# Patient Record
Sex: FEMALE | Race: WHITE | NOT HISPANIC OR LATINO | Employment: OTHER | URBAN - METROPOLITAN AREA
[De-identification: names, ages, dates, MRNs, and addresses within clinical notes are randomized per-mention and may not be internally consistent; named-entity substitution may affect disease eponyms.]

---

## 2017-02-22 ENCOUNTER — OFFICE VISIT - RIVER FALLS (OUTPATIENT)
Dept: FAMILY MEDICINE | Facility: CLINIC | Age: 62
End: 2017-02-22

## 2017-05-02 RX ORDER — FUROSEMIDE 80 MG
TABLET ORAL
Qty: 180 TABLET | Refills: 3 | OUTPATIENT
Start: 2017-05-02

## 2017-06-12 ENCOUNTER — TRANSFERRED RECORDS (OUTPATIENT)
Dept: HEALTH INFORMATION MANAGEMENT | Facility: CLINIC | Age: 62
End: 2017-06-12

## 2017-06-21 RX ORDER — FUROSEMIDE 80 MG
TABLET ORAL
Qty: 180 TABLET | Refills: 3 | OUTPATIENT
Start: 2017-06-21

## 2017-09-03 ENCOUNTER — OFFICE VISIT - RIVER FALLS (OUTPATIENT)
Dept: FAMILY MEDICINE | Facility: CLINIC | Age: 62
End: 2017-09-03

## 2017-09-03 ASSESSMENT — MIFFLIN-ST. JEOR: SCORE: 1068.89

## 2017-09-28 ENCOUNTER — OFFICE VISIT - RIVER FALLS (OUTPATIENT)
Dept: FAMILY MEDICINE | Facility: CLINIC | Age: 62
End: 2017-09-28

## 2017-10-17 ENCOUNTER — OFFICE VISIT - RIVER FALLS (OUTPATIENT)
Dept: FAMILY MEDICINE | Facility: CLINIC | Age: 62
End: 2017-10-17

## 2017-10-17 LAB
CREAT SERPL-MCNC: 0.74 MG/DL
GLUCOSE BLD-MCNC: 105 MG/DL

## 2017-10-17 ASSESSMENT — MIFFLIN-ST. JEOR: SCORE: 1070.7

## 2017-10-18 LAB
CREAT SERPL-MCNC: 0.64 MG/DL (ref 0.5–0.99)
GLUCOSE BLD-MCNC: 118 MG/DL (ref 65–99)

## 2017-10-31 ENCOUNTER — TRANSFERRED RECORDS (OUTPATIENT)
Dept: HEALTH INFORMATION MANAGEMENT | Facility: CLINIC | Age: 62
End: 2017-10-31

## 2017-12-11 ENCOUNTER — TRANSFERRED RECORDS (OUTPATIENT)
Dept: HEALTH INFORMATION MANAGEMENT | Facility: CLINIC | Age: 62
End: 2017-12-11

## 2018-02-13 ENCOUNTER — TRANSFERRED RECORDS (OUTPATIENT)
Dept: HEALTH INFORMATION MANAGEMENT | Facility: CLINIC | Age: 63
End: 2018-02-13

## 2018-02-15 ENCOUNTER — TELEPHONE (OUTPATIENT)
Dept: TRANSPLANT | Facility: CLINIC | Age: 63
End: 2018-02-15

## 2018-02-15 VITALS — HEIGHT: 63 IN | BODY MASS INDEX: 21.26 KG/M2 | WEIGHT: 120 LBS

## 2018-02-15 NOTE — TELEPHONE ENCOUNTER
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     Clinic RN Appt (Only Adult Kidney, Liver and Pancreas Referrals): Y or N    Preferred Evaluation Start Date:  ASAP     Online Forms    Best time patient can be reached: anytime    Type of packet sent:  Liver packet    Misc. Notes: May speak with emergency contacts listed in OTTR.     Verbal Consent: Y     Email Consent: Y or N    If no PCP or referring information the time of call informed pt to call back with information: Y or N    Informed patient to call if doesn't receive packet and or phone call from coordinator within given time- Y    Insurance- Medicare-[id number]- [employer]- [id number] Group- [id number]

## 2018-02-15 NOTE — LETTER
February 15, 2018    Sherrie Lala  [address] [place] [id number]    Dear Sherrie,    Thank you for your interest in the Transplant Center at [facility], [facility]. We look forward to being a part of your care team and assisting you through the transplant process.    As we discussed, your transplant coordinator is Marifer Leija (Liver).  You may call your coordinator at any time with questions or concerns , call [phone]- option [phone].    Please complete the following.    1. Fill out and return the enclosed forms    Authorization for Electronic Communication    Authorization to Discuss Protected Health Information    [employer] Release of Information    2. Sign up for:    [employer], access to your electronic medical record (see enclosed pamphlet)    [email or web address], a transplant education website (see enclosed booklet)    You can use these tools to learn more about your transplant, communicate with your care team, and track your medical details    Sincerely,      Solid Organ Transplant  [facility], [facility]    cc: Care Team

## 2018-02-20 ENCOUNTER — TRANSFERRED RECORDS (OUTPATIENT)
Dept: HEALTH INFORMATION MANAGEMENT | Facility: CLINIC | Age: 63
End: 2018-02-20

## 2018-02-20 LAB
ALT SERPL-CCNC: 28 IU/L (ref 8–45)
AST SERPL-CCNC: 36 IU/L (ref 2–40)
CREAT SERPL-MCNC: 0.7 MG/DL (ref 0.57–1.11)
GFR SERPL CREATININE-BSD FRML MDRD: >60 ML/MIN/1.73M2
GLUCOSE SERPL-MCNC: 107 MG/DL (ref 65–100)
POTASSIUM SERPL-SCNC: 3.3 MMOL/L (ref 3.5–5)

## 2018-02-21 NOTE — TELEPHONE ENCOUNTER
61 y/o referral who follows with Dr. Lilly at [facility] and is s/p resection for CCA more than 5 years ago. Patient did have an ablation to right lung met in 2014, but has been clear of recurrent disease since. She is being referred for txp evaluation as she is starting to show signs of decompensation in the form of varices requiring banding, HE that is controlled on meds, and most recently ascites leading to pleural effusions and pleurex cath getting inserted. The IR team at [facility] would like to try placing a TIPS to see if that would work to help with fluid, but would like patient to have an evaluation so transplant is in the back pocket just in case of further decompensation.     Ascites - yes with pleural effusion that lead to pluerex cath placement  HE - yes but controlled on meds  GIB - x 1 in 2016  EGD - with banding followed at [facility]  Colon - 2011 @ [facility] (need records)    Plan: full evaluation 2/26 without GI that day as she follows with Dr. Lilly at [facility].     Following per Dr. Lilly's last visit with her on 12/11/17:  Ms. Lala returns for followup now more than 5 years status post resection of a cholangiocarcinoma. She has, however, had evidence of ongoing portal hypertension manifested by esophageal varices as well as initially refractory ascites and now more recently a pleural effusion. She is status post pleurodesis at the end of October and has not required a thoracentesis since then.    She does note some abdominal discomfort. She has some difficulty with food intolerances, getting somewhat gassy with certain foods. She denies any itching or skin rash. She has mild fatigue. She denies any increased abdominal girth. She does have some lower extremity edema. She has not had any gastrointestinal bleeding or any overt signs of hepatic encephalopathy. She denies any fevers or chills, cough. She does have some shortness of breath. She denies any nausea or vomiting, diarrhea or constipation. Her appetite  is poor, but her weight has been for the most part stable.

## 2018-02-22 ENCOUNTER — MEDICAL CORRESPONDENCE (OUTPATIENT)
Dept: HEALTH INFORMATION MANAGEMENT | Facility: CLINIC | Age: 63
End: 2018-02-22

## 2018-02-23 ENCOUNTER — TELEPHONE (OUTPATIENT)
Dept: TRANSPLANT | Facility: CLINIC | Age: 63
End: 2018-02-23

## 2018-02-23 ENCOUNTER — APPOINTMENT (OUTPATIENT)
Dept: TRANSPLANT | Facility: CLINIC | Age: 63
End: 2018-02-23
Attending: INTERNAL MEDICINE
Payer: MEDICARE

## 2018-02-23 ENCOUNTER — TRANSFERRED RECORDS (OUTPATIENT)
Dept: HEALTH INFORMATION MANAGEMENT | Facility: CLINIC | Age: 63
End: 2018-02-23

## 2018-02-23 DIAGNOSIS — C22.1 CHOLANGIOCARCINOMA (H): ICD-10-CM

## 2018-02-23 DIAGNOSIS — R18.8 OTHER ASCITES: ICD-10-CM

## 2018-02-23 DIAGNOSIS — M89.9 DISORDER OF BONE: ICD-10-CM

## 2018-02-23 DIAGNOSIS — E55.9 VITAMIN D DEFICIENCY: ICD-10-CM

## 2018-02-23 DIAGNOSIS — Z79.899 OTHER LONG TERM (CURRENT) DRUG THERAPY: ICD-10-CM

## 2018-02-23 DIAGNOSIS — R18.8 CIRRHOSIS OF LIVER WITH ASCITES, UNSPECIFIED HEPATIC CIRRHOSIS TYPE (H): Primary | ICD-10-CM

## 2018-02-23 DIAGNOSIS — K76.9 LIVER DISEASE: ICD-10-CM

## 2018-02-23 DIAGNOSIS — K74.60 CIRRHOSIS OF LIVER WITH ASCITES, UNSPECIFIED HEPATIC CIRRHOSIS TYPE (H): Primary | ICD-10-CM

## 2018-02-23 DIAGNOSIS — K76.6 PORTAL HYPERTENSION (H): ICD-10-CM

## 2018-02-23 NOTE — TELEPHONE ENCOUNTER
I called Sherrie to introduce myself and discuss timing of the liver evaluation that Tye, her coordinator, placed orders for.    She agreed to attend appointments on Monday, February 26 and Tuesday, February 27.    For the specialist, she asked me to avoid the week of March 5; she is also not available from April 3 to April 10.  I told her that I would send the schedule via email to [email or web address] and also send the packet via [employer] next day air.    Zakiya Hinton  Pre-Liver Transplant   [phone]

## 2018-02-23 NOTE — LETTER
February 23, 2018      LIVER TRANSPLANT EVALUATION SCHEDULE    Patient:   Sherrie Lala  MR#:    [id number]  Coordinator:  Marifer Leija  [phone]  :     Zakiya DUKE   [phone]  Location:    [facility]  Dates:   February 26, February 27, March 20 and March 28    This is your evaluation schedule, please follow dates and times.  You will receive reminder phone calls for other tests, but please follow this schedule only!  If you have any questions about dates and times, please call us on the number listed above.  Thank you, Transplant Services       Do not take calcium pills or supplements 48 hours before the DEXA scan    No food or drink after midnight (Sunday) for labs & ultrasound (you may only have small amounts of water)    Day/Date:  Monday, February 26, 2018  Time Location Activity   7:30 am [facility] Waiting Room  (2nd floor [facility],  [address]; [place] [address]) Liver Ultrasound with dopplers    NO FOOD OR DRINK AFTER MIDNIGHT   8:30 am [facility] - 2nd Floor/Entrance  [facility] Take [facility] to Clinics & Surgery Center  (The shuttle is white with maroon letters)   9:00 am Imaging and Lab testing  (1st [facility],  [address]; [place] [address]) Blood and urine tests     NO FOOD OR DRINK AFTER MIDNIGHT   10:00 am to 11:30 am Transplant Services  (3rd floor [facility] and Surgery [place]) Pre-transplant class   With class Transplant Services  (3rd floor [facility] and [facility] [place]) Meet with Marifer Leija RN,  Transplant Coordinator   11:30 am [place] for [facility]   ([facility] floor [facility] and [facility] [place]) Pulmonary Function Tests  PLEASE DO NOT WEAR ANY PERFUME, DEODORANT OR SCENTED PRODUCTS.   12:30 pm Imaging and Lab Testing  (1st floor [facility] and [facility] [place]) Dexa Scan  DO NOT TAKE CALCIUM PILLS OR SUPPLEMENTS 48 HOURS BEFORE THE TEST   1:00 pm Transplant Services  ([address] [place] [address] and  Surgery Center) Appointment with Alyssa Ramirez,       * IF ONLINE CHECK IN IS NOT DONE, YOU WILL NEED TO CHECK IN AT LEAST 15 MINUTES EARLIER THAN THE FIRST SCHEDULED APPOINTMENT *        No beta blockers the day before or day of the the dobutamine stress echo    No caffeine/alcohol twelve (12) hours before the dobutamine stress echo    No food or drink three (3) hours before the dobutamine stress echo    Day/Date:  Tuesday, February 27, 2018  Time Location Activity   8:30 am Transplant Services  (3rd floor [facility],  [address] [place] [address]; [facility] [address]) Review evaluation process & daily schedule   9:00 am Transplant Services  (3rd floor [facility]) Appointment with Helga Porter,  Registered Dietitian   9:30 am Transplant Services  (3rd floor [facility]) Appointment with Dr. Dias,  Transplant Surgeon   10:15 am Transplant Services  (3rd floor [facility]) Check out with the Nursing Staff   12:30 pm [facility] - 1st Floor/Entrance  [facility]  Take [facility] to Hospital  (The shuttle is white with maroon letters)   1:00 pm [facility] Waiting Room  (2nd floor [facility],  [address]; [facility] [address]) Dobutamine Stress Echo  See enclosed instructions for test!    No food or drink three (3) hours before test    No caffeine/alcohol twelve (12) hrs before test    No beta blockers the day before or day of the test     I saved these cardiology and pulmonary appointments for you; if they don t work with your schedule, please call me and we can find a different date and/or time for you.    Day/Date:  Tuesday, March 20, 2018  Time Location Activity   2:30 pm [facility]  (3rd floor [facility],  [address] [place] [address]; [facility] [address]) Appointment with Dr. Luo,  Cardiology       Day/Date:  Wednesday, March 28, 2018  Time Location Activity   9:00 am [facility]    (3rd floor [facility],  [address]; [facility] [address]) Appointment with Dr. Morrow,  Pulmonary       Day/Date:  Every Thursday *OPTIONAL*  Time Location Activity   12:00 pm to 1:30 pm Liver Transplant Support Group  [address]; [facility] [address]  Hospital Station 7B  Room [phone] Every Thursday *OPTIONAL*

## 2018-02-23 NOTE — Clinical Note
K - so sorry about this, but she is coming in next week. She is prep and knows about coming. Please do what you can for at least Monday and Tuesday (tuesday shouldn't be a problem given block).  Labs, US, and DSE would be the important testing to be done Monday or Tuesday. Other stuff can be done after.  Thank you so much, tk

## 2018-02-23 NOTE — TELEPHONE ENCOUNTER
Message  Received: Today       Jairo Early Jr., RN  SonyZakiya                     She is coming in next week. She is prep and knows about coming. Please do what you can for at least Monday and Tuesday (tuesday shouldn't be a problem given block). Labs, US, and DSE would be the important testing to be done Monday or Tuesday. Other stuff can be done after.           Orders Only for Transplant Evaluation  2/23/2018       Jairo Early Jr., RN - [facility] Solid Organ Transplant Encounter Summary       Diagnoses       Cirrhosis Of Liver With Ascites, Unspecified Hepatic Cirrhosis Type (h) (Primary)       Cholangiocarcinoma (H); Disorder of bone; Other long term (current) drug therapy; Liver disease; Other ascites; Vitamin D deficiency; Portal hypertension (H)                Orders Signed This Encounter (39)      Cancer antigen 19-9        Hepatitis C antibody        Ethyl Glucuronide Urine        UA reflex to Microscopic and Culture        Anti Treponema        HIV Antigen Antibody Combo Pretransplant        Hepatitis B surface antigen        Hepatitis B Surface Antibody        Hepatitis B core antibody        Hepatitis A Antibody IgG        EBV Capsid Antibody IgG        CMV Antibody IgG        Protein  random urine with Creat Ratio        CBC with platelets        INR        Vitamin D Deficiency        Transferrin        TSH with free T4 reflex        Phosphorus        M Tuberculosis by Quantiferon        HCG qualitative (female only)        AFP tumor marker        Hepatic panel        Lipid Profile        Basic metabolic panel        ABO type [OWA3761]        ABO/Rh type and screen        Antibody titer red cell        PULMONARY MEDICINE REFERRAL        PFT Lab Testing        Pulse oximetry nursing        Echo dobutamine stress test        Dexa hip/pelvis/spine [[id number]]        US Abdomen Complete w Doppler Complete        NUTRITION REFERRAL        CARDIOLOGY EVAL ADULT REFERRAL          REFERRAL        GENERAL SURG ADULT REFERRAL        Pre-Transplant Class

## 2018-02-23 NOTE — PROGRESS NOTES
62-year-old female with history of cholangiocarcinoma.    Status post-radioembolization and resection with the posterior right   hepatic lobe remaining as well as a radiofrequency ablation of a   biopsy-proven pulmonary metastasis the right lower lobe however this was   multiple years ago with the trisegmentectomy occurring in 2012 and   subsequent narrowing of the portal vein which ultimately thrombosed some   time in 2014 between March and November with development of severe   portosystemic collaterals developing subsequently and varices, ascites and   recurrent right pleural effusion, now requested to have a TIPS procedure   with recanalization of the chronically thrombosed main portal vein.

## 2018-02-26 ENCOUNTER — OFFICE VISIT (OUTPATIENT)
Dept: TRANSPLANT | Facility: CLINIC | Age: 63
End: 2018-02-26
Attending: INTERNAL MEDICINE
Payer: MEDICARE

## 2018-02-26 ENCOUNTER — APPOINTMENT (OUTPATIENT)
Dept: LAB | Facility: CLINIC | Age: 63
End: 2018-02-26
Payer: COMMERCIAL

## 2018-02-26 ENCOUNTER — HOSPITAL ENCOUNTER (OUTPATIENT)
Dept: ULTRASOUND IMAGING | Facility: CLINIC | Age: 63
Discharge: HOME | End: 2018-02-26
Attending: INTERNAL MEDICINE | Admitting: INTERNAL MEDICINE
Payer: MEDICARE

## 2018-02-26 ENCOUNTER — RADIANT APPOINTMENT (OUTPATIENT)
Dept: BONE DENSITY | Facility: CLINIC | Age: 63
End: 2018-02-26
Attending: INTERNAL MEDICINE
Payer: COMMERCIAL

## 2018-02-26 DIAGNOSIS — R18.8 CIRRHOSIS OF LIVER WITH ASCITES, UNSPECIFIED HEPATIC CIRRHOSIS TYPE (H): ICD-10-CM

## 2018-02-26 DIAGNOSIS — K76.6 PORTAL HYPERTENSION (H): ICD-10-CM

## 2018-02-26 DIAGNOSIS — C22.1 CHOLANGIOCARCINOMA (H): ICD-10-CM

## 2018-02-26 DIAGNOSIS — Z76.82 AWAITING ORGAN TRANSPLANT STATUS: Primary | ICD-10-CM

## 2018-02-26 DIAGNOSIS — K74.60 CIRRHOSIS OF LIVER WITH ASCITES, UNSPECIFIED HEPATIC CIRRHOSIS TYPE (H): ICD-10-CM

## 2018-02-26 DIAGNOSIS — M89.9 DISORDER OF BONE: ICD-10-CM

## 2018-02-26 DIAGNOSIS — E55.9 VITAMIN D DEFICIENCY: ICD-10-CM

## 2018-02-26 DIAGNOSIS — C22.1 CHOLANGIOCARCINOMA (H): Primary | ICD-10-CM

## 2018-02-26 DIAGNOSIS — Z79.899 OTHER LONG TERM (CURRENT) DRUG THERAPY: ICD-10-CM

## 2018-02-26 DIAGNOSIS — K76.9 LIVER DISEASE: ICD-10-CM

## 2018-02-26 DIAGNOSIS — R18.8 OTHER ASCITES: ICD-10-CM

## 2018-02-26 LAB
ABO + RH BLD: NORMAL
ABO + RH BLD: NORMAL
AFP SERPL-MCNC: <1.5 UG/L (ref 0–8)
ALBUMIN SERPL-MCNC: 3 G/DL (ref 3.4–5)
ALBUMIN UR-MCNC: NEGATIVE MG/DL
ALP SERPL-CCNC: 156 U/L (ref 40–150)
ALT SERPL W P-5'-P-CCNC: 34 U/L (ref 0–50)
ANION GAP SERPL CALCULATED.3IONS-SCNC: 8 MMOL/L (ref 3–14)
APPEARANCE UR: CLEAR
AST SERPL W P-5'-P-CCNC: 42 U/L (ref 0–45)
BILIRUB DIRECT SERPL-MCNC: 0.6 MG/DL (ref 0–0.2)
BILIRUB SERPL-MCNC: 2.2 MG/DL (ref 0.2–1.3)
BILIRUB UR QL STRIP: NEGATIVE
BLD GP AB SCN TITR SERPL: NORMAL {TITER}
BUN SERPL-MCNC: 16 MG/DL (ref 7–30)
CALCIUM SERPL-MCNC: 8.4 MG/DL (ref 8.5–10.1)
CHLORIDE SERPL-SCNC: 102 MMOL/L (ref 94–109)
CHOLEST SERPL-MCNC: 185 MG/DL
CMV IGG SERPL QL IA: 0.2 AI (ref 0–0.8)
CO2 SERPL-SCNC: 29 MMOL/L (ref 20–32)
COLOR UR AUTO: YELLOW
CREAT SERPL-MCNC: 0.69 MG/DL (ref 0.52–1.04)
CREAT UR-MCNC: 146 MG/DL
DEPRECATED CALCIDIOL+CALCIFEROL SERPL-MC: 12 UG/L (ref 20–75)
EBV VCA IGG SER QL IA: 7.7 AI (ref 0–0.8)
ERYTHROCYTE [DISTWIDTH] IN BLOOD BY AUTOMATED COUNT: 15.5 % (ref 10–15)
GFR SERPL CREATININE-BSD FRML MDRD: 86 ML/MIN/1.7M2
GLUCOSE SERPL-MCNC: 78 MG/DL (ref 70–99)
GLUCOSE UR STRIP-MCNC: NEGATIVE MG/DL
HAV IGG SER QL IA: REACTIVE
HBV CORE AB SERPL QL IA: NONREACTIVE
HBV SURFACE AB SERPL IA-ACNC: 933.57 M[IU]/ML
HBV SURFACE AG SERPL QL IA: NONREACTIVE
HCG SERPL QL: POSITIVE
HCT VFR BLD AUTO: 40.4 % (ref 35–47)
HCV AB SERPL QL IA: NONREACTIVE
HDLC SERPL-MCNC: 93 MG/DL
HGB BLD-MCNC: 13.9 G/DL (ref 11.7–15.7)
HGB UR QL STRIP: NEGATIVE
HIV 1+2 AB+HIV1 P24 AG SERPL QL IA: NONREACTIVE
HYALINE CASTS #/AREA URNS LPF: 1 /LPF (ref 0–2)
INR PPP: 1.37 (ref 0.86–1.14)
KETONES UR STRIP-MCNC: NEGATIVE MG/DL
LDLC SERPL CALC-MCNC: 82 MG/DL
LEUKOCYTE ESTERASE UR QL STRIP: NEGATIVE
MCH RBC QN AUTO: 31 PG (ref 26.5–33)
MCHC RBC AUTO-ENTMCNC: 34.4 G/DL (ref 31.5–36.5)
MCV RBC AUTO: 90 FL (ref 78–100)
MUCOUS THREADS #/AREA URNS LPF: PRESENT /LPF
NITRATE UR QL: NEGATIVE
NONHDLC SERPL-MCNC: 92 MG/DL
PH UR STRIP: 5 PH (ref 5–7)
PHOSPHATE SERPL-MCNC: 3.1 MG/DL (ref 2.5–4.5)
PLATELET # BLD AUTO: 50 10E9/L (ref 150–450)
POTASSIUM SERPL-SCNC: 3.1 MMOL/L (ref 3.4–5.3)
PROT SERPL-MCNC: 6.4 G/DL (ref 6.8–8.8)
PROT UR-MCNC: 0.09 G/L
PROT/CREAT 24H UR: 0.06 G/G CR (ref 0–0.2)
RBC # BLD AUTO: 4.48 10E12/L (ref 3.8–5.2)
RBC #/AREA URNS AUTO: 1 /HPF (ref 0–2)
SODIUM SERPL-SCNC: 138 MMOL/L (ref 133–144)
SOURCE: ABNORMAL
SP GR UR STRIP: 1.02 (ref 1–1.03)
SPECIMEN EXP DATE BLD: NORMAL
SQUAMOUS #/AREA URNS AUTO: 3 /HPF (ref 0–1)
T PALLIDUM IGG+IGM SER QL: NEGATIVE
T4 FREE SERPL-MCNC: 1.06 NG/DL (ref 0.76–1.46)
TRANSFERRIN SERPL-MCNC: 239 MG/DL (ref 210–360)
TRIGL SERPL-MCNC: 54 MG/DL
TSH SERPL DL<=0.005 MIU/L-ACNC: 0.02 MU/L (ref 0.4–4)
UROBILINOGEN UR STRIP-MCNC: 0 MG/DL (ref 0–2)
WBC # BLD AUTO: 4.1 10E9/L (ref 4–11)
WBC #/AREA URNS AUTO: 1 /HPF (ref 0–2)

## 2018-02-26 PROCEDURE — 84156 ASSAY OF PROTEIN URINE: CPT | Performed by: INTERNAL MEDICINE

## 2018-02-26 PROCEDURE — 86850 RBC ANTIBODY SCREEN: CPT | Performed by: INTERNAL MEDICINE

## 2018-02-26 PROCEDURE — 80048 BASIC METABOLIC PNL TOTAL CA: CPT | Performed by: INTERNAL MEDICINE

## 2018-02-26 PROCEDURE — 80321 ALCOHOLS BIOMARKERS 1OR 2: CPT | Performed by: INTERNAL MEDICINE

## 2018-02-26 PROCEDURE — 86665 EPSTEIN-BARR CAPSID VCA: CPT | Performed by: INTERNAL MEDICINE

## 2018-02-26 PROCEDURE — 85027 COMPLETE CBC AUTOMATED: CPT | Performed by: INTERNAL MEDICINE

## 2018-02-26 PROCEDURE — 86901 BLOOD TYPING SEROLOGIC RH(D): CPT | Performed by: INTERNAL MEDICINE

## 2018-02-26 PROCEDURE — 86644 CMV ANTIBODY: CPT | Performed by: INTERNAL MEDICINE

## 2018-02-26 PROCEDURE — 80076 HEPATIC FUNCTION PANEL: CPT | Performed by: INTERNAL MEDICINE

## 2018-02-26 PROCEDURE — 93975 VASCULAR STUDY: CPT | Mod: TC

## 2018-02-26 PROCEDURE — 84703 CHORIONIC GONADOTROPIN ASSAY: CPT | Performed by: INTERNAL MEDICINE

## 2018-02-26 PROCEDURE — 84439 ASSAY OF FREE THYROXINE: CPT | Performed by: INTERNAL MEDICINE

## 2018-02-26 PROCEDURE — 86886 COOMBS TEST INDIRECT TITER: CPT | Performed by: INTERNAL MEDICINE

## 2018-02-26 PROCEDURE — 84100 ASSAY OF PHOSPHORUS: CPT | Performed by: INTERNAL MEDICINE

## 2018-02-26 PROCEDURE — 86301 IMMUNOASSAY TUMOR CA 19-9: CPT | Performed by: INTERNAL MEDICINE

## 2018-02-26 PROCEDURE — 80061 LIPID PANEL: CPT | Performed by: INTERNAL MEDICINE

## 2018-02-26 PROCEDURE — 87340 HEPATITIS B SURFACE AG IA: CPT | Performed by: INTERNAL MEDICINE

## 2018-02-26 PROCEDURE — 82306 VITAMIN D 25 HYDROXY: CPT | Performed by: INTERNAL MEDICINE

## 2018-02-26 PROCEDURE — 86706 HEP B SURFACE ANTIBODY: CPT | Performed by: INTERNAL MEDICINE

## 2018-02-26 PROCEDURE — 86780 TREPONEMA PALLIDUM: CPT | Performed by: INTERNAL MEDICINE

## 2018-02-26 PROCEDURE — 86905 BLOOD TYPING RBC ANTIGENS: CPT | Performed by: INTERNAL MEDICINE

## 2018-02-26 PROCEDURE — 86870 RBC ANTIBODY IDENTIFICATION: CPT | Performed by: INTERNAL MEDICINE

## 2018-02-26 PROCEDURE — 86803 HEPATITIS C AB TEST: CPT | Performed by: INTERNAL MEDICINE

## 2018-02-26 PROCEDURE — 82105 ALPHA-FETOPROTEIN SERUM: CPT | Performed by: INTERNAL MEDICINE

## 2018-02-26 PROCEDURE — 84443 ASSAY THYROID STIM HORMONE: CPT | Performed by: INTERNAL MEDICINE

## 2018-02-26 PROCEDURE — 86704 HEP B CORE ANTIBODY TOTAL: CPT | Performed by: INTERNAL MEDICINE

## 2018-02-26 PROCEDURE — 84466 ASSAY OF TRANSFERRIN: CPT | Performed by: INTERNAL MEDICINE

## 2018-02-26 PROCEDURE — 85610 PROTHROMBIN TIME: CPT | Performed by: INTERNAL MEDICINE

## 2018-02-26 PROCEDURE — 86708 HEPATITIS A ANTIBODY: CPT | Performed by: INTERNAL MEDICINE

## 2018-02-26 PROCEDURE — 40000866 ZZHCL STATISTIC HIV 1/2 ANTIGEN/ANTIBODY PRETRANSPLANT ONLY: Performed by: INTERNAL MEDICINE

## 2018-02-26 PROCEDURE — 86480 TB TEST CELL IMMUN MEASURE: CPT | Performed by: INTERNAL MEDICINE

## 2018-02-26 PROCEDURE — 86900 BLOOD TYPING SEROLOGIC ABO: CPT | Performed by: INTERNAL MEDICINE

## 2018-02-26 PROCEDURE — 81001 URINALYSIS AUTO W/SCOPE: CPT | Performed by: INTERNAL MEDICINE

## 2018-02-26 NOTE — PROGRESS NOTES
"Psychosocial Assessment for Liver Transplant Evaluation  Sherrie aLla was seen in the Transplant Center as part of her evaluation as a potential liver transplant recipient.  She was accompanied by her  Bryant.  Living Situation: Sherrie is a [age] year old woman who lives with her  Ash \"Bryant\" in [place], [place].  They live approximately forty miles away from the transplant center.  Sherrie and Bryant have lived in the same house since  and they have no mortgage.  They both deny any concerns with their living situation.  Education/Employment: Sherrie graduated high school and completed a two year nursing degree.  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  She is not a .  Financial /Income: Sherrie and Bryant both receive Social Security benefits.  Sherrie receives disability benefits and Bryant receives [employer] benefits.  Sherrie and Bryant also have [employer] accounts.  They deny any financial concerns.  Health Insurance:  Sherrie has Medicare and a supplement through [employer].  I provided education about Medicare Part B coverage for immunosuppression medications.  Sherrie is currently in her Part D coverage gap, mostly due to the high cost of Xifaxan.  This writer talked with Sherrie and Bryant about the financial risks of transplant, particularly about the high cost of transplant related medications and the importance of maintaining adequate health insurance coverage.  Family/Social Support: Sherrie and Bryant have been  for thirty years.  Bryant is involved and supportive as demonstrated today.  He is retired and able to provide post transplant care giving.  Sherrie has two adult children from a previous relationship.  Her daughter Ramírez lives in [place], [place] and her son Ash lives in [place].  Sherrie's parents are .  She has four siblings, two brothers ([place] and [place]) and two sisters ([place] and [place]).   Sherrie anticipates her sister from [place] may come at " the time of surgery.   This writer stressed the importance of having a stable and involved support network before and after transplant.  Provided Sherrie and Bryant with education about the relationship between a stable support system and better surgical and post-transplant outcomes compared to patients with a limited support system.    Functional Status: Sherrie is currently independent with her personal cares, ambulation, medication management and driving.  She denies any non-compliance with medications.  Chemical Dependency:  Sherrie denies any history of using tobacco products, pain medication overuse or alcohol abuse/dependency.  Sherrie reports her last consumption of alcohol to be [place] of 2015.  She did try marijuana during her chemotherapy treatments back in 2011 but reports it was not helpful.  She denies any other illicit drug use.  She has no history of chemical dependency treatment.  Sherrie verbalizes understanding out policy on completed abstinence from all drugs and alcohol.    Mental Health: Sherrie denies any mental health history.  She specifically denies any history of suicidal ideation or hospitalization for mental health treatment.  PHQ-9 score today: 4  BRENDA-7 score today: 3  Adjustment to Illness:  Sherrie says she feels too healthy to require a transplant, though she understands her treatment options are becoming more limited.  She has struggled with hepatic encephalopathy but it is currently well controlled with Xifaxan.   She is coping appropriately, with a normal amount of worry.   This writer provided Sherrie and Bryant with supportive counseling throughout this interview.  This writer also encouraged Sherrie and her  to attend the liver transplant support group for additional support and encouragement.   Impression/Recommendations:   Sherrie and her  Bryant verbalize understanding the psychosocial risks of transplant and teaching provided during this evaluation.  There are no contraindications to transplant  from a psychosocial perspective.  Sherrie has adequate finances and health insurance for transplant, a stable living situation and an involved support system.  We reviewed the Caregiver Agreement for Liver Transplantation.  Sherrie would have adequate care giving from her  Bryant and daughter Ramírez who lives in a nearby city.   There are no chemical or mental health concerns.  Sherrie is a good transplant candidate from a psychosocial perspective.  This writer will remain available to assist patient throughout the evaluation process and will follow patient through transplant if she is listed.  It was a pleasure to evaluate this patient for liver transplant.   Teaching completed during assessment:  1.     Housing and relocation needs post transplant.  2.     Caregiver needs post transplant.  3.     Financial issues related to transplant.  4.     Risks of alcohol use post transplant.  5.     Common psychosocial stressors pre/post transplant.          6.     Liver Transplant support group availability.          7.     [employer] Directive-Sherrie completed a directive in the past.  She does not have the original, though she believes a copy is available at [facility].  I provided a [place] directive and patient plans to complete this.             Psychosocial Risks of Transplant Reviewed:  1.     Increased stress related to your emotional, family, social, employment, or   financial situation.  2.     Affect on work and/or disability benefits.  3.     Affect on future health and life insurance.  4.     Transplant outcome expectations may not be met.  5.     Mental Health risks: anxiety, depression, PTSD, guilt, grief and chronic fatigue.     LÁZARO Interiano

## 2018-02-26 NOTE — MR AVS SNAPSHOT
After Visit Summary   2/26/2018    Sherrie Lala    MRN: [id number]           Patient Information     Date Of Birth          1955        Visit Information        Provider Department      2/26/2018 1:00 PM Alyssa Ramirez MSW [facility] Solid Organ Transplant        Today's Diagnoses     Awaiting organ transplant status    -  1       Follow-ups after your visit        Your next 10 appointments already scheduled     Feb 27, 2018  8:30 AM CST   (Arrive by 8:15 AM)   New Patient Visit with  TXC EVALUATION   [facility] Solid Organ Transplant ([facility])    [address] [place] [address]  [address]  [facility] [address]   [phone]            Feb 27, 2018  9:00 AM CST   NUTRITION VISIT with Clarissa Porter RD   [facility] Solid Organ Transplant ([facility])    [address] [place] [address]  [address]  [facility] [address]   [phone]            Feb 27, 2018  9:30 AM CST   (Arrive by 9:15 AM)   Liver Transplant Pre-Op with Gianfranco Dias MD   [facility] Solid Organ Transplant ([facility])    [address] [place] [address]  [address]  [facility] [address]   [phone]            Feb 27, 2018  1:00 PM CST   Ech Dobutamine Stress Test with [id number]   [facility] [place],  [facility] ([facility])    [address] [facility] [address] [phone]           1.  Please bring or wear a comfortable two-piece outfit and walking shoes. 2.  Stop eating 3 hours before the test. You may drink water or juice. 3.  Stop all caffeine 12 hours before the test. This includes coffee, tea, soda pop, chocolate and certain medicines (such as Anacin and Excederin). Also avoid decaf coffee and tea, as these contain small amounts of caffeine. 4.  No alcohol, smoking or use of other tobacco products for 12 hours before the test. 5.  Refer to your provider instructions to see if you need to stop any medications  (such as beta-blockers or nitrates) for this test. 6.  For patients with diabetes: -   If you take insulin, call your diabetes care team. Ask if you should take a   dose the morning of your test. -   If you take diabetes medicine by mouth, don't take it on the morning of your test. Bring it with you to take after the test.  (If you have questions, call your diabetes care team) 7.  When you arrive, please tell us if: -   You have diabetes. -   You have taken Viagra, Cialis or Levitra in the past 48 hours. 8.  For any questions that cannot be answered, please contact the ordering physician            Mar 20, 2018  2:30 PM CDT   (Arrive by 2:15 PM)   NEW LIVER EVALUATION with MAGGY Luo MD   [facility] ([facility])    [address] [place] [address]  [address] [place] [address] [phone]            Mar 28, 2018  9:00 AM CDT   (Arrive by 8:45 AM)   New Patient Visit with Raj Morrow MD   [facility] ([facility])    [address] [place] [address]  [address] [place] [address] [phone]              Who to contact     If you have questions or need follow up information about today's clinic visit or your schedule please contact [facility] SOLID ORGAN TRANSPLANT directly at [phone].  Normal or non-critical lab and imaging results will be communicated to you by Footwayhart, letter or phone within 4 business days after the clinic has received the results. If you do not hear from us within 7 days, please contact the clinic through Footwayhart or phone. If you have a critical or abnormal lab result, we will notify you by phone as soon as possible.  Submit refill requests through Tequila Mobile or call your pharmacy and they will forward the refill request to us. Please allow 3 business days for your refill to be completed.          Additional Information About Your Visit        Tequila Mobile Information     Tequila Mobile lets you send messages  "to your doctor, view your test results, renew your prescriptions, schedule appointments and more. To sign up, go to [email or web address] . Click on \"Log in\" on the left side of the screen, which will take you to the Welcome page. Then click on \"Sign up Now\" on the right side of the page.     You will be asked to enter the access code listed below, as well as some personal information. Please follow the directions to create your username and password.     Your access code is: [email or web address]  Expires: 2018  4:06 PM     Your access code will  in 90 days. If you need help or a new code, please call your [place] clinic or [phone].        Care EveryWhere ID     This is your Care EveryWhere ID. This could be used by other organizations to access your [place] medical records  [id number]         Blood Pressure from Last 3 Encounters:   No data found for BP    Weight from Last 3 Encounters:   02/15/18 54.4 kg (120 lb)              Today, you had the following     No orders found for display       Primary Care Provider Fax #    Physician No Ref-Primary [phone]       No address on file        Equal Access to Services     [facility] : Hadii lucien Ordonez, wadelmyda yamile, qaybta kaalmada kari, nahed alfaro . So [facility] [phone].    ATENCIÓN: Si habla español, tiene a bucio disposición servicios gratuitos de asistencia lingüística. Neville al [phone].    We comply with applicable federal civil rights laws and [place] laws. We do not discriminate on the basis of race, color, national origin, age, disability, sex, sexual orientation, or gender identity.            Thank you!     Thank you for choosing [facility] SOLID ORGAN TRANSPLANT  for your care. Our goal is always to provide you with excellent care. Hearing back from our patients is one way we can continue to improve our services. Please take a few minutes to complete the written survey that you may " receive in the mail after your visit with us. Thank you!             Your Updated Medication List - Protect others around you: Learn how to safely use, store and throw away your medicines at www.disposemymeds.org.      Notice  As of 2/26/2018  4:06 PM    You have not been prescribed any medications.

## 2018-02-26 NOTE — MR AVS SNAPSHOT
After Visit Summary   2/26/2018    Sherrie Lala    MRN: [id number]           Patient Information     Date Of Birth          1955        Visit Information        Provider Department      2/26/2018 10:30 AM  [facility]        Today's Diagnoses     Cholangiocarcinoma (H)    -  1       Follow-ups after your visit        Your next 10 appointments already scheduled     Mar 13, 2018 11:30 AM CDT   (Arrive by 11:15 AM)   CT ABDOMEN W CONTRAST with URCT1   [facility], Radiology ([facility])    [facility] [facility] [address] [phone]           Please bring any scans or X-rays taken at other hospitals, if similar tests were done. Also bring a list of your medicines, including vitamins, minerals and over-the-counter drugs. It is safest to leave personal items at home.  Be sure to tell your doctor:   If you have any allergies.   If there s any chance you are pregnant.   If you are breastfeeding.  You may have contrast for this exam. To prepare:   Do not eat or drink for 2 hours before your exam. If you need to take medicine, you may take it with small sips of water. (We may ask you to take liquid medicine as well.)   The day before your exam, drink extra fluids at least six 8-ounce glasses (unless your doctor tells you to restrict your fluids).   You will be given instructions on how to drink the contrast.  Patients over 70 or patients with diabetes or kidney problems:   If you haven t had a blood test (creatinine test) within the last 30 days, the Cardiologist/Radiologist may require you to get this test prior to your exam.  If you have diabetes:   Continue to take your metformin medication on the day of your exam  Please wear loose clothing, such as a sweat suit or jogging clothes. Avoid snaps, zippers and other metal. We may ask you to undress and put on a hospital gown.  If you have any  questions, please call the Imaging Department where you will have your exam.            Mar 13, 2018  1:20 PM CDT   (Arrive by 1:05 PM)   New Patient Visit with Gaurav Hernadez MD   [facility] Interventional Radiology ([facility])    [address]  1st Floor  [facility] [address] [phone]            Mar 20, 2018  1:00 PM CDT   Masonic Lab Draw with UC MASONIC LAB DRAW   [facility] Masonic Lab Draw ([facility])    [address]  [address]  [facility] [address] [phone]            Mar 20, 2018  2:30 PM CDT   (Arrive by 2:15 PM)   NEW LIVER EVALUATION with MAGGY Luo MD   [facility] Heart Care ([facility])    [address]  [address]  [facility] [address] [phone]            Mar 20, 2018  3:00 PM CDT   Infusion 60 with UC SPEC INFUSION, UC 46 ATC   [facility] Specialty and Procedure ([facility])    [address]  [address]  [facility] [address] [phone]            Mar 28, 2018  9:00 AM CDT   (Arrive by 8:45 AM)   New Patient Visit with Raj Morrow MD   [facility] ([facility])    [address]  [address]  [facility] [address] [phone]              Future tests that were ordered for you today     Open Future Orders        Priority Expected Expires Ordered    CT Abdomen w contrast* Routine  3/12/2019 3/12/2018            Who to contact     If you have questions or need follow up information about today's clinic visit or your schedule please contact [facility] SOLID ORGAN TRANSPLANT directly at [phone].  Normal or non-critical lab and imaging results will be communicated to you by MyChart, letter or phone within 4 business days after the clinic has received the results. If you do not hear from us within 7 days, please contact the clinic through  Stayzillahart or phone. If you have a critical or abnormal lab result, we will notify you by phone as soon as possible.  Submit refill requests through OptixConnect or call your pharmacy and they will forward the refill request to us. Please allow 3 business days for your refill to be completed.          Additional Information About Your Visit        Stayzillahart Information     OptixConnect gives you secure access to your electronic health record. If you see a primary care provider, you can also send messages to your care team and make appointments. If you have questions, please call your primary care clinic.  If you do not have a primary care provider, please call [phone] and they will assist you.        Care EveryWhere ID     This is your Care EveryWhere ID. This could be used by other organizations to access your [place] medical records  [id number]         Blood Pressure from Last 3 Encounters:   02/27/18 124/74    Weight from Last 3 Encounters:   02/27/18 53.8 kg (118 lb 9.6 oz)   02/15/18 54.4 kg (120 lb)              Today, you had the following     No orders found for display       Primary Care Provider Fax #    Physician No Ref-Primary [phone]       No address on file        Equal Access to Services     [facility]: Hadii lucien Ordonez, waaxda yamile, qaybta kaalmada kari, nahed alfaro . So [facility] [phone].    ATENCIÓN: Si habla español, tiene a bucio disposición servicios gratuitos de asistencia lingüística. Llame al [phone].    We comply with applicable federal civil rights laws and [place] laws. We do not discriminate on the basis of race, color, national origin, age, disability, sex, sexual orientation, or gender identity.            Thank you!     Thank you for choosing [facility] SOLID ORGAN TRANSPLANT  for your care. Our goal is always to provide you with excellent care. Hearing back from our patients is one way we can continue to improve our services.  Please take a few minutes to complete the written survey that you may receive in the mail after your visit with us. Thank you!             Your Updated Medication List - Protect others around you: Learn how to safely use, store and throw away your medicines at www.disposemymeds.org.      Notice  As of 2/26/2018 11:59 PM    You have not been prescribed any medications.

## 2018-02-27 ENCOUNTER — HOSPITAL ENCOUNTER (OUTPATIENT)
Dept: CARDIOLOGY | Facility: CLINIC | Age: 63
Discharge: HOME | End: 2018-02-27
Attending: INTERNAL MEDICINE | Admitting: INTERNAL MEDICINE
Payer: MEDICARE

## 2018-02-27 ENCOUNTER — COMMITTEE REVIEW (OUTPATIENT)
Dept: TRANSPLANT | Facility: CLINIC | Age: 63
End: 2018-02-27

## 2018-02-27 ENCOUNTER — OFFICE VISIT (OUTPATIENT)
Dept: TRANSPLANT | Facility: CLINIC | Age: 63
End: 2018-02-27
Attending: INTERNAL MEDICINE
Payer: MEDICARE

## 2018-02-27 ENCOUNTER — OFFICE VISIT (OUTPATIENT)
Dept: TRANSPLANT | Facility: CLINIC | Age: 63
End: 2018-02-27

## 2018-02-27 VITALS
OXYGEN SATURATION: 94 % | WEIGHT: 118.6 LBS | DIASTOLIC BLOOD PRESSURE: 74 MMHG | SYSTOLIC BLOOD PRESSURE: 124 MMHG | BODY MASS INDEX: 21.02 KG/M2 | TEMPERATURE: 97.8 F | HEIGHT: 63 IN | HEART RATE: 87 BPM | RESPIRATION RATE: 16 BRPM

## 2018-02-27 DIAGNOSIS — K76.9 LIVER DISEASE: Primary | ICD-10-CM

## 2018-02-27 DIAGNOSIS — K76.6 PORTAL HYPERTENSION (H): ICD-10-CM

## 2018-02-27 DIAGNOSIS — R93.5 ABNORMAL FINDINGS ON DIAGNOSTIC IMAGING OF OTHER ABDOMINAL REGIONS, INCLUDING RETROPERITONEUM: ICD-10-CM

## 2018-02-27 DIAGNOSIS — K74.60 CIRRHOSIS OF LIVER WITH ASCITES, UNSPECIFIED HEPATIC CIRRHOSIS TYPE (H): Primary | ICD-10-CM

## 2018-02-27 DIAGNOSIS — R18.8 CIRRHOSIS OF LIVER WITH ASCITES, UNSPECIFIED HEPATIC CIRRHOSIS TYPE (H): Primary | ICD-10-CM

## 2018-02-27 DIAGNOSIS — Z76.82 AWAITING ORGAN TRANSPLANT STATUS: Primary | ICD-10-CM

## 2018-02-27 DIAGNOSIS — Z90.49 HX OF RESECTION OF LIVER: Primary | ICD-10-CM

## 2018-02-27 DIAGNOSIS — C22.1 CHOLANGIOCARCINOMA (H): ICD-10-CM

## 2018-02-27 DIAGNOSIS — K74.60 CIRRHOSIS OF LIVER WITH ASCITES, UNSPECIFIED HEPATIC CIRRHOSIS TYPE (H): ICD-10-CM

## 2018-02-27 DIAGNOSIS — Z79.899 OTHER LONG TERM (CURRENT) DRUG THERAPY: ICD-10-CM

## 2018-02-27 DIAGNOSIS — R18.8 CIRRHOSIS OF LIVER WITH ASCITES, UNSPECIFIED HEPATIC CIRRHOSIS TYPE (H): ICD-10-CM

## 2018-02-27 DIAGNOSIS — K76.9 LIVER DISEASE: ICD-10-CM

## 2018-02-27 LAB
CANCER AG19-9 SERPL-ACNC: 16 U/ML (ref 0–37)
ETHYL GLUCURONIDE UR QL: NEGATIVE
M TB TUBERC IFN-G BLD QL: NEGATIVE
M TB TUBERC IFN-G/MITOGEN IGNF BLD: 0 IU/ML

## 2018-02-27 PROCEDURE — 93350 STRESS TTE ONLY: CPT | Mod: 26 | Performed by: INTERNAL MEDICINE

## 2018-02-27 PROCEDURE — 93321 DOPPLER ECHO F-UP/LMTD STD: CPT | Mod: 26 | Performed by: INTERNAL MEDICINE

## 2018-02-27 PROCEDURE — 93325 DOPPLER ECHO COLOR FLOW MAPG: CPT | Mod: 26 | Performed by: INTERNAL MEDICINE

## 2018-02-27 PROCEDURE — 25000128 H RX IP 250 OP 636: Performed by: INTERNAL MEDICINE

## 2018-02-27 PROCEDURE — 25500064 ZZH RX 255 OP 636: Performed by: INTERNAL MEDICINE

## 2018-02-27 PROCEDURE — 93018 CV STRESS TEST I&R ONLY: CPT | Performed by: INTERNAL MEDICINE

## 2018-02-27 PROCEDURE — 40000264 ECHO STRESS DOBUTAMINE WITH OPTISON

## 2018-02-27 PROCEDURE — 25000125 ZZHC RX 250: Performed by: INTERNAL MEDICINE

## 2018-02-27 PROCEDURE — 93016 CV STRESS TEST SUPVJ ONLY: CPT | Performed by: INTERNAL MEDICINE

## 2018-02-27 PROCEDURE — 97802 MEDICAL NUTRITION INDIV IN: CPT

## 2018-02-27 RX ORDER — FLUTICASONE PROPIONATE 220 UG/1
1 AEROSOL, METERED RESPIRATORY (INHALATION) 2 TIMES DAILY
COMMUNITY
End: 2018-04-18

## 2018-02-27 RX ORDER — METOPROLOL TARTRATE 1 MG/ML
15 INJECTION, SOLUTION INTRAVENOUS
Status: DISCONTINUED | OUTPATIENT
Start: 2018-02-27 | End: 2018-02-28 | Stop reason: HOSPADM

## 2018-02-27 RX ORDER — ALBUTEROL SULFATE 90 UG/1
2 AEROSOL, METERED RESPIRATORY (INHALATION) EVERY 6 HOURS PRN
COMMUNITY
End: 2022-04-07

## 2018-02-27 RX ORDER — DOBUTAMINE HYDROCHLORIDE 200 MG/100ML
5-50 INJECTION INTRAVENOUS CONTINUOUS
Status: DISCONTINUED | OUTPATIENT
Start: 2018-02-27 | End: 2018-02-28 | Stop reason: HOSPADM

## 2018-02-27 RX ADMIN — HUMAN ALBUMIN MICROSPHERES AND PERFLUTREN 5 ML: 10; .22 INJECTION, SOLUTION INTRAVENOUS at 13:12

## 2018-02-27 RX ADMIN — METOPROLOL TARTRATE 1.5 MG: 5 INJECTION INTRAVENOUS at 13:08

## 2018-02-27 RX ADMIN — DOBUTAMINE IN DEXTROSE 30 MCG/KG/MIN: 200 INJECTION, SOLUTION INTRAVENOUS at 13:00

## 2018-02-27 RX ADMIN — ATROPINE SULFATE 0.4 MG: 0.4 INJECTION, SOLUTION INTRAMUSCULAR; INTRAVENOUS; SUBCUTANEOUS at 13:05

## 2018-02-27 ASSESSMENT — PAIN SCALES - GENERAL: PAINLEVEL: MODERATE PAIN (4)

## 2018-02-27 NOTE — Clinical Note
Yeison Sigala,   Please schedule pt for MRV (please confirm pt to have MRV). Order placed. Hoping to have done today if possible otherwise will coordinate with upcoming visits. Please call pt's cell with schedule-- [phone].   Thanks,  Marifer

## 2018-02-27 NOTE — MR AVS SNAPSHOT
After Visit Summary   2/27/2018    Sherrie Lala    MRN: [id number]           Patient Information     Date Of Birth          1955        Visit Information        Provider Department      2/27/2018 10:08 AM Liver, [facility] Solid Organ Transplant        Today's Diagnoses     Liver disease     -  1       Follow-ups after your visit        Your next 10 appointments already scheduled     Mar 13, 2018 11:30 AM CDT   (Arrive by 11:15 AM)   CT ABDOMEN W CONTRAST with URCT1   [facility], Radiology ([facility])    [address] [place] [address] [phone]           Please bring any scans or X-rays taken at other hospitals, if similar tests were done. Also bring a list of your medicines, including vitamins, minerals and over-the-counter drugs. It is safest to leave personal items at home.  Be sure to tell your doctor:   If you have any allergies.   If there s any chance you are pregnant.   If you are breastfeeding.  You may have contrast for this exam. To prepare:   Do not eat or drink for 2 hours before your exam. If you need to take medicine, you may take it with small sips of water. (We may ask you to take liquid medicine as well.)   The day before your exam, drink extra fluids at least six 8-ounce glasses (unless your doctor tells you to restrict your fluids).   You will be given instructions on how to drink the contrast.  Patients over 70 or patients with diabetes or kidney problems:   If you haven t had a blood test (creatinine test) within the last 30 days, the Cardiologist/Radiologist may require you to get this test prior to your exam.  If you have diabetes:   Continue to take your metformin medication on the day of your exam  Please wear loose clothing, such as a sweat suit or jogging clothes. Avoid snaps, zippers and other metal. We may ask you to undress and put on a hospital gown.  If you have any questions,  please call the Imaging Department where you will have your exam.            Mar 13, 2018  1:20 PM CDT   (Arrive by 1:05 PM)   New Patient Visit with Gaurav Hernadez MD   [facility] Interventional Radiology ([facility])    [address]  1st Floor  [place] [address] [phone]            Mar 20, 2018  1:00 PM CDT   Masonic Lab Draw with UC MASONIC LAB DRAW   [facility] Masonic Lab Draw ([facility])    [address]  [address]  [place] [address] [phone]            Mar 20, 2018  2:30 PM CDT   (Arrive by 2:15 PM)   NEW LIVER EVALUATION with MAGGY Luo MD   [facility] Heart Care ([facility])    [address] [place] [address]  [address]  [facility] [address] [phone]            Mar 20, 2018  3:00 PM CDT   Infusion 60 with UC SPEC INFUSION, UC 46 ATC   [facility] Specialty [facility] Procedure ([facility])    [address]  [address]  [place] [address] [phone]            Mar 28, 2018  9:00 AM CDT   (Arrive by 8:45 AM)   New Patient Visit with Raj Morrow MD   [facility] ([facility])    [address] [place] [address]  [address]  [facility] [address] [phone]              Future tests that were ordered for you today     Open Future Orders        Priority Expected Expires Ordered    CT Abdomen w contrast* Routine  3/12/2019 3/12/2018            Who to contact     If you have questions or need follow up information about today's clinic visit or your schedule please contact [facility] SOLID ORGAN TRANSPLANT directly at [phone].  Normal or non-critical lab and imaging results will be communicated to you by MyChart, letter or phone within 4 business days after the clinic has received the results. If you do not hear from us within 7 days, please contact the clinic through MyChart or  phone. If you have a critical or abnormal lab result, we will notify you by phone as soon as possible.  Submit refill requests through Omate or call your pharmacy and they will forward the refill request to us. Please allow 3 business days for your refill to be completed.          Additional Information About Your Visit        Sway Medicalhart Information     Omate gives you secure access to your electronic health record. If you see a primary care provider, you can also send messages to your care team and make appointments. If you have questions, please call your primary care clinic.  If you do not have a primary care provider, please call [phone] and they will assist you.        Care EveryWhere ID     This is your Care EveryWhere ID. This could be used by other organizations to access your [place] medical records  [id number]         Blood Pressure from Last 3 Encounters:   02/27/18 124/74    Weight from Last 3 Encounters:   02/27/18 53.8 kg (118 lb 9.6 oz)   02/15/18 54.4 kg (120 lb)              Today, you had the following     No orders found for display       Primary Care Provider Fax #    Physician No Ref-Primary [phone]       No address on file        Equal Access to Services     [facility] : Hadii luicen kwan [place], waaxda yamile, qaybta kaalmada kari, nahed alfaro . So [facility] [phone].    ATENCIÓN: Si habla español, tiene a bucio disposición servicios gratuitos de asistencia lingüística. Neville al [phone].    We comply with applicable federal civil rights laws and [place] laws. We do not discriminate on the basis of race, color, national origin, age, disability, sex, sexual orientation, or gender identity.            Thank you!     Thank you for choosing [facility] SOLID ORGAN TRANSPLANT  for your care. Our goal is always to provide you with excellent care. Hearing back from our patients is one way we can continue to improve our services. Please take a  few minutes to complete the written survey that you may receive in the mail after your visit with us. Thank you!             Your Updated Medication List - Protect others around you: Learn how to safely use, store and throw away your medicines at www.disposemymeds.org.          This list is accurate as of 2/27/18 11:59 PM.  Always use your most recent med list.                   Brand Name Dispense Instructions for use Diagnosis    albuterol 108 (90 BASE) MCG/ACT Inhaler    PROAIR HFA/PROVENTIL HFA/VENTOLIN HFA     Inhale 2 puffs into the lungs every 6 hours        CIPROFLOXACIN PO      Take 750 mg by mouth once a week        fluticasone 220 MCG/ACT Inhaler    FLOVENT HFA     Inhale 1 puff into the lungs 2 times daily        FUROSEMIDE PO      Take 80 mg by mouth daily        GABAPENTIN PO      Take 300 mg by mouth At Bedtime        OMEPRAZOLE PO      Take 20 mg by mouth 2 times daily (before meals)        PRAMIPEXOLE DIHYDROCHLORIDE PO      Take 0.5 mg by mouth daily        RIFAMPIN PO      Take 300 mg by mouth daily        SPIRONOLACTONE PO      Take 100 mg by mouth daily        XIFAXAN PO      Take 550 mg by mouth 2 times daily        ZOLPIDEM TARTRATE PO      Take 10 mg by mouth nightly as needed for sleep

## 2018-02-27 NOTE — MR AVS SNAPSHOT
After Visit Summary   2/27/2018    Sherrie Lala    MRN: [id number]           Patient Information     Date Of Birth          1955        Visit Information        Provider Department      2/27/2018 9:30 AM Darren Rod MD [facility] Solid Organ Transplant        Today's Diagnoses     Cirrhosis of liver with ascites, unspecified hepatic cirrhosis type (H)    -  1       Follow-ups after your visit        Your next 10 appointments already scheduled     Apr 11, 2018  9:00 AM CDT   (Arrive by 8:45 AM)   PE NPET ONCOLOGY (EYES TO THIGHS) with UUPET1   [facility] [place] PET CT ([facility] [place])    [address] [address] [phone]           Tell your doctor:   If there is any chance you may be pregnant or if you are breastfeeding.   If you have problems lying in small spaces (claustrophobia). If you do, your doctor may give you medicine to help you relax. If you have diabetes:   Have your exam early in the morning. Your blood glucose will go up as the day goes by.   Your glucose level must be 180 or less at the start of the exam. Please take any medicines you need to ensure this blood glucose level. 24 hours before your scan: Don t do any heavy exercise. (No jogging, aerobics or other workouts.) Exercise will make your pictures less accurate. 6 hours before your scan:   Stop all food and liquids (except water).   Do not chew gum or suck on mints.   If you need to take medicine with food, you may take it with a few crackers.  Please call your Imaging Department at your exam site with any questions.            Apr 18, 2018  8:30 AM CDT   (Arrive by 8:15 AM)   New Patient Visit with Raj Morrow MD   [facility] ([facility])    [address]  [address]  [facility] [address] [phone]              Who to contact     If you have questions or need follow up  information about today's clinic visit or your schedule please contact [facility] TRANSPL[facility] directly at [phone].  Normal or non-critical lab and imaging results will be communicated to you by castacliphart, letter or phone within 4 business days after the clinic has received the results. If you do not hear from us within 7 days, please contact the clinic through castacliphart or phone. If you have a critical or abnormal lab result, we will notify you by phone as soon as possible.  Submit refill requests through SPark! or call your pharmacy and they will forward the refill request to us. Please allow 3 business days for your refill to be completed.          Additional Information About Your Visit        castacliphart Information     SPark! gives you secure access to your electronic health record. If you see a primary care provider, you can also send messages to your care team and make appointments. If you have questions, please call your primary care clinic.  If you do not have a primary care provider, please call [phone] and they will assist you.        Care EveryWhere ID     This is your Care EveryWhere ID. This could be used by other organizations to access your [facility] medical records  [id number]         Blood Pressure from Last 3 Encounters:   03/20/18 108/68   03/13/18 102/62   02/27/18 124/74    Weight from Last 3 Encounters:   03/20/18 55.8 kg (123 lb)   03/13/18 53.5 kg (118 lb)   02/27/18 53.8 kg (118 lb 9.6 oz)              Today, you had the following     No orders found for display       Primary Care Provider Fax #    Physician No Ref-Primary [phone]       No address on file        Equal Access to Services     [facility] : Hadii lucien huizar hadjohnson Soleta, waaxda luqadaha, qaybta kaalmada nahed pierce . So [facility] [phone].    ATENCIÓN: Si habla español, tiene a bucio disposición servicios gratuitos de asistencia lingüística. Llame al [phone].    We  comply with applicable federal civil rights laws and [place] laws. We do not discriminate on the basis of race, color, national origin, age, disability, sex, sexual orientation, or gender identity.            Thank you!     Thank you for choosing [facility] ORGAN TRANSPL[facility]  for your care. Our goal is always to provide you with excellent care. Hearing back from our patients is one way we can continue to improve our services. Please take a few minutes to complete the written survey that you may receive in the mail after your visit with us. Thank you!             Your Updated Medication List - Protect others around you: Learn how to safely use, store and throw away your medicines at www.disposemymeds.org.          This list is accurate as of 2/27/18 11:59 PM.  Always use your most recent med list.                   Brand Name Dispense Instructions for use Diagnosis    albuterol 108 (90 BASE) MCG/ACT Inhaler    PROAIR HFA/PROVENTIL HFA/VENTOLIN HFA     Inhale 2 puffs into the lungs every 6 hours        CIPROFLOXACIN PO      Take 750 mg by mouth once a week        fluticasone 220 MCG/ACT Inhaler    FLOVENT HFA     Inhale 1 puff into the lungs 2 times daily        FUROSEMIDE PO      Take 80 mg by mouth daily        GABAPENTIN PO      Take 300 mg by mouth At Bedtime        OMEPRAZOLE PO      Take 20 mg by mouth 2 times daily (before meals)        PRAMIPEXOLE DIHYDROCHLORIDE PO      Take 0.5 mg by mouth daily        RIFAMPIN PO      Take 300 mg by mouth daily        SPIRONOLACTONE PO      Take 100 mg by mouth daily        XIFAXAN PO      Take 550 mg by mouth 2 times daily        ZOLPIDEM TARTRATE PO      Take 10 mg by mouth nightly as needed for sleep

## 2018-02-27 NOTE — PROGRESS NOTES
HPI      ROS      Physical Exam     Assessment and Plan:  1. liver transplant evaluation - patient is a fair candidate overall. Benefits and surgical risks of a liver transplantation were discussed.  2.  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 Portal Vein:Portal vein thrombus - needs MRV   2. Hepatic Artery: Open  3. TIPS: absent  4. Previous Abdominal Surgery: Yes; left trisegmentectomy  5. Hepatocellular Carcinoma: Cholangiocarcinoma  6. Ascites: Present - large amount; has hydrothorax and a pleurex catheter  7. Costal Angle: narrow  8. Portopulmonary Hypertension: absent  9. Hepatopulmonary Syndrome: absent  10. Cardiac Evaluation: needs stress echocardiogram  11. Nutritional Status: Moderate  12. Diabetes: no  13.Hypertension  no  14. Smoker:no  115: Fraility index: yes      Recommendations:   MRV/MRI to evaluate liver vasculature  Oncology consult  Hypercoagulable work up      Patients overall evaluation will be discussed at the Liver Transplant selection committee meeting with a final recommendation on the patients suitability for transplant to be made at that time.    Consult Full  Details:  Sherrie Lala was seen in consultation at the request of Dr. Lilly  for evaluation as a potential liver transplant recipient.    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    HPI:  Presenting complaint:  Hepatic hydrothorax with a pleurex catheter draining about 1000c/day    Had stage IV cholangiocarcinoma  Resected by extended left hepatectomy in 2012, also has lung nodules treated by chemo radiation  Now has budd chiari, ascites and hydrothorax    Past Medical History:   Diagnosis Date     Cholangiocarcinoma (H)      No  past surgical history on file.  No past surgical history on file.  No family history on file.  No Known Allergies  Prior to Admission medications    Medication Sig Start Date End Date Taking? Authorizing Provider   OMEPRAZOLE PO Take 20 mg by mouth 2 times daily (before meals)    Reported, Patient   RifAXIMin (XIFAXAN PO) Take 550 mg by mouth 2 times daily    Reported, Patient   CIPROFLOXACIN PO Take 750 mg by mouth once a week    Reported, Patient   SPIRONOLACTONE PO Take 100 mg by mouth daily    Reported, Patient   FUROSEMIDE PO Take 80 mg by mouth daily    Reported, Patient   RIFAMPIN PO Take 300 mg by mouth daily    Reported, Patient   GABAPENTIN PO Take 300 mg by mouth At Bedtime    Reported, Patient   ZOLPIDEM TARTRATE PO Take 10 mg by mouth nightly as needed for sleep    Reported, Patient   PRAMIPEXOLE DIHYDROCHLORIDE PO Take 0.5 mg by mouth daily    Reported, Patient   fluticasone (FLOVENT HFA) 220 MCG/ACT Inhaler Inhale 1 puff into the lungs 2 times daily    Reported, Patient   albuterol (PROAIR HFA/PROVENTIL HFA/VENTOLIN HFA) 108 (90 BASE) MCG/ACT Inhaler Inhale 2 puffs into the lungs every 6 hours    Reported, Patient       Previous Transplant Hx: No    Cardiovascular Hx:       h/o Cardiac Issues: No       Exercise Tolerance: no chest pain or shortness of breath with exertion.    Potential Donor(s): No    ROS:    REVIEW OF SYSTEMS (check box if normal)  [x]                GENERAL  [x]                  PULMONARY [x]                 GENITOURINARY  [x]                 CNS                 [x]                  CARDIAC  [x]                  ENDOCRINE  [x]                 EARS,NOSE,THROAT [x]                  GASTROINTESTINAL [x]                  NEUROLOGIC    [x]                 MUSCLOSKELTAL  [x]                   HEMATOLOGY    Examination:     Vitals:  There were no vitals taken for this visit.    GENERAL APPEARANCE: alert and no distress  EYES: PERRL  HENT: mouth without ulcers or lesions  NECK: supple, no  adenopathy  RESP: lungs clear to auscultation - no rales, rhonchi or wheezes  CV: regular rhythm, normal rate, no rub   ABDOMEN: right plurex catheter and mildline incision  soft, nontender, no HSM or masses and bowel sounds normal  MS: extremities normal- no gross deformities noted, no evidence of inflammation in joints, no muscle tenderness  SKIN: no rash  NEURO: Normal strength and tone, sensory exam grossly normal, mentation intact and speech normal  PSYCH: mentation appears normal. and affect normal/bright      Results:   Recent Results (from the past 168 hour(s))   Protein  random urine with Creat Ratio    Collection Time: 02/26/18  9:47 AM   Result Value Ref Range    Protein Random Urine 0.09 g/L    Protein Total Urine g/gr Creatinine 0.06 0 - 0.2 g/g Cr   UA reflex to Microscopic and Culture    Collection Time: 02/26/18  9:47 AM   Result Value Ref Range    Color Urine Yellow     Appearance Urine Clear     Glucose Urine Negative NEG^Negative mg/dL    Bilirubin Urine Negative NEG^Negative    Ketones Urine Negative NEG^Negative mg/dL    Specific Gravity Urine 1.017 1.003 - 1.035    Blood Urine Negative NEG^Negative    pH Urine 5.0 5.0 - 7.0 pH    Protein Albumin Urine Negative NEG^Negative mg/dL    Urobilinogen mg/dL 0.0 0.0 - 2.0 mg/dL    Nitrite Urine Negative NEG^Negative    Leukocyte Esterase Urine Negative NEG^Negative    Source Midstream Urine     RBC Urine 1 0 - 2 /HPF    WBC Urine 1 0 - 2 /HPF    Squamous Epithelial /HPF Urine 3 (H) 0 - 1 /HPF    Mucous Urine Present (A) NEG^Negative /LPF    Hyaline Casts 1 0 - 2 /LPF   Ethyl Glucuronide Urine    Collection Time: 02/26/18  9:47 AM   Result Value Ref Range    Ethyl Glucuronide Urine NEGATIVE not reported   Creatinine urine calculation only    Collection Time: 02/26/18  9:47 AM   Result Value Ref Range    Creatinine Urine 146 mg/dL   ABO type [ORE1713]    Collection Time: 02/26/18  9:49 AM   Result Value Ref Range    ABO AB     RH(D) Pos     Specimen  Expires 03/01/2018    Antibody titer red cell    Collection Time: 02/26/18  9:52 AM   Result Value Ref Range    Antibody Titer       Titer not indicated. Patient is type AB. 2/26/18 SS.   ABO/Rh type and screen    Collection Time: 02/26/18  9:52 AM   Result Value Ref Range    ABO AB     RH(D) Pos     Antibody Screen Pos (A)     Test Valid Only At          [facility],    Specimen Expires 03/01/2018     Blood Bank Comment       Delay in availability of Red Blood Cells called to  Marifer Leija Txp Coordinator 2233 2/26/18 by CB.     Basic metabolic panel    Collection Time: 02/26/18  9:52 AM   Result Value Ref Range    Sodium 138 133 - 144 mmol/L    Potassium 3.1 (L) 3.4 - 5.3 mmol/L    Chloride 102 94 - 109 mmol/L    Carbon Dioxide 29 20 - 32 mmol/L    Anion Gap 8 3 - 14 mmol/L    Glucose 78 70 - 99 mg/dL    Urea Nitrogen 16 7 - 30 mg/dL    Creatinine 0.69 0.52 - 1.04 mg/dL    GFR Estimate 86 >60 mL/min/1.7m2    GFR Estimate If Black >90 >60 mL/min/1.7m2    Calcium 8.4 (L) 8.5 - 10.1 mg/dL   Lipid Profile    Collection Time: 02/26/18  9:52 AM   Result Value Ref Range    Cholesterol 185 <200 mg/dL    Triglycerides 54 <150 mg/dL    HDL Cholesterol 93 >49 mg/dL    LDL Cholesterol Calculated 82 <100 mg/dL    Non HDL Cholesterol 92 <130 mg/dL   Hepatic panel    Collection Time: 02/26/18  9:52 AM   Result Value Ref Range    Bilirubin Direct 0.6 (H) 0.0 - 0.2 mg/dL    Bilirubin Total 2.2 (H) 0.2 - 1.3 mg/dL    Albumin 3.0 (L) 3.4 - 5.0 g/dL    Protein Total 6.4 (L) 6.8 - 8.8 g/dL    Alkaline Phosphatase 156 (H) 40 - 150 U/L    ALT 34 0 - 50 U/L    AST 42 0 - 45 U/L   AFP tumor marker    Collection Time: 02/26/18  9:52 AM   Result Value Ref Range    Alpha Fetoprotein <1.5 0 - 8 ug/L   HCG qualitative (female only)    Collection Time: 02/26/18  9:52 AM   Result Value Ref Range    HCG Qualitative Serum Positive (A) NEG^Negative   Phosphorus    Collection Time: 02/26/18  9:52 AM    Result Value Ref Range    Phosphorus 3.1 2.5 - 4.5 mg/dL   TSH with free T4 reflex    Collection Time: 02/26/18  9:52 AM   Result Value Ref Range    TSH 0.02 (L) 0.40 - 4.00 mU/L   Transferrin    Collection Time: 02/26/18  9:52 AM   Result Value Ref Range    Transferrin 239 210 - 360 mg/dL   Vitamin D Deficiency    Collection Time: 02/26/18  9:52 AM   Result Value Ref Range    Vitamin D Deficiency screening 12 (L) 20 - 75 ug/L   INR    Collection Time: 02/26/18  9:52 AM   Result Value Ref Range    INR 1.37 (H) 0.86 - 1.14   CBC with platelets    Collection Time: 02/26/18  9:52 AM   Result Value Ref Range    WBC 4.1 4.0 - 11.0 10e9/L    RBC Count 4.48 3.8 - 5.2 10e12/L    Hemoglobin 13.9 11.7 - 15.7 g/dL    Hematocrit 40.4 35.0 - 47.0 %    MCV 90 78 - 100 fl    MCH 31.0 26.5 - 33.0 pg    MCHC 34.4 31.5 - 36.5 g/dL    RDW 15.5 (H) 10.0 - 15.0 %    Platelet Count 50 (L) 150 - 450 10e9/L   CMV Antibody IgG    Collection Time: 02/26/18  9:52 AM   Result Value Ref Range    CMV Antibody IgG 0.2 0.0 - 0.8 AI   EBV Capsid Antibody IgG    Collection Time: 02/26/18  9:52 AM   Result Value Ref Range    EBV Capsid Antibody IgG 7.7 (H) 0.0 - 0.8 AI   Hepatitis A Antibody IgG    Collection Time: 02/26/18  9:52 AM   Result Value Ref Range    Hepatitis A Antibody IgG Reactive (A) NR^Nonreactive   Hepatitis B core antibody    Collection Time: 02/26/18  9:52 AM   Result Value Ref Range    Hepatitis B Core Marium Nonreactive NR^Nonreactive   Hepatitis B Surface Antibody    Collection Time: 02/26/18  9:52 AM   Result Value Ref Range    Hepatitis B Surface Antibody 933.57 (H) <8.00 m[IU]/mL   Hepatitis B surface antigen    Collection Time: 02/26/18  9:52 AM   Result Value Ref Range    Hep B Surface Agn Nonreactive NR^Nonreactive   HIV Antigen Antibody Combo Pretransplant    Collection Time: 02/26/18  9:52 AM   Result Value Ref Range    HIV Antigen Antibody Combo Pretransplant Nonreactive NR^Nonreactive   Anti Treponema    Collection Time:  02/26/18  9:52 AM   Result Value Ref Range    Treponema pallidum Antibody Negative NEG^Negative   Hepatitis C antibody    Collection Time: 02/26/18  9:52 AM   Result Value Ref Range    Hepatitis C Antibody Nonreactive NR^Nonreactive   T4 free    Collection Time: 02/26/18  9:52 AM   Result Value Ref Range    T4 Free 1.06 0.76 - 1.46 ng/dL   General PFT Lab (Please always keep checked)    Collection Time: 02/26/18 11:49 AM   Result Value Ref Range    FVC-Pred 3.03 L    FVC-Pre 2.21 L    FVC-%Pred-Pre 73 %    FEV1-Pre 1.47 L    FEV1-%Pred-Pre 61 %    FEV1FVC-Pred 79 %    FEV1FVC-Pre 66 %    FEFMax-Pred 6.09 L/sec    FEFMax-Pre 3.79 L/sec    FEFMax-%Pred-Pre 62 %    FEF2575-Pred 2.19 L/sec    FEF2575-Pre 0.88 L/sec    JIN9491-%Pred-Pre 40 %    FEF2575-Post 1.06 L/sec    SPJ7621-%Pred-Post 48 %    ExpTime-Pre 8.04 sec    FIFMax-Pre 3.76 L/sec    VC-Pred 3.13 L    VC-Pre 2.29 L    VC-%Pred-Pre 73 %    IC-Pred 2.16 L    IC-Pre 2.08 L    IC-%Pred-Pre 96 %    ERV-Pred 0.97 L    ERV-Pre 0.20 L    ERV-%Pred-Pre 20 %    FEV1FEV6-Pred 81 %    FEV1FEV6-Pre 66 %    FRCPleth-Pred 2.65 L    FRCPleth-Pre 2.59 L    FRCPleth-%Pred-Pre 97 %    RVPleth-Pred 1.87 L    RVPleth-Pre 2.39 L    RVPleth-%Pred-Pre 127 %    TLCPleth-Pred 4.77 L    TLCPleth-Pre 4.68 L    TLCPleth-%Pred-Pre 98 %    DLCOunc-Pred 21.12 ml/min/mmHg    DLCOunc-Pre 15.03 ml/min/mmHg    DLCOunc-%Pred-Pre 71 %    DLCOcor-Pre 14.81 ml/min/mmHg    DLCOcor-%Pred-Pre 70 %    VA-Pre 3.63 L    VA-%Pred-Pre 74 %    FEV1SVC-Pred 76 %    FEV1SVC-Pre 64 %     I had a long discussion with the patient regarding liver transplantation which included but was not limited to  the following points:    1. Liver transplant selection committee process.  2. The federal rules for cadaveric waiting list, the size and blood type matching of the organ. The availability of living-related donor transplantation.  3. The types of donors: brain death donors, non-heart beating donors, partial liver  grafts: splits and living donor grafts  4. Extended criteria  Donors (older age, steasosis) and the increased  risk of primary non-function using the extended criteria donors  5. The CDC high risk donors,  Risk of donor transmitted infections and donor transmitted malignancy  6. The liver transplant operation and the associated risks and technical complications which can include intraoperative death, post operative death,  Primary non-function, bleeding requiring re-operations, arterial and biliary complications, bowel perforations, and intra abdominal abscess. Some of these complicaitons may require a second operation.  7. The postoperative course, the ICU stay and risk of postoperative complications which can include sepsis, MI, stroke, brain injury, pneumonia, pleural effusions, and renal dysfunction.  8. The current 1 year and 5 year graft and patient survivals.  9. The need for life long immunosuppressive therapy and the side effects of these medications, including the possibility of toxicity, opportunistic infections, risk of cancer including lymphoma, and the possibility of rejection even if the patient is taking the medication exactly as prescribed.  10. The need for compliance with medications and follow-up visits in the clinic and thereafter.  11. The patient and family understand these risks and wish to proceed to transplantation       I spent 60 minutes with the patient and more than 50% of the time was spend in direct face to face counseling.  CC      Copy to patient     [address] [facility] [address]

## 2018-02-27 NOTE — COMMITTEE REVIEW
Abdominal Patient Discussion Note Transplant Coordinator: Marifer Leija  Transplant Surgeon:   Darren Rod    Referring Physician: Baltazar Solares    Committee Review Members:  Dietitian, Jodi Jenkins, JALIL   Pharmacy Harjinder Stewart, [facility]    - Clinical Alyssa Ramirez, [facility], Josefina Felix, [facility]   Transplant Gloria Staples MD, Abigail Parham, RN, Ceasar Villarreal MD, Alyce Figueroa PA-C, Bradly Lilly MD, Jr Jairo Early, BECKI, Jazmin Fields MD, Eldon Cunningham MD, Gabriela Moreland, APRN CNP, Darlene Dooley, RN, Wes Rayo MD, Chon Dick MD, Marifer Leija, BECKI, Jose Luis Lanza MD, Darren Rod MD, Gianfranco Dias MD       Additional Discussion Notes and Findings:       NOT a live donor recip candidate  MRV - okay to schedule at [facility] if unable to schedule at [facility]  IR consult at [facility] for possible TIPS      Pt updated of above. Pt comfortable with current plan of care. MRV scheduled for 3/9/18. Pt requesting IR consult at [facility].

## 2018-02-27 NOTE — LETTER
2/27/2018       RE: Sherrie Lala  [address] [place] [id number]     Dear Colleague,    Thank you for referring your patient, Sherrie Lala, to the [facility]ANS[facility] at [facility]. Please see a copy of my visit note below.    Patient attended all appointments and completed all scheduled tests.  Patient to follow up with Transplant Coordinator.  Patient stated understanding and has contact numbers.      Again, thank you for allowing me to participate in the care of your patient.      Sincerely,    Transplant Evaluation Resource

## 2018-02-27 NOTE — MR AVS SNAPSHOT
After Visit Summary   2/27/2018    Sherrie Lala    MRN: [id number]           Patient Information     Date Of Birth          1955        Visit Information        Provider Department      2/27/2018 9:00 AM Clarissa Porter RD [facility] Solid Organ Transplant        Today's Diagnoses     Awaiting organ transplant status    -  1       Follow-ups after your visit        Your next 10 appointments already scheduled     Mar 09, 2018  9:00 AM CST   (Arrive by 8:45 AM)   MR ABDOMEN W/O & W CONTRAST ANGIOGRAM with UUMR1   [facility] [place], [facility] ([facility])    [address] [address] [phone]           Take your medicines as usual, unless your doctor tells you not to. Bring a list of your current medicines to your exam (including vitamins, minerals and over-the-counter drugs). Also bring the results of similar scans you may have had.    You may or may not receive IV contrast for this exam pending the discretion of the Radiologist.   Do not eat or drink for 6 hours prior to exam.  The MRI machine uses a strong magnet. Please wear clothes without metal (snaps, zippers). A sweatsuit works well, or we may give you a hospital gown.  Please remove any body piercings and hair extensions before you arrive. You will also remove watches, jewelry, hairpins, wallets, dentures, partial dental plates and hearing aids. You may wear contact lenses, and you may be able to wear your rings. We have a safe place to keep your personal items, but it is safer to leave them at home.  **IMPORTANT** THE INSTRUCTIONS BELOW ARE ONLY FOR THOSE PATIENTS WHO HAVE BEEN PRESCRIBED SEDATION OR GENERAL ANESTHESIA DURING THEIR MRI PROCEDURE:  IF YOUR DOCTOR PRESCRIBED ORAL SEDATION (take medicine to help you relax during your exam):   You must get the medicine from your doctor (oral medication) before you arrive. Bring the medicine to the exam. Do not take it at  home. You ll be told when to take it upon arriving for your exam.   Arrive one hour early. Bring someone who can take you home after the test. Your medicine will make you sleepy. After the exam, you may not drive, take a bus or take a taxi by yourself.  IF YOUR DOCTOR PRESCRIBED IV SEDATION:   Arrive one hour early. Bring someone who can take you home after the test. Your medicine will make you sleepy. After the exam, you may not drive, take a bus or take a taxi by yourself.   No eating 6 hours before your exam. You may have clear liquids up until 4 hours before your exam. (Clear liquids include water, clear tea, black coffee and fruit juice without pulp.)  IF YOUR DOCTOR PRESCRIBED ANESTHESIA (be asleep for your exam):   Arrive 1 1/2 hours early. Bring someone who can take you home after the test. You may not drive, take a bus or take a taxi by yourself.   No eating 8 hours before your exam. You may have clear liquids up until 4 hours before your exam. (Clear liquids include water, clear tea, black coffee and fruit juice without pulp.)   You will spend four to five hours in the recovery room.  If you have any questions, please contact your Imaging Department exam site.            Mar 09, 2018 10:15 AM CST   LAB with [facility] ([facility])    [address] [place] [id number] [phone]           Please do not eat 10-12 hours before your appointment if you are coming in fasting for labs on lipids, cholesterol, or glucose (sugar). This does not apply to pregnant women. Water, hot tea and black coffee (with nothing added) are okay. Do not drink other fluids, diet soda or chew gum.            Mar 20, 2018  2:30 PM CDT   (Arrive by 2:15 PM)   NEW LIVER EVALUATION with MAGGY Luo MD   [facility] ([facility])    [address] [place] [address]  [address] [place] [id number] [phone]            Mar 28, 2018  9:00  "AM CDT   (Arrive by 8:45 AM)   New Patient Visit with Raj Morrow MD   [facility] ([facility])    [address]  [address] [place] [address] [phone]              Future tests that were ordered for you today     Open Future Orders        Priority Expected Expires Ordered    Lupus Anticoagulant Panel Routine  8/27/2018 2/28/2018    Cardiolipin Marium IgG and IgM Routine  8/27/2018 2/28/2018    Factor 5 leiden mutation analysis Routine  8/27/2018 2/28/2018    F2 prothrombin 78684I Mut Anal Routine  8/27/2018 2/28/2018    Partial thromboplastin time Routine  8/27/2018 2/28/2018    Fibrinogen activity Routine  8/27/2018 2/28/2018    MRA Angiogram Abdomen w & wo Contrast Routine  2/27/2019 2/27/2018    ECHO STRESS DOBUTAMINE WITH OPTISON Routine  2/23/2019 2/23/2018            Who to contact     If you have questions or need follow up information about today's clinic visit or your schedule please contact [facility] SOLID ORGAN TRANSPLANT directly at [phone].  Normal or non-critical lab and imaging results will be communicated to you by [employer], letter or phone within 4 business days after the clinic has received the results. If you do not hear from us within 7 days, please contact the clinic through [employer] or phone. If you have a critical or abnormal lab result, we will notify you by phone as soon as possible.  Submit refill requests through [employer] or call your pharmacy and they will forward the refill request to us. Please allow 3 business days for your refill to be completed.          Additional Information About Your Visit        MyChart Information     [employer] lets you send messages to your doctor, view your test results, renew your prescriptions, schedule appointments and more. To sign up, go to [email or web address] . Click on \"Log in\" on the left side of the screen, which will take you to the Welcome page. Then click on \"Sign " "up Now\" on the right side of the page.     You will be asked to enter the access code listed below, as well as some personal information. Please follow the directions to create your username and password.     Your access code is: [email or web address]  Expires: 2018  4:06 PM     Your access code will  in 90 days. If you need help or a new code, please call your [place] clinic or [phone].        Care EveryWhere ID     This is your Care EveryWhere ID. This could be used by other organizations to access your [place] medical records  [id number]         Blood Pressure from Last 3 Encounters:   18 124/74    Weight from Last 3 Encounters:   18 53.8 kg (118 lb 9.6 oz)   02/15/18 54.4 kg (120 lb)              Today, you had the following     No orders found for display       Primary Care Provider Fax #    Physician No Ref-Primary [phone]       No address on file        Equal Access to Services     [facility]: Hadii lucien huizar hadasho Soelvisali, waaxda luqadaha, qaybta kaalmada adeegyada, waxay damien alfaro . So [facility] [phone].    ATENCIÓN: Si habla español, tiene a bucio disposición servicios gratuitos de asistencia lingüística. Llame al [phone].    We comply with applicable federal civil rights laws and [place] laws. We do not discriminate on the basis of race, color, national origin, age, disability, sex, sexual orientation, or gender identity.            Thank you!     Thank you for choosing [facility] ORGAN TRANSPLANT  for your care. Our goal is always to provide you with excellent care. Hearing back from our patients is one way we can continue to improve our services. Please take a few minutes to complete the written survey that you may receive in the mail after your visit with us. Thank you!             Your Updated Medication List - Protect others around you: Learn how to safely use, store and throw away your medicines at www.disposemymeds.org.          This " list is accurate as of 2/27/18 11:59 PM.  Always use your most recent med list.                   Brand Name Dispense Instructions for use Diagnosis    albuterol 108 (90 BASE) MCG/ACT Inhaler    PROAIR HFA/PROVENTIL HFA/VENTOLIN HFA     Inhale 2 puffs into the lungs every 6 hours        CIPROFLOXACIN PO      Take 750 mg by mouth once a week        fluticasone 220 MCG/ACT Inhaler    FLOVENT HFA     Inhale 1 puff into the lungs 2 times daily        FUROSEMIDE PO      Take 80 mg by mouth daily        GABAPENTIN PO      Take 300 mg by mouth At Bedtime        OMEPRAZOLE PO      Take 20 mg by mouth 2 times daily (before meals)        PRAMIPEXOLE DIHYDROCHLORIDE PO      Take 0.5 mg by mouth daily        RIFAMPIN PO      Take 300 mg by mouth daily        SPIRONOLACTONE PO      Take 100 mg by mouth daily        XIFAXAN PO      Take 550 mg by mouth 2 times daily        ZOLPIDEM TARTRATE PO      Take 10 mg by mouth nightly as needed for sleep

## 2018-02-27 NOTE — LETTER
2/27/2018       RE: Sherrie Lala  [address] [facility] [address]     Dear Colleague,    Thank you for referring your patient, Sherrie Lala, to the [facility] TRANS[facility] at [facility]. Please see a copy of my visit note below.    ALANNA SALINAS      Physical Exam    Progress Notes  Encounter Date: 2/27/2018  Darren Rod MD   Transplant   Expand All Collapse All    []Hide copied text  []Hover for attribution information  HPI        RITA        Physical Exam      Assessment and Plan:  1. liver transplant evaluation - patient is a fair candidate overall. Benefits and surgical risks of a liver transplantation were discussed.  2.  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 Portal Vein:Portal vein thrombus - needs MRV   2. Hepatic Artery: Open  3. TIPS: absent  4. Previous Abdominal Surgery: Yes; left trisegmentectomy  5. Hepatocellular Carcinoma: Cholangiocarcinoma  6. Ascites: Present - large amount; has hydrothorax and a pleurex catheter  7. Costal Angle: narrow  8. Portopulmonary Hypertension: absent  9. Hepatopulmonary Syndrome: absent  10. Cardiac Evaluation: needs stress echocardiogram  11. Nutritional Status: Moderate  12. Diabetes: no  13.Hypertension  no  14. Smoker:no  115: Fraility index: yes        Recommendations:   MRV/MRI to evaluate liver vasculature  Oncology consult  Hypercoagulable work up        Patients overall evaluation will be discussed at the Liver Transplant selection committee meeting with a final recommendation on the patients suitability for transplant to be made at that time.     Consult Full  Details:  Sherrie Lala was seen in consultation at the request of Dr. Lilly  for evaluation as a potential liver transplant recipient.     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     HPI:  Presenting complaint:  Hepatic hydrothorax with a pleurex catheter draining about 1000c/day     Had stage IV cholangiocarcinoma  Resected by extended left hepatectomy in 2012, also has lung nodules treated by chemo radiation  Now has budd chiari, ascites and hydrothorax      Past Medical History         Past Medical History:   Diagnosis Date     Cholangiocarcinoma (H)            Past Surgical History    No past surgical history on file.      Past Surgical History    No past surgical history on file.      Family History    No family history on file.     No Known Allergies          Prior to Admission medications    Medication Sig Start Date End Date Taking? Authorizing Provider   OMEPRAZOLE PO Take 20 mg by mouth 2 times daily (before meals)       Reported, Patient   RifAXIMin (XIFAXAN PO) Take 550 mg by mouth 2 times daily       Reported, Patient   CIPROFLOXACIN PO Take 750 mg by mouth once a week       Reported, Patient   SPIRONOLACTONE PO Take 100 mg by mouth daily       Reported, Patient   FUROSEMIDE PO Take 80 mg by mouth daily       Reported, Patient   RIFAMPIN PO Take 300 mg by mouth daily       Reported, Patient   GABAPENTIN PO Take 300 mg by mouth At Bedtime       Reported, Patient   ZOLPIDEM TARTRATE PO Take 10 mg by mouth nightly as needed for sleep       Reported, Patient   PRAMIPEXOLE DIHYDROCHLORIDE PO Take 0.5 mg by mouth daily       Reported, Patient   fluticasone (FLOVENT HFA) 220 MCG/ACT Inhaler Inhale 1 puff into the lungs 2 times daily       Reported, Patient   albuterol (PROAIR HFA/PROVENTIL HFA/VENTOLIN HFA) 108 (90 BASE) MCG/ACT Inhaler Inhale 2 puffs into the lungs every 6 hours       Reported, Patient         Previous Transplant Hx: No     Cardiovascular Hx:       h/o Cardiac Issues: No       Exercise Tolerance: no chest pain or shortness of  breath with exertion.     Potential Donor(s): No     ROS:    REVIEW OF SYSTEMS (check box if normal)  [x]                GENERAL                                   [x]                  PULMONARY                 [x]                 GENITOURINARY  [x]                 CNS                                     [x]                  CARDIAC                                         [x]                  ENDOCRINE  [x]                 EARS,NOSE,THROAT                [x]                  GASTROINTESTINAL                [x]                  NEUROLOGIC    [x]                 MUSCLOSKELTAL                     [x]                   HEMATOLOGY     Examination:      Vitals:  There were no vitals taken for this visit.     GENERAL APPEARANCE: alert and no distress  EYES: PERRL  HENT: mouth without ulcers or lesions  NECK: supple, no adenopathy  RESP: lungs clear to auscultation - no rales, rhonchi or wheezes  CV: regular rhythm, normal rate, no rub   ABDOMEN: right plurex catheter and mildline incision  soft, nontender, no HSM or masses and bowel sounds normal  MS: extremities normal- no gross deformities noted, no evidence of inflammation in joints, no muscle tenderness  SKIN: no rash  NEURO: Normal strength and tone, sensory exam grossly normal, mentation intact and speech normal  PSYCH: mentation appears normal. and affect normal/bright        Results:    Recent Results           Recent Results (from the past 168 hour(s))   Protein  random urine with Creat Ratio     Collection Time: 02/26/18  9:47 AM   Result Value Ref Range     Protein Random Urine 0.09 g/L     Protein Total Urine g/gr Creatinine 0.06 0 - 0.2 g/g Cr   UA reflex to Microscopic and Culture     Collection Time: 02/26/18  9:47 AM   Result Value Ref Range     Color Urine Yellow       Appearance Urine Clear       Glucose Urine Negative NEG^Negative mg/dL     Bilirubin Urine Negative NEG^Negative     Ketones Urine Negative NEG^Negative mg/dL     Specific Gravity Urine 1.017  1.003 - 1.035     Blood Urine Negative NEG^Negative     pH Urine 5.0 5.0 - 7.0 pH     Protein Albumin Urine Negative NEG^Negative mg/dL     Urobilinogen mg/dL 0.0 0.0 - 2.0 mg/dL     Nitrite Urine Negative NEG^Negative     Leukocyte Esterase Urine Negative NEG^Negative     Source Midstream Urine       RBC Urine 1 0 - 2 /HPF     WBC Urine 1 0 - 2 /HPF     Squamous Epithelial /HPF Urine 3 (H) 0 - 1 /HPF     Mucous Urine Present (A) NEG^Negative /LPF     Hyaline Casts 1 0 - 2 /LPF   Ethyl Glucuronide Urine     Collection Time: 02/26/18  9:47 AM   Result Value Ref Range     Ethyl Glucuronide Urine NEGATIVE not reported   Creatinine urine calculation only     Collection Time: 02/26/18  9:47 AM   Result Value Ref Range     Creatinine Urine 146 mg/dL   ABO type [QNP0912]     Collection Time: 02/26/18  9:49 AM   Result Value Ref Range     ABO AB       RH(D) Pos       Specimen Expires 03/01/2018     Antibody titer red cell     Collection Time: 02/26/18  9:52 AM   Result Value Ref Range     Antibody Titer           Titer not indicated. Patient is type AB. 2/26/18 SS.   ABO/Rh type and screen     Collection Time: 02/26/18  9:52 AM   Result Value Ref Range     ABO AB       RH(D) Pos       Antibody Screen Pos (A)       Test Valid Only At            [facility]     Specimen Expires 03/01/2018       Blood Bank Comment           Delay in availability of Red Blood Cells called to  Marifer Leija Txp Coordinator [id number] 2/26/18 by CB.   Basic metabolic panel     Collection Time: 02/26/18  9:52 AM   Result Value Ref Range     Sodium 138 133 - 144 mmol/L     Potassium 3.1 (L) 3.4 - 5.3 mmol/L     Chloride 102 94 - 109 mmol/L     Carbon Dioxide 29 20 - 32 mmol/L     Anion Gap 8 3 - 14 mmol/L     Glucose 78 70 - 99 mg/dL     Urea Nitrogen 16 7 - 30 mg/dL     Creatinine 0.69 0.52 - 1.04 mg/dL     GFR Estimate 86 >60 mL/min/1.7m2     GFR Estimate If Black >90 >60 mL/min/1.7m2     Calcium 8.4 (L) 8.5  - 10.1 mg/dL   Lipid Profile     Collection Time: 02/26/18  9:52 AM   Result Value Ref Range     Cholesterol 185 <200 mg/dL     Triglycerides 54 <150 mg/dL     HDL Cholesterol 93 >49 mg/dL     LDL Cholesterol Calculated 82 <100 mg/dL     Non HDL Cholesterol 92 <130 mg/dL   Hepatic panel     Collection Time: 02/26/18  9:52 AM   Result Value Ref Range     Bilirubin Direct 0.6 (H) 0.0 - 0.2 mg/dL     Bilirubin Total 2.2 (H) 0.2 - 1.3 mg/dL     Albumin 3.0 (L) 3.4 - 5.0 g/dL     Protein Total 6.4 (L) 6.8 - 8.8 g/dL     Alkaline Phosphatase 156 (H) 40 - 150 U/L     ALT 34 0 - 50 U/L     AST 42 0 - 45 U/L   AFP tumor marker     Collection Time: 02/26/18  9:52 AM   Result Value Ref Range     Alpha Fetoprotein <1.5 0 - 8 ug/L   HCG qualitative (female only)     Collection Time: 02/26/18  9:52 AM   Result Value Ref Range     HCG Qualitative Serum Positive (A) NEG^Negative   Phosphorus     Collection Time: 02/26/18  9:52 AM   Result Value Ref Range     Phosphorus 3.1 2.5 - 4.5 mg/dL   TSH with free T4 reflex     Collection Time: 02/26/18  9:52 AM   Result Value Ref Range     TSH 0.02 (L) 0.40 - 4.00 mU/L   Transferrin     Collection Time: 02/26/18  9:52 AM   Result Value Ref Range     Transferrin 239 210 - 360 mg/dL   Vitamin D Deficiency     Collection Time: 02/26/18  9:52 AM   Result Value Ref Range     Vitamin D Deficiency screening 12 (L) 20 - 75 ug/L   INR     Collection Time: 02/26/18  9:52 AM   Result Value Ref Range     INR 1.37 (H) 0.86 - 1.14   CBC with platelets     Collection Time: 02/26/18  9:52 AM   Result Value Ref Range     WBC 4.1 4.0 - 11.0 10e9/L     RBC Count 4.48 3.8 - 5.2 10e12/L     Hemoglobin 13.9 11.7 - 15.7 g/dL     Hematocrit 40.4 35.0 - 47.0 %     MCV 90 78 - 100 fl     MCH 31.0 26.5 - 33.0 pg     MCHC 34.4 31.5 - 36.5 g/dL     RDW 15.5 (H) 10.0 - 15.0 %     Platelet Count 50 (L) 150 - 450 10e9/L   CMV Antibody IgG     Collection Time: 02/26/18  9:52 AM   Result Value Ref Range     CMV Antibody IgG  0.2 0.0 - 0.8 AI   EBV Capsid Antibody IgG     Collection Time: 02/26/18  9:52 AM   Result Value Ref Range     EBV Capsid Antibody IgG 7.7 (H) 0.0 - 0.8 AI   Hepatitis A Antibody IgG     Collection Time: 02/26/18  9:52 AM   Result Value Ref Range     Hepatitis A Antibody IgG Reactive (A) NR^Nonreactive   Hepatitis B core antibody     Collection Time: 02/26/18  9:52 AM   Result Value Ref Range     Hepatitis B Core Marium Nonreactive NR^Nonreactive   Hepatitis B Surface Antibody     Collection Time: 02/26/18  9:52 AM   Result Value Ref Range     Hepatitis B Surface Antibody 933.57 (H) <8.00 m[IU]/mL   Hepatitis B surface antigen     Collection Time: 02/26/18  9:52 AM   Result Value Ref Range     Hep B Surface Agn Nonreactive NR^Nonreactive   HIV Antigen Antibody Combo Pretransplant     Collection Time: 02/26/18  9:52 AM   Result Value Ref Range     HIV Antigen Antibody Combo Pretransplant Nonreactive NR^Nonreactive   Anti Treponema     Collection Time: 02/26/18  9:52 AM   Result Value Ref Range     Treponema pallidum Antibody Negative NEG^Negative   Hepatitis C antibody     Collection Time: 02/26/18  9:52 AM   Result Value Ref Range     Hepatitis C Antibody Nonreactive NR^Nonreactive   T4 free     Collection Time: 02/26/18  9:52 AM   Result Value Ref Range     T4 Free 1.06 0.76 - 1.46 ng/dL   General PFT Lab (Please always keep checked)     Collection Time: 02/26/18 11:49 AM   Result Value Ref Range     FVC-Pred 3.03 L     FVC-Pre 2.21 L     FVC-%Pred-Pre 73 %     FEV1-Pre 1.47 L     FEV1-%Pred-Pre 61 %     FEV1FVC-Pred 79 %     FEV1FVC-Pre 66 %     FEFMax-Pred 6.09 L/sec     FEFMax-Pre 3.79 L/sec     FEFMax-%Pred-Pre 62 %     FEF2575-Pred 2.19 L/sec     FEF2575-Pre 0.88 L/sec     WQG5937-%Pred-Pre 40 %     FEF2575-Post 1.06 L/sec     UIW0486-%Pred-Post 48 %     ExpTime-Pre 8.04 sec     FIFMax-Pre 3.76 L/sec     VC-Pred 3.13 L     VC-Pre 2.29 L     VC-%Pred-Pre 73 %     IC-Pred 2.16 L     IC-Pre 2.08 L     IC-%Pred-Pre  96 %     ERV-Pred 0.97 L     ERV-Pre 0.20 L     ERV-%Pred-Pre 20 %     FEV1FEV6-Pred 81 %     FEV1FEV6-Pre 66 %     FRCPleth-Pred 2.65 L     FRCPleth-Pre 2.59 L     FRCPleth-%Pred-Pre 97 %     RVPleth-Pred 1.87 L     RVPleth-Pre 2.39 L     RVPleth-%Pred-Pre 127 %     TLCPleth-Pred 4.77 L     TLCPleth-Pre 4.68 L     TLCPleth-%Pred-Pre 98 %     DLCOunc-Pred 21.12 ml/min/mmHg     DLCOunc-Pre 15.03 ml/min/mmHg     DLCOunc-%Pred-Pre 71 %     DLCOcor-Pre 14.81 ml/min/mmHg     DLCOcor-%Pred-Pre 70 %     VA-Pre 3.63 L     VA-%Pred-Pre 74 %     FEV1SVC-Pred 76 %     FEV1SVC-Pre 64 %         I had a long discussion with the patient regarding liver transplantation which included but was not limited to  the following points:     1. Liver transplant selection committee process.  2. The federal rules for cadaveric waiting list, the size and blood type matching of the organ. The availability of living-related donor transplantation.  3. The types of donors: brain death donors, non-heart beating donors, partial liver grafts: splits and living donor grafts  4. Extended criteria  Donors (older age, steasosis) and the increased  risk of primary non-function using the extended criteria donors  5. The CDC high risk donors,  Risk of donor transmitted infections and donor transmitted malignancy  6. The liver transplant operation and the associated risks and technical complications which can include intraoperative death, post operative death,  Primary non-function, bleeding requiring re-operations, arterial and biliary complications, bowel perforations, and intra abdominal abscess. Some of these complicaitons may require a second operation.  7. The postoperative course, the ICU stay and risk of postoperative complications which can include sepsis, MI, stroke, brain injury, pneumonia, pleural effusions, and renal dysfunction.  8. The current 1 year and 5 year graft and patient survivals.  9. The need for life long immunosuppressive therapy  and the side effects of these medications, including the possibility of toxicity, opportunistic infections, risk of cancer including lymphoma, and the possibility of rejection even if the patient is taking the medication exactly as prescribed.  10. The need for compliance with medications and follow-up visits in the clinic and thereafter.  11. The patient and family understand these risks and wish to proceed to transplantation         I spent 60 minutes with the patient and more than 50% of the time was spend in direct face to face counseling.  CC        Copy to patient      [address] [place] [id number]                  Electronically signed by Darren oRd MD at 3/12/2018 11:49 AM        Office Visit on 2/27/2018              Detailed Report       ple      Again, thank you for allowing me to participate in the care of your patient.      Sincerely,    Darren Rod MD

## 2018-02-27 NOTE — MR AVS SNAPSHOT
After Visit Summary   2/27/2018    Sherrie Lala    MRN: [id number]           Patient Information     Date Of Birth          1955        Visit Information        Provider Department      2/27/2018 8:30 AM [facility] EVALUATION [place] [facility]        Today's Diagnoses     Awaiting organ transplant status    -  1    Cholangiocarcinoma (H)        Liver disease            Follow-ups after your visit        Your next 10 appointments already scheduled     Feb 27, 2018  1:00 PM CST   Ech Dobutamine Stress Test with [id number]   [facility], [place],  Echocardiography ([facility], [facility])    [address]  [facility] [place] [address] [phone]           1.  Please bring or wear a comfortable two-piece outfit and walking shoes. 2.  Stop eating 3 hours before the test. You may drink water or juice. 3.  Stop all caffeine 12 hours before the test. This includes coffee, tea, soda pop, chocolate and certain medicines (such as Anacin and Excederin). Also avoid decaf coffee and tea, as these contain small amounts of caffeine. 4.  No alcohol, smoking or use of other tobacco products for 12 hours before the test. 5.  Refer to your provider instructions to see if you need to stop any medications (such as beta-blockers or nitrates) for this test. 6.  For patients with diabetes: -   If you take insulin, call your diabetes care team. Ask if you should take a   dose the morning of your test. -   If you take diabetes medicine by mouth, don't take it on the morning of your test. Bring it with you to take after the test.  (If you have questions, call your diabetes care team) 7.  When you arrive, please tell us if: -   You have diabetes. -   You have taken Viagra, Cialis or Levitra in the past 48 hours. 8.  For any questions that cannot be answered, please contact the ordering physician            Mar 20, 2018 12:00 PM CDT   (Arrive by 11:45 AM)   MR ABDOMEN W/O & W CONTRAST ANGIOGRAM  with UUMR1   [facility], [place], MRI ([facility])    [address] [facility] [address] [phone]           Take your medicines as usual, unless your doctor tells you not to. Bring a list of your current medicines to your exam (including vitamins, minerals and over-the-counter drugs). Also bring the results of similar scans you may have had.    You may or may not receive IV contrast for this exam pending the discretion of the Radiologist.   Do not eat or drink for 6 hours prior to exam.  The MRI machine uses a strong magnet. Please wear clothes without metal (snaps, zippers). A sweatsuit works well, or we may give you a hospital gown.  Please remove any body piercings and hair extensions before you arrive. You will also remove watches, jewelry, hairpins, wallets, dentures, partial dental plates and hearing aids. You may wear contact lenses, and you may be able to wear your rings. We have a safe place to keep your personal items, but it is safer to leave them at home.  **IMPORTANT** THE INSTRUCTIONS BELOW ARE ONLY FOR THOSE PATIENTS WHO HAVE BEEN PRESCRIBED SEDATION OR GENERAL ANESTHESIA DURING THEIR MRI PROCEDURE:  IF YOUR DOCTOR PRESCRIBED ORAL SEDATION (take medicine to help you relax during your exam):   You must get the medicine from your doctor (oral medication) before you arrive. Bring the medicine to the exam. Do not take it at home. You ll be told when to take it upon arriving for your exam.   Arrive one hour early. Bring someone who can take you home after the test. Your medicine will make you sleepy. After the exam, you may not drive, take a bus or take a taxi by yourself.  IF YOUR DOCTOR PRESCRIBED IV SEDATION:   Arrive one hour early. Bring someone who can take you home after the test. Your medicine will make you sleepy. After the exam, you may not drive, take a bus or take a taxi by yourself.   No eating 6 hours before your exam. You may  have clear liquids up until 4 hours before your exam. (Clear liquids include water, clear tea, black coffee and fruit juice without pulp.)  IF YOUR DOCTOR PRESCRIBED ANESTHESIA (be asleep for your exam):   Arrive 1 1/2 hours early. Bring someone who can take you home after the test. You may not drive, take a bus or take a taxi by yourself.   No eating 8 hours before your exam. You may have clear liquids up until 4 hours before your exam. (Clear liquids include water, clear tea, black coffee and fruit juice without pulp.)   You will spend four to five hours in the recovery room.  If you have any questions, please contact your Imaging Department exam site.            Mar 20, 2018  2:30 PM CDT   (Arrive by 2:15 PM)   NEW LIVER EVALUATION with MAGGY Luo MD   [facility] ([facility])    [address] [place] [address]  [address] [place] [id number] [phone]            Mar 28, 2018  9:00 AM CDT   (Arrive by 8:45 AM)   New Patient Visit with Raj Morrow MD   [facility] ([facility])    [address] [place] [address]  [address] [place] [id number] [phone]              Future tests that were ordered for you today     Open Future Orders        Priority Expected Expires Ordered    MRA Angiogram Abdomen w & wo Contrast Routine  2/27/2019 2/27/2018            Who to contact     If you have questions or need follow up information about today's clinic visit or your schedule please contact [facility] directly at [phone].  Normal or non-critical lab and imaging results will be communicated to you by MyChart, letter or phone within 4 business days after the clinic has received the results. If you do not hear from us within 7 days, please contact the clinic through MyChart or phone. If you have a critical or abnormal lab result, we will notify you by phone as soon as possible.  Submit refill requests  "through [employer] or call your pharmacy and they will forward the refill request to us. Please allow 3 business days for your refill to be completed.          Additional Information About Your Visit        [employer] Information     [employer] lets you send messages to your doctor, view your test results, renew your prescriptions, schedule appointments and more. To sign up, go to [email or web address] . Click on \"Log in\" on the left side of the screen, which will take you to the Welcome page. Then click on \"Sign up Now\" on the right side of the page.     You will be asked to enter the access code listed below, as well as some personal information. Please follow the directions to create your username and password.     Your access code is: [email or web address]  Expires: 2018  4:06 PM     Your access code will  in 90 days. If you need help or a new code, please call your [place] clinic or [phone].        Care EveryWhere ID     This is your Care EveryWhere ID. This could be used by other organizations to access your [place] medical records  [id number]        Your Vitals Were     Pulse Temperature Respirations Height Pulse Oximetry BMI (Body Mass Index)    87 97.8  F (36.6  C) (Oral) 16 1.6 m (5' 3\") 94% 21.01 kg/m2       Blood Pressure from Last 3 Encounters:   18 124/74    Weight from Last 3 Encounters:   18 53.8 kg (118 lb 9.6 oz)   02/15/18 54.4 kg (120 lb)              Today, you had the following     No orders found for display       Primary Care Provider Fax #    Physician No Ref-Primary [phone]       No address on file        Equal Access to Services     HARINI CHAMBERLAIN : Hadii lucien Ordonez, saman ovalle, nahed grossman . So [facility] [phone].    ATENCIÓN: Si habla español, tiene a bucio disposición servicios gratuitos de asistencia lingüística. Llame al [phone].    We comply with applicable federal civil rights laws and " [place] laws. We do not discriminate on the basis of race, color, national origin, age, disability, sex, sexual orientation, or gender identity.            Thank you!     Thank you for choosing [facility]  for your care. Our goal is always to provide you with excellent care. Hearing back from our patients is one way we can continue to improve our services. Please take a few minutes to complete the written survey that you may receive in the mail after your visit with us. Thank you!             Your Updated Medication List - Protect others around you: Learn how to safely use, store and throw away your medicines at www.disposemymeds.org.          This list is accurate as of 2/27/18 12:45 PM.  Always use your most recent med list.                   Brand Name Dispense Instructions for use Diagnosis    albuterol 108 (90 BASE) MCG/ACT Inhaler    PROAIR HFA/PROVENTIL HFA/VENTOLIN HFA     Inhale 2 puffs into the lungs every 6 hours        CIPROFLOXACIN PO      Take 750 mg by mouth once a week        fluticasone 220 MCG/ACT Inhaler    FLOVENT HFA     Inhale 1 puff into the lungs 2 times daily        FUROSEMIDE PO      Take 80 mg by mouth daily        GABAPENTIN PO      Take 300 mg by mouth At Bedtime        OMEPRAZOLE PO      Take 20 mg by mouth 2 times daily (before meals)        PRAMIPEXOLE DIHYDROCHLORIDE PO      Take 0.5 mg by mouth daily        RIFAMPIN PO      Take 300 mg by mouth daily        SPIRONOLACTONE PO      Take 100 mg by mouth daily        XIFAXAN PO      Take 550 mg by mouth 2 times daily        ZOLPIDEM TARTRATE PO      Take 10 mg by mouth nightly as needed for sleep

## 2018-02-28 ENCOUNTER — TRANSFERRED RECORDS (OUTPATIENT)
Dept: HEALTH INFORMATION MANAGEMENT | Facility: CLINIC | Age: 63
End: 2018-02-28

## 2018-02-28 ENCOUNTER — OFFICE VISIT - RIVER FALLS (OUTPATIENT)
Dept: FAMILY MEDICINE | Facility: CLINIC | Age: 63
End: 2018-02-28
Payer: COMMERCIAL

## 2018-02-28 DIAGNOSIS — C22.1 INTRAHEPATIC CHOLANGIOCARCINOMA (H): Primary | ICD-10-CM

## 2018-02-28 LAB
ABO + RH BLD: ABNORMAL
ABO + RH BLD: ABNORMAL
BLD GP AB INVEST PLASRBC-IMP: ABNORMAL
BLD GP AB SCN SERPL QL: ABNORMAL
BLOOD BANK CMNT PATIENT-IMP: ABNORMAL
HPV ABSTRACT: NORMAL
PAP-ABSTRACT: NORMAL
SPECIMEN EXP DATE BLD: ABNORMAL

## 2018-02-28 ASSESSMENT — MIFFLIN-ST. JEOR: SCORE: 1077.05

## 2018-02-28 NOTE — PROGRESS NOTES
"Outpatient MNT: Liver Transplant Evaluation    Current BMI: 21  BMI is within criteria of <40 for liver transplant    Additional Concerns  None at this time.    Malnutrition Status:  Malnutrition Diagnosis: Non-Severe malnutrition in the context of chronic illness.   - Moderate muscle loss  - Mild-Moderate fat loss    Meets criteria for frailty based on handgrip strength: Y     Time Spent: 30 minutes  Visit Type: Initial  Referring Physician: Dr. Rod  Pt accompanied by:     History of previous txp: None    Nutrition Assessment  Pt feels full all the time, but on \"good\" days she will make an effort to eat a lot because she knows she needs the calories and protein, but some days she is not hungry and will not eat anything. Will eat at least 5-6 snacks on good day per pt. Pt sometimes feels nauseous from food smells or other smells in general. Pt and  have been more cognizant of the low sodium diet recently. Pt and  recently went to [employer] and pt ate entire [place] fish dinner (they do not go to [employer] often - was a rare occurrence). Pt mainly does small snacks, not meals. Per , pt's taste and preference in foods change and so they try to do low sodium foods while also trying to find foods she will eat.     Appetite: fair    Vitamins, Supplements, Pertinent Meds: none  Herbal Medicines/Supplements: none    Diet Recall  Breakfast Fruit, cereal, nuts   Lunch Cheese, crackers, pretzels   Dinner Fruit, cereal, cheese (whatever pt is in the mood to snack on)   Snacks Same as mentioned above   Beverages Water, soda (1 on \"good\" days - can go 3-4 days w/o one), juice (lemonade)   Dining out Rarely (recently went to [employer])     Physical Activity  None - pt says she has little energy and some shortness of breat when walking. She says she could walk to the end of the block but would have to take a break.      Anthropometrics  Height:   63 in   BMI:    21    Weight Status:Normal BMI "   Weight:  118 lbs            IBW (lb): 115  % IBW: 102%    Wt Hx: Pt does have some muscle loss    Adj/dosing BW: 118 lbs/ 54 kg       Frailty Screening   Weakness Meets criteria for frailty if  strength (average of 3 trials, dominant hand) is:    Men  ?29 kg for BMI ?24  ?30 kg for BMI 24.1-26  ?30 kg for BMI 26.1-28  ?32 kg for BMI >28 Women  ?17 kg for BMI ?23  ?17.3 kg for BMI 23.1-26  ?18 kg for BMI 26.1-29  ?21 kg for BMI >29    Equipment: Asher hand dynamometer  Participant attempts to squeeze the dynamometer maximally 3 times with the dominant hand.   Average of 3 trials: 14 kg with BMI of 21  Meets criteria for frailty based on handgrip strength: Y    Labs  Recent Labs   Lab Test  02/26/18   0952   CHOL  185   HDL  93   LDL  82   TRIG  54       Malnutrition  % Intake: Decreased intake does not meet criteria for malnutrition (pt snacking on a consistent basis and also drinking high calorie liquids)  % Weight Loss: None noted - wt has been stable   Subcutaneous Fat Loss: Mild-Moderate orbital fat pad loss  Muscle Loss: Moderate temporal and clavicle loss  Fluid Accumulation/Edema: Pt with ascites and pleural effusions  Malnutrition Diagnosis: Non-Severe malnutrition in the context of chronic illness.     Estimated Nutrition Needs  Energy  5215-6511     (25-30 kcal/kg for maintenance)     Protein  54-65    (1-1.2 g/kg for increased needs) - Muscle maintenance/moderate muscle loss           Fluid  1 ml/kcal or per MD        Micronutrient   Na+: <2000 mg/day            Nutrition Diagnosis  Excessive Na+ intake r/t food and nutrition related knowledge deficit AEB diet recall reveals high Na+ foods.    Food and nutrition related knowledge deficit r/t pre liver transplant eval AEB pt verbalized not hearing pre/post transplant diet guidelines.    Nutrition Intervention  Nutrition education provided:  Discussed sodium intake (low sodium foods and drinks, seasoning food without salt and tips for low sodium diet).      Reviewed adequate protein intake. Encouraged receiving protein from both animal and plant based sources.     Reviewed post txp diet guidelines in brief (will review in further detail post txp):  (1) Review of proper food safety measures d/t immunosuppressant therapy post-op and increased risk for food-borne illness   (2) Stressed importance of not taking any herbal/Chinese/alternative medicines or supplements post txp (d/t risk for rejection, unknown effects on the organs, potential interactions with immunosuppresants).   (3) Med regimen and possible side effects    Patient Understanding: Pt verbalized understanding of education provided.  Expected Compliance: Good  Follow-Up Plans: PRN     Nutrition Goals  1. Limit Na+ <2000mg/day  2. Pt to verbalize understanding of 3 aspects of post txp education provided    Provided pt with contact info.   Caroline Jenkins RD, LD  Pgr [phone]

## 2018-03-01 ENCOUNTER — TRANSFERRED RECORDS (OUTPATIENT)
Dept: HEALTH INFORMATION MANAGEMENT | Facility: CLINIC | Age: 63
End: 2018-03-01

## 2018-03-01 ENCOUNTER — MEDICAL CORRESPONDENCE (OUTPATIENT)
Dept: TRANSPLANT | Facility: CLINIC | Age: 63
End: 2018-03-01

## 2018-03-02 ENCOUNTER — TRANSFERRED RECORDS (OUTPATIENT)
Dept: HEALTH INFORMATION MANAGEMENT | Facility: CLINIC | Age: 63
End: 2018-03-02

## 2018-03-06 ENCOUNTER — TRANSFERRED RECORDS (OUTPATIENT)
Dept: HEALTH INFORMATION MANAGEMENT | Facility: CLINIC | Age: 63
End: 2018-03-06

## 2018-03-06 DIAGNOSIS — M81.0 OSTEOPOROSIS: Primary | ICD-10-CM

## 2018-03-06 PROBLEM — I85.01 BLEEDING ESOPHAGEAL VARICES (H): Status: ACTIVE | Noted: 2018-03-06

## 2018-03-06 PROBLEM — K25.9 GASTRIC ULCER: Status: ACTIVE | Noted: 2018-03-06

## 2018-03-06 LAB
DLCOCOR-%PRED-PRE: 70 %
DLCOCOR-PRE: 14.81 ML/MIN/MMHG
DLCOUNC-%PRED-PRE: 71 %
DLCOUNC-PRE: 15.03 ML/MIN/MMHG
DLCOUNC-PRED: 21.12 ML/MIN/MMHG
ERV-%PRED-PRE: 20 %
ERV-PRE: 0.2 L
ERV-PRED: 0.97 L
EXPTIME-PRE: 8.04 SEC
FEF2575-%PRED-POST: 48 %
FEF2575-%PRED-PRE: 40 %
FEF2575-POST: 1.06 L/SEC
FEF2575-PRE: 0.88 L/SEC
FEF2575-PRED: 2.19 L/SEC
FEFMAX-%PRED-PRE: 62 %
FEFMAX-PRE: 3.79 L/SEC
FEFMAX-PRED: 6.09 L/SEC
FEV1-%PRED-PRE: 61 %
FEV1-PRE: 1.47 L
FEV1FEV6-PRE: 66 %
FEV1FEV6-PRED: 81 %
FEV1FVC-PRE: 66 %
FEV1FVC-PRED: 79 %
FEV1SVC-PRE: 64 %
FEV1SVC-PRED: 76 %
FIFMAX-PRE: 3.76 L/SEC
FRCPLETH-%PRED-PRE: 97 %
FRCPLETH-PRE: 2.59 L
FRCPLETH-PRED: 2.65 L
FVC-%PRED-PRE: 73 %
FVC-PRE: 2.21 L
FVC-PRED: 3.03 L
HPV ABSTRACT: NORMAL
IC-%PRED-PRE: 96 %
IC-PRE: 2.08 L
IC-PRED: 2.16 L
RVPLETH-%PRED-PRE: 127 %
RVPLETH-PRE: 2.39 L
RVPLETH-PRED: 1.87 L
TLCPLETH-%PRED-PRE: 98 %
TLCPLETH-PRE: 4.68 L
TLCPLETH-PRED: 4.77 L
VA-%PRED-PRE: 74 %
VA-PRE: 3.63 L
VC-%PRED-PRE: 73 %
VC-PRE: 2.29 L
VC-PRED: 3.13 L

## 2018-03-06 RX ORDER — IBANDRONATE SODIUM 3 MG/3 ML
3 SYRINGE (ML) INTRAVENOUS ONCE
Status: CANCELLED | OUTPATIENT
Start: 2018-03-06 | End: 2018-03-06

## 2018-03-06 RX ORDER — ANTACID TABLETS 500 MG/1
500 TABLET, CHEWABLE ORAL 2 TIMES DAILY
Qty: 60 TABLET | Refills: 0 | Status: ON HOLD | OUTPATIENT
Start: 2018-03-06 | End: 2018-08-29

## 2018-03-06 NOTE — Clinical Note
Yeison Sigala,   Please schedule pt for boniva infusions q90 days, therapy plan in place. She should start them only after she has completed any necessary dental work (she is aware). She is out of town until the end of the week, so okay to call next week.   Thanks,  Marifer

## 2018-03-06 NOTE — PROGRESS NOTES
Dr. Lilly recommending pt start calcium, vitamin D and IV boniva for treatment of osteoporosis per DEXA scan. Orders placed per Dr. Lilly including therapy plan.     Pt called and updated of Dr. Lilly's recommendations. Reviewed importance of starting calcium and vitamin D and also completing any needed dental work prior to starting boniva infusions. Pt verbalized understanding and comfort with above. Pt denied questions or concerns.     Message sent to  to schedule infusions.

## 2018-03-09 ENCOUNTER — HOSPITAL ENCOUNTER (OUTPATIENT)
Dept: MRI IMAGING | Facility: CLINIC | Age: 63
Discharge: HOME | End: 2018-03-09
Attending: TRANSPLANT SURGERY | Admitting: TRANSPLANT SURGERY
Payer: MEDICARE

## 2018-03-09 ENCOUNTER — TELEPHONE (OUTPATIENT)
Dept: TRANSPLANT | Facility: CLINIC | Age: 63
End: 2018-03-09

## 2018-03-09 DIAGNOSIS — C22.1 INTRAHEPATIC CHOLANGIOCARCINOMA (H): ICD-10-CM

## 2018-03-09 DIAGNOSIS — R93.5 ABNORMAL FINDINGS ON DIAGNOSTIC IMAGING OF OTHER ABDOMINAL REGIONS, INCLUDING RETROPERITONEUM: ICD-10-CM

## 2018-03-09 DIAGNOSIS — Z90.49 HX OF RESECTION OF LIVER: ICD-10-CM

## 2018-03-09 LAB
APTT PPP: 33 SEC (ref 22–37)
CARDIOLIPIN ANTIBODY IGG: <1.6 GPL-U/ML (ref 0–19.9)
CARDIOLIPIN ANTIBODY IGM: 11.3 MPL-U/ML (ref 0–19.9)
FIBRINOGEN PPP-MCNC: 317 MG/DL (ref 200–420)

## 2018-03-09 PROCEDURE — A9585 GADOBUTROL INJECTION: HCPCS | Performed by: TRANSPLANT SURGERY

## 2018-03-09 PROCEDURE — 81403 MOPATH PROCEDURE LEVEL 4: CPT | Performed by: TRANSPLANT SURGERY

## 2018-03-09 PROCEDURE — 81219 CALR GENE COM VARIANTS: CPT | Performed by: TRANSPLANT SURGERY

## 2018-03-09 PROCEDURE — 25000128 H RX IP 250 OP 636: Performed by: TRANSPLANT SURGERY

## 2018-03-09 PROCEDURE — 74185 MRA ABD W OR W/O CNTRST: CPT

## 2018-03-09 RX ORDER — GADOBUTROL 604.72 MG/ML
7.5 INJECTION INTRAVENOUS ONCE
Status: COMPLETED | OUTPATIENT
Start: 2018-03-09 | End: 2018-03-09

## 2018-03-09 RX ADMIN — GADOBUTROL 5.5 ML: 604.72 INJECTION INTRAVENOUS at 09:24

## 2018-03-09 NOTE — LETTER
March 12, 2018    PRE-LIVER TRANSPLANT APPOINTMENT SCHEDULE    Patient:   Sherrie Lala  MR#:    [id number]  Coordinator:  Marifer Leija  [phone]  :     Zakiya DUKE   [phone]  Location:    [facility]  Dates:   March 20 and March 28, 2018    Day/Date:  Tuesday, March 20, 2018  Time Location Activity   1:00 pm [facility]  (2nd floor [facility]ics [facility],  [address]; [facility] [address]) Blood Labs   1:20 pm Surgical Interventional Radiology  (1st floor [facility]) Appointment with Dr. Hernadez,  Interventional Radiologist   2:30 pm [facility]  (3rd floor [facility]) Appointment with Dr. Luo,  Cardiology   3:00 pm [facility]  (2nd floor [facility]) Specialty Infusion: Boniva     Day/Date:  Wednesday, March 28, 2018  Time Location Activity   9:00 am [facility]   (3rd floor [facility],  [address]; [facility] [address]) Appointment with Dr. Morrow,  Pulmonary     Day/Date:  Every Thursday *OPTIONAL*  Time Location Activity   12:00 pm to 1:30 pm Liver Transplant Support Group  [address]; [facility] [address]  Hospital [address], Room 7-120 Every Thursday *OPTIONAL*

## 2018-03-09 NOTE — TELEPHONE ENCOUNTER
March 9, 2018: I left a message for Sherrie earlier today and spoke with her a few minutes later.    She is willing to come in on March 20 for multiple appointments. The Boniva infusion is the last appointment of the day.  She will call her dentist to inquire about the possibility of additional work. She will call me back if we need to cancel and/or adjust the timing of that appointment.    I sent a PDF of her schedule via email.    Zakiya Cardoza  Pre-Liver Transplant   [phone]    Message  Received: 3 days ago       Marifer Leija, RN  Zakiya Cardoza         Public Remarks       Zakiya Cardoza Thu Mar 8, 2018  2:53 PM CST       RE: Boniva infusions (from Nena Pino)  Pt is scheduled for labs at 1pm and infusion at 3pm 3/20     ----- Message -----   From: Zakiya Cardoza   Sent: 3/8/2018   2:17 PM   To: Zakiya Cardoza [facility] Scheduling      Please schedule pt for boniva infusions q90 days, therapy plan in place.     Can we double that up on 3/20/2018? I scheduled her to see someone at 1:20 PM for 40 min.     She is out of town until the end of the week. I can call her with info.       Zakiya Cardoza Thu Mar 8, 2018  2:19 PM CST       sent request to [facility] SCHEDULING  [[id number]]. Inquiring about 3/20.                   Yeison Sigala,     Please schedule pt for boniva infusions q90 days, therapy plan in place. She should start them only after she has completed any necessary dental work (she is aware). She is out of town until the end of the week, so okay to call next week.     Thanks,   Marifer

## 2018-03-11 ENCOUNTER — HEALTH MAINTENANCE LETTER (OUTPATIENT)
Age: 63
End: 2018-03-11

## 2018-03-12 ENCOUNTER — TELEPHONE (OUTPATIENT)
Dept: INTERVENTIONAL RADIOLOGY/VASCULAR | Facility: CLINIC | Age: 63
End: 2018-03-12

## 2018-03-12 DIAGNOSIS — R18.8 ASCITES: ICD-10-CM

## 2018-03-12 DIAGNOSIS — I82.0 BUDD-CHIARI SYNDROME (H): Primary | ICD-10-CM

## 2018-03-12 DIAGNOSIS — J94.8 HYDROTHORAX: ICD-10-CM

## 2018-03-12 LAB — LA PPP-IMP: NEGATIVE

## 2018-03-12 NOTE — TELEPHONE ENCOUNTER
I have pt scheduled for CT and clinic consult for TIPS with Dr. Hernadez on 3/13/18 @ 1:20 pm at the [facility] and [place] scan on [place].  DEANDRA Hinton RN, BSN  Interventional Radiology Care Coordinator   Phone:  [phone]

## 2018-03-13 ENCOUNTER — OFFICE VISIT (OUTPATIENT)
Dept: INTERVENTIONAL RADIOLOGY/VASCULAR | Facility: CLINIC | Age: 63
End: 2018-03-13
Payer: COMMERCIAL

## 2018-03-13 ENCOUNTER — HOSPITAL ENCOUNTER (OUTPATIENT)
Dept: CT IMAGING | Facility: CLINIC | Age: 63
Discharge: HOME | End: 2018-03-13
Attending: RADIOLOGY | Admitting: RADIOLOGY
Payer: MEDICARE

## 2018-03-13 VITALS
OXYGEN SATURATION: 97 % | SYSTOLIC BLOOD PRESSURE: 102 MMHG | BODY MASS INDEX: 20.9 KG/M2 | DIASTOLIC BLOOD PRESSURE: 62 MMHG | WEIGHT: 118 LBS | HEART RATE: 87 BPM

## 2018-03-13 DIAGNOSIS — R18.8 ASCITES: ICD-10-CM

## 2018-03-13 DIAGNOSIS — J94.8 HYDROTHORAX: ICD-10-CM

## 2018-03-13 DIAGNOSIS — J90 PLEURAL EFFUSION: ICD-10-CM

## 2018-03-13 DIAGNOSIS — I82.0 BUDD-CHIARI SYNDROME (H): ICD-10-CM

## 2018-03-13 DIAGNOSIS — I81 PORTAL VEIN THROMBOSIS: Primary | ICD-10-CM

## 2018-03-13 DIAGNOSIS — K76.6 PORTAL HYPERTENSION (H): ICD-10-CM

## 2018-03-13 LAB — COPATH REPORT: NORMAL

## 2018-03-13 PROCEDURE — 25000128 H RX IP 250 OP 636: Performed by: RADIOLOGY

## 2018-03-13 PROCEDURE — 25000125 ZZHC RX 250: Performed by: RADIOLOGY

## 2018-03-13 PROCEDURE — 74160 CT ABDOMEN W/CONTRAST: CPT

## 2018-03-13 RX ORDER — IOPAMIDOL 755 MG/ML
100 INJECTION, SOLUTION INTRAVASCULAR ONCE
Status: COMPLETED | OUTPATIENT
Start: 2018-03-13 | End: 2018-03-13

## 2018-03-13 RX ADMIN — SODIUM CHLORIDE 55 ML: 9 INJECTION, SOLUTION INTRAVENOUS at 10:58

## 2018-03-13 RX ADMIN — IOPAMIDOL 58 ML: 755 INJECTION, SOLUTION INTRAVENOUS at 10:54

## 2018-03-13 ASSESSMENT — ENCOUNTER SYMPTOMS
JOINT SWELLING: 0
DISTURBANCES IN COORDINATION: 0
TINGLING: 1
POLYPHAGIA: 1
BRUISES/BLEEDS EASILY: 1
DECREASED APPETITE: 1
MUSCLE CRAMPS: 1
LEG PAIN: 0
LOSS OF CONSCIOUSNESS: 0
DIZZINESS: 0
ABDOMINAL PAIN: 1
ARTHRALGIAS: 1
JAUNDICE: 0
EXERCISE INTOLERANCE: 1
PARALYSIS: 0
HALLUCINATIONS: 0
TREMORS: 1
SWOLLEN GLANDS: 0
POSTURAL DYSPNEA: 1
BOWEL INCONTINENCE: 0
LIGHT-HEADEDNESS: 1
NECK PAIN: 1
SYNCOPE: 0
NIGHT SWEATS: 1
DIARRHEA: 0
SPEECH CHANGE: 0
BLOOD IN STOOL: 0
VOMITING: 0
ORTHOPNEA: 1
COUGH: 0
NAUSEA: 1
WHEEZING: 0
WEAKNESS: 1
SNORES LOUDLY: 0
NUMBNESS: 1
CHILLS: 1
DYSPNEA ON EXERTION: 1
CONSTIPATION: 0
HEARTBURN: 1
SPUTUM PRODUCTION: 0
SHORTNESS OF BREATH: 0
HYPOTENSION: 0
BACK PAIN: 1
HEMOPTYSIS: 0
STIFFNESS: 1
ALTERED TEMPERATURE REGULATION: 1
MUSCLE WEAKNESS: 0
RECTAL PAIN: 0
INCREASED ENERGY: 1
HEADACHES: 0
BLOATING: 1
PALPITATIONS: 0
SLEEP DISTURBANCES DUE TO BREATHING: 0
FATIGUE: 1
FEVER: 0
HYPERTENSION: 0
SEIZURES: 0
POLYDIPSIA: 1
COUGH DISTURBING SLEEP: 0
MYALGIAS: 1
MEMORY LOSS: 1

## 2018-03-13 NOTE — MR AVS SNAPSHOT
After Visit Summary   3/13/2018    Sherrie Lala    MRN: [id number]           Patient Information     Date Of Birth          1955        Visit Information        Provider Department      3/13/2018 1:20 PM Gaurav Hernadez MD [facility] Interventional Radiology        Today's Diagnoses     Portal vein thrombosis    -  1    Portal hypertension (H)        Pleural effusion           Follow-ups after your visit        Your next 10 appointments already scheduled     Apr 11, 2018  9:00 AM CDT   (Arrive by 8:45 AM)   PE NPET ONCOLOGY (EYES TO THIGHS) with UUPET1   [facility] [place] PET CT ([facility] [place])    [address] [id number] [phone]           Tell your doctor:   If there is any chance you may be pregnant or if you are breastfeeding.   If you have problems lying in small spaces (claustrophobia). If you do, your doctor may give you medicine to help you relax. If you have diabetes:   Have your exam early in the morning. Your blood glucose will go up as the day goes by.   Your glucose level must be 180 or less at the start of the exam. Please take any medicines you need to ensure this blood glucose level. 24 hours before your scan: Don t do any heavy exercise. (No jogging, aerobics or other workouts.) Exercise will make your pictures less accurate. 6 hours before your scan:   Stop all food and liquids (except water).   Do not chew gum or suck on mints.   If you need to take medicine with food, you may take it with a few crackers.  Please call your Imaging Department at your exam site with any questions.            Apr 18, 2018  8:30 AM CDT   (Arrive by 8:15 AM)   New Patient Visit with Raj Morrow MD   [facility] ([facility])    [address]  [address]  [place] [address] [phone]              Who to contact     Please call your clinic at [phone]  to:    Ask questions about your health    Make or cancel appointments    Discuss your medicines    Learn about your test results    Speak to your doctor            Additional Information About Your Visit        [employer] Information     [employer] gives you secure access to your electronic health record. If you see a primary care provider, you can also send messages to your care team and make appointments. If you have questions, please call your primary care clinic.  If you do not have a primary care provider, please call [phone] and they will assist you.      [employer] is an electronic gateway that provides easy, online access to your medical records. With [employer], you can request a clinic appointment, read your test results, renew a prescription or communicate with your care team.     To access your existing account, please contact your [facility] or call [phone] for assistance.        Care EveryWhere ID     This is your Care EveryWhere ID. This could be used by other organizations to access your [place] medical records  [id number]        Your Vitals Were     Pulse Pulse Oximetry BMI (Body Mass Index)             87 97% 20.9 kg/m2          Blood Pressure from Last 3 Encounters:   03/20/18 108/68   03/13/18 102/62   02/27/18 124/74    Weight from Last 3 Encounters:   03/20/18 55.8 kg (123 lb)   03/13/18 53.5 kg (118 lb)   02/27/18 53.8 kg (118 lb 9.6 oz)              Today, you had the following     No orders found for display       Primary Care Provider Fax #    Physician No Ref-Primary [phone]       No address on file        Equal Access to Services     HARINI CHAMBERLAIN : Hadii lucien ochoao Soleta, waaxda luqadaha, qaybta kaalmada adeegyada, nahed alfaro . So [facility] [phone].    ATENCIÓN: Si habla español, tiene a bucio disposición servicios gratuitos de asistencia lingüística. Llame al [phone].    We comply with applicable federal civil  rights laws and [place] laws. We do not discriminate on the basis of race, color, national origin, age, disability, sex, sexual orientation, or gender identity.            Thank you!     Thank you for choosing [facility] INTERVENTIONAL RADIOLOGY  for your care. Our goal is always to provide you with excellent care. Hearing back from our patients is one way we can continue to improve our services. Please take a few minutes to complete the written survey that you may receive in the mail after your visit with us. Thank you!             Your Updated Medication List - Protect others around you: Learn how to safely use, store and throw away your medicines at www.disposemymeds.org.          This list is accurate as of 3/13/18 11:59 PM.  Always use your most recent med list.                   Brand Name Dispense Instructions for use Diagnosis    albuterol 108 (90 BASE) MCG/ACT Inhaler    PROAIR HFA/PROVENTIL HFA/VENTOLIN HFA     Inhale 2 puffs into the lungs every 6 hours        calcium carbonate 500 MG Chew     60 tablet    Take 500 mg by mouth 2 times daily    Osteoporosis       cholecalciferol 1000 UNIT tablet    vitamin D3    30 tablet    Take 1 tablet (1,000 Units) by mouth daily    Osteoporosis       CIPROFLOXACIN PO      Take 750 mg by mouth once a week        fluticasone 220 MCG/ACT Inhaler    FLOVENT HFA     Inhale 1 puff into the lungs 2 times daily        FUROSEMIDE PO      Take 80 mg by mouth daily        GABAPENTIN PO      Take 300 mg by mouth At Bedtime        OMEPRAZOLE PO      Take 20 mg by mouth 2 times daily (before meals)        PRAMIPEXOLE DIHYDROCHLORIDE PO      Take 0.5 mg by mouth daily        RIFAMPIN PO      Take 300 mg by mouth daily        SPIRONOLACTONE PO      Take 100 mg by mouth daily        XIFAXAN PO      Take 550 mg by mouth 2 times daily        ZOLPIDEM TARTRATE PO      Take 10 mg by mouth nightly as needed for sleep

## 2018-03-13 NOTE — PROGRESS NOTES
Interventional Radiology Clinic Visit    Date of this visit: 3/13/2018    Sherrie Lala  is referred by  for treatment recommendations. The patient requires evaluation for diagnosis of portal hypertension, hepatic hydrothorax.    Primary Physician: No Ref-Primary, Physician        History Of Present Illness:    Ms. Sherrie Santa is a very pleasant 62-year-old lady accompanied by her  Ash to this clinic visit today. She is here for assessment and discussion of possible placement of a TIPS. 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 She has outputs from her catheter as high as 1000 cc per day. This is quite limiting her lifestyle and she is hoping that she can get rid of this catheter.    She is remaining quite functional and does not have any severe functional limitations. She does have occasions of discomfort from this pleural fluid collection. She did have some ascites after resection, however that has resolved. She is under the care of Dr. Lilly from hepatology service who has referred her to us. She was recently evaluated for transplant and Dr. Crocker has found her a suitable transplant candidate.    She denies any episodes of hepatic encephalopathy (she only has minimal lapses of mammary), icterus or jaundice. She does have bilateral lower extremity edema, which has been a chronic problem for her. We also discussed the potential for worsening of this issue after possible portal interventions.      Review of Systems:    General: Negative for recent fever.  Skin: Negative for jaundice.  Eyes: Negative for jaundice.  Respiratory: Negative for shortness of  breath.  Cardiovascular: Negative for chest pain.  Gastrointestinal: Negative for abdominal pain or swelling, nausea, vomiting, or diarrhea.  Musculoskeletal: Positive for ankle swelling.    Past Medical/Surgical History:    Past Medical History:   Diagnosis Date     Asthma      Cholangiocarcinoma (H) 06/2014     Esophageal varices with hemorrhage (H)      Gastric ulcer      Lung metastases (H) 08/2014     Past Surgical History:   Procedure Laterality Date     CHOLECYSTECTOMY         Current Medications:    Current Outpatient Prescriptions   Medication Sig Dispense Refill     calcium carbonate 500 MG CHEW Take 500 mg by mouth 2 times daily 60 tablet 0     cholecalciferol (VITAMIN D3) 1000 UNIT tablet Take 1 tablet (1,000 Units) by mouth daily 30 tablet 0     OMEPRAZOLE PO Take 20 mg by mouth 2 times daily (before meals)       RifAXIMin (XIFAXAN PO) Take 550 mg by mouth 2 times daily       CIPROFLOXACIN PO Take 750 mg by mouth once a week       SPIRONOLACTONE PO Take 100 mg by mouth daily       FUROSEMIDE PO Take 80 mg by mouth daily       RIFAMPIN PO Take 300 mg by mouth daily       GABAPENTIN PO Take 300 mg by mouth At Bedtime       ZOLPIDEM TARTRATE PO Take 10 mg by mouth nightly as needed for sleep       PRAMIPEXOLE DIHYDROCHLORIDE PO Take 0.5 mg by mouth daily       fluticasone (FLOVENT HFA) 220 MCG/ACT Inhaler Inhale 1 puff into the lungs 2 times daily       albuterol (PROAIR HFA/PROVENTIL HFA/VENTOLIN HFA) 108 (90 BASE) MCG/ACT Inhaler Inhale 2 puffs into the lungs every 6 hours         Allergies:    Blood transfusion related (informational only)    Family History:    No family history on file.    Social History:    No hx of smoking or excessive alcohol use.    Social History     Social History     Marital status:      Spouse name: N/A     Number of children: N/A     Years of education: N/A     Social History Main Topics     Smoking status: Never Smoker     Smokeless tobacco: Never Used     Alcohol use  No      Comment: occ      Drug use: No     Sexual activity: Not on file     Other Topics Concern     Not on file     Social History Narrative       Physical Exam:    /62  Pulse 87  Wt 53.5 kg (118 lb)  SpO2 97%  BMI 20.9 kg/m2     GENERAL APPEARANCE: healthy, alert and no distress  PSYCHIATRIC: mentation appears normal and affect normal.  EYES: No jaundice.  SKIN: No jaundice.   RESP: PleyrX cath in place. decreased breath sounds on the R side  CARDIOVASCULAR: regular rates and rhythm, normal S1 S2, no S3 or S4 and no murmur  ABDOMEN:  soft, nontender, no masses and bowel sounds normal. MUSCULOSKELETAL: 1-2+ edema in the lower extremities.    Laboratory Studies:    Lab Results   Component Value Date    HGB 13.9 02/26/2018     Lab Results   Component Value Date    PLT 50 02/26/2018     Lab Results   Component Value Date    WBC 4.1 02/26/2018       Lab Results   Component Value Date    INR 1.37 02/26/2018       Lab Results   Component Value Date    PROTTOTAL 6.4 02/26/2018      Lab Results   Component Value Date    ALBUMIN 3.0 02/26/2018     Lab Results   Component Value Date    BILITOTAL 2.2 02/26/2018     No results found for: BILICONJ   Lab Results   Component Value Date    ALKPHOS 156 02/26/2018     Lab Results   Component Value Date    AST 42 02/26/2018     Lab Results   Component Value Date    ALT 34 02/26/2018       Lab Results   Component Value Date    CR 0.69 02/26/2018     Lab Results   Component Value Date    BUN 16 02/26/2018       Alpha Fetoprotein   Date Value Ref Range Status   02/26/2018 <1.5 0 - 8 ug/L Final     Comment:     Reference ranges apply to non-pregnant females only.  Assay Method:  Chemiluminescence using Siemens Centaur XP         Imaging:     Occlusion of the intrahepatic and extrahepatic portal veins with only minimal collaterals present demonstrated both on MRI and hepatic venous Doppler examination. Extensive extrahepatic collateral formation. Post surgical changes of  resection and a small liver size as well as changes of cirrhosis.    ASSESSMENT/PLAN:      Regarding her candidacy for TIPS, it is a rather challenging case given lack of a discretely patent portal vein. This was demonstrated on MR venography and duplex ultrasound of the liver. Her liver size also rather small. She can potentially benefit from portal venous recanalization although this could also be quite challenging given lack of any target vessel on the intra-heppatic portal venous end. I also discussed the option of portal vein recanalization with pt's transplant surgeon, who is concerned given the fact that this patient is not high on transplant list. This means that a backup transplant surgery is very challenging to accomplish if our procedure becomes complicated. We also discussed the potential for performing a DIPS procedure. I presented to the patient the risks and benefits of these procedures and the fact that despite presence of lifestyle limiting hydrothorax, she currently has a well compensated liver disease. She prefers to think more about the options and go on a trip with her family. Upon return she will come back to clinic to discuss her decision again. I do believe at this point, after my subsequent conversations with patient's transplant surgeon and other colleagues in interventional radiology, she would have an above average risk for portal venous recanalization. If she chooses so we will plan to recanalize via a trans-splenic approach.        A total of 45 minutes was spent in care for the patient, of which >50% was spent in counseling.     It was a pleasure seeing the patient.     SignedGaurav M.D.  Department of Interventional Radiology  [facility]  Patient Care Team:  No Ref-Primary, Physician as PCP - General  Apollo Benitez MD as PCP - Internal Medicine (Nephrology)  Baltazar Solares NP as Referring Physician (Nurse Practitioner)  Marifer Leija  RN as Nurse Coordinator (Transplant)  [facility] [place] as MD (Hematology & Oncology)  Rafy Chisholm as MD (Gastroenterology)  JANETH OROZCO

## 2018-03-13 NOTE — LETTER
3/13/2018       RE: Sherrie Lala  [address] [place] [address]     Dear Colleague,    Thank you for referring your patient, Sherrie Lala, to the [facility] INTERVENTIONAL RADIOLOGY at [facility]. Please see a copy of my visit note below.          Interventional Radiology Clinic Visit    Date of this visit: 3/13/2018    Sherrie Lala  is referred by  for treatment recommendations. The patient requires evaluation for diagnosis of portal hypertension, hepatic hydrothorax.    Primary Physician: No Ref-Primary, Physician        History Of Present Illness:    Ms. Sherrie Santa is a very pleasant 62-year-old lady accompanied by her  Ash to this clinic visit today. She is here for assessment and discussion of possible placement of a TIPS. 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 She has outputs from her catheter as high as 1000 cc per day. This is quite limiting her lifestyle and she is hoping that she can get rid of this catheter.    She is remaining quite functional and does not have any severe functional limitations. She does have occasions of discomfort from this pleural fluid collection. She did have some ascites after resection, however that has resolved. She is under the care of Dr. Lilly from hepatology service who has referred her to us. She was recently evaluated for transplant and Dr. Crocker has found her a suitable transplant candidate.    She denies any episodes of hepatic encephalopathy (she only has minimal lapses of mammary), icterus or jaundice. She does have bilateral lower extremity edema, which has been a chronic problem for  her. We also discussed the potential for worsening of this issue after possible portal interventions.      Review of Systems:    General: Negative for recent fever.  Skin: Negative for jaundice.  Eyes: Negative for jaundice.  Respiratory: Negative for shortness of breath.  Cardiovascular: Negative for chest pain.  Gastrointestinal: Negative for abdominal pain or swelling, nausea, vomiting, or diarrhea.  Musculoskeletal: Positive for ankle swelling.    Past Medical/Surgical History:    Past Medical History:   Diagnosis Date     Asthma      Cholangiocarcinoma (H) 06/2014     Esophageal varices with hemorrhage (H)      Gastric ulcer      Lung metastases (H) 08/2014     Past Surgical History:   Procedure Laterality Date     CHOLECYSTECTOMY         Current Medications:    Current Outpatient Prescriptions   Medication Sig Dispense Refill     calcium carbonate 500 MG CHEW Take 500 mg by mouth 2 times daily 60 tablet 0     cholecalciferol (VITAMIN D3) 1000 UNIT tablet Take 1 tablet (1,000 Units) by mouth daily 30 tablet 0     OMEPRAZOLE PO Take 20 mg by mouth 2 times daily (before meals)       RifAXIMin (XIFAXAN PO) Take 550 mg by mouth 2 times daily       CIPROFLOXACIN PO Take 750 mg by mouth once a week       SPIRONOLACTONE PO Take 100 mg by mouth daily       FUROSEMIDE PO Take 80 mg by mouth daily       RIFAMPIN PO Take 300 mg by mouth daily       GABAPENTIN PO Take 300 mg by mouth At Bedtime       ZOLPIDEM TARTRATE PO Take 10 mg by mouth nightly as needed for sleep       PRAMIPEXOLE DIHYDROCHLORIDE PO Take 0.5 mg by mouth daily       fluticasone (FLOVENT HFA) 220 MCG/ACT Inhaler Inhale 1 puff into the lungs 2 times daily       albuterol (PROAIR HFA/PROVENTIL HFA/VENTOLIN HFA) 108 (90 BASE) MCG/ACT Inhaler Inhale 2 puffs into the lungs every 6 hours         Allergies:    Blood transfusion related (informational only)    Family History:    No family history on file.    Social History:    No hx of smoking or excessive  alcohol use.    Social History     Social History     Marital status:      Spouse name: N/A     Number of children: N/A     Years of education: N/A     Social History Main Topics     Smoking status: Never Smoker     Smokeless tobacco: Never Used     Alcohol use No      Comment: occ      Drug use: No     Sexual activity: Not on file     Other Topics Concern     Not on file     Social History Narrative       Physical Exam:    /62  Pulse 87  Wt 53.5 kg (118 lb)  SpO2 97%  BMI 20.9 kg/m2     GENERAL APPEARANCE: healthy, alert and no distress  PSYCHIATRIC: mentation appears normal and affect normal.  EYES: No jaundice.  SKIN: No jaundice.   RESP: PleyrX cath in place. decreased breath sounds on the R side  CARDIOVASCULAR: regular rates and rhythm, normal S1 S2, no S3 or S4 and no murmur  ABDOMEN:  soft, nontender, no masses and bowel sounds normal. MUSCULOSKELETAL: 1-2+ edema in the lower extremities.    Laboratory Studies:    Lab Results   Component Value Date    HGB 13.9 02/26/2018     Lab Results   Component Value Date    PLT 50 02/26/2018     Lab Results   Component Value Date    WBC 4.1 02/26/2018       Lab Results   Component Value Date    INR 1.37 02/26/2018       Lab Results   Component Value Date    PROTTOTAL 6.4 02/26/2018      Lab Results   Component Value Date    ALBUMIN 3.0 02/26/2018     Lab Results   Component Value Date    BILITOTAL 2.2 02/26/2018     No results found for: BILICONJ   Lab Results   Component Value Date    ALKPHOS 156 02/26/2018     Lab Results   Component Value Date    AST 42 02/26/2018     Lab Results   Component Value Date    ALT 34 02/26/2018       Lab Results   Component Value Date    CR 0.69 02/26/2018     Lab Results   Component Value Date    BUN 16 02/26/2018       Alpha Fetoprotein   Date Value Ref Range Status   02/26/2018 <1.5 0 - 8 ug/L Final     Comment:     Reference ranges apply to non-pregnant females only.  Assay Method:  Chemiluminescence using Siemens  Centaur XP         Imaging:     Occlusion of the intrahepatic and extrahepatic portal veins with only minimal collaterals present demonstrated both on MRI and hepatic venous Doppler examination. Extensive extrahepatic collateral formation. Post surgical changes of resection and a small liver size as well as changes of cirrhosis.    ASSESSMENT/PLAN:      Regarding her candidacy for TIPS, it is a rather challenging case given lack of a discretely patent portal vein. This was demonstrated on MR venography and duplex ultrasound of the liver. Her liver size also rather small. She can potentially benefit from portal venous recanalization although this could also be quite challenging given lack of any target vessel on the intra-heppatic portal venous end. I also discussed the option of portal vein recanalization with pt's transplant surgeon, who is concerned given the fact that this patient is not high on transplant list. This means that a backup transplant surgery is very challenging to accomplish if our procedure becomes complicated. We also discussed the potential for performing a DIPS procedure. I presented to the patient the risks and benefits of these procedures and the fact that despite presence of lifestyle limiting hydrothorax, she currently has a well compensated liver disease. She prefers to think more about the options and go on a trip with her family. Upon return she will come back to clinic to discuss her decision again. I do believe at this point, after my subsequent conversations with patient's transplant surgeon and other colleagues in interventional radiology, she would have an above average risk for portal venous recanalization. If she chooses so we will plan to recanalize via a trans-splenic approach.        A total of 45 minutes was spent in care for the patient, of which >50% was spent in counseling.     It was a pleasure seeing the patient.     Gaurav Mayberry M.D.  Department of  Interventional Radiology  [facility]      CC  Patient Care Team:  No Ref-Primary, Physician as PCP - General  Apollo Benitez MD as PCP - Internal Medicine (Nephrology)  Baltazar Solares, CHARLY as Referring Physician (Nurse Practitioner)  Marifer Leija RN as Nurse Coordinator (Transplant)  Jorge Langford MD as MD (Hematology & Oncology)  Rafy Chisholm as MD (Gastroenterology)  JANETH OROZCO

## 2018-03-13 NOTE — PROGRESS NOTES
Liver Transplant Evaluation/Teaching    TEACHING TOPICS: Evaluation Process, Evaluation Items, Diagnostic Studies, Consultation, Chemical Dependency Policy, CD Eval, Donor Source, Liver Allocation, MELD System, UNOS, Waiting List, Follow up while on transplant list, Follow up after transplantation, Infection and Rejection, Immunosuppression , Medication Teaching, Lab Recording after transplant, Laboratory Frequency after transplant , Consent for evaluation and One year survival rates  INSTRUCTIONAL MATERIAL USED/GIVEN: Liver Transplant Handbook, MELD Booklet, Donor Booklet, Web Sites Options, Verbal instructions, Multiple Listing Brochure , Consent for evaluation signed, One year survival rates and SRTR (Scientific Registry) Data  Person(s) involved in teaching: Her , Ash, and patient.  Asks Questions: YES  Eager to Learn: YES  Cooperative: YES  Receptive (willing/able to accept information): YES  Reason for the appointment, diagnosis and treatment plan:YES  Knowledge of proper use of medications and conditions for which they are ordered (with special attention to potential side effects or drug interactions): YES  Which situations necessitate calling provider and whom to contact:YES  Teaching Concerns Addressed   Comments: Patient attended class on 2018. Patient asked appropriate questions and verbalized understanding of the material.     Proper use and care of (medical equip, care aids, etc.):YES  Nutritional needs and diet plan: YES  Pain management techniques: YES  Wound Care: YES  How and/when to access community resources: YES  Patient is aware of and agrees to required commitment to post-op care and long term follow-up: YES  Patient has name and phone number of transplant coordinator.   Time Spent face-to-face teachin minutes.    ABEL AustinN,RN  Adult Liver Coordinator  [phone]

## 2018-03-14 DIAGNOSIS — I82.0 BUDD-CHIARI SYNDROME (H): Primary | ICD-10-CM

## 2018-03-14 DIAGNOSIS — I81 PORTAL VEIN THROMBOSIS: ICD-10-CM

## 2018-03-15 DIAGNOSIS — C22.1 INTRAHEPATIC CHOLANGIOCARCINOMA (H): Primary | ICD-10-CM

## 2018-03-15 NOTE — Clinical Note
Yeison Sigala,   Please cancel [facility] (pt to call after her dental work completed), schedule EKG (per txp eval) and reschedule pulmonary visit per pt request (she is wondering if it could be coordinated with cards on 3/20?  Let me know if you have any questions.   Thanks,  Marifer

## 2018-03-15 NOTE — PROGRESS NOTES
Pt called to review upcoming appts and POC. Pt also scheduled to see dentist 4/17/18, so requesting delay in scheduling [facility].     Upcoming visits reviewed including plan to discuss POC with Dr. Lilly next week. Pt verbalized understanding and comfort with this plan.     Message routed to  to cancel [facility], schedule EKG (per txp eval) and reschedule pulmonary visit per pt request.

## 2018-03-16 LAB — COPATH REPORT: NORMAL

## 2018-03-20 ENCOUNTER — OFFICE VISIT (OUTPATIENT)
Dept: CARDIOLOGY | Facility: CLINIC | Age: 63
End: 2018-03-20
Attending: INTERNAL MEDICINE
Payer: MEDICARE

## 2018-03-20 VITALS
SYSTOLIC BLOOD PRESSURE: 108 MMHG | DIASTOLIC BLOOD PRESSURE: 68 MMHG | OXYGEN SATURATION: 96 % | WEIGHT: 123 LBS | HEIGHT: 63 IN | HEART RATE: 85 BPM | BODY MASS INDEX: 21.79 KG/M2

## 2018-03-20 DIAGNOSIS — C22.1 INTRAHEPATIC CHOLANGIOCARCINOMA (H): ICD-10-CM

## 2018-03-20 DIAGNOSIS — Z01.810 PRE-OPERATIVE CARDIOVASCULAR EXAMINATION: Primary | ICD-10-CM

## 2018-03-20 PROCEDURE — 93005 ELECTROCARDIOGRAM TRACING: CPT | Mod: ZF

## 2018-03-20 PROCEDURE — 99204 OFFICE O/P NEW MOD 45 MIN: CPT | Mod: ZP | Performed by: INTERNAL MEDICINE

## 2018-03-20 PROCEDURE — G0463 HOSPITAL OUTPT CLINIC VISIT: HCPCS | Mod: 25,ZF

## 2018-03-20 PROCEDURE — 93010 ELECTROCARDIOGRAM REPORT: CPT | Mod: ZP | Performed by: INTERNAL MEDICINE

## 2018-03-20 ASSESSMENT — PAIN SCALES - GENERAL: PAINLEVEL: NO PAIN (0)

## 2018-03-20 NOTE — PATIENT INSTRUCTIONS
Thank you for your visit today.  Please call me with any questions or concerns.   Flako Thomas RN  Cardiology Care Coordinator  [phone], press option 1 then option 3

## 2018-03-20 NOTE — LETTER
3/20/2018      RE: Sherrie Lala  [address] [facility] [address]       Dear Colleague,    Thank you for the opportunity to participate in the care of your patient, Sherrie Lala, at the [facility] HEART [facility]. Please see a copy of my visit note below.       SUBJECTIVE:  Sherrie Lala is a 62 year old female who presents for Liver Transplant evaluation.  ESLD due to Budd Chiari syndrome. S/O Cholangiocarcinoma resection 2012. Known Liver mets had XRT and chemo.  Active with no cardiac symptoms.  No significant cardiac risk factors.    Patient Active Problem List    Diagnosis Date Noted     Gastric ulcer 03/06/2018     Priority: Medium     Osteoporosis 03/06/2018     Priority: Medium     Bleeding esophageal varices (H) 03/06/2018     Priority: Medium    .  Current Outpatient Prescriptions   Medication Sig     calcium carbonate 500 MG CHEW Take 500 mg by mouth 2 times daily     cholecalciferol (VITAMIN D3) 1000 UNIT tablet Take 1 tablet (1,000 Units) by mouth daily     OMEPRAZOLE PO Take 20 mg by mouth 2 times daily (before meals)     RifAXIMin (XIFAXAN PO) Take 550 mg by mouth 2 times daily     CIPROFLOXACIN PO Take 750 mg by mouth once a week     SPIRONOLACTONE PO Take 100 mg by mouth daily     FUROSEMIDE PO Take 80 mg by mouth daily     RIFAMPIN PO Take 300 mg by mouth daily     GABAPENTIN PO Take 300 mg by mouth At Bedtime     ZOLPIDEM TARTRATE PO Take 10 mg by mouth nightly as needed for sleep     PRAMIPEXOLE DIHYDROCHLORIDE PO Take 0.5 mg by mouth daily     fluticasone (FLOVENT HFA) 220 MCG/ACT Inhaler Inhale 1 puff into the lungs 2 times daily     albuterol (PROAIR HFA/PROVENTIL HFA/VENTOLIN HFA) 108 (90 BASE) MCG/ACT Inhaler Inhale 2 puffs into the lungs every 6 hours     No current facility-administered medications for this visit.      Past Medical History:   Diagnosis Date     Asthma      Cholangiocarcinoma (H) 06/2014     Esophageal varices with hemorrhage (H)   "    Gastric ulcer      Lung metastases (H) 08/2014     Past Surgical History:   Procedure Laterality Date     CHOLECYSTECTOMY       Allergies   Allergen Reactions     Blood Transfusion Related (Informational Only) Other (See Comments)     Patient has a history of a clinically significant antibody against RBC antigens.  A delay in compatible RBCs may occur.     Social History     Social History     Marital status:      Spouse name: N/A     Number of children: N/A     Years of education: N/A     Occupational History     Not on file.     Social History Main Topics     Smoking status: Never Smoker     Smokeless tobacco: Never Used     Alcohol use No      Comment: occ      Drug use: No     Sexual activity: Not on file     Other Topics Concern     Not on file     Social History Narrative     No family history on file.       REVIEW OF SYSTEMS:  General: negative, fever, chills, night sweats  Skin: negative, acne, rash and scaling  Eyes: negative, double vision, eye pain and photophobia  Ears/Nose/Throat: negative, nasal congestion and purulent rhinorrhea  Respiratory: No dyspnea on exertion, No cough, No hemoptysis and negative  Cardiovascular: negative, palpitations, tachycardia, irregular heart beat, chest pain, exertional chest pain or pressure, paroxysmal nocturnal dyspnea, dyspnea on exertion and orthopnea  Gastrointestinal: negative, dysphagia, nausea and vomiting  Genitourinary: negative, nocturia, dysuria, frequency and urgency  Musculoskeletal: negative, fracture, back pain and neck pain  Neurologic: negative, headaches, syncope, stroke, seizures and paralysis  Psychiatric: negative, nervous breakdown, thoughts of self-harm and thoughts of hurting someone else  Hematologic/Lymphatic/Immunologic: negative, bleeding disorder, chills and fever  Endocrine: negative, cold intolerance, heat intolerance and hot flashes       OBJECTIVE:  Blood pressure 108/68, pulse 85, height 1.6 m (5' 3\"), weight 55.8 kg (123 " lb), SpO2 96 %.  General Appearance: alert, active and no distress  Head: Normocephalic. No masses, lesions, tenderness or abnormalities  Eyes: conjuctiva clear, PERRL, EOM intact  Ears: External ears normal. Canals clear. TM's normal.  Nose: Nares normal  Mouth: normal  Neck: Supple, no cervical adenopathy, no thyromegaly  Lungs: clear to auscultation  Cardiac: regular rate and rhythm, normal S1 and S2, no murmur  Abdomen: Soft, nontender.  Normal bowel sounds.  No hepatosplenomegaly or abnormal masses  Extremities: no peripheral edema, peripheral pulses normal  Musculoskeletal: negative  Neurological: Cranial nerves 2-12 intact, motor strength intact, reflexes normal, Romberg negative       ASSESSMENT/PLAN:  Patient with ESLD due to Budd Chiary syndrome here for Liver Tx evaluation.  Prior cholangio carcinoma s/p resection. Lung mets. Chemo/XRT.  Active with no cardiac complaints.  No CAD risk factors.  Reviewed stress echo. Normal at target heartrate. Normal PA pressure of 21mmGf+RAP.    Patient may proceed with transplant.  Per orders.   Return to Clinic PRN.    MAGGY Luo MD

## 2018-03-20 NOTE — MR AVS SNAPSHOT
After Visit Summary   3/20/2018    Sherrie Lala    MRN: [id number]           Patient Information     Date Of Birth          1955        Visit Information        Provider Department      3/20/2018 2:30 PM MAGGY Luo MD [facility]        Today's Diagnoses     Pre-operative cardiovascular examination    -  1    Intrahepatic cholangiocarcinoma (H)          Care Instructions    Thank you for your visit today.  Please call me with any questions or concerns.   Flako Thomas RN  Cardiology Care Coordinator  [phone], press option 1 then option 3          Follow-ups after your visit        Your next 10 appointments already scheduled     Mar 20, 2018  2:30 PM CDT   (Arrive by 2:15 PM)   NEW LIVER EVALUATION with MAGGY Luo MD   [facility] ([facility])    [address] [place] [id number] [phone]            Apr 18, 2018  8:30 AM CDT   (Arrive by 8:15 AM)   New Patient Visit with Raj Morrow MD   [facility] ([facility])    [address] [place] [address]  [address] [place] [id number] [phone]              Who to contact     If you have questions or need follow up information about today's clinic visit or your schedule please contact [facility] directly at [phone].  Normal or non-critical lab and imaging results will be communicated to you by MyChart, letter or phone within 4 business days after the clinic has received the results. If you do not hear from us within 7 days, please contact the clinic through MyChart or phone. If you have a critical or abnormal lab result, we will notify you by phone as soon as possible.  Submit refill requests through [employer] or call your pharmacy and they will forward the refill request to us. Please allow 3 business days for your refill to be completed.          Additional Information About Your Visit       "  MyChart Information     [employer] gives you secure access to your electronic health record. If you see a primary care provider, you can also send messages to your care team and make appointments. If you have questions, please call your primary care clinic.  If you do not have a primary care provider, please call [phone] and they will assist you.        Care EveryWhere ID     This is your Care EveryWhere ID. This could be used by other organizations to access your [facility] medical records  [id number]        Your Vitals Were     Pulse Height Pulse Oximetry BMI (Body Mass Index)          85 1.6 m (5' 3\") 96% 21.79 kg/m2         Blood Pressure from Last 3 Encounters:   03/20/18 108/68   03/13/18 102/62   02/27/18 124/74    Weight from Last 3 Encounters:   03/20/18 55.8 kg (123 lb)   03/13/18 53.5 kg (118 lb)   02/27/18 53.8 kg (118 lb 9.6 oz)              We Performed the Following     EKG 12-lead, tracing only (Same Day)     EKG 12-lead, tracing only (Same Day)     EKG 12-lead, tracing only [EKG1]        Primary Care Provider Fax #    Physician No Ref-Primary [phone]       No address on file        Equal Access to Services     HARINI CHAMBERLAIN : Hadii aad ku hadasho Soomaali, waaxda luqadaha, qaybta kaalmada adeegyada, nahed alfaro . So [facility] [phone].    ATENCIÓN: Si habla español, tiene a bucio disposición servicios gratuitos de asistencia lingüística. Llame al [phone].    We comply with applicable federal civil rights laws and [place] laws. We do not discriminate on the basis of race, color, national origin, age, disability, sex, sexual orientation, or gender identity.            Thank you!     Thank you for choosing [facility]  for your care. Our goal is always to provide you with excellent care. Hearing back from our patients is one way we can continue to improve our services. Please take a few minutes to complete the written survey that you may receive in the " mail after your visit with us. Thank you!             Your Updated Medication List - Protect others around you: Learn how to safely use, store and throw away your medicines at www.disposemymeds.org.          This list is accurate as of 3/20/18  1:46 PM.  Always use your most recent med list.                   Brand Name Dispense Instructions for use Diagnosis    albuterol 108 (90 BASE) MCG/ACT Inhaler    PROAIR HFA/PROVENTIL HFA/VENTOLIN HFA     Inhale 2 puffs into the lungs every 6 hours        calcium carbonate 500 MG Chew     60 tablet    Take 500 mg by mouth 2 times daily    Osteoporosis       cholecalciferol 1000 UNIT tablet    vitamin D3    30 tablet    Take 1 tablet (1,000 Units) by mouth daily    Osteoporosis       CIPROFLOXACIN PO      Take 750 mg by mouth once a week        fluticasone 220 MCG/ACT Inhaler    FLOVENT HFA     Inhale 1 puff into the lungs 2 times daily        FUROSEMIDE PO      Take 80 mg by mouth daily        GABAPENTIN PO      Take 300 mg by mouth At Bedtime        OMEPRAZOLE PO      Take 20 mg by mouth 2 times daily (before meals)        PRAMIPEXOLE DIHYDROCHLORIDE PO      Take 0.5 mg by mouth daily        RIFAMPIN PO      Take 300 mg by mouth daily        SPIRONOLACTONE PO      Take 100 mg by mouth daily        XIFAXAN PO      Take 550 mg by mouth 2 times daily        ZOLPIDEM TARTRATE PO      Take 10 mg by mouth nightly as needed for sleep

## 2018-03-20 NOTE — NURSING NOTE
Med Reconcile: Reviewed and verified all current medications with the patient. The updated medication list was printed and given to the patient.  Return Appointment: PRN. Patient given instructions regarding scheduling next clinic visit. Patient demonstrated understanding of this information and agreed to call with further questions or concerns.  Patient stated she understood all health information given and agreed to call with further questions or concerns.

## 2018-03-20 NOTE — NURSING NOTE
Chief Complaint   Patient presents with     Blood Draw     No lab orders. Advised patient to check in with  for her appointment upstairs.     Cecilia Marte RN

## 2018-03-20 NOTE — NURSING NOTE
Chief Complaint   Patient presents with     New Patient     pre-liver TX candidate needs EKG and cardiac clearance     Vitals were taken and medications were reconciled. EKG was performed.  MARCELLA Cheema  1:33 PM

## 2018-03-21 LAB — INTERPRETATION ECG - MUSE: NORMAL

## 2018-03-23 ENCOUNTER — DOCUMENTATION ONLY (OUTPATIENT)
Dept: TRANSPLANT | Facility: CLINIC | Age: 63
End: 2018-03-23

## 2018-03-23 NOTE — PROGRESS NOTES
"Patient's imaging and case discussed by transplant hepatology, txp sx, IR, and oncology at tumor conference 3/23/18.    Recs include:  - IR (Dr. Oscar to discuss with Dr. Hdez, et al about possibility of trying TIPS) & Dr. Lilly   > question on how to approach given her resection and clot(s)  - Transplant sx feels that she would be \"doable\" as txp candidate, but would be high risk given previous resection and clot(s)  - plan to continue transplant evaluation (pulm on 4/18 last item) and discuss at selection conference.     Called patient to update, but was unable to reach her. Left VM to call back and discuss.   "

## 2018-03-26 DIAGNOSIS — C23: Primary | ICD-10-CM

## 2018-03-26 DIAGNOSIS — C22.1 INTRAHEPATIC CHOLANGIOCARCINOMA (H): ICD-10-CM

## 2018-04-05 NOTE — PROGRESS NOTES
HPI      ROS      Physical Exam    Progress Notes  Encounter Date: 2/27/2018  Darren Rod MD   Transplant   Expand All Collapse All    []Hide copied text  []Hover for attribution information  HPI        ROS        Physical Exam      Assessment and Plan:  1. liver transplant evaluation - patient is a fair candidate overall. Benefits and surgical risks of a liver transplantation were discussed.  2.  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 Portal Vein:Portal vein thrombus - needs MRV   2. Hepatic Artery: Open  3. TIPS: absent  4. Previous Abdominal Surgery: Yes; left trisegmentectomy  5. Hepatocellular Carcinoma: Cholangiocarcinoma  6. Ascites: Present - large amount; has hydrothorax and a pleurex catheter  7. Costal Angle: narrow  8. Portopulmonary Hypertension: absent  9. Hepatopulmonary Syndrome: absent  10. Cardiac Evaluation: needs stress echocardiogram  11. Nutritional Status: Moderate  12. Diabetes: no  13.Hypertension  no  14. Smoker:no  115: Fraility index: yes        Recommendations:   MRV/MRI to evaluate liver vasculature  Oncology consult  Hypercoagulable work up        Patients overall evaluation will be discussed at the Liver Transplant selection committee meeting with a final recommendation on the patients suitability for transplant to be made at that time.     Consult Full  Details:  Sherrie Lala was seen in consultation at the request of Dr. Lilly  for evaluation as a potential liver transplant recipient.     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     HPI:  Presenting complaint:  Hepatic hydrothorax with a pleurex catheter draining about 1000c/day     Had stage IV cholangiocarcinoma   Resected by extended left hepatectomy in 2012, also has lung nodules treated by chemo radiation  Now has budd chiari, ascites and hydrothorax      Past Medical History         Past Medical History:   Diagnosis Date     Cholangiocarcinoma (H)            Past Surgical History    No past surgical history on file.      Past Surgical History    No past surgical history on file.      Family History    No family history on file.     No Known Allergies          Prior to Admission medications    Medication Sig Start Date End Date Taking? Authorizing Provider   OMEPRAZOLE PO Take 20 mg by mouth 2 times daily (before meals)       Reported, Patient   RifAXIMin (XIFAXAN PO) Take 550 mg by mouth 2 times daily       Reported, Patient   CIPROFLOXACIN PO Take 750 mg by mouth once a week       Reported, Patient   SPIRONOLACTONE PO Take 100 mg by mouth daily       Reported, Patient   FUROSEMIDE PO Take 80 mg by mouth daily       Reported, Patient   RIFAMPIN PO Take 300 mg by mouth daily       Reported, Patient   GABAPENTIN PO Take 300 mg by mouth At Bedtime       Reported, Patient   ZOLPIDEM TARTRATE PO Take 10 mg by mouth nightly as needed for sleep       Reported, Patient   PRAMIPEXOLE DIHYDROCHLORIDE PO Take 0.5 mg by mouth daily       Reported, Patient   fluticasone (FLOVENT HFA) 220 MCG/ACT Inhaler Inhale 1 puff into the lungs 2 times daily       Reported, Patient   albuterol (PROAIR HFA/PROVENTIL HFA/VENTOLIN HFA) 108 (90 BASE) MCG/ACT Inhaler Inhale 2 puffs into the lungs every 6 hours       Reported, Patient         Previous Transplant Hx: No     Cardiovascular Hx:       h/o Cardiac Issues: No       Exercise Tolerance: no chest pain or shortness of breath with exertion.     Potential Donor(s): No     ROS:    REVIEW OF SYSTEMS (check box if normal)  [x]                GENERAL                                   [x]                  PULMONARY                 [x]                 GENITOURINARY  [x]                 CNS                                      [x]                  CARDIAC                                         [x]                  ENDOCRINE  [x]                 EARS,NOSE,THROAT                [x]                  GASTROINTESTINAL                [x]                  NEUROLOGIC    [x]                 MUSCLOSKELTAL                     [x]                   HEMATOLOGY     Examination:      Vitals:  There were no vitals taken for this visit.     GENERAL APPEARANCE: alert and no distress  EYES: PERRL  HENT: mouth without ulcers or lesions  NECK: supple, no adenopathy  RESP: lungs clear to auscultation - no rales, rhonchi or wheezes  CV: regular rhythm, normal rate, no rub   ABDOMEN: right plurex catheter and mildline incision  soft, nontender, no HSM or masses and bowel sounds normal  MS: extremities normal- no gross deformities noted, no evidence of inflammation in joints, no muscle tenderness  SKIN: no rash  NEURO: Normal strength and tone, sensory exam grossly normal, mentation intact and speech normal  PSYCH: mentation appears normal. and affect normal/bright        Results:    Recent Results           Recent Results (from the past 168 hour(s))   Protein  random urine with Creat Ratio     Collection Time: 02/26/18  9:47 AM   Result Value Ref Range     Protein Random Urine 0.09 g/L     Protein Total Urine g/gr Creatinine 0.06 0 - 0.2 g/g Cr   UA reflex to Microscopic and Culture     Collection Time: 02/26/18  9:47 AM   Result Value Ref Range     Color Urine Yellow       Appearance Urine Clear       Glucose Urine Negative NEG^Negative mg/dL     Bilirubin Urine Negative NEG^Negative     Ketones Urine Negative NEG^Negative mg/dL     Specific Gravity Urine 1.017 1.003 - 1.035     Blood Urine Negative NEG^Negative     pH Urine 5.0 5.0 - 7.0 pH     Protein Albumin Urine Negative NEG^Negative mg/dL     Urobilinogen mg/dL 0.0 0.0 - 2.0 mg/dL     Nitrite Urine Negative NEG^Negative     Leukocyte Esterase Urine Negative NEG^Negative     Source  Midstream Urine       RBC Urine 1 0 - 2 /HPF     WBC Urine 1 0 - 2 /HPF     Squamous Epithelial /HPF Urine 3 (H) 0 - 1 /HPF     Mucous Urine Present (A) NEG^Negative /LPF     Hyaline Casts 1 0 - 2 /LPF   Ethyl Glucuronide Urine     Collection Time: 02/26/18  9:47 AM   Result Value Ref Range     Ethyl Glucuronide Urine NEGATIVE not reported   Creatinine urine calculation only     Collection Time: 02/26/18  9:47 AM   Result Value Ref Range     Creatinine Urine 146 mg/dL   ABO type [[id number]]     Collection Time: 02/26/18  9:49 AM   Result Value Ref Range     ABO AB       RH(D) Pos       Specimen Expires 03/01/2018     Antibody titer red cell     Collection Time: 02/26/18  9:52 AM   Result Value Ref Range     Antibody Titer           Titer not indicated. Patient is type AB. 2/26/18 SS.   ABO/Rh type and screen     Collection Time: 02/26/18  9:52 AM   Result Value Ref Range     ABO AB       RH(D) Pos       Antibody Screen Pos (A)       Test Valid Only At            [facility]     Specimen Expires 03/01/2018       Blood Bank Comment           Delay in availability of Red Blood Cells called to  Marifer Leija Txp Coordinator [id number] 2/26/18 by CB.   Basic metabolic panel     Collection Time: 02/26/18  9:52 AM   Result Value Ref Range     Sodium 138 133 - 144 mmol/L     Potassium 3.1 (L) 3.4 - 5.3 mmol/L     Chloride 102 94 - 109 mmol/L     Carbon Dioxide 29 20 - 32 mmol/L     Anion Gap 8 3 - 14 mmol/L     Glucose 78 70 - 99 mg/dL     Urea Nitrogen 16 7 - 30 mg/dL     Creatinine 0.69 0.52 - 1.04 mg/dL     GFR Estimate 86 >60 mL/min/1.7m2     GFR Estimate If Black >90 >60 mL/min/1.7m2     Calcium 8.4 (L) 8.5 - 10.1 mg/dL   Lipid Profile     Collection Time: 02/26/18  9:52 AM   Result Value Ref Range     Cholesterol 185 <200 mg/dL     Triglycerides 54 <150 mg/dL     HDL Cholesterol 93 >49 mg/dL     LDL Cholesterol Calculated 82 <100 mg/dL     Non HDL Cholesterol 92 <130 mg/dL    Hepatic panel     Collection Time: 02/26/18  9:52 AM   Result Value Ref Range     Bilirubin Direct 0.6 (H) 0.0 - 0.2 mg/dL     Bilirubin Total 2.2 (H) 0.2 - 1.3 mg/dL     Albumin 3.0 (L) 3.4 - 5.0 g/dL     Protein Total 6.4 (L) 6.8 - 8.8 g/dL     Alkaline Phosphatase 156 (H) 40 - 150 U/L     ALT 34 0 - 50 U/L     AST 42 0 - 45 U/L   AFP tumor marker     Collection Time: 02/26/18  9:52 AM   Result Value Ref Range     Alpha Fetoprotein <1.5 0 - 8 ug/L   HCG qualitative (female only)     Collection Time: 02/26/18  9:52 AM   Result Value Ref Range     HCG Qualitative Serum Positive (A) NEG^Negative   Phosphorus     Collection Time: 02/26/18  9:52 AM   Result Value Ref Range     Phosphorus 3.1 2.5 - 4.5 mg/dL   TSH with free T4 reflex     Collection Time: 02/26/18  9:52 AM   Result Value Ref Range     TSH 0.02 (L) 0.40 - 4.00 mU/L   Transferrin     Collection Time: 02/26/18  9:52 AM   Result Value Ref Range     Transferrin 239 210 - 360 mg/dL   Vitamin D Deficiency     Collection Time: 02/26/18  9:52 AM   Result Value Ref Range     Vitamin D Deficiency screening 12 (L) 20 - 75 ug/L   INR     Collection Time: 02/26/18  9:52 AM   Result Value Ref Range     INR 1.37 (H) 0.86 - 1.14   CBC with platelets     Collection Time: 02/26/18  9:52 AM   Result Value Ref Range     WBC 4.1 4.0 - 11.0 10e9/L     RBC Count 4.48 3.8 - 5.2 10e12/L     Hemoglobin 13.9 11.7 - 15.7 g/dL     Hematocrit 40.4 35.0 - 47.0 %     MCV 90 78 - 100 fl     MCH 31.0 26.5 - 33.0 pg     MCHC 34.4 31.5 - 36.5 g/dL     RDW 15.5 (H) 10.0 - 15.0 %     Platelet Count 50 (L) 150 - 450 10e9/L   CMV Antibody IgG     Collection Time: 02/26/18  9:52 AM   Result Value Ref Range     CMV Antibody IgG 0.2 0.0 - 0.8 AI   EBV Capsid Antibody IgG     Collection Time: 02/26/18  9:52 AM   Result Value Ref Range     EBV Capsid Antibody IgG 7.7 (H) 0.0 - 0.8 AI   Hepatitis A Antibody IgG     Collection Time: 02/26/18  9:52 AM   Result Value Ref Range     Hepatitis A Antibody  IgG Reactive (A) NR^Nonreactive   Hepatitis B core antibody     Collection Time: 02/26/18  9:52 AM   Result Value Ref Range     Hepatitis B Core Marium Nonreactive NR^Nonreactive   Hepatitis B Surface Antibody     Collection Time: 02/26/18  9:52 AM   Result Value Ref Range     Hepatitis B Surface Antibody 933.57 (H) <8.00 m[IU]/mL   Hepatitis B surface antigen     Collection Time: 02/26/18  9:52 AM   Result Value Ref Range     Hep B Surface Agn Nonreactive NR^Nonreactive   HIV Antigen Antibody Combo Pretransplant     Collection Time: 02/26/18  9:52 AM   Result Value Ref Range     HIV Antigen Antibody Combo Pretransplant Nonreactive NR^Nonreactive   Anti Treponema     Collection Time: 02/26/18  9:52 AM   Result Value Ref Range     Treponema pallidum Antibody Negative NEG^Negative   Hepatitis C antibody     Collection Time: 02/26/18  9:52 AM   Result Value Ref Range     Hepatitis C Antibody Nonreactive NR^Nonreactive   T4 free     Collection Time: 02/26/18  9:52 AM   Result Value Ref Range     T4 Free 1.06 0.76 - 1.46 ng/dL   General PFT Lab (Please always keep checked)     Collection Time: 02/26/18 11:49 AM   Result Value Ref Range     FVC-Pred 3.03 L     FVC-Pre 2.21 L     FVC-%Pred-Pre 73 %     FEV1-Pre 1.47 L     FEV1-%Pred-Pre 61 %     FEV1FVC-Pred 79 %     FEV1FVC-Pre 66 %     FEFMax-Pred 6.09 L/sec     FEFMax-Pre 3.79 L/sec     FEFMax-%Pred-Pre 62 %     FEF2575-Pred 2.19 L/sec     FEF2575-Pre 0.88 L/sec     [id number]-%Pred-Pre 40 %     FEF2575-Post 1.06 L/sec     OII7069-%Pred-Post 48 %     ExpTime-Pre 8.04 sec     FIFMax-Pre 3.76 L/sec     VC-Pred 3.13 L     VC-Pre 2.29 L     VC-%Pred-Pre 73 %     IC-Pred 2.16 L     IC-Pre 2.08 L     IC-%Pred-Pre 96 %     ERV-Pred 0.97 L     ERV-Pre 0.20 L     ERV-%Pred-Pre 20 %     FEV1FEV6-Pred 81 %     FEV1FEV6-Pre 66 %     FRCPleth-Pred 2.65 L     FRCPleth-Pre 2.59 L     FRCPleth-%Pred-Pre 97 %     RVPleth-Pred 1.87 L     RVPleth-Pre 2.39 L     RVPleth-%Pred-Pre 127 %      TLCPleth-Pred 4.77 L     TLCPleth-Pre 4.68 L     TLCPleth-%Pred-Pre 98 %     DLCOunc-Pred 21.12 ml/min/mmHg     DLCOunc-Pre 15.03 ml/min/mmHg     DLCOunc-%Pred-Pre 71 %     DLCOcor-Pre 14.81 ml/min/mmHg     DLCOcor-%Pred-Pre 70 %     VA-Pre 3.63 L     VA-%Pred-Pre 74 %     FEV1SVC-Pred 76 %     FEV1SVC-Pre 64 %         I had a long discussion with the patient regarding liver transplantation which included but was not limited to  the following points:     1. Liver transplant selection committee process.  2. The federal rules for cadaveric waiting list, the size and blood type matching of the organ. The availability of living-related donor transplantation.  3. The types of donors: brain death donors, non-heart beating donors, partial liver grafts: splits and living donor grafts  4. Extended criteria  Donors (older age, steasosis) and the increased  risk of primary non-function using the extended criteria donors  5. The CDC high risk donors,  Risk of donor transmitted infections and donor transmitted malignancy  6. The liver transplant operation and the associated risks and technical complications which can include intraoperative death, post operative death,  Primary non-function, bleeding requiring re-operations, arterial and biliary complications, bowel perforations, and intra abdominal abscess. Some of these complicaitons may require a second operation.  7. The postoperative course, the ICU stay and risk of postoperative complications which can include sepsis, MI, stroke, brain injury, pneumonia, pleural effusions, and renal dysfunction.  8. The current 1 year and 5 year graft and patient survivals.  9. The need for life long immunosuppressive therapy and the side effects of these medications, including the possibility of toxicity, opportunistic infections, risk of cancer including lymphoma, and the possibility of rejection even if the patient is taking the medication exactly as prescribed.  10. The need for  compliance with medications and follow-up visits in the clinic and thereafter.  11. The patient and family understand these risks and wish to proceed to transplantation         I spent 60 minutes with the patient and more than 50% of the time was spend in direct face to face counseling.  CC        Copy to patient      [address] [facility] [id number]                  Electronically signed by Darren Rod MD at 3/12/2018 11:49 AM        Office Visit on 2/27/2018              Detailed Report       ple

## 2018-04-11 ENCOUNTER — HOSPITAL ENCOUNTER (OUTPATIENT)
Dept: PET IMAGING | Facility: CLINIC | Age: 63
Discharge: HOME | End: 2018-04-11
Attending: INTERNAL MEDICINE | Admitting: INTERNAL MEDICINE
Payer: MEDICARE

## 2018-04-11 DIAGNOSIS — C22.1 INTRAHEPATIC CHOLANGIOCARCINOMA (H): ICD-10-CM

## 2018-04-11 LAB — GLUCOSE BLDC GLUCOMTR-MCNC: 100 MG/DL (ref 70–99)

## 2018-04-11 PROCEDURE — 82962 GLUCOSE BLOOD TEST: CPT

## 2018-04-11 PROCEDURE — 78816 PET IMAGE W/CT FULL BODY: CPT | Mod: PS

## 2018-04-11 PROCEDURE — 34300033 ZZH RX 343: Performed by: INTERNAL MEDICINE

## 2018-04-11 PROCEDURE — A9552 F18 FDG: HCPCS | Performed by: INTERNAL MEDICINE

## 2018-04-11 RX ADMIN — FLUDEOXYGLUCOSE F-18 10.38 MCI.: 500 INJECTION, SOLUTION INTRAVENOUS at 09:15

## 2018-04-16 ENCOUNTER — TRANSFERRED RECORDS (OUTPATIENT)
Dept: HEALTH INFORMATION MANAGEMENT | Facility: CLINIC | Age: 63
End: 2018-04-16

## 2018-04-17 ENCOUNTER — COMMITTEE REVIEW (OUTPATIENT)
Dept: TRANSPLANT | Facility: CLINIC | Age: 63
End: 2018-04-17

## 2018-04-17 NOTE — COMMITTEE REVIEW
Abdominal Patient Discussion Note Transplant Coordinator: Marifer Leija  Transplant Surgeon:   Darren Rod    Referring Physician: Baltazar Solares    Committee Review Members:  Nutrition Sherri Porter, JALIL   Pharmacist Venus Latham, [facility]    - Clinical Alyssa Ramirez, [facility]   Transplant Gloria Staples MD, Abigail Parham, RN, Leighann Rodriguez, RN, Katie Bailey PA-C, Jr Jairo Early, BECKI, Jazmin Fields MD, Eldon Cunningham MD, Gabriela Moreland, APRN CNP, Darlene Dooley, RN, Janae Gallegos, RN, Wes Rayo MD, Chon Dick MD, Marifer Leija, BECKI, Darren Rod MD, Gianfranco Dias MD, Ashley Blackwood MD       Additional Discussion Notes and Findings:     Surgery needs to review imaging further prior to approval.     Re-discuss next week.

## 2018-04-18 ENCOUNTER — OFFICE VISIT (OUTPATIENT)
Dept: PULMONOLOGY | Facility: CLINIC | Age: 63
End: 2018-04-18
Attending: INTERNAL MEDICINE
Payer: COMMERCIAL

## 2018-04-18 VITALS
OXYGEN SATURATION: 95 % | RESPIRATION RATE: 16 BRPM | WEIGHT: 125.1 LBS | BODY MASS INDEX: 22.16 KG/M2 | SYSTOLIC BLOOD PRESSURE: 124 MMHG | DIASTOLIC BLOOD PRESSURE: 75 MMHG | HEART RATE: 84 BPM

## 2018-04-18 DIAGNOSIS — Z94.9 TRANSPLANT: Primary | ICD-10-CM

## 2018-04-18 ASSESSMENT — PAIN SCALES - GENERAL: PAINLEVEL: NO PAIN (0)

## 2018-04-18 NOTE — LETTER
4/18/2018       RE: Sherrie Lala  [address] [facility] [address]     Dear Colleague,    Thank you for referring your patient, Sherrie Lala, to the [facility] LUNG [facility]ENCE AND [facility] at [facility]. Please see a copy of my visit note below.    [facility]   PULMONARY CONSULT NOTE    Sherrie Lala MRN# [id number]   Age: 63 year old YOB: 1955     Reason for Consultation:     Pulmonary clearance  Requesting Physician:         Bradly Lilly MD  [address]  [place] [address]       Assessment and Plan:    1. Pulmonary clearance. She has obstructive lung disease (moderate) but likely due to right sided pleural effusion. CT chest with no parenchymal findings of lung disease. Would not recommend any further treatment of this at this time. Ok to use prn albuterol. There is no current pulmonary reason to prohibit liver transplant.     2. Right hepatic hydrothorax s/p PleurX. Recommended to cont draining as she currently is. Will likely improve post-transplant     Raj Morrow MD   of Medicine  Interventional Pulmonary  Department of Pulmonary, Allergy, Critical Care and Sleep Medicine   [facility]  Pager: [phone]                     History:   Mrs. Lala is a 63F h/o metastatic cholangiocarcinoma, budd chiari, right hepatic hydrothorax, portal vein thrombosis who is here for pulmonary clearance for potential liver transplantation.   - No respiratory sx today, Denies cough and worsening dyspnea.   - Had cholangiocarcinoma with mets to lung s/p lung nodule radiation, liver resection and chemoradiation. Remission since end-of 2014 by imaging. Now has budd chiari and progressed to liver failure with pre-eval for lung transplantation. Recent PET-CT with no activity in the chest.  - Has right hepatic hydrothorax s/p failed abx pleurodesis and now has pleurX placed. Draining ~1L daily.   - Never  smoker. No family h/o lung cancer.   - Denies exposure to asbestos, radon             Past Medical History:      Past Medical History:   Diagnosis Date     Asthma      Cholangiocarcinoma (H) 06/2014     Encounter for pleural drainage tube placement      Esophageal varices with hemorrhage (H)      Gastric ulcer      Lung metastases (H) 08/2014             Past Surgical History:      Past Surgical History:   Procedure Laterality Date     CHOLECYSTECTOMY              Social History:     Social History   Substance Use Topics     Smoking status: Never Smoker     Smokeless tobacco: Never Used     Alcohol use No      Comment: occ             Family History:   No family history on file.          Allergies:   Please see allergy list which was reviewed this admission.         Medications:     .  Current Outpatient Prescriptions   Medication     albuterol (PROAIR HFA/PROVENTIL HFA/VENTOLIN HFA) 108 (90 BASE) MCG/ACT Inhaler     calcium carbonate 500 MG CHEW     cholecalciferol (VITAMIN D3) 1000 UNIT tablet     CIPROFLOXACIN PO     FUROSEMIDE PO     GABAPENTIN PO     OMEPRAZOLE PO     PRAMIPEXOLE DIHYDROCHLORIDE PO     RIFAMPIN PO     RifAXIMin (XIFAXAN PO)     SPIRONOLACTONE PO     ZOLPIDEM TARTRATE PO     No current facility-administered medications for this visit.             Review of Systems:   CONSTITUTIONAL: negative for fever, chills, change in weight  INTEGUMENTARY/SKIN: no rash or obvious new lesions  ENT/MOUTH: no sore throat, new sinus pain or nasal drainage  RESP: see interval history  CV: negative for chest pain, palpitations or peripheral edema  GI: no nausea, vomiting, change in stools  : no dysuria  MUSCULOSKELETAL: no myalgias, arthralgias  ENDOCRINE: blood sugars with adequate control  PSYCHIATRIC: mood stable  LYMPHATIC: no new lymphadenopathy  HEME: no bleeding or easy bruisability  NEURO: no numbness, weakness, headaches         Physical Exam:   Pulse:  [84] 84  Resp:  [16] 16  BP: (124)/(75)  124/75  SpO2:  [95 %] 95 %    Wt Readings from Last 4 Encounters:   04/18/18 56.7 kg (125 lb 1.6 oz)   03/20/18 55.8 kg (123 lb)   03/13/18 53.5 kg (118 lb)   02/27/18 53.8 kg (118 lb 9.6 oz)       Constitutional:   Awake, alert and in no apparent distress   Eyes:   Nonicteric, GURDEEP     ENT:    oral mucosa moist without lesions     Neck:   Supple without supraclavicular or cervical lymphadenopathy     Lungs:   Good air flow.  No crackles. No rhonchi.  No wheezes.     Cardiovascular:   Normal S1 and S2.  RRR.  No murmur, gallop or rub.  Radial, DP and PT pulses normal and symmetric     Abdomen:   NABS, soft, nontender, nondistended.  No HSM.     Musculoskeletal:   No edema. Strength 5/5 and symmetric     Neurologic:   Alert and conversant. Cranial nerves II-XII intact.       Skin:   Warm, dry.  No rash on limited exam.           Current Laboratory Data:   All laboratory and imaging data reviewed.           Previous Pulmonary Function Testing   ..Most Recent [place]ze Pulmonary Function Testing    FVC-Pred   Date Value Ref Range Status   02/26/2018 3.03 L      FVC-Pre   Date Value Ref Range Status   02/26/2018 2.21 L      FVC-%Pred-Pre   Date Value Ref Range Status   02/26/2018 73 %      FEV1-Pre   Date Value Ref Range Status   02/26/2018 1.47 L      FEV1-%Pred-Pre   Date Value Ref Range Status   02/26/2018 61 %      FEV1FVC-Pred   Date Value Ref Range Status   02/26/2018 79 %      FEV1FVC-Pre   Date Value Ref Range Status   02/26/2018 66 %      No results found for: 20029  FEFMax-Pred   Date Value Ref Range Status   02/26/2018 6.09 L/sec      FEFMax-Pre   Date Value Ref Range Status   02/26/2018 3.79 L/sec      FEFMax-%Pred-Pre   Date Value Ref Range Status   02/26/2018 62 %      ExpTime-Pre   Date Value Ref Range Status   02/26/2018 8.04 sec      FIFMax-Pre   Date Value Ref Range Status   02/26/2018 3.76 L/sec      FEV1FEV6-Pred   Date Value Ref Range Status   02/26/2018 81 %      FEV1FEV6-Pre   Date Value Ref Range  Status   02/26/2018 66 %           Previous Chest Imaging   Combined Report of:    PET and CT on  4/11/2018 10:40 AM :     1. PET of the neck, chest, abdomen, and pelvis.  2. PET CT Fusion for Attenuation Correction and Anatomical  Localization:    3. 3D MIP and PET-CT fused images were processed on an independent  workstation and archived to PACS and reviewed by a radiologist.     Technique:     1. PET: The patient received 10.38 mCi of F-18-FDG; the serum glucose  was 100 prior to administration, body weight was 55.8 kg. Images were  evaluated in the axial, sagittal, and coronal planes as well as the  rotational whole body MIP. Images were acquired from the Vertex to the  Feet.     UPTAKE WAS MEASURED AT 62 MINUTES.      BACKGROUND:  Liver SUV max= 2.67,   Aorta Blood SUV Max: 1.92.      2. CT: CT only obtained for attenuation correction and not diagnostic  purposes.     INDICATION: cholangiocarcinoma - resected at [facility]; Intrahepatic  cholangiocarcinoma (H)     ADDITIONAL INFORMATION OBTAINED FROM EMR: Patient with history of  cholangiocarcinoma status post resection     COMPARISON: Outside CT dated 2/13/2018 and outside PET/CT dated  3/2/2017     FINDINGS:      HEAD/NECK:  There is no suspicious FDG uptake in the neck.      Non-FDG avid bilateral thyroid nodules     CHEST:  There is no suspicious FDG uptake in the chest.      Increased right pleural effusion with associated compression  atelectasis since 3/13/2018. A Pleurex catheter is in place.     Subsegmental atelectasis in the left lingula.     ABDOMEN AND PELVIS:  There is no suspicious FDG uptake in the abdomen or pelvis.     Postsurgical changes of left hepatectomy and postprocedural changes  from gastroduodenal artery embolization.     Unchanged mild to moderate ascites since 3/13/2018. Splenomegaly.     Mild FDG uptake along the course of the right chest tube.     LOWER EXTREMITIES:   No abnormal masses or hypermetabolic lesions.     BONES:   There are  no suspicious lytic or blastic osseous lesions.  There is no  abnormal FDG uptake in the skeleton.         IMPRESSION: In this patient with history of cholangiocarcinoma status  post resection;  1. Postsurgical changes from left hepatectomy. No evidence of FDG avid  distant metastatic disease.  2. Increased right pleural effusion and associated compressive  atelectasis since 3/13/2018. Stable positioning of right chest pleurex  catheter.  3. Unchanged mild to moderate ascites. Portal hypertension findings.         I have personally reviewed the examination and initial interpretation  and I agree with the findings.     IFEOMA CARVAJAL MD         Previous Cardiology Imaging   [id number]  [facility]78  [id number]  [id number]^ERNESTO^JANETH^DOMINIQUE           [facility]  Echocardiography Laboratory  [address] [place] [id number]     Name: REX DAMON  MRN: [id number]  : 1955  Study Date: 2018 12:56 PM  Age: 62 yrs  Gender: Female  Patient Location: [facility]  Reason For Study: pre-liver  Ordering Physician: JANETH OROZCO  Referring Physician: JANETH OROZCO  Performed By: Bebeto Reddy RDCS     BSA: 1.5 m2  Height: 63 in  Weight: 118 lb  HR: 85  BP: 113/69 mmHg  _____________________________________________________________________________  __     _____________________________________________________________________________  __        Interpretation Summary  Normal, low-risk dobutamine echocardiogram.     The target heart rate was achieved. Normal heart rate response to dobutamine.  Blunted blood pressure response to dobutamine.  Normal biventricular size, thickness, and global systolic function at  baseline, LVEF=60-65%.  With dobutamine, LVEF increased to >70% and LV cavity size decreased  appropriately. No regional wall motion abnormalities at rest or with  dobutamine.  No angina was elicited. No ECG evidence of ischemia. Occasional PVCs with  stress, expected.     No significant valvular  abnormalities noted on screening Doppler exam. normal  biventricular function. The visualized ascending aorta is normal. There is a  small pericardial effusion, measured at 1.3cm. While incompletely evaluated,  likely has minimal clinical significance.  _____________________________________________________________________________  __     Stress  The drug infusion was stopped due to target heart rate achieved.  The patient did not exhibit any symptoms during drug infusion.  The maximum dose of dobutamine was 50mcg/kg/min.  The maximum dose of metoprolol was 50mg.  Optison (NDC #2576-8684-81) given intravenously.  Patient was given 5 ml mixture of 3 ml Optison and 6 ml saline.  5 ml wasted.  IV start location L Hand .  IV start time 12:55 .     Baseline  NSR, poor R wave progression.     Stress Results                                       Maximum Predicted HR:   160 bpm             Target HR: 136 bpm        % Maximum Predicted HR: 85 %                           Stage  DurationHeart Rate  BP  Dose                               (mm:ss)   (bpm)                       BASELINE  0:00      85    113/69                         PEAK    7:18     136    96/43 0.40                            Stress Duration:   7:18 mm:ss *                      Maximum Stress HR: 136 bpm *     Left Ventricle  The left ventricle is normal in structure, function and size.     Right Ventricle  The right ventricle is normal in structure, function and size.     Atria  The left atrium is mildly dilated. The right atrium is mildly dilated.        Mitral Valve  The mitral valve leaflets appear normal. There is no evidence of stenosis,  fluttering, or prolapse.     Tricuspid Valve  Normal tricuspid valve. There is trace tricuspid regurgitation. RVSP 21mmHg +  RAP.     Aortic Valve  The aortic valve is not well visualized. AoV function by doppler is normal.     Vessels  Normal ascending aorta. Inferior vena cava not well visualized for estimation  of right  atrial pressure. Pulmonary arteries not well visualized.     Pericardium  Small pericardial effusion around right heart.        Procedure  Dobutamine Echo Complete. Contrast Optison.     Attestation  I was present and supervised the stress test. I personally viewed the imaging  and agree with the interpretation and report as documented by the fellow,  Ozzy Rand.  _____________________________________________________________________________  __     MMode/2D Measurements & Calculations  IVSd: 0.69 cm  LVIDd: 3.8 cm  LVIDs: 2.5 cm  LVPWd: 0.73 cm  FS: 35.3 %  LV mass(C)dI: 48.7 grams/m2  asc Aorta Diam: 3.5 cm  RWT: 0.38                 _____________________________________________________________________________  __           Report approved by: CATHERINE Eller 02/27/2018 03:22 PM           Again, thank you for allowing me to participate in the care of your patient.      Sincerely,    Raj Morrow MD

## 2018-04-18 NOTE — PROGRESS NOTES
[facility]   PULMONARY CONSULT NOTE    Sherrie Lala MRN# [id number]   Age: 63 year old YOB: 1955     Reason for Consultation:     Pulmonary clearance  Requesting Physician:         Bradly Lilly MD  [address] [facility] [address]  [place] [address]       Assessment and Plan:    1. Pulmonary clearance. She has obstructive lung disease (moderate) but likely due to right sided pleural effusion. CT chest with no parenchymal findings of lung disease. Would not recommend any further treatment of this at this time. Ok to use prn albuterol. There is no current pulmonary reason to prohibit liver transplant.     2. Right hepatic hydrothorax s/p PleurX. Recommended to cont draining as she currently is. Will likely improve post-transplant     Raj Morrow MD   of Medicine  Interventional Pulmonary  Department of Pulmonary, Allergy, Critical Care and Sleep Medicine   [facility]  Pager: [phone]                     History:   Mrs. Lala is a 63F h/o metastatic cholangiocarcinoma, budd chiari, right hepatic hydrothorax, portal vein thrombosis who is here for pulmonary clearance for potential liver transplantation.   - No respiratory sx today, Denies cough and worsening dyspnea.   - Had cholangiocarcinoma with mets to lung s/p lung nodule radiation, liver resection and chemoradiation. Remission since end-of 2014 by imaging. Now has budd chiari and progressed to liver failure with pre-eval for lung transplantation. Recent PET-CT with no activity in the chest.  - Has right hepatic hydrothorax s/p failed abx pleurodesis and now has pleurX placed. Draining ~1L daily.   - Never smoker. No family h/o lung cancer.   - Denies exposure to asbestos, radon             Past Medical History:      Past Medical History:   Diagnosis Date     Asthma      Cholangiocarcinoma (H) 06/2014     Encounter for pleural drainage tube placement      Esophageal varices with hemorrhage (H)       Gastric ulcer      Lung metastases (H) 08/2014             Past Surgical History:      Past Surgical History:   Procedure Laterality Date     CHOLECYSTECTOMY              Social History:     Social History   Substance Use Topics     Smoking status: Never Smoker     Smokeless tobacco: Never Used     Alcohol use No      Comment: occ             Family History:   No family history on file.          Allergies:   Please see allergy list which was reviewed this admission.         Medications:     .  Current Outpatient Prescriptions   Medication     albuterol (PROAIR HFA/PROVENTIL HFA/VENTOLIN HFA) 108 (90 BASE) MCG/ACT Inhaler     calcium carbonate 500 MG CHEW     cholecalciferol (VITAMIN D3) 1000 UNIT tablet     CIPROFLOXACIN PO     FUROSEMIDE PO     GABAPENTIN PO     OMEPRAZOLE PO     PRAMIPEXOLE DIHYDROCHLORIDE PO     RIFAMPIN PO     RifAXIMin (XIFAXAN PO)     SPIRONOLACTONE PO     ZOLPIDEM TARTRATE PO     No current facility-administered medications for this visit.             Review of Systems:   CONSTITUTIONAL: negative for fever, chills, change in weight  INTEGUMENTARY/SKIN: no rash or obvious new lesions  ENT/MOUTH: no sore throat, new sinus pain or nasal drainage  RESP: see interval history  CV: negative for chest pain, palpitations or peripheral edema  GI: no nausea, vomiting, change in stools  : no dysuria  MUSCULOSKELETAL: no myalgias, arthralgias  ENDOCRINE: blood sugars with adequate control  PSYCHIATRIC: mood stable  LYMPHATIC: no new lymphadenopathy  HEME: no bleeding or easy bruisability  NEURO: no numbness, weakness, headaches         Physical Exam:   Pulse:  [84] 84  Resp:  [16] 16  BP: (124)/(75) 124/75  SpO2:  [95 %] 95 %    Wt Readings from Last 4 Encounters:   04/18/18 56.7 kg (125 lb 1.6 oz)   03/20/18 55.8 kg (123 lb)   03/13/18 53.5 kg (118 lb)   02/27/18 53.8 kg (118 lb 9.6 oz)       Constitutional:   Awake, alert and in no apparent distress   Eyes:   Nonicteric, GURDEEP     ENT:    oral  mucosa moist without lesions     Neck:   Supple without supraclavicular or cervical lymphadenopathy     Lungs:   Good air flow.  No crackles. No rhonchi.  No wheezes.     Cardiovascular:   Normal S1 and S2.  RRR.  No murmur, gallop or rub.  Radial, DP and PT pulses normal and symmetric     Abdomen:   NABS, soft, nontender, nondistended.  No HSM.     Musculoskeletal:   No edema. Strength 5/5 and symmetric     Neurologic:   Alert and conversant. Cranial nerves II-XII intact.       Skin:   Warm, dry.  No rash on limited exam.           Current Laboratory Data:   All laboratory and imaging data reviewed.           Previous Pulmonary Function Testing   ..Most Recent [place]ze Pulmonary Function Testing    FVC-Pred   Date Value Ref Range Status   02/26/2018 3.03 L      FVC-Pre   Date Value Ref Range Status   02/26/2018 2.21 L      FVC-%Pred-Pre   Date Value Ref Range Status   02/26/2018 73 %      FEV1-Pre   Date Value Ref Range Status   02/26/2018 1.47 L      FEV1-%Pred-Pre   Date Value Ref Range Status   02/26/2018 61 %      FEV1FVC-Pred   Date Value Ref Range Status   02/26/2018 79 %      FEV1FVC-Pre   Date Value Ref Range Status   02/26/2018 66 %      No results found for: 93405  FEFMax-Pred   Date Value Ref Range Status   02/26/2018 6.09 L/sec      FEFMax-Pre   Date Value Ref Range Status   02/26/2018 3.79 L/sec      FEFMax-%Pred-Pre   Date Value Ref Range Status   02/26/2018 62 %      ExpTime-Pre   Date Value Ref Range Status   02/26/2018 8.04 sec      FIFMax-Pre   Date Value Ref Range Status   02/26/2018 3.76 L/sec      FEV1FEV6-Pred   Date Value Ref Range Status   02/26/2018 81 %      FEV1FEV6-Pre   Date Value Ref Range Status   02/26/2018 66 %           Previous Chest Imaging   Combined Report of:    PET and CT on  4/11/2018 10:40 AM :     1. PET of the neck, chest, abdomen, and pelvis.  2. PET CT Fusion for Attenuation Correction and Anatomical  Localization:    3. 3D MIP and PET-CT fused images were processed on an  independent  workstation and archived to PACS and reviewed by a radiologist.     Technique:     1. PET: The patient received 10.38 mCi of F-18-FDG; the serum glucose  was 100 prior to administration, body weight was 55.8 kg. Images were  evaluated in the axial, sagittal, and coronal planes as well as the  rotational whole body MIP. Images were acquired from the Vertex to the  Feet.     UPTAKE WAS MEASURED AT 62 MINUTES.      BACKGROUND:  Liver SUV max= 2.67,   Aorta Blood SUV Max: 1.92.      2. CT: CT only obtained for attenuation correction and not diagnostic  purposes.     INDICATION: cholangiocarcinoma - resected at [facility]; Intrahepatic  cholangiocarcinoma (H)     ADDITIONAL INFORMATION OBTAINED FROM EMR: Patient with history of  cholangiocarcinoma status post resection     COMPARISON: Outside CT dated 2/13/2018 and outside PET/CT dated  3/2/2017     FINDINGS:      HEAD/NECK:  There is no suspicious FDG uptake in the neck.      Non-FDG avid bilateral thyroid nodules     CHEST:  There is no suspicious FDG uptake in the chest.      Increased right pleural effusion with associated compression  atelectasis since 3/13/2018. A Pleurex catheter is in place.     Subsegmental atelectasis in the left lingula.     ABDOMEN AND PELVIS:  There is no suspicious FDG uptake in the abdomen or pelvis.     Postsurgical changes of left hepatectomy and postprocedural changes  from gastroduodenal artery embolization.     Unchanged mild to moderate ascites since 3/13/2018. Splenomegaly.     Mild FDG uptake along the course of the right chest tube.     LOWER EXTREMITIES:   No abnormal masses or hypermetabolic lesions.     BONES:   There are no suspicious lytic or blastic osseous lesions.  There is no  abnormal FDG uptake in the skeleton.         IMPRESSION: In this patient with history of cholangiocarcinoma status  post resection;  1. Postsurgical changes from left hepatectomy. No evidence of FDG avid  distant metastatic disease.  2.  Increased right pleural effusion and associated compressive  atelectasis since 3/13/2018. Stable positioning of right chest pleurex  catheter.  3. Unchanged mild to moderate ascites. Portal hypertension findings.         I have personally reviewed the examination and initial interpretation  and I agree with the findings.     IFEOMA CARVAJAL MD         Previous Cardiology Imaging   [id number]  Replaced by [facility]  [id number]  [id number]^ERNESTO^JANETH^DOMINIQUE           [facility],[place]  Echocardiography Laboratory  [address] [place] [address]     Name: REX DAMON  MRN: [id number]  : 1955  Study Date: 2018 12:56 PM  Age: 62 yrs  Gender: Female  Patient Location: [facility]  Reason For Study: pre-liver  Ordering Physician: JANETH OROZCO  Referring Physician: JANETH OROZCO  Performed By: Bebeto Reddy RDCS     BSA: 1.5 m2  Height: 63 in  Weight: 118 lb  HR: 85  BP: 113/69 mmHg  _____________________________________________________________________________  __     _____________________________________________________________________________  __        Interpretation Summary  Normal, low-risk dobutamine echocardiogram.     The target heart rate was achieved. Normal heart rate response to dobutamine.  Blunted blood pressure response to dobutamine.  Normal biventricular size, thickness, and global systolic function at  baseline, LVEF=60-65%.  With dobutamine, LVEF increased to >70% and LV cavity size decreased  appropriately. No regional wall motion abnormalities at rest or with  dobutamine.  No angina was elicited. No ECG evidence of ischemia. Occasional PVCs with  stress, expected.     No significant valvular abnormalities noted on screening Doppler exam. normal  biventricular function. The visualized ascending aorta is normal. There is a  small pericardial effusion, measured at 1.3cm. While incompletely evaluated,  likely has minimal clinical  significance.  _____________________________________________________________________________  __     Stress  The drug infusion was stopped due to target heart rate achieved.  The patient did not exhibit any symptoms during drug infusion.  The maximum dose of dobutamine was 50mcg/kg/min.  The maximum dose of metoprolol was 50mg.  Optison (NDC #7060-0488-46) given intravenously.  Patient was given 5 ml mixture of 3 ml Optison and 6 ml saline.  5 ml wasted.  IV start location L Hand .  IV start time 12:55 .     Baseline  NSR, poor R wave progression.     Stress Results                                       Maximum Predicted HR:   160 bpm             Target HR: 136 bpm        % Maximum Predicted HR: 85 %                           Stage  DurationHeart Rate  BP  Dose                               (mm:ss)   (bpm)                       BASELINE  0:00      85    113/69                         PEAK    7:18     136    96/43 0.40                            Stress Duration:   7:18 mm:ss *                      Maximum Stress HR: 136 bpm *     Left Ventricle  The left ventricle is normal in structure, function and size.     Right Ventricle  The right ventricle is normal in structure, function and size.     Atria  The left atrium is mildly dilated. The right atrium is mildly dilated.        Mitral Valve  The mitral valve leaflets appear normal. There is no evidence of stenosis,  fluttering, or prolapse.     Tricuspid Valve  Normal tricuspid valve. There is trace tricuspid regurgitation. RVSP 21mmHg +  RAP.     Aortic Valve  The aortic valve is not well visualized. AoV function by doppler is normal.     Vessels  Normal ascending aorta. Inferior vena cava not well visualized for estimation  of right atrial pressure. Pulmonary arteries not well visualized.     Pericardium  Small pericardial effusion around right heart.        Procedure  Dobutamine Echo Complete. Contrast Optison.     Attestation  I was present and supervised the  stress test. I personally viewed the imaging  and agree with the interpretation and report as documented by the fellow,  Ozzy Rand.  _____________________________________________________________________________  __     MMode/2D Measurements & Calculations  IVSd: 0.69 cm  LVIDd: 3.8 cm  LVIDs: 2.5 cm  LVPWd: 0.73 cm  FS: 35.3 %  LV mass(C)dI: 48.7 grams/m2  asc Aorta Diam: 3.5 cm  RWT: 0.38                 _____________________________________________________________________________  __           Report approved by: CATHERINE Eller 02/27/2018 03:22 PM

## 2018-04-18 NOTE — MR AVS SNAPSHOT
After Visit Summary   4/18/2018    Sherrie Lala    MRN: [id number]           Patient Information     Date Of Birth          1955        Visit Information        Provider Department      4/18/2018 8:30 AM Raj Morrow MD [facility]        Today's Diagnoses     Transplant    -  1       Follow-ups after your visit        Follow-up notes from your care team     Return if symptoms worsen or fail to improve.      Who to contact     If you have questions or need follow up information about today's clinic visit or your schedule please contact [facility] directly at [phone].  Normal or non-critical lab and imaging results will be communicated to you by [employer]hart, letter or phone within 4 business days after the clinic has received the results. If you do not hear from us within 7 days, please contact the clinic through [employer]hart or phone. If you have a critical or abnormal lab result, we will notify you by phone as soon as possible.  Submit refill requests through [employer] or call your pharmacy and they will forward the refill request to us. Please allow 3 business days for your refill to be completed.          Additional Information About Your Visit        MyChart Information     [employer] gives you secure access to your electronic health record. If you see a primary care provider, you can also send messages to your care team and make appointments. If you have questions, please call your primary care clinic.  If you do not have a primary care provider, please call [phone] and they will assist you.        Care EveryWhere ID     This is your Care EveryWhere ID. This could be used by other organizations to access your [place] medical records  [id number]        Your Vitals Were     Pulse Respirations Pulse Oximetry BMI (Body Mass Index)          84 16 95% 22.16 kg/m2         Blood Pressure from Last 3 Encounters:   04/18/18 124/75    03/20/18 108/68   03/13/18 102/62    Weight from Last 3 Encounters:   04/18/18 56.7 kg (125 lb 1.6 oz)   03/20/18 55.8 kg (123 lb)   03/13/18 53.5 kg (118 lb)              Today, you had the following     No orders found for display         Today's Medication Changes          These changes are accurate as of 4/18/18 10:17 AM.  If you have any questions, ask your nurse or doctor.               Stop taking these medicines if you haven't already. Please contact your care team if you have questions.     fluticasone 220 MCG/ACT Inhaler   Commonly known as:  FLOVENT HFA   Stopped by:  Raj Morrow MD                    Primary Care Provider Fax #    Physician No Ref-Primary [phone]       No address on file        Equal Access to Services     HARINI CHAMBERLAIN : Natalio Ordonez, wapillo ovalle, qaybjermain pierce, nahed alfaro . [facility] [phone].    ATENCIÓN: Si habla español, tiene a bucio disposición servicios gratuitos de asistencia lingüística. Llame al [phone].    We comply with applicable federal civil rights laws and [place] laws. We do not discriminate on the basis of race, color, national origin, age, disability, sex, sexual orientation, or gender identity.            Thank you!     Thank you for choosing [facility]  for your care. Our goal is always to provide you with excellent care. Hearing back from our patients is one way we can continue to improve our services. Please take a few minutes to complete the written survey that you may receive in the mail after your visit with us. Thank you!             Your Updated Medication List - Protect others around you: Learn how to safely use, store and throw away your medicines at www.disposemymeds.org.          This list is accurate as of 4/18/18 10:17 AM.  Always use your most recent med list.                   Brand Name Dispense Instructions for use Diagnosis    albuterol  108 (90 Base) MCG/ACT Inhaler    PROAIR HFA/PROVENTIL HFA/VENTOLIN HFA     Inhale 2 puffs into the lungs every 6 hours        calcium carbonate 500 MG Chew     60 tablet    Take 500 mg by mouth 2 times daily    Osteoporosis       cholecalciferol 1000 UNIT tablet    vitamin D3    30 tablet    Take 1 tablet (1,000 Units) by mouth daily    Osteoporosis       CIPROFLOXACIN PO      Take 750 mg by mouth once a week        FUROSEMIDE PO      Take 80 mg by mouth daily        GABAPENTIN PO      Take 300 mg by mouth At Bedtime        OMEPRAZOLE PO      Take 20 mg by mouth 2 times daily (before meals)        PRAMIPEXOLE DIHYDROCHLORIDE PO      Take 0.5 mg by mouth daily        RIFAMPIN PO      Take 300 mg by mouth daily        SPIRONOLACTONE PO      Take 100 mg by mouth daily        XIFAXAN PO      Take 550 mg by mouth 2 times daily        ZOLPIDEM TARTRATE PO      Take 10 mg by mouth nightly as needed for sleep

## 2018-04-24 ENCOUNTER — COMMITTEE REVIEW (OUTPATIENT)
Dept: TRANSPLANT | Facility: CLINIC | Age: 63
End: 2018-04-24

## 2018-04-24 DIAGNOSIS — K74.60 CIRRHOSIS OF LIVER (H): Primary | ICD-10-CM

## 2018-04-24 NOTE — LETTER
PHYSICIAN ORDERS      DATE & TIME ISSUED: 201811:53 AM  PATIENT NAME: Sherrie Lala   : 1955     [facility] MR# : [id number]     DIAGNOSIS:  cirrhosis  ICD-9 CODE: K74.60     Please draw monthly, orders  1 year after issue.  These labs must be drawn all together on the same day when done:     INR     Total Bili     Creatinine     Albumin     Sodium     PLEASE FAX RESULTS AS SOON AS POSSIBLE [phone], ATTN: Lab DE

## 2018-04-24 NOTE — COMMITTEE REVIEW
Abdominal Committee Review Note     Evaluation Date: 2/27/2018  Committee Review Date: 4/24/2018    Organ being evaluated for: Liver    Transplant Phase: Evaluation  Transplant Status: Active    Transplant Coordinator: Marifer Leija  Transplant Surgeon:   Darren Rod    Referring Physician: Baltazar Solares    Primary Diagnosis: Primary Liver Malignancy: Cholangiocarcinoma (CH-CA)  Secondary Diagnosis: cirrhosis    Committee Review Members:  Nutrition Sherri Porter, RD   Pharmacy Harjinder Stewart, [facility]    - Clinical MARY Rosenberg, Josefina Felix, [facility]   Transplant Gloria Staples MD, Abigail Parham, RN, Leighann Rodriguez, RN, Katie Bailey PA-C, Bradly Lilly MD, Jr Jairo Early, BECKI, Jazmin Fields MD, Eldon Cunningham MD, Gabriela Moreland, APRN CNP, Janae Gallegos, RN, Wes Rayo MD, Chon Dick MD, Marifer Leija RN, Darren Rod MD       Transplant Eligibility: Cirrhosis with MELD    Committee Review Decision: Approved    Relative Contraindications: None    Absolute Contraindications: None    Committee Chair Gloria Staples MD verbally attested to the committee's decision.    Committee Discussion Details:      Approved for listing.     Pt updated of above and need for MELD labs asap for listing. Per pt request, lab orders faxed to [facility], ph [phone], fax [phone].     Pt confirmed she will get labs today or tomorrow.

## 2018-04-25 ENCOUNTER — TELEPHONE (OUTPATIENT)
Dept: TRANSPLANT | Facility: CLINIC | Age: 63
End: 2018-04-25

## 2018-04-25 NOTE — TELEPHONE ENCOUNTER
Transplant Social Work Services Phone Call      Data: Sherrie is being evaluated for a Liver Transplant and was just approved yesterday for listing.  Intervention: Called patient to follow up on psychsocial evaluation and Health Care Directive questions.  Assessment: Sherrie is in the process of completing her health care directive and she wanted to know how to make it legal.  She is anticipated to be listed for a liver transplant soon, and she asked many appropriate questions about organ allocation and the course of hospitalization/recovery post transplantation.  She verbalizes a normal amount of worry about liver transplantation.  Education provided by SW: [place] Health Care Directive, Liver Transplant Support Group  Plan: Sherrie will complete her [place] directive (needs two witnesses to make legal) and mail to me.  I will remain available to Sherrie for her psychosocial needs.      MARY Interiano, [facility]  Liver Transplant   Phone [phone]  Pager [phone]     Addendum: Received a copy of patient's Health Care Directive and sent to [employer].  She named her  Ash Lala as her primary agent, daughter Ramírez Steele as the second choice and her sister Janae Degroot as the third choice.

## 2018-04-30 ENCOUNTER — DOCUMENTATION ONLY (OUTPATIENT)
Dept: TRANSPLANT | Facility: CLINIC | Age: 63
End: 2018-04-30

## 2018-04-30 NOTE — LETTER
April 30, 2018    Sherrie Lala  [address] [place] [phone]      Dear Ms. Lala,    This letter is sent to confirm that you have completed your transplant work-up and you are a candidate in the liver transplant program at the [facility].  You were placed on the liver active waitlist on 4/30/2018.      When you are active on the waitlist and an organ becomes available, a coordinator will need to speak to you immediately.  You could be contacted at any time during the day or night as an organ could become available at any time.  Please make certain our office always has your current telephone numbers and address.      Items we will need from you:      We have received approval from your insurance company for the transplant procedure.  It is critical that you notify us if there is any change in your insurance.  It is also important that you familiarize yourself with the details of your specific insurance policy.  Our patient  is available to assist you if you should have any questions regarding your coverage.      Please have your MELD labs drawn monthly and as directed to maintain your status on the transplant list.      During this waiting period, we may request additional periodic laboratory tests with your primary physician.  It will be your responsibility to remind your physician to forward your results to the Transplant Office.      We need to be kept informed of any changes in your medical condition such as:    o changes in your medications,   o significant changes in your health  o significant infections (such as pneumonia or abscesses)  o blood transfusions  o any condition which requires hospitalization  o any surgery      Remember to complete any routine cancer screening tests required before your transplant.  This includes colonoscopy; prostate screening for men, and mammogram and gynecologic testing for women, as well as dental work.  Your primary  care clinic can assist you with arranging for these exams.  Remind your caregivers to forward copies of the records and final reports.    We want you to know that our program has physician and surgeon coverage 24 hours a day, 365 days a year. If this coverage changes or there are substantial program changes, you will be notified in writing by letter.     Attached is a letter from the [employer] ([employer]). It describes the services and information offered to patients by [employer] and the Organ Procurement and Transplantation Network.    We appreciate having had the opportunity to participate in your care.  If you have questions, please feel free to call the Transplant Office at [phone] or [phone].      Sincerely,  Marifer Leija RN  Liver Transplant Coordinator       Solid Organ Transplant  [facility]    Enclosures: [employer] Letter  cc: Care Team

## 2018-04-30 NOTE — PROGRESS NOTES
Listed for Liver Transplant  Dx: cirrhosis, hx cholangiocarcinoma diagnosed in 2011  ABO: AB  MELD: 14 (see Nery Hendricks for listing labs)    Pt updated. Importance of ongoing close follow up with Dr. Lilly and MELD labs as ordered reviewed. Pt verbalized understanding.

## 2018-05-01 ENCOUNTER — ALLIED HEALTH/NURSE VISIT (OUTPATIENT)
Dept: RESEARCH | Facility: CLINIC | Age: 63
End: 2018-05-01

## 2018-05-01 DIAGNOSIS — Z00.6 EXAMINATION OF PARTICIPANT IN CLINICAL TRIAL: Primary | ICD-10-CM

## 2018-05-01 NOTE — MR AVS SNAPSHOT
After Visit Summary   5/1/2018    Sherrie Lala    MRN: [id number]           Patient Information     Date Of Birth          1955        Visit Information        Provider Department      5/1/2018 3:09 PM Specialty, Provider [facility],  Clinical Research        Today's Diagnoses     Examination of participant in clinical trial    -  1       Follow-ups after your visit        Who to contact     If you have questions or need follow up information about today's clinic visit or your schedule please contact [facility]  [facility] directly at [phone].  Normal or non-critical lab and imaging results will be communicated to you by pbsihart, letter or phone within 4 business days after the clinic has received the results. If you do not hear from us within 7 days, please contact the clinic through [employer] or phone. If you have a critical or abnormal lab result, we will notify you by phone as soon as possible.  Submit refill requests through [employer] or call your pharmacy and they will forward the refill request to us. Please allow 3 business days for your refill to be completed.          Additional Information About Your Visit        MyChart Information     [employer] gives you secure access to your electronic health record. If you see a primary care provider, you can also send messages to your care team and make appointments. If you have questions, please call your primary care clinic.  If you do not have a primary care provider, please call [phone] and they will assist you.        Care EveryWhere ID     This is your Care EveryWhere ID. This could be used by other organizations to access your [facility] medical records  [id number]         Blood Pressure from Last 3 Encounters:   04/18/18 124/75   03/20/18 108/68   03/13/18 102/62    Weight from Last 3 Encounters:   04/18/18 56.7 kg (125 lb 1.6 oz)   03/20/18 55.8 kg (123 lb)   03/13/18 53.5 kg (118 lb)              Today, you had the  following     No orders found for display       Primary Care Provider Fax #    Physician No Ref-Primary [phone]       No address on file        Equal Access to Services     HARINI CHAMBERLAIN : Hadii aad ku hadbendale Danyelali, saman enjames, thu pierce, nahed arauz So [facility] [phone].    ATENCIÓN: Si habla español, tiene a bucio disposición servicios gratuitos de asistencia lingüística. Llame al [phone].    We comply with applicable federal civil rights laws and [place] laws. We do not discriminate on the basis of race, color, national origin, age, disability, sex, sexual orientation, or gender identity.            Thank you!     Thank you for choosing [facility],  [facility]  for your care. Our goal is always to provide you with excellent care. Hearing back from our patients is one way we can continue to improve our services. Please take a few minutes to complete the written survey that you may receive in the mail after your visit with us. Thank you!             Your Updated Medication List - Protect others around you: Learn how to safely use, store and throw away your medicines at www.disposemymeds.org.          This list is accurate as of 5/1/18  3:10 PM.  Always use your most recent med list.                   Brand Name Dispense Instructions for use Diagnosis    albuterol 108 (90 Base) MCG/ACT Inhaler    PROAIR HFA/PROVENTIL HFA/VENTOLIN HFA     Inhale 2 puffs into the lungs every 6 hours        calcium carbonate 500 MG Chew     60 tablet    Take 500 mg by mouth 2 times daily    Osteoporosis       cholecalciferol 1000 UNIT tablet    vitamin D3    30 tablet    Take 1 tablet (1,000 Units) by mouth daily    Osteoporosis       CIPROFLOXACIN PO      Take 750 mg by mouth once a week        FUROSEMIDE PO      Take 80 mg by mouth daily        GABAPENTIN PO      Take 300 mg by mouth At Bedtime        OMEPRAZOLE PO      Take 20 mg by mouth 2 times daily  (before meals)        PRAMIPEXOLE DIHYDROCHLORIDE PO      Take 0.5 mg by mouth daily        RIFAMPIN PO      Take 300 mg by mouth daily        SPIRONOLACTONE PO      Take 100 mg by mouth daily        XIFAXAN PO      Take 550 mg by mouth 2 times daily        ZOLPIDEM TARTRATE PO      Take 10 mg by mouth nightly as needed for sleep

## 2018-05-01 NOTE — PROGRESS NOTES
Surgical Clinical Trials Office Notification of Study Eligibility  Study Name: International Randomized Trial to Evaluate The Effectiveness of the Portable Organ Care System(OCS) Liver For Preserving and Assessing Donor Livers for Transplantation (OCS Liver PROTECT Trial) (IRB #[id number])  ICF Version Date / IRB Approval Date: 05/31/2017 / 07/03/2017  Per our IRB approved recruitment process, an invitation letter and consent for the above study was sent to the patient on 01-MAY-2018. If the patient does not respond within the next few weeks, a follow-up call will be made to ensure it was received and we will discuss meeting with the patient at his/her next clinic visit for in-person consent process.   Please contact the study coordinators, Isabella Espinal ([phone]) and Angélica Dietz ([phone]) with any questions.

## 2018-05-03 ENCOUNTER — RESULTS ONLY (OUTPATIENT)
Dept: OTHER | Facility: CLINIC | Age: 63
End: 2018-05-03

## 2018-05-03 ENCOUNTER — HOSPITAL ENCOUNTER (OUTPATIENT)
Facility: CLINIC | Age: 63
Discharge: HOME | End: 2018-05-04
Attending: TRANSPLANT SURGERY | Admitting: TRANSPLANT SURGERY
Payer: MEDICARE

## 2018-05-03 ENCOUNTER — APPOINTMENT (OUTPATIENT)
Dept: GENERAL RADIOLOGY | Facility: CLINIC | Age: 63
End: 2018-05-03
Attending: TRANSPLANT SURGERY
Payer: MEDICARE

## 2018-05-03 ENCOUNTER — ORGAN (OUTPATIENT)
Dept: TRANSPLANT | Facility: CLINIC | Age: 63
End: 2018-05-03

## 2018-05-03 DIAGNOSIS — E87.6 HYPOKALEMIA: Primary | ICD-10-CM

## 2018-05-03 PROBLEM — K72.10 END STAGE LIVER DISEASE (H): Status: ACTIVE | Noted: 2018-05-03

## 2018-05-03 PROCEDURE — 93010 ELECTROCARDIOGRAM REPORT: CPT | Performed by: INTERNAL MEDICINE

## 2018-05-03 PROCEDURE — 40000275 ZZH STATISTIC RCP TIME EA 10 MIN

## 2018-05-03 PROCEDURE — 71046 X-RAY EXAM CHEST 2 VIEWS: CPT

## 2018-05-03 PROCEDURE — 93005 ELECTROCARDIOGRAM TRACING: CPT

## 2018-05-03 RX ORDER — LIDOCAINE 40 MG/G
CREAM TOPICAL
Status: DISCONTINUED | OUTPATIENT
Start: 2018-05-03 | End: 2018-05-04 | Stop reason: HOSPADM

## 2018-05-03 RX ORDER — PIPERACILLIN SODIUM, TAZOBACTAM SODIUM 3; .375 G/15ML; G/15ML
3.38 INJECTION, POWDER, LYOPHILIZED, FOR SOLUTION INTRAVENOUS ONCE
Status: DISCONTINUED | OUTPATIENT
Start: 2018-05-03 | End: 2018-05-04

## 2018-05-03 RX ORDER — PIPERACILLIN SODIUM, TAZOBACTAM SODIUM 2; .25 G/10ML; G/10ML
2.25 INJECTION, POWDER, LYOPHILIZED, FOR SOLUTION INTRAVENOUS
Status: DISCONTINUED | OUTPATIENT
Start: 2018-05-03 | End: 2018-05-04

## 2018-05-03 NOTE — IP AVS SNAPSHOT
[address] [address]    Phone:  [phone]                                       After Visit Summary   5/3/2018    Sherrie Lala    MRN: [id number]           After Visit Summary Signature Page     I have received my discharge instructions, and my questions have been answered. I have discussed any challenges I see with this plan with the nurse or doctor.    ..........................................................................................................................................  Patient/Patient Representative Signature      ..........................................................................................................................................  Patient Representative Print Name and Relationship to Patient    ..................................................               ................................................  Date                                            Time    ..........................................................................................................................................  Reviewed by Signature/Title    ...................................................              ..............................................  Date                                                            Time

## 2018-05-03 NOTE — LETTER
Transition Communication Hand-off for Care Transitions to Next Level of Care Provider    Name: Sherrie Lala  : 1955  MRN #: [id number]  Primary Care Provider: Physician No Ref-Primary     Primary Clinic: No address on file     Reason for Hospitalization:  End Stage Renal disease  End stage liver disease (H)  End Stage Liver Disease  Admit Date/Time: 5/3/2018 10:25 PM  Discharge Date: 18  Payor Source: Payor: MEDICA / Plan: MEDICA PRIME SOLUTION / Product Type: Indemnity /              Reason for Communication Hand-off Referral: Other LT canceled    Discharge Plan:       Concern for non-adherence with plan of care:   Y/N N  Discharge Needs Assessment:        Follow-up specialty is recommended: Yes    Follow-up plan:  No future appointments.    Any outstanding tests or procedures:              Key Recommendations:      Xi Muse    AVS/Discharge Summary is the source of truth; this is a helpful guide for improved communication of patient story

## 2018-05-03 NOTE — IP AVS SNAPSHOT
MRN:[id number]                      After Visit Summary   5/3/2018    Sherrie Lala    MRN: [id number]           Thank you!     Thank you for choosing [facility] for your care. Our goal is always to provide you with excellent care. Hearing back from our patients is one way we can continue to improve our services. Please take a few minutes to complete the written survey that you may receive in the mail after you visit with us. Thank you!        Patient Information     Date Of Birth          1955        Designated Caregiver       Most Recent Value    Caregiver    Will someone help with your care after discharge? yes    Name of designated caregiver Bryant     Phone number of caregiver [phone]    Caregiver address same as pt      About your hospital stay     You were admitted on:  May 3, 2018 You last received care in the:  [address] [facility]    You were discharged on:  May 4, 2018        Reason for your hospital stay       Organ transplant, cancelled.  Hypokalemia.                  Who to Call     For medical emergencies, please call 911.  For non-urgent questions about your medical care, please call your primary care provider or clinic, None          Attending Provider     Provider Specialty    Darren Rod MD Transplant       Primary Care Provider Fax #    Physician No Ref-Primary [phone]       When to contact your care team       Notify your coordinator if you are hospitalized or develop a new medical issue.    Transplant Center, coordinator Marifer [phone]                  After Care Instructions     Activity       Your activity upon discharge: as tolerated            Diet       Follow this diet upon discharge: Low sodium            Discharge Instructions       Take another potassium chloride 20meq tab from your home supply this afternoon and then take 20meq daily until your follow up with your PCP                  Follow-up Appointments     Follow Up and  "recommended labs and tests       Please follow up with your primary care provider, Dr. Elijah Benitez [phone], for low potassium and anemia                  Pending Results     Date and Time Order Name Status Description    5/3/2018 2317 EKG 12-lead, tracing only Preliminary     5/3/2018 2317 Hepatitis C antibody In process     5/3/2018 2317 Hepatitis B core antibody In process     5/3/2018 2317 Hepatitis B Surface Antibody In process     5/3/2018 2317 HIV Antigen Antibody Combo In process     5/3/2018 2317 EBV Capsid Antibody IgM In process     5/3/2018 2317 EBV Capsid Antibody IgG In process     5/3/2018 2317 CMV antibody IgM In process     5/3/2018 2317 CMV Antibody IgG In process             Statement of Approval     Ordered          05/04/18 0901  I have reviewed and agree with all the recommendations and orders detailed in this document.  EFFECTIVE NOW     Approved and electronically signed by:  Suzanna Cui APRN CNP             Admission Information     Date & Time Provider Department Dept. Phone    5/3/2018 Darren Rod MD [address] [facility] [place] [phone]      Your Vitals Were     Blood Pressure Temperature Respirations Height Weight Pulse Oximetry    116/67 97.7  F (36.5  C) (Oral) 18 1.6 m (5' 3\") 55.1 kg (121 lb 8 oz) 98%    BMI (Body Mass Index)                   21.52 kg/m2           MyChart Information     [employer] gives you secure access to your electronic health record. If you see a primary care provider, you can also send messages to your care team and make appointments. If you have questions, please call your primary care clinic.  If you do not have a primary care provider, please call [phone] and they will assist you.        Care EveryWhere ID     This is your Care EveryWhere ID. This could be used by other organizations to access your [facility] medical records  [id number]        Equal Access to Services     HARINI CHAMBERLAIN : saman Richardson, " thu barrett javierricardo, nahed jnugoran pompaaan ah. So [facility] [phone].    ATENCIÓN: Si patricia cobian, tiene a bucio disposición servicios gratuitos de asistencia lingüística. Llame al [phone].    We comply with applicable federal civil rights laws and [place] laws. We do not discriminate on the basis of race, color, national origin, age, disability, sex, sexual orientation, or gender identity.               Review of your medicines      START taking        Dose / Directions    Potassium Chloride ER 20 MEQ Tbcr        Dose:  20 mEq   Take 1 tablet (20 mEq) by mouth daily   Refills:  0         CONTINUE these medicines which have NOT CHANGED        Dose / Directions    albuterol 108 (90 Base) MCG/ACT Inhaler   Commonly known as:  PROAIR HFA/PROVENTIL HFA/VENTOLIN HFA        Dose:  2 puff   Inhale 2 puffs into the lungs every 6 hours   Refills:  0       calcium carbonate 500 MG Chew   Used for:  Osteoporosis        Dose:  500 mg   Take 500 mg by mouth 2 times daily   Quantity:  60 tablet   Refills:  0       cholecalciferol 1000 UNIT tablet   Commonly known as:  vitamin D3   Used for:  Osteoporosis        Dose:  1000 Units   Take 1 tablet (1,000 Units) by mouth daily   Quantity:  30 tablet   Refills:  0       CIPROFLOXACIN PO        Dose:  750 mg   Take 750 mg by mouth once a week   Refills:  0       FUROSEMIDE PO        Dose:  80 mg   Take 80 mg by mouth 2 times daily   Refills:  0       GABAPENTIN PO        Dose:  300 mg   Take 300 mg by mouth At Bedtime   Refills:  0       OMEPRAZOLE PO        Dose:  20 mg   Take 20 mg by mouth 2 times daily (before meals)   Refills:  0       PRAMIPEXOLE DIHYDROCHLORIDE PO        Dose:  0.5 mg   Take 0.5 mg by mouth daily   Refills:  0       RIFAMPIN PO        Dose:  300 mg   Take 300 mg by mouth daily   Refills:  0       SPIRONOLACTONE PO        Dose:  100 mg   Take 100 mg by mouth daily   Refills:  0       XIFAXAN PO        Dose:  550 mg   Take 550 mg by  mouth 2 times daily   Refills:  0       ZOLPIDEM TARTRATE PO        Dose:  10 mg   Take 10 mg by mouth nightly as needed for sleep   Refills:  0            Where to get your medicines      Some of these will need a paper prescription and others can be bought over the counter. Ask your nurse if you have questions.     You don't need a prescription for these medications     Potassium Chloride ER 20 MEQ Tbcr                Protect others around you: Learn how to safely use, store and throw away your medicines at www.disposemymeds.org.             Medication List: This is a list of all your medications and when to take them. Check marks below indicate your daily home schedule. Keep this list as a reference.      Medications           Morning Afternoon Evening Bedtime As Needed    albuterol 108 (90 Base) MCG/ACT Inhaler   Commonly known as:  PROAIR HFA/PROVENTIL HFA/VENTOLIN HFA   Inhale 2 puffs into the lungs every 6 hours                                calcium carbonate 500 MG Chew   Take 500 mg by mouth 2 times daily                                cholecalciferol 1000 UNIT tablet   Commonly known as:  vitamin D3   Take 1 tablet (1,000 Units) by mouth daily                                CIPROFLOXACIN PO   Take 750 mg by mouth once a week                                FUROSEMIDE PO   Take 80 mg by mouth 2 times daily                                GABAPENTIN PO   Take 300 mg by mouth At Bedtime                                OMEPRAZOLE PO   Take 20 mg by mouth 2 times daily (before meals)                                Potassium Chloride ER 20 MEQ Tbcr   Take 1 tablet (20 mEq) by mouth daily                                PRAMIPEXOLE DIHYDROCHLORIDE PO   Take 0.5 mg by mouth daily                                RIFAMPIN PO   Take 300 mg by mouth daily                                SPIRONOLACTONE PO   Take 100 mg by mouth daily                                XIFAXAN PO   Take 550 mg by mouth 2 times daily                                 ZOLPIDEM TARTRATE PO   Take 10 mg by mouth nightly as needed for sleep

## 2018-05-04 ENCOUNTER — TELEPHONE (OUTPATIENT)
Dept: TRANSPLANT | Facility: CLINIC | Age: 63
End: 2018-05-04

## 2018-05-04 VITALS
WEIGHT: 121.5 LBS | TEMPERATURE: 97.7 F | DIASTOLIC BLOOD PRESSURE: 67 MMHG | SYSTOLIC BLOOD PRESSURE: 116 MMHG | BODY MASS INDEX: 21.53 KG/M2 | HEIGHT: 63 IN | OXYGEN SATURATION: 98 % | RESPIRATION RATE: 18 BRPM

## 2018-05-04 PROBLEM — E87.6 HYPOKALEMIA: Status: ACTIVE | Noted: 2018-05-04

## 2018-05-04 PROBLEM — Z76.82 ORGAN TRANSPLANT CANDIDATE: Status: ACTIVE | Noted: 2018-05-04

## 2018-05-04 LAB
ABO + RH BLD: ABNORMAL
ABO + RH BLD: ABNORMAL
ALBUMIN SERPL-MCNC: 3.4 G/DL (ref 3.4–5)
ALP SERPL-CCNC: 121 U/L (ref 40–150)
ALT SERPL W P-5'-P-CCNC: 21 U/L (ref 0–50)
AMYLASE SERPL-CCNC: 111 U/L (ref 30–110)
ANION GAP SERPL CALCULATED.3IONS-SCNC: 10 MMOL/L (ref 3–14)
APTT PPP: 36 SEC (ref 22–37)
AST SERPL W P-5'-P-CCNC: 26 U/L (ref 0–45)
BASOPHILS # BLD AUTO: 0 10E9/L (ref 0–0.2)
BASOPHILS NFR BLD AUTO: 0.5 %
BILIRUB SERPL-MCNC: 2.2 MG/DL (ref 0.2–1.3)
BLD GP AB SCN SERPL QL: ABNORMAL
BLD PROD TYP BPU: ABNORMAL
BLOOD BANK CMNT PATIENT-IMP: ABNORMAL
BLOOD BANK CMNT PATIENT-IMP: ABNORMAL
BUN SERPL-MCNC: 11 MG/DL (ref 7–30)
CALCIUM SERPL-MCNC: 8.2 MG/DL (ref 8.5–10.1)
CHLORIDE SERPL-SCNC: 104 MMOL/L (ref 94–109)
CMV IGG SERPL QL IA: <0.2 AI (ref 0–0.8)
CMV IGM SERPL QL IA: <0.2 AI (ref 0–0.8)
CO2 SERPL-SCNC: 29 MMOL/L (ref 20–32)
CREAT SERPL-MCNC: 0.62 MG/DL (ref 0.52–1.04)
DIFFERENTIAL METHOD BLD: ABNORMAL
EBV VCA IGG SER QL IA: 6.2 AI (ref 0–0.8)
EBV VCA IGM SER QL IA: 0.5 AI (ref 0–0.8)
EOSINOPHIL # BLD AUTO: 0.1 10E9/L (ref 0–0.7)
EOSINOPHIL NFR BLD AUTO: 6.3 %
ERYTHROCYTE [DISTWIDTH] IN BLOOD BY AUTOMATED COUNT: 15.2 % (ref 10–15)
FIBRINOGEN PPP-MCNC: 191 MG/DL (ref 200–420)
GFR SERPL CREATININE-BSD FRML MDRD: >90 ML/MIN/1.7M2
GLUCOSE SERPL-MCNC: 105 MG/DL (ref 70–99)
HBV CORE AB SERPL QL IA: NONREACTIVE
HBV SURFACE AB SERPL IA-ACNC: 925.03 M[IU]/ML
HCT VFR BLD AUTO: 31.3 % (ref 35–47)
HCV AB SERPL QL IA: NONREACTIVE
HGB BLD-MCNC: 10.2 G/DL (ref 11.7–15.7)
HIV 1+2 AB+HIV1 P24 AG SERPL QL IA: NONREACTIVE
HLA FINAL CROSSMATCH RECIPIENT: NORMAL
IMM GRANULOCYTES # BLD: 0 10E9/L (ref 0–0.4)
IMM GRANULOCYTES NFR BLD: 0 %
INR PPP: 1.8 (ref 0.86–1.14)
INTERPRETATION ECG - MUSE: NORMAL
LYMPHOCYTES # BLD AUTO: 0.5 10E9/L (ref 0.8–5.3)
LYMPHOCYTES NFR BLD AUTO: 28.1 %
MAGNESIUM SERPL-MCNC: 1.9 MG/DL (ref 1.6–2.3)
MCH RBC QN AUTO: 29.4 PG (ref 26.5–33)
MCHC RBC AUTO-ENTMCNC: 32.6 G/DL (ref 31.5–36.5)
MCV RBC AUTO: 90 FL (ref 78–100)
MONOCYTES # BLD AUTO: 0.3 10E9/L (ref 0–1.3)
MONOCYTES NFR BLD AUTO: 16.7 %
NEUTROPHILS # BLD AUTO: 0.9 10E9/L (ref 1.6–8.3)
NEUTROPHILS NFR BLD AUTO: 48.4 %
NRBC # BLD AUTO: 0 10*3/UL
NRBC BLD AUTO-RTO: 0 /100
NUM BPU REQUESTED: 10
PHOSPHATE SERPL-MCNC: 3.3 MG/DL (ref 2.5–4.5)
PLATELET # BLD AUTO: 29 10E9/L (ref 150–450)
POTASSIUM SERPL-SCNC: 2.6 MMOL/L (ref 3.4–5.3)
POTASSIUM SERPL-SCNC: 3.1 MMOL/L (ref 3.4–5.3)
PROT SERPL-MCNC: 6.1 G/DL (ref 6.8–8.8)
RBC # BLD AUTO: 3.47 10E12/L (ref 3.8–5.2)
SODIUM SERPL-SCNC: 142 MMOL/L (ref 133–144)
SPECIMEN EXP DATE BLD: ABNORMAL
WBC # BLD AUTO: 1.9 10E9/L (ref 4–11)

## 2018-05-04 PROCEDURE — 86645 CMV ANTIBODY IGM: CPT | Performed by: TRANSPLANT SURGERY

## 2018-05-04 PROCEDURE — 86803 HEPATITIS C AB TEST: CPT | Performed by: TRANSPLANT SURGERY

## 2018-05-04 PROCEDURE — 83735 ASSAY OF MAGNESIUM: CPT | Performed by: TRANSPLANT SURGERY

## 2018-05-04 PROCEDURE — 86901 BLOOD TYPING SEROLOGIC RH(D): CPT | Performed by: TRANSPLANT SURGERY

## 2018-05-04 PROCEDURE — 40000141 ZZH STATISTIC PERIPHERAL IV START W/O US GUIDANCE

## 2018-05-04 PROCEDURE — 84100 ASSAY OF PHOSPHORUS: CPT | Performed by: TRANSPLANT SURGERY

## 2018-05-04 PROCEDURE — 25000132 ZZH RX MED GY IP 250 OP 250 PS 637: Mod: GY

## 2018-05-04 PROCEDURE — 86644 CMV ANTIBODY: CPT | Performed by: TRANSPLANT SURGERY

## 2018-05-04 PROCEDURE — 36415 COLL VENOUS BLD VENIPUNCTURE: CPT

## 2018-05-04 PROCEDURE — 85025 COMPLETE CBC W/AUTO DIFF WBC: CPT | Performed by: TRANSPLANT SURGERY

## 2018-05-04 PROCEDURE — 85384 FIBRINOGEN ACTIVITY: CPT | Performed by: TRANSPLANT SURGERY

## 2018-05-04 PROCEDURE — 86706 HEP B SURFACE ANTIBODY: CPT | Performed by: TRANSPLANT SURGERY

## 2018-05-04 PROCEDURE — 86665 EPSTEIN-BARR CAPSID VCA: CPT | Performed by: TRANSPLANT SURGERY

## 2018-05-04 PROCEDURE — 80053 COMPREHEN METABOLIC PANEL: CPT | Performed by: TRANSPLANT SURGERY

## 2018-05-04 PROCEDURE — 87389 HIV-1 AG W/HIV-1&-2 AB AG IA: CPT | Performed by: TRANSPLANT SURGERY

## 2018-05-04 PROCEDURE — 36415 COLL VENOUS BLD VENIPUNCTURE: CPT | Performed by: TRANSPLANT SURGERY

## 2018-05-04 PROCEDURE — 86900 BLOOD TYPING SEROLOGIC ABO: CPT | Performed by: TRANSPLANT SURGERY

## 2018-05-04 PROCEDURE — 85610 PROTHROMBIN TIME: CPT | Performed by: TRANSPLANT SURGERY

## 2018-05-04 PROCEDURE — 25000128 H RX IP 250 OP 636

## 2018-05-04 PROCEDURE — 86922 COMPATIBILITY TEST ANTIGLOB: CPT | Performed by: TRANSPLANT SURGERY

## 2018-05-04 PROCEDURE — 85730 THROMBOPLASTIN TIME PARTIAL: CPT | Performed by: TRANSPLANT SURGERY

## 2018-05-04 PROCEDURE — 86850 RBC ANTIBODY SCREEN: CPT | Performed by: TRANSPLANT SURGERY

## 2018-05-04 PROCEDURE — 86902 BLOOD TYPE ANTIGEN DONOR EA: CPT | Performed by: TRANSPLANT SURGERY

## 2018-05-04 PROCEDURE — A9270 NON-COVERED ITEM OR SERVICE: HCPCS | Mod: GY

## 2018-05-04 PROCEDURE — 86704 HEP B CORE ANTIBODY TOTAL: CPT | Performed by: TRANSPLANT SURGERY

## 2018-05-04 PROCEDURE — 82150 ASSAY OF AMYLASE: CPT | Performed by: TRANSPLANT SURGERY

## 2018-05-04 PROCEDURE — 84132 ASSAY OF SERUM POTASSIUM: CPT

## 2018-05-04 RX ORDER — SODIUM CHLORIDE, SODIUM LACTATE, POTASSIUM CHLORIDE, CALCIUM CHLORIDE 600; 310; 30; 20 MG/100ML; MG/100ML; MG/100ML; MG/100ML
INJECTION, SOLUTION INTRAVENOUS CONTINUOUS
Status: DISCONTINUED | OUTPATIENT
Start: 2018-05-04 | End: 2018-05-04

## 2018-05-04 RX ORDER — POTASSIUM CHLORIDE 7.45 MG/ML
10 INJECTION INTRAVENOUS ONCE
Status: DISCONTINUED | OUTPATIENT
Start: 2018-05-04 | End: 2018-05-04

## 2018-05-04 RX ORDER — POTASSIUM CHLORIDE 20MEQ/15ML
40 LIQUID (ML) ORAL ONCE
Status: COMPLETED | OUTPATIENT
Start: 2018-05-04 | End: 2018-05-04

## 2018-05-04 RX ORDER — POTASSIUM CHLORIDE 7.45 MG/ML
10 INJECTION INTRAVENOUS ONCE
Status: COMPLETED | OUTPATIENT
Start: 2018-05-04 | End: 2018-05-04

## 2018-05-04 RX ORDER — POTASSIUM CHLORIDE 1500 MG/1
20 TABLET, EXTENDED RELEASE ORAL DAILY
Status: ON HOLD
Start: 2018-05-04 | End: 2018-09-12

## 2018-05-04 RX ADMIN — SODIUM CHLORIDE, POTASSIUM CHLORIDE, SODIUM LACTATE AND CALCIUM CHLORIDE: 600; 310; 30; 20 INJECTION, SOLUTION INTRAVENOUS at 02:11

## 2018-05-04 RX ADMIN — POTASSIUM CHLORIDE 10 MEQ: 7.46 INJECTION, SOLUTION INTRAVENOUS at 02:19

## 2018-05-04 RX ADMIN — POTASSIUM CHLORIDE 40 MEQ: 40 SOLUTION ORAL at 03:33

## 2018-05-04 ASSESSMENT — ACTIVITIES OF DAILY LIVING (ADL)
AMBULATION: 0-->INDEPENDENT
TRANSFERRING: 0-->INDEPENDENT
SWALLOWING: 0-->SWALLOWS FOODS/LIQUIDS WITHOUT DIFFICULTY
RETIRED_EATING: 0-->INDEPENDENT
RETIRED_COMMUNICATION: 0-->UNDERSTANDS/COMMUNICATES WITHOUT DIFFICULTY
DRESS: 0-->INDEPENDENT
BATHING: 0-->INDEPENDENT
COGNITION: 0 - NO COGNITION ISSUES REPORTED
TOILETING: 0-->INDEPENDENT
FALL_HISTORY_WITHIN_LAST_SIX_MONTHS: NO

## 2018-05-04 NOTE — TELEPHONE ENCOUNTER
----- Message from Marifer Leija RN sent at 5/1/2018  1:21 PM CDT -----  Regarding: waqas Sigala,     Please reach out to pt and let her know that Dr. Lilly said she should go ahead and schedule her Boniva infusion. Therapy plan in place we were just waiting on her to complete her dental work.     Thanks,   Marifer

## 2018-05-04 NOTE — DISCHARGE SUMMARY
[facility], [place]    Discharge Summary  Transplant Surgery  I evaluated the patient today.  I reviewed the discharge plan with the fellow, and agree with the final assessment and plan for discharge as noted in the discharge summary.  I personally spent  30 minutes or less  today on discharge activities for this patient.      Darren Rod MD, JAMIR  Professor of Surgery  [employer]  Surgical Director of Liver Transplant Program  Executive Medical Director of Pediatric Transplantation    Date of Admission:  5/3/2018  Date of Discharge:  2018  Discharging Provider:  Suzanna Cui NP     Discharge Diagnoses   Principal Problem:    Organ transplant candidate  Active Problems:    End stage liver disease (H)    Hypokalemia      History of Present Illness   Sherrie Lala is an 63 year old female with history of stage IV intrahepatic cholangiocarcinoma in  s/p extended left hepatectomy, 6 cycles of cisplatin plus Gemzar, and radioablation of lung mets.  In remission since .  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  She presented today for a possible  donor liver transplant.  The transplant was cancelled due to graft issues, but she was found on admission to have K 2.6.      Hospital Course   Hypokalemia:  K 2.9-3.1 on recent labs.  Home medications include lasix 80mg BID.  K 2.6 on admission, replaced with 50meq.  Recheck K 3.1.  Patient was instructed to take another 20meq this afternoon and then 20meq daily until follow up.    Pancytopenia:  Hgb 10.2 on admission.  Decreased from baseline >13.  She denies any recent bleeding or dark stools.  This drop may be hemodilution secondary to recent albumin infusions.  WBC 1.9 (ANC 0.9, ALC 0.5), PLT 29.  Likely secondary to liver disease.  Encouraged patient to follow up with her  PCP.      Discharge Pain Plan: N/A, no pain.    Suzanna Cui NP pager [phone]    Pending Results   These results will be followed up by transplant coordinator  Unresulted Labs Ordered in the Past 30 Days of this Admission     Date and Time Order Name Status Description    5/3/2018 2317 HLA Final Crossmatch Recipient In process     5/3/2018 2317 Hepatitis C antibody In process     5/3/2018 2317 Hepatitis B core antibody In process     5/3/2018 2317 Hepatitis B Surface Antibody In process     5/3/2018 2317 HIV Antigen Antibody Combo In process     5/3/2018 2317 EBV Capsid Antibody IgM In process     5/3/2018 2317 EBV Capsid Antibody IgG In process     5/3/2018 2317 CMV antibody IgM In process     5/3/2018 2317 CMV Antibody IgG In process     3/9/2018 0744 FACTOR 2 PROTHROMBIN 21125O MUT ANAL In process     3/9/2018 0744 FACTOR 5 LEIDEN MUTATION ANALYSIS In process           Primary Care Physician   Elijah Benitez MD    Time Spent on this Encounter   I, Suzanna Cui, personally saw the patient today and spent less than or equal to 30 minutes discharging this patient.    Discharge Disposition   Discharged to home  Condition at discharge: Stable    Consultations This Hospital Stay   SOCIAL WORK IP CONSULT  NUTRITION SERVICES ADULT IP CONSULT  PHYSICAL THERAPY ADULT IP CONSULT  OCCUPATIONAL THERAPY ADULT IP CONSULT  VASCULAR ACCESS CARE ADULT IP CONSULT    Discharge Orders       Reason for your hospital stay   Organ transplant, cancelled.  Hypokalemia.     Follow Up and recommended labs and tests   Please follow up with your primary care provider, Dr. Elijah Benitez [phone], for low potassium and anemia     Activity   Your activity upon discharge: as tolerated     When to contact your care team   Notify your coordinator if you are hospitalized or develop a new medical issue.    Transplant Center, coordinator Marifer [phone]     Discharge Instructions   Take another potassium chloride 20meq tab from your home  supply this afternoon and then take 20meq daily until your follow up with your PCP     Diet   Follow this diet upon discharge: Low sodium       Discharge Medications     Current Discharge Medication List      START taking these medications    Details   Potassium Chloride ER 20 MEQ TBCR Take 1 tablet (20 mEq) by mouth daily    Associated Diagnoses: Hypokalemia         CONTINUE these medications which have NOT CHANGED    Details   albuterol (PROAIR HFA/PROVENTIL HFA/VENTOLIN HFA) 108 (90 BASE) MCG/ACT Inhaler Inhale 2 puffs into the lungs every 6 hours      calcium carbonate 500 MG CHEW Take 500 mg by mouth 2 times daily  Qty: 60 tablet, Refills: 0    Associated Diagnoses: Osteoporosis      cholecalciferol (VITAMIN D3) 1000 UNIT tablet Take 1 tablet (1,000 Units) by mouth daily  Qty: 30 tablet, Refills: 0    Associated Diagnoses: Osteoporosis      CIPROFLOXACIN PO Take 750 mg by mouth once a week      FUROSEMIDE PO Take 80 mg by mouth 2 times daily       GABAPENTIN PO Take 300 mg by mouth At Bedtime      OMEPRAZOLE PO Take 20 mg by mouth 2 times daily (before meals)      PRAMIPEXOLE DIHYDROCHLORIDE PO Take 0.5 mg by mouth daily      RIFAMPIN PO Take 300 mg by mouth daily      RifAXIMin (XIFAXAN PO) Take 550 mg by mouth 2 times daily      SPIRONOLACTONE PO Take 100 mg by mouth daily      ZOLPIDEM TARTRATE PO Take 10 mg by mouth nightly as needed for sleep           Allergies   Allergies   Allergen Reactions     Blood Transfusion Related (Informational Only) Other (See Comments)     Patient has a history of a clinically significant antibody against RBC antigens.  A delay in compatible RBCs may occur.     Dilaudid [Hydromorphone] Itching     Data   Most Recent 3 CBC's:  Recent Labs   Lab Test  05/04/18   0001  02/26/18   0952   WBC  1.9*  4.1   HGB  10.2*  13.9   MCV  90  90   PLT  29*  50*     Most Recent 3 BMP's:  Recent Labs   Lab Test  05/04/18   0738  05/04/18   0001  02/26/18   0952 02/20/18   NA   --   142  138    --    POTASSIUM  3.1*  2.6*  3.1*  3.3*   CHLORIDE   --   104  102   --    CO2   --   29  29   --    BUN   --   11  16   --    CR   --   0.62  0.69  0.70   ANIONGAP   --   10  8   --    SHELDON   --   8.2*  8.4*   --    GLC   --   105*  78  107*     Most Recent 2 LFT's:  Recent Labs   Lab Test  05/04/18   0001  02/26/18   0952   AST  26  42   ALT  21  34   ALKPHOS  121  156*   BILITOTAL  2.2*  2.2*     Most Recent 3 INR's:  Recent Labs   Lab Test  05/04/18   0001  02/26/18 0952   INR  1.80*  1.37*

## 2018-05-04 NOTE — PLAN OF CARE
Problem: Patient Care Overview  Goal: Plan of Care/Patient Progress Review  Outcome: No Change  Pt here for possible Liver transplant, but procedure was cancelled. Incidentally, K+ of 2.6 was found during labwork (see provider notification note). Pt given IV 10mEq KCl x1 with one-time PO dose of 40mEq. Recheck at 0530. VSS, though pressures soft (near pt's reported baseline of 90s/60s). Denies pain and nausea. Advanced to regular diet following cancellation of procedure. Family at bedside and supportive. Plan for d/c once K+ at an acceptable level. PIV SL for now (order for LR at 50mL/hr but pt requesting to turn off now that pt is PO - MD notified). Resting with call light in reach. Up independently. Continue to monitor.

## 2018-05-04 NOTE — PROGRESS NOTES
25-Jul-2017 SURGERY CROSS-COVER NOTE  05/04/2018 4:01 AM    Patient: Sherrie Lala  MRN: [id number]    Received update from transplant coordinator at ~03:00 that Ms. Lala will not be receiving the available organ. Pt and family updated at bedside and voice understanding.    Pt not appropriate for immediate discharge given critically low K+ on pre-op lab work. Will continue to replace and recheck in a few hours.    - - - - - - - - - - - - - - - - - -  Kirsten Mitchell MD  General Surgery PGY-1

## 2018-05-04 NOTE — H&P
TRANSPLANT SURGERY HISTORY & PHYSICAL  Physician Attestation   I, Darren Rod, saw this patient with the resident and agree with the resident and/or medical student's findings and plan of care as documented in the note.      I personally reviewed vital signs, medications and labs.    Key findings: patient was only a back up for liver (which was accepted else where and did not come to us)    Darren Rod MD  Date of Service (when I saw the patient): 5/3    Patient: Sherrie Lala   MRN: [id number]     Date of Admission: 5/3/2018    HPI: Sherrie Lala is a 63 year old female who presents for work up for possible  donor liver transplantation. 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 She has a persistent right hepatic hydrothorax that failed pleurodesis and now have a pleurX catheter placed which drains about 1 L daily. She endorses being in her usual state of health, no recent URI or GI symptoms.     PMH:  Past Medical History:   Diagnosis Date     Asthma      Cholangiocarcinoma (H) 2014     Encounter for pleural drainage tube placement      Esophageal varices with hemorrhage (H)      Gastric ulcer      Lung metastases (H) 2014     Portal vein thrombosis        PSH:  Past Surgical History:   Procedure Laterality Date     CHOLECYSTECTOMY         FH:  Denies family history of bleeding or clotting disorders or adverse reactions to anesthesia.    SH:  Tobacco use: denies  EtOH use: occasional, every 1-2 mo  Illicit drug use: denies  Lives in [place], [place] with her .     Allergies:  Allergies   Allergen Reactions     Blood Transfusion Related (Informational Only) Other (See Comments)     Patient has a history of a clinically significant antibody against RBC antigens.  A delay in compatible RBCs may occur.     Dilaudid [Hydromorphone] Itching       Home Meds:    No  current facility-administered medications on file prior to encounter.   Current Outpatient Prescriptions on File Prior to Encounter:  albuterol (PROAIR HFA/PROVENTIL HFA/VENTOLIN HFA) 108 (90 BASE) MCG/ACT Inhaler Inhale 2 puffs into the lungs every 6 hours   calcium carbonate 500 MG CHEW Take 500 mg by mouth 2 times daily   cholecalciferol (VITAMIN D3) 1000 UNIT tablet Take 1 tablet (1,000 Units) by mouth daily   CIPROFLOXACIN PO Take 750 mg by mouth once a week   FUROSEMIDE PO Take 80 mg by mouth 2 times daily    GABAPENTIN PO Take 300 mg by mouth At Bedtime   OMEPRAZOLE PO Take 20 mg by mouth 2 times daily (before meals)   PRAMIPEXOLE DIHYDROCHLORIDE PO Take 0.5 mg by mouth daily   RIFAMPIN PO Take 300 mg by mouth daily   RifAXIMin (XIFAXAN PO) Take 550 mg by mouth 2 times daily   SPIRONOLACTONE PO Take 100 mg by mouth daily   ZOLPIDEM TARTRATE PO Take 10 mg by mouth nightly as needed for sleep       ROS:   A 10-point review of systems was performed and otherwise negative except as reported in HPI.    Physical Exam:  Temp:  [98.4  F (36.9  C)-98.6  F (37  C)] 98.4  F (36.9  C)  Heart Rate:  [82-92] 82  Resp:  [18] 18  BP: ()/(60-72) 99/60  SpO2:  [97 %-98 %] 98 %    General: AAOx4, NAD, lying comfortably in bed  CV: regular rate, warm, well-perfused  Pulm: no dyspnea, breathing comfortably on RA. Lungs CTAB. Right pleurx catheter in place.   Abd: soft, non-distended, non-tender  Extremities: warm and well perfused, 2+ edema bilaterally  Skin: no rashes  Neuro: moving all extremities spontaneously without apparent deficit    Labs:  CBC     Recent Labs  Lab 05/04/18  0001   WBC 1.9*   HGB 10.2*   PLT 29*     BMP     Recent Labs  Lab 05/04/18  0001      POTASSIUM 2.6*   CHLORIDE 104   CO2 29   BUN 11   CR 0.62   *     LFTs   Recent Labs  Lab 05/04/18  0001   INR 1.80*       Imaging:  XR Chest 2 Views   Preliminary Result   Impression:     1. Small right pleural effusion with associated right  basilar   atelectasis and additional left mid lung atelectasis. Lungs are   otherwise clear.   2. Right basilar Pleurx catheter in place.          ASSESSMENT/PLAN:  Sherrie Lala is a 63 year old female who presents with end stage liver disease as a candidate for  donor liver transplant.    Pre-op liver transplant orders initiated, including final cross match. Blood bank informed of pt's clinically significant RBC antigens.    Pt is significantly hypokalemic on lab draw, will replace IV.     - - - - - - - - - - - - - - - - - -  Kirsten Mitchell MD  General Surgery PGY-1

## 2018-05-04 NOTE — PLAN OF CARE
Problem: Patient Care Overview  Goal: Plan of Care/Patient Progress Review  Outcome: Completed Date Met: 05/04/18  DISCHARGE:  D: Patient with orders to discharge to home.  I: Discharge instructions, medications & follow ups reviewed with patient and spouse. Copy of discharge summary given to patient. PIV removed. All belongings packed & sent with patient. Medications filled & picked up at discharge pharmacy. Paperwork faxed.   A: Patient in stable condition. AVSS. Patient had no further questions regarding discharge instructions and medications. Patient transferred out by spouse & left with spouse.  P: Plan for follow-up for primary care provider.

## 2018-05-04 NOTE — TELEPHONE ENCOUNTER
May 4, 2018: I spoke with Sherrie; she will contact Lynette at [facility]; she can get the order sent to [place]. She will call on Monday.    Zakiya Hinton  Pre-Liver Transplant   [phone]

## 2018-05-04 NOTE — TELEPHONE ENCOUNTER
"Organ Offer Encounter Information    Organ Offer Information   Organ offer date & time:  5/3/2018  8:37 PM   Coordinator/Fellow/Attending name:  GIA PAZ   Organ(s):   Organ UNOS ID Match Run ID Comment Organ Laterality   Liver [id number] MNOP, Back Up          Recent infections?:  No    New medications?:  No Recent pregnancy?:  No   Angicoagulation medications?:  No Recent vaccinations?:  No   Recent blood transfusions?:  No Recent hospitalizations?:  No   Has your insurance changed in the last 6-12 months?:  Neg    Discussed organ offer with:  Patient   Patient/Caregiver name:  Sherrie   Discussed risk category with Patient/Other:  N/A   Understood donor criteria, verbalized understanding   Patient/Other asked to speak to a surgeon?:  No   Discussed program-specific outcomes:  Did not have questions regarding SRTR   Right to decline organ offer without penalty, Patient/Other:  Aware of option to decline without penalty   Organ offer decision status Patient/Other:  Accepted Offer   Organ disposition:  Case Cancelled - Other (specify)   Additional Comments:  May 3, 2018 9:08 PM  Liver: Back Up, Local, DBD  MD: Viola/Prema  OPO Contact: Harpreet [phone]  Plan: Patient back up for local donor DBD liver offer.  Spoke with Dr. Rod, plan to admit patient for pre-op in case primary center declines.  Spoke to patient about BACK UP offer & plan, admission instructions given.  Contact info provided.  Admissions: [facility] - ETA 2200  Unit: [facility] updated  Update Provider Entering Orders: JIM OAKLEY [ Msg Id [id number] ] - does need FXM order, informed MD  Immunology: Florence notified of back up offer  OR: Lisa Booked for 0300 - informed that this is a back up offer & to wait to call patient or open any packaging until notified  Blood Bank: Sharon notified - patient has antibodies so blood bank request MD order \"prepare\" order early so they can start working on getting her blood, updated " MD  Research: N/A - recipient not consented  TransNet/ABO Verification: TBD  Add Organ: TBD  Lori Paz, RN   Transplant Coordinator    May 4, 2018 4:19 AM  Did not become primary, updated MD - surgery intern to discharge patient.  Updated 7A, OR, Immunology, and blood bank.  Lori Paz, BECKI   Transplant Coordinator         Attestation I have discussed all of the above with the Patient/Legal Guardian/Caregiver regarding this organ offer.:  Yes   Coordinator/Fellow/Attending name:  GIA PAZ

## 2018-05-04 NOTE — PROVIDER NOTIFICATION
On call provider Claus Mitchell paged at 0864 to inform them of critical platelet value of 29 and critical potassium 2.6. Provider to enter orders now for replacement.

## 2018-05-07 ENCOUNTER — DOCUMENTATION ONLY (OUTPATIENT)
Dept: TRANSPLANT | Facility: CLINIC | Age: 63
End: 2018-05-07

## 2018-05-07 LAB
A* LOCUS NMDP: NORMAL
A* LOCUS: NORMAL
A* NMDP: NORMAL
A*: NORMAL
ABTEST METHOD: NORMAL
B* LOCUS NMDP: NORMAL
B* LOCUS: NORMAL
B* NMDP: NORMAL
B*: NORMAL
BW-1: NORMAL
C* LOCUS NMDP: NORMAL
C* LOCUS: NORMAL
C* NMDP: NORMAL
C*: NORMAL
DPA1* NMDP: NORMAL
DPA1*: NORMAL
DPA1*LOCUS: NORMAL
DPB1* LOCUS NMDP: NORMAL
DPB1* NMDP: NORMAL
DPB1*: NORMAL
DPB1*LOCUS: NORMAL
DQA1*: NORMAL
DQA1*LOCUS NMDP: NORMAL
DQA1*LOCUS: NORMAL
DQA1*NMDP: NORMAL
DQB1* LOCUS NMDP: NORMAL
DQB1* LOCUS: NORMAL
DQB1* NMDP: NORMAL
DQB1*: NORMAL
DRB1* LOCUS NMDP: NORMAL
DRB1* LOCUS: NORMAL
DRB1* NMDP: NORMAL
DRB1*: NORMAL
DRB3* LOCUS NMDP: NORMAL
DRB3* LOCUS: NORMAL
DRB5* NMDP: NORMAL
DRB5*: NORMAL
DRSSO TEST METHOD: NORMAL
PROTOCOL CUTOFF: NORMAL
UNACCEPTABLE ANTIGEN: NORMAL

## 2018-05-07 NOTE — PROGRESS NOTES
Follow up call made to pt to check in post hospital and review plan of care.     Pt confirmed she is seeing her PCP 5/9/18 for post hospital f/u including labs. Pt confirmed she will call to scheduled Bon[facility] infusion in [facility].     Pt denied questions or concerns.

## 2018-05-08 LAB
BLD PROD TYP BPU: NORMAL
BLD UNIT ID BPU: 0
BLOOD PRODUCT CODE: NORMAL
BPU ID: NORMAL
SA1 CELL: NORMAL
SA1 COMMENTS: NORMAL
SA1 HI RISK ABY: NORMAL
SA1 MOD RISK ABY: NORMAL
SA1 TEST METHOD: NORMAL
SA2 CELL: NORMAL
SA2 COMMENTS: NORMAL
SA2 HI RISK ABY UA: NORMAL
SA2 MOD RISK ABY: NORMAL
SA2 TEST METHOD: NORMAL
TRANSFUSION STATUS PATIENT QL: NORMAL

## 2018-05-09 ENCOUNTER — OFFICE VISIT - RIVER FALLS (OUTPATIENT)
Dept: FAMILY MEDICINE | Facility: CLINIC | Age: 63
End: 2018-05-09
Payer: COMMERCIAL

## 2018-05-09 LAB
CREAT SERPL-MCNC: 0.62 MG/DL
CREAT SERPL-MCNC: 0.69 MG/DL
CREAT SERPL-MCNC: 0.7 MG/DL
GLUCOSE BLD-MCNC: 105 MG/DL
GLUCOSE BLD-MCNC: 107 MG/DL
GLUCOSE BLD-MCNC: 78 MG/DL

## 2018-05-10 ENCOUNTER — DOCUMENTATION ONLY (OUTPATIENT)
Dept: TRANSPLANT | Facility: CLINIC | Age: 63
End: 2018-05-10

## 2018-05-10 PROBLEM — J94.8 HYDROTHORAX: Status: ACTIVE | Noted: 2018-05-10

## 2018-05-10 PROBLEM — K72.10 END STAGE LIVER DISEASE (H): Status: RESOLVED | Noted: 2018-05-03 | Resolved: 2018-05-10

## 2018-05-10 PROBLEM — R18.8 CIRRHOSIS OF LIVER WITH ASCITES (H): Status: ACTIVE | Noted: 2018-05-10

## 2018-05-10 PROBLEM — K74.60 CIRRHOSIS OF LIVER WITH ASCITES (H): Status: ACTIVE | Noted: 2018-05-10

## 2018-05-10 NOTE — PROGRESS NOTES
Following submitted for 28 MELD points:    This is the initial request for exception points for a 64 y/o patient with cirrhosis which occurred status post resection of cholangiocarcinoma more than 5 years ago. The patient was treated with Y-90 on 4/19/11, completed chemotherapy 6/2011, and on 10/15/12 the patient underwent hepatecomy of the lesion with clean margins. The patient underwent recent PET (4/11/18) scan and CT scan (2/13/18) without evidence of recurrence. The patient has developed chronic portal vein thrombosis which has led to refractory hepatic hydrothorax, pulmonary complications, and multiple hospital admissions. Unfortunately a PleurX catheter was placed at an outside hospital that we have been unable to remove with the patient draining approximately 500 ml daily. This patient s life expectancy is not reflected in the MELD-Na of 16. We are respectfully requesting the patient be granted an exception of 28 MELD points.

## 2018-05-23 LAB
CREAT SERPL-MCNC: 0.8 MG/DL
GLUCOSE BLD-MCNC: 113 MG/DL

## 2018-05-24 ENCOUNTER — DOCUMENTATION ONLY (OUTPATIENT)
Dept: TRANSPLANT | Facility: CLINIC | Age: 63
End: 2018-05-24

## 2018-05-24 NOTE — PROGRESS NOTES
Following submitted for appeal of previous denial for exception points:    This is an appeal of the decision to deny exception points for a 64 y/o patient with cirrhosis which occurred status post resection of a cholangiocarcinoma more than 5 years ago. The patient was treated with Y-90 on 4/19/11, completed chemotherapy 6/2011, and on 10/15/12 the patient underwent hepatecomy of the lesion with clean margins. The patient underwent recent PET (4/11/18) scan and CT scan (2/13/18) without evidence of recurrence. The patient unfortunately developed progressive fibrosis and a chronic portal vein thrombosis which has led to refractory hepatic hydrothorax and multiple hospital admissions, including some for encephalopathy. Unfortunately, a PleurX catheter was placed > 4 months ago at an outside hospital by a less than ideally informed pulmonologist that we have been unable to remove as the patient is draining approximately 500 ml or more daily. Recent evaluation by two different IR groups determined that PVT is too extensive and extremely fibrotic such that TIPS cannot be technically performed, even by our center which has extensive experience with PV recannulization via TIPS. With the chronic loss of pleural fluid, the patient has developed severe muscle wasting and progressive weakness.  This patient's life expectancy is not reflected in the MELD-Na of 16. We are respectfully requesting the patient be granted an exception of 26 MELD points.

## 2018-08-15 ENCOUNTER — TELEPHONE (OUTPATIENT)
Dept: TRANSPLANT | Facility: CLINIC | Age: 63
End: 2018-08-15

## 2018-08-21 NOTE — TELEPHONE ENCOUNTER
Contact made with pt. Pt reports overall she is feeling well. She continues to follow with Dr. Lilly at [facility]. Per pt her pleurx drain was removed and she has had follow up imaging. Pt denied questions or concerns. Message sent to Dr. Lilly to review POC including transplant exception.

## 2018-08-22 NOTE — TELEPHONE ENCOUNTER
After review with Dr. Lilly the following submitted for exception points:     64 y/o patient with cirrhosis which occurred status post resection of a cholangiocarcinoma almost 6 years ago. The patient was treated with Y-90 on 4/19/11, completed chemotherapy 6/2011, and on 10/15/12 the patient underwent hepatectomy of the lesion with clean margins. The patient underwent recent PET scan (4/11/18) and CT scan (8/13/18) without evidence of recurrence. The patient unfortunately developed progressive fibrosis and a chronic portal vein thrombosis which has led to refractory hepatic hydrothorax and multiple hospital admissions, including some for encephalopathy. Unfortunately, a PleurX catheter was placed > 7 months ago at an outside hospital by a less than ideally informed pulmonologist that we had been unable to remove as the patient was draining approximately 500 ml or more daily. Recent evaluation by two different IR groups determined that PVT is too extensive and extremely fibrotic such that TIPS cannot be technically performed, even by our center which has extensive experience with PV recannulization via TIPS. With the chronic loss of pleural fluid, the patient has developed severe muscle wasting and progressive weakness. This patient's life expectancy is not reflected in the MELD-Na of 13. We are respectfully requesting the patient maintain their current exception score of 26 MELD points.    Pt updated and expressed understanding and comfort with plan.

## 2018-08-29 ENCOUNTER — RESULTS ONLY (OUTPATIENT)
Dept: OTHER | Facility: CLINIC | Age: 63
End: 2018-08-29

## 2018-08-29 ENCOUNTER — APPOINTMENT (OUTPATIENT)
Dept: GENERAL RADIOLOGY | Facility: CLINIC | Age: 63
DRG: 005 | End: 2018-08-29
Attending: PHYSICIAN ASSISTANT
Payer: MEDICARE

## 2018-08-29 ENCOUNTER — HOSPITAL ENCOUNTER (OUTPATIENT)
Facility: CLINIC | Age: 63
End: 2018-08-29
Attending: TRANSPLANT SURGERY | Admitting: TRANSPLANT SURGERY
Payer: MEDICARE

## 2018-08-29 ENCOUNTER — ANESTHESIA EVENT (OUTPATIENT)
Dept: SURGERY | Facility: CLINIC | Age: 63
DRG: 005 | End: 2018-08-29
Payer: MEDICARE

## 2018-08-29 ENCOUNTER — ORGAN (OUTPATIENT)
Dept: TRANSPLANT | Facility: CLINIC | Age: 63
End: 2018-08-29

## 2018-08-29 ENCOUNTER — HOSPITAL ENCOUNTER (INPATIENT)
Facility: CLINIC | Age: 63
LOS: 12 days | Discharge: HOME HEALTH | DRG: 005 | End: 2018-09-12
Attending: TRANSPLANT SURGERY | Admitting: TRANSPLANT SURGERY
Payer: MEDICARE

## 2018-08-29 ENCOUNTER — DOCUMENTATION ONLY (OUTPATIENT)
Dept: TRANSPLANT | Facility: CLINIC | Age: 63
End: 2018-08-29

## 2018-08-29 DIAGNOSIS — Z79.01 WARFARIN ANTICOAGULATION: ICD-10-CM

## 2018-08-29 DIAGNOSIS — Z76.82 LIVER TRANSPLANT CANDIDATE: ICD-10-CM

## 2018-08-29 DIAGNOSIS — E44.1 MILD PROTEIN-CALORIE MALNUTRITION (H): ICD-10-CM

## 2018-08-29 DIAGNOSIS — D84.9 IMMUNOSUPPRESSION (H): ICD-10-CM

## 2018-08-29 DIAGNOSIS — I81 PORTAL VEIN THROMBOSIS OF TRANSPLANTED LIVER (H): ICD-10-CM

## 2018-08-29 DIAGNOSIS — K21.9 GASTROESOPHAGEAL REFLUX DISEASE WITHOUT ESOPHAGITIS: ICD-10-CM

## 2018-08-29 DIAGNOSIS — Z94.4 LIVER TRANSPLANTED (H): Primary | ICD-10-CM

## 2018-08-29 DIAGNOSIS — R11.0 NAUSEA: ICD-10-CM

## 2018-08-29 DIAGNOSIS — T86.49 PORTAL VEIN THROMBOSIS OF TRANSPLANTED LIVER (H): ICD-10-CM

## 2018-08-29 DIAGNOSIS — Z78.9 ON ENTERAL NUTRITION: ICD-10-CM

## 2018-08-29 DIAGNOSIS — E87.70 HYPERVOLEMIA, UNSPECIFIED HYPERVOLEMIA TYPE: ICD-10-CM

## 2018-08-29 LAB
ALBUMIN SERPL-MCNC: 5 G/DL (ref 3.4–5)
ALP SERPL-CCNC: 115 U/L (ref 40–150)
ALT SERPL W P-5'-P-CCNC: 24 U/L (ref 0–50)
AMYLASE SERPL-CCNC: 82 U/L (ref 30–110)
ANION GAP SERPL CALCULATED.3IONS-SCNC: 12 MMOL/L (ref 3–14)
APTT PPP: 36 SEC (ref 22–37)
AST SERPL W P-5'-P-CCNC: 23 U/L (ref 0–45)
BASOPHILS # BLD AUTO: 0 10E9/L (ref 0–0.2)
BASOPHILS NFR BLD AUTO: 0.4 %
BILIRUB SERPL-MCNC: 3 MG/DL (ref 0.2–1.3)
BUN SERPL-MCNC: 19 MG/DL (ref 7–30)
CALCIUM SERPL-MCNC: 8.9 MG/DL (ref 8.5–10.1)
CHLORIDE SERPL-SCNC: 106 MMOL/L (ref 94–109)
CO2 SERPL-SCNC: 26 MMOL/L (ref 20–32)
CREAT SERPL-MCNC: 0.77 MG/DL (ref 0.52–1.04)
DIFFERENTIAL METHOD BLD: ABNORMAL
EOSINOPHIL # BLD AUTO: 0.1 10E9/L (ref 0–0.7)
EOSINOPHIL NFR BLD AUTO: 4.8 %
ERYTHROCYTE [DISTWIDTH] IN BLOOD BY AUTOMATED COUNT: 15.7 % (ref 10–15)
FIBRINOGEN PPP-MCNC: 183 MG/DL (ref 200–420)
GFR SERPL CREATININE-BSD FRML MDRD: 76 ML/MIN/1.7M2
GLUCOSE SERPL-MCNC: 90 MG/DL (ref 70–99)
HCT VFR BLD AUTO: 30.4 % (ref 35–47)
HGB BLD-MCNC: 9.4 G/DL (ref 11.7–15.7)
IMM GRANULOCYTES # BLD: 0 10E9/L (ref 0–0.4)
IMM GRANULOCYTES NFR BLD: 0 %
INR PPP: 1.65 (ref 0.86–1.14)
LYMPHOCYTES # BLD AUTO: 0.5 10E9/L (ref 0.8–5.3)
LYMPHOCYTES NFR BLD AUTO: 19.9 %
MAGNESIUM SERPL-MCNC: 2.2 MG/DL (ref 1.6–2.3)
MCH RBC QN AUTO: 27.5 PG (ref 26.5–33)
MCHC RBC AUTO-ENTMCNC: 30.9 G/DL (ref 31.5–36.5)
MCV RBC AUTO: 89 FL (ref 78–100)
MONOCYTES # BLD AUTO: 0.3 10E9/L (ref 0–1.3)
MONOCYTES NFR BLD AUTO: 9.2 %
NEUTROPHILS # BLD AUTO: 1.8 10E9/L (ref 1.6–8.3)
NEUTROPHILS NFR BLD AUTO: 65.7 %
NRBC # BLD AUTO: 0 10*3/UL
NRBC BLD AUTO-RTO: 0 /100
PHOSPHATE SERPL-MCNC: 3.6 MG/DL (ref 2.5–4.5)
PLATELET # BLD AUTO: 37 10E9/L (ref 150–450)
PLATELET # BLD EST: ABNORMAL 10*3/UL
POTASSIUM SERPL-SCNC: 3.1 MMOL/L (ref 3.4–5.3)
PROT SERPL-MCNC: 7.8 G/DL (ref 6.8–8.8)
RBC # BLD AUTO: 3.42 10E12/L (ref 3.8–5.2)
SODIUM SERPL-SCNC: 144 MMOL/L (ref 133–144)
WBC # BLD AUTO: 2.7 10E9/L (ref 4–11)

## 2018-08-29 PROCEDURE — 86902 BLOOD TYPE ANTIGEN DONOR EA: CPT | Performed by: PHYSICIAN ASSISTANT

## 2018-08-29 PROCEDURE — 82150 ASSAY OF AMYLASE: CPT | Performed by: PHYSICIAN ASSISTANT

## 2018-08-29 PROCEDURE — 86665 EPSTEIN-BARR CAPSID VCA: CPT | Performed by: PHYSICIAN ASSISTANT

## 2018-08-29 PROCEDURE — 86850 RBC ANTIBODY SCREEN: CPT | Performed by: PHYSICIAN ASSISTANT

## 2018-08-29 PROCEDURE — 85025 COMPLETE CBC W/AUTO DIFF WBC: CPT | Performed by: PHYSICIAN ASSISTANT

## 2018-08-29 PROCEDURE — 83735 ASSAY OF MAGNESIUM: CPT | Performed by: PHYSICIAN ASSISTANT

## 2018-08-29 PROCEDURE — 85384 FIBRINOGEN ACTIVITY: CPT | Performed by: PHYSICIAN ASSISTANT

## 2018-08-29 PROCEDURE — 81200005 ZZH ACQUISITION LIVER CADAVER DONOR

## 2018-08-29 PROCEDURE — 86645 CMV ANTIBODY IGM: CPT | Performed by: PHYSICIAN ASSISTANT

## 2018-08-29 PROCEDURE — 85610 PROTHROMBIN TIME: CPT | Performed by: PHYSICIAN ASSISTANT

## 2018-08-29 PROCEDURE — 86900 BLOOD TYPING SEROLOGIC ABO: CPT | Performed by: PHYSICIAN ASSISTANT

## 2018-08-29 PROCEDURE — 85730 THROMBOPLASTIN TIME PARTIAL: CPT | Performed by: PHYSICIAN ASSISTANT

## 2018-08-29 PROCEDURE — 40000141 ZZH STATISTIC PERIPHERAL IV START W/O US GUIDANCE

## 2018-08-29 PROCEDURE — 40000065 ZZH STATISTIC EKG NON-CHARGEABLE

## 2018-08-29 PROCEDURE — 71046 X-RAY EXAM CHEST 2 VIEWS: CPT

## 2018-08-29 PROCEDURE — 86922 COMPATIBILITY TEST ANTIGLOB: CPT | Performed by: PHYSICIAN ASSISTANT

## 2018-08-29 PROCEDURE — 86706 HEP B SURFACE ANTIBODY: CPT | Performed by: PHYSICIAN ASSISTANT

## 2018-08-29 PROCEDURE — 86644 CMV ANTIBODY: CPT | Performed by: PHYSICIAN ASSISTANT

## 2018-08-29 PROCEDURE — 86901 BLOOD TYPING SEROLOGIC RH(D): CPT | Performed by: PHYSICIAN ASSISTANT

## 2018-08-29 PROCEDURE — 87389 HIV-1 AG W/HIV-1&-2 AB AG IA: CPT | Performed by: PHYSICIAN ASSISTANT

## 2018-08-29 PROCEDURE — 36415 COLL VENOUS BLD VENIPUNCTURE: CPT | Performed by: PHYSICIAN ASSISTANT

## 2018-08-29 PROCEDURE — 93010 ELECTROCARDIOGRAM REPORT: CPT | Performed by: INTERNAL MEDICINE

## 2018-08-29 PROCEDURE — 86803 HEPATITIS C AB TEST: CPT | Performed by: PHYSICIAN ASSISTANT

## 2018-08-29 PROCEDURE — 86704 HEP B CORE ANTIBODY TOTAL: CPT | Performed by: PHYSICIAN ASSISTANT

## 2018-08-29 PROCEDURE — 84100 ASSAY OF PHOSPHORUS: CPT | Performed by: PHYSICIAN ASSISTANT

## 2018-08-29 PROCEDURE — 80053 COMPREHEN METABOLIC PANEL: CPT | Performed by: PHYSICIAN ASSISTANT

## 2018-08-29 PROCEDURE — 87081 CULTURE SCREEN ONLY: CPT | Performed by: PHYSICIAN ASSISTANT

## 2018-08-29 RX ORDER — POTASSIUM CHLORIDE 1.5 G/1.58G
20-40 POWDER, FOR SOLUTION ORAL
Status: DISCONTINUED | OUTPATIENT
Start: 2018-08-29 | End: 2018-08-31

## 2018-08-29 RX ORDER — PIPERACILLIN SODIUM, TAZOBACTAM SODIUM 3; .375 G/15ML; G/15ML
3.38 INJECTION, POWDER, LYOPHILIZED, FOR SOLUTION INTRAVENOUS ONCE
Status: COMPLETED | OUTPATIENT
Start: 2018-08-29 | End: 2018-08-30

## 2018-08-29 RX ORDER — ACETAMINOPHEN 325 MG/1
975 TABLET ORAL ONCE
Status: DISCONTINUED | OUTPATIENT
Start: 2018-08-29 | End: 2018-08-31 | Stop reason: HOSPADM

## 2018-08-29 RX ORDER — PIPERACILLIN SODIUM, TAZOBACTAM SODIUM 2; .25 G/10ML; G/10ML
2.25 INJECTION, POWDER, LYOPHILIZED, FOR SOLUTION INTRAVENOUS
Status: DISCONTINUED | OUTPATIENT
Start: 2018-08-29 | End: 2018-08-31 | Stop reason: HOSPADM

## 2018-08-29 RX ORDER — POTASSIUM CHLORIDE 7.45 MG/ML
10 INJECTION INTRAVENOUS
Status: DISCONTINUED | OUTPATIENT
Start: 2018-08-29 | End: 2018-08-31

## 2018-08-29 RX ORDER — POTASSIUM CHLORIDE 750 MG/1
20-40 TABLET, EXTENDED RELEASE ORAL
Status: DISCONTINUED | OUTPATIENT
Start: 2018-08-29 | End: 2018-08-31

## 2018-08-29 RX ORDER — POTASSIUM CL/LIDO/0.9 % NACL 10MEQ/0.1L
10 INTRAVENOUS SOLUTION, PIGGYBACK (ML) INTRAVENOUS
Status: DISCONTINUED | OUTPATIENT
Start: 2018-08-29 | End: 2018-08-29

## 2018-08-29 RX ORDER — LIDOCAINE 40 MG/G
CREAM TOPICAL
Status: DISCONTINUED | OUTPATIENT
Start: 2018-08-29 | End: 2018-08-31 | Stop reason: HOSPADM

## 2018-08-29 RX ORDER — POTASSIUM CHLORIDE 29.8 MG/ML
20 INJECTION INTRAVENOUS
Status: DISCONTINUED | OUTPATIENT
Start: 2018-08-29 | End: 2018-08-31

## 2018-08-29 NOTE — IP AVS SNAPSHOT
"    [facility]: [phone]                                              INTERAGENCY TRANSFER FORM - PHYSICIAN ORDERS   2018                    Hospital Admission Date: 2018  REX DAMON   : 1955  Sex: Female        Attending Provider: Darren Rod MD     Allergies:  Blood Transfusion Related (Informational Only), Dilaudid [Hydromorphone]    Infection:  None   Service:  TRANSPLANT    Ht:  1.6 m (5' 3\")   Wt:  57.2 kg (126 lb 3.2 oz)   Admission Wt:  55.2 kg (121 lb 9.6 oz)    BMI:  22.36 kg/m 2   BSA:  1.59 m 2            Patient PCP Information     Provider PCP Type    Apollo Benitez MD [facility]    Apollo Benitez MD Internal Medicine      ED Clinical Impression     Diagnosis Description Comment Added By Time Added    Liver transplanted (H) [Z94.4] Liver transplanted (H) [Z94.4]  Derik Garcias MD 2018  1:12 AM    Liver transplant candidate [Z76.82] Liver transplant candidate [Z76.82]  Derik Garcias MD 9/3/2018 10:05 AM    Immunosuppression (H) [D89.9] Immunosuppression (H) [D89.9]  Xi Muse RN 2018  1:30 PM    On enteral nutrition [Z78.9] On enteral nutrition [Z78.9]  Xi Muse RN 2018  1:30 PM    Warfarin anticoagulation [Z79.01] Warfarin anticoagulation [Z79.01]  Xi Muse RN 9/10/2018  9:58 AM    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) [T86.49, I81] 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) [T86.49, I81]  Xi Muse RN 9/10/2018  9:59 AM    Gastroesophageal reflux disease without esophagitis [K21.9] Gastroesophageal reflux disease without esophagitis [K21.9]  Mary Card NP 2018 11:07 AM    Nausea [R11.0] Nausea [R11.0]  Mary Card NP 2018 11:09 AM    Mild protein-calorie malnutrition (H) [E44.1] Mild protein-calorie malnutrition (H) [E44.1]  Mary Card, NP 2018 11:17 AM    Hypervolemia, unspecified hypervolemia type [E87.70] Hypervolemia, unspecified hypervolemia type [E87.70]  Kyaw, " Mary Lopez NP 9/12/2018 12:22 PM      Hospital Problems as of 9/12/2018              Priority Class Noted POA    * (Principal)Liver transplanted (H) Medium  8/31/2018 Yes    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) Medium  9/12/2018 No    Open abdominal wall wound Medium  9/12/2018 No    Immunosuppressed status (H) Medium  9/12/2018 Yes    Acute post-operative pain Medium  9/12/2018 Yes    Acute blood loss anemia Medium  9/12/2018 Yes    Mild protein-calorie malnutrition (H) Medium  9/12/2018 Yes    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) Medium  9/12/2018 Yes    Hypervolemia Medium  9/12/2018 No    Positive sputum culture in cadaveric donor Medium  9/12/2018 Yes    Positive urine culture in cadaveric donor Medium  9/12/2018 Yes      Non-Hospital Problems as of 9/12/2018              Priority Class Noted    Gastric ulcer Medium  3/6/2018    Osteoporosis Medium  3/6/2018    Bleeding esophageal varices (H) Medium  3/6/2018    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      Code Status History     Date Active Date Inactive Code Status Order ID Comments User Context    9/12/2018 12:02 PM  Full Code [id number]  Mary Card NP Outpatient    8/31/2018  1:53 AM 9/12/2018 12:02 PM Full Code [id number]  Derik Garcias MD Inpatient    8/29/2018  8:36 PM 8/31/2018  1:52 AM Full Code [id number]  Danika Good PA-C Inpatient    5/3/2018 11:17 PM 5/4/2018 11:28 AM Full Code [id number]  Kirsten Mitchell MD Inpatient         Medication Review      START taking        Dose / Directions Comments    acetaminophen 32 mg/mL solution   Commonly known as:  TYLENOL        Dose:  650 mg   Take 20.3 mLs (650 mg) by mouth every 6 hours as needed for fever or mild pain   Quantity:  400 mL   Refills:  1        alum & mag hydroxide-simethicone 400-400-40 MG/5ML Susp suspension   Commonly known as:  MYLANTA ES/MAALOX  ES   Used for:  Gastroesophageal reflux disease without esophagitis        Dose:  30 mL   Take 30  mLs by mouth every 6 hours as needed for indigestion   Quantity:  1200 mL   Refills:  1        aspirin 10 mg/mL Susp        Dose:  325 mg   Start taking on:  9/13/2018   Take 32.5 mLs (325 mg) by mouth daily   Quantity:  975 mL   Refills:  11        bisacodyl 10 MG Suppository   Commonly known as:  DULCOLAX        Dose:  10 mg   Place 1 suppository (10 mg) rectally daily as needed for constipation   Quantity:  10 suppository   Refills:  1        bumetanide 1 MG tablet   Commonly known as:  BUMEX        Dose:  1 mg   Start taking on:  9/13/2018   Take 1 tablet (1 mg) by mouth daily for 7 days   Quantity:  7 tablet   Refills:  0        cyclobenzaprine 10 MG tablet   Commonly known as:  FLEXERIL        Dose:  10 mg   Take 1 tablet (10 mg) by mouth 3 times daily as needed for muscle spasms   Quantity:  20 tablet   Refills:  0        docusate 50 MG/5ML liquid   Commonly known as:  COLACE        Dose:   mg   Take 5-10 mLs ( mg) by mouth 2 times daily   Quantity:  200 mL   Refills:  1        multivitamins with minerals Liqd liquid        Dose:  15 mL   Start taking on:  9/13/2018   15 mLs by Per Feeding Tube route daily   Quantity:  450 mL   Refills:  3        mycophenolate suspension        Dose:  1000 mg   5 mLs (1,000 mg) by Oral or NG Tube route 2 times daily   Quantity:  300 mL   Refills:  5        ondansetron 4 MG ODT tab   Commonly known as:  ZOFRAN-ODT   Used for:  Nausea        Dose:  4 mg   Take 1 tablet (4 mg) by mouth every 6 hours as needed for nausea or vomiting   Quantity:  120 tablet   Refills:  1        oxyCODONE IR 5 MG tablet   Commonly known as:  ROXICODONE        Dose:  2.5-5 mg   Take 0.5-1 tablets (2.5-5 mg) by mouth every 3 hours as needed for moderate to severe pain   Quantity:  20 tablet   Refills:  0        sennosides 8.8 MG/5ML syrup   Commonly known as:  SENOKOT        Dose:  5-10 mL   Take 5-10 mLs by mouth 2 times daily   Quantity:  200 mL   Refills:  1         sulfamethoxazole-trimethoprim suspension   Commonly known as:  BACTRIM/SEPTRA   Indication:  PCP prophylaxis        Dose:  80 mg   Start taking on:  9/13/2018   Take 10 mLs (80 mg) by mouth daily Dose based on TMP component.   Quantity:  560 mL   Refills:  11        * tacrolimus 0.5 MG capsule   Commonly known as:  GENERIC EQUIVALENT        Dose:  0.5 mg   Take 1 capsule (0.5 mg) by mouth 2 times daily As directed by provider   Quantity:  60 capsule   Refills:  11        * tacrolimus 1 MG capsule   Commonly known as:  GENERIC EQUIVALENT        Dose:  5 mg   Take 5 capsules (5 mg) by mouth 2 times daily   Quantity:  300 capsule   Refills:  11        valGANciclovir 50 MG/ML Solr solution   Commonly known as:  VALCYTE   Indication:  Medication Treatment to Prevent Cytomegalovirus Disease        Dose:  450 mg   Start taking on:  9/13/2018   9 mLs (450 mg) by Oral or NG Tube route daily   Quantity:  270 mL   Refills:  2        warfarin 2 MG tablet   Commonly known as:  COUMADIN        Dose:  4 mg   Take 2 tablets (4 mg) by mouth daily Every evening or as directed by provider   Quantity:  8 tablet   Refills:  0        * Notice:  This list has 2 medication(s) that are the same as other medications prescribed for you. Read the directions carefully, and ask your doctor or other care provider to review them with you.      CONTINUE these medications which have NOT CHANGED        Dose / Directions Comments    albuterol 108 (90 Base) MCG/ACT inhaler   Commonly known as:  PROAIR HFA/PROVENTIL HFA/VENTOLIN HFA        Dose:  2 puff   Inhale 2 puffs into the lungs every 6 hours   Refills:  0        GABAPENTIN PO        Dose:  300 mg   Take 300 mg by mouth At Bedtime   Refills:  0        OMEPRAZOLE PO        Dose:  20 mg   Take 20 mg by mouth 2 times daily (before meals)   Refills:  0        PRAMIPEXOLE DIHYDROCHLORIDE PO        Dose:  0.5 mg   Take 0.5 mg by mouth daily   Refills:  0          STOP taking     CIPROFLOXACIN PO            FUROSEMIDE PO           Potassium Chloride ER 20 MEQ Tbcr           RIFAMPIN PO           SPIRONOLACTONE PO           XIFAXAN PO           ZOLPIDEM TARTRATE PO                     Further instructions from your care team       ________________________________________________________  Discharge RN please fax discharge orders to home care agency: [facility]  --they need signed discharge orders by 12 noon on the day of discharge  ---page hernando Marina Charles @ [phone] Monday-Friday  ---weekends call office [phone]  ________________________________________________________  And to    [facility] F: [phone]    Tube Feedings: Nutren 1.5 @ 50 ml/hr x 12 hours (8pm-8am) + 1 pkt Prosource TF daily  Please keep track of all food/liquids consumed.  Please bring this to your outpatient appointments.   Diet recommendations post-transplant: High protein diet (at least ~85 grams/day) x 8 weeks.  Heart healthy dietary habits long term (low saturated/trans fat, low sodium). Practice food safety precautions. See nutrition handout for more information.    Summary of Visit     Reason for your hospital stay       Liver transplant surgery             After Care     Activity       Your activity upon discharge: activity as tolerated. Ambulate in home. Do not drive while on narcotics and cleared by transplant surgeon.       Diet       Follow this diet upon discharge: Regular      Adult Formula Drip Feeding:  Nutren 1.5; Nasoduodenal tube; Goal Rate: 50; mL/hr; From: 8:00 PM; 8:00 AM; Medication - Tube Feeding Flush Frequency: At least 15-30 mL water before and after medication administration       Snacks/Supplements Adult: Boost Plus; Between Meals      Regular Diet Adult       Tubes and drains       You are going home with the following tubes or drains: SANTOS.x2. Empty and record SANTOS daily and as needed. Bring fluid color and volume recordings to clinic appointment       Wound care and dressings       Instructions to care  for your wound at home:keep incision clean and dry. Ok to shower. Do not scrub incision.  Pat dry.  Do not soak or submerge in baths, hot tubs, or pools. Do not apply lotions or powders to incision.             Referrals     Home care nursing referral       Agency per [facility]        RN skilled nursing visit, please see the day of discharge to go over  New enteral feeds  ---pt will return to the ATC clinic ([phone]) Monday, 9/10 @ 0900       RN to assess vital signs and weight, respiratory and cardiac status, patients ability to take and record daily blood pressure, temp and weight, pain level and activity tolerance, incision for signs/symptoms of infection, hydration, nutrition and bowel status and home safety.  RN to teach medication management and tube feedings.   Assist / teach patient to obtain and record lab results in handbook     RN to provide tube site care and management and morning lab draws,Q Monday/Thursday and report results to:  Outpatient Care Coordinator: Abigail Parham  Ph: [phone]  Fax: [phone]          Your provider has ordered home care nursing services. If you have not been contacted within 2 days of your discharge please call the inpatient department phone number at [phone] .       Home care nursing referral       [facility]    Please draw INR, per MD orders, and report results to:    [facility]     [place], [place]   Ph: [phone]   F: [phone]     MD: Dr Apollo Case   INR Nurses: Monday-Friday     Indication: s/p Liver Transplant with portal vein thrombosis   Gaol INR: 1.5-2.0   Duration: 1 year--9/10/2019       Home infusion referral       Your provider has referred you to: [facility]: [facility] [phone]   [email or web address]  New Enteral Feeds s/p Liver Transplant  Home RN for f/u LT with medication management, enteral and assessments and lab draws    Local Address (if  different from home address): N/A    Anticipated Length of Therapy: to be determined--possible up to 6-8 weeks    Home Infusion Pharmacist to adjust therapy based on labs and clinical assessments: Yes    Labs:  May draw labs from Venous Catheter: No  Home Infusion Pharmacist to order labs based on therapy type and clinical assessments: Yes  Call/Fax Lab Results to: Outpatient Care Coordinator: Abigail Parham  Ph: [phone]  Fax: [phone]    Agency Staff to assess nursing needs for Infusion Therapy.    Access Device Management:  IV Access Type: n/a  Flush with Heparin and Normal Saline IVP PRN and routine site care (per agency protocol) to maintain access device? No     9/5 NJT             Your next 10 appointments already scheduled     Sep 17, 2018  9:00 AM CDT   (Arrive by 8:45 AM)   New Transplant Visit with [facility], [facility] ATC   [facility] ([facility])    [address] [place] [address]  [address]  [facility] [id number] [phone]            Sep 17, 2018  9:30 AM CDT   (Arrive by 9:15 AM)   Return with Darren Rod MD   [facility] ([facility])    [address] [place] [address]  [address]  [facility] [id number] [phone]              Follow-Up Appointment Instructions     Future Labs/Procedures    Adult [facility] Follow-up and recommended labs and tests     Comments:    1.  [facility] ([address] [place] [address]) on Monday 9/17. Please bring all medications, lab book, and medication card with you.  Do not take your morning dose of prograf until after labs are drawn.     2.  Labs every Monday & Thursday:  CMP, CBC, Magnesium, Phosphorus, tacrolimus level, INR  3. Urine Histoplasma Ag Q other week. Next due 9/21/18      Appointments on [place] and/or [facility] (with [facility] or [facility] provider or service). Call [phone] if you haven't  heard regarding these appointments within 7 days of discharge.    Follow Up and recommended labs and tests     Comments:    [facility]    [place], [place]   Ph: [phone]  F: [phone]    MD: Dr Apollo Case  INR Nurses: Monday-Friday    Indication: s/p Liver Transplant with portal vein thrombosis  Gaol INR: 1.5-2.0  Duration: 1 year--9/10/2019      Sherrie--you have an appointment on Friday, 9/14 @ 11am at the above clinic to establish care for INR monitoring      Follow-Up Appointment Instructions     Adult [facility] Follow-up and recommended labs and tests       1.  [facility] ([address] [place] [address]) on Monday 9/17. Please bring all medications, lab book, and medication card with you.  Do not take your morning dose of prograf until after labs are drawn.     2.  Labs every Monday & Thursday:  CMP, CBC, Magnesium, Phosphorus, tacrolimus level, INR  3. Urine Histoplasma Ag Q other week. Next due 9/21/18      Appointments on [place] and/or [facility] (with [facility] or [facility] provider or service). Call [phone] if you haven't heard regarding these appointments within 7 days of discharge.       Follow Up and recommended labs and tests       [facility]    [place], [place]   Ph: [phone]  F: [phone]    MD: Dr Apollo Case  INR Nurses: Monday-Friday    Indication: s/p Liver Transplant with portal vein thrombosis  Gaol INR: 1.5-2.0  Duration: 1 year--9/10/2019      Sherrie--you have an appointment on Friday, 9/14 @ 11am at the above clinic to establish care for INR monitoring             Statement of Approval     Ordered          09/12/18 4335  I have reviewed and agree with all the recommendations and orders detailed in this document.  EFFECTIVE NOW     Approved and electronically signed by:  Mary Card NP           09/12/18 5903  I have reviewed and agree with all the recommendations and orders detailed  in this document.  EFFECTIVE NOW     Approved and electronically signed by:  Mary Card, NP

## 2018-08-29 NOTE — IP AVS SNAPSHOT
[address] [address]    Phone:  [phone]                                       After Visit Summary   8/29/2018    Sherrie Lala    MRN: [id number]           After Visit Summary Signature Page     I have received my discharge instructions, and my questions have been answered. I have discussed any challenges I see with this plan with the nurse or doctor.    ..........................................................................................................................................  Patient/Patient Representative Signature      ..........................................................................................................................................  Patient Representative Print Name and Relationship to Patient    ..................................................               ................................................  Date                                   Time    ..........................................................................................................................................  Reviewed by Signature/Title    ...................................................              ..............................................  Date                                               Time          22EPIC Rev 08/18

## 2018-08-29 NOTE — IP AVS SNAPSHOT
STOP!!! DO NOT PRINT OR REFERENCE THIS AVS!!!  AVS displayed here is NOT the version that was given to the patient at discharge.  GO TO CHART REVIEW to print or review a copy of the AVS that was frozen/printed at time of discharge.                           MRN:[id number]                      After Visit Summary   8/29/2018    Sherrie Lala    MRN: [id number]           Thank you!     Thank you for choosing [facility] for your care. Our goal is always to provide you with excellent care. Hearing back from our patients is one way we can continue to improve our services. Please take a few minutes to complete the written survey that you may receive in the mail after you visit with us. Thank you!        Patient Information     Date Of Birth          1955        Designated Caregiver       Most Recent Value    Caregiver    Will someone help with your care after discharge? yes    Name of designated caregiver Byrant    Phone number of caregiver [phone]    Caregiver address [address] [place] [address]      About your hospital stay     You were admitted on:  August 29, 2018 You last received care in the:  [address] [facility]    You were discharged on:  September 12, 2018        Reason for your hospital stay       Liver transplant surgery                  Who to Call     For medical emergencies, please call 911.  For non-urgent questions about your medical care, please call your primary care provider or clinic, [phone]  For questions related to your surgery, please call your surgery clinic        Attending Provider     Provider Specialty    Darren Rod MD Transplant       Primary Care Provider Office Phone # Fax #    Apollo Benitez [phone]       When to contact your care team       WHEN TO CONTACT YOUR COORDINATOR:    Transplant coordinator [phone].  Notify your coordinator if you have increased abdominal pain, increased redness or drainage from incision, fever greater than  100.5F.    Notify your coordinator immediately if you are unable to take your immunosuppressive medications for any reason.    If it is outside of office hours, please call the hospital switchboard at [phone] and ask to have the liver transplant surgery fellow paged for urgent medical questions, or present to the emergency department.                  After Care Instructions     Activity       Your activity upon discharge: activity as tolerated. Ambulate in home. Do not drive while on narcotics and cleared by transplant surgeon.            Diet       Follow this diet upon discharge: Regular      Adult Formula Drip Feeding:  Nutren 1.5; Nasoduodenal tube; Goal Rate: 50; mL/hr; From: 8:00 PM; 8:00 AM; Medication - Tube Feeding Flush Frequency: At least 15-30 mL water before and after medication administration       Snacks/Supplements Adult: Boost Plus; Between Meals      Regular Diet Adult            Tubes and drains       You are going home with the following tubes or drains: SANTOS.x2. Empty and record SANTOS daily and as needed. Bring fluid color and volume recordings to clinic appointment            Wound care and dressings       Instructions to care for your wound at home:keep incision clean and dry. Ok to shower. Do not scrub incision.  Pat dry.  Do not soak or submerge in baths, hot tubs, or pools. Do not apply lotions or powders to incision.                  Follow-up Appointments     Adult [facility] Follow-up and recommended labs and tests       1.  [facility] ([address] [place] [address]) on Monday 9/17. Please bring all medications, lab book, and medication card with you.  Do not take your morning dose of prograf until after labs are drawn.     2.  Labs every Monday & Thursday:  CMP, CBC, Magnesium, Phosphorus, tacrolimus level, INR  3. Urine Histoplasma Ag Q other week. Next due 9/21/18      Appointments on [place] and/or [facility] (with [facility] or [facility] provider or service). Call  [phone] if you haven't heard regarding these appointments within 7 days of discharge.            Follow Up and recommended labs and tests       New Warfarin Monitoring    [place], [place]   Ph: [phone]  F: [phone]    MD: Dr Apollo Case  INR Nurses: Monday-Friday    Indication: s/p Liver Transplant with portal vein thrombosis  Gaol INR: 1.5-2.0  Duration: 1 year--9/10/2019      Sherrie--you have an appointment on Friday, 9/14 @ 11am at the above clinic to establish care for INR monitoring                  Your next 10 appointments already scheduled     Sep 17, 2018  9:00 AM CDT   (Arrive by 8:45 AM)   New Transplant Visit with [facility], UC [facility] ATC   [facility] ([facility])    [address] [place] [address]  [address]  [facility] [address]   [phone]            Sep 17, 2018  9:30 AM CDT   (Arrive by 9:15 AM)   Return with Darren Rod MD   [facility] ([facility])    [address] [place] [address]  [address]  [facility] [address]   [phone]              Additional Services     Home care nursing referral       Agency per [facility]        RN skilled nursing visit, please see the day of discharge to go over  New enteral feeds  ---pt will return to the ATC clinic ([phone]) Monday, 9/10 @ 0900       RN to assess vital signs and weight, respiratory and cardiac status, patients ability to take and record daily blood pressure, temp and weight, pain level and activity tolerance, incision for signs/symptoms of infection, hydration, nutrition and bowel status and home safety.  RN to teach medication management and tube feedings.   Assist / teach patient to obtain and record lab results in handbook     RN to provide tube site care and management and morning lab draws,Q Monday/Thursday and report results to:  Outpatient Care Coordinator:  Abigail Parham  Ph: [phone]  Fax: [phone]          Your provider has ordered home care nursing services. If you have not been contacted within 2 days of your discharge please call the inpatient department phone number at [phone] .            Home care nursing referral       [facility]    Please draw INR, per MD orders, and report results to:    New Warfarin Monitoring     [place]   Ph: [phone]   F: [phone]     MD: Dr Apollo Case   INR Nurses: Monday-Friday     Indication: s/p Liver Transplant with portal vein thrombosis   Gaol INR: 1.5-2.0   Duration: 1 year--9/10/2019            Home infusion referral       Your provider has referred you to: [facility]: [facility] [phone]   [email or web address]  New Enteral Feeds s/p Liver Transplant  Home RN for f/u LT with medication management, enteral and assessments and lab draws    Local Address (if different from home address): N/A    Anticipated Length of Therapy: to be determined--possible up to 6-8 weeks    Home Infusion Pharmacist to adjust therapy based on labs and clinical assessments: Yes    Labs:  May draw labs from Venous Catheter: No  Home Infusion Pharmacist to order labs based on therapy type and clinical assessments: Yes  Call/Fax Lab Results to: Outpatient Care Coordinator: Abigail Parham  Ph: [phone]  Fax: [phone]    Agency Staff to assess nursing needs for Infusion Therapy.    Access Device Management:  IV Access Type: n/a  Flush with Heparin and Normal Saline IVP PRN and routine site care (per agency protocol) to maintain access device? No     9/5 NJT                  Further instructions from your care team       ________________________________________________________  Discharge RN please fax discharge orders to home care agency: [facility]  --they need signed discharge orders by 12 noon on the day of discharge  ---page hernando Charles @  [phone] Monday-Friday  ---weekends call office [phone]  ________________________________________________________  And to    [facility] F: [phone]--9/12 I fax'd discharge orders and warfarin dosing sheet/pharmacist note @ [phone]    Tube Feedings: Nutren 1.5 @ 50 ml/hr x 12 hours (8pm-8am) + 1 pkt Prosource TF daily  Please keep track of all food/liquids consumed.  Please bring this to your outpatient appointments.   Diet recommendations post-transplant: High protein diet (at least ~85 grams/day) x 8 weeks.  Heart healthy dietary habits long term (low saturated/trans fat, low sodium). Practice food safety precautions. See nutrition handout for more information.    Warfarin Instruction     You have started taking a medicine called warfarin. This is a blood-thinning medicine (anticoagulant). It helps prevent and treat blood clots.      Before leaving the hospital, make sure you know how much to take and how long to take it.      You will need regular blood tests to make sure your blood is clotting safely. It is very important to see your doctor for regular blood tests.    Talk to your doctor before taking any new medicine (this includes over-the-counter drugs and herbal products). Many medicines can interact with warfarin. This may cause more bleeding or too much clotting.     Eating a lot of vitamin K--found in green, leafy vegetables--can change the way warfarin works in your body. Do NOT avoid these foods. Instead, try to eat the same amount each day.     Bleeding is the most common side-effect of warfarin. You may notice bleeding gums, a bloody nose, bruises and bleeding longer when you cut yourself. See a doctor at once if:   o You cough up blood  o You find blood in your stool (poop)  o You have a deep cut, or a cut that bleeds longer than 10 minutes   o You have a bad cut, hard fall, accident or hit your head (go to urgent care or the emergency room).    For women who can get pregnant:  "This medicine can harm an unborn baby. Be very careful not to get pregnant while taking this medicine. If you think you might be pregnant, call your doctor right away.    For more information, read \"Guide to Warfarin Therapy,  the booklet you received in the hospital.        Pending Results     Date and Time Order Name Status Description    9/1/2018 1052 Fungus Culture, non-blood Preliminary             Statement of Approval     Ordered          09/12/18 1335  I have reviewed and agree with all the recommendations and orders detailed in this document.  EFFECTIVE NOW     Approved and electronically signed by:  Mary Card NP           09/12/18 1341  I have reviewed and agree with all the recommendations and orders detailed in this document.  EFFECTIVE NOW     Approved and electronically signed by:  Mary Card NP             Admission Information     Date & Time Department Dept. Phone    8/29/2018 [address] [facility] [phone]      Your Vitals Were     Blood Pressure Pulse Temperature Respirations Height Weight    120/77 (BP Location: Left arm) 90 98.3  F (36.8  C) (Oral) 18 1.6 m (5' 3\") 57.2 kg (126 lb 3.2 oz)    Pulse Oximetry BMI (Body Mass Index)                98% 22.36 kg/m2          [email or web address] Information     [employer] gives you secure access to your electronic health record. If you see a primary care provider, you can also send messages to your care team and make appointments. If you have questions, please call your primary care clinic.  If you do not have a primary care provider, please call [phone] and they will assist you.        Care EveryWhere ID     This is your Care EveryWhere ID. This could be used by other organizations to access your [facility] medical records  [id number]        Equal Access to Services     [facility] : Natalio Ordonez, saman ovalle, nahed grossman. So [facility] [phone].    ATENCIÓN: Si " patricia cobian, tiene a bucio disposición servicios gratuitos de asistencia lingüística. Neville murillo [phone].    We comply with applicable federal civil rights laws and [place] laws. We do not discriminate on the basis of race, color, national origin, age, disability, sex, sexual orientation, or gender identity.               Review of your medicines      START taking        Dose / Directions    acetaminophen 32 mg/mL solution   Commonly known as:  TYLENOL   Used for:  Liver transplanted (H)        Dose:  650 mg   Take 20.3 mLs (650 mg) by mouth every 6 hours as needed for fever or mild pain   Quantity:  400 mL   Refills:  1       alum & mag hydroxide-simethicone 400-400-40 MG/5ML Susp suspension   Commonly known as:  MYLANTA ES/MAALOX  ES   Used for:  Gastroesophageal reflux disease without esophagitis        Dose:  30 mL   Take 30 mLs by mouth every 6 hours as needed for indigestion   Quantity:  1200 mL   Refills:  1       aspirin 10 mg/mL Susp   Used for:  Liver transplanted (H)        Dose:  325 mg   Start taking on:  9/13/2018   Take 32.5 mLs (325 mg) by mouth daily   Quantity:  975 mL   Refills:  11       bisacodyl 10 MG Suppository   Commonly known as:  DULCOLAX   Used for:  Liver transplanted (H)        Dose:  10 mg   Place 1 suppository (10 mg) rectally daily as needed for constipation   Quantity:  10 suppository   Refills:  1       bumetanide 1 MG tablet   Commonly known as:  BUMEX   Used for:  Liver transplanted (H), Hypervolemia, unspecified hypervolemia type        Dose:  1 mg   Start taking on:  9/13/2018   Take 1 tablet (1 mg) by mouth daily for 7 days   Quantity:  7 tablet   Refills:  0       cyclobenzaprine 10 MG tablet   Commonly known as:  FLEXERIL   Used for:  Liver transplanted (H)        Dose:  10 mg   Take 1 tablet (10 mg) by mouth 3 times daily as needed for muscle spasms   Quantity:  20 tablet   Refills:  0       docusate 50 MG/5ML liquid   Commonly known as:  COLACE   Used for:  Liver  transplanted (H)        Dose:   mg   Take 5-10 mLs ( mg) by mouth 2 times daily   Quantity:  200 mL   Refills:  1       multivitamins with minerals Liqd liquid   Used for:  Mild protein-calorie malnutrition (H)        Dose:  15 mL   Start taking on:  9/13/2018   15 mLs by Per Feeding Tube route daily   Quantity:  450 mL   Refills:  3       mycophenolate suspension   Used for:  Liver transplanted (H)        Dose:  1000 mg   5 mLs (1,000 mg) by Oral or NG Tube route 2 times daily   Quantity:  300 mL   Refills:  5       ondansetron 4 MG ODT tab   Commonly known as:  ZOFRAN-ODT   Used for:  Nausea        Dose:  4 mg   Take 1 tablet (4 mg) by mouth every 6 hours as needed for nausea or vomiting   Quantity:  120 tablet   Refills:  1       oxyCODONE IR 5 MG tablet   Commonly known as:  ROXICODONE   Used for:  Liver transplanted (H)        Dose:  2.5-5 mg   Take 0.5-1 tablets (2.5-5 mg) by mouth every 3 hours as needed for moderate to severe pain   Quantity:  20 tablet   Refills:  0       sennosides 8.8 MG/5ML syrup   Commonly known as:  SENOKOT   Used for:  Liver transplanted (H)        Dose:  5-10 mL   Take 5-10 mLs by mouth 2 times daily   Quantity:  200 mL   Refills:  1       sulfamethoxazole-trimethoprim suspension   Commonly known as:  BACTRIM/SEPTRA   Indication:  PCP prophylaxis   Used for:  Liver transplanted (H)        Dose:  80 mg   Start taking on:  9/13/2018   Take 10 mLs (80 mg) by mouth daily Dose based on TMP component.   Quantity:  560 mL   Refills:  11       * tacrolimus 0.5 MG capsule   Commonly known as:  GENERIC EQUIVALENT   Used for:  Liver transplanted (H)        Dose:  0.5 mg   Take 1 capsule (0.5 mg) by mouth 2 times daily As directed by provider   Quantity:  60 capsule   Refills:  11       * tacrolimus 1 MG capsule   Commonly known as:  GENERIC EQUIVALENT   Used for:  Liver transplanted (H)        Dose:  5 mg   Take 5 capsules (5 mg) by mouth 2 times daily   Quantity:  300 capsule    Refills:  11       valGANciclovir 50 MG/ML Solr solution   Commonly known as:  VALCYTE   Indication:  Medication Treatment to Prevent Cytomegalovirus Disease   Used for:  Liver transplanted (H)        Dose:  450 mg   Start taking on:  9/13/2018   9 mLs (450 mg) by Oral or NG Tube route daily   Quantity:  270 mL   Refills:  2       warfarin 2 MG tablet   Commonly known as:  COUMADIN   Used for:  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)        Dose:  4 mg   Take 2 tablets (4 mg) by mouth daily Every evening or as directed by provider   Quantity:  8 tablet   Refills:  0       * Notice:  This list has 2 medication(s) that are the same as other medications prescribed for you. Read the directions carefully, and ask your doctor or other care provider to review them with you.      CONTINUE these medicines which have NOT CHANGED        Dose / Directions    albuterol 108 (90 Base) MCG/ACT inhaler   Commonly known as:  PROAIR HFA/PROVENTIL HFA/VENTOLIN HFA        Dose:  2 puff   Inhale 2 puffs into the lungs every 6 hours   Refills:  0       GABAPENTIN PO        Dose:  300 mg   Take 300 mg by mouth At Bedtime   Refills:  0       OMEPRAZOLE PO        Dose:  20 mg   Take 20 mg by mouth 2 times daily (before meals)   Refills:  0       PRAMIPEXOLE DIHYDROCHLORIDE PO        Dose:  0.5 mg   Take 0.5 mg by mouth daily   Refills:  0         STOP taking     CIPROFLOXACIN PO           FUROSEMIDE PO           Potassium Chloride ER 20 MEQ Tbcr           RIFAMPIN PO           SPIRONOLACTONE PO           XIFAXAN PO           ZOLPIDEM TARTRATE PO                Where to get your medicines      These medications were sent to [facility] [place], [place] - [address] [facility]  [address] [facility] [address]     Phone:  [phone]     acetaminophen 32 mg/mL solution    alum & mag hydroxide-simethicone 400-400-40 MG/5ML Susp suspension    aspirin 10 mg/mL Susp    bisacodyl 10 MG Suppository    bumetanide  1 MG tablet    cyclobenzaprine 10 MG tablet    docusate 50 MG/5ML liquid    multivitamins with minerals Liqd liquid    mycophenolate suspension    ondansetron 4 MG ODT tab    sennosides 8.8 MG/5ML syrup    sulfamethoxazole-trimethoprim suspension    tacrolimus 0.5 MG capsule    tacrolimus 1 MG capsule    valGANciclovir 50 MG/ML Solr solution    warfarin 2 MG tablet         Some of these will need a paper prescription and others can be bought over the counter. Ask your nurse if you have questions.     Bring a paper prescription for each of these medications     oxyCODONE IR 5 MG tablet                Protect others around you: Learn how to safely use, store and throw away your medicines at www.disposemymeds.org.        ANTIBIOTIC INSTRUCTION     You've Been Prescribed an Antibiotic - Now What?  Your healthcare team thinks that you or your loved one might have an infection. Some infections can be treated with antibiotics, which are powerful, life-saving drugs. Like all medications, antibiotics have side effects and should only be used when necessary. There are some important things you should know about your antibiotic treatment.      Your healthcare team may run tests before you start taking an antibiotic.    Your team may take samples (e.g., from your blood, urine or other areas) to run tests to look for bacteria. These test can be important to determine if you need an antibiotic at all and, if you do, which antibiotic will work best.      Within a few days, your healthcare team might change or even stop your antibiotic.    Your team may start you on an antibiotic while they are working to find out what is making you sick.    Your team might change your antibiotic because test results show that a different antibiotic would be better to treat your infection.    In some cases, once your team has more information, they learn that you do not need an antibiotic at all. They may find out that you don't have an infection,  or that the antibiotic you're taking won't work against your infection. For example, an infection caused by a virus can't be treated with antibiotics. Staying on an antibiotic when you don't need it is more likely to be harmful than helpful.      You may experience side effects from your antibiotic.    Like all medications, antibiotics have side effects. Some of these can be serious.    Let you healthcare team know if you have any known allergies when you are admitted to the hospital.    One significant side effect of nearly all antibiotics is the risk of severe and sometimes deadly diarrhea caused by Clostridium difficile (C. Difficile). This occurs when a person takes antibiotics because some good germs are destroyed. Antibiotic use allows C. diificile to take over, putting patients at high risk for this serious infection.    As a patient or caregiver, it is important to understand your or your loved one's antibiotic treatment. It is especially important for caregivers to speak up when patients can't speak for themselves. Here are some important questions to ask your healthcare team.    What infection is this antibiotic treating and how do you know I have that infection?    What side effects might occur from this antibiotic?    How long will I need to take this antibiotic?    Is it safe to take this antibiotic with other medications or supplements (e.g., vitamins) that I am taking?     Are there any special directions I need to know about taking this antibiotic? For example, should I take it with food?    How will I be monitored to know whether my infection is responding to the antibiotic?    What tests may help to make sure the right antibiotic is prescribed for me?      Information provided by:  www.cdc.gov/getsmart  U.S. Department of Health and Human Services  Centers for disease Control and Prevention  National Center for Emerging and Zoonotic Infectious Diseases  Division of Healthcare Quality Promotion         Information about OPIOIDS     PRESCRIPTION OPIOIDS: WHAT YOU NEED TO KNOW   We gave you an opioid (narcotic) pain medicine. It is important to manage your pain, but opioids are not always the best choice. You should first try all the other options your care team gave you. Take this medicine for as short a time (and as few doses) as possible.    Some activities can increase your pain, such as bandage changes or therapy sessions. It may help to take your pain medicine 30 to 60 minutes before these activities. Reduce your stress by getting enough sleep, working on hobbies you enjoy and practicing relaxation or meditation. Talk to your care team about ways to manage your pain beyond prescription opioids.    These medicines have risks:    DO NOT drive when on new or higher doses of pain medicine. These medicines can affect your alertness and reaction times, and you could be arrested for driving under the influence (DUI). If you need to use opioids long-term, talk to your care team about driving.    DO NOT operate heavy machinery    DO NOT do any other dangerous activities while taking these medicines.    DO NOT drink any alcohol while taking these medicines.     If the opioid prescribed includes acetaminophen, DO NOT take with any other medicines that contain acetaminophen. Read all labels carefully. Look for the word  acetaminophen  or  Tylenol.  Ask your pharmacist if you have questions or are unsure.    You can get addicted to pain medicines, especially if you have a history of addiction (chemical, alcohol or substance dependence). Talk to your care team about ways to reduce this risk.    All opioids tend to cause constipation. Drink plenty of water and eat foods that have a lot of fiber, such as fruits, vegetables, prune juice, apple juice and high-fiber cereal. Take a laxative (Miralax, milk of magnesia, Colace, Senna) if you don t move your bowels at least every other day. Other side effects include upset  stomach, sleepiness, dizziness, throwing up, tolerance (needing more of the medicine to have the same effect), physical dependence and slowed breathing.    Store your pills in a secure place, locked if possible. We will not replace any lost or stolen medicine. If you don t finish your medicine, please throw away (dispose) as directed by your pharmacist. The [place] [employer] has more information about safe disposal: https://www.pca.[facility]mn.us/living-green/managing-unwanted-medications             Medication List: This is a list of all your medications and when to take them. Check marks below indicate your daily home schedule. Keep this list as a reference.      Medications           Morning Afternoon Evening Bedtime As Needed    acetaminophen 32 mg/mL solution   Commonly known as:  TYLENOL   Take 20.3 mLs (650 mg) by mouth every 6 hours as needed for fever or mild pain   Last time this was given:  650 mg on 9/11/2018  3:55 PM                                   albuterol 108 (90 Base) MCG/ACT inhaler   Commonly known as:  PROAIR HFA/PROVENTIL HFA/VENTOLIN HFA   Inhale 2 puffs into the lungs every 6 hours                                   alum & mag hydroxide-simethicone 400-400-40 MG/5ML Susp suspension   Commonly known as:  MYLANTA ES/MAALOX  ES   Take 30 mLs by mouth every 6 hours as needed for indigestion                                   aspirin 10 mg/mL Susp   Take 32.5 mLs (325 mg) by mouth daily   Start taking on:  9/13/2018   Last time this was given:  325 mg on 9/12/2018  8:39 AM                                   bisacodyl 10 MG Suppository   Commonly known as:  DULCOLAX   Place 1 suppository (10 mg) rectally daily as needed for constipation   Last time this was given:  10 mg on 9/11/2018  3:55 PM                                   bumetanide 1 MG tablet   Commonly known as:  BUMEX   Take 1 tablet (1 mg) by mouth daily for 7 days   Start taking on:  9/13/2018   Last time this was given:  1 mg  on 9/12/2018  8:39 AM                                   cyclobenzaprine 10 MG tablet   Commonly known as:  FLEXERIL   Take 1 tablet (10 mg) by mouth 3 times daily as needed for muscle spasms   Last time this was given:  5 mg on 9/12/2018  8:38 AM                            3 doses per day       docusate 50 MG/5ML liquid   Commonly known as:  COLACE   Take 5-10 mLs ( mg) by mouth 2 times daily   Last time this was given:  100 mg on 9/12/2018  8:37 AM                                      GABAPENTIN PO   Take 300 mg by mouth At Bedtime   Last time this was given:  300 mg on 9/11/2018 10:01 PM                                   multivitamins with minerals Liqd liquid   15 mLs by Per Feeding Tube route daily   Start taking on:  9/13/2018   Last time this was given:  15 mLs on 9/12/2018  8:38 AM                                   mycophenolate suspension   5 mLs (1,000 mg) by Oral or NG Tube route 2 times daily   Last time this was given:  1,000 mg on 9/12/2018  8:38 AM                                      OMEPRAZOLE PO   Take 20 mg by mouth 2 times daily (before meals)                                      ondansetron 4 MG ODT tab   Commonly known as:  ZOFRAN-ODT   Take 1 tablet (4 mg) by mouth every 6 hours as needed for nausea or vomiting   Last time this was given:  4 mg on 9/12/2018 12:27 AM                            Every 6 hours       oxyCODONE IR 5 MG tablet   Commonly known as:  ROXICODONE   Take 0.5-1 tablets (2.5-5 mg) by mouth every 3 hours as needed for moderate to severe pain   Last time this was given:  5 mg on 9/12/2018  2:55 PM                            Every 3 hours       PRAMIPEXOLE DIHYDROCHLORIDE PO   Take 0.5 mg by mouth daily   Last time this was given:  0.5 mg on 9/11/2018  8:10 PM                                   sennosides 8.8 MG/5ML syrup   Commonly known as:  SENOKOT   Take 5-10 mLs by mouth 2 times daily   Last time this was given:  5 mLs on 9/12/2018  8:37 AM                                       sulfamethoxazole-trimethoprim suspension   Commonly known as:  BACTRIM/SEPTRA   Take 10 mLs (80 mg) by mouth daily Dose based on TMP component.   Start taking on:  9/13/2018   Last time this was given:  80 mg on 9/12/2018  8:38 AM                                * tacrolimus 0.5 MG capsule   Commonly known as:  GENERIC EQUIVALENT   Take 1 capsule (0.5 mg) by mouth 2 times daily As directed by provider   Last time this was given:  7 mg on 9/12/2018  7:08 AM                                      * tacrolimus 1 MG capsule   Commonly known as:  GENERIC EQUIVALENT   Take 5 capsules (5 mg) by mouth 2 times daily   Last time this was given:  7 mg on 9/12/2018  7:08 AM                                      valGANciclovir 50 MG/ML Solr solution   Commonly known as:  VALCYTE   9 mLs (450 mg) by Oral or NG Tube route daily   Start taking on:  9/13/2018   Last time this was given:  450 mg on 9/12/2018  8:37 AM                                   warfarin 2 MG tablet   Commonly known as:  COUMADIN   Take 2 tablets (4 mg) by mouth daily Every evening or as directed by provider   Last time this was given:  4 mg on 9/11/2018  6:10 PM                                   * Notice:  This list has 2 medication(s) that are the same as other medications prescribed for you. Read the directions carefully, and ask your doctor or other care provider to review them with you.

## 2018-08-29 NOTE — PROGRESS NOTES
TRANSPLANT OR REPORT    Organ: Liver  Laterality (if known): N/A  Organ Location: [facility]    UNOS ID: [id number]  Donor OR Time: 1200   Expected/Actual Cross Clamp Time: 1300  Expected Organ Arrival Time: 1600    Surgeon: Viola  Time in OR: 1600  Time in 3C (N/A for LI): N/A    Recipient Details  Admission ETA: 1900  Unit: 7A  Isolation: None  Latex Allergy: None  : No  Diagnosis: End Stage Liver    Liver Transplants  Bypass: Yes  Hemodialysis: No    Kidney/Panc Transplants  XM Status (Need to wait for XM?): N/A        Transplant Coordinator Contact Info: Lori [id number]

## 2018-08-29 NOTE — TELEPHONE ENCOUNTER
Donors with risk factors or behaviors that increase their chance of having an infection are called a Public Health Service (or [facility]) Increased Risk donor.  These donors undergo basic and a second level of more sensitive testing for infections like HIV (AIDS), Hepatitis B or C.   This particular donor meets PHS Increased Risk guidelines due to Sex for drugs or money/Sex with IVDA within the past 12 months     Even without the PHS increased risk label, infection or cancer can be transmitted from donors through transplant, but it is rare.  The risk of getting a donor transmitted infection from this donor is low, but higher than a donor without this label, so that is why we discuss this with you.  The test results for HIV and hepatitis in this donor were negative. Even with negative test results, there is still a small chance (less than 1 in 100) that this donor could have an infection that could be transmitted with the organ transplant.  Our doctor has reviewed the information about this donor. She/he recommends that you consider this organ. In his/her opinion, the potential benefits of accepting the organ outweigh the risks of getting an infection from this donor.   Everyone has a different level of how much risk they are willing to accept for themselves.  The decision to accept this organ is yours.  If you decide NOT to accept the organ, you will not lose your place on the waiting list.  Do you have any questions?

## 2018-08-29 NOTE — LETTER
Transition Communication Hand-off for Care Transitions to Next Level of Care Provider    Name: Sherrie Lala  : 1955  MRN #: [id number]  Primary Care Provider: Apollo Benitez     Primary Clinic: [facility] [address] [facility] [address]     Reason for Hospitalization:  Liver transplanted (H) [Z94.4]  Admit Date/Time: 2018  8:24 PM  Discharge Date: 18  Payor Source: Payor: MEDICA / Plan: MEDICA PRIME SOLUTION / Product Type: Indemnity /              Reason for Communication Hand-off Referral: Other LT    Discharge Plan: NJT for enteral with [facility] and [facility](to visit  morning)  New warfarin by HHN and PCP--appointment for        Concern for non-adherence with plan of care:   Y/N N  Discharge Needs Assessment:  Needs       Most Recent Value    Equipment Currently Used at Home -- [enteral supplies]    Transportation Available car, family or friend will provide    Home Infusion Provider [facility] [phone], Fax: [phone]            Follow-up specialty is recommended: Yes    Follow-up plan:  Future Appointments  Date Time Provider Department Center   2018 9:00 AM [facility] INFUSION [facility]   2018 9:30 AM Darren Rod MD [facility]       Any outstanding tests or procedures:        Referrals     Future Labs/Procedures    Home care nursing referral     Comments:    Agency per [facility]        RN skilled nursing visit, please see the day of discharge to go over  New enteral feeds  ---pt will return to the ATC clinic ([phone]) Monday, 9/10 @ 0900       RN to assess vital signs and weight, respiratory and cardiac status, patients ability to take and record daily blood pressure, temp and weight, pain level and activity tolerance, incision for signs/symptoms of infection, hydration, nutrition and bowel status and home safety.  RN to teach medication management and tube feedings.   Assist / teach patient to obtain and record lab  results in handbook     RN to provide tube site care and management and morning lab draws,Q Monday/Thursday and report results to:  Outpatient Care Coordinator: Abigail Parham  Ph: [phone]  Fax: [phone]          Your provider has ordered home care nursing services. If you have not been contacted within 2 days of your discharge please call the inpatient department phone number at [phone] .    Home care nursing referral     Comments:    [facility]    Please draw INR, per MD orders, and report results to:    New Warfarin Monitoring     [place], [place]   Ph: [phone]   F: [phone]     MD: Dr Apollo Case   INR Nurses: Monday-Friday     Indication: s/p Liver Transplant with portal vein thrombosis   Gaol INR: 1.5-2.0   Duration: 1 year--9/10/2019    Home infusion referral     Comments:    Your provider has referred you to: [facility]: [facility] [phone]   [email or web address]  New Enteral Feeds s/p Liver Transplant  Home RN for f/u LT with medication management, enteral and assessments and lab draws    Local Address (if different from home address): N/A    Anticipated Length of Therapy: to be determined--possible up to 6-8 weeks    Home Infusion Pharmacist to adjust therapy based on labs and clinical assessments: Yes    Labs:  May draw labs from Venous Catheter: No  Home Infusion Pharmacist to order labs based on therapy type and clinical assessments: Yes  Call/Fax Lab Results to: Outpatient Care Coordinator: Abigail Parham  Ph: [phone]  Fax: [phone]    Agency Staff to assess nursing needs for Infusion Therapy.    Access Device Management:  IV Access Type: n/a  Flush with Heparin and Normal Saline IVP PRN and routine site care (per agency protocol) to maintain access device? No     9/5 NJT            Key Recommendations:      Xi Muse    AVS/Discharge Summary is the source of truth; this is a helpful guide for  improved communication of patient story

## 2018-08-30 ENCOUNTER — RESULTS ONLY (OUTPATIENT)
Dept: OTHER | Facility: CLINIC | Age: 63
End: 2018-08-30

## 2018-08-30 ENCOUNTER — APPOINTMENT (OUTPATIENT)
Dept: ULTRASOUND IMAGING | Facility: CLINIC | Age: 63
DRG: 005 | End: 2018-08-30
Attending: TRANSPLANT SURGERY
Payer: MEDICARE

## 2018-08-30 ENCOUNTER — ANESTHESIA (OUTPATIENT)
Dept: SURGERY | Facility: CLINIC | Age: 63
DRG: 005 | End: 2018-08-30
Payer: MEDICARE

## 2018-08-30 LAB
APTT PPP: 81 SEC (ref 22–37)
APTT PPP: NORMAL SEC (ref 22–37)
BASE DEFICIT BLDA-SCNC: 1.1 MMOL/L
BASE DEFICIT BLDA-SCNC: 2.4 MMOL/L
BASE DEFICIT BLDA-SCNC: 4.4 MMOL/L
BASE DEFICIT BLDA-SCNC: 7.5 MMOL/L
BASE DEFICIT BLDA-SCNC: 9 MMOL/L
BLD PROD TYP BPU: NORMAL
BLD PROD TYP BPU: NORMAL
CA-I BLD-MCNC: 2.9 MG/DL (ref 4.4–5.2)
CA-I BLD-MCNC: 2.9 MG/DL (ref 4.4–5.2)
CA-I BLD-MCNC: 4.6 MG/DL (ref 4.4–5.2)
CA-I BLD-MCNC: 5.4 MG/DL (ref 4.4–5.2)
CA-I BLD-MCNC: 5.4 MG/DL (ref 4.4–5.2)
CMV IGG SERPL QL IA: <0.2 AI (ref 0–0.8)
CMV IGM SERPL QL IA: <0.2 AI (ref 0–0.8)
EBV VCA IGG SER QL IA: 7.4 AI (ref 0–0.8)
EBV VCA IGM SER QL IA: 0.5 AI (ref 0–0.8)
ERYTHROCYTE [DISTWIDTH] IN BLOOD BY AUTOMATED COUNT: 15.1 % (ref 10–15)
ERYTHROCYTE [DISTWIDTH] IN BLOOD BY AUTOMATED COUNT: 15.4 % (ref 10–15)
FIBRINOGEN PPP-MCNC: 135 MG/DL (ref 200–420)
FIBRINOGEN PPP-MCNC: NORMAL MG/DL (ref 200–420)
GLUCOSE BLD-MCNC: 203 MG/DL (ref 70–99)
GLUCOSE BLD-MCNC: 212 MG/DL (ref 70–99)
GLUCOSE BLD-MCNC: 229 MG/DL (ref 70–99)
GLUCOSE BLD-MCNC: 232 MG/DL (ref 70–99)
GLUCOSE BLD-MCNC: 64 MG/DL (ref 70–99)
HBV CORE AB SERPL QL IA: NONREACTIVE
HBV SURFACE AB SERPL IA-ACNC: 944.24 M[IU]/ML
HCO3 BLD-SCNC: 17 MMOL/L (ref 21–28)
HCO3 BLD-SCNC: 19 MMOL/L (ref 21–28)
HCO3 BLD-SCNC: 20 MMOL/L (ref 21–28)
HCO3 BLD-SCNC: 22 MMOL/L (ref 21–28)
HCO3 BLD-SCNC: 24 MMOL/L (ref 21–28)
HCT VFR BLD AUTO: 22.5 % (ref 35–47)
HCT VFR BLD AUTO: 30.3 % (ref 35–47)
HCV AB SERPL QL IA: NONREACTIVE
HGB BLD-MCNC: 10.1 G/DL (ref 11.7–15.7)
HGB BLD-MCNC: 6.9 G/DL (ref 11.7–15.7)
HGB BLD-MCNC: 7 G/DL (ref 11.7–15.7)
HGB BLD-MCNC: 8.2 G/DL (ref 11.7–15.7)
HGB BLD-MCNC: 8.2 G/DL (ref 11.7–15.7)
HGB BLD-MCNC: 9.1 G/DL (ref 11.7–15.7)
HGB BLD-MCNC: 9.9 G/DL (ref 11.7–15.7)
HIV 1+2 AB+HIV1 P24 AG SERPL QL IA: NONREACTIVE
HLA FINAL CROSSMATCH RECIPIENT: NORMAL
INR PPP: 2.12 (ref 0.86–1.14)
INR PPP: NORMAL (ref 0.86–1.14)
INTERPRETATION ECG - MUSE: NORMAL
LACTATE BLD-SCNC: 4.6 MMOL/L (ref 0.7–2)
LACTATE BLD-SCNC: 4.7 MMOL/L (ref 0.7–2)
LACTATE BLD-SCNC: 5.3 MMOL/L (ref 0.7–2)
MCH RBC QN AUTO: 27.5 PG (ref 26.5–33)
MCH RBC QN AUTO: 28.8 PG (ref 26.5–33)
MCHC RBC AUTO-ENTMCNC: 30.7 G/DL (ref 31.5–36.5)
MCHC RBC AUTO-ENTMCNC: 32.7 G/DL (ref 31.5–36.5)
MCV RBC AUTO: 88 FL (ref 78–100)
MCV RBC AUTO: 90 FL (ref 78–100)
NUM BPU REQUESTED: 20
NUM BPU REQUESTED: 20
O2/TOTAL GAS SETTING VFR VENT: 50 %
PCO2 BLD: 34 MM HG (ref 35–45)
PCO2 BLD: 35 MM HG (ref 35–45)
PCO2 BLD: 37 MM HG (ref 35–45)
PCO2 BLD: 38 MM HG (ref 35–45)
PCO2 BLD: 39 MM HG (ref 35–45)
PH BLD: 7.29 PH (ref 7.35–7.45)
PH BLD: 7.3 PH (ref 7.35–7.45)
PH BLD: 7.37 PH (ref 7.35–7.45)
PH BLD: 7.39 PH (ref 7.35–7.45)
PH BLD: 7.4 PH (ref 7.35–7.45)
PLATELET # BLD AUTO: 21 10E9/L (ref 150–450)
PLATELET # BLD AUTO: 66 10E9/L (ref 150–450)
PLATELET # BLD AUTO: 68 10E9/L (ref 150–450)
PLATELET # BLD AUTO: 83 10E9/L (ref 150–450)
PO2 BLD: 128 MM HG (ref 80–105)
PO2 BLD: 143 MM HG (ref 80–105)
PO2 BLD: 144 MM HG (ref 80–105)
PO2 BLD: 169 MM HG (ref 80–105)
PO2 BLD: 210 MM HG (ref 80–105)
POTASSIUM BLD-SCNC: 3.5 MMOL/L (ref 3.4–5.3)
POTASSIUM BLD-SCNC: 3.6 MMOL/L (ref 3.4–5.3)
POTASSIUM BLD-SCNC: 3.6 MMOL/L (ref 3.4–5.3)
POTASSIUM BLD-SCNC: 4.1 MMOL/L (ref 3.4–5.3)
POTASSIUM BLD-SCNC: 4.6 MMOL/L (ref 3.4–5.3)
POTASSIUM SERPL-SCNC: 3.7 MMOL/L (ref 3.4–5.3)
RBC # BLD AUTO: 2.51 10E12/L (ref 3.8–5.2)
RBC # BLD AUTO: 3.44 10E12/L (ref 3.8–5.2)
SODIUM BLD-SCNC: 141 MMOL/L (ref 133–144)
SODIUM BLD-SCNC: 142 MMOL/L (ref 133–144)
SODIUM BLD-SCNC: 143 MMOL/L (ref 133–144)
WBC # BLD AUTO: 2.1 10E9/L (ref 4–11)
WBC # BLD AUTO: 3.5 10E9/L (ref 4–11)

## 2018-08-30 PROCEDURE — 25000128 H RX IP 250 OP 636: Performed by: ANESTHESIOLOGY

## 2018-08-30 PROCEDURE — P9059 PLASMA, FRZ BETWEEN 8-24HOUR: HCPCS | Performed by: TRANSPLANT SURGERY

## 2018-08-30 PROCEDURE — C1769 GUIDE WIRE: HCPCS | Performed by: TRANSPLANT SURGERY

## 2018-08-30 PROCEDURE — 83605 ASSAY OF LACTIC ACID: CPT | Performed by: TRANSPLANT SURGERY

## 2018-08-30 PROCEDURE — 82803 BLOOD GASES ANY COMBINATION: CPT | Performed by: TRANSPLANT SURGERY

## 2018-08-30 PROCEDURE — 25000128 H RX IP 250 OP 636

## 2018-08-30 PROCEDURE — 40000196 ZZH STATISTIC RAPCV CVP MONITORING

## 2018-08-30 PROCEDURE — P9037 PLATE PHERES LEUKOREDU IRRAD: HCPCS | Performed by: TRANSPLANT SURGERY

## 2018-08-30 PROCEDURE — 27210794 ZZH OR GENERAL SUPPLY STERILE: Performed by: TRANSPLANT SURGERY

## 2018-08-30 PROCEDURE — 85049 AUTOMATED PLATELET COUNT: CPT | Performed by: TRANSPLANT SURGERY

## 2018-08-30 PROCEDURE — 37000009 ZZH ANESTHESIA TECHNICAL FEE, EACH ADDTL 15 MIN: Performed by: TRANSPLANT SURGERY

## 2018-08-30 PROCEDURE — 25000125 ZZHC RX 250: Performed by: ANESTHESIOLOGY

## 2018-08-30 PROCEDURE — 85610 PROTHROMBIN TIME: CPT | Performed by: TRANSPLANT SURGERY

## 2018-08-30 PROCEDURE — 84132 ASSAY OF SERUM POTASSIUM: CPT | Performed by: TRANSPLANT SURGERY

## 2018-08-30 PROCEDURE — 82330 ASSAY OF CALCIUM: CPT | Performed by: TRANSPLANT SURGERY

## 2018-08-30 PROCEDURE — 25000128 H RX IP 250 OP 636: Performed by: TRANSPLANT SURGERY

## 2018-08-30 PROCEDURE — 0DNU0ZZ RELEASE OMENTUM, OPEN APPROACH: ICD-10-PCS | Performed by: TRANSPLANT SURGERY

## 2018-08-30 PROCEDURE — 41000018 ZZH PER-PERFUSION 1ST 30 MIN: Performed by: TRANSPLANT SURGERY

## 2018-08-30 PROCEDURE — 82947 ASSAY GLUCOSE BLOOD QUANT: CPT | Performed by: TRANSPLANT SURGERY

## 2018-08-30 PROCEDURE — 25000125 ZZHC RX 250: Performed by: NURSE ANESTHETIST, CERTIFIED REGISTERED

## 2018-08-30 PROCEDURE — 27211024 ZZHC OR SUPPLY OTHER OPNP: Performed by: TRANSPLANT SURGERY

## 2018-08-30 PROCEDURE — 25000132 ZZH RX MED GY IP 250 OP 250 PS 637: Mod: GY | Performed by: STUDENT IN AN ORGANIZED HEALTH CARE EDUCATION/TRAINING PROGRAM

## 2018-08-30 PROCEDURE — P9073 PLATELETS PHERESIS PATH REDU: HCPCS | Performed by: TRANSPLANT SURGERY

## 2018-08-30 PROCEDURE — P9012 CRYOPRECIPITATE EACH UNIT: HCPCS | Performed by: TRANSPLANT SURGERY

## 2018-08-30 PROCEDURE — 25800025 ZZH RX 258

## 2018-08-30 PROCEDURE — 0DNE0ZZ RELEASE LARGE INTESTINE, OPEN APPROACH: ICD-10-PCS | Performed by: TRANSPLANT SURGERY

## 2018-08-30 PROCEDURE — 36415 COLL VENOUS BLD VENIPUNCTURE: CPT | Performed by: TRANSPLANT SURGERY

## 2018-08-30 PROCEDURE — 25000128 H RX IP 250 OP 636: Performed by: PHYSICIAN ASSISTANT

## 2018-08-30 PROCEDURE — C1757 CATH, THROMBECTOMY/EMBOLECT: HCPCS | Performed by: TRANSPLANT SURGERY

## 2018-08-30 PROCEDURE — 37000008 ZZH ANESTHESIA TECHNICAL FEE, 1ST 30 MIN: Performed by: TRANSPLANT SURGERY

## 2018-08-30 PROCEDURE — 40000985 US INTRAOPERATIVE

## 2018-08-30 PROCEDURE — 84295 ASSAY OF SERUM SODIUM: CPT | Performed by: TRANSPLANT SURGERY

## 2018-08-30 PROCEDURE — 85730 THROMBOPLASTIN TIME PARTIAL: CPT | Performed by: TRANSPLANT SURGERY

## 2018-08-30 PROCEDURE — 25000128 H RX IP 250 OP 636: Performed by: NURSE ANESTHETIST, CERTIFIED REGISTERED

## 2018-08-30 PROCEDURE — 40000048 ZZH STATISTIC DAILY SWAN MONITORING

## 2018-08-30 PROCEDURE — 36000070 ZZH SURGERY LEVEL 5 EA 15 ADDTL MIN - UMMC: Performed by: TRANSPLANT SURGERY

## 2018-08-30 PROCEDURE — P9016 RBC LEUKOCYTES REDUCED: HCPCS | Performed by: PHYSICIAN ASSISTANT

## 2018-08-30 PROCEDURE — 36000068 ZZH SURGERY LEVEL 5 1ST 30 MIN - UMMC: Performed by: TRANSPLANT SURGERY

## 2018-08-30 PROCEDURE — 40000344 ZZHCL STATISTIC THAWING COMPONENT: Performed by: TRANSPLANT SURGERY

## 2018-08-30 PROCEDURE — 93503 INSERT/PLACE HEART CATHETER: CPT

## 2018-08-30 PROCEDURE — 0FY00Z0 TRANSPLANTATION OF LIVER, ALLOGENEIC, OPEN APPROACH: ICD-10-PCS | Performed by: TRANSPLANT SURGERY

## 2018-08-30 PROCEDURE — 5A1C00Z PERFORMANCE OF BILIARY FILTRATION, SINGLE: ICD-10-PCS | Performed by: TRANSPLANT SURGERY

## 2018-08-30 PROCEDURE — 85384 FIBRINOGEN ACTIVITY: CPT | Performed by: TRANSPLANT SURGERY

## 2018-08-30 PROCEDURE — 88304 TISSUE EXAM BY PATHOLOGIST: CPT | Performed by: TRANSPLANT SURGERY

## 2018-08-30 PROCEDURE — 85027 COMPLETE CBC AUTOMATED: CPT | Performed by: TRANSPLANT SURGERY

## 2018-08-30 PROCEDURE — 27210139 ZZH KIT CATH SVO2/CCO PA SUPPLY

## 2018-08-30 PROCEDURE — 06C80ZZ EXTIRPATION OF MATTER FROM PORTAL VEIN, OPEN APPROACH: ICD-10-PCS | Performed by: TRANSPLANT SURGERY

## 2018-08-30 PROCEDURE — 85396 CLOTTING ASSAY WHOLE BLOOD: CPT | Performed by: TRANSPLANT SURGERY

## 2018-08-30 PROCEDURE — A9270 NON-COVERED ITEM OR SERVICE: HCPCS | Mod: GY | Performed by: STUDENT IN AN ORGANIZED HEALTH CARE EDUCATION/TRAINING PROGRAM

## 2018-08-30 PROCEDURE — 25000566 ZZH SEVOFLURANE, EA 15 MIN: Performed by: TRANSPLANT SURGERY

## 2018-08-30 RX ORDER — IRRIGATION SET
800 IRRIGATION SET MISCELLANEOUS ONCE
Status: DISCONTINUED | OUTPATIENT
Start: 2018-08-30 | End: 2018-08-30 | Stop reason: DRUGHIGH

## 2018-08-30 RX ORDER — VANCOMYCIN HYDROCHLORIDE 1 G/200ML
1000 INJECTION, SOLUTION INTRAVENOUS ONCE
Status: COMPLETED | OUTPATIENT
Start: 2018-08-30 | End: 2018-08-31

## 2018-08-30 RX ORDER — FLUCONAZOLE 2 MG/ML
400 INJECTION, SOLUTION INTRAVENOUS ONCE
Status: COMPLETED | OUTPATIENT
Start: 2018-08-30 | End: 2018-08-31

## 2018-08-30 RX ORDER — DEXTROSE MONOHYDRATE 25 G/50ML
INJECTION, SOLUTION INTRAVENOUS PRN
Status: DISCONTINUED | OUTPATIENT
Start: 2018-08-30 | End: 2018-08-31

## 2018-08-30 RX ORDER — PROPOFOL 10 MG/ML
INJECTION, EMULSION INTRAVENOUS PRN
Status: DISCONTINUED | OUTPATIENT
Start: 2018-08-30 | End: 2018-08-31

## 2018-08-30 RX ORDER — LIDOCAINE HYDROCHLORIDE 20 MG/ML
INJECTION, SOLUTION INFILTRATION; PERINEURAL PRN
Status: DISCONTINUED | OUTPATIENT
Start: 2018-08-30 | End: 2018-08-31

## 2018-08-30 RX ORDER — SODIUM CHLORIDE, SODIUM GLUCONATE, SODIUM ACETATE, POTASSIUM CHLORIDE AND MAGNESIUM CHLORIDE 526; 502; 368; 37; 30 MG/100ML; MG/100ML; MG/100ML; MG/100ML; MG/100ML
INJECTION, SOLUTION INTRAVENOUS CONTINUOUS PRN
Status: DISCONTINUED | OUTPATIENT
Start: 2018-08-30 | End: 2018-08-31

## 2018-08-30 RX ORDER — FENTANYL CITRATE 50 UG/ML
INJECTION, SOLUTION INTRAMUSCULAR; INTRAVENOUS PRN
Status: DISCONTINUED | OUTPATIENT
Start: 2018-08-30 | End: 2018-08-31

## 2018-08-30 RX ORDER — PANTOPRAZOLE SODIUM 40 MG/1
40 TABLET, DELAYED RELEASE ORAL ONCE
Status: DISCONTINUED | OUTPATIENT
Start: 2018-08-30 | End: 2018-08-31

## 2018-08-30 RX ORDER — CALCIUM CHLORIDE 100 MG/ML
INJECTION INTRAVENOUS; INTRAVENTRICULAR PRN
Status: DISCONTINUED | OUTPATIENT
Start: 2018-08-30 | End: 2018-08-31

## 2018-08-30 RX ORDER — NITROGLYCERIN 10 MG/100ML
INJECTION INTRAVENOUS PRN
Status: DISCONTINUED | OUTPATIENT
Start: 2018-08-30 | End: 2018-08-31

## 2018-08-30 RX ADMIN — PROPOFOL 50 MG: 10 INJECTION, EMULSION INTRAVENOUS at 18:04

## 2018-08-30 RX ADMIN — SODIUM CHLORIDE 250 MG: 9 INJECTION, SOLUTION INTRAVENOUS at 22:35

## 2018-08-30 RX ADMIN — DEXTROSE MONOHYDRATE 50 ML: 25 INJECTION, SOLUTION INTRAVENOUS at 20:23

## 2018-08-30 RX ADMIN — NITROGLYCERIN 100 MCG: 10 INJECTION INTRAVENOUS at 22:08

## 2018-08-30 RX ADMIN — AMINOCAPROIC ACID 1 G/HR: 250 INJECTION, SOLUTION INTRAVENOUS at 20:55

## 2018-08-30 RX ADMIN — DESMOPRESSIN ACETATE 16 MCG: 4 SOLUTION INTRAVENOUS at 21:10

## 2018-08-30 RX ADMIN — POTASSIUM CHLORIDE 40 MEQ: 750 TABLET, EXTENDED RELEASE ORAL at 00:27

## 2018-08-30 RX ADMIN — CALCIUM CHLORIDE 2000 MG: 100 INJECTION, SOLUTION INTRAVENOUS at 21:52

## 2018-08-30 RX ADMIN — ROCURONIUM BROMIDE 100 MG: 10 INJECTION INTRAVENOUS at 18:05

## 2018-08-30 RX ADMIN — PIPERACILLIN SODIUM AND TAZOBACTAM SODIUM 3.38 G: .375; 3 INJECTION, POWDER, LYOPHILIZED, FOR SOLUTION INTRAVENOUS at 19:20

## 2018-08-30 RX ADMIN — SODIUM CHLORIDE, SODIUM GLUCONATE, SODIUM ACETATE, POTASSIUM CHLORIDE AND MAGNESIUM CHLORIDE: 526; 502; 368; 37; 30 INJECTION, SOLUTION INTRAVENOUS at 17:41

## 2018-08-30 RX ADMIN — FLUCONAZOLE 400 MG: 2 INJECTION, SOLUTION INTRAVENOUS at 23:39

## 2018-08-30 RX ADMIN — POTASSIUM CHLORIDE 20 MEQ: 750 TABLET, EXTENDED RELEASE ORAL at 02:16

## 2018-08-30 RX ADMIN — FENTANYL CITRATE 200 MCG: 50 INJECTION, SOLUTION INTRAMUSCULAR; INTRAVENOUS at 18:01

## 2018-08-30 RX ADMIN — FENTANYL CITRATE 200 MCG: 50 INJECTION, SOLUTION INTRAMUSCULAR; INTRAVENOUS at 22:20

## 2018-08-30 RX ADMIN — CALCIUM CHLORIDE 1000 MG: 100 INJECTION, SOLUTION INTRAVENOUS at 20:43

## 2018-08-30 RX ADMIN — PIPERACILLIN SODIUM AND TAZOBACTAM SODIUM 2.25 G: .375; 3 INJECTION, POWDER, LYOPHILIZED, FOR SOLUTION INTRAVENOUS at 23:19

## 2018-08-30 RX ADMIN — FENTANYL CITRATE 200 MCG: 50 INJECTION, SOLUTION INTRAMUSCULAR; INTRAVENOUS at 23:06

## 2018-08-30 RX ADMIN — CALCIUM CHLORIDE 1000 MG: 100 INJECTION, SOLUTION INTRAVENOUS at 20:39

## 2018-08-30 RX ADMIN — VANCOMYCIN HYDROCHLORIDE 1000 MG: 1 INJECTION, SOLUTION INTRAVENOUS at 23:37

## 2018-08-30 RX ADMIN — VASOPRESSIN 1 UNITS/HR: 20 INJECTION INTRAVENOUS at 20:14

## 2018-08-30 RX ADMIN — NITROGLYCERIN 100 MCG: 10 INJECTION INTRAVENOUS at 22:05

## 2018-08-30 RX ADMIN — FENTANYL CITRATE 100 MCG: 50 INJECTION, SOLUTION INTRAMUSCULAR; INTRAVENOUS at 23:51

## 2018-08-30 RX ADMIN — VASOPRESSIN 0.5 UNITS/HR: 20 INJECTION INTRAVENOUS at 19:22

## 2018-08-30 RX ADMIN — FENTANYL CITRATE 250 MCG: 50 INJECTION, SOLUTION INTRAMUSCULAR; INTRAVENOUS at 20:46

## 2018-08-30 RX ADMIN — VASOPRESSIN 0.5 UNITS/HR: 20 INJECTION INTRAVENOUS at 19:56

## 2018-08-30 RX ADMIN — VASOPRESSIN 0.7 UNITS/HR: 20 INJECTION INTRAVENOUS at 20:10

## 2018-08-30 RX ADMIN — NITROGLYCERIN 100 MCG: 10 INJECTION INTRAVENOUS at 22:01

## 2018-08-30 RX ADMIN — DEXTROSE MONOHYDRATE AND SODIUM CHLORIDE: 5; .45 INJECTION, SOLUTION INTRAVENOUS at 19:37

## 2018-08-30 RX ADMIN — HUMAN INSULIN 3 UNITS/HR: 100 INJECTION, SOLUTION SUBCUTANEOUS at 19:51

## 2018-08-30 RX ADMIN — FENTANYL CITRATE 250 MCG: 50 INJECTION, SOLUTION INTRAMUSCULAR; INTRAVENOUS at 21:56

## 2018-08-30 RX ADMIN — CALCIUM CHLORIDE 1000 MG: 100 INJECTION, SOLUTION INTRAVENOUS at 20:00

## 2018-08-30 RX ADMIN — CALCIUM CHLORIDE 2000 MG: 100 INJECTION, SOLUTION INTRAVENOUS at 21:00

## 2018-08-30 RX ADMIN — PIPERACILLIN SODIUM AND TAZOBACTAM SODIUM 2.25 G: .375; 3 INJECTION, POWDER, LYOPHILIZED, FOR SOLUTION INTRAVENOUS at 21:28

## 2018-08-30 RX ADMIN — ROCURONIUM BROMIDE 30 MG: 10 INJECTION INTRAVENOUS at 23:05

## 2018-08-30 RX ADMIN — SODIUM CHLORIDE, SODIUM GLUCONATE, SODIUM ACETATE, POTASSIUM CHLORIDE AND MAGNESIUM CHLORIDE: 526; 502; 368; 37; 30 INJECTION, SOLUTION INTRAVENOUS at 20:00

## 2018-08-30 RX ADMIN — LIDOCAINE HYDROCHLORIDE 80 MG: 20 INJECTION, SOLUTION INFILTRATION; PERINEURAL at 18:04

## 2018-08-30 RX ADMIN — FENTANYL CITRATE 200 MCG: 50 INJECTION, SOLUTION INTRAMUSCULAR; INTRAVENOUS at 17:44

## 2018-08-30 NOTE — PROGRESS NOTES
Admission Note:    Samara arrived around 2030 for possible liver transplant. Adult Profile finished

## 2018-08-30 NOTE — PROGRESS NOTES
Transplant Admission Psychosocial Assessment    Patient Name: Sherrie Lala  : 1955  Age: 63 year old  MRN: [id number]  Date of Initial Social Work Evaluation: 18    Patient is anticipated to have a liver transplant later today.  I met Sherrie to update psychosocial assessment.  I also provided education about SW role while inpatient, and began a discussion of expectations/requirements, caregiver needs and follow up needs post-transplant.     Presenting Information   Living Situation: Sherrie lives with her  Bryant in [place], [place] (40 miles away).  If not local, plans for short term stay: N/A  Previous Functional Status: independent with ADLs and IADLs  Cultural/Language/Spiritual Considerations: undesignated    Support System  Primary Support Person:  Bryant  Other support: daughter Ramírez, sister Janae ([place])  Plan for support in immediate post-transplant period: Patient plans to return home with skilled home care, unless TCU/ARU is recommended.    Health Care Directive  Decision Maker: patient  Alternate Decision Maker:  Bryant is designated as patient's primary health care agent  Health Care Directive: Copy in Chart    Mental Health/Coping:   History of Mental Health: no history  History of Chemical Health: no history of alcohol abuse or illicit drug use, no   Current status: stable  Coping: appropriate to circumstances  Services Needed/Recommended: Liver Transplant Support Group    Financial   Income: Sherrie and her  receive Social Security benefits  Impact of transplant on income: travel and parking expenses  Insurance and medication coverage: Medicare and a supplement through [employer]  Financial concerns: none voiced  Resources needed: ongoing assessment    Education provided by SW: Social Work role inpatient setting, Liver Transplant support group, parking information     Assessment and recommendations and plan:  Transplant Social Work will review patient's PT/OT recommendations  after surgery to assist with discharge planning.  I will remain available for Sherrie's psychosocial needs post transplant.        MARY Interiano, [facility]  Liver Transplant   Phone [phone]  Pager [phone]           MARY Interiano, [facility]  Liver Transplant   Phone [phone]  Pager [phone]

## 2018-08-30 NOTE — H&P
TRANSPLANT SURGERY ADMISSION HISTORY & PHYSICAL   Sherrie Lala MRN# [id number]   YOB: 1955 Age: 63 year old   Physician Attestation   I, Darren Rod, saw this patient with the resident and agree with the resident/fellow's findings and plan of care as documented in the note.      I personally reviewed vital signs, medications, labs and imaging.    Key findings:  PLAN liver transplant    Darren Rod MD  Date of Service (when I saw the patient): 2018    Date of Admission: 2018    CHIEF COMPLAINT:   Liver transplant recipient    HISTORY OF PRESENTING ILLNESS:   Sherrie Lala is a 63 year old female who presents for work up for possible  donor liver transplantation. 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 She has a persistent right hepatic hydrothorax that failed pleurodesis. She had a pleurX catheter placed which was draining up to 1L of fluid per day, but was removed recently after output had decreased to less than 300 ml/day. Patient reported having a 1 week episode of loose stools, subjective, fevers, confusion, nausea without vomiting, and general malaise. She treated herself with increased fluid intake and symptomatic OTC medications. Since then she has had no recurrence of symptoms and feels back to her usual state of health. Patient also reports improvement of lower extremity edema after receiving albumin infusions.       REVIEW OF SYSTEMS:   The remainder of the complete ROS was negative unless noted in the HPI.    PAST MEDICAL HISTORY:   Past Medical History:   Diagnosis Date     Asthma      Cholangiocarcinoma (H) 2014     Cirrhosis of liver with ascites (H) 5/10/2018     Encounter for pleural drainage tube placement      Esophageal varices with hemorrhage (H)      Gastric ulcer      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     Lung metastases (H)  08/2014     Portal vein thrombosis        PAST SURGICAL HISTORY:   Past Surgical History:   Procedure Laterality Date     CHOLECYSTECTOMY       HEPATECTOMY PARTIAL         ALLERGIES:    Allergies   Allergen Reactions     Blood Transfusion Related (Informational Only) Other (See Comments)     Patient has a history of a clinically significant antibody against RBC antigens.  A delay in compatible RBCs may occur.     Dilaudid [Hydromorphone] Itching       HOME MEDICATIONS:     No current facility-administered medications on file prior to encounter.   Current Outpatient Prescriptions on File Prior to Encounter:  albuterol (PROAIR HFA/PROVENTIL HFA/VENTOLIN HFA) 108 (90 BASE) MCG/ACT Inhaler Inhale 2 puffs into the lungs every 6 hours   CIPROFLOXACIN PO Take 750 mg by mouth once a week   FUROSEMIDE PO Take 80 mg by mouth 2 times daily    GABAPENTIN PO Take 300 mg by mouth At Bedtime   OMEPRAZOLE PO Take 20 mg by mouth 2 times daily (before meals)   PRAMIPEXOLE DIHYDROCHLORIDE PO Take 0.5 mg by mouth daily   RIFAMPIN PO Take 300 mg by mouth daily   RifAXIMin (XIFAXAN PO) Take 550 mg by mouth 2 times daily   SPIRONOLACTONE PO Take 100 mg by mouth daily   ZOLPIDEM TARTRATE PO Take 10 mg by mouth nightly as needed for sleep   Potassium Chloride ER 20 MEQ TBCR Take 1 tablet (20 mEq) by mouth daily       SOCIAL HISTORY:   Social History   Substance Use Topics     Smoking status: Never Smoker     Smokeless tobacco: Never Used     Alcohol use No      Comment: occ        PHYSICAL EXAMINATION:  Temp:  [98.2  F (36.8  C)] 98.2  F (36.8  C)  Pulse:  [82] 82  Resp:  [16] 16  BP: (126)/(71) 126/71  SpO2:  [98 %] 98 %   I/O last 3 completed shifts:  In: -   Out: 100 [Urine:100]    General: AAO, NAD, lying in bed, appears comfortable  CV: RRR, no murmurs, gallops, or rubs  Pulm: CTAB, breathing comfortably on RA  Abd: soft, baseline RUQ tenderness, non-distended  Extremities: warm, well-perfused with +2 edema b/l  Neuro: No focal  deficits noted, patient moves all extremities spontaneously    LABS:    Recent Labs  Lab 18   WBC 2.7*   RBC 3.42*   HGB 9.4*   HCT 30.4*   MCV 89   MCH 27.5   MCHC 30.9*   RDW 15.7*   PLT 37*       Recent Labs  Lab 18      POTASSIUM 3.1*   CHLORIDE 106   CO2 26   BUN 19   CR 0.77   GLC 90   SHELDON 8.9   MAG 2.2   PHOS 3.6       Recent Labs  Lab 18   AST 23   ALT 24   ALKPHOS 115   BILITOTAL 3.0*   ALBUMIN 5.0   INR 1.65*     IMAGING:  CXR 18 9:55PM  Impression:   1. Decreased small right pleural effusion with overlying  atelectasis/consolidation.  2. Hyperinflated lungs with no new focal airspace opacity.    ASSESSMENT/PLAN:   Sherrie Lala is a 63 year old female who presents with end stage liver disease as a candidate for  donor liver transplant. Pre-op liver transplant orders initiated, including final cross match. Blood bank informed of patient's clinically significant RBC antigens, 15 units of blood prepared. Patient is hypokalemic on lab draw, will replace IV.            Carlos Rivas MD  General Surgery (PGY-1)  [phone] (pager)

## 2018-08-30 NOTE — PROGRESS NOTES
Patient admitted to  this shift for liver transplant. Oriented patient to room and call light. Patient profile, PCS, Education assessment and pre-op checklist completed. Chest x-ray, EKG, and labs completed. Anticipated OR time is midday tomorrow. Patient has had no solid food since noon today, has been drinking water.

## 2018-08-30 NOTE — PROGRESS NOTES
"CLINICAL NUTRITION SERVICES - ASSESSMENT NOTE     Nutrition Prescription    Malnutrition Status:    Non-severe malnutrition in the context of chronic illness    Recommendations already ordered by Registered Dietitian (RD):  Weigh patient (no baseline weight available yet this admission, last weight from May)    Future/Additional Recommendations:  Advance diet as soon as medically appropriate.    Order calorie counts once diet advances past liquids.      Order Boost Plus or Boost Breeze between meals for patient when appropriate.     TF recommendations: Nutren 1.5 @ 50 mL/hr + 1 pkt Prosource TF to provide 1800 kcals (33 kcal/kg/day), 93 g PRO (1.7 g/kg/day), 912 mL H2O, 211 g CHO and no fiber daily.       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     In general has early satiety and poor appetite.  She reports that ~10 days out of a month she has been not eating anything.  Other days, she snacks frequently on \"bad\" things such as chips, pretzels but also has fruit, yogurt, cottage cheese, cheese, peanut butter.  Is lactose intolerant so has lactose free milk when using it. Does not eat meat, nuts or seeds and very infrequently will have fish (but only eats it when its fried).  Does not eat meals.     She does have a hx of being on TPN for 1 year after her liver resection for cancer.  She reports a difficult recovery after this surgery.     CURRENT NUTRITION ORDERS  Diet: NPO    LABS  Vitamin D - 12 low (2/26/18)    MEDICATIONS  Medications reviewed    ANTHROPOMETRICS  Height: 5'3\"  Most Recent Weight:  55.2 kg (8/30)  IBW: 52.3 kg  BMI: Normal BMI  Weight History: Patient reports UBW of ~128#.    Wt Readings from Last 15 Encounters:   05/03/18 55.1 kg (121 lb " 8 oz)   04/18/18 56.7 kg (125 lb 1.6 oz)   03/20/18 55.8 kg (123 lb)   03/13/18 53.5 kg (118 lb)   02/27/18 53.8 kg (118 lb 9.6 oz)   02/15/18 54.4 kg (120 lb)   Dosing Weight: 55 kg (actual wt)    ASSESSED NUTRITION NEEDS  Estimated Energy Needs: 9600-4508 kcals/day (30 - 35 kcals/kg )  Justification: Post-op liver transplant  Estimated Protein Needs:  grams protein/day (1.5 - 2 grams of pro/kg)  Justification: Post-op liver transplant  Estimated Fluid Needs: 1 mL/kcal   Justification: Maintenance    PHYSICAL FINDINGS  See malnutrition section below.    MALNUTRITION  % Intake: </=75% for >/= 1 month (severe)  % Weight Loss: Weight loss does not meet criteria  Subcutaneous Fat Loss: Facial region:  Mild   Muscle Loss: Facial & jaw region and Thoracic region (clavicle, acromium bone, deltoid, trapezius, pectoral):  Mild  Fluid Accumulation/Edema: None noted  Malnutrition Diagnosis: Non-severe malnutrition in the context of chronic illness    NUTRITION DIAGNOSIS  Inadequate oral intake related to poor appetite, early satiety as evidenced by sporadic and overall decreased PO intake in the past several months.       INTERVENTIONS  Implementation  Nutrition Education: Provided education on RD role, kcal/pro needs post-transplant, brief overview post-transplant diet.  Discussed possibility of FT for nutrition supplemental therapy - patient opposed to the idea of a FT.  Reviewed oral supplements and the need for trial of these.     Goals  Diet adv v nutrition support within 5 days.     Monitoring/Evaluation  Progress toward goals will be monitored and evaluated per protocol.    Sdea Hernandez MS, RD, LD  Pager [phone]

## 2018-08-30 NOTE — PROGRESS NOTES
Patient has been admitted for pre-op liver. Patient is ready for OR besides one more pre-op shower. PIV SL'd. No pain or nausea. Vitally stable on RA. Up independently. Tentative OR time 1800.

## 2018-08-31 ENCOUNTER — APPOINTMENT (OUTPATIENT)
Dept: ULTRASOUND IMAGING | Facility: CLINIC | Age: 63
DRG: 005 | End: 2018-08-31
Attending: TRANSPLANT SURGERY
Payer: MEDICARE

## 2018-08-31 ENCOUNTER — ANESTHESIA EVENT (OUTPATIENT)
Dept: SURGERY | Facility: CLINIC | Age: 63
DRG: 005 | End: 2018-08-31
Payer: MEDICARE

## 2018-08-31 ENCOUNTER — DOCUMENTATION ONLY (OUTPATIENT)
Dept: TRANSPLANT | Facility: CLINIC | Age: 63
End: 2018-08-31

## 2018-08-31 ENCOUNTER — APPOINTMENT (OUTPATIENT)
Dept: GENERAL RADIOLOGY | Facility: CLINIC | Age: 63
DRG: 005 | End: 2018-08-31
Attending: TRANSPLANT SURGERY
Payer: MEDICARE

## 2018-08-31 ENCOUNTER — APPOINTMENT (OUTPATIENT)
Dept: GENERAL RADIOLOGY | Facility: CLINIC | Age: 63
DRG: 005 | End: 2018-08-31
Attending: NURSE PRACTITIONER
Payer: MEDICARE

## 2018-08-31 LAB
ABO + RH BLD: ABNORMAL
ABO + RH BLD: ABNORMAL
ALBUMIN SERPL-MCNC: 2 G/DL (ref 3.4–5)
ALP SERPL-CCNC: 41 U/L (ref 40–150)
ALT SERPL W P-5'-P-CCNC: 195 U/L (ref 0–50)
AMYLASE SERPL-CCNC: 39 U/L (ref 30–110)
ANGLE RATE OF CLOT GROWTH: 28.7 DEG (ref 59–74)
ANGLE RATE OF CLOT GROWTH: 57.6 DEG (ref 59–74)
ANGLE RATE OF CLOT GROWTH: 62 DEG (ref 59–74)
ANGLE RATE OF CLOT GROWTH: 62.6 DEG (ref 59–74)
ANGLE RATE OF CLOT GROWTH: NORMAL DEG (ref 59–74)
ANION GAP SERPL CALCULATED.3IONS-SCNC: 12 MMOL/L (ref 3–14)
ANION GAP SERPL CALCULATED.3IONS-SCNC: 6 MMOL/L (ref 3–14)
APTT PPP: 30 SEC (ref 22–37)
APTT PPP: 31 SEC (ref 22–37)
APTT PPP: 33 SEC (ref 22–37)
APTT PPP: 35 SEC (ref 22–37)
APTT PPP: 39 SEC (ref 22–37)
AST SERPL W P-5'-P-CCNC: 435 U/L (ref 0–45)
BASE EXCESS BLDA CALC-SCNC: 2.1 MMOL/L
BASE EXCESS BLDA CALC-SCNC: 2.3 MMOL/L
BASOPHILS # BLD AUTO: 0 10E9/L (ref 0–0.2)
BASOPHILS NFR BLD AUTO: 0 %
BASOPHILS NFR BLD AUTO: 0 %
BASOPHILS NFR BLD AUTO: 0.1 %
BASOPHILS NFR BLD AUTO: 0.1 %
BASOPHILS NFR BLD AUTO: 0.3 %
BILIRUB DIRECT SERPL-MCNC: 2 MG/DL (ref 0–0.2)
BILIRUB SERPL-MCNC: 3.7 MG/DL (ref 0.2–1.3)
BLD GP AB SCN SERPL QL: ABNORMAL
BLD PROD TYP BPU: ABNORMAL
BLD PROD TYP BPU: NORMAL
BLD UNIT ID BPU: 0
BLOOD BANK CMNT PATIENT-IMP: ABNORMAL
BLOOD BANK CMNT PATIENT-IMP: ABNORMAL
BLOOD PRODUCT CODE: NORMAL
BPU ID: NORMAL
BUN SERPL-MCNC: 13 MG/DL (ref 7–30)
BUN SERPL-MCNC: 19 MG/DL (ref 7–30)
CA-I BLD-MCNC: 5.3 MG/DL (ref 4.4–5.2)
CA-I SERPL ISE-MCNC: 5.1 MG/DL (ref 4.4–5.2)
CALCIUM SERPL-MCNC: 7.9 MG/DL (ref 8.5–10.1)
CALCIUM SERPL-MCNC: 9 MG/DL (ref 8.5–10.1)
CHLORIDE SERPL-SCNC: 109 MMOL/L (ref 94–109)
CHLORIDE SERPL-SCNC: 109 MMOL/L (ref 94–109)
CI HYPERCOAGULATION INDEX: NORMAL RATIO (ref 0–3)
CI HYPOCOAGULATION INDEX: 0.4 RATIO (ref 0–3)
CI HYPOCOAGULATION INDEX: 0.5 RATIO (ref 0–3)
CI HYPOCOAGULATION INDEX: 18.3 RATIO (ref 0–3)
CI HYPOCOAGULATION INDEX: 2.5 RATIO (ref 0–3)
CI HYPOCOAGULATION INDEX: NORMAL RATIO (ref 0–3)
CLOT LYSIS 30M P MA LENFR BLD TEG: 0 % (ref 0–8)
CLOT LYSIS 30M P MA LENFR BLD TEG: NORMAL % (ref 0–8)
CLOT STRENGTH BLD TEG: 2.5 KD/SC (ref 5.3–13.2)
CLOT STRENGTH BLD TEG: 5.5 KD/SC (ref 5.3–13.2)
CLOT STRENGTH BLD TEG: 5.5 KD/SC (ref 5.3–13.2)
CLOT STRENGTH BLD TEG: 6.5 KD/SC (ref 5.3–13.2)
CLOT STRENGTH BLD TEG: NORMAL KD/SC (ref 5.3–13.2)
CO2 SERPL-SCNC: 25 MMOL/L (ref 20–32)
CO2 SERPL-SCNC: 28 MMOL/L (ref 20–32)
CREAT SERPL-MCNC: 0.66 MG/DL (ref 0.52–1.04)
CREAT SERPL-MCNC: 0.96 MG/DL (ref 0.52–1.04)
CROSSMATCH RESULT: NORMAL
CROSSMATCH RESULT: NORMAL
DIFFERENTIAL METHOD BLD: ABNORMAL
DONOR IDENTIFICATION: NORMAL
DSA COMMENTS: NORMAL
DSA PRESENT: NO
DSA TEST METHOD: NORMAL
EOSINOPHIL # BLD AUTO: 0 10E9/L (ref 0–0.7)
EOSINOPHIL # BLD AUTO: 0.2 10E9/L (ref 0–0.7)
EOSINOPHIL NFR BLD AUTO: 0 %
EOSINOPHIL NFR BLD AUTO: 0 %
EOSINOPHIL NFR BLD AUTO: 0.3 %
EOSINOPHIL NFR BLD AUTO: 0.5 %
EOSINOPHIL NFR BLD AUTO: 3.2 %
ERYTHROCYTE [DISTWIDTH] IN BLOOD BY AUTOMATED COUNT: 14.9 % (ref 10–15)
ERYTHROCYTE [DISTWIDTH] IN BLOOD BY AUTOMATED COUNT: 15.2 % (ref 10–15)
ERYTHROCYTE [DISTWIDTH] IN BLOOD BY AUTOMATED COUNT: 15.6 % (ref 10–15)
ERYTHROCYTE [DISTWIDTH] IN BLOOD BY AUTOMATED COUNT: 15.8 % (ref 10–15)
ERYTHROCYTE [DISTWIDTH] IN BLOOD BY AUTOMATED COUNT: 16.1 % (ref 10–15)
FIBRINOGEN PPP-MCNC: 217 MG/DL (ref 200–420)
FIBRINOGEN PPP-MCNC: 241 MG/DL (ref 200–420)
FIBRINOGEN PPP-MCNC: 245 MG/DL (ref 200–420)
FIBRINOGEN PPP-MCNC: 257 MG/DL (ref 200–420)
FIBRINOGEN PPP-MCNC: 260 MG/DL (ref 200–420)
GFR SERPL CREATININE-BSD FRML MDRD: 59 ML/MIN/1.7M2
GFR SERPL CREATININE-BSD FRML MDRD: >90 ML/MIN/1.7M2
GLUCOSE BLDC GLUCOMTR-MCNC: 102 MG/DL (ref 70–99)
GLUCOSE BLDC GLUCOMTR-MCNC: 109 MG/DL (ref 70–99)
GLUCOSE BLDC GLUCOMTR-MCNC: 112 MG/DL (ref 70–99)
GLUCOSE BLDC GLUCOMTR-MCNC: 113 MG/DL (ref 70–99)
GLUCOSE BLDC GLUCOMTR-MCNC: 121 MG/DL (ref 70–99)
GLUCOSE BLDC GLUCOMTR-MCNC: 121 MG/DL (ref 70–99)
GLUCOSE BLDC GLUCOMTR-MCNC: 127 MG/DL (ref 70–99)
GLUCOSE BLDC GLUCOMTR-MCNC: 131 MG/DL (ref 70–99)
GLUCOSE BLDC GLUCOMTR-MCNC: 139 MG/DL (ref 70–99)
GLUCOSE BLDC GLUCOMTR-MCNC: 142 MG/DL (ref 70–99)
GLUCOSE BLDC GLUCOMTR-MCNC: 160 MG/DL (ref 70–99)
GLUCOSE BLDC GLUCOMTR-MCNC: 165 MG/DL (ref 70–99)
GLUCOSE BLDC GLUCOMTR-MCNC: 166 MG/DL (ref 70–99)
GLUCOSE BLDC GLUCOMTR-MCNC: 175 MG/DL (ref 70–99)
GLUCOSE BLDC GLUCOMTR-MCNC: 177 MG/DL (ref 70–99)
GLUCOSE BLDC GLUCOMTR-MCNC: 178 MG/DL (ref 70–99)
GLUCOSE BLDC GLUCOMTR-MCNC: 88 MG/DL (ref 70–99)
GLUCOSE BLDC GLUCOMTR-MCNC: 90 MG/DL (ref 70–99)
GLUCOSE BLDC GLUCOMTR-MCNC: 95 MG/DL (ref 70–99)
GLUCOSE SERPL-MCNC: 168 MG/DL (ref 70–99)
GLUCOSE SERPL-MCNC: 96 MG/DL (ref 70–99)
HCO3 BLD-SCNC: 26 MMOL/L (ref 21–28)
HCO3 BLD-SCNC: 26 MMOL/L (ref 21–28)
HCT VFR BLD AUTO: 31.8 % (ref 35–47)
HCT VFR BLD AUTO: 34 % (ref 35–47)
HCT VFR BLD AUTO: 36.5 % (ref 35–47)
HCT VFR BLD AUTO: 36.9 % (ref 35–47)
HCT VFR BLD AUTO: 40.3 % (ref 35–47)
HGB BLD-MCNC: 10.9 G/DL (ref 11.7–15.7)
HGB BLD-MCNC: 11.2 G/DL (ref 11.7–15.7)
HGB BLD-MCNC: 12.2 G/DL (ref 11.7–15.7)
HGB BLD-MCNC: 12.3 G/DL (ref 11.7–15.7)
HGB BLD-MCNC: 13.7 G/DL (ref 11.7–15.7)
IMM GRANULOCYTES # BLD: 0 10E9/L (ref 0–0.4)
IMM GRANULOCYTES NFR BLD: 0.1 %
IMM GRANULOCYTES NFR BLD: 0.2 %
IMM GRANULOCYTES NFR BLD: 0.3 %
INR PPP: 1.49 (ref 0.86–1.14)
INR PPP: 1.5 (ref 0.86–1.14)
INR PPP: 1.54 (ref 0.86–1.14)
INR PPP: 1.65 (ref 0.86–1.14)
INR PPP: 1.7 (ref 0.86–1.14)
K TIME TO SPEC CLOT STRENGTH: 2.2 MIN (ref 1–3)
K TIME TO SPEC CLOT STRENGTH: 2.4 MIN (ref 1–3)
K TIME TO SPEC CLOT STRENGTH: 2.7 MIN (ref 1–3)
K TIME TO SPEC CLOT STRENGTH: 7.7 MIN (ref 1–3)
K TIME TO SPEC CLOT STRENGTH: NORMAL MIN (ref 1–3)
LACTATE BLD-SCNC: 1.2 MMOL/L (ref 0.7–2)
LACTATE BLD-SCNC: 1.3 MMOL/L (ref 0.7–2)
LACTATE BLD-SCNC: 2 MMOL/L (ref 0.7–2)
LACTATE BLD-SCNC: 3.1 MMOL/L (ref 0.7–2)
LY60 LYSIS AT 60 MINUTES: 0 % (ref 0–15)
LY60 LYSIS AT 60 MINUTES: NORMAL % (ref 0–15)
LYMPHOCYTES # BLD AUTO: 0.2 10E9/L (ref 0.8–5.3)
LYMPHOCYTES # BLD AUTO: 0.4 10E9/L (ref 0.8–5.3)
LYMPHOCYTES # BLD AUTO: 0.5 10E9/L (ref 0.8–5.3)
LYMPHOCYTES NFR BLD AUTO: 4.2 %
LYMPHOCYTES NFR BLD AUTO: 4.4 %
LYMPHOCYTES NFR BLD AUTO: 5 %
LYMPHOCYTES NFR BLD AUTO: 5.3 %
LYMPHOCYTES NFR BLD AUTO: 9.8 %
MA MAXIMUM CLOT STRENGTH: 33.5 MM (ref 55–74)
MA MAXIMUM CLOT STRENGTH: 52.2 MM (ref 55–74)
MA MAXIMUM CLOT STRENGTH: 52.2 MM (ref 55–74)
MA MAXIMUM CLOT STRENGTH: 56.6 MM (ref 55–74)
MA MAXIMUM CLOT STRENGTH: NORMAL MM (ref 55–74)
MAGNESIUM SERPL-MCNC: 1.7 MG/DL (ref 1.6–2.3)
MAGNESIUM SERPL-MCNC: 2 MG/DL (ref 1.6–2.3)
MCH RBC QN AUTO: 28.4 PG (ref 26.5–33)
MCH RBC QN AUTO: 28.4 PG (ref 26.5–33)
MCH RBC QN AUTO: 28.5 PG (ref 26.5–33)
MCH RBC QN AUTO: 28.7 PG (ref 26.5–33)
MCH RBC QN AUTO: 28.8 PG (ref 26.5–33)
MCHC RBC AUTO-ENTMCNC: 32.9 G/DL (ref 31.5–36.5)
MCHC RBC AUTO-ENTMCNC: 33.3 G/DL (ref 31.5–36.5)
MCHC RBC AUTO-ENTMCNC: 33.4 G/DL (ref 31.5–36.5)
MCHC RBC AUTO-ENTMCNC: 34 G/DL (ref 31.5–36.5)
MCHC RBC AUTO-ENTMCNC: 34.3 G/DL (ref 31.5–36.5)
MCV RBC AUTO: 84 FL (ref 78–100)
MCV RBC AUTO: 85 FL (ref 78–100)
MCV RBC AUTO: 87 FL (ref 78–100)
MONOCYTES # BLD AUTO: 0.1 10E9/L (ref 0–1.3)
MONOCYTES # BLD AUTO: 0.3 10E9/L (ref 0–1.3)
MONOCYTES # BLD AUTO: 0.4 10E9/L (ref 0–1.3)
MONOCYTES # BLD AUTO: 0.7 10E9/L (ref 0–1.3)
MONOCYTES # BLD AUTO: 1 10E9/L (ref 0–1.3)
MONOCYTES NFR BLD AUTO: 1.4 %
MONOCYTES NFR BLD AUTO: 11.6 %
MONOCYTES NFR BLD AUTO: 4.8 %
MONOCYTES NFR BLD AUTO: 5.7 %
MONOCYTES NFR BLD AUTO: 9.4 %
MRSA DNA SPEC QL NAA+PROBE: NEGATIVE
NEUTROPHILS # BLD AUTO: 3.1 10E9/L (ref 1.6–8.3)
NEUTROPHILS # BLD AUTO: 4.9 10E9/L (ref 1.6–8.3)
NEUTROPHILS # BLD AUTO: 5.9 10E9/L (ref 1.6–8.3)
NEUTROPHILS # BLD AUTO: 7 10E9/L (ref 1.6–8.3)
NEUTROPHILS # BLD AUTO: 8.1 10E9/L (ref 1.6–8.3)
NEUTROPHILS NFR BLD AUTO: 82 %
NEUTROPHILS NFR BLD AUTO: 83.3 %
NEUTROPHILS NFR BLD AUTO: 88.2 %
NEUTROPHILS NFR BLD AUTO: 89.7 %
NEUTROPHILS NFR BLD AUTO: 89.9 %
NRBC # BLD AUTO: 0 10*3/UL
NRBC BLD AUTO-RTO: 0 /100
NUM BPU REQUESTED: 10
NUM BPU REQUESTED: 47
O2/TOTAL GAS SETTING VFR VENT: 40 %
O2/TOTAL GAS SETTING VFR VENT: 40 %
ORGAN: NORMAL
PCO2 BLD: 37 MM HG (ref 35–45)
PCO2 BLD: 39 MM HG (ref 35–45)
PH BLD: 7.44 PH (ref 7.35–7.45)
PH BLD: 7.46 PH (ref 7.35–7.45)
PHOSPHATE SERPL-MCNC: 2.8 MG/DL (ref 2.5–4.5)
PHOSPHATE SERPL-MCNC: 4.9 MG/DL (ref 2.5–4.5)
PLATELET # BLD AUTO: 105 10E9/L (ref 150–450)
PLATELET # BLD AUTO: 55 10E9/L (ref 150–450)
PLATELET # BLD AUTO: 65 10E9/L (ref 150–450)
PLATELET # BLD AUTO: 71 10E9/L (ref 150–450)
PLATELET # BLD AUTO: 82 10E9/L (ref 150–450)
PLATELET # BLD EST: ABNORMAL 10*3/UL
PO2 BLD: 133 MM HG (ref 80–105)
PO2 BLD: 170 MM HG (ref 80–105)
POTASSIUM SERPL-SCNC: 3.5 MMOL/L (ref 3.4–5.3)
POTASSIUM SERPL-SCNC: 4 MMOL/L (ref 3.4–5.3)
POTASSIUM SERPL-SCNC: 4.4 MMOL/L (ref 3.4–5.3)
PROT SERPL-MCNC: 4 G/DL (ref 6.8–8.8)
R TIME UNTIL CLOT FORMS: 21.3 MIN (ref 4–9)
R TIME UNTIL CLOT FORMS: 4.5 MIN (ref 4–9)
R TIME UNTIL CLOT FORMS: 5.2 MIN (ref 4–9)
R TIME UNTIL CLOT FORMS: 6.8 MIN (ref 4–9)
R TIME UNTIL CLOT FORMS: NORMAL MIN (ref 4–9)
RBC # BLD AUTO: 3.8 10E12/L (ref 3.8–5.2)
RBC # BLD AUTO: 3.93 10E12/L (ref 3.8–5.2)
RBC # BLD AUTO: 4.29 10E12/L (ref 3.8–5.2)
RBC # BLD AUTO: 4.33 10E12/L (ref 3.8–5.2)
RBC # BLD AUTO: 4.76 10E12/L (ref 3.8–5.2)
SA1 CELL: NORMAL
SA1 COMMENTS: NORMAL
SA1 HI RISK ABY: NORMAL
SA1 MOD RISK ABY: NORMAL
SA1 TEST METHOD: NORMAL
SA2 CELL: NORMAL
SA2 COMMENTS: NORMAL
SA2 HI RISK ABY UA: NORMAL
SA2 MOD RISK ABY: NORMAL
SA2 TEST METHOD: NORMAL
SODIUM SERPL-SCNC: 143 MMOL/L (ref 133–144)
SODIUM SERPL-SCNC: 146 MMOL/L (ref 133–144)
SPECIMEN EXP DATE BLD: ABNORMAL
SPECIMEN SOURCE: NORMAL
TRANSFUSION STATUS PATIENT QL: NORMAL
WBC # BLD AUTO: 3.6 10E9/L (ref 4–11)
WBC # BLD AUTO: 5.5 10E9/L (ref 4–11)
WBC # BLD AUTO: 7.2 10E9/L (ref 4–11)
WBC # BLD AUTO: 8.4 10E9/L (ref 4–11)
WBC # BLD AUTO: 9 10E9/L (ref 4–11)

## 2018-08-31 PROCEDURE — A9270 NON-COVERED ITEM OR SERVICE: HCPCS | Mod: GY | Performed by: NURSE PRACTITIONER

## 2018-08-31 PROCEDURE — 85610 PROTHROMBIN TIME: CPT | Performed by: STUDENT IN AN ORGANIZED HEALTH CARE EDUCATION/TRAINING PROGRAM

## 2018-08-31 PROCEDURE — 25000128 H RX IP 250 OP 636: Performed by: NURSE ANESTHETIST, CERTIFIED REGISTERED

## 2018-08-31 PROCEDURE — 25000128 H RX IP 250 OP 636: Performed by: STUDENT IN AN ORGANIZED HEALTH CARE EDUCATION/TRAINING PROGRAM

## 2018-08-31 PROCEDURE — 25000125 ZZHC RX 250

## 2018-08-31 PROCEDURE — 40000986 XR ABDOMEN PORT 1 VW

## 2018-08-31 PROCEDURE — 84100 ASSAY OF PHOSPHORUS: CPT | Performed by: STUDENT IN AN ORGANIZED HEALTH CARE EDUCATION/TRAINING PROGRAM

## 2018-08-31 PROCEDURE — 25000125 ZZHC RX 250: Performed by: NURSE PRACTITIONER

## 2018-08-31 PROCEDURE — 44500 INTRO GASTROINTESTINAL TUBE: CPT | Performed by: DIETITIAN, REGISTERED

## 2018-08-31 PROCEDURE — 88313 SPECIAL STAINS GROUP 2: CPT | Performed by: TRANSPLANT SURGERY

## 2018-08-31 PROCEDURE — 85027 COMPLETE CBC AUTOMATED: CPT | Performed by: SURGERY

## 2018-08-31 PROCEDURE — 82330 ASSAY OF CALCIUM: CPT | Performed by: INTERNAL MEDICINE

## 2018-08-31 PROCEDURE — P9041 ALBUMIN (HUMAN),5%, 50ML: HCPCS | Performed by: STUDENT IN AN ORGANIZED HEALTH CARE EDUCATION/TRAINING PROGRAM

## 2018-08-31 PROCEDURE — 88309 TISSUE EXAM BY PATHOLOGIST: CPT | Performed by: TRANSPLANT SURGERY

## 2018-08-31 PROCEDURE — 40000985 XR CHEST PORT 1 VW

## 2018-08-31 PROCEDURE — 25000128 H RX IP 250 OP 636: Performed by: NURSE PRACTITIONER

## 2018-08-31 PROCEDURE — 25000125 ZZHC RX 250: Performed by: STUDENT IN AN ORGANIZED HEALTH CARE EDUCATION/TRAINING PROGRAM

## 2018-08-31 PROCEDURE — 40000275 ZZH STATISTIC RCP TIME EA 10 MIN

## 2018-08-31 PROCEDURE — 83735 ASSAY OF MAGNESIUM: CPT | Performed by: STUDENT IN AN ORGANIZED HEALTH CARE EDUCATION/TRAINING PROGRAM

## 2018-08-31 PROCEDURE — 85730 THROMBOPLASTIN TIME PARTIAL: CPT | Performed by: STUDENT IN AN ORGANIZED HEALTH CARE EDUCATION/TRAINING PROGRAM

## 2018-08-31 PROCEDURE — 85730 THROMBOPLASTIN TIME PARTIAL: CPT | Performed by: SURGERY

## 2018-08-31 PROCEDURE — P9037 PLATE PHERES LEUKOREDU IRRAD: HCPCS | Performed by: TRANSPLANT SURGERY

## 2018-08-31 PROCEDURE — 25000128 H RX IP 250 OP 636

## 2018-08-31 PROCEDURE — 25800025 ZZH RX 258: Performed by: STUDENT IN AN ORGANIZED HEALTH CARE EDUCATION/TRAINING PROGRAM

## 2018-08-31 PROCEDURE — 00000146 ZZHCL STATISTIC GLUCOSE BY METER IP

## 2018-08-31 PROCEDURE — 84132 ASSAY OF SERUM POTASSIUM: CPT | Performed by: STUDENT IN AN ORGANIZED HEALTH CARE EDUCATION/TRAINING PROGRAM

## 2018-08-31 PROCEDURE — 87641 MR-STAPH DNA AMP PROBE: CPT | Performed by: STUDENT IN AN ORGANIZED HEALTH CARE EDUCATION/TRAINING PROGRAM

## 2018-08-31 PROCEDURE — 82330 ASSAY OF CALCIUM: CPT | Performed by: STUDENT IN AN ORGANIZED HEALTH CARE EDUCATION/TRAINING PROGRAM

## 2018-08-31 PROCEDURE — 82150 ASSAY OF AMYLASE: CPT | Performed by: STUDENT IN AN ORGANIZED HEALTH CARE EDUCATION/TRAINING PROGRAM

## 2018-08-31 PROCEDURE — 27210429 ZZH NUTRITION PRODUCT INTERMEDIATE LITER

## 2018-08-31 PROCEDURE — 27210995 ZZH RX 272: Performed by: TRANSPLANT SURGERY

## 2018-08-31 PROCEDURE — 76700 US EXAM ABDOM COMPLETE: CPT

## 2018-08-31 PROCEDURE — 25000125 ZZHC RX 250: Performed by: NURSE ANESTHETIST, CERTIFIED REGISTERED

## 2018-08-31 PROCEDURE — 82803 BLOOD GASES ANY COMBINATION: CPT | Performed by: INTERNAL MEDICINE

## 2018-08-31 PROCEDURE — 85610 PROTHROMBIN TIME: CPT | Performed by: SURGERY

## 2018-08-31 PROCEDURE — 85025 COMPLETE CBC W/AUTO DIFF WBC: CPT | Performed by: STUDENT IN AN ORGANIZED HEALTH CARE EDUCATION/TRAINING PROGRAM

## 2018-08-31 PROCEDURE — 25000128 H RX IP 250 OP 636: Performed by: TRANSPLANT SURGERY

## 2018-08-31 PROCEDURE — 99291 CRITICAL CARE FIRST HOUR: CPT | Performed by: INTERNAL MEDICINE

## 2018-08-31 PROCEDURE — 40000196 ZZH STATISTIC RAPCV CVP MONITORING

## 2018-08-31 PROCEDURE — 80076 HEPATIC FUNCTION PANEL: CPT | Performed by: STUDENT IN AN ORGANIZED HEALTH CARE EDUCATION/TRAINING PROGRAM

## 2018-08-31 PROCEDURE — 20000004 ZZH R&B ICU UMMC

## 2018-08-31 PROCEDURE — 85384 FIBRINOGEN ACTIVITY: CPT | Performed by: STUDENT IN AN ORGANIZED HEALTH CARE EDUCATION/TRAINING PROGRAM

## 2018-08-31 PROCEDURE — 82803 BLOOD GASES ANY COMBINATION: CPT | Performed by: STUDENT IN AN ORGANIZED HEALTH CARE EDUCATION/TRAINING PROGRAM

## 2018-08-31 PROCEDURE — 94002 VENT MGMT INPAT INIT DAY: CPT

## 2018-08-31 PROCEDURE — 25000132 ZZH RX MED GY IP 250 OP 250 PS 637: Mod: GY | Performed by: STUDENT IN AN ORGANIZED HEALTH CARE EDUCATION/TRAINING PROGRAM

## 2018-08-31 PROCEDURE — 83605 ASSAY OF LACTIC ACID: CPT | Performed by: STUDENT IN AN ORGANIZED HEALTH CARE EDUCATION/TRAINING PROGRAM

## 2018-08-31 PROCEDURE — 25000132 ZZH RX MED GY IP 250 OP 250 PS 637: Mod: GY | Performed by: NURSE PRACTITIONER

## 2018-08-31 PROCEDURE — 40000014 ZZH STATISTIC ARTERIAL MONITORING DAILY

## 2018-08-31 PROCEDURE — 85384 FIBRINOGEN ACTIVITY: CPT | Performed by: SURGERY

## 2018-08-31 PROCEDURE — 40000048 ZZH STATISTIC DAILY SWAN MONITORING

## 2018-08-31 PROCEDURE — 25000131 ZZH RX MED GY IP 250 OP 636 PS 637: Mod: GY | Performed by: STUDENT IN AN ORGANIZED HEALTH CARE EDUCATION/TRAINING PROGRAM

## 2018-08-31 PROCEDURE — 80048 BASIC METABOLIC PNL TOTAL CA: CPT | Performed by: STUDENT IN AN ORGANIZED HEALTH CARE EDUCATION/TRAINING PROGRAM

## 2018-08-31 PROCEDURE — A9270 NON-COVERED ITEM OR SERVICE: HCPCS | Mod: GY | Performed by: STUDENT IN AN ORGANIZED HEALTH CARE EDUCATION/TRAINING PROGRAM

## 2018-08-31 PROCEDURE — 87640 STAPH A DNA AMP PROBE: CPT | Performed by: STUDENT IN AN ORGANIZED HEALTH CARE EDUCATION/TRAINING PROGRAM

## 2018-08-31 PROCEDURE — 85025 COMPLETE CBC W/AUTO DIFF WBC: CPT | Performed by: SURGERY

## 2018-08-31 RX ORDER — VALGANCICLOVIR HYDROCHLORIDE 50 MG/ML
450 POWDER, FOR SOLUTION ORAL DAILY
Status: DISCONTINUED | OUTPATIENT
Start: 2018-08-31 | End: 2018-09-12 | Stop reason: HOSPADM

## 2018-08-31 RX ORDER — VALGANCICLOVIR 450 MG/1
450 TABLET, FILM COATED ORAL DAILY
Status: DISCONTINUED | OUTPATIENT
Start: 2018-08-31 | End: 2018-09-12 | Stop reason: HOSPADM

## 2018-08-31 RX ORDER — METHYLPREDNISOLONE SODIUM SUCCINATE 125 MG/2ML
50 INJECTION, POWDER, LYOPHILIZED, FOR SOLUTION INTRAMUSCULAR; INTRAVENOUS ONCE
Status: COMPLETED | OUTPATIENT
Start: 2018-09-03 | End: 2018-09-03

## 2018-08-31 RX ORDER — SULFAMETHOXAZOLE AND TRIMETHOPRIM 400; 80 MG/1; MG/1
1 TABLET ORAL DAILY
Status: DISCONTINUED | OUTPATIENT
Start: 2018-08-31 | End: 2018-09-03

## 2018-08-31 RX ORDER — PIPERACILLIN SODIUM, TAZOBACTAM SODIUM 3; .375 G/15ML; G/15ML
3.38 INJECTION, POWDER, LYOPHILIZED, FOR SOLUTION INTRAVENOUS EVERY 6 HOURS
Status: COMPLETED | OUTPATIENT
Start: 2018-08-31 | End: 2018-09-01

## 2018-08-31 RX ORDER — MYCOPHENOLATE MOFETIL 250 MG/1
1000 CAPSULE ORAL
Status: DISCONTINUED | OUTPATIENT
Start: 2018-08-31 | End: 2018-09-12 | Stop reason: HOSPADM

## 2018-08-31 RX ORDER — FENTANYL CITRATE 50 UG/ML
25-50 INJECTION, SOLUTION INTRAMUSCULAR; INTRAVENOUS
Status: DISCONTINUED | OUTPATIENT
Start: 2018-08-31 | End: 2018-09-04

## 2018-08-31 RX ORDER — MAGNESIUM SULFATE HEPTAHYDRATE 40 MG/ML
4 INJECTION, SOLUTION INTRAVENOUS EVERY 4 HOURS PRN
Status: DISCONTINUED | OUTPATIENT
Start: 2018-08-31 | End: 2018-09-04

## 2018-08-31 RX ORDER — VANCOMYCIN HYDROCHLORIDE 1 G/200ML
1000 INJECTION, SOLUTION INTRAVENOUS EVERY 12 HOURS
Status: DISPENSED | OUTPATIENT
Start: 2018-08-31 | End: 2018-09-03

## 2018-08-31 RX ORDER — PROPOFOL 10 MG/ML
INJECTION, EMULSION INTRAVENOUS
Status: COMPLETED
Start: 2018-08-31 | End: 2018-08-31

## 2018-08-31 RX ORDER — NALOXONE HYDROCHLORIDE 0.4 MG/ML
.1-.4 INJECTION, SOLUTION INTRAMUSCULAR; INTRAVENOUS; SUBCUTANEOUS
Status: DISCONTINUED | OUTPATIENT
Start: 2018-08-31 | End: 2018-09-02

## 2018-08-31 RX ORDER — PROPOFOL 10 MG/ML
5-75 INJECTION, EMULSION INTRAVENOUS CONTINUOUS
Status: DISCONTINUED | OUTPATIENT
Start: 2018-08-31 | End: 2018-09-02

## 2018-08-31 RX ORDER — HEPARIN SODIUM 10000 [USP'U]/100ML
400 INJECTION, SOLUTION INTRAVENOUS CONTINUOUS
Status: DISCONTINUED | OUTPATIENT
Start: 2018-08-31 | End: 2018-09-10

## 2018-08-31 RX ORDER — DEXTROSE MONOHYDRATE 25 G/50ML
25-50 INJECTION, SOLUTION INTRAVENOUS
Status: DISCONTINUED | OUTPATIENT
Start: 2018-08-31 | End: 2018-09-02

## 2018-08-31 RX ORDER — PREDNISONE 10 MG/1
10 TABLET ORAL ONCE
Status: COMPLETED | OUTPATIENT
Start: 2018-09-05 | End: 2018-09-05

## 2018-08-31 RX ORDER — POTASSIUM CHLORIDE 29.8 MG/ML
20 INJECTION INTRAVENOUS
Status: DISCONTINUED | OUTPATIENT
Start: 2018-08-31 | End: 2018-09-04

## 2018-08-31 RX ORDER — POTASSIUM CHLORIDE 750 MG/1
20-40 TABLET, EXTENDED RELEASE ORAL
Status: DISCONTINUED | OUTPATIENT
Start: 2018-08-31 | End: 2018-09-04

## 2018-08-31 RX ORDER — POTASSIUM CHLORIDE 1.5 G/1.58G
20-40 POWDER, FOR SOLUTION ORAL
Status: DISCONTINUED | OUTPATIENT
Start: 2018-08-31 | End: 2018-09-04

## 2018-08-31 RX ORDER — CARBOXYMETHYLCELLULOSE SODIUM 5 MG/ML
1 SOLUTION/ DROPS OPHTHALMIC 3 TIMES DAILY PRN
Status: DISCONTINUED | OUTPATIENT
Start: 2018-08-31 | End: 2018-09-12 | Stop reason: HOSPADM

## 2018-08-31 RX ORDER — POTASSIUM CL/LIDO/0.9 % NACL 10MEQ/0.1L
10 INTRAVENOUS SOLUTION, PIGGYBACK (ML) INTRAVENOUS
Status: DISCONTINUED | OUTPATIENT
Start: 2018-08-31 | End: 2018-09-04

## 2018-08-31 RX ORDER — ALBUMIN, HUMAN INJ 5% 5 %
12.5 SOLUTION INTRAVENOUS ONCE
Status: COMPLETED | OUTPATIENT
Start: 2018-08-31 | End: 2018-08-31

## 2018-08-31 RX ORDER — ALBUMIN, HUMAN INJ 5% 5 %
SOLUTION INTRAVENOUS
Status: DISCONTINUED
Start: 2018-08-31 | End: 2018-09-01 | Stop reason: HOSPADM

## 2018-08-31 RX ORDER — PROPOFOL 10 MG/ML
INJECTION, EMULSION INTRAVENOUS CONTINUOUS PRN
Status: DISCONTINUED | OUTPATIENT
Start: 2018-08-31 | End: 2018-08-31

## 2018-08-31 RX ORDER — NICOTINE POLACRILEX 4 MG
15-30 LOZENGE BUCCAL
Status: DISCONTINUED | OUTPATIENT
Start: 2018-08-31 | End: 2018-09-02

## 2018-08-31 RX ORDER — OXYCODONE HCL 5 MG/5 ML
5-10 SOLUTION, ORAL ORAL EVERY 4 HOURS PRN
Status: DISCONTINUED | OUTPATIENT
Start: 2018-08-31 | End: 2018-09-03

## 2018-08-31 RX ORDER — DESMOPRESSIN ACETATE 4 UG/ML
INJECTION, SOLUTION INTRAVENOUS; SUBCUTANEOUS PRN
Status: DISCONTINUED | OUTPATIENT
Start: 2018-08-30 | End: 2018-08-31

## 2018-08-31 RX ORDER — PROPOFOL 10 MG/ML
10-20 INJECTION, EMULSION INTRAVENOUS EVERY 30 MIN PRN
Status: DISCONTINUED | OUTPATIENT
Start: 2018-08-31 | End: 2018-09-02

## 2018-08-31 RX ORDER — MYCOPHENOLATE MOFETIL 200 MG/ML
1000 POWDER, FOR SUSPENSION ORAL
Status: DISCONTINUED | OUTPATIENT
Start: 2018-08-31 | End: 2018-09-12 | Stop reason: HOSPADM

## 2018-08-31 RX ORDER — FLUCONAZOLE 2 MG/ML
400 INJECTION, SOLUTION INTRAVENOUS EVERY 24 HOURS
Status: DISPENSED | OUTPATIENT
Start: 2018-08-31 | End: 2018-09-02

## 2018-08-31 RX ORDER — METHYLPREDNISOLONE SODIUM SUCCINATE 125 MG/2ML
100 INJECTION, POWDER, LYOPHILIZED, FOR SOLUTION INTRAMUSCULAR; INTRAVENOUS ONCE
Status: COMPLETED | OUTPATIENT
Start: 2018-09-02 | End: 2018-09-02

## 2018-08-31 RX ORDER — POTASSIUM CHLORIDE 7.45 MG/ML
10 INJECTION INTRAVENOUS
Status: DISCONTINUED | OUTPATIENT
Start: 2018-08-31 | End: 2018-09-04

## 2018-08-31 RX ADMIN — MYCOPHENOLATE MOFETIL 1000 MG: 200 POWDER, FOR SUSPENSION ORAL at 11:04

## 2018-08-31 RX ADMIN — ALBUMIN HUMAN 12.5 G: 0.05 INJECTION, SOLUTION INTRAVENOUS at 20:49

## 2018-08-31 RX ADMIN — SODIUM CHLORIDE 20 MG: 9 INJECTION, SOLUTION INTRAVENOUS at 12:50

## 2018-08-31 RX ADMIN — PANTOPRAZOLE SODIUM 40 MG: 40 INJECTION, POWDER, FOR SOLUTION INTRAVENOUS at 09:16

## 2018-08-31 RX ADMIN — PIPERACILLIN SODIUM,TAZOBACTAM SODIUM 3.38 G: 3; .375 INJECTION, POWDER, FOR SOLUTION INTRAVENOUS at 11:04

## 2018-08-31 RX ADMIN — POTASSIUM CHLORIDE 20 MEQ: 29.8 INJECTION, SOLUTION INTRAVENOUS at 05:49

## 2018-08-31 RX ADMIN — Medication 50 MCG/HR: at 05:07

## 2018-08-31 RX ADMIN — POTASSIUM CHLORIDE 20 MEQ: 29.8 INJECTION, SOLUTION INTRAVENOUS at 17:23

## 2018-08-31 RX ADMIN — HUMAN INSULIN 5.5 UNITS/HR: 100 INJECTION, SOLUTION SUBCUTANEOUS at 05:05

## 2018-08-31 RX ADMIN — LIDOCAINE HYDROCHLORIDE 5 ML: 20 SOLUTION ORAL; TOPICAL at 13:02

## 2018-08-31 RX ADMIN — FLUCONAZOLE 400 MG: 2 INJECTION, SOLUTION INTRAVENOUS at 23:13

## 2018-08-31 RX ADMIN — SULFAMETHOXAZOLE AND TRIMETHOPRIM 1 TABLET: 400; 80 TABLET ORAL at 09:16

## 2018-08-31 RX ADMIN — HEPARIN SODIUM 400 UNITS/HR: 10000 INJECTION, SOLUTION INTRAVENOUS at 12:31

## 2018-08-31 RX ADMIN — PROPOFOL 40 MCG/KG/MIN: 10 INJECTION, EMULSION INTRAVENOUS at 05:07

## 2018-08-31 RX ADMIN — PIPERACILLIN SODIUM,TAZOBACTAM SODIUM 3.38 G: 3; .375 INJECTION, POWDER, FOR SOLUTION INTRAVENOUS at 04:40

## 2018-08-31 RX ADMIN — MYCOPHENOLATE MOFETIL 1000 MG: 200 POWDER, FOR SUSPENSION ORAL at 17:23

## 2018-08-31 RX ADMIN — PROPOFOL 20 MCG/KG/MIN: 10 INJECTION, EMULSION INTRAVENOUS at 00:15

## 2018-08-31 RX ADMIN — POTASSIUM CHLORIDE 20 MEQ: 400 INJECTION, SOLUTION INTRAVENOUS at 00:23

## 2018-08-31 RX ADMIN — HUMAN INSULIN 3.5 UNITS/HR: 100 INJECTION, SOLUTION SUBCUTANEOUS at 09:23

## 2018-08-31 RX ADMIN — PIPERACILLIN SODIUM,TAZOBACTAM SODIUM 3.38 G: 3; .375 INJECTION, POWDER, FOR SOLUTION INTRAVENOUS at 16:18

## 2018-08-31 RX ADMIN — PIPERACILLIN SODIUM,TAZOBACTAM SODIUM 3.38 G: 3; .375 INJECTION, POWDER, FOR SOLUTION INTRAVENOUS at 23:13

## 2018-08-31 RX ADMIN — MULTIVITAMIN 15 ML: LIQUID ORAL at 16:18

## 2018-08-31 RX ADMIN — Medication 2 G: at 18:33

## 2018-08-31 RX ADMIN — FUROSEMIDE 10 MG/HR: 10 INJECTION, SOLUTION INTRAVENOUS at 06:07

## 2018-08-31 RX ADMIN — PROPOFOL 10 MCG/KG/MIN: 10 INJECTION, EMULSION INTRAVENOUS at 16:18

## 2018-08-31 RX ADMIN — FUROSEMIDE 10 MG/HR: 10 INJECTION, SOLUTION INTRAVENOUS at 14:44

## 2018-08-31 RX ADMIN — VANCOMYCIN HYDROCHLORIDE 1000 MG: 1 INJECTION, SOLUTION INTRAVENOUS at 11:40

## 2018-08-31 RX ADMIN — VALGANCICLOVIR HYDROCHLORIDE 450 MG: 50 POWDER, FOR SOLUTION ORAL at 09:15

## 2018-08-31 RX ADMIN — DEXTROSE AND SODIUM CHLORIDE: 5; 450 INJECTION, SOLUTION INTRAVENOUS at 05:05

## 2018-08-31 RX ADMIN — HYDROMORPHONE HYDROCHLORIDE 1 MG: 1 INJECTION, SOLUTION INTRAMUSCULAR; INTRAVENOUS; SUBCUTANEOUS at 00:22

## 2018-08-31 ASSESSMENT — ACTIVITIES OF DAILY LIVING (ADL)
ADLS_ACUITY_SCORE: 9

## 2018-08-31 NOTE — PHARMACY-CONSULT NOTE
Pharmacy Tube Feeding Consult    Medication reviewed for administration by feeding tube and for potential food/drug interactions.    Recommendation: No changes are needed at this time.     Pharmacy will continue to follow as new medications are ordered.    Tamica Saini, SaniyaD

## 2018-08-31 NOTE — H&P
SURGICAL ICU H&P  August 30, 2018      CO-MORBIDITIES:   No diagnosis found.    ASSESSMENT: Sherrie Lala is a 63 year old female with hx of metastatic cholangiocarcinoma with subsequent liver failure POD # 0 s/p DDLT with intraop blood loss of 3L and temporary abdominal closure.    PLAN:  Neuro/ pain/ sedation:  #acute postop pain  - Monitor neurological status. Notify the MD for any acute changes in exam.  - fentanyl for pain.  - propofol for sedation.    Pulmonary care:   - Mechanically ventilated while abdomen open  - No acute issues/concerns    Cardiovascular:    - Monitor hemodynamic status.   - No acute issues/concerns  - MAPs >65    GI care:   #s/p DDLT  - NPO except ice chips and medications.    Fluids/ Electrolytes/ Nutrition:   #hepatorenal syndrome  - D5 1/2NS at 100/hr for IV fluid hydration  - Electrolyte replacement protocol  - No indication for parenteral nutrition.  - Lasix gtt at 10mg/hr per Dr. Rod    Renal/ Fluid Balance:    #hepatorenal syndrome  - Urine output is adequate so far.  - Will continue to monitor intake and output.    Endocrine:    - Insulin gtt    ID/ Antibiotics:  - vanc/zosyn/diflucan per transplant    Heme:   #acute blood loss anemia    - Hemoglobin stable  - Will continue to closely monitor given high EBL and active bleeding  Intraop:      Colloid (mL) 3200   RBC (mL) 3289   FFP (mL) 4208   Cryoprecipitate (mL)  109   Platelets (mL) 1489     MSK:  -PT/OT    Prophylaxis:    - Mechanical prophylaxis for DVT.   - No chemical DVT prophylaxis due to high risk of bleeding.   - PPI    Lines/ tubes/ drains:  - PIVs  - Bruner  - MACs x2  - ETT  -A-line    Disposition:  - Surgical ICU    Patient seen, findings and plan discussed with ICU staff Dr. Velarde.    John Shah MD  PGY-2 Surgery  pager [phone]    - - - - - - - - - - - - - - - - - - - - - - - - - - - - - - - - - - - - - - - - - - - - - - - - - - - - - - - - -     PRIMARY TEAM: Liver Transplant  PRIMARY PHYSICIAN:   Viola    REASON FOR CRITICAL CARE ADMISSION: Postop critical care   ADMITTING PHYSICIAN: Dr. Velarde    HISTORY PRESENTING ILLNESS: Sherrie Lala is a 63 year old female who is s/p  donor liver transplantation. 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 She had a persistent right hepatic hydrothorax that failed pleurodesis. She had a pleur-X catheter placed which was draining up to 1L of fluid per day, but was removed recently after output had decreased to less than 300 ml/day. Patient reported pre-op of having a 1 week episode of loose stools, subjective, fevers, confusion, nausea without vomiting, and general malaise. She treated herself with increased fluid intake and symptomatic OTC medications. Since then she has had no recurrence of symptoms and feels back to her usual state of health. Patient also reports improvement of lower extremity edema after receiving albumin infusions. She is now POD0 s/p  donor liver transplant, intraop she was found to have a large portal vein thrombosis. The donor liver was also noted to be large. She had ~3L of blood loss intraop and received multiple rounds of blood products, see above. She was left open given significant bleeding and concern for return to OR.     REVIEW OF SYSTEMS: Unable to attain due to sedation.    PAST MEDICAL HISTORY:   Past Medical History:   Diagnosis Date     Asthma      Cholangiocarcinoma (H) 2014     Cirrhosis of liver with ascites (H) 5/10/2018     Encounter for pleural drainage tube placement      Esophageal varices with hemorrhage (H)      Gastric ulcer      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     Lung metastases (H) 2014     Portal vein thrombosis        SURGICAL HISTORY:   Past Surgical History:   Procedure Laterality Date     CHOLECYSTECTOMY       HEPATECTOMY PARTIAL         SOCIAL HISTORY: Unable to  attain due to sedation    FAMILY HISTORY: No reported bleeding/clotting disorders nor problems with anesthesia.     ALLERGIES:      Allergies   Allergen Reactions     Blood Transfusion Related (Informational Only) Other (See Comments)     Patient has a history of a clinically significant antibody against RBC antigens.  A delay in compatible RBCs may occur.     Dilaudid [Hydromorphone] Itching       MEDICATIONS:    No current facility-administered medications on file prior to encounter.   Current Outpatient Prescriptions on File Prior to Encounter:  albuterol (PROAIR HFA/PROVENTIL HFA/VENTOLIN HFA) 108 (90 BASE) MCG/ACT Inhaler Inhale 2 puffs into the lungs every 6 hours   CIPROFLOXACIN PO Take 750 mg by mouth once a week   FUROSEMIDE PO Take 80 mg by mouth 2 times daily    GABAPENTIN PO Take 300 mg by mouth At Bedtime   OMEPRAZOLE PO Take 20 mg by mouth 2 times daily (before meals)   PRAMIPEXOLE DIHYDROCHLORIDE PO Take 0.5 mg by mouth daily   RIFAMPIN PO Take 300 mg by mouth daily   RifAXIMin (XIFAXAN PO) Take 550 mg by mouth 2 times daily   SPIRONOLACTONE PO Take 100 mg by mouth daily   ZOLPIDEM TARTRATE PO Take 10 mg by mouth nightly as needed for sleep   Potassium Chloride ER 20 MEQ TBCR Take 1 tablet (20 mEq) by mouth daily       PHYSICAL EXAMINATION:  Temp:  [97.8  F (36.6  C)-98.7  F (37.1  C)] 97.8  F (36.6  C)  Pulse:  [73-85] 85  Resp:  [16] 16  BP: (103-126)/(46-71) 109/68  SpO2:  [96 %-98 %] 97 %    General: Sedated, well-appearing female in no acute distress.  Neuro: No focal neuro deficits noted.   Respiratory: Non-labored breathing. Lung sounds clear to auscultation bilaterally.  Cardiovascular: Regular rate and rhythm.   Gastrointestinal: Abdomen soft, moderately distended, non-tender to palpation.   Extremities: No limb deformities. 2+ pedal edema.  Skin: As noted above. No rashes or lesions appreciated.    LABS: Reviewed.   Arterial Blood Gases     Recent Labs  Lab 08/30/18 2130 08/30/18 2020  18   PH 7.30* 7.37 7.40   PCO2 34* 35 38   PO2 144* 169* 210*   HCO3 17* 20* 24     Complete Blood Count     Recent Labs  Lab 18   WBC  --  2.1*  --  2.7*   HGB 9.1* 6.9*  7.0* 8.2* 9.4*   PLT  --  68* 21* 37*     Basic Metabolic Panel    Recent Labs  Lab 18  0541 18    142 143  --  144   POTASSIUM 4.6 4.1 3.6 3.7 3.1*   CHLORIDE  --   --   --   --  106   CO2  --   --   --   --  26   BUN  --   --   --   --  19   CR  --   --   --   --  0.77   * 229* 64*  --  90     Liver Function Tests    Recent Labs  Lab 18   AST  --  23   ALT  --  24   ALKPHOS  --  115   BILITOTAL  --  3.0*   ALBUMIN  --  5.0   INR Canceled, Test credited 1.65*     Pancreatic Enzymes    Recent Labs  Lab 18   AMYLASE 82     Coagulation Profile    Recent Labs  Lab 18   INR Canceled, Test credited 1.65*   PTT Canceled, Test credited 36     Lactate  Invalid input(s): LACTATE    IMAGING:  Results for orders placed or performed during the hospital encounter of 18   XR Chest 2 Views    Narrative    Exam: XR CHEST 2 VW, 2018 9:55 PM    Indication:  donor transplant going to OR NOW;     Comparison: Radiograph of the chest 2018    Findings:   PA and lateral views of the chest. The cardiac mediastinal silhouette  is within normal limits. Pulmonary vasculature is distinct.  Hyperinflated lungs. No new focal airspace opacity. Blunting of the  right costophrenic angle. Surgical clips in the right lower quadrant  the abdomen with anastomotic staples.      Impression    Impression:   1. Decreased small right pleural effusion with overlying  atelectasis/consolidation.  2. Hyperinflated lungs with no new focal airspace opacity.    I have personally reviewed the examination and initial interpretation  and I agree with the  findings.    IFEOMA CARVAJAL MD

## 2018-08-31 NOTE — PROGRESS NOTES
Patient removed from [facility] waitlist after  donor liver transplant. [facility] ID [id number].   Donor PHS increased risk: YES.   If Yes, MD documentation Pending-Dr. Garcias notified..

## 2018-08-31 NOTE — ANESTHESIA CARE TRANSFER NOTE
Patient: Sherrie Lala    Procedure(s):  TRANSPLANT LIVER RECIPIENT  DONOR     Diagnosis: end stage liver   Diagnosis Additional Information: No value filed.    Anesthesia Type:   No value filed.     Note:  Airway :ETT  Patient transferred to:PACU  Comments: Patient transported to ICU without complication. Vital signs were stable and patient was placed on ICU ventilator with same vent settings as OR. Patient not reversed given open abdomen. She is on propofol gtt @ 80 mcg/kg/min. Report given to ICU nurse. Handoff Report: Identifed the Patient, Identified the Reponsible Provider, Reviewed the pertinent medical history, Discussed the surgical course, Reviewed Intra-OP anesthesia mangement and issues during anesthesia, Set expectations for post-procedure period and Allowed opportunity for questions and acknowledgement of understanding      Vitals: (Last set prior to Anesthesia Care Transfer)    CRNA VITALS  2018 0112 - 2018 0204      2018             Ht Rate: (!)  0                Electronically Signed By: Delfino Jimenez MD  2018  2:04 AM

## 2018-08-31 NOTE — ANESTHESIA PREPROCEDURE EVALUATION
Anesthesia Evaluation     . Pt has had prior anesthetic. 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 Other pulmonary disease cholcangiocarcinoma with pulmonary mets s/p resection.    Neurologic:  - neg neurologic ROS     Cardiovascular:  - neg cardiovascular ROS       METS/Exercise Tolerance:     Hematologic: Comments: Multiple antibodies. Difficult crossmatch. Blood bank has 50 units crossmatched.     (+) History of blood clots Anemia, History of Transfusion Other Hematologic Disorder-thrombocytopenia      Musculoskeletal:   (+) , , other musculoskeletal- osteoporosis      GI/Hepatic: Comment: Cholangiocarcinoma s/p partial hepatectomy c/b portal v. Thrombus and ESLD    (+) liver disease,       Renal/Genitourinary:  - ROS Renal section negative       Endo:  - neg endo ROS       Psychiatric:  - neg psychiatric ROS       Infectious Disease:         Malignancy:   (+) Malignancy History of GI  GI CA Remission status post,         Other:                     Physical Exam  Normal systems: cardiovascular and pulmonary    Airway   Mallampati: II  TM distance: >3 FB  Neck ROM: full    Dental     Cardiovascular   Rhythm and rate: regular and normal      Pulmonary    breath sounds clear to auscultation(-) no wheezes and no rales                    Anesthesia Plan      History & Physical Review      ASA Status:  4 emergent.    NPO Status:  Waived due to emergency    Plan for General, RSI and ETT with Intravenous induction. Maintenance will be Balanced.    PONV prophylaxis:  Ondansetron (or other 5HT-3), Dexamethasone or Solumedrol and Other (See comment) (propofol infusion)  Additional equipment: 2nd IV, Arterial Line, Central Line, CVP, PA Catheter and JUVENAL      Postoperative Care  Postoperative pain  management:  IV analgesics and Multi-modal analgesia.      Consents  Anesthetic plan, risks, benefits and alternatives discussed with:  Patient.  Use of blood products discussed: Yes.   Use of blood products discussed with Patient.  Consented to blood products.  .          MELD-Na score: 17 at 8/31/2018  2:12 AM  MELD score: 17 at 8/31/2018  2:12 AM  Calculated from:  Serum Creatinine: 0.77 mg/dL (Rounded to 1) at 8/29/2018  8:58 PM  Serum Sodium: 146 mmol/L (Rounded to 137) at 8/31/2018  2:12 AM  Total Bilirubin: 3.0 mg/dL at 8/29/2018  8:58 PM  INR(ratio): 1.70 at 8/31/2018  2:12 AM  Age: 63 years    Difficult crossmatch due to both E (20% donor match) and c (15% donor match) antibodies. Dissection will be difficult and anhepatic phase will be prolonged due to Hx Budd Chiari. Blood bank has 50 units crossed. Discussed with the patient and Dr. Rod that this is an extremely high risk case.    ___________________   Peng Greenfield MD          Pager: [phone]    Late entry note due to emergent need for patient care.

## 2018-08-31 NOTE — PROGRESS NOTES
Immunosuppression Note:    Sherrie Lala is a 63 year old female who is seen today  for immunosuppression management     I, Darren Rod MD, I have examined the patient with the resident/PA/Fellow, discussed and agree with the note and findings.  I have reviewed today's vital signs, medications, labs and imaging. I reviewed the immunosuppression medications and levels. I spoke to the patient/family and explained below clinical details and answered all the questions      Transplant Surgery  Inpatient Daily Progress Note  08/31/2018    Assessment & Plan: Sherrie Lala is a 63-year old female with a history of metastatic cholangiocarcinoma with subsequent liver failure who is now s/p DDLT on 8/30/18. The patient had significant intraoperative blood loss (~3L) and her abdomen was left open.    Graft function: Fair, stable. Tbili elevated at 3.7, alk phos is normal, and ALT/AST are 195/435 today. Liver US POD#1 with limited views, and a persistent eccentric thrombus in the intrahepatic main portal vein extending into the right portal vein. Starting heparin drip at 400u/hr today for HAT ppx.  Immunosuppression management:  - Receiving induction with steroid taper, and will give a dose of Basiliximab today to delay initiation of Tacrolimus.  - MMF: Continue 1gm BID.  - Tacrolimus: Will plan to start Tacrolimus tomorrow (9/1). Goal range 10-15.  Complexity of management:Medium. Contributing factors: thrombocytopenia, anemia and organ dysfunction  Hematology: Significant bleeding intra-op, requiring 11u PRBCs and other blood products. Hgb now stable in the 12-13 range.  Thrombocytopenia: Patient with baseline platelet count ~30 prior to transplant. Platelet count 82 today, received multiple platelet transfusions intra-op. Continue to monitor.  Cardiorespiratory: Stable, SBP in the 90s-110s range without pressors. Remains intubated.  GI/Nutrition: NPO, ICU to place [place] tube today for initiation of tube feedings. Abdomen  left open due to significant bleeding intra-op, plan for return to OR for washout and possible closure tomorrow (9/1).  Endocrine: On insulin drip while on steroid taper.  Fluid/Electrolytes: MIVF: On D545 @ 100ml/hr, recommend decreasing or discontinuing. On Lasix drip at 10mg/hr due to significant volume of blood products administered during surgery. Electrolytes WNL.  : Bruner in place, good UOP.  Infectious disease: Afebrile, normal WBC count. On Vanco, Zosyn, and Fluconazole for 3 days for surgical ppx.  Prophylaxis: GI, DVT, heparin, Bactrim, Valcyte  Disposition: 4A    Medical Decision Making: High  Subsequent visit 58007 (moderate level decision making)    DREA: Aurelia Dunn NP x [phone]  Fellow: Derik Garcias MD x [phone]  Faculty: Darren Rod M.D.  __________________________________________________________________  Transplant History: Admitted 8/29/2018 for orthotopic liver transplantation.   The patient has a history of liver failure due to cholangiocarcinoma.    8/30/2018 (Liver), Postoperative day: 1     Interval History:   Remains intubated, alert and following commands. Off pressors, good UOP. Indicates abdominal pain.    ROS:   A 10-point review of systems was negative except as noted above.    Meds:    docusate  100 mg Oral BID     fluconazole  400 mg Intravenous Q24H     [START ON 9/1/2018] methylPREDNISolone  200 mg Intravenous Once    Followed by     [START ON 9/2/2018] methylPREDNISolone  100 mg Intravenous Once    Followed by     [START ON 9/3/2018] methylPREDNISolone  50 mg Intravenous Once    Followed by     [START ON 9/4/2018] predniSONE  25 mg Oral Once    Followed by     [START ON 9/5/2018] predniSONE  10 mg Oral Once     multivitamins with minerals  15 mL Per Feeding Tube Daily     mycophenolate  1,000 mg Oral BID IS    Or     mycophenolate  1,000 mg Oral or NG Tube BID IS     pantoprazole (PROTONIX) IV  40 mg Intravenous Daily with breakfast     piperacillin-tazobactam  3.375 g  Intravenous Q6H     sennosides  10 mL Oral BID     sodium chloride (PF)  3 mL Intravenous Q8H     sulfamethoxazole-trimethoprim  1 tablet Oral Daily     valGANciclovir  450 mg Oral Daily    Or     valGANciclovir  450 mg Oral or NG Tube Daily     vancomycin (VANCOCIN) IV  1,000 mg Intravenous Q12H       Physical Exam:     Admit Weight: 55.2 kg (121 lb 9.6 oz)  Today's weight: 143 lbs 4.78 oz    Vital sign ranges:    Temp:  [98  F (36.7  C)-99.1  F (37.3  C)] 98.7  F (37.1  C)  Heart Rate:  [] 78  Resp:  [12-22] 18  BP: ()/(69-72) 93/69  MAP:  [64 mmHg-112 mmHg] 78 mmHg  Arterial Line BP: ()/(36-81) 103/62  FiO2 (%):  [30 %-40 %] 30 %  SpO2:  [94 %-100 %] 99 %    GENERAL: Intubated, in no apparent distress.  SKIN: No jaundice. No rashes or lesions.   HEENT: Eyes symmetrical and free of discharge bilaterally.  CV: Regular rate and rhythm.   RESPIRATORY: Nonlabored breathing on ventilator.   GI: Soft and non distended with normoactive bowel sounds. Abthera drain in place with sanguinous drainage.  : Bruner in place.  EXTREMITIES: Generalized trace edema.  NEUROLOGIC: Intubated, following commands. No focal deficits. Tremor absent.  MUSCULOSKELETAL: No joint swelling or tenderness.     Data:   CMP  Recent Labs  Lab 08/31/18  1513 08/31/18  0947 08/31/18  0553 08/31/18  0448 08/31/18  0212 08/30/18  2325  08/29/18  2058     --   --   --  146* 141  < > 144   POTASSIUM 4.0 4.4  --   --  3.5 3.5  < > 3.1*   CHLORIDE 109  --   --   --  109  --   --  106   CO2 28  --   --   --  25  --   --  26   GLC 96  --   --   --  168* 212*  < > 90   BUN 19  --   --   --  13  --   --  19   CR 0.96  --   --   --  0.66  --   --  0.77   GFRESTIMATED 59*  --   --   --  >90  --   --  76   GFRESTBLACK 72  --   --   --  >90  --   --  >90   SHELDON 7.9*  --   --   --  9.0  --   --  8.9   ICAW  --   --   --  5.3*  --  5.4*  < >  --    MAG 1.7  --   --   --  2.0  --   --  2.2   PHOS 4.9*  --   --   --  2.8  --   --  3.6   AMYLASE   --   --  39  --   --   --   --  82   ALBUMIN  --   --   --   --  2.0*  --   --  5.0   BILITOTAL  --   --   --   --  3.7*  --   --  3.0*   ALKPHOS  --   --   --   --  41  --   --  115   AST  --   --   --   --  435*  --   --  23   ALT  --   --   --   --  195*  --   --  24   < > = values in this interval not displayed.  CBC  Recent Labs  Lab 08/31/18  1513 08/31/18  0947   HGB 12.2 13.7   WBC 8.4 9.0   PLT 82* 105*     COAGS  Recent Labs  Lab 08/31/18  1513 08/31/18  0947   INR 1.49* 1.54*   PTT 33 30      Urinalysis  Recent Labs   Lab Test  02/26/18   0947   COLOR  Yellow   APPEARANCE  Clear   URINEGLC  Negative   URINEBILI  Negative   URINEKETONE  Negative   SG  1.017   UBLD  Negative   URINEPH  5.0   PROTEIN  Negative   NITRITE  Negative   LEUKEST  Negative   RBCU  1   WBCU  1   UTPG  0.06     Virology:  Hepatitis C Antibody   Date Value Ref Range Status   08/29/2018 Nonreactive NR^Nonreactive Final     Comment:     Assay performance characteristics have not been established for newborns,   infants, and children

## 2018-08-31 NOTE — PROCEDURES
Small Bowel Feeding Tube Placement Assessment  Reason for Feeding Tube Placement: Provider request for post-pyloric FT  Cortrak Start Time: 13:02   Cortrak End Time: 13:08  Medicine Delivered During Procedure: Lidocaine  Placement Successful:  Presume post-pyloric (pending AXR confirmation).    Procedure Complications: None  Final Placement Flako at exit of nare 92 cm  Face to Face time with patient: 15 minutes    Lori Rothman RD, LD  SICU RD Pgr: [phone]

## 2018-08-31 NOTE — ANESTHESIA POSTPROCEDURE EVALUATION
Patient: Sherrie Lala    Procedure(s):  TRANSPLANT LIVER RECIPIENT  DONOR     Diagnosis:end stage liver   Diagnosis Additional Information: No value filed.    Anesthesia Type:  No value filed.    Note:  Anesthesia Post Evaluation    Patient location during evaluation: ICU  Patient participation: Unable to evaluate secondary to administered sedation  Level of consciousness: obtunded/minimal responses  Pain management: adequate  Airway patency: patent  Cardiovascular status: acceptable and hemodynamically stable  Respiratory status: acceptable, ETT and ventilator  Hydration status: euvolemic  PONV: none     Anesthetic complications: None    Comments: Abdomen left open. NMB not reversed.        Last vitals:  Vitals:    18 0743 18 1204 18 1538   BP: 104/56 103/46 109/68   Pulse: 73 76 85   Resp: 16 16 16   Temp: 37.1  C (98.7  F) 36.8  C (98.2  F) 36.6  C (97.8  F)   SpO2: 97% 96% 97%         Electronically Signed By: Shahid Hampton MD  2018  2:31 AM

## 2018-08-31 NOTE — PROGRESS NOTES
Final positive donor urine and sputum culture results have been uploaded into [facility]. Donor ID [id number].  Dr. Garcias notified of results.

## 2018-08-31 NOTE — PROGRESS NOTES
Critical Care Services Progress Note:  I personally examined and evaluated the patient today. I formulated today s plan with the house staff team or resident(s) and agree with the findings and plan in the associated note (see separately attested resident note).   I have evaluated all laboratory values and imaging studies from the past 24 hours.  Summary of hospital course:  63F h/o cholangioCa causing Budd-Chiari syndrome and subsequent liver failure underwent OLT this evening.  Significant blood loss during procedure, ~3L.  11u prbc given intraop.  Because of this as well as large size of donor liver abdomen was left open for re-exploration likely in 24-48 hours.  She arrives to the ICU hemodynamically stable, but remains mechanically ventilated.  Overnight events/pertinent findings today:  See above.  Data  bp stable off pressors.  satting acceptably on 40% and peep5.  Adequate uop.  Cardiac index slightly depressed but acceptable at 3.2.  Labs (personally reviewed):  Lytes ok.  Creat stable 0.66.  Lactate downtrending to 3.1.  Glucose elevated 175.  abg with mild mixed alkalosis: 7.46/37/170/26.  hgb 11.2, pltlts ok 65.  inr 1.70; ptt 39; fibrinogen 217.  Ical ok 5.4.  CXR (personally reviewed):  Lungs clear.  ETT in acceptable position.    Assessment/plan:  1.  Hypoxemic respiratory failure, acute, vent-dependent:  Unable to extubate post-operatively on pathway.  Likely a combination of pulmonary edema from product administered, and mass effect from abdomen.  Stable on ventilator for now.  Diuresing as permitted by hemodynamics.    2.  Mixed alkalosis:  Decreased vent TV to 400 (6-8 ml/kg ideal body weight).  Decreased rate as well.  Recheck abg in 30 min.  3.  Intraop blood loss:  HD stable for now and hgb stable.  Keep pltlts > 50, INR < 2, fibrinogen > 200; or as dictated by transplant surgery svc.  Trend ionized calcium.  Rest per resident note.   I spent a total of 35 minutes (excluding procedure time)  personally providing and directing critical care services at the bedside and on the critical care unit for this patient.     Cj Velarde

## 2018-08-31 NOTE — PHARMACY-VANCOMYCIN DOSING SERVICE
Pharmacy Vancomycin Initial Note  Date of Service 2018  Patient's  1955  63 year old, female    Indication: Surgical Prophylaxis    Current estimated CrCl = Estimated Creatinine Clearance: 81.1 mL/min (based on Cr of 0.66).    Creatinine for last 3 days  2018:  8:58 PM Creatinine 0.77 mg/dL  2018:  2:12 AM Creatinine 0.66 mg/dL    Recent Vancomycin Level(s) for last 3 days  No results found for requested labs within last 72 hours.      Vancomycin IV Administrations (past 72 hours)                   vancomycin (VANCOCIN) 1000 mg in dextrose 5% 200 mL PREMIX (mg) 1,000 mg Given 18 2337                Nephrotoxins and other renal medications (Future)    Start     Dose/Rate Route Frequency Ordered Stop    18 1100  vancomycin (VANCOCIN) 1000 mg in dextrose 5% 200 mL PREMIX      1,000 mg  200 mL/hr over 1 Hours Intravenous EVERY 12 HOURS 18 1052      18 0500  piperacillin-tazobactam (ZOSYN) 3.375 g vial to attach to  mL bag      3.375 g  over 30 Minutes Intravenous EVERY 6 HOURS 18 0152 18 0459    18 0500  furosemide (LASIX) 100 mg in sodium chloride 0.9 % 100 mL infusion      10 mg/hr  10 mL/hr  Intravenous CONTINUOUS 18 0328            Contrast Orders - past 72 hours     None                Plan:  1.  Start vancomycin  1000 mg (~15 mg/kg) IV q12h  2.  Goal Trough Level: 15-20 mg/L   3.  Pharmacy will check trough levels as appropriate in 1-3 Days.    4. Serum creatinine levels will be ordered daily for the first week of therapy and at least twice weekly for subsequent weeks.    5. [place] method utilized to dose vancomycin therapy: Method 2          Eleanor Broadbent, PharmD, [facility]  PGY-2 Infectious Diseases Pharmacy Resident

## 2018-08-31 NOTE — ANESTHESIA PROCEDURE NOTES
Central Line Procedure Note  Staff:     Anesthesiologist:  OLY ANAND    Resident/CRNA:  [employer]OPER [employer]    Central line placed by Resident/CRNA in the presence of a teaching physician    Location: In OR after induction  Procedure Start/Stop Times:     patient identified, IV checked, risks and benefits discussed, informed consent, monitors and equipment checked, pre-op evaluation and at physician/surgeon's request      Correct Patient: Yes      Correct Position: Yes      Correct Site: Yes      Correct Procedure: Yes      Correct Laterality:  N/A    Site Marked:  N/A  Line Placement:     Procedure:  Central Line    Insertion laterality:  Right    Insertion site:  Internal Jugular    Position:  Trendelenburg      Maximal Sterile Barriers: All elements of maximal sterile barrier technique followed      (Maximal sterile barriers include:   Sterile gown, Sterile Gloves, Mask, Cap, Whole body draped, hand hygiene and acceptable skin prep).Skin Prep: Chloraprep         Injection Technique:  Ultrasound guided    Sterile Ultrasound Technique:  Sterile probe cover and Sterile gel    Vein evaluated via U/S for patency/adequacy of catheter insertion and is adequate.  Using realtime U/S imaging the vein was punctured, and needle was observed entering vein on U/S      Permanent Image entered into patient's record      Catheter size:  9 Fr, 2 lumen 11.5 cm (MAC)    Cath secured with: suture      Dressing:  Tegaderm and Biopatch    Complications:  None obvious    Blood aspirated all lumens: Yes      All Lumens Flushed: Yes      Verification method:  Placement to be verified post-op and X-ray    Person verifying:  Jean Pierre  Assessment/Narrative:      [facility] CVC #1 for [place] and PA catheter.

## 2018-08-31 NOTE — OP NOTE
Procedure Date: 08/31/2018      DATE OF PROCEDURE:  08/30/2018      PREOPERATIVE DIAGNOSES:     1.  End-stage liver disease, due to cirrhosis.   2.  History of cholangiocarcinoma.   3.  History of left hepatectomy.   4.  Portal vein thrombosis, complete.      POSTOPERATIVE DIAGNOSIS:     1.  End-stage liver disease, due to cirrhosis.   1.  History of cholangiocarcinoma.   2.  History of left hepatectomy.   3.  Portal vein thrombosis, complete.   4.  Frozen abdomen, with very severe portal hypertension.      PROCEDURES PERFORMED:   1.  Liver transplant. Complex surgery due to previous complete portal vein thrombosis  2.  Adhesiolysis for greater than 60 minutes.   3. Portal vein endovein thrombectomy  4.  Full venovenous  bypass, both systemic and portal bypass.   5. Closure of the abdomen with a apthera     SURGEON:  Darren Rod MD    Assistant:  Dr. Derik Garcias and Dr. Umu Bowie     INDICATION FOR PROCEDURE:  Sherrie Lala is a 63-year-old lady, with end-stage liver disease and a complicated medical history with portal vein thrombosis.  She was evaluated and listed for a liver transplant.  I met with her during evaluation today as well and I explained to her the risks, benefits, and alternatives of liver transplantation, including the risk of intraoperative and postoperative death.  The patient understood the risks and consented for the procedure.      OPERATIVE FINDINGS:   1.  The patient had a completely frozen abdomen.   2.  There were very severe vascular collaterals due to the portal vein thrombosis completely encasing the liver.   3.  The portal vein were completely thrombosed.  After thrombectomy, the flow was about a liter.   4.  The hepatic artery was intact.   5.  The liver was very shriveled and walnut sized.     6.  After reperfusion of the liver, the liver swelled up so we had to close the patient with wound vac Apthera           OPERATIVE PROCEDURE:  The patient was brought to the  operating room, placed in the supine position.  General anesthesia was administered.  Sterile drapes were placed placed.  We opened the abdomen by bilateral subcostal incision with a vertical T extension.  The skin was incised with a knife.  The subcutaneous tissues and abdominal muscles were incised with cautery.  Upon opening the abdomen, we found very severe portal hypertension, with significant vascular collaterals.  The entire omentum and the colon was stuck to the undersurface of the liver.  There were also very dense adhesions within the liver and the undersurface of the diaphragm.  We started mobilizing these collaterals and we used essentially vascular staplers to divide these adhesions.  We then started dissecting on the right side, then packing, and then going on the left side.  We then carefully identified, ligated, and divided the hepatic artery.  We then identified, ligated, and divided the bile duct.  After that, we went posterior to the bile duct and identified a cord-like structure which was a portal vein.  We divided at the hilum.  We essentially opened the portal vein and did a portal vein endovein thrombectomy.  After doing the thrombectomy the flow in the portal vein appeared satisfactory.  We placed a bypass cannula and then proceeded to go on venovenous portal bypass.  After this, there was mobilizing.  There was very severe bleeding.  There was almost a liter of bleeding within about 2 minutes.  So, at this point I packed the abdomen, allowed the anesthesiologist to catch up, and then placed the 19-English femoral cannula in the right femoral vein.  Both of them were connected and we went full venovenous  bypass.  Once we did that, the bleeding somewhat decreased.  We then proceeded to ligate all the collaterals and mobilized the liver.  The liver was very densely stuck to the kidney and with mobilizing the adrenal gland was torn.  So, we essentially put some sutures to control the bleeding.   We then very rapidly placed clamps intrahepatic and suprahepatic, and controlled the bleeding.  After that, I mobilized the retrohepatic cava.  We then excised the liver.  After the excision of the liver, the bleeding somewhat improved.  We then spent a considerable amount of time securing hemostasis using argon beam coagulator and multiple sutures.  The liver was then brought into the field.  Because of the previous surgery as well as the small recipient liver, the patient did not have a lot of abdominal domain.  We constructed the suprahepatic caval anastomosis using 4-0 Prolene.  We then constructed the intrahepatic caval anastomosis using 4-0 Prolene.  During that time, we constructed the caval anastomosis, we flushed the liver with albumin solution.  We then freshened the edges of the donor portal vein and performed an end-to-end anastomosis.  The donor portal vein was about 5 mm.  The recipeint portal vein was about 6 mm.  Once we did this, we opened clamps upper cava  followed by lower cava and then the portal vein.  The liver was a little bit slow to flush.  We rapidly performed the arterial anastomosis.  The arterial anastomosis was performed between the donor celiac axis and the recipient right hepatic artery.  The artery actually was good size and once we did the anastomosis there was an excellent thrill in the artery.  After that, we did a standard cholecystectomy.  Then, we spent a considerable amount of time securing hemostasis of the upper cava, lower cava, and retroperitoneum, and then we went ahead and did that the bile duct anastomosis.  Since the patient was not very stable, we did the bile duct with continuous 5-0 Prolene.  No stent was placed.  After this, we brought an ultrasound into the room to check the portal vein patency.  Ultrasonograph said that there was antegrade flow in the portal vein, but it was very diminished.  So, I essentially placed 7-0 prolene  stay sutures , did a portal vein  [place] passed a  5-Czech Sukumar.  I then introduced the Fogerty balloon.  We ballooned the anastomosis and went about 5 cm into the portal vein, but no clot was seen.  After this, we repeated the ultrasound.  The ultrasound showed that the flow in the portal vein was improved.  There was good arterial flow and the portal vein flow was antegrade.  We then again several miniutes securing  hemostasis and after this we felt that the liver was quite swollen, so we decided to close the abdomen with Apthera dressing.  We used two quick clot mops, one on the retroperitoneum and one on the right side.  An Apthera was placed.  The patient received 11 units of PRBCs and multiple other blood products per anesthesia, see anesthesia's chart.  The patient was not on any pressors and was shifted to the ICU in a stable but critical condition.        I explaied the details of the procedure to the family and  answered all of their questions.         MAHAD SALDIVAR MD             D: 2018   T: 2018   MT: YESSENIA      Name:     REX DAMON   MRN:      [id number]        Account:        [id number]   :      1955           Procedure Date: 2018      Document: [id number]

## 2018-08-31 NOTE — ANESTHESIA PROCEDURE NOTES
JUVENAL Probe Insertion Note  Probe Inserted by: OLY ANAND   Insertion method: JUVENAL probe easily inserted   Bite block used:   Yes      Probe type:  Adult 2D   Insertion complications: No complications.      JUVENAL report NOT being generated

## 2018-08-31 NOTE — PROGRESS NOTES
SURGICAL ICU PROGRESS NOTE  August 31, 2018      CO-MORBIDITIES:   Liver transplanted (H)  (primary encounter diagnosis)    ASSESSMENT: Sherrie Lala is a 63 year old female with hx of metastatic cholangiocarcinoma with subsequent liver failure s/p DDLT with intraop blood loss of 3L and temporary abdominal closure.    TODAY'S PROGRESS/PLANS:   - Continue aggressive diuresis  - FT placement today  - Pressure support this AM  - Continue serial labs  - Start bowel regimen  - Start heparin infusion set rate per transplant    PLAN:  Neuro/ pain/ sedation:  #acute postop pain  - Monitor neurological status. Notify the MD for any acute changes in exam.  - fentanyl gtt for pain.  - propofol gtt for sedation.     Pulmonary care:   #Post operative ventilator management  - Remains mechanically ventilated pending RTOR tomorrow.   - CMV 18/400/5/30. Daily PST  - Ventilator bundle.      Cardiovascular:    - Monitor hemodynamic status.   - No acute issues/concerns  - MAPs >65 off pressors.      GI care:   #s/p DDLT  - NPO except ice chips and medications.  - Immunosuppression management per transplant- Mycophenolate, methylpred  - Abdomen open. RTOR tomorrow for potential closure.   - Start bowel regimen     Fluids/ Electrolytes/ Nutrition:   #Fluid overload  - TKO IVF  - Electrolyte replacement protocol  - No indication for parenteral nutrition.  - Lasix gtt at 10mg/hr currently. Remains 10L positive.   - Follow electrolytes closely, repeat this afternoon.     Renal/ Fluid Balance:    #Hepatorenal syndrome  - Urine output is adequate so far.  - Will continue to monitor intake and output.     Endocrine:    #Stress hyperglycemia  - Insulin gtt  - Induction steroids per transplant.     ID/ Antibiotics:  #DDLT  - Vancomycin, Zosyn, and Fluconazole per primary team. Continue until abdomen closed  - Valcyte and Bactrim for prophylaxis     Heme:   #acute blood loss anemia    - Hemoglobin stable 13.7. Platelets 105K. INR 1.54  - Will  continue to closely monitor given high EBL   - Started on heparin gtt at 400 units/hr per transplant  Intraop:                            Colloid (mL) 3200   RBC (mL) 3289   FFP (mL) 4208   Cryoprecipitate (mL)  109   Platelets (mL) 1489      MSK:  -PT/OT     Prophylaxis:    - Mechanical prophylaxis for DVT.   - No chemical DVT prophylaxis due to high risk of bleeding.   - PPI     Lines/ tubes/ drains:  - PIVs  - Sanchez  - MACs x2  - ETT  -A-line     Disposition:  - Surgical ICU     Patient seen, findings and plan discussed with ICU staff Dr. Edwin Amado  CC time 35 minutes  ====================================    SUBJECTIVE:   No acute events overnight. Awake, answering questions. Denies dyspnea, chest pain, abdominal pain, nausea    OBJECTIVE:   1. VITAL SIGNS:   Temp:  [97.8  F (36.6  C)-99.1  F (37.3  C)] 99.1  F (37.3  C)  Pulse:  [76-85] 85  Heart Rate:  [70-91] 91  Resp:  [16-22] 18  BP: ()/(46-72) 93/69  MAP:  [67 mmHg-112 mmHg] 80 mmHg  Arterial Line BP: ()/(36-71) 108/63  FiO2 (%):  [30 %-40 %] 30 %  SpO2:  [94 %-100 %] 94 %  Ventilation Mode: CPAP/PS  (Continuous positive airway pressure with Pressure Support)  FiO2 (%): 30 %  Rate Set (breaths/minute): 18 breaths/min  Tidal Volume Set (mL): 400 mL  PEEP (cm H2O): 5 cmH2O  Pressure Support (cm H2O): 7 cmH2O  Oxygen Concentration (%): 30 %  Resp: 18    2. INTAKE/ OUTPUT:   I/O last 3 completed shifts:  In: 97310.63 [I.V.:606.63; Other:600]  Out: 4465 [Urine:3215; Drains:1250]    3. PHYSICAL EXAMINATION:   General: awake, NAD  Neuro: A&Ox3, moving all extremities, following commands strong and equal in all extremities, non focal,   Resp: Breathing non-labored, clear lung sounds  CV: RRR, S1 S2  Abdomen: Soft, Non-distended. Wound vac to suction.   : sanchez dannie   Extremities: warm and well perfused    4. INVESTIGATIONS:   Arterial Blood Gases     Recent Labs  Lab 08/31/18  0448 08/31/18  0154 08/30/18  2325 08/30/18  2235    PH 7.44 7.46* 7.39 7.29*   PCO2 39 37 37 39   PO2 133* 170* 143* 128*   HCO3 26 26 22 19*     Complete Blood Count     Recent Labs  Lab 08/31/18 0553 08/31/18 0212 08/30/18 2325 08/30/18 2239 08/30/18 2235 08/30/18 2020   WBC 5.5 3.6* 3.5*  --   --   --  2.1*   HGB 12.3 11.2* 9.9*  10.1*  --  8.2*  < > 6.9*  7.0*   PLT 71* 65* 66* 83*  --   --  68*   < > = values in this interval not displayed.  Basic Metabolic Panel    Recent Labs  Lab 08/31/18 0212 08/30/18 2325 08/30/18 2235 08/30/18 2130 08/29/18 2058   * 141 141 141  < > 144   POTASSIUM 3.5 3.5 3.6 4.6  < > 3.1*   CHLORIDE 109  --   --   --   --  106   CO2 25  --   --   --   --  26   BUN 13  --   --   --   --  19   CR 0.66  --   --   --   --  0.77   * 212* 232* 203*  < > 90   < > = values in this interval not displayed.  Liver Function Tests    Recent Labs  Lab 08/31/18 0553 08/31/18 0212 08/30/18 2325 08/30/18 2020 08/29/18 2058   AST  --  435*  --   --  23   ALT  --  195*  --   --  24   ALKPHOS  --  41  --   --  115   BILITOTAL  --  3.7*  --   --  3.0*   ALBUMIN  --  2.0*  --   --  5.0   INR 1.65* 1.70* 2.12* Canceled, Test credited 1.65*     Pancreatic Enzymes    Recent Labs  Lab 08/31/18 0553 08/29/18 2058   AMYLASE 39 82     Coagulation Profile    Recent Labs  Lab 08/31/18 0553 08/31/18 0212 08/30/18 2325 08/30/18 2020   INR 1.65* 1.70* 2.12* Canceled, Test credited   PTT 31 39* 81* Canceled, Test credited     Lactate  Invalid input(s): LACTATE    5. RADIOLOGY:   Recent Results (from the past 24 hour(s))   US Intraoperative    Narrative    EXAM: Intraoperative duplex liver  8/31/2018 12:17 AM      HISTORY: Evaluate portal venous flow new transplant    COMPARISON: None available    FINDINGS:   Intraoperative duplex images of the portal venous system before and  after thrombectomy were obtained.     The radiology resident was present for pre-thrombectomy images which  demonstrate partial thrombus in the main and right  portal veins. Slow  flow in the left portal vein without obvious thrombus.    Post thrombectomy images demonstrate marked decrease in thrombus  burden with blood flow velocities.    Minimal residual thrombus in the main and right portal veins. Slow  flow without obvious thrombus in the left portal vein.    Intrahepatic portal vein:  Patent continuous antegrade flow, 73  cm/sec.  Right portal vein flow is antegrade, measuring 27 cm/sec.  Left portal vein flow is antegrade, measuring 8 cm/sec.      Impression    IMPRESSION:   Post thrombectomy images demonstrate decreased portal venous system  thrombus burden with improved flow.  - Minimal residual thrombus in the main and right portal veins. Slow  flow without obvious thrombus in the left portal vein. Consider  short-term follow-up.    Findings discussed with Dr Rod in the OR.   XR Chest Port 1 View    Impression    IMPRESSION:   1.  Endotracheal tube tip 3.5 cm above the pauly. Additional support  devices as above.  2.  Postoperative edema.  3.  Postsurgical changes of liver transplant. Trace pneumoperitoneum,  likely postsurgical.       =========================================      Patient seen, findings and plan discussed with surgical ICU staff Dr. Pineda.

## 2018-08-31 NOTE — ANESTHESIA PROCEDURE NOTES
Arterial Line Procedure Note  Staff:     Anesthesiologist:  OLY ANAND    Resident/CRNA:  [employer]OPER [employer]    Arterial line performed by resident/CRNA in presence of a teaching physician    Location: In OR After Induction  Procedure Start/Stop Times:     patient identified, IV checked, site marked, risks and benefits discussed, informed consent, monitors and equipment checked, pre-op evaluation and at physician/surgeon's request      Correct Patient: Yes      Correct Position: Yes      Correct Site: Yes      Correct Procedure: Yes      Correct Laterality:  N/A    Site Marked:  N/A  Line Placement:     Procedure:  Arterial Line    Insertion Site:  Radial    Insertion laterality:  Left    Skin Prep: Chloraprep      Patient Prep: patient draped, mask, sterile gloves, hat and hand hygiene      Local skin infiltration:  None    Catheter size:  20 gauge, Quick cath    Cath secured with: suture      Dressing:  Tegaderm    Complications:  None obvious    Arterial waveform: Yes      IBP within 10% of NIBP: Yes

## 2018-08-31 NOTE — ANESTHESIA PROCEDURE NOTES
Central Line Procedure Note  Staff:     Anesthesiologist:  OLY ANAND    Resident/CRNA:  [employer]OPER [employer]    Central line placed by Resident/CRNA in the presence of a teaching physician    Location: In OR after induction  Procedure Start/Stop Times:     patient identified, IV checked, site marked, risks and benefits discussed, informed consent, monitors and equipment checked, pre-op evaluation and at physician/surgeon's request      Correct Patient: Yes      Correct Position: Yes      Correct Site: Yes      Correct Procedure: Yes      Correct Laterality:  N/A    Site Marked:  N/A  Line Placement:     Procedure:  Central Line    Insertion laterality:  Right    Insertion site:  Internal Jugular    Position:  Trendelenburg      Maximal Sterile Barriers: All elements of maximal sterile barrier technique followed      (Maximal sterile barriers include:   Sterile gown, Sterile Gloves, Mask, Cap, Whole body draped, hand hygiene and acceptable skin prep).Skin Prep: Chloraprep         Injection Technique:  Ultrasound guided and Seldinger Technique    Sterile Ultrasound Technique:  Sterile probe cover and Sterile gel    Vein evaluated via U/S for patency/adequacy of catheter insertion and is adequate.  Using realtime U/S imaging the vein was punctured, and needle was observed entering vein on U/S      Permanent Image entered into patient's record      Catheter size:  9 Fr, 2 lumen 11.5 cm (MAC)    Cath secured with: suture      Dressing:  Tegaderm    Complications:  None obvious    Blood aspirated all lumens: Yes      All Lumens Flushed: Yes      Verification method:  Placement to be verified post-op    Person verifying:  Jean Pierre  Assessment/Narrative:      [facility] CVC # 2 for portosystemic bypass and rapid infusion device.

## 2018-08-31 NOTE — TELEPHONE ENCOUNTER
Organ Offer Encounter Information    Organ Offer Information   Organ offer date & time:  8/29/2018  4:06 PM   Coordinator/Fellow/Attending name:  GIA PAZ   Organ(s):   Organ UNOS ID Match Run ID Comment Organ Laterality   Liver [id number] MNOP          Recent infections?:  No    New medications?:  No Recent pregnancy?:  No   Angicoagulation medications?:  No Recent vaccinations?:  No   Recent blood transfusions?:  No Recent hospitalizations?:  No   Has your insurance changed in the last 6-12 months?:  Neg    Discussed organ offer with:  Patient   Patient/Caregiver name:  Sherrie   Discussed risk category with Patient/Other:  [employer] Risk   Did not understand donor criteria, questions answered, verbalized understanding   Patient/Other asked to speak to a surgeon?:  No   Discussed program-specific outcomes:  Did not have questions regarding SRTR   Right to decline organ offer without penalty, Patient/Other:  Aware of option to decline without penalty   Organ offer decision status Patient/Other:  Accepted Offer   Organ disposition:  Transplanted   Additional Comments:  August 29, 2018 4:08 PM  Liver: Local, DBD, Primary  MD: Dieter/Viola  OPO Contact: Tamica Toure  Plan: Discussed offer with Dr. Rod, plan to call patient & if accepts, will admit for pre-op.  Donor OR TBD, pending thoracic allocation.  Called Sherrie to discuss offer, disclosed donor PHS increased risk status, verbalized understanding and acceptance of organ offer.  Admission instructions given, does NOT need to be NPO yet at this point.     Admissions: ETA 1830, [place]  Unit: [facility] notified  Update Provider Entering Orders:  Danika jonied, will need fXM ordered  Immunology: Linda daley, fXM ordered (Signed&Held)  OR:  Mayela Booked for 1600 on 8/30  Blood Bank: Wes daley  Research: N/A - not consented  TransNet/ABO Verification:  Done  Add Organ: Done  Lori Paz, RN   Transplant Coordinator         Attestation I  have discussed all of the above with the Patient/Legal Guardian/Caregiver regarding this organ offer.:  Yes   Coordinator/Fellow/Attending name:  GIA PAZ

## 2018-08-31 NOTE — PROCEDURES
Bridle Placement:   Reason for bridle placement: Securement of FT per RN   Medicine delivered during procedure: lubricating jelly   Procedure: Successful  Location of top of clip on FT: @ 93 cm marker   Condition of nose/skin at time of bridle placement: Unremarkable   Face to Face time with patient: <5 minutes.  Lori Rothman, RD, LD  SICU RD Pgr: [phone]

## 2018-08-31 NOTE — PROGRESS NOTES
I diIscussed an organ offer with Ms Sherrie Lala today. The donor is a CDC high risk donor. The nature of the known risk behaviors was disclosed. BELKIS testing is negative for HCV, HIV and other tests were reviewed. The risk of potential donor transmitted infection due to hi-risk status was discussed in detail, estimated overall as low, but higher than a donor without this designation. Ms Lala verbalized an understanding of this risk and wishes to proceed with transplantation with this donor.    Derik Garcias MD  Transplant Fellow  Pager# [phone]

## 2018-08-31 NOTE — PHARMACY-TRANSPLANT NOTE
Adult Liver Transplant Post Operative Note    63 year old female s/p  donor liver transplant on  for ESLD.      Planned immunosuppression regimen to include:   INDUCTION with: basiliximab on POD #1 (will reassess on POD#4 if second dose needed) and methylprednisolone/prednisone taper per liver transplant protocol  MAINTENANCE to include mycophenolate and tacrolimus (will likely be ordered to start ) with initial goal trough levels of 10-15 mcg/L for 0-3 months post-transplant.  Patient may continue prednisone at 5 mg PO daily until tacrolimus level is therapeutic.     Surgical prophylaxis includes: piperacillin-tazobactam, vancomycin, and fluconazole IV for 72 hours    Opportunistic pathogen prophylaxis includes: trimethoprim/sulfamethoxazole and valganciclovir. After patient completes IV fluconazole course topical antifungal (clotrimazole or nystatin) will be ordered.    Patient is not enrolled in medication study.    Pharmacy will monitor for medication interactions and immunosuppression levels in conjunction with the team. Medication therapy needs for discharge planning will continue to be addressed throughout the current admission via multidisciplinary rounds and order review.  Pharmacy will make recommendations as appropriate.        Eleanor Broadbent, PharmD, [facility]  PGY-2 Infectious Diseases Pharmacy Resident

## 2018-08-31 NOTE — PROGRESS NOTES
CLINICAL NUTRITION SERVICES - BRIEF NOTE    Nutrition Prescription    RECOMMENDATIONS FOR MDs/PROVIDERS TO ORDER:  -Free water flush adjustment per MD discretion pending sodium and fluid status  -Closely monitor electrolytes and replace as needed    Recommendations already ordered by Registered Dietitian (RD):  1. Once FT verified post-pyloric, start TF via NDT:  -Nutren 1.5 @ 10 mL/hr  -If pt tolerates, adv TF by 10 mL q8h to goal 50 mL/hr to provide 1800 kcals (33+ kcal/kg/day), 82 g PRO (1.5+ g/kg/day), 912 mL H2O, 211 g CHO and no fiber daily.  -1 packet Prosource TF to provide an additional 40 kcal and 11 g PRO to increase total provisions to 1840 kcal (33 kcal/kg) and 93 g PRO (1.7 g/kg)  -Order multivitamin/mineral (15 ml/day via FT) to help ensure micronutrient needs being met with suspected hypermetabolic demands and potential interruptions to TF infusions.  -30 mL water flush q4h for tube patency    Future/Additional Recommendations:  -FT position  -EN start, adv, lytes     Nutrition Progress Note - f/u for progress towards previous nutrition POC (see previous 8/30 reassessment for details)    Lori Rothman, RD, LD  SICU RD Pgr: [phone]

## 2018-08-31 NOTE — ANESTHESIA PROCEDURE NOTES
PA Catheter Insertion Note  Anesthesiologist: OLY ANAND  Resident:  [employer]   PA Catheter placed by resident/CRNA in the presence of a teaching physician.  Introducer: Introducer placed as part of procedure (SEE separate note)   Skin prep:  Chloraprep Cap, Full body drape, hand hygiene, Mask, Sterile gloves and Sterile gown    PA Catheter type:  CCO    Distance catheter advanced:  51 cm.    Appropriate RA, RV, PA  waveforms?: Yes    Dressing:  Biopatch and Tegaderm    Complications:  None apparent

## 2018-09-01 ENCOUNTER — APPOINTMENT (OUTPATIENT)
Dept: ULTRASOUND IMAGING | Facility: CLINIC | Age: 63
DRG: 005 | End: 2018-09-01
Attending: TRANSPLANT SURGERY
Payer: MEDICARE

## 2018-09-01 ENCOUNTER — APPOINTMENT (OUTPATIENT)
Dept: GENERAL RADIOLOGY | Facility: CLINIC | Age: 63
DRG: 005 | End: 2018-09-01
Attending: TRANSPLANT SURGERY
Payer: MEDICARE

## 2018-09-01 ENCOUNTER — ANESTHESIA (OUTPATIENT)
Dept: SURGERY | Facility: CLINIC | Age: 63
DRG: 005 | End: 2018-09-01
Payer: MEDICARE

## 2018-09-01 LAB
ALBUMIN SERPL-MCNC: 2.3 G/DL (ref 3.4–5)
ALP SERPL-CCNC: 41 U/L (ref 40–150)
ALT SERPL W P-5'-P-CCNC: 170 U/L (ref 0–50)
ANION GAP SERPL CALCULATED.3IONS-SCNC: 8 MMOL/L (ref 3–14)
APTT PPP: 32 SEC (ref 22–37)
APTT PPP: 37 SEC (ref 22–37)
AST SERPL W P-5'-P-CCNC: 107 U/L (ref 0–45)
BACTERIA SPEC CULT: NORMAL
BASE EXCESS BLDA CALC-SCNC: 1.8 MMOL/L
BASOPHILS # BLD AUTO: 0 10E9/L (ref 0–0.2)
BASOPHILS NFR BLD AUTO: 0 %
BASOPHILS NFR BLD AUTO: 0.1 %
BASOPHILS NFR BLD AUTO: 0.4 %
BILIRUB DIRECT SERPL-MCNC: 0.8 MG/DL (ref 0–0.2)
BILIRUB SERPL-MCNC: 1.5 MG/DL (ref 0.2–1.3)
BLD PROD TYP BPU: NORMAL
BLD UNIT ID BPU: 0
BLOOD PRODUCT CODE: NORMAL
BPU ID: NORMAL
BUN SERPL-MCNC: 23 MG/DL (ref 7–30)
CA-I BLD-MCNC: 4.3 MG/DL (ref 4.4–5.2)
CA-I BLD-MCNC: 4.7 MG/DL (ref 4.4–5.2)
CA-I SERPL ISE-MCNC: 4.1 MG/DL (ref 4.4–5.2)
CA-I SERPL ISE-MCNC: 4.2 MG/DL (ref 4.4–5.2)
CALCIUM SERPL-MCNC: 7.2 MG/DL (ref 8.5–10.1)
CHLORIDE SERPL-SCNC: 108 MMOL/L (ref 94–109)
CO2 SERPL-SCNC: 28 MMOL/L (ref 20–32)
CREAT SERPL-MCNC: 1.02 MG/DL (ref 0.52–1.04)
DIFFERENTIAL METHOD BLD: ABNORMAL
EOSINOPHIL # BLD AUTO: 0 10E9/L (ref 0–0.7)
EOSINOPHIL # BLD AUTO: 0.2 10E9/L (ref 0–0.7)
EOSINOPHIL # BLD AUTO: 0.4 10E9/L (ref 0–0.7)
EOSINOPHIL NFR BLD AUTO: 0.2 %
EOSINOPHIL NFR BLD AUTO: 1.7 %
EOSINOPHIL NFR BLD AUTO: 5.6 %
ERYTHROCYTE [DISTWIDTH] IN BLOOD BY AUTOMATED COUNT: 16.1 % (ref 10–15)
ERYTHROCYTE [DISTWIDTH] IN BLOOD BY AUTOMATED COUNT: 16.2 % (ref 10–15)
ERYTHROCYTE [DISTWIDTH] IN BLOOD BY AUTOMATED COUNT: 16.4 % (ref 10–15)
FIBRINOGEN PPP-MCNC: 304 MG/DL (ref 200–420)
FIBRINOGEN PPP-MCNC: 368 MG/DL (ref 200–420)
FIBRINOGEN PPP-MCNC: 481 MG/DL (ref 200–420)
GFR SERPL CREATININE-BSD FRML MDRD: 55 ML/MIN/1.7M2
GLUCOSE BLD-MCNC: 136 MG/DL (ref 70–99)
GLUCOSE BLDC GLUCOMTR-MCNC: 107 MG/DL (ref 70–99)
GLUCOSE BLDC GLUCOMTR-MCNC: 108 MG/DL (ref 70–99)
GLUCOSE BLDC GLUCOMTR-MCNC: 109 MG/DL (ref 70–99)
GLUCOSE BLDC GLUCOMTR-MCNC: 110 MG/DL (ref 70–99)
GLUCOSE BLDC GLUCOMTR-MCNC: 111 MG/DL (ref 70–99)
GLUCOSE BLDC GLUCOMTR-MCNC: 123 MG/DL (ref 70–99)
GLUCOSE BLDC GLUCOMTR-MCNC: 124 MG/DL (ref 70–99)
GLUCOSE BLDC GLUCOMTR-MCNC: 137 MG/DL (ref 70–99)
GLUCOSE BLDC GLUCOMTR-MCNC: 139 MG/DL (ref 70–99)
GLUCOSE BLDC GLUCOMTR-MCNC: 140 MG/DL (ref 70–99)
GLUCOSE BLDC GLUCOMTR-MCNC: 148 MG/DL (ref 70–99)
GLUCOSE BLDC GLUCOMTR-MCNC: 168 MG/DL (ref 70–99)
GLUCOSE BLDC GLUCOMTR-MCNC: 97 MG/DL (ref 70–99)
GLUCOSE SERPL-MCNC: 118 MG/DL (ref 70–99)
GRAM STN SPEC: NORMAL
HCO3 BLD-SCNC: 26 MMOL/L (ref 21–28)
HCT VFR BLD AUTO: 28.2 % (ref 35–47)
HCT VFR BLD AUTO: 30.9 % (ref 35–47)
HCT VFR BLD AUTO: 32.2 % (ref 35–47)
HGB BLD-MCNC: 10.2 G/DL (ref 11.7–15.7)
HGB BLD-MCNC: 10.5 G/DL (ref 11.7–15.7)
HGB BLD-MCNC: 10.7 G/DL (ref 11.7–15.7)
HGB BLD-MCNC: 9.2 G/DL (ref 11.7–15.7)
IMM GRANULOCYTES # BLD: 0 10E9/L (ref 0–0.4)
IMM GRANULOCYTES NFR BLD: 0.1 %
IMM GRANULOCYTES NFR BLD: 0.1 %
IMM GRANULOCYTES NFR BLD: 0.2 %
INR PPP: 1.33 (ref 0.86–1.14)
INR PPP: 1.33 (ref 0.86–1.14)
INR PPP: 1.41 (ref 0.86–1.14)
LACTATE BLD-SCNC: 0.9 MMOL/L (ref 0.7–2)
LIPASE SERPL-CCNC: 161 U/L (ref 73–393)
LYMPHOCYTES # BLD AUTO: 0.3 10E9/L (ref 0.8–5.3)
LYMPHOCYTES # BLD AUTO: 0.3 10E9/L (ref 0.8–5.3)
LYMPHOCYTES # BLD AUTO: 0.4 10E9/L (ref 0.8–5.3)
LYMPHOCYTES NFR BLD AUTO: 3.1 %
LYMPHOCYTES NFR BLD AUTO: 4.2 %
LYMPHOCYTES NFR BLD AUTO: 5.5 %
MAGNESIUM SERPL-MCNC: 2.3 MG/DL (ref 1.6–2.3)
MCH RBC QN AUTO: 27.8 PG (ref 26.5–33)
MCH RBC QN AUTO: 28.2 PG (ref 26.5–33)
MCH RBC QN AUTO: 28.2 PG (ref 26.5–33)
MCHC RBC AUTO-ENTMCNC: 32.6 G/DL (ref 31.5–36.5)
MCHC RBC AUTO-ENTMCNC: 33 G/DL (ref 31.5–36.5)
MCHC RBC AUTO-ENTMCNC: 33.2 G/DL (ref 31.5–36.5)
MCV RBC AUTO: 85 FL (ref 78–100)
MONOCYTES # BLD AUTO: 0.2 10E9/L (ref 0–1.3)
MONOCYTES # BLD AUTO: 0.3 10E9/L (ref 0–1.3)
MONOCYTES # BLD AUTO: 0.6 10E9/L (ref 0–1.3)
MONOCYTES NFR BLD AUTO: 3.5 %
MONOCYTES NFR BLD AUTO: 3.6 %
MONOCYTES NFR BLD AUTO: 8.1 %
NEUTROPHILS # BLD AUTO: 5.6 10E9/L (ref 1.6–8.3)
NEUTROPHILS # BLD AUTO: 5.7 10E9/L (ref 1.6–8.3)
NEUTROPHILS # BLD AUTO: 8 10E9/L (ref 1.6–8.3)
NEUTROPHILS NFR BLD AUTO: 80.3 %
NEUTROPHILS NFR BLD AUTO: 91.4 %
NEUTROPHILS NFR BLD AUTO: 91.9 %
NRBC # BLD AUTO: 0 10*3/UL
NRBC BLD AUTO-RTO: 0 /100
NUM BPU REQUESTED: 2
NUM BPU REQUESTED: 2
O2/TOTAL GAS SETTING VFR VENT: ABNORMAL %
PCO2 BLD: 38 MM HG (ref 35–45)
PH BLD: 7.44 PH (ref 7.35–7.45)
PHOSPHATE SERPL-MCNC: 3.8 MG/DL (ref 2.5–4.5)
PLATELET # BLD AUTO: 47 10E9/L (ref 150–450)
PLATELET # BLD AUTO: 48 10E9/L (ref 150–450)
PLATELET # BLD AUTO: 52 10E9/L (ref 150–450)
PLATELET # BLD AUTO: 53 10E9/L (ref 150–450)
PO2 BLD: 163 MM HG (ref 80–105)
POTASSIUM BLD-SCNC: 3.3 MMOL/L (ref 3.4–5.3)
POTASSIUM SERPL-SCNC: 3.4 MMOL/L (ref 3.4–5.3)
POTASSIUM SERPL-SCNC: 3.8 MMOL/L (ref 3.4–5.3)
PROT SERPL-MCNC: 4.7 G/DL (ref 6.8–8.8)
RADIOLOGIST FLAGS: ABNORMAL
RBC # BLD AUTO: 3.31 10E12/L (ref 3.8–5.2)
RBC # BLD AUTO: 3.62 10E12/L (ref 3.8–5.2)
RBC # BLD AUTO: 3.8 10E12/L (ref 3.8–5.2)
SODIUM BLD-SCNC: 142 MMOL/L (ref 133–144)
SODIUM SERPL-SCNC: 143 MMOL/L (ref 133–144)
SPECIMEN SOURCE: NORMAL
SPECIMEN SOURCE: NORMAL
TRANSFUSION STATUS PATIENT QL: NORMAL
WBC # BLD AUTO: 6.2 10E9/L (ref 4–11)
WBC # BLD AUTO: 6.9 10E9/L (ref 4–11)
WBC # BLD AUTO: 8.7 10E9/L (ref 4–11)

## 2018-09-01 PROCEDURE — 25000128 H RX IP 250 OP 636: Performed by: NURSE PRACTITIONER

## 2018-09-01 PROCEDURE — 87205 SMEAR GRAM STAIN: CPT | Performed by: TRANSPLANT SURGERY

## 2018-09-01 PROCEDURE — 25000125 ZZHC RX 250: Performed by: SURGERY

## 2018-09-01 PROCEDURE — 86902 BLOOD TYPE ANTIGEN DONOR EA: CPT | Performed by: SURGERY

## 2018-09-01 PROCEDURE — 87102 FUNGUS ISOLATION CULTURE: CPT | Performed by: TRANSPLANT SURGERY

## 2018-09-01 PROCEDURE — 84132 ASSAY OF SERUM POTASSIUM: CPT | Performed by: STUDENT IN AN ORGANIZED HEALTH CARE EDUCATION/TRAINING PROGRAM

## 2018-09-01 PROCEDURE — 85384 FIBRINOGEN ACTIVITY: CPT | Performed by: SURGERY

## 2018-09-01 PROCEDURE — 85025 COMPLETE CBC W/AUTO DIFF WBC: CPT | Performed by: STUDENT IN AN ORGANIZED HEALTH CARE EDUCATION/TRAINING PROGRAM

## 2018-09-01 PROCEDURE — P9047 ALBUMIN (HUMAN), 25%, 50ML: HCPCS | Performed by: NURSE ANESTHETIST, CERTIFIED REGISTERED

## 2018-09-01 PROCEDURE — 80048 BASIC METABOLIC PNL TOTAL CA: CPT | Performed by: STUDENT IN AN ORGANIZED HEALTH CARE EDUCATION/TRAINING PROGRAM

## 2018-09-01 PROCEDURE — 25000128 H RX IP 250 OP 636: Performed by: SURGERY

## 2018-09-01 PROCEDURE — 82947 ASSAY GLUCOSE BLOOD QUANT: CPT | Performed by: TRANSPLANT SURGERY

## 2018-09-01 PROCEDURE — 27210429 ZZH NUTRITION PRODUCT INTERMEDIATE LITER

## 2018-09-01 PROCEDURE — 85610 PROTHROMBIN TIME: CPT | Performed by: TRANSPLANT SURGERY

## 2018-09-01 PROCEDURE — 25000128 H RX IP 250 OP 636: Performed by: STUDENT IN AN ORGANIZED HEALTH CARE EDUCATION/TRAINING PROGRAM

## 2018-09-01 PROCEDURE — 83735 ASSAY OF MAGNESIUM: CPT | Performed by: STUDENT IN AN ORGANIZED HEALTH CARE EDUCATION/TRAINING PROGRAM

## 2018-09-01 PROCEDURE — 82330 ASSAY OF CALCIUM: CPT | Performed by: TRANSPLANT SURGERY

## 2018-09-01 PROCEDURE — 87075 CULTR BACTERIA EXCEPT BLOOD: CPT | Performed by: TRANSPLANT SURGERY

## 2018-09-01 PROCEDURE — 25000132 ZZH RX MED GY IP 250 OP 250 PS 637: Mod: GY | Performed by: STUDENT IN AN ORGANIZED HEALTH CARE EDUCATION/TRAINING PROGRAM

## 2018-09-01 PROCEDURE — 94003 VENT MGMT INPAT SUBQ DAY: CPT

## 2018-09-01 PROCEDURE — 25000565 ZZH ISOFLURANE, EA 15 MIN: Performed by: TRANSPLANT SURGERY

## 2018-09-01 PROCEDURE — 25000131 ZZH RX MED GY IP 250 OP 636 PS 637: Mod: GY | Performed by: STUDENT IN AN ORGANIZED HEALTH CARE EDUCATION/TRAINING PROGRAM

## 2018-09-01 PROCEDURE — 25000125 ZZHC RX 250: Performed by: STUDENT IN AN ORGANIZED HEALTH CARE EDUCATION/TRAINING PROGRAM

## 2018-09-01 PROCEDURE — 83690 ASSAY OF LIPASE: CPT | Performed by: STUDENT IN AN ORGANIZED HEALTH CARE EDUCATION/TRAINING PROGRAM

## 2018-09-01 PROCEDURE — 80076 HEPATIC FUNCTION PANEL: CPT | Performed by: STUDENT IN AN ORGANIZED HEALTH CARE EDUCATION/TRAINING PROGRAM

## 2018-09-01 PROCEDURE — 40000275 ZZH STATISTIC RCP TIME EA 10 MIN

## 2018-09-01 PROCEDURE — 86922 COMPATIBILITY TEST ANTIGLOB: CPT | Performed by: SURGERY

## 2018-09-01 PROCEDURE — 85610 PROTHROMBIN TIME: CPT | Performed by: SURGERY

## 2018-09-01 PROCEDURE — 27210794 ZZH OR GENERAL SUPPLY STERILE: Performed by: TRANSPLANT SURGERY

## 2018-09-01 PROCEDURE — 85730 THROMBOPLASTIN TIME PARTIAL: CPT | Performed by: TRANSPLANT SURGERY

## 2018-09-01 PROCEDURE — 00000146 ZZHCL STATISTIC GLUCOSE BY METER IP

## 2018-09-01 PROCEDURE — 36000070 ZZH SURGERY LEVEL 5 EA 15 ADDTL MIN - UMMC: Performed by: TRANSPLANT SURGERY

## 2018-09-01 PROCEDURE — C9364 PORCINE IMPLANT, PERMACOL: HCPCS | Performed by: TRANSPLANT SURGERY

## 2018-09-01 PROCEDURE — 85384 FIBRINOGEN ACTIVITY: CPT | Performed by: STUDENT IN AN ORGANIZED HEALTH CARE EDUCATION/TRAINING PROGRAM

## 2018-09-01 PROCEDURE — 86850 RBC ANTIBODY SCREEN: CPT | Performed by: SURGERY

## 2018-09-01 PROCEDURE — 40000985 XR ABDOMEN PORT 1 VW

## 2018-09-01 PROCEDURE — 25000128 H RX IP 250 OP 636: Performed by: NURSE ANESTHETIST, CERTIFIED REGISTERED

## 2018-09-01 PROCEDURE — 25000125 ZZHC RX 250: Performed by: NURSE ANESTHETIST, CERTIFIED REGISTERED

## 2018-09-01 PROCEDURE — 99291 CRITICAL CARE FIRST HOUR: CPT | Mod: GC | Performed by: SURGERY

## 2018-09-01 PROCEDURE — 27210995 ZZH RX 272: Performed by: TRANSPLANT SURGERY

## 2018-09-01 PROCEDURE — 86901 BLOOD TYPING SEROLOGIC RH(D): CPT | Performed by: SURGERY

## 2018-09-01 PROCEDURE — 82803 BLOOD GASES ANY COMBINATION: CPT | Performed by: TRANSPLANT SURGERY

## 2018-09-01 PROCEDURE — 83605 ASSAY OF LACTIC ACID: CPT | Performed by: STUDENT IN AN ORGANIZED HEALTH CARE EDUCATION/TRAINING PROGRAM

## 2018-09-01 PROCEDURE — 85049 AUTOMATED PLATELET COUNT: CPT | Performed by: TRANSPLANT SURGERY

## 2018-09-01 PROCEDURE — A9270 NON-COVERED ITEM OR SERVICE: HCPCS | Mod: GY | Performed by: NURSE PRACTITIONER

## 2018-09-01 PROCEDURE — 87070 CULTURE OTHR SPECIMN AEROBIC: CPT | Performed by: TRANSPLANT SURGERY

## 2018-09-01 PROCEDURE — 27210995 ZZH RX 272: Performed by: STUDENT IN AN ORGANIZED HEALTH CARE EDUCATION/TRAINING PROGRAM

## 2018-09-01 PROCEDURE — 85730 THROMBOPLASTIN TIME PARTIAL: CPT | Performed by: STUDENT IN AN ORGANIZED HEALTH CARE EDUCATION/TRAINING PROGRAM

## 2018-09-01 PROCEDURE — 85384 FIBRINOGEN ACTIVITY: CPT | Performed by: TRANSPLANT SURGERY

## 2018-09-01 PROCEDURE — 84295 ASSAY OF SERUM SODIUM: CPT | Performed by: TRANSPLANT SURGERY

## 2018-09-01 PROCEDURE — 25000128 H RX IP 250 OP 636: Performed by: TRANSPLANT SURGERY

## 2018-09-01 PROCEDURE — A9270 NON-COVERED ITEM OR SERVICE: HCPCS | Mod: GY | Performed by: STUDENT IN AN ORGANIZED HEALTH CARE EDUCATION/TRAINING PROGRAM

## 2018-09-01 PROCEDURE — 85610 PROTHROMBIN TIME: CPT | Performed by: STUDENT IN AN ORGANIZED HEALTH CARE EDUCATION/TRAINING PROGRAM

## 2018-09-01 PROCEDURE — 71045 X-RAY EXAM CHEST 1 VIEW: CPT | Mod: 76

## 2018-09-01 PROCEDURE — 85025 COMPLETE CBC W/AUTO DIFF WBC: CPT | Performed by: SURGERY

## 2018-09-01 PROCEDURE — 71045 X-RAY EXAM CHEST 1 VIEW: CPT

## 2018-09-01 PROCEDURE — 84100 ASSAY OF PHOSPHORUS: CPT | Performed by: STUDENT IN AN ORGANIZED HEALTH CARE EDUCATION/TRAINING PROGRAM

## 2018-09-01 PROCEDURE — 86900 BLOOD TYPING SEROLOGIC ABO: CPT | Performed by: SURGERY

## 2018-09-01 PROCEDURE — 84132 ASSAY OF SERUM POTASSIUM: CPT | Performed by: TRANSPLANT SURGERY

## 2018-09-01 PROCEDURE — 25000132 ZZH RX MED GY IP 250 OP 250 PS 637: Mod: GY | Performed by: NURSE PRACTITIONER

## 2018-09-01 PROCEDURE — 82330 ASSAY OF CALCIUM: CPT | Performed by: SURGERY

## 2018-09-01 PROCEDURE — 20000004 ZZH R&B ICU UMMC

## 2018-09-01 PROCEDURE — 37000008 ZZH ANESTHESIA TECHNICAL FEE, 1ST 30 MIN: Performed by: TRANSPLANT SURGERY

## 2018-09-01 PROCEDURE — 93975 VASCULAR STUDY: CPT | Mod: TC

## 2018-09-01 PROCEDURE — 82330 ASSAY OF CALCIUM: CPT | Performed by: STUDENT IN AN ORGANIZED HEALTH CARE EDUCATION/TRAINING PROGRAM

## 2018-09-01 PROCEDURE — 25800025 ZZH RX 258: Performed by: TRANSPLANT SURGERY

## 2018-09-01 PROCEDURE — 36000068 ZZH SURGERY LEVEL 5 1ST 30 MIN - UMMC: Performed by: TRANSPLANT SURGERY

## 2018-09-01 PROCEDURE — P9016 RBC LEUKOCYTES REDUCED: HCPCS | Performed by: PHYSICIAN ASSISTANT

## 2018-09-01 PROCEDURE — 37000009 ZZH ANESTHESIA TECHNICAL FEE, EACH ADDTL 15 MIN: Performed by: TRANSPLANT SURGERY

## 2018-09-01 PROCEDURE — 25000125 ZZHC RX 250: Performed by: TRANSPLANT SURGERY

## 2018-09-01 DEVICE — IMPLANTABLE DEVICE: Type: IMPLANTABLE DEVICE | Site: ABDOMEN | Status: FUNCTIONAL

## 2018-09-01 DEVICE — MESH PERMACOL 10X15CMX1MM CHARGE PER SQ CM= 150 UNITS: Type: IMPLANTABLE DEVICE | Site: ABDOMEN | Status: FUNCTIONAL

## 2018-09-01 RX ORDER — PROPOFOL 10 MG/ML
INJECTION, EMULSION INTRAVENOUS PRN
Status: DISCONTINUED | OUTPATIENT
Start: 2018-09-01 | End: 2018-09-01

## 2018-09-01 RX ORDER — FUROSEMIDE 10 MG/ML
10 INJECTION INTRAMUSCULAR; INTRAVENOUS EVERY 8 HOURS
Status: DISCONTINUED | OUTPATIENT
Start: 2018-09-01 | End: 2018-09-03

## 2018-09-01 RX ORDER — ASPIRIN 325 MG
325 TABLET ORAL DAILY
Status: DISCONTINUED | OUTPATIENT
Start: 2018-09-01 | End: 2018-09-03

## 2018-09-01 RX ORDER — ALBUMIN (HUMAN) 12.5 G/50ML
SOLUTION INTRAVENOUS CONTINUOUS PRN
Status: DISCONTINUED | OUTPATIENT
Start: 2018-09-01 | End: 2018-09-01

## 2018-09-01 RX ORDER — SODIUM CHLORIDE 9 MG/ML
INJECTION, SOLUTION INTRAVENOUS CONTINUOUS PRN
Status: DISCONTINUED | OUTPATIENT
Start: 2018-09-01 | End: 2018-09-01

## 2018-09-01 RX ADMIN — PIPERACILLIN SODIUM,TAZOBACTAM SODIUM 3.38 G: 3; .375 INJECTION, POWDER, FOR SOLUTION INTRAVENOUS at 14:26

## 2018-09-01 RX ADMIN — PHENYLEPHRINE HYDROCHLORIDE 200 MCG: 10 INJECTION, SOLUTION INTRAMUSCULAR; INTRAVENOUS; SUBCUTANEOUS at 11:21

## 2018-09-01 RX ADMIN — POTASSIUM CHLORIDE 20 MEQ: 29.8 INJECTION, SOLUTION INTRAVENOUS at 16:50

## 2018-09-01 RX ADMIN — PANTOPRAZOLE SODIUM 40 MG: 40 INJECTION, POWDER, FOR SOLUTION INTRAVENOUS at 13:16

## 2018-09-01 RX ADMIN — MYCOPHENOLATE MOFETIL 1000 MG: 200 POWDER, FOR SUSPENSION ORAL at 13:18

## 2018-09-01 RX ADMIN — POTASSIUM CHLORIDE 20 MEQ: 29.8 INJECTION, SOLUTION INTRAVENOUS at 21:28

## 2018-09-01 RX ADMIN — VALGANCICLOVIR HYDROCHLORIDE 450 MG: 50 POWDER, FOR SOLUTION ORAL at 13:18

## 2018-09-01 RX ADMIN — MYCOPHENOLATE MOFETIL 1000 MG: 200 POWDER, FOR SUSPENSION ORAL at 19:02

## 2018-09-01 RX ADMIN — PROPOFOL 75 MCG/KG/MIN: 10 INJECTION, EMULSION INTRAVENOUS at 13:15

## 2018-09-01 RX ADMIN — PROPOFOL 50 MG: 10 INJECTION, EMULSION INTRAVENOUS at 08:30

## 2018-09-01 RX ADMIN — VANCOMYCIN HYDROCHLORIDE 1000 MG: 1 INJECTION, SOLUTION INTRAVENOUS at 15:36

## 2018-09-01 RX ADMIN — FUROSEMIDE 10 MG: 10 INJECTION, SOLUTION INTRAVENOUS at 16:12

## 2018-09-01 RX ADMIN — MULTIVITAMIN 15 ML: LIQUID ORAL at 13:16

## 2018-09-01 RX ADMIN — ROCURONIUM BROMIDE 50 MG: 10 INJECTION INTRAVENOUS at 08:30

## 2018-09-01 RX ADMIN — FENTANYL CITRATE 50 MCG: 50 INJECTION, SOLUTION INTRAMUSCULAR; INTRAVENOUS at 15:38

## 2018-09-01 RX ADMIN — MIDAZOLAM 2 MG: 1 INJECTION INTRAMUSCULAR; INTRAVENOUS at 08:20

## 2018-09-01 RX ADMIN — PIPERACILLIN SODIUM,TAZOBACTAM SODIUM 3.38 G: 3; .375 INJECTION, POWDER, FOR SOLUTION INTRAVENOUS at 21:25

## 2018-09-01 RX ADMIN — POTASSIUM CHLORIDE 20 MEQ: 29.8 INJECTION, SOLUTION INTRAVENOUS at 15:44

## 2018-09-01 RX ADMIN — NOREPINEPHRINE BITARTRATE: 1 INJECTION INTRAVENOUS at 10:36

## 2018-09-01 RX ADMIN — PIPERACILLIN SODIUM,TAZOBACTAM SODIUM 3.38 G: 3; .375 INJECTION, POWDER, FOR SOLUTION INTRAVENOUS at 04:51

## 2018-09-01 RX ADMIN — Medication 10 ML: at 20:23

## 2018-09-01 RX ADMIN — Medication 100 MCG/HR: at 13:14

## 2018-09-01 RX ADMIN — HUMAN INSULIN 1 UNITS/HR: 100 INJECTION, SOLUTION SUBCUTANEOUS at 10:06

## 2018-09-01 RX ADMIN — PROPOFOL 50 MCG/KG/MIN: 10 INJECTION, EMULSION INTRAVENOUS at 18:38

## 2018-09-01 RX ADMIN — ALBUMIN HUMAN: 0.25 SOLUTION INTRAVENOUS at 12:00

## 2018-09-01 RX ADMIN — ROCURONIUM BROMIDE 50 MG: 10 INJECTION INTRAVENOUS at 11:20

## 2018-09-01 RX ADMIN — CHLOROTHIAZIDE SODIUM 500 MG: 500 INJECTION, POWDER, LYOPHILIZED, FOR SOLUTION INTRAVENOUS at 11:04

## 2018-09-01 RX ADMIN — PHENYLEPHRINE HYDROCHLORIDE 100 MCG: 10 INJECTION, SOLUTION INTRAMUSCULAR; INTRAVENOUS; SUBCUTANEOUS at 10:40

## 2018-09-01 RX ADMIN — PHENYLEPHRINE HYDROCHLORIDE 100 MCG: 10 INJECTION, SOLUTION INTRAMUSCULAR; INTRAVENOUS; SUBCUTANEOUS at 10:35

## 2018-09-01 RX ADMIN — SODIUM CHLORIDE 200 MG: 9 INJECTION, SOLUTION INTRAVENOUS at 13:21

## 2018-09-01 RX ADMIN — PHENYLEPHRINE HYDROCHLORIDE 100 MCG: 10 INJECTION, SOLUTION INTRAMUSCULAR; INTRAVENOUS; SUBCUTANEOUS at 10:45

## 2018-09-01 RX ADMIN — VANCOMYCIN HYDROCHLORIDE 1000 MG: 1 INJECTION, SOLUTION INTRAVENOUS at 00:59

## 2018-09-01 RX ADMIN — PIPERACILLIN SODIUM,TAZOBACTAM SODIUM 3.38 G: 3; .375 INJECTION, POWDER, FOR SOLUTION INTRAVENOUS at 10:50

## 2018-09-01 RX ADMIN — ASPIRIN 325 MG ORAL TABLET 325 MG: 325 PILL ORAL at 14:51

## 2018-09-01 RX ADMIN — OXYCODONE HYDROCHLORIDE 10 MG: 5 SOLUTION ORAL at 20:22

## 2018-09-01 RX ADMIN — DOCUSATE SODIUM 100 MG: 50 LIQUID ORAL at 20:21

## 2018-09-01 RX ADMIN — SODIUM CHLORIDE: 9 INJECTION, SOLUTION INTRAVENOUS at 08:20

## 2018-09-01 RX ADMIN — CALCIUM CHLORIDE 1 G: 100 INJECTION, SOLUTION INTRAVENOUS at 06:22

## 2018-09-01 RX ADMIN — SULFAMETHOXAZOLE AND TRIMETHOPRIM 1 TABLET: 400; 80 TABLET ORAL at 13:16

## 2018-09-01 ASSESSMENT — ACTIVITIES OF DAILY LIVING (ADL)
ADLS_ACUITY_SCORE: 10
ADLS_ACUITY_SCORE: 9

## 2018-09-01 ASSESSMENT — PAIN DESCRIPTION - DESCRIPTORS: DESCRIPTORS: TIGHTNESS

## 2018-09-01 NOTE — PROGRESS NOTES
Immunosuppression Note:    Sherrie Lala is a 63 year old female who is seen today  for immunosuppression management     I, Darren Rod MD, I have examined the patient with the resident/PA/Fellow, discussed and agree with the note and findings.  I have reviewed today's vital signs, medications, labs and imaging. I reviewed the immunosuppression medications and levels. I spoke to the patient/family and explained below clinical details and answered all the questions      Transplant Surgery  Inpatient Daily Progress Note  09/01/2018    Assessment & Plan: Sherrie Lala is a 63-year old female with a history of metastatic cholangiocarcinoma with subsequent liver failure who is now s/p DDLT on 8/30/18. The patient had significant intraoperative blood loss (~3L) and her abdomen was left open.    Graft function: Fair, stable. Tbili elevated at 3.7, alk phos is normal, and ALT/AST are 195/435 today. Liver US POD#1 with limited views, and a persistent eccentric thrombus in the intrahepatic main portal vein extending into the right portal vein. Starting heparin drip at 400u/hr today for HAT ppx.  Immunosuppression management:  - Receiving induction with steroid taper, and will give a dose of Basiliximab today to delay initiation of Tacrolimus.  - MMF: Continue 1gm BID.  - Tacrolimus: Will plan to start Tacrolimus tomorrow (9/1). Goal range 10-15.  Complexity of management:Medium. Contributing factors: thrombocytopenia, anemia and organ dysfunction  Hematology: Significant bleeding intra-op, requiring 11u PRBCs and other blood products. Hgb now stable in the 12-13 range.  Thrombocytopenia: Patient with baseline platelet count ~30 prior to transplant. Platelet count 82 today, received multiple platelet transfusions intra-op. Continue to monitor.  Cardiorespiratory: Stable, SBP in the 90s-110s range without pressors. Remains intubated.  GI/Nutrition: NPO, ICU to place [place] tube today for initiation of tube feedings. Abdomen  left open due to significant bleeding intra-op, plan for return to OR for washout and possible closure tomorrow (9/1).  Endocrine: On insulin drip while on steroid taper.  Fluid/Electrolytes: MIVF: On D545 @ 100ml/hr, recommend decreasing or discontinuing. On Lasix drip at 10mg/hr due to significant volume of blood products administered during surgery. Electrolytes WNL.  : Bruner in place, good UOP.  Infectious disease: Afebrile, normal WBC count. On Vanco, Zosyn, and Fluconazole for 3 days for surgical ppx.  Prophylaxis: GI, DVT, heparin, Bactrim, Valcyte  Disposition: 4A    Medical Decision Making: High  Subsequent visit 10335 (moderate level decision making)    DREA:   Fellow: Derik Garcias MD x [phone]  Faculty: Darren Rod M.D.  __________________________________________________________________  Transplant History: Admitted 8/29/2018 for orthotopic liver transplantation.   The patient has a history of liver failure due to cholangiocarcinoma.    8/30/2018 (Liver), Postoperative day: 2     Interval History:   Remains intubated. Off pressors, good UOP.     ROS:   A 10-point review of systems was negative except as noted above.    Meds:    aspirin  325 mg Oral Daily     docusate  100 mg Oral BID     fluconazole  400 mg Intravenous Q24H     furosemide  10 mg Intravenous Q8H     gentamicin 120mg, polymyxin 500,000 units, bacitracin 50,000 units in 1L 0.9% NaCl bag   Irrigation Once     [START ON 9/2/2018] methylPREDNISolone  100 mg Intravenous Once    Followed by     [START ON 9/3/2018] methylPREDNISolone  50 mg Intravenous Once    Followed by     [START ON 9/4/2018] predniSONE  25 mg Oral Once    Followed by     [START ON 9/5/2018] predniSONE  10 mg Oral Once     multivitamins with minerals  15 mL Per Feeding Tube Daily     mycophenolate  1,000 mg Oral BID IS    Or     mycophenolate  1,000 mg Oral or NG Tube BID IS     pantoprazole (PROTONIX) IV  40 mg Intravenous Daily with breakfast      piperacillin-tazobactam  3.375 g Intravenous Q6H     sennosides  10 mL Oral BID     sodium chloride (PF)  3 mL Intravenous Q8H     sulfamethoxazole-trimethoprim  1 tablet Oral Daily     valGANciclovir  450 mg Oral Daily    Or     valGANciclovir  450 mg Oral or NG Tube Daily     vancomycin (VANCOCIN) IV  1,000 mg Intravenous Q12H       Physical Exam:     Admit Weight: 55.2 kg (121 lb 9.6 oz)  Today's weight: 143 lbs 4.78 oz    Vital sign ranges:    Temp:  [97.7  F (36.5  C)-99.2  F (37.3  C)] 97.7  F (36.5  C)  Heart Rate:  [72-92] 76  Resp:  [18] 18  MAP:  [62 mmHg-98 mmHg] 91 mmHg  Arterial Line BP: ()/(41-79) 122/71  FiO2 (%):  [30 %] 30 %  SpO2:  [92 %-100 %] 100 %    GENERAL: Intubated, in no apparent distress.  SKIN: No jaundice. No rashes or lesions.   HEENT: Eyes symmetrical and free of discharge bilaterally.  CV: Regular rate and rhythm.   RESPIRATORY: Nonlabored breathing on ventilator.   GI: Soft and non distended with normoactive bowel sounds. Abthera drain in place with sanguinous drainage.  : Bruner in place.  EXTREMITIES: Generalized trace edema.  NEUROLOGIC: Intubated, following commands. No focal deficits. Tremor absent.  MUSCULOSKELETAL: No joint swelling or tenderness.     Data:   CMP  Recent Labs  Lab 09/01/18  0933 09/01/18  0431 08/31/18  1513  08/31/18  0553  08/31/18  0212  08/29/18  2058    143 143  --   --   --  146*  < > 144   POTASSIUM 3.3* 3.4 4.0  < >  --   --  3.5  < > 3.1*   CHLORIDE  --  108 109  --   --   --  109  --  106   CO2  --  28 28  --   --   --  25  --  26   * 118* 96  --   --   --  168*  < > 90   BUN  --  23 19  --   --   --  13  --  19   CR  --  1.02 0.96  --   --   --  0.66  --  0.77   GFRESTIMATED  --  55* 59*  --   --   --  >90  --  76   GFRESTBLACK  --  67 72  --   --   --  >90  --  >90   SHELDON  --  7.2* 7.9*  --   --   --  9.0  --  8.9   ICAW 4.7 4.3*  --   --   --   < >  --   < >  --    MAG  --  2.3 1.7  --   --   --  2.0  --  2.2   PHOS  --  3.8  4.9*  --   --   --  2.8  --  3.6   AMYLASE  --   --   --   --  39  --   --   --  82   LIPASE  --  161  --   --   --   --   --   --   --    ALBUMIN  --  2.3*  --   --   --   --  2.0*  --  5.0   BILITOTAL  --  1.5*  --   --   --   --  3.7*  --  3.0*   ALKPHOS  --  41  --   --   --   --  41  --  115   AST  --  107*  --   --   --   --  435*  --  23   ALT  --  170*  --   --   --   --  195*  --  24   < > = values in this interval not displayed.  CBC    Recent Labs  Lab 09/01/18 0933 09/01/18  0431 08/31/18  2212   HGB 10.5* 10.2* 10.9*   WBC  --  6.9 7.2   PLT 48* 47* 55*     COAGS    Recent Labs  Lab 09/01/18 0933 09/01/18  0431   INR 1.33* 1.41*   PTT 32 37      Urinalysis  Recent Labs   Lab Test  02/26/18   0947   COLOR  Yellow   APPEARANCE  Clear   URINEGLC  Negative   URINEBILI  Negative   URINEKETONE  Negative   SG  1.017   UBLD  Negative   URINEPH  5.0   PROTEIN  Negative   NITRITE  Negative   LEUKEST  Negative   RBCU  1   WBCU  1   UTPG  0.06     Virology:  Hepatitis C Antibody   Date Value Ref Range Status   08/29/2018 Nonreactive NR^Nonreactive Final     Comment:     Assay performance characteristics have not been established for newborns,   infants, and children

## 2018-09-01 NOTE — ANESTHESIA CARE TRANSFER NOTE
Patient: Sherrie Lala    Procedure(s):  Open Abdomen, return liver transplant washout with   Mesh placement and  Abdomal Wound closure    Diagnosis: Open Abdomen post liver transplant  Diagnosis Additional Information: No value filed.    Anesthesia Type:   General, ETT     Note:  Airway :ETT  Patient transferred to:ICU  Comments: Anesthesia Care Transfer Note    Patient: Sherrie Lala    Transferred to: ICU    Patient vital signs: stable    Airway: ETT    Monitors on, on vent, sedated, VSS    Mark Bobo CRNA  9/1/2018 12:44 PM      ICU Handoff: Call for PAUSE to initiate/utilize ICU HANDOFF, Identified Patient, Identified Responsible Provider, Reviewed the Pertinent Medical History, Discussed Surgical Course, Reviewed Intra-OP Anesthesia Management and Issues during Anesthesia, Set Expectations for Post Procedure Period and Allowed Opportunity for Questions and Acknowledgement of Understanding      Vitals: (Last set prior to Anesthesia Care Transfer)    CRNA VITALS  9/1/2018 1202 - 9/1/2018 1244      9/1/2018             Resp Rate (observed): 11                Electronically Signed By: DANILO Sellers CRNA  September 1, 2018  12:44 PM

## 2018-09-01 NOTE — ANESTHESIA POSTPROCEDURE EVALUATION
Patient: Sherrie Lala    Procedure(s):  Open Abdomen, return liver transplant washout with   Mesh and liver stent  placement and  Abdomal Wound closure    Diagnosis:Open Abdomen post liver transplant  Diagnosis Additional Information: No value filed.    Anesthesia Type:  General, ETT    Note:  Anesthesia Post Evaluation         Comments: Patient location during evaluation: Phase 1  Patient participation: Able to fully participate in evaluation  Level of consciousness: awake  Pain management: adequate  Airway patency: patent  Cardiovascular status: acceptable  Respiratory status: acceptable  Hydration status: acceptable  PONV: none     Anesthetic complications: None        Last vitals:  Vitals:    09/01/18 1500 09/01/18 1530 09/01/18 1600   BP:      Pulse:      Resp:   18   Temp:   37.5  C (99.5  F)   SpO2: 100% 100% 100%         Electronically Signed By: Efren Cortez MD  September 1, 2018  4:23 PM

## 2018-09-01 NOTE — ANESTHESIA PREPROCEDURE EVALUATION
Anesthesia Evaluation     . Pt has had prior anesthetic. 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 Other pulmonary disease cholcangiocarcinoma with pulmonary mets s/p resection.    Neurologic:  - neg neurologic ROS     Cardiovascular:  - neg cardiovascular ROS       METS/Exercise Tolerance:     Hematologic: Comments: Multiple antibodies. Difficult crossmatch. Blood bank has 50 units crossmatched.     (+) History of blood clots Anemia, History of Transfusion Other Hematologic Disorder-thrombocytopenia      Musculoskeletal:   (+) , , other musculoskeletal- osteoporosis      GI/Hepatic: Comment: Cholangiocarcinoma s/p partial hepatectomy c/b portal v. Thrombus and ESLD    (+) liver disease,       Renal/Genitourinary:  - ROS Renal section negative       Endo:  - neg endo ROS       Psychiatric:  - neg psychiatric ROS       Infectious Disease:         Malignancy:   (+) Malignancy History of GI  GI CA Remission status post,         Other:                                    ANESTHESIA PREOP EVALUATION    Procedure: Procedure(s):  Open Abdomen, return liver transplant washout    HPI: Sherrie Lala is a 63 year old female who is presenting for above stated procedure.    PMHx/PSHx/ROS:  Past Medical History:   Diagnosis Date     Asthma      Cholangiocarcinoma (H) 2014     Cirrhosis of liver with ascites (H) 5/10/2018     Encounter for pleural drainage tube placement      Esophageal varices with hemorrhage (H)      Gastric ulcer      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     Lung metastases (H) 2014     Portal vein thrombosis        Past Surgical History:   Procedure Laterality Date     CHOLECYSTECTOMY       HEPATECTOMY PARTIAL       TRANSPLANT LIVER RECIPIENT  DONOR N/A 2018    Procedure:  TRANSPLANT LIVER RECIPIENT  DONOR;  TRANSPLANT LIVER RECIPIENT  DONOR ;  Surgeon: Darren Rod MD;  Location: [facility] OR       [facility] as stated above    Soc Hx:   Social History   Substance Use Topics     Smoking status: Never Smoker     Smokeless tobacco: Never Used     Alcohol use No      Comment: occ        Allergies:   Allergies   Allergen Reactions     Blood Transfusion Related (Informational Only) Other (See Comments)     Patient has a history of a clinically significant antibody against RBC antigens.  A delay in compatible RBCs may occur.     Dilaudid [Hydromorphone] Itching       Meds:   Prescriptions Prior to Admission   Medication Sig Dispense Refill Last Dose     albuterol (PROAIR HFA/PROVENTIL HFA/VENTOLIN HFA) 108 (90 BASE) MCG/ACT Inhaler Inhale 2 puffs into the lungs every 6 hours   Past Week at Unknown time     CIPROFLOXACIN PO Take 750 mg by mouth once a week   Past Week at Unknown time     FUROSEMIDE PO Take 80 mg by mouth 2 times daily    2018 at 1700     GABAPENTIN PO Take 300 mg by mouth At Bedtime   2018 at Unknown time     OMEPRAZOLE PO Take 20 mg by mouth 2 times daily (before meals)   2018 at 1700     PRAMIPEXOLE DIHYDROCHLORIDE PO Take 0.5 mg by mouth daily   2018 at Unknown time     RIFAMPIN PO Take 300 mg by mouth daily   2018 at Unknown time     RifAXIMin (XIFAXAN PO) Take 550 mg by mouth 2 times daily   2018 at 1700     SPIRONOLACTONE PO Take 100 mg by mouth daily   2018 at Unknown time     ZOLPIDEM TARTRATE PO Take 10 mg by mouth nightly as needed for sleep   2018 at Unknown time     Potassium Chloride ER 20 MEQ TBCR Take 1 tablet (20 mEq) by mouth daily   More than a month at Unknown time       No current outpatient prescriptions on file.       Physical Exam:  VS: Temp:  [36.7  C (98  F)-37.3  C (99.1  F)] 36.9  C (98.5  F)  Heart Rate:  [] 83  Resp:  [12-22] 18  BP: ()/(69-72) 93/69  MAP:  [62 mmHg-112 mmHg] 68  mmHg  Arterial Line BP: ()/(36-81) 95/50  FiO2 (%):  [30 %-40 %] 30 %  SpO2:  [94 %-100 %] 97 %   97%, Weight   Wt Readings from Last 2 Encounters:   08/31/18 65 kg (143 lb 4.8 oz)   05/03/18 55.1 kg (121 lb 8 oz)       Labs:    BMP:  Recent Labs   Lab Test  08/31/18   1513   NA  143   POTASSIUM  4.0   CHLORIDE  109   CO2  28   BUN  19   CR  0.96   GLC  96   SHELDON  7.9*     LFTs:   Recent Labs   Lab Test  08/31/18   0212   PROTTOTAL  4.0*   ALBUMIN  2.0*   BILITOTAL  3.7*   ALKPHOS  41   AST  435*   ALT  195*     CBC:   Recent Labs   Lab Test  08/31/18   2212   WBC  7.2   RBC  3.80   HGB  10.9*   HCT  31.8*   MCV  84   MCH  28.7   MCHC  34.3   RDW  16.1*   PLT  55*     Coags:  Recent Labs   Lab Test  08/31/18 2212   INR  1.50*   PTT  35   FIBR  257           Patient to be discussed with Staff Anesthesiologist.    Evens Young  Anesthesia Resident CA-3  Pager [phone]  September 1, 2018, 12:48 AM    Anesthesia Plan      History & Physical Review  History and physical reviewed and following examination, relevant changes include:    ASA Status:  4 .    NPO Status:  > 8 hours    Plan for General and ETT with Intravenous induction. Maintenance will be Balanced.    PONV prophylaxis:  Ondansetron (or other 5HT-3)  Sherrie Lala is a 63 year old female POD 2 from liver transplant requiring open abdominal washout with Dr. Rod. Remains intubated and sedated.       Postoperative Care      Consents  Anesthetic plan, risks, benefits and alternatives discussed with:  Implied consent/emergency..

## 2018-09-01 NOTE — PROGRESS NOTES
SURGICAL ICU PROGRESS NOTE  August 31, 2018      CO-MORBIDITIES:   Liver transplanted (H)  (primary encounter diagnosis)    ASSESSMENT: Sherrie Lala is a 63 year old female with hx of metastatic cholangiocarcinoma with subsequent liver failure s/p DDLT with intraop blood loss of 3L and temporary abdominal closure.  Taken back to the OR on 9/1 and abdomen closed.    TODAY'S PROGRESS/PLANS:   - Wean vent as able  - Post op labs  - Pain control  - Plan to extubate tomorrow    PLAN:  Neuro/ pain/ sedation:  #acute postop pain  - Monitor neurological status. Notify the MD for any acute changes in exam.  - fentanyl gtt for pain.  - propofol gtt for sedation.     Pulmonary care:   #Post operative ventilator management  #PTX  - Small appearing right pneumothorax in setting of prior pleural effusion with drain- likely pneumo ex-vacuo without intervention needed  - Stable on vent with minimal O2 requirements  - CMV 18/400/5/30. Daily PST  - Ventilator bundle.   - CXR tomorrow     Cardiovascular:    - Monitor hemodynamic status.   - No acute issues/concerns  - MAPs >65 off pressors.      GI care:   #s/p DDLT  - NPO- diet per transplant.  Will start trickle feeds  - Immunosuppression management per transplant- Mycophenolate, methylpred  - Abdomen open. RTOR tomorrow for potential closure.   - Start bowel regimen     Fluids/ Electrolytes/ Nutrition:   #Fluid overload  - TKO IVF  - Electrolyte replacement protocol  - No indication for parenteral nutrition.    - Follow electrolytes closely, repeat this afternoon.     Renal/ Fluid Balance:    #Hepatorenal syndrome  - Urine output is adequate so far.  - Will continue to monitor intake and output.     Endocrine:    #Stress hyperglycemia  - Insulin gtt  - Induction steroids per transplant.     ID/ Antibiotics:  #DDLT  - Vancomycin, Zosyn, and Fluconazole per primary team.  - Valcyte and Bactrim for prophylaxis     Heme:   #acute blood loss anemia    - Continue on heparin gtt at 400  units/hr per transplant  - Hgb goal >7, PLT goal over 50     MSK:  -PT/OT     Prophylaxis:    - Mechanical prophylaxis for DVT.   - Low dose heparin drip.   - PPI     Lines/ tubes/ drains:  - PIVs  - Sanchez  - MACs x2  - ETT  -A-line     Disposition:  - Surgical ICU            ====================================    SUBJECTIVE:   No acute events overnight. Intubated and sedated post op    OBJECTIVE:   1. VITAL SIGNS:   Temp:  [98.4  F (36.9  C)-99.2  F (37.3  C)] 99.2  F (37.3  C)  Heart Rate:  [] 80  Resp:  [18] 18  MAP:  [62 mmHg-94 mmHg] 68 mmHg  Arterial Line BP: ()/(41-81) 88/55  FiO2 (%):  [30 %] 30 %  SpO2:  [95 %-100 %] 99 %  Ventilation Mode: CMV/AC  (Continuous Mandatory Ventilation/ Assist Control)  FiO2 (%): 30 %  Rate Set (breaths/minute): 18 breaths/min  Tidal Volume Set (mL): 400 mL  PEEP (cm H2O): 5 cmH2O  Pressure Support (cm H2O): 7 cmH2O  Oxygen Concentration (%): 30 %  Resp: 18    2. INTAKE/ OUTPUT:   I/O last 3 completed shifts:  In: 2026.27 [I.V.:1956.27; NG/GT:30]  Out: 3558 [Urine:1858; Drains:1700]    3. PHYSICAL EXAMINATION:   General: awake, NAD  Neuro: Deeply sedated post operatively  Resp: Breathing non-labored, clear lung sounds  CV: RRR, S1 S2  Abdomen: Soft, Non-distended. Drains with moderate serosang fluid  : sanchez dannie   Extremities: warm and well perfused    4. INVESTIGATIONS:   Arterial Blood Gases     Recent Labs  Lab 08/31/18  0448 08/31/18  0154 08/30/18  2325 08/30/18  2235   PH 7.44 7.46* 7.39 7.29*   PCO2 39 37 37 39   PO2 133* 170* 143* 128*   HCO3 26 26 22 19*     Complete Blood Count     Recent Labs  Lab 09/01/18  0431 08/31/18  2212 08/31/18  1513 08/31/18  0947   WBC 6.9 7.2 8.4 9.0   HGB 10.2* 10.9* 12.2 13.7   PLT 47* 55* 82* 105*     Basic Metabolic Panel    Recent Labs  Lab 09/01/18  0431 08/31/18  1513 08/31/18  0947 08/31/18  0212 08/30/18  2325  08/29/18  2058    143  --  146* 141  < > 144   POTASSIUM 3.4 4.0 4.4 3.5 3.5  < > 3.1*   CHLORIDE  108 109  --  109  --   --  106   CO2 28 28  --  25  --   --  26   BUN 23 19  --  13  --   --  19   CR 1.02 0.96  --  0.66  --   --  0.77   * 96  --  168* 212*  < > 90   < > = values in this interval not displayed.  Liver Function Tests    Recent Labs  Lab 09/01/18  0431 08/31/18 2212 08/31/18  1513 08/31/18  0947  08/31/18  0212  08/29/18 2058   *  --   --   --   --  435*  --  23   *  --   --   --   --  195*  --  24   ALKPHOS 41  --   --   --   --  41  --  115   BILITOTAL 1.5*  --   --   --   --  3.7*  --  3.0*   ALBUMIN 2.3*  --   --   --   --  2.0*  --  5.0   INR 1.41* 1.50* 1.49* 1.54*  < > 1.70*  < > 1.65*   < > = values in this interval not displayed.  Pancreatic Enzymes    Recent Labs  Lab 09/01/18  0431 08/31/18  0553 08/29/18 2058   LIPASE 161  --   --    AMYLASE  --  39 82     Coagulation Profile    Recent Labs  Lab 09/01/18  0431 08/31/18 2212 08/31/18  1513 08/31/18  0947   INR 1.41* 1.50* 1.49* 1.54*   PTT 37 35 33 30     Lactate  Invalid input(s): LACTATE    5. RADIOLOGY:   Recent Results (from the past 24 hour(s))   US Intraoperative    Narrative    EXAM: Intraoperative duplex liver  8/31/2018 12:17 AM      HISTORY: Evaluate portal venous flow new transplant    COMPARISON: None available    FINDINGS:   Intraoperative duplex images of the portal venous system before and  after thrombectomy were obtained.     The radiology resident was present for pre-thrombectomy images which  demonstrate partial thrombus in the main and right portal veins. Slow  flow in the left portal vein without obvious thrombus.    Post thrombectomy images demonstrate marked decrease in thrombus  burden with blood flow velocities.    Minimal residual thrombus in the main and right portal veins. Slow  flow without obvious thrombus in the left portal vein.    Intrahepatic portal vein:  Patent continuous antegrade flow, 73  cm/sec.  Right portal vein flow is antegrade, measuring 27 cm/sec.  Left portal vein  flow is antegrade, measuring 8 cm/sec.      Impression    IMPRESSION:   Post thrombectomy images demonstrate decreased portal venous system  thrombus burden with improved flow.  - Minimal residual thrombus in the main and right portal veins. Slow  flow without obvious thrombus in the left portal vein. Consider  short-term follow-up.    Findings discussed with Dr Rod in the OR.   XR Chest Port 1 View    Impression    IMPRESSION:   1.  Endotracheal tube tip 3.5 cm above the pauly. Additional support  devices as above.  2.  Postoperative edema.  3.  Postsurgical changes of liver transplant. Trace pneumoperitoneum,  likely postsurgical.       =========================================      Patient seen, findings and plan discussed with surgical ICU staff

## 2018-09-02 ENCOUNTER — APPOINTMENT (OUTPATIENT)
Dept: OCCUPATIONAL THERAPY | Facility: CLINIC | Age: 63
DRG: 005 | End: 2018-09-02
Attending: TRANSPLANT SURGERY
Payer: MEDICARE

## 2018-09-02 ENCOUNTER — APPOINTMENT (OUTPATIENT)
Dept: GENERAL RADIOLOGY | Facility: CLINIC | Age: 63
DRG: 005 | End: 2018-09-02
Attending: TRANSPLANT SURGERY
Payer: MEDICARE

## 2018-09-02 LAB
ALBUMIN SERPL-MCNC: 2.3 G/DL (ref 3.4–5)
ALP SERPL-CCNC: 36 U/L (ref 40–150)
ALT SERPL W P-5'-P-CCNC: 124 U/L (ref 0–50)
ANION GAP SERPL CALCULATED.3IONS-SCNC: 10 MMOL/L (ref 3–14)
ANION GAP SERPL CALCULATED.3IONS-SCNC: 6 MMOL/L (ref 3–14)
APTT PPP: 34 SEC (ref 22–37)
AST SERPL W P-5'-P-CCNC: 57 U/L (ref 0–45)
BASE EXCESS BLDA CALC-SCNC: 3.5 MMOL/L
BASOPHILS # BLD AUTO: 0 10E9/L (ref 0–0.2)
BASOPHILS NFR BLD AUTO: 0.1 %
BASOPHILS NFR BLD AUTO: 0.2 %
BASOPHILS NFR BLD AUTO: 0.2 %
BILIRUB DIRECT SERPL-MCNC: 0.7 MG/DL (ref 0–0.2)
BILIRUB SERPL-MCNC: 1.2 MG/DL (ref 0.2–1.3)
BLD PROD TYP BPU: NORMAL
BLD UNIT ID BPU: 0
BLOOD PRODUCT CODE: NORMAL
BPU ID: NORMAL
BUN SERPL-MCNC: 27 MG/DL (ref 7–30)
BUN SERPL-MCNC: 28 MG/DL (ref 7–30)
CA-I BLD-MCNC: 4.3 MG/DL (ref 4.4–5.2)
CA-I SERPL ISE-MCNC: 4.2 MG/DL (ref 4.4–5.2)
CA-I SERPL ISE-MCNC: 4.2 MG/DL (ref 4.4–5.2)
CA-I SERPL ISE-MCNC: 4.3 MG/DL (ref 4.4–5.2)
CALCIUM SERPL-MCNC: 7.4 MG/DL (ref 8.5–10.1)
CALCIUM SERPL-MCNC: 7.4 MG/DL (ref 8.5–10.1)
CHLORIDE SERPL-SCNC: 107 MMOL/L (ref 94–109)
CHLORIDE SERPL-SCNC: 108 MMOL/L (ref 94–109)
CK SERPL-CCNC: 390 U/L (ref 30–225)
CO2 SERPL-SCNC: 25 MMOL/L (ref 20–32)
CO2 SERPL-SCNC: 28 MMOL/L (ref 20–32)
CREAT SERPL-MCNC: 0.92 MG/DL (ref 0.52–1.04)
CREAT SERPL-MCNC: 1.09 MG/DL (ref 0.52–1.04)
DIFFERENTIAL METHOD BLD: ABNORMAL
EOSINOPHIL # BLD AUTO: 0 10E9/L (ref 0–0.7)
EOSINOPHIL # BLD AUTO: 0 10E9/L (ref 0–0.7)
EOSINOPHIL # BLD AUTO: 0.3 10E9/L (ref 0–0.7)
EOSINOPHIL NFR BLD AUTO: 0.5 %
EOSINOPHIL NFR BLD AUTO: 0.7 %
EOSINOPHIL NFR BLD AUTO: 3.9 %
ERYTHROCYTE [DISTWIDTH] IN BLOOD BY AUTOMATED COUNT: 16.3 % (ref 10–15)
ERYTHROCYTE [DISTWIDTH] IN BLOOD BY AUTOMATED COUNT: 16.3 % (ref 10–15)
ERYTHROCYTE [DISTWIDTH] IN BLOOD BY AUTOMATED COUNT: 16.5 % (ref 10–15)
GFR SERPL CREATININE-BSD FRML MDRD: 51 ML/MIN/1.7M2
GFR SERPL CREATININE-BSD FRML MDRD: 62 ML/MIN/1.7M2
GLUCOSE BLDC GLUCOMTR-MCNC: 110 MG/DL (ref 70–99)
GLUCOSE BLDC GLUCOMTR-MCNC: 110 MG/DL (ref 70–99)
GLUCOSE BLDC GLUCOMTR-MCNC: 113 MG/DL (ref 70–99)
GLUCOSE BLDC GLUCOMTR-MCNC: 113 MG/DL (ref 70–99)
GLUCOSE BLDC GLUCOMTR-MCNC: 126 MG/DL (ref 70–99)
GLUCOSE BLDC GLUCOMTR-MCNC: 129 MG/DL (ref 70–99)
GLUCOSE BLDC GLUCOMTR-MCNC: 132 MG/DL (ref 70–99)
GLUCOSE BLDC GLUCOMTR-MCNC: 136 MG/DL (ref 70–99)
GLUCOSE BLDC GLUCOMTR-MCNC: 138 MG/DL (ref 70–99)
GLUCOSE BLDC GLUCOMTR-MCNC: 140 MG/DL (ref 70–99)
GLUCOSE BLDC GLUCOMTR-MCNC: 86 MG/DL (ref 70–99)
GLUCOSE SERPL-MCNC: 108 MG/DL (ref 70–99)
GLUCOSE SERPL-MCNC: 129 MG/DL (ref 70–99)
HBA1C MFR BLD: 4.9 % (ref 0–5.6)
HCO3 BLD-SCNC: 27 MMOL/L (ref 21–28)
HCT VFR BLD AUTO: 24 % (ref 35–47)
HCT VFR BLD AUTO: 24.6 % (ref 35–47)
HCT VFR BLD AUTO: 26.2 % (ref 35–47)
HGB BLD-MCNC: 8 G/DL (ref 11.7–15.7)
HGB BLD-MCNC: 8.3 G/DL (ref 11.7–15.7)
HGB BLD-MCNC: 8.7 G/DL (ref 11.7–15.7)
IMM GRANULOCYTES # BLD: 0 10E9/L (ref 0–0.4)
IMM GRANULOCYTES NFR BLD: 0 %
IMM GRANULOCYTES NFR BLD: 0.1 %
IMM GRANULOCYTES NFR BLD: 0.2 %
LYMPHOCYTES # BLD AUTO: 0.3 10E9/L (ref 0.8–5.3)
LYMPHOCYTES # BLD AUTO: 0.3 10E9/L (ref 0.8–5.3)
LYMPHOCYTES # BLD AUTO: 0.4 10E9/L (ref 0.8–5.3)
LYMPHOCYTES NFR BLD AUTO: 3.3 %
LYMPHOCYTES NFR BLD AUTO: 5.5 %
LYMPHOCYTES NFR BLD AUTO: 6.6 %
MAGNESIUM SERPL-MCNC: 2.3 MG/DL (ref 1.6–2.3)
MAGNESIUM SERPL-MCNC: 2.4 MG/DL (ref 1.6–2.3)
MCH RBC QN AUTO: 28.1 PG (ref 26.5–33)
MCH RBC QN AUTO: 28.7 PG (ref 26.5–33)
MCH RBC QN AUTO: 28.8 PG (ref 26.5–33)
MCHC RBC AUTO-ENTMCNC: 33.2 G/DL (ref 31.5–36.5)
MCHC RBC AUTO-ENTMCNC: 33.3 G/DL (ref 31.5–36.5)
MCHC RBC AUTO-ENTMCNC: 33.7 G/DL (ref 31.5–36.5)
MCV RBC AUTO: 85 FL (ref 78–100)
MCV RBC AUTO: 85 FL (ref 78–100)
MCV RBC AUTO: 86 FL (ref 78–100)
MONOCYTES # BLD AUTO: 0.3 10E9/L (ref 0–1.3)
MONOCYTES # BLD AUTO: 0.4 10E9/L (ref 0–1.3)
MONOCYTES # BLD AUTO: 0.4 10E9/L (ref 0–1.3)
MONOCYTES NFR BLD AUTO: 5.1 %
MONOCYTES NFR BLD AUTO: 5.5 %
MONOCYTES NFR BLD AUTO: 6.6 %
NEUTROPHILS # BLD AUTO: 4.9 10E9/L (ref 1.6–8.3)
NEUTROPHILS # BLD AUTO: 5.3 10E9/L (ref 1.6–8.3)
NEUTROPHILS # BLD AUTO: 6.7 10E9/L (ref 1.6–8.3)
NEUTROPHILS NFR BLD AUTO: 85.9 %
NEUTROPHILS NFR BLD AUTO: 87.5 %
NEUTROPHILS NFR BLD AUTO: 88.1 %
NRBC # BLD AUTO: 0 10*3/UL
NRBC BLD AUTO-RTO: 0 /100
NUM BPU REQUESTED: 1
O2/TOTAL GAS SETTING VFR VENT: 30 %
PCO2 BLD: 34 MM HG (ref 35–45)
PH BLD: 7.5 PH (ref 7.35–7.45)
PHOSPHATE SERPL-MCNC: 2.8 MG/DL (ref 2.5–4.5)
PHOSPHATE SERPL-MCNC: 3.9 MG/DL (ref 2.5–4.5)
PLATELET # BLD AUTO: 45 10E9/L (ref 150–450)
PLATELET # BLD AUTO: 46 10E9/L (ref 150–450)
PLATELET # BLD AUTO: 54 10E9/L (ref 150–450)
PLATELET # BLD EST: ABNORMAL 10*3/UL
PO2 BLD: 122 MM HG (ref 80–105)
POTASSIUM SERPL-SCNC: 3.4 MMOL/L (ref 3.4–5.3)
POTASSIUM SERPL-SCNC: 3.6 MMOL/L (ref 3.4–5.3)
PROT SERPL-MCNC: 5 G/DL (ref 6.8–8.8)
RBC # BLD AUTO: 2.78 10E12/L (ref 3.8–5.2)
RBC # BLD AUTO: 2.89 10E12/L (ref 3.8–5.2)
RBC # BLD AUTO: 3.1 10E12/L (ref 3.8–5.2)
SODIUM SERPL-SCNC: 142 MMOL/L (ref 133–144)
SODIUM SERPL-SCNC: 143 MMOL/L (ref 133–144)
TRANSFUSION STATUS PATIENT QL: NORMAL
TRIGL SERPL-MCNC: 159 MG/DL
WBC # BLD AUTO: 5.8 10E9/L (ref 4–11)
WBC # BLD AUTO: 6 10E9/L (ref 4–11)
WBC # BLD AUTO: 7.6 10E9/L (ref 4–11)

## 2018-09-02 PROCEDURE — 97165 OT EVAL LOW COMPLEX 30 MIN: CPT | Mod: GO

## 2018-09-02 PROCEDURE — 25000128 H RX IP 250 OP 636: Performed by: NURSE PRACTITIONER

## 2018-09-02 PROCEDURE — 84100 ASSAY OF PHOSPHORUS: CPT | Performed by: STUDENT IN AN ORGANIZED HEALTH CARE EDUCATION/TRAINING PROGRAM

## 2018-09-02 PROCEDURE — 85025 COMPLETE CBC W/AUTO DIFF WBC: CPT | Performed by: STUDENT IN AN ORGANIZED HEALTH CARE EDUCATION/TRAINING PROGRAM

## 2018-09-02 PROCEDURE — 85730 THROMBOPLASTIN TIME PARTIAL: CPT | Performed by: STUDENT IN AN ORGANIZED HEALTH CARE EDUCATION/TRAINING PROGRAM

## 2018-09-02 PROCEDURE — 80048 BASIC METABOLIC PNL TOTAL CA: CPT | Performed by: STUDENT IN AN ORGANIZED HEALTH CARE EDUCATION/TRAINING PROGRAM

## 2018-09-02 PROCEDURE — 71045 X-RAY EXAM CHEST 1 VIEW: CPT | Mod: 77

## 2018-09-02 PROCEDURE — 82330 ASSAY OF CALCIUM: CPT | Performed by: SURGERY

## 2018-09-02 PROCEDURE — 94003 VENT MGMT INPAT SUBQ DAY: CPT

## 2018-09-02 PROCEDURE — 00000146 ZZHCL STATISTIC GLUCOSE BY METER IP

## 2018-09-02 PROCEDURE — A9270 NON-COVERED ITEM OR SERVICE: HCPCS | Mod: GY | Performed by: STUDENT IN AN ORGANIZED HEALTH CARE EDUCATION/TRAINING PROGRAM

## 2018-09-02 PROCEDURE — 36592 COLLECT BLOOD FROM PICC: CPT | Performed by: STUDENT IN AN ORGANIZED HEALTH CARE EDUCATION/TRAINING PROGRAM

## 2018-09-02 PROCEDURE — 80076 HEPATIC FUNCTION PANEL: CPT | Performed by: STUDENT IN AN ORGANIZED HEALTH CARE EDUCATION/TRAINING PROGRAM

## 2018-09-02 PROCEDURE — 83036 HEMOGLOBIN GLYCOSYLATED A1C: CPT | Performed by: SURGERY

## 2018-09-02 PROCEDURE — 40000048 ZZH STATISTIC DAILY SWAN MONITORING

## 2018-09-02 PROCEDURE — 25000131 ZZH RX MED GY IP 250 OP 636 PS 637: Mod: GY | Performed by: STUDENT IN AN ORGANIZED HEALTH CARE EDUCATION/TRAINING PROGRAM

## 2018-09-02 PROCEDURE — 82330 ASSAY OF CALCIUM: CPT | Performed by: STUDENT IN AN ORGANIZED HEALTH CARE EDUCATION/TRAINING PROGRAM

## 2018-09-02 PROCEDURE — 25000125 ZZHC RX 250: Performed by: STUDENT IN AN ORGANIZED HEALTH CARE EDUCATION/TRAINING PROGRAM

## 2018-09-02 PROCEDURE — 40000133 ZZH STATISTIC OT WARD VISIT

## 2018-09-02 PROCEDURE — 25000128 H RX IP 250 OP 636: Performed by: STUDENT IN AN ORGANIZED HEALTH CARE EDUCATION/TRAINING PROGRAM

## 2018-09-02 PROCEDURE — 25000128 H RX IP 250 OP 636: Performed by: SURGERY

## 2018-09-02 PROCEDURE — 40000196 ZZH STATISTIC RAPCV CVP MONITORING

## 2018-09-02 PROCEDURE — 25000132 ZZH RX MED GY IP 250 OP 250 PS 637: Mod: GY | Performed by: STUDENT IN AN ORGANIZED HEALTH CARE EDUCATION/TRAINING PROGRAM

## 2018-09-02 PROCEDURE — 12000025 ZZH R&B TRANSPLANT INTERMEDIATE

## 2018-09-02 PROCEDURE — 84478 ASSAY OF TRIGLYCERIDES: CPT | Performed by: STUDENT IN AN ORGANIZED HEALTH CARE EDUCATION/TRAINING PROGRAM

## 2018-09-02 PROCEDURE — 99232 SBSQ HOSP IP/OBS MODERATE 35: CPT | Mod: GC | Performed by: SURGERY

## 2018-09-02 PROCEDURE — 25000128 H RX IP 250 OP 636: Performed by: TRANSPLANT SURGERY

## 2018-09-02 PROCEDURE — 85025 COMPLETE CBC W/AUTO DIFF WBC: CPT | Performed by: SURGERY

## 2018-09-02 PROCEDURE — 97110 THERAPEUTIC EXERCISES: CPT | Mod: GO

## 2018-09-02 PROCEDURE — 83735 ASSAY OF MAGNESIUM: CPT | Performed by: STUDENT IN AN ORGANIZED HEALTH CARE EDUCATION/TRAINING PROGRAM

## 2018-09-02 PROCEDURE — P9037 PLATE PHERES LEUKOREDU IRRAD: HCPCS | Performed by: SURGERY

## 2018-09-02 PROCEDURE — 40000014 ZZH STATISTIC ARTERIAL MONITORING DAILY

## 2018-09-02 PROCEDURE — 82803 BLOOD GASES ANY COMBINATION: CPT | Performed by: SURGERY

## 2018-09-02 PROCEDURE — 93010 ELECTROCARDIOGRAM REPORT: CPT | Performed by: INTERNAL MEDICINE

## 2018-09-02 PROCEDURE — 25000131 ZZH RX MED GY IP 250 OP 636 PS 637: Mod: GY | Performed by: TRANSPLANT SURGERY

## 2018-09-02 PROCEDURE — A9270 NON-COVERED ITEM OR SERVICE: HCPCS | Mod: GY | Performed by: NURSE PRACTITIONER

## 2018-09-02 PROCEDURE — 25000132 ZZH RX MED GY IP 250 OP 250 PS 637: Mod: GY | Performed by: NURSE PRACTITIONER

## 2018-09-02 PROCEDURE — 93005 ELECTROCARDIOGRAM TRACING: CPT

## 2018-09-02 PROCEDURE — 82550 ASSAY OF CK (CPK): CPT | Performed by: STUDENT IN AN ORGANIZED HEALTH CARE EDUCATION/TRAINING PROGRAM

## 2018-09-02 PROCEDURE — 97530 THERAPEUTIC ACTIVITIES: CPT | Mod: GO

## 2018-09-02 PROCEDURE — 71045 X-RAY EXAM CHEST 1 VIEW: CPT

## 2018-09-02 PROCEDURE — 25800025 ZZH RX 258: Performed by: STUDENT IN AN ORGANIZED HEALTH CARE EDUCATION/TRAINING PROGRAM

## 2018-09-02 PROCEDURE — 0WUF0JZ SUPPLEMENT ABDOMINAL WALL WITH SYNTHETIC SUBSTITUTE, OPEN APPROACH: ICD-10-PCS | Performed by: TRANSPLANT SURGERY

## 2018-09-02 PROCEDURE — 40000275 ZZH STATISTIC RCP TIME EA 10 MIN

## 2018-09-02 PROCEDURE — 3E1M38Z IRRIGATION OF PERITONEAL CAVITY USING IRRIGATING SUBSTANCE, PERCUTANEOUS APPROACH: ICD-10-PCS | Performed by: TRANSPLANT SURGERY

## 2018-09-02 RX ORDER — TACROLIMUS 1 MG/1
2 CAPSULE ORAL
Status: DISCONTINUED | OUTPATIENT
Start: 2018-09-02 | End: 2018-09-02

## 2018-09-02 RX ORDER — NALOXONE HYDROCHLORIDE 0.4 MG/ML
.1-.4 INJECTION, SOLUTION INTRAMUSCULAR; INTRAVENOUS; SUBCUTANEOUS
Status: DISCONTINUED | OUTPATIENT
Start: 2018-09-02 | End: 2018-09-03

## 2018-09-02 RX ORDER — AMOXICILLIN 250 MG
1 CAPSULE ORAL 2 TIMES DAILY
Status: DISCONTINUED | OUTPATIENT
Start: 2018-09-02 | End: 2018-09-03

## 2018-09-02 RX ORDER — DEXTROSE MONOHYDRATE 25 G/50ML
25-50 INJECTION, SOLUTION INTRAVENOUS
Status: DISCONTINUED | OUTPATIENT
Start: 2018-09-02 | End: 2018-09-07

## 2018-09-02 RX ORDER — NICOTINE POLACRILEX 4 MG
15-30 LOZENGE BUCCAL
Status: DISCONTINUED | OUTPATIENT
Start: 2018-09-02 | End: 2018-09-07

## 2018-09-02 RX ORDER — ONDANSETRON 2 MG/ML
4 INJECTION INTRAMUSCULAR; INTRAVENOUS EVERY 6 HOURS PRN
Status: DISCONTINUED | OUTPATIENT
Start: 2018-09-02 | End: 2018-09-12 | Stop reason: HOSPADM

## 2018-09-02 RX ORDER — AMOXICILLIN 250 MG
2 CAPSULE ORAL 2 TIMES DAILY
Status: DISCONTINUED | OUTPATIENT
Start: 2018-09-02 | End: 2018-09-03

## 2018-09-02 RX ORDER — ONDANSETRON 4 MG/1
4 TABLET, ORALLY DISINTEGRATING ORAL EVERY 6 HOURS PRN
Status: DISCONTINUED | OUTPATIENT
Start: 2018-09-02 | End: 2018-09-12 | Stop reason: HOSPADM

## 2018-09-02 RX ADMIN — MYCOPHENOLATE MOFETIL 1000 MG: 200 POWDER, FOR SUSPENSION ORAL at 08:45

## 2018-09-02 RX ADMIN — MULTIVITAMIN 15 ML: LIQUID ORAL at 08:42

## 2018-09-02 RX ADMIN — PROPOFOL 40 MCG/KG/MIN: 10 INJECTION, EMULSION INTRAVENOUS at 00:42

## 2018-09-02 RX ADMIN — VANCOMYCIN HYDROCHLORIDE 1000 MG: 1 INJECTION, SOLUTION INTRAVENOUS at 16:12

## 2018-09-02 RX ADMIN — FUROSEMIDE 10 MG: 10 INJECTION, SOLUTION INTRAVENOUS at 00:43

## 2018-09-02 RX ADMIN — Medication: at 17:21

## 2018-09-02 RX ADMIN — VANCOMYCIN HYDROCHLORIDE 1000 MG: 1 INJECTION, SOLUTION INTRAVENOUS at 03:16

## 2018-09-02 RX ADMIN — OXYCODONE HYDROCHLORIDE 5 MG: 5 SOLUTION ORAL at 09:34

## 2018-09-02 RX ADMIN — PROPOFOL 40 MCG/KG/MIN: 10 INJECTION, EMULSION INTRAVENOUS at 06:30

## 2018-09-02 RX ADMIN — HEPARIN SODIUM 400 UNITS/HR: 10000 INJECTION, SOLUTION INTRAVENOUS at 22:28

## 2018-09-02 RX ADMIN — METHYLPREDNISOLONE 100 MG: 125 INJECTION, POWDER, LYOPHILIZED, FOR SOLUTION INTRAMUSCULAR; INTRAVENOUS at 08:44

## 2018-09-02 RX ADMIN — VALGANCICLOVIR HYDROCHLORIDE 450 MG: 50 POWDER, FOR SOLUTION ORAL at 09:33

## 2018-09-02 RX ADMIN — Medication 2 MG: at 16:10

## 2018-09-02 RX ADMIN — MYCOPHENOLATE MOFETIL 1000 MG: 200 POWDER, FOR SUSPENSION ORAL at 17:59

## 2018-09-02 RX ADMIN — DEXTROSE AND SODIUM CHLORIDE: 5; 450 INJECTION, SOLUTION INTRAVENOUS at 11:51

## 2018-09-02 RX ADMIN — OXYCODONE HYDROCHLORIDE 5 MG: 5 SOLUTION ORAL at 13:59

## 2018-09-02 RX ADMIN — PANTOPRAZOLE SODIUM 40 MG: 40 INJECTION, POWDER, FOR SOLUTION INTRAVENOUS at 08:43

## 2018-09-02 RX ADMIN — DOCUSATE SODIUM 100 MG: 50 LIQUID ORAL at 08:43

## 2018-09-02 RX ADMIN — FUROSEMIDE 10 MG: 10 INJECTION, SOLUTION INTRAVENOUS at 23:59

## 2018-09-02 RX ADMIN — POTASSIUM CHLORIDE 20 MEQ: 29.8 INJECTION, SOLUTION INTRAVENOUS at 05:59

## 2018-09-02 RX ADMIN — CALCIUM GLUCONATE 1 G: 98 INJECTION, SOLUTION INTRAVENOUS at 21:26

## 2018-09-02 RX ADMIN — Medication 100 MCG/HR: at 03:28

## 2018-09-02 RX ADMIN — SULFAMETHOXAZOLE AND TRIMETHOPRIM 1 TABLET: 400; 80 TABLET ORAL at 08:43

## 2018-09-02 RX ADMIN — FUROSEMIDE 10 MG: 10 INJECTION, SOLUTION INTRAVENOUS at 16:14

## 2018-09-02 RX ADMIN — Medication 10 ML: at 08:42

## 2018-09-02 RX ADMIN — OXYCODONE HYDROCHLORIDE 5 MG: 5 SOLUTION ORAL at 07:02

## 2018-09-02 RX ADMIN — ASPIRIN 325 MG ORAL TABLET 325 MG: 325 PILL ORAL at 08:42

## 2018-09-02 RX ADMIN — OXYCODONE HYDROCHLORIDE 5 MG: 5 SOLUTION ORAL at 21:00

## 2018-09-02 RX ADMIN — FUROSEMIDE 10 MG: 10 INJECTION, SOLUTION INTRAVENOUS at 08:44

## 2018-09-02 RX ADMIN — DOCUSATE SODIUM 100 MG: 50 LIQUID ORAL at 20:32

## 2018-09-02 RX ADMIN — FLUCONAZOLE 400 MG: 2 INJECTION, SOLUTION INTRAVENOUS at 00:40

## 2018-09-02 RX ADMIN — OXYCODONE HYDROCHLORIDE 5 MG: 5 SOLUTION ORAL at 12:55

## 2018-09-02 ASSESSMENT — ACTIVITIES OF DAILY LIVING (ADL)
ADLS_ACUITY_SCORE: 10
ADLS_ACUITY_SCORE: 10
PREVIOUS_RESPONSIBILITIES: MEAL PREP;LAUNDRY;HOUSEKEEPING;SHOPPING;YARDWORK;MEDICATION MANAGEMENT;FINANCES;DRIVING;WORK
ADLS_ACUITY_SCORE: 10

## 2018-09-02 ASSESSMENT — PAIN DESCRIPTION - DESCRIPTORS: DESCRIPTORS: CRAMPING

## 2018-09-02 NOTE — OP NOTE
Procedure Date: 09/02/2018      DATE OF PROCEDURE:  09/01/2018      PREOPERATIVE DIAGNOSES:   1.  Open abdomen.   2.  Status post liver transplant.      POSTOPERATIVE DIAGNOSES:   1.  Open abdomen status post liver transplant.   2.  Bile leak from the anastomosis.   3.  Large abdominal defect had to be closed with mesh.      SURGEON:  Darren Rod MD      ASSISTANT:  Derik Garcias MD      INDICATIONS FOR THE PROCEDURE:  Sherrie Santa received a very complex liver transplant yesterday.  At that point, she was unstable with a large liver, so we closed the abdomen with ABThera.  This was a planned exploration after 48 hours of the primary transplant.  I met with the family and explained to them the risks, benefits, alternatives of exploration.  They understood the risks and consented for the procedure.      OPERATIVE FINDINGS:   1.  The liver looked good.   2.  The hepatic artery was open.   3.  The portal vein was open.   4.  There was leakage from the right side of the bile duct anastomosis with just local peritonitis.   5.  The right kidney looked good.  Doppler signal was excellent of the right kidney.   6.  There were a few of abdominal clots and minimal amount of ascites.      OPERATIVE PROCEDURE:  The patient was brought to the operating room, placed in supine position.  General anesthesia was administered.  Parts were prepped and draped from nipple to mid thigh.  Sterile drapes were placed.  The ABThera was removed.  The abdomen was prepped.  Sterile drapes were placed.  We put the Fields retractors and aforementioned findings were noted.  We thoroughly sucked out all the fluid, washed the abdominal cavity with 3 liters of  antibiotic solution and 6 liters of saline.  We freshened the edges of the bile duct.  We essentially redid the anastomosis using interrupted 6-0 PDS on the back wall.  We then placed an 8-Hebrew feeding tube as an internal stent.  Then we closed the anterior wall using interrupted  6-0 Prolene.  After this, we placed a drain posterior to the liver.  We then released the abdominal wall.  Once we released the abdominal wall we noticed that there was a large defect, so we brought in a Permacol mesh.  We actually brought in 2 meshes, one was 10 x 15.  The other one was 10 x 10.  We then positioned the mass and sutured it to the parietes using 0 Prolene.  We closed it with 0 Prolene.  We then raised skin flaps above and below, and then approximated with 3-0 nylon.  This approximation looked very good.  The peak airway pressures did not change.  Overall, the patient tolerated the procedure well and was shifted to the ICU with stable but critical condition.  I explained the details of the procedure to the family as well as questions taken.         MAHAD SALDIVAR MD             D: 2018   T: 2018   MT: NUNO      Name:     REX DAMON   MRN:      [id number]        Account:        [id number]   :      1955           Procedure Date: 2018      Document: [id number]

## 2018-09-02 NOTE — PROCEDURES
Pre-Procedure Diagnosis: s/p DCD liver txp  Post-Procedure Diagnosis: s/p DCD liver txp    Procedure: Central Venous Catheter Rewiring    Consent: Detailed explanation of the procedure, treatment options, risks including but not limited to infection and bleeding, and benefits were explained to the patient (or family). A written informed consent was obtained.     Technique: The right/left neck / chest / groin was prepped with 2% chlorhexidine and betadine wash, then draped with a full length sterile sheet in the usual fashion.  The sutures were removed from the cephalic and caudad RIJ CVCs. The caudal line was removed and direct pressure was applied for 3 minutes without significant bleeding. A compression dressing was placed thereafter.    Then, through the brown port of the cephalad MAC line, a guidewire was placed until the end was even with the crown of the patient's head, no ectopy was observed on the EKG at this time. The line was then removed over the wire. A pre-flushed triple lumen was inserted via the seldinger technique. Blood was withdrawn from all lumens and flushed with normal saline.  The catheter was sutured in place and a sterile dressing was applied over the site prior to removal of drapes.      The patient tolerated the procedure well and there were no complications.    Chest x ray is pending at this time.      Nilay Rivera DO, MSc.  Anesthesiology Resident CA-2    Dr. Stewart was present for the critical portions of the procedure

## 2018-09-02 NOTE — PROGRESS NOTES
Immunosuppression Note:    Sherrie Lala is a 63 year old female who is seen today  for immunosuppression management     I, Darren Rod MD, I have examined the patient with the resident/PA/Fellow, discussed and agree with the note and findings.  I have reviewed today's vital signs, medications, labs and imaging. I reviewed the immunosuppression medications and levels. I spoke to the patient/family and explained below clinical details and answered all the questions      Transplant Surgery  Inpatient Daily Progress Note  09/02/2018    Assessment & Plan: Sherrie Lala is a 63-year old female with a history of metastatic cholangiocarcinoma with subsequent liver failure who is now s/p DDLT on 8/30/18. The patient had significant intraoperative blood loss (~3L) and her abdomen was left open.    Graft function: Fair, stable. Tbili improved at 0.7, alk phos is normal, and ALT/AST are 124/57 today. Liver US POD#1 with limited views, and a persistent eccentric thrombus in the intrahepatic main portal vein extending into the right portal vein. Starting heparin drip at 400u/hr today for HAT ppx.  Immunosuppression management:  - Receiving induction with steroid taper, and will give a dose of Basiliximab today to delay initiation of Tacrolimus.  - MMF: Continue 1gm BID.  - Tacrolimus: Tacrolimus started today at 2 mg BID. Goal range 10-15.  Complexity of management:Medium. Contributing factors: thrombocytopenia, anemia and organ dysfunction  Hematology: Significant bleeding intra-op, requiring 11u PRBCs and other blood products. Hgb now stable in the 12-13 range.  Thrombocytopenia: Patient with baseline platelet count ~30 prior to transplant. Platelet count 82 today, received multiple platelet transfusions intra-op. Continue to monitor.  Cardiorespiratory: Stable, SBP in the 90s-110s range without pressors. Remains intubated.  GI/Nutrition: NPO, ICU to place [place] tube today for initiation of tube feedings. Abdomen left open  due to significant bleeding intra-op, plan for return to OR for washout and possible closure tomorrow (9/1).  Endocrine: On insulin drip while on steroid taper.  Fluid/Electrolytes: MIVF: On D545 @ 100ml/hr, recommend decreasing or discontinuing. On Lasix drip at 10mg/hr due to significant volume of blood products administered during surgery was discontinued yesterday.  Electrolytes WNL.  : Bruner in place, good UOP.  Infectious disease: Afebrile, normal WBC count. On Vanco, Zosyn, and Fluconazole for 3 days for surgical ppx.  Prophylaxis: GI, DVT, heparin, Bactrim, Valcyte  Disposition: 4A    Medical Decision Making: High  Subsequent visit 07344 (moderate level decision making)    DREA:   Fellow: Derik Garcias MD x [phone]  Faculty: Darren Rod M.D.  __________________________________________________________________  Transplant History: Admitted 8/29/2018 for orthotopic liver transplantation.   The patient has a history of liver failure due to cholangiocarcinoma.    8/30/2018 (Liver), Postoperative day: 3     Interval History:   Remains intubated. Off pressors, good UOP.     ROS:   A 10-point review of systems was negative except as noted above.    Meds:    aspirin  325 mg Oral Daily     docusate  100 mg Oral BID     fluconazole  400 mg Intravenous Q24H     furosemide  10 mg Intravenous Q8H     gentamicin 120mg, polymyxin 500,000 units, bacitracin 50,000 units in 1L 0.9% NaCl bag   Irrigation Once     [START ON 9/3/2018] methylPREDNISolone  50 mg Intravenous Once    Followed by     [START ON 9/4/2018] predniSONE  25 mg Oral Once    Followed by     [START ON 9/5/2018] predniSONE  10 mg Oral Once     multivitamins with minerals  15 mL Per Feeding Tube Daily     mycophenolate  1,000 mg Oral BID IS    Or     mycophenolate  1,000 mg Oral or NG Tube BID IS     pantoprazole (PROTONIX) IV  40 mg Intravenous Daily with breakfast     sennosides  10 mL Oral BID     sodium chloride (PF)  3 mL Intravenous Q8H      sulfamethoxazole-trimethoprim  1 tablet Oral Daily     valGANciclovir  450 mg Oral Daily    Or     valGANciclovir  450 mg Oral or NG Tube Daily     vancomycin (VANCOCIN) IV  1,000 mg Intravenous Q12H       Physical Exam:     Admit Weight: 55.2 kg (121 lb 9.6 oz)  Today's weight: 135 lbs 2.27 oz    Vital sign ranges:    Temp:  [97.7  F (36.5  C)-99.7  F (37.6  C)] 97.9  F (36.6  C)  Heart Rate:  [68-95] 86  Resp:  [10-20] 10  BP: (101-140)/(69-70) 101/69  MAP:  [63 mmHg-99 mmHg] 99 mmHg  Arterial Line BP: ()/(29-79) 132/73  FiO2 (%):  [30 %] 30 %  SpO2:  [92 %-100 %] 100 %    GENERAL: Intubated, in no apparent distress.  SKIN: No jaundice. No rashes or lesions.   HEENT: Eyes symmetrical and free of discharge bilaterally.  CV: Regular rate and rhythm.   RESPIRATORY: Nonlabored breathing on ventilator.   GI: Soft and non distended with normoactive bowel sounds. Abthera drain in place with sanguinous drainage.  : Bruner in place.  EXTREMITIES: Generalized trace edema.  NEUROLOGIC: Intubated, following commands. No focal deficits. Tremor absent.  MUSCULOSKELETAL: No joint swelling or tenderness.     Data:   CMP  Recent Labs  Lab 09/02/18  0308 09/01/18 2008 09/01/18  0933 09/01/18  0431  08/31/18  0553  08/29/18 2058     --  142 143  < >  --   < > 144   POTASSIUM 3.6 3.8 3.3* 3.4  < >  --   < > 3.1*   CHLORIDE 107  --   --  108  < >  --   < > 106   CO2 25  --   --  28  < >  --   < > 26   *  --  136* 118*  < >  --   < > 90   BUN 27  --   --  23  < >  --   < > 19   CR 1.09*  --   --  1.02  < >  --   < > 0.77   GFRESTIMATED 51*  --   --  55*  < >  --   < > 76   GFRESTBLACK 62  --   --  67  < >  --   < > >90   SHELDON 7.4*  --   --  7.2*  < >  --   < > 8.9   ICAW 4.3*  --  4.7 4.3*  --   --   < >  --    MAG 2.3  --   --  2.3  < >  --   < > 2.2   PHOS 3.9  --   --  3.8  < >  --   < > 3.6   AMYLASE  --   --   --   --   --  39  --  82   LIPASE  --   --   --  161  --   --   --   --    ALBUMIN 2.3*  --   --   2.3*  --   --   < > 5.0   BILITOTAL 1.2  --   --  1.5*  --   --   < > 3.0*   ALKPHOS 36*  --   --  41  --   --   < > 115   AST 57*  --   --  107*  --   --   < > 23   *  --   --  170*  --   --   < > 24   < > = values in this interval not displayed.  CBC    Recent Labs  Lab 09/02/18  0308 09/01/18  2209   HGB 8.7* 9.2*   WBC 5.8 6.2   PLT 45* 52*     COAGS    Recent Labs  Lab 09/02/18  0308 09/01/18  1557 09/01/18  0933   INR  --  1.33* 1.33*   PTT 34  --  32      Urinalysis  Recent Labs   Lab Test  02/26/18   0947   COLOR  Yellow   APPEARANCE  Clear   URINEGLC  Negative   URINEBILI  Negative   URINEKETONE  Negative   SG  1.017   UBLD  Negative   URINEPH  5.0   PROTEIN  Negative   NITRITE  Negative   LEUKEST  Negative   RBCU  1   WBCU  1   UTPG  0.06     Virology:  Hepatitis C Antibody   Date Value Ref Range Status   08/29/2018 Nonreactive NR^Nonreactive Final     Comment:     Assay performance characteristics have not been established for newborns,   infants, and children

## 2018-09-02 NOTE — PROGRESS NOTES
09/02/18 1306   Quick Adds   Type of Visit Initial Occupational Therapy Evaluation   Living Environment   Lives With spouse   Living Arrangements house   Home Accessibility bed and bath on same level   Number of Stairs to Enter Home 2   Number of Stairs Within Home 12   Stair Railings at Home inside, present on right side   Living Environment Comment Pt lives in split level home with bedroom, bathroom and laundry on upper level   Self-Care   Dominant Hand right   Usual Activity Tolerance excellent   Current Activity Tolerance fair   Regular Exercise no   Equipment Currently Used at Home none   Activity/Exercise/Self-Care Comment Pt is a RN, enjoys baking, swimming, doing yoga, reading, spending time with family, and antiquing   Functional Level Prior   Ambulation 0-->independent   Transferring 0-->independent   Toileting 0-->independent   Bathing 0-->independent   Dressing 0-->independent   Eating 0-->independent   Communication 0-->understands/communicates without difficulty   Swallowing 0-->swallows foods/liquids without difficulty   Cognition 0 - no cognition issues reported   Fall history within last six months no   Which of the above functional risks had a recent onset or change? ambulation;transferring;toileting;dressing;bathing;eating   Prior Functional Level Comment Pt reports IND at baseline   General Information   Onset of Illness/Injury or Date of Surgery - Date 08/29/18   Referring Physician Darren Rod MD   Patient/Family Goals Statement To return to [facility]   Additional Occupational Profile Info/Pertinent History of Current Problem Sherrie Lala is a 63 year old female with hx of metastatic cholangiocarcinoma with subsequent liver failure s/p DDLT with intraop blood loss of 3L and temporary abdominal closure.  Taken back to the OR on 9/1 and abdomen closed.   Precautions/Limitations abdominal precautions   Weight-Bearing Status - LUE (No lifting more than 10#)   Weight-Bearing Status - RUE (No  lifting more than 10#)   General Info Comments 4 SANTOS drains, sanchez catheter, and many IV's   Cognitive Status Examination   Orientation person;place   Cognitive Comment Pt's cognition grossly intact   Visual Perception   Visual Perception Wears glasses   Visual Perception Comments Denies blurred/double vision   Sensory Examination   Sensory Comments Denies numbness/tingling   Pain Assessment   Patient Currently in Pain Yes, see Vital Sign flowsheet   Range of Motion (ROM)   ROM Comment BUE ROM WFL   Strength   Strength Comments BUE MMT >3/5, not formally tested due to abdominal precautions   Hand Strength   Hand Strength Comments Bilateral  strength WFL   Instrumental Activities of Daily Living (IADL)   Previous Responsibilities meal prep;laundry;housekeeping;shopping;yardwork;medication management;finances;driving;work   IADL Comments shares responsibility w/   Activities of Daily Living Analysis   Impairments Contributing to Impaired Activities of Daily Living pain;post surgical precautions;strength decreased;balance impaired   ADL Comments Pt reports ADL IND at baseline   General Therapy Interventions   Planned Therapy Interventions ADL retraining;IADL retraining;home program guidelines;progressive activity/exercise;risk factor education;transfer training   Clinical Impression   Criteria for Skilled Therapeutic Interventions Met yes, treatment indicated   OT Diagnosis Decreased ADL/IADL IND   Influenced by the following impairments medical status, pain, abdominal precautions, weakness, decreased activity tolerance   Assessment of Occupational Performance 3-5 Performance Deficits   Identified Performance Deficits Functional ambulation and transfers, dressing, showering, home management   Clinical Decision Making (Complexity) Low complexity   Therapy Frequency daily   Predicted Duration of Therapy Intervention (days/wks) 1 week   Anticipated Discharge Disposition Transitional Care Facility   Risks and  "Benefits of Treatment have been explained. Yes   Patient, Family & other staff in agreement with plan of care Yes   Clinical Impression Comments Pt to benefit from skilled OT intervention to address above impairments. See daily flowsheet for details pertaining to treatment.    [facility] AM-PAC  \"6 Clicks\" Daily Activity Inpatient Short Form   1. Putting on and taking off regular lower body clothing? 1 - Total   2. Bathing (including washing, rinsing, drying)? 2 - A Lot   3. Toileting, which includes using toilet, bedpan or urinal? 2 - A Lot   4. Putting on and taking off regular upper body clothing? 2 - A Lot   5. Taking care of personal grooming such as brushing teeth? 3 - A Little   6. Eating meals? 3 - A Little   Daily Activity Raw Score (Score out of 24.Lower scores equate to lower levels of function) 13   Total Evaluation Time   Total Evaluation Time (Minutes) 16     "

## 2018-09-02 NOTE — PROGRESS NOTES
SURGICAL ICU PROGRESS NOTE  September 2, 2018      CO-MORBIDITIES:   Liver transplanted (H)  (primary encounter diagnosis)    ASSESSMENT: Sherrie Lala is a 63 year old female with hx of metastatic cholangiocarcinoma with subsequent liver failure s/p DDLT with intraop blood loss of 3L and temporary abdominal closure.  Taken back to the OR on 9/1 and abdomen closed.    TODAY'S PROGRESS/PLANS:   - Extubate today  - Pain control  - Hgb stable  - Discontinue propofol and fentanyl ggt  - Start fentanyl PCA (not dilaudid given h/o pruritis)  - Rewire RIJ CVC line for RIJ triple lumen.  - Remove a-line    PLAN:  Neuro/ pain/ sedation:  #acute postop pain  - Monitor neurological status. Notify the MD for any acute changes in exam.  - Fentanyl PCA and oxycodone 5-10mg q4h prn.  - No indications for sedation.     Pulmonary care:   #Post operative ventilator management  #PTX  - Small appearing right pneumothorax in setting of prior pleural effusion with drain- likely pneumo ex-vacuo without intervention needed  - Stable with minimal respiratory support s/p extubation.     Cardiovascular:    - Monitor hemodynamic status.   - No acute issues/concerns  - MAPs >65 off pressors.      GI care:   #s/p DDLT  - NPO- diet per transplant.  Advance TF as tolerated.  - Immunosuppression management per transplant- Mycophenolate, methylpred  - Abdomen closed.  - Start bowel regimen     Fluids/ Electrolytes/ Nutrition:   #Fluid overload  - mIVF as D5 1/2NS at 50mL/hr  - Electrolyte replacement protocol  - No indication for parenteral nutrition.  - Follow electrolytes daily.     Renal/ Fluid Balance:    #Hepatorenal syndrome  - Urine output is adequate so far.  - Will continue to monitor intake and output.     Endocrine:    #Stress hyperglycemia  - Insulin gtt  - Induction steroids per transplant.     ID/ Antibiotics:  #DDLT  - Vancomycin, Zosyn, and Fluconazole per primary team.  - Valcyte and Bactrim for prophylaxis     Heme:   #acute blood  loss anemia    - Continue on heparin gtt at 400 units/hr per transplant  - Hgb goal >7, PLT goal over 50     MSK:  -PT/OT     Prophylaxis:    - Mechanical prophylaxis for DVT.   - Low dose heparin drip.   - PPI     Lines/ tubes/ drains:  - PIVs  - Sanchez  - MACs x2  - ETT  - A-line     Disposition:  - Surgical ICU vs TTF later today    ====================================    SUBJECTIVE:   No acute events overnight. Intubated and sedated post op    OBJECTIVE:   1. VITAL SIGNS:   Temp:  [97.7  F (36.5  C)-99.7  F (37.6  C)] 98.8  F (37.1  C)  Heart Rate:  [68-95] 86  Resp:  [10-20] 10  BP: (101-140)/(69-73) 112/69  MAP:  [63 mmHg-99 mmHg] 80 mmHg  Arterial Line BP: ()/(29-75) 114/59  FiO2 (%):  [30 %] 30 %  SpO2:  [96 %-100 %] 96 %  Ventilation Mode: CPAP/PS  (Continuous positive airway pressure with Pressure Support)  FiO2 (%): 30 %  Rate Set (breaths/minute): 18 breaths/min  Tidal Volume Set (mL): 400 mL  PEEP (cm H2O): 5 cmH2O  Pressure Support (cm H2O): 7 cmH2O  Oxygen Concentration (%): 30 %  Resp: 10    2. INTAKE/ OUTPUT:   I/O last 3 completed shifts:  In: 3469.65 [I.V.:3126.65; NG/GT:30]  Out: 4398 [Urine:3795; Emesis/NG output:200; Drains:388; Blood:15]    3. PHYSICAL EXAMINATION:   General: awake, NAD  Neuro: follows commands, moves all 4 extremities spontaneously, AO  Resp: Breathing non-labored, clear lung sounds  CV: RRR, S1 S2  Abdomen: Soft, Non-distended. Drains with moderate serosang fluid  : sanchez dannie   Extremities: warm and well perfused    4. INVESTIGATIONS:   Arterial Blood Gases     Recent Labs  Lab 09/02/18  0308 09/01/18  0933 08/31/18  0448 08/31/18  0154   PH 7.50* 7.44 7.44 7.46*   PCO2 34* 38 39 37   PO2 122* 163* 133* 170*   HCO3 27 26 26 26     Complete Blood Count     Recent Labs  Lab 09/02/18  1124 09/02/18  0308 09/01/18  2209 09/01/18  1557   WBC 7.6 5.8 6.2 8.7   HGB 8.3* 8.7* 9.2* 10.7*   PLT 54* 45* 52* 53*     Basic Metabolic Panel    Recent Labs  Lab 09/02/18  0308  09/01/18 2008 09/01/18 0933 09/01/18 0431 08/31/18  1513  08/31/18 0212     --  142 143 143  --  146*   POTASSIUM 3.6 3.8 3.3* 3.4 4.0  < > 3.5   CHLORIDE 107  --   --  108 109  --  109   CO2 25  --   --  28 28  --  25   BUN 27  --   --  23 19  --  13   CR 1.09*  --   --  1.02 0.96  --  0.66   *  --  136* 118* 96  --  168*   < > = values in this interval not displayed.  Liver Function Tests    Recent Labs  Lab 09/02/18 0308 09/01/18 1557 09/01/18 0933 09/01/18 0431 08/31/18 2212 08/31/18 0212 08/29/18 2058   AST 57*  --   --  107*  --   --  435*  --  23   *  --   --  170*  --   --  195*  --  24   ALKPHOS 36*  --   --  41  --   --  41  --  115   BILITOTAL 1.2  --   --  1.5*  --   --  3.7*  --  3.0*   ALBUMIN 2.3*  --   --  2.3*  --   --  2.0*  --  5.0   INR  --  1.33* 1.33* 1.41* 1.50*  < > 1.70*  < > 1.65*   < > = values in this interval not displayed.  Pancreatic Enzymes    Recent Labs  Lab 09/01/18 0431 08/31/18  0553 08/29/18 2058   LIPASE 161  --   --    AMYLASE  --  39 82     Coagulation Profile    Recent Labs  Lab 09/02/18 0308 09/01/18 1557 09/01/18 0933 09/01/18 0431 08/31/18 2212   INR  --  1.33* 1.33* 1.41* 1.50*   PTT 34  --  32 37 35     Lactate  Invalid input(s): LACTATE    5. RADIOLOGY:   Recent Results (from the past 24 hour(s))   US Intraoperative    Narrative    EXAM: Intraoperative duplex liver  8/31/2018 12:17 AM      HISTORY: Evaluate portal venous flow new transplant    COMPARISON: None available    FINDINGS:   Intraoperative duplex images of the portal venous system before and  after thrombectomy were obtained.     The radiology resident was present for pre-thrombectomy images which  demonstrate partial thrombus in the main and right portal veins. Slow  flow in the left portal vein without obvious thrombus.    Post thrombectomy images demonstrate marked decrease in thrombus  burden with blood flow velocities.    Minimal residual thrombus in the main and  right portal veins. Slow  flow without obvious thrombus in the left portal vein.    Intrahepatic portal vein:  Patent continuous antegrade flow, 73  cm/sec.  Right portal vein flow is antegrade, measuring 27 cm/sec.  Left portal vein flow is antegrade, measuring 8 cm/sec.      Impression    IMPRESSION:   Post thrombectomy images demonstrate decreased portal venous system  thrombus burden with improved flow.  - Minimal residual thrombus in the main and right portal veins. Slow  flow without obvious thrombus in the left portal vein. Consider  short-term follow-up.    Findings discussed with Dr Rod in the OR.   XR Chest Port 1 View    Impression    IMPRESSION:   1.  Endotracheal tube tip 3.5 cm above the pauly. Additional support  devices as above.  2.  Postoperative edema.  3.  Postsurgical changes of liver transplant. Trace pneumoperitoneum,  likely postsurgical.       =========================================      Patient seen, findings and plan discussed with surgical ICU staff, Dr. Escalera.    -----------------------------------  Nilay Rivera DO, MSc  Anesthesia Resident, PGY3

## 2018-09-03 ENCOUNTER — APPOINTMENT (OUTPATIENT)
Dept: GENERAL RADIOLOGY | Facility: CLINIC | Age: 63
DRG: 005 | End: 2018-09-03
Attending: TRANSPLANT SURGERY
Payer: MEDICARE

## 2018-09-03 ENCOUNTER — APPOINTMENT (OUTPATIENT)
Dept: OCCUPATIONAL THERAPY | Facility: CLINIC | Age: 63
DRG: 005 | End: 2018-09-03
Attending: TRANSPLANT SURGERY
Payer: MEDICARE

## 2018-09-03 LAB
ABO + RH BLD: ABNORMAL
ABO + RH BLD: ABNORMAL
ALBUMIN SERPL-MCNC: 2.5 G/DL (ref 3.4–5)
ALP SERPL-CCNC: 44 U/L (ref 40–150)
ALT SERPL W P-5'-P-CCNC: 90 U/L (ref 0–50)
ANION GAP SERPL CALCULATED.3IONS-SCNC: 7 MMOL/L (ref 3–14)
AST SERPL W P-5'-P-CCNC: 42 U/L (ref 0–45)
BASOPHILS # BLD AUTO: 0 10E9/L (ref 0–0.2)
BASOPHILS # BLD AUTO: 0 10E9/L (ref 0–0.2)
BASOPHILS NFR BLD AUTO: 0.2 %
BASOPHILS NFR BLD AUTO: 0.2 %
BILIRUB DIRECT SERPL-MCNC: 0.5 MG/DL (ref 0–0.2)
BILIRUB SERPL-MCNC: 1 MG/DL (ref 0.2–1.3)
BLD GP AB SCN SERPL QL: ABNORMAL
BLD PROD TYP BPU: ABNORMAL
BLD PROD TYP BPU: NORMAL
BLD UNIT ID BPU: 0
BLOOD BANK CMNT PATIENT-IMP: ABNORMAL
BLOOD BANK CMNT PATIENT-IMP: ABNORMAL
BLOOD PRODUCT CODE: NORMAL
BPU ID: NORMAL
BUN SERPL-MCNC: 24 MG/DL (ref 7–30)
CA-I SERPL ISE-MCNC: 4.3 MG/DL (ref 4.4–5.2)
CA-I SERPL ISE-MCNC: 4.4 MG/DL (ref 4.4–5.2)
CALCIUM SERPL-MCNC: 7.6 MG/DL (ref 8.5–10.1)
CHLORIDE SERPL-SCNC: 108 MMOL/L (ref 94–109)
CO2 SERPL-SCNC: 29 MMOL/L (ref 20–32)
CREAT SERPL-MCNC: 0.78 MG/DL (ref 0.52–1.04)
DIFFERENTIAL METHOD BLD: ABNORMAL
DIFFERENTIAL METHOD BLD: ABNORMAL
EOSINOPHIL # BLD AUTO: 0.3 10E9/L (ref 0–0.7)
EOSINOPHIL # BLD AUTO: 0.5 10E9/L (ref 0–0.7)
EOSINOPHIL NFR BLD AUTO: 5.8 %
EOSINOPHIL NFR BLD AUTO: 9.5 %
ERYTHROCYTE [DISTWIDTH] IN BLOOD BY AUTOMATED COUNT: 16.5 % (ref 10–15)
ERYTHROCYTE [DISTWIDTH] IN BLOOD BY AUTOMATED COUNT: 16.5 % (ref 10–15)
GFR SERPL CREATININE-BSD FRML MDRD: 75 ML/MIN/1.7M2
GLUCOSE BLDC GLUCOMTR-MCNC: 104 MG/DL (ref 70–99)
GLUCOSE BLDC GLUCOMTR-MCNC: 112 MG/DL (ref 70–99)
GLUCOSE BLDC GLUCOMTR-MCNC: 115 MG/DL (ref 70–99)
GLUCOSE BLDC GLUCOMTR-MCNC: 118 MG/DL (ref 70–99)
GLUCOSE BLDC GLUCOMTR-MCNC: 147 MG/DL (ref 70–99)
GLUCOSE BLDC GLUCOMTR-MCNC: 170 MG/DL (ref 70–99)
GLUCOSE SERPL-MCNC: 105 MG/DL (ref 70–99)
HCT VFR BLD AUTO: 22.7 % (ref 35–47)
HCT VFR BLD AUTO: 23.6 % (ref 35–47)
HGB BLD-MCNC: 7.1 G/DL (ref 11.7–15.7)
HGB BLD-MCNC: 7.5 G/DL (ref 11.7–15.7)
IMM GRANULOCYTES # BLD: 0 10E9/L (ref 0–0.4)
IMM GRANULOCYTES # BLD: 0 10E9/L (ref 0–0.4)
IMM GRANULOCYTES NFR BLD: 0 %
IMM GRANULOCYTES NFR BLD: 0.2 %
LYMPHOCYTES # BLD AUTO: 0.6 10E9/L (ref 0.8–5.3)
LYMPHOCYTES # BLD AUTO: 0.6 10E9/L (ref 0.8–5.3)
LYMPHOCYTES NFR BLD AUTO: 10 %
LYMPHOCYTES NFR BLD AUTO: 12.9 %
MAGNESIUM SERPL-MCNC: 2.5 MG/DL (ref 1.6–2.3)
MCH RBC QN AUTO: 27.6 PG (ref 26.5–33)
MCH RBC QN AUTO: 28.1 PG (ref 26.5–33)
MCHC RBC AUTO-ENTMCNC: 31.3 G/DL (ref 31.5–36.5)
MCHC RBC AUTO-ENTMCNC: 31.8 G/DL (ref 31.5–36.5)
MCV RBC AUTO: 88 FL (ref 78–100)
MCV RBC AUTO: 88 FL (ref 78–100)
MONOCYTES # BLD AUTO: 0.4 10E9/L (ref 0–1.3)
MONOCYTES # BLD AUTO: 0.6 10E9/L (ref 0–1.3)
MONOCYTES NFR BLD AUTO: 10.5 %
MONOCYTES NFR BLD AUTO: 8.9 %
NEUTROPHILS # BLD AUTO: 3.2 10E9/L (ref 1.6–8.3)
NEUTROPHILS # BLD AUTO: 4.1 10E9/L (ref 1.6–8.3)
NEUTROPHILS NFR BLD AUTO: 68.3 %
NEUTROPHILS NFR BLD AUTO: 73.5 %
NRBC # BLD AUTO: 0 10*3/UL
NRBC # BLD AUTO: 0 10*3/UL
NRBC BLD AUTO-RTO: 0 /100
NRBC BLD AUTO-RTO: 0 /100
NUM BPU REQUESTED: 1
PHOSPHATE SERPL-MCNC: 2.5 MG/DL (ref 2.5–4.5)
PLATELET # BLD AUTO: 45 10E9/L (ref 150–450)
PLATELET # BLD AUTO: 51 10E9/L (ref 150–450)
POTASSIUM SERPL-SCNC: 3.2 MMOL/L (ref 3.4–5.3)
POTASSIUM SERPL-SCNC: 3.6 MMOL/L (ref 3.4–5.3)
POTASSIUM SERPL-SCNC: 4.1 MMOL/L (ref 3.4–5.3)
PROT SERPL-MCNC: 5.2 G/DL (ref 6.8–8.8)
RBC # BLD AUTO: 2.57 10E12/L (ref 3.8–5.2)
RBC # BLD AUTO: 2.67 10E12/L (ref 3.8–5.2)
SODIUM SERPL-SCNC: 143 MMOL/L (ref 133–144)
SPECIMEN EXP DATE BLD: ABNORMAL
TACROLIMUS BLD-MCNC: <3 UG/L (ref 5–15)
TME LAST DOSE: ABNORMAL H
TRANSFUSION STATUS PATIENT QL: NORMAL
TRANSFUSION STATUS PATIENT QL: NORMAL
WBC # BLD AUTO: 4.7 10E9/L (ref 4–11)
WBC # BLD AUTO: 5.5 10E9/L (ref 4–11)

## 2018-09-03 PROCEDURE — A9270 NON-COVERED ITEM OR SERVICE: HCPCS | Mod: GY | Performed by: STUDENT IN AN ORGANIZED HEALTH CARE EDUCATION/TRAINING PROGRAM

## 2018-09-03 PROCEDURE — 85025 COMPLETE CBC W/AUTO DIFF WBC: CPT | Performed by: STUDENT IN AN ORGANIZED HEALTH CARE EDUCATION/TRAINING PROGRAM

## 2018-09-03 PROCEDURE — 36592 COLLECT BLOOD FROM PICC: CPT | Performed by: STUDENT IN AN ORGANIZED HEALTH CARE EDUCATION/TRAINING PROGRAM

## 2018-09-03 PROCEDURE — 25000131 ZZH RX MED GY IP 250 OP 636 PS 637: Mod: GY | Performed by: STUDENT IN AN ORGANIZED HEALTH CARE EDUCATION/TRAINING PROGRAM

## 2018-09-03 PROCEDURE — 84132 ASSAY OF SERUM POTASSIUM: CPT | Performed by: STUDENT IN AN ORGANIZED HEALTH CARE EDUCATION/TRAINING PROGRAM

## 2018-09-03 PROCEDURE — 25000128 H RX IP 250 OP 636: Performed by: NURSE PRACTITIONER

## 2018-09-03 PROCEDURE — 80197 ASSAY OF TACROLIMUS: CPT | Performed by: STUDENT IN AN ORGANIZED HEALTH CARE EDUCATION/TRAINING PROGRAM

## 2018-09-03 PROCEDURE — 84100 ASSAY OF PHOSPHORUS: CPT | Performed by: STUDENT IN AN ORGANIZED HEALTH CARE EDUCATION/TRAINING PROGRAM

## 2018-09-03 PROCEDURE — 25000128 H RX IP 250 OP 636: Performed by: SURGERY

## 2018-09-03 PROCEDURE — 25000132 ZZH RX MED GY IP 250 OP 250 PS 637: Mod: GY | Performed by: STUDENT IN AN ORGANIZED HEALTH CARE EDUCATION/TRAINING PROGRAM

## 2018-09-03 PROCEDURE — 80076 HEPATIC FUNCTION PANEL: CPT | Performed by: STUDENT IN AN ORGANIZED HEALTH CARE EDUCATION/TRAINING PROGRAM

## 2018-09-03 PROCEDURE — 40000133 ZZH STATISTIC OT WARD VISIT

## 2018-09-03 PROCEDURE — 80048 BASIC METABOLIC PNL TOTAL CA: CPT | Performed by: STUDENT IN AN ORGANIZED HEALTH CARE EDUCATION/TRAINING PROGRAM

## 2018-09-03 PROCEDURE — 74018 RADEX ABDOMEN 1 VIEW: CPT

## 2018-09-03 PROCEDURE — P9016 RBC LEUKOCYTES REDUCED: HCPCS | Performed by: SURGERY

## 2018-09-03 PROCEDURE — 82330 ASSAY OF CALCIUM: CPT | Performed by: STUDENT IN AN ORGANIZED HEALTH CARE EDUCATION/TRAINING PROGRAM

## 2018-09-03 PROCEDURE — A9270 NON-COVERED ITEM OR SERVICE: HCPCS | Mod: GY | Performed by: TRANSPLANT SURGERY

## 2018-09-03 PROCEDURE — 97535 SELF CARE MNGMENT TRAINING: CPT | Mod: GO

## 2018-09-03 PROCEDURE — 25000128 H RX IP 250 OP 636: Performed by: STUDENT IN AN ORGANIZED HEALTH CARE EDUCATION/TRAINING PROGRAM

## 2018-09-03 PROCEDURE — 83735 ASSAY OF MAGNESIUM: CPT | Performed by: STUDENT IN AN ORGANIZED HEALTH CARE EDUCATION/TRAINING PROGRAM

## 2018-09-03 PROCEDURE — 25000132 ZZH RX MED GY IP 250 OP 250 PS 637: Mod: GY | Performed by: NURSE PRACTITIONER

## 2018-09-03 PROCEDURE — 97530 THERAPEUTIC ACTIVITIES: CPT | Mod: GO

## 2018-09-03 PROCEDURE — 12000025 ZZH R&B TRANSPLANT INTERMEDIATE

## 2018-09-03 PROCEDURE — 25000131 ZZH RX MED GY IP 250 OP 636 PS 637: Mod: GY | Performed by: TRANSPLANT SURGERY

## 2018-09-03 PROCEDURE — A9270 NON-COVERED ITEM OR SERVICE: HCPCS | Mod: GY | Performed by: NURSE PRACTITIONER

## 2018-09-03 PROCEDURE — 00000146 ZZHCL STATISTIC GLUCOSE BY METER IP

## 2018-09-03 PROCEDURE — 25000132 ZZH RX MED GY IP 250 OP 250 PS 637: Mod: GY | Performed by: TRANSPLANT SURGERY

## 2018-09-03 RX ORDER — SULFAMETHOXAZOLE AND TRIMETHOPRIM 200; 40 MG/5ML; MG/5ML
80 SUSPENSION ORAL DAILY
Status: DISCONTINUED | OUTPATIENT
Start: 2018-09-03 | End: 2018-09-12 | Stop reason: HOSPADM

## 2018-09-03 RX ORDER — OXYCODONE HCL 5 MG/5 ML
5 SOLUTION, ORAL ORAL EVERY 6 HOURS
Status: DISCONTINUED | OUTPATIENT
Start: 2018-09-03 | End: 2018-09-04

## 2018-09-03 RX ORDER — FUROSEMIDE 10 MG/ML
20 INJECTION INTRAMUSCULAR; INTRAVENOUS ONCE
Status: COMPLETED | OUTPATIENT
Start: 2018-09-03 | End: 2018-09-03

## 2018-09-03 RX ORDER — LIDOCAINE 4 G/G
2 PATCH TOPICAL
Status: DISCONTINUED | OUTPATIENT
Start: 2018-09-03 | End: 2018-09-03

## 2018-09-03 RX ORDER — LIDOCAINE 4 G/G
2 PATCH TOPICAL
Status: DISCONTINUED | OUTPATIENT
Start: 2018-09-03 | End: 2018-09-12 | Stop reason: HOSPADM

## 2018-09-03 RX ORDER — FUROSEMIDE 10 MG/ML
20 INJECTION INTRAMUSCULAR; INTRAVENOUS DAILY
Status: DISCONTINUED | OUTPATIENT
Start: 2018-09-04 | End: 2018-09-05

## 2018-09-03 RX ORDER — CYCLOBENZAPRINE HCL 5 MG
10 TABLET ORAL 3 TIMES DAILY
Status: DISCONTINUED | OUTPATIENT
Start: 2018-09-03 | End: 2018-09-06

## 2018-09-03 RX ORDER — PRAMIPEXOLE DIHYDROCHLORIDE 0.5 MG/1
0.5 TABLET ORAL DAILY
Status: DISCONTINUED | OUTPATIENT
Start: 2018-09-03 | End: 2018-09-12 | Stop reason: HOSPADM

## 2018-09-03 RX ORDER — LORAZEPAM 2 MG/ML
0.5 INJECTION INTRAMUSCULAR ONCE
Status: COMPLETED | OUTPATIENT
Start: 2018-09-03 | End: 2018-09-03

## 2018-09-03 RX ORDER — ALBUMIN (HUMAN) 12.5 G/50ML
25 SOLUTION INTRAVENOUS 2 TIMES DAILY
Status: DISCONTINUED | OUTPATIENT
Start: 2018-09-03 | End: 2018-09-03

## 2018-09-03 RX ORDER — BISACODYL 10 MG
10 SUPPOSITORY, RECTAL RECTAL DAILY PRN
Status: DISCONTINUED | OUTPATIENT
Start: 2018-09-03 | End: 2018-09-12 | Stop reason: HOSPADM

## 2018-09-03 RX ORDER — NALOXONE HYDROCHLORIDE 0.4 MG/ML
.1-.4 INJECTION, SOLUTION INTRAMUSCULAR; INTRAVENOUS; SUBCUTANEOUS
Status: DISCONTINUED | OUTPATIENT
Start: 2018-09-03 | End: 2018-09-12 | Stop reason: HOSPADM

## 2018-09-03 RX ORDER — NYSTATIN 100000/ML
500000 SUSPENSION, ORAL (FINAL DOSE FORM) ORAL 4 TIMES DAILY
Status: DISCONTINUED | OUTPATIENT
Start: 2018-09-03 | End: 2018-09-12 | Stop reason: HOSPADM

## 2018-09-03 RX ADMIN — Medication 20 MG: at 09:35

## 2018-09-03 RX ADMIN — FUROSEMIDE 20 MG: 10 INJECTION, SOLUTION INTRAVENOUS at 14:54

## 2018-09-03 RX ADMIN — MULTIVITAMIN 15 ML: LIQUID ORAL at 07:41

## 2018-09-03 RX ADMIN — Medication: at 09:55

## 2018-09-03 RX ADMIN — PRAMIPEXOLE DIHYDROCHLORIDE 0.5 MG: 0.5 TABLET ORAL at 18:34

## 2018-09-03 RX ADMIN — METHYLPREDNISOLONE SODIUM SUCCINATE 50 MG: 125 INJECTION, POWDER, LYOPHILIZED, FOR SOLUTION INTRAMUSCULAR; INTRAVENOUS at 07:43

## 2018-09-03 RX ADMIN — NYSTATIN 500000 UNITS: 100000 SUSPENSION ORAL at 12:09

## 2018-09-03 RX ADMIN — SULFAMETHOXAZOLE AND TRIMETHOPRIM 80 MG: 200; 40 SUSPENSION ORAL at 09:35

## 2018-09-03 RX ADMIN — BISACODYL 10 MG: 10 SUPPOSITORY RECTAL at 09:36

## 2018-09-03 RX ADMIN — LIDOCAINE 2 PATCH: 560 PATCH PERCUTANEOUS; TOPICAL; TRANSDERMAL at 10:38

## 2018-09-03 RX ADMIN — CYCLOBENZAPRINE HYDROCHLORIDE 10 MG: 5 TABLET, FILM COATED ORAL at 20:21

## 2018-09-03 RX ADMIN — Medication 2 MG: at 18:23

## 2018-09-03 RX ADMIN — Medication: at 05:40

## 2018-09-03 RX ADMIN — MORPHINE SULFATE: 5 INJECTION, SOLUTION INTRAVENOUS at 10:31

## 2018-09-03 RX ADMIN — OXYCODONE HYDROCHLORIDE 10 MG: 5 SOLUTION ORAL at 00:00

## 2018-09-03 RX ADMIN — CYCLOBENZAPRINE HYDROCHLORIDE 10 MG: 5 TABLET, FILM COATED ORAL at 13:52

## 2018-09-03 RX ADMIN — MYCOPHENOLATE MOFETIL 1000 MG: 200 POWDER, FOR SUSPENSION ORAL at 18:23

## 2018-09-03 RX ADMIN — Medication 10 MG: at 12:09

## 2018-09-03 RX ADMIN — Medication 325 MG: at 09:35

## 2018-09-03 RX ADMIN — OXYCODONE HYDROCHLORIDE 10 MG: 5 SOLUTION ORAL at 07:41

## 2018-09-03 RX ADMIN — NYSTATIN 500000 UNITS: 100000 SUSPENSION ORAL at 17:44

## 2018-09-03 RX ADMIN — DOCUSATE SODIUM 50 MG: 50 LIQUID ORAL at 20:21

## 2018-09-03 RX ADMIN — LORAZEPAM 0.5 MG: 2 INJECTION INTRAMUSCULAR; INTRAVENOUS at 09:35

## 2018-09-03 RX ADMIN — OXYCODONE HYDROCHLORIDE 10 MG: 5 SOLUTION ORAL at 03:13

## 2018-09-03 RX ADMIN — SENNOSIDES A AND B 10 ML: 415.36 LIQUID ORAL at 09:34

## 2018-09-03 RX ADMIN — FENTANYL CITRATE 25 MCG: 50 INJECTION, SOLUTION INTRAMUSCULAR; INTRAVENOUS at 02:06

## 2018-09-03 RX ADMIN — VALGANCICLOVIR HYDROCHLORIDE 450 MG: 50 POWDER, FOR SOLUTION ORAL at 07:41

## 2018-09-03 RX ADMIN — INSULIN ASPART 1 UNITS: 100 INJECTION, SOLUTION INTRAVENOUS; SUBCUTANEOUS at 13:05

## 2018-09-03 RX ADMIN — POTASSIUM CHLORIDE 20 MEQ: 1.5 POWDER, FOR SOLUTION ORAL at 09:35

## 2018-09-03 RX ADMIN — DOCUSATE SODIUM 50 MG: 50 LIQUID ORAL at 07:41

## 2018-09-03 RX ADMIN — FUROSEMIDE 10 MG: 10 INJECTION, SOLUTION INTRAVENOUS at 07:42

## 2018-09-03 RX ADMIN — POTASSIUM CHLORIDE 40 MEQ: 1.5 POWDER, FOR SOLUTION ORAL at 07:41

## 2018-09-03 RX ADMIN — Medication 2 MG: at 07:41

## 2018-09-03 RX ADMIN — OXYCODONE HYDROCHLORIDE 5 MG: 5 SOLUTION ORAL at 13:59

## 2018-09-03 RX ADMIN — OXYCODONE HYDROCHLORIDE 5 MG: 5 SOLUTION ORAL at 20:21

## 2018-09-03 RX ADMIN — MYCOPHENOLATE MOFETIL 1000 MG: 200 POWDER, FOR SUSPENSION ORAL at 07:41

## 2018-09-03 ASSESSMENT — ACTIVITIES OF DAILY LIVING (ADL)
ADLS_ACUITY_SCORE: 9
ADLS_ACUITY_SCORE: 10
ADLS_ACUITY_SCORE: 9
ADLS_ACUITY_SCORE: 9

## 2018-09-03 ASSESSMENT — PAIN DESCRIPTION - DESCRIPTORS
DESCRIPTORS: SORE
DESCRIPTORS: PRESSURE

## 2018-09-03 NOTE — PROGRESS NOTES
SURGERY CROSS-COVER NOTE  2018 1:40 AM  Patient: Sherrie Lala  MRN: [id number]    Was notified by RN regarding patient having muscle cramping and twitching  Assessed Sherrie Lala at the bedside.    SUBJECTIVE  Patient states she noticed muscle spasms and twitching after moving up to the floor. She also states she was feeling anxious and having palpitations. Heparin drip running at 400 unit/hr. Tolerating tube feeds. Denies shortness of breath, chest pain, perioral numbness, weakness, syncope, dizziness, nausea, or vomiting. Patient recently started on mycophenolate 1000 mg BID and tacrolimus 2 mg BID.    OBJECTIVE  Temp:  [97.9  F (36.6  C)-98.9  F (37.2  C)] 98.1  F (36.7  C)  Heart Rate:  [68-94] 80  Resp:  [10-18] 13  BP: (106-116)/(59-73) 112/71  MAP:  [63 mmHg-99 mmHg] 80 mmHg  Arterial Line BP: ()/(50-73) 114/59  FiO2 (%):  [30 %] 30 %  SpO2:  [92 %-100 %] 100 %     Physical Exam:  General: AAO, NAD, lying in bed, muscle twitching observed on quadriceps muscles bilaterally.  CV: RRR, no murmurs  Pulm: Non-labored breathing on 2L NC  Abd: soft, appropriately tender to palpation, non-distended, incisions c/d/i with drain in pace with s/s output  Extremities: warm, well-perfused without edema  Neuro: No focal deficits noted, patient moves all extremities spontaneously    Labs:  Ionized calcium: 4.2  Ca: 7.4  M.8  K: 3.4    ASSESSMENT/PLAN  Sherrie Lala is a 63 year old female POD#3 s/p DDLT with significant intraoperative blood loss and abdomen was left open.  abdomen closed with mesh now presenting with muscle cramping, spasms, and palpitations.    - EKG   - 1g IV CaCl via CVC  - BMP, Mg, P  - Tacrolimus level     The transplant fellow, Dr. Garcias was notified of this assessment and agrees to the aforementioned plan.      Carlos Rivas MD  General Surgery (PGY-1)

## 2018-09-03 NOTE — PROGRESS NOTES
Immunosuppression Note:    Sherrie Lala is a 63 year old female who is seen today  for immunosuppression management     I, Darren Rod MD, I have examined the patient with the resident/PA/Fellow, discussed and agree with the note and findings.  I have reviewed today's vital signs, medications, labs and imaging. I reviewed the immunosuppression medications and levels. I spoke to the patient/family and explained below clinical details and answered all the questions      Transplant Surgery  Inpatient Daily Progress Note  09/03/2018    Assessment & Plan: Sherrie Lala is a 63-year old female with a history of metastatic cholangiocarcinoma with subsequent liver failure who is now s/p DDLT on 8/30/18. The patient had significant intraoperative blood loss (~3L) and her abdomen was left open.    Graft function: Fair, stable. Tbili improved at 1.0, alk phos is normal, and ALT/AST are 90/44 today. Liver US POD#1 with limited views, and a persistent eccentric thrombus in the intrahepatic main portal vein extending into the right portal vein. Starting heparin drip at 400u/hr today for HAT ppx.  Immunosuppression management:  - Receiving induction with steroid taper, and will give a dose of Basiliximab today to delay initiation of Tacrolimus.  - MMF: Continue 1gm BID.  - Tacrolimus: Tacrolimus started today at 2 mg BID. Goal range 10-15.  Complexity of management:Medium. Contributing factors: thrombocytopenia, anemia and organ dysfunction  Hematology: Significant bleeding intra-op, requiring 11u PRBCs and other blood products. Hgb now stable in the 12-13 range. Will give one unit of RBC today.   Thrombocytopenia: Patient with baseline platelet count ~30 prior to transplant. Platelet count 82 today, received multiple platelet transfusions intra-op. Continue to monitor.  Cardiorespiratory: Stable, SBP in the 90s-110s range without pressors. Extubated, on 2 L of oxygen.   GI/Nutrition: NPO, ICU to place NJ tube, on tube  feedings now. Abdomen left open due to significant bleeding intra-op, plan for return to OR for washout and possible closure tomorrow (9/1). 9/2-closed abdomen with a permacol mesh.   Endocrine: On insulin drip while on steroid taper.  Fluid/Electrolytes: MIVF: discontinued. On Lasix drip at 10mg/hr due to significant volume of blood products administered during surgery was discontinued yesterday.  Electrolytes, replaced potassium and calcium.   : Bruner in place, good UOP.  Infectious disease: Afebrile, normal WBC count. On Vanco, Zosyn, and Fluconazole for 3 days for surgical ppx.  Prophylaxis: GI, DVT, heparin, Bactrim, Valcyte  Disposition: 4A    Medical Decision Making: High  Subsequent visit 26678 (moderate level decision making)    DREA:   Fellow: Derik Garcias MD x [phone]  Faculty: Darren Rod M.D.  __________________________________________________________________  Transplant History: Admitted 8/29/2018 for orthotopic liver transplantation.   The patient has a history of liver failure due to cholangiocarcinoma.    8/30/2018 (Liver), Postoperative day: 4     Interval History:   Extubated, on 2 L of O2, complains of diffuse abdominal pain, not passing flatus, no bowel movements, ambulated once last night.     ROS:   A 10-point review of systems was negative except as noted above.    Meds:    aspirin  325 mg Oral Daily     cyclobenzaprine  10 mg Oral TID     sennosides  5-10 mL Oral BID    And     docusate   mg Oral BID     [START ON 9/4/2018] furosemide  20 mg Intravenous Daily     insulin aspart  1-6 Units Subcutaneous Q4H     lidocaine  2 patch Transdermal Q24H     lidocaine   Transdermal Q8H     lidocaine   Transdermal Q24h     multivitamins with minerals  15 mL Per Feeding Tube Daily     mycophenolate  1,000 mg Oral BID IS    Or     mycophenolate  1,000 mg Oral or NG Tube BID IS     omeprazole  20 mg Oral QAM AC     oxyCODONE  5 mg Oral Q6H     phytonadione  10 mg Oral Daily     [START ON  9/4/2018] predniSONE  25 mg Oral Once    Followed by     [START ON 9/5/2018] predniSONE  10 mg Oral Once     sodium chloride (PF)  3 mL Intravenous Q8H     sulfamethoxazole-trimethoprim  80 mg Oral Daily     tacrolimus  2 mg Oral BID IS     valGANciclovir  450 mg Oral Daily    Or     valGANciclovir  450 mg Oral or NG Tube Daily       Physical Exam:     Admit Weight: 55.2 kg (121 lb 9.6 oz)  Today's weight: 135 lbs 2.27 oz    Vital sign ranges:    Temp:  [97.9  F (36.6  C)-98.8  F (37.1  C)] 98.7  F (37.1  C)  Pulse:  [79] 79  Heart Rate:  [78-89] 78  Resp:  [10-16] 16  BP: (106-116)/(59-73) 113/68  MAP:  [80 mmHg] 80 mmHg  Arterial Line BP: (114)/(59) 114/59  SpO2:  [92 %-100 %] 99 %    GENERAL: Intubated, in no apparent distress.  SKIN: No jaundice. No rashes or lesions.   HEENT: Eyes symmetrical and free of discharge bilaterally.  CV: Regular rate and rhythm.   RESPIRATORY: Nonlabored breathing on ventilator.   GI: Soft and non distended with normoactive bowel sounds. Abthera drain in place with sanguinous drainage.  : Bruner in place.  EXTREMITIES: Generalized trace edema.  NEUROLOGIC: Intubated, following commands. No focal deficits. Tremor absent.  MUSCULOSKELETAL: No joint swelling or tenderness.     Data:   CMP  Recent Labs  Lab 09/03/18  0608 09/02/18  2021 09/02/18  0308  09/01/18  0933 09/01/18  0431  08/31/18  0553  08/29/18  2058    143 142  --  142 143  < >  --   < > 144   POTASSIUM 3.2* 3.4 3.6  < > 3.3* 3.4  < >  --   < > 3.1*   CHLORIDE 108 108 107  --   --  108  < >  --   < > 106   CO2 29 28 25  --   --  28  < >  --   < > 26   * 108* 129*  --  136* 118*  < >  --   < > 90   BUN 24 28 27  --   --  23  < >  --   < > 19   CR 0.78 0.92 1.09*  --   --  1.02  < >  --   < > 0.77   GFRESTIMATED 75 62 51*  --   --  55*  < >  --   < > 76   GFRESTBLACK >90 75 62  --   --  67  < >  --   < > >90   SHELDON 7.6* 7.4* 7.4*  --   --  7.2*  < >  --   < > 8.9   ICAW  --   --  4.3*  --  4.7 4.3*  --   --   < >   --    MAG 2.5* 2.4* 2.3  --   --  2.3  < >  --   < > 2.2   PHOS 2.5 2.8 3.9  --   --  3.8  < >  --   < > 3.6   AMYLASE  --   --   --   --   --   --   --  39  --  82   LIPASE  --   --   --   --   --  161  --   --   --   --    ALBUMIN 2.5*  --  2.3*  --   --  2.3*  --   --   < > 5.0   BILITOTAL 1.0  --  1.2  --   --  1.5*  --   --   < > 3.0*   ALKPHOS 44  --  36*  --   --  41  --   --   < > 115   AST 42  --  57*  --   --  107*  --   --   < > 23   ALT 90*  --  124*  --   --  170*  --   --   < > 24   < > = values in this interval not displayed.  CBC    Recent Labs  Lab 09/03/18  0608 09/02/18  2337 09/02/18  1701   HGB 7.1* 7.5* 8.0*   WBC 4.7 5.5 6.0   PLT 45* 51* 46*   A1C  --   --  4.9     COAGS    Recent Labs  Lab 09/02/18  0308 09/01/18  1557 09/01/18  0933   INR  --  1.33* 1.33*   PTT 34  --  32      Urinalysis  Recent Labs   Lab Test  02/26/18   0947   COLOR  Yellow   APPEARANCE  Clear   URINEGLC  Negative   URINEBILI  Negative   URINEKETONE  Negative   SG  1.017   UBLD  Negative   URINEPH  5.0   PROTEIN  Negative   NITRITE  Negative   LEUKEST  Negative   RBCU  1   WBCU  1   UTPG  0.06     Virology:  Hepatitis C Antibody   Date Value Ref Range Status   08/29/2018 Nonreactive NR^Nonreactive Final     Comment:     Assay performance characteristics have not been established for newborns,   infants, and children

## 2018-09-04 ENCOUNTER — APPOINTMENT (OUTPATIENT)
Dept: PHYSICAL THERAPY | Facility: CLINIC | Age: 63
DRG: 005 | End: 2018-09-04
Attending: TRANSPLANT SURGERY
Payer: MEDICARE

## 2018-09-04 ENCOUNTER — DOCUMENTATION ONLY (OUTPATIENT)
Dept: TRANSPLANT | Facility: CLINIC | Age: 63
End: 2018-09-04

## 2018-09-04 LAB
ALBUMIN SERPL-MCNC: 2.5 G/DL (ref 3.4–5)
ALP SERPL-CCNC: 60 U/L (ref 40–150)
ALT SERPL W P-5'-P-CCNC: 78 U/L (ref 0–50)
ANION GAP SERPL CALCULATED.3IONS-SCNC: 6 MMOL/L (ref 3–14)
APTT PPP: 28 SEC (ref 22–37)
AST SERPL W P-5'-P-CCNC: 37 U/L (ref 0–45)
BASOPHILS # BLD AUTO: 0 10E9/L (ref 0–0.2)
BASOPHILS NFR BLD AUTO: 0 %
BILIRUB DIRECT SERPL-MCNC: 0.4 MG/DL (ref 0–0.2)
BILIRUB SERPL-MCNC: 0.9 MG/DL (ref 0.2–1.3)
BUN SERPL-MCNC: 20 MG/DL (ref 7–30)
CA-I SERPL ISE-MCNC: 4.4 MG/DL (ref 4.4–5.2)
CALCIUM SERPL-MCNC: 8 MG/DL (ref 8.5–10.1)
CHLORIDE SERPL-SCNC: 110 MMOL/L (ref 94–109)
CO2 SERPL-SCNC: 28 MMOL/L (ref 20–32)
CREAT SERPL-MCNC: 0.68 MG/DL (ref 0.52–1.04)
DIFFERENTIAL METHOD BLD: ABNORMAL
EOSINOPHIL # BLD AUTO: 0.3 10E9/L (ref 0–0.7)
EOSINOPHIL NFR BLD AUTO: 10.2 %
ERYTHROCYTE [DISTWIDTH] IN BLOOD BY AUTOMATED COUNT: 16.8 % (ref 10–15)
GFR SERPL CREATININE-BSD FRML MDRD: 88 ML/MIN/1.7M2
GLUCOSE BLDC GLUCOMTR-MCNC: 113 MG/DL (ref 70–99)
GLUCOSE BLDC GLUCOMTR-MCNC: 114 MG/DL (ref 70–99)
GLUCOSE BLDC GLUCOMTR-MCNC: 119 MG/DL (ref 70–99)
GLUCOSE BLDC GLUCOMTR-MCNC: 133 MG/DL (ref 70–99)
GLUCOSE BLDC GLUCOMTR-MCNC: 140 MG/DL (ref 70–99)
GLUCOSE BLDC GLUCOMTR-MCNC: 95 MG/DL (ref 70–99)
GLUCOSE SERPL-MCNC: 109 MG/DL (ref 70–99)
HCT VFR BLD AUTO: 24.9 % (ref 35–47)
HGB BLD-MCNC: 7.9 G/DL (ref 11.7–15.7)
IMM GRANULOCYTES # BLD: 0 10E9/L (ref 0–0.4)
IMM GRANULOCYTES NFR BLD: 0.4 %
INTERPRETATION ECG - MUSE: NORMAL
LYMPHOCYTES # BLD AUTO: 0.4 10E9/L (ref 0.8–5.3)
LYMPHOCYTES NFR BLD AUTO: 12.6 %
MAGNESIUM SERPL-MCNC: 2.6 MG/DL (ref 1.6–2.3)
MCH RBC QN AUTO: 28 PG (ref 26.5–33)
MCHC RBC AUTO-ENTMCNC: 31.7 G/DL (ref 31.5–36.5)
MCV RBC AUTO: 88 FL (ref 78–100)
MONOCYTES # BLD AUTO: 0.3 10E9/L (ref 0–1.3)
MONOCYTES NFR BLD AUTO: 10.2 %
NEUTROPHILS # BLD AUTO: 1.9 10E9/L (ref 1.6–8.3)
NEUTROPHILS NFR BLD AUTO: 66.6 %
NRBC # BLD AUTO: 0 10*3/UL
NRBC BLD AUTO-RTO: 0 /100
PHOSPHATE SERPL-MCNC: 2.6 MG/DL (ref 2.5–4.5)
PLATELET # BLD AUTO: 28 10E9/L (ref 150–450)
POTASSIUM SERPL-SCNC: 3.6 MMOL/L (ref 3.4–5.3)
PROT SERPL-MCNC: 5.2 G/DL (ref 6.8–8.8)
RBC # BLD AUTO: 2.82 10E12/L (ref 3.8–5.2)
SODIUM SERPL-SCNC: 144 MMOL/L (ref 133–144)
TACROLIMUS BLD-MCNC: <3 UG/L (ref 5–15)
TME LAST DOSE: ABNORMAL H
WBC # BLD AUTO: 2.9 10E9/L (ref 4–11)

## 2018-09-04 PROCEDURE — 36592 COLLECT BLOOD FROM PICC: CPT | Performed by: NURSE PRACTITIONER

## 2018-09-04 PROCEDURE — 25000131 ZZH RX MED GY IP 250 OP 636 PS 637: Mod: GY | Performed by: TRANSPLANT SURGERY

## 2018-09-04 PROCEDURE — 12000026 ZZH R&B TRANSPLANT

## 2018-09-04 PROCEDURE — 85730 THROMBOPLASTIN TIME PARTIAL: CPT | Performed by: STUDENT IN AN ORGANIZED HEALTH CARE EDUCATION/TRAINING PROGRAM

## 2018-09-04 PROCEDURE — 80197 ASSAY OF TACROLIMUS: CPT | Performed by: NURSE PRACTITIONER

## 2018-09-04 PROCEDURE — A9270 NON-COVERED ITEM OR SERVICE: HCPCS | Mod: GY | Performed by: STUDENT IN AN ORGANIZED HEALTH CARE EDUCATION/TRAINING PROGRAM

## 2018-09-04 PROCEDURE — A9270 NON-COVERED ITEM OR SERVICE: HCPCS | Mod: GY | Performed by: NURSE PRACTITIONER

## 2018-09-04 PROCEDURE — 40000193 ZZH STATISTIC PT WARD VISIT

## 2018-09-04 PROCEDURE — 25000128 H RX IP 250 OP 636: Performed by: STUDENT IN AN ORGANIZED HEALTH CARE EDUCATION/TRAINING PROGRAM

## 2018-09-04 PROCEDURE — 00000146 ZZHCL STATISTIC GLUCOSE BY METER IP

## 2018-09-04 PROCEDURE — 83735 ASSAY OF MAGNESIUM: CPT | Performed by: STUDENT IN AN ORGANIZED HEALTH CARE EDUCATION/TRAINING PROGRAM

## 2018-09-04 PROCEDURE — 80048 BASIC METABOLIC PNL TOTAL CA: CPT | Performed by: STUDENT IN AN ORGANIZED HEALTH CARE EDUCATION/TRAINING PROGRAM

## 2018-09-04 PROCEDURE — 25000131 ZZH RX MED GY IP 250 OP 636 PS 637: Mod: GY | Performed by: STUDENT IN AN ORGANIZED HEALTH CARE EDUCATION/TRAINING PROGRAM

## 2018-09-04 PROCEDURE — 82330 ASSAY OF CALCIUM: CPT | Performed by: STUDENT IN AN ORGANIZED HEALTH CARE EDUCATION/TRAINING PROGRAM

## 2018-09-04 PROCEDURE — A9270 NON-COVERED ITEM OR SERVICE: HCPCS | Mod: GY | Performed by: TRANSPLANT SURGERY

## 2018-09-04 PROCEDURE — 25000132 ZZH RX MED GY IP 250 OP 250 PS 637: Mod: GY | Performed by: NURSE PRACTITIONER

## 2018-09-04 PROCEDURE — 97116 GAIT TRAINING THERAPY: CPT | Mod: GP

## 2018-09-04 PROCEDURE — 85025 COMPLETE CBC W/AUTO DIFF WBC: CPT | Performed by: STUDENT IN AN ORGANIZED HEALTH CARE EDUCATION/TRAINING PROGRAM

## 2018-09-04 PROCEDURE — 25000132 ZZH RX MED GY IP 250 OP 250 PS 637: Mod: GY | Performed by: TRANSPLANT SURGERY

## 2018-09-04 PROCEDURE — 25000132 ZZH RX MED GY IP 250 OP 250 PS 637: Mod: GY | Performed by: STUDENT IN AN ORGANIZED HEALTH CARE EDUCATION/TRAINING PROGRAM

## 2018-09-04 PROCEDURE — 25000125 ZZHC RX 250: Performed by: STUDENT IN AN ORGANIZED HEALTH CARE EDUCATION/TRAINING PROGRAM

## 2018-09-04 PROCEDURE — 97530 THERAPEUTIC ACTIVITIES: CPT | Mod: GP

## 2018-09-04 PROCEDURE — 80076 HEPATIC FUNCTION PANEL: CPT | Performed by: STUDENT IN AN ORGANIZED HEALTH CARE EDUCATION/TRAINING PROGRAM

## 2018-09-04 PROCEDURE — 25000131 ZZH RX MED GY IP 250 OP 636 PS 637: Mod: GY | Performed by: NURSE PRACTITIONER

## 2018-09-04 PROCEDURE — 97161 PT EVAL LOW COMPLEX 20 MIN: CPT | Mod: GP

## 2018-09-04 PROCEDURE — 84100 ASSAY OF PHOSPHORUS: CPT | Performed by: STUDENT IN AN ORGANIZED HEALTH CARE EDUCATION/TRAINING PROGRAM

## 2018-09-04 PROCEDURE — 84132 ASSAY OF SERUM POTASSIUM: CPT | Performed by: STUDENT IN AN ORGANIZED HEALTH CARE EDUCATION/TRAINING PROGRAM

## 2018-09-04 RX ORDER — OXYCODONE HCL 5 MG/5 ML
5 SOLUTION, ORAL ORAL EVERY 6 HOURS
Status: DISCONTINUED | OUTPATIENT
Start: 2018-09-04 | End: 2018-09-05

## 2018-09-04 RX ORDER — FLUORIDE TOOTHPASTE
10 TOOTHPASTE DENTAL 4 TIMES DAILY PRN
Status: DISCONTINUED | OUTPATIENT
Start: 2018-09-04 | End: 2018-09-12 | Stop reason: HOSPADM

## 2018-09-04 RX ORDER — OXYCODONE HCL 5 MG/5 ML
5-10 SOLUTION, ORAL ORAL EVERY 6 HOURS
Status: DISCONTINUED | OUTPATIENT
Start: 2018-09-04 | End: 2018-09-04

## 2018-09-04 RX ADMIN — MYCOPHENOLATE MOFETIL 1000 MG: 200 POWDER, FOR SUSPENSION ORAL at 17:52

## 2018-09-04 RX ADMIN — PRAMIPEXOLE DIHYDROCHLORIDE 0.5 MG: 0.5 TABLET ORAL at 19:24

## 2018-09-04 RX ADMIN — PREDNISONE 25 MG: 5 TABLET ORAL at 08:47

## 2018-09-04 RX ADMIN — Medication 325 MG: at 08:45

## 2018-09-04 RX ADMIN — Medication 20 MG: at 08:46

## 2018-09-04 RX ADMIN — OXYCODONE HYDROCHLORIDE 5 MG: 5 SOLUTION ORAL at 19:24

## 2018-09-04 RX ADMIN — ONDANSETRON 4 MG: 2 INJECTION INTRAMUSCULAR; INTRAVENOUS at 08:55

## 2018-09-04 RX ADMIN — SENNOSIDES A AND B 10 ML: 415.36 LIQUID ORAL at 19:24

## 2018-09-04 RX ADMIN — Medication 4 MG: at 17:52

## 2018-09-04 RX ADMIN — NYSTATIN 500000 UNITS: 100000 SUSPENSION ORAL at 08:46

## 2018-09-04 RX ADMIN — VALGANCICLOVIR HYDROCHLORIDE 450 MG: 50 POWDER, FOR SOLUTION ORAL at 08:46

## 2018-09-04 RX ADMIN — SULFAMETHOXAZOLE AND TRIMETHOPRIM 80 MG: 200; 40 SUSPENSION ORAL at 08:45

## 2018-09-04 RX ADMIN — NYSTATIN 500000 UNITS: 100000 SUSPENSION ORAL at 16:30

## 2018-09-04 RX ADMIN — DOCUSATE SODIUM 286 ML: 50 LIQUID ORAL at 16:35

## 2018-09-04 RX ADMIN — Medication 2 MG: at 08:46

## 2018-09-04 RX ADMIN — Medication 10 MG: at 08:46

## 2018-09-04 RX ADMIN — MULTIVITAMIN 15 ML: LIQUID ORAL at 08:46

## 2018-09-04 RX ADMIN — CYCLOBENZAPRINE HYDROCHLORIDE 10 MG: 5 TABLET, FILM COATED ORAL at 13:18

## 2018-09-04 RX ADMIN — MYCOPHENOLATE MOFETIL 1000 MG: 200 POWDER, FOR SUSPENSION ORAL at 08:46

## 2018-09-04 RX ADMIN — CYCLOBENZAPRINE HYDROCHLORIDE 10 MG: 5 TABLET, FILM COATED ORAL at 08:47

## 2018-09-04 RX ADMIN — SENNOSIDES A AND B 10 ML: 415.36 LIQUID ORAL at 08:46

## 2018-09-04 RX ADMIN — ACETAMINOPHEN 650 MG: 325 SOLUTION ORAL at 11:17

## 2018-09-04 RX ADMIN — INSULIN ASPART 1 UNITS: 100 INJECTION, SOLUTION INTRAVENOUS; SUBCUTANEOUS at 11:17

## 2018-09-04 RX ADMIN — OXYCODONE HYDROCHLORIDE 5 MG: 5 SOLUTION ORAL at 01:36

## 2018-09-04 RX ADMIN — OXYCODONE HYDROCHLORIDE 5 MG: 5 SOLUTION ORAL at 14:07

## 2018-09-04 RX ADMIN — DOCUSATE SODIUM 100 MG: 50 LIQUID ORAL at 08:46

## 2018-09-04 RX ADMIN — NYSTATIN 500000 UNITS: 100000 SUSPENSION ORAL at 11:17

## 2018-09-04 RX ADMIN — CYCLOBENZAPRINE HYDROCHLORIDE 10 MG: 5 TABLET, FILM COATED ORAL at 19:24

## 2018-09-04 RX ADMIN — NYSTATIN 500000 UNITS: 100000 SUSPENSION ORAL at 19:24

## 2018-09-04 RX ADMIN — FUROSEMIDE 20 MG: 10 INJECTION, SOLUTION INTRAVENOUS at 08:56

## 2018-09-04 RX ADMIN — Medication 10 ML: at 13:18

## 2018-09-04 RX ADMIN — OXYCODONE HYDROCHLORIDE 5 MG: 5 SOLUTION ORAL at 08:45

## 2018-09-04 RX ADMIN — LIDOCAINE 2 PATCH: 560 PATCH PERCUTANEOUS; TOPICAL; TRANSDERMAL at 08:46

## 2018-09-04 RX ADMIN — DOCUSATE SODIUM 100 MG: 50 LIQUID ORAL at 19:24

## 2018-09-04 RX ADMIN — BISACODYL 10 MG: 10 SUPPOSITORY RECTAL at 10:08

## 2018-09-04 ASSESSMENT — ACTIVITIES OF DAILY LIVING (ADL)
ADLS_ACUITY_SCORE: 9

## 2018-09-04 ASSESSMENT — PAIN DESCRIPTION - DESCRIPTORS
DESCRIPTORS: ACHING
DESCRIPTORS: ACHING

## 2018-09-04 NOTE — PROGRESS NOTES
09/04/18 1014   Quick Adds   Type of Visit Initial PT Evaluation   Living Environment   Lives With spouse   Living Arrangements house   Home Accessibility bed and bath on same level   Number of Stairs to Enter Home 2   Number of Stairs Within Home 12   Stair Railings at Home inside, present on right side   Transportation Available car;family or friend will provide   Living Environment Comment Pt lives in split level home with bedroom, bathroom, and laundry on upper level   Self-Care   Dominant Hand right   Usual Activity Tolerance excellent   Current Activity Tolerance fair   Regular Exercise no   Equipment Currently Used at Home none   Activity/Exercise/Self-Care Comment Pt is a retired RN, 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 ambulation;transferring;toileting;dressing;bathing;eating   Prior Functional Level Comment Ind with functional mobility and ADLs at baseline   General Information   Onset of Illness/Injury or Date of Surgery - Date 08/29/18   Referring Physician Derik Garcias MD   Patient/Family Goals Statement To return home   Pertinent History of Current Problem (include personal factors and/or comorbidities that impact the POC) Sherrie Lala is a 63-year old female with a history of metastatic cholangiocarcinoma with subsequent liver failure who is now s/p DDLT on 8/30/18. The patient had significant intraoperative blood loss (~3L) and her abdomen was left open.   Precautions/Limitations fall precautions;abdominal precautions   General  Observations 63-year-old female resting in bed upon arrival, very drowsy though agreeable to session.   General Info Comments Activity orders: up ad kam   Cognitive Status Examination   Orientation orientation to person, place and time   Level of Consciousness lethargic/somnolent   Follows Commands and Answers Questions 100% of the time   Personal Safety and Judgment intact   Memory intact   Pain Assessment   Patient Currently in Pain Yes, see Vital Sign flowsheet   Integumentary/Edema   Integumentary/Edema no deficits were identifed   Posture    Posture Forward head position;Protracted shoulders   Range of Motion (ROM)   ROM Comment B UE and LE ROM WFL   Strength   Strength Comments Strength testing not formally assessed due to abdominal incision, B LE strength at least 3/5 per functional status   Bed Mobility   Bed Mobility Comments CGA at trunk supine>sit via log roll technique   Transfer Skills   Transfer Comments CGA-SBA sit<>stand with FWW   Gait   Gait Comments Pt ambulates 10' in room with FWW and CGA-SBA, very slow speed with VCs needed to keep eyes open as pt closes eyes for coping technique for pain   Balance   Balance Comments Good sitting balance, SBA for standing balance with B UE support on FWW   Sensory Examination   Sensory Perception no deficits were identified   General Therapy Interventions   Planned Therapy Interventions balance training;bed mobility training;gait training;strengthening;transfer training;home program guidelines;progressive activity/exercise   Clinical Impression   Criteria for Skilled Therapeutic Intervention yes, treatment indicated   PT Diagnosis Impaired functional mobility   Influenced by the following impairments Decreased strength, activity tolerance, pain, balance   Functional limitations due to impairments Pt requires assist for functional mobility and ADLs   Clinical Presentation Stable/Uncomplicated   Clinical Presentation Rationale PMH and clinical judgment   Clinical  "Decision Making (Complexity) Low complexity   Therapy Frequency` 5 times/week   Predicted Duration of Therapy Intervention (days/wks) 1 week   Anticipated Equipment Needs at Discharge (TBD)   Anticipated Discharge Disposition Transitional Care Facility   Risk & Benefits of therapy have been explained Yes   Patient, Family & other staff in agreement with plan of care Yes   Clinical Impression Comments PT eval complete, treatment initiated. Currently recommend TCU at discharge per current functional status though pending progress with therapies and LOS, pt may progress to being appropriate for discharge home with assist with possible HHPT.   [facility] AM-PAC TM \"6 Clicks\"   2016, Trustees of [facility], under license to [employer].  All rights reserved.   6 Clicks Short Forms Basic Mobility Inpatient Short Form   [facility] AM-PAC  \"6 Clicks\" V.2 Basic Mobility Inpatient Short Form   1. Turning from your back to your side while in a flat bed without using bedrails? 3 - A Little   2. Moving from lying on your back to sitting on the side of a flat bed without using bedrails? 3 - A Little   3. Moving to and from a bed to a chair (including a wheelchair)? 3 - A Little   4. Standing up from a chair using your arms (e.g., wheelchair, or bedside chair)? 3 - A Little   5. To walk in hospital room? 3 - A Little   6. Climbing 3-5 steps with a railing? 2 - A Lot   Basic Mobility Raw Score (Score out of 24.Lower scores equate to lower levels of function) 17   Total Evaluation Time   Total Evaluation Time (Minutes) 8     "

## 2018-09-04 NOTE — PROGRESS NOTES
Immunosuppression Note:    Sherrie Lala is a 63 year old female who is seen today  for immunosuppression management     I, Darren Rod MD, I have examined the patient with the resident/PA/Fellow, discussed and agree with the note and findings.  I have reviewed today's vital signs, medications, labs and imaging. I reviewed the immunosuppression medications and levels. I spoke to the patient/family and explained below clinical details and answered all the questions      Transplant Surgery  Inpatient Daily Progress Note  09/04/2018    Assessment & Plan: Sherrie Lala is a 63-year old female with a history of metastatic cholangiocarcinoma with subsequent liver failure who is now s/p DDLT on 8/30/18. The patient had significant intraoperative blood loss (~3L) and her abdomen was left open. She underwent washout and abdominal closure on 9/1.    Graft function: Good, improved. LFTs have normalized other than mildly elevated ALT (78). Liver US POD#1 with limited views, and a persistent eccentric thrombus in the intrahepatic main portal vein extending into the right portal vein. Continue heparin drip at 400u/hr today for HAT ppx.  Immunosuppression management:  - Received induction with steroid taper and Basiliximab on POD#1 to delay initiation of Tacrolimus.  - MMF: Continue 1gm BID.  - Tacrolimus: On 2mg BID, level on 9/4 < 3.0. Goal range 10-15. Will increase dose to 4mg BID and repeat level daily for now.  Complexity of management: Medium. Contributing factors: thrombocytopenia, anemia and organ dysfunction  Hematology: Significant bleeding intra-op, requiring 11u PRBCs and other blood products. Hgb now stable in the 7-8 range. Last blood transfusion on 9/1.  Thrombocytopenia: Patient with baseline platelet count ~30 prior to transplant. Platelet count remains low at 28 today, last platelet transfusion on 9/2. Goal PLT > 20, will not transfuse today and continue to monitor.  Cardiorespiratory: Stable, SBP in the  90s-110s range. On room air.   GI/Nutrition: NPO, NJ feeding tube in place with trickle feeds due to abdominal distention. Had return of bowel function yesterday, but remains distended. Will give a suppository and enema today, and start clear liquid diet. Advance tube feeds by 5ml/hr q6 hours to goal rate of 50ml/hr.   Endocrine: BG in the  range, continue Novolog sliding scale insulin q4 hours.  Fluid/Electrolytes: No MIVF. Continue IV Lasix 20mg daily. Electrolytes WNL.   : Bruner in place, good UOP. Plan to remove Bruner tomorrow morning.  Infectious disease: Afebrile, normal WBC count. Completed Vanco, Zosyn, and Fluconazole for 3 days for surgical ppx.  Pain: Patient with significant amount of abdominal pain since surgery. On Oxycodone, morphine PCA, and Flexeril. Will consult pain management for consideration of block for improved pain control. Expect improved pain control with return of bowel function, giving enema as above.  Prophylaxis: GI, DVT, heparin, Bactrim, Valcyte  Disposition: 7A    Medical Decision Making: Moderate  Subsequent visit 07575 (moderate level decision making)    DREA/Fellow: Aurelia Dunn, CHARLY 0656  Faculty: Darren Rod M.D.  __________________________________________________________________  Transplant History: Admitted 8/29/2018 for orthotopic liver transplantation.   The patient has a history of liver failure due to cholangiocarcinoma.    8/30/2018 (Liver), Postoperative day: 5     Interval History:   Continues to have significant amount of pain, worse after medication administration. Abdomen is distended, having BMs and passing gas. Going for several walks a day.    ROS:   A 10-point review of systems was negative except as noted above.    Meds:    aspirin  325 mg Oral Daily     cyclobenzaprine  10 mg Oral TID     sennosides  5-10 mL Oral BID    And     docusate   mg Oral BID     furosemide  20 mg Intravenous Daily     insulin aspart  1-6 Units Subcutaneous Q4H      lidocaine  2 patch Transdermal Q24H     lidocaine   Transdermal Q8H     lidocaine   Transdermal Q24h     multivitamins with minerals  15 mL Per Feeding Tube Daily     mycophenolate  1,000 mg Oral BID IS    Or     mycophenolate  1,000 mg Oral or NG Tube BID IS     nystatin  500,000 Units Mouth/Throat 4x Daily     omeprazole  20 mg Oral QAM AC     oxyCODONE  5 mg Oral Q6H     phytonadione  10 mg Oral Daily     pramipexole (MIRAPEX) tablet 0.5 mg  0.5 mg Oral Daily     [START ON 9/5/2018] predniSONE  10 mg Oral Once     sodium chloride (PF)  3 mL Intracatheter Q8H     sodium chloride (PF)  3 mL Intravenous Q8H     sulfamethoxazole-trimethoprim  80 mg Oral Daily     tacrolimus  4 mg Oral BID IS     valGANciclovir  450 mg Oral Daily    Or     valGANciclovir  450 mg Oral or NG Tube Daily       Physical Exam:     Admit Weight: 55.2 kg (121 lb 9.6 oz)  Today's weight: 126 lbs 12.8 oz    Vital sign ranges:    Temp:  [97.9  F (36.6  C)-98.7  F (37.1  C)] 98.6  F (37  C)  Pulse:  [85-86] 86  Heart Rate:  [76-90] 76  Resp:  [16-18] 16  BP: ()/(64-76) 117/70  SpO2:  [96 %-98 %] 97 %    GENERAL: Awake and alert, in mild distress due to pain.  SKIN: No jaundice. No rashes or lesions.   HEENT: Eyes symmetrical and free of discharge bilaterally.  CV: Regular rate and rhythm.   RESPIRATORY: Nonlabored breathing on room air.   GI: Soft and non distended with normoactive bowel sounds. Surgical incision healing well with sutures intact.  : Bruner in place, draining clear yellow urine.  EXTREMITIES: No peripheral edema.  NEUROLOGIC: Alert and oriented x 4. No focal deficits. Tremor absent.  MUSCULOSKELETAL: No joint swelling or tenderness.     Data:   CMP  Recent Labs  Lab 09/04/18  0552 09/03/18  2131  09/03/18  0608  09/02/18  0308  09/01/18  0933 09/01/18  0431  08/31/18  0553  08/29/18  2058     --   --  143  < > 142  --  142 143  < >  --   < > 144   POTASSIUM 3.6 3.6  < > 3.2*  < > 3.6  < > 3.3* 3.4  < >  --   < >  3.1*   CHLORIDE 110*  --   --  108  < > 107  --   --  108  < >  --   < > 106   CO2 28  --   --  29  < > 25  --   --  28  < >  --   < > 26   *  --   --  105*  < > 129*  --  136* 118*  < >  --   < > 90   BUN 20  --   --  24  < > 27  --   --  23  < >  --   < > 19   CR 0.68  --   --  0.78  < > 1.09*  --   --  1.02  < >  --   < > 0.77   GFRESTIMATED 88  --   --  75  < > 51*  --   --  55*  < >  --   < > 76   GFRESTBLACK >90  --   --  >90  < > 62  --   --  67  < >  --   < > >90   SHELDON 8.0*  --   --  7.6*  < > 7.4*  --   --  7.2*  < >  --   < > 8.9   ICAW  --   --   --   --   --  4.3*  --  4.7 4.3*  --   --   < >  --    MAG 2.6*  --   --  2.5*  < > 2.3  --   --  2.3  < >  --   < > 2.2   PHOS 2.6  --   --  2.5  < > 3.9  --   --  3.8  < >  --   < > 3.6   AMYLASE  --   --   --   --   --   --   --   --   --   --  39  --  82   LIPASE  --   --   --   --   --   --   --   --  161  --   --   --   --    ALBUMIN 2.5*  --   --  2.5*  --  2.3*  --   --  2.3*  --   --   < > 5.0   BILITOTAL 0.9  --   --  1.0  --  1.2  --   --  1.5*  --   --   < > 3.0*   ALKPHOS 60  --   --  44  --  36*  --   --  41  --   --   < > 115   AST 37  --   --  42  --  57*  --   --  107*  --   --   < > 23   ALT 78*  --   --  90*  --  124*  --   --  170*  --   --   < > 24   < > = values in this interval not displayed.  CBC    Recent Labs  Lab 09/04/18  0552 09/03/18  0608  09/02/18  1701   HGB 7.9* 7.1*  < > 8.0*   WBC 2.9* 4.7  < > 6.0   PLT 28* 45*  < > 46*   A1C  --   --   --  4.9   < > = values in this interval not displayed.  COAGS    Recent Labs  Lab 09/04/18  0552 09/02/18  0308 09/01/18  1557 09/01/18  0933   INR  --   --  1.33* 1.33*   PTT 28 34  --  32      Urinalysis  Recent Labs   Lab Test  02/26/18   0947   COLOR  Yellow   APPEARANCE  Clear   URINEGLC  Negative   URINEBILI  Negative   URINEKETONE  Negative   SG  1.017   UBLD  Negative   URINEPH  5.0   PROTEIN  Negative   NITRITE  Negative   LEUKEST  Negative   RBCU  1   WBCU  1   UTPG  0.06      Virology:  Hepatitis C Antibody   Date Value Ref Range Status   08/29/2018 Nonreactive NR^Nonreactive Final     Comment:     Assay performance characteristics have not been established for newborns,   infants, and children

## 2018-09-04 NOTE — PROGRESS NOTES
Final positive donor urine & sputum culture results have been uploaded to [facility].  Donor ID [id number].  Dr. Rod & Dr. Garcias notified of results.

## 2018-09-05 ENCOUNTER — HOME INFUSION (PRE-WILLOW HOME INFUSION) (OUTPATIENT)
Dept: PHARMACY | Facility: CLINIC | Age: 63
End: 2018-09-05

## 2018-09-05 ENCOUNTER — DOCUMENTATION ONLY (OUTPATIENT)
Dept: TRANSPLANT | Facility: CLINIC | Age: 63
End: 2018-09-05

## 2018-09-05 ENCOUNTER — APPOINTMENT (OUTPATIENT)
Dept: PHYSICAL THERAPY | Facility: CLINIC | Age: 63
DRG: 005 | End: 2018-09-05
Attending: TRANSPLANT SURGERY
Payer: MEDICARE

## 2018-09-05 ENCOUNTER — APPOINTMENT (OUTPATIENT)
Dept: OCCUPATIONAL THERAPY | Facility: CLINIC | Age: 63
DRG: 005 | End: 2018-09-05
Attending: TRANSPLANT SURGERY
Payer: MEDICARE

## 2018-09-05 LAB
ALBUMIN SERPL-MCNC: 2.6 G/DL (ref 3.4–5)
ALP SERPL-CCNC: 67 U/L (ref 40–150)
ALT SERPL W P-5'-P-CCNC: 62 U/L (ref 0–50)
ANION GAP SERPL CALCULATED.3IONS-SCNC: 6 MMOL/L (ref 3–14)
AST SERPL W P-5'-P-CCNC: 22 U/L (ref 0–45)
BASOPHILS # BLD AUTO: 0 10E9/L (ref 0–0.2)
BASOPHILS NFR BLD AUTO: 0.3 %
BILIRUB DIRECT SERPL-MCNC: 0.4 MG/DL (ref 0–0.2)
BILIRUB SERPL-MCNC: 0.8 MG/DL (ref 0.2–1.3)
BUN SERPL-MCNC: 16 MG/DL (ref 7–30)
CALCIUM SERPL-MCNC: 7.7 MG/DL (ref 8.5–10.1)
CHLORIDE SERPL-SCNC: 110 MMOL/L (ref 94–109)
CO2 SERPL-SCNC: 28 MMOL/L (ref 20–32)
CREAT SERPL-MCNC: 0.72 MG/DL (ref 0.52–1.04)
DIFFERENTIAL METHOD BLD: ABNORMAL
EOSINOPHIL # BLD AUTO: 0.3 10E9/L (ref 0–0.7)
EOSINOPHIL NFR BLD AUTO: 8.4 %
ERYTHROCYTE [DISTWIDTH] IN BLOOD BY AUTOMATED COUNT: 17.1 % (ref 10–15)
GFR SERPL CREATININE-BSD FRML MDRD: 83 ML/MIN/1.7M2
GLUCOSE BLDC GLUCOMTR-MCNC: 108 MG/DL (ref 70–99)
GLUCOSE BLDC GLUCOMTR-MCNC: 120 MG/DL (ref 70–99)
GLUCOSE BLDC GLUCOMTR-MCNC: 120 MG/DL (ref 70–99)
GLUCOSE BLDC GLUCOMTR-MCNC: 149 MG/DL (ref 70–99)
GLUCOSE BLDC GLUCOMTR-MCNC: 163 MG/DL (ref 70–99)
GLUCOSE BLDC GLUCOMTR-MCNC: 166 MG/DL (ref 70–99)
GLUCOSE SERPL-MCNC: 116 MG/DL (ref 70–99)
HCT VFR BLD AUTO: 25.3 % (ref 35–47)
HGB BLD-MCNC: 8.2 G/DL (ref 11.7–15.7)
IMM GRANULOCYTES # BLD: 0 10E9/L (ref 0–0.4)
IMM GRANULOCYTES NFR BLD: 0.3 %
INR PPP: 1.09 (ref 0.86–1.14)
LYMPHOCYTES # BLD AUTO: 0.3 10E9/L (ref 0.8–5.3)
LYMPHOCYTES NFR BLD AUTO: 9.9 %
MAGNESIUM SERPL-MCNC: 2.6 MG/DL (ref 1.6–2.3)
MCH RBC QN AUTO: 28.4 PG (ref 26.5–33)
MCHC RBC AUTO-ENTMCNC: 32.4 G/DL (ref 31.5–36.5)
MCV RBC AUTO: 88 FL (ref 78–100)
MONOCYTES # BLD AUTO: 0.3 10E9/L (ref 0–1.3)
MONOCYTES NFR BLD AUTO: 9 %
NEUTROPHILS # BLD AUTO: 2.4 10E9/L (ref 1.6–8.3)
NEUTROPHILS NFR BLD AUTO: 72.1 %
NRBC # BLD AUTO: 0 10*3/UL
NRBC BLD AUTO-RTO: 0 /100
PHOSPHATE SERPL-MCNC: 3 MG/DL (ref 2.5–4.5)
PLATELET # BLD AUTO: 36 10E9/L (ref 150–450)
POTASSIUM SERPL-SCNC: 3.3 MMOL/L (ref 3.4–5.3)
PROT SERPL-MCNC: 5.7 G/DL (ref 6.8–8.8)
RBC # BLD AUTO: 2.89 10E12/L (ref 3.8–5.2)
SODIUM SERPL-SCNC: 143 MMOL/L (ref 133–144)
TACROLIMUS BLD-MCNC: 3.5 UG/L (ref 5–15)
TME LAST DOSE: ABNORMAL H
WBC # BLD AUTO: 3.3 10E9/L (ref 4–11)

## 2018-09-05 PROCEDURE — 27210429 ZZH NUTRITION PRODUCT INTERMEDIATE LITER

## 2018-09-05 PROCEDURE — 25000131 ZZH RX MED GY IP 250 OP 636 PS 637: Mod: GY | Performed by: STUDENT IN AN ORGANIZED HEALTH CARE EDUCATION/TRAINING PROGRAM

## 2018-09-05 PROCEDURE — 85610 PROTHROMBIN TIME: CPT | Performed by: TRANSPLANT SURGERY

## 2018-09-05 PROCEDURE — 25000132 ZZH RX MED GY IP 250 OP 250 PS 637: Mod: GY | Performed by: STUDENT IN AN ORGANIZED HEALTH CARE EDUCATION/TRAINING PROGRAM

## 2018-09-05 PROCEDURE — 85025 COMPLETE CBC W/AUTO DIFF WBC: CPT | Performed by: NURSE PRACTITIONER

## 2018-09-05 PROCEDURE — 97116 GAIT TRAINING THERAPY: CPT | Mod: GP

## 2018-09-05 PROCEDURE — 25000125 ZZHC RX 250: Performed by: STUDENT IN AN ORGANIZED HEALTH CARE EDUCATION/TRAINING PROGRAM

## 2018-09-05 PROCEDURE — A9270 NON-COVERED ITEM OR SERVICE: HCPCS | Mod: GY | Performed by: STUDENT IN AN ORGANIZED HEALTH CARE EDUCATION/TRAINING PROGRAM

## 2018-09-05 PROCEDURE — 25000131 ZZH RX MED GY IP 250 OP 636 PS 637: Mod: GY | Performed by: NURSE PRACTITIONER

## 2018-09-05 PROCEDURE — 36592 COLLECT BLOOD FROM PICC: CPT | Performed by: NURSE PRACTITIONER

## 2018-09-05 PROCEDURE — 25000132 ZZH RX MED GY IP 250 OP 250 PS 637: Mod: GY | Performed by: TRANSPLANT SURGERY

## 2018-09-05 PROCEDURE — A9270 NON-COVERED ITEM OR SERVICE: HCPCS | Mod: GY | Performed by: PHYSICIAN ASSISTANT

## 2018-09-05 PROCEDURE — 40000133 ZZH STATISTIC OT WARD VISIT: Performed by: OCCUPATIONAL THERAPIST

## 2018-09-05 PROCEDURE — 83735 ASSAY OF MAGNESIUM: CPT | Performed by: NURSE PRACTITIONER

## 2018-09-05 PROCEDURE — 25000128 H RX IP 250 OP 636: Performed by: STUDENT IN AN ORGANIZED HEALTH CARE EDUCATION/TRAINING PROGRAM

## 2018-09-05 PROCEDURE — 99221 1ST HOSP IP/OBS SF/LOW 40: CPT | Performed by: PHYSICIAN ASSISTANT

## 2018-09-05 PROCEDURE — 25000128 H RX IP 250 OP 636: Performed by: NURSE PRACTITIONER

## 2018-09-05 PROCEDURE — 80076 HEPATIC FUNCTION PANEL: CPT | Performed by: NURSE PRACTITIONER

## 2018-09-05 PROCEDURE — A9270 NON-COVERED ITEM OR SERVICE: HCPCS | Mod: GY

## 2018-09-05 PROCEDURE — 80048 BASIC METABOLIC PNL TOTAL CA: CPT | Performed by: NURSE PRACTITIONER

## 2018-09-05 PROCEDURE — A9270 NON-COVERED ITEM OR SERVICE: HCPCS | Mod: GY | Performed by: TRANSPLANT SURGERY

## 2018-09-05 PROCEDURE — 12000026 ZZH R&B TRANSPLANT

## 2018-09-05 PROCEDURE — 25000132 ZZH RX MED GY IP 250 OP 250 PS 637: Mod: GY | Performed by: NURSE PRACTITIONER

## 2018-09-05 PROCEDURE — 25000132 ZZH RX MED GY IP 250 OP 250 PS 637: Mod: GY | Performed by: PHYSICIAN ASSISTANT

## 2018-09-05 PROCEDURE — 25000132 ZZH RX MED GY IP 250 OP 250 PS 637: Mod: GY

## 2018-09-05 PROCEDURE — 80197 ASSAY OF TACROLIMUS: CPT | Performed by: NURSE PRACTITIONER

## 2018-09-05 PROCEDURE — A9270 NON-COVERED ITEM OR SERVICE: HCPCS | Mod: GY | Performed by: NURSE PRACTITIONER

## 2018-09-05 PROCEDURE — 97530 THERAPEUTIC ACTIVITIES: CPT | Mod: GO | Performed by: OCCUPATIONAL THERAPIST

## 2018-09-05 PROCEDURE — 84100 ASSAY OF PHOSPHORUS: CPT | Performed by: NURSE PRACTITIONER

## 2018-09-05 PROCEDURE — 97535 SELF CARE MNGMENT TRAINING: CPT | Mod: GO | Performed by: OCCUPATIONAL THERAPIST

## 2018-09-05 PROCEDURE — 25000128 H RX IP 250 OP 636: Performed by: PHYSICIAN ASSISTANT

## 2018-09-05 PROCEDURE — 36592 COLLECT BLOOD FROM PICC: CPT | Performed by: TRANSPLANT SURGERY

## 2018-09-05 PROCEDURE — 97530 THERAPEUTIC ACTIVITIES: CPT | Mod: GP

## 2018-09-05 PROCEDURE — 40000193 ZZH STATISTIC PT WARD VISIT

## 2018-09-05 PROCEDURE — 00000146 ZZHCL STATISTIC GLUCOSE BY METER IP

## 2018-09-05 RX ORDER — OXYCODONE HCL 5 MG/5 ML
5-7.5 SOLUTION, ORAL ORAL
Status: DISCONTINUED | OUTPATIENT
Start: 2018-09-05 | End: 2018-09-05 | Stop reason: DRUGHIGH

## 2018-09-05 RX ORDER — ACETAMINOPHEN 325 MG/1
650 TABLET ORAL EVERY 4 HOURS PRN
Status: DISCONTINUED | OUTPATIENT
Start: 2018-09-05 | End: 2018-09-08

## 2018-09-05 RX ORDER — POTASSIUM CHLORIDE 750 MG/1
40 TABLET, EXTENDED RELEASE ORAL ONCE
Status: COMPLETED | OUTPATIENT
Start: 2018-09-05 | End: 2018-09-05

## 2018-09-05 RX ORDER — WARFARIN SODIUM 3 MG/1
3 TABLET ORAL
Status: COMPLETED | OUTPATIENT
Start: 2018-09-05 | End: 2018-09-05

## 2018-09-05 RX ORDER — MORPHINE SULFATE 2 MG/ML
1-2 INJECTION, SOLUTION INTRAMUSCULAR; INTRAVENOUS
Status: DISCONTINUED | OUTPATIENT
Start: 2018-09-05 | End: 2018-09-06

## 2018-09-05 RX ORDER — GABAPENTIN 300 MG/1
300 CAPSULE ORAL AT BEDTIME
Status: DISCONTINUED | OUTPATIENT
Start: 2018-09-05 | End: 2018-09-12 | Stop reason: HOSPADM

## 2018-09-05 RX ORDER — TRAMADOL HYDROCHLORIDE 50 MG/1
50 TABLET ORAL EVERY 6 HOURS PRN
Status: DISCONTINUED | OUTPATIENT
Start: 2018-09-05 | End: 2018-09-06

## 2018-09-05 RX ADMIN — SENNOSIDES A AND B 5 ML: 415.36 LIQUID ORAL at 09:05

## 2018-09-05 RX ADMIN — INSULIN ASPART 1 UNITS: 100 INJECTION, SOLUTION INTRAVENOUS; SUBCUTANEOUS at 09:37

## 2018-09-05 RX ADMIN — ACETAMINOPHEN 650 MG: 325 SOLUTION ORAL at 22:24

## 2018-09-05 RX ADMIN — MYCOPHENOLATE MOFETIL 1000 MG: 200 POWDER, FOR SUSPENSION ORAL at 18:13

## 2018-09-05 RX ADMIN — OXYCODONE HYDROCHLORIDE 5 MG: 5 SOLUTION ORAL at 01:43

## 2018-09-05 RX ADMIN — LIDOCAINE 2 PATCH: 560 PATCH PERCUTANEOUS; TOPICAL; TRANSDERMAL at 09:05

## 2018-09-05 RX ADMIN — MICAFUNGIN SODIUM 100 MG: 10 INJECTION, POWDER, LYOPHILIZED, FOR SOLUTION INTRAVENOUS at 12:17

## 2018-09-05 RX ADMIN — CYCLOBENZAPRINE HYDROCHLORIDE 10 MG: 5 TABLET, FILM COATED ORAL at 09:04

## 2018-09-05 RX ADMIN — Medication 4 MG: at 09:06

## 2018-09-05 RX ADMIN — PRAMIPEXOLE DIHYDROCHLORIDE 0.5 MG: 0.5 TABLET ORAL at 19:36

## 2018-09-05 RX ADMIN — ONDANSETRON 4 MG: 4 TABLET, ORALLY DISINTEGRATING ORAL at 22:46

## 2018-09-05 RX ADMIN — BISACODYL 10 MG: 10 SUPPOSITORY RECTAL at 10:56

## 2018-09-05 RX ADMIN — GABAPENTIN 300 MG: 300 CAPSULE ORAL at 21:16

## 2018-09-05 RX ADMIN — INSULIN ASPART 1 UNITS: 100 INJECTION, SOLUTION INTRAVENOUS; SUBCUTANEOUS at 15:44

## 2018-09-05 RX ADMIN — MULTIVITAMIN 15 ML: LIQUID ORAL at 09:03

## 2018-09-05 RX ADMIN — OXYCODONE HYDROCHLORIDE 5 MG: 5 SOLUTION ORAL at 09:06

## 2018-09-05 RX ADMIN — TRAMADOL HYDROCHLORIDE 50 MG: 50 TABLET, COATED ORAL at 23:57

## 2018-09-05 RX ADMIN — NYSTATIN 500000 UNITS: 100000 SUSPENSION ORAL at 19:35

## 2018-09-05 RX ADMIN — HEPARIN SODIUM 400 UNITS/HR: 10000 INJECTION, SOLUTION INTRAVENOUS at 10:56

## 2018-09-05 RX ADMIN — CYCLOBENZAPRINE HYDROCHLORIDE 10 MG: 5 TABLET, FILM COATED ORAL at 19:35

## 2018-09-05 RX ADMIN — Medication 10 MG: at 09:05

## 2018-09-05 RX ADMIN — ACETAMINOPHEN 650 MG: 325 SOLUTION ORAL at 13:21

## 2018-09-05 RX ADMIN — WARFARIN SODIUM 3 MG: 3 TABLET ORAL at 18:13

## 2018-09-05 RX ADMIN — HEPARIN SODIUM 400 UNITS/HR: 10000 INJECTION, SOLUTION INTRAVENOUS at 09:51

## 2018-09-05 RX ADMIN — DOCUSATE SODIUM 50 MG: 50 LIQUID ORAL at 19:35

## 2018-09-05 RX ADMIN — POTASSIUM CHLORIDE 40 MEQ: 750 TABLET, EXTENDED RELEASE ORAL at 10:52

## 2018-09-05 RX ADMIN — ONDANSETRON 4 MG: 4 TABLET, ORALLY DISINTEGRATING ORAL at 15:44

## 2018-09-05 RX ADMIN — OXYCODONE HYDROCHLORIDE 5 MG: 5 SOLUTION ORAL at 19:35

## 2018-09-05 RX ADMIN — DOCUSATE SODIUM 100 MG: 50 LIQUID ORAL at 09:05

## 2018-09-05 RX ADMIN — MYCOPHENOLATE MOFETIL 1000 MG: 200 POWDER, FOR SUSPENSION ORAL at 09:05

## 2018-09-05 RX ADMIN — Medication 325 MG: at 09:06

## 2018-09-05 RX ADMIN — FUROSEMIDE 20 MG: 10 INJECTION, SOLUTION INTRAVENOUS at 09:04

## 2018-09-05 RX ADMIN — NYSTATIN 500000 UNITS: 100000 SUSPENSION ORAL at 15:44

## 2018-09-05 RX ADMIN — CYCLOBENZAPRINE HYDROCHLORIDE 10 MG: 5 TABLET, FILM COATED ORAL at 13:21

## 2018-09-05 RX ADMIN — SENNOSIDES A AND B 5 ML: 415.36 LIQUID ORAL at 19:35

## 2018-09-05 RX ADMIN — OXYCODONE HYDROCHLORIDE 5 MG: 5 SOLUTION ORAL at 16:04

## 2018-09-05 RX ADMIN — OXYCODONE HYDROCHLORIDE 5 MG: 5 SOLUTION ORAL at 13:21

## 2018-09-05 RX ADMIN — SULFAMETHOXAZOLE AND TRIMETHOPRIM 80 MG: 200; 40 SUSPENSION ORAL at 09:05

## 2018-09-05 RX ADMIN — TRAMADOL HYDROCHLORIDE 50 MG: 50 TABLET, COATED ORAL at 18:13

## 2018-09-05 RX ADMIN — INSULIN ASPART 1 UNITS: 100 INJECTION, SOLUTION INTRAVENOUS; SUBCUTANEOUS at 23:57

## 2018-09-05 RX ADMIN — OXYCODONE HYDROCHLORIDE 5 MG: 5 SOLUTION ORAL at 22:24

## 2018-09-05 RX ADMIN — NYSTATIN 500000 UNITS: 100000 SUSPENSION ORAL at 09:04

## 2018-09-05 RX ADMIN — TRAMADOL HYDROCHLORIDE 50 MG: 50 TABLET, COATED ORAL at 10:52

## 2018-09-05 RX ADMIN — Medication 4 MG: at 18:13

## 2018-09-05 RX ADMIN — Medication 20 MG: at 09:05

## 2018-09-05 RX ADMIN — PREDNISONE 10 MG: 10 TABLET ORAL at 09:06

## 2018-09-05 RX ADMIN — VALGANCICLOVIR HYDROCHLORIDE 450 MG: 50 POWDER, FOR SOLUTION ORAL at 09:06

## 2018-09-05 RX ADMIN — INSULIN ASPART 1 UNITS: 100 INJECTION, SOLUTION INTRAVENOUS; SUBCUTANEOUS at 12:16

## 2018-09-05 ASSESSMENT — ACTIVITIES OF DAILY LIVING (ADL)
ADLS_ACUITY_SCORE: 9

## 2018-09-05 ASSESSMENT — PAIN DESCRIPTION - DESCRIPTORS
DESCRIPTORS: PRESSURE;TIGHTNESS
DESCRIPTORS: TIGHTNESS

## 2018-09-05 NOTE — PROGRESS NOTES
Care Coordinator    Care Coordinator - Discharge Planning    Admission Date/Time:  8/29/2018  Attending MD:  Darren Rod MD     Data  Date of initial CC assessment:  9.5.18  Chart reviewed, discussed with interdisciplinary team.   Patient was admitted for:   1. Liver transplanted (H)    2. Liver transplant candidate    3. Immunosuppression (H)    4. On enteral nutrition    Sherrie Lala is a 63-year old female with a history of metastatic cholangiocarcinoma with subsequent liver failure who is now s/p DDLT on 8/30/18. The patient had significant intraoperative blood loss (~3L) and her abdomen was left open. She underwent washout and abdominal closure on 9/1--VANESSA Garber note today. Jonnie Mead may be ready for discharge on Friday or Saturday on enteral feeds.     Assessment   Concerns with insurance coverage for discharge needs: None.  Current Living Situation: Patient lives with spouse.  Support System: Supportive and Involved  Services Involved: Home Care and Home Infusion  Transportation at Discharge: Car and Family or friend will provide  Transportation to Medical Appointments:    - Name of caregiver: spouse Bryant  Barriers to Discharge: pain/labs/toleration of enteral feeds      Coordination of Care and Referrals: Provided patient/family with options for Home Infusion. I met with pt, Bryant and pt's good friend Marjorie in her room. They are shocked that I am talking about going home already--I explained that I need to have enteral feeds arranged and they understand. Pt has had TPN via [facility] in 2011. [facility] is arranged for Fr[place] 9/7 @ 10:45am and Bryant will be here. They agree with [facility]--I explained that [facility] will find a HH agency. I explained that once OOP is met enteral is covered at 100%.  Address and cell phone #'s on facesheet are correct.  PCP: Apollo Benitez  [place], [place]  Ph: [phone]--she has seen him within the last 6 months.  [facility] surgeon: Darren Rod  OP CC: Abigail  Dione--met pt 9/4.  Equipment--she doesn't want a shower chair  Steps--she has 7 to get up and down  I explained I have the HHN visit the day after discharge, unless it is the Monday for ATC clinic-- I explained where ATC is and it is approx a 4 hour visit.  A: plan for discharge in 2-3 days  P: BREANNA Charles aware--will let me know HH agency. Will follow.      Plan  Anticipated Discharge Date:  9/7-9/8  Anticipated Discharge Plan:  See above    CTS Handoff completed:  YES    Xi Muse RN

## 2018-09-05 NOTE — CONSULTS
"Inpatient Pain Management Service: Consultation      DATE OF CONSULT: September 5, 2018      REASON FOR PAIN CONSULTATION:  Sherrie Lala is a 63 year old female I am seeing in consultation at the request of Alyce Figueroa PA-C for evaluation and recommendations for her acute post operative abdominal pain.       CHIEF PAIN COMPLAINT: abdominal pain      ASSESSMENT:   1. Acute post operative abdominal pain. POD #6 of liver transplant (8/30/18) c/by intraoperative blood loss, abdomen left open and later underwent washout and closure on 9/1/18.  2. 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  3. Thrombocytopenia. Platelets on 9/5/18: 36.  4. Post chemotherapy neuropathy-takes gabapentin 300mg at bedtime PTA.    -- Outpatient opioid requirements prior to admission: OME = none.    [place]  reviewed.  8/8/18 zolpidem 10mg #90  6/1/18 oxycodone-acetaminophen 5mg #20  5/17/18 oxycodone-acetaminophen 5mg #20    Primary Care Provider: Apollo Benitez  Chronic Pain Provider: none    TREATMENT RECOMMENDATIONS/PLAN:   1. Agree with Discontinuing the PCA morphine 0.5mg IV Q 15 minutes.  Patient states that this was not very helpful and causes \"grogginess.\"   2. Increase oxycodone 5-7.5mg PO Q 3 hours PRN.  (pt does not tolerate Dilaudid due to itching, listed as allergy)  3. Start morphine 1-2mg IV Q 2 hours PRN.  4. Discontinue order for tramadol 50mg PO Q 6hours PRN.  5. Resume PTA dose of gabapentin 300mg PO at bedtime.   (not seen on  as state of [place] does not report. Consider increasing in future if needed.)  6. Start menthol patches 1 patch TD Q 8 hours.  7. Continue lidocaine patch 4%, 1-3 patches TD Q 24hours, 12 hours on and 12 hours off.  8. Continue with bowel regimen per primary team to prevent opioid induced constipation.  9. Continue flexeril 10mg PO TID.    Pain Service will Continue to follow at this time.     Recommendations were " "discussed with transplant team and pain team  Plan was reviewed by the Pain Service consisting of Stacy Cadet MD.    Thank you for consulting the Inpatient Pain Management Service.   The above recommendations are to be acted upon at the primary team s discretion.    To reach us:  Mon - Friday 8 AM - 3 PM: Pager [phone] (Text Page)  After hours, weekends and holidays: Primary service should call [phone] for the on-call pain specialist    HISTORY OF PRESENT ILLNESS: Sherrie Lala is a 63 year old female with hx of metastatic cholangiocarcinoma s/p liver resection and chemoradiation, then unfortunately developed Budd Chiari which had progressed to liver failure. She underwent  donor liver transplant on 18, intraop she was found to have a large portal vein thrombosis. The donor liver was also noted to be large. She had ~3L of blood loss intraop and received multiple rounds of blood products.    Today, is POD #6 of liver transplant.  She feels that pain is not well controlled, however notes that opioids make her sleepy and \"groggy.\"  States pain in the upper abdomen and is a pressure/tight sensation of pain.  Pain is deep and radiates to her back.  Rates pain 8/10 on VAS and goal is to decrease to 5/10 on VAS.  Feels at this time that nothing makes pain worse or better.  She is able to tolerate liquids and diet has been advanced but she does not have an appetite.  She had small BM yesterday however abdomen still distended.  She is able to walk down the hallways.    Discussed the risks, benefits, and side effects of opioids especially in her case of pain control vs sedation vs GI motility.  Discussed increasing oral opioid dosing for pain control recognizing that it may increase side effects.  She states that pain is worse than side effects at this point.  She also reports that she is sensitive to medications.      PAIN HISTORY:   Location: abdomen  Duration: constant  Pain intensity: " 8/10  Quality of the pain: tight/pressure  Aggravating factors: movements  Relieving factors : nothing    CAPA (Clinically Aligned Pain Assessment)  Comfort (How is your pain?): Tolerable with discomfort  Change in Pain (Since your last medication/intervention?): Getting worse  Pain Control (How are your pain treatments working?): Partially effective pain control  Functioning (Are you able to do activities to get better?) : Pain keeps me from doing most of what I need to do  Sleep (Does your pain management allow you to sleep or rest?): Awake with pain most of the night       FUNCTIONAL STATUS:  Change:      unchanged  Oral intake:     Clear liquids  Bowel function:    Small BM since surgery  Activity level:     Ambulating in hanna  Sleep:      Did not sleep well last night  Mood:      Tired, calm      REVIEW OF 10 BODY SYSTEMS: 10 point ROS of systems including Constitutional, Eyes, Respiratory, Cardiovascular, Gastroenterology, Genitourinary, Integumentary, Musculoskeletal, Psychiatric were all negative except for pertinent positives noted in my HPI.       INPATIENT MEDICATIONS PERTINENT TO PAIN CONSULT:   Medications related to Pain Management (Future)    Start     Dose/Rate Route Frequency Ordered Stop    09/05/18 2200  gabapentin (NEURONTIN) capsule 300 mg      300 mg Oral AT BEDTIME 09/05/18 1102      09/05/18 1115  oxyCODONE (ROXICODONE) solution 5-7.5 mg      5-7.5 mg Oral EVERY 3 HOURS PRN 09/05/18 1102      09/05/18 1015  pink lady enema (COMPOUNDED: docusate, magnesium citrate, mineral oil, sodium phosphate)      286 mL Rectal ONCE 09/05/18 1010      09/05/18 0834  traMADol (ULTRAM) tablet 50 mg      50 mg Oral EVERY 6 HOURS PRN 09/05/18 0835      09/04/18 1045  acetaminophen (TYLENOL) solution 650 mg      650 mg Oral EVERY 4 HOURS PRN 09/04/18 1045      09/03/18 1400  cyclobenzaprine (FLEXERIL) tablet 10 mg      10 mg Oral 3 TIMES DAILY 09/03/18 1006      09/03/18 1015  Lidocaine (LIDOCARE) 4 % Patch 2  patch      2 patch  over 12 Hours Transdermal EVERY 24 HOURS 0800 09/03/18 1005      09/03/18 1015  lidocaine patch in PLACE       Transdermal EVERY 8 HOURS 09/03/18 1005      09/03/18 0911  bisacodyl (DULCOLAX) Suppository 10 mg      10 mg Rectal DAILY PRN 09/03/18 0912      09/03/18 0800  aspirin suspension 325 mg      325 mg Oral DAILY 09/03/18 0731      09/03/18 0800  sennosides (SENOKOT) syrup 5-10 mL      5-10 mL Oral 2 TIMES DAILY 09/03/18 0735      09/03/18 0800  docusate (COLACE) 50 MG/5ML liquid  mg       mg Oral 2 TIMES DAILY 09/03/18 0735              CURRENT MEDICATIONS:   Current Facility-Administered Medications Ordered in Epic   Medication Dose Route Frequency Provider Last Rate Last Dose     acetaminophen (TYLENOL) solution 650 mg  650 mg Oral Q4H PRN Aurelia Dunn, APRN CNP   650 mg at 09/04/18 1117     artificial saliva (BIOTENE DRY MOUTHWASH) liquid 10 mL  10 mL Swish & Spit 4x Daily PRN Aurelia Dunn, APRN CNP   10 mL at 09/04/18 1318     aspirin suspension 325 mg  325 mg Oral Daily Darren Rod MD   325 mg at 09/05/18 0906     bisacodyl (DULCOLAX) Suppository 10 mg  10 mg Rectal Daily PRN Derik Garcias MD   10 mg at 09/05/18 1056     Carboxymethylcellulose Sod PF (REFRESH PLUS) 0.5 % ophthalmic solution 1 drop  1 drop Both Eyes TID PRN Krista Hinds MD         cyclobenzaprine (FLEXERIL) tablet 10 mg  10 mg Oral TID Derik Garcias MD   10 mg at 09/05/18 0904     dextrose 10 % 1,000 mL infusion   Intravenous Continuous PRN Mouna Hightower CNP         glucose gel 15-30 g  15-30 g Oral Q15 Min PRN Nilay Rivera DO        Or     dextrose 50 % injection 25-50 mL  25-50 mL Intravenous Q15 Min PRN Nilay Rivera DO        Or     glucagon injection 1 mg  1 mg Subcutaneous Q15 Min PRN Nilay Rivera DO         sennosides (SENOKOT) syrup 5-10 mL  5-10 mL Oral BID Darren Rod MD   5 mL at 09/05/18 0905    And     docusate (COLACE) 50 MG/5ML liquid   mg   mg Oral BID Darren Rod MD   100 mg at 09/05/18 0905     gabapentin (NEURONTIN) capsule 300 mg  300 mg Oral At Bedtime Alyce Figueroa PA-C         heparin drip 25,000 units in 0.45% NaCl 250 mL  400 Units/hr Intravenous Continuous Aurelia Dunn APRN CNP 4 mL/hr at 09/05/18 1056 400 Units/hr at 09/05/18 1056     insulin aspart (NovoLOG) inj (RAPID ACTING)  1-6 Units Subcutaneous Q4H Nilay Rivera DO   1 Units at 09/05/18 0937     Lidocaine (LIDOCARE) 4 % Patch 2 patch  2 patch Transdermal Q24H Derik Garcias MD   2 patch at 09/05/18 0905     lidocaine patch in PLACE   Transdermal Q8H Derik Garcias MD         lidocaine patch REMOVAL   Transdermal Q24h Derki Garcias MD         multivitamins with minerals (CERTAVITE/CEROVITE) liquid 15 mL  15 mL Per Feeding Tube Daily Mouna Hightower CNP   15 mL at 09/05/18 0903     mycophenolate (GENERIC EQUIVALENT) capsule 1,000 mg  1,000 mg Oral BID IS Derik Garicas MD        Or     mycophenolate (CELLCEPT BRAND) suspension 1,000 mg  1,000 mg Oral or NG Tube BID IS Derik Garcias MD   1,000 mg at 09/05/18 0905     naloxone (NARCAN) injection 0.1-0.4 mg  0.1-0.4 mg Intravenous Q2 Min PRN Derik Garcias MD         nystatin (MYCOSTATIN) suspension 500,000 Units  500,000 Units Mouth/Throat 4x Daily Derik Garcias MD   500,000 Units at 09/05/18 0904     omeprazole (priLOSEC) suspension 20 mg  20 mg Oral QAM AC Darren Rod MD   20 mg at 09/05/18 0905     ondansetron (ZOFRAN-ODT) ODT tab 4 mg  4 mg Oral Q6H PRN Nilay Rivera DO        Or     ondansetron (ZOFRAN) injection 4 mg  4 mg Intravenous Q6H PRN Nilay Rivera DO   4 mg at 09/04/18 0855     oxyCODONE (ROXICODONE) solution 5-7.5 mg  5-7.5 mg Oral Q3H PRN Alyce Figueroa PA-C         pink lady enema (COMPOUNDED: docusate, magnesium citrate, mineral oil, sodium phosphate)  286 mL Rectal Once Darren Rod MD         pramipexole (MIRAPEX) tablet 0.5 mg  0.5 mg Oral  Daily Carmelita Ortega MD   0.5 mg at 09/04/18 1924     sodium chloride (PF) 0.9% PF flush 3 mL  3 mL Intracatheter Q8H Darren Rod MD   3 mL at 09/05/18 0907     sodium chloride (PF) 0.9% PF flush 3 mL  3 mL Intravenous Q1H PRN Derik Garcias MD         sodium chloride (PF) 0.9% PF flush 3 mL  3 mL Intravenous Q8H Derik Garcias MD   3 mL at 09/03/18 0935     sodium chloride (PF) 0.9% PF flush 3 mL  3 mL Intravenous Q1H PRN Derik Garcias MD   20 mL at 09/03/18 0601     sulfamethoxazole-trimethoprim (BACTRIM/SEPTRA) suspension 80 mg  80 mg Oral Daily Darren Rod MD   80 mg at 09/05/18 0905     tacrolimus (GENERIC EQUIVALENT) suspension 4 mg  4 mg Oral BID IS Aurelia Dunn APRN CNP   4 mg at 09/05/18 0906     traMADol (ULTRAM) tablet 50 mg  50 mg Oral Q6H PRN Derik Garcias MD   50 mg at 09/05/18 1052     valGANciclovir (VALCYTE) tablet 450 mg  450 mg Oral Daily Derik Garcias MD        Or     valGANciclovir (VALCYTE) solution 450 mg  450 mg Oral or NG Tube Daily Derik Garcias MD   450 mg at 09/05/18 0906     No current [facility]-ordered outpatient prescriptions on file.           HOME/PREVIOUS MEDICATIONS:   Prior to Admission medications    Medication Sig Start Date End Date Taking? Authorizing Provider   albuterol (PROAIR HFA/PROVENTIL HFA/VENTOLIN HFA) 108 (90 BASE) MCG/ACT Inhaler Inhale 2 puffs into the lungs every 6 hours   Yes Reported, Patient   CIPROFLOXACIN PO Take 750 mg by mouth once a week   Yes Reported, Patient   FUROSEMIDE PO Take 80 mg by mouth 2 times daily    Yes Reported, Patient   GABAPENTIN PO Take 300 mg by mouth At Bedtime   Yes Reported, Patient   OMEPRAZOLE PO Take 20 mg by mouth 2 times daily (before meals)   Yes Reported, Patient   PRAMIPEXOLE DIHYDROCHLORIDE PO Take 0.5 mg by mouth daily   Yes Reported, Patient   RIFAMPIN PO Take 300 mg by mouth daily   Yes Reported, Patient   RifAXIMin (XIFAXAN PO) Take 550 mg by mouth 2 times daily   Yes Reported, Patient    SPIRONOLACTONE PO Take 100 mg by mouth daily   Yes Reported, Patient   ZOLPIDEM TARTRATE PO Take 10 mg by mouth nightly as needed for sleep   Yes Reported, Patient   Potassium Chloride ER 20 MEQ TBCR Take 1 tablet (20 mEq) by mouth daily 18   Suzanna Cui APRN CNP                   ALLERGIES:    Allergies   Allergen Reactions     Blood Transfusion Related (Informational Only) Other (See Comments)     Patient has a history of a clinically significant antibody against RBC antigens.  A delay in compatible RBCs may occur.     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     Lung metastases (H) 2014     Portal vein thrombosis            PAST SURGICAL HISTORY:   Past Surgical History:   Procedure Laterality Date     CHOLECYSTECTOMY       HEPATECTOMY PARTIAL       RETURN LIVER TRANSPLANT N/A 2018    Procedure: RETURN LIVER TRANSPLANT;  Open Abdomen, return liver transplant washout with   Mesh and liver stent  placement and  Abdomal Wound closure;  Surgeon: Darren Rod MD;  Location: [facility]     TRANSPLANT LIVER RECIPIENT  DONOR N/A 2018    Procedure: TRANSPLANT LIVER RECIPIENT  DONOR;  TRANSPLANT LIVER RECIPIENT  DONOR ;  Surgeon: Darren Rod MD;  Location:  OR           FAMILY HISTORY: family history is not on file.      HEALTH & LIFESTYLE PRACTICES:   Tobacco:  reports that she has never smoked. She has never used smokeless tobacco.  Alcohol:  reports that she does not drink alcohol.  Illicit drugs:  reports that she does not use illicit drugs.      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  Social History     Social History      Marital status:      Spouse name: N/A     Number of children: N/A     Years of education: N/A     Occupational History     Not on file.     Social History Main Topics     Smoking status: Never Smoker     Smokeless tobacco: Never Used     Alcohol use No      Comment: occ      Drug use: No     Sexual activity: Not on file     Other Topics Concern     Not on file     Social History Narrative         LABORATORY VALUES:   Last Basic Metabolic Panel:  Lab Results   Component Value Date     09/05/2018      Lab Results   Component Value Date    POTASSIUM 3.3 09/05/2018     Lab Results   Component Value Date    CHLORIDE 110 09/05/2018     Lab Results   Component Value Date    SHELDON 7.7 09/05/2018     Lab Results   Component Value Date    CO2 28 09/05/2018     Lab Results   Component Value Date    BUN 16 09/05/2018     Lab Results   Component Value Date    CR 0.72 09/05/2018     Lab Results   Component Value Date     09/05/2018       CBC results:  Lab Results   Component Value Date    WBC 3.3 09/05/2018     Lab Results   Component Value Date    HGB 8.2 09/05/2018     Lab Results   Component Value Date    HCT 25.3 09/05/2018     Lab Results   Component Value Date    PLT 36 09/05/2018       DIAGNOSTIC TESTS:       Labs above reviewed as well as additional relevant diagnostic studies from the EPIC record.       PHYSICAL EXAMINATION:  VITAL SIGNS:  B/P: 127/76, T: 98.6, P: 86, R: 16    CONSTITUTIONAL/GENERAL APPEARANCE:  Awake but tired appearing.  EYES:  EOMIs.  ENT/NECK: neck is supple  RESPIRATORY: non labored breathing  CARDIOVASCULAR: S1 and S2 appreciated  GI: distended, bowel sounds present. Dressing:c,d,i  MUSCULOSKELETAL/BACK/SPINE/EXTREMITIES:  Moving UE and LE     NEURO:  SILT to UE and LE    SKIN/VASCULAR EXAM:  Dry and warm  PSYCHIATRIC/BEHAVIORAL/OBSERVATIONS:  Appears uncomfortable with movements    Judgment/Insight - fair   Orientation - x3   Memory -fair-good   Mood and affect -tired  appearing, calm, cooperated          TIME SPENT: 45 minutes including 23 minutes of face-to-face time counseling her  about her pain management treatment options, and coordinating care with the primary team.    Imelda Owusu PA-C  September 5, 2018, 11:03 AM  Inpatient Pain Management Service

## 2018-09-05 NOTE — PROGRESS NOTES
Immunosuppression Note:    Sherrie Lala is a 63 year old female who is seen today  for immunosuppression management     I, Darren Rod MD, I have examined the patient with the resident/PA/Fellow, discussed and agree with the note and findings.  I have reviewed today's vital signs, medications, labs and imaging. I reviewed the immunosuppression medications and levels. I spoke to the patient/family and explained below clinical details and answered all the questions      Transplant Surgery  Inpatient Daily Progress Note  09/05/2018    Assessment & Plan: Sherrie Lala is a 63-year old female with a history of metastatic cholangiocarcinoma with subsequent liver failure who is now s/p DDLT on 8/30/18. The patient had significant intraoperative blood loss (~3L) and her abdomen was left open. She underwent washout and abdominal closure on 9/1.    Graft function: Good, improved. LFTs have normalized. Liver US POD#1 with limited views, and a persistent eccentric thrombus in the intrahepatic main portal vein extending into the right portal vein. Continue heparin drip at 400u/hr for HAT ppx. Will also start Coumadin today, goal INR 1.5-2 x 3 months.   Immunosuppression management:  - Received induction with steroid taper and Basiliximab on POD#1 to delay initiation of Tacrolimus.  - MMF: Continue 1gm BID.  - Tacrolimus: On 2mg BID, level on 9/4 < 3.0. Goal range 10-15. Increased dose to 4mg BID on 9/4. 9/5 level 3.5, no change as dose was increased yesterday.   Complexity of management: Medium. Contributing factors: thrombocytopenia, anemia and organ dysfunction  Hematology: Significant bleeding intra-op, requiring 11u PRBCs and other blood products. Hgb now stable in the 7-8 range. Last blood transfusion on 9/1.  Thrombocytopenia: Patient with baseline platelet count ~30 prior to transplant. Platelet count remains low at 36 today, last platelet transfusion on 9/2. Goal PLT > 20, will not transfuse today and continue to  monitor.  Cardiorespiratory: Stable, SBP in the 110s-130s range. On room air.   GI/Nutrition: NJ feeding tube in place with feeds (goal 50/hour) due to abdominal distention. Had return of bowel function but remains distended. Will give additional enema today. Tolerating CLD, will advance to regular diet as tolerated. Very minimal appetite due to distention and abdominal pain. Encouraged supplements. Calorie counts pending.   Endocrine: BG in the 113-120 range, continue Novolog sliding scale insulin q4 hours prn.  Fluid/Electrolytes: No MIVF. Discontinue IV Lasix 20mg daily, start Bumex 1 mg daily. Electrolytes WNL.   : Bruner in place, good UOP. Remove Bruner today and check PVR.   Infectious disease: Afebrile, normal WBC count. Completed Vanco, Zosyn, and Fluconazole for 3 days for surgical ppx. Per report donor with caseating lymph nodes, consulted ID for work up. Also with Candida positive donor cultures, started Micafungin x 10 days. Awaiting more details on donor.   Pain: Patient with significant amount of abdominal pain since surgery. On Oxycodone, morphine PCA, and Flexeril. Will consult pain management. Will increase Oxycodone from 5 mg every 6 hours to 5-7.5 mg every 3 hours prn, discontinued Morphine pca, added Morphine 1-2 mg every 2 hours prn, added Tramadol 100 mg every 6 hours prn. Continue prn Flexeril and lidocaine patches. Unable to do block due to thrombocytopenia.   Prophylaxis: GI, DVT, heparin, Bactrim, Valcyte  Disposition: 7A    Medical Decision Making: Moderate  Subsequent visit 17247 (moderate level decision making)    DREA/Fellow: Alyce Figueroa PA-C [id number]  Faculty: Darren Rod M.D.  __________________________________________________________________  Transplant History: Admitted 8/29/2018 for orthotopic liver transplantation.   The patient has a history of liver failure due to cholangiocarcinoma.    8/30/2018 (Liver), Postoperative day: 6     Interval History:   Continues to have  significant amount of pain, worse after medication administration. Abdomen is distended, having BMs and passing gas. Going for several walks a day.    ROS:   A 10-point review of systems was negative except as noted above.    Meds:    aspirin  325 mg Oral Daily     cyclobenzaprine  10 mg Oral TID     sennosides  5-10 mL Oral BID    And     docusate   mg Oral BID     gabapentin  300 mg Oral At Bedtime     insulin aspart  1-6 Units Subcutaneous Q4H     lidocaine  2 patch Transdermal Q24H     lidocaine   Transdermal Q8H     lidocaine   Transdermal Q24h     micafungin  100 mg Intravenous Q24H     multivitamins with minerals  15 mL Per Feeding Tube Daily     mycophenolate  1,000 mg Oral BID IS    Or     mycophenolate  1,000 mg Oral or NG Tube BID IS     nystatin  500,000 Units Mouth/Throat 4x Daily     omeprazole  20 mg Oral QAM AC     pink lady enema  286 mL Rectal Once     pramipexole (MIRAPEX) tablet 0.5 mg  0.5 mg Oral Daily     sodium chloride (PF)  3 mL Intracatheter Q8H     sodium chloride (PF)  3 mL Intravenous Q8H     sulfamethoxazole-trimethoprim  80 mg Oral Daily     tacrolimus  4 mg Oral BID IS     valGANciclovir  450 mg Oral Daily    Or     valGANciclovir  450 mg Oral or NG Tube Daily       Physical Exam:     Admit Weight: 55.2 kg (121 lb 9.6 oz)  Today's weight: 128 lbs 8.45 oz    Vital sign ranges:    Temp:  [97.9  F (36.6  C)-98.6  F (37  C)] 98.5  F (36.9  C)  Heart Rate:  [76-87] 85  Resp:  [16-18] 16  BP: (109-127)/(66-76) 109/73  SpO2:  [97 %-99 %] 99 %    GENERAL: Awake and alert, in mild distress due to pain.  SKIN: No jaundice. No rashes or lesions.   HEENT: Eyes symmetrical and free of discharge bilaterally.  CV: Regular rate and rhythm.   RESPIRATORY: Nonlabored breathing on room air.   GI: Soft and non distended with normoactive bowel sounds. Surgical incision healing well with sutures intact.  : Bruner in place, draining clear yellow urine.  EXTREMITIES: No peripheral edema.  NEUROLOGIC:  Alert and oriented x 4. No focal deficits. Tremor absent.  MUSCULOSKELETAL: No joint swelling or tenderness.     Data:   CMP  Recent Labs  Lab 09/05/18  0541 09/04/18  0552  09/02/18  0308  09/01/18  0933 09/01/18  0431  08/31/18  0553  08/29/18  2058    144  < > 142  --  142 143  < >  --   < > 144   POTASSIUM 3.3* 3.6  < > 3.6  < > 3.3* 3.4  < >  --   < > 3.1*   CHLORIDE 110* 110*  < > 107  --   --  108  < >  --   < > 106   CO2 28 28  < > 25  --   --  28  < >  --   < > 26   * 109*  < > 129*  --  136* 118*  < >  --   < > 90   BUN 16 20  < > 27  --   --  23  < >  --   < > 19   CR 0.72 0.68  < > 1.09*  --   --  1.02  < >  --   < > 0.77   GFRESTIMATED 83 88  < > 51*  --   --  55*  < >  --   < > 76   GFRESTBLACK >90 >90  < > 62  --   --  67  < >  --   < > >90   SHELDON 7.7* 8.0*  < > 7.4*  --   --  7.2*  < >  --   < > 8.9   ICAW  --   --   --  4.3*  --  4.7 4.3*  --   --   < >  --    MAG 2.6* 2.6*  < > 2.3  --   --  2.3  < >  --   < > 2.2   PHOS 3.0 2.6  < > 3.9  --   --  3.8  < >  --   < > 3.6   AMYLASE  --   --   --   --   --   --   --   --  39  --  82   LIPASE  --   --   --   --   --   --  161  --   --   --   --    ALBUMIN 2.6* 2.5*  < > 2.3*  --   --  2.3*  --   --   < > 5.0   BILITOTAL 0.8 0.9  < > 1.2  --   --  1.5*  --   --   < > 3.0*   ALKPHOS 67 60  < > 36*  --   --  41  --   --   < > 115   AST 22 37  < > 57*  --   --  107*  --   --   < > 23   ALT 62* 78*  < > 124*  --   --  170*  --   --   < > 24   < > = values in this interval not displayed.  CBC    Recent Labs  Lab 09/05/18  0541 09/04/18  0552  09/02/18  1701   HGB 8.2* 7.9*  < > 8.0*   WBC 3.3* 2.9*  < > 6.0   PLT 36* 28*  < > 46*   A1C  --   --   --  4.9   < > = values in this interval not displayed.  COAGS    Recent Labs  Lab 09/05/18  1349 09/04/18  0552 09/02/18  0308 09/01/18  1557   INR 1.09  --   --  1.33*   PTT  --  28 34  --       Urinalysis  Recent Labs   Lab Test  02/26/18   0947   COLOR  Yellow   APPEARANCE  Clear   URINEGLC  Negative    URINEBILI  Negative   URINEKETONE  Negative   SG  1.017   UBLD  Negative   URINEPH  5.0   PROTEIN  Negative   NITRITE  Negative   LEUKEST  Negative   RBCU  1   WBCU  1   UTPG  0.06     Virology:  Hepatitis C Antibody   Date Value Ref Range Status   08/29/2018 Nonreactive NR^Nonreactive Final     Comment:     Assay performance characteristics have not been established for newborns,   infants, and children

## 2018-09-05 NOTE — PROGRESS NOTES
Therapy: Enteral and IV abx  Insurance: [employer]  Ded: $0   Co-Insurance: 80%  Max Out of Pocket: $3000   Met: $2042    In reference to admission 8/29/18 for check Enteral and IV abx coverage    Please contact Intake with any questions, [phone] or In Basket pool,  Home Infusion ([id number]).

## 2018-09-05 NOTE — PROGRESS NOTES
CLINICAL NUTRITION SERVICES - REASSESSMENT NOTE     Nutrition Prescription    RECOMMENDATIONS FOR MDs/PROVIDERS TO ORDER:  Recommend patient to consume at least 1100 kcal, 55 grams protein (~60% of assessed needs)    Malnutrition Status:    Non-severe malnutrition in the context of acute on chronic illness    Recommendations already ordered by Registered Dietitian (RD):  Boost Plus - between meals  Boost Breeze - between meals   Calorie counts (9/6-9/8)    Future/Additional Recommendations:  Pending calorie counts, consider cycling TF        EVALUATION OF THE PROGRESS TOWARD GOALS   Diet: Regular (advanced 9/5)    Nutrition Support: Nutren 1.5 @ 45 ml/hr via NJT (goal rate = 50 ml/hr, will meet goal at 10am if continues to tolerate)    Intake:   Patient reports no appetite and abdominal fullness/distention.  Ordering small meals, such as mashed potatoes or apple juice with supplements.      TF started the day after surgery, however advancing very slowly (5 ml q 6 hours) d/t abdominal distention.  Received a 6 day TF average of 215 ml daily, receiving 323 kcal, 15 grams protein daily (~20% of assessed needs).  No Prosource packets were ordered.      NEW FINDINGS   Weight up ~6-7# from admission/dry weight of 55.2 kg (8/30).     MALNUTRITION  % Intake: </= 50% for >/= 5 days (severe)  % Weight Loss: Weight loss does not meet criteria  Subcutaneous Fat Loss: Facial region:  Mild  Muscle Loss: Facial & jaw region:  Mild and Thoracic region (clavicle, acromium bone, deltoid, trapezius, pectoral): Mild  Fluid Accumulation/Edema: Does not meet criteria  Malnutrition Diagnosis: Non-severe malnutrition in the context of acute on chronic illness    Previous Goals   Diet adv v nutrition support within 5 days  Evaluation: Met    Previous Nutrition Diagnosis  Inadequate oral intake  Evaluation: Declining    CURRENT NUTRITION DIAGNOSIS  Inadequate protein-energy intake related to slow TF advancement and poor appetite/abdominal  distention as evidenced by patient receiving 20% of assessed needs from TF on average over the last 6 days and minimal PO intake.     INTERVENTIONS  Implementation  Medical food supplement therapy/Nutrition education for recommended modifications - reviewed recommendations for oral intake/supplements per discussion during last visit.  Patient recalled writer and our conversation.  Pt agreeable to trying Boost Breeze and Boost Plus - will send between meals as it is unlikely patient with be ordering regular meals.      Goals  Total avg nutritional intake to meet a minimum of 30 kcal/kg and 1.5 g PRO/kg daily (per dosing wt 55 kg).    Monitoring/Evaluation  Progress toward goals will be monitored and evaluated per protocol.    Seda Hernandez MS, RD, LD  Pager [phone]

## 2018-09-05 NOTE — PROGRESS NOTES
Care Coordinator  D: Sherrie Lala is a 63-year old female with a history of metastatic cholangiocarcinoma with subsequent liver failure who is now s/p DDLT on 8/30/18. The patient had significant intraoperative blood loss (~3L) and her abdomen was left open. She underwent washout and abdominal closure on 9/1--per Aurelia Dunn,CHARLY note 9/4.  I: Per VANESSA Castaneda< pt will need NJT enteral feeds at discharge--planning for FRiday 9/7.  I called [facility] [phone] and per [facility] pt has Medicare with [employer] secondary with no ded, 80% coverage up to max OOP $3000 (met $2042 so far),  Once OOP is all met pt will have 100% coverage for enteral and (IVAB--if she needs them).  P: I have scheduled PLC  Got Friday 9/7 @ 10:45am--will wait for [facility] to find a HHN agency--will need HHN visit day after discharge, pt will return to [facility] 9/10 @ 0900.

## 2018-09-05 NOTE — PHARMACY-ANTICOAGULATION SERVICE
Clinical Pharmacy - Warfarin Dosing Consult     Pharmacy has been consulted to manage this patient s warfarin therapy.  Indication: Other - specify in comments (Portal Vein Thrombus)  Therapy Goal: Other - see comments (INR goal: 1.5 - 2.0)  Provider/Team: Transplant  OP Anticoag Clinic: New start  Warfarin Prior to Admission: No  Warfarin PTA Regimen: New start  Significant drug interactions: heparin gtt, bactrim  Recent documented change in oral intake/nutrition: No    INR   Date Value Ref Range Status   09/05/2018 1.09 0.86 - 1.14 Final   09/01/2018 1.33 (H) 0.86 - 1.14 Final       Recommend warfarin 3 mg today.  Pharmacy will monitor Sherrie Lala daily and order warfarin doses to achieve specified goal.      Please contact pharmacy as soon as possible if the warfarin needs to be held for a procedure or if the warfarin goals change.      Julian Honeycutt, PharmD -- pager [phone]

## 2018-09-05 NOTE — PROGRESS NOTES
"Call from Hans at donor OPO regarding medical examiner finding from donor autopsy.  Donor found to have \"caseating hilar nodules\", sample sent to histology - final results expected by Friday.  Donor ID [id number].  Dr. Rod notified of results.    "

## 2018-09-05 NOTE — PROGRESS NOTES
"Organ specific education started.  Stopped to see Sherrie.  At this time she was very uncomfortable.  She is fairly new post op had lots of adhesions due to previous surgery so is having lots of pain.  The pain team was there to see her when I arrived.  I introduced myself to Sherrie, explained my role and left her copies of \"Your lab results\" and \"Things to Remember\".  I told her that I would stop back to talk w/ her when she is feeling better.  She has my phone number, suggested that her family can give me a call if /  When they have questions.  "

## 2018-09-06 ENCOUNTER — APPOINTMENT (OUTPATIENT)
Dept: GENERAL RADIOLOGY | Facility: CLINIC | Age: 63
DRG: 005 | End: 2018-09-06
Attending: NURSE PRACTITIONER
Payer: MEDICARE

## 2018-09-06 ENCOUNTER — APPOINTMENT (OUTPATIENT)
Dept: PHYSICAL THERAPY | Facility: CLINIC | Age: 63
DRG: 005 | End: 2018-09-06
Attending: TRANSPLANT SURGERY
Payer: MEDICARE

## 2018-09-06 ENCOUNTER — HOME INFUSION (PRE-WILLOW HOME INFUSION) (OUTPATIENT)
Dept: PHARMACY | Facility: CLINIC | Age: 63
End: 2018-09-06

## 2018-09-06 ENCOUNTER — APPOINTMENT (OUTPATIENT)
Dept: OCCUPATIONAL THERAPY | Facility: CLINIC | Age: 63
DRG: 005 | End: 2018-09-06
Attending: TRANSPLANT SURGERY
Payer: MEDICARE

## 2018-09-06 LAB
ALBUMIN SERPL-MCNC: 2.5 G/DL (ref 3.4–5)
ALP SERPL-CCNC: 67 U/L (ref 40–150)
ALT SERPL W P-5'-P-CCNC: 53 U/L (ref 0–50)
ANION GAP SERPL CALCULATED.3IONS-SCNC: 8 MMOL/L (ref 3–14)
AST SERPL W P-5'-P-CCNC: 20 U/L (ref 0–45)
BACTERIA SPEC CULT: NO GROWTH
BASOPHILS # BLD AUTO: 0 10E9/L (ref 0–0.2)
BASOPHILS NFR BLD AUTO: 0.3 %
BILIRUB DIRECT SERPL-MCNC: 0.3 MG/DL (ref 0–0.2)
BILIRUB SERPL-MCNC: 0.8 MG/DL (ref 0.2–1.3)
BUN SERPL-MCNC: 16 MG/DL (ref 7–30)
CALCIUM SERPL-MCNC: 7.9 MG/DL (ref 8.5–10.1)
CHLORIDE SERPL-SCNC: 110 MMOL/L (ref 94–109)
CO2 SERPL-SCNC: 26 MMOL/L (ref 20–32)
CREAT SERPL-MCNC: 0.67 MG/DL (ref 0.52–1.04)
DIFFERENTIAL METHOD BLD: ABNORMAL
EOSINOPHIL # BLD AUTO: 0.3 10E9/L (ref 0–0.7)
EOSINOPHIL NFR BLD AUTO: 9.5 %
ERYTHROCYTE [DISTWIDTH] IN BLOOD BY AUTOMATED COUNT: 18.1 % (ref 10–15)
GFR SERPL CREATININE-BSD FRML MDRD: 89 ML/MIN/1.7M2
GLUCOSE BLDC GLUCOMTR-MCNC: 103 MG/DL (ref 70–99)
GLUCOSE BLDC GLUCOMTR-MCNC: 111 MG/DL (ref 70–99)
GLUCOSE BLDC GLUCOMTR-MCNC: 114 MG/DL (ref 70–99)
GLUCOSE BLDC GLUCOMTR-MCNC: 120 MG/DL (ref 70–99)
GLUCOSE BLDC GLUCOMTR-MCNC: 127 MG/DL (ref 70–99)
GLUCOSE BLDC GLUCOMTR-MCNC: 137 MG/DL (ref 70–99)
GLUCOSE BLDC GLUCOMTR-MCNC: 142 MG/DL (ref 70–99)
GLUCOSE SERPL-MCNC: 127 MG/DL (ref 70–99)
HCT VFR BLD AUTO: 26 % (ref 35–47)
HGB BLD-MCNC: 8.3 G/DL (ref 11.7–15.7)
IMM GRANULOCYTES # BLD: 0 10E9/L (ref 0–0.4)
IMM GRANULOCYTES NFR BLD: 1.2 %
INR PPP: 1.1 (ref 0.86–1.14)
LYMPHOCYTES # BLD AUTO: 0.5 10E9/L (ref 0.8–5.3)
LYMPHOCYTES NFR BLD AUTO: 15.8 %
MAGNESIUM SERPL-MCNC: 2.4 MG/DL (ref 1.6–2.3)
MCH RBC QN AUTO: 28.3 PG (ref 26.5–33)
MCHC RBC AUTO-ENTMCNC: 31.9 G/DL (ref 31.5–36.5)
MCV RBC AUTO: 89 FL (ref 78–100)
MONOCYTES # BLD AUTO: 0.2 10E9/L (ref 0–1.3)
MONOCYTES NFR BLD AUTO: 6.8 %
NEUTROPHILS # BLD AUTO: 2.2 10E9/L (ref 1.6–8.3)
NEUTROPHILS NFR BLD AUTO: 66.4 %
NRBC # BLD AUTO: 0 10*3/UL
NRBC BLD AUTO-RTO: 0 /100
PHOSPHATE SERPL-MCNC: 3 MG/DL (ref 2.5–4.5)
PLATELET # BLD AUTO: 47 10E9/L (ref 150–450)
POTASSIUM SERPL-SCNC: 3.6 MMOL/L (ref 3.4–5.3)
PROT SERPL-MCNC: 5.4 G/DL (ref 6.8–8.8)
RBC # BLD AUTO: 2.93 10E12/L (ref 3.8–5.2)
SODIUM SERPL-SCNC: 144 MMOL/L (ref 133–144)
SPECIMEN SOURCE: NORMAL
TACROLIMUS BLD-MCNC: 4.6 UG/L (ref 5–15)
TME LAST DOSE: ABNORMAL H
WBC # BLD AUTO: 3.4 10E9/L (ref 4–11)

## 2018-09-06 PROCEDURE — 25000128 H RX IP 250 OP 636: Performed by: NURSE PRACTITIONER

## 2018-09-06 PROCEDURE — 25000131 ZZH RX MED GY IP 250 OP 636 PS 637: Mod: GY | Performed by: NURSE PRACTITIONER

## 2018-09-06 PROCEDURE — 85610 PROTHROMBIN TIME: CPT | Performed by: NURSE PRACTITIONER

## 2018-09-06 PROCEDURE — 27210429 ZZH NUTRITION PRODUCT INTERMEDIATE LITER

## 2018-09-06 PROCEDURE — 97530 THERAPEUTIC ACTIVITIES: CPT | Mod: GP

## 2018-09-06 PROCEDURE — 25000132 ZZH RX MED GY IP 250 OP 250 PS 637: Mod: GY | Performed by: STUDENT IN AN ORGANIZED HEALTH CARE EDUCATION/TRAINING PROGRAM

## 2018-09-06 PROCEDURE — 25000132 ZZH RX MED GY IP 250 OP 250 PS 637: Mod: GY | Performed by: NURSE PRACTITIONER

## 2018-09-06 PROCEDURE — A9270 NON-COVERED ITEM OR SERVICE: HCPCS | Mod: GY | Performed by: STUDENT IN AN ORGANIZED HEALTH CARE EDUCATION/TRAINING PROGRAM

## 2018-09-06 PROCEDURE — A9270 NON-COVERED ITEM OR SERVICE: HCPCS | Mod: GY | Performed by: NURSE PRACTITIONER

## 2018-09-06 PROCEDURE — 36592 COLLECT BLOOD FROM PICC: CPT | Performed by: NURSE PRACTITIONER

## 2018-09-06 PROCEDURE — 84100 ASSAY OF PHOSPHORUS: CPT | Performed by: NURSE PRACTITIONER

## 2018-09-06 PROCEDURE — 99207 ZZC NO CHARGE FIRST FOLLOW UP PS: CPT

## 2018-09-06 PROCEDURE — A9270 NON-COVERED ITEM OR SERVICE: HCPCS | Mod: GY | Performed by: TRANSPLANT SURGERY

## 2018-09-06 PROCEDURE — 83735 ASSAY OF MAGNESIUM: CPT | Performed by: NURSE PRACTITIONER

## 2018-09-06 PROCEDURE — 97530 THERAPEUTIC ACTIVITIES: CPT | Mod: GO | Performed by: OCCUPATIONAL THERAPIST

## 2018-09-06 PROCEDURE — 00000146 ZZHCL STATISTIC GLUCOSE BY METER IP

## 2018-09-06 PROCEDURE — 25000132 ZZH RX MED GY IP 250 OP 250 PS 637: Mod: GY | Performed by: TRANSPLANT SURGERY

## 2018-09-06 PROCEDURE — 80076 HEPATIC FUNCTION PANEL: CPT | Performed by: NURSE PRACTITIONER

## 2018-09-06 PROCEDURE — 25000128 H RX IP 250 OP 636: Performed by: PHYSICIAN ASSISTANT

## 2018-09-06 PROCEDURE — 97535 SELF CARE MNGMENT TRAINING: CPT | Mod: GO | Performed by: OCCUPATIONAL THERAPIST

## 2018-09-06 PROCEDURE — 80197 ASSAY OF TACROLIMUS: CPT | Performed by: NURSE PRACTITIONER

## 2018-09-06 PROCEDURE — 74019 RADEX ABDOMEN 2 VIEWS: CPT

## 2018-09-06 PROCEDURE — 40000133 ZZH STATISTIC OT WARD VISIT: Performed by: OCCUPATIONAL THERAPIST

## 2018-09-06 PROCEDURE — 12000026 ZZH R&B TRANSPLANT

## 2018-09-06 PROCEDURE — 97116 GAIT TRAINING THERAPY: CPT | Mod: GP

## 2018-09-06 PROCEDURE — 25000131 ZZH RX MED GY IP 250 OP 636 PS 637: Mod: GY | Performed by: STUDENT IN AN ORGANIZED HEALTH CARE EDUCATION/TRAINING PROGRAM

## 2018-09-06 PROCEDURE — 40000193 ZZH STATISTIC PT WARD VISIT

## 2018-09-06 PROCEDURE — A9270 NON-COVERED ITEM OR SERVICE: HCPCS | Mod: GY | Performed by: PHYSICIAN ASSISTANT

## 2018-09-06 PROCEDURE — 25000132 ZZH RX MED GY IP 250 OP 250 PS 637: Mod: GY | Performed by: PHYSICIAN ASSISTANT

## 2018-09-06 PROCEDURE — 85025 COMPLETE CBC W/AUTO DIFF WBC: CPT | Performed by: NURSE PRACTITIONER

## 2018-09-06 PROCEDURE — 80048 BASIC METABOLIC PNL TOTAL CA: CPT | Performed by: NURSE PRACTITIONER

## 2018-09-06 RX ORDER — BISACODYL 10 MG
10 SUPPOSITORY, RECTAL RECTAL ONCE
Status: DISCONTINUED | OUTPATIENT
Start: 2018-09-06 | End: 2018-09-07

## 2018-09-06 RX ORDER — CYCLOBENZAPRINE HCL 5 MG
10 TABLET ORAL 3 TIMES DAILY PRN
Status: DISCONTINUED | OUTPATIENT
Start: 2018-09-06 | End: 2018-09-12 | Stop reason: HOSPADM

## 2018-09-06 RX ORDER — WARFARIN SODIUM 3 MG/1
3 TABLET ORAL
Status: COMPLETED | OUTPATIENT
Start: 2018-09-06 | End: 2018-09-06

## 2018-09-06 RX ADMIN — MICAFUNGIN SODIUM 100 MG: 10 INJECTION, POWDER, LYOPHILIZED, FOR SOLUTION INTRAVENOUS at 12:26

## 2018-09-06 RX ADMIN — SENNOSIDES A AND B 10 ML: 415.36 LIQUID ORAL at 08:50

## 2018-09-06 RX ADMIN — ACETAMINOPHEN 650 MG: 325 TABLET, FILM COATED ORAL at 19:38

## 2018-09-06 RX ADMIN — SULFAMETHOXAZOLE AND TRIMETHOPRIM 80 MG: 200; 40 SUSPENSION ORAL at 08:52

## 2018-09-06 RX ADMIN — Medication 5 MG: at 05:12

## 2018-09-06 RX ADMIN — Medication 5 MG: at 19:38

## 2018-09-06 RX ADMIN — Medication 5 MG: at 12:22

## 2018-09-06 RX ADMIN — WARFARIN SODIUM 3 MG: 3 TABLET ORAL at 18:02

## 2018-09-06 RX ADMIN — Medication 5 MG: at 15:46

## 2018-09-06 RX ADMIN — GABAPENTIN 300 MG: 300 CAPSULE ORAL at 21:31

## 2018-09-06 RX ADMIN — NYSTATIN 500000 UNITS: 100000 SUSPENSION ORAL at 08:52

## 2018-09-06 RX ADMIN — Medication 325 MG: at 08:52

## 2018-09-06 RX ADMIN — NYSTATIN 500000 UNITS: 100000 SUSPENSION ORAL at 15:46

## 2018-09-06 RX ADMIN — DOCUSATE SODIUM 100 MG: 50 LIQUID ORAL at 08:52

## 2018-09-06 RX ADMIN — TRAMADOL HYDROCHLORIDE 50 MG: 50 TABLET, COATED ORAL at 08:30

## 2018-09-06 RX ADMIN — NYSTATIN 500000 UNITS: 100000 SUSPENSION ORAL at 12:26

## 2018-09-06 RX ADMIN — CYCLOBENZAPRINE HYDROCHLORIDE 10 MG: 5 TABLET, FILM COATED ORAL at 21:40

## 2018-09-06 RX ADMIN — NYSTATIN 500000 UNITS: 100000 SUSPENSION ORAL at 19:39

## 2018-09-06 RX ADMIN — Medication 5 MG: at 08:29

## 2018-09-06 RX ADMIN — CYCLOBENZAPRINE HYDROCHLORIDE 10 MG: 5 TABLET, FILM COATED ORAL at 08:52

## 2018-09-06 RX ADMIN — Medication 4 MG: at 08:52

## 2018-09-06 RX ADMIN — ACETAMINOPHEN 650 MG: 325 TABLET, FILM COATED ORAL at 14:47

## 2018-09-06 RX ADMIN — Medication 20 MG: at 08:52

## 2018-09-06 RX ADMIN — Medication 5 MG: at 18:02

## 2018-09-06 RX ADMIN — MYCOPHENOLATE MOFETIL 1000 MG: 250 CAPSULE ORAL at 08:50

## 2018-09-06 RX ADMIN — PRAMIPEXOLE DIHYDROCHLORIDE 0.5 MG: 0.5 TABLET ORAL at 19:39

## 2018-09-06 RX ADMIN — LIDOCAINE 2 PATCH: 560 PATCH PERCUTANEOUS; TOPICAL; TRANSDERMAL at 08:34

## 2018-09-06 RX ADMIN — MULTIVITAMIN 15 ML: LIQUID ORAL at 08:50

## 2018-09-06 RX ADMIN — HEPARIN SODIUM 400 UNITS/HR: 10000 INJECTION, SOLUTION INTRAVENOUS at 16:00

## 2018-09-06 RX ADMIN — VALGANCICLOVIR HYDROCHLORIDE 450 MG: 450 TABLET ORAL at 08:52

## 2018-09-06 RX ADMIN — CYCLOBENZAPRINE HYDROCHLORIDE 10 MG: 5 TABLET, FILM COATED ORAL at 13:39

## 2018-09-06 RX ADMIN — MYCOPHENOLATE MOFETIL 1000 MG: 200 POWDER, FOR SUSPENSION ORAL at 18:02

## 2018-09-06 RX ADMIN — Medication 5 MG: at 01:42

## 2018-09-06 ASSESSMENT — PAIN DESCRIPTION - DESCRIPTORS
DESCRIPTORS: BURNING
DESCRIPTORS: PRESSURE;BURNING

## 2018-09-06 ASSESSMENT — ACTIVITIES OF DAILY LIVING (ADL)
ADLS_ACUITY_SCORE: 9

## 2018-09-06 NOTE — PROGRESS NOTES
Transplant Social Work Services Progress Note      Date of Initial Social Work Evaluation: 8/30/18  Collaborated with: Patient, Liver Tx RANDEE Shah    Data: Met with pt at the bedside; introduced self and role of covering inpt SW. Inquired as to how pt was coping with hospitalization and she stated that she was feeling overwhelmed at the thought of discharge possibly this wknd. Inquired more specifically about her feelings of being overwhelmed and pt reported it was mainly surrounding the feeding tube. She also discussed being in pain and how big of a surgery she just underwent. Provided supportive listening. Discussed plans for teaching tmrw and that her  will be present for this. Pt is hopeful that she will feel less overwhelmed as she starts to feel better and attends teaching. Also discussed Health Psychology resource should pt have ongoing anxiety or continues to feel overwhelmed. Pt declined need for this resource at this time, but pt aware resource exists should she need it.   Intervention: Supportive check-in   Assessment: Pt was pleasant and receptive to SW. She appeared to be in pain and was overwhelmed with the thought of discharging, but was optimistic that she will begin to feel better and that she will feel less overwhelmed.   Education provided by SW: Educated on Health Psychology resource.   Plan:    Discharge Plans in Progress: Yes, pt likely to dc home this wknd with assist from spouse. RN CC working on home dc needs.     Barriers to d/c plan: Medical stability     Follow up Plan: SW will continue to follow, support and assist with ongoing social service and discharge planning needs.     Alba Garcia, MSW, [facility]  - Coverage for 7A Deepa Hawthorne  Ph. [phone]  [email or web address]     NO LETTER

## 2018-09-06 NOTE — PROGRESS NOTES
Immunosuppression Note:    Sherrie Lala is a 63 year old female who is seen today  for immunosuppression management     I, Darren Rod MD, I have examined the patient with the resident/PA/Fellow, discussed and agree with the note and findings.  I have reviewed today's vital signs, medications, labs and imaging. I reviewed the immunosuppression medications and levels. I spoke to the patient/family and explained below clinical details and answered all the questions      Transplant Surgery  Inpatient Daily Progress Note  09/06/2018    Assessment & Plan: Sherrie Lala is a 63-year old female with a history of metastatic cholangiocarcinoma with subsequent liver failure who is now s/p DDLT on 8/30/18. The patient had significant intraoperative blood loss (~3L) and her abdomen was left open. She underwent washout and abdominal closure on 9/1.    Graft function: POD #7. LFTs have normalized. Liver US POD#1 with limited views, and a persistent eccentric thrombus in the intrahepatic main portal vein extending into the right portal vein. Continue heparin drip at 400u/hr for HAT ppx. Warfarin started 9/5, goal INR 1.5-2 x 3 months.   Immunosuppression management:  - Received induction with steroid taper and Basiliximab on POD#1 to delay initiation of Tacrolimus.  - MMF: Continue 1gm BID.  - Tacrolimus: Goal range 10-15.  Complexity of management: Medium. Contributing factors: thrombocytopenia, anemia and organ dysfunction  Hematology: Significant bleeding intra-op, requiring 11u PRBCs and other blood products. Hgb now stable in the 7-8 range. Last blood transfusion on 9/1.  Thrombocytopenia: Patient with baseline platelet count ~30 prior to transplant. Platelet count remains low at 47 today, last platelet transfusion on 9/2.   Cardiorespiratory: Stable, SBP in the 110s-130s range. On room air.   GI/Nutrition: NJ feeding tube in place with feeds (goal 50/hour) due to abdominal distention. Had return of bowel function but  remains distended. Repeat suppository today. Reg diet with isadora counts. Encouraged supplements.   Endocrine: BG in the 103-166 range, continue Novolog sliding scale insulin q4 hours prn.  Fluid/Electrolytes: No MIVF. Weight remains ~3kg over admission. Electrolytes WNL.   : No acute issues  Infectious disease: Afebrile, normal WBC count. Completed Vanco, Zosyn, and Fluconazole for 3 days for surgical ppx. Per report donor with caseating lymph nodes, consulted ID for work up. Also with Candida positive donor urine and sputum cultures, started Micafungin x 10 days. Awaiting more details on donor.  Fungal studies ordered.  See ID note.  Pain: Patient with significant amount of abdominal pain since surgery. Required Oxycodone, morphine PCA, and Flexeril. Pain management consulted. Pain improving.  Now on Oxycodone f5-7.5 mg every 3 hours prn,  Morphine 1-2 mg every 2 hours prn (DC today), Tramadol 50 mg every 6 hours prn (DC today), Flexeril (change to PRN), and lidocaine patches.   Prophylaxis: GI, DVT, heparin, Bactrim, Valcyte  Disposition: 7A    Medical Decision Making: Moderate  Subsequent visit 95902 (moderate level decision making)    DREA/Fellow:  Suzanna Cui NP  [id number]  Faculty: Darren Rod M.D.  __________________________________________________________________  Transplant History: Admitted 8/29/2018 for orthotopic liver transplantation.   The patient has a history of liver failure due to cholangiocarcinoma.    8/30/2018 (Liver), Postoperative day: 7     Interval History:   BM x3, + flatus.  Pain improved with bowel function.  Eating minimal amounts.    ROS:   A 10-point review of systems was negative except as noted above.    Meds:    aspirin  325 mg Oral Daily     bisacodyl  10 mg Rectal Once     sennosides  5-10 mL Oral BID    And     docusate   mg Oral BID     gabapentin  300 mg Oral At Bedtime     insulin aspart  1-6 Units Subcutaneous Q4H     lidocaine  2 patch Transdermal Q24H      lidocaine   Transdermal Q8H     lidocaine   Transdermal Q24h     micafungin  100 mg Intravenous Q24H     multivitamins with minerals  15 mL Per Feeding Tube Daily     mycophenolate  1,000 mg Oral BID IS    Or     mycophenolate  1,000 mg Oral or NG Tube BID IS     nystatin  500,000 Units Mouth/Throat 4x Daily     omeprazole  20 mg Oral QAM AC     pink lady enema  286 mL Rectal Once     pramipexole (MIRAPEX) tablet 0.5 mg  0.5 mg Oral Daily     sodium chloride (PF)  3 mL Intracatheter Q8H     sodium chloride (PF)  3 mL Intravenous Q8H     sulfamethoxazole-trimethoprim  80 mg Oral Daily     tacrolimus  4 mg Oral BID IS     valGANciclovir  450 mg Oral Daily    Or     valGANciclovir  450 mg Oral or NG Tube Daily     warfarin  3 mg Oral ONCE at 18:00       Physical Exam:     Admit Weight: 55.2 kg (121 lb 9.6 oz)  Today's weight: 127 lbs 14.4 oz    Vital sign ranges:    Temp:  [97.7  F (36.5  C)-98.8  F (37.1  C)] 98.8  F (37.1  C)  Heart Rate:  [81-93] 86  Resp:  [16-18] 18  BP: (111-129)/(72-84) 116/73  SpO2:  [96 %-99 %] 97 %    GENERAL: NAD  SKIN: No jaundice. No rashes or lesions.   HEENT: Eyes symmetrical and free of discharge bilaterally.  CV: Regular rate and rhythm.   RESPIRATORY: Nonlabored breathing on room air.   GI: Distended. Surgical incision healing well with sutures intact.  : No sanchez  EXTREMITIES: Tr BLE edema  NEUROLOGIC: Alert and oriented x 4. No focal deficits. Tremor absent.  MUSCULOSKELETAL: No joint swelling or tenderness.     Data:   CMP  Recent Labs  Lab 09/06/18  0559 09/05/18  0541  09/02/18  0308  09/01/18  0933 09/01/18  0431  08/31/18  0553    143  < > 142  --  142 143  < >  --    POTASSIUM 3.6 3.3*  < > 3.6  < > 3.3* 3.4  < >  --    CHLORIDE 110* 110*  < > 107  --   --  108  < >  --    CO2 26 28  < > 25  --   --  28  < >  --    * 116*  < > 129*  --  136* 118*  < >  --    BUN 16 16  < > 27  --   --  23  < >  --    CR 0.67 0.72  < > 1.09*  --   --  1.02  < >  --     GFRESTIMATED 89 83  < > 51*  --   --  55*  < >  --    GFRESTBLACK >90 >90  < > 62  --   --  67  < >  --    SHELDON 7.9* 7.7*  < > 7.4*  --   --  7.2*  < >  --    ICAW  --   --   --  4.3*  --  4.7 4.3*  --   --    MAG 2.4* 2.6*  < > 2.3  --   --  2.3  < >  --    PHOS 3.0 3.0  < > 3.9  --   --  3.8  < >  --    AMYLASE  --   --   --   --   --   --   --   --  39   LIPASE  --   --   --   --   --   --  161  --   --    ALBUMIN 2.5* 2.6*  < > 2.3*  --   --  2.3*  --   --    BILITOTAL 0.8 0.8  < > 1.2  --   --  1.5*  --   --    ALKPHOS 67 67  < > 36*  --   --  41  --   --    AST 20 22  < > 57*  --   --  107*  --   --    ALT 53* 62*  < > 124*  --   --  170*  --   --    < > = values in this interval not displayed.  CBC    Recent Labs  Lab 09/06/18  0559 09/05/18  0541  09/02/18  1701   HGB 8.3* 8.2*  < > 8.0*   WBC 3.4* 3.3*  < > 6.0   PLT 47* 36*  < > 46*   A1C  --   --   --  4.9   < > = values in this interval not displayed.  COAGS    Recent Labs  Lab 09/06/18  0559 09/05/18  1349 09/04/18  0552 09/02/18  0308   INR 1.10 1.09  --   --    PTT  --   --  28 34      Urinalysis  Recent Labs   Lab Test  02/26/18   0947   COLOR  Yellow   APPEARANCE  Clear   URINEGLC  Negative   URINEBILI  Negative   URINEKETONE  Negative   SG  1.017   UBLD  Negative   URINEPH  5.0   PROTEIN  Negative   NITRITE  Negative   LEUKEST  Negative   RBCU  1   WBCU  1   UTPG  0.06     Virology:  Hepatitis C Antibody   Date Value Ref Range Status   08/29/2018 Nonreactive NR^Nonreactive Final     Comment:     Assay performance characteristics have not been established for newborns,   infants, and children

## 2018-09-06 NOTE — PROGRESS NOTES
Patient: Sherrie Lala  Date of Service: September 6, 2018 Admission Date:8/29/2018   5 Days Post-Op     Chief Pain Endorsement: abdominal pain, back pain    Recommendations were discussed and relayed to Suzanna Cui CNP  Plan was reviewed by the Inpatient Pain Service and staff attending, Dr. Peacock      1. Discontinue tramadol   2. Discontinue morphine IV   3. Decrease oxyCODONE 5mg by mouth every 3 hour as needed moderate-severe pain    At discharge, decrease to oxyCODONE 5mg by mouth every 4 hour as needed and taper patient to off over the typical timeframe for this type of procedure.  4. Decrease flexeril to 5-10mg by mouth every TID as needed muscle spasms     Discontinue at discharge  5. Continue acetaminophen 650mg by mouth every 4 hour as needed mild pain   6. Continue gabapentin 300mg by mouth at bedtime     At discharge, continue regimen as this is her home dose.   7. Continue Lidocaine 4% patches, 1-2 patch(es) transdermal every 24 hours (12 hours on and 12 hours off)  8. Bowel regimen per primary team to prevent opioid induced constipation.    Pain Service will Sign Off at this time.     Thank you for consulting the Inpatient Pain Management Service. The above recommendations are to be acted upon at the primary team s discretion.     To reach us:  Mon - Friday 8 AM - 3 PM: Pager [phone] (Text Page)  After hours, weekends and holidays: Primary service should call [phone] for the on-call pain specialist    PAIN MEDICATION SAFE USE:   Prior to discharge instruct patient on the following in addition to the medication fact sheet:    Caution: these medications can cause sedation    Take prescription medicine only if it has been prescribed by your doctor    Do not take more medicine or take it more often than instructed     Call your doctor if pain gets worse    Never mix pain medicine with alcohol, sleeping pills, or any illicit drugs    Do not operate heavy machinery, including vehicles, when  initiation opioid therapy or increasing dosage    Store prescription opioids in a locked container, whenever possible     Dispose of unused opioids appropriately     Do not stop abruptly once at higher doses.  These medications must be tapered off.    Opioid pain medications do carry the risk for physical dependence and addiction and patients should be counseled about this.         1. Acute post operative abdominal pain. POD #7 of liver transplant (8/30/18) c/by intraoperative blood loss, abdomen left open and later underwent washout and closure on 9/1/18.  2. 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  3. Thrombocytopenia. Platelets on 9/5/18: 36, 47 on 9/6/18  4. Post chemotherapy neuropathy-takes gabapentin 300mg at bedtime PTA.     -- Outpatient opioid requirements prior to admission: OME = none.     [place]  reviewed.  8/8/18 zolpidem 10mg #90  6/1/18 oxycodone-acetaminophen 5mg #20  5/17/18 oxycodone-acetaminophen 5mg #20     Primary Care Provider: Apollo Benitez  Chronic Pain Provider: none    Interval History:  Sherrie Lala was seen today (September 6, 2018) and she reports that her pain has improved greatly and she is no longer so sedated. She rates her pain as a 5/10 on a 0-10 VAS. She was having significant back pain yesterday too, but improved today. No reports of muscle spasms but did indicate she is experiencing dry mouth. Talked about changing flexeril to as needed because of this reason. Talked about the general taper plan for her oxyCODONE which she was amendable to.  She reports the oxyCODONE does make her a little sleepy but has found a balance for the 5mg dose.     Her last bowel movement was yesterday and it was small. She reports having relief after having a bowel movement and passing gas.   CAPA (Clinically Aligned Pain Assessment):    Comfort (How is your pain?): Tolerable with discomfort  Change in Pain  (Since your last medication/intervention?): Getting better  Pain Control (How are your pain treatments working?):  Partially effective control  Functioning (Are you able to do activities to get better?) : Can do most things, but pain gets in the way of some   Sleep (Does your pain management allow you to sleep or rest?): Normal sleep      FUNCTIONAL STATUS:  Change:      staying the same  Oral intake:     Regular  Activity level:     Ambulating in hanna  Mood:      adjusting to situation     -- Inpatient Medications Related to Pain Management:   Medications related to Pain Management (Future)    Start     Dose/Rate Route Frequency Ordered Stop    09/06/18 1000  bisacodyl (DULCOLAX) Suppository 10 mg      10 mg Rectal ONCE 09/06/18 0952      09/05/18 2309  acetaminophen (TYLENOL) tablet 650 mg      650 mg Oral EVERY 4 HOURS PRN 09/05/18 2309      09/05/18 2302  oxyCODONE IR (ROXICODONE) half-tab 5-7.5 mg      5-7.5 mg Oral EVERY 3 HOURS PRN 09/05/18 2303      09/05/18 2200  gabapentin (NEURONTIN) capsule 300 mg      300 mg Oral AT BEDTIME 09/05/18 1102      09/05/18 1103  morphine (PF) injection 1-2 mg      1-2 mg Intravenous EVERY 2 HOURS PRN 09/05/18 1103      09/05/18 1015  pink lady enema (COMPOUNDED: docusate, magnesium citrate, mineral oil, sodium phosphate)      286 mL Rectal ONCE 09/05/18 1010      09/05/18 0834  traMADol (ULTRAM) tablet 50 mg      50 mg Oral EVERY 6 HOURS PRN 09/05/18 0835      09/03/18 1400  cyclobenzaprine (FLEXERIL) tablet 10 mg      10 mg Oral 3 TIMES DAILY 09/03/18 1006      09/03/18 1015  Lidocaine (LIDOCARE) 4 % Patch 2 patch      2 patch  over 12 Hours Transdermal EVERY 24 HOURS 0800 09/03/18 1005      09/03/18 1015  lidocaine patch in PLACE       Transdermal EVERY 8 HOURS 09/03/18 1005      09/03/18 0911  bisacodyl (DULCOLAX) Suppository 10 mg      10 mg Rectal DAILY PRN 09/03/18 0912      09/03/18 0800  aspirin suspension 325 mg      325 mg Oral DAILY 09/03/18 0731      09/03/18  0800  sennosides (SENOKOT) syrup 5-10 mL      5-10 mL Oral 2 TIMES DAILY 09/03/18 0735      09/03/18 0800  docusate (COLACE) 50 MG/5ML liquid  mg       mg Oral 2 TIMES DAILY 09/03/18 0735            LAB DATA:    Recent Labs  Lab 09/06/18  0559 09/05/18  0541 09/04/18  0552   CR 0.67 0.72 0.68   WBC 3.4* 3.3* 2.9*   HGB 8.3* 8.2* 7.9*   AST 20 22 37   ALT 53* 62* 78*         ----------------------------------------------------------------------------------  Torrey Weiss [facility]  Inpatient Pain Service     To reach us:  Mon - Friday 8 AM - 3 PM: Pager [phone] (Text Page)  After hours, weekends and holidays: Primary service should call [phone] for the on-call pain specialist    Helpful Resources:  Getting Rid of Unwanted Medications (printable PDF for patients)   Opioid Overdose Prevention Toolkit (printable PDF for patients)   Prescription Opioids: What You Need To Know (printable PDF for patients)

## 2018-09-06 NOTE — CONSULTS
"  [facility]  Transplant Infectious Disease Consult Note - New Patient     Patient:  Sherrie Lala, Date of birth 1955, Medical record number [id number]  Date of Visit:  09/05/2018  Consult requested by Dr. Figueroa for evaluation of caseating/calcified node in donor tissue         Assessment and Recommendations:   Recommendations:  1. Will follow-up with transplant team as they gather more information about possible caseating lymph nodes in donor. UNOS system does note a calcified nodule in the spleen, which would not be concerning.  2. Recommend urine histoplasma antigen once every other week for the next six months.    Assessment:  62yo F with history of metastatic cholangiocarcinoma s/p liver resection and chemoradiation and now s/p OLTon 8/30. Post-op course complicated by significant (3L) blood loss and delayed abdominal closure related to both blood loss and large size of donor liver. Abdomen is now closed. ID consulted after transplant team made aware of possible \"caseating lymph node\" in donor. Review of UNOS system notes shows a calcified granuloma in the donor's spleen (which, if infectious in etiology, would represent dead organisms but cannot rule out unseen organisms in liver), but not mention of caseating nodes. Will defer to transplant as they dig up this information and will adjust accordingly. With regard to the calcified node, this could potentially represent old/dead histoplasma that might be unseen in the donated liver, so we feel it is worth monitoring this without treatment for the time being (via urine histo Ag assay).     Donovan Kee MD, PGY-6  Infectious Diseases Fellow  [phone]         History of Infectious Disease Illness:   62yo F with history of metastatic cholangiocarcinoma s/p liver resection and chemoradiation and now s/p OLT on 8/30. 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 She was treated with vancomycin/zosyn/fluconzaole for the three days post-operatively per transplant protocol but has received no other abx this admission. Immunosuppression induction was performed with basilixmiab, MMF, and steroid taper, with Tacrolimus started on . Her transplant post-op course complicated by significant (3L) blood loss and delayed abdominal closure related to both blood loss and large size of donor liver, requiring ICU stay and intubation from  to . Prior to her  admission for the transplant, she endorses a mild illness \"a week or two ago\" that lasted several days, consisting of subjective fever, loose stool, nausea without vomiting, and fatigue. She treated this symptomatically with OTC meds and it self-resolved prior to being called for the transplant. At this time, her only complaint is tenderness along her incision site, which she says is \"stable\". On , ID was consulted regarding information about a possible \"caseating lymph node\" in the donor.      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  Social History     Social History     Marital status:      Spouse name: N/A     Number of children: N/A     Years of education: N/A     Occupational History     Not on file.     Social History Main Topics     Smoking status: Never Smoker     Smokeless tobacco: Never Used     Alcohol use No      Comment: occ      Drug use: No     Sexual activity: Not on file     Other Topics Concern     Not on file     Social History Narrative       Patient Active Problem List   Diagnosis     Gastric ulcer     Osteoporosis     Bleeding esophageal varices (H)     Organ transplant candidate     Hypokalemia     Cirrhosis of liver with ascites (H)     Hydrothorax - hepatic     Liver transplant candidate     Liver transplanted (H)            Review of Systems:   CONSTITUTIONAL:  No fevers or chills  EYES: negative for icterus  ENT:  negative for hearing loss, tinnitus and sore throat  RESPIRATORY:  negative for cough with sputum or dyspnea  CARDIOVASCULAR:  negative for chest pain  GASTROINTESTINAL:  negative for nausea, vomiting, diarrhea or constipation  GENITOURINARY:  negative for dysuria  HEME:  No easy bruising  INTEGUMENT:  negative for rash and pruritus  NEURO:  Negative for headache         Current Medications (antimicrobials listed in bold):       aspirin  325 mg Oral Daily     cyclobenzaprine  10 mg Oral TID     sennosides  5-10 mL Oral BID    And     docusate   mg Oral BID     gabapentin  300 mg Oral At Bedtime     insulin aspart  1-6 Units Subcutaneous Q4H     lidocaine  2 patch Transdermal Q24H     lidocaine   Transdermal Q8H     lidocaine   Transdermal Q24h     micafungin  100 mg Intravenous Q24H     multivitamins with minerals  15 mL Per Feeding Tube Daily     mycophenolate  1,000 mg Oral BID IS    Or     mycophenolate  1,000 mg  Oral or NG Tube BID IS     nystatin  500,000 Units Mouth/Throat 4x Daily     omeprazole  20 mg Oral QAM AC     pink lady enema  286 mL Rectal Once     pramipexole (MIRAPEX) tablet 0.5 mg  0.5 mg Oral Daily     sodium chloride (PF)  3 mL Intracatheter Q8H     sodium chloride (PF)  3 mL Intravenous Q8H     sulfamethoxazole-trimethoprim  80 mg Oral Daily     tacrolimus  4 mg Oral BID IS     valGANciclovir  450 mg Oral Daily    Or     valGANciclovir  450 mg Oral or NG Tube Daily     Infusions:    IV fluid REPLACEMENT ONLY       heparin 400 Units/hr (09/05/18 1600)     Warfarin Therapy Reminder            Allergies:     Allergies   Allergen Reactions     Blood Transfusion Related (Informational Only) Other (See Comments)     Patient has a history of a clinically significant antibody against RBC antigens.  A delay in compatible RBCs may occur.     Dilaudid [Hydromorphone] Itching          Physical Exam:   Vitals were reviewed.  All vitals stable.  Patient Vitals for the past 24 hrs:   BP Temp Temp src Heart Rate Resp SpO2 Weight   09/05/18 1945 129/84 97.7  F (36.5  C) Oral 83 16 99 % -   09/05/18 1714 119/74 98.4  F (36.9  C) Oral 93 16 99 % -   09/05/18 1130 109/73 98.5  F (36.9  C) Oral 85 16 99 % -   09/05/18 0811 127/76 98.6  F (37  C) Oral 85 16 99 % -   09/05/18 0810 - - - - - - 58.3 kg (128 lb 8.5 oz)   09/05/18 0359 115/66 98  F (36.7  C) Oral 83 16 99 % -   09/05/18 0001 123/71 97.9  F (36.6  C) Oral 87 18 98 % -     Ranges for his vital signs:  Temp:  [97.7  F (36.5  C)-98.6  F (37  C)] 97.7  F (36.5  C)  Heart Rate:  [83-93] 83  Resp:  [16-18] 16  BP: (109-129)/(66-84) 129/84  SpO2:  [98 %-99 %] 99 %  Physical Examination:  GENERAL:  well-developed, well-nourished, in bed in no acute distress.  HEENT:  Head is normocephalic, atraumatic   EYES:  Eyes have anicteric sclerae without conjunctival injection or stigmata of endocarditis.    ENT:  Oropharynx is moist without exudates or ulcers. Tongue is  midline  NECK:  Supple. No cervical lymphadenopathy  LUNGS:  Clear to auscultation bilateral.   CARDIOVASCULAR:  Regular rate and rhythm with no murmurs, gallops or rubs.  ABDOMEN: Surgical site (across mid-abd) healing well with no crepitus/induration/erythema and only minimal TTP along site. And with mild distention, normal bowel sounds.  SKIN:  No acute rashes.  Line(s) are in place without any surrounding erythema or exudate. No stigmata of endocarditis.  NEUROLOGIC:  Grossly nonfocal. Active x4 extremities         Laboratory Data:     CD4 counts  No results found for: ACD4  Inflammatory Markers  No lab results found.  Immune Globulin Studies  No lab results found.  Metabolic Studies     Recent Labs   Lab Test  09/05/18   0541  09/04/18   0552  09/03/18   2131  09/03/18   1539  09/03/18   0608  09/02/18 2021 09/02/18   0308   09/01/18   0933  09/01/18   0431   NA  143  144   --    --   143  143  142   --   142  143   POTASSIUM  3.3*  3.6  3.6  4.1  3.2*  3.4  3.6   < >  3.3*  3.4   CHLORIDE  110*  110*   --    --   108  108  107   --    --   108   CO2  28  28   --    --   29  28  25   --    --   28   ANIONGAP  6  6   --    --   7  6  10   --    --   8   BUN  16  20   --    --   24  28  27   --    --   23   CR  0.72  0.68   --    --   0.78  0.92  1.09*   --    --   1.02   GFRESTIMATED  83  88   --    --   75  62  51*   --    --   55*   GLC  116*  109*   --    --   105*  108*  129*   --   136*  118*   SHELDON  7.7*  8.0*   --    --   7.6*  7.4*  7.4*   --    --   7.2*   PHOS  3.0  2.6   --    --   2.5  2.8  3.9   --    --   3.8   MAG  2.6*  2.6*   --    --   2.5*  2.4*  2.3   --    --   2.3    < > = values in this interval not displayed.     Hepatic Studies  Recent Labs   Lab Test  09/05/18   0541  09/04/18   0552  09/03/18   0608  09/02/18   0308  09/01/18   0431  08/31/18   0212   BILITOTAL  0.8  0.9  1.0  1.2  1.5*  3.7*   ALKPHOS  67  60  44  36*  41  41   ALBUMIN  2.6*  2.5*  2.5*  2.3*  2.3*  2.0*   AST  22   37  42  57*  107*  435*   ALT  62*  78*  90*  124*  170*  195*     Pancreatitis testing  Recent Labs   Lab Test  09/02/18   0308  09/01/18   0431  08/31/18   0553  08/29/18   2058  05/04/18   0001  02/26/18   0952   AMYLASE   --    --   39  82  111*   --    LIPASE   --   161   --    --    --    --    TRIG  159*   --    --    --    --   54     Hematology Studies    Recent Labs   Lab Test  09/05/18   0541  09/04/18   0552  09/03/18   0608  09/02/18   2337  09/02/18   1701  09/02/18   1124   WBC  3.3*  2.9*  4.7  5.5  6.0  7.6   ANEU  2.4  1.9  3.2  4.1  5.3  6.7   ALYM  0.3*  0.4*  0.6*  0.6*  0.3*  0.3*   JOSÉ MANUEL  0.3  0.3  0.4  0.6  0.3  0.4   AEOS  0.3  0.3  0.5  0.3  0.0  0.3   HGB  8.2*  7.9*  7.1*  7.5*  8.0*  8.3*   HCT  25.3*  24.9*  22.7*  23.6*  24.0*  24.6*   MCV  88  88  88  88  86  85   PLT  36*  28*  45*  51*  46*  54*     Clotting Studies    Recent Labs   Lab Test  09/05/18   1349  09/04/18   0552  09/02/18   0308  09/01/18   1557  09/01/18   0933  09/01/18   0431   INR  1.09   --    --   1.33*  1.33*  1.41*   PTT   --   28  34   --   32  37     Arterial Blood Gas Testing  Recent Labs   Lab Test  09/02/18   0308  09/01/18   0933  08/31/18   0448  08/31/18   0154  08/30/18   2325   PH  7.50*  7.44  7.44  7.46*  7.39   PCO2  34*  38  39  37  37   PO2  122*  163*  133*  170*  143*   HCO3  27  26  26  26  22   O2PER  30  50%  40.0  40.0  50      Thyroid Studies     Recent Labs   Lab Test  02/26/18   0952   TSH  0.02*   T4  1.06     Urine Studies  Recent Labs   Lab Test  02/26/18   0947   URINEPH  5.0   NITRITE  Negative   LEUKEST  Negative   WBCU  1     Hepatitis B Testing   Recent Labs   Lab Test  08/29/18 2058 05/04/18   0001  02/26/18   0952   HBCAB  Nonreactive  Nonreactive  Nonreactive   HEPBANG   --    --   Nonreactive     Hepatitis C Testing   Hepatitis C Antibody   Date Value Ref Range Status   08/29/2018 Nonreactive NR^Nonreactive Final     Comment:     Assay performance characteristics have not  been established for newborns,   infants, and children     05/04/2018 Nonreactive NR^Nonreactive Final     Comment:     Assay performance characteristics have not been established for newborns,   infants, and children

## 2018-09-06 NOTE — PROGRESS NOTES
Home Infusion  Sherrie is expected to dc within a couple of days and will be going home on continuous enteral feeds.  She has never done home enteral therapy before however she has an appt with [facility] nurse tomorrow for some initial teaching.  Met with Sherrie at bedside and provided her with information about [facility] services.  Explained about administration method via the Luis pump and back pack for mobility and options of hook up of TF at the hospital prior to dc vs disconnect for transport home (if feasible per medical team) and restart of feeding at home.   Informed her about supplies and delivery of supplies, storage of formula, plan for SNV and 24/7 availability of [facility]HI staff while on enteral therapy.   [facility]  will be providing home nursing services.  Sherrie verbalized understanding of all information given.   She is willing and able to learn and manage her home enteral therapy and would prefer to disconnect her TF for transport home on day of discharge if possible then restart at home.  Questions answered.  Will continue to follow and revisit pt once dc plans are confirmed.    Marina Bhakta [facility] Liaison  [phone] pager

## 2018-09-07 ENCOUNTER — APPOINTMENT (OUTPATIENT)
Dept: OCCUPATIONAL THERAPY | Facility: CLINIC | Age: 63
DRG: 005 | End: 2018-09-07
Attending: TRANSPLANT SURGERY
Payer: MEDICARE

## 2018-09-07 ENCOUNTER — APPOINTMENT (OUTPATIENT)
Dept: PHYSICAL THERAPY | Facility: CLINIC | Age: 63
DRG: 005 | End: 2018-09-07
Attending: TRANSPLANT SURGERY
Payer: MEDICARE

## 2018-09-07 LAB
ALBUMIN SERPL-MCNC: 2.4 G/DL (ref 3.4–5)
ALP SERPL-CCNC: 64 U/L (ref 40–150)
ALT SERPL W P-5'-P-CCNC: 42 U/L (ref 0–50)
ANION GAP SERPL CALCULATED.3IONS-SCNC: 5 MMOL/L (ref 3–14)
AST SERPL W P-5'-P-CCNC: 15 U/L (ref 0–45)
BASOPHILS # BLD AUTO: 0 10E9/L (ref 0–0.2)
BASOPHILS NFR BLD AUTO: 0.7 %
BILIRUB DIRECT SERPL-MCNC: 0.3 MG/DL (ref 0–0.2)
BILIRUB SERPL-MCNC: 0.6 MG/DL (ref 0.2–1.3)
BUN SERPL-MCNC: 15 MG/DL (ref 7–30)
CALCIUM SERPL-MCNC: 7.6 MG/DL (ref 8.5–10.1)
CHLORIDE SERPL-SCNC: 108 MMOL/L (ref 94–109)
CO2 SERPL-SCNC: 27 MMOL/L (ref 20–32)
COPATH REPORT: NORMAL
CREAT SERPL-MCNC: 0.62 MG/DL (ref 0.52–1.04)
DIFFERENTIAL METHOD BLD: ABNORMAL
EOSINOPHIL # BLD AUTO: 0.2 10E9/L (ref 0–0.7)
EOSINOPHIL NFR BLD AUTO: 8.2 %
ERYTHROCYTE [DISTWIDTH] IN BLOOD BY AUTOMATED COUNT: 18.7 % (ref 10–15)
GFR SERPL CREATININE-BSD FRML MDRD: >90 ML/MIN/1.7M2
GLUCOSE BLDC GLUCOMTR-MCNC: 112 MG/DL (ref 70–99)
GLUCOSE BLDC GLUCOMTR-MCNC: 130 MG/DL (ref 70–99)
GLUCOSE BLDC GLUCOMTR-MCNC: 94 MG/DL (ref 70–99)
GLUCOSE SERPL-MCNC: 108 MG/DL (ref 70–99)
HCT VFR BLD AUTO: 23.7 % (ref 35–47)
HGB BLD-MCNC: 7.7 G/DL (ref 11.7–15.7)
HGB BLD-MCNC: 7.9 G/DL (ref 11.7–15.7)
IMM GRANULOCYTES # BLD: 0 10E9/L (ref 0–0.4)
IMM GRANULOCYTES NFR BLD: 1.1 %
INR PPP: 1.18 (ref 0.86–1.14)
LYMPHOCYTES # BLD AUTO: 0.4 10E9/L (ref 0.8–5.3)
LYMPHOCYTES NFR BLD AUTO: 15.8 %
MAGNESIUM SERPL-MCNC: 2.5 MG/DL (ref 1.6–2.3)
MCH RBC QN AUTO: 29.1 PG (ref 26.5–33)
MCHC RBC AUTO-ENTMCNC: 32.5 G/DL (ref 31.5–36.5)
MCV RBC AUTO: 89 FL (ref 78–100)
MONOCYTES # BLD AUTO: 0.2 10E9/L (ref 0–1.3)
MONOCYTES NFR BLD AUTO: 7.5 %
NEUTROPHILS # BLD AUTO: 1.9 10E9/L (ref 1.6–8.3)
NEUTROPHILS NFR BLD AUTO: 66.7 %
NRBC # BLD AUTO: 0 10*3/UL
NRBC BLD AUTO-RTO: 0 /100
PHOSPHATE SERPL-MCNC: 3.1 MG/DL (ref 2.5–4.5)
PLATELET # BLD AUTO: 47 10E9/L (ref 150–450)
POTASSIUM SERPL-SCNC: 4.1 MMOL/L (ref 3.4–5.3)
PROT SERPL-MCNC: 5 G/DL (ref 6.8–8.8)
RBC # BLD AUTO: 2.65 10E12/L (ref 3.8–5.2)
SODIUM SERPL-SCNC: 141 MMOL/L (ref 133–144)
TACROLIMUS BLD-MCNC: 4.5 UG/L (ref 5–15)
TME LAST DOSE: ABNORMAL H
WBC # BLD AUTO: 2.8 10E9/L (ref 4–11)

## 2018-09-07 PROCEDURE — 80076 HEPATIC FUNCTION PANEL: CPT | Performed by: NURSE PRACTITIONER

## 2018-09-07 PROCEDURE — 80048 BASIC METABOLIC PNL TOTAL CA: CPT | Performed by: NURSE PRACTITIONER

## 2018-09-07 PROCEDURE — 40000133 ZZH STATISTIC OT WARD VISIT

## 2018-09-07 PROCEDURE — A9270 NON-COVERED ITEM OR SERVICE: HCPCS | Mod: GY | Performed by: STUDENT IN AN ORGANIZED HEALTH CARE EDUCATION/TRAINING PROGRAM

## 2018-09-07 PROCEDURE — 25000132 ZZH RX MED GY IP 250 OP 250 PS 637: Mod: GY | Performed by: TRANSPLANT SURGERY

## 2018-09-07 PROCEDURE — 40000193 ZZH STATISTIC PT WARD VISIT

## 2018-09-07 PROCEDURE — 12000026 ZZH R&B TRANSPLANT

## 2018-09-07 PROCEDURE — 25000132 ZZH RX MED GY IP 250 OP 250 PS 637: Mod: GY | Performed by: PHYSICIAN ASSISTANT

## 2018-09-07 PROCEDURE — A9270 NON-COVERED ITEM OR SERVICE: HCPCS | Mod: GY | Performed by: PHYSICIAN ASSISTANT

## 2018-09-07 PROCEDURE — 25000132 ZZH RX MED GY IP 250 OP 250 PS 637: Mod: GY | Performed by: STUDENT IN AN ORGANIZED HEALTH CARE EDUCATION/TRAINING PROGRAM

## 2018-09-07 PROCEDURE — 80197 ASSAY OF TACROLIMUS: CPT | Performed by: NURSE PRACTITIONER

## 2018-09-07 PROCEDURE — 85018 HEMOGLOBIN: CPT | Performed by: NURSE PRACTITIONER

## 2018-09-07 PROCEDURE — 25000131 ZZH RX MED GY IP 250 OP 636 PS 637: Mod: GY | Performed by: NURSE PRACTITIONER

## 2018-09-07 PROCEDURE — 00000146 ZZHCL STATISTIC GLUCOSE BY METER IP

## 2018-09-07 PROCEDURE — 27210429 ZZH NUTRITION PRODUCT INTERMEDIATE LITER

## 2018-09-07 PROCEDURE — 97535 SELF CARE MNGMENT TRAINING: CPT | Mod: GO

## 2018-09-07 PROCEDURE — 85025 COMPLETE CBC W/AUTO DIFF WBC: CPT | Performed by: NURSE PRACTITIONER

## 2018-09-07 PROCEDURE — 83735 ASSAY OF MAGNESIUM: CPT | Performed by: NURSE PRACTITIONER

## 2018-09-07 PROCEDURE — 36592 COLLECT BLOOD FROM PICC: CPT | Performed by: NURSE PRACTITIONER

## 2018-09-07 PROCEDURE — A9270 NON-COVERED ITEM OR SERVICE: HCPCS | Mod: GY | Performed by: NURSE PRACTITIONER

## 2018-09-07 PROCEDURE — 25000132 ZZH RX MED GY IP 250 OP 250 PS 637: Mod: GY | Performed by: NURSE PRACTITIONER

## 2018-09-07 PROCEDURE — 99207 ZZC NO CHARGE SIGN-OFF PS: CPT

## 2018-09-07 PROCEDURE — A9270 NON-COVERED ITEM OR SERVICE: HCPCS | Mod: GY | Performed by: TRANSPLANT SURGERY

## 2018-09-07 PROCEDURE — 86612 BLASTOMYCES ANTIBODY: CPT | Performed by: NURSE PRACTITIONER

## 2018-09-07 PROCEDURE — 86635 COCCIDIOIDES ANTIBODY: CPT | Performed by: NURSE PRACTITIONER

## 2018-09-07 PROCEDURE — 85610 PROTHROMBIN TIME: CPT | Performed by: NURSE PRACTITIONER

## 2018-09-07 PROCEDURE — 25000131 ZZH RX MED GY IP 250 OP 636 PS 637: Mod: GY | Performed by: STUDENT IN AN ORGANIZED HEALTH CARE EDUCATION/TRAINING PROGRAM

## 2018-09-07 PROCEDURE — 84100 ASSAY OF PHOSPHORUS: CPT | Performed by: NURSE PRACTITIONER

## 2018-09-07 PROCEDURE — 97110 THERAPEUTIC EXERCISES: CPT | Mod: GP

## 2018-09-07 PROCEDURE — 87385 HISTOPLASMA CAPSUL AG IA: CPT | Performed by: PHYSICIAN ASSISTANT

## 2018-09-07 PROCEDURE — 25000128 H RX IP 250 OP 636: Performed by: PHYSICIAN ASSISTANT

## 2018-09-07 PROCEDURE — 86606 ASPERGILLUS ANTIBODY: CPT | Performed by: NURSE PRACTITIONER

## 2018-09-07 PROCEDURE — 86698 HISTOPLASMA ANTIBODY: CPT | Performed by: NURSE PRACTITIONER

## 2018-09-07 RX ORDER — WARFARIN SODIUM 4 MG/1
4 TABLET ORAL
Status: COMPLETED | OUTPATIENT
Start: 2018-09-07 | End: 2018-09-07

## 2018-09-07 RX ORDER — FUROSEMIDE 40 MG
40 TABLET ORAL ONCE
Status: COMPLETED | OUTPATIENT
Start: 2018-09-07 | End: 2018-09-07

## 2018-09-07 RX ADMIN — NYSTATIN 500000 UNITS: 100000 SUSPENSION ORAL at 09:06

## 2018-09-07 RX ADMIN — MYCOPHENOLATE MOFETIL 1000 MG: 200 POWDER, FOR SUSPENSION ORAL at 18:21

## 2018-09-07 RX ADMIN — WARFARIN SODIUM 4 MG: 4 TABLET ORAL at 18:20

## 2018-09-07 RX ADMIN — VALGANCICLOVIR HYDROCHLORIDE 450 MG: 450 TABLET ORAL at 09:07

## 2018-09-07 RX ADMIN — PRAMIPEXOLE DIHYDROCHLORIDE 0.5 MG: 0.5 TABLET ORAL at 20:12

## 2018-09-07 RX ADMIN — NYSTATIN 500000 UNITS: 100000 SUSPENSION ORAL at 15:56

## 2018-09-07 RX ADMIN — LIDOCAINE 2 PATCH: 560 PATCH PERCUTANEOUS; TOPICAL; TRANSDERMAL at 09:03

## 2018-09-07 RX ADMIN — CYCLOBENZAPRINE HYDROCHLORIDE 10 MG: 5 TABLET, FILM COATED ORAL at 09:01

## 2018-09-07 RX ADMIN — Medication 325 MG: at 09:06

## 2018-09-07 RX ADMIN — Medication 7.5 MG: at 06:26

## 2018-09-07 RX ADMIN — CYCLOBENZAPRINE HYDROCHLORIDE 10 MG: 5 TABLET, FILM COATED ORAL at 15:56

## 2018-09-07 RX ADMIN — NYSTATIN 500000 UNITS: 100000 SUSPENSION ORAL at 11:34

## 2018-09-07 RX ADMIN — Medication 7.5 MG: at 14:44

## 2018-09-07 RX ADMIN — ACETAMINOPHEN 650 MG: 325 TABLET, FILM COATED ORAL at 09:01

## 2018-09-07 RX ADMIN — NYSTATIN 500000 UNITS: 100000 SUSPENSION ORAL at 20:12

## 2018-09-07 RX ADMIN — Medication 7.5 MG: at 11:35

## 2018-09-07 RX ADMIN — Medication 6 MG: at 18:21

## 2018-09-07 RX ADMIN — MICAFUNGIN SODIUM 100 MG: 10 INJECTION, POWDER, LYOPHILIZED, FOR SOLUTION INTRAVENOUS at 11:35

## 2018-09-07 RX ADMIN — FUROSEMIDE 40 MG: 40 TABLET ORAL at 09:07

## 2018-09-07 RX ADMIN — Medication 5 MG: at 00:06

## 2018-09-07 RX ADMIN — CYCLOBENZAPRINE HYDROCHLORIDE 10 MG: 5 TABLET, FILM COATED ORAL at 23:10

## 2018-09-07 RX ADMIN — Medication 7.5 MG: at 21:38

## 2018-09-07 RX ADMIN — MYCOPHENOLATE MOFETIL 1000 MG: 250 CAPSULE ORAL at 09:07

## 2018-09-07 RX ADMIN — GABAPENTIN 300 MG: 300 CAPSULE ORAL at 21:38

## 2018-09-07 RX ADMIN — Medication 20 MG: at 09:07

## 2018-09-07 RX ADMIN — Medication 7.5 MG: at 18:20

## 2018-09-07 RX ADMIN — MULTIVITAMIN 15 ML: LIQUID ORAL at 09:06

## 2018-09-07 RX ADMIN — SULFAMETHOXAZOLE AND TRIMETHOPRIM 80 MG: 200; 40 SUSPENSION ORAL at 09:06

## 2018-09-07 RX ADMIN — Medication 5 MG: at 09:06

## 2018-09-07 ASSESSMENT — ACTIVITIES OF DAILY LIVING (ADL)
ADLS_ACUITY_SCORE: 9

## 2018-09-07 ASSESSMENT — PAIN DESCRIPTION - DESCRIPTORS
DESCRIPTORS: PRESSURE
DESCRIPTORS: PRESSURE

## 2018-09-07 NOTE — PROGRESS NOTES
Clinical Nutrition Education Note  Reason for Assessment:  Nutrition education regarding post-op liver tx.   Pt reports at home she does a lot of cooking and does not go out to eat much. She anticipates that following a heart healthy diet and food safety precaution will not be an issue at home.  RD and pt further discussed reason for calorie counts and diet post-tx. Discharge diet instructions are written.    NUTRITION DIAGNOSIS:  Food and nutrition-related knowledge deficit r/t length of time since previous post-transplant education AEB patient verbal report, review of chart record, and MD consult for nutrition education.     INTERVENTIONS  Implementation  Nutrition Education:  1. Provided instruction on post-transplant diet with discussion regarding protein sources and high protein needs in acute post-tx phase.  Reviewed recommendations to follow low fat/low sodium diet long term and discussed heart healthy diet tips. Reviewed need for food safety precautions to prevent food borne illness.    2. Provided & reviewed handout: Post-transplant diet guidelines. Patient receptive to information provided. Expected diet compliance is good.     Continued to encourage PO intake and recommended 5-6 small meals with protein.     Goals  1. Patient will verbalize understanding of 3 important aspects of post-transplant diet guidelines.     Monitoring/Evaluation  Progress toward goals will be monitored and evaluated per protocol.    Conchis England, MS, RD, LD   Unit Pgr: [phone]

## 2018-09-07 NOTE — PROGRESS NOTES
Immunosuppression Note:    Sherrie Lala is a 63 year old female who is seen today  for immunosuppression management     I, Darren Rod MD, I have examined the patient with the resident/PA/Fellow, discussed and agree with the note and findings.  I have reviewed today's vital signs, medications, labs and imaging. I reviewed the immunosuppression medications and levels. I spoke to the patient/family and explained below clinical details and answered all the questions      Transplant Surgery  Inpatient Daily Progress Note  09/07/2018    Assessment & Plan: Sherrie Lala is a 63-year old female with a history of metastatic cholangiocarcinoma with subsequent liver failure who is now s/p DDLT on 8/30/18. The patient had significant intraoperative blood loss (~3L) and her abdomen was left open. She underwent washout and abdominal closure on 9/1.    Graft function: POD #8. LFTs have normalized. Liver US POD#1 with limited views, and a persistent eccentric thrombus in the intrahepatic main portal vein extending into the right portal vein. Continue heparin drip at 400u/hr for HAT ppx. Warfarin started 9/5, goal INR 1.5-2 x 3 months.    Immunosuppression management:  - Received induction with steroid taper and Basiliximab on POD#1 to delay initiation of Tacrolimus.  - MMF: Continue 1gm BID.  - Tacrolimus: Goal range 10-15. Level 4.5 (12h), increase to 6mg BID.  Complexity of management: Medium. Contributing factors: thrombocytopenia, anemia and organ dysfunction  Hematology:   Acute blood loss anemia:  Significant bleeding intra-op, requiring 11u PRBCs and other blood products.  Last blood transfusion on 9/1.  Acute hgb drop 8.3->7.7 with new right flank ecchymosis.  No sign of active bleeding today.  Recheck hgb this afternoon.  Thrombocytopenia: Patient with baseline platelet count ~30 prior to transplant. Platelet count remains low at 47 today, last platelet transfusion on 9/2.   Anticoagulation:  Portal vein thrombus.   Continue heparin drip at 400u/hr for HAT ppx. Warfarin started 9/5, goal INR 1.5-2 x 3 months.  INR 1.2.  Close monitoring due to abd wall bleeding last night.  Cardiorespiratory: VSS. On room air.   GI/Nutrition: NJ feeding tube in place with feeds (goal 50/hour) due to abdominal distention. Had return of bowel function but remains distended. Reg diet with isadora counts. Encouraged supplements.   Endocrine: -137, DC sliding scale.  Fluid/Electrolytes: No MIVF. Weight remains ~3kg over admission with significant edema.  Lasix 40mg IV x1 today.  : No acute issues  Infectious disease: Afebrile, normal WBC count. Completed Vanco, Zosyn, and Fluconazole for 3 days for surgical ppx. Per report donor with caseating lymph nodes, consulted ID for work up. Also with Candida positive donor urine and sputum cultures, started Micafungin x 10 days. Awaiting more details on donor.  Fungal studies ordered.  See ID note.  Pain: Patient with significant amount of abdominal pain since surgery. Required Oxycodone, morphine PCA, and Flexeril. Pain management consulted. Pain improving.  Now on Oxycodone 5-7.5 mg every 3 hours prn, Flexeril PRN, and lidocaine patches.   Prophylaxis: GI, DVT, heparin, Bactrim, Valcyte  Disposition: 7A    Medical Decision Making: Moderate  Subsequent visit 76965 (moderate level decision making)    DREA/Fellow:  Suzanna Cui NP  [id number]  Faculty: Darren Rod M.D.  __________________________________________________________________  Transplant History: Admitted 8/29/2018 for orthotopic liver transplantation.   The patient has a history of liver failure due to cholangiocarcinoma.    8/30/2018 (Liver), Postoperative day: 8     Interval History:   Sudden increase in pain at right side of incision last evening, followed by new ecchymosis in right flank area.  Area remains tender.  Poor appetite.    ROS:   A 10-point review of systems was negative except as noted above.    Meds:    aspirin  325 mg Oral  Daily     bisacodyl  10 mg Rectal Once     sennosides  5-10 mL Oral BID    And     docusate   mg Oral BID     gabapentin  300 mg Oral At Bedtime     lidocaine  2 patch Transdermal Q24H     lidocaine   Transdermal Q8H     lidocaine   Transdermal Q24h     micafungin  100 mg Intravenous Q24H     multivitamins with minerals  15 mL Per Feeding Tube Daily     mycophenolate  1,000 mg Oral BID IS    Or     mycophenolate  1,000 mg Oral or NG Tube BID IS     nystatin  500,000 Units Mouth/Throat 4x Daily     omeprazole  20 mg Oral QAM AC     pink lady enema  286 mL Rectal Once     pramipexole (MIRAPEX) tablet 0.5 mg  0.5 mg Oral Daily     sodium chloride (PF)  3 mL Intracatheter Q8H     sodium chloride (PF)  3 mL Intravenous Q8H     sulfamethoxazole-trimethoprim  80 mg Oral Daily     tacrolimus  6 mg Oral BID IS     valGANciclovir  450 mg Oral Daily    Or     valGANciclovir  450 mg Oral or NG Tube Daily     warfarin  4 mg Oral ONCE at 18:00       Physical Exam:     Admit Weight: 55.2 kg (121 lb 9.6 oz)  Today's weight: 127 lbs 14.4 oz    Vital sign ranges:    Temp:  [97.6  F (36.4  C)-98.8  F (37.1  C)] 97.8  F (36.6  C)  Pulse:  [85-89] 89  Heart Rate:  [82-90] 90  Resp:  [16-18] 18  BP: (109-119)/(64-73) 109/64  SpO2:  [97 %-100 %] 100 %    GENERAL: NAD  SKIN: No jaundice. No rashes or lesions.   HEENT: Eyes symmetrical and free of discharge bilaterally.  CV: Regular rate and rhythm.   RESPIRATORY: Nonlabored breathing on room air.   GI: Distended. Surgical incision healing well with sutures intact.  : No sanchez  EXTREMITIES: +1 BLE edema  NEUROLOGIC: Alert and oriented x 4. No focal deficits. Tremor absent.  MUSCULOSKELETAL:  Ecchymosis right flank (marked)    Data:   CMP  Recent Labs  Lab 09/07/18  0543 09/06/18  0559  09/02/18  0308  09/01/18  0933 09/01/18  0431    144  < > 142  --  142 143   POTASSIUM 4.1 3.6  < > 3.6  < > 3.3* 3.4   CHLORIDE 108 110*  < > 107  --   --  108   CO2 27 26  < > 25  --   --  28    * 127*  < > 129*  --  136* 118*   BUN 15 16  < > 27  --   --  23   CR 0.62 0.67  < > 1.09*  --   --  1.02   GFRESTIMATED >90 89  < > 51*  --   --  55*   GFRESTBLACK >90 >90  < > 62  --   --  67   SHELDON 7.6* 7.9*  < > 7.4*  --   --  7.2*   ICAW  --   --   --  4.3*  --  4.7 4.3*   MAG 2.5* 2.4*  < > 2.3  --   --  2.3   PHOS 3.1 3.0  < > 3.9  --   --  3.8   LIPASE  --   --   --   --   --   --  161   ALBUMIN 2.4* 2.5*  < > 2.3*  --   --  2.3*   BILITOTAL 0.6 0.8  < > 1.2  --   --  1.5*   ALKPHOS 64 67  < > 36*  --   --  41   AST 15 20  < > 57*  --   --  107*   ALT 42 53*  < > 124*  --   --  170*   < > = values in this interval not displayed.  CBC    Recent Labs  Lab 09/07/18  0543 09/06/18  0559  09/02/18  1701   HGB 7.7* 8.3*  < > 8.0*   WBC 2.8* 3.4*  < > 6.0   PLT 47* 47*  < > 46*   A1C  --   --   --  4.9   < > = values in this interval not displayed.  COAGS    Recent Labs  Lab 09/07/18  0543 09/06/18  0559  09/04/18  0552 09/02/18  0308   INR 1.18* 1.10  < >  --   --    PTT  --   --   --  28 34   < > = values in this interval not displayed.   Urinalysis  Recent Labs   Lab Test  02/26/18   0947   COLOR  Yellow   APPEARANCE  Clear   URINEGLC  Negative   URINEBILI  Negative   URINEKETONE  Negative   SG  1.017   UBLD  Negative   URINEPH  5.0   PROTEIN  Negative   NITRITE  Negative   LEUKEST  Negative   RBCU  1   WBCU  1   UTPG  0.06     Virology:  Hepatitis C Antibody   Date Value Ref Range Status   08/29/2018 Nonreactive NR^Nonreactive Final     Comment:     Assay performance characteristics have not been established for newborns,   infants, and children

## 2018-09-07 NOTE — PROVIDER NOTIFICATION
Pt had 100 mL urine out on NOC shift, bladder scanned for 114.  Suzanna transplant NP notified in person of low urine output overnight.  No new orders at this time. Will continue to monitor closely.

## 2018-09-07 NOTE — PROGRESS NOTES
BRIEF NOTE - INFECTIOUS DISEASES    No new information regarding possible caseating node In donor. For now, will stick to our plan for biweekly urine histoplasma antigen x 6 months. Please call if any new information arises and we will be happy to help!    Donovan Kee MD, PGY-6  Infectious Diseases Fellow  P: [phone]

## 2018-09-07 NOTE — PROGRESS NOTES
This is a recent snapshot of the patient's [facility] medical record.  For current drug dose and complete information and questions, call [phone] or In Basket pool, [facility] home infusion ([id number])  CSN Number:  [id number]

## 2018-09-08 LAB
ALBUMIN SERPL-MCNC: 2.4 G/DL (ref 3.4–5)
ALP SERPL-CCNC: 64 U/L (ref 40–150)
ALT SERPL W P-5'-P-CCNC: 38 U/L (ref 0–50)
ANION GAP SERPL CALCULATED.3IONS-SCNC: 4 MMOL/L (ref 3–14)
AST SERPL W P-5'-P-CCNC: 15 U/L (ref 0–45)
BACTERIA SPEC CULT: NORMAL
BASOPHILS # BLD AUTO: 0 10E9/L (ref 0–0.2)
BASOPHILS NFR BLD AUTO: 0.6 %
BILIRUB DIRECT SERPL-MCNC: 0.3 MG/DL (ref 0–0.2)
BILIRUB SERPL-MCNC: 0.6 MG/DL (ref 0.2–1.3)
BUN SERPL-MCNC: 19 MG/DL (ref 7–30)
CALCIUM SERPL-MCNC: 7.3 MG/DL (ref 8.5–10.1)
CHLORIDE SERPL-SCNC: 106 MMOL/L (ref 94–109)
CO2 SERPL-SCNC: 28 MMOL/L (ref 20–32)
CREAT SERPL-MCNC: 0.62 MG/DL (ref 0.52–1.04)
DIFFERENTIAL METHOD BLD: ABNORMAL
EOSINOPHIL # BLD AUTO: 0.2 10E9/L (ref 0–0.7)
EOSINOPHIL NFR BLD AUTO: 7.1 %
ERYTHROCYTE [DISTWIDTH] IN BLOOD BY AUTOMATED COUNT: 19.5 % (ref 10–15)
GFR SERPL CREATININE-BSD FRML MDRD: >90 ML/MIN/1.7M2
GLUCOSE SERPL-MCNC: 98 MG/DL (ref 70–99)
HCT VFR BLD AUTO: 24.5 % (ref 35–47)
HGB BLD-MCNC: 7.8 G/DL (ref 11.7–15.7)
IMM GRANULOCYTES # BLD: 0 10E9/L (ref 0–0.4)
IMM GRANULOCYTES NFR BLD: 0.9 %
INR PPP: 1.27 (ref 0.86–1.14)
LACTATE BLD-SCNC: 0.9 MMOL/L (ref 0.7–2)
LYMPHOCYTES # BLD AUTO: 0.5 10E9/L (ref 0.8–5.3)
LYMPHOCYTES NFR BLD AUTO: 14.4 %
Lab: NORMAL
MAGNESIUM SERPL-MCNC: 2.3 MG/DL (ref 1.6–2.3)
MCH RBC QN AUTO: 28.5 PG (ref 26.5–33)
MCHC RBC AUTO-ENTMCNC: 31.8 G/DL (ref 31.5–36.5)
MCV RBC AUTO: 89 FL (ref 78–100)
MONOCYTES # BLD AUTO: 0.3 10E9/L (ref 0–1.3)
MONOCYTES NFR BLD AUTO: 8.9 %
NEUTROPHILS # BLD AUTO: 2.2 10E9/L (ref 1.6–8.3)
NEUTROPHILS NFR BLD AUTO: 68.1 %
NRBC # BLD AUTO: 0 10*3/UL
NRBC BLD AUTO-RTO: 0 /100
PHOSPHATE SERPL-MCNC: 3.5 MG/DL (ref 2.5–4.5)
PLATELET # BLD AUTO: 57 10E9/L (ref 150–450)
POTASSIUM SERPL-SCNC: 4.5 MMOL/L (ref 3.4–5.3)
PROT SERPL-MCNC: 5.2 G/DL (ref 6.8–8.8)
RBC # BLD AUTO: 2.74 10E12/L (ref 3.8–5.2)
SODIUM SERPL-SCNC: 138 MMOL/L (ref 133–144)
SPECIMEN SOURCE: NORMAL
TACROLIMUS BLD-MCNC: 6.6 UG/L (ref 5–15)
TME LAST DOSE: NORMAL H
WBC # BLD AUTO: 3.3 10E9/L (ref 4–11)

## 2018-09-08 PROCEDURE — 25000132 ZZH RX MED GY IP 250 OP 250 PS 637: Mod: GY | Performed by: NURSE PRACTITIONER

## 2018-09-08 PROCEDURE — 80197 ASSAY OF TACROLIMUS: CPT | Performed by: NURSE PRACTITIONER

## 2018-09-08 PROCEDURE — 25000132 ZZH RX MED GY IP 250 OP 250 PS 637: Mod: GY | Performed by: PHYSICIAN ASSISTANT

## 2018-09-08 PROCEDURE — 80076 HEPATIC FUNCTION PANEL: CPT | Performed by: NURSE PRACTITIONER

## 2018-09-08 PROCEDURE — 25000132 ZZH RX MED GY IP 250 OP 250 PS 637: Mod: GY | Performed by: STUDENT IN AN ORGANIZED HEALTH CARE EDUCATION/TRAINING PROGRAM

## 2018-09-08 PROCEDURE — A9270 NON-COVERED ITEM OR SERVICE: HCPCS | Mod: GY | Performed by: PHYSICIAN ASSISTANT

## 2018-09-08 PROCEDURE — 25000131 ZZH RX MED GY IP 250 OP 636 PS 637: Mod: GY | Performed by: STUDENT IN AN ORGANIZED HEALTH CARE EDUCATION/TRAINING PROGRAM

## 2018-09-08 PROCEDURE — 85025 COMPLETE CBC W/AUTO DIFF WBC: CPT | Performed by: NURSE PRACTITIONER

## 2018-09-08 PROCEDURE — 12000026 ZZH R&B TRANSPLANT

## 2018-09-08 PROCEDURE — 36592 COLLECT BLOOD FROM PICC: CPT | Performed by: TRANSPLANT SURGERY

## 2018-09-08 PROCEDURE — A9270 NON-COVERED ITEM OR SERVICE: HCPCS | Mod: GY | Performed by: NURSE PRACTITIONER

## 2018-09-08 PROCEDURE — 25000132 ZZH RX MED GY IP 250 OP 250 PS 637: Mod: GY | Performed by: TRANSPLANT SURGERY

## 2018-09-08 PROCEDURE — 25000128 H RX IP 250 OP 636: Performed by: PHYSICIAN ASSISTANT

## 2018-09-08 PROCEDURE — A9270 NON-COVERED ITEM OR SERVICE: HCPCS | Mod: GY | Performed by: STUDENT IN AN ORGANIZED HEALTH CARE EDUCATION/TRAINING PROGRAM

## 2018-09-08 PROCEDURE — 36592 COLLECT BLOOD FROM PICC: CPT | Performed by: NURSE PRACTITIONER

## 2018-09-08 PROCEDURE — 84100 ASSAY OF PHOSPHORUS: CPT | Performed by: NURSE PRACTITIONER

## 2018-09-08 PROCEDURE — 85610 PROTHROMBIN TIME: CPT | Performed by: NURSE PRACTITIONER

## 2018-09-08 PROCEDURE — 25000131 ZZH RX MED GY IP 250 OP 636 PS 637: Mod: GY | Performed by: NURSE PRACTITIONER

## 2018-09-08 PROCEDURE — 27210429 ZZH NUTRITION PRODUCT INTERMEDIATE LITER

## 2018-09-08 PROCEDURE — 83605 ASSAY OF LACTIC ACID: CPT | Performed by: TRANSPLANT SURGERY

## 2018-09-08 PROCEDURE — 83735 ASSAY OF MAGNESIUM: CPT | Performed by: NURSE PRACTITIONER

## 2018-09-08 PROCEDURE — 80048 BASIC METABOLIC PNL TOTAL CA: CPT | Performed by: NURSE PRACTITIONER

## 2018-09-08 PROCEDURE — A9270 NON-COVERED ITEM OR SERVICE: HCPCS | Mod: GY | Performed by: TRANSPLANT SURGERY

## 2018-09-08 RX ORDER — WARFARIN SODIUM 6 MG/1
6 TABLET ORAL
Status: COMPLETED | OUTPATIENT
Start: 2018-09-08 | End: 2018-09-08

## 2018-09-08 RX ORDER — BUMETANIDE 1 MG/1
1 TABLET ORAL DAILY
Status: DISCONTINUED | OUTPATIENT
Start: 2018-09-08 | End: 2018-09-12 | Stop reason: HOSPADM

## 2018-09-08 RX ADMIN — Medication 7.5 MG: at 05:01

## 2018-09-08 RX ADMIN — Medication 5 MG: at 08:16

## 2018-09-08 RX ADMIN — NYSTATIN 500000 UNITS: 100000 SUSPENSION ORAL at 12:23

## 2018-09-08 RX ADMIN — Medication 7.5 MG: at 02:00

## 2018-09-08 RX ADMIN — ACETAMINOPHEN 650 MG: 325 SOLUTION ORAL at 11:19

## 2018-09-08 RX ADMIN — DOCUSATE SODIUM 100 MG: 50 LIQUID ORAL at 20:01

## 2018-09-08 RX ADMIN — Medication 7.5 MG: at 20:17

## 2018-09-08 RX ADMIN — NYSTATIN 500000 UNITS: 100000 SUSPENSION ORAL at 09:24

## 2018-09-08 RX ADMIN — Medication 5 MG: at 16:57

## 2018-09-08 RX ADMIN — Medication 325 MG: at 09:04

## 2018-09-08 RX ADMIN — MYCOPHENOLATE MOFETIL 1000 MG: 200 POWDER, FOR SUSPENSION ORAL at 09:03

## 2018-09-08 RX ADMIN — GABAPENTIN 300 MG: 300 CAPSULE ORAL at 21:54

## 2018-09-08 RX ADMIN — WARFARIN SODIUM 6 MG: 6 TABLET ORAL at 20:01

## 2018-09-08 RX ADMIN — Medication 6 MG: at 09:03

## 2018-09-08 RX ADMIN — SULFAMETHOXAZOLE AND TRIMETHOPRIM 80 MG: 200; 40 SUSPENSION ORAL at 09:04

## 2018-09-08 RX ADMIN — SENNOSIDES A AND B 5 ML: 415.36 LIQUID ORAL at 20:01

## 2018-09-08 RX ADMIN — PRAMIPEXOLE DIHYDROCHLORIDE 0.5 MG: 0.5 TABLET ORAL at 20:02

## 2018-09-08 RX ADMIN — VALGANCICLOVIR HYDROCHLORIDE 450 MG: 50 POWDER, FOR SOLUTION ORAL at 09:04

## 2018-09-08 RX ADMIN — NYSTATIN 500000 UNITS: 100000 SUSPENSION ORAL at 20:01

## 2018-09-08 RX ADMIN — CYCLOBENZAPRINE HYDROCHLORIDE 5 MG: 5 TABLET, FILM COATED ORAL at 08:16

## 2018-09-08 RX ADMIN — BUMETANIDE 1 MG: 1 TABLET ORAL at 14:57

## 2018-09-08 RX ADMIN — BISACODYL 10 MG: 10 SUPPOSITORY RECTAL at 11:22

## 2018-09-08 RX ADMIN — Medication 20 MG: at 09:03

## 2018-09-08 RX ADMIN — Medication 5 MG: at 23:40

## 2018-09-08 RX ADMIN — LIDOCAINE 2 PATCH: 560 PATCH PERCUTANEOUS; TOPICAL; TRANSDERMAL at 09:13

## 2018-09-08 RX ADMIN — Medication 7.5 MG: at 11:11

## 2018-09-08 RX ADMIN — DOCUSATE SODIUM 50 MG: 50 LIQUID ORAL at 09:05

## 2018-09-08 RX ADMIN — Medication 6 MG: at 20:01

## 2018-09-08 RX ADMIN — MICAFUNGIN SODIUM 100 MG: 10 INJECTION, POWDER, LYOPHILIZED, FOR SOLUTION INTRAVENOUS at 12:23

## 2018-09-08 RX ADMIN — CYCLOBENZAPRINE HYDROCHLORIDE 10 MG: 5 TABLET, FILM COATED ORAL at 21:54

## 2018-09-08 RX ADMIN — CYCLOBENZAPRINE HYDROCHLORIDE 5 MG: 5 TABLET, FILM COATED ORAL at 14:53

## 2018-09-08 RX ADMIN — MYCOPHENOLATE MOFETIL 1000 MG: 250 CAPSULE ORAL at 20:02

## 2018-09-08 RX ADMIN — NYSTATIN 500000 UNITS: 100000 SUSPENSION ORAL at 16:51

## 2018-09-08 RX ADMIN — MULTIVITAMIN 15 ML: LIQUID ORAL at 09:05

## 2018-09-08 RX ADMIN — ACETAMINOPHEN 650 MG: 325 TABLET, FILM COATED ORAL at 02:04

## 2018-09-08 ASSESSMENT — ACTIVITIES OF DAILY LIVING (ADL)
ADLS_ACUITY_SCORE: 9

## 2018-09-08 ASSESSMENT — PAIN DESCRIPTION - DESCRIPTORS
DESCRIPTORS: DISCOMFORT
DESCRIPTORS: DISCOMFORT

## 2018-09-08 NOTE — PROGRESS NOTES
Immunosuppression Note:    Sherrie Lala is a 63 year old female who is seen today  for immunosuppression management     I, Darren Rod MD, I have examined the patient with the resident/PA/Fellow, discussed and agree with the note and findings.  I have reviewed today's vital signs, medications, labs and imaging. I reviewed the immunosuppression medications and levels. I spoke to the patient/family and explained below clinical details and answered all the questions      Transplant Surgery  Inpatient Daily Progress Note  09/08/2018    Assessment & Plan: Sherrie Lala is a 63-year old female with a history of metastatic cholangiocarcinoma with subsequent liver failure who is now s/p DDLT on 8/30/18. The patient had significant intraoperative blood loss (~3L) and her abdomen was left open. She underwent washout and abdominal closure on 9/1.    Graft function: POD #9. LFTs have normalized. Liver US POD#1 with limited views, and a persistent eccentric thrombus in the intrahepatic main portal vein extending into the right portal vein. Continue heparin drip at 400u/hr for HAT ppx. Warfarin started 9/5, goal INR 1.5-2 x 1 year.     Immunosuppression management:  - Received induction with steroid taper and Basiliximab on POD#1 to delay initiation of Tacrolimus.  - MMF: Continue 1gm BID.  - Tacrolimus: Goal range 10-15. Level 4.5 (12h), increase to 6mg BID.  Complexity of management: Medium. Contributing factors: thrombocytopenia, anemia and organ dysfunction  Hematology:   Acute blood loss anemia:  Significant bleeding intra-op, requiring 11u PRBCs and other blood products.  Last blood transfusion on 9/1.  Acute hgb drop 8.3->7.7 with new right flank ecchymosis.  No sign of active bleeding today.  Recheck hgb this afternoon.  Thrombocytopenia: Patient with baseline platelet count ~30 prior to transplant. Platelet count remains low at 47 today, last platelet transfusion on 9/2.   Anticoagulation:  Portal vein thrombus.   Continue heparin drip at 400u/hr for HAT ppx. Warfarin started 9/5, goal INR 1.5-2 x 1 year.  INR 1.2.  Close monitoring due to abd wall bleeding last night.  Cardiorespiratory: VSS. On room air.   GI/Nutrition: NJ feeding tube in place with feeds (goal 50/hour) due to abdominal distention. Had return of bowel function but remains distended. Reg diet with isadora counts. Encouraged supplements. Will cycle TF starting tomorrow.   Endocrine: -137, DC sliding scale.  Fluid/Electrolytes: No MIVF. Weight remains ~3kg over admission with significant edema.  Lasix 40mg IV x1 today.  : No acute issues  Infectious disease: Afebrile, normal WBC count. Completed Vanco, Zosyn, and Fluconazole for 3 days for surgical ppx. Per report donor with caseating lymph nodes, consulted ID for work up. Also with Candida positive donor urine and sputum cultures, started Micafungin x 10 days. Awaiting more details on donor.  Fungal studies ordered.  See ID note.  Pain: Patient with significant amount of abdominal pain since surgery. Required Oxycodone, morphine PCA, and Flexeril. Pain management consulted. Pain improving.  Now on Oxycodone 5-7.5 mg every 3 hours prn, Flexeril PRN, and lidocaine patches.   Prophylaxis: GI, DVT, heparin, Bactrim, Valcyte  Disposition: 7A    Medical Decision Making: Moderate  Subsequent visit 33971 (moderate level decision making)    DREA/Fellow:  Derik Garcias, fellow  Faculty: Darren Rod M.D.  __________________________________________________________________  Transplant History: Admitted 8/29/2018 for orthotopic liver transplantation.   The patient has a history of liver failure due to cholangiocarcinoma.    8/30/2018 (Liver), Postoperative day: 9     Interval History:   No acute events overnight, reports abdominal pain is improving; denies nausea, also poor appetite; asking for TF cycling.     ROS:   A 10-point review of systems was negative except as noted above.    Meds:    aspirin  325 mg Oral  Daily     sennosides  5-10 mL Oral BID    And     docusate   mg Oral BID     gabapentin  300 mg Oral At Bedtime     lidocaine  2 patch Transdermal Q24H     lidocaine   Transdermal Q8H     lidocaine   Transdermal Q24h     micafungin  100 mg Intravenous Q24H     multivitamins with minerals  15 mL Per Feeding Tube Daily     mycophenolate  1,000 mg Oral BID IS    Or     mycophenolate  1,000 mg Oral or NG Tube BID IS     nystatin  500,000 Units Mouth/Throat 4x Daily     omeprazole  20 mg Oral QAM AC     pink lady enema  286 mL Rectal Once     pramipexole (MIRAPEX) tablet 0.5 mg  0.5 mg Oral Daily     sodium chloride (PF)  3 mL Intracatheter Q8H     sodium chloride (PF)  3 mL Intravenous Q8H     sulfamethoxazole-trimethoprim  80 mg Oral Daily     tacrolimus  6 mg Oral BID IS     valGANciclovir  450 mg Oral Daily    Or     valGANciclovir  450 mg Oral or NG Tube Daily     warfarin  6 mg Oral ONCE at 18:00       Physical Exam:     Admit Weight: 55.2 kg (121 lb 9.6 oz)  Today's weight: 131 lbs 14.4 oz    Vital sign ranges:    Temp:  [97.6  F (36.4  C)-98.4  F (36.9  C)] 97.9  F (36.6  C)  Heart Rate:  [80-87] 81  Resp:  [16-20] 18  BP: ()/(66-73) 108/66  SpO2:  [96 %-100 %] 99 %    GENERAL: NAD  SKIN: No jaundice. No rashes or lesions.   HEENT: Eyes symmetrical and free of discharge bilaterally.  CV: Regular rate and rhythm.   RESPIRATORY: Nonlabored breathing on room air.   GI: Distended. Surgical incision healing well with sutures intact.  : No sanchez  EXTREMITIES: +1 BLE edema  NEUROLOGIC: Alert and oriented x 4. No focal deficits. Tremor absent.  MUSCULOSKELETAL:  Ecchymosis right flank (marked)    Data:   CMP  Recent Labs  Lab 09/08/18  0633 09/07/18  0543  09/02/18  0308    141  < > 142   POTASSIUM 4.5 4.1  < > 3.6   CHLORIDE 106 108  < > 107   CO2 28 27  < > 25   GLC 98 108*  < > 129*   BUN 19 15  < > 27   CR 0.62 0.62  < > 1.09*   GFRESTIMATED >90 >90  < > 51*   GFRESTBLACK >90 >90  < > 62   SHELDON  7.3* 7.6*  < > 7.4*   ICAW  --   --   --  4.3*   MAG 2.3 2.5*  < > 2.3   PHOS 3.5 3.1  < > 3.9   ALBUMIN 2.4* 2.4*  < > 2.3*   BILITOTAL 0.6 0.6  < > 1.2   ALKPHOS 64 64  < > 36*   AST 15 15  < > 57*   ALT 38 42  < > 124*   < > = values in this interval not displayed.  CBC    Recent Labs  Lab 09/08/18  0633 09/07/18  1254 09/07/18  0543  09/02/18  1701   HGB 7.8* 7.9* 7.7*  < > 8.0*   WBC 3.3*  --  2.8*  < > 6.0   PLT 57*  --  47*  < > 46*   A1C  --   --   --   --  4.9   < > = values in this interval not displayed.  COAGS    Recent Labs  Lab 09/08/18  0633 09/07/18  0543  09/04/18  0552 09/02/18  0308   INR 1.27* 1.18*  < >  --   --    PTT  --   --   --  28 34   < > = values in this interval not displayed.   Urinalysis  Recent Labs   Lab Test  02/26/18   0947   COLOR  Yellow   APPEARANCE  Clear   URINEGLC  Negative   URINEBILI  Negative   URINEKETONE  Negative   SG  1.017   UBLD  Negative   URINEPH  5.0   PROTEIN  Negative   NITRITE  Negative   LEUKEST  Negative   RBCU  1   WBCU  1   UTPG  0.06     Virology:  Hepatitis C Antibody   Date Value Ref Range Status   08/29/2018 Nonreactive NR^Nonreactive Final     Comment:     Assay performance characteristics have not been established for newborns,   infants, and children

## 2018-09-08 NOTE — PROGRESS NOTES
Calorie Count  Intake recorded for: 9/7  Kcals: 656  Protein: 30g  # Meals Recorded: 100% cheerios, 75% 2 1% milk, raisin bran,   # Supplements Recorded: 100% 1 vanilla Boost

## 2018-09-08 NOTE — PROGRESS NOTES
[facility] - BRIEF NOTE    Provider request received to cycle pt's TF (for a 16-18 hr cycle) to allow pt time off to increase her po intakes.  Pt is currently on TF via of NJT of Nutren 1.5 @ 50 ml/hr plus one packet of Prosource, in addition to a Regular diet with Boost Plus/Boost Breeze supplements betw meals.  Calorie counts ordered on 9/6 x 3 days. Ave po intakes x 2 days = 506 kcals and 19 g PRO (meeting approximately of estimated calorie needs and 20% of estimated protein needs)    Intervention;  1. Met with pt this afternoon to discuss transitioning to cyclic feedings. Pt receptive to plan. Pt commented that she has been tolerating TF without complaint, but reported that her oral intake was slow to improve due to her distended abdomen.  2. Modified TF orders to reflect cycling of TF of Nutren 1.5 @ 50 ml/hr x 18 hrs (from 8 pm to 2 pm). New regimen to provide 900 ml, 1350 kcals, 61 g PRO, 158 gm CHO and 684 ml H20.    Hayley Zavala RD,LD  Weekend pager [phone]

## 2018-09-09 LAB
ALBUMIN SERPL-MCNC: 2.4 G/DL (ref 3.4–5)
ALP SERPL-CCNC: 67 U/L (ref 40–150)
ALT SERPL W P-5'-P-CCNC: 36 U/L (ref 0–50)
ANION GAP SERPL CALCULATED.3IONS-SCNC: 5 MMOL/L (ref 3–14)
ASPERGILLUS AB TITR SER CF: NORMAL {TITER}
AST SERPL W P-5'-P-CCNC: 15 U/L (ref 0–45)
B DERMAT AB SER-ACNC: 0.2 IV
BASOPHILS # BLD AUTO: 0 10E9/L (ref 0–0.2)
BASOPHILS NFR BLD AUTO: 0.5 %
BILIRUB DIRECT SERPL-MCNC: 0.3 MG/DL (ref 0–0.2)
BILIRUB SERPL-MCNC: 0.6 MG/DL (ref 0.2–1.3)
BUN SERPL-MCNC: 18 MG/DL (ref 7–30)
CALCIUM SERPL-MCNC: 7.2 MG/DL (ref 8.5–10.1)
CHLORIDE SERPL-SCNC: 106 MMOL/L (ref 94–109)
CO2 SERPL-SCNC: 26 MMOL/L (ref 20–32)
COCCIDIOIDES AB TITR SER CF: NORMAL {TITER}
CREAT SERPL-MCNC: 0.66 MG/DL (ref 0.52–1.04)
DIFFERENTIAL METHOD BLD: ABNORMAL
EOSINOPHIL # BLD AUTO: 0.2 10E9/L (ref 0–0.7)
EOSINOPHIL NFR BLD AUTO: 5.5 %
ERYTHROCYTE [DISTWIDTH] IN BLOOD BY AUTOMATED COUNT: 19.9 % (ref 10–15)
GFR SERPL CREATININE-BSD FRML MDRD: >90 ML/MIN/1.7M2
GLUCOSE SERPL-MCNC: 106 MG/DL (ref 70–99)
H CAPSUL MYC AB TITR SER CF: NORMAL {TITER}
H CAPSUL YST AB TITR SER CF: NORMAL {TITER}
HCT VFR BLD AUTO: 23.8 % (ref 35–47)
HGB BLD-MCNC: 7.6 G/DL (ref 11.7–15.7)
IMM GRANULOCYTES # BLD: 0 10E9/L (ref 0–0.4)
IMM GRANULOCYTES NFR BLD: 0.5 %
INR PPP: 1.51 (ref 0.86–1.14)
LYMPHOCYTES # BLD AUTO: 0.4 10E9/L (ref 0.8–5.3)
LYMPHOCYTES NFR BLD AUTO: 10.5 %
MAGNESIUM SERPL-MCNC: 2.4 MG/DL (ref 1.6–2.3)
MCH RBC QN AUTO: 28.6 PG (ref 26.5–33)
MCHC RBC AUTO-ENTMCNC: 31.9 G/DL (ref 31.5–36.5)
MCV RBC AUTO: 90 FL (ref 78–100)
MONOCYTES # BLD AUTO: 0.2 10E9/L (ref 0–1.3)
MONOCYTES NFR BLD AUTO: 5.8 %
NEUTROPHILS # BLD AUTO: 2.9 10E9/L (ref 1.6–8.3)
NEUTROPHILS NFR BLD AUTO: 77.2 %
NRBC # BLD AUTO: 0 10*3/UL
NRBC BLD AUTO-RTO: 0 /100
PHOSPHATE SERPL-MCNC: 3.4 MG/DL (ref 2.5–4.5)
PLATELET # BLD AUTO: 65 10E9/L (ref 150–450)
POTASSIUM SERPL-SCNC: 4.7 MMOL/L (ref 3.4–5.3)
PROT SERPL-MCNC: 5.3 G/DL (ref 6.8–8.8)
RBC # BLD AUTO: 2.66 10E12/L (ref 3.8–5.2)
SODIUM SERPL-SCNC: 137 MMOL/L (ref 133–144)
SPECIMEN SOURCE FLD: NORMAL
TACROLIMUS BLD-MCNC: 7.1 UG/L (ref 5–15)
TME LAST DOSE: NORMAL H
TRIGL FLD-MCNC: 89 MG/DL
WBC # BLD AUTO: 3.8 10E9/L (ref 4–11)

## 2018-09-09 PROCEDURE — A9270 NON-COVERED ITEM OR SERVICE: HCPCS | Mod: GY | Performed by: STUDENT IN AN ORGANIZED HEALTH CARE EDUCATION/TRAINING PROGRAM

## 2018-09-09 PROCEDURE — 25000131 ZZH RX MED GY IP 250 OP 636 PS 637: Mod: GY | Performed by: STUDENT IN AN ORGANIZED HEALTH CARE EDUCATION/TRAINING PROGRAM

## 2018-09-09 PROCEDURE — 25000132 ZZH RX MED GY IP 250 OP 250 PS 637: Mod: GY | Performed by: TRANSPLANT SURGERY

## 2018-09-09 PROCEDURE — 25000128 H RX IP 250 OP 636: Performed by: PHYSICIAN ASSISTANT

## 2018-09-09 PROCEDURE — 84478 ASSAY OF TRIGLYCERIDES: CPT | Performed by: STUDENT IN AN ORGANIZED HEALTH CARE EDUCATION/TRAINING PROGRAM

## 2018-09-09 PROCEDURE — 80197 ASSAY OF TACROLIMUS: CPT | Performed by: NURSE PRACTITIONER

## 2018-09-09 PROCEDURE — A9270 NON-COVERED ITEM OR SERVICE: HCPCS | Mod: GY | Performed by: TRANSPLANT SURGERY

## 2018-09-09 PROCEDURE — 25000128 H RX IP 250 OP 636: Performed by: NURSE PRACTITIONER

## 2018-09-09 PROCEDURE — 25000132 ZZH RX MED GY IP 250 OP 250 PS 637: Mod: GY | Performed by: NURSE PRACTITIONER

## 2018-09-09 PROCEDURE — A9270 NON-COVERED ITEM OR SERVICE: HCPCS | Mod: GY | Performed by: PHYSICIAN ASSISTANT

## 2018-09-09 PROCEDURE — 25000132 ZZH RX MED GY IP 250 OP 250 PS 637: Mod: GY | Performed by: PHYSICIAN ASSISTANT

## 2018-09-09 PROCEDURE — 25000132 ZZH RX MED GY IP 250 OP 250 PS 637: Mod: GY | Performed by: STUDENT IN AN ORGANIZED HEALTH CARE EDUCATION/TRAINING PROGRAM

## 2018-09-09 PROCEDURE — 83735 ASSAY OF MAGNESIUM: CPT | Performed by: NURSE PRACTITIONER

## 2018-09-09 PROCEDURE — 36592 COLLECT BLOOD FROM PICC: CPT | Performed by: NURSE PRACTITIONER

## 2018-09-09 PROCEDURE — 12000026 ZZH R&B TRANSPLANT

## 2018-09-09 PROCEDURE — 27210429 ZZH NUTRITION PRODUCT INTERMEDIATE LITER

## 2018-09-09 PROCEDURE — 40000893 ZZH STATISTIC PT IP EVAL DEFER: Performed by: PHYSICAL THERAPIST

## 2018-09-09 PROCEDURE — 84100 ASSAY OF PHOSPHORUS: CPT | Performed by: NURSE PRACTITIONER

## 2018-09-09 PROCEDURE — A9270 NON-COVERED ITEM OR SERVICE: HCPCS | Mod: GY | Performed by: NURSE PRACTITIONER

## 2018-09-09 PROCEDURE — 25000131 ZZH RX MED GY IP 250 OP 636 PS 637: Mod: GY | Performed by: NURSE PRACTITIONER

## 2018-09-09 PROCEDURE — 80076 HEPATIC FUNCTION PANEL: CPT | Performed by: NURSE PRACTITIONER

## 2018-09-09 PROCEDURE — 80048 BASIC METABOLIC PNL TOTAL CA: CPT | Performed by: NURSE PRACTITIONER

## 2018-09-09 PROCEDURE — 85025 COMPLETE CBC W/AUTO DIFF WBC: CPT | Performed by: NURSE PRACTITIONER

## 2018-09-09 PROCEDURE — 25000125 ZZHC RX 250: Performed by: STUDENT IN AN ORGANIZED HEALTH CARE EDUCATION/TRAINING PROGRAM

## 2018-09-09 PROCEDURE — 85610 PROTHROMBIN TIME: CPT | Performed by: NURSE PRACTITIONER

## 2018-09-09 RX ORDER — WARFARIN SODIUM 6 MG/1
6 TABLET ORAL
Status: COMPLETED | OUTPATIENT
Start: 2018-09-09 | End: 2018-09-09

## 2018-09-09 RX ADMIN — Medication 5 MG: at 02:46

## 2018-09-09 RX ADMIN — DOCUSATE SODIUM 100 MG: 50 LIQUID ORAL at 08:32

## 2018-09-09 RX ADMIN — Medication 7.5 MG: at 20:33

## 2018-09-09 RX ADMIN — SULFAMETHOXAZOLE AND TRIMETHOPRIM 80 MG: 200; 40 SUSPENSION ORAL at 08:31

## 2018-09-09 RX ADMIN — ONDANSETRON 4 MG: 4 TABLET, ORALLY DISINTEGRATING ORAL at 08:44

## 2018-09-09 RX ADMIN — VALGANCICLOVIR HYDROCHLORIDE 450 MG: 50 POWDER, FOR SOLUTION ORAL at 10:51

## 2018-09-09 RX ADMIN — BUMETANIDE 1 MG: 1 TABLET ORAL at 08:33

## 2018-09-09 RX ADMIN — Medication 6 MG: at 08:32

## 2018-09-09 RX ADMIN — SENNOSIDES A AND B 5 ML: 415.36 LIQUID ORAL at 08:32

## 2018-09-09 RX ADMIN — ACETAMINOPHEN 650 MG: 325 SOLUTION ORAL at 08:29

## 2018-09-09 RX ADMIN — HEPARIN SODIUM 400 UNITS/HR: 10000 INJECTION, SOLUTION INTRAVENOUS at 05:43

## 2018-09-09 RX ADMIN — MULTIVITAMIN 15 ML: LIQUID ORAL at 08:32

## 2018-09-09 RX ADMIN — LIDOCAINE 2 PATCH: 560 PATCH PERCUTANEOUS; TOPICAL; TRANSDERMAL at 08:46

## 2018-09-09 RX ADMIN — WARFARIN SODIUM 6 MG: 6 TABLET ORAL at 18:34

## 2018-09-09 RX ADMIN — NYSTATIN 500000 UNITS: 100000 SUSPENSION ORAL at 20:34

## 2018-09-09 RX ADMIN — BISACODYL 10 MG: 10 SUPPOSITORY RECTAL at 18:34

## 2018-09-09 RX ADMIN — Medication 325 MG: at 08:33

## 2018-09-09 RX ADMIN — NYSTATIN 500000 UNITS: 100000 SUSPENSION ORAL at 16:33

## 2018-09-09 RX ADMIN — Medication 20 MG: at 08:33

## 2018-09-09 RX ADMIN — Medication 5 MG: at 11:59

## 2018-09-09 RX ADMIN — CYCLOBENZAPRINE HYDROCHLORIDE 10 MG: 5 TABLET, FILM COATED ORAL at 16:31

## 2018-09-09 RX ADMIN — GABAPENTIN 300 MG: 300 CAPSULE ORAL at 21:31

## 2018-09-09 RX ADMIN — MYCOPHENOLATE MOFETIL 1000 MG: 200 POWDER, FOR SUSPENSION ORAL at 18:34

## 2018-09-09 RX ADMIN — Medication 7 MG: at 18:34

## 2018-09-09 RX ADMIN — MICAFUNGIN SODIUM 100 MG: 10 INJECTION, POWDER, LYOPHILIZED, FOR SOLUTION INTRAVENOUS at 11:02

## 2018-09-09 RX ADMIN — MYCOPHENOLATE MOFETIL 1000 MG: 200 POWDER, FOR SUSPENSION ORAL at 08:31

## 2018-09-09 RX ADMIN — PRAMIPEXOLE DIHYDROCHLORIDE 0.5 MG: 0.5 TABLET ORAL at 21:32

## 2018-09-09 RX ADMIN — NYSTATIN 500000 UNITS: 100000 SUSPENSION ORAL at 12:00

## 2018-09-09 RX ADMIN — NYSTATIN 500000 UNITS: 100000 SUSPENSION ORAL at 08:34

## 2018-09-09 ASSESSMENT — ACTIVITIES OF DAILY LIVING (ADL)
ADLS_ACUITY_SCORE: 9

## 2018-09-09 ASSESSMENT — PAIN DESCRIPTION - DESCRIPTORS
DESCRIPTORS: ACHING
DESCRIPTORS: ACHING

## 2018-09-09 NOTE — PROGRESS NOTES
Immunosuppression Note:    Sherrie Lala is a 63 year old female who is seen today  for immunosuppression management     I, Darren Rod MD, I have examined the patient with the resident/PA/Fellow, discussed and agree with the note and findings.  I have reviewed today's vital signs, medications, labs and imaging. I reviewed the immunosuppression medications and levels. I spoke to the patient/family and explained below clinical details and answered all the questions      Transplant Surgery  Inpatient Daily Progress Note  09/09/2018    Assessment & Plan: Sherrie Lala is a 63-year old female with a history of metastatic cholangiocarcinoma with subsequent liver failure who is now s/p DDLT on 8/30/18. The patient had significant intraoperative blood loss (~3L) and her abdomen was left open. She underwent washout and abdominal closure on 9/1.    Graft function: POD #10. LFTs have normalized. Liver US POD#1 with limited views, and a persistent eccentric thrombus in the intrahepatic main portal vein extending into the right portal vein. Continue heparin drip at 400u/hr for HAT ppx. Warfarin started 9/5, goal INR 1.5-2 x 1 year.     Immunosuppression management:  - Received induction with steroid taper and Basiliximab on POD#1 to delay initiation of Tacrolimus.  - MMF: Continue 1gm BID.  - Tacrolimus: Goal range 10-15. Level 4.5 (12h),  6mg BID, level today pending.  Complexity of management: Medium. Contributing factors: thrombocytopenia, anemia and organ dysfunction  Hematology:   Acute blood loss anemia:  Significant bleeding intra-op, requiring 11u PRBCs and other blood products.  Last blood transfusion on 9/1.  Acute hgb drop 8.3->7.7 with new right flank ecchymosis.  No sign of active bleeding today.  Recheck hgb this afternoon.  Thrombocytopenia: Patient with baseline platelet count ~30 prior to transplant. Platelet count remains low at 47 today, last platelet transfusion on 9/2.   Anticoagulation:  Portal vein  thrombus.  Continue heparin drip at 400u/hr for HAT ppx. Warfarin started 9/5, goal INR 1.5-2 x 1 year.  INR 1.2.  Close monitoring due to abd wall bleeding last night.  Cardiorespiratory: VSS. On room air.   GI/Nutrition: NJ feeding tube in place with feeds (goal 50/hour) due to abdominal distention. Had return of bowel function but remains distended. Reg diet with isadora counts. Encouraged supplements. Will cycle TF starting tomorrow.   Endocrine: -137, DC sliding scale.  Fluid/Electrolytes: No MIVF. Weight remains ~3kg over admission with significant edema.  Lasix 40mg IV x1 today.  : No acute issues  Infectious disease: Afebrile, normal WBC count. Completed Vanco, Zosyn, and Fluconazole for 3 days for surgical ppx. Per report donor with caseating lymph nodes, consulted ID for work up. Also with Candida positive donor urine and sputum cultures, started Micafungin x 10 days. Awaiting more details on donor.  Fungal studies ordered.  See ID note.  Pain: Patient with significant amount of abdominal pain since surgery. Required Oxycodone, morphine PCA, and Flexeril. Pain management consulted. Pain improving.  Now on Oxycodone 5-7.5 mg every 3 hours prn, Flexeril PRN, and lidocaine patches.   Prophylaxis: GI, DVT, heparin, Bactrim, Valcyte  Disposition: 7A, potentially DC tomorrow     Medical Decision Making: Moderate  Subsequent visit 04657 (moderate level decision making)    DREA/Fellow:  Derik Garcias, fellow  Faculty: Darren Rod M.D.  __________________________________________________________________  Transplant History: Admitted 8/29/2018 for orthotopic liver transplantation.   The patient has a history of liver failure due to cholangiocarcinoma.    8/30/2018 (Liver), Postoperative day: 10     Interval History:   No acute events overnight, reports abdominal pain improved; denies nausea, still states poor appetite;      ROS:   A 10-point review of systems was negative except as noted above.    Meds:     aspirin  325 mg Oral Daily     bumetanide  1 mg Oral Daily     sennosides  5-10 mL Oral BID    And     docusate   mg Oral BID     gabapentin  300 mg Oral At Bedtime     lidocaine  2 patch Transdermal Q24H     lidocaine   Transdermal Q8H     lidocaine   Transdermal Q24h     micafungin  100 mg Intravenous Q24H     multivitamins with minerals  15 mL Per Feeding Tube Daily     mycophenolate  1,000 mg Oral BID IS    Or     mycophenolate  1,000 mg Oral or NG Tube BID IS     nystatin  500,000 Units Mouth/Throat 4x Daily     omeprazole  20 mg Oral QAM AC     pink lady enema  286 mL Rectal Once     pramipexole (MIRAPEX) tablet 0.5 mg  0.5 mg Oral Daily     sodium chloride (PF)  3 mL Intracatheter Q8H     sodium chloride (PF)  3 mL Intravenous Q8H     sulfamethoxazole-trimethoprim  80 mg Oral Daily     tacrolimus  6 mg Oral BID IS     valGANciclovir  450 mg Oral Daily    Or     valGANciclovir  450 mg Oral or NG Tube Daily       Physical Exam:     Admit Weight: 55.2 kg (121 lb 9.6 oz)  Today's weight: 131 lbs 6.4 oz    Vital sign ranges:    Temp:  [98  F (36.7  C)-98.2  F (36.8  C)] 98.2  F (36.8  C)  Pulse:  [87-91] 91  Heart Rate:  [81-90] 90  Resp:  [18-22] 20  BP: ()/(66-70) 111/70  SpO2:  [97 %-100 %] 99 %    GENERAL: NAD  SKIN: No jaundice. No rashes or lesions.   HEENT: Eyes symmetrical and free of discharge bilaterally.  CV: Regular rate and rhythm.   RESPIRATORY: Nonlabored breathing on room air.   GI: Distended. Surgical incision healing well with sutures intact.  : No sanchez  EXTREMITIES: +1 BLE edema  NEUROLOGIC: Alert and oriented x 4. No focal deficits. Tremor absent.  MUSCULOSKELETAL:  Ecchymosis right flank (marked)    Data:   CMP    Recent Labs  Lab 09/08/18  0633 09/07/18  0543    141   POTASSIUM 4.5 4.1   CHLORIDE 106 108   CO2 28 27   GLC 98 108*   BUN 19 15   CR 0.62 0.62   GFRESTIMATED >90 >90   GFRESTBLACK >90 >90   SHELDON 7.3* 7.6*   MAG 2.3 2.5*   PHOS 3.5 3.1   ALBUMIN 2.4* 2.4*    BILITOTAL 0.6 0.6   ALKPHOS 64 64   AST 15 15   ALT 38 42     CBC    Recent Labs  Lab 09/08/18  0633 09/07/18  1254 09/07/18  0543  09/02/18  1701   HGB 7.8* 7.9* 7.7*  < > 8.0*   WBC 3.3*  --  2.8*  < > 6.0   PLT 57*  --  47*  < > 46*   A1C  --   --   --   --  4.9   < > = values in this interval not displayed.  COAGS    Recent Labs  Lab 09/08/18  0633 09/07/18  0543  09/04/18  0552   INR 1.27* 1.18*  < >  --    PTT  --   --   --  28   < > = values in this interval not displayed.   Urinalysis  Recent Labs   Lab Test  02/26/18   0947   COLOR  Yellow   APPEARANCE  Clear   URINEGLC  Negative   URINEBILI  Negative   URINEKETONE  Negative   SG  1.017   UBLD  Negative   URINEPH  5.0   PROTEIN  Negative   NITRITE  Negative   LEUKEST  Negative   RBCU  1   WBCU  1   UTPG  0.06     Virology:  Hepatitis C Antibody   Date Value Ref Range Status   08/29/2018 Nonreactive NR^Nonreactive Final     Comment:     Assay performance characteristics have not been established for newborns,   infants, and children

## 2018-09-09 NOTE — PROGRESS NOTES
Calorie Count  Intake recorded for: 9/8 Kcals: 561   Protein: 21g  # Meals Recorded: 100% banana bread, boiled egg, espinal strip  # Supplements Recorded: 100% 1 Boost breeze

## 2018-09-09 NOTE — PHARMACY
[facility] Prior to Discharge Note  63 year old female s/p liver donation surgery on 8/30/18     Pharmacy has monitored for any potential medication issues.  In anticipation for discharge, medication therapy needs have been addressed daily throughout the current admission via multidisciplinary rounds and/or discussions, order verification, daily clinical pharmacy review, and communication with prescribers.    Dl Brown, PharmD

## 2018-09-10 ENCOUNTER — APPOINTMENT (OUTPATIENT)
Dept: PHYSICAL THERAPY | Facility: CLINIC | Age: 63
DRG: 005 | End: 2018-09-10
Attending: TRANSPLANT SURGERY
Payer: MEDICARE

## 2018-09-10 ENCOUNTER — TELEPHONE (OUTPATIENT)
Dept: PHARMACY | Facility: CLINIC | Age: 63
End: 2018-09-10

## 2018-09-10 LAB
ALBUMIN SERPL-MCNC: 2.3 G/DL (ref 3.4–5)
ALP SERPL-CCNC: 67 U/L (ref 40–150)
ALT SERPL W P-5'-P-CCNC: 32 U/L (ref 0–50)
ANION GAP SERPL CALCULATED.3IONS-SCNC: 7 MMOL/L (ref 3–14)
AST SERPL W P-5'-P-CCNC: 12 U/L (ref 0–45)
BASOPHILS # BLD AUTO: 0 10E9/L (ref 0–0.2)
BASOPHILS NFR BLD AUTO: 0.8 %
BILIRUB DIRECT SERPL-MCNC: 0.2 MG/DL (ref 0–0.2)
BILIRUB SERPL-MCNC: 0.5 MG/DL (ref 0.2–1.3)
BUN SERPL-MCNC: 17 MG/DL (ref 7–30)
CALCIUM SERPL-MCNC: 7.2 MG/DL (ref 8.5–10.1)
CHLORIDE SERPL-SCNC: 105 MMOL/L (ref 94–109)
CO2 SERPL-SCNC: 26 MMOL/L (ref 20–32)
CREAT SERPL-MCNC: 0.67 MG/DL (ref 0.52–1.04)
DIFFERENTIAL METHOD BLD: ABNORMAL
EOSINOPHIL # BLD AUTO: 0.2 10E9/L (ref 0–0.7)
EOSINOPHIL NFR BLD AUTO: 5 %
ERYTHROCYTE [DISTWIDTH] IN BLOOD BY AUTOMATED COUNT: 20.1 % (ref 10–15)
GFR SERPL CREATININE-BSD FRML MDRD: 90 ML/MIN/1.7M2
GLUCOSE SERPL-MCNC: 97 MG/DL (ref 70–99)
HCT VFR BLD AUTO: 23.2 % (ref 35–47)
HGB BLD-MCNC: 7.5 G/DL (ref 11.7–15.7)
IMM GRANULOCYTES # BLD: 0 10E9/L (ref 0–0.4)
IMM GRANULOCYTES NFR BLD: 0.5 %
INR PPP: 2.26 (ref 0.86–1.14)
LYMPHOCYTES # BLD AUTO: 0.4 10E9/L (ref 0.8–5.3)
LYMPHOCYTES NFR BLD AUTO: 11.5 %
MAGNESIUM SERPL-MCNC: 2.4 MG/DL (ref 1.6–2.3)
MCH RBC QN AUTO: 28.8 PG (ref 26.5–33)
MCHC RBC AUTO-ENTMCNC: 32.3 G/DL (ref 31.5–36.5)
MCV RBC AUTO: 89 FL (ref 78–100)
MONOCYTES # BLD AUTO: 0.2 10E9/L (ref 0–1.3)
MONOCYTES NFR BLD AUTO: 5.2 %
NEUTROPHILS # BLD AUTO: 2.9 10E9/L (ref 1.6–8.3)
NEUTROPHILS NFR BLD AUTO: 77 %
NRBC # BLD AUTO: 0 10*3/UL
NRBC BLD AUTO-RTO: 0 /100
PHOSPHATE SERPL-MCNC: 3.4 MG/DL (ref 2.5–4.5)
PLATELET # BLD AUTO: 69 10E9/L (ref 150–450)
POTASSIUM SERPL-SCNC: 4.6 MMOL/L (ref 3.4–5.3)
PROT SERPL-MCNC: 5.2 G/DL (ref 6.8–8.8)
RBC # BLD AUTO: 2.6 10E12/L (ref 3.8–5.2)
SODIUM SERPL-SCNC: 137 MMOL/L (ref 133–144)
TACROLIMUS BLD-MCNC: 7.3 UG/L (ref 5–15)
TME LAST DOSE: NORMAL H
WBC # BLD AUTO: 3.8 10E9/L (ref 4–11)

## 2018-09-10 PROCEDURE — 25000132 ZZH RX MED GY IP 250 OP 250 PS 637: Mod: GY | Performed by: STUDENT IN AN ORGANIZED HEALTH CARE EDUCATION/TRAINING PROGRAM

## 2018-09-10 PROCEDURE — A9270 NON-COVERED ITEM OR SERVICE: HCPCS | Mod: GY | Performed by: NURSE PRACTITIONER

## 2018-09-10 PROCEDURE — A9270 NON-COVERED ITEM OR SERVICE: HCPCS | Mod: GY | Performed by: TRANSPLANT SURGERY

## 2018-09-10 PROCEDURE — 25000131 ZZH RX MED GY IP 250 OP 636 PS 637: Mod: GY | Performed by: STUDENT IN AN ORGANIZED HEALTH CARE EDUCATION/TRAINING PROGRAM

## 2018-09-10 PROCEDURE — 97112 NEUROMUSCULAR REEDUCATION: CPT | Mod: GP | Performed by: PHYSICAL THERAPIST

## 2018-09-10 PROCEDURE — A9270 NON-COVERED ITEM OR SERVICE: HCPCS | Mod: GY | Performed by: STUDENT IN AN ORGANIZED HEALTH CARE EDUCATION/TRAINING PROGRAM

## 2018-09-10 PROCEDURE — 25000132 ZZH RX MED GY IP 250 OP 250 PS 637: Mod: GY | Performed by: NURSE PRACTITIONER

## 2018-09-10 PROCEDURE — 83735 ASSAY OF MAGNESIUM: CPT | Performed by: NURSE PRACTITIONER

## 2018-09-10 PROCEDURE — A9270 NON-COVERED ITEM OR SERVICE: HCPCS | Mod: GY | Performed by: PHYSICIAN ASSISTANT

## 2018-09-10 PROCEDURE — 85025 COMPLETE CBC W/AUTO DIFF WBC: CPT | Performed by: NURSE PRACTITIONER

## 2018-09-10 PROCEDURE — 80048 BASIC METABOLIC PNL TOTAL CA: CPT | Performed by: NURSE PRACTITIONER

## 2018-09-10 PROCEDURE — 97110 THERAPEUTIC EXERCISES: CPT | Mod: GP | Performed by: PHYSICAL THERAPIST

## 2018-09-10 PROCEDURE — 25000128 H RX IP 250 OP 636: Performed by: PHYSICIAN ASSISTANT

## 2018-09-10 PROCEDURE — 84100 ASSAY OF PHOSPHORUS: CPT | Performed by: NURSE PRACTITIONER

## 2018-09-10 PROCEDURE — 25000132 ZZH RX MED GY IP 250 OP 250 PS 637: Mod: GY | Performed by: PHYSICIAN ASSISTANT

## 2018-09-10 PROCEDURE — 85610 PROTHROMBIN TIME: CPT | Performed by: NURSE PRACTITIONER

## 2018-09-10 PROCEDURE — 40000193 ZZH STATISTIC PT WARD VISIT: Performed by: PHYSICAL THERAPIST

## 2018-09-10 PROCEDURE — 12000026 ZZH R&B TRANSPLANT

## 2018-09-10 PROCEDURE — 80076 HEPATIC FUNCTION PANEL: CPT | Performed by: NURSE PRACTITIONER

## 2018-09-10 PROCEDURE — 36592 COLLECT BLOOD FROM PICC: CPT | Performed by: NURSE PRACTITIONER

## 2018-09-10 PROCEDURE — 80197 ASSAY OF TACROLIMUS: CPT | Performed by: NURSE PRACTITIONER

## 2018-09-10 PROCEDURE — 25000125 ZZHC RX 250: Performed by: STUDENT IN AN ORGANIZED HEALTH CARE EDUCATION/TRAINING PROGRAM

## 2018-09-10 PROCEDURE — 25000132 ZZH RX MED GY IP 250 OP 250 PS 637: Mod: GY | Performed by: TRANSPLANT SURGERY

## 2018-09-10 RX ORDER — ALUMINA, MAGNESIA, AND SIMETHICONE 2400; 2400; 240 MG/30ML; MG/30ML; MG/30ML
30 SUSPENSION ORAL EVERY 6 HOURS PRN
Status: DISCONTINUED | OUTPATIENT
Start: 2018-09-10 | End: 2018-09-12 | Stop reason: HOSPADM

## 2018-09-10 RX ORDER — WARFARIN SODIUM 1 MG/1
1 TABLET ORAL
Status: COMPLETED | OUTPATIENT
Start: 2018-09-10 | End: 2018-09-10

## 2018-09-10 RX ORDER — PANTOPRAZOLE SODIUM 40 MG/1
40 TABLET, DELAYED RELEASE ORAL
Status: DISCONTINUED | OUTPATIENT
Start: 2018-09-11 | End: 2018-09-12 | Stop reason: HOSPADM

## 2018-09-10 RX ADMIN — CYCLOBENZAPRINE HYDROCHLORIDE 10 MG: 5 TABLET, FILM COATED ORAL at 00:00

## 2018-09-10 RX ADMIN — CYCLOBENZAPRINE HYDROCHLORIDE 10 MG: 5 TABLET, FILM COATED ORAL at 20:04

## 2018-09-10 RX ADMIN — CYCLOBENZAPRINE HYDROCHLORIDE 5 MG: 5 TABLET, FILM COATED ORAL at 13:48

## 2018-09-10 RX ADMIN — Medication 20 MG: at 16:45

## 2018-09-10 RX ADMIN — Medication 7 MG: at 18:13

## 2018-09-10 RX ADMIN — Medication 5 MG: at 16:45

## 2018-09-10 RX ADMIN — ONDANSETRON 4 MG: 4 TABLET, ORALLY DISINTEGRATING ORAL at 16:48

## 2018-09-10 RX ADMIN — Medication 7.5 MG: at 00:00

## 2018-09-10 RX ADMIN — NYSTATIN 500000 UNITS: 100000 SUSPENSION ORAL at 18:10

## 2018-09-10 RX ADMIN — GABAPENTIN 300 MG: 300 CAPSULE ORAL at 21:58

## 2018-09-10 RX ADMIN — ONDANSETRON 4 MG: 4 TABLET, ORALLY DISINTEGRATING ORAL at 08:25

## 2018-09-10 RX ADMIN — Medication 5 MG: at 21:57

## 2018-09-10 RX ADMIN — WARFARIN SODIUM 1 MG: 1 TABLET ORAL at 18:10

## 2018-09-10 RX ADMIN — MICAFUNGIN SODIUM 100 MG: 10 INJECTION, POWDER, LYOPHILIZED, FOR SOLUTION INTRAVENOUS at 13:51

## 2018-09-10 RX ADMIN — MYCOPHENOLATE MOFETIL 1000 MG: 200 POWDER, FOR SUSPENSION ORAL at 18:10

## 2018-09-10 RX ADMIN — VALGANCICLOVIR HYDROCHLORIDE 450 MG: 50 POWDER, FOR SOLUTION ORAL at 08:59

## 2018-09-10 RX ADMIN — BUMETANIDE 1 MG: 1 TABLET ORAL at 08:59

## 2018-09-10 RX ADMIN — Medication 7.5 MG: at 03:16

## 2018-09-10 RX ADMIN — LIDOCAINE 2 PATCH: 560 PATCH PERCUTANEOUS; TOPICAL; TRANSDERMAL at 08:00

## 2018-09-10 RX ADMIN — PRAMIPEXOLE DIHYDROCHLORIDE 0.5 MG: 0.5 TABLET ORAL at 20:04

## 2018-09-10 RX ADMIN — SENNOSIDES A AND B 10 ML: 415.36 LIQUID ORAL at 08:59

## 2018-09-10 RX ADMIN — MULTIVITAMIN 15 ML: LIQUID ORAL at 08:59

## 2018-09-10 RX ADMIN — SULFAMETHOXAZOLE AND TRIMETHOPRIM 80 MG: 200; 40 SUSPENSION ORAL at 08:59

## 2018-09-10 RX ADMIN — Medication 325 MG: at 08:59

## 2018-09-10 RX ADMIN — Medication 2.5 MG: at 10:44

## 2018-09-10 RX ADMIN — BISACODYL 10 MG: 10 SUPPOSITORY RECTAL at 15:57

## 2018-09-10 RX ADMIN — MYCOPHENOLATE MOFETIL 1000 MG: 200 POWDER, FOR SUSPENSION ORAL at 08:59

## 2018-09-10 RX ADMIN — Medication 5 MG: at 13:45

## 2018-09-10 RX ADMIN — Medication 20 MG: at 08:55

## 2018-09-10 RX ADMIN — NYSTATIN 500000 UNITS: 100000 SUSPENSION ORAL at 09:00

## 2018-09-10 RX ADMIN — ACETAMINOPHEN 650 MG: 325 SOLUTION ORAL at 15:57

## 2018-09-10 RX ADMIN — DOCUSATE SODIUM 100 MG: 50 LIQUID ORAL at 09:00

## 2018-09-10 RX ADMIN — Medication 7 MG: at 08:58

## 2018-09-10 ASSESSMENT — ACTIVITIES OF DAILY LIVING (ADL)
ADLS_ACUITY_SCORE: 9

## 2018-09-10 ASSESSMENT — PAIN DESCRIPTION - DESCRIPTORS
DESCRIPTORS: SORE
DESCRIPTORS: ACHING

## 2018-09-10 NOTE — PROGRESS NOTES
Care Coordinator  D: Sherrie Lala is a 63-year old female with a history of metastatic cholangiocarcinoma with subsequent liver failure who is now s/p DDLT on 8/30/18. The patient had significant intraoperative blood loss (~3L) and her abdomen was left open. She underwent washout and abdominal closure on 9/1.   Graft function: POD #10. LFTs have normalized. Liver US POD#1 with limited views, and a persistent eccentric thrombus in the intrahepatic main portal vein extending into the right portal vein. Continue heparin drip at 400u/hr for HAT ppx. Warfarin started 9/5, goal INR 1.5-2 x 1 year--note per DR Garcias 9/9/18.     I: I have set up pt for INR monitoring with:  [facility]     [place], [place]   Ph: [phone]   F: [phone]     MD: Dr Apollo Case   INR Nurses: Monday-Friday     Indication: s/p Liver Transplant with portal vein thrombosis   Gaol INR: 1.5-2.0   Duration: 1 year--9/10/2019       Sherrie--you have an appointment on Friday, 9/14 @ 11am at the above clinic to establish care for INR monitoring                A: pvt s/p LT  P: I have fax'd pertinent information to pt's [facility]--discharge orders need to be sent to them, and I have included info to her HHN. Will follow.

## 2018-09-10 NOTE — PROGRESS NOTES
Immunosuppression Note:    Sherrie Lala is a 63 year old female who is seen today  for immunosuppression management     I, Darren Rod MD, I have examined the patient with the resident/PA/Fellow, discussed and agree with the note and findings.  I have reviewed today's vital signs, medications, labs and imaging. I reviewed the immunosuppression medications and levels. I spoke to the patient/family and explained below clinical details and answered all the questions      Transplant Surgery  Inpatient Daily Progress Note  09/10/2018    Assessment & Plan: Sherrie Lala is a 63-year old female with a history of metastatic cholangiocarcinoma with subsequent liver failure who is now s/p DDLT on 8/30/18. The patient had significant intraoperative blood loss (~3L) and her abdomen was left open. She underwent washout and abdominal closure on 9/1.    Graft function: POD #11. LFTs have normalized. TB 0.5. Liver US POD#1 with limited views, and a persistent eccentric thrombus in the intrahepatic main portal vein extending into the right portal vein. Started on a heparin drip at 400u/hr for PVT. Warfarin started 9/5, goal INR 1.5-2 x 1 year.     Immunosuppression management:  - Received induction with steroid taper and Basiliximab on POD#1 to delay initiation of Tacrolimus.  - MMF: Continue 1gm BID.  - Tacrolimus: Goal range 10-15. Level 7.3(11.5h),  On 7mg BID. Continue same dose today.  Complexity of management: Medium. Contributing factors: thrombocytopenia, anemia and organ dysfunction  Hematology:   Acute blood loss anemia:  Significant bleeding intra-op, requiring 11u PRBCs and other blood products.  Last blood transfusion on 9/1.  Acute hgb drop 8.3->7.7 with new right flank ecchymosis.  No sign of active bleeding today.  Hgb remains stable ~7.5-7.8 .  Thrombocytopenia: Patient with baseline platelet count ~30 prior to transplant. Platelet count remains low but improving, 69 today. Last platelet transfusion on 9/2.    Anticoagulation:  Portal vein thrombus.  Currently on heparin drip at 400u/hr for PVT-stop today as INR 2.26. Warfarin started 9/5, goal INR 1.5-2 x 1 year. Close monitoring due to hx of abd wall bleeding. Current dose is 6mg , will give 1mg tonight.   Cardiorespiratory: VSS. On room air.   GI/Nutrition: Inadequate protein-energy intake:  D/t abdominal distention.   Diet: Regular + Boost Plus/Boost Breeze between meals  Nutrition Support: NJ feeding tube in place with Nutren 1.5 @ 50 ml/hr x 18 hours + 1 pkt ProSource TF, will plan to cycle to 12 hours.   Nausea: Continue zofran PRN. Continue with stool softeners and suppository PRN to maintain regular bowel movements.   Heartburn: Add maalox. DC Omeprazole. Start protonix.  Endocrine: BG , Off insulin.  Fluid/Electrolytes: No MIVF. Weight remains +4.3kg up from admission. Weight stable from previous day. Continue with bumex 1mg daily.  : No acute issues  Infectious disease: Afebrile, normal WBC count. Completed Vanco, Zosyn, and Fluconazole for 3 days for surgical ppx. Per report donor with caseating lymph nodes, consulted ID for work up. Also with Candida positive donor urine and sputum cultures, started Micafungin x 10 days. Awaiting more details on donor.  Fungal studies ordered.  See ID note.  Pain: Postoperative pain: patient with significant amount of abdominal pain since surgery. Required Oxycodone, morphine PCA, and Flexeril. Pain management consulted. Pain now improving.  Current regimen- Oxycodone 5-7.5 mg every 3 hours prn-requesting dose decrease. Will start 2.5-5mg Q3 today. Continue Flexeril PRN, and lidocaine patches.   Prophylaxis: GI, DVT, heparin, Bactrim, Valcyte  Disposition: 7A     Medical Decision Making: Moderate  Subsequent visit 70903 (moderate level decision making)    DREA/Fellow:  Mary Card NP    Faculty: Darren Rod M.D.  __________________________________________________________________  Transplant History:  Admitted 8/29/2018 for orthotopic liver transplantation.   The patient has a history of liver failure due to cholangiocarcinoma.    8/30/2018 (Liver), Postoperative day: 11     Interval History:   No acute events overnight, reports abdominal pain present but stable to RUQ. C/o nausea without vomiting.  Reporting poor appetite but tolerated 3 nutritional supplements yesterday.  BM today following suppository.       ROS:   A 10-point review of systems was negative except as noted above.    Meds:    aspirin  325 mg Oral Daily     bumetanide  1 mg Oral Daily     sennosides  5-10 mL Oral BID    And     docusate   mg Oral BID     gabapentin  300 mg Oral At Bedtime     lidocaine  2 patch Transdermal Q24H     lidocaine   Transdermal Q8H     lidocaine   Transdermal Q24h     micafungin  100 mg Intravenous Q24H     multivitamins with minerals  15 mL Per Feeding Tube Daily     mycophenolate  1,000 mg Oral BID IS    Or     mycophenolate  1,000 mg Oral or NG Tube BID IS     nystatin  500,000 Units Mouth/Throat 4x Daily     omeprazole  20 mg Oral QAM AC     pramipexole (MIRAPEX) tablet 0.5 mg  0.5 mg Oral Daily     sodium chloride (PF)  3 mL Intracatheter Q8H     sodium chloride (PF)  3 mL Intravenous Q8H     sulfamethoxazole-trimethoprim  80 mg Oral Daily     tacrolimus  7 mg Oral BID IS     valGANciclovir  450 mg Oral Daily    Or     valGANciclovir  450 mg Oral or NG Tube Daily     warfarin  1 mg Oral ONCE at 18:00       Physical Exam:     Admit Weight: 55.2 kg (121 lb 9.6 oz)  Today's weight: 131 lbs 1.6 oz    Vital sign ranges:    Temp:  [97.5  F (36.4  C)-98.4  F (36.9  C)] 98.4  F (36.9  C)  Heart Rate:  [82-89] 89  Resp:  [16-18] 16  BP: (101-125)/(60-79) 112/72  SpO2:  [96 %-100 %] 98 %    GENERAL: NAD  SKIN: No jaundice. No rashes or lesions.   HEENT: Eyes symmetrical and free of discharge bilaterally. Right Nare NJ.   CV: Regular rate and rhythm.   RESPIRATORY: Nonlabored breathing on room air.   GI: Distended.  rounded, semi-firm. +BS x4.  Surgical incision with sutures CDI. Resolving ecchymosis to right flank (marked) SANTOS x2 with serous, #2 with slightly milky output.  : No sanchez  EXTREMITIES: +1 BLE pitting edema R>L  NEUROLOGIC: Alert and oriented x 4. No focal deficits. Tremor absent.  Lines: RIJ CVL CDI    Data:   CMP    Recent Labs  Lab 09/10/18  0600 09/09/18  0623    137   POTASSIUM 4.6 4.7   CHLORIDE 105 106   CO2 26 26   GLC 97 106*   BUN 17 18   CR 0.67 0.66   GFRESTIMATED 90 >90   GFRESTBLACK >90 >90   SHELDON 7.2* 7.2*   MAG 2.4* 2.4*   PHOS 3.4 3.4   ALBUMIN 2.3* 2.4*   BILITOTAL 0.5 0.6   ALKPHOS 67 67   AST 12 15   ALT 32 36     CBC    Recent Labs  Lab 09/10/18  0600 09/09/18  0623   HGB 7.5* 7.6*   WBC 3.8* 3.8*   PLT 69* 65*     COAGS    Recent Labs  Lab 09/10/18  0600 09/09/18  0623  09/04/18  0552   INR 2.26* 1.51*  < >  --    PTT  --   --   --  28   < > = values in this interval not displayed.   Urinalysis  Recent Labs   Lab Test  02/26/18   0947   COLOR  Yellow   APPEARANCE  Clear   URINEGLC  Negative   URINEBILI  Negative   URINEKETONE  Negative   SG  1.017   UBLD  Negative   URINEPH  5.0   PROTEIN  Negative   NITRITE  Negative   LEUKEST  Negative   RBCU  1   WBCU  1   UTPG  0.06     Virology:  Hepatitis C Antibody   Date Value Ref Range Status   08/29/2018 Nonreactive NR^Nonreactive Final     Comment:     Assay performance characteristics have not been established for newborns,   infants, and children

## 2018-09-10 NOTE — PROGRESS NOTES
Transplant Social Work Service Discharge Note      Patient Name:  Sherrie Lala     Anticipated Discharge Date:  9/11/18    Discharge Disposition: Home    Plan for 24 hour care for immediate post transplant period: Home with      If not local, plans for short term stay:  NA     Additional Services/Equipment Arranged:  RN CC arranging home discharge, see her note for details.      Persons notified of above discharge plan:  NA, discussed with RN CC and in rounds.     Patient / Family response to discharge plan:  Agreeable     Education and resources provided by SW at discharge: Role of transplant  in out patient setting and provided contact info for , availability of support groups - discussed by previous SW during initial assessment. This writer also discussed Health Psychology resource during previous SW visit.     Discussed anticipated pharmacy out of pocket costs: NO, clinic SW available to follow up.     Provided [employer] Donor Letter Writing packet : NO, clinic SW to follow up.     Plan: Pt anticipated to discharge home with her  to their house in [facility] [place]. RN CC coordinating home discharge; see her note for more details. SW available should further needs arise.     Alba Garcia, MARY, [facility]  - Coverage for 7A Deepa Hawthorne  Ph. [phone]  [email or web address]     NO LETTER

## 2018-09-10 NOTE — PROGRESS NOTES
CLINICAL NUTRITION SERVICES - BRIEF NOTE     Nutrition Prescription    Recommendations already ordered by Registered Dietitian (RD):  Nutren 1.5 @ 50 ml/hr x 12 hours + 1 pkt Prosource TF daily (updated orders for this/discussed with team)    Changed oral supplement order per patient's preference (Boost Plus 2x/day)    Calorie counts (9/10-9/12)       EVALUATION OF THE PROGRESS TOWARD GOALS   Diet: Regular + Boost Plus/Boost Breeze between meals  Nutrition Support: Nutren 1.5 @ 50 ml/hr x 18 hours (ordered this way, however RN notes say providing x 12 hours) + 1 pkt ProSource TF  Intake: Patient reports has been drinking 1 Boost Plus daily, now has a goal of drinking 2 Boost Plus daily (720 kcal, 28 grams protein).  Taking small amounts of food with meals as well.      Interventions  Discussed plan for oral supplements.  She plans to take an oral supplement that she gets from [facility] (has 20 grams protein).  Patient does not think it contains herbal ingredients and it is likely low in calories.  Requested patient increase calories by blending it with ice cream, yogurt, fruit, etc.  Provided Boost Plus coupons as an alternative as well.  Requested patient keep track of oral intake and bring to visit with outpatient RD.       Monitoring/Evaluation  Progress toward goals will be monitored and evaluated per protocol.    Seda Hernandez MS, RD, LD  Pager [phone]

## 2018-09-10 NOTE — DISCHARGE INSTRUCTIONS
________________________________________________________  Discharge RN please fax discharge orders to home care agency: [facility]  --they need signed discharge orders by 12 noon on the day of discharge  ---page hernando Marina Araceli @ [phone] Monday-Friday  ---weekends call office [phone]  ________________________________________________________  And to    [facility]: [phone]--9/12 I fax'd discharge orders and warfarin dosing sheet/pharmacist note @ [phone]    Tube Feedings: Nutren 1.5 @ 50 ml/hr x 12 hours (8pm-8am) + 1 pkt Prosource TF daily  Please keep track of all food/liquids consumed.  Please bring this to your outpatient appointments.   Diet recommendations post-transplant: High protein diet (at least ~85 grams/day) x 8 weeks.  Heart healthy dietary habits long term (low saturated/trans fat, low sodium). Practice food safety precautions. See nutrition handout for more information.

## 2018-09-11 ENCOUNTER — TELEPHONE (OUTPATIENT)
Dept: TRANSPLANT | Facility: CLINIC | Age: 63
End: 2018-09-11

## 2018-09-11 DIAGNOSIS — Z94.4 LIVER REPLACED BY TRANSPLANT (H): Primary | ICD-10-CM

## 2018-09-11 LAB
ALBUMIN SERPL-MCNC: 2.5 G/DL (ref 3.4–5)
ALP SERPL-CCNC: 75 U/L (ref 40–150)
ALT SERPL W P-5'-P-CCNC: 30 U/L (ref 0–50)
ANION GAP SERPL CALCULATED.3IONS-SCNC: 6 MMOL/L (ref 3–14)
AST SERPL W P-5'-P-CCNC: 15 U/L (ref 0–45)
BASOPHILS # BLD AUTO: 0 10E9/L (ref 0–0.2)
BASOPHILS NFR BLD AUTO: 0.6 %
BILIRUB DIRECT SERPL-MCNC: 0.3 MG/DL (ref 0–0.2)
BILIRUB SERPL-MCNC: 1.2 MG/DL (ref 0.2–1.3)
BUN SERPL-MCNC: 16 MG/DL (ref 7–30)
CALCIUM SERPL-MCNC: 7.6 MG/DL (ref 8.5–10.1)
CHLORIDE SERPL-SCNC: 104 MMOL/L (ref 94–109)
CO2 SERPL-SCNC: 25 MMOL/L (ref 20–32)
CREAT SERPL-MCNC: 0.7 MG/DL (ref 0.52–1.04)
DIFFERENTIAL METHOD BLD: ABNORMAL
EOSINOPHIL # BLD AUTO: 0.2 10E9/L (ref 0–0.7)
EOSINOPHIL NFR BLD AUTO: 3.1 %
ERYTHROCYTE [DISTWIDTH] IN BLOOD BY AUTOMATED COUNT: 20.3 % (ref 10–15)
GFR SERPL CREATININE-BSD FRML MDRD: 85 ML/MIN/1.7M2
GLUCOSE SERPL-MCNC: 114 MG/DL (ref 70–99)
HCT VFR BLD AUTO: 24.7 % (ref 35–47)
HGB BLD-MCNC: 7.9 G/DL (ref 11.7–15.7)
IMM GRANULOCYTES # BLD: 0 10E9/L (ref 0–0.4)
IMM GRANULOCYTES NFR BLD: 0.2 %
INR PPP: 1.82 (ref 0.86–1.14)
LYMPHOCYTES # BLD AUTO: 0.5 10E9/L (ref 0.8–5.3)
LYMPHOCYTES NFR BLD AUTO: 9.1 %
MAGNESIUM SERPL-MCNC: 2.5 MG/DL (ref 1.6–2.3)
MCH RBC QN AUTO: 28.5 PG (ref 26.5–33)
MCHC RBC AUTO-ENTMCNC: 32 G/DL (ref 31.5–36.5)
MCV RBC AUTO: 89 FL (ref 78–100)
MONOCYTES # BLD AUTO: 0.2 10E9/L (ref 0–1.3)
MONOCYTES NFR BLD AUTO: 4.1 %
NEUTROPHILS # BLD AUTO: 4.3 10E9/L (ref 1.6–8.3)
NEUTROPHILS NFR BLD AUTO: 82.9 %
NRBC # BLD AUTO: 0 10*3/UL
NRBC BLD AUTO-RTO: 0 /100
PHOSPHATE SERPL-MCNC: 3.6 MG/DL (ref 2.5–4.5)
PLATELET # BLD AUTO: 93 10E9/L (ref 150–450)
POTASSIUM SERPL-SCNC: 4.5 MMOL/L (ref 3.4–5.3)
PROT SERPL-MCNC: 5.5 G/DL (ref 6.8–8.8)
RBC # BLD AUTO: 2.77 10E12/L (ref 3.8–5.2)
SODIUM SERPL-SCNC: 136 MMOL/L (ref 133–144)
TACROLIMUS BLD-MCNC: 6.8 UG/L (ref 5–15)
TME LAST DOSE: NORMAL H
WBC # BLD AUTO: 5.2 10E9/L (ref 4–11)

## 2018-09-11 PROCEDURE — 85610 PROTHROMBIN TIME: CPT | Performed by: NURSE PRACTITIONER

## 2018-09-11 PROCEDURE — 25000132 ZZH RX MED GY IP 250 OP 250 PS 637: Mod: GY | Performed by: STUDENT IN AN ORGANIZED HEALTH CARE EDUCATION/TRAINING PROGRAM

## 2018-09-11 PROCEDURE — A9270 NON-COVERED ITEM OR SERVICE: HCPCS | Mod: GY | Performed by: STUDENT IN AN ORGANIZED HEALTH CARE EDUCATION/TRAINING PROGRAM

## 2018-09-11 PROCEDURE — A9270 NON-COVERED ITEM OR SERVICE: HCPCS | Mod: GY | Performed by: NURSE PRACTITIONER

## 2018-09-11 PROCEDURE — A9270 NON-COVERED ITEM OR SERVICE: HCPCS | Mod: GY | Performed by: PHYSICIAN ASSISTANT

## 2018-09-11 PROCEDURE — 25000131 ZZH RX MED GY IP 250 OP 636 PS 637: Mod: GY | Performed by: STUDENT IN AN ORGANIZED HEALTH CARE EDUCATION/TRAINING PROGRAM

## 2018-09-11 PROCEDURE — A9270 NON-COVERED ITEM OR SERVICE: HCPCS | Mod: GY | Performed by: TRANSPLANT SURGERY

## 2018-09-11 PROCEDURE — 80048 BASIC METABOLIC PNL TOTAL CA: CPT | Performed by: NURSE PRACTITIONER

## 2018-09-11 PROCEDURE — 85025 COMPLETE CBC W/AUTO DIFF WBC: CPT | Performed by: NURSE PRACTITIONER

## 2018-09-11 PROCEDURE — 83735 ASSAY OF MAGNESIUM: CPT | Performed by: NURSE PRACTITIONER

## 2018-09-11 PROCEDURE — 36592 COLLECT BLOOD FROM PICC: CPT | Performed by: NURSE PRACTITIONER

## 2018-09-11 PROCEDURE — 25000132 ZZH RX MED GY IP 250 OP 250 PS 637: Mod: GY | Performed by: NURSE PRACTITIONER

## 2018-09-11 PROCEDURE — 25000131 ZZH RX MED GY IP 250 OP 636 PS 637: Mod: GY | Performed by: NURSE PRACTITIONER

## 2018-09-11 PROCEDURE — 12000026 ZZH R&B TRANSPLANT

## 2018-09-11 PROCEDURE — 84100 ASSAY OF PHOSPHORUS: CPT | Performed by: NURSE PRACTITIONER

## 2018-09-11 PROCEDURE — 25000128 H RX IP 250 OP 636: Performed by: PHYSICIAN ASSISTANT

## 2018-09-11 PROCEDURE — 80197 ASSAY OF TACROLIMUS: CPT | Performed by: NURSE PRACTITIONER

## 2018-09-11 PROCEDURE — 25000132 ZZH RX MED GY IP 250 OP 250 PS 637: Mod: GY | Performed by: PHYSICIAN ASSISTANT

## 2018-09-11 PROCEDURE — 25000132 ZZH RX MED GY IP 250 OP 250 PS 637: Mod: GY | Performed by: TRANSPLANT SURGERY

## 2018-09-11 PROCEDURE — 80076 HEPATIC FUNCTION PANEL: CPT | Performed by: NURSE PRACTITIONER

## 2018-09-11 RX ORDER — WARFARIN SODIUM 4 MG/1
4 TABLET ORAL
Status: COMPLETED | OUTPATIENT
Start: 2018-09-11 | End: 2018-09-11

## 2018-09-11 RX ADMIN — Medication 325 MG: at 08:36

## 2018-09-11 RX ADMIN — NYSTATIN 500000 UNITS: 100000 SUSPENSION ORAL at 15:55

## 2018-09-11 RX ADMIN — SENNOSIDES A AND B 5 ML: 415.36 LIQUID ORAL at 08:35

## 2018-09-11 RX ADMIN — SENNOSIDES A AND B 10 ML: 415.36 LIQUID ORAL at 20:10

## 2018-09-11 RX ADMIN — PANTOPRAZOLE SODIUM 40 MG: 40 TABLET, DELAYED RELEASE ORAL at 15:55

## 2018-09-11 RX ADMIN — PRAMIPEXOLE DIHYDROCHLORIDE 0.5 MG: 0.5 TABLET ORAL at 20:10

## 2018-09-11 RX ADMIN — CYCLOBENZAPRINE HYDROCHLORIDE 10 MG: 5 TABLET, FILM COATED ORAL at 20:10

## 2018-09-11 RX ADMIN — DOCUSATE SODIUM 100 MG: 50 LIQUID ORAL at 20:10

## 2018-09-11 RX ADMIN — WARFARIN SODIUM 4 MG: 4 TABLET ORAL at 18:10

## 2018-09-11 RX ADMIN — Medication 5 MG: at 01:16

## 2018-09-11 RX ADMIN — CYCLOBENZAPRINE HYDROCHLORIDE 5 MG: 5 TABLET, FILM COATED ORAL at 15:55

## 2018-09-11 RX ADMIN — PANTOPRAZOLE SODIUM 40 MG: 40 TABLET, DELAYED RELEASE ORAL at 08:35

## 2018-09-11 RX ADMIN — MYCOPHENOLATE MOFETIL 1000 MG: 200 POWDER, FOR SUSPENSION ORAL at 18:12

## 2018-09-11 RX ADMIN — SULFAMETHOXAZOLE AND TRIMETHOPRIM 80 MG: 200; 40 SUSPENSION ORAL at 08:36

## 2018-09-11 RX ADMIN — BUMETANIDE 1 MG: 1 TABLET ORAL at 08:35

## 2018-09-11 RX ADMIN — Medication 5 MG: at 20:10

## 2018-09-11 RX ADMIN — BISACODYL 10 MG: 10 SUPPOSITORY RECTAL at 15:55

## 2018-09-11 RX ADMIN — MYCOPHENOLATE MOFETIL 1000 MG: 200 POWDER, FOR SUSPENSION ORAL at 08:36

## 2018-09-11 RX ADMIN — NYSTATIN 500000 UNITS: 100000 SUSPENSION ORAL at 08:37

## 2018-09-11 RX ADMIN — TACROLIMUS 7 MG: 5 CAPSULE ORAL at 18:10

## 2018-09-11 RX ADMIN — Medication 7 MG: at 08:36

## 2018-09-11 RX ADMIN — Medication 5 MG: at 04:46

## 2018-09-11 RX ADMIN — CYCLOBENZAPRINE HYDROCHLORIDE 5 MG: 5 TABLET, FILM COATED ORAL at 01:24

## 2018-09-11 RX ADMIN — MULTIVITAMIN 15 ML: LIQUID ORAL at 08:37

## 2018-09-11 RX ADMIN — LIDOCAINE 2 PATCH: 560 PATCH PERCUTANEOUS; TOPICAL; TRANSDERMAL at 08:39

## 2018-09-11 RX ADMIN — Medication 5 MG: at 12:20

## 2018-09-11 RX ADMIN — MICAFUNGIN SODIUM 100 MG: 10 INJECTION, POWDER, LYOPHILIZED, FOR SOLUTION INTRAVENOUS at 11:23

## 2018-09-11 RX ADMIN — Medication 2.5 MG: at 08:33

## 2018-09-11 RX ADMIN — ACETAMINOPHEN 650 MG: 325 SOLUTION ORAL at 08:36

## 2018-09-11 RX ADMIN — NYSTATIN 500000 UNITS: 100000 SUSPENSION ORAL at 20:10

## 2018-09-11 RX ADMIN — ACETAMINOPHEN 650 MG: 325 SOLUTION ORAL at 15:55

## 2018-09-11 RX ADMIN — DOCUSATE SODIUM 50 MG: 50 LIQUID ORAL at 08:37

## 2018-09-11 RX ADMIN — VALGANCICLOVIR HYDROCHLORIDE 450 MG: 50 POWDER, FOR SOLUTION ORAL at 08:37

## 2018-09-11 RX ADMIN — GABAPENTIN 300 MG: 300 CAPSULE ORAL at 22:01

## 2018-09-11 ASSESSMENT — ACTIVITIES OF DAILY LIVING (ADL)
ADLS_ACUITY_SCORE: 9

## 2018-09-11 ASSESSMENT — PAIN DESCRIPTION - DESCRIPTORS
DESCRIPTORS: ACHING

## 2018-09-11 NOTE — PROGRESS NOTES
Organ specific education completed, and discharge planning has been conducted with multidisciplinary transplant care team including physicians, pharmacy, nutrition, and social work.      Met with Sherrie and her  Bryant on 7A.  Introduced myself and explained the role of the post liver transplant coordinator and support team.  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  Sherrie is active and looks forward to getting back home.  She has a primary care doctor in [place] at [facility].  His name is Apollo Benitez.  She has known him for a long time and is confident that he will continue to care for her for primary care needs.  She will have her labs done by homecare on Monday and Thursday.  When home care has signed off she will go to the above clinic for lab draws.  Briefly discussed the following topics:  1.) immunosuppression and the importance of taking regularly as directed.  2.) pertinent labs and their interpretation  3.) rejection signs / symptoms and treatment and 4.) Importance of long term follow-up with the transplant center and primary care physician.  Sherrie has lab book and understands responsibility of getting and recording results. Education in process.  (See inpatient education teaching flowsheet).  Will have [facility]IP[facility] / ATC visit 1 week after discharge (likely9/17)from the hospital.  Discharge education will be reinforced at subsequent clinic visits. Is aware that she needs a follow-up appointment with pre transplant primary care within 1-2 weeks of returning home.  Patient is aware that he will need follow-up with the transplant team for the rest of  her life.  Initial follow-up will be with the transplant surgeon (Viola), then move to pre-transplant hepatologist (Dr. Lilly).  Patient is aware that she will need lab testing, initially every Monday and Thursday to monitor liver function on a regular basis for the rest of his / her life.   Gave pt handouts entitled  "\"Things to Remember\" and \"Your Lab Results\" as well as a copy of contact numbers for myself, social work, transplant LPN and after hours/ weekend / holiday phone numbers.  Pt denies further questions at this time.  Will meet again to reinforce education and answer questions when patient returns for SIPC / ATC visit.  "

## 2018-09-11 NOTE — PROGRESS NOTES
Calorie Counts  Intake recorded for: 9/10 Kcals: 0  Protein: 0g  # Meals Recorded: 1 meal ordered from kitchen, no intake recorded.   # Supplements Recorded: no intake recorded.

## 2018-09-11 NOTE — LETTER
OUTPATIENT OR TRANSITIONAL CARE  LABORATORY TEST ORDER  [facility]: Fax [phone]    Patient Name: Sherrie Lala  Transplant Date: 8/30/2018   YOB: 1955  Issue Date: 09/11/18    [facility] MR#: [id number]  Expiration Date: 09/11/19       Diagnoses: [x]      Liver Transplant (ICD-10 Z94.4)    [x]      Long term use of medications (ICD-10 Z79.899)     Please fax results to [phone]    Frequency: 2X a week, and PRN        [x] CBC with Platelets   [x] Basic Metabolic Panel (Sodium, Potassium, Chloride, CO2, Creatinine, Urea Nitrogen, Glucose, Calcium)  [x] Phosphorous, Magnesium  [x] Hepatic panel (Albumin, Alk Phos, ALT, AST, Direct and Total Bili)  [x] Tacrolimus drug level - 12 hour trough, please document time of last dose       If you have questions, please call [phone] or [phone].    .

## 2018-09-11 NOTE — PROGRESS NOTES
Immunosuppression Note:    Sherrie Lala is a 63 year old female who is seen today  for immunosuppression management     I, Darren Rod MD, I have examined the patient with the resident/PA/Fellow, discussed and agree with the note and findings.  I have reviewed today's vital signs, medications, labs and imaging. I reviewed the immunosuppression medications and levels. I spoke to the patient/family and explained below clinical details and answered all the questions      Transplant Surgery  Inpatient Daily Progress Note  09/11/2018    Assessment & Plan: Sherrie Lala is a 63-year old female with a history of metastatic cholangiocarcinoma with subsequent liver failure who is now s/p DDLT on 8/30/18. The patient had significant intraoperative blood loss (~3L) and her abdomen was left open. She underwent washout and abdominal closure on 9/1.    Graft function: POD #12. LFTs have normalized. TB 1.2 (0.5). Liver US POD#1 with limited views, and a persistent eccentric thrombus in the intrahepatic main portal vein extending into the right portal vein. Started on a heparin drip at 400u/hr for PVT. Warfarin started 9/5, goal INR 1.5-2 x 1 year.     Immunosuppression management:  - Received induction with steroid taper and Basiliximab on POD#1 to delay initiation of Tacrolimus.  - MMF: Continue 1gm BID.  - Tacrolimus: Goal range 10-15. Level 6.8 (12h),  On 7mg BID-oral suspension, will change to po. Continue same dose today.  Complexity of management: Medium. Contributing factors: thrombocytopenia, anemia and organ dysfunction  Hematology:   Acute blood loss anemia:  Significant bleeding intra-op, requiring 11u PRBCs and other blood products.  Last blood transfusion on 9/1.  Acute hgb drop 8.3->7.7 with new right flank ecchymosis.  No sign of active bleeding today.  Hgb remains stable ~7.5-7.9 .  Thrombocytopenia: Patient with baseline platelet count ~30 prior to transplant. Platelet count remains low but improving,93  today. Last platelet transfusion on 9/2.   Anticoagulation:  Portal vein thrombus.  Initiated on heparin drip -stopped 9/10 when INR therapeutic. INR 1.8 today. Warfarin started 9/5, goal INR 1.5-2 x 1 year. Close monitoring due to hx of abd wall bleeding.will give 4mg tonight.   Cardiorespiratory: VSS. On room air.   GI/Nutrition: Inadequate protein-energy intake:  D/t abdominal distention.   Diet: Regular + Boost Plus/Boost Breeze between meals  Nutrition Support: NJ feeding tube in place with Nutren 1.5 @ 50 ml/hr x 18 hours + 1 pkt ProSource TF, cycle x12 hours.   Nausea: Continue zofran PRN. Continue with stool softeners and suppository PRN to maintain regular bowel movements.   Heartburn: Add maalox.Continue protonix BID.  Endocrine: BG , Off insulin.  Fluid/Electrolytes: No MIVF. Weight remains +3.2kg up from admission. Weight down from previous day. Continue with bumex 1mg daily.  : No acute issues  Infectious disease: Afebrile, normal WBC count. Completed Vanco, Zosyn, and Fluconazole for 3 days for surgical ppx. Per report donor with caseating lymph nodes, consulted ID for work up. Also with Candida positive donor urine and sputum cultures, started Micafungin x 10 days. Awaiting more details on donor.  Fungal studies ordered.  See ID note.  Pain: Postoperative pain: patient with significant amount of abdominal pain since surgery. Required Oxycodone, morphine PCA, and Flexeril. Pain management consulted. Pain now improving.  Current regimen- Oxycodone2.5-5 mg every 3 hours prn. Continue Flexeril PRN, and lidocaine patches.   Prophylaxis: GI, DVT, heparin, Bactrim, Valcyte  Disposition: 7A , probable dc tomorrow.    Medical Decision Making: Moderate  Subsequent visit 84459 (moderate level decision making)    DREA/Fellow:  Mary Card NP    Faculty: Darren Rod M.D.  __________________________________________________________________  Transplant History: Admitted 8/29/2018 for orthotopic  liver transplantation.   The patient has a history of liver failure due to cholangiocarcinoma.    8/30/2018 (Liver), Postoperative day: 12     Interval History:   No acute events overnight, reports abdominal pain more significant overnight. Reports nausea and heartburn resolved. Ambulating in halls. Attempting to eat per pt.     ROS:   A 10-point review of systems was negative except as noted above.    Meds:    aspirin  325 mg Oral Daily     bumetanide  1 mg Oral Daily     sennosides  5-10 mL Oral BID    And     docusate   mg Oral BID     gabapentin  300 mg Oral At Bedtime     lidocaine  2 patch Transdermal Q24H     lidocaine   Transdermal Q8H     lidocaine   Transdermal Q24h     micafungin  100 mg Intravenous Q24H     multivitamins with minerals  15 mL Per Feeding Tube Daily     mycophenolate  1,000 mg Oral BID IS    Or     mycophenolate  1,000 mg Oral or NG Tube BID IS     nystatin  500,000 Units Mouth/Throat 4x Daily     pantoprazole  40 mg Oral BID AC     pramipexole (MIRAPEX) tablet 0.5 mg  0.5 mg Oral Daily     sodium chloride (PF)  3 mL Intracatheter Q8H     sodium chloride (PF)  3 mL Intravenous Q8H     sulfamethoxazole-trimethoprim  80 mg Oral Daily     tacrolimus  7 mg Oral BID IS     valGANciclovir  450 mg Oral Daily    Or     valGANciclovir  450 mg Oral or NG Tube Daily     warfarin  4 mg Oral ONCE at 18:00       Physical Exam:     Admit Weight: 55.2 kg (121 lb 9.6 oz)  Today's weight: 127 lbs 12.8 oz    Vital sign ranges:    Temp:  [97.7  F (36.5  C)-98.6  F (37  C)] 98.4  F (36.9  C)  Pulse:  [83-84] 83  Heart Rate:  [80-88] 84  Resp:  [16] 16  BP: (110-127)/(70-90) 116/75  SpO2:  [97 %-100 %] 100 %    GENERAL: NAD  SKIN: No jaundice. No rashes or lesions.   HEENT: Eyes symmetrical and free of discharge bilaterally. Right Nare NJ.   CV: Regular rate and rhythm.   RESPIRATORY: Nonlabored breathing on room air.   GI: Distended. rounded, semi-firm. +BS x4.  Surgical incision with sutures CDI. SANTOS x2  with serous, #2.  : No sanchez  EXTREMITIES: +1 BLE pitting edema R>L  NEUROLOGIC: Alert and oriented x 4. No focal deficits. Tremor absent.  Lines: RIJ CVL CDI    Data:   CMP    Recent Labs  Lab 09/11/18  0608 09/10/18  0600    137   POTASSIUM 4.5 4.6   CHLORIDE 104 105   CO2 25 26   * 97   BUN 16 17   CR 0.70 0.67   GFRESTIMATED 85 90   GFRESTBLACK >90 >90   SHELDON 7.6* 7.2*   MAG 2.5* 2.4*   PHOS 3.6 3.4   ALBUMIN 2.5* 2.3*   BILITOTAL 1.2 0.5   ALKPHOS 75 67   AST 15 12   ALT 30 32     CBC    Recent Labs  Lab 09/11/18  0608 09/10/18  0600   HGB 7.9* 7.5*   WBC 5.2 3.8*   PLT 93* 69*     COAGS    Recent Labs  Lab 09/11/18  0608 09/10/18  0600   INR 1.82* 2.26*      Urinalysis  Recent Labs   Lab Test  02/26/18   0947   COLOR  Yellow   APPEARANCE  Clear   URINEGLC  Negative   URINEBILI  Negative   URINEKETONE  Negative   SG  1.017   UBLD  Negative   URINEPH  5.0   PROTEIN  Negative   NITRITE  Negative   LEUKEST  Negative   RBCU  1   WBCU  1   UTPG  0.06     Virology:  Hepatitis C Antibody   Date Value Ref Range Status   08/29/2018 Nonreactive NR^Nonreactive Final     Comment:     Assay performance characteristics have not been established for newborns,   infants, and children

## 2018-09-12 ENCOUNTER — HOME INFUSION (PRE-WILLOW HOME INFUSION) (OUTPATIENT)
Dept: PHARMACY | Facility: CLINIC | Age: 63
End: 2018-09-12

## 2018-09-12 VITALS
BODY MASS INDEX: 22.36 KG/M2 | DIASTOLIC BLOOD PRESSURE: 77 MMHG | OXYGEN SATURATION: 98 % | WEIGHT: 126.2 LBS | HEART RATE: 90 BPM | HEIGHT: 63 IN | SYSTOLIC BLOOD PRESSURE: 120 MMHG | RESPIRATION RATE: 18 BRPM | TEMPERATURE: 98.3 F

## 2018-09-12 PROBLEM — E87.6 HYPOKALEMIA: Status: RESOLVED | Noted: 2018-05-04 | Resolved: 2018-09-12

## 2018-09-12 PROBLEM — K74.60 CIRRHOSIS OF LIVER WITH ASCITES (H): Status: RESOLVED | Noted: 2018-05-10 | Resolved: 2018-09-12

## 2018-09-12 PROBLEM — Z76.82 ORGAN TRANSPLANT CANDIDATE: Status: RESOLVED | Noted: 2018-05-04 | Resolved: 2018-09-12

## 2018-09-12 PROBLEM — D84.9 IMMUNOSUPPRESSED STATUS (H): Chronic | Status: ACTIVE | Noted: 2018-09-12

## 2018-09-12 PROBLEM — I81 PORTAL VEIN THROMBOSIS OF TRANSPLANTED LIVER (H): Status: ACTIVE | Noted: 2018-09-12

## 2018-09-12 PROBLEM — R82.79 POSITIVE URINE CULTURE IN CADAVERIC DONOR: Status: ACTIVE | Noted: 2018-09-12

## 2018-09-12 PROBLEM — R18.8 CIRRHOSIS OF LIVER WITH ASCITES (H): Status: RESOLVED | Noted: 2018-05-10 | Resolved: 2018-09-12

## 2018-09-12 PROBLEM — G89.18 ACUTE POST-OPERATIVE PAIN: Status: ACTIVE | Noted: 2018-09-12

## 2018-09-12 PROBLEM — S31.109A OPEN ABDOMINAL WALL WOUND: Status: ACTIVE | Noted: 2018-09-12

## 2018-09-12 PROBLEM — R84.5 POSITIVE SPUTUM CULTURE IN CADAVERIC DONOR: Status: ACTIVE | Noted: 2018-09-12

## 2018-09-12 PROBLEM — Z00.5 POSITIVE URINE CULTURE IN CADAVERIC DONOR: Status: ACTIVE | Noted: 2018-09-12

## 2018-09-12 PROBLEM — T86.49 PORTAL VEIN THROMBOSIS OF TRANSPLANTED LIVER (H): Status: ACTIVE | Noted: 2018-09-12

## 2018-09-12 PROBLEM — K83.8 COMMON BILE DUCT LEAK OF TRANSPLANTED LIVER (H): Status: ACTIVE | Noted: 2018-09-12

## 2018-09-12 PROBLEM — Z00.5 POSITIVE SPUTUM CULTURE IN CADAVERIC DONOR: Status: ACTIVE | Noted: 2018-09-12

## 2018-09-12 PROBLEM — T86.49 COMMON BILE DUCT LEAK OF TRANSPLANTED LIVER (H): Status: ACTIVE | Noted: 2018-09-12

## 2018-09-12 PROBLEM — Z76.82 LIVER TRANSPLANT CANDIDATE: Status: RESOLVED | Noted: 2018-08-29 | Resolved: 2018-09-12

## 2018-09-12 PROBLEM — E44.1 MILD PROTEIN-CALORIE MALNUTRITION (H): Status: ACTIVE | Noted: 2018-09-12

## 2018-09-12 PROBLEM — Z94.4 LIVER TRANSPLANTED (H): Chronic | Status: ACTIVE | Noted: 2018-08-31

## 2018-09-12 PROBLEM — D84.9 IMMUNOSUPPRESSED STATUS (H): Status: ACTIVE | Noted: 2018-09-12

## 2018-09-12 PROBLEM — E87.70 HYPERVOLEMIA: Status: ACTIVE | Noted: 2018-09-12

## 2018-09-12 LAB
ALBUMIN SERPL-MCNC: 2.4 G/DL (ref 3.4–5)
ALP SERPL-CCNC: 76 U/L (ref 40–150)
ALT SERPL W P-5'-P-CCNC: 26 U/L (ref 0–50)
ANION GAP SERPL CALCULATED.3IONS-SCNC: 5 MMOL/L (ref 3–14)
AST SERPL W P-5'-P-CCNC: 10 U/L (ref 0–45)
BASOPHILS # BLD AUTO: 0 10E9/L (ref 0–0.2)
BASOPHILS NFR BLD AUTO: 0.6 %
BILIRUB DIRECT SERPL-MCNC: 0.2 MG/DL (ref 0–0.2)
BILIRUB SERPL-MCNC: 0.6 MG/DL (ref 0.2–1.3)
BUN SERPL-MCNC: 15 MG/DL (ref 7–30)
CALCIUM SERPL-MCNC: 7.6 MG/DL (ref 8.5–10.1)
CHLORIDE SERPL-SCNC: 105 MMOL/L (ref 94–109)
CO2 SERPL-SCNC: 26 MMOL/L (ref 20–32)
CREAT SERPL-MCNC: 0.73 MG/DL (ref 0.52–1.04)
DIFFERENTIAL METHOD BLD: ABNORMAL
EOSINOPHIL # BLD AUTO: 0.1 10E9/L (ref 0–0.7)
EOSINOPHIL NFR BLD AUTO: 2.8 %
ERYTHROCYTE [DISTWIDTH] IN BLOOD BY AUTOMATED COUNT: 20.1 % (ref 10–15)
GFR SERPL CREATININE-BSD FRML MDRD: 81 ML/MIN/1.7M2
GLUCOSE SERPL-MCNC: 114 MG/DL (ref 70–99)
HCT VFR BLD AUTO: 25.6 % (ref 35–47)
HGB BLD-MCNC: 8.2 G/DL (ref 11.7–15.7)
IMM GRANULOCYTES # BLD: 0 10E9/L (ref 0–0.4)
IMM GRANULOCYTES NFR BLD: 0.2 %
INR PPP: 1.9 (ref 0.86–1.14)
LAB SCANNED RESULT: NORMAL
LYMPHOCYTES # BLD AUTO: 0.3 10E9/L (ref 0.8–5.3)
LYMPHOCYTES NFR BLD AUTO: 6.6 %
MAGNESIUM SERPL-MCNC: 2.3 MG/DL (ref 1.6–2.3)
MCH RBC QN AUTO: 28.6 PG (ref 26.5–33)
MCHC RBC AUTO-ENTMCNC: 32 G/DL (ref 31.5–36.5)
MCV RBC AUTO: 89 FL (ref 78–100)
MONOCYTES # BLD AUTO: 0.3 10E9/L (ref 0–1.3)
MONOCYTES NFR BLD AUTO: 5.7 %
NEUTROPHILS # BLD AUTO: 4 10E9/L (ref 1.6–8.3)
NEUTROPHILS NFR BLD AUTO: 84.1 %
NRBC # BLD AUTO: 0 10*3/UL
NRBC BLD AUTO-RTO: 0 /100
PHOSPHATE SERPL-MCNC: 3.2 MG/DL (ref 2.5–4.5)
PLATELET # BLD AUTO: 101 10E9/L (ref 150–450)
POTASSIUM SERPL-SCNC: 4.5 MMOL/L (ref 3.4–5.3)
PROT SERPL-MCNC: 5.7 G/DL (ref 6.8–8.8)
RBC # BLD AUTO: 2.87 10E12/L (ref 3.8–5.2)
SODIUM SERPL-SCNC: 137 MMOL/L (ref 133–144)
TACROLIMUS BLD-MCNC: 10.1 UG/L (ref 5–15)
TME LAST DOSE: NORMAL H
WBC # BLD AUTO: 4.7 10E9/L (ref 4–11)

## 2018-09-12 PROCEDURE — 25000132 ZZH RX MED GY IP 250 OP 250 PS 637: Mod: GY | Performed by: NURSE PRACTITIONER

## 2018-09-12 PROCEDURE — 25000132 ZZH RX MED GY IP 250 OP 250 PS 637: Mod: GY | Performed by: STUDENT IN AN ORGANIZED HEALTH CARE EDUCATION/TRAINING PROGRAM

## 2018-09-12 PROCEDURE — A9270 NON-COVERED ITEM OR SERVICE: HCPCS | Mod: GY | Performed by: PHYSICIAN ASSISTANT

## 2018-09-12 PROCEDURE — 85025 COMPLETE CBC W/AUTO DIFF WBC: CPT | Performed by: NURSE PRACTITIONER

## 2018-09-12 PROCEDURE — 25000132 ZZH RX MED GY IP 250 OP 250 PS 637: Mod: GY | Performed by: TRANSPLANT SURGERY

## 2018-09-12 PROCEDURE — 80048 BASIC METABOLIC PNL TOTAL CA: CPT | Performed by: NURSE PRACTITIONER

## 2018-09-12 PROCEDURE — 25000131 ZZH RX MED GY IP 250 OP 636 PS 637: Mod: GY | Performed by: STUDENT IN AN ORGANIZED HEALTH CARE EDUCATION/TRAINING PROGRAM

## 2018-09-12 PROCEDURE — 85610 PROTHROMBIN TIME: CPT | Performed by: NURSE PRACTITIONER

## 2018-09-12 PROCEDURE — A9270 NON-COVERED ITEM OR SERVICE: HCPCS | Mod: GY | Performed by: TRANSPLANT SURGERY

## 2018-09-12 PROCEDURE — 25000131 ZZH RX MED GY IP 250 OP 636 PS 637: Mod: GY | Performed by: NURSE PRACTITIONER

## 2018-09-12 PROCEDURE — A9270 NON-COVERED ITEM OR SERVICE: HCPCS | Mod: GY | Performed by: NURSE PRACTITIONER

## 2018-09-12 PROCEDURE — 80076 HEPATIC FUNCTION PANEL: CPT | Performed by: NURSE PRACTITIONER

## 2018-09-12 PROCEDURE — 83735 ASSAY OF MAGNESIUM: CPT | Performed by: NURSE PRACTITIONER

## 2018-09-12 PROCEDURE — 36415 COLL VENOUS BLD VENIPUNCTURE: CPT | Performed by: NURSE PRACTITIONER

## 2018-09-12 PROCEDURE — A9270 NON-COVERED ITEM OR SERVICE: HCPCS | Mod: GY | Performed by: STUDENT IN AN ORGANIZED HEALTH CARE EDUCATION/TRAINING PROGRAM

## 2018-09-12 PROCEDURE — 25000125 ZZHC RX 250: Performed by: STUDENT IN AN ORGANIZED HEALTH CARE EDUCATION/TRAINING PROGRAM

## 2018-09-12 PROCEDURE — 25000132 ZZH RX MED GY IP 250 OP 250 PS 637: Mod: GY | Performed by: PHYSICIAN ASSISTANT

## 2018-09-12 PROCEDURE — 84100 ASSAY OF PHOSPHORUS: CPT | Performed by: NURSE PRACTITIONER

## 2018-09-12 PROCEDURE — 80197 ASSAY OF TACROLIMUS: CPT | Performed by: NURSE PRACTITIONER

## 2018-09-12 RX ORDER — TACROLIMUS 1 MG/1
5 CAPSULE ORAL 2 TIMES DAILY
Qty: 300 CAPSULE | Refills: 11 | Status: SHIPPED | OUTPATIENT
Start: 2018-09-12 | End: 2018-10-09

## 2018-09-12 RX ORDER — TACROLIMUS 0.5 MG/1
0.5 CAPSULE ORAL 2 TIMES DAILY
Qty: 60 CAPSULE | Refills: 11 | Status: SHIPPED | OUTPATIENT
Start: 2018-09-12 | End: 2018-10-02

## 2018-09-12 RX ORDER — ALUMINA, MAGNESIA, AND SIMETHICONE 2400; 2400; 240 MG/30ML; MG/30ML; MG/30ML
30 SUSPENSION ORAL EVERY 6 HOURS PRN
Qty: 1200 ML | Refills: 1 | Status: SHIPPED | OUTPATIENT
Start: 2018-09-12 | End: 2018-10-12

## 2018-09-12 RX ORDER — SULFAMETHOXAZOLE AND TRIMETHOPRIM 200; 40 MG/5ML; MG/5ML
80 SUSPENSION ORAL DAILY
Qty: 560 ML | Refills: 11 | Status: SHIPPED | OUTPATIENT
Start: 2018-09-13 | End: 2018-12-17 | Stop reason: ALTCHOICE

## 2018-09-12 RX ORDER — WARFARIN SODIUM 2 MG/1
4 TABLET ORAL DAILY
Qty: 8 TABLET | Refills: 0 | Status: SHIPPED | OUTPATIENT
Start: 2018-09-12 | End: 2021-03-03

## 2018-09-12 RX ORDER — VALGANCICLOVIR HYDROCHLORIDE 50 MG/ML
450 POWDER, FOR SOLUTION ORAL DAILY
Qty: 270 ML | Refills: 2 | Status: SHIPPED | OUTPATIENT
Start: 2018-09-13 | End: 2018-10-08 | Stop reason: DRUGHIGH

## 2018-09-12 RX ORDER — TACROLIMUS 1 MG/1
7 CAPSULE ORAL 2 TIMES DAILY
Qty: 7 CAPSULE | Refills: 11 | Status: SHIPPED | OUTPATIENT
Start: 2018-09-12 | End: 2018-09-12

## 2018-09-12 RX ORDER — BISACODYL 10 MG
10 SUPPOSITORY, RECTAL RECTAL DAILY PRN
Qty: 10 SUPPOSITORY | Refills: 1 | Status: SHIPPED | OUTPATIENT
Start: 2018-09-12 | End: 2018-10-12

## 2018-09-12 RX ORDER — OXYCODONE HYDROCHLORIDE 5 MG/1
2.5-5 TABLET ORAL
Qty: 20 TABLET | Refills: 0 | Status: SHIPPED | OUTPATIENT
Start: 2018-09-12 | End: 2018-10-12

## 2018-09-12 RX ORDER — BUMETANIDE 1 MG/1
1 TABLET ORAL DAILY
Qty: 7 TABLET | Refills: 0 | Status: SHIPPED | OUTPATIENT
Start: 2018-09-13 | End: 2018-09-20

## 2018-09-12 RX ORDER — TACROLIMUS 1 MG/1
5 CAPSULE ORAL 2 TIMES DAILY
Qty: 7 CAPSULE | Refills: 11 | Status: SHIPPED | OUTPATIENT
Start: 2018-09-12 | End: 2018-09-12

## 2018-09-12 RX ORDER — WARFARIN SODIUM 4 MG/1
4 TABLET ORAL
Status: DISCONTINUED | OUTPATIENT
Start: 2018-09-12 | End: 2018-09-12 | Stop reason: HOSPADM

## 2018-09-12 RX ORDER — CYCLOBENZAPRINE HCL 10 MG
10 TABLET ORAL 3 TIMES DAILY PRN
Qty: 20 TABLET | Refills: 0 | Status: SHIPPED | OUTPATIENT
Start: 2018-09-12 | End: 2018-10-12

## 2018-09-12 RX ORDER — MYCOPHENOLATE MOFETIL 200 MG/ML
1000 POWDER, FOR SUSPENSION ORAL 2 TIMES DAILY
Qty: 300 ML | Refills: 5 | Status: SHIPPED | OUTPATIENT
Start: 2018-09-12 | End: 2018-10-05

## 2018-09-12 RX ORDER — ONDANSETRON 4 MG/1
4 TABLET, ORALLY DISINTEGRATING ORAL EVERY 6 HOURS PRN
Qty: 120 TABLET | Refills: 1 | Status: SHIPPED | OUTPATIENT
Start: 2018-09-12 | End: 2018-10-22

## 2018-09-12 RX ADMIN — Medication 5 MG: at 14:55

## 2018-09-12 RX ADMIN — DOCUSATE SODIUM 100 MG: 50 LIQUID ORAL at 08:37

## 2018-09-12 RX ADMIN — NYSTATIN 500000 UNITS: 100000 SUSPENSION ORAL at 11:19

## 2018-09-12 RX ADMIN — NYSTATIN 500000 UNITS: 100000 SUSPENSION ORAL at 08:39

## 2018-09-12 RX ADMIN — BUMETANIDE 1 MG: 1 TABLET ORAL at 08:39

## 2018-09-12 RX ADMIN — LIDOCAINE 2 PATCH: 560 PATCH PERCUTANEOUS; TOPICAL; TRANSDERMAL at 09:03

## 2018-09-12 RX ADMIN — VALGANCICLOVIR HYDROCHLORIDE 450 MG: 50 POWDER, FOR SOLUTION ORAL at 08:37

## 2018-09-12 RX ADMIN — TACROLIMUS 7 MG: 5 CAPSULE ORAL at 07:08

## 2018-09-12 RX ADMIN — CYCLOBENZAPRINE HYDROCHLORIDE 5 MG: 5 TABLET, FILM COATED ORAL at 08:38

## 2018-09-12 RX ADMIN — Medication 2.5 MG: at 04:53

## 2018-09-12 RX ADMIN — PANTOPRAZOLE SODIUM 40 MG: 40 TABLET, DELAYED RELEASE ORAL at 08:39

## 2018-09-12 RX ADMIN — MYCOPHENOLATE MOFETIL 1000 MG: 200 POWDER, FOR SUSPENSION ORAL at 08:38

## 2018-09-12 RX ADMIN — SENNOSIDES A AND B 5 ML: 415.36 LIQUID ORAL at 08:37

## 2018-09-12 RX ADMIN — CYCLOBENZAPRINE HYDROCHLORIDE 10 MG: 5 TABLET, FILM COATED ORAL at 04:53

## 2018-09-12 RX ADMIN — MULTIVITAMIN 15 ML: LIQUID ORAL at 08:38

## 2018-09-12 RX ADMIN — Medication 5 MG: at 11:19

## 2018-09-12 RX ADMIN — ONDANSETRON 4 MG: 4 TABLET, ORALLY DISINTEGRATING ORAL at 00:27

## 2018-09-12 RX ADMIN — Medication 5 MG: at 00:42

## 2018-09-12 RX ADMIN — SULFAMETHOXAZOLE AND TRIMETHOPRIM 80 MG: 200; 40 SUSPENSION ORAL at 08:38

## 2018-09-12 RX ADMIN — Medication 325 MG: at 08:39

## 2018-09-12 ASSESSMENT — ACTIVITIES OF DAILY LIVING (ADL)
ADLS_ACUITY_SCORE: 9

## 2018-09-12 NOTE — PROGRESS NOTES
Calorie Counts  Intake recorded for: 9/11  Kcals: 140  Protein: 7g  # Meals Recorded: 25% baked potato chips, grilled cheese sandwich   # Supplements Recorded: 0

## 2018-09-12 NOTE — PROGRESS NOTES
CLINICAL NUTRITION SERVICES - DISCHARGE NOTE    Patient s discharge needs assessed and discharge planning has been conducted with the multidisciplinary transplant care team including physicians, pharmacy, social work and transplant coordinator.    Follow up/Monitoring:  Once discharged, place outpatient nutrition consult via the transplant team if nutrition concerns arise.    Seda Hernandez MS, RD, LD  Pager [phone]

## 2018-09-12 NOTE — PHARMACY-ANTICOAGULATION SERVICE
Clinical Pharmacy- Warfarin Discharge Note  This patient is currently on warfarin for the treatment of portal vein thrombus.  INR Goal= 1.5-2  Expected length of therapy undetermined.           Anticoagulation Dose History     Recent Dosing and Labs Latest Ref Rng & Units 9/6/2018 9/7/2018 9/8/2018 9/9/2018 9/10/2018 9/11/2018 9/12/2018    Warfarin 1 mg - - - - - 1 mg - -    Warfarin 3 mg - 3 mg - - - - - -    Warfarin 4 mg - - 4 mg - - - 4 mg -    Warfarin 6 mg - - - 6 mg 6 mg - - -    INR 0.86 - 1.14 1.10 1.18(H) 1.27(H) 1.51(H) 2.26(H) 1.82(H) 1.90(H)          Vitamin K doses administered during the last 7 days: 10mg of vitamin K given 9/3, 9/4 and 9/5  FFP administered during the last 7 days: none  Recommend discharging the patient on a warfarin regimen of 4 mg on 9/12 and 9/13 with follow up INR on 9/14/18. Recommend a prescription for warfarin 4mg tablets.      Yudith Tillman, Pharm.D., [facility]  Pager [phone]

## 2018-09-12 NOTE — PROGRESS NOTES
DISCHARGE    D: Patient with orders to discharge to home.  I: Discharge instructions, medications, & follow ups reviewed with pt and sister. Copy of discharge summary given to pt. Central line removed. All belongings packed & sent with patient. Medications filled & picked up at discharge pharmacy. Paperwork faxed. Report given to [facility]  A: Patient in stable condition. AVSS. Pt had no further questions regarding discharge instructions and medications. Patient transferred out by wheelchair & left with sister.  P: Plan for visit w/ [facility] on 9/13 and clinic appointment in [facility] on 9/17.

## 2018-09-12 NOTE — DISCHARGE SUMMARY
[facility], [place]    Discharge Summary  Transplant Surgery    Date of Admission:  2018  Date of Discharge:  2018  4:35 PM  Discharging Provider: Mary Card NP/ Darren Rod MD      Discharge Diagnoses   Principal Problem:    Liver transplanted (H)  Active Problems:    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)    Open abdominal wall wound    Immunosuppressed status (H)    Acute post-operative pain    Acute blood loss anemia    Mild protein-calorie malnutrition (H)    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)    Hypervolemia    Positive sputum culture in cadaveric donor    Positive urine culture in cadaveric donor      History of Present Illness   Sherrie Lala is an 63 year old female with history of metastatic cholangiocarcinoma s/p resection and subsequent liver failure, hepatic hydrothorax, and portal vein thrombosis.  She underwent  donor liver transplant with biliary stent on 18. The patient had significant intraoperative blood loss (~3L), as well as large graft size, and her abdomen was left open. She underwent washout, repair of anastomotic bile leak, and abdominal closure with mesh on 18.    Hospital Course   Liver transplant:  Abdomen left open post-op, as above.  Closed on 18.  Anastomotic biliary leak was noted and repaired at the time of abdominal closure.  LFTs trended down as expected after transplant.  US on POD #1 noted residual thrombus in main and right portal vein.  Anticoagulation as below.    Immunosuppression:  Induction immunosuppression with steroid taper and Basiliximab on POD#1 to delay initiation of Tacrolimus.  Maintenance immunosupression with mycophenolate (Cellcept) and tacrolimus (Prograf).  Tacrolimus level goal 10-15 (12 hour trough).  Level 10.1 on day of discharge. She had been transitioned to solution via NJ to po route day prior. Therefore she was decreased to 5mg FK BID at time of discharge.   "Infectious prophylaxis with bactrim (x6 months) and valganciclovir (x3 months).      Transplant coordinator Abigail Parham [phone].  Biliary stent:  YES  Donor status:  DBD  CMV D- / R -  EBV D + / R +    Acute blood loss anemia:  Significant bleeding intra-op, requiring 11u PRBCs and other blood products. Patient had a second Hgb drop on 9/7 with new ecchymosis right flank.  Likely a spontaneous and self-limited abdominal wall bleed.  Last blood transfusion on 9/1.  Hgb 8.2 on day of discharge.    Portal vein thrombus / anticoagulation:  On heparin gtt post-op.  Warfarin added 9/5.  INR goal 1.5-2 for 1 year. INR was 1.9 at time of discharge. She will discharge on 4mg qpm until seen by PCP for further INR management on 9/14.    Acute post-operative pain:  Patient had significant abdominal pain post-op.  Required Oxycodone, morphine PCA, and Flexeril. Pain management consulted.  Tapered down to oxycodone, flexeril, and lidocaine patches.  Will discharge with oxycodone 5mg tabs (#20) and flexeril 10mg (#20).    Non-severe malnutrition:  In context of chronic illness (Criteria: % Intake: </= 50% for >/= 5 days (severe), % Weight Loss: Weight loss does not meet criteria, Subcutaneous Fat Loss: Facial region:  Mild, Muscle Loss: Facial & jaw region:  Mild and Thoracic region (clavicle, acromium bone, deltoid, trapezius, pectoral): Mild).  NJ feeding tube placed.  Will discharge on cycled EN Nutren1.5 @50cc/hr.    Donor urine and sputum cultures positive candida:  Received fluconazole 8/30-9/2 and micafungin 9/5-9/11.    Donor possibly positive for caseating lymph node:  Received report of possible caseating lymph node in donor.  ID consulted.  Review of UNOS notes by ID showed a calcified granuloma in the donor's spleen, but not mention of caseating nodes.  Per ID: \"With regard to the calcified node, this could potentially represent old/dead histoplasma that might be unseen in the donated liver, so we feel it is worth " "monitoring this without treatment for the time being (via urine histo Ag assay). \"  ID recommends urine histoplasma antigen once every other week for the next six months. Next due 18.    Hypervolemia:  Weight up ~6kg post-op.  Diuresed with bumex during hospitalization.   Weight remains up ~2Kg by day of discharge, she will continue 1mg bumex daily.      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  - Brain Death liver transplant   2. End-to-end Choledochocholedochostomy   3. Liver biopsy   4. Lysis of adhesions taking an additional 120 minutes of operating time   5. Venovenous bypass        Surgeon:  Surgeon(s) and Role:     * Darren Rod MD - Primary   Pathology:  Liver, native, hepatectomy:   - Sinusoidal congestion with hepatocellular atrophy and nodular   regenerative hyperplasia, consistent with   portal vein obstruction   - Organizing thrombus of portal vein   - Paucity of intrahepatic portal vein branches   - No evidence of significant fibrosis   - See microscopic description     18  PREOPERATIVE DIAGNOSES:   1.  Open abdomen.   2.  Status post liver transplant.       POSTOPERATIVE DIAGNOSES:   1.  Open abdomen status post liver transplant.   2.  Bile leak from the anastomosis.   3.  Large abdominal defect had to be closed with mesh.   SURGEON: Viola    Pending Results   These results will be followed up by transplant coordinator.  Unresulted Labs Ordered in the Past 30 Days of this Admission     Date and Time Order Name Status Description    2018 1052 Fungus Culture, non-blood Preliminary           Code Status   Full    Primary Care Physician   Apollo Benitez    Physical Exam   Temp: 98.3  F (36.8  C) Temp src: Oral BP: 120/77 Pulse: 90 Heart Rate: 91 Resp: 18 SpO2: 98 % O2 Device: None (Room air)    Vitals:    09/10/18 " 0342 09/11/18 0454 09/12/18 0425   Weight: 59.5 kg (131 lb 1.6 oz) 58 kg (127 lb 12.8 oz) 57.2 kg (126 lb 3.2 oz)     Vital Signs with Ranges  Temp:  [98  F (36.7  C)-98.4  F (36.9  C)] 98.3  F (36.8  C)  Pulse:  [90] 90  Heart Rate:  [81-91] 91  Resp:  [16-18] 18  BP: (114-128)/(73-77) 120/77  SpO2:  [96 %-100 %] 98 %  I/O last 3 completed shifts:  In: 780 [NG/GT:180]  Out: 2757.5 [Urine:2450; Drains:307.5]     GENERAL: NAD  SKIN: No jaundice. No rashes or lesions.   HEENT: Eyes symmetrical and free of discharge bilaterally. Right Nare NJ.   CV: Regular rate and rhythm.   RESPIRATORY: Nonlabored breathing on room air.   GI: Distended. rounded, semi-firm. +BS x4.  Surgical incision with sutures CDI. SANTOS x2 with serous, #2.  : No sanchez  EXTREMITIES: +1 BLE pitting edema R>L  NEUROLOGIC: Alert and oriented x 4. No focal deficits. Tremor absent.    Time Spent on this Encounter   I, Mary Card, personally saw the patient today and spent greater than 30 minutes discharging this patient.    Discharge Disposition   Discharged to home  Condition at discharge: Stable    Consultations This Hospital Stay   SOCIAL WORK IP CONSULT  NUTRITION SERVICES ADULT IP CONSULT  PHYSICAL THERAPY ADULT IP CONSULT  OCCUPATIONAL THERAPY ADULT IP CONSULT  VASCULAR ACCESS CARE ADULT IP CONSULT  NUTRITION SERVICES ADULT IP CONSULT  OCCUPATIONAL THERAPY ADULT IP CONSULT  PHYSICAL THERAPY ADULT IP CONSULT  PHARMACY IP CONSULT  NUTRITION SERVICES ADULT IP CONSULT  PHARMACY TO DOSE VANCO  PHARMACY IP CONSULT  PAIN MANAGEMENT ADULT IP CONSULT  PAIN MANAGEMENT ADULT IP CONSULT  INFECTIOUS DISEASE TRANSPLANT SOT ADULT IP CONSULT  PHARMACY TO DOSE WARFARIN  PAIN MANAGEMENT ADULT IP CONSULT  PATIENT LEARNING CENTER IP CONSULT  LYMPHEDEMA THERAPY IP CONSULT  PATIENT LEARNING CENTER IP CONSULT    Discharge Orders       Home infusion referral     Home care nursing referral     Home care nursing referral     Follow Up and recommended labs and tests    New Warfarin Monitoring    [place], [place]   Ph: [phone]  F: [phone]    MD: Dr Apollo Case  INR Nurses: Monday-Friday    Indication: s/p Liver Transplant with portal vein thrombosis  Gaol INR: 1.5-2.0  Duration: 1 year--9/10/2019      Sherrie--you have an appointment on Friday, 9/14 @ 11am at the above clinic to establish care for INR monitoring     Reason for your hospital stay   Liver transplant surgery     Activity   Your activity upon discharge: activity as tolerated. Ambulate in home. Do not drive while on narcotics and cleared by transplant surgeon.     When to contact your care team   WHEN TO CONTACT YOUR COORDINATOR:    Transplant coordinator [phone].  Notify your coordinator if you have increased abdominal pain, increased redness or drainage from incision, fever greater than 100.5F.    Notify your coordinator immediately if you are unable to take your immunosuppressive medications for any reason.    If it is outside of office hours, please call the hospital switchboard at [phone] and ask to have the liver transplant surgery fellow paged for urgent medical questions, or present to the emergency department.     Wound care and dressings   Instructions to care for your wound at home:keep incision clean and dry. Ok to shower. Do not scrub incision.  Pat dry.  Do not soak or submerge in baths, hot tubs, or pools. Do not apply lotions or powders to incision.     Tubes and drains   You are going home with the following tubes or drains: SANTOS.x2. Empty and record SANTOS daily and as needed. Bring fluid color and volume recordings to clinic appointment     [facility]ult [facility] Follow-up and recommended labs and tests   1.  [facility] ([address] [place] [address]) on Monday 9/17. Please bring all medications, lab book, and medication card with you.  Do not take your morning dose of prograf until after labs are drawn.     2.  Labs every Monday & Thursday:  CMP, CBC,  Magnesium, Phosphorus, tacrolimus level, INR  3. Urine Histoplasma Ag Q other week. Next due 9/21/18      Appointments on [place] and/or [facility] (with [facility] or [facility] provider or service). Call [phone] if you haven't heard regarding these appointments within 7 days of discharge.     Full Code     Diet   Follow this diet upon discharge: Regular     Adult Formula Drip Feeding:  Nutren 1.5; Nasoduodenal tube; Goal Rate: 50; mL/hr; From: 8:00 PM; 8:00 AM; Medication - Tube Feeding Flush Frequency: At least 15-30 mL water before and after medication administration      Snacks/Supplements Adult: Boost Plus; Between Meals     Regular Diet Adult       Discharge Medications      Discharge Medication List as of 9/12/2018  5:20 PM      START taking these medications    Details   acetaminophen (TYLENOL) 32 mg/mL solution Take 20.3 mLs (650 mg) by mouth every 6 hours as needed for fever or mild pain, Disp-400 mL, R-1, E-Prescribe      alum & mag hydroxide-simethicone (MYLANTA ES/MAALOX  ES) 400-400-40 MG/5ML SUSP suspension Take 30 mLs by mouth every 6 hours as needed for indigestion, Disp-1200 mL, R-1, E-Prescribe      aspirin 10 mg/mL SUSP Take 32.5 mLs (325 mg) by mouth daily, Disp-975 mL, R-11, E-Prescribe      bisacodyl (DULCOLAX) 10 MG Suppository Place 1 suppository (10 mg) rectally daily as needed for constipation, Disp-10 suppository, R-1, E-Prescribe      bumetanide (BUMEX) 1 MG tablet Take 1 tablet (1 mg) by mouth daily for 7 days, Disp-7 tablet, R-0, E-Prescribe      CELLCEPT (BRAND) 200 MG/ML SUSPENSION 5 mLs (1,000 mg) by Oral or NG Tube route 2 times daily, Disp-300 mL, R-5, E-Prescribe      cyclobenzaprine (FLEXERIL) 10 MG tablet Take 1 tablet (10 mg) by mouth 3 times daily as needed for muscle spasms, Disp-20 tablet, R-0, E-Prescribe      docusate (COLACE) 50 MG/5ML liquid Take 5-10 mLs ( mg) by mouth 2 times daily, Disp-200 mL, R-1, E-Prescribe      multivitamins with minerals  (CERTAVITE/CEROVITE) LIQD liquid 15 mLs by Per Feeding Tube route daily, Disp-450 mL, R-3, E-Prescribe      ondansetron (ZOFRAN-ODT) 4 MG ODT tab Take 1 tablet (4 mg) by mouth every 6 hours as needed for nausea or vomiting, Disp-120 tablet, R-1, E-Prescribe      oxyCODONE IR (ROXICODONE) 5 MG tablet Take 0.5-1 tablets (2.5-5 mg) by mouth every 3 hours as needed for moderate to severe pain, Disp-20 tablet, R-0, Local Print      sennosides (SENOKOT) 8.8 MG/5ML syrup Take 5-10 mLs by mouth 2 times daily, Disp-200 mL, R-1, E-Prescribe      sulfamethoxazole-trimethoprim (BACTRIM/SEPTRA) suspension Take 10 mLs (80 mg) by mouth daily Dose based on TMP component., Disp-560 mL, R-11, E-Prescribe      tacrolimus (GENERIC EQUIVALENT) 0.5 MG capsule Take 1 capsule (0.5 mg) by mouth 2 times daily As directed by provider, Disp-60 capsule, R-11, E-Prescribe      valGANciclovir (VALCYTE) 50 MG/ML SOLR solution 9 mLs (450 mg) by Oral or NG Tube route daily, Disp-270 mL, R-2, E-Prescribe      warfarin (COUMADIN) 2 MG tablet Take 2 tablets (4 mg) by mouth daily Every evening or as directed by provider, Disp-8 tablet, R-0, E-Prescribe         CONTINUE these medications which have CHANGED    Details   tacrolimus (GENERIC EQUIVALENT) 1 MG capsule Take 5 capsules (5 mg) by mouth 2 times daily, Disp-300 capsule, R-11, E-Prescribe         CONTINUE these medications which have NOT CHANGED    Details   albuterol (PROAIR HFA/PROVENTIL HFA/VENTOLIN HFA) 108 (90 BASE) MCG/ACT Inhaler Inhale 2 puffs into the lungs every 6 hours, Historical      GABAPENTIN PO Take 300 mg by mouth At Bedtime, Historical      OMEPRAZOLE PO Take 20 mg by mouth 2 times daily (before meals), Historical      PRAMIPEXOLE DIHYDROCHLORIDE PO Take 0.5 mg by mouth daily, Historical         STOP taking these medications       CIPROFLOXACIN PO Comments:   Reason for Stopping:         FUROSEMIDE PO Comments:   Reason for Stopping:         Potassium Chloride ER 20 MEQ TBCR  Comments:   Reason for Stopping:         RIFAMPIN PO Comments:   Reason for Stopping:         RifAXIMin (XIFAXAN PO) Comments:   Reason for Stopping:         SPIRONOLACTONE PO Comments:   Reason for Stopping:         ZOLPIDEM TARTRATE PO Comments:   Reason for Stopping:             Allergies   Allergies   Allergen Reactions     Blood Transfusion Related (Informational Only) Other (See Comments)     Patient has a history of a clinically significant antibody against RBC antigens.  A delay in compatible RBCs may occur.     Dilaudid [Hydromorphone] Itching     Data   Most Recent 3 CBC's:  Recent Labs   Lab Test  09/12/18   0627  09/11/18   0608  09/10/18   0600   WBC  4.7  5.2  3.8*   HGB  8.2*  7.9*  7.5*   MCV  89  89  89   PLT  101*  93*  69*     Most Recent 3 BMP's:  Recent Labs   Lab Test  09/12/18   0627  09/11/18   0608  09/10/18   0600   NA  137  136  137   POTASSIUM  4.5  4.5  4.6   CHLORIDE  105  104  105   CO2  26  25  26   BUN  15  16  17   CR  0.73  0.70  0.67   ANIONGAP  5  6  7   SHELDON  7.6*  7.6*  7.2*   GLC  114*  114*  97     Most Recent 2 LFT's:  Recent Labs   Lab Test  09/12/18 0627 09/11/18   0608   AST  10  15   ALT  26  30   ALKPHOS  76  75   BILITOTAL  0.6  1.2     Most Recent 3 INR's:  Recent Labs   Lab Test  09/12/18 0627 09/11/18   0608  09/10/18   0600   INR  1.90*  1.82*  2.26*

## 2018-09-12 NOTE — PHARMACY-TRANSPLANT NOTE
Solid Organ Transplant Recipient Prior to Discharge Note    63 year old female s/p liver transplant on 8/30/18.    Pharmacy has monitored for medication interactions and immunosuppression levels in conjunction with the multidisciplinary team. In anticipation for discharge, medication therapy needs have been addressed daily throughout the current admission via multidisciplinary rounds and/or discussions, order verification, daily clinical pharmacy review, and communication with prescribers.  Yudith Tillman, Pharm.D., [facility]  Pager [phone]

## 2018-09-13 ENCOUNTER — TEAM CONFERENCE (OUTPATIENT)
Dept: TRANSPLANT | Facility: CLINIC | Age: 63
End: 2018-09-13

## 2018-09-13 ENCOUNTER — TELEPHONE (OUTPATIENT)
Dept: TRANSPLANT | Facility: CLINIC | Age: 63
End: 2018-09-13

## 2018-09-13 DIAGNOSIS — Z94.4 LIVER REPLACED BY TRANSPLANT (H): Primary | ICD-10-CM

## 2018-09-13 DIAGNOSIS — Z94.4 LIVER TRANSPLANTED (H): Primary | ICD-10-CM

## 2018-09-13 NOTE — TELEPHONE ENCOUNTER
September 13, 2018: I spoke with Sherrie who approved the ID appt on 10/16 at 5p for one hour.    Zakiya Hinton  Post-Liver Transplant   [phone]

## 2018-09-13 NOTE — PROGRESS NOTES
Home Infusion  Sherrie is discharging today and going home on 12 hr cycled enteral therapy.   She has never done home enteral therapy before but she has been to the [facility] for initial teaching.    She prefers to hook herself up at home later today and needs some review on enteral set up confirmation of competency.  Met with Sherrie and her friend Main at bedside and provided instruction in enteral administration using the Luis pump and practice equipment.  Had Sherrie program the pump, set up and prime the tubing and load pump and bag into the backpack.  Her Friend Main observed and asked good, relevant questions.  Instructed in 8-12 hr hang time for formula and to use a new bag q 24 hrs.  Provided information about supplies and on going supply deliveries, storage of formula, administration schedule (cycle over 12 hrs) and 24/7 availability of [facility] staff.   Sherrie  was taught med administration via her NJ by bedside nurses and stated she feels comfortable managing that.   I reinforced water flushing for both patency and to maintain hydration.   Sherrie and Main verbalized understanding of information given.  She demonstrated good technique with set up and verbalized good understanding of process.  Stated she feels comfortable with administering her feeding independently later tonight.  Sherrie is ready for discharge from [facility] perspective.    Marina PETIT  [facility] Liaison  [phone] pager

## 2018-09-13 NOTE — TELEPHONE ENCOUNTER
Call to Sherrie for discharge follow-up.    Is at home w/ her sister Janae who came from [place] to help her and her , Bryant.  Reports that she is doing ok.  [facility] made their first vist today.  We reviewed meds and pertinent stop dates.  Other issues include:  Anticoagulation:  She will be seeing her PCP tomorrow.  He will be managing anticoagulation (coumadin, INR goal 1.2-2 due portal vein thrombus).   Pain:  She is currently taking oxycodone q 3 hours and will be out of this medication before her next appt here on 9/17.  I told her that Dr. Rod mentioned involvement w/ a pain clinic and that I could make a referral for her to have this done here.  She wants to talk w/ her PCP to see if he would be willing to manage.  Appetite:  Is poor.  Is getting night time feedings.  Would like feeding tube to be removed when she returns Monday.  Told he we would ideally like her to be taking 2000 calories.  Medications:  Has what she needs, we  Decreased her cellcept to 750 mg po bid  Fluid management:  Has 2 SANTOS's.  She will record output and we will discuss again tomorrow.  She is currently on bumex,  She has no lower extremity edema.  She will weigh herself daily.  Activity:  Encouraged her to get outside at least 3 times per day.  Infectious disease:  Know we will be making an appt for her to follow up w/ Dr. Gong re: caseous lymph node.    Sherrie had no questions at the end of our phone conversation.  She knows how to contact after hours call nurse and transplant during the day if she has concerns or questions.

## 2018-09-13 NOTE — PROGRESS NOTES
This is a recent snapshot of the patient's [facility] medical record.  For current drug dose and complete information and questions, call [phone] or In Basket pool, [facility] home infusion ([id number])  CSN Number:  [id number]

## 2018-09-13 NOTE — TELEPHONE ENCOUNTER
September 13, 2018: I left a message to let the patient know about an appointment I scheduled with Dr. Gong on Tuesday, 10/16 at 5pm. There are other times that day to choose from if the pt prefers to come later in the evening.    Zakiya Hinton  Post-Liver Transplant   [phone]

## 2018-09-14 ENCOUNTER — AMBULATORY - RIVER FALLS (OUTPATIENT)
Dept: FAMILY MEDICINE | Facility: CLINIC | Age: 63
End: 2018-09-14
Payer: COMMERCIAL

## 2018-09-14 ENCOUNTER — OFFICE VISIT - RIVER FALLS (OUTPATIENT)
Dept: FAMILY MEDICINE | Facility: CLINIC | Age: 63
End: 2018-09-14
Payer: COMMERCIAL

## 2018-09-14 ENCOUNTER — TELEPHONE (OUTPATIENT)
Dept: TRANSPLANT | Facility: CLINIC | Age: 63
End: 2018-09-14

## 2018-09-14 ASSESSMENT — MIFFLIN-ST. JEOR: SCORE: 1072.52

## 2018-09-14 NOTE — TELEPHONE ENCOUNTER
"Spoke to Sherrie for general follow-up.  Reports that she is doing well.  Appetite still poor but is getting nighttime feedings.  Weight stable SANTOS output minimal on the \"exterior\" drain.  Other SANTOS w/  Less than 400 ml daily.  Denies lower extremity edema.  Will continue to hold Bumex.  Did get outside for a walk yesterday.  Is seeing PCP at 4pm this afternoon.  Pain is tolerable, managed w/ oxycodone.  Denies questions.  Knows how to contact transplant on call this weekend if she has issues.  Is scheduled to come to [facility] on Monday.  "

## 2018-09-17 ENCOUNTER — TELEPHONE (OUTPATIENT)
Dept: PHARMACY | Facility: CLINIC | Age: 63
End: 2018-09-17

## 2018-09-17 ENCOUNTER — TELEPHONE (OUTPATIENT)
Dept: TRANSPLANT | Facility: CLINIC | Age: 63
End: 2018-09-17

## 2018-09-17 ENCOUNTER — INFUSION THERAPY VISIT (OUTPATIENT)
Dept: INFUSION THERAPY | Facility: CLINIC | Age: 63
End: 2018-09-17
Attending: TRANSPLANT SURGERY
Payer: MEDICARE

## 2018-09-17 ENCOUNTER — RADIANT APPOINTMENT (OUTPATIENT)
Dept: GENERAL RADIOLOGY | Facility: CLINIC | Age: 63
End: 2018-09-17
Payer: COMMERCIAL

## 2018-09-17 ENCOUNTER — HOME INFUSION (PRE-WILLOW HOME INFUSION) (OUTPATIENT)
Dept: PHARMACY | Facility: CLINIC | Age: 63
End: 2018-09-17

## 2018-09-17 ENCOUNTER — OFFICE VISIT (OUTPATIENT)
Dept: TRANSPLANT | Facility: CLINIC | Age: 63
End: 2018-09-17

## 2018-09-17 VITALS
TEMPERATURE: 97.6 F | OXYGEN SATURATION: 96 % | HEART RATE: 82 BPM | DIASTOLIC BLOOD PRESSURE: 81 MMHG | RESPIRATION RATE: 16 BRPM | SYSTOLIC BLOOD PRESSURE: 129 MMHG

## 2018-09-17 DIAGNOSIS — Z94.4 LIVER REPLACED BY TRANSPLANT (H): ICD-10-CM

## 2018-09-17 DIAGNOSIS — Z00.5 POSITIVE SPUTUM CULTURE IN CADAVERIC DONOR: ICD-10-CM

## 2018-09-17 DIAGNOSIS — Z94.4 LIVER TRANSPLANTED (H): Chronic | ICD-10-CM

## 2018-09-17 DIAGNOSIS — Z94.4 LIVER REPLACED BY TRANSPLANT (H): Primary | ICD-10-CM

## 2018-09-17 DIAGNOSIS — Z94.4 LIVER TRANSPLANTED (H): Primary | Chronic | ICD-10-CM

## 2018-09-17 DIAGNOSIS — R84.5 POSITIVE SPUTUM CULTURE IN CADAVERIC DONOR: ICD-10-CM

## 2018-09-17 DIAGNOSIS — Z94.4 LIVER TRANSPLANTED (H): Primary | ICD-10-CM

## 2018-09-17 LAB
ALBUMIN SERPL-MCNC: 3.1 G/DL (ref 3.4–5)
ALP SERPL-CCNC: 94 U/L (ref 40–150)
ALT SERPL W P-5'-P-CCNC: 19 U/L (ref 0–50)
ANION GAP SERPL CALCULATED.3IONS-SCNC: 6 MMOL/L (ref 3–14)
AST SERPL W P-5'-P-CCNC: 13 U/L (ref 0–45)
BASOPHILS # BLD AUTO: 0 10E9/L (ref 0–0.2)
BASOPHILS NFR BLD AUTO: 1.2 %
BILIRUB DIRECT SERPL-MCNC: 0.3 MG/DL (ref 0–0.2)
BILIRUB SERPL-MCNC: 0.8 MG/DL (ref 0.2–1.3)
BUN SERPL-MCNC: 27 MG/DL (ref 7–30)
CALCIUM SERPL-MCNC: 8.4 MG/DL (ref 8.5–10.1)
CHLORIDE SERPL-SCNC: 105 MMOL/L (ref 94–109)
CO2 SERPL-SCNC: 27 MMOL/L (ref 20–32)
CREAT SERPL-MCNC: 0.98 MG/DL (ref 0.52–1.04)
DIFFERENTIAL METHOD BLD: ABNORMAL
EOSINOPHIL # BLD AUTO: 0.1 10E9/L (ref 0–0.7)
EOSINOPHIL NFR BLD AUTO: 2 %
ERYTHROCYTE [DISTWIDTH] IN BLOOD BY AUTOMATED COUNT: 19.2 % (ref 10–15)
GFR SERPL CREATININE-BSD FRML MDRD: 57 ML/MIN/1.7M2
GLUCOSE SERPL-MCNC: 100 MG/DL (ref 70–99)
HCT VFR BLD AUTO: 28.6 % (ref 35–47)
HGB BLD-MCNC: 9 G/DL (ref 11.7–15.7)
IMM GRANULOCYTES # BLD: 0 10E9/L (ref 0–0.4)
IMM GRANULOCYTES NFR BLD: 0.3 %
INR PPP: 2.29 (ref 0.86–1.14)
LYMPHOCYTES # BLD AUTO: 0.4 10E9/L (ref 0.8–5.3)
LYMPHOCYTES NFR BLD AUTO: 11.7 %
MAGNESIUM SERPL-MCNC: 2.9 MG/DL (ref 1.6–2.3)
MCH RBC QN AUTO: 28.2 PG (ref 26.5–33)
MCHC RBC AUTO-ENTMCNC: 31.5 G/DL (ref 31.5–36.5)
MCV RBC AUTO: 90 FL (ref 78–100)
MONOCYTES # BLD AUTO: 0.2 10E9/L (ref 0–1.3)
MONOCYTES NFR BLD AUTO: 5.6 %
NEUTROPHILS # BLD AUTO: 2.7 10E9/L (ref 1.6–8.3)
NEUTROPHILS NFR BLD AUTO: 79.2 %
NRBC # BLD AUTO: 0 10*3/UL
NRBC BLD AUTO-RTO: 0 /100
PHOSPHATE SERPL-MCNC: 3.8 MG/DL (ref 2.5–4.5)
PLATELET # BLD AUTO: 191 10E9/L (ref 150–450)
POTASSIUM SERPL-SCNC: 4.9 MMOL/L (ref 3.4–5.3)
PROT SERPL-MCNC: 6.8 G/DL (ref 6.8–8.8)
RBC # BLD AUTO: 3.19 10E12/L (ref 3.8–5.2)
SODIUM SERPL-SCNC: 138 MMOL/L (ref 133–144)
TACROLIMUS BLD-MCNC: 9.1 UG/L (ref 5–15)
TME LAST DOSE: NORMAL H
WBC # BLD AUTO: 3.4 10E9/L (ref 4–11)

## 2018-09-17 PROCEDURE — 85027 COMPLETE CBC AUTOMATED: CPT | Performed by: INTERNAL MEDICINE

## 2018-09-17 PROCEDURE — 80048 BASIC METABOLIC PNL TOTAL CA: CPT | Performed by: INTERNAL MEDICINE

## 2018-09-17 PROCEDURE — 84100 ASSAY OF PHOSPHORUS: CPT | Performed by: INTERNAL MEDICINE

## 2018-09-17 PROCEDURE — G0463 HOSPITAL OUTPT CLINIC VISIT: HCPCS

## 2018-09-17 PROCEDURE — 85004 AUTOMATED DIFF WBC COUNT: CPT | Performed by: INTERNAL MEDICINE

## 2018-09-17 PROCEDURE — 80076 HEPATIC FUNCTION PANEL: CPT | Performed by: INTERNAL MEDICINE

## 2018-09-17 PROCEDURE — 97803 MED NUTRITION INDIV SUBSEQ: CPT | Mod: XU

## 2018-09-17 PROCEDURE — 36415 COLL VENOUS BLD VENIPUNCTURE: CPT

## 2018-09-17 PROCEDURE — 80197 ASSAY OF TACROLIMUS: CPT | Performed by: INTERNAL MEDICINE

## 2018-09-17 PROCEDURE — 83735 ASSAY OF MAGNESIUM: CPT | Performed by: INTERNAL MEDICINE

## 2018-09-17 PROCEDURE — 85610 PROTHROMBIN TIME: CPT | Performed by: INTERNAL MEDICINE

## 2018-09-17 ASSESSMENT — PAIN SCALES - GENERAL: PAINLEVEL: SEVERE PAIN (6)

## 2018-09-17 NOTE — LETTER
2018       RE: Sherrie Lala  [address] [place] [address]     Dear Colleague,    Thank you for referring your patient, Sherrie Lala, to the [facility]. Please see a copy of my visit note below.    Transplant Social Work Services Progress Note      Collaborated with: Liver Transplant Team, [facility]    Data: Sherrie is s/p  donor liver transplant and she comes to [facility] today for routine follow up post hospitalization.  She is 18 days post transplant.  Intervention/education: I met with Sherrie along with her  Bryant, Dr. Rod, Abigail Parham-Transplant RN and VANESSA Nielsen.  I reviewed the following with Sherrie and her :        1. Writing to the Donor Family process        2. Liver Transplant Support Group and social events-Sherrie's email will be added to our distribution list.        3. Immunosuppression copays-no current concerns.  I reminded patient/ to contact me if they have any future concerns and we can evaluate for financial assistance programs, grants, etc.        4. Adjustment to illness- Sherrie is copping appropriately post transplantation.  She has no mental health history.    Assessment: Sherrie has been home from the hospital since last Wednesday.  She feels she is seeing some improvement in her desire to eat, though tube feeding will continue a few more days.  Her sleep has improved since she returned home.  Sherrie has strong support from her  Bryant, sister Janae (came from out state [place] five days), and her daughter Ramírez who lives nearby.  There are no chemical or mental health concerns.  Plan: I will remain available for Sherrie's psychosocial needs.        MARY Interiano, [facility]  Liver Transplant   Phone [phone]  Pager [phone]     Again, thank you for allowing me to participate in the care of your patient.      Sincerely,    MARY Rosenberg

## 2018-09-17 NOTE — MR AVS SNAPSHOT
After Visit Summary   9/17/2018    Sherrie Lala    MRN: [id number]           Patient Information     Date Of Birth          1955        Visit Information        Provider Department      9/17/2018 9:30 AM Darren Rod MD [facility] Specialty and Procedure        Today's Diagnoses     Liver transplanted (H)    -  1    Positive sputum culture in cadaveric donor           Follow-ups after your visit        Your next 10 appointments already scheduled     Sep 20, 2018  9:30 AM CDT   Lab with  LAB   [facility] ([facility])    [address]  1st Floor  [facility] [id number] [phone]            Sep 20, 2018 10:00 AM CDT   (Arrive by 9:45 AM)   Liver Return Post Op with Maribel Stringer NP   [facility] Solid Organ Transplant ([facility])    [address] [place] [address]  [address]  [facility] [id number] [phone]            Oct 16, 2018  5:00 PM CDT   (Arrive by 4:45 PM)   New Patient Visit with Lacy Gong MD   [facility] and Infectious Diseases ([facility])    [address] [place] [address]  [address]  [facility] [id number] [phone]              Who to contact     If you have questions or need follow up information about today's clinic visit or your schedule please contact [facility] SPECIALTY AND PROCEDURE directly at [phone].  Normal or non-critical lab and imaging results will be communicated to you by MyChart, letter or phone within 4 business days after the clinic has received the results. If you do not hear from us within 7 days, please contact the clinic through MyChart or phone. If you have a critical or abnormal lab result, we will notify you by phone as soon as possible.  Submit refill requests through [employer] or call your pharmacy and they will forward the refill request to us. Please allow 3 business days for your refill to  be completed.          Additional Information About Your Visit        s0ckethart Information     [employer] gives you secure access to your electronic health record. If you see a primary care provider, you can also send messages to your care team and make appointments. If you have questions, please call your primary care clinic.  If you do not have a primary care provider, please call [phone] and they will assist you.        Care EveryWhere ID     This is your Care EveryWhere ID. This could be used by other organizations to access your [place] medical records  [id number]         Blood Pressure from Last 3 Encounters:   09/17/18 129/81   09/12/18 120/77   05/04/18 116/67    Weight from Last 3 Encounters:   09/12/18 57.2 kg (126 lb 3.2 oz)   05/03/18 55.1 kg (121 lb 8 oz)   04/18/18 56.7 kg (125 lb 1.6 oz)               Primary Care Provider Office Phone # Fax #    Apollo Benitez [phone]       [address] [address]        Equal Access to Services     [facility]: Hadii lucien ku hadasho Soomaali, waaxda luqadaha, qaybta kaalmada kari, nahed alfaro . So [facility] [place] [phone].    ATENCIÓN: Si habla español, tiene a bucio disposición servicios gratuitos de asistencia lingüística. [facility] [phone].    We comply with applicable federal civil rights laws and [place] laws. We do not discriminate on the basis of race, color, national origin, age, disability, sex, sexual orientation, or gender identity.            Thank you!     Thank you for choosing [facility] SPECIALTY AND PROCEDURE  for your care. Our goal is always to provide you with excellent care. Hearing back from our patients is one way we can continue to improve our services. Please take a few minutes to complete the written survey that you may receive in the mail after your visit with us. Thank you!             Your Updated Medication List  - Protect others around you: Learn how to safely use, store and throw away your medicines at www.disposemymeds.org.          This list is accurate as of 9/17/18 11:59 PM.  Always use your most recent med list.                   Brand Name Dispense Instructions for use Diagnosis    acetaminophen 32 mg/mL solution    TYLENOL    400 mL    Take 20.3 mLs (650 mg) by mouth every 6 hours as needed for fever or mild pain    Liver transplanted (H)       albuterol 108 (90 Base) MCG/ACT inhaler    PROAIR HFA/PROVENTIL HFA/VENTOLIN HFA     Inhale 2 puffs into the lungs every 6 hours        alum & mag hydroxide-simethicone 400-400-40 MG/5ML Susp suspension    MYLANTA ES/MAALOX  ES    1200 mL    Take 30 mLs by mouth every 6 hours as needed for indigestion    Gastroesophageal reflux disease without esophagitis       aspirin 10 mg/mL Susp     975 mL    Take 32.5 mLs (325 mg) by mouth daily    Liver transplanted (H)       bisacodyl 10 MG Suppository    DULCOLAX    10 suppository    Place 1 suppository (10 mg) rectally daily as needed for constipation    Liver transplanted (H)       bumetanide 1 MG tablet    BUMEX    7 tablet    Take 1 tablet (1 mg) by mouth daily for 7 days    Liver transplanted (H), Hypervolemia, unspecified hypervolemia type       cyclobenzaprine 10 MG tablet    FLEXERIL    20 tablet    Take 1 tablet (10 mg) by mouth 3 times daily as needed for muscle spasms    Liver transplanted (H)       docusate 50 MG/5ML liquid    COLACE    200 mL    Take 5-10 mLs ( mg) by mouth 2 times daily    Liver transplanted (H)       GABAPENTIN PO      Take 300 mg by mouth At Bedtime        multivitamins with minerals Liqd liquid     450 mL    15 mLs by Per Feeding Tube route daily    Mild protein-calorie malnutrition (H)       mycophenolate suspension     300 mL    5 mLs (1,000 mg) by Oral or NG Tube route 2 times daily    Liver transplanted (H)       OMEPRAZOLE PO      Take 20 mg by mouth 2 times daily (before meals)         ondansetron 4 MG ODT tab    ZOFRAN-ODT    120 tablet    Take 1 tablet (4 mg) by mouth every 6 hours as needed for nausea or vomiting    Nausea       oxyCODONE IR 5 MG tablet    ROXICODONE    20 tablet    Take 0.5-1 tablets (2.5-5 mg) by mouth every 3 hours as needed for moderate to severe pain    Liver transplanted (H)       PRAMIPEXOLE DIHYDROCHLORIDE PO      Take 0.5 mg by mouth daily        sennosides 8.8 MG/5ML syrup    SENOKOT    200 mL    Take 5-10 mLs by mouth 2 times daily    Liver transplanted (H)       sulfamethoxazole-trimethoprim suspension    BACTRIM/SEPTRA    560 mL    Take 10 mLs (80 mg) by mouth daily Dose based on TMP component.    Liver transplanted (H)       * tacrolimus 0.5 MG capsule    GENERIC EQUIVALENT    60 capsule    Take 1 capsule (0.5 mg) by mouth 2 times daily As directed by provider    Liver transplanted (H)       * tacrolimus 1 MG capsule    GENERIC EQUIVALENT    300 capsule    Take 5 capsules (5 mg) by mouth 2 times daily    Liver transplanted (H)       valGANciclovir 50 MG/ML Solr solution    VALCYTE    270 mL    9 mLs (450 mg) by Oral or NG Tube route daily    Liver transplanted (H)       warfarin 2 MG tablet    COUMADIN    8 tablet    Take 2 tablets (4 mg) by mouth daily Every evening or as directed by provider    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)       * Notice:  This list has 2 medication(s) that are the same as other medications prescribed for you. Read the directions carefully, and ask your doctor or other care provider to review them with you.

## 2018-09-17 NOTE — LETTER
2018  Lab Results  TO: Dr Benitez, [facility]: Fax [phone]    DATE & TIME ISSUED: 2018 10:39 AM  PATIENT NAME: Sherrie Damon   : 1955     DIAGNOSIS:  Post Liver Transplant   ICD-10 CODE: z94.4       Results for SHERRIE DAMON (MRN [id number]) as of 2018 10:38   Ref. Range 2018 08:50   Sodium Latest Ref Range: 133 - 144 mmol/L 138   Potassium Latest Ref Range: 3.4 - 5.3 mmol/L 4.9   Chloride Latest Ref Range: 94 - 109 mmol/L 105   Carbon Dioxide Latest Ref Range: 20 - 32 mmol/L 27   Urea Nitrogen Latest Ref Range: 7 - 30 mg/dL 27   Creatinine Latest Ref Range: 0.52 - 1.04 mg/dL 0.98   GFR Estimate Latest Ref Range: >60 mL/min/1.7m2 57 (L)   GFR Estimate If Black Latest Ref Range: >60 mL/min/1.7m2 69   Calcium Latest Ref Range: 8.5 - 10.1 mg/dL 8.4 (L)   Anion Gap Latest Ref Range: 3 - 14 mmol/L 6   Magnesium Latest Ref Range: 1.6 - 2.3 mg/dL 2.9 (H)   Phosphorus Latest Ref Range: 2.5 - 4.5 mg/dL 3.8   Albumin Latest Ref Range: 3.4 - 5.0 g/dL 3.1 (L)   Protein Total Latest Ref Range: 6.8 - 8.8 g/dL 6.8   Bilirubin Total Latest Ref Range: 0.2 - 1.3 mg/dL 0.8   Alkaline Phosphatase Latest Ref Range: 40 - 150 U/L 94   ALT Latest Ref Range: 0 - 50 U/L 19   AST Latest Ref Range: 0 - 45 U/L 13   Bilirubin Direct Latest Ref Range: 0.0 - 0.2 mg/dL 0.3 (H)   Glucose Latest Ref Range: 70 - 99 mg/dL 100 (H)   WBC Latest Ref Range: 4.0 - 11.0 10e9/L 3.4 (L)   Hemoglobin Latest Ref Range: 11.7 - 15.7 g/dL 9.0 (L)   Hematocrit Latest Ref Range: 35.0 - 47.0 % 28.6 (L)   Platelet Count Latest Ref Range: 150 - 450 10e9/L 191   RBC Count Latest Ref Range: 3.8 - 5.2 10e12/L 3.19 (L)   MCV Latest Ref Range: 78 - 100 fl 90   MCH Latest Ref Range: 26.5 - 33.0 pg 28.2   MCHC Latest Ref Range: 31.5 - 36.5 g/dL 31.5   RDW Latest Ref Range: 10.0 - 15.0 % 19.2 (H)   Diff Method Unknown Automated Method   % Neutrophils Latest Units: % 79.2   % Lymphocytes Latest Units: % 11.7    % Monocytes Latest Units: % 5.6   % Eosinophils Latest Units: % 2.0   % Basophils Latest Units: % 1.2   % Immature Granulocytes Latest Units: % 0.3   Nucleated RBCs Latest Ref Range: 0 /100 0   Absolute Neutrophil Latest Ref Range: 1.6 - 8.3 10e9/L 2.7   Absolute Lymphocytes Latest Ref Range: 0.8 - 5.3 10e9/L 0.4 (L)   Absolute Monocytes Latest Ref Range: 0.0 - 1.3 10e9/L 0.2   Absolute Eosinophils Latest Ref Range: 0.0 - 0.7 10e9/L 0.1   Absolute Basophils Latest Ref Range: 0.0 - 0.2 10e9/L 0.0   Abs Immature Granulocytes Latest Ref Range: 0 - 0.4 10e9/L 0.0   Absolute Nucleated RBC Unknown 0.0   INR Latest Ref Range: 0.86 - 1.14  2.29 (H)

## 2018-09-17 NOTE — PROGRESS NOTES
Outpatient MNT: Post Liver Transplant    Time Spent: 15 minutes  Visit Type: F/U (last seen by OP RD 2/2018 for pre txp eval)  Referring Physician: Dr Rod  Pt accompanied by: her Bryant    Date of txp: 8/30/18    Nutrition Assessment/Diet Recall  Pt discharged home 5 days ago. Appetite is not good, maybe improving slightly. She is still doing nocturnal tube feeds of Nutren 1.5 @ 50 ml/hr x 12 hours overnight. This provides pt with 600 ml formula, 900 calories, and 41 g protein daily. Tube feeds are providing ~50% calorie and protein needs. Yesterday she drank an Ensure and had 1/2 cup cereal with milk. She may have 1-2 Ensure/day. She reports waking up feeling full.     Anthropometrics  Height:   63 in   BMI:    22.4    Weight Status:Normal BMI   Weight:  126 lbs            IBW (lb): 115  % IBW: 110    Adj/dosing BW: 126 lbs/57 kg       Labs  Potassium   Date Value Ref Range Status   09/17/2018 4.9 3.4 - 5.3 mmol/L Final       Estimated Nutrition Needs  Energy  2374-0680     (30-35 kcal/kg for increased needs post txp)     Protein      (1.5-2 g/kg for increased needs post txp)           Fluid  1 ml/kcal or per MD     Nutrition Diagnosis  Inadequate protein intake related to increased protein needs s/p transplant AEB TF providing 50% protein needs with oral diet not making up the difference to reach minimum goal of 86 g protein/day.    Nutrition Intervention  1. Discussed importance of consuming adequate calories and protein for 2 months post-txp to help heal and reduce muscle/fat wasting. Discussed sources of protein and ways to ensure she is increasing her protein intake.  - Discussed tube feeds and possible impacts on appetite and oral intakes. Encouraged minimum of 2 Ensure/day consistently or try a higher protein content drink. Consider mixing Ensure with ice cream to bump up the calories.     2. Reviewed importance of food safety and increased risk for food-borne illness post-txp. Also  discussed potential need to monitor K+, CHO, and Na+ intakes d/t medication side effects.     3. Avoid the following post txp d/t risk for rejection, unknown effects on the organs, and/or potential interactions with immunosuppressants:  - Herbal, Chinese, holistic, chiropractic, natural, alternative medicines and supplements  - Detoxes and cleanses  - Weight loss pills  - Protein powders or other products with extracts or herbs (ie green tea extract)      Patient Understanding: Pt verbalized understanding of education provided.  Expected Compliance: Good  Follow-Up Plans: PRN     Nutrition Goals  1. 2 protein drinks/day consistently   2. Weight maintenance     Provided pt with contact info.   Sherri Porter RD, LD  [facility] [phone]

## 2018-09-17 NOTE — PROGRESS NOTES
Transplant Social Work Services Progress Note      Collaborated with: Liver Transplant Team, [facility]    Data: Sherrie is s/p  donor liver transplant and she comes to [facility] today for routine follow up post hospitalization.  She is 18 days post transplant.  Intervention/education: I met with Sherrie along with her  Bryant, Dr. Rod, Abigail Parham-Transplant RN and Maribel Stringer PA.  I reviewed the following with Sherrie and her :        1. Writing to the Donor Family process        2. Liver Transplant Support Group and social events-Sherrie's email will be added to our distribution list.        3. Immunosuppression copays-no current concerns.  I reminded patient/ to contact me if they have any future concerns and we can evaluate for financial assistance programs, grants, etc.        4. Adjustment to illness- Sherrie is copping appropriately post transplantation.  She has no mental health history.    Assessment: Sherrie has been home from the hospital since last Wednesday.  She feels she is seeing some improvement in her desire to eat, though tube feeding will continue a few more days.  Her sleep has improved since she returned home.  Sherrie has strong support from her  Bryant, sister Janae (came from out [facility] for five days), and her daughter Ramírez who lives nearby.  There are no chemical or mental health concerns.  Plan: I will remain available for Sherrie's psychosocial needs.        MARY Interiano, [facility]  Liver Transplant   Phone [phone]  Pager [phone]

## 2018-09-17 NOTE — MR AVS SNAPSHOT
After Visit Summary   9/17/2018    Sherrie Lala    MRN: [id number]           Patient Information     Date Of Birth          1955        Visit Information        Provider Department      9/17/2018 10:00 AM Clarissa Porter RD [facility] Treatment [place] Specialty and Procedure        Today's Diagnoses     Liver transplanted (H)    -  1       Follow-ups after your visit        Your next 10 appointments already scheduled     Sep 17, 2018  2:40 PM CDT   XR CHEST 2 VIEWS with UCXR1   [facility] Imaging [place] Xray ([facility])    [address] [id number] [phone]           How do I prepare for my exam? (Food and drink instructions) No Food and Drink Restrictions.  How do I prepare for my exam? (Other instructions) You do not need to do anything special for this exam.  What should I wear: Wear comfortable clothes.  How long does the exam take: Most scans take less than 5 minutes.  What should I bring: Bring a list of your medicines, including vitamins, minerals and over-the-counter drugs. It is safest to leave personal items at home.  Do I need a :  No  is needed.  What do I need to tell my doctor: Tell your doctor if there s any chance you are pregnant.  What should I do after the exam: No restrictions, You may resume normal activities.  What is this test: An image of a specific body part shown in shades of black and white.  Who should I call with questions: If you have any questions, please call the Imaging Department where you will have your exam. Directions, parking instructions, and other information is available on our website, [email or web address].            Sep 20, 2018  9:30 AM CDT   Lab with  LAB   [facility] Lab ([facility])    [address] [place] [id number] [phone]            Sep 20, 2018 10:00 AM CDT   (Arrive by 9:45 AM)   Liver Return Post Op  with Maribel Stringer NP   [facility] Solid Organ Transplant ([facility])    [address]  [address]  [facility] [address] [phone]            Oct 16, 2018  5:00 PM CDT   (Arrive by 4:45 PM)   New Patient Visit with Lacy Gong MD   [facility] and Infectious Diseases ([facility])    [address]  [address]  [facility] [address] [phone]              Who to contact     If you have questions or need follow up information about today's clinic visit or your schedule please contact [facility] SPECIALTY AND PROCEDURE directly at [phone].  Normal or non-critical lab and imaging results will be communicated to you by Wanov[employer]art, letter or phone within 4 business days after the clinic has received the results. If you do not hear from us within 7 days, please contact the clinic through One[employer] or phone. If you have a critical or abnormal lab result, we will notify you by phone as soon as possible.  Submit refill requests through [employer] or call your pharmacy and they will forward the refill request to us. Please allow 3 business days for your refill to be completed.          Additional Information About Your Visit        [employer] Information     [employer] gives you secure access to your electronic health record. If you see a primary care provider, you can also send messages to your care team and make appointments. If you have questions, please call your primary care clinic.  If you do not have a primary care provider, please call [phone] and they will assist you.        Care EveryWhere ID     This is your Care EveryWhere ID. This could be used by other organizations to access your [place] medical records  [id number]         Blood Pressure from Last 3 Encounters:   09/17/18 129/81   09/12/18 120/77   05/04/18 116/67    Weight from Last 3 Encounters:   09/12/18 57.2 kg (126 lb 3.2 oz)   05/03/18  55.1 kg (121 lb 8 oz)   04/18/18 56.7 kg (125 lb 1.6 oz)              Today, you had the following     No orders found for display       Primary Care Provider Office Phone # Fax #    Apollo Benietz [phone]       [facility] [address] [facility] [address]        Equal Access to Services     JOSE CINTIA : Hadii aad ku hadasho Soomaali, waaxda luqadaha, qaybta kaalmada adeegyada, nahed quezada hayaan adekiki wintersalexdameon alfaro . So [facility] [phone].    ATENCIÓN: Si habla español, tiene a bucio disposición servicios gratuitos de asistencia lingüística. [facility] [phone].    We comply with applicable federal civil rights laws and [place] laws. We do not discriminate on the basis of race, color, national origin, age, disability, sex, sexual orientation, or gender identity.            Thank you!     Thank you for choosing [facility] SPECIALTY AND PROCEDURE  for your care. Our goal is always to provide you with excellent care. Hearing back from our patients is one way we can continue to improve our services. Please take a few minutes to complete the written survey that you may receive in the mail after your visit with us. Thank you!             Your Updated Medication List - Protect others around you: Learn how to safely use, store and throw away your medicines at www.disposemymeds.org.          This list is accurate as of 9/17/18  1:55 PM.  Always use your most recent med list.                   Brand Name Dispense Instructions for use Diagnosis    acetaminophen 32 mg/mL solution    TYLENOL    400 mL    Take 20.3 mLs (650 mg) by mouth every 6 hours as needed for fever or mild pain    Liver transplanted (H)       albuterol 108 (90 Base) MCG/ACT inhaler    PROAIR HFA/PROVENTIL HFA/VENTOLIN HFA     Inhale 2 puffs into the lungs every 6 hours        alum & mag hydroxide-simethicone 400-400-40 MG/5ML Susp suspension    MYLANTA ES/MAALOX  ES    1200 mL    Take  30 mLs by mouth every 6 hours as needed for indigestion    Gastroesophageal reflux disease without esophagitis       aspirin 10 mg/mL Susp     975 mL    Take 32.5 mLs (325 mg) by mouth daily    Liver transplanted (H)       bisacodyl 10 MG Suppository    DULCOLAX    10 suppository    Place 1 suppository (10 mg) rectally daily as needed for constipation    Liver transplanted (H)       bumetanide 1 MG tablet    BUMEX    7 tablet    Take 1 tablet (1 mg) by mouth daily for 7 days    Liver transplanted (H), Hypervolemia, unspecified hypervolemia type       cyclobenzaprine 10 MG tablet    FLEXERIL    20 tablet    Take 1 tablet (10 mg) by mouth 3 times daily as needed for muscle spasms    Liver transplanted (H)       docusate 50 MG/5ML liquid    COLACE    200 mL    Take 5-10 mLs ( mg) by mouth 2 times daily    Liver transplanted (H)       GABAPENTIN PO      Take 300 mg by mouth At Bedtime        multivitamins with minerals Liqd liquid     450 mL    15 mLs by Per Feeding Tube route daily    Mild protein-calorie malnutrition (H)       mycophenolate suspension     300 mL    5 mLs (1,000 mg) by Oral or NG Tube route 2 times daily    Liver transplanted (H)       OMEPRAZOLE PO      Take 20 mg by mouth 2 times daily (before meals)        ondansetron 4 MG ODT tab    ZOFRAN-ODT    120 tablet    Take 1 tablet (4 mg) by mouth every 6 hours as needed for nausea or vomiting    Nausea       oxyCODONE IR 5 MG tablet    ROXICODONE    20 tablet    Take 0.5-1 tablets (2.5-5 mg) by mouth every 3 hours as needed for moderate to severe pain    Liver transplanted (H)       PRAMIPEXOLE DIHYDROCHLORIDE PO      Take 0.5 mg by mouth daily        sennosides 8.8 MG/5ML syrup    SENOKOT    200 mL    Take 5-10 mLs by mouth 2 times daily    Liver transplanted (H)       sulfamethoxazole-trimethoprim suspension    BACTRIM/SEPTRA    560 mL    Take 10 mLs (80 mg) by mouth daily Dose based on TMP component.    Liver transplanted (H)       * tacrolimus  0.5 MG capsule    GENERIC EQUIVALENT    60 capsule    Take 1 capsule (0.5 mg) by mouth 2 times daily As directed by provider    Liver transplanted (H)       * tacrolimus 1 MG capsule    GENERIC EQUIVALENT    300 capsule    Take 5 capsules (5 mg) by mouth 2 times daily    Liver transplanted (H)       valGANciclovir 50 MG/ML Solr solution    VALCYTE    270 mL    9 mLs (450 mg) by Oral or NG Tube route daily    Liver transplanted (H)       warfarin 2 MG tablet    COUMADIN    8 tablet    Take 2 tablets (4 mg) by mouth daily Every evening or as directed by provider    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)       * Notice:  This list has 2 medication(s) that are the same as other medications prescribed for you. Read the directions carefully, and ask your doctor or other care provider to review them with you.

## 2018-09-17 NOTE — PROGRESS NOTES
"Sherrie Lala came to [facility] today for a lab and assess following a  Donor Liver transplant on 18.      Discharge date: 18  Transplant coordinator: Abigail Parham        Physical Assessment:  See physical assessment located under \"Document Flowsheets\".  Incision site: C/D/I - No staples, sutures intake  Lines: NG tube in place and in working order per patient report. Getting night feeds. Removal of feeding tube to be addressed again on Thursday at f/u appointment with surgery. SANTOS x2 both to bulb suction. 70cc of drainage in 8 hours of serous drainage in 1 of the drains and minimal to no output in the other drain. SANTOS drain that has no output was removed by Maribel MENDEZ today at bedside. Other left in place until f/u on Thursday.  Bruner: N/A  Urine clarity: Voiding without issues. Urine color getting lighter per patient report  Hydration: Patient is working on her oral intake, encouraged fluids. Receiving night tube feeds  Nutrition: Continues on prescribed tube feeds/protein pack along with oral intake.    Last BM: 18- Patient reports having daily bowel movements- loose to semiformed but only going once daily. Continues on daily bowel meds/regimine while taking narcotics.  Pain: Ongoing moderate to severe pain. Using oxycodone three times per day per patient report along with Tylenol and flexeril. Patient reports these are adequate in controlling her pain at this time.     Labs drawn by [facility] staff Yes    Plan of care for today:   1.) Labs and assessment reviewed with MD Trejo) at bedside. Copy of labs given to patient.  2.) Reviewed medication card and pill box. New medication card completed and changes made to reflect accuracy. Patient's medication box had been filled by patient with assistance from her sister. Reviewed patient's pill box.   3.) Transplant checklist completed.  4.) Care coordinator in to see patient  5.) Pharmacy up to see patient.   6.) Dietician up to see patient.  7.) " Orders placed by surgery for a chest X ray- patient having this performed after appointment here  8.) Arrangements made to have someone call patient to schedule a f/u with surgery on Thursday.      Medication changes: None    Medications administered: None      Patient education:    The following teaching topics were addressed: Importance of drinking 2L of non-caffeinated fluids daily, Incisional care, Signs/symptoms of infection, Good handwashing, Medications (purposes, doses and times of administration), Phone numbers to call with concenrs (Transplant coordinator, [facility] [address] and [facility]), Plan of care and Drain care   Patient and Spouse verbalized understanding and all questions answered.    Drug level:  Prograf level today will be reviewed by patient's coordinator .    Face to face time: 20 minutes  Discharge Plan    Pt will follow up with Surgery this Thursday 9/20. Appointment time TBD  Discharge instructions reviewed with patient: YES  Patient/Representative verbalized understanding, all questions answered: YES    Discharged from unit at 1330 with whom: spouse to imaging for CXR then patient to go home.    Ericka Severson, RN

## 2018-09-17 NOTE — TELEPHONE ENCOUNTER
Here today for post liver transplant follow-up.  Accompanied by  Bryant. Reports doing well at home, sister Janae was in from [place] to assist her post-operatively, has now gone home.  Sherrie feels she has great support.   Homecare is to be seeing Monday and Thursdays for lab draw, but visits have been cancelled due to clinic appt's.  Night time tube feedings continue.  Will get CXR today.  ON coumadin, INR goal is 1.5-2.  PCP, Dr. Benitez is following.  Thien to fax today's INR to Dr. Benitez's office today.  Wrote stop dates for cellcept, bactrim and valcyte on med card.  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    Has lab book, knows how to obtain and record labs.  Reviewed lab results and assisted with interpretation / understanding.    Appetite not good but this may be due to 12 hour night-time feedings. Per Viola will consider removing feeding tube when she returns this week again on Thursday.  Encouraged small, frequent meals.  Encouraged drinking plenty of fluids.  Weight is stable.  No edema, bumex was stopped last week.    Activity:  Increasing activity.  Gettting out of the house.  Reminded of lifting restriction, encouraged to increase activity as tolerated.    Pain management:  Continues on oxycodone but is down to just q 8 hrs.  Had back pain prior to liver transplant, thought to be possibly related to radiation injury.  She also takes gabapentin for this.    Staples: No staples, sutures present    Biliary stent: likely present, Sherrie is aware that this may fall out, if not will need to be removed at 2-3 mos post transplant.    Lab frequency:  Every Monday and Thurs    Current Immunosuppression: Prograf , Cellcept 750 mg po bid,.      Med changes:     none today    Homecare:  [facility].  I called them to let them know that Sherrie will return to our clinic this Thursday so she won't need them to draw her labs there this week.    Recommended  follow-up:  1.)  PCP - Dr. Benitez in [place]  2.)  Surgeon:  Viola  3.)  Hepatology -  4.)  Oncology       Has watched transplant discharge DVD's, has copies at home for review.   I have addressed all questions.  Patient has my contact information and knows how to contact afterhours for assistance / questions.

## 2018-09-17 NOTE — PROGRESS NOTES
Transplant Surgery -OUTPATIENT IMMUNOSUPPRESSION PROGRESS NOTE    Date of Visit: 09/17/2018  Immunosuppression Note:    Sherrie Lala is a 63 year old female who is seen today  for immunosuppression management     IDarren MD, I have examined the patient with Maribel Stringer CNP, discussed and agree with the note and findings.  I have reviewed today's vital signs, medications, labs and imaging. I reviewed the immunosuppression medications and levels. I spoke to the patient/family and explained below clinical details and answered all the questions        Transplants:  8/30/2018 (Liver); Postoperative day:  18  ASSESMENT AND PLAN:  1.Graft Function: Hepatic panel stable   2.Immunosuppression Management: prograf and cellcept   3.Hypertension: stable  4.Renal Function: cr is 0.98 stable  5.Lab frequency: twice weekly  6.Other: Hung drain removed.    Malnutrition:  TF for 1 week.  Monitor intake.    Chest  X ray shows moderate pleural effusion, start on lasix 20 mg po daily    Maribel Stringer CNP   Date: September 17, 2018    Transplant:  [x]                             Liver [x]                              Kidney []                             Pancreas []                              Other:             Chief Complaint:No chief complaint on file.    History of Present Illness:Sherrie Lala is a 63-year old female with a history of metastatic cholangiocarcinoma with subsequent liver failure who is now s/p DDLT on 8/30/18. The patient had significant intraoperative blood loss (~3L) and her abdomen was left open. She underwent washout and abdominal closure on 9/1.     Patient Active Problem List   Diagnosis     Gastric ulcer     Osteoporosis     Bleeding esophageal varices (H)     Hydrothorax - hepatic     Liver transplanted (H)     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)     Open abdominal wall wound     Immunosuppressed status (H)     Acute post-operative pain     Acute blood loss anemia     Mild  protein-calorie malnutrition (H)     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)     Hypervolemia     Positive sputum culture in cadaveric donor     Positive urine culture in cadaveric donor     SOCIAL /FAMILY HISTORY: [x]                  No recent change    Past Medical History:   Diagnosis Date     Asthma      Cholangiocarcinoma (H) 2014     Cirrhosis of liver with ascites (H) 5/10/2018     Encounter for pleural drainage tube placement      Esophageal varices with hemorrhage (H)      Gastric ulcer      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     Liver transplanted (H) 2018     Lung metastases (H) 2014     Portal vein thrombosis      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    Residual thrombus in main and right portal     Past Surgical History:   Procedure Laterality Date     CHOLECYSTECTOMY       HEPATECTOMY PARTIAL       RETURN LIVER TRANSPLANT N/A 2018    Procedure: RETURN LIVER TRANSPLANT;  Open Abdomen, return liver transplant washout with   Mesh and liver stent  placement and  Abdomal Wound closure;  Surgeon: Darren Rod MD;  Location: [facility] OR     TRANSPLANT LIVER RECIPIENT  DONOR N/A 2018    Procedure: TRANSPLANT LIVER RECIPIENT  DONOR;  TRANSPLANT LIVER RECIPIENT  DONOR ;  Surgeon: Darren Rod MD;  Location: [facility] OR     Social History     Social History     Marital status:      Spouse name: N/A     Number of children: N/A     Years of education: N/A     Occupational History     Not on file.     Social History Main Topics     Smoking status: Never Smoker     Smokeless tobacco: Never Used     Alcohol use No      Comment: occ      Drug use: No     Sexual activity: Not on file     Other Topics Concern     Not on file     Social History Narrative     Prescription Medications as of 2018             acetaminophen (TYLENOL) 32 mg/mL solution Take 20.3 mLs (650 mg) by mouth every 6 hours as needed for fever or mild pain     albuterol (PROAIR HFA/PROVENTIL HFA/VENTOLIN HFA) 108 (90 BASE) MCG/ACT Inhaler Inhale 2 puffs into the lungs every 6 hours    alum & mag hydroxide-simethicone (MYLANTA ES/MAALOX  ES) 400-400-40 MG/5ML SUSP suspension Take 30 mLs by mouth every 6 hours as needed for indigestion    aspirin 10 mg/mL SUSP Take 32.5 mLs (325 mg) by mouth daily    bisacodyl (DULCOLAX) 10 MG Suppository Place 1 suppository (10 mg) rectally daily as needed for constipation    bumetanide (BUMEX) 1 MG tablet Take 1 tablet (1 mg) by mouth daily for 7 days    CELLCEPT (BRAND) 200 MG/ML SUSPENSION 5 mLs (1,000 mg) by Oral or NG Tube route 2 times daily    cyclobenzaprine (FLEXERIL) 10 MG tablet Take 1 tablet (10 mg) by mouth 3 times daily as needed for muscle spasms    docusate (COLACE) 50 MG/5ML liquid Take 5-10 mLs ( mg) by mouth 2 times daily    GABAPENTIN PO Take 300 mg by mouth At Bedtime    multivitamins with minerals (CERTAVITE/CEROVITE) LIQD liquid 15 mLs by Per Feeding Tube route daily    OMEPRAZOLE PO Take 20 mg by mouth 2 times daily (before meals)    ondansetron (ZOFRAN-ODT) 4 MG ODT tab Take 1 tablet (4 mg) by mouth every 6 hours as needed for nausea or vomiting    oxyCODONE IR (ROXICODONE) 5 MG tablet Take 0.5-1 tablets (2.5-5 mg) by mouth every 3 hours as needed for moderate to severe pain    PRAMIPEXOLE DIHYDROCHLORIDE PO Take 0.5 mg by mouth daily    sennosides (SENOKOT) 8.8 MG/5ML syrup Take 5-10 mLs by mouth 2 times daily    sulfamethoxazole-trimethoprim (BACTRIM/SEPTRA) suspension Take 10 mLs (80 mg) by mouth daily Dose based on TMP component.    tacrolimus (GENERIC EQUIVALENT) 0.5 MG capsule Take 1 capsule (0.5 mg) by mouth 2 times daily As directed by provider    tacrolimus (GENERIC EQUIVALENT) 1 MG capsule Take 5 capsules (5 mg) by mouth 2 times daily    valGANciclovir (VALCYTE) 50 MG/ML SOLR solution 9 mLs (450 mg) by Oral or NG Tube route daily    warfarin (COUMADIN) 2 MG tablet Take 2 tablets (4 mg) by mouth  daily Every evening or as directed by provider        Blood transfusion related (informational only) and Dilaudid [hydromorphone]   REVIEW OF SYSTEMS (check box if normal)  [x]               GENERAL  [x]                 PULMONARY [x]                GENITOURINARY  [x]                CNS                 [x]                 CARDIAC  [x]                 ENDOCRINE  [x]                EARS,NOSE,THROAT [x]                 GASTROINTESTINAL [x]                 NEUROLOGIC    [x]                MUSCLOSKELTAL  [x]                  HEMATOLOGY      PHYSICAL EXAM (check box if normal)There were no vitals taken for this visit.        [x]            GENERAL:    [x]       EYES:  ICTERIC   []        YES  []                    NO  [x]           EXTREMITIES: Clubbing []       Y     [x]           N    [x]           EARS, NOSE, THROAT: Membranes Moist    YES   [x]                   NO []                  [x]           LUNGS:  CLEAR    YES       [x]                  NO    []                                [x]           SKIN: Jaundice           YES       []                  NO    [x]                   Rash: YES       []                  NO    [x]                                     [x]             HEART: Regular Rate          YES       [x]                  NO    []                   Incision Clean:  YES       [x]                  NO    []                                [x]                    ABDOMEN: Organomegaly YES       []                  NO    [x]                       [x]                    NEUROLOGICAL:  Nonfocal  YES       [x]                  NO    []                       [x]                    Hernia YES       []                  NO    [x]                   PSYCHIATRIC:  Appropriate  YES       [x]                  NO    []                       OTHER:                                                                                                   PAIN SCALE:: 2-3

## 2018-09-17 NOTE — MR AVS SNAPSHOT
After Visit Summary   9/17/2018    Sherrie Lala    MRN: [id number]           Patient Information     Date Of Birth          1955        Visit Information        Provider Department      9/17/2018 2:41 PM Alyssa Ramirez MSW [facility]        Today's Diagnoses     Liver replaced by transplant (H)    -  1       Follow-ups after your visit        Your next 10 appointments already scheduled     Sep 20, 2018  9:30 AM CDT   Lab with  LAB   [facility] ([facility])    [address] [place] [address]  1st Floor  [facility] [id number] [phone]            Sep 20, 2018 10:00 AM CDT   (Arrive by 9:45 AM)   Liver Return Post Op with Maribel Stringer NP   [facility] ([facility])    [address] [place] [address]  [address]  [facility] [id number] [phone]            Oct 16, 2018  5:00 PM CDT   (Arrive by 4:45 PM)   New Patient Visit with Lacy Gong MD   [facility] ([facility])    [address] [place] [address]  [address]  [facility] [id number] [phone]              Who to contact     Please call your clinic at [phone] to:    Ask questions about your health    Make or cancel appointments    Discuss your medicines    Learn about your test results    Speak to your doctor            Additional Information About Your Visit        MyChart Information     [employer] gives you secure access to your electronic health record. If you see a primary care provider, you can also send messages to your care team and make appointments. If you have questions, please call your primary care clinic.  If you do not have a primary care provider, please call [phone] and they will assist you.      [employer] is an electronic gateway that provides easy, online access to your medical records. With [employer], you can request a clinic appointment, read your test results, renew a prescription  or communicate with your care team.     To access your existing account, please contact your [facility] or call [phone] for assistance.        Care EveryWhere ID     This is your Care EveryWhere ID. This could be used by other organizations to access your [facility] medical records  [id number]         Blood Pressure from Last 3 Encounters:   09/17/18 129/81   09/12/18 120/77   05/04/18 116/67    Weight from Last 3 Encounters:   09/12/18 57.2 kg (126 lb 3.2 oz)   05/03/18 55.1 kg (121 lb 8 oz)   04/18/18 56.7 kg (125 lb 1.6 oz)              Today, you had the following     No orders found for display       Primary Care Provider Office Phone # Fax #    Apollo Benitez [phone]       [place] [id number] [facility] [address]        Equal Access to Services     HARINI [facility] WesleyDOMINIQUE : Hadii lucien Ordonez, waaxda luqadaha, qaybta kaalmada kari, nahed alfaro . So [facility] [phone].    ATENCIÓN: Si habla español, tiene a bucio disposición servicios gratuitos de asistencia lingüística. Neville al [phone].    We comply with applicable federal civil rights laws and [place] laws. We do not discriminate on the basis of race, color, national origin, age, disability, sex, sexual orientation, or gender identity.            Thank you!     Thank you for choosing [facility]  for your care. Our goal is always to provide you with excellent care. Hearing back from our patients is one way we can continue to improve our services. Please take a few minutes to complete the written survey that you may receive in the mail after your visit with us. Thank you!             Your Updated Medication List - Protect others around you: Learn how to safely use, store and throw away your medicines at www.disposemymeds.org.          This list is accurate as of 9/17/18  2:51 PM.  Always use your most recent med list.                    Brand Name Dispense Instructions for use Diagnosis    acetaminophen 32 mg/mL solution    TYLENOL    400 mL    Take 20.3 mLs (650 mg) by mouth every 6 hours as needed for fever or mild pain    Liver transplanted (H)       albuterol 108 (90 Base) MCG/ACT inhaler    PROAIR HFA/PROVENTIL HFA/VENTOLIN HFA     Inhale 2 puffs into the lungs every 6 hours        alum & mag hydroxide-simethicone 400-400-40 MG/5ML Susp suspension    MYLANTA ES/MAALOX  ES    1200 mL    Take 30 mLs by mouth every 6 hours as needed for indigestion    Gastroesophageal reflux disease without esophagitis       aspirin 10 mg/mL Susp     975 mL    Take 32.5 mLs (325 mg) by mouth daily    Liver transplanted (H)       bisacodyl 10 MG Suppository    DULCOLAX    10 suppository    Place 1 suppository (10 mg) rectally daily as needed for constipation    Liver transplanted (H)       bumetanide 1 MG tablet    BUMEX    7 tablet    Take 1 tablet (1 mg) by mouth daily for 7 days    Liver transplanted (H), Hypervolemia, unspecified hypervolemia type       cyclobenzaprine 10 MG tablet    FLEXERIL    20 tablet    Take 1 tablet (10 mg) by mouth 3 times daily as needed for muscle spasms    Liver transplanted (H)       docusate 50 MG/5ML liquid    COLACE    200 mL    Take 5-10 mLs ( mg) by mouth 2 times daily    Liver transplanted (H)       GABAPENTIN PO      Take 300 mg by mouth At Bedtime        multivitamins with minerals Liqd liquid     450 mL    15 mLs by Per Feeding Tube route daily    Mild protein-calorie malnutrition (H)       mycophenolate suspension     300 mL    5 mLs (1,000 mg) by Oral or NG Tube route 2 times daily    Liver transplanted (H)       OMEPRAZOLE PO      Take 20 mg by mouth 2 times daily (before meals)        ondansetron 4 MG ODT tab    ZOFRAN-ODT    120 tablet    Take 1 tablet (4 mg) by mouth every 6 hours as needed for nausea or vomiting    Nausea       oxyCODONE IR 5 MG tablet    ROXICODONE    20 tablet    Take 0.5-1 tablets  (2.5-5 mg) by mouth every 3 hours as needed for moderate to severe pain    Liver transplanted (H)       PRAMIPEXOLE DIHYDROCHLORIDE PO      Take 0.5 mg by mouth daily        sennosides 8.8 MG/5ML syrup    SENOKOT    200 mL    Take 5-10 mLs by mouth 2 times daily    Liver transplanted (H)       sulfamethoxazole-trimethoprim suspension    BACTRIM/SEPTRA    560 mL    Take 10 mLs (80 mg) by mouth daily Dose based on TMP component.    Liver transplanted (H)       * tacrolimus 0.5 MG capsule    GENERIC EQUIVALENT    60 capsule    Take 1 capsule (0.5 mg) by mouth 2 times daily As directed by provider    Liver transplanted (H)       * tacrolimus 1 MG capsule    GENERIC EQUIVALENT    300 capsule    Take 5 capsules (5 mg) by mouth 2 times daily    Liver transplanted (H)       valGANciclovir 50 MG/ML Solr solution    VALCYTE    270 mL    9 mLs (450 mg) by Oral or NG Tube route daily    Liver transplanted (H)       warfarin 2 MG tablet    COUMADIN    8 tablet    Take 2 tablets (4 mg) by mouth daily Every evening or as directed by provider    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)       * Notice:  This list has 2 medication(s) that are the same as other medications prescribed for you. Read the directions carefully, and ask your doctor or other care provider to review them with you.

## 2018-09-17 NOTE — MR AVS SNAPSHOT
After Visit Summary   9/17/2018    Sherrie Lala    MRN: [id number]           Patient Information     Date Of Birth          1955        Visit Information        Provider Department      9/17/2018 9:00 AM  45 ATC;  SPEC INFUSION [facility] Specialty and Procedure        Today's Diagnoses     Liver replaced by transplant (H)          Care Instructions    Dear Sherrie Lala    Thank you for choosing [facility] Specialty Infusion and Procedure Center ([facility]) for your transplant cares.  The following information is a summary of our appointment as well as important reminders.      1.) Someone will be in contact with you to schedule your follow up appointment for this Thursday with surgery and to have your labs drawn.    2.) Please stop down in imaging to have a chest X-ray completed.    3.) No medication changes    We look forward in seeing you on your next appointment here at [facility].  Please don t hesitate to call us at [phone] to reschedule any of your appointments or to speak with one of the [facility] registered nurses.  It was a pleasure taking care of you today.    Sincerely,    [facility]  Specialty Infusion & Procedure Center  [address] [place] [address]  Phone:  [phone]            Follow-ups after your visit        Your next 10 appointments already scheduled     Sep 20, 2018  9:30 AM CDT   Lab with ANNI LAB   [facility] Lab ([facility])    [address] [place] [address] [phone]            Sep 20, 2018 10:00 AM CDT   (Arrive by 9:45 AM)   Liver Return Post Op with Maribel Stringer NP   [facility] Solid Organ Transplant ([facility])    [address] [place] [address]  [address]  [facility] [address] [phone]            Oct 16, 2018  5:00 PM CDT   (Arrive by 4:45 PM)   New Patient Visit with Lacy Gong MD   [facility]  [facility] and Infectious Diseases ([facility])    [address]  [address]  [facility] [address] [phone]              Future tests that were ordered for you today     Open Future Orders        Priority Expected Expires Ordered    X-ray Chest 2 vws* Routine 9/17/2018 9/17/2019 9/17/2018            Who to contact     If you have questions or need follow up information about today's clinic visit or your schedule please contact [facility] TREATMENT [place] SPECIALTY AND PROCEDURE directly at [phone].  Normal or non-critical lab and imaging results will be communicated to you by Sparrowhart, letter or phone within 4 business days after the clinic has received the results. If you do not hear from us within 7 days, please contact the clinic through U-NOTEt or phone. If you have a critical or abnormal lab result, we will notify you by phone as soon as possible.  Submit refill requests through Adreal or call your pharmacy and they will forward the refill request to us. Please allow 3 business days for your refill to be completed.          Additional Information About Your Visit        Sparrowhart Information     Adreal gives you secure access to your electronic health record. If you see a primary care provider, you can also send messages to your care team and make appointments. If you have questions, please call your primary care clinic.  If you do not have a primary care provider, please call [phone] and they will assist you.        Care EveryWhere ID     This is your Care EveryWhere ID. This could be used by other organizations to access your [place] medical records  [id number]        Your Vitals Were     Pulse Temperature Respirations Pulse Oximetry          82 97.6  F (36.4  C) (Oral) 16 96%         Blood Pressure from Last 3 Encounters:   09/17/18 137/84   09/12/18 120/77   05/04/18 116/67    Weight from Last 3 Encounters:   09/12/18 57.2 kg (126 lb 3.2 oz)    05/03/18 55.1 kg (121 lb 8 oz)   04/18/18 56.7 kg (125 lb 1.6 oz)              We Performed the Following     Basic metabolic panel     CBC with platelets     Hepatic panel     INR     Magnesium     Phosphorus     Tacrolimus level     WBC Differential        Primary Care Provider Office Phone # Fax #    Apollo Benitez [phone]       [facility] [address] [facility] [address]        Equal Access to Services     HARINI CHAMBERLAIN : Hadii aad ku hadasho Soomaali, waaxda luqadaha, qaybta kaalmada adeegyada, waxay idiin hayaan adeeg khalexsh la'lilyn ah. So [facility] [phone].    ATENCIÓN: Si habvictoriano cobian, tiene a bucio disposición servicios gratuitos de asistencia lingüística. Llame al [phone].    We comply with applicable federal civil rights laws and [place] laws. We do not discriminate on the basis of race, color, national origin, age, disability, sex, sexual orientation, or gender identity.            Thank you!     Thank you for choosing [facility] SPECIALTY AND PROCEDURE  for your care. Our goal is always to provide you with excellent care. Hearing back from our patients is one way we can continue to improve our services. Please take a few minutes to complete the written survey that you may receive in the mail after your visit with us. Thank you!             Your Updated Medication List - Protect others around you: Learn how to safely use, store and throw away your medicines at www.disposemymeds.org.          This list is accurate as of 9/17/18  1:11 PM.  Always use your most recent med list.                   Brand Name Dispense Instructions for use Diagnosis    acetaminophen 32 mg/mL solution    TYLENOL    400 mL    Take 20.3 mLs (650 mg) by mouth every 6 hours as needed for fever or mild pain    Liver transplanted (H)       albuterol 108 (90 Base) MCG/ACT inhaler    PROAIR HFA/PROVENTIL HFA/VENTOLIN HFA     Inhale 2 puffs into the lungs every 6  hours        alum & mag hydroxide-simethicone 400-400-40 MG/5ML Susp suspension    MYLANTA ES/MAALOX  ES    1200 mL    Take 30 mLs by mouth every 6 hours as needed for indigestion    Gastroesophageal reflux disease without esophagitis       aspirin 10 mg/mL Susp     975 mL    Take 32.5 mLs (325 mg) by mouth daily    Liver transplanted (H)       bisacodyl 10 MG Suppository    DULCOLAX    10 suppository    Place 1 suppository (10 mg) rectally daily as needed for constipation    Liver transplanted (H)       bumetanide 1 MG tablet    BUMEX    7 tablet    Take 1 tablet (1 mg) by mouth daily for 7 days    Liver transplanted (H), Hypervolemia, unspecified hypervolemia type       cyclobenzaprine 10 MG tablet    FLEXERIL    20 tablet    Take 1 tablet (10 mg) by mouth 3 times daily as needed for muscle spasms    Liver transplanted (H)       docusate 50 MG/5ML liquid    COLACE    200 mL    Take 5-10 mLs ( mg) by mouth 2 times daily    Liver transplanted (H)       GABAPENTIN PO      Take 300 mg by mouth At Bedtime        multivitamins with minerals Liqd liquid     450 mL    15 mLs by Per Feeding Tube route daily    Mild protein-calorie malnutrition (H)       mycophenolate suspension     300 mL    5 mLs (1,000 mg) by Oral or NG Tube route 2 times daily    Liver transplanted (H)       OMEPRAZOLE PO      Take 20 mg by mouth 2 times daily (before meals)        ondansetron 4 MG ODT tab    ZOFRAN-ODT    120 tablet    Take 1 tablet (4 mg) by mouth every 6 hours as needed for nausea or vomiting    Nausea       oxyCODONE IR 5 MG tablet    ROXICODONE    20 tablet    Take 0.5-1 tablets (2.5-5 mg) by mouth every 3 hours as needed for moderate to severe pain    Liver transplanted (H)       PRAMIPEXOLE DIHYDROCHLORIDE PO      Take 0.5 mg by mouth daily        sennosides 8.8 MG/5ML syrup    SENOKOT    200 mL    Take 5-10 mLs by mouth 2 times daily    Liver transplanted (H)       sulfamethoxazole-trimethoprim suspension    BACTRIM/SEPTRA     560 mL    Take 10 mLs (80 mg) by mouth daily Dose based on TMP component.    Liver transplanted (H)       * tacrolimus 0.5 MG capsule    GENERIC EQUIVALENT    60 capsule    Take 1 capsule (0.5 mg) by mouth 2 times daily As directed by provider    Liver transplanted (H)       * tacrolimus 1 MG capsule    GENERIC EQUIVALENT    300 capsule    Take 5 capsules (5 mg) by mouth 2 times daily    Liver transplanted (H)       valGANciclovir 50 MG/ML Solr solution    VALCYTE    270 mL    9 mLs (450 mg) by Oral or NG Tube route daily    Liver transplanted (H)       warfarin 2 MG tablet    COUMADIN    8 tablet    Take 2 tablets (4 mg) by mouth daily Every evening or as directed by provider    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)       * Notice:  This list has 2 medication(s) that are the same as other medications prescribed for you. Read the directions carefully, and ask your doctor or other care provider to review them with you.

## 2018-09-17 NOTE — PATIENT INSTRUCTIONS
Dear Sherrie Lala    Thank you for choosing [facility] Specialty Infusion and Procedure Center ([facility]) for your transplant cares.  The following information is a summary of our appointment as well as important reminders.      1.) Someone will be in contact with you to schedule your follow up appointment for this Thursday with surgery and to have your labs drawn.    2.) Please stop down in imaging to have a chest X-ray completed.    3.) No medication changes    We look forward in seeing you on your next appointment here at [facility].  Please don t hesitate to call us at [phone] to reschedule any of your appointments or to speak with one of the [facility] registered nurses.  It was a pleasure taking care of you today.    Sincerely,    [facility]  Specialty Infusion & Procedure Center  [address] [place] [address]  [address]  Phone:  [phone]

## 2018-09-18 ENCOUNTER — TELEPHONE (OUTPATIENT)
Dept: TRANSPLANT | Facility: CLINIC | Age: 63
End: 2018-09-18

## 2018-09-18 DIAGNOSIS — J90 PLEURAL EFFUSION, RIGHT: Primary | ICD-10-CM

## 2018-09-18 RX ORDER — FUROSEMIDE 20 MG
20 TABLET ORAL DAILY
Qty: 30 TABLET | Refills: 0 | Status: SHIPPED | OUTPATIENT
Start: 2018-09-18 | End: 2018-10-12

## 2018-09-18 RX ORDER — FUROSEMIDE 20 MG
20 TABLET ORAL DAILY
Qty: 60 TABLET | Refills: 0 | Status: SHIPPED | OUTPATIENT
Start: 2018-09-18 | End: 2018-09-20

## 2018-09-18 NOTE — PROGRESS NOTES
This is a recent snapshot of the patient's [facility] medical record.  For current drug dose and complete information and questions, call [phone] or In [facility] pool, fv home infusion ([id number])  CSN Number:  [id number]

## 2018-09-18 NOTE — TELEPHONE ENCOUNTER
Per Maribel Stringer, CXR reveals moderate right sided pleural effusion.  Dr. Rod requesting lasix 20 mg daily and repeat CXR next week.  Will discuss CXR when we see her in clinic on Thursday as I'm not sure she'll be back here next week.  Sherrie raymond.

## 2018-09-20 ENCOUNTER — OFFICE VISIT (OUTPATIENT)
Dept: TRANSPLANT | Facility: CLINIC | Age: 63
End: 2018-09-20
Attending: NURSE PRACTITIONER
Payer: MEDICARE

## 2018-09-20 ENCOUNTER — TEAM CONFERENCE (OUTPATIENT)
Dept: TRANSPLANT | Facility: CLINIC | Age: 63
End: 2018-09-20

## 2018-09-20 ENCOUNTER — TELEPHONE (OUTPATIENT)
Dept: TRANSPLANT | Facility: CLINIC | Age: 63
End: 2018-09-20

## 2018-09-20 VITALS
OXYGEN SATURATION: 98 % | HEIGHT: 63 IN | WEIGHT: 117.8 LBS | HEART RATE: 90 BPM | SYSTOLIC BLOOD PRESSURE: 105 MMHG | TEMPERATURE: 98.2 F | DIASTOLIC BLOOD PRESSURE: 74 MMHG | BODY MASS INDEX: 20.87 KG/M2

## 2018-09-20 DIAGNOSIS — Z94.4 LIVER REPLACED BY TRANSPLANT (H): ICD-10-CM

## 2018-09-20 DIAGNOSIS — Z94.4 LIVER REPLACED BY TRANSPLANT (H): Primary | ICD-10-CM

## 2018-09-20 DIAGNOSIS — J90 PLEURAL EFFUSION: Primary | ICD-10-CM

## 2018-09-20 LAB
ALBUMIN SERPL-MCNC: 3.3 G/DL (ref 3.4–5)
ALP SERPL-CCNC: 102 U/L (ref 40–150)
ALT SERPL W P-5'-P-CCNC: 16 U/L (ref 0–50)
ANION GAP SERPL CALCULATED.3IONS-SCNC: 7 MMOL/L (ref 3–14)
AST SERPL W P-5'-P-CCNC: 10 U/L (ref 0–45)
BILIRUB DIRECT SERPL-MCNC: 0.3 MG/DL (ref 0–0.2)
BILIRUB SERPL-MCNC: 0.6 MG/DL (ref 0.2–1.3)
BUN SERPL-MCNC: 25 MG/DL (ref 7–30)
CALCIUM SERPL-MCNC: 8.7 MG/DL (ref 8.5–10.1)
CHLORIDE SERPL-SCNC: 104 MMOL/L (ref 94–109)
CO2 SERPL-SCNC: 27 MMOL/L (ref 20–32)
CREAT SERPL-MCNC: 0.92 MG/DL (ref 0.52–1.04)
ERYTHROCYTE [DISTWIDTH] IN BLOOD BY AUTOMATED COUNT: 18.6 % (ref 10–15)
GFR SERPL CREATININE-BSD FRML MDRD: 62 ML/MIN/1.7M2
GLUCOSE SERPL-MCNC: 117 MG/DL (ref 70–99)
HCT VFR BLD AUTO: 34.8 % (ref 35–47)
HGB BLD-MCNC: 10.8 G/DL (ref 11.7–15.7)
MAGNESIUM SERPL-MCNC: 2.5 MG/DL (ref 1.6–2.3)
MCH RBC QN AUTO: 27.7 PG (ref 26.5–33)
MCHC RBC AUTO-ENTMCNC: 31 G/DL (ref 31.5–36.5)
MCV RBC AUTO: 89 FL (ref 78–100)
PHOSPHATE SERPL-MCNC: 3.3 MG/DL (ref 2.5–4.5)
PLATELET # BLD AUTO: 212 10E9/L (ref 150–450)
POTASSIUM SERPL-SCNC: 4.7 MMOL/L (ref 3.4–5.3)
PROT SERPL-MCNC: 7.5 G/DL (ref 6.8–8.8)
RBC # BLD AUTO: 3.9 10E12/L (ref 3.8–5.2)
SODIUM SERPL-SCNC: 138 MMOL/L (ref 133–144)
TACROLIMUS BLD-MCNC: 10.5 UG/L (ref 5–15)
TME LAST DOSE: NORMAL H
WBC # BLD AUTO: 4.6 10E9/L (ref 4–11)

## 2018-09-20 PROCEDURE — 36415 COLL VENOUS BLD VENIPUNCTURE: CPT | Performed by: INTERNAL MEDICINE

## 2018-09-20 PROCEDURE — 83735 ASSAY OF MAGNESIUM: CPT | Performed by: INTERNAL MEDICINE

## 2018-09-20 PROCEDURE — G0463 HOSPITAL OUTPT CLINIC VISIT: HCPCS | Mod: ZF

## 2018-09-20 PROCEDURE — 87385 HISTOPLASMA CAPSUL AG IA: CPT | Performed by: INTERNAL MEDICINE

## 2018-09-20 PROCEDURE — 80197 ASSAY OF TACROLIMUS: CPT | Performed by: INTERNAL MEDICINE

## 2018-09-20 PROCEDURE — 80076 HEPATIC FUNCTION PANEL: CPT | Performed by: INTERNAL MEDICINE

## 2018-09-20 PROCEDURE — 85027 COMPLETE CBC AUTOMATED: CPT | Performed by: INTERNAL MEDICINE

## 2018-09-20 PROCEDURE — 84100 ASSAY OF PHOSPHORUS: CPT | Performed by: INTERNAL MEDICINE

## 2018-09-20 PROCEDURE — 80048 BASIC METABOLIC PNL TOTAL CA: CPT | Performed by: INTERNAL MEDICINE

## 2018-09-20 ASSESSMENT — PAIN SCALES - GENERAL: PAINLEVEL: NO PAIN (0)

## 2018-09-20 NOTE — MR AVS SNAPSHOT
After Visit Summary   9/20/2018    Sherrie Lala    MRN: [id number]           Patient Information     Date Of Birth          1955        Visit Information        Provider Department      9/20/2018 10:00 AM Maribel Stringer NP [facility] Solid Organ Transplant        Today's Diagnoses     Liver replaced by transplant (H)    -  1       Follow-ups after your visit        Your next 10 appointments already scheduled     Sep 25, 2018  9:10 AM CDT   XR CHEST 2 VIEWS with UCXR1   [facility] Xray ([facility])    [address]  1st Floor  [facility] [id number] [phone]           How do I prepare for my exam? (Food and drink instructions) No Food and Drink Restrictions.  How do I prepare for my exam? (Other instructions) You do not need to do anything special for this exam.  What should I wear: Wear comfortable clothes.  How long does the exam take: Most scans take less than 5 minutes.  What should I bring: Bring a list of your medicines, including vitamins, minerals and over-the-counter drugs. It is safest to leave personal items at home.  Do I need a :  No  is needed.  What do I need to tell my doctor: Tell your doctor if there s any chance you are pregnant.  What should I do after the exam: No restrictions, You may resume normal activities.  What is this test: An image of a specific body part shown in shades of black and white.  Who should I call with questions: If you have any questions, please call the Imaging Department where you will have your exam. Directions, parking instructions, and other information is available on our website, [email or web address].            Sep 25, 2018  9:45 AM CDT   (Arrive by 9:30 AM)   Liver Return Post Op with Darren Rod MD   [facility] Solid Organ Transplant ([facility])    [address]  [address]  [facility] [id number] [phone]            Oct 16, 2018  5:00  "PM CDT   (Arrive by 4:45 PM)   New Patient Visit with Lacy Gong MD   [facility] and Infectious Diseases ([facility])    [address]  [address]  [facility] [address] [phone]              Who to contact     If you have questions or need follow up information about today's clinic visit or your schedule please contact [facility] SOLID ORGAN TRANSPLANT directly at [phone].  Normal or non-critical lab and imaging results will be communicated to you by TechMedia Advertisinghart, letter or phone within 4 business days after the clinic has received the results. If you do not hear from us within 7 days, please contact the clinic through Remark Mediat or phone. If you have a critical or abnormal lab result, we will notify you by phone as soon as possible.  Submit refill requests through Veronica or call your pharmacy and they will forward the refill request to us. Please allow 3 business days for your refill to be completed.          Additional Information About Your Visit        [employer] Information     Veronica gives you secure access to your electronic health record. If you see a primary care provider, you can also send messages to your care team and make appointments. If you have questions, please call your primary care clinic.  If you do not have a primary care provider, please call [phone] and they will assist you.        Care EveryWhere ID     This is your Care EveryWhere ID. This could be used by other organizations to access your [place] medical records  [id number]        Your Vitals Were     Pulse Temperature Height Pulse Oximetry BMI (Body Mass Index)       90 98.2  F (36.8  C) (Oral) 1.6 m (5' 3\") 98% 20.87 kg/m2        Blood Pressure from Last 3 Encounters:   09/20/18 105/74   09/17/18 129/81   09/12/18 120/77    Weight from Last 3 Encounters:   09/20/18 53.4 kg (117 lb 12.8 oz)   09/12/18 57.2 kg (126 lb 3.2 oz)   05/03/18 55.1 kg (121 lb 8 oz)              Today, " you had the following     No orders found for display       Primary Care Provider Office Phone # Fax #    Apollo Benitez [phone]       [facility] [address] [facility] [address]        Equal Access to Services     HARINI CHAMBERLAIN : Hadii aad ku hadjohnson Ordonez, waaxda luqadaha, qaybta kaalmada adericardo, nahed gloria javierkiki vanegas angelina chi. So [facility] [phone].    ATENCIÓN: Si habla español, tiene a bucio disposición servicios gratuitos de asistencia lingüística. [facility] [phone].    We comply with applicable federal civil rights laws and [place] laws. We do not discriminate on the basis of race, color, national origin, age, disability, sex, sexual orientation, or gender identity.            Thank you!     Thank you for choosing [facility] SOLID ORGAN TRANSPLANT  for your care. Our goal is always to provide you with excellent care. Hearing back from our patients is one way we can continue to improve our services. Please take a few minutes to complete the written survey that you may receive in the mail after your visit with us. Thank you!             Your Updated Medication List - Protect others around you: Learn how to safely use, store and throw away your medicines at www.disposemymeds.org.          This list is accurate as of 9/20/18 11:59 PM.  Always use your most recent med list.                   Brand Name Dispense Instructions for use Diagnosis    acetaminophen 32 mg/mL solution    TYLENOL    400 mL    Take 20.3 mLs (650 mg) by mouth every 6 hours as needed for fever or mild pain    Liver transplanted (H)       albuterol 108 (90 Base) MCG/ACT inhaler    PROAIR HFA/PROVENTIL HFA/VENTOLIN HFA     Inhale 2 puffs into the lungs every 6 hours        alum & mag hydroxide-simethicone 400-400-40 MG/5ML Susp suspension    MYLANTA ES/MAALOX  ES    1200 mL    Take 30 mLs by mouth every 6 hours as needed for indigestion    Gastroesophageal reflux disease without  esophagitis       aspirin 10 mg/mL Susp     975 mL    Take 32.5 mLs (325 mg) by mouth daily    Liver transplanted (H)       bisacodyl 10 MG Suppository    DULCOLAX    10 suppository    Place 1 suppository (10 mg) rectally daily as needed for constipation    Liver transplanted (H)       cyclobenzaprine 10 MG tablet    FLEXERIL    20 tablet    Take 1 tablet (10 mg) by mouth 3 times daily as needed for muscle spasms    Liver transplanted (H)       docusate 50 MG/5ML liquid    COLACE    200 mL    Take 5-10 mLs ( mg) by mouth 2 times daily    Liver transplanted (H)       furosemide 20 MG tablet    LASIX    30 tablet    Take 1 tablet (20 mg) by mouth daily    Pleural effusion, right       GABAPENTIN PO      Take 300 mg by mouth At Bedtime        multivitamins with minerals Liqd liquid     450 mL    15 mLs by Per Feeding Tube route daily    Mild protein-calorie malnutrition (H)       mycophenolate suspension     300 mL    5 mLs (1,000 mg) by Oral or NG Tube route 2 times daily    Liver transplanted (H)       OMEPRAZOLE PO      Take 20 mg by mouth 2 times daily (before meals)        ondansetron 4 MG ODT tab    ZOFRAN-ODT    120 tablet    Take 1 tablet (4 mg) by mouth every 6 hours as needed for nausea or vomiting    Nausea       oxyCODONE IR 5 MG tablet    ROXICODONE    20 tablet    Take 0.5-1 tablets (2.5-5 mg) by mouth every 3 hours as needed for moderate to severe pain    Liver transplanted (H)       PRAMIPEXOLE DIHYDROCHLORIDE PO      Take 0.5 mg by mouth daily        sulfamethoxazole-trimethoprim suspension    BACTRIM/SEPTRA    560 mL    Take 10 mLs (80 mg) by mouth daily Dose based on TMP component.    Liver transplanted (H)       * tacrolimus 0.5 MG capsule    GENERIC EQUIVALENT    60 capsule    Take 1 capsule (0.5 mg) by mouth 2 times daily As directed by provider    Liver transplanted (H)       * tacrolimus 1 MG capsule    GENERIC EQUIVALENT    300 capsule    Take 5 capsules (5 mg) by mouth 2 times daily     Liver transplanted (H)       valGANciclovir 50 MG/ML Solr solution    VALCYTE    270 mL    9 mLs (450 mg) by Oral or NG Tube route daily    Liver transplanted (H)       warfarin 2 MG tablet    COUMADIN    8 tablet    Take 2 tablets (4 mg) by mouth daily Every evening or as directed by provider    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)       * Notice:  This list has 2 medication(s) that are the same as other medications prescribed for you. Read the directions carefully, and ask your doctor or other care provider to review them with you.

## 2018-09-20 NOTE — NURSING NOTE
"Chief Complaint   Patient presents with     RECHECK     liver tx f./u     /74  Pulse 90  Temp 98.2  F (36.8  C) (Oral)  Ht 1.6 m (5' 3\")  Wt 53.4 kg (117 lb 12.8 oz)  SpO2 98%  BMI 20.87 kg/m2  Sharmaine Simmons, CMA    "

## 2018-09-20 NOTE — TELEPHONE ENCOUNTER
"Sherrie here today for follow-up w/ Maribel Stringer.  Very teary, tired, \"worn down\".  Tired of feeding tube - \"it makes me gag  Constantly and my throat is hurting - it makes me feel like I can't swallow\".  Worried about getting adequate nutrition but can't eat w/ tube in.  Tube removed.  Encouraged small snacks, supplements.  Says she has never been a good eater.  Suggested she stop thinking about calories and focus more on daily weight.  Also worried about pleural effusion as she required thoracentesis pre tx.  Will keep her on lasix 20 mg daily for now, will get repeat cxr next week when she returns to see Dr. Rod on 9/25.  Asked her to contact me if she feels more short of breath.  Provided encouragement.  Sherrie feels she should be doing better (is only 3 weeks post transplant!!).  I reassured her that she is doing well, told her recovery could be up to 3-6 mos.  Dr. Lilly stopped in to encourage also.  Labs every Monday and Thursday.  "

## 2018-09-20 NOTE — LETTER
9/20/2018      RE: Sherrie Lala  [address] [facility] [address]       Transplant Surgery Progress Note    Transplants:  8/30/2018 (Liver); Postoperative day:  21  S: Sherrie Lala is a 63-year old female with a history of metastatic cholangiocarcinoma with subsequent liver failure who is now s/p DDLT on 8/30/18. The patient had significant intraoperative blood loss (~3L) and her abdomen was left open. She underwent washout and abdominal closure on 9/1.    Overall doing well. Eating more.  Requests TF be removed.          Transplant History:    Transplant Type:  liver transplant   Baseline Cr: 0.92  DeNovo DSA: No    Acute Rejection Hx:  No    Present Maintenance Immunosuppression:  Tacrolimus and cellcept    CMV IgG Ab Discordance:  No  EBV IgG Ab Discordance:  No    BK Viremia:  No  EBV Viremia:  No    Transplant Coordinator: Abigail Parham     Transplant Office Phone Number: [phone]     Immunsupresent Medications     Immunosuppressive Agents Disp Start End    CELLCEPT (BRAND) 200 MG/ML SUSPENSION 300 mL 9/12/2018     Sig - Route: 5 mLs (1,000 mg) by Oral or NG Tube route 2 times daily - Oral or NG Tube    Class: E-Prescribe    tacrolimus (GENERIC EQUIVALENT) 0.5 MG capsule 60 capsule 9/12/2018     Sig - Route: Take 1 capsule (0.5 mg) by mouth 2 times daily As directed by provider - Oral    Class: E-Prescribe    tacrolimus (GENERIC EQUIVALENT) 1 MG capsule 300 capsule 9/12/2018     Sig - Route: Take 5 capsules (5 mg) by mouth 2 times daily - Oral    Class: E-Prescribe          Possible Immunosuppression-related side effects:   []             headache  []             vivid dreams  []             irritability  []             cognitive difficuties  []             fine tremor  []             nausea  []             diarrhea  []             neuropathy      []             edema  []             renal calcineurin toxicity  []             hyperkalemia  []             post-transplant diabetes  []             decreased  appetite  []             increased appetite  []             other:  []             none    Prescription Medications as of 9/20/2018             alum & mag hydroxide-simethicone (MYLANTA ES/MAALOX  ES) 400-400-40 MG/5ML SUSP suspension Take 30 mLs by mouth every 6 hours as needed for indigestion    aspirin 10 mg/mL SUSP Take 32.5 mLs (325 mg) by mouth daily    bisacodyl (DULCOLAX) 10 MG Suppository Place 1 suppository (10 mg) rectally daily as needed for constipation    CELLCEPT (BRAND) 200 MG/ML SUSPENSION 5 mLs (1,000 mg) by Oral or NG Tube route 2 times daily    docusate (COLACE) 50 MG/5ML liquid Take 5-10 mLs ( mg) by mouth 2 times daily    GABAPENTIN PO Take 300 mg by mouth At Bedtime    multivitamins with minerals (CERTAVITE/CEROVITE) LIQD liquid 15 mLs by Per Feeding Tube route daily    OMEPRAZOLE PO Take 20 mg by mouth 2 times daily (before meals)    PRAMIPEXOLE DIHYDROCHLORIDE PO Take 0.5 mg by mouth daily    sulfamethoxazole-trimethoprim (BACTRIM/SEPTRA) suspension Take 10 mLs (80 mg) by mouth daily Dose based on TMP component.    tacrolimus (GENERIC EQUIVALENT) 0.5 MG capsule Take 1 capsule (0.5 mg) by mouth 2 times daily As directed by provider    tacrolimus (GENERIC EQUIVALENT) 1 MG capsule Take 5 capsules (5 mg) by mouth 2 times daily    valGANciclovir (VALCYTE) 50 MG/ML SOLR solution 9 mLs (450 mg) by Oral or NG Tube route daily    warfarin (COUMADIN) 2 MG tablet Take 2 tablets (4 mg) by mouth daily Every evening or as directed by provider    acetaminophen (TYLENOL) 32 mg/mL solution Take 20.3 mLs (650 mg) by mouth every 6 hours as needed for fever or mild pain    albuterol (PROAIR HFA/PROVENTIL HFA/VENTOLIN HFA) 108 (90 BASE) MCG/ACT Inhaler Inhale 2 puffs into the lungs every 6 hours    cyclobenzaprine (FLEXERIL) 10 MG tablet Take 1 tablet (10 mg) by mouth 3 times daily as needed for muscle spasms    furosemide (LASIX) 20 MG tablet Take 1 tablet (20 mg) by mouth daily    ondansetron  (ZOFRAN-ODT) 4 MG ODT tab Take 1 tablet (4 mg) by mouth every 6 hours as needed for nausea or vomiting    oxyCODONE IR (ROXICODONE) 5 MG tablet Take 0.5-1 tablets (2.5-5 mg) by mouth every 3 hours as needed for moderate to severe pain          O:   Temp:  [98.2  F (36.8  C)] 98.2  F (36.8  C)  Pulse:  [90] 90  BP: (105)/(74) 105/74  SpO2:  [98 %] 98 %  General Appearance: in no apparent distress.   Skin: Normal, no rashes or jaundice  Heart: regular rate and rhythm, normal S1 and S2  Lungs: easy respirations, no audible wheezing.  Abdomen: flat, The wound is dry and intact, without hernia. The abdomen is non-tender. The liver graft is not tender.  There is no ascites.  Extremities: Tremor present bilaterally.   Edema: absent.     Transplant Immunosuppression Labs Latest Ref Rng & Units 9/20/2018 9/17/2018 9/12/2018 9/11/2018 9/10/2018   Tacro Level 5.0 - 15.0 ug/L - 9.1 10.1 6.8 7.3   Tacro Level - - Not Provided Not Provided Not Provided Not Provided   Creat 0.52 - 1.04 mg/dL 0.92 0.98 0.73 0.70 0.67   BUN 7 - 30 mg/dL 25 27 15 16 17   WBC 4.0 - 11.0 10e9/L 4.6 3.4(L) 4.7 5.2 3.8(L)   Neutrophil % - 79.2 84.1 82.9 77.0   ANEU 1.6 - 8.3 10e9/L - 2.7 4.0 4.3 2.9       Chemistries:   Recent Labs   Lab Test  09/20/18   0935   BUN  25   CR  0.92   GFRESTIMATED  62   GLC  117*     Lab Results   Component Value Date    A1C 4.9 09/02/2018     Recent Labs   Lab Test  09/20/18   0935   ALBUMIN  3.3*   BILITOTAL  0.6   ALKPHOS  102   AST  10   ALT  16     Urine Studies:  Recent Labs   Lab Test  02/26/18   0947   COLOR  Yellow   APPEARANCE  Clear   URINEGLC  Negative   URINEBILI  Negative   URINEKETONE  Negative   SG  1.017   UBLD  Negative   URINEPH  5.0   PROTEIN  Negative   NITRITE  Negative   LEUKEST  Negative   RBCU  1   WBCU  1     Recent Labs   Lab Test  02/26/18   0947   UTPG  0.06     Hematology:   Recent Labs   Lab Test  09/20/18   0935  09/17/18   0850  09/12/18   0627   HGB  10.8*  9.0*  8.2*   PLT  212  191  101*    WBC  4.6  3.4*  4.7     Coags:   Recent Labs   Lab Test  09/17/18   0850  09/12/18   0627   INR  2.29*  1.90*     HLA antibodies:   SA1 Hi Risk Marium   Date Value Ref Range Status   08/29/2018 None  Final     SA1 Mod Risk Marium   Date Value Ref Range Status   08/29/2018 A:32 B:8  Final     SA2 Hi Risk Marium   Date Value Ref Range Status   08/29/2018 None  Final     SA2 Mod Risk Marium   Date Value Ref Range Status   08/29/2018 None  Final       Assessment: Sherrie Lala is doing well s/p liver transplant:  Issues we addressed during her visit include:    Plan:    1. Graft function: hepatic panel stable   2. Immunosuppression Management: No change continue prograf 5 mg BID  and cellcept 1000mg BID .  Complexity of management:Medium.  Contributing factors: recent txp  3.Malnutrition:  Removed NG tube.  6 small meals daily  4. SANTOS drain:  Removed without difficulty.  Signs and symptoms of infection reviewed.   Followup: as directed    Total Time: 25 min,   Counselling Time: 15 min.          Maribel Stringer NP

## 2018-09-20 NOTE — PROGRESS NOTES
Transplant Surgery Progress Note    Transplants:  8/30/2018 (Liver); Postoperative day:  21  S: Sherrie Lala is a 63-year old female with a history of metastatic cholangiocarcinoma with subsequent liver failure who is now s/p DDLT on 8/30/18. The patient had significant intraoperative blood loss (~3L) and her abdomen was left open. She underwent washout and abdominal closure on 9/1.    Overall doing well. Eating more.  Requests TF be removed.          Transplant History:    Transplant Type:  liver transplant   Baseline Cr: 0.92  DeNovo DSA: No    Acute Rejection Hx:  No    Present Maintenance Immunosuppression:  Tacrolimus and cellcept    CMV IgG Ab Discordance:  No  EBV IgG Ab Discordance:  No    BK Viremia:  No  EBV Viremia:  No    Transplant Coordinator: Abigail Parham     Transplant Office Phone Number: [phone]     Immunsupresent Medications     Immunosuppressive Agents Disp Start End    CELLCEPT (BRAND) 200 MG/ML SUSPENSION 300 mL 9/12/2018     Sig - Route: 5 mLs (1,000 mg) by Oral or NG Tube route 2 times daily - Oral or NG Tube    Class: E-Prescribe    tacrolimus (GENERIC EQUIVALENT) 0.5 MG capsule 60 capsule 9/12/2018     Sig - Route: Take 1 capsule (0.5 mg) by mouth 2 times daily As directed by provider - Oral    Class: E-Prescribe    tacrolimus (GENERIC EQUIVALENT) 1 MG capsule 300 capsule 9/12/2018     Sig - Route: Take 5 capsules (5 mg) by mouth 2 times daily - Oral    Class: E-Prescribe          Possible Immunosuppression-related side effects:   []             headache  []             vivid dreams  []             irritability  []             cognitive difficuties  []             fine tremor  []             nausea  []             diarrhea  []             neuropathy      []             edema  []             renal calcineurin toxicity  []             hyperkalemia  []             post-transplant diabetes  []             decreased appetite  []             increased appetite  []             other:  []              none    Prescription Medications as of 9/20/2018             alum & mag hydroxide-simethicone (MYLANTA ES/MAALOX  ES) 400-400-40 MG/5ML SUSP suspension Take 30 mLs by mouth every 6 hours as needed for indigestion    aspirin 10 mg/mL SUSP Take 32.5 mLs (325 mg) by mouth daily    bisacodyl (DULCOLAX) 10 MG Suppository Place 1 suppository (10 mg) rectally daily as needed for constipation    CELLCEPT (BRAND) 200 MG/ML SUSPENSION 5 mLs (1,000 mg) by Oral or NG Tube route 2 times daily    docusate (COLACE) 50 MG/5ML liquid Take 5-10 mLs ( mg) by mouth 2 times daily    GABAPENTIN PO Take 300 mg by mouth At Bedtime    multivitamins with minerals (CERTAVITE/CEROVITE) LIQD liquid 15 mLs by Per Feeding Tube route daily    OMEPRAZOLE PO Take 20 mg by mouth 2 times daily (before meals)    PRAMIPEXOLE DIHYDROCHLORIDE PO Take 0.5 mg by mouth daily    sulfamethoxazole-trimethoprim (BACTRIM/SEPTRA) suspension Take 10 mLs (80 mg) by mouth daily Dose based on TMP component.    tacrolimus (GENERIC EQUIVALENT) 0.5 MG capsule Take 1 capsule (0.5 mg) by mouth 2 times daily As directed by provider    tacrolimus (GENERIC EQUIVALENT) 1 MG capsule Take 5 capsules (5 mg) by mouth 2 times daily    valGANciclovir (VALCYTE) 50 MG/ML SOLR solution 9 mLs (450 mg) by Oral or NG Tube route daily    warfarin (COUMADIN) 2 MG tablet Take 2 tablets (4 mg) by mouth daily Every evening or as directed by provider    acetaminophen (TYLENOL) 32 mg/mL solution Take 20.3 mLs (650 mg) by mouth every 6 hours as needed for fever or mild pain    albuterol (PROAIR HFA/PROVENTIL HFA/VENTOLIN HFA) 108 (90 BASE) MCG/ACT Inhaler Inhale 2 puffs into the lungs every 6 hours    cyclobenzaprine (FLEXERIL) 10 MG tablet Take 1 tablet (10 mg) by mouth 3 times daily as needed for muscle spasms    furosemide (LASIX) 20 MG tablet Take 1 tablet (20 mg) by mouth daily    ondansetron (ZOFRAN-ODT) 4 MG ODT tab Take 1 tablet (4 mg) by mouth every 6 hours as needed for  nausea or vomiting    oxyCODONE IR (ROXICODONE) 5 MG tablet Take 0.5-1 tablets (2.5-5 mg) by mouth every 3 hours as needed for moderate to severe pain          O:   Temp:  [98.2  F (36.8  C)] 98.2  F (36.8  C)  Pulse:  [90] 90  BP: (105)/(74) 105/74  SpO2:  [98 %] 98 %  General Appearance: in no apparent distress.   Skin: Normal, no rashes or jaundice  Heart: regular rate and rhythm, normal S1 and S2  Lungs: easy respirations, no audible wheezing.  Abdomen: flat, The wound is dry and intact, without hernia. The abdomen is non-tender. The liver graft is not tender.  There is no ascites.  Extremities: Tremor present bilaterally.   Edema: absent.     Transplant Immunosuppression Labs Latest Ref Rng & Units 9/20/2018 9/17/2018 9/12/2018 9/11/2018 9/10/2018   Tacro Level 5.0 - 15.0 ug/L - 9.1 10.1 6.8 7.3   Tacro Level - - Not Provided Not Provided Not Provided Not Provided   Creat 0.52 - 1.04 mg/dL 0.92 0.98 0.73 0.70 0.67   BUN 7 - 30 mg/dL 25 27 15 16 17   WBC 4.0 - 11.0 10e9/L 4.6 3.4(L) 4.7 5.2 3.8(L)   Neutrophil % - 79.2 84.1 82.9 77.0   ANEU 1.6 - 8.3 10e9/L - 2.7 4.0 4.3 2.9       Chemistries:   Recent Labs   Lab Test  09/20/18   0935   BUN  25   CR  0.92   GFRESTIMATED  62   GLC  117*     Lab Results   Component Value Date    A1C 4.9 09/02/2018     Recent Labs   Lab Test  09/20/18   0935   ALBUMIN  3.3*   BILITOTAL  0.6   ALKPHOS  102   AST  10   ALT  16     Urine Studies:  Recent Labs   Lab Test  02/26/18   0947   COLOR  Yellow   APPEARANCE  Clear   URINEGLC  Negative   URINEBILI  Negative   URINEKETONE  Negative   SG  1.017   UBLD  Negative   URINEPH  5.0   PROTEIN  Negative   NITRITE  Negative   LEUKEST  Negative   RBCU  1   WBCU  1     Recent Labs   Lab Test  02/26/18   0947   UTPG  0.06     Hematology:   Recent Labs   Lab Test  09/20/18   0935  09/17/18   0850  09/12/18   0627   HGB  10.8*  9.0*  8.2*   PLT  212  191  101*   WBC  4.6  3.4*  4.7     Coags:   Recent Labs   Lab Test  09/17/18   0850   09/12/18   0627   INR  2.29*  1.90*     HLA antibodies:   SA1 Hi Risk Marium   Date Value Ref Range Status   08/29/2018 None  Final     SA1 Mod Risk Marium   Date Value Ref Range Status   08/29/2018 A:32 B:8  Final     SA2 Hi Risk Marium   Date Value Ref Range Status   08/29/2018 None  Final     SA2 Mod Risk Marium   Date Value Ref Range Status   08/29/2018 None  Final       Assessment: Sherrie Lala is doing well s/p liver transplant:  Issues we addressed during her visit include:    Plan:    1. Graft function: hepatic panel stable   2. Immunosuppression Management: No change continue prograf 5 mg BID  and cellcept 1000mg BID .  Complexity of management:Medium.  Contributing factors: recent txp  3.Malnutrition:  Removed NG tube.  6 small meals daily  4. SANTOS drain:  Removed without difficulty.  Signs and symptoms of infection reviewed.   Followup: as directed    Total Time: 25 min,   Counselling Time: 15 min.

## 2018-09-20 NOTE — TELEPHONE ENCOUNTER
Post Liver Transplant Team Conference  Date: 9/20/2018  Transplant Coordinator: Abigail Parham      Attendees:  [x] Dr. Dias [] Dr. Fields []       [x] Dr. Rod [] Thien Moody LPN []    [] Dr. Rayo [] Gabriela Moreland NP []    [] Dr. Lanza [] Maribel Stringer, CHARLY []    [] Dr. Blackwood [] Marifer Leija, BECKI []       [] Dr. Lilly [x] Janae Gallegos RN []       [] Dr. Kapil Gamble [x] Abigail Parham, BECKI []       [] Dr. Staples [x] Leighann Rodriguez RN []       [] Dr. Rowe [] Darlene Dooley, RN []       [] Dr. Garcias [] Tye Early, BECKI []         Verbal Plan Read Back:   Pull SANTOS today.  Repeat CXR to follow-up on known pleural effusion next week.  Seeing Sherrie in clinic today.    Routed to RN Coordinator   Abigail Parham

## 2018-09-21 NOTE — TELEPHONE ENCOUNTER
September 21, 2018: I left a message to let the patient know about this appt:    Date: 9/25/2018 Status: Josué   Time: 9:45 AM Length: 15   Visit Type: [facility] LIVER RETURN POST OP RAYMUNDO: [id number]   Provider: Darren Rod MD       I told her that we also need to get a CXR and asked her if she needed labs. I requested the pt call me back to discuss.    Zakiya Hinton  Post-Liver Transplant   [phone]

## 2018-09-24 NOTE — TELEPHONE ENCOUNTER
September 21, 2018: Sherrie left a message agreeing to the Tuesday, 9/25 appointments. She asked about whether or not she needed labs.    September 24, 2018: I spoke with Sherrie who will attend these appts: CXR at 9:10a and Dr. Rod at 9:45a tomorrow, 9/25. I told her that her coordinator stated that if she got labs this morning, we don't need them drawn tomorrow. Pt agreed.    Zakiya Hinton  Post-Liver Transplant   [phone]

## 2018-09-25 ENCOUNTER — OFFICE VISIT (OUTPATIENT)
Dept: TRANSPLANT | Facility: CLINIC | Age: 63
End: 2018-09-25
Attending: TRANSPLANT SURGERY
Payer: MEDICARE

## 2018-09-25 ENCOUNTER — TELEPHONE (OUTPATIENT)
Dept: TRANSPLANT | Facility: CLINIC | Age: 63
End: 2018-09-25

## 2018-09-25 ENCOUNTER — RADIANT APPOINTMENT (OUTPATIENT)
Dept: GENERAL RADIOLOGY | Facility: CLINIC | Age: 63
End: 2018-09-25
Attending: TRANSPLANT SURGERY
Payer: COMMERCIAL

## 2018-09-25 VITALS
BODY MASS INDEX: 20.93 KG/M2 | OXYGEN SATURATION: 99 % | HEART RATE: 91 BPM | WEIGHT: 118.1 LBS | SYSTOLIC BLOOD PRESSURE: 120 MMHG | HEIGHT: 63 IN | TEMPERATURE: 97.8 F | DIASTOLIC BLOOD PRESSURE: 82 MMHG

## 2018-09-25 DIAGNOSIS — J90 PLEURAL EFFUSION: ICD-10-CM

## 2018-09-25 DIAGNOSIS — J90 PLEURAL EFFUSION: Primary | ICD-10-CM

## 2018-09-25 DIAGNOSIS — Z94.4 LIVER REPLACED BY TRANSPLANT (H): Primary | ICD-10-CM

## 2018-09-25 DIAGNOSIS — D84.9 IMMUNOSUPPRESSION (H): ICD-10-CM

## 2018-09-25 ASSESSMENT — PAIN SCALES - GENERAL: PAINLEVEL: MODERATE PAIN (5)

## 2018-09-25 NOTE — TELEPHONE ENCOUNTER
"Here for post liver transplant follow-up.  Accompanied by , Bryant.    Reports feeling better but still having some shortness of breath, poor appetite due to metallic taste in her mouth.  Also has soft stool each time she eats.  Not watery.  Has a low level of nausea.  Is drinking 2 supplement per day.  Will also decrease cellcept to 500 mg po bid.  Sherrie aware.  Weight is stable.  Had CXR today for follow-up of known right pleural effusion.  Effusion still present.  Will leave on lasix 20 mg po daily and repeat CXR next week. Feels like she is \"shaking much of the time\".  Doesn't have notable tremor.  Will await yesterday's prograf level.  Reviewed recent labs and assisted with interpretation.     Complaints:  Right sided (lower rib) pain.  Dr. Rod thinks this may be pain due to retractor placement at time of transplant.    Current immunosuppression:    cellcept and prograf    Med changes: decrease mmf to 500 mg po bid.  Updated patient's med card.    Lab frequency:  Monday and thursday    Follow-up:  Viola next week, derm and PCP annually. Reviewed need for annual follow-up here with hepatology and dermatology.    "

## 2018-09-25 NOTE — LETTER
9/25/2018       RE: Sherrie Lala  [address] [facility] [address]     Dear Colleague,    Thank you for referring your patient, Sherrie Lala, to the [facility] SOLID ORGAN TRANSPLANT at [facility]. Please see a copy of my visit note below.    Transplant Surgery -OUTPATIENT IMMUNOSUPPRESSION PROGRESS NOTE    Date of Visit: 09/25/2018    Transplants:  8/30/2018 (Liver); Postoperative day:  26  ASSESMENT AND PLAN:  1.Graft Function: Liver allograft: no rejection or technical problems.    2.Immunosuppression Management: keep tacrolimus levels at 8 ng/dL; reduce cellcept to 500mg po bid  3.Hypertension: ok  4.Renal Function: good  5.Lab frequency:twice weekly  6.Other:  Pleural effusions: continue lasix and intense physical therapy      Date: September 25, 2018    Transplant:  [x]                             Liver [x]                              Kidney []                             Pancreas []                              Other:             Chief Complaint:  Doing well, not able to eat well  History of Present Illness:  Post liver transplant with a  mesh  Patient Active Problem List   Diagnosis     Gastric ulcer     Osteoporosis     Bleeding esophageal varices (H)     Hydrothorax - hepatic     Liver transplanted (H)     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)     Open abdominal wall wound     Immunosuppressed status (H)     Acute post-operative pain     Acute blood loss anemia     Mild protein-calorie malnutrition (H)     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)     Hypervolemia     Positive sputum culture in cadaveric donor     Positive urine culture in cadaveric donor     SOCIAL /FAMILY HISTORY: [x]                  No recent change    Past Medical History:   Diagnosis Date     Asthma      Cholangiocarcinoma (H) 06/2014     Cirrhosis of liver with ascites (H) 5/10/2018     Encounter for pleural drainage tube placement      Esophageal varices with hemorrhage (H)       Gastric ulcer      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     Liver transplanted (H) 2018     Lung metastases (H) 2014     Portal vein thrombosis      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    Residual thrombus in main and right portal     Past Surgical History:   Procedure Laterality Date     CHOLECYSTECTOMY       HEPATECTOMY PARTIAL       RETURN LIVER TRANSPLANT N/A 2018    Procedure: RETURN LIVER TRANSPLANT;  Open Abdomen, return liver transplant washout with   Mesh and liver stent  placement and  Abdomal Wound closure;  Surgeon: Darren Rod MD;  Location: [facility] OR     TRANSPLANT LIVER RECIPIENT  DONOR N/A 2018    Procedure: TRANSPLANT LIVER RECIPIENT  DONOR;  TRANSPLANT LIVER RECIPIENT  DONOR ;  Surgeon: Darren Rod MD;  Location: [facility] OR     Social History     Social History     Marital status:      Spouse name: N/A     Number of children: N/A     Years of education: N/A     Occupational History     Not on file.     Social History Main Topics     Smoking status: Never Smoker     Smokeless tobacco: Never Used     Alcohol use No      Comment: occ      Drug use: No     Sexual activity: Not on file     Other Topics Concern     Not on file     Social History Narrative     Prescription Medications as of 2018             acetaminophen (TYLENOL) 32 mg/mL solution Take 20.3 mLs (650 mg) by mouth every 6 hours as needed for fever or mild pain    albuterol (PROAIR HFA/PROVENTIL HFA/VENTOLIN HFA) 108 (90 BASE) MCG/ACT Inhaler Inhale 2 puffs into the lungs every 6 hours    alum & mag hydroxide-simethicone (MYLANTA ES/MAALOX  ES) 400-400-40 MG/5ML SUSP suspension Take 30 mLs by mouth every 6 hours as needed for indigestion    aspirin 10 mg/mL SUSP Take 32.5 mLs (325 mg) by mouth daily    bisacodyl (DULCOLAX) 10 MG Suppository Place 1 suppository (10 mg) rectally daily as needed for constipation    CELLCEPT (BRAND) 200 MG/ML SUSPENSION 5 mLs  (1,000 mg) by Oral or NG Tube route 2 times daily    docusate (COLACE) 50 MG/5ML liquid Take 5-10 mLs ( mg) by mouth 2 times daily    furosemide (LASIX) 20 MG tablet Take 1 tablet (20 mg) by mouth daily    GABAPENTIN PO Take 300 mg by mouth At Bedtime    multivitamins with minerals (CERTAVITE/CEROVITE) LIQD liquid 15 mLs by Per Feeding Tube route daily    OMEPRAZOLE PO Take 20 mg by mouth 2 times daily (before meals)    ondansetron (ZOFRAN-ODT) 4 MG ODT tab Take 1 tablet (4 mg) by mouth every 6 hours as needed for nausea or vomiting    oxyCODONE IR (ROXICODONE) 5 MG tablet Take 0.5-1 tablets (2.5-5 mg) by mouth every 3 hours as needed for moderate to severe pain    PRAMIPEXOLE DIHYDROCHLORIDE PO Take 0.5 mg by mouth daily    sulfamethoxazole-trimethoprim (BACTRIM/SEPTRA) suspension Take 10 mLs (80 mg) by mouth daily Dose based on TMP component.    tacrolimus (GENERIC EQUIVALENT) 0.5 MG capsule Take 1 capsule (0.5 mg) by mouth 2 times daily As directed by provider    tacrolimus (GENERIC EQUIVALENT) 1 MG capsule Take 5 capsules (5 mg) by mouth 2 times daily    valGANciclovir (VALCYTE) 50 MG/ML SOLR solution 9 mLs (450 mg) by Oral or NG Tube route daily    warfarin (COUMADIN) 2 MG tablet Take 2 tablets (4 mg) by mouth daily Every evening or as directed by provider    cyclobenzaprine (FLEXERIL) 10 MG tablet Take 1 tablet (10 mg) by mouth 3 times daily as needed for muscle spasms        Blood transfusion related (informational only) and Dilaudid [hydromorphone]   REVIEW OF SYSTEMS (check box if normal)  [x]               GENERAL  [x]                 PULMONARY [x]                GENITOURINARY  [x]                CNS                 [x]                 CARDIAC  [x]                 ENDOCRINE  [x]                EARS,NOSE,THROAT [x]                 GASTROINTESTINAL [x]                 NEUROLOGIC    [x]                MUSCLOSKELTAL  [x]                  HEMATOLOGY      PHYSICAL EXAM (check box if normal)/82   "Pulse 91  Temp 97.8  F (36.6  C)  Ht 1.6 m (5' 3\")  Wt 53.6 kg (118 lb 1.6 oz)  SpO2 99%  BMI 20.92 kg/m2        [x]            GENERAL:    [x]       EYES:  ICTERIC   []        YES  []                    NO  [x]           EXTREMITIES: Clubbing []       Y     [x]           N    [x]           EARS, NOSE, THROAT: Membranes Moist    YES   [x]                   NO []                  [x]           LUNGS:  CLEAR    YES       [x]                  NO    []                                [x]           SKIN: Jaundice           YES       []                  NO    [x]                   Rash: YES       []                  NO    [x]                                     [x]             HEART: Regular Rate          YES       [x]                  NO    []                   Incision Clean:  YES       [x]                  NO    []                                [x]                    ABDOMEN: Wound healthy Organomegaly YES       []                  NO    [x]                       [x]                    NEUROLOGICAL:  Nonfocal  YES       [x]                  NO    []                       [x]                    Hernia YES       []                  NO    [x]                   PSYCHIATRIC:  Appropriate  YES       [x]                  NO    []                       OTHER:                                                                                                   PAIN SCALE:: 3        Again, thank you for allowing me to participate in the care of your patient.      Sincerely,    Darren Rod MD      "

## 2018-09-25 NOTE — MR AVS SNAPSHOT
After Visit Summary   9/25/2018    Sherrie Lala    MRN: [id number]           Patient Information     Date Of Birth          1955        Visit Information        Provider Department      9/25/2018 9:45 AM Darren Rod MD [facility] Solid Organ Transplant        Today's Diagnoses     Liver replaced by transplant (H)    -  1    Immunosuppression (H)           Follow-ups after your visit        Your next 10 appointments already scheduled     Oct 01, 2018  9:00 AM CDT   XR CHEST 2 VIEWS with UCXR1   [facility] ([facility])    [address] [address] [phone]           How do I prepare for my exam? (Food and drink instructions) No Food and Drink Restrictions.  How do I prepare for my exam? (Other instructions) You do not need to do anything special for this exam.  What should I wear: Wear comfortable clothes.  How long does the exam take: Most scans take less than 5 minutes.  What should I bring: Bring a list of your medicines, including vitamins, minerals and over-the-counter drugs. It is safest to leave personal items at home.  Do I need a :  No  is needed.  What do I need to tell my doctor: Tell your doctor if there s any chance you are pregnant.  What should I do after the exam: No restrictions, You may resume normal activities.  What is this test: An image of a specific body part shown in shades of black and white.  Who should I call with questions: If you have any questions, please call the Imaging Department where you will have your exam. Directions, parking instructions, and other information is available on our website, [email or web address].            Oct 01, 2018  9:15 AM CDT   Lab with  LAB   [facility] ([facility])    [address] [address] [phone]            Oct 01, 2018  9:40 AM CDT   (Arrive by 9:25 AM)   Liver Return  Post Op with Darren Rod MD   [facility] Solid Organ Transplant ([facility])    [address]  [address]  [facility] [address] [phone]            Oct 16, 2018  5:00 PM CDT   (Arrive by 4:45 PM)   New Patient Visit with Lacy Gong MD   [facility] and Infectious Diseases ([facility])    [address]  [address]  [facility] [address] [phone]              Future tests that were ordered for you today     Open Future Orders        Priority Expected Expires Ordered    XR Chest 2 Views Routine 10/1/2018 10/25/2018 9/25/2018            Who to contact     If you have questions or need follow up information about today's clinic visit or your schedule please contact [facility] SOLID ORGAN TRANSPLANT directly at [phone].  Normal or non-critical lab and imaging results will be communicated to you by MyChart, letter or phone within 4 business days after the clinic has received the results. If you do not hear from us within 7 days, please contact the clinic through [employer] or phone. If you have a critical or abnormal lab result, we will notify you by phone as soon as possible.  Submit refill requests through [employer] or call your pharmacy and they will forward the refill request to us. Please allow 3 business days for your refill to be completed.          Additional Information About Your Visit        MyChart Information     [employer] gives you secure access to your electronic health record. If you see a primary care provider, you can also send messages to your care team and make appointments. If you have questions, please call your primary care clinic.  If you do not have a primary care provider, please call [phone] and they will assist you.        Care EveryWhere ID     This is your Care EveryWhere ID. This could be used by other organizations to access your [place] medical records  [id number]        Your  "Vitals Were     Pulse Temperature Height Pulse Oximetry BMI (Body Mass Index)       91 97.8  F (36.6  C) 1.6 m (5' 3\") 99% 20.92 kg/m2        Blood Pressure from Last 3 Encounters:   09/25/18 120/82   09/20/18 105/74   09/17/18 129/81    Weight from Last 3 Encounters:   09/25/18 53.6 kg (118 lb 1.6 oz)   09/20/18 53.4 kg (117 lb 12.8 oz)   09/12/18 57.2 kg (126 lb 3.2 oz)              Today, you had the following     No orders found for display       Primary Care Provider Office Phone # Fax #    Apollo Benitez [phone]       [place] [address] [facility] [address]        Equal Access to Services     HARINI CHAMBERLAIN : Natalio Ordonez, wapillo luqadaha, qaybjermain kaalmaran pierce, nahed chi. So [facility] [phone].    ATENCIÓN: Si habla español, tiene a bucio disposición servicios gratuitos de asistencia lingüística. Neville al [phone].    We comply with applicable federal civil rights laws and [place] laws. We do not discriminate on the basis of race, color, national origin, age, disability, sex, sexual orientation, or gender identity.            Thank you!     Thank you for choosing [facility] SOLID ORGAN TRANSPLANT  for your care. Our goal is always to provide you with excellent care. Hearing back from our patients is one way we can continue to improve our services. Please take a few minutes to complete the written survey that you may receive in the mail after your visit with us. Thank you!             Your Updated Medication List - Protect others around you: Learn how to safely use, store and throw away your medicines at www.disposemymeds.org.          This list is accurate as of 9/25/18 11:24 AM.  Always use your most recent med list.                   Brand Name Dispense Instructions for use Diagnosis    acetaminophen 32 mg/mL solution    TYLENOL    400 mL    Take 20.3 mLs (650 mg) by mouth every 6 hours as needed for fever " or mild pain    Liver transplanted (H)       albuterol 108 (90 Base) MCG/ACT inhaler    PROAIR HFA/PROVENTIL HFA/VENTOLIN HFA     Inhale 2 puffs into the lungs every 6 hours        alum & mag hydroxide-simethicone 400-400-40 MG/5ML Susp suspension    MYLANTA ES/MAALOX  ES    1200 mL    Take 30 mLs by mouth every 6 hours as needed for indigestion    Gastroesophageal reflux disease without esophagitis       aspirin 10 mg/mL Susp     975 mL    Take 32.5 mLs (325 mg) by mouth daily    Liver transplanted (H)       bisacodyl 10 MG Suppository    DULCOLAX    10 suppository    Place 1 suppository (10 mg) rectally daily as needed for constipation    Liver transplanted (H)       cyclobenzaprine 10 MG tablet    FLEXERIL    20 tablet    Take 1 tablet (10 mg) by mouth 3 times daily as needed for muscle spasms    Liver transplanted (H)       docusate 50 MG/5ML liquid    COLACE    200 mL    Take 5-10 mLs ( mg) by mouth 2 times daily    Liver transplanted (H)       furosemide 20 MG tablet    LASIX    30 tablet    Take 1 tablet (20 mg) by mouth daily    Pleural effusion, right       GABAPENTIN PO      Take 300 mg by mouth At Bedtime        multivitamins with minerals Liqd liquid     450 mL    15 mLs by Per Feeding Tube route daily    Mild protein-calorie malnutrition (H)       mycophenolate suspension     300 mL    5 mLs (1,000 mg) by Oral or NG Tube route 2 times daily    Liver transplanted (H)       OMEPRAZOLE PO      Take 20 mg by mouth 2 times daily (before meals)        ondansetron 4 MG ODT tab    ZOFRAN-ODT    120 tablet    Take 1 tablet (4 mg) by mouth every 6 hours as needed for nausea or vomiting    Nausea       oxyCODONE IR 5 MG tablet    ROXICODONE    20 tablet    Take 0.5-1 tablets (2.5-5 mg) by mouth every 3 hours as needed for moderate to severe pain    Liver transplanted (H)       PRAMIPEXOLE DIHYDROCHLORIDE PO      Take 0.5 mg by mouth daily        sulfamethoxazole-trimethoprim suspension    BACTRIM/SEPTRA     560 mL    Take 10 mLs (80 mg) by mouth daily Dose based on TMP component.    Liver transplanted (H)       * tacrolimus 0.5 MG capsule    GENERIC EQUIVALENT    60 capsule    Take 1 capsule (0.5 mg) by mouth 2 times daily As directed by provider    Liver transplanted (H)       * tacrolimus 1 MG capsule    GENERIC EQUIVALENT    300 capsule    Take 5 capsules (5 mg) by mouth 2 times daily    Liver transplanted (H)       valGANciclovir 50 MG/ML Solr solution    VALCYTE    270 mL    9 mLs (450 mg) by Oral or NG Tube route daily    Liver transplanted (H)       warfarin 2 MG tablet    COUMADIN    8 tablet    Take 2 tablets (4 mg) by mouth daily Every evening or as directed by provider    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)       * Notice:  This list has 2 medication(s) that are the same as other medications prescribed for you. Read the directions carefully, and ask your doctor or other care provider to review them with you.

## 2018-09-25 NOTE — NURSING NOTE
"Chief Complaint   Patient presents with     Surgical Followup     Liver tx f/u     Blood pressure 120/82, pulse 91, temperature 97.8  F (36.6  C), height 1.6 m (5' 3\"), weight 53.6 kg (118 lb 1.6 oz), SpO2 99 %.    Tyron Ang CMA    "

## 2018-09-25 NOTE — PROGRESS NOTES
Transplant Surgery -OUTPATIENT IMMUNOSUPPRESSION PROGRESS NOTE    Date of Visit: 09/25/2018    Transplants:  8/30/2018 (Liver); Postoperative day:  26  ASSESMENT AND PLAN:  1.Graft Function: Liver allograft: no rejection or technical problems.    2.Immunosuppression Management: keep tacrolimus levels at 8 ng/dL; reduce cellcept to 500mg po bid  3.Hypertension: ok  4.Renal Function: good  5.Lab frequency:twice weekly  6.Other:  Pleural effusions: continue lasix and intense physical therapy      Date: September 25, 2018    Transplant:  [x]                             Liver [x]                              Kidney []                             Pancreas []                              Other:             Chief Complaint:  Doing well, not able to eat well  History of Present Illness:  Post liver transplant with a  mesh  Patient Active Problem List   Diagnosis     Gastric ulcer     Osteoporosis     Bleeding esophageal varices (H)     Hydrothorax - hepatic     Liver transplanted (H)     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)     Open abdominal wall wound     Immunosuppressed status (H)     Acute post-operative pain     Acute blood loss anemia     Mild protein-calorie malnutrition (H)     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)     Hypervolemia     Positive sputum culture in cadaveric donor     Positive urine culture in cadaveric donor     SOCIAL /FAMILY HISTORY: [x]                  No recent change    Past Medical History:   Diagnosis Date     Asthma      Cholangiocarcinoma (H) 06/2014     Cirrhosis of liver with ascites (H) 5/10/2018     Encounter for pleural drainage tube placement      Esophageal varices with hemorrhage (H)      Gastric ulcer      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     Liver transplanted (H) 08/30/2018     Lung metastases (H) 08/2014     Portal vein thrombosis      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) 08/31/2018    Residual thrombus in main and right portal     Past Surgical  History:   Procedure Laterality Date     CHOLECYSTECTOMY       HEPATECTOMY PARTIAL       RETURN LIVER TRANSPLANT N/A 2018    Procedure: RETURN LIVER TRANSPLANT;  Open Abdomen, return liver transplant washout with   Mesh and liver stent  placement and  Abdomal Wound closure;  Surgeon: Darren Rod MD;  Location: [facility] OR     TRANSPLANT LIVER RECIPIENT  DONOR N/A 2018    Procedure: TRANSPLANT LIVER RECIPIENT  DONOR;  TRANSPLANT LIVER RECIPIENT  DONOR ;  Surgeon: Darren Rod MD;  Location:  OR     Social History     Social History     Marital status:      Spouse name: N/A     Number of children: N/A     Years of education: N/A     Occupational History     Not on file.     Social History Main Topics     Smoking status: Never Smoker     Smokeless tobacco: Never Used     Alcohol use No      Comment: occ      Drug use: No     Sexual activity: Not on file     Other Topics Concern     Not on file     Social History Narrative     Prescription Medications as of 2018             acetaminophen (TYLENOL) 32 mg/mL solution Take 20.3 mLs (650 mg) by mouth every 6 hours as needed for fever or mild pain    albuterol (PROAIR HFA/PROVENTIL HFA/VENTOLIN HFA) 108 (90 BASE) MCG/ACT Inhaler Inhale 2 puffs into the lungs every 6 hours    alum & mag hydroxide-simethicone (MYLANTA ES/MAALOX  ES) 400-400-40 MG/5ML SUSP suspension Take 30 mLs by mouth every 6 hours as needed for indigestion    aspirin 10 mg/mL SUSP Take 32.5 mLs (325 mg) by mouth daily    bisacodyl (DULCOLAX) 10 MG Suppository Place 1 suppository (10 mg) rectally daily as needed for constipation    CELLCEPT (BRAND) 200 MG/ML SUSPENSION 5 mLs (1,000 mg) by Oral or NG Tube route 2 times daily    docusate (COLACE) 50 MG/5ML liquid Take 5-10 mLs ( mg) by mouth 2 times daily    furosemide (LASIX) 20 MG tablet Take 1 tablet (20 mg) by mouth daily    GABAPENTIN PO Take 300 mg by mouth At Bedtime    multivitamins with  "minerals (CERTAVITE/CEROVITE) LIQD liquid 15 mLs by Per Feeding Tube route daily    OMEPRAZOLE PO Take 20 mg by mouth 2 times daily (before meals)    ondansetron (ZOFRAN-ODT) 4 MG ODT tab Take 1 tablet (4 mg) by mouth every 6 hours as needed for nausea or vomiting    oxyCODONE IR (ROXICODONE) 5 MG tablet Take 0.5-1 tablets (2.5-5 mg) by mouth every 3 hours as needed for moderate to severe pain    PRAMIPEXOLE DIHYDROCHLORIDE PO Take 0.5 mg by mouth daily    sulfamethoxazole-trimethoprim (BACTRIM/SEPTRA) suspension Take 10 mLs (80 mg) by mouth daily Dose based on TMP component.    tacrolimus (GENERIC EQUIVALENT) 0.5 MG capsule Take 1 capsule (0.5 mg) by mouth 2 times daily As directed by provider    tacrolimus (GENERIC EQUIVALENT) 1 MG capsule Take 5 capsules (5 mg) by mouth 2 times daily    valGANciclovir (VALCYTE) 50 MG/ML SOLR solution 9 mLs (450 mg) by Oral or NG Tube route daily    warfarin (COUMADIN) 2 MG tablet Take 2 tablets (4 mg) by mouth daily Every evening or as directed by provider    cyclobenzaprine (FLEXERIL) 10 MG tablet Take 1 tablet (10 mg) by mouth 3 times daily as needed for muscle spasms        Blood transfusion related (informational only) and Dilaudid [hydromorphone]   REVIEW OF SYSTEMS (check box if normal)  [x]               GENERAL  [x]                 PULMONARY [x]                GENITOURINARY  [x]                CNS                 [x]                 CARDIAC  [x]                 ENDOCRINE  [x]                EARS,NOSE,THROAT [x]                 GASTROINTESTINAL [x]                 NEUROLOGIC    [x]                MUSCLOSKELTAL  [x]                  HEMATOLOGY      PHYSICAL EXAM (check box if normal)/82  Pulse 91  Temp 97.8  F (36.6  C)  Ht 1.6 m (5' 3\")  Wt 53.6 kg (118 lb 1.6 oz)  SpO2 99%  BMI 20.92 kg/m2        [x]            GENERAL:    [x]       EYES:  ICTERIC   []        YES  []                    NO  [x]           EXTREMITIES: Clubbing []       Y     [x]           " N    [x]           EARS, NOSE, THROAT: Membranes Moist    YES   [x]                   NO []                  [x]           LUNGS:  CLEAR    YES       [x]                  NO    []                                [x]           SKIN: Jaundice           YES       []                  NO    [x]                   Rash: YES       []                  NO    [x]                                     [x]             HEART: Regular Rate          YES       [x]                  NO    []                   Incision Clean:  YES       [x]                  NO    []                                [x]                    ABDOMEN: Wound healthy Organomegaly YES       []                  NO    [x]                       [x]                    NEUROLOGICAL:  Nonfocal  YES       [x]                  NO    []                       [x]                    Hernia YES       []                  NO    [x]                   PSYCHIATRIC:  Appropriate  YES       [x]                  NO    []                       OTHER:                                                                                                   PAIN SCALE:: 3  HPI      ROS      Physical Exam

## 2018-09-26 ENCOUNTER — DOCUMENTATION ONLY (OUTPATIENT)
Dept: TRANSPLANT | Facility: CLINIC | Age: 63
End: 2018-09-26

## 2018-09-26 LAB — LAB SCANNED RESULT: NORMAL

## 2018-09-28 ENCOUNTER — TELEPHONE (OUTPATIENT)
Dept: PHARMACY | Facility: CLINIC | Age: 63
End: 2018-09-28

## 2018-09-28 DIAGNOSIS — G89.18 ACUTE POST-OPERATIVE PAIN: Primary | ICD-10-CM

## 2018-10-01 ENCOUNTER — RADIANT APPOINTMENT (OUTPATIENT)
Dept: GENERAL RADIOLOGY | Facility: CLINIC | Age: 63
End: 2018-10-01
Payer: COMMERCIAL

## 2018-10-01 ENCOUNTER — OFFICE VISIT (OUTPATIENT)
Dept: TRANSPLANT | Facility: CLINIC | Age: 63
End: 2018-10-01
Attending: TRANSPLANT SURGERY
Payer: MEDICARE

## 2018-10-01 VITALS
SYSTOLIC BLOOD PRESSURE: 117 MMHG | HEART RATE: 98 BPM | BODY MASS INDEX: 20.36 KG/M2 | WEIGHT: 114.9 LBS | TEMPERATURE: 97.4 F | HEIGHT: 63 IN | DIASTOLIC BLOOD PRESSURE: 77 MMHG | RESPIRATION RATE: 18 BRPM

## 2018-10-01 DIAGNOSIS — J90 PLEURAL EFFUSION: Primary | ICD-10-CM

## 2018-10-01 DIAGNOSIS — Z94.4 LIVER TRANSPLANTED (H): Primary | ICD-10-CM

## 2018-10-01 DIAGNOSIS — Z94.4 LIVER REPLACED BY TRANSPLANT (H): ICD-10-CM

## 2018-10-01 DIAGNOSIS — J90 PLEURAL EFFUSION: ICD-10-CM

## 2018-10-01 LAB
ALBUMIN SERPL-MCNC: 3.7 G/DL (ref 3.4–5)
ALP SERPL-CCNC: 94 U/L (ref 40–150)
ALT SERPL W P-5'-P-CCNC: 12 U/L (ref 0–50)
ANION GAP SERPL CALCULATED.3IONS-SCNC: 7 MMOL/L (ref 3–14)
AST SERPL W P-5'-P-CCNC: 10 U/L (ref 0–45)
BILIRUB DIRECT SERPL-MCNC: 0.3 MG/DL (ref 0–0.2)
BILIRUB SERPL-MCNC: 0.8 MG/DL (ref 0.2–1.3)
BUN SERPL-MCNC: 21 MG/DL (ref 7–30)
CALCIUM SERPL-MCNC: 8.7 MG/DL (ref 8.5–10.1)
CHLORIDE SERPL-SCNC: 106 MMOL/L (ref 94–109)
CO2 SERPL-SCNC: 26 MMOL/L (ref 20–32)
CREAT SERPL-MCNC: 0.91 MG/DL (ref 0.52–1.04)
ERYTHROCYTE [DISTWIDTH] IN BLOOD BY AUTOMATED COUNT: 17.3 % (ref 10–15)
FUNGUS SPEC CULT: NORMAL
GFR SERPL CREATININE-BSD FRML MDRD: 63 ML/MIN/1.7M2
GLUCOSE SERPL-MCNC: 94 MG/DL (ref 70–99)
HCT VFR BLD AUTO: 37 % (ref 35–47)
HGB BLD-MCNC: 11.9 G/DL (ref 11.7–15.7)
MAGNESIUM SERPL-MCNC: 1.8 MG/DL (ref 1.6–2.3)
MCH RBC QN AUTO: 27.8 PG (ref 26.5–33)
MCHC RBC AUTO-ENTMCNC: 32.2 G/DL (ref 31.5–36.5)
MCV RBC AUTO: 86 FL (ref 78–100)
PHOSPHATE SERPL-MCNC: 3.2 MG/DL (ref 2.5–4.5)
PLATELET # BLD AUTO: 149 10E9/L (ref 150–450)
POTASSIUM SERPL-SCNC: 4.2 MMOL/L (ref 3.4–5.3)
PROT SERPL-MCNC: 7.2 G/DL (ref 6.8–8.8)
RBC # BLD AUTO: 4.28 10E12/L (ref 3.8–5.2)
SODIUM SERPL-SCNC: 138 MMOL/L (ref 133–144)
SPECIMEN SOURCE: NORMAL
TACROLIMUS BLD-MCNC: 7.8 UG/L (ref 5–15)
TME LAST DOSE: NORMAL H
WBC # BLD AUTO: 5 10E9/L (ref 4–11)

## 2018-10-01 PROCEDURE — 85027 COMPLETE CBC AUTOMATED: CPT | Performed by: INTERNAL MEDICINE

## 2018-10-01 PROCEDURE — 80197 ASSAY OF TACROLIMUS: CPT | Performed by: INTERNAL MEDICINE

## 2018-10-01 PROCEDURE — 84100 ASSAY OF PHOSPHORUS: CPT | Performed by: INTERNAL MEDICINE

## 2018-10-01 PROCEDURE — 80076 HEPATIC FUNCTION PANEL: CPT | Performed by: INTERNAL MEDICINE

## 2018-10-01 PROCEDURE — 83735 ASSAY OF MAGNESIUM: CPT | Performed by: INTERNAL MEDICINE

## 2018-10-01 PROCEDURE — 80048 BASIC METABOLIC PNL TOTAL CA: CPT | Performed by: INTERNAL MEDICINE

## 2018-10-01 PROCEDURE — 87385 HISTOPLASMA CAPSUL AG IA: CPT | Performed by: INTERNAL MEDICINE

## 2018-10-01 PROCEDURE — 36415 COLL VENOUS BLD VENIPUNCTURE: CPT | Performed by: INTERNAL MEDICINE

## 2018-10-01 PROCEDURE — 36415 COLL VENOUS BLD VENIPUNCTURE: CPT | Performed by: TRANSPLANT SURGERY

## 2018-10-01 PROCEDURE — G0463 HOSPITAL OUTPT CLINIC VISIT: HCPCS | Mod: ZF

## 2018-10-01 ASSESSMENT — PAIN SCALES - GENERAL: PAINLEVEL: NO PAIN (0)

## 2018-10-01 NOTE — NURSING NOTE
"Chief Complaint   Patient presents with     RECHECK     LIVER TX FOLLOW UP      /77  Pulse 98  Temp 97.4  F (36.3  C) (Oral)  Resp 18  Ht 1.6 m (5' 3\")  Wt 52.1 kg (114 lb 14.4 oz)  BMI 20.35 kg/m2  RAMÍREZ VANEGAS CMA    "

## 2018-10-01 NOTE — MR AVS SNAPSHOT
After Visit Summary   10/1/2018    Sherrie Lala    MRN: [id number]           Patient Information     Date Of Birth          1955        Visit Information        Provider Department      10/1/2018 9:40 AM Darren Rod MD [facility] Solid Organ Transplant        Today's Diagnoses     Liver transplanted (H)    -  1       Follow-ups after your visit        Your next 10 appointments already scheduled     Oct 01, 2018  9:40 AM CDT   (Arrive by 9:25 AM)   Liver Return Post Op with Darren Rod MD   [facility] Solid Organ Transplant ([facility])    [address] [place] [address]  [address]  [facility] [id number] [phone]            Oct 16, 2018  5:00 PM CDT   (Arrive by 4:45 PM)   New Patient Visit with Lacy Gong MD   [facility] and Infectious Diseases ([facility])    [address] [place] [address]  [address]  [facility] [address] [phone]              Who to contact     If you have questions or need follow up information about today's clinic visit or your schedule please contact [facility] SOLID ORGAN TRANSPLANT directly at [phone].  Normal or non-critical lab and imaging results will be communicated to you by MyChart, letter or phone within 4 business days after the clinic has received the results. If you do not hear from us within 7 days, please contact the clinic through MyChart or phone. If you have a critical or abnormal lab result, we will notify you by phone as soon as possible.  Submit refill requests through [employer] or call your pharmacy and they will forward the refill request to us. Please allow 3 business days for your refill to be completed.          Additional Information About Your Visit        [employer] Information     [employer] gives you secure access to your electronic health record. If you see a primary care provider, you can also send messages to your care team and make appointments. If you have questions,  "please call your primary care clinic.  If you do not have a primary care provider, please call [phone] and they will assist you.        Care EveryWhere ID     This is your Care EveryWhere ID. This could be used by other organizations to access your [place] medical records  [id number]        Your Vitals Were     Pulse Temperature Respirations Height BMI (Body Mass Index)       98 97.4  F (36.3  C) (Oral) 18 1.6 m (5' 3\") 20.35 kg/m2        Blood Pressure from Last 3 Encounters:   10/01/18 117/77   09/25/18 120/82   09/20/18 105/74    Weight from Last 3 Encounters:   10/01/18 52.1 kg (114 lb 14.4 oz)   09/25/18 53.6 kg (118 lb 1.6 oz)   09/20/18 53.4 kg (117 lb 12.8 oz)              We Performed the Following     CMV DNA quantification        Primary Care Provider Office Phone # Fax #    Apollo Benitez [phone]       [facility] [address] [facility] [address]        Equal Access to Services     [facility] CenterDOMINIQUE : Hadii lucien kwan Soleta, waaxda lujames, qaybta kaalmaran pierce, nahed alfaro . So [facility] [phone].    ATENCIÓN: Si habla español, tiene a bucio disposición servicios gratuitos de asistencia lingüística. [facility] [phone].    We comply with applicable federal civil rights laws and [place] laws. We do not discriminate on the basis of race, color, national origin, age, disability, sex, sexual orientation, or gender identity.            Thank you!     Thank you for choosing [facility] SOLID ORGAN TRANSPLANT  for your care. Our goal is always to provide you with excellent care. Hearing back from our patients is one way we can continue to improve our services. Please take a few minutes to complete the written survey that you may receive in the mail after your visit with us. Thank you!             Your Updated Medication List - Protect others around you: Learn how to safely use, store and throw away your medicines at " www.disposemymeds.org.          This list is accurate as of 10/1/18  9:23 AM.  Always use your most recent med list.                   Brand Name Dispense Instructions for use Diagnosis    acetaminophen 32 mg/mL solution    TYLENOL    400 mL    Take 20.3 mLs (650 mg) by mouth every 6 hours as needed for fever or mild pain    Liver transplanted (H)       albuterol 108 (90 Base) MCG/ACT inhaler    PROAIR HFA/PROVENTIL HFA/VENTOLIN HFA     Inhale 2 puffs into the lungs every 6 hours        alum & mag hydroxide-simethicone 400-400-40 MG/5ML Susp suspension    MYLANTA ES/MAALOX  ES    1200 mL    Take 30 mLs by mouth every 6 hours as needed for indigestion    Gastroesophageal reflux disease without esophagitis       aspirin 10 mg/mL Susp     975 mL    Take 32.5 mLs (325 mg) by mouth daily    Liver transplanted (H)       bisacodyl 10 MG Suppository    DULCOLAX    10 suppository    Place 1 suppository (10 mg) rectally daily as needed for constipation    Liver transplanted (H)       cyclobenzaprine 10 MG tablet    FLEXERIL    20 tablet    Take 1 tablet (10 mg) by mouth 3 times daily as needed for muscle spasms    Liver transplanted (H)       docusate 50 MG/5ML liquid    COLACE    200 mL    Take 5-10 mLs ( mg) by mouth 2 times daily    Liver transplanted (H)       furosemide 20 MG tablet    LASIX    30 tablet    Take 1 tablet (20 mg) by mouth daily    Pleural effusion, right       GABAPENTIN PO      Take 300 mg by mouth At Bedtime        multivitamins with minerals Liqd liquid     450 mL    15 mLs by Per Feeding Tube route daily    Mild protein-calorie malnutrition (H)       mycophenolate suspension     300 mL    5 mLs (1,000 mg) by Oral or NG Tube route 2 times daily    Liver transplanted (H)       OMEPRAZOLE PO      Take 20 mg by mouth 2 times daily (before meals)        ondansetron 4 MG ODT tab    ZOFRAN-ODT    120 tablet    Take 1 tablet (4 mg) by mouth every 6 hours as needed for nausea or vomiting    Nausea        oxyCODONE IR 5 MG tablet    ROXICODONE    20 tablet    Take 0.5-1 tablets (2.5-5 mg) by mouth every 3 hours as needed for moderate to severe pain    Liver transplanted (H)       PRAMIPEXOLE DIHYDROCHLORIDE PO      Take 0.5 mg by mouth daily        sulfamethoxazole-trimethoprim suspension    BACTRIM/SEPTRA    560 mL    Take 10 mLs (80 mg) by mouth daily Dose based on TMP component.    Liver transplanted (H)       * tacrolimus 0.5 MG capsule    GENERIC EQUIVALENT    60 capsule    Take 1 capsule (0.5 mg) by mouth 2 times daily As directed by provider    Liver transplanted (H)       * tacrolimus 1 MG capsule    GENERIC EQUIVALENT    300 capsule    Take 5 capsules (5 mg) by mouth 2 times daily    Liver transplanted (H)       valGANciclovir 50 MG/ML Solr solution    VALCYTE    270 mL    9 mLs (450 mg) by Oral or NG Tube route daily    Liver transplanted (H)       warfarin 2 MG tablet    COUMADIN    8 tablet    Take 2 tablets (4 mg) by mouth daily Every evening or as directed by provider    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)       * Notice:  This list has 2 medication(s) that are the same as other medications prescribed for you. Read the directions carefully, and ask your doctor or other care provider to review them with you.

## 2018-10-01 NOTE — PROGRESS NOTES
"HPI      ROS      Physical Exam    Transplant Surgery -OUTPATIENT IMMUNOSUPPRESSION PROGRESS NOTE    Date of Visit: 10/01/2018    Transplants:  8/30/2018 (Liver); Postoperative day:  32  ASSESMENT AND PLAN:  1.Graft Function: Liver allograft: no rejection or technical problems.    2.Immunosuppression Management: decrease cellcept to 250mg po bid and keep tacrolimus levels at 10  3.Hypertension: ok  4.Renal Function: good  5.Lab frequency: twice weekly  6.Other:  Check CMV PCR  Pleural effusions: need to see pulmonology      Date: October 1, 2018    Transplant:  [x]                             Liver [x]                              Kidney []                             Pancreas []                              Other:             Chief Complaint:  Has \"flu\" like symptoms, no fever, needs to eat more  History of Present Illness:  Post liver transplant      Patient Active Problem List   Diagnosis     Gastric ulcer     Osteoporosis     Bleeding esophageal varices (H)     Hydrothorax - hepatic     Liver transplanted (H)     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)     Open abdominal wall wound     Immunosuppressed status (H)     Acute post-operative pain     Acute blood loss anemia     Mild protein-calorie malnutrition (H)     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)     Hypervolemia     Positive sputum culture in cadaveric donor     Positive urine culture in cadaveric donor     SOCIAL /FAMILY HISTORY: [x]                  No recent change    Past Medical History:   Diagnosis Date     Asthma      Cholangiocarcinoma (H) 06/2014     Cirrhosis of liver with ascites (H) 5/10/2018     Encounter for pleural drainage tube placement      Esophageal varices with hemorrhage (H)      Gastric ulcer      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     Liver transplanted (H) 08/30/2018     Lung metastases (H) 08/2014     Portal vein thrombosis      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) 08/31/2018    Residual thrombus in " main and right portal     Past Surgical History:   Procedure Laterality Date     CHOLECYSTECTOMY       HEPATECTOMY PARTIAL       RETURN LIVER TRANSPLANT N/A 2018    Procedure: RETURN LIVER TRANSPLANT;  Open Abdomen, return liver transplant washout with   Mesh and liver stent  placement and  Abdomal Wound closure;  Surgeon: Darren Rod MD;  Location: [facility] OR     TRANSPLANT LIVER RECIPIENT  DONOR N/A 2018    Procedure: TRANSPLANT LIVER RECIPIENT  DONOR;  TRANSPLANT LIVER RECIPIENT  DONOR ;  Surgeon: Darren Rod MD;  Location: [facility] OR     Social History     Social History     Marital status:      Spouse name: N/A     Number of children: N/A     Years of education: N/A     Occupational History     Not on file.     Social History Main Topics     Smoking status: Never Smoker     Smokeless tobacco: Never Used     Alcohol use No      Comment: occ      Drug use: No     Sexual activity: Not on file     Other Topics Concern     Not on file     Social History Narrative     Prescription Medications as of 10/1/2018             acetaminophen (TYLENOL) 32 mg/mL solution Take 20.3 mLs (650 mg) by mouth every 6 hours as needed for fever or mild pain    albuterol (PROAIR HFA/PROVENTIL HFA/VENTOLIN HFA) 108 (90 BASE) MCG/ACT Inhaler Inhale 2 puffs into the lungs every 6 hours    alum & mag hydroxide-simethicone (MYLANTA ES/MAALOX  ES) 400-400-40 MG/5ML SUSP suspension Take 30 mLs by mouth every 6 hours as needed for indigestion    aspirin 10 mg/mL SUSP Take 32.5 mLs (325 mg) by mouth daily    bisacodyl (DULCOLAX) 10 MG Suppository Place 1 suppository (10 mg) rectally daily as needed for constipation    CELLCEPT (BRAND) 200 MG/ML SUSPENSION 5 mLs (1,000 mg) by Oral or NG Tube route 2 times daily    docusate (COLACE) 50 MG/5ML liquid Take 5-10 mLs ( mg) by mouth 2 times daily    furosemide (LASIX) 20 MG tablet Take 1 tablet (20 mg) by mouth daily    GABAPENTIN PO Take 300 mg  "by mouth At Bedtime    multivitamins with minerals (CERTAVITE/CEROVITE) LIQD liquid 15 mLs by Per Feeding Tube route daily    OMEPRAZOLE PO Take 20 mg by mouth 2 times daily (before meals)    ondansetron (ZOFRAN-ODT) 4 MG ODT tab Take 1 tablet (4 mg) by mouth every 6 hours as needed for nausea or vomiting    oxyCODONE IR (ROXICODONE) 5 MG tablet Take 0.5-1 tablets (2.5-5 mg) by mouth every 3 hours as needed for moderate to severe pain    PRAMIPEXOLE DIHYDROCHLORIDE PO Take 0.5 mg by mouth daily    sulfamethoxazole-trimethoprim (BACTRIM/SEPTRA) suspension Take 10 mLs (80 mg) by mouth daily Dose based on TMP component.    tacrolimus (GENERIC EQUIVALENT) 0.5 MG capsule Take 1 capsule (0.5 mg) by mouth 2 times daily As directed by provider    tacrolimus (GENERIC EQUIVALENT) 1 MG capsule Take 5 capsules (5 mg) by mouth 2 times daily    valGANciclovir (VALCYTE) 50 MG/ML SOLR solution 9 mLs (450 mg) by Oral or NG Tube route daily    warfarin (COUMADIN) 2 MG tablet Take 2 tablets (4 mg) by mouth daily Every evening or as directed by provider    cyclobenzaprine (FLEXERIL) 10 MG tablet Take 1 tablet (10 mg) by mouth 3 times daily as needed for muscle spasms        Blood transfusion related (informational only) and Dilaudid [hydromorphone]   REVIEW OF SYSTEMS (check box if normal)  [x]               GENERAL  [x]                 PULMONARY [x]                GENITOURINARY  [x]                CNS                 [x]                 CARDIAC  [x]                 ENDOCRINE  [x]                EARS,NOSE,THROAT [x]                 GASTROINTESTINAL [x]                 NEUROLOGIC    [x]                MUSCLOSKELTAL  [x]                  HEMATOLOGY      PHYSICAL EXAM (check box if normal)/77  Pulse 98  Temp 97.4  F (36.3  C) (Oral)  Resp 18  Ht 1.6 m (5' 3\")  Wt 52.1 kg (114 lb 14.4 oz)  BMI 20.35 kg/m2      [x]            GENERAL:    [x]       EYES:  ICTERIC   []        YES  []                    NO  [x]           " EXTREMITIES: Clubbing []       Y     [x]           N    [x]           EARS, NOSE, THROAT: Membranes Moist    YES   [x]                   NO []                  [x]           LUNGS:  CLEAR    YES       [x]                  NO    []                                [x]           SKIN: Jaundice           YES       []                  NO    [x]                   Rash: YES       []                  NO    [x]                                     [x]             HEART: Regular Rate          YES       [x]                  NO    []                   Incision Clean:  YES       [x]                  NO    []                                [x]                    ABDOMEN: Organomegaly YES       []                  NO    [x]                       [x]                    NEUROLOGICAL:  Nonfocal  YES       [x]                  NO    []                       [x]                    Hernia YES       []                  NO    [x]                   PSYCHIATRIC:  Appropriate  YES       [x]                  NO    []                       OTHER:                                                                                                   PAIN SCALE:: 3

## 2018-10-01 NOTE — LETTER
"10/1/2018      RE: Sherrie Lala  [address] [facility] [address]       HPI      ROS      Physical Exam    Transplant Surgery -OUTPATIENT IMMUNOSUPPRESSION PROGRESS NOTE    Date of Visit: 10/01/2018    Transplants:  8/30/2018 (Liver); Postoperative day:  32  ASSESMENT AND PLAN:  1.Graft Function: Liver allograft: no rejection or technical problems.    2.Immunosuppression Management: decrease cellcept to 250mg po bid and keep tacrolimus levels at 10  3.Hypertension: ok  4.Renal Function: good  5.Lab frequency: twice weekly  6.Other:  Check CMV PCR  Pleural effusions: need to see pulmonology      Date: October 1, 2018    Transplant:  [x]                             Liver [x]                              Kidney []                             Pancreas []                              Other:             Chief Complaint:  Has \"flu\" like symptoms, no fever, needs to eat more  History of Present Illness:  Post liver transplant      Patient Active Problem List   Diagnosis     Gastric ulcer     Osteoporosis     Bleeding esophageal varices (H)     Hydrothorax - hepatic     Liver transplanted (H)     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)     Open abdominal wall wound     Immunosuppressed status (H)     Acute post-operative pain     Acute blood loss anemia     Mild protein-calorie malnutrition (H)     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)     Hypervolemia     Positive sputum culture in cadaveric donor     Positive urine culture in cadaveric donor     SOCIAL /FAMILY HISTORY: [x]                  No recent change    Past Medical History:   Diagnosis Date     Asthma      Cholangiocarcinoma (H) 06/2014     Cirrhosis of liver with ascites (H) 5/10/2018     Encounter for pleural drainage tube placement      Esophageal varices with hemorrhage (H)      Gastric ulcer      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     Liver transplanted (H) 08/30/2018     Lung metastases (H) 08/2014     Portal vein thrombosis      Portal vein " thrombosis of transplanted liver (H) 2018    Residual thrombus in main and right portal     Past Surgical History:   Procedure Laterality Date     CHOLECYSTECTOMY       HEPATECTOMY PARTIAL       RETURN LIVER TRANSPLANT N/A 2018    Procedure: RETURN LIVER TRANSPLANT;  Open Abdomen, return liver transplant washout with   Mesh and liver stent  placement and  Abdomal Wound closure;  Surgeon: Darren Rod MD;  Location: [facility] OR     TRANSPLANT LIVER RECIPIENT  DONOR N/A 2018    Procedure: TRANSPLANT LIVER RECIPIENT  DONOR;  TRANSPLANT LIVER RECIPIENT  DONOR ;  Surgeon: Darren Rod MD;  Location: [facility] OR     Social History     Social History     Marital status:      Spouse name: N/A     Number of children: N/A     Years of education: N/A     Occupational History     Not on file.     Social History Main Topics     Smoking status: Never Smoker     Smokeless tobacco: Never Used     Alcohol use No      Comment: occ      Drug use: No     Sexual activity: Not on file     Other Topics Concern     Not on file     Social History Narrative     Prescription Medications as of 10/1/2018             acetaminophen (TYLENOL) 32 mg/mL solution Take 20.3 mLs (650 mg) by mouth every 6 hours as needed for fever or mild pain    albuterol (PROAIR HFA/PROVENTIL HFA/VENTOLIN HFA) 108 (90 BASE) MCG/ACT Inhaler Inhale 2 puffs into the lungs every 6 hours    alum & mag hydroxide-simethicone (MYLANTA ES/MAALOX  ES) 400-400-40 MG/5ML SUSP suspension Take 30 mLs by mouth every 6 hours as needed for indigestion    aspirin 10 mg/mL SUSP Take 32.5 mLs (325 mg) by mouth daily    bisacodyl (DULCOLAX) 10 MG Suppository Place 1 suppository (10 mg) rectally daily as needed for constipation    CELLCEPT (BRAND) 200 MG/ML SUSPENSION 5 mLs (1,000 mg) by Oral or NG Tube route 2 times daily    docusate (COLACE) 50 MG/5ML liquid Take 5-10 mLs ( mg) by mouth 2 times daily    furosemide (LASIX) 20 MG  "tablet Take 1 tablet (20 mg) by mouth daily    GABAPENTIN PO Take 300 mg by mouth At Bedtime    multivitamins with minerals (CERTAVITE/CEROVITE) LIQD liquid 15 mLs by Per Feeding Tube route daily    OMEPRAZOLE PO Take 20 mg by mouth 2 times daily (before meals)    ondansetron (ZOFRAN-ODT) 4 MG ODT tab Take 1 tablet (4 mg) by mouth every 6 hours as needed for nausea or vomiting    oxyCODONE IR (ROXICODONE) 5 MG tablet Take 0.5-1 tablets (2.5-5 mg) by mouth every 3 hours as needed for moderate to severe pain    PRAMIPEXOLE DIHYDROCHLORIDE PO Take 0.5 mg by mouth daily    sulfamethoxazole-trimethoprim (BACTRIM/SEPTRA) suspension Take 10 mLs (80 mg) by mouth daily Dose based on TMP component.    tacrolimus (GENERIC EQUIVALENT) 0.5 MG capsule Take 1 capsule (0.5 mg) by mouth 2 times daily As directed by provider    tacrolimus (GENERIC EQUIVALENT) 1 MG capsule Take 5 capsules (5 mg) by mouth 2 times daily    valGANciclovir (VALCYTE) 50 MG/ML SOLR solution 9 mLs (450 mg) by Oral or NG Tube route daily    warfarin (COUMADIN) 2 MG tablet Take 2 tablets (4 mg) by mouth daily Every evening or as directed by provider    cyclobenzaprine (FLEXERIL) 10 MG tablet Take 1 tablet (10 mg) by mouth 3 times daily as needed for muscle spasms        Blood transfusion related (informational only) and Dilaudid [hydromorphone]   REVIEW OF SYSTEMS (check box if normal)  [x]               GENERAL  [x]                 PULMONARY [x]                GENITOURINARY  [x]                CNS                 [x]                 CARDIAC  [x]                 ENDOCRINE  [x]                EARS,NOSE,THROAT [x]                 GASTROINTESTINAL [x]                 NEUROLOGIC    [x]                MUSCLOSKELTAL  [x]                  HEMATOLOGY      PHYSICAL EXAM (check box if normal)/77  Pulse 98  Temp 97.4  F (36.3  C) (Oral)  Resp 18  Ht 1.6 m (5' 3\")  Wt 52.1 kg (114 lb 14.4 oz)  BMI 20.35 kg/m2      [x]            GENERAL:    [x]       EYES:  " ICTERIC   []        YES  []                    NO  [x]           EXTREMITIES: Clubbing []       Y     [x]           N    [x]           EARS, NOSE, THROAT: Membranes Moist    YES   [x]                   NO []                  [x]           LUNGS:  CLEAR    YES       [x]                  NO    []                                [x]           SKIN: Jaundice           YES       []                  NO    [x]                   Rash: YES       []                  NO    [x]                                     [x]             HEART: Regular Rate          YES       [x]                  NO    []                   Incision Clean:  YES       [x]                  NO    []                                [x]                    ABDOMEN: Organomegaly YES       []                  NO    [x]                       [x]                    NEUROLOGICAL:  Nonfocal  YES       [x]                  NO    []                       [x]                    Hernia YES       []                  NO    [x]                   PSYCHIATRIC:  Appropriate  YES       [x]                  NO    []                       OTHER:                                                                                                   PAIN SCALE:: 3    Darren Rod MD

## 2018-10-02 ENCOUNTER — TELEPHONE (OUTPATIENT)
Dept: TRANSPLANT | Facility: CLINIC | Age: 63
End: 2018-10-02

## 2018-10-02 DIAGNOSIS — Z94.4 LIVER TRANSPLANTED (H): ICD-10-CM

## 2018-10-02 LAB
CMV DNA SPEC NAA+PROBE-ACNC: NORMAL [IU]/ML
CMV DNA SPEC NAA+PROBE-LOG#: NORMAL {LOG_IU}/ML
CREAT SERPL-MCNC: 0.88 MG/DL
GLUCOSE BLD-MCNC: 88 MG/DL
SPECIMEN SOURCE: NORMAL

## 2018-10-02 RX ORDER — TACROLIMUS 0.5 MG/1
0.5 CAPSULE ORAL 2 TIMES DAILY
Qty: 60 CAPSULE | Refills: 11 | Status: SHIPPED | OUTPATIENT
Start: 2018-10-02 | End: 2018-10-09 | Stop reason: DRUGHIGH

## 2018-10-02 NOTE — TELEPHONE ENCOUNTER
October 2, 2018: I spoke with Sherrie who agreed to attend these appts on Mon, 10/15:    10:00 A - P FULL PULMONARY FUNCTION  11:05 [facility] - [facility]  Post-Liver Transplant   [phone]    Leighann Rodriguez, RN  Zakiya Cardoza       Can you help schedule patient to see pulmonary for pleural effusion per Dr Rod.           Orders Only  10/1/2018       Leighann Rodriguez, RN - [facility] Solid Organ Transplant Encounter Summary       Diagnosis       Pleural Effusion (Primary)              Orders Signed This Encounter (1)      PULMONARY MEDICINE REFERRAL

## 2018-10-04 ENCOUNTER — DOCUMENTATION ONLY (OUTPATIENT)
Dept: TRANSPLANT | Facility: CLINIC | Age: 63
End: 2018-10-04

## 2018-10-05 DIAGNOSIS — Z94.4 LIVER TRANSPLANTED (H): ICD-10-CM

## 2018-10-05 RX ORDER — MYCOPHENOLATE MOFETIL 200 MG/ML
250 POWDER, FOR SUSPENSION ORAL 2 TIMES DAILY
Qty: 300 ML | Refills: 3 | Status: SHIPPED | OUTPATIENT
Start: 2018-10-05 | End: 2018-10-08 | Stop reason: SINTOL

## 2018-10-05 NOTE — PROGRESS NOTES
Post Liver Transplant Team Conference  Date: 10/04/2018  Transplant Coordinator: Abigail Parham  Transplant Surgeon: Darren Rod      Attendees:  [] Dr. Dias [] Dr. Fields []       [x] Dr. Rod [x] Thien Moody LPN []    [] Dr. Rayo [] Gabriela Moreland NP []    [] Dr. Lanza [] Maribel Stringer NP []    [x] Dr. Blackwood [] Marifer Leija RN []       [] Dr. Lilly [x] Janae Gallegos, RN []       [] Dr. Kapil Gamble [] Abigail Parham, BECKI []       [] Dr. Staples [x] Leighann Rodriguez RN []       [] Dr. Rowe [] Darlene Dooley, RN []       [] Dr. Garcias [] Tye Early, BECKI []         Verbal Plan Read Back:   Stable, decrease cellcept dose to 250mg BID   Tacrolimus goal = 10       Routed to RN Coordinator   Janae Gallegos

## 2018-10-05 NOTE — TELEPHONE ENCOUNTER
Please call Sherrie to verify that she did decrease the cellcept dose now to 250mg Q 12 hours as discussed at her clinic appt with .

## 2018-10-07 LAB — LAB SCANNED RESULT: NORMAL

## 2018-10-08 ENCOUNTER — TELEPHONE (OUTPATIENT)
Dept: TRANSPLANT | Facility: CLINIC | Age: 63
End: 2018-10-08

## 2018-10-08 ENCOUNTER — OFFICE VISIT (OUTPATIENT)
Dept: TRANSPLANT | Facility: CLINIC | Age: 63
End: 2018-10-08
Attending: TRANSPLANT SURGERY
Payer: MEDICARE

## 2018-10-08 VITALS
WEIGHT: 114.78 LBS | TEMPERATURE: 98 F | BODY MASS INDEX: 20.33 KG/M2 | HEART RATE: 90 BPM | SYSTOLIC BLOOD PRESSURE: 131 MMHG | DIASTOLIC BLOOD PRESSURE: 85 MMHG | RESPIRATION RATE: 18 BRPM

## 2018-10-08 DIAGNOSIS — D84.9 IMMUNOSUPPRESSION (H): ICD-10-CM

## 2018-10-08 DIAGNOSIS — Z94.4 LIVER TRANSPLANTED (H): Primary | ICD-10-CM

## 2018-10-08 DIAGNOSIS — Z94.4 LIVER REPLACED BY TRANSPLANT (H): ICD-10-CM

## 2018-10-08 LAB
ALBUMIN SERPL-MCNC: 3.8 G/DL (ref 3.4–5)
ALP SERPL-CCNC: 90 U/L (ref 40–150)
ALT SERPL W P-5'-P-CCNC: 13 U/L (ref 0–50)
ANION GAP SERPL CALCULATED.3IONS-SCNC: 6 MMOL/L (ref 3–14)
AST SERPL W P-5'-P-CCNC: 8 U/L (ref 0–45)
BILIRUB DIRECT SERPL-MCNC: 0.2 MG/DL (ref 0–0.2)
BILIRUB SERPL-MCNC: 0.7 MG/DL (ref 0.2–1.3)
BUN SERPL-MCNC: 33 MG/DL (ref 7–30)
CALCIUM SERPL-MCNC: 8.7 MG/DL (ref 8.5–10.1)
CHLORIDE SERPL-SCNC: 107 MMOL/L (ref 94–109)
CO2 SERPL-SCNC: 26 MMOL/L (ref 20–32)
CREAT SERPL-MCNC: 1.09 MG/DL (ref 0.52–1.04)
ERYTHROCYTE [DISTWIDTH] IN BLOOD BY AUTOMATED COUNT: 16.6 % (ref 10–15)
GFR SERPL CREATININE-BSD FRML MDRD: 51 ML/MIN/1.7M2
GLUCOSE SERPL-MCNC: 98 MG/DL (ref 70–99)
HCT VFR BLD AUTO: 36.8 % (ref 35–47)
HGB BLD-MCNC: 11.5 G/DL (ref 11.7–15.7)
MAGNESIUM SERPL-MCNC: 1.9 MG/DL (ref 1.6–2.3)
MCH RBC QN AUTO: 27 PG (ref 26.5–33)
MCHC RBC AUTO-ENTMCNC: 31.3 G/DL (ref 31.5–36.5)
MCV RBC AUTO: 86 FL (ref 78–100)
PHOSPHATE SERPL-MCNC: 4.2 MG/DL (ref 2.5–4.5)
PLATELET # BLD AUTO: 130 10E9/L (ref 150–450)
POTASSIUM SERPL-SCNC: 4.6 MMOL/L (ref 3.4–5.3)
PROT SERPL-MCNC: 7 G/DL (ref 6.8–8.8)
RBC # BLD AUTO: 4.26 10E12/L (ref 3.8–5.2)
SODIUM SERPL-SCNC: 139 MMOL/L (ref 133–144)
TACROLIMUS BLD-MCNC: 10.2 UG/L (ref 5–15)
TME LAST DOSE: 2100 H
WBC # BLD AUTO: 4.1 10E9/L (ref 4–11)

## 2018-10-08 PROCEDURE — G0463 HOSPITAL OUTPT CLINIC VISIT: HCPCS | Mod: ZF

## 2018-10-08 PROCEDURE — 85027 COMPLETE CBC AUTOMATED: CPT | Performed by: INTERNAL MEDICINE

## 2018-10-08 PROCEDURE — 36415 COLL VENOUS BLD VENIPUNCTURE: CPT | Performed by: INTERNAL MEDICINE

## 2018-10-08 PROCEDURE — 80197 ASSAY OF TACROLIMUS: CPT | Performed by: INTERNAL MEDICINE

## 2018-10-08 PROCEDURE — 83735 ASSAY OF MAGNESIUM: CPT | Performed by: INTERNAL MEDICINE

## 2018-10-08 PROCEDURE — 80076 HEPATIC FUNCTION PANEL: CPT | Performed by: INTERNAL MEDICINE

## 2018-10-08 PROCEDURE — 80048 BASIC METABOLIC PNL TOTAL CA: CPT | Performed by: INTERNAL MEDICINE

## 2018-10-08 PROCEDURE — 84100 ASSAY OF PHOSPHORUS: CPT | Performed by: INTERNAL MEDICINE

## 2018-10-08 RX ORDER — VALGANCICLOVIR 450 MG/1
450 TABLET, FILM COATED ORAL DAILY
Qty: 45 TABLET | Refills: 0 | Status: SHIPPED | OUTPATIENT
Start: 2018-10-08 | End: 2018-10-24

## 2018-10-08 RX ORDER — VALGANCICLOVIR HYDROCHLORIDE 50 MG/ML
450 POWDER, FOR SOLUTION ORAL DAILY
Qty: 270 ML | Refills: 2 | Status: CANCELLED | OUTPATIENT
Start: 2018-10-08

## 2018-10-08 RX ORDER — AZATHIOPRINE 50 MG/1
75 TABLET ORAL DAILY
Qty: 45 TABLET | Refills: 3 | Status: SHIPPED | OUTPATIENT
Start: 2018-10-08 | End: 2018-10-22

## 2018-10-08 ASSESSMENT — PAIN SCALES - GENERAL: PAINLEVEL: NO PAIN (0)

## 2018-10-08 NOTE — LETTER
10/8/2018      RE: Sherrie Lala  [address] [facility] [address]       HPI      ROS      Physical Exam    Transplant Surgery -OUTPATIENT IMMUNOSUPPRESSION PROGRESS NOTE    Date of Visit: 10/08/2018    Transplants:  8/30/2018 (Liver); Postoperative day:  39  ASSESMENT AND PLAN:  1.Graft Function: Liver allograft: no rejection or technical problems.    2.Immunosuppression Management: stop MMF, start Imuran at 75 mg daily and scale up to 150 mg daily  3.Hypertension:ok  4.Renal Function: good  5.Lab frequency: q weekly  6.Other:  Wound healing well, remove sutures      Date: October 8, 2018    Transplant:  [x]                             Liver [x]                              Kidney []                             Pancreas []                              Other:             Chief Complaint:  Has nausea and GI upset    History of Present Illness:  Patient Active Problem List   Diagnosis     Gastric ulcer     Osteoporosis     Bleeding esophageal varices (H)     Hydrothorax - hepatic     Liver transplanted (H)     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)     Open abdominal wall wound     Immunosuppressed status (H)     Acute post-operative pain     Acute blood loss anemia     Mild protein-calorie malnutrition (H)     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)     Hypervolemia     Positive sputum culture in cadaveric donor     Positive urine culture in cadaveric donor     SOCIAL /FAMILY HISTORY: [x]                  No recent change    Past Medical History:   Diagnosis Date     Asthma      Cholangiocarcinoma (H) 06/2014     Cirrhosis of liver with ascites (H) 5/10/2018     Encounter for pleural drainage tube placement      Esophageal varices with hemorrhage (H)      Gastric ulcer      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     Liver transplanted (H) 08/30/2018     Lung metastases (H) 08/2014     Portal vein thrombosis      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) 08/31/2018    Residual thrombus in main and  right portal     Past Surgical History:   Procedure Laterality Date     CHOLECYSTECTOMY       HEPATECTOMY PARTIAL       RETURN LIVER TRANSPLANT N/A 2018    Procedure: RETURN LIVER TRANSPLANT;  Open Abdomen, return liver transplant washout with   Mesh and liver stent  placement and  Abdomal Wound closure;  Surgeon: Darren Rod MD;  Location: [facility] OR     TRANSPLANT LIVER RECIPIENT  DONOR N/A 2018    Procedure: TRANSPLANT LIVER RECIPIENT  DONOR;  TRANSPLANT LIVER RECIPIENT  DONOR ;  Surgeon: Darren Rod MD;  Location: [facility] OR     Social History     Social History     Marital status:      Spouse name: N/A     Number of children: N/A     Years of education: N/A     Occupational History     Not on file.     Social History Main Topics     Smoking status: Never Smoker     Smokeless tobacco: Never Used     Alcohol use No      Comment: occ      Drug use: No     Sexual activity: Not on file     Other Topics Concern     Not on file     Social History Narrative     Prescription Medications as of 10/8/2018             acetaminophen (TYLENOL) 32 mg/mL solution Take 20.3 mLs (650 mg) by mouth every 6 hours as needed for fever or mild pain    albuterol (PROAIR HFA/PROVENTIL HFA/VENTOLIN HFA) 108 (90 BASE) MCG/ACT Inhaler Inhale 2 puffs into the lungs every 6 hours    alum & mag hydroxide-simethicone (MYLANTA ES/MAALOX  ES) 400-400-40 MG/5ML SUSP suspension Take 30 mLs by mouth every 6 hours as needed for indigestion    aspirin 10 mg/mL SUSP Take 32.5 mLs (325 mg) by mouth daily    bisacodyl (DULCOLAX) 10 MG Suppository Place 1 suppository (10 mg) rectally daily as needed for constipation    CELLCEPT (BRAND) 200 MG/ML SUSPENSION 1.25 mLs (250 mg) by Oral or NG Tube route 2 times daily    cyclobenzaprine (FLEXERIL) 10 MG tablet Take 1 tablet (10 mg) by mouth 3 times daily as needed for muscle spasms    docusate (COLACE) 50 MG/5ML liquid Take 5-10 mLs ( mg) by mouth 2 times  daily    furosemide (LASIX) 20 MG tablet Take 1 tablet (20 mg) by mouth daily    GABAPENTIN PO Take 300 mg by mouth At Bedtime    multivitamins with minerals (CERTAVITE/CEROVITE) LIQD liquid 15 mLs by Per Feeding Tube route daily    OMEPRAZOLE PO Take 20 mg by mouth 2 times daily (before meals)    ondansetron (ZOFRAN-ODT) 4 MG ODT tab Take 1 tablet (4 mg) by mouth every 6 hours as needed for nausea or vomiting    oxyCODONE IR (ROXICODONE) 5 MG tablet Take 0.5-1 tablets (2.5-5 mg) by mouth every 3 hours as needed for moderate to severe pain    PRAMIPEXOLE DIHYDROCHLORIDE PO Take 0.5 mg by mouth daily    sulfamethoxazole-trimethoprim (BACTRIM/SEPTRA) suspension Take 10 mLs (80 mg) by mouth daily Dose based on TMP component.    tacrolimus (GENERIC EQUIVALENT) 0.5 MG capsule Take 1 capsule (0.5 mg) by mouth 2 times daily Take with 1 mg caps. Total daily dose 5.5 mg twice daily    tacrolimus (GENERIC EQUIVALENT) 1 MG capsule Take 5 capsules (5 mg) by mouth 2 times daily    valGANciclovir (VALCYTE) 50 MG/ML SOLR solution 9 mLs (450 mg) by Oral or NG Tube route daily    warfarin (COUMADIN) 2 MG tablet Take 2 tablets (4 mg) by mouth daily Every evening or as directed by provider        Blood transfusion related (informational only) and Dilaudid [hydromorphone]   REVIEW OF SYSTEMS (check box if normal)  [x]               GENERAL  [x]                 PULMONARY [x]                GENITOURINARY  [x]                CNS                 [x]                 CARDIAC  [x]                 ENDOCRINE  [x]                EARS,NOSE,THROAT [x]                 GASTROINTESTINAL [x]                 NEUROLOGIC    [x]                MUSCLOSKELTAL  [x]                  HEMATOLOGY      PHYSICAL EXAM (check box if normal)/85  Pulse 90  Temp 98  F (36.7  C) (Oral)  Resp 18  Wt 52.1 kg (114 lb 12.5 oz)  BMI 20.33 kg/m2      [x]            GENERAL:    [x]       EYES:  ICTERIC   []        YES  []                    NO  [x]            EXTREMITIES: Clubbing []       Y     [x]           N    [x]           EARS, NOSE, THROAT: Membranes Moist    YES   [x]                   NO []                  [x]           LUNGS:  CLEAR    YES       [x]                  NO    []                                [x]           SKIN: Jaundice           YES       []                  NO    [x]                   Rash: YES       []                  NO    [x]                                     [x]             HEART: Regular Rate          YES       [x]                  NO    []                   Incision Clean:  YES       [x]                  NO    []                                [x]                    ABDOMEN: Remove wound sutures Organomegaly YES       []                  NO    [x]                       [x]                    NEUROLOGICAL:  Nonfocal  YES       [x]                  NO    []                       [x]                    Hernia YES       []                  NO    [x]                   PSYCHIATRIC:  Appropriate  YES       [x]                  NO    []                       OTHER:                                                                                                   PAIN SCALE:: 3    Darren Rod MD

## 2018-10-08 NOTE — MR AVS SNAPSHOT
After Visit Summary   10/8/2018    Sherrie Lala    MRN: [id number]           Patient Information     Date Of Birth          1955        Visit Information        Provider Department      10/8/2018 9:10 AM Darren Rod MD [facility] Solid Organ Transplant        Today's Diagnoses     Liver transplanted (H)    -  1    Immunosuppression (H)           Follow-ups after your visit        Your next 10 appointments already scheduled     Oct 08, 2018 10:15 AM CDT   Lab with  LAB   [facility] Lab ([facility])    [address]  1st Floor  [facility] [id number] [phone]            Oct 15, 2018 10:00 AM CDT   FULL PULMONARY FUNCTION with  PFL C   [facility] Pulmonary Function Testing ([facility])    [address] [place] [address]  3rd Floor  [facility] [id number] [phone]            Oct 15, 2018 11:05 AM CDT   (Arrive by 10:50 AM)   Return Visit with Jesus Thomas MD   [facility] ([facility])    [address] [place] [address]  [address]  [facility] [id number] [phone]            Oct 16, 2018  5:00 PM CDT   (Arrive by 4:45 PM)   New Patient Visit with Lacy Gong MD   [facility] ([facility])    [address] [place] [address]  [address]  [facility] [id number] [phone]              Who to contact     If you have questions or need follow up information about today's clinic visit or your schedule please contact [facility] SOLID ORGAN TRANSPLANT directly at [phone].  Normal or non-critical lab and imaging results will be communicated to you by MyChart, letter or phone within 4 business days after the clinic has received the results. If you do not hear from us within 7 days, please contact the clinic through MyChart or phone. If you have a critical or abnormal lab result, we will notify you by phone as soon as  possible.  Submit refill requests through [employer] or call your pharmacy and they will forward the refill request to us. Please allow 3 business days for your refill to be completed.          Additional Information About Your Visit        [employer] Information     [employer] gives you secure access to your electronic health record. If you see a primary care provider, you can also send messages to your care team and make appointments. If you have questions, please call your primary care clinic.  If you do not have a primary care provider, please call [phone] and they will assist you.        Care EveryWhere ID     This is your Care EveryWhere ID. This could be used by other organizations to access your [facility] medical records  [id number]        Your Vitals Were     Pulse Temperature Respirations BMI (Body Mass Index)          90 98  F (36.7  C) (Oral) 18 20.33 kg/m2         Blood Pressure from Last 3 Encounters:   10/08/18 131/85   10/01/18 117/77   09/25/18 120/82    Weight from Last 3 Encounters:   10/08/18 52.1 kg (114 lb 12.5 oz)   10/01/18 52.1 kg (114 lb 14.4 oz)   09/25/18 53.6 kg (118 lb 1.6 oz)              Today, you had the following     No orders found for display       Primary Care Provider Office Phone # Fax #    Apollo Benitez [phone]       [address] [address]        Equal Access to Services     [facility]SE [facility] : Hadii lucien kwan Soleta, waaxda luqadaha, qaybta kaalmada kari, nahed alfaro . So [facility] [place] [phone].    ATENCIÓN: Si habla español, tiene a bucio disposición servicios gratuitos de asistencia lingüística. Llame al [phone].    We comply with applicable federal civil rights laws and [place] laws. We do not discriminate on the basis of race, color, national origin, age, disability, sex, sexual orientation, or gender identity.            Thank you!     Thank you for choosing [facility] SOLID ORGAN  TRANSPLANT  for your care. Our goal is always to provide you with excellent care. Hearing back from our patients is one way we can continue to improve our services. Please take a few minutes to complete the written survey that you may receive in the mail after your visit with us. Thank you!             Your Updated Medication List - Protect others around you: Learn how to safely use, store and throw away your medicines at www.disposemymeds.org.          This list is accurate as of 10/8/18  9:39 AM.  Always use your most recent med list.                   Brand Name Dispense Instructions for use Diagnosis    acetaminophen 32 mg/mL solution    TYLENOL    400 mL    Take 20.3 mLs (650 mg) by mouth every 6 hours as needed for fever or mild pain    Liver transplanted (H)       albuterol 108 (90 Base) MCG/ACT inhaler    PROAIR HFA/PROVENTIL HFA/VENTOLIN HFA     Inhale 2 puffs into the lungs every 6 hours        alum & mag hydroxide-simethicone 400-400-40 MG/5ML Susp suspension    MYLANTA ES/MAALOX  ES    1200 mL    Take 30 mLs by mouth every 6 hours as needed for indigestion    Gastroesophageal reflux disease without esophagitis       aspirin 10 mg/mL Susp     975 mL    Take 32.5 mLs (325 mg) by mouth daily    Liver transplanted (H)       bisacodyl 10 MG Suppository    DULCOLAX    10 suppository    Place 1 suppository (10 mg) rectally daily as needed for constipation    Liver transplanted (H)       cyclobenzaprine 10 MG tablet    FLEXERIL    20 tablet    Take 1 tablet (10 mg) by mouth 3 times daily as needed for muscle spasms    Liver transplanted (H)       docusate 50 MG/5ML liquid    COLACE    200 mL    Take 5-10 mLs ( mg) by mouth 2 times daily    Liver transplanted (H)       furosemide 20 MG tablet    LASIX    30 tablet    Take 1 tablet (20 mg) by mouth daily    Pleural effusion, right       GABAPENTIN PO      Take 300 mg by mouth At Bedtime        multivitamins with minerals Liqd liquid     450 mL    15 mLs by  Per Feeding Tube route daily    Mild protein-calorie malnutrition (H)       mycophenolate suspension     300 mL    1.25 mLs (250 mg) by Oral or NG Tube route 2 times daily    Liver transplanted (H)       OMEPRAZOLE PO      Take 20 mg by mouth 2 times daily (before meals)        ondansetron 4 MG ODT tab    ZOFRAN-ODT    120 tablet    Take 1 tablet (4 mg) by mouth every 6 hours as needed for nausea or vomiting    Nausea       oxyCODONE IR 5 MG tablet    ROXICODONE    20 tablet    Take 0.5-1 tablets (2.5-5 mg) by mouth every 3 hours as needed for moderate to severe pain    Liver transplanted (H)       PRAMIPEXOLE DIHYDROCHLORIDE PO      Take 0.5 mg by mouth daily        sulfamethoxazole-trimethoprim suspension    BACTRIM/SEPTRA    560 mL    Take 10 mLs (80 mg) by mouth daily Dose based on TMP component.    Liver transplanted (H)       * tacrolimus 1 MG capsule    GENERIC EQUIVALENT    300 capsule    Take 5 capsules (5 mg) by mouth 2 times daily    Liver transplanted (H)       * tacrolimus 0.5 MG capsule    GENERIC EQUIVALENT    60 capsule    Take 1 capsule (0.5 mg) by mouth 2 times daily Take with 1 mg caps. Total daily dose 5.5 mg twice daily    Liver transplanted (H)       valGANciclovir 50 MG/ML Solr solution    VALCYTE    270 mL    9 mLs (450 mg) by Oral or NG Tube route daily    Liver transplanted (H)       warfarin 2 MG tablet    COUMADIN    8 tablet    Take 2 tablets (4 mg) by mouth daily Every evening or as directed by provider    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)       * Notice:  This list has 2 medication(s) that are the same as other medications prescribed for you. Read the directions carefully, and ask your doctor or other care provider to review them with you.

## 2018-10-08 NOTE — PROGRESS NOTES
HPI      ROS      Physical Exam    Transplant Surgery -OUTPATIENT IMMUNOSUPPRESSION PROGRESS NOTE    Date of Visit: 10/08/2018    Transplants:  8/30/2018 (Liver); Postoperative day:  39  ASSESMENT AND PLAN:  1.Graft Function: Liver allograft: no rejection or technical problems.    2.Immunosuppression Management: stop MMF, start Imuran at 75 mg daily and scale up to 150 mg daily  3.Hypertension:ok  4.Renal Function: good  5.Lab frequency: q weekly  6.Other:  Wound healing well, remove sutures      Date: October 8, 2018    Transplant:  [x]                             Liver [x]                              Kidney []                             Pancreas []                              Other:             Chief Complaint:  Has nausea and GI upset    History of Present Illness:  Patient Active Problem List   Diagnosis     Gastric ulcer     Osteoporosis     Bleeding esophageal varices (H)     Hydrothorax - hepatic     Liver transplanted (H)     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)     Open abdominal wall wound     Immunosuppressed status (H)     Acute post-operative pain     Acute blood loss anemia     Mild protein-calorie malnutrition (H)     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)     Hypervolemia     Positive sputum culture in cadaveric donor     Positive urine culture in cadaveric donor     SOCIAL /FAMILY HISTORY: [x]                  No recent change    Past Medical History:   Diagnosis Date     Asthma      Cholangiocarcinoma (H) 06/2014     Cirrhosis of liver with ascites (H) 5/10/2018     Encounter for pleural drainage tube placement      Esophageal varices with hemorrhage (H)      Gastric ulcer      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     Liver transplanted (H) 08/30/2018     Lung metastases (H) 08/2014     Portal vein thrombosis      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) 08/31/2018    Residual thrombus in main and right portal     Past Surgical History:   Procedure Laterality Date      CHOLECYSTECTOMY       HEPATECTOMY PARTIAL       RETURN LIVER TRANSPLANT N/A 2018    Procedure: RETURN LIVER TRANSPLANT;  Open Abdomen, return liver transplant washout with   Mesh and liver stent  placement and  Abdomal Wound closure;  Surgeon: Darren Rod MD;  Location: [facility] OR     TRANSPLANT LIVER RECIPIENT  DONOR N/A 2018    Procedure: TRANSPLANT LIVER RECIPIENT  DONOR;  TRANSPLANT LIVER RECIPIENT  DONOR ;  Surgeon: Darren Rod MD;  Location: [facility] OR     Social History     Social History     Marital status:      Spouse name: N/A     Number of children: N/A     Years of education: N/A     Occupational History     Not on file.     Social History Main Topics     Smoking status: Never Smoker     Smokeless tobacco: Never Used     Alcohol use No      Comment: occ      Drug use: No     Sexual activity: Not on file     Other Topics Concern     Not on file     Social History Narrative     Prescription Medications as of 10/8/2018             acetaminophen (TYLENOL) 32 mg/mL solution Take 20.3 mLs (650 mg) by mouth every 6 hours as needed for fever or mild pain    albuterol (PROAIR HFA/PROVENTIL HFA/VENTOLIN HFA) 108 (90 BASE) MCG/ACT Inhaler Inhale 2 puffs into the lungs every 6 hours    alum & mag hydroxide-simethicone (MYLANTA ES/MAALOX  ES) 400-400-40 MG/5ML SUSP suspension Take 30 mLs by mouth every 6 hours as needed for indigestion    aspirin 10 mg/mL SUSP Take 32.5 mLs (325 mg) by mouth daily    bisacodyl (DULCOLAX) 10 MG Suppository Place 1 suppository (10 mg) rectally daily as needed for constipation    CELLCEPT (BRAND) 200 MG/ML SUSPENSION 1.25 mLs (250 mg) by Oral or NG Tube route 2 times daily    cyclobenzaprine (FLEXERIL) 10 MG tablet Take 1 tablet (10 mg) by mouth 3 times daily as needed for muscle spasms    docusate (COLACE) 50 MG/5ML liquid Take 5-10 mLs ( mg) by mouth 2 times daily    furosemide (LASIX) 20 MG tablet Take 1 tablet (20 mg) by mouth  daily    GABAPENTIN PO Take 300 mg by mouth At Bedtime    multivitamins with minerals (CERTAVITE/CEROVITE) LIQD liquid 15 mLs by Per Feeding Tube route daily    OMEPRAZOLE PO Take 20 mg by mouth 2 times daily (before meals)    ondansetron (ZOFRAN-ODT) 4 MG ODT tab Take 1 tablet (4 mg) by mouth every 6 hours as needed for nausea or vomiting    oxyCODONE IR (ROXICODONE) 5 MG tablet Take 0.5-1 tablets (2.5-5 mg) by mouth every 3 hours as needed for moderate to severe pain    PRAMIPEXOLE DIHYDROCHLORIDE PO Take 0.5 mg by mouth daily    sulfamethoxazole-trimethoprim (BACTRIM/SEPTRA) suspension Take 10 mLs (80 mg) by mouth daily Dose based on TMP component.    tacrolimus (GENERIC EQUIVALENT) 0.5 MG capsule Take 1 capsule (0.5 mg) by mouth 2 times daily Take with 1 mg caps. Total daily dose 5.5 mg twice daily    tacrolimus (GENERIC EQUIVALENT) 1 MG capsule Take 5 capsules (5 mg) by mouth 2 times daily    valGANciclovir (VALCYTE) 50 MG/ML SOLR solution 9 mLs (450 mg) by Oral or NG Tube route daily    warfarin (COUMADIN) 2 MG tablet Take 2 tablets (4 mg) by mouth daily Every evening or as directed by provider        Blood transfusion related (informational only) and Dilaudid [hydromorphone]   REVIEW OF SYSTEMS (check box if normal)  [x]               GENERAL  [x]                 PULMONARY [x]                GENITOURINARY  [x]                CNS                 [x]                 CARDIAC  [x]                 ENDOCRINE  [x]                EARS,NOSE,THROAT [x]                 GASTROINTESTINAL [x]                 NEUROLOGIC    [x]                MUSCLOSKELTAL  [x]                  HEMATOLOGY      PHYSICAL EXAM (check box if normal)/85  Pulse 90  Temp 98  F (36.7  C) (Oral)  Resp 18  Wt 52.1 kg (114 lb 12.5 oz)  BMI 20.33 kg/m2      [x]            GENERAL:    [x]       EYES:  ICTERIC   []        YES  []                    NO  [x]           EXTREMITIES: Clubbing []       Y     [x]           N    [x]           EARS,  NOSE, THROAT: Membranes Moist    YES   [x]                   NO []                  [x]           LUNGS:  CLEAR    YES       [x]                  NO    []                                [x]           SKIN: Jaundice           YES       []                  NO    [x]                   Rash: YES       []                  NO    [x]                                     [x]             HEART: Regular Rate          YES       [x]                  NO    []                   Incision Clean:  YES       [x]                  NO    []                                [x]                    ABDOMEN: Remove wound sutures Organomegaly YES       []                  NO    [x]                       [x]                    NEUROLOGICAL:  Nonfocal  YES       [x]                  NO    []                       [x]                    Hernia YES       []                  NO    [x]                   PSYCHIATRIC:  Appropriate  YES       [x]                  NO    []                       OTHER:                                                                                                   PAIN SCALE:: 3

## 2018-10-08 NOTE — NURSING NOTE
Chief Complaint   Patient presents with     RECHECK     Liver tx follow up     Blood pressure 131/85, pulse 90, temperature 98  F (36.7  C), temperature source Oral, resp. rate 18, weight 52.1 kg (114 lb 12.5 oz).    RAMÍREZ VANEGAS CMA

## 2018-10-08 NOTE — TELEPHONE ENCOUNTER
Here for post liver transplant follow-up.  Accompanied by .  Reviewed recent labs and assisted with interpretation. Nausea has worsened.  Per Viola will stop cellcept and start azathioprine 75 mg po daily for now, increase to 150 daily as tolerated.  Pleural effusion improved last week.  Has pulmonary and ID follow-up next week.  Feels tremulous, had bad headache last night, will evaluate prograf change when prograf level is available.  Sutures to be removed today.  RTC 1-2 weeks to see Viola.    Complaints:  Ongoing right sided pain, severe nausea, no emesis    Current immunosuppression:    Prograf (goal 8) and change today from cellcept to AZA.  Script sent.    Lab frequency:  weekly    Follow-up:  Miguel in 1-2 weeks, derm and PCP annually. Reviewed need for annual follow-up here with hepatology and dermatology.

## 2018-10-09 ENCOUNTER — TELEPHONE (OUTPATIENT)
Dept: TRANSPLANT | Facility: CLINIC | Age: 63
End: 2018-10-09

## 2018-10-09 DIAGNOSIS — Z94.4 LIVER TRANSPLANTED (H): ICD-10-CM

## 2018-10-09 RX ORDER — TACROLIMUS 1 MG/1
CAPSULE ORAL
Qty: 270 CAPSULE | Refills: 11 | Status: SHIPPED | OUTPATIENT
Start: 2018-10-09 | End: 2018-10-16

## 2018-10-09 NOTE — TELEPHONE ENCOUNTER
Call to Sherrie.  We changed from cellcept to AZA yesterday because Sherrie was very nauseated.  She is feeling a bit better today.  She also reported headache and had a fine tremor yesterday.  Her prograf level came back at 10.2.  Dr. Rod suggested a level of 8.  I asked Sherrie to decrease prograf to 4 mg in the morning and 5 mg in the evening.

## 2018-10-12 ENCOUNTER — TELEPHONE (OUTPATIENT)
Dept: PHARMACY | Facility: CLINIC | Age: 63
End: 2018-10-12

## 2018-10-12 ENCOUNTER — TELEPHONE (OUTPATIENT)
Dept: TRANSPLANT | Facility: CLINIC | Age: 63
End: 2018-10-12

## 2018-10-12 DIAGNOSIS — Z94.4 LIVER REPLACED BY TRANSPLANT (H): Primary | ICD-10-CM

## 2018-10-12 NOTE — TELEPHONE ENCOUNTER
Patient Call: General    Reason for call:patient returning call and has some questions.    Call back needed? Yes    Return Call Needed  Same as documented in contacts section  When to return call?: Same day: Route [facility]

## 2018-10-12 NOTE — TELEPHONE ENCOUNTER
Spoke to Sherrie.   Nausea has improved since stopping cellcept / changing to AZA.  Has appt w/ pulmonary and ID next Monday.  Needs appt w/ [place] on 10/22 and lab appt that same day prior to the appt.  Also needs lab appt on 10/15 at 0930.  Message to .  Updated med list.  Lab orders to be faxed to [facility] asking for monthly labs.

## 2018-10-12 NOTE — TELEPHONE ENCOUNTER
October 12, 2018: I spoke with Sherrie who agreed to the following appts:    10/15/2018 Mon - 9:30 A -  LAB - labs (coordinator: Abigail; pager: [phone])    10/22/2018 Mon - 10:10 TRENT - MAHAD SALDIVAR - liver TX F/U  10/22/2018 Mon - 9:15 A -  LAB - labs (coordinator: Abigail; pager: [phone])    Zakiya Hinton  Post-Liver Transplant   [phone]

## 2018-10-15 ENCOUNTER — OFFICE VISIT (OUTPATIENT)
Dept: PULMONOLOGY | Facility: CLINIC | Age: 63
End: 2018-10-15
Payer: MEDICARE

## 2018-10-15 VITALS
HEIGHT: 63 IN | SYSTOLIC BLOOD PRESSURE: 129 MMHG | HEART RATE: 77 BPM | RESPIRATION RATE: 17 BRPM | WEIGHT: 114.86 LBS | BODY MASS INDEX: 20.35 KG/M2 | OXYGEN SATURATION: 99 % | DIASTOLIC BLOOD PRESSURE: 83 MMHG

## 2018-10-15 DIAGNOSIS — Z94.4 LIVER TRANSPLANTED (H): Primary | Chronic | ICD-10-CM

## 2018-10-15 DIAGNOSIS — J90 PLEURAL EFFUSION: Primary | ICD-10-CM

## 2018-10-15 DIAGNOSIS — Z94.4 LIVER REPLACED BY TRANSPLANT (H): ICD-10-CM

## 2018-10-15 LAB
ALBUMIN SERPL-MCNC: 3.6 G/DL (ref 3.4–5)
ALP SERPL-CCNC: 77 U/L (ref 40–150)
ALT SERPL W P-5'-P-CCNC: 12 U/L (ref 0–50)
ANION GAP SERPL CALCULATED.3IONS-SCNC: 6 MMOL/L (ref 3–14)
AST SERPL W P-5'-P-CCNC: 7 U/L (ref 0–45)
BILIRUB DIRECT SERPL-MCNC: 0.2 MG/DL (ref 0–0.2)
BILIRUB SERPL-MCNC: 0.5 MG/DL (ref 0.2–1.3)
BUN SERPL-MCNC: 26 MG/DL (ref 7–30)
CALCIUM SERPL-MCNC: 8.5 MG/DL (ref 8.5–10.1)
CHLORIDE SERPL-SCNC: 106 MMOL/L (ref 94–109)
CO2 SERPL-SCNC: 25 MMOL/L (ref 20–32)
CREAT SERPL-MCNC: 0.99 MG/DL (ref 0.52–1.04)
ERYTHROCYTE [DISTWIDTH] IN BLOOD BY AUTOMATED COUNT: 15.9 % (ref 10–15)
GFR SERPL CREATININE-BSD FRML MDRD: 57 ML/MIN/1.7M2
GLUCOSE SERPL-MCNC: 95 MG/DL (ref 70–99)
HCT VFR BLD AUTO: 36.3 % (ref 35–47)
HGB BLD-MCNC: 11.3 G/DL (ref 11.7–15.7)
MAGNESIUM SERPL-MCNC: 2 MG/DL (ref 1.6–2.3)
MCH RBC QN AUTO: 26.9 PG (ref 26.5–33)
MCHC RBC AUTO-ENTMCNC: 31.1 G/DL (ref 31.5–36.5)
MCV RBC AUTO: 86 FL (ref 78–100)
PHOSPHATE SERPL-MCNC: 3.2 MG/DL (ref 2.5–4.5)
PLATELET # BLD AUTO: 122 10E9/L (ref 150–450)
POTASSIUM SERPL-SCNC: 4.8 MMOL/L (ref 3.4–5.3)
PROT SERPL-MCNC: 6.9 G/DL (ref 6.8–8.8)
RBC # BLD AUTO: 4.2 10E12/L (ref 3.8–5.2)
SODIUM SERPL-SCNC: 137 MMOL/L (ref 133–144)
TACROLIMUS BLD-MCNC: 5.5 UG/L (ref 5–15)
TME LAST DOSE: NORMAL H
WBC # BLD AUTO: 4.2 10E9/L (ref 4–11)

## 2018-10-15 PROCEDURE — 83735 ASSAY OF MAGNESIUM: CPT | Performed by: INTERNAL MEDICINE

## 2018-10-15 PROCEDURE — 80048 BASIC METABOLIC PNL TOTAL CA: CPT | Performed by: INTERNAL MEDICINE

## 2018-10-15 PROCEDURE — 85027 COMPLETE CBC AUTOMATED: CPT | Performed by: INTERNAL MEDICINE

## 2018-10-15 PROCEDURE — 86787 VARICELLA-ZOSTER ANTIBODY: CPT | Performed by: INTERNAL MEDICINE

## 2018-10-15 PROCEDURE — 80197 ASSAY OF TACROLIMUS: CPT | Performed by: INTERNAL MEDICINE

## 2018-10-15 PROCEDURE — 84100 ASSAY OF PHOSPHORUS: CPT | Performed by: INTERNAL MEDICINE

## 2018-10-15 PROCEDURE — 90471 IMMUNIZATION ADMIN: CPT

## 2018-10-15 PROCEDURE — 80076 HEPATIC FUNCTION PANEL: CPT | Performed by: INTERNAL MEDICINE

## 2018-10-15 PROCEDURE — 87385 HISTOPLASMA CAPSUL AG IA: CPT | Performed by: INTERNAL MEDICINE

## 2018-10-15 PROCEDURE — G0463 HOSPITAL OUTPT CLINIC VISIT: HCPCS

## 2018-10-15 PROCEDURE — 36415 COLL VENOUS BLD VENIPUNCTURE: CPT | Performed by: INTERNAL MEDICINE

## 2018-10-15 ASSESSMENT — PAIN SCALES - GENERAL: PAINLEVEL: NO PAIN (0)

## 2018-10-15 NOTE — LETTER
10/15/2018       RE: Sherrie Lala  [address] [facility] [address]     Dear Colleague,    Thank you for referring your patient, Sherrie Lala, to the [facility] at [facility]. Please see a copy of my visit note below.    [facility]  Pulmonary Medicine  Visit Clinic Note  October 15, 2018         ASSESSMENT & PLAN       Pleural effusion: Her pleural effusion was symptomatic prior to transplant.  Currently, she is not having any shortness of breath.  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  Another possibility for her fluid accumulation could be that she has developed entrapped or trapped lung.  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    I gave her 2 options as far as follow-up for this pleural effusion.  One would be that we monitor her symptoms and repeat a chest x-ray in a couple months to see if the effusion is getting better or worse.  The other would be to perform a therapeutic thoracentesis to drain the pleural space and try to get the pleura to stick to each other better. I advocated for performing the thoracentesis, but she would like avoid that at this time and just monitor symptoms.      She will get a repeat CXR in about 3 month, and call my office if she were to develop any change in symptoms.      Abnormal PFTs: Difficult to interpret in the setting of her pleural effusion.  Her low FVC and FEV1 could be related to a trapped lung with limited mobility on the right.  Her symptoms do not correlate with the technical read of severe airflow obstruction.    Reuben Thomas MD     I eliezer follow up with her by phone after the CXR.        Today's visit  note:     Chief Complaint: Sherrie Lala is a 63 year old year old female who is being seen for RECHECK (Pleural Effusion)      HISTORY OF PRESENT ILLNESS:    Is a 63-year-old female who is presenting to the pulmonary clinic today for evaluation of a right-sided pleural effusion.    She had a liver transplant on August 30 of this year.  She is recuperating very well from the surgery, and is not having any shortness of breath.  There is no lower extremity edema or abdominal distention.    Prior to surgery, she struggled with a lot of shortness of breath and large pleural effusion on the right.  She had an extensive workup for this including multiple thoracenteses, a failed doxycycline pleurodesis, and eventually she went on to get a Pleurx catheter.  Prior to transplant, she was draining 1 L of pleural fluid a week.    Currently, she describes some dull posterior chest pain on the right side.  Slightly worse with deep inspiration.  She has had this for a long time, and is 1 of her indicators of when fluid is filling up in her pleural space.  Right now, she says it is very mild and hardly noticeable.  She is not having any shortness of breath.         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     Liver transplanted (H) 08/30/2018     Lung metastases (H) 08/2014     Portal vein thrombosis      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) 08/31/2018    Residual thrombus in main and right portal     Past Surgical History:   Procedure Laterality Date     CHOLECYSTECTOMY       HEPATECTOMY PARTIAL       RETURN LIVER TRANSPLANT N/A 9/1/2018    Procedure: RETURN LIVER TRANSPLANT;  Open Abdomen, return liver transplant washout with   Mesh and  "liver stent  placement and  Abdomal Wound closure;  Surgeon: Darren Rod MD;  Location: [facility] OR     TRANSPLANT LIVER RECIPIENT  DONOR N/A 2018    Procedure: TRANSPLANT LIVER RECIPIENT  DONOR;  TRANSPLANT LIVER RECIPIENT  DONOR ;  Surgeon: Darren Rod MD;  Location: [facility] OR           Family History:     No history of lung disease         Social History:     Social History     Social History     Marital status:      Spouse name: N/A     Number of children: N/A     Years of education: N/A     Occupational History     Not on file.     Social History Main Topics     Smoking status: Never Smoker     Smokeless tobacco: Never Used     Alcohol use No      Comment: occ      Drug use: No     Sexual activity: Not on file     Other Topics Concern     Not on file     Social History Narrative            Medications:     Current Outpatient Prescriptions   Medication     albuterol (PROAIR HFA/PROVENTIL HFA/VENTOLIN HFA) 108 (90 BASE) MCG/ACT Inhaler     aspirin 10 mg/mL SUSP     azaTHIOprine (IMURAN) 50 MG tablet     GABAPENTIN PO     OMEPRAZOLE PO     ondansetron (ZOFRAN-ODT) 4 MG ODT tab     PRAMIPEXOLE DIHYDROCHLORIDE PO     sulfamethoxazole-trimethoprim (BACTRIM/SEPTRA) suspension     tacrolimus (GENERIC EQUIVALENT) 1 MG capsule     valGANciclovir (VALCYTE) 450 MG tablet     warfarin (COUMADIN) 2 MG tablet     No current facility-administered medications for this visit.             Review of Systems:       A complete review of systems was otherwise negative except as noted in the HPI.      PHYSICAL EXAM:  /83  Pulse 77  Resp 17  Ht 1.6 m (5' 3\")  Wt 52.1 kg (114 lb 13.8 oz)  SpO2 99%  BMI 20.35 kg/m2     General: Well developed, well nourished, No apparent distress  Eyes: Anicteric  Ears: Hearing grossly normal  Mouth: Oral mucosa is moist, without any lesions. No oropharyngeal exudate.  Neck: supple, no thyromegaly  Lymphatics: No cervical or supraclavicular " nodes  Respiratory: Good air movement. No crackles. No rhonchi. No wheezes  Cardiac: RRR, normal S1, S2. No murmurs. No JVD  Abdomen: Soft, NT/ND  Musculoskeletal: Extremities normal. No clubbing. No cyanosis. No edema.  Skin: No rash on limited exam  Neuro: Normal mentation. Normal speech.  Psych:Normal affect           Data:   All laboratory and imaging data reviewed.      PFT:   FEV1/FVC ratio 0.62.  FVC 1.55 L which is 51% predicted.  FEV1 is 0.96 L which is 40% predicted.  Residual volume is 128% predicted, and the total lung capacity is 86% predicted.  Diffusion capacity 60% predicted.    PFT Interpretation:  Severe airflow obstruction  Valid Maneuver    CXR: Chest x-ray on October 1 shows a right-sided pleural effusion.      Recent Results (from the past 168 hour(s))   CBC with platelets    Collection Time: 10/11/18  9:35 AM   Result Value Ref Range    WBC Count (External) 3.8 (L) 4.0 - 11.0 k/ul    RBC Count (External) 4.03 4.0 - 5.2 M/UL    Hemoglobin (External) 10.9 (L) 12.0 - 16.0 g/dl    Hematocrit (External) 34.1 (L) 36.0 - 46.0 %    MCV (External) 84.6 80 - 100 fl    MCH (External) 27.0 26 - 34 pg    MCHC (External) 32.0 32 - 36 g/dL    RDW (External) 16.2 (H) 11.5 - 14.5 %    Platelet Count (External) 123 (L) 150 - 450 k/ul   Basic metabolic panel    Collection Time: 10/11/18  9:35 AM   Result Value Ref Range    Sodium (External) 140 136 - 145 mmol/L    Potassium (External) 4.4 3.5 - 5.1 mmol/L    Chloride (External) 109 98 - 109 mmol/L    CO2 (External) 24 20 - 29 mmol/L    Anion Gap (External) 7 7 - 16 mmol/L    Glucose (External) 95 70 - 180 mg/dl    Calcium (External) 9.4 8.4 - 10.4 mg/dl    Urea Nitrogen (External) 30 (H) 7 - 26 mg/dl    Creatinine (External) 0.95 0.55 - 1.02 mg/dl    GFR Estimated (External) >60 >60 ml/min/1.73m2    GFR Est if Black (External) >60 >60 ml/min/1.73m2   Phosphorus    Collection Time: 10/11/18  9:35 AM   Result Value Ref Range    Phosphorus (External) 3.6 2.3 - 4.7  mg/dl   Magnesium    Collection Time: 10/11/18  9:35 AM   Result Value Ref Range    Magnesium (External) 2.1 1.6 - 2.6 mg/dl   Hepatic panel    Collection Time: 10/11/18  9:35 AM   Result Value Ref Range    Alk Phosphatase (External) 79 40 - 150 U/L    Bilirubin Total (External) 0.6 0.2 - 1.2 mg/dl    Bilirubin Direct (External) 0.3 0.0 - 0.5 mg/dl    ALT (External) <10 0 - 55 U/L    AST (External) 8 (L) 10 - 40 U/L    Protein Total (External) 6.9 6.4 - 8.3 g/dl    Albumin (External) 3.8 3.5 - 5.0 g/dl   Basic metabolic panel    Collection Time: 10/15/18  9:54 AM   Result Value Ref Range    Sodium 137 133 - 144 mmol/L    Potassium 4.8 3.4 - 5.3 mmol/L    Chloride 106 94 - 109 mmol/L    Carbon Dioxide 25 20 - 32 mmol/L    Anion Gap 6 3 - 14 mmol/L    Glucose 95 70 - 99 mg/dL    Urea Nitrogen 26 7 - 30 mg/dL    Creatinine 0.99 0.52 - 1.04 mg/dL    GFR Estimate 57 (L) >60 mL/min/1.7m2    GFR Estimate If Black 69 >60 mL/min/1.7m2    Calcium 8.5 8.5 - 10.1 mg/dL   CBC with platelets    Collection Time: 10/15/18  9:54 AM   Result Value Ref Range    WBC 4.2 4.0 - 11.0 10e9/L    RBC Count 4.20 3.8 - 5.2 10e12/L    Hemoglobin 11.3 (L) 11.7 - 15.7 g/dL    Hematocrit 36.3 35.0 - 47.0 %    MCV 86 78 - 100 fl    MCH 26.9 26.5 - 33.0 pg    MCHC 31.1 (L) 31.5 - 36.5 g/dL    RDW 15.9 (H) 10.0 - 15.0 %    Platelet Count 122 (L) 150 - 450 10e9/L   Hepatic panel    Collection Time: 10/15/18  9:54 AM   Result Value Ref Range    Bilirubin Direct 0.2 0.0 - 0.2 mg/dL    Bilirubin Total 0.5 0.2 - 1.3 mg/dL    Albumin 3.6 3.4 - 5.0 g/dL    Protein Total 6.9 6.8 - 8.8 g/dL    Alkaline Phosphatase 77 40 - 150 U/L    ALT 12 0 - 50 U/L    AST 7 0 - 45 U/L   Magnesium    Collection Time: 10/15/18  9:54 AM   Result Value Ref Range    Magnesium 2.0 1.6 - 2.3 mg/dL   Phosphorus    Collection Time: 10/15/18  9:54 AM   Result Value Ref Range    Phosphorus 3.2 2.5 - 4.5 mg/dL   General PFT Lab (Please always keep checked)    Collection Time: 10/15/18  10:11 AM   Result Value Ref Range    FVC-Pred 3.01 L    FVC-Pre 1.55 L    FVC-%Pred-Pre 51 %    FEV1-Pre 0.96 L    FEV1-%Pred-Pre 40 %    FEV1FVC-Pred 78 %    FEV1FVC-Pre 62 %    FEFMax-Pred 6.06 L/sec    FEFMax-Pre 2.49 L/sec    FEFMax-%Pred-Pre 41 %    FEF2575-Pred 2.16 L/sec    FEF2575-Pre 0.52 L/sec    ZVM7359-%Pred-Pre 23 %    ExpTime-Pre 7.63 sec    FIFMax-Pre 2.92 L/sec    VC-Pred 3.11 L    VC-Pre 1.70 L    VC-%Pred-Pre 54 %    IC-Pred 2.10 L    IC-Pre 1.39 L    IC-%Pred-Pre 66 %    ERV-Pred 1.01 L    ERV-Pre 0.31 L    ERV-%Pred-Pre 30 %    FEV1FEV6-Pred 80 %    FEV1FEV6-Pre 63 %    FRCPleth-Pred 2.65 L    FRCPleth-Pre 2.72 L    FRCPleth-%Pred-Pre 102 %    RVPleth-Pred 1.88 L    RVPleth-Pre 2.41 L    RVPleth-%Pred-Pre 128 %    TLCPleth-Pred 4.77 L    TLCPleth-Pre 4.11 L    TLCPleth-%Pred-Pre 86 %    DLCOunc-Pred 21.05 ml/min/mmHg    DLCOunc-Pre 12.75 ml/min/mmHg    DLCOunc-%Pred-Pre 60 %    DLCOcor-Pre 13.73 ml/min/mmHg    DLCOcor-%Pred-Pre 65 %    VA-Pre 3.19 L    VA-%Pred-Pre 65 %    FEV1SVC-Pred 76 %    FEV1SVC-Pre 57 %         Again, thank you for allowing me to participate in the care of your patient.      Sincerely,    Jesus Thomas MD

## 2018-10-15 NOTE — MR AVS SNAPSHOT
After Visit Summary   10/15/2018    Sherrie Lala    MRN: [id number]           Patient Information     Date Of Birth          1955        Visit Information        Provider Department      10/15/2018 11:05 AM Jesus Thomas MD [facility]        Today's Diagnoses     Pleural effusion    -  1       Follow-ups after your visit        Your next 10 appointments already scheduled     Oct 16, 2018  5:00 PM CDT   (Arrive by 4:45 PM)   New Patient Visit with Lacy Gong MD   [facility] ([facility])    [address]  [address]  [facility] [id number] [phone]            Oct 22, 2018  9:15 AM CDT   Lab with  LAB   [facility] ([facility])    [address]  [address] Floor  [facility] [id number] [phone]            Oct 22, 2018 10:10 AM CDT   (Arrive by 9:55 AM)   Liver Return Post Op with Darren Rod MD   [facility] Transplant ([facility])    [address]  [address]  [place] [id number] [phone]              Future tests that were ordered for you today     Open Future Orders        Priority Expected Expires Ordered    X-ray Chest 2 vws* Routine 1/7/2019 10/15/2019 10/15/2018            Who to contact     If you have questions or need follow up information about today's clinic visit or your schedule please contact [facility] directly at [phone].  Normal or non-critical lab and imaging results will be communicated to you by MyChart, letter or phone within 4 business days after the clinic has received the results. If you do not hear from us within 7 days, please contact the clinic through MyChart or phone. If you have a critical or abnormal lab result, we will notify you by phone as soon as possible.  Submit refill requests through [employer] or call your  "pharmacy and they will forward the refill request to us. Please allow 3 business days for your refill to be completed.          Additional Information About Your Visit        MyChart Information     [employer] gives you secure access to your electronic health record. If you see a primary care provider, you can also send messages to your care team and make appointments. If you have questions, please call your primary care clinic.  If you do not have a primary care provider, please call [phone] and they will assist you.        Care EveryWhere ID     This is your Care EveryWhere ID. This could be used by other organizations to access your [facility] medical records  [id number]        Your Vitals Were     Pulse Respirations Height Pulse Oximetry BMI (Body Mass Index)       77 17 1.6 m (5' 3\") 99% 20.35 kg/m2        Blood Pressure from Last 3 Encounters:   10/15/18 129/83   10/08/18 131/85   10/01/18 117/77    Weight from Last 3 Encounters:   10/15/18 52.1 kg (114 lb 13.8 oz)   10/08/18 52.1 kg (114 lb 12.5 oz)   10/01/18 52.1 kg (114 lb 14.4 oz)               Primary Care Provider Office Phone # Fax #    Apollo Benitez [phone]       [address] [address]        Equal Access to Services     HARINI CHAMBERLAIN AH: Hadii lucien huizar hadasho Soomaali, waaxda luqadaha, qaybta kaalmada adeegyada, nahed chi. So [facility] [phone].    ATENCIÓN: Si habla español, tiene a bucio disposición servicios gratuitos de asistencia lingüística. ame al [phone].    We comply with applicable federal civil rights laws and [place] laws. We do not discriminate on the basis of race, color, national origin, age, disability, sex, sexual orientation, or gender identity.            Thank you!     Thank you for choosing [facility]  for your care. Our goal is always to provide you with excellent care. Hearing back from our patients is one " way we can continue to improve our services. Please take a few minutes to complete the written survey that you may receive in the mail after your visit with us. Thank you!             Your Updated Medication List - Protect others around you: Learn how to safely use, store and throw away your medicines at www.disposemymeds.org.          This list is accurate as of 10/15/18 11:30 AM.  Always use your most recent med list.                   Brand Name Dispense Instructions for use Diagnosis    albuterol 108 (90 Base) MCG/ACT inhaler    PROAIR HFA/PROVENTIL HFA/VENTOLIN HFA     Inhale 2 puffs into the lungs every 6 hours        aspirin 10 mg/mL Susp     975 mL    Take 32.5 mLs (325 mg) by mouth daily    Liver transplanted (H)       azaTHIOprine 50 MG tablet    IMURAN    45 tablet    Take 1.5 tablets (75 mg) by mouth daily    Liver transplanted (H)       GABAPENTIN PO      Take 300 mg by mouth At Bedtime        OMEPRAZOLE PO      Take 20 mg by mouth 2 times daily (before meals)        ondansetron 4 MG ODT tab    ZOFRAN-ODT    120 tablet    Take 1 tablet (4 mg) by mouth every 6 hours as needed for nausea or vomiting    Nausea       PRAMIPEXOLE DIHYDROCHLORIDE PO      Take 0.5 mg by mouth daily        sulfamethoxazole-trimethoprim suspension    BACTRIM/SEPTRA    560 mL    Take 10 mLs (80 mg) by mouth daily Dose based on TMP component.    Liver transplanted (H)       tacrolimus 1 MG capsule    GENERIC EQUIVALENT    270 capsule    Take 4 mg in the morning and 5 mg in the evening.    Liver transplanted (H)       valGANciclovir 450 MG tablet    VALCYTE    45 tablet    Take 1 tablet (450 mg) by mouth daily    Liver transplanted (H)       warfarin 2 MG tablet    COUMADIN    8 tablet    Take 2 tablets (4 mg) by mouth daily Every evening or as directed by provider    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)

## 2018-10-15 NOTE — PROGRESS NOTES
[facility]  Pulmonary Medicine  Visit Clinic Note  October 15, 2018         ASSESSMENT & PLAN       Pleural effusion: Her pleural effusion was symptomatic prior to transplant.  Currently, she is not having any shortness of breath.  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  Another possibility for her fluid accumulation could be that she has developed entrapped or trapped lung.  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    I gave her 2 options as far as follow-up for this pleural effusion.  One would be that we monitor her symptoms and repeat a chest x-ray in a couple months to see if the effusion is getting better or worse.  The other would be to perform a therapeutic thoracentesis to drain the pleural space and try to get the pleura to stick to each other better. I advocated for performing the thoracentesis, but she would like avoid that at this time and just monitor symptoms.      She will get a repeat CXR in about 3 month, and call my office if she were to develop any change in symptoms.      Abnormal PFTs: Difficult to interpret in the setting of her pleural effusion.  Her low FVC and FEV1 could be related to a trapped lung with limited mobility on the right.  Her symptoms do not correlate with the technical read of severe airflow obstruction.    Reuben Thomas MD     I eliezer follow up with her by phone after the CXR.        Today's visit note:     Chief Complaint: Sherrie Lala is a 63 year old year old female who is being seen for RECHECK (Pleural Effusion)      HISTORY OF PRESENT ILLNESS:    Is a 63-year-old female who is presenting to the pulmonary clinic today for evaluation of a right-sided pleural effusion.    She had a  liver transplant on  of this year.  She is recuperating very well from the surgery, and is not having any shortness of breath.  There is no lower extremity edema or abdominal distention.    Prior to surgery, she struggled with a lot of shortness of breath and large pleural effusion on the right.  She had an extensive workup for this including multiple thoracenteses, a failed doxycycline pleurodesis, and eventually she went on to get a Pleurx catheter.  Prior to transplant, she was draining 1 L of pleural fluid a week.    Currently, she describes some dull posterior chest pain on the right side.  Slightly worse with deep inspiration.  She has had this for a long time, and is 1 of her indicators of when fluid is filling up in her pleural space.  Right now, she says it is very mild and hardly noticeable.  She is not having any shortness of breath.         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     Liver transplanted (H) 2018     Lung metastases (H) 2014     Portal vein thrombosis      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    Residual thrombus in main and right portal     Past Surgical History:   Procedure Laterality Date     CHOLECYSTECTOMY       HEPATECTOMY PARTIAL       RETURN LIVER TRANSPLANT N/A 2018    Procedure: RETURN LIVER TRANSPLANT;  Open Abdomen, return liver transplant washout with   Mesh and liver stent  placement and  Abdomal Wound closure;  Surgeon: Darren Rod MD;  Location: [facility] OR     TRANSPLANT LIVER RECIPIENT  DONOR N/A 2018    Procedure: TRANSPLANT LIVER RECIPIENT  DONOR;  TRANSPLANT LIVER RECIPIENT  DONOR ;  Surgeon: Viola  "MD Darren;  Location:  OR           Family History:     No history of lung disease         Social History:     Social History     Social History     Marital status:      Spouse name: N/A     Number of children: N/A     Years of education: N/A     Occupational History     Not on file.     Social History Main Topics     Smoking status: Never Smoker     Smokeless tobacco: Never Used     Alcohol use No      Comment: occ      Drug use: No     Sexual activity: Not on file     Other Topics Concern     Not on file     Social History Narrative            Medications:     Current Outpatient Prescriptions   Medication     albuterol (PROAIR HFA/PROVENTIL HFA/VENTOLIN HFA) 108 (90 BASE) MCG/ACT Inhaler     aspirin 10 mg/mL SUSP     azaTHIOprine (IMURAN) 50 MG tablet     GABAPENTIN PO     OMEPRAZOLE PO     ondansetron (ZOFRAN-ODT) 4 MG ODT tab     PRAMIPEXOLE DIHYDROCHLORIDE PO     sulfamethoxazole-trimethoprim (BACTRIM/SEPTRA) suspension     tacrolimus (GENERIC EQUIVALENT) 1 MG capsule     valGANciclovir (VALCYTE) 450 MG tablet     warfarin (COUMADIN) 2 MG tablet     No current facility-administered medications for this visit.             Review of Systems:       A complete review of systems was otherwise negative except as noted in the HPI.      PHYSICAL EXAM:  /83  Pulse 77  Resp 17  Ht 1.6 m (5' 3\")  Wt 52.1 kg (114 lb 13.8 oz)  SpO2 99%  BMI 20.35 kg/m2     General: Well developed, well nourished, No apparent distress  Eyes: Anicteric  Ears: Hearing grossly normal  Mouth: Oral mucosa is moist, without any lesions. No oropharyngeal exudate.  Neck: supple, no thyromegaly  Lymphatics: No cervical or supraclavicular nodes  Respiratory: Good air movement. No crackles. No rhonchi. No wheezes  Cardiac: RRR, normal S1, S2. No murmurs. No JVD  Abdomen: Soft, NT/ND  Musculoskeletal: Extremities normal. No clubbing. No cyanosis. No edema.  Skin: No rash on limited exam  Neuro: Normal mentation. Normal " speech.  Psych:Normal affect           Data:   All laboratory and imaging data reviewed.      PFT:   FEV1/FVC ratio 0.62.  FVC 1.55 L which is 51% predicted.  FEV1 is 0.96 L which is 40% predicted.  Residual volume is 128% predicted, and the total lung capacity is 86% predicted.  Diffusion capacity 60% predicted.    PFT Interpretation:  Severe airflow obstruction  Valid Maneuver    CXR: Chest x-ray on October 1 shows a right-sided pleural effusion.      Recent Results (from the past 168 hour(s))   CBC with platelets    Collection Time: 10/11/18  9:35 AM   Result Value Ref Range    WBC Count (External) 3.8 (L) 4.0 - 11.0 k/ul    RBC Count (External) 4.03 4.0 - 5.2 M/UL    Hemoglobin (External) 10.9 (L) 12.0 - 16.0 g/dl    Hematocrit (External) 34.1 (L) 36.0 - 46.0 %    MCV (External) 84.6 80 - 100 fl    MCH (External) 27.0 26 - 34 pg    MCHC (External) 32.0 32 - 36 g/dL    RDW (External) 16.2 (H) 11.5 - 14.5 %    Platelet Count (External) 123 (L) 150 - 450 k/ul   Basic metabolic panel    Collection Time: 10/11/18  9:35 AM   Result Value Ref Range    Sodium (External) 140 136 - 145 mmol/L    Potassium (External) 4.4 3.5 - 5.1 mmol/L    Chloride (External) 109 98 - 109 mmol/L    CO2 (External) 24 20 - 29 mmol/L    Anion Gap (External) 7 7 - 16 mmol/L    Glucose (External) 95 70 - 180 mg/dl    Calcium (External) 9.4 8.4 - 10.4 mg/dl    Urea Nitrogen (External) 30 (H) 7 - 26 mg/dl    Creatinine (External) 0.95 0.55 - 1.02 mg/dl    GFR Estimated (External) >60 >60 ml/min/1.73m2    GFR Est if Black (External) >60 >60 ml/min/1.73m2   Phosphorus    Collection Time: 10/11/18  9:35 AM   Result Value Ref Range    Phosphorus (External) 3.6 2.3 - 4.7 mg/dl   Magnesium    Collection Time: 10/11/18  9:35 AM   Result Value Ref Range    Magnesium (External) 2.1 1.6 - 2.6 mg/dl   Hepatic panel    Collection Time: 10/11/18  9:35 AM   Result Value Ref Range    Alk Phosphatase (External) 79 40 - 150 U/L    Bilirubin Total (External) 0.6  0.2 - 1.2 mg/dl    Bilirubin Direct (External) 0.3 0.0 - 0.5 mg/dl    ALT (External) <10 0 - 55 U/L    AST (External) 8 (L) 10 - 40 U/L    Protein Total (External) 6.9 6.4 - 8.3 g/dl    Albumin (External) 3.8 3.5 - 5.0 g/dl   Basic metabolic panel    Collection Time: 10/15/18  9:54 AM   Result Value Ref Range    Sodium 137 133 - 144 mmol/L    Potassium 4.8 3.4 - 5.3 mmol/L    Chloride 106 94 - 109 mmol/L    Carbon Dioxide 25 20 - 32 mmol/L    Anion Gap 6 3 - 14 mmol/L    Glucose 95 70 - 99 mg/dL    Urea Nitrogen 26 7 - 30 mg/dL    Creatinine 0.99 0.52 - 1.04 mg/dL    GFR Estimate 57 (L) >60 mL/min/1.7m2    GFR Estimate If Black 69 >60 mL/min/1.7m2    Calcium 8.5 8.5 - 10.1 mg/dL   CBC with platelets    Collection Time: 10/15/18  9:54 AM   Result Value Ref Range    WBC 4.2 4.0 - 11.0 10e9/L    RBC Count 4.20 3.8 - 5.2 10e12/L    Hemoglobin 11.3 (L) 11.7 - 15.7 g/dL    Hematocrit 36.3 35.0 - 47.0 %    MCV 86 78 - 100 fl    MCH 26.9 26.5 - 33.0 pg    MCHC 31.1 (L) 31.5 - 36.5 g/dL    RDW 15.9 (H) 10.0 - 15.0 %    Platelet Count 122 (L) 150 - 450 10e9/L   Hepatic panel    Collection Time: 10/15/18  9:54 AM   Result Value Ref Range    Bilirubin Direct 0.2 0.0 - 0.2 mg/dL    Bilirubin Total 0.5 0.2 - 1.3 mg/dL    Albumin 3.6 3.4 - 5.0 g/dL    Protein Total 6.9 6.8 - 8.8 g/dL    Alkaline Phosphatase 77 40 - 150 U/L    ALT 12 0 - 50 U/L    AST 7 0 - 45 U/L   Magnesium    Collection Time: 10/15/18  9:54 AM   Result Value Ref Range    Magnesium 2.0 1.6 - 2.3 mg/dL   Phosphorus    Collection Time: 10/15/18  9:54 AM   Result Value Ref Range    Phosphorus 3.2 2.5 - 4.5 mg/dL   General PFT Lab (Please always keep checked)    Collection Time: 10/15/18 10:11 AM   Result Value Ref Range    FVC-Pred 3.01 L    FVC-Pre 1.55 L    FVC-%Pred-Pre 51 %    FEV1-Pre 0.96 L    FEV1-%Pred-Pre 40 %    FEV1FVC-Pred 78 %    FEV1FVC-Pre 62 %    FEFMax-Pred 6.06 L/sec    FEFMax-Pre 2.49 L/sec    FEFMax-%Pred-Pre 41 %    FEF2575-Pred 2.16 L/sec     FEF2575-Pre 0.52 L/sec    MBU4676-%Pred-Pre 23 %    ExpTime-Pre 7.63 sec    FIFMax-Pre 2.92 L/sec    VC-Pred 3.11 L    VC-Pre 1.70 L    VC-%Pred-Pre 54 %    IC-Pred 2.10 L    IC-Pre 1.39 L    IC-%Pred-Pre 66 %    ERV-Pred 1.01 L    ERV-Pre 0.31 L    ERV-%Pred-Pre 30 %    FEV1FEV6-Pred 80 %    FEV1FEV6-Pre 63 %    FRCPleth-Pred 2.65 L    FRCPleth-Pre 2.72 L    FRCPleth-%Pred-Pre 102 %    RVPleth-Pred 1.88 L    RVPleth-Pre 2.41 L    RVPleth-%Pred-Pre 128 %    TLCPleth-Pred 4.77 L    TLCPleth-Pre 4.11 L    TLCPleth-%Pred-Pre 86 %    DLCOunc-Pred 21.05 ml/min/mmHg    DLCOunc-Pre 12.75 ml/min/mmHg    DLCOunc-%Pred-Pre 60 %    DLCOcor-Pre 13.73 ml/min/mmHg    DLCOcor-%Pred-Pre 65 %    VA-Pre 3.19 L    VA-%Pred-Pre 65 %    FEV1SVC-Pred 76 %    FEV1SVC-Pre 57 %

## 2018-10-16 ENCOUNTER — TELEPHONE (OUTPATIENT)
Dept: TRANSPLANT | Facility: CLINIC | Age: 63
End: 2018-10-16

## 2018-10-16 ENCOUNTER — OFFICE VISIT (OUTPATIENT)
Dept: INFECTIOUS DISEASES | Facility: CLINIC | Age: 63
End: 2018-10-16
Attending: INTERNAL MEDICINE
Payer: MEDICARE

## 2018-10-16 VITALS
DIASTOLIC BLOOD PRESSURE: 83 MMHG | WEIGHT: 119.5 LBS | HEART RATE: 80 BPM | HEIGHT: 63 IN | OXYGEN SATURATION: 97 % | BODY MASS INDEX: 21.17 KG/M2 | TEMPERATURE: 97.7 F | SYSTOLIC BLOOD PRESSURE: 126 MMHG

## 2018-10-16 DIAGNOSIS — Z94.4 LIVER TRANSPLANTED (H): Primary | ICD-10-CM

## 2018-10-16 DIAGNOSIS — Z94.4 LIVER TRANSPLANTED (H): ICD-10-CM

## 2018-10-16 DIAGNOSIS — R11.0 SEVERE NAUSEA: ICD-10-CM

## 2018-10-16 DIAGNOSIS — Z94.4 LIVER TRANSPLANTED (H): Chronic | ICD-10-CM

## 2018-10-16 DIAGNOSIS — Z91.89 AT RISK FOR INFECTION TRANSMITTED FROM DONOR: Primary | ICD-10-CM

## 2018-10-16 DIAGNOSIS — J90 PLEURAL EFFUSION, RIGHT: ICD-10-CM

## 2018-10-16 DIAGNOSIS — R10.13 DYSPEPSIA: ICD-10-CM

## 2018-10-16 LAB
CREAT SERPL-MCNC: 0.95 MG/DL
DLCOCOR-%PRED-PRE: 65 %
DLCOCOR-PRE: 13.73 ML/MIN/MMHG
DLCOUNC-%PRED-PRE: 60 %
DLCOUNC-PRE: 12.75 ML/MIN/MMHG
DLCOUNC-PRED: 21.05 ML/MIN/MMHG
ERV-%PRED-PRE: 30 %
ERV-PRE: 0.31 L
ERV-PRED: 1.01 L
EXPTIME-PRE: 7.63 SEC
FEF2575-%PRED-PRE: 23 %
FEF2575-PRE: 0.52 L/SEC
FEF2575-PRED: 2.16 L/SEC
FEFMAX-%PRED-PRE: 41 %
FEFMAX-PRE: 2.49 L/SEC
FEFMAX-PRED: 6.06 L/SEC
FEV1-%PRED-PRE: 40 %
FEV1-PRE: 0.96 L
FEV1FEV6-PRE: 63 %
FEV1FEV6-PRED: 80 %
FEV1FVC-PRE: 62 %
FEV1FVC-PRED: 78 %
FEV1SVC-PRE: 57 %
FEV1SVC-PRED: 76 %
FIFMAX-PRE: 2.92 L/SEC
FRCPLETH-%PRED-PRE: 102 %
FRCPLETH-PRE: 2.72 L
FRCPLETH-PRED: 2.65 L
FVC-%PRED-PRE: 51 %
FVC-PRE: 1.55 L
FVC-PRED: 3.01 L
GLUCOSE BLD-MCNC: 95 MG/DL
IC-%PRED-PRE: 66 %
IC-PRE: 1.39 L
IC-PRED: 2.1 L
RVPLETH-%PRED-PRE: 128 %
RVPLETH-PRE: 2.41 L
RVPLETH-PRED: 1.88 L
TLCPLETH-%PRED-PRE: 86 %
TLCPLETH-PRE: 4.11 L
TLCPLETH-PRED: 4.77 L
VA-%PRED-PRE: 65 %
VA-PRE: 3.19 L
VC-%PRED-PRE: 54 %
VC-PRE: 1.7 L
VC-PRED: 3.11 L

## 2018-10-16 PROCEDURE — G0463 HOSPITAL OUTPT CLINIC VISIT: HCPCS | Mod: ZF

## 2018-10-16 RX ORDER — TACROLIMUS 1 MG/1
5 CAPSULE ORAL 2 TIMES DAILY
Qty: 300 CAPSULE | Refills: 11 | Status: SHIPPED | OUTPATIENT
Start: 2018-10-16 | End: 2018-11-02

## 2018-10-16 ASSESSMENT — PAIN SCALES - GENERAL: PAINLEVEL: NO PAIN (0)

## 2018-10-16 NOTE — MR AVS SNAPSHOT
After Visit Summary   10/16/2018    Sherrie Lala    MRN: [id number]           Patient Information     Date Of Birth          1955        Visit Information        Provider Department      10/16/2018 5:00 PM Lacy Gong MD [facility] and Infectious Diseases        Today's Diagnoses     At risk for infection transmitted from donor    -  1    Liver transplanted (H)        Pleural effusion, right           Follow-ups after your visit        Follow-up notes from your care team     Return in about 2 months (around 12/16/2018).      Your next 10 appointments already scheduled     Oct 22, 2018  9:00 AM CDT   XR ABDOMEN 2 VIEWS with UCXR1   [facility] ([facility])    [address]  [facility] [address] [phone]           How do I prepare for my exam? (Food and drink instructions) No Food and Drink Restrictions.  How do I prepare for my exam? (Other instructions) You do not need to do anything special for this exam.  What should I wear: Wear comfortable clothes.  How long does the exam take: Most scans take less than 5 minutes.  What should I bring: Bring a list of your medicines, including vitamins, minerals and over-the-counter drugs. It is safest to leave personal items at home.  Do I need a :  No  is needed.  What do I need to tell my doctor: Tell your doctor if there s any chance you are pregnant.  What should I do after the exam: No restrictions, You may resume normal activities.  What is this test: An image of a specific body part shown in shades of black and white.  Who should I call with questions: If you have any questions, please call the Imaging Department where you will have your exam. Directions, parking instructions, and other information is available on our website, [email or web address].            Oct 22, 2018  9:15 AM CDT   Lab with  LAB   [facility] ([facility])     [address]  1st Floor  [facility] [address]   [phone]            Oct 22, 2018 10:10 AM CDT   (Arrive by 9:55 AM)   Liver Return Post Op with Darren Rod MD   [facility] Transplant ([facility])    [address]  [address]  [facility] [address]   [phone]            Jan 08, 2019  3:30 PM CST   (Arrive by 3:15 PM)   Return Visit with Lacy Gong MD   [facility] and Infectious Diseases ([facility])    [address]  [address]  [facility] [address]   [phone]              Future tests that were ordered for you today     Open Future Orders        Priority Expected Expires Ordered    XR Abdomen 2 Views Routine 10/22/2018 10/25/2018 10/16/2018    UPPER GI ENDOSCOPY Routine 10/22/2018 10/30/2018 10/16/2018            Who to contact     If you have questions or need follow up information about today's clinic visit or your schedule please contact [facility] AND INFECTIOUS DISEASES directly at [phone].  Normal or non-critical lab and imaging results will be communicated to you by MyChart, letter or phone within 4 business days after the clinic has received the results. If you do not hear from us within 7 days, please contact the clinic through [employer] or phone. If you have a critical or abnormal lab result, we will notify you by phone as soon as possible.  Submit refill requests through [employer] or call your pharmacy and they will forward the refill request to us. Please allow 3 business days for your refill to be completed.          Additional Information About Your Visit        [employer] Information     [employer] gives you secure access to your electronic health record. If you see a primary care provider, you can also send messages to your care team and make appointments. If you have questions, please call your primary care clinic.  If you do not have a primary care  "provider, please call [phone] and they will assist you.        Care EveryWhere ID     This is your Care EveryWhere ID. This could be used by other organizations to access your [facility] medical records  [id number]        Your Vitals Were     Pulse Temperature Height Pulse Oximetry BMI (Body Mass Index)       80 97.7  F (36.5  C) (Oral) 1.6 m (5' 3\") 97% 21.17 kg/m2        Blood Pressure from Last 3 Encounters:   10/16/18 126/83   10/15/18 129/83   10/08/18 131/85    Weight from Last 3 Encounters:   10/16/18 54.2 kg (119 lb 8 oz)   10/15/18 52.1 kg (114 lb 13.8 oz)   10/08/18 52.1 kg (114 lb 12.5 oz)              We Performed the Following     Fungal Antibodies     Varicella Zoster Virus Antibody IgG          Today's Medication Changes          These changes are accurate as of 10/16/18 11:59 PM.  If you have any questions, ask your nurse or doctor.               These medicines have changed or have updated prescriptions.        Dose/Directions    tacrolimus 1 MG capsule   Commonly known as:  GENERIC EQUIVALENT   This may have changed:    - how much to take  - how to take this  - when to take this  - additional instructions   Used for:  Liver transplanted (H)   Changed by:  Abigail Parham, RN        Dose:  5 mg   Take 5 capsules (5 mg) by mouth 2 times daily   Quantity:  300 capsule   Refills:  11            Where to get your medicines      These medications were sent to [facility] - [place], [place] - [address] [place] [address]  [address] [place] [address], [facility] [address]    Hours:  Mon-Fri 8:30am-5:00pm Toll Free [phone] Phone:  [phone]     tacrolimus 1 MG capsule                Primary Care Provider Office Phone # Fax #    Apollo Benitez [phone]       [address] [address]        Equal Access to Services     HARINI CHAMBERLAIN AH: Natalio Ordonez, wadelmyda luqadaha, qaflorian pierce, nahed bucio " romina opmpaaan ah. So [facility] [phone].    ATENCIÓN: Si teressala mango, tiene a bucio disposición servicios gratuitos de asistencia lingüística. Neville al [phone].    We comply with applicable federal civil rights laws and [place] laws. We do not discriminate on the basis of race, color, national origin, age, disability, sex, sexual orientation, or gender identity.            Thank you!     Thank you for choosing [facility] AND INFECTIOUS DISEASES  for your care. Our goal is always to provide you with excellent care. Hearing back from our patients is one way we can continue to improve our services. Please take a few minutes to complete the written survey that you may receive in the mail after your visit with us. Thank you!             Your Updated Medication List - Protect others around you: Learn how to safely use, store and throw away your medicines at www.disposemymeds.org.          This list is accurate as of 10/16/18 11:59 PM.  Always use your most recent med list.                   Brand Name Dispense Instructions for use Diagnosis    albuterol 108 (90 Base) MCG/ACT inhaler    PROAIR HFA/PROVENTIL HFA/VENTOLIN HFA     Inhale 2 puffs into the lungs every 6 hours        aspirin 10 mg/mL Susp     975 mL    Take 32.5 mLs (325 mg) by mouth daily    Liver transplanted (H)       azaTHIOprine 50 MG tablet    IMURAN    45 tablet    Take 1.5 tablets (75 mg) by mouth daily    Liver transplanted (H)       GABAPENTIN PO      Take 300 mg by mouth At Bedtime        OMEPRAZOLE PO      Take 20 mg by mouth 2 times daily (before meals)        ondansetron 4 MG ODT tab    ZOFRAN-ODT    120 tablet    Take 1 tablet (4 mg) by mouth every 6 hours as needed for nausea or vomiting    Nausea       PRAMIPEXOLE DIHYDROCHLORIDE PO      Take 0.5 mg by mouth daily        sulfamethoxazole-trimethoprim suspension    BACTRIM/SEPTRA    560 mL    Take 10 mLs (80 mg) by mouth daily Dose based on TMP component.    Liver transplanted  (H)       tacrolimus 1 MG capsule    GENERIC EQUIVALENT    300 capsule    Take 5 capsules (5 mg) by mouth 2 times daily    Liver transplanted (H)       valGANciclovir 450 MG tablet    VALCYTE    45 tablet    Take 1 tablet (450 mg) by mouth daily    Liver transplanted (H)       warfarin 2 MG tablet    COUMADIN    8 tablet    Take 2 tablets (4 mg) by mouth daily Every evening or as directed by provider    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)

## 2018-10-16 NOTE — TELEPHONE ENCOUNTER
Patient Call: General  Route to LPN    Reason for call: Patient returning call. Please call patient back    Call back needed? Yes    Return Call Needed  Same as documented in contacts section  When to return call?: Same day: Route High Priority

## 2018-10-16 NOTE — TELEPHONE ENCOUNTER
"Tacrolimus level 5.5.  Asked Sherrie to increase tacrolimus dose to 5 mg in the morning and 5 mg in the evening.     Sherrie has problems w/ ongoing nausea.  We changed her from cellcept to imuran last week to see if this might make a difference.  She reports that things are only minimally better.  She describes eating dinner around 5 pm, by 8 or nine she is \"urpy\" - says she \"regurgitates small amounts of food - acidy liquid.  Sleeps OK during the night but when she awakens and sits at the side of the bed in the morning she wretches for at least 10 minutes, again \"urping up acidy fluid\" and has very bad nausea.  She takes some maalox and this helps a bit, eats some food and is able to take her meds without throwing up but has a \"low level of nausea\" all day.  She has a hard time eating much but is maintaining her weight.  Has had ulcers in the past. Is on omeprazole 20 mg po bid.  She has a biliary stent, is 45 days post op so will need upper endoscopy to look for biliary stent in about 2 weeks. She does take zofran during the day once or twice but the morning nausea is very bothersome and uncomfortable for her.   I'll place order for abdominal film and look for stent and upper endsocopy to remove stent but also to look for ulcer / hiatal hernia.  Message to Dr. Lilly to see if he advises anything else.    "

## 2018-10-16 NOTE — TELEPHONE ENCOUNTER
Tacrolimus level 5.5.    Left message for Sherrie asking her to  Increase  tacrolimus dose to 5 mg in the morning and 5 mg in the evening.

## 2018-10-16 NOTE — LETTER
10/16/2018      RE: Sherrie Lala  [address] [facility] [id number]       [facility]    Transplant Infectious Diseases Outpatient Consultation     Patient:  Sherrie Lala, Date of birth 1955, Medical record number [id number]  Date of Visit:  10/17/2018  Consult requested by Dr. Figueroa for evaluation of caseating/calcified node in donor tissue.    ATTESTATION   I interviewed and examined the patient myself. I also reviewed the chart.   I was present with Mark Henriquez during the webb parts of the history and the physical examination.   Mark Henriquez wrote this note as a scribe and this note reflects my findings, assessment, and plan.     Lacy Gong  Seen and examined 10/16/2018  Attested on 10/17/2018   Pager: [phone]           Recommendations:     1. Continue bi-weekly urine histoplasma antigen tests given initial concern of caseating nodes in donor. Currently ~8wk post-transplant without symptoms of active infection; all prior urine histo labs were negative (most recent 10/15/18).     2. Fungal serology and VZV serology with the next blood draw.     3. We agree with pulmonary that the pleural effusion should be tapped and fluid should be sent for cell count with diff, Glucose, protein, LDH, Gram stain, aerobic cx, anaerobic cx, fungal cx, AFB smear and cx, and cytology. We did discuss this with the patient; however patient would like to avoid a thoracentesis due to pain of procedure and only monitor by CXR at this time. We advised her that her symptomatic pleural effusion was abnormal and could mask an ongoing infection or malignancy, and to be vigilant about new symptoms such as fever, night sweats, increased weight loss, etc.     4. Will discuss with the transplant team if valcyte is truly indicated in this patient who is CMV -/-.      4. Still too early for vaccines post-transplant, but will need to give Prevnar, Flu, and Tdap after 11/30/18.      RTC: 2 months         Summary  "of Presentation:   Transplants:  8/30/2018 (Liver), Postoperative day:  48     This patient is a 63 year old female with history of metastatic cholangiocarcinoma with susbsequent liver failure s/p liver resection DDLT on 8/30/18. Post-op complicated by significant intraoperative blood loss (~3L), abdomen was left open and closed on 9/1/18. Was given basiliximab induction with steroid taper, then tacrolimus and MMF 1g bid. MMF switched to Imuran 150mg daily on 10/8/18.     ID consulted after \"caseating lymph node\" was found in donor and review of [facility] system notes show a calcified granuloma in donor spleen, lung, and calcified hilar LA. Calcified node could potentially represent old/dead histoplasma and is worth monitoring without treatment at this time via urine Histo antigen.         Active Problems and Infectious Diseases Issues:   1. 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  Per transplant team, donor was found to have caseating lymph node and [facility] system shows calcified granuloma in donor spleen/lung with calcified hilar LA. ID consulted and it was recommended at that time to do biweekly urine Histoplasma antigen tests. All have been negative since then (most recent 10/15/18). Will continue to monitor.  Will check another fungal serology with next blood draw.     2. Right-sided pleural effusion.  Patient has had right-sided pleural effusion since prior to transplant and was previously short of breath. She had multiple thoracenteses, a failed doxycylcine pleurodesis, and eventually got a Pleurx catheter and was draining 1L a week. Since transplant, patient has posterior chest pain, but no issue with breathing or any SOB. Says that PE is not bothering her.   We advised her to obtain a thoracentesis, but patient would like to defer for time being since it has remained stable with few symptoms since transplant. Would like to see if it resolves on its own. Was seen by Dr. Thomas [facility] on 10/15/18 " "and had abnormal PFTs, see note for details.           Old Problems and Infectious Diseases Issues:     1. None known    Other Infectious Disease issues include:  - PCP prophylaxis: Bactrim  - Serostatus: CMV D-/R-, EBV D+/R+, HSV1/2 ?, VZV ? (will check VZV serology for future assessment for the potential benefit of shingrix vaccine)  -  Immunization status: Still too early for vaccines post-transplant, but will need to give Prevnar, Flu, and Tdap after 11/30/18.  - Gamma globulin status: N/A      Attestation:  I interviewed the patient and obtained history from the patient, and by reviewing the patient's chart including outside records, microbiological data, and radiological data. All data are summarized in this notes.    Mark Henriquez,  Medical Student  10/17/2018         History of the Infectious Disease lllness:       Transplants:  8/30/2018 (Liver); Postoperative day:  48    64yo F with history of metastatic cholangiocarcinoma s/p liver resection and chemoradiation and now s/p OLT on 8/30. 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 She was treated with vancomycin/zosyn/fluconzaole for the three days post-operatively per transplant protocol but has received no other abx this admission. Immunosuppression induction was performed with basilixmiab, MMF, and steroid taper, with Tacrolimus started on 9/1. Her transplant post-op course complicated by significant (3L) blood loss and delayed abdominal closure related to both blood loss and large size of donor liver, requiring ICU stay and intubation from 8/30 to 9/2. Prior to her 8/29 admission for the transplant, she endorses a mild illness \"a week or two ago\" that lasted several days, consisting of subjective fever, loose stool, nausea without vomiting, and fatigue. She treated this " "symptomatically with OTC meds and it self-resolved prior to being called for the transplant. At this time, her only complaint is tenderness along her incision site, which she says is \"stable\". On , ID was consulted regarding information about a possible \"caseating lymph node\" in the donor. At that time, it was recommended that a urine histoplama antigen be obtained every other week for the next 6 months. Since then, urine histo labs have been negative (most recent on 10/15/18) and patient has no complaints of febrile illness, chills headache, cough, muscle aches, etc. Patient had night sweats from transplant until about a week ago when Cellcept was discontinued in lieu of Imuran (10/8/18). Patient also had weight loss from 18 - 10/1/18, but patient feels this was due to loss of appetite and feels her weight is currently stable.           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    Residual thrombus in main and right portal            Past Surgical History:     Past Surgical History:   Procedure Laterality Date     CHOLECYSTECTOMY       HEPATECTOMY PARTIAL       RETURN LIVER TRANSPLANT N/A 2018    Procedure: RETURN LIVER TRANSPLANT;  Open Abdomen, return liver transplant washout with   Mesh and liver stent  placement and  Abdomal Wound closure;  Surgeon: Darren Rod MD;  Location: [facility] OR     TRANSPLANT LIVER RECIPIENT  DONOR N/A 2018    Procedure: TRANSPLANT LIVER RECIPIENT  DONOR;  TRANSPLANT LIVER RECIPIENT  DONOR ;  Surgeon: Viola" MD Darren;  Location:  OR              Social History:     Social History   Substance Use Topics     Smoking status: Never Smoker     Smokeless tobacco: Never Used     Alcohol use No      Comment: occ             Family History:   No family history on file.         Immunizations:     Immunization History   Administered Date(s) Administered     Influenza Vaccine IM 3yrs+ 4 Valent IIV4 09/29/2014, 10/12/2016, 09/28/2017     Pneumococcal 23 valent 10/18/2012     Td (Adult), Adsorbed 06/01/2000             Allergies:     Allergies   Allergen Reactions     Blood Transfusion Related (Informational Only) Other (See Comments)     Patient has a history of a clinically significant antibody against RBC antigens.  A delay in compatible RBCs may occur.     Dilaudid [Hydromorphone] Itching             Medications:     Current Outpatient Prescriptions   Medication Sig     albuterol (PROAIR HFA/PROVENTIL HFA/VENTOLIN HFA) 108 (90 BASE) MCG/ACT Inhaler Inhale 2 puffs into the lungs every 6 hours     aspirin 10 mg/mL SUSP Take 32.5 mLs (325 mg) by mouth daily     azaTHIOprine (IMURAN) 50 MG tablet Take 1.5 tablets (75 mg) by mouth daily     GABAPENTIN PO Take 300 mg by mouth At Bedtime     OMEPRAZOLE PO Take 20 mg by mouth 2 times daily (before meals)     ondansetron (ZOFRAN-ODT) 4 MG ODT tab Take 1 tablet (4 mg) by mouth every 6 hours as needed for nausea or vomiting     PRAMIPEXOLE DIHYDROCHLORIDE PO Take 0.5 mg by mouth daily     sulfamethoxazole-trimethoprim (BACTRIM/SEPTRA) suspension Take 10 mLs (80 mg) by mouth daily Dose based on TMP component.     tacrolimus (GENERIC EQUIVALENT) 1 MG capsule Take 5 capsules (5 mg) by mouth 2 times daily     valGANciclovir (VALCYTE) 450 MG tablet Take 1 tablet (450 mg) by mouth daily     warfarin (COUMADIN) 2 MG tablet Take 2 tablets (4 mg) by mouth daily Every evening or as directed by provider     No current facility-administered medications for this visit.               Review of  "Systems:   As mentioned in the interim history otherwise negative by reviewing constitutional symptoms, central and peripheral neurological systems, respiratory system, cardiac system, GI system,  system, musculoskeletal, skin, allergy, and lymphatics.     Some posterior chest pain, little to no SOB         Physical Exam:   /83  Pulse 80  Temp 97.7  F (36.5  C) (Oral)  Ht 1.6 m (5' 3\")  Wt 54.2 kg (119 lb 8 oz)  SpO2 97%  BMI 21.17 kg/m2    Constitutional: awake, alert, cooperative, no apparent distress and appears at stated age, well nourished.   Head, ENT, Eyes, and Neck: Normocephalic, sinuses non-tender to palpation, external ears without lesions, moist buccal mucosa without oral thrush, tonsils without swelling, erythema, or exudate, no tenderness palpating teeth, good dentition, gums without necrosis or abscesses.   PERRL, EOMI, pink conjunctivae, non-icteric sclera.   Neck supple without rigidity, no cervical LA bilaterally.   Neurologic: Patient is moving all extremities without focal deficit, no focal sensory loss.   Lungs: Diminished air sounds on right side, pain on percussion and dullness, decreased tactile fremitus,   no accessory muscle use,   CVS: RRR, normal S1/S2, no murmur  Abdomen: Tender, difficulty using abdominal muscles for lying down/getting up, transplant incisions appear to be healing with stitches removed, ~1-2 inch black scab remaining near middle of abdomen, no redness/warmth, no signs of infection.  Extremities: No pitting edema of bilateral lower extremities, no ulcers, normal ROM of all joints, no swelling or erythema of any of joints and no tenderness to palpation.   Skin: no induration, fluctuation and no rash            Laboratory Data:     No results found for: ACD4    Inflammatory Markers  No lab results found.    Immune Globulin Studies    No lab results found.    Metabolic Studies    Recent Labs   Lab Test  10/15/18   0954  10/08/18   0910  10/01/18   0850  " 09/20/18   0935  09/17/18   0850  09/12/18   0627   09/02/18   1701  09/02/18   0308   09/01/18   0431  08/31/18   1513   NA  137  139  138  138  138  137   < >   --   142   < >  143  143   POTASSIUM  4.8  4.6  4.2  4.7  4.9  4.5   < >   --   3.6   < >  3.4  4.0   CHLORIDE  106  107  106  104  105  105   < >   --   107   --   108  109   CO2  25  26  26  27  27  26   < >   --   25   --   28  28   ANIONGAP  6  6  7  7  6  5   < >   --   10   --   8  6   BUN  26  33*  21  25  27  15   < >   --   27   --   23  19   CR  0.99  1.09*  0.91  0.92  0.98  0.73   < >   --   1.09*   --   1.02  0.96   GFRESTIMATED  57*  51*  63  62  57*  81   < >   --   51*   --   55*  59*   GLC  95  98  94  117*  100*  114*   < >   --   129*   < >  118*  96   A1C   --    --    --    --    --    --    --   4.9   --    --    --    --    SHELDON  8.5  8.7  8.7  8.7  8.4*  7.6*   < >   --   7.4*   --   7.2*  7.9*   PHOS  3.2  4.2  3.2  3.3  3.8  3.2   < >   --   3.9   --   3.8  4.9*   MAG  2.0  1.9  1.8  2.5*  2.9*  2.3   < >   --   2.3   --   2.3  1.7   LACT   --    --    --    --    --    --    --    --    --    --   0.9  1.3   CKT   --    --    --    --    --    --    --    --   390*   --    --    --     < > = values in this interval not displayed.       Hepatic Studies    Recent Labs   Lab Test  10/15/18   0954  10/08/18   0910  10/01/18   0850  09/20/18   0935  09/17/18   0850  09/12/18 0627   BILITOTAL  0.5  0.7  0.8  0.6  0.8  0.6   ALKPHOS  77  90  94  102  94  76   PROTTOTAL  6.9  7.0  7.2  7.5  6.8  5.7*   ALBUMIN  3.6  3.8  3.7  3.3*  3.1*  2.4*   AST  7  8  10  10  13  10   ALT  12  13  12  16  19  26       Hematology Studies     Recent Labs   Lab Test  10/15/18   0954  10/08/18   0910  10/01/18   0850  09/20/18   0935  09/17/18   0850  09/12/18   0627  09/11/18   0608  09/10/18   0600  09/09/18   0623  09/08/18   0633   WBC  4.2  4.1  5.0  4.6  3.4*  4.7  5.2  3.8*  3.8*  3.3*   ANEU   --    --    --    --   2.7  4.0  4.3  2.9  2.9  2.2    ALYM   --    --    --    --   0.4*  0.3*  0.5*  0.4*  0.4*  0.5*   JOÉS MANUEL   --    --    --    --   0.2  0.3  0.2  0.2  0.2  0.3   AEOS   --    --    --    --   0.1  0.1  0.2  0.2  0.2  0.2   HGB  11.3*  11.5*  11.9  10.8*  9.0*  8.2*  7.9*  7.5*  7.6*  7.8*   HCT  36.3  36.8  37.0  34.8*  28.6*  25.6*  24.7*  23.2*  23.8*  24.5*   PLT  122*  130*  149*  212  191  101*  93*  69*  65*  57*       Clotting Studies    Recent Labs   Lab Test  09/17/18   0850  09/12/18   0627  09/11/18   0608  09/10/18   0600   09/04/18   0552  09/02/18   0308   09/01/18   0933  09/01/18   0431   INR  2.29*  1.90*  1.82*  2.26*   < >   --    --    < >  1.33*  1.41*   PTT   --    --    --    --    --   28  34   --   32  37    < > = values in this interval not displayed.       Urine Studies    Recent Labs   Lab Test  02/26/18   0947   URINEPH  5.0   NITRITE  Negative   LEUKEST  Negative   WBCU  1         Microbiology:  Last 6 Culture results with specimen source  Culture Micro   Date Value Ref Range Status   09/01/2018 No anaerobes isolated  Final   09/01/2018 Culture negative after 30 days  Final   09/01/2018 No growth  Final   08/29/2018 No VRE isolated  Final    Specimen Description   Date Value Ref Range Status   09/01/2018 Tissue abdomen  Final   09/01/2018 Tissue abdomen  Final   09/01/2018 Tissue abdomen  Final   09/01/2018 Tissue abdomen  Final   08/31/2018 Nares  Final   08/29/2018 Rectal  Final      No results found for: CMV    Last check of C difficile  No results found for: CDBPCT      Virology:  CMV viral loads    undetectable    EBV viral loads   No lab results found.  No results found for: EBVDN, EBRES, EBVDN, EBVSP, EBVPC, EBVPCR    Human Herpes Virus 6 viral loads    No results found for: H6RES No results found for: H6SPEC    CMV Antibody IgG   Date Value Ref Range Status   08/29/2018 <0.2 0.0 - 0.8 AI Final     Comment:     Negative  Antibody index (AI) values reflect qualitative changes in antibody   concentration that  cannot be directly associated with clinical condition or   disease state.     05/04/2018 <0.2 0.0 - 0.8 AI Final     Comment:     Negative  Antibody index (AI) values reflect qualitative changes in antibody   concentration that cannot be directly associated with clinical condition or   disease state.     02/26/2018 0.2 0.0 - 0.8 AI Final     Comment:     Negative  Antibody index (AI) values reflect qualitative changes in antibody   concentration that cannot be directly associated with clinical condition or   disease state.       CMV Antibody IgM   Date Value Ref Range Status   08/29/2018 <0.2 0.0 - 0.8 AI Final     Comment:     Negative  Antibody index (AI) values reflect qualitative changes in antibody   concentration that cannot be directly associated with clinical condition or   disease state.     05/04/2018 <0.2 0.0 - 0.8 AI Final     Comment:     Negative  Antibody index (AI) values reflect qualitative changes in antibody   concentration that cannot be directly associated with clinical condition or   disease state.         Pathology:  8/30/2018   FINAL DIAGNOSIS:   A. Gallbladder, donor, cholecystectomy:   - No histopathologic abnormality     B. Liver, native, hepatectomy:   - Sinusoidal congestion with hepatocellular atrophy and nodular   regenerative hyperplasia, consistent with   portal vein obstruction   - Organizing thrombus of portal vein   - Paucity of intrahepatic portal vein branches   - No evidence of significant fibrosis   - See microscopic description       Imaging:  Results for orders placed or performed in visit on 10/01/18   XR Chest 2 Views    Narrative    Exam: XR CHEST 2 VW, 10/1/2018 8:52 AM    Indication: ; Pleural effusion, history of liver transplant    Comparison: Chest x-ray 9/25/2018    Findings:   Chest PA and lateral radiograph obtained demonstrating stable cardiac  and mediastinal silhouettes. Postsurgical appearance of upper abdomen.  Unremarkable soft tissues and no acute bony  abnormalities. No  pneumothorax. Stable appearance of moderate right pleural effusion and  likely fluid and/or platelike atelectasis along the minor fissure. No  acute airspace opacities.       Impression    Impression:   1. No acute cardiopulmonary abnormalities.  2. Stable appearance of moderate right pleural effusion and associated  atelectasis.    I have personally reviewed the examination and initial interpretation  and I agree with the findings.    MD Lacy ANDERSON MD

## 2018-10-16 NOTE — TELEPHONE ENCOUNTER
October 16, 2018: I spoke with Sherrie this afternoon to schedule a 9am abdominal film (before her labs) on Monday, 10/22.    I also told her that she will be receiving a call from the endoscopy  to get an upper endoscopy on the books.    Zakiya Hinton  Post-Liver Transplant   [phone]

## 2018-10-17 LAB — LAB SCANNED RESULT: NORMAL

## 2018-10-17 NOTE — PROGRESS NOTES
[facility]    Transplant Infectious Diseases Outpatient Consultation     Patient:  Sherrie Lala, Date of birth 1955, Medical record number [id number]  Date of Visit:  10/17/2018  Consult requested by Dr. Figueroa for evaluation of caseating/calcified node in donor tissue.    ATTESTATION   I interviewed and examined the patient myself. I also reviewed the chart.   I was present with Mark Henriquez during the webb parts of the history and the physical examination.   Mark Henriquez wrote this note as a scribe and this note reflects my findings, assessment, and plan.     Lacy Gong  Seen and examined 10/16/2018  Attested on 10/17/2018   Pager: [phone]           Recommendations:     1. Continue bi-weekly urine histoplasma antigen tests given initial concern of caseating nodes in donor. Currently ~8wk post-transplant without symptoms of active infection; all prior urine histo labs were negative (most recent 10/15/18).     2. Fungal serology and VZV serology with the next blood draw.     3. We agree with pulmonary that the pleural effusion should be tapped and fluid should be sent for cell count with diff, Glucose, protein, LDH, Gram stain, aerobic cx, anaerobic cx, fungal cx, AFB smear and cx, and cytology. We did discuss this with the patient; however patient would like to avoid a thoracentesis due to pain of procedure and only monitor by CXR at this time. We advised her that her symptomatic pleural effusion was abnormal and could mask an ongoing infection or malignancy, and to be vigilant about new symptoms such as fever, night sweats, increased weight loss, etc.     4. Will discuss with the transplant team if valcyte is truly indicated in this patient who is CMV -/-.      4. Still too early for vaccines post-transplant, but will need to give Prevnar, Flu, and Tdap after 11/30/18.      RTC: 2 months         Summary of Presentation:   Transplants:  8/30/2018 (Liver), Postoperative day:  48  "    This patient is a 63 year old female with history of metastatic cholangiocarcinoma with susbsequent liver failure s/p liver resection DDLT on 8/30/18. Post-op complicated by significant intraoperative blood loss (~3L), abdomen was left open and closed on 9/1/18. Was given basiliximab induction with steroid taper, then tacrolimus and MMF 1g bid. MMF switched to Imuran 150mg daily on 10/8/18.     ID consulted after \"caseating lymph node\" was found in donor and review of [facility] system notes show a calcified granuloma in donor spleen, lung, and calcified hilar LA. Calcified node could potentially represent old/dead histoplasma and is worth monitoring without treatment at this time via urine Histo antigen.         Active Problems and Infectious Diseases Issues:   1. 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  Per transplant team, donor was found to have caseating lymph node and [facility] system shows calcified granuloma in donor spleen/lung with calcified hilar LA. ID consulted and it was recommended at that time to do biweekly urine Histoplasma antigen tests. All have been negative since then (most recent 10/15/18). Will continue to monitor.  Will check another fungal serology with next blood draw.     2. Right-sided pleural effusion.  Patient has had right-sided pleural effusion since prior to transplant and was previously short of breath. She had multiple thoracenteses, a failed doxycylcine pleurodesis, and eventually got a Pleurx catheter and was draining 1L a week. Since transplant, patient has posterior chest pain, but no issue with breathing or any SOB. Says that PE is not bothering her.   We advised her to obtain a thoracentesis, but patient would like to defer for time being since it has remained stable with few symptoms since transplant. Would like to see if it resolves on its own. Was seen by Dr. Thomas [facility] 10/15/18 and had abnormal PFTs, see note for details.           Old Problems and " "Infectious Diseases Issues:     1. None known    Other Infectious Disease issues include:  - PCP prophylaxis: Bactrim  - Serostatus: CMV D-/R-, EBV D+/R+, HSV1/2 ?, VZV ? (will check VZV serology for future assessment for the potential benefit of shingrix vaccine)  -  Immunization status: Still too early for vaccines post-transplant, but will need to give Prevnar, Flu, and Tdap after 11/30/18.  - Gamma globulin status: N/A      Attestation:  I interviewed the patient and obtained history from the patient, and by reviewing the patient's chart including outside records, microbiological data, and radiological data. All data are summarized in this notes.    Mark Henriquez,  Medical Student  10/17/2018         History of the Infectious Disease lllness:       Transplants:  8/30/2018 (Liver); Postoperative day:  48    62yo F with history of metastatic cholangiocarcinoma s/p liver resection and chemoradiation and now s/p OLT on 8/30. 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 She was treated with vancomycin/zosyn/fluconzaole for the three days post-operatively per transplant protocol but has received no other abx this admission. Immunosuppression induction was performed with basilixmiab, MMF, and steroid taper, with Tacrolimus started on 9/1. Her transplant post-op course complicated by significant (3L) blood loss and delayed abdominal closure related to both blood loss and large size of donor liver, requiring ICU stay and intubation from 8/30 to 9/2. Prior to her 8/29 admission for the transplant, she endorses a mild illness \"a week or two ago\" that lasted several days, consisting of subjective fever, loose stool, nausea without vomiting, and fatigue. She treated this symptomatically with OTC meds and it self-resolved prior to being called " "for the transplant. At this time, her only complaint is tenderness along her incision site, which she says is \"stable\". On , ID was consulted regarding information about a possible \"caseating lymph node\" in the donor. At that time, it was recommended that a urine histoplama antigen be obtained every other week for the next 6 months. Since then, urine histo labs have been negative (most recent on 10/15/18) and patient has no complaints of febrile illness, chills headache, cough, muscle aches, etc. Patient had night sweats from transplant until about a week ago when Cellcept was discontinued in lieu of Imuran (10/8/18). Patient also had weight loss from 18 - 10/1/18, but patient feels this was due to loss of appetite and feels her weight is currently stable.           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    Residual thrombus in main and right portal            Past Surgical History:     Past Surgical History:   Procedure Laterality Date     CHOLECYSTECTOMY       HEPATECTOMY PARTIAL       RETURN LIVER TRANSPLANT N/A 2018    Procedure: RETURN LIVER TRANSPLANT;  Open Abdomen, return liver transplant washout with   Mesh and liver stent  placement and  Abdomal Wound closure;  Surgeon: Darren Rod MD;  Location: [facility] OR     TRANSPLANT LIVER RECIPIENT  DONOR N/A 2018    Procedure: TRANSPLANT LIVER RECIPIENT  DONOR;  TRANSPLANT LIVER RECIPIENT  DONOR ;  Surgeon: Darren Rod MD;  Location: [facility] OR              Social History:     Social " History   Substance Use Topics     Smoking status: Never Smoker     Smokeless tobacco: Never Used     Alcohol use No      Comment: occ             Family History:   No family history on file.         Immunizations:     Immunization History   Administered Date(s) Administered     Influenza Vaccine IM 3yrs+ 4 Valent IIV4 09/29/2014, 10/12/2016, 09/28/2017     Pneumococcal 23 valent 10/18/2012     Td (Adult), Adsorbed 06/01/2000             Allergies:     Allergies   Allergen Reactions     Blood Transfusion Related (Informational Only) Other (See Comments)     Patient has a history of a clinically significant antibody against RBC antigens.  A delay in compatible RBCs may occur.     Dilaudid [Hydromorphone] Itching             Medications:     Current Outpatient Prescriptions   Medication Sig     albuterol (PROAIR HFA/PROVENTIL HFA/VENTOLIN HFA) 108 (90 BASE) MCG/ACT Inhaler Inhale 2 puffs into the lungs every 6 hours     aspirin 10 mg/mL SUSP Take 32.5 mLs (325 mg) by mouth daily     azaTHIOprine (IMURAN) 50 MG tablet Take 1.5 tablets (75 mg) by mouth daily     GABAPENTIN PO Take 300 mg by mouth At Bedtime     OMEPRAZOLE PO Take 20 mg by mouth 2 times daily (before meals)     ondansetron (ZOFRAN-ODT) 4 MG ODT tab Take 1 tablet (4 mg) by mouth every 6 hours as needed for nausea or vomiting     PRAMIPEXOLE DIHYDROCHLORIDE PO Take 0.5 mg by mouth daily     sulfamethoxazole-trimethoprim (BACTRIM/SEPTRA) suspension Take 10 mLs (80 mg) by mouth daily Dose based on TMP component.     tacrolimus (GENERIC EQUIVALENT) 1 MG capsule Take 5 capsules (5 mg) by mouth 2 times daily     valGANciclovir (VALCYTE) 450 MG tablet Take 1 tablet (450 mg) by mouth daily     warfarin (COUMADIN) 2 MG tablet Take 2 tablets (4 mg) by mouth daily Every evening or as directed by provider     No current facility-administered medications for this visit.               Review of Systems:   As mentioned in the interim history otherwise negative by  "reviewing constitutional symptoms, central and peripheral neurological systems, respiratory system, cardiac system, GI system,  system, musculoskeletal, skin, allergy, and lymphatics.     Some posterior chest pain, little to no SOB         Physical Exam:   /83  Pulse 80  Temp 97.7  F (36.5  C) (Oral)  Ht 1.6 m (5' 3\")  Wt 54.2 kg (119 lb 8 oz)  SpO2 97%  BMI 21.17 kg/m2    Constitutional: awake, alert, cooperative, no apparent distress and appears at stated age, well nourished.   Head, ENT, Eyes, and Neck: Normocephalic, sinuses non-tender to palpation, external ears without lesions, moist buccal mucosa without oral thrush, tonsils without swelling, erythema, or exudate, no tenderness palpating teeth, good dentition, gums without necrosis or abscesses.   PERRL, EOMI, pink conjunctivae, non-icteric sclera.   Neck supple without rigidity, no cervical LA bilaterally.   Neurologic: Patient is moving all extremities without focal deficit, no focal sensory loss.   Lungs: Diminished air sounds on right side, pain on percussion and dullness, decreased tactile fremitus,   no accessory muscle use,   CVS: RRR, normal S1/S2, no murmur  Abdomen: Tender, difficulty using abdominal muscles for lying down/getting up, transplant incisions appear to be healing with stitches removed, ~1-2 inch black scab remaining near middle of abdomen, no redness/warmth, no signs of infection.  Extremities: No pitting edema of bilateral lower extremities, no ulcers, normal ROM of all joints, no swelling or erythema of any of joints and no tenderness to palpation.   Skin: no induration, fluctuation and no rash            Laboratory Data:     No results found for: ACD4    Inflammatory Markers  No lab results found.    Immune Globulin Studies    No lab results found.    Metabolic Studies    Recent Labs   Lab Test  10/15/18   0954  10/08/18   0910  10/01/18   0850  09/20/18   0935  09/17/18   0850  09/12/18   0627   09/02/18   1701  " 09/02/18   0308   09/01/18   0431  08/31/18   1513   NA  137  139  138  138  138  137   < >   --   142   < >  143  143   POTASSIUM  4.8  4.6  4.2  4.7  4.9  4.5   < >   --   3.6   < >  3.4  4.0   CHLORIDE  106  107  106  104  105  105   < >   --   107   --   108  109   CO2  25  26  26  27  27  26   < >   --   25   --   28  28   ANIONGAP  6  6  7  7  6  5   < >   --   10   --   8  6   BUN  26  33*  21  25  27  15   < >   --   27   --   23  19   CR  0.99  1.09*  0.91  0.92  0.98  0.73   < >   --   1.09*   --   1.02  0.96   GFRESTIMATED  57*  51*  63  62  57*  81   < >   --   51*   --   55*  59*   GLC  95  98  94  117*  100*  114*   < >   --   129*   < >  118*  96   A1C   --    --    --    --    --    --    --   4.9   --    --    --    --    SHELDON  8.5  8.7  8.7  8.7  8.4*  7.6*   < >   --   7.4*   --   7.2*  7.9*   PHOS  3.2  4.2  3.2  3.3  3.8  3.2   < >   --   3.9   --   3.8  4.9*   MAG  2.0  1.9  1.8  2.5*  2.9*  2.3   < >   --   2.3   --   2.3  1.7   LACT   --    --    --    --    --    --    --    --    --    --   0.9  1.3   CKT   --    --    --    --    --    --    --    --   390*   --    --    --     < > = values in this interval not displayed.       Hepatic Studies    Recent Labs   Lab Test  10/15/18   0954  10/08/18   0910  10/01/18   0850  09/20/18   0935  09/17/18   0850  09/12/18   0627   BILITOTAL  0.5  0.7  0.8  0.6  0.8  0.6   ALKPHOS  77  90  94  102  94  76   PROTTOTAL  6.9  7.0  7.2  7.5  6.8  5.7*   ALBUMIN  3.6  3.8  3.7  3.3*  3.1*  2.4*   AST  7  8  10  10  13  10   ALT  12  13  12  16  19  26       Hematology Studies     Recent Labs   Lab Test  10/15/18   0954  10/08/18   0910  10/01/18   0850  09/20/18   0935  09/17/18   0850  09/12/18   0627  09/11/18   0608  09/10/18   0600  09/09/18   0623  09/08/18   0633   WBC  4.2  4.1  5.0  4.6  3.4*  4.7  5.2  3.8*  3.8*  3.3*   ANEU   --    --    --    --   2.7  4.0  4.3  2.9  2.9  2.2   ALYM   --    --    --    --   0.4*  0.3*  0.5*  0.4*  0.4*  0.5*    JOSÉ MANUEL   --    --    --    --   0.2  0.3  0.2  0.2  0.2  0.3   AEOS   --    --    --    --   0.1  0.1  0.2  0.2  0.2  0.2   HGB  11.3*  11.5*  11.9  10.8*  9.0*  8.2*  7.9*  7.5*  7.6*  7.8*   HCT  36.3  36.8  37.0  34.8*  28.6*  25.6*  24.7*  23.2*  23.8*  24.5*   PLT  122*  130*  149*  212  191  101*  93*  69*  65*  57*       Clotting Studies    Recent Labs   Lab Test  09/17/18   0850  09/12/18   0627  09/11/18   0608  09/10/18   0600   09/04/18   0552  09/02/18   0308   09/01/18   0933  09/01/18   0431   INR  2.29*  1.90*  1.82*  2.26*   < >   --    --    < >  1.33*  1.41*   PTT   --    --    --    --    --   28  34   --   32  37    < > = values in this interval not displayed.       Urine Studies    Recent Labs   Lab Test  02/26/18   0947   URINEPH  5.0   NITRITE  Negative   LEUKEST  Negative   WBCU  1         Microbiology:  Last 6 Culture results with specimen source  Culture Micro   Date Value Ref Range Status   09/01/2018 No anaerobes isolated  Final   09/01/2018 Culture negative after 30 days  Final   09/01/2018 No growth  Final   08/29/2018 No VRE isolated  Final    Specimen Description   Date Value Ref Range Status   09/01/2018 Tissue abdomen  Final   09/01/2018 Tissue abdomen  Final   09/01/2018 Tissue abdomen  Final   09/01/2018 Tissue abdomen  Final   08/31/2018 Nares  Final   08/29/2018 Rectal  Final      No results found for: CMV    Last check of C difficile  No results found for: CDBPCT      Virology:  CMV viral loads    undetectable    EBV viral loads   No lab results found.  No results found for: EBVDN, EBRES, EBVDN, EBVSP, EBVPC, EBVPCR    Human Herpes Virus 6 viral loads    No results found for: H6RES No results found for: H6SPEC    CMV Antibody IgG   Date Value Ref Range Status   08/29/2018 <0.2 0.0 - 0.8 AI Final     Comment:     Negative  Antibody index (AI) values reflect qualitative changes in antibody   concentration that cannot be directly associated with clinical condition or   disease  state.     05/04/2018 <0.2 0.0 - 0.8 AI Final     Comment:     Negative  Antibody index (AI) values reflect qualitative changes in antibody   concentration that cannot be directly associated with clinical condition or   disease state.     02/26/2018 0.2 0.0 - 0.8 AI Final     Comment:     Negative  Antibody index (AI) values reflect qualitative changes in antibody   concentration that cannot be directly associated with clinical condition or   disease state.       CMV Antibody IgM   Date Value Ref Range Status   08/29/2018 <0.2 0.0 - 0.8 AI Final     Comment:     Negative  Antibody index (AI) values reflect qualitative changes in antibody   concentration that cannot be directly associated with clinical condition or   disease state.     05/04/2018 <0.2 0.0 - 0.8 AI Final     Comment:     Negative  Antibody index (AI) values reflect qualitative changes in antibody   concentration that cannot be directly associated with clinical condition or   disease state.         Pathology:  8/30/2018   FINAL DIAGNOSIS:   A. Gallbladder, donor, cholecystectomy:   - No histopathologic abnormality     B. Liver, native, hepatectomy:   - Sinusoidal congestion with hepatocellular atrophy and nodular   regenerative hyperplasia, consistent with   portal vein obstruction   - Organizing thrombus of portal vein   - Paucity of intrahepatic portal vein branches   - No evidence of significant fibrosis   - See microscopic description       Imaging:  Results for orders placed or performed in visit on 10/01/18   XR Chest 2 Views    Narrative    Exam: XR CHEST 2 VW, 10/1/2018 8:52 AM    Indication: ; Pleural effusion, history of liver transplant    Comparison: Chest x-ray 9/25/2018    Findings:   Chest PA and lateral radiograph obtained demonstrating stable cardiac  and mediastinal silhouettes. Postsurgical appearance of upper abdomen.  Unremarkable soft tissues and no acute bony abnormalities. No  pneumothorax. Stable appearance of moderate right  pleural effusion and  likely fluid and/or platelike atelectasis along the minor fissure. No  acute airspace opacities.       Impression    Impression:   1. No acute cardiopulmonary abnormalities.  2. Stable appearance of moderate right pleural effusion and associated  atelectasis.    I have personally reviewed the examination and initial interpretation  and I agree with the findings.    MARILOU RAUSCH MD

## 2018-10-18 ENCOUNTER — TEAM CONFERENCE (OUTPATIENT)
Dept: TRANSPLANT | Facility: CLINIC | Age: 63
End: 2018-10-18

## 2018-10-18 DIAGNOSIS — Z94.4 LIVER REPLACED BY TRANSPLANT (H): ICD-10-CM

## 2018-10-18 PROCEDURE — 80197 ASSAY OF TACROLIMUS: CPT | Performed by: INTERNAL MEDICINE

## 2018-10-18 NOTE — TELEPHONE ENCOUNTER
Post Liver Transplant Team Conference  Date: 10/18/2018  Transplant Coordinator: Abigail Parham  Transplant Surgeon: Darren Rod      Attendees:  [x] Dr. Dias [] Dr. Fields []       [x] Dr. Rod [x] Thien Moody LPN []    [] Dr. Rayo [] Gabriela Moreland NP []    [] Dr. Lanza [] Maribel Stringer NP []    [x] Dr. Blackwood [] Marifer Leija RN []       [] Dr. Lilly [x] Janae Gallegos RN []       [] Dr. Kapil Gamble [x] Abigail Parham, BECKI []       [] Dr. Staples [] Leighann Rodriguez, BECKI []       [] Dr. Rowe [] Darlene Dooley, RN []       [] Dr. Garcias [] Tye Early, RN []         Verbal Plan Read Back:   Run prograf 6-8, increase AZA if WBC ok.    Routed to RN Coordinator   Abigail Parham

## 2018-10-19 LAB — VZV IGG SER QL IA: 4.2 AI (ref 0–0.8)

## 2018-10-22 ENCOUNTER — TELEPHONE (OUTPATIENT)
Dept: TRANSPLANT | Facility: CLINIC | Age: 63
End: 2018-10-22

## 2018-10-22 ENCOUNTER — OFFICE VISIT (OUTPATIENT)
Dept: TRANSPLANT | Facility: CLINIC | Age: 63
End: 2018-10-22
Attending: TRANSPLANT SURGERY
Payer: MEDICARE

## 2018-10-22 ENCOUNTER — RADIANT APPOINTMENT (OUTPATIENT)
Dept: GENERAL RADIOLOGY | Facility: CLINIC | Age: 63
End: 2018-10-22
Attending: TRANSPLANT SURGERY
Payer: COMMERCIAL

## 2018-10-22 VITALS
SYSTOLIC BLOOD PRESSURE: 127 MMHG | OXYGEN SATURATION: 98 % | HEIGHT: 63 IN | WEIGHT: 116.8 LBS | DIASTOLIC BLOOD PRESSURE: 83 MMHG | TEMPERATURE: 97.4 F | HEART RATE: 83 BPM | BODY MASS INDEX: 20.7 KG/M2

## 2018-10-22 DIAGNOSIS — D84.9 IMMUNOSUPPRESSION (H): Primary | ICD-10-CM

## 2018-10-22 DIAGNOSIS — Z94.4 LIVER TRANSPLANTED (H): Chronic | ICD-10-CM

## 2018-10-22 DIAGNOSIS — Z94.4 LIVER TRANSPLANTED (H): ICD-10-CM

## 2018-10-22 DIAGNOSIS — Z94.4 LIVER REPLACED BY TRANSPLANT (H): ICD-10-CM

## 2018-10-22 LAB
ALBUMIN SERPL-MCNC: 3.8 G/DL (ref 3.4–5)
ALP SERPL-CCNC: 76 U/L (ref 40–150)
ALT SERPL W P-5'-P-CCNC: 10 U/L (ref 0–50)
ANION GAP SERPL CALCULATED.3IONS-SCNC: 8 MMOL/L (ref 3–14)
AST SERPL W P-5'-P-CCNC: 5 U/L (ref 0–45)
BILIRUB DIRECT SERPL-MCNC: 0.2 MG/DL (ref 0–0.2)
BILIRUB SERPL-MCNC: 0.4 MG/DL (ref 0.2–1.3)
BUN SERPL-MCNC: 24 MG/DL (ref 7–30)
CALCIUM SERPL-MCNC: 8.2 MG/DL (ref 8.5–10.1)
CHLORIDE SERPL-SCNC: 106 MMOL/L (ref 94–109)
CO2 SERPL-SCNC: 24 MMOL/L (ref 20–32)
CREAT SERPL-MCNC: 1.07 MG/DL (ref 0.52–1.04)
ERYTHROCYTE [DISTWIDTH] IN BLOOD BY AUTOMATED COUNT: 15.5 % (ref 10–15)
GFR SERPL CREATININE-BSD FRML MDRD: 52 ML/MIN/1.7M2
GLUCOSE SERPL-MCNC: 104 MG/DL (ref 70–99)
HCT VFR BLD AUTO: 35.1 % (ref 35–47)
HGB BLD-MCNC: 11.4 G/DL (ref 11.7–15.7)
MAGNESIUM SERPL-MCNC: 1.9 MG/DL (ref 1.6–2.3)
MCH RBC QN AUTO: 27.7 PG (ref 26.5–33)
MCHC RBC AUTO-ENTMCNC: 32.5 G/DL (ref 31.5–36.5)
MCV RBC AUTO: 85 FL (ref 78–100)
PHOSPHATE SERPL-MCNC: 3.3 MG/DL (ref 2.5–4.5)
PLATELET # BLD AUTO: 93 10E9/L (ref 150–450)
POTASSIUM SERPL-SCNC: 4.2 MMOL/L (ref 3.4–5.3)
PROT SERPL-MCNC: 7 G/DL (ref 6.8–8.8)
RBC # BLD AUTO: 4.11 10E12/L (ref 3.8–5.2)
SODIUM SERPL-SCNC: 138 MMOL/L (ref 133–144)
TACROLIMUS BLD-MCNC: 6.4 UG/L (ref 5–15)
TACROLIMUS BLD-MCNC: 7.4 UG/L (ref 5–15)
TME LAST DOSE: NORMAL H
TME LAST DOSE: NORMAL H
WBC # BLD AUTO: 3.7 10E9/L (ref 4–11)

## 2018-10-22 PROCEDURE — 80048 BASIC METABOLIC PNL TOTAL CA: CPT | Performed by: INTERNAL MEDICINE

## 2018-10-22 PROCEDURE — 86606 ASPERGILLUS ANTIBODY: CPT | Performed by: INTERNAL MEDICINE

## 2018-10-22 PROCEDURE — 85027 COMPLETE CBC AUTOMATED: CPT | Performed by: INTERNAL MEDICINE

## 2018-10-22 PROCEDURE — 36415 COLL VENOUS BLD VENIPUNCTURE: CPT | Performed by: INTERNAL MEDICINE

## 2018-10-22 PROCEDURE — 80076 HEPATIC FUNCTION PANEL: CPT | Performed by: INTERNAL MEDICINE

## 2018-10-22 PROCEDURE — 80197 ASSAY OF TACROLIMUS: CPT | Performed by: INTERNAL MEDICINE

## 2018-10-22 PROCEDURE — 84100 ASSAY OF PHOSPHORUS: CPT | Performed by: INTERNAL MEDICINE

## 2018-10-22 PROCEDURE — 83735 ASSAY OF MAGNESIUM: CPT | Performed by: INTERNAL MEDICINE

## 2018-10-22 RX ORDER — AZATHIOPRINE 50 MG/1
100 TABLET ORAL DAILY
Qty: 45 TABLET | Refills: 3 | Status: SHIPPED | OUTPATIENT
Start: 2018-10-22 | End: 2018-11-29

## 2018-10-22 ASSESSMENT — PAIN SCALES - GENERAL: PAINLEVEL: NO PAIN (0)

## 2018-10-22 NOTE — TELEPHONE ENCOUNTER
Here for post liver transplant follow-up. Drove here on her own today.  Reports finally feeling better. Nausea has been gone for 2 days now, appetite increasing.  We will cancel upcoming upper endoscopy.  Had been seen recently by ID and pulmonary.  ID had suggested thoracentesis, Dr. ELIAS recommends not doing this now as she is on coumadin - he suggested waiting at least 3 mos to do this and then re-evaluate need for.  Encouraged Sherrie to use incentive spirometer 50 x / day.  Becoming more active.  Encouraged activity.    Complaints:  Tremor.  Last prograf level 6.4.  Dr. Rod suggests running 6-8.  Will see what today's level looks like.    Current immunosuppression:   Prograf and Imuran.  Increased imuran to 100 mg daily today.  Sherrie aware.     Lab frequency:  Weekly.    Follow-up:  [place] in 2 weeks.  Derm and PCP annually. Reviewed need for annual follow-up here with hepatology and dermatology.

## 2018-10-22 NOTE — PROGRESS NOTES
HPI      ROS      Physical Exam    Transplant Surgery -OUTPATIENT IMMUNOSUPPRESSION PROGRESS NOTE    Date of Visit: 10/22/2018    Transplants:  8/30/2018 (Liver); Postoperative day:  53  ASSESMENT AND PLAN:  1.Graft Function:Liver allograft: no rejection or technical problems.    2.Immunosuppression Management: keep tacrolimus level at 8-10 and continue imuran  3.Hypertension: ok  4.Renal Function: better  5.Lab frequency: twice weekly  6.Other:  Continue seeing ID AND Pulmonary   KUB: biliary  stent has passed       Date: October 22, 2018    Transplant:  [x]                             Liver [x]                              Kidney []                             Pancreas []                              Other:             Chief Complaint:  Doing better, nausea improved and not short of breath  History of Present Illness:  Post liver transplant    Patient Active Problem List   Diagnosis     Gastric ulcer     Osteoporosis     Bleeding esophageal varices (H)     Hydrothorax - hepatic     Liver transplanted (H)     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)     Open abdominal wall wound     Immunosuppressed status (H)     Acute post-operative pain     Acute blood loss anemia     Mild protein-calorie malnutrition (H)     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)     Hypervolemia     Positive sputum culture in cadaveric donor     Positive urine culture in cadaveric donor     SOCIAL /FAMILY HISTORY: [x]                  No recent change    Past Medical History:   Diagnosis Date     Asthma      Cholangiocarcinoma (H) 06/2014     Cirrhosis of liver with ascites (H) 5/10/2018     Encounter for pleural drainage tube placement      Esophageal varices with hemorrhage (H)      Gastric ulcer      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     Liver transplanted (H) 08/30/2018     Lung metastases (H) 08/2014     Portal vein thrombosis      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) 08/31/2018    Residual thrombus in main and  right portal     Past Surgical History:   Procedure Laterality Date     CHOLECYSTECTOMY       HEPATECTOMY PARTIAL       RETURN LIVER TRANSPLANT N/A 2018    Procedure: RETURN LIVER TRANSPLANT;  Open Abdomen, return liver transplant washout with   Mesh and liver stent  placement and  Abdomal Wound closure;  Surgeon: Darren Rod MD;  Location: [facility] OR     TRANSPLANT LIVER RECIPIENT  DONOR N/A 2018    Procedure: TRANSPLANT LIVER RECIPIENT  DONOR;  TRANSPLANT LIVER RECIPIENT  DONOR ;  Surgeon: Darren Rod MD;  Location: [facility] OR     Social History     Social History     Marital status:      Spouse name: N/A     Number of children: N/A     Years of education: N/A     Occupational History     Not on file.     Social History Main Topics     Smoking status: Never Smoker     Smokeless tobacco: Never Used     Alcohol use No      Comment: occ      Drug use: No     Sexual activity: Not on file     Other Topics Concern     Not on file     Social History Narrative     Prescription Medications as of 10/22/2018             albuterol (PROAIR HFA/PROVENTIL HFA/VENTOLIN HFA) 108 (90 BASE) MCG/ACT Inhaler Inhale 2 puffs into the lungs every 6 hours    aspirin 10 mg/mL SUSP Take 32.5 mLs (325 mg) by mouth daily    azaTHIOprine (IMURAN) 50 MG tablet Take 1.5 tablets (75 mg) by mouth daily    GABAPENTIN PO Take 300 mg by mouth At Bedtime    OMEPRAZOLE PO Take 20 mg by mouth 2 times daily (before meals)    ondansetron (ZOFRAN-ODT) 4 MG ODT tab Take 1 tablet (4 mg) by mouth every 6 hours as needed for nausea or vomiting    PRAMIPEXOLE DIHYDROCHLORIDE PO Take 0.5 mg by mouth daily    sulfamethoxazole-trimethoprim (BACTRIM/SEPTRA) suspension Take 10 mLs (80 mg) by mouth daily Dose based on TMP component.    tacrolimus (GENERIC EQUIVALENT) 1 MG capsule Take 5 capsules (5 mg) by mouth 2 times daily    valGANciclovir (VALCYTE) 450 MG tablet Take 1 tablet (450 mg) by mouth daily    warfarin  "(COUMADIN) 2 MG tablet Take 2 tablets (4 mg) by mouth daily Every evening or as directed by provider        Blood transfusion related (informational only) and Dilaudid [hydromorphone]   REVIEW OF SYSTEMS (check box if normal)  [x]               GENERAL  [x]                 PULMONARY [x]                GENITOURINARY  [x]                CNS                 [x]                 CARDIAC  [x]                 ENDOCRINE  [x]                EARS,NOSE,THROAT [x]                 GASTROINTESTINAL [x]                 NEUROLOGIC    [x]                MUSCLOSKELTAL  [x]                  HEMATOLOGY      PHYSICAL EXAM (check box if normal)/83  Pulse 83  Temp 97.4  F (36.3  C)  Ht 1.6 m (5' 3\")  Wt 53 kg (116 lb 12.8 oz)  SpO2 98%  BMI 20.69 kg/m2      [x]            GENERAL:    [x]       EYES:  ICTERIC   []        YES  []                    NO  [x]           EXTREMITIES: Clubbing []       Y     [x]           N    [x]           EARS, NOSE, THROAT: Membranes Moist    YES   [x]                   NO []                  [x]           LUNGS:  CLEAR    YES       [x]                  NO    []                                [x]           SKIN: Jaundice           YES       []                  NO    [x]                   Rash: YES       []                  NO    [x]                                     [x]             HEART: Regular Rate          YES       [x]                  NO    []                   Incision Clean:  YES       [x]                  NO    []                                [x]                    ABDOMEN: Wound health Organomegaly YES       []                  NO    [x]                       [x]                    NEUROLOGICAL:  Nonfocal  YES       [x]                  NO    []                       [x]                    Hernia YES       []                  NO    [x]                   PSYCHIATRIC:  Appropriate  YES       [x]                  NO    []                       OTHER:                                       "                                                             PAIN SCALE:: 4

## 2018-10-22 NOTE — MR AVS SNAPSHOT
After Visit Summary   10/22/2018    Sherrie Lala    MRN: [id number]           Patient Information     Date Of Birth          1955        Visit Information        Provider Department      10/22/2018 10:10 AM Darren Rod MD [facility] Solid Organ Transplant        Today's Diagnoses     Immunosuppression (H)    -  1       Follow-ups after your visit        Your next 10 appointments already scheduled     Jan 08, 2019  3:30 PM CST   (Arrive by 3:15 PM)   Return Visit with Lacy Gong MD   [facility] Infect[facility] ([facility])    [address] [place] [address]  [address]  [facility] [address] [phone]              Who to contact     If you have questions or need follow up information about today's clinic visit or your schedule please contact [facility] SOLID ORGAN TRANSPLANT directly at [phone].  Normal or non-critical lab and imaging results will be communicated to you by MyChart, letter or phone within 4 business days after the clinic has received the results. If you do not hear from us within 7 days, please contact the clinic through [employer] or phone. If you have a critical or abnormal lab result, we will notify you by phone as soon as possible.  Submit refill requests through [employer] or call your pharmacy and they will forward the refill request to us. Please allow 3 business days for your refill to be completed.          Additional Information About Your Visit        MyChart Information     [employer] gives you secure access to your electronic health record. If you see a primary care provider, you can also send messages to your care team and make appointments. If you have questions, please call your primary care clinic.  If you do not have a primary care provider, please call [phone] and they will assist you.        Care EveryWhere ID     This is your Care EveryWhere ID. This could be used by other organizations to access your [place]  "medical records  [id number]        Your Vitals Were     Pulse Temperature Height Pulse Oximetry BMI (Body Mass Index)       83 97.4  F (36.3  C) 1.6 m (5' 3\") 98% 20.69 kg/m2        Blood Pressure from Last 3 Encounters:   10/22/18 127/83   10/16/18 126/83   10/15/18 129/83    Weight from Last 3 Encounters:   10/22/18 53 kg (116 lb 12.8 oz)   10/16/18 54.2 kg (119 lb 8 oz)   10/15/18 52.1 kg (114 lb 13.8 oz)              Today, you had the following     No orders found for display       Primary Care Provider Office Phone # Fax #    Apollo Benitez [phone]       [address] [address]        Equal Access to Services     HARINI CHAMBERLAIN : Hadari Ordonez, waaxran ovalle, qaybta kaalmaran pierce, nahed alfaro . So [facility] [phone].    ATENCIÓN: Si habla español, tiene a bucio disposición servicios gratuitos de asistencia lingüística. Neville al [phone].    We comply with applicable federal civil rights laws and [place] laws. We do not discriminate on the basis of race, color, national origin, age, disability, sex, sexual orientation, or gender identity.            Thank you!     Thank you for choosing [facility] SOLID ORGAN TRANSPLANT  for your care. Our goal is always to provide you with excellent care. Hearing back from our patients is one way we can continue to improve our services. Please take a few minutes to complete the written survey that you may receive in the mail after your visit with us. Thank you!             Your Updated Medication List - Protect others around you: Learn how to safely use, store and throw away your medicines at www.disposemymeds.org.          This list is accurate as of 10/22/18 10:14 AM.  Always use your most recent med list.                   Brand Name Dispense Instructions for use Diagnosis    albuterol 108 (90 Base) MCG/ACT inhaler    PROAIR HFA/PROVENTIL HFA/VENTOLIN HFA     Inhale 2 " puffs into the lungs every 6 hours        aspirin 10 mg/mL Susp     975 mL    Take 32.5 mLs (325 mg) by mouth daily    Liver transplanted (H)       azaTHIOprine 50 MG tablet    IMURAN    45 tablet    Take 1.5 tablets (75 mg) by mouth daily    Liver transplanted (H)       GABAPENTIN PO      Take 300 mg by mouth At Bedtime        OMEPRAZOLE PO      Take 20 mg by mouth 2 times daily (before meals)        ondansetron 4 MG ODT tab    ZOFRAN-ODT    120 tablet    Take 1 tablet (4 mg) by mouth every 6 hours as needed for nausea or vomiting    Nausea       PRAMIPEXOLE DIHYDROCHLORIDE PO      Take 0.5 mg by mouth daily        sulfamethoxazole-trimethoprim suspension    BACTRIM/SEPTRA    560 mL    Take 10 mLs (80 mg) by mouth daily Dose based on TMP component.    Liver transplanted (H)       tacrolimus 1 MG capsule    GENERIC EQUIVALENT    300 capsule    Take 5 capsules (5 mg) by mouth 2 times daily    Liver transplanted (H)       valGANciclovir 450 MG tablet    VALCYTE    45 tablet    Take 1 tablet (450 mg) by mouth daily    Liver transplanted (H)       warfarin 2 MG tablet    COUMADIN    8 tablet    Take 2 tablets (4 mg) by mouth daily Every evening or as directed by provider    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)

## 2018-10-22 NOTE — LETTER
10/22/2018       RE: Sherrie Lala  [address] [facility] [address]     Dear Colleague,    Thank you for referring your patient, Sherrie Lala, to the [facility] SOLID ORGAN TRANSPLANT at [facility]. Please see a copy of my visit note below.    HPI      ROS      Physical Exam    Transplant Surgery -OUTPATIENT IMMUNOSUPPRESSION PROGRESS NOTE    Date of Visit: 10/22/2018    Transplants:  8/30/2018 (Liver); Postoperative day:  53  ASSESMENT AND PLAN:  1.Graft Function:Liver allograft: no rejection or technical problems.    2.Immunosuppression Management: keep tacrolimus level at 8-10 and continue imuran  3.Hypertension: ok  4.Renal Function: better  5.Lab frequency: twice weekly  6.Other:  Continue seeing ID AND Pulmonary   KUB: biliary  stent has passed       Date: October 22, 2018    Transplant:  [x]                             Liver [x]                              Kidney []                             Pancreas []                              Other:             Chief Complaint:  Doing better, nausea improved and not short of breath  History of Present Illness:  Post liver transplant    Patient Active Problem List   Diagnosis     Gastric ulcer     Osteoporosis     Bleeding esophageal varices (H)     Hydrothorax - hepatic     Liver transplanted (H)     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)     Open abdominal wall wound     Immunosuppressed status (H)     Acute post-operative pain     Acute blood loss anemia     Mild protein-calorie malnutrition (H)     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)     Hypervolemia     Positive sputum culture in cadaveric donor     Positive urine culture in cadaveric donor     SOCIAL /FAMILY HISTORY: [x]                  No recent change    Past Medical History:   Diagnosis Date     Asthma      Cholangiocarcinoma (H) 06/2014     Cirrhosis of liver with ascites (H) 5/10/2018     Encounter for pleural drainage tube placement      Esophageal  varices with hemorrhage (H)      Gastric ulcer      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     Liver transplanted (H) 2018     Lung metastases (H) 2014     Portal vein thrombosis      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    Residual thrombus in main and right portal     Past Surgical History:   Procedure Laterality Date     CHOLECYSTECTOMY       HEPATECTOMY PARTIAL       RETURN LIVER TRANSPLANT N/A 2018    Procedure: RETURN LIVER TRANSPLANT;  Open Abdomen, return liver transplant washout with   Mesh and liver stent  placement and  Abdomal Wound closure;  Surgeon: Darren Rod MD;  Location:  OR     TRANSPLANT LIVER RECIPIENT  DONOR N/A 2018    Procedure: TRANSPLANT LIVER RECIPIENT  DONOR;  TRANSPLANT LIVER RECIPIENT  DONOR ;  Surgeon: Darren Rod MD;  Location:  OR     Social History     Social History     Marital status:      Spouse name: N/A     Number of children: N/A     Years of education: N/A     Occupational History     Not on file.     Social History Main Topics     Smoking status: Never Smoker     Smokeless tobacco: Never Used     Alcohol use No      Comment: occ      Drug use: No     Sexual activity: Not on file     Other Topics Concern     Not on file     Social History Narrative     Prescription Medications as of 10/22/2018             albuterol (PROAIR HFA/PROVENTIL HFA/VENTOLIN HFA) 108 (90 BASE) MCG/ACT Inhaler Inhale 2 puffs into the lungs every 6 hours    aspirin 10 mg/mL SUSP Take 32.5 mLs (325 mg) by mouth daily    azaTHIOprine (IMURAN) 50 MG tablet Take 1.5 tablets (75 mg) by mouth daily    GABAPENTIN PO Take 300 mg by mouth At Bedtime    OMEPRAZOLE PO Take 20 mg by mouth 2 times daily (before meals)    ondansetron (ZOFRAN-ODT) 4 MG ODT tab Take 1 tablet (4 mg) by mouth every 6 hours as needed for nausea or vomiting    PRAMIPEXOLE DIHYDROCHLORIDE PO Take 0.5 mg by mouth daily    sulfamethoxazole-trimethoprim  "(BACTRIM/SEPTRA) suspension Take 10 mLs (80 mg) by mouth daily Dose based on TMP component.    tacrolimus (GENERIC EQUIVALENT) 1 MG capsule Take 5 capsules (5 mg) by mouth 2 times daily    valGANciclovir (VALCYTE) 450 MG tablet Take 1 tablet (450 mg) by mouth daily    warfarin (COUMADIN) 2 MG tablet Take 2 tablets (4 mg) by mouth daily Every evening or as directed by provider        Blood transfusion related (informational only) and Dilaudid [hydromorphone]   REVIEW OF SYSTEMS (check box if normal)  [x]               GENERAL  [x]                 PULMONARY [x]                GENITOURINARY  [x]                CNS                 [x]                 CARDIAC  [x]                 ENDOCRINE  [x]                EARS,NOSE,THROAT [x]                 GASTROINTESTINAL [x]                 NEUROLOGIC    [x]                MUSCLOSKELTAL  [x]                  HEMATOLOGY      PHYSICAL EXAM (check box if normal)/83  Pulse 83  Temp 97.4  F (36.3  C)  Ht 1.6 m (5' 3\")  Wt 53 kg (116 lb 12.8 oz)  SpO2 98%  BMI 20.69 kg/m2      [x]            GENERAL:    [x]       EYES:  ICTERIC   []        YES  []                    NO  [x]           EXTREMITIES: Clubbing []       Y     [x]           N    [x]           EARS, NOSE, THROAT: Membranes Moist    YES   [x]                   NO []                  [x]           LUNGS:  CLEAR    YES       [x]                  NO    []                                [x]           SKIN: Jaundice           YES       []                  NO    [x]                   Rash: YES       []                  NO    [x]                                     [x]             HEART: Regular Rate          YES       [x]                  NO    []                   Incision Clean:  YES       [x]                  NO    []                                [x]                    ABDOMEN: Wound health Organomegaly YES       []                  NO    [x]                       [x]                    NEUROLOGICAL:  Nonfocal  YES   "     [x]                  NO    []                       [x]                    Hernia YES       []                  NO    [x]                   PSYCHIATRIC:  Appropriate  YES       [x]                  NO    []                       OTHER:                                                                                                   PAIN SCALE:: 4    Again, thank you for allowing me to participate in the care of your patient.      Sincerely,    Darren Rod MD

## 2018-10-22 NOTE — TELEPHONE ENCOUNTER
Call to Sherrie to let her know that her stent is in her lower abdomen.  Asked for her to watch for it, let me know if it passes, otherwise my consider doing another abdominal film on her next visit.  Talked about prograf level of 7.4.  Has tremor but not going to decrease her dose.  Asked her to call me if tremor worsens, gets headache.  Will recheck all labs next Monday.

## 2018-10-24 DIAGNOSIS — Z79.60 LONG-TERM USE OF IMMUNOSUPPRESSANT MEDICATION: ICD-10-CM

## 2018-10-24 DIAGNOSIS — Z20.6 CONTACT WITH AND (SUSPECTED) EXPOSURE TO HUMAN IMMUNODEFICIENCY VIRUS (HIV): ICD-10-CM

## 2018-10-24 DIAGNOSIS — Z94.4 LIVER TRANSPLANTED (H): ICD-10-CM

## 2018-10-24 DIAGNOSIS — Z94.4 LIVER REPLACED BY TRANSPLANT (H): Primary | ICD-10-CM

## 2018-10-25 ENCOUNTER — AMBULATORY - RIVER FALLS (OUTPATIENT)
Dept: FAMILY MEDICINE | Facility: CLINIC | Age: 63
End: 2018-10-25
Payer: COMMERCIAL

## 2018-10-25 LAB
ASPERGILLUS AB TITR SER CF: NORMAL {TITER}
B DERMAT AB SER-ACNC: 0.2 IV
COCCIDIOIDES AB TITR SER CF: NORMAL {TITER}
H CAPSUL MYC AB TITR SER CF: NORMAL {TITER}
H CAPSUL YST AB TITR SER CF: NORMAL {TITER}

## 2018-10-25 RX ORDER — VALGANCICLOVIR 450 MG/1
450 TABLET, FILM COATED ORAL DAILY
Qty: 34 TABLET | Refills: 0 | Status: SHIPPED | OUTPATIENT
Start: 2018-10-25 | End: 2018-12-17

## 2018-10-28 ENCOUNTER — TELEPHONE (OUTPATIENT)
Dept: TRANSPLANT | Facility: CLINIC | Age: 63
End: 2018-10-28

## 2018-10-29 ENCOUNTER — TELEPHONE (OUTPATIENT)
Dept: TRANSPLANT | Facility: CLINIC | Age: 63
End: 2018-10-29

## 2018-10-29 DIAGNOSIS — R11.0 SEVERE NAUSEA: Primary | ICD-10-CM

## 2018-10-29 DIAGNOSIS — Z94.4 LIVER REPLACED BY TRANSPLANT (H): ICD-10-CM

## 2018-10-29 PROCEDURE — 80197 ASSAY OF TACROLIMUS: CPT | Performed by: INTERNAL MEDICINE

## 2018-10-29 RX ORDER — ONDANSETRON 4 MG/1
4 TABLET, FILM COATED ORAL 3 TIMES DAILY
Qty: 18 TABLET | Refills: 1 | COMMUNITY
Start: 2018-10-29 | End: 2018-12-05

## 2018-10-29 NOTE — TELEPHONE ENCOUNTER
"I noted that Sherrie called the oncall team this weekend for wretching, nausea, chills so I gave her a follow-up call.  She told me that Saturday morning she was up \"wretching uncontrollably all morning\", cold, clammy.  Some right lower quadrant pain - \"the wretching makes my back hurt\".   No fever.  No UTI symptoms.  Didn't eat much at all.  Sunday a bit better but again this morning wretching.  \"I lay on the couch - just don't have much strength.  \"I feel fragile\"  Thinks she may be dehydrated.  Has had an ulcer in the past, doesn't feel like an ulcer.  Reviewed w/ Dr. Lilly.  He suggested scheduled zofran (4 mg o q 6 hours) and an upper endoscopy.  We will try to set Sherrie up locally to receive a liter of IV fluids.  I have left a message w/ her PCP asking them to call me back.  I also see she's taking liquid bactrim.  I asked her to hold the bactrim for a few days to see if that might make a difference, decrease her nausea.  Ivan Stewart recommended zofran 4 mg po q 6 hours.  She will do this.  "

## 2018-10-29 NOTE — LETTER
OUTPATIENT    ORDER    Patient Name: Sherrie Lala  Transplant Date: 8/30/2018   YOB: 1955  Issue Date: 10/29/18           Diagnoses: [x]      Liver Transplant (ICD-10 Z94.4)          Please administer 1 liter NS today.    If you have questions, please call [phone] option 4 and ask to speak to Abigail.    .

## 2018-10-29 NOTE — TELEPHONE ENCOUNTER
Call back from Seda at Dr. Benitez's office stating they will give Sherrie a 1 liter NS flush today.  Order faxed.

## 2018-10-31 LAB
CREAT SERPL-MCNC: 0.99 MG/DL
GLUCOSE BLD-MCNC: 95 MG/DL
TACROLIMUS BLD-MCNC: 8.9 UG/L (ref 5–15)
TME LAST DOSE: NORMAL H

## 2018-11-01 ENCOUNTER — RADIANT APPOINTMENT (OUTPATIENT)
Dept: CT IMAGING | Facility: CLINIC | Age: 63
End: 2018-11-01
Attending: TRANSPLANT SURGERY
Payer: COMMERCIAL

## 2018-11-01 ENCOUNTER — TELEPHONE (OUTPATIENT)
Dept: TRANSPLANT | Facility: CLINIC | Age: 63
End: 2018-11-01

## 2018-11-01 ENCOUNTER — OFFICE VISIT (OUTPATIENT)
Dept: TRANSPLANT | Facility: CLINIC | Age: 63
End: 2018-11-01
Attending: TRANSPLANT SURGERY
Payer: MEDICARE

## 2018-11-01 ENCOUNTER — TEAM CONFERENCE (OUTPATIENT)
Dept: TRANSPLANT | Facility: CLINIC | Age: 63
End: 2018-11-01

## 2018-11-01 VITALS
DIASTOLIC BLOOD PRESSURE: 83 MMHG | SYSTOLIC BLOOD PRESSURE: 116 MMHG | OXYGEN SATURATION: 100 % | HEART RATE: 83 BPM | WEIGHT: 116.5 LBS | BODY MASS INDEX: 20.64 KG/M2 | TEMPERATURE: 97.8 F

## 2018-11-01 DIAGNOSIS — Z94.4 LIVER TRANSPLANTED (H): Primary | ICD-10-CM

## 2018-11-01 DIAGNOSIS — Z94.4 LIVER TRANSPLANTED (H): ICD-10-CM

## 2018-11-01 DIAGNOSIS — R10.9 ABDOMINAL PAIN: ICD-10-CM

## 2018-11-01 DIAGNOSIS — R68.83 CHILLS (WITHOUT FEVER): ICD-10-CM

## 2018-11-01 DIAGNOSIS — Z94.4 LIVER REPLACED BY TRANSPLANT (H): ICD-10-CM

## 2018-11-01 LAB
ALBUMIN SERPL-MCNC: 3.4 G/DL (ref 3.4–5)
ALP SERPL-CCNC: 66 U/L (ref 40–150)
ALT SERPL W P-5'-P-CCNC: 11 U/L (ref 0–50)
ANION GAP SERPL CALCULATED.3IONS-SCNC: 6 MMOL/L (ref 3–14)
AST SERPL W P-5'-P-CCNC: 16 U/L (ref 0–45)
BILIRUB DIRECT SERPL-MCNC: 0.2 MG/DL (ref 0–0.2)
BILIRUB SERPL-MCNC: 0.4 MG/DL (ref 0.2–1.3)
BUN SERPL-MCNC: 30 MG/DL (ref 7–30)
CALCIUM SERPL-MCNC: 7.5 MG/DL (ref 8.5–10.1)
CHLORIDE SERPL-SCNC: 104 MMOL/L (ref 94–109)
CO2 SERPL-SCNC: 25 MMOL/L (ref 20–32)
CREAT SERPL-MCNC: 0.98 MG/DL (ref 0.52–1.04)
ERYTHROCYTE [DISTWIDTH] IN BLOOD BY AUTOMATED COUNT: 15.3 % (ref 10–15)
GFR SERPL CREATININE-BSD FRML MDRD: 57 ML/MIN/1.7M2
GLUCOSE SERPL-MCNC: 92 MG/DL (ref 70–99)
HCT VFR BLD AUTO: 31.2 % (ref 35–47)
HGB BLD-MCNC: 10.1 G/DL (ref 11.7–15.7)
MAGNESIUM SERPL-MCNC: 1.7 MG/DL (ref 1.6–2.3)
MCH RBC QN AUTO: 27.4 PG (ref 26.5–33)
MCHC RBC AUTO-ENTMCNC: 32.4 G/DL (ref 31.5–36.5)
MCV RBC AUTO: 85 FL (ref 78–100)
PHOSPHATE SERPL-MCNC: 3.5 MG/DL (ref 2.5–4.5)
PLATELET # BLD AUTO: 105 10E9/L (ref 150–450)
POTASSIUM SERPL-SCNC: 4.6 MMOL/L (ref 3.4–5.3)
PROT SERPL-MCNC: 6.2 G/DL (ref 6.8–8.8)
RBC # BLD AUTO: 3.69 10E12/L (ref 3.8–5.2)
SODIUM SERPL-SCNC: 136 MMOL/L (ref 133–144)
WBC # BLD AUTO: 3.1 10E9/L (ref 4–11)

## 2018-11-01 PROCEDURE — 84100 ASSAY OF PHOSPHORUS: CPT | Performed by: INTERNAL MEDICINE

## 2018-11-01 PROCEDURE — 80076 HEPATIC FUNCTION PANEL: CPT | Performed by: INTERNAL MEDICINE

## 2018-11-01 PROCEDURE — 85027 COMPLETE CBC AUTOMATED: CPT | Performed by: INTERNAL MEDICINE

## 2018-11-01 PROCEDURE — 83735 ASSAY OF MAGNESIUM: CPT | Performed by: INTERNAL MEDICINE

## 2018-11-01 PROCEDURE — 80048 BASIC METABOLIC PNL TOTAL CA: CPT | Performed by: INTERNAL MEDICINE

## 2018-11-01 RX ORDER — IOPAMIDOL 755 MG/ML
72 INJECTION, SOLUTION INTRAVASCULAR ONCE
Status: COMPLETED | OUTPATIENT
Start: 2018-11-01 | End: 2018-11-01

## 2018-11-01 RX ADMIN — IOPAMIDOL 72 ML: 755 INJECTION, SOLUTION INTRAVASCULAR at 15:19

## 2018-11-01 NOTE — LETTER
11/1/2018       RE: Sherrie Lala  [address] [facility] [address]     Dear Colleague,    Thank you for referring your patient, Sherrie Lala, to the [facility] SOLID ORGAN TRANSPLANT at [facility]. Please see a copy of my visit note below.    HPI      ROS      Physical Exam    [facility] [place]   Transplant Surgery Daily Note          Assessment and Plan:     Has abdominal pain, on the right side, CT is un remarkable, will check CMV PCR ( has sweats)  And a stool for  C. diff          Interval History:         ROS: 10 point ROS neg other than the symptoms noted above         Physical Exam:   /83  Pulse 83  Temp 97.8  F (36.6  C)  Wt 52.8 kg (116 lb 8 oz)  SpO2 100%  BMI 20.64 kg/m2  Constitutional:   HEENT: PERRL, no icterus  Hematologic / Lymphatic:  Lungs: Clear to auscultation bilaterally  Cardiovascular: RRR, s1, s2  Abdomen: wound healthy  :       .

## 2018-11-01 NOTE — MR AVS SNAPSHOT
After Visit Summary   11/1/2018    Sherrie Lala    MRN: [id number]           Patient Information     Date Of Birth          1955        Visit Information        Provider Department      11/1/2018 2:30 PM Darren Rod MD [facility] Solid Organ Transplant        Today's Diagnoses     Liver transplanted (H)    -  1       Follow-ups after your visit        Your next 10 appointments already scheduled     Nov 01, 2018  4:40 PM CDT   CT ABDOMEN W/O & W CONTRAST with UCCT1   [facility] [place] [facility] ([facility])    [address] [address] [phone]           How do I prepare for my exam? (Food and drink instructions) To prepare: Do not eat or drink for 2 hours before your exam. If you need to take medicine, you may take it with small sips of water. (We may ask you to take liquid medicine as well.)  How do I prepare for my exam? (Other instructions) Please arrive 30 minutes early for your CT.  Once in the department you might be asked to drink water 15-20 minutes prior to your exam.  If indicated you may be asked to drink an oral contrast in advance of your CT.  If this is the case, the imaging team will let you know or be in contact with you prior to your appointment  Patients over 70 or patients with diabetes or kidney problems: If you haven t had a blood test (creatinine test) within the last 30 days, the Cardiologist/Radiologist may require you to get this test prior to your exam.  If you have diabetes:  Continue to take your metformin medication on the day of your exam  What should I wear: Please wear loose clothing, such as a sweat suit or jogging clothes. Avoid snaps, zippers and other metal. We may ask you to undress and put on a hospital gown.  How long does the exam take: Most scans take less than 20 minutes.  What should I bring: Please bring any scans or X-rays taken at other hospitals, if similar tests were done. Also  bring a list of your medicines, including vitamins, minerals and over-the-counter drugs. It is safest to leave personal items at home.  Do I need a : No  is needed.  What do I need to tell my doctor? Be sure to tell your doctor: * If you have any allergies. * If there s any chance you are pregnant. * If you are breastfeeding.  What should I do after the exam: No restrictions, You may resume normal activities.  What is this test: A CT (computed tomography) scan is a series of pictures that allows us to look inside your body. The scanner creates images of the body in cross sections, much like slices of bread. This helps us see any problems more clearly. You may receive contrast (X-ray dye) before or during your scan. You will be asked to drink the contrast.  Who should I call with questions: If you have any questions, please call the Imaging Department where you will have your exam. Directions, parking instructions, and other information is available on our website, [email or web address].            Nov 05, 2018  9:30 AM CST   Lab with  LAB   [facility] Lab ([facility])    [address]  1st Floor  [facility] [address]   [phone]            Nov 05, 2018 10:10 AM CST   (Arrive by 9:55 AM)   Liver Return Post Op with Darren Rod MD   [facility] Solid Organ Transplant ([facility])    [address] [place] [address] Se  [address]  [facility] [address]   [phone]            Jan 08, 2019  3:30 PM CST   (Arrive by 3:15 PM)   Return Visit with Lacy Gong MD   [place] and Infectious Diseases ([facility])    [address] [place] [address]  [address]  [facility] [address] [phone]              Future tests that were ordered for you today     Open Future Orders        Priority Expected Expires Ordered    Clostridium difficile toxin B PCR Routine  12/1/2018 11/1/2018            Who to contact     If you  have questions or need follow up information about today's clinic visit or your schedule please contact [facility] SOLID ORGAN TRANSPLANT directly at [phone].  Normal or non-critical lab and imaging results will be communicated to you by MyChart, letter or phone within 4 business days after the clinic has received the results. If you do not hear from us within 7 days, please contact the clinic through [employer] or phone. If you have a critical or abnormal lab result, we will notify you by phone as soon as possible.  Submit refill requests through [employer] or call your pharmacy and they will forward the refill request to us. Please allow 3 business days for your refill to be completed.          Additional Information About Your Visit        [employer] Information     [employer] gives you secure access to your electronic health record. If you see a primary care provider, you can also send messages to your care team and make appointments. If you have questions, please call your primary care clinic.  If you do not have a primary care provider, please call [phone] and they will assist you.        Care EveryWhere ID     This is your Care EveryWhere ID. This could be used by other organizations to access your [place] medical records  [id number]        Your Vitals Were     Pulse Temperature Pulse Oximetry BMI (Body Mass Index)          83 97.8  F (36.6  C) 100% 20.64 kg/m2         Blood Pressure from Last 3 Encounters:   11/01/18 116/83   10/22/18 127/83   10/16/18 126/83    Weight from Last 3 Encounters:   11/01/18 52.8 kg (116 lb 8 oz)   10/22/18 53 kg (116 lb 12.8 oz)   10/16/18 54.2 kg (119 lb 8 oz)               Primary Care Provider Office Phone # Fax #    Apollo Benitez [phone]       [address] [address]        Equal Access to Services     HARINI CHAMBERLAIN : Natalio Ordonez, saman ovalle, nahed grossman  lamanasa chi. So [facility] [phone].    ATENCIÓN: Si habla mango, tiene a bucio disposición servicios gratuitos de asistencia lingüística. Neville al [phone].    We comply with applicable federal civil rights laws and [place] laws. We do not discriminate on the basis of race, color, national origin, age, disability, sex, sexual orientation, or gender identity.            Thank you!     Thank you for choosing [facility] SOLID ORGAN TRANSPLANT  for your care. Our goal is always to provide you with excellent care. Hearing back from our patients is one way we can continue to improve our services. Please take a few minutes to complete the written survey that you may receive in the mail after your visit with us. Thank you!             Your Updated Medication List - Protect others around you: Learn how to safely use, store and throw away your medicines at www.disposemymeds.org.          This list is accurate as of 11/1/18  4:18 PM.  Always use your most recent med list.                   Brand Name Dispense Instructions for use Diagnosis    albuterol 108 (90 Base) MCG/ACT inhaler    PROAIR HFA/PROVENTIL HFA/VENTOLIN HFA     Inhale 2 puffs into the lungs every 6 hours        azaTHIOprine 50 MG tablet    IMURAN    45 tablet    Take 2 tablets (100 mg) by mouth daily    Liver transplanted (H)       GABAPENTIN PO      Take 300 mg by mouth At Bedtime        OMEPRAZOLE PO      Take 20 mg by mouth 2 times daily (before meals)        ondansetron 4 MG tablet    ZOFRAN    18 tablet    Take 1 tablet (4 mg) by mouth 3 times daily        PRAMIPEXOLE DIHYDROCHLORIDE PO      Take 0.5 mg by mouth daily        sulfamethoxazole-trimethoprim suspension    BACTRIM/SEPTRA    560 mL    Take 10 mLs (80 mg) by mouth daily Dose based on TMP component.    Liver transplanted (H)       tacrolimus 1 MG capsule    GENERIC EQUIVALENT    300 capsule    Take 5 capsules (5 mg) by mouth 2 times daily    Liver transplanted (H)       valGANciclovir 450 MG tablet     VALCYTE    34 tablet    Take 1 tablet (450 mg) by mouth daily    Liver transplanted (H)       warfarin 2 MG tablet    COUMADIN    8 tablet    Take 2 tablets (4 mg) by mouth daily Every evening or as directed by provider    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)

## 2018-11-01 NOTE — PROGRESS NOTES
HPI      ROS      Physical Exam    [facility] [place]   Transplant Surgery Daily Note          Assessment and Plan:     Has abdominal pain, on the right side, CT is un remarkable, will check CMV PCR ( has sweats)  And a stool for  C. diff          Interval History:         ROS: 10 point ROS neg other than the symptoms noted above         Physical Exam:   /83  Pulse 83  Temp 97.8  F (36.6  C)  Wt 52.8 kg (116 lb 8 oz)  SpO2 100%  BMI 20.64 kg/m2  Constitutional:   HEENT: PERRL, no icterus  Hematologic / Lymphatic:  Lungs: Clear to auscultation bilaterally  Cardiovascular: RRR, s1, s2  Abdomen: wound healthy  :       .

## 2018-11-01 NOTE — TELEPHONE ENCOUNTER
Post Liver Transplant Team Conference  Date: 11/1/2018  Transplant Coordinator: Abigail Parham  Transplant Surgeon: Darren Rod      Attendees:  [x] Dr. Dias [] Dr. Fields []       [x] Dr. Rod [x] Thien Moody LPN []    [] Dr. Rayo [] Gabriela Moreland NP []    [] Dr. Lanza [] Maribel Stringer NP []    [] Dr. Blackwood [] Marifer Leija RN []       [] Dr. Lilly [x] Janae Gallegos RN []       [] Dr. Kapil Gamble [x] Abigail Parham, BECKI []       [] Dr. Staples [] Leighann Rodriguez RN []       [] Dr. Rowe [] Darlene Dooley, RN []       [] Dr. Garcias [] Tye Early, RN []         Verbal Plan Read Back:   Needs endoscopy to evaluate ongoing nausea, bactrim on hold to see if this might be causing nausea.  Needs to use dietary supplements, see dietician when she comes next week.    Routed to RN Coordinator   Abigail Parham

## 2018-11-01 NOTE — TELEPHONE ENCOUNTER
"Call from Sherrie stating \"there's something wrong\".  Wretching is only a bit better on scheduled zofran.  She is having right sided pain, says it's hard to straighten up.  Is cold all the time, feels like she's shivering, no fever.  REviewed w/ Dr. Rod.  He would like abdomen / pelvis CT w/ oral contrast only, no IV.  He would also like to see her in clinic today.  Would also like CMV DNA PCR.  Orders placed.  "

## 2018-11-01 NOTE — DISCHARGE INSTRUCTIONS

## 2018-11-02 ENCOUNTER — TELEPHONE (OUTPATIENT)
Dept: TRANSPLANT | Facility: CLINIC | Age: 63
End: 2018-11-02

## 2018-11-02 DIAGNOSIS — Z94.4 LIVER TRANSPLANTED (H): ICD-10-CM

## 2018-11-02 RX ORDER — TACROLIMUS 1 MG/1
4 CAPSULE ORAL 2 TIMES DAILY
Qty: 240 CAPSULE | Refills: 11 | Status: SHIPPED | OUTPATIENT
Start: 2018-11-02 | End: 2018-11-20

## 2018-11-02 NOTE — TELEPHONE ENCOUNTER
Call to Sherrie to see how she's doing.  CT yesterday was negative.  Per Maggie, OK to lower goal for prograf to 6-8.  Spoke to Sherrie, asked her to decrease to 4 mg po bid.  Will see her in clinic again on Monday.

## 2018-11-03 LAB
CMV DNA SPEC NAA+PROBE-ACNC: NORMAL [IU]/ML
CMV DNA SPEC NAA+PROBE-LOG#: NORMAL {LOG_IU}/ML
SPECIMEN SOURCE: NORMAL

## 2018-11-04 DIAGNOSIS — Z94.4 LIVER TRANSPLANTED (H): ICD-10-CM

## 2018-11-05 ENCOUNTER — TELEPHONE (OUTPATIENT)
Dept: TRANSPLANT | Facility: CLINIC | Age: 63
End: 2018-11-05

## 2018-11-05 ENCOUNTER — OFFICE VISIT (OUTPATIENT)
Dept: TRANSPLANT | Facility: CLINIC | Age: 63
End: 2018-11-05
Attending: TRANSPLANT SURGERY
Payer: MEDICARE

## 2018-11-05 ENCOUNTER — HOSPITAL ENCOUNTER (OUTPATIENT)
Facility: CLINIC | Age: 63
End: 2018-11-05
Attending: INTERNAL MEDICINE | Admitting: INTERNAL MEDICINE
Payer: MEDICARE

## 2018-11-05 VITALS
HEIGHT: 63 IN | WEIGHT: 118.7 LBS | RESPIRATION RATE: 18 BRPM | DIASTOLIC BLOOD PRESSURE: 90 MMHG | TEMPERATURE: 97.8 F | SYSTOLIC BLOOD PRESSURE: 120 MMHG | HEART RATE: 86 BPM | BODY MASS INDEX: 21.03 KG/M2

## 2018-11-05 DIAGNOSIS — D84.9 IMMUNOSUPPRESSION (H): ICD-10-CM

## 2018-11-05 DIAGNOSIS — R10.9 ABDOMINAL PAIN: ICD-10-CM

## 2018-11-05 DIAGNOSIS — Z94.4 LIVER REPLACED BY TRANSPLANT (H): ICD-10-CM

## 2018-11-05 DIAGNOSIS — Z94.4 LIVER TRANSPLANTED (H): Primary | ICD-10-CM

## 2018-11-05 LAB
ALBUMIN SERPL-MCNC: 3.9 G/DL (ref 3.4–5)
ALP SERPL-CCNC: 68 U/L (ref 40–150)
ALT SERPL W P-5'-P-CCNC: 12 U/L (ref 0–50)
ANION GAP SERPL CALCULATED.3IONS-SCNC: 7 MMOL/L (ref 3–14)
AST SERPL W P-5'-P-CCNC: 9 U/L (ref 0–45)
BILIRUB DIRECT SERPL-MCNC: 0.2 MG/DL (ref 0–0.2)
BILIRUB SERPL-MCNC: 0.5 MG/DL (ref 0.2–1.3)
BUN SERPL-MCNC: 27 MG/DL (ref 7–30)
CALCIUM SERPL-MCNC: 8.3 MG/DL (ref 8.5–10.1)
CHLORIDE SERPL-SCNC: 107 MMOL/L (ref 94–109)
CO2 SERPL-SCNC: 24 MMOL/L (ref 20–32)
CREAT SERPL-MCNC: 1.02 MG/DL (ref 0.52–1.04)
ERYTHROCYTE [DISTWIDTH] IN BLOOD BY AUTOMATED COUNT: 15.8 % (ref 10–15)
GFR SERPL CREATININE-BSD FRML MDRD: 55 ML/MIN/1.7M2
GLUCOSE SERPL-MCNC: 88 MG/DL (ref 70–99)
HCT VFR BLD AUTO: 36.4 % (ref 35–47)
HGB BLD-MCNC: 11.6 G/DL (ref 11.7–15.7)
MAGNESIUM SERPL-MCNC: 2 MG/DL (ref 1.6–2.3)
MCH RBC QN AUTO: 27.2 PG (ref 26.5–33)
MCHC RBC AUTO-ENTMCNC: 31.9 G/DL (ref 31.5–36.5)
MCV RBC AUTO: 85 FL (ref 78–100)
PHOSPHATE SERPL-MCNC: 3.8 MG/DL (ref 2.5–4.5)
PLATELET # BLD AUTO: 133 10E9/L (ref 150–450)
POTASSIUM SERPL-SCNC: 4.6 MMOL/L (ref 3.4–5.3)
PROT SERPL-MCNC: 7 G/DL (ref 6.8–8.8)
RBC # BLD AUTO: 4.27 10E12/L (ref 3.8–5.2)
SODIUM SERPL-SCNC: 138 MMOL/L (ref 133–144)
TACROLIMUS BLD-MCNC: 6.1 UG/L (ref 5–15)
TME LAST DOSE: NORMAL H
WBC # BLD AUTO: 3.7 10E9/L (ref 4–11)

## 2018-11-05 PROCEDURE — 80076 HEPATIC FUNCTION PANEL: CPT | Performed by: INTERNAL MEDICINE

## 2018-11-05 PROCEDURE — 83735 ASSAY OF MAGNESIUM: CPT | Performed by: INTERNAL MEDICINE

## 2018-11-05 PROCEDURE — 85027 COMPLETE CBC AUTOMATED: CPT | Performed by: INTERNAL MEDICINE

## 2018-11-05 PROCEDURE — 36415 COLL VENOUS BLD VENIPUNCTURE: CPT | Performed by: INTERNAL MEDICINE

## 2018-11-05 PROCEDURE — 80197 ASSAY OF TACROLIMUS: CPT | Performed by: INTERNAL MEDICINE

## 2018-11-05 PROCEDURE — 84100 ASSAY OF PHOSPHORUS: CPT | Performed by: INTERNAL MEDICINE

## 2018-11-05 PROCEDURE — 80048 BASIC METABOLIC PNL TOTAL CA: CPT | Performed by: INTERNAL MEDICINE

## 2018-11-05 PROCEDURE — G0463 HOSPITAL OUTPT CLINIC VISIT: HCPCS | Mod: ZF

## 2018-11-05 RX ORDER — NICOTINE POLACRILEX 4 MG/1
40 GUM, CHEWING ORAL 2 TIMES DAILY
Qty: 120 TABLET | Refills: 3 | Status: SHIPPED | OUTPATIENT
Start: 2018-11-05 | End: 2019-03-11

## 2018-11-05 RX ORDER — SULFAMETHOXAZOLE/TRIMETHOPRIM 800-160 MG
TABLET ORAL
Qty: 8 TABLET | Refills: 3 | Status: SHIPPED | OUTPATIENT
Start: 2018-11-05 | End: 2018-11-12

## 2018-11-05 ASSESSMENT — PAIN SCALES - GENERAL: PAINLEVEL: MILD PAIN (2)

## 2018-11-05 NOTE — PROGRESS NOTES
Transplant Surgery -OUTPATIENT IMMUNOSUPPRESSION PROGRESS NOTE    Date of Visit: 11/05/2018    Transplants:  8/30/2018 (Liver); Postoperative day:  67  ASSESMENT AND PLAN:  1.Graft Function: Liver allograft: no rejection or technical problems.    2.Immunosuppression Management: keep tacrolimus levels 6-8  3.Hypertension: ok  4.Renal Function: ok  5.Lab frequency:  6.Other:  Upper GI discomfort, increase protonix to 40mg po bid  Continue anticoagulation      Date: November 5, 2018    Transplant:  [x]                             Liver [x]                              Kidney []                             Pancreas []                              Other:             Chief Complaint:  Has upper GI discomfort   History of Present Illness:  Post liver transplant  Patient Active Problem List   Diagnosis     Gastric ulcer     Osteoporosis     Bleeding esophageal varices (H)     Hydrothorax - hepatic     Liver transplanted (H)     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)     Open abdominal wall wound     Immunosuppressed status (H)     Acute post-operative pain     Acute blood loss anemia     Mild protein-calorie malnutrition (H)     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)     Hypervolemia     Positive sputum culture in cadaveric donor     Positive urine culture in cadaveric donor     SOCIAL /FAMILY HISTORY: [x]                  No recent change    Past Medical History:   Diagnosis Date     Asthma      Cholangiocarcinoma (H) 06/2014     Cirrhosis of liver with ascites (H) 5/10/2018     Encounter for pleural drainage tube placement      Esophageal varices with hemorrhage (H)      Gastric ulcer      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     Liver transplanted (H) 08/30/2018     Lung metastases (H) 08/2014     Portal vein thrombosis      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) 08/31/2018    Residual thrombus in main and right portal     Past Surgical History:   Procedure Laterality Date     CHOLECYSTECTOMY        HEPATECTOMY PARTIAL       RETURN LIVER TRANSPLANT N/A 2018    Procedure: RETURN LIVER TRANSPLANT;  Open Abdomen, return liver transplant washout with   Mesh and liver stent  placement and  Abdomal Wound closure;  Surgeon: Darren Rod MD;  Location: [facility] OR     TRANSPLANT LIVER RECIPIENT  DONOR N/A 2018    Procedure: TRANSPLANT LIVER RECIPIENT  DONOR;  TRANSPLANT LIVER RECIPIENT  DONOR ;  Surgeon: Darren Rod MD;  Location: [facility] OR     Social History     Social History     Marital status:      Spouse name: N/A     Number of children: N/A     Years of education: N/A     Occupational History     Not on file.     Social History Main Topics     Smoking status: Never Smoker     Smokeless tobacco: Never Used     Alcohol use No      Comment: occ      Drug use: No     Sexual activity: Not on file     Other Topics Concern     Not on file     Social History Narrative     Prescription Medications as of 2018             albuterol (PROAIR HFA/PROVENTIL HFA/VENTOLIN HFA) 108 (90 BASE) MCG/ACT Inhaler Inhale 2 puffs into the lungs every 6 hours    azaTHIOprine (IMURAN) 50 MG tablet Take 2 tablets (100 mg) by mouth daily    GABAPENTIN PO Take 300 mg by mouth At Bedtime    OMEPRAZOLE PO Take 20 mg by mouth 2 times daily (before meals)    ondansetron (ZOFRAN) 4 MG tablet Take 1 tablet (4 mg) by mouth 3 times daily    PRAMIPEXOLE DIHYDROCHLORIDE PO Take 0.5 mg by mouth daily    sulfamethoxazole-trimethoprim (BACTRIM/SEPTRA) suspension Take 10 mLs (80 mg) by mouth daily Dose based on TMP component.    tacrolimus (GENERIC EQUIVALENT) 1 MG capsule Take 4 capsules (4 mg) by mouth 2 times daily    valGANciclovir (VALCYTE) 450 MG tablet Take 1 tablet (450 mg) by mouth daily    warfarin (COUMADIN) 2 MG tablet Take 2 tablets (4 mg) by mouth daily Every evening or as directed by provider        Blood transfusion related (informational only) and Dilaudid [hydromorphone]   REVIEW OF  "SYSTEMS (check box if normal)  [x]               GENERAL  [x]                 PULMONARY [x]                GENITOURINARY  [x]                CNS                 [x]                 CARDIAC  [x]                 ENDOCRINE  [x]                EARS,NOSE,THROAT [x]                 GASTROINTESTINAL [x]                 NEUROLOGIC    [x]                MUSCLOSKELTAL  [x]                  HEMATOLOGY      PHYSICAL EXAM (check box if normal)/90  Pulse 86  Temp 97.8  F (36.6  C) (Oral)  Resp 18  Ht 1.6 m (5' 3\")  Wt 53.8 kg (118 lb 11.2 oz)  BMI 21.03 kg/m2        [x]            GENERAL:    [x]       EYES:  ICTERIC   []        YES  []                    NO  [x]           EXTREMITIES: Clubbing []       Y     [x]           N    [x]           EARS, NOSE, THROAT: Membranes Moist    YES   [x]                   NO []                  [x]           LUNGS:  CLEAR    YES       [x]                  NO    []                                [x]           SKIN: Jaundice           YES       []                  NO    [x]                   Rash: YES       []                  NO    [x]                                     [x]             HEART: Regular Rate          YES       [x]                  NO    []                   Incision Clean:  YES       [x]                  NO    []                                [x]                    ABDOMEN: Abdomen soft, tenderness improved Organomegaly YES       []                  NO    [x]                       [x]                    NEUROLOGICAL:  Nonfocal  YES       [x]                  NO    []                       [x]                    Hernia YES       []                  NO    [x]                   PSYCHIATRIC:  Appropriate  YES       [x]                  NO    []                       OTHER:                                                                                                   PAIN SCALE:: 3  "

## 2018-11-05 NOTE — MR AVS SNAPSHOT
After Visit Summary   11/5/2018    Sherrie Lala    MRN: [id number]           Patient Information     Date Of Birth          1955        Visit Information        Provider Department      11/5/2018 10:10 AM Darren Rod MD [facility] Solid Organ Transplant        Today's Diagnoses     Liver transplanted (H)    -  1    Immunosuppression (H)           Follow-ups after your visit        Your next 10 appointments already scheduled     Nov 19, 2018 10:00 AM CST   Lab with  LAB   [facility] ([facility])    [address] [place] [address]  [address] [facility] [address]   [phone]            Nov 19, 2018 10:30 AM CST   (Arrive by 10:15 AM)   Liver Return Post Op with Darren Rod MD   [facility] Solid Organ Transplant ([facility])    [address] [place] [address]  [address]  [facility] [address]   [phone]            Jan 08, 2019  3:30 PM CST   (Arrive by 3:15 PM)   Return Visit with Lacy Gong MD   [facility] and Infectious Diseases ([facility])    [address] [place] [address]  [address]  [facility] [address]   [phone]            Feb 01, 2019  9:15 AM CST   Lab with  LAB   [facility] ([facility])    [address] [place] [address]   [phone]            Feb 01, 2019 10:00 AM CST   (Arrive by 9:45 AM)   Return Liver Transplant with Bradly Lilly MD   [facility] Hepatology ([facility])    [address] [place] [address]  [address]  [facility] [address]   [phone]              Who to contact     If you have questions or need follow up information about today's clinic visit or your schedule please contact [facility] SOLID ORGAN TRANSPLANT directly at [phone].  Normal or non-critical lab and imaging results will be communicated to you by MyChart, letter or phone within 4 business days after the clinic has received the  "results. If you do not hear from us within 7 days, please contact the clinic through [employer] or phone. If you have a critical or abnormal lab result, we will notify you by phone as soon as possible.  Submit refill requests through [employer] or call your pharmacy and they will forward the refill request to us. Please allow 3 business days for your refill to be completed.          Additional Information About Your Visit        [employer] Information     [employer] gives you secure access to your electronic health record. If you see a primary care provider, you can also send messages to your care team and make appointments. If you have questions, please call your primary care clinic.  If you do not have a primary care provider, please call [phone] and they will assist you.        Care EveryWhere ID     This is your Care EveryWhere ID. This could be used by other organizations to access your [place] medical records  [id number]        Your Vitals Were     Pulse Temperature Respirations Height BMI (Body Mass Index)       86 97.8  F (36.6  C) (Oral) 18 1.6 m (5' 3\") 21.03 kg/m2        Blood Pressure from Last 3 Encounters:   11/05/18 120/90   11/01/18 116/83   10/22/18 127/83    Weight from Last 3 Encounters:   11/05/18 53.8 kg (118 lb 11.2 oz)   11/01/18 52.8 kg (116 lb 8 oz)   10/22/18 53 kg (116 lb 12.8 oz)              Today, you had the following     No orders found for display       Primary Care Provider Office Phone # Fax #    Apollo Benitez [phone]       [address] [address]        Equal Access to Services     [facility] HospitalDOMIN[facility]QUE : Hadii lucien huizar hadashdale Soleta, waaxda luqadaha, qaybta kaalmanahed olivo. So [facility] [phone].    ATENCIÓN: Si habla español, tiene a bucio disposición servicios gratuitos de asistencia lingüística. Llame al [phone].    We comply with applicable federal civil rights laws and [place] laws. We " do not discriminate on the basis of race, color, national origin, age, disability, sex, sexual orientation, or gender identity.            Thank you!     Thank you for choosing [facility] SOLID ORGAN TRANSPLANT  for your care. Our goal is always to provide you with excellent care. Hearing back from our patients is one way we can continue to improve our services. Please take a few minutes to complete the written survey that you may receive in the mail after your visit with us. Thank you!             Your Updated Medication List - Protect others around you: Learn how to safely use, store and throw away your medicines at www.disposemymeds.org.          This list is accurate as of 11/5/18 10:27 AM.  Always use your most recent med list.                   Brand Name Dispense Instructions for use Diagnosis    albuterol 108 (90 Base) MCG/ACT inhaler    PROAIR HFA/PROVENTIL HFA/VENTOLIN HFA     Inhale 2 puffs into the lungs every 6 hours        azaTHIOprine 50 MG tablet    IMURAN    45 tablet    Take 2 tablets (100 mg) by mouth daily    Liver transplanted (H)       OMEPRAZOLE PO      Take 20 mg by mouth 2 times daily (before meals)        ondansetron 4 MG tablet    ZOFRAN    18 tablet    Take 1 tablet (4 mg) by mouth 3 times daily        PRAMIPEXOLE DIHYDROCHLORIDE PO      Take 0.5 mg by mouth daily        sulfamethoxazole-trimethoprim suspension    BACTRIM/SEPTRA    560 mL    Take 10 mLs (80 mg) by mouth daily Dose based on TMP component.    Liver transplanted (H)       tacrolimus 1 MG capsule    GENERIC EQUIVALENT    240 capsule    Take 4 capsules (4 mg) by mouth 2 times daily    Liver transplanted (H)       valGANciclovir 450 MG tablet    VALCYTE    34 tablet    Take 1 tablet (450 mg) by mouth daily    Liver transplanted (H)       warfarin 2 MG tablet    COUMADIN    8 tablet    Take 2 tablets (4 mg) by mouth daily Every evening or as directed by provider    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)

## 2018-11-05 NOTE — NURSING NOTE
"Chief Complaint   Patient presents with     RECHECK     liver tx follow up     Blood pressure 120/90, pulse 86, temperature 97.8  F (36.6  C), temperature source Oral, resp. rate 18, height 1.6 m (5' 3\"), weight 53.8 kg (118 lb 11.2 oz).    RAMÍREZ VANEGAS CMA    "

## 2018-11-05 NOTE — LETTER
11/5/2018      RE: Sherrie Lala  [address] [facility] [address]       Transplant Surgery -OUTPATIENT IMMUNOSUPPRESSION PROGRESS NOTE    Date of Visit: 11/05/2018    Transplants:  8/30/2018 (Liver); Postoperative day:  67  ASSESMENT AND PLAN:  1.Graft Function: Liver allograft: no rejection or technical problems.    2.Immunosuppression Management: keep tacrolimus levels 6-8  3.Hypertension: ok  4.Renal Function: ok  5.Lab frequency:  6.Other:  Upper GI discomfort, increase protonix to 40mg po bid  Continue anticoagulation      Date: November 5, 2018    Transplant:  [x]                             Liver [x]                              Kidney []                             Pancreas []                              Other:             Chief Complaint:  Has upper GI discomfort   History of Present Illness:  Post liver transplant  Patient Active Problem List   Diagnosis     Gastric ulcer     Osteoporosis     Bleeding esophageal varices (H)     Hydrothorax - hepatic     Liver transplanted (H)     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)     Open abdominal wall wound     Immunosuppressed status (H)     Acute post-operative pain     Acute blood loss anemia     Mild protein-calorie malnutrition (H)     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)     Hypervolemia     Positive sputum culture in cadaveric donor     Positive urine culture in cadaveric donor     SOCIAL /FAMILY HISTORY: [x]                  No recent change    Past Medical History:   Diagnosis Date     Asthma      Cholangiocarcinoma (H) 06/2014     Cirrhosis of liver with ascites (H) 5/10/2018     Encounter for pleural drainage tube placement      Esophageal varices with hemorrhage (H)      Gastric ulcer      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     Liver transplanted (H) 08/30/2018     Lung metastases (H) 08/2014     Portal vein thrombosis      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) 08/31/2018    Residual thrombus in main and right portal      Past Surgical History:   Procedure Laterality Date     CHOLECYSTECTOMY       HEPATECTOMY PARTIAL       RETURN LIVER TRANSPLANT N/A 2018    Procedure: RETURN LIVER TRANSPLANT;  Open Abdomen, return liver transplant washout with   Mesh and liver stent  placement and  Abdomal Wound closure;  Surgeon: Darren Rod MD;  Location: [facility] OR     TRANSPLANT LIVER RECIPIENT  DONOR N/A 2018    Procedure: TRANSPLANT LIVER RECIPIENT  DONOR;  TRANSPLANT LIVER RECIPIENT  DONOR ;  Surgeon: Darren Rod MD;  Location:  OR     Social History     Social History     Marital status:      Spouse name: N/A     Number of children: N/A     Years of education: N/A     Occupational History     Not on file.     Social History Main Topics     Smoking status: Never Smoker     Smokeless tobacco: Never Used     Alcohol use No      Comment: occ      Drug use: No     Sexual activity: Not on file     Other Topics Concern     Not on file     Social History Narrative     Prescription Medications as of 2018             albuterol (PROAIR HFA/PROVENTIL HFA/VENTOLIN HFA) 108 (90 BASE) MCG/ACT Inhaler Inhale 2 puffs into the lungs every 6 hours    azaTHIOprine (IMURAN) 50 MG tablet Take 2 tablets (100 mg) by mouth daily    GABAPENTIN PO Take 300 mg by mouth At Bedtime    OMEPRAZOLE PO Take 20 mg by mouth 2 times daily (before meals)    ondansetron (ZOFRAN) 4 MG tablet Take 1 tablet (4 mg) by mouth 3 times daily    PRAMIPEXOLE DIHYDROCHLORIDE PO Take 0.5 mg by mouth daily    sulfamethoxazole-trimethoprim (BACTRIM/SEPTRA) suspension Take 10 mLs (80 mg) by mouth daily Dose based on TMP component.    tacrolimus (GENERIC EQUIVALENT) 1 MG capsule Take 4 capsules (4 mg) by mouth 2 times daily    valGANciclovir (VALCYTE) 450 MG tablet Take 1 tablet (450 mg) by mouth daily    warfarin (COUMADIN) 2 MG tablet Take 2 tablets (4 mg) by mouth daily Every evening or as directed by provider        Blood  "transfusion related (informational only) and Dilaudid [hydromorphone]   REVIEW OF SYSTEMS (check box if normal)  [x]               GENERAL  [x]                 PULMONARY [x]                GENITOURINARY  [x]                CNS                 [x]                 CARDIAC  [x]                 ENDOCRINE  [x]                EARS,NOSE,THROAT [x]                 GASTROINTESTINAL [x]                 NEUROLOGIC    [x]                MUSCLOSKELTAL  [x]                  HEMATOLOGY      PHYSICAL EXAM (check box if normal)/90  Pulse 86  Temp 97.8  F (36.6  C) (Oral)  Resp 18  Ht 1.6 m (5' 3\")  Wt 53.8 kg (118 lb 11.2 oz)  BMI 21.03 kg/m2        [x]            GENERAL:    [x]       EYES:  ICTERIC   []        YES  []                    NO  [x]           EXTREMITIES: Clubbing []       Y     [x]           N    [x]           EARS, NOSE, THROAT: Membranes Moist    YES   [x]                   NO []                  [x]           LUNGS:  CLEAR    YES       [x]                  NO    []                                [x]           SKIN: Jaundice           YES       []                  NO    [x]                   Rash: YES       []                  NO    [x]                                     [x]             HEART: Regular Rate          YES       [x]                  NO    []                   Incision Clean:  YES       [x]                  NO    []                                [x]                    ABDOMEN: Abdomen soft, tenderness improved Organomegaly YES       []                  NO    [x]                       [x]                    NEUROLOGICAL:  Nonfocal  YES       [x]                  NO    []                       [x]                    Hernia YES       []                  NO    [x]                   PSYCHIATRIC:  Appropriate  YES       [x]                  NO    []                       OTHER:                                                                                                   PAIN SCALE:: " 3    Darren Rod MD

## 2018-11-05 NOTE — TELEPHONE ENCOUNTER
"Here for post liver transplant follow-up.  Continues to have right epigastric pain which now \"goes thru to my back\".  Has a history of ulcer, was ordered for upper endoscopy but still hasn't been scheduled.  She will schedule today on her way out.  Dr. Neal increased protonix to 40 mg po bid.  Feels slightly better than last week.  Will restart bactrim at 1 single strength tab every Monday and Thursday, aspirin changed to baby aspirin.  We lowered Sherrie's prograf dose last week and she reports that the \"feeling of shaking all the time\" has resolved.  Reviewed recent labs and assisted with interpretation.     Complaints:  abd pain    Current immunosuppression:   Prograf w/ goal of 6-8     Med changes: Increased protonix to 40 bid, restarted bactrim, changed aspirin to baby aspirin.  Updated patient's med card.    Lab frequency:  weekly    Follow-up:  Hepatology in 2 weeks, derm and PCP annually.     "

## 2018-11-08 ENCOUNTER — AMBULATORY - RIVER FALLS (OUTPATIENT)
Dept: FAMILY MEDICINE | Facility: CLINIC | Age: 63
End: 2018-11-08
Payer: COMMERCIAL

## 2018-11-08 ENCOUNTER — TEAM CONFERENCE (OUTPATIENT)
Dept: TRANSPLANT | Facility: CLINIC | Age: 63
End: 2018-11-08

## 2018-11-08 DIAGNOSIS — Z94.4 LIVER TRANSPLANTED (H): Primary | ICD-10-CM

## 2018-11-08 NOTE — TELEPHONE ENCOUNTER
Post Liver Transplant Team Conference  Date: 11/8/2018  Transplant Coordinator: Abigail Parham  Transplant Surgeon: Darren Rod      Attendees:  [x] Dr. Dias [] Dr. Fields []       [] Dr. Rod [x] Thien Moody LPN []    [] Dr. Rayo [] Gabriela Moreland NP []    [] Dr. Lanza [] Maribel Stringer NP []    [x] Dr. Blackwood [] Marifer Leija RN []       [] Dr. Lilly [x] Janae Gallegos RN []       [] Dr. Kapil Gamble [x] Abigail Parham, RN []       [] Dr. Staples [x] Leighann Rodriguez RN []       [] Dr. Rowe [] Darlene Dooley, RN []       [] Dr. Garcias [] Tye Early, BECKI []         Verbal Plan Read Back:   REviewed abdominal CT.  Dr. Blackwood would like a liver US w/ doppler to evaluate narrowed PV.  Can do when she returns in 1.5 weeks    Routed to RN Coordinator   Abigail Parham

## 2018-11-12 ENCOUNTER — TELEPHONE (OUTPATIENT)
Dept: PHARMACY | Facility: CLINIC | Age: 63
End: 2018-11-12

## 2018-11-12 ENCOUNTER — TELEPHONE (OUTPATIENT)
Dept: TRANSPLANT | Facility: CLINIC | Age: 63
End: 2018-11-12

## 2018-11-12 DIAGNOSIS — Z94.4 LIVER TRANSPLANTED (H): Primary | ICD-10-CM

## 2018-11-12 NOTE — TELEPHONE ENCOUNTER
----- Message from Eldon Moreno [facility] sent at 11/12/2018 11:15 AM CST -----  Sherrie was unaware of the change in bactrim.  I see note that says single strength mondays and thursdays but we got double strength.  The same note also mentioned protonix and she is on omeprazole. I also saw that her aspirin was switched to 81mg this too was news to her.  Can you investigate and let us know what is correct?    Thanks  Eldon Moreno [facility]  Specialty Pharmacist [phone]

## 2018-11-12 NOTE — TELEPHONE ENCOUNTER
I called and talked with Sherrie to make sure she understood what to take. She told me that Dr ELIAS was talking about these changes in the appointment but never told her directly to change. She has been taking the bactrim daily and did not know it was twice a week (I will also ask coordinator on dose like Eldon mentioned below). She will change it to twice a week started today. Sherrie also stated she has been taking 325 mg daily dose of aspirin and was never told to change to 81 mg. She will go and by some OTC.

## 2018-11-13 DIAGNOSIS — Z94.4 LIVER REPLACED BY TRANSPLANT (H): ICD-10-CM

## 2018-11-13 PROCEDURE — 80197 ASSAY OF TACROLIMUS: CPT | Performed by: INTERNAL MEDICINE

## 2018-11-13 RX ORDER — SULFAMETHOXAZOLE AND TRIMETHOPRIM 400; 80 MG/1; MG/1
1 TABLET ORAL
Qty: 8 TABLET | Refills: 3 | Status: SHIPPED | OUTPATIENT
Start: 2018-11-15 | End: 2019-02-08

## 2018-11-14 NOTE — TELEPHONE ENCOUNTER
November 14, 2018: I spoke with Sherrie briefly to ask if she needs me to relay detail for appts on 11/19. She states that she is aware, and I reiterated that she should be fasting from midnight on for the US    9:15 A - US ABDOMEN  10:00 A - LAB WITH HB CLINIC  10:30 A - MAHAD SALDIVAR  Post-Liver Transplant   [phone]

## 2018-11-15 LAB
TACROLIMUS BLD-MCNC: 6.3 UG/L (ref 5–15)
TME LAST DOSE: NORMAL H

## 2018-11-19 ENCOUNTER — OFFICE VISIT (OUTPATIENT)
Dept: TRANSPLANT | Facility: CLINIC | Age: 63
End: 2018-11-19
Attending: TRANSPLANT SURGERY
Payer: MEDICARE

## 2018-11-19 ENCOUNTER — RADIANT APPOINTMENT (OUTPATIENT)
Dept: ULTRASOUND IMAGING | Facility: CLINIC | Age: 63
End: 2018-11-19
Attending: TRANSPLANT SURGERY
Payer: COMMERCIAL

## 2018-11-19 VITALS
HEIGHT: 63 IN | TEMPERATURE: 97.2 F | DIASTOLIC BLOOD PRESSURE: 81 MMHG | HEART RATE: 95 BPM | WEIGHT: 117.8 LBS | BODY MASS INDEX: 20.87 KG/M2 | SYSTOLIC BLOOD PRESSURE: 117 MMHG | OXYGEN SATURATION: 98 %

## 2018-11-19 DIAGNOSIS — Z94.4 LIVER REPLACED BY TRANSPLANT (H): Primary | ICD-10-CM

## 2018-11-19 DIAGNOSIS — Z94.4 LIVER REPLACED BY TRANSPLANT (H): ICD-10-CM

## 2018-11-19 DIAGNOSIS — Z20.6 CONTACT WITH AND (SUSPECTED) EXPOSURE TO HUMAN IMMUNODEFICIENCY VIRUS (HIV): ICD-10-CM

## 2018-11-19 DIAGNOSIS — Z94.4 LIVER TRANSPLANTED (H): ICD-10-CM

## 2018-11-19 DIAGNOSIS — Z79.60 LONG-TERM USE OF IMMUNOSUPPRESSANT MEDICATION: ICD-10-CM

## 2018-11-19 LAB
ALBUMIN SERPL-MCNC: 4.1 G/DL (ref 3.4–5)
ALP SERPL-CCNC: 66 U/L (ref 40–150)
ALT SERPL W P-5'-P-CCNC: 14 U/L (ref 0–50)
ANION GAP SERPL CALCULATED.3IONS-SCNC: 5 MMOL/L (ref 3–14)
AST SERPL W P-5'-P-CCNC: 8 U/L (ref 0–45)
BILIRUB DIRECT SERPL-MCNC: 0.3 MG/DL (ref 0–0.2)
BILIRUB SERPL-MCNC: 0.9 MG/DL (ref 0.2–1.3)
BUN SERPL-MCNC: 28 MG/DL (ref 7–30)
CALCIUM SERPL-MCNC: 8.5 MG/DL (ref 8.5–10.1)
CHLORIDE SERPL-SCNC: 108 MMOL/L (ref 94–109)
CO2 SERPL-SCNC: 25 MMOL/L (ref 20–32)
CREAT SERPL-MCNC: 0.9 MG/DL (ref 0.52–1.04)
ERYTHROCYTE [DISTWIDTH] IN BLOOD BY AUTOMATED COUNT: 16.9 % (ref 10–15)
GFR SERPL CREATININE-BSD FRML MDRD: 63 ML/MIN/1.7M2
GLUCOSE SERPL-MCNC: 97 MG/DL (ref 70–99)
HCT VFR BLD AUTO: 36.5 % (ref 35–47)
HGB BLD-MCNC: 11.8 G/DL (ref 11.7–15.7)
MAGNESIUM SERPL-MCNC: 1.8 MG/DL (ref 1.6–2.3)
MCH RBC QN AUTO: 27.6 PG (ref 26.5–33)
MCHC RBC AUTO-ENTMCNC: 32.3 G/DL (ref 31.5–36.5)
MCV RBC AUTO: 86 FL (ref 78–100)
PHOSPHATE SERPL-MCNC: 3.9 MG/DL (ref 2.5–4.5)
PLATELET # BLD AUTO: 131 10E9/L (ref 150–450)
POTASSIUM SERPL-SCNC: 4.3 MMOL/L (ref 3.4–5.3)
PROT SERPL-MCNC: 7.2 G/DL (ref 6.8–8.8)
RBC # BLD AUTO: 4.27 10E12/L (ref 3.8–5.2)
SODIUM SERPL-SCNC: 138 MMOL/L (ref 133–144)
TACROLIMUS BLD-MCNC: 5.9 UG/L (ref 5–15)
TME LAST DOSE: NORMAL H
WBC # BLD AUTO: 3.1 10E9/L (ref 4–11)

## 2018-11-19 PROCEDURE — 80076 HEPATIC FUNCTION PANEL: CPT | Performed by: INTERNAL MEDICINE

## 2018-11-19 PROCEDURE — 87798 DETECT AGENT NOS DNA AMP: CPT | Performed by: INTERNAL MEDICINE

## 2018-11-19 PROCEDURE — 83735 ASSAY OF MAGNESIUM: CPT | Performed by: INTERNAL MEDICINE

## 2018-11-19 PROCEDURE — 87535 HIV-1 PROBE&REVERSE TRNSCRPJ: CPT | Performed by: INTERNAL MEDICINE

## 2018-11-19 PROCEDURE — 80048 BASIC METABOLIC PNL TOTAL CA: CPT | Performed by: INTERNAL MEDICINE

## 2018-11-19 PROCEDURE — 87516 HEPATITIS B DNA AMP PROBE: CPT | Performed by: INTERNAL MEDICINE

## 2018-11-19 PROCEDURE — 85027 COMPLETE CBC AUTOMATED: CPT | Performed by: INTERNAL MEDICINE

## 2018-11-19 PROCEDURE — 80197 ASSAY OF TACROLIMUS: CPT | Performed by: INTERNAL MEDICINE

## 2018-11-19 PROCEDURE — G0463 HOSPITAL OUTPT CLINIC VISIT: HCPCS | Mod: ZF

## 2018-11-19 PROCEDURE — 87385 HISTOPLASMA CAPSUL AG IA: CPT | Performed by: INTERNAL MEDICINE

## 2018-11-19 PROCEDURE — 36415 COLL VENOUS BLD VENIPUNCTURE: CPT | Performed by: INTERNAL MEDICINE

## 2018-11-19 PROCEDURE — 87521 HEPATITIS C PROBE&RVRS TRNSC: CPT | Performed by: INTERNAL MEDICINE

## 2018-11-19 PROCEDURE — 84100 ASSAY OF PHOSPHORUS: CPT | Performed by: INTERNAL MEDICINE

## 2018-11-19 RX ORDER — ZOLPIDEM TARTRATE 10 MG/1
10 TABLET ORAL
COMMUNITY
Start: 2018-08-24 | End: 2022-08-04

## 2018-11-19 RX ORDER — ONDANSETRON 4 MG/1
TABLET, ORALLY DISINTEGRATING ORAL
COMMUNITY
Start: 2018-09-12 | End: 2018-12-05

## 2018-11-19 ASSESSMENT — PAIN SCALES - GENERAL: PAINLEVEL: NO PAIN (0)

## 2018-11-19 NOTE — LETTER
11/19/2018      RE: Sherrie Lala  [address] [facility] [address]       HPI      ROS      Physical Exam    Transplant Surgery -OUTPATIENT IMMUNOSUPPRESSION PROGRESS NOTE    Date of Visit: 11/19/2018    Transplants:  8/30/2018 (Liver); Postoperative day:  81  ASSESMENT AND PLAN:  1.Graft Function: Liver allograft: no rejection or technical problems.    2.Immunosuppression Management: keep tacrolimus levels at 8-10; continue Imuran  3.Hypertension: ok4.Renal Function: ok  5.Lab frequency: twice weekly  6.Other:  Wound healed well    Date: November 19, 2018    Transplant:  [x]                             Liver [x]                              Kidney []                             Pancreas []                              Other:             Chief Complaint:  Doing well, no fever or abdominal pain  History of Present Illness:  Post liver transplant    Patient Active Problem List   Diagnosis     Gastric ulcer     Osteoporosis     Bleeding esophageal varices (H)     Hydrothorax - hepatic     Liver transplanted (H)     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)     Open abdominal wall wound     Immunosuppressed status (H)     Acute post-operative pain     Acute blood loss anemia     Mild protein-calorie malnutrition (H)     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)     Hypervolemia     Positive sputum culture in cadaveric donor     Positive urine culture in cadaveric donor     SOCIAL /FAMILY HISTORY: [x]                  No recent change    Past Medical History:   Diagnosis Date     Asthma      Cholangiocarcinoma (H) 06/2014     Cirrhosis of liver with ascites (H) 5/10/2018     Encounter for pleural drainage tube placement      Esophageal varices with hemorrhage (H)      Gastric ulcer      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     Liver transplanted (H) 08/30/2018     Lung metastases (H) 08/2014     Portal vein thrombosis      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) 08/31/2018    Residual thrombus in main  and right portal     Past Surgical History:   Procedure Laterality Date     CHOLECYSTECTOMY       HEPATECTOMY PARTIAL       RETURN LIVER TRANSPLANT N/A 2018    Procedure: RETURN LIVER TRANSPLANT;  Open Abdomen, return liver transplant washout with   Mesh and liver stent  placement and  Abdomal Wound closure;  Surgeon: Darren Rod MD;  Location: [facility] OR     TRANSPLANT LIVER RECIPIENT  DONOR N/A 2018    Procedure: TRANSPLANT LIVER RECIPIENT  DONOR;  TRANSPLANT LIVER RECIPIENT  DONOR ;  Surgeon: Darren Rod MD;  Location: [facility] OR     Social History     Social History     Marital status:      Spouse name: N/A     Number of children: N/A     Years of education: N/A     Occupational History     Not on file.     Social History Main Topics     Smoking status: Never Smoker     Smokeless tobacco: Never Used     Alcohol use No      Comment: occ      Drug use: No     Sexual activity: Not on file     Other Topics Concern     Not on file     Social History Narrative     Prescription Medications as of 2018             albuterol (PROAIR HFA/PROVENTIL HFA/VENTOLIN HFA) 108 (90 BASE) MCG/ACT Inhaler Inhale 2 puffs into the lungs every 6 hours    aspirin 81 MG tablet Take 1 tablet (81 mg) by mouth daily    azaTHIOprine (IMURAN) 50 MG tablet Take 2 tablets (100 mg) by mouth daily    omeprazole 20 MG tablet Take 2 tablets (40 mg) by mouth 2 times daily    ondansetron (ZOFRAN) 4 MG tablet Take 1 tablet (4 mg) by mouth 3 times daily    PRAMIPEXOLE DIHYDROCHLORIDE PO Take 0.5 mg by mouth daily    sulfamethoxazole-trimethoprim (BACTRIM/SEPTRA) suspension Take 10 mLs (80 mg) by mouth daily Dose based on TMP component.    tacrolimus (GENERIC EQUIVALENT) 1 MG capsule Take 4 capsules (4 mg) by mouth 2 times daily    valGANciclovir (VALCYTE) 450 MG tablet Take 1 tablet (450 mg) by mouth daily    warfarin (COUMADIN) 2 MG tablet Take 2 tablets (4 mg) by mouth daily Every evening or as  "directed by provider    zolpidem (AMBIEN) 10 MG tablet     omeprazole (PRILOSEC) 20 MG CR capsule     ondansetron (ZOFRAN-ODT) 4 MG ODT tab     sulfamethoxazole-trimethoprim (BACTRIM) 400-80 MG per tablet Take 1 tablet by mouth twice a week On Monday's and Thursday's        Blood transfusion related (informational only) and Dilaudid [hydromorphone]   REVIEW OF SYSTEMS (check box if normal)  [x]               GENERAL  [x]                 PULMONARY [x]                GENITOURINARY  [x]                CNS                 [x]                 CARDIAC  [x]                 ENDOCRINE  [x]                EARS,NOSE,THROAT [x]                 GASTROINTESTINAL [x]                 NEUROLOGIC    [x]                MUSCLOSKELTAL  [x]                  HEMATOLOGY      PHYSICAL EXAM (check box if normal)/81  Pulse 95  Temp 97.2  F (36.2  C) (Oral)  Ht 1.6 m (5' 3\")  Wt 53.4 kg (117 lb 12.8 oz)  SpO2 98%  BMI 20.87 kg/m2        [x]            GENERAL:    [x]       EYES:  ICTERIC   []        YES  []                    NO  [x]           EXTREMITIES: Clubbing []       Y     [x]           N    [x]           EARS, NOSE, THROAT: Membranes Moist    YES   [x]                   NO []                  [x]           LUNGS:  CLEAR    YES       [x]                  NO    []                                [x]           SKIN: Jaundice           YES       []                  NO    [x]                   Rash: YES       []                  NO    [x]                                     [x]             HEART: Regular Rate          YES       [x]                  NO    []                   Incision Clean:  YES       [x]                  NO    []                                [x]                    ABDOMEN: Organomegaly YES       []                  NO    [x]                       [x]                    NEUROLOGICAL:  Nonfocal  YES       [x]                  NO    []                       [x]                    Hernia YES       []               "    NO    [x]                   PSYCHIATRIC:  Appropriate  YES       [x]                  NO    []                       OTHER:                                                                                                   PAIN SCALE:: 3    Darren Rod MD

## 2018-11-19 NOTE — MR AVS SNAPSHOT
After Visit Summary   11/19/2018    Sherrie Lala    MRN: [id number]           Patient Information     Date Of Birth          1955        Visit Information        Provider Department      11/19/2018 10:30 AM Darren Rod MD [facility] Solid Organ Transplant        Today's Diagnoses     Liver replaced by transplant (H)    -  1       Follow-ups after your visit        Your next 10 appointments already scheduled     Dec 06, 2018   Procedure with Jazmin Fields MD   [facility], [place], Endoscopy ([facility])    [address]  [facility] [place] [address]   [phone]           [facility] is located on the corner of [address] and [address] on the [facility]. It is easily accessible from virtually any point in the [facility] area, via [address] and [address]            Dec 17, 2018  9:15 AM CST   Lab with  LAB   [facility] ([facility])    [address] [place] [address]  1st Floor  [facility] [address]   [phone]            Dec 17, 2018  9:50 AM CST   (Arrive by 9:35 AM)   Liver Return Post Op with Darren Rod MD   [facility] Solid Organ Transplant ([facility])    [address]  [address]  [facility] [address]   [phone]            Jan 08, 2019  3:30 PM CST   (Arrive by 3:15 PM)   Return Visit with Lacy Gong MD   [facility] Infectious Diseases ([facility])    [address]  [address]  [facility] [address]   [phone]            Feb 01, 2019  9:15 AM CST   Lab with  LAB    [facility] ([facility])    [address] [place] [address]  1st Floor  [facility] [address]   [phone]            Feb 01, 2019 10:00 AM CST   (Arrive by 9:45 AM)   Return Liver Transplant with Bradly Lilly MD   [facility] Hepatology ([facility])    [address]  [address]  [facility] [address] [phone]              Who to contact     If you have questions or need follow up information about today's clinic visit or your schedule please contact [facility] SOLID ORGAN TRANSPLANT directly at [phone].  Normal or non-critical lab and imaging results will be communicated to you by MyChart, letter or phone within 4 business days after the clinic has received the results. If you do not hear from us within 7 days, please contact the clinic through [employer] or phone. If you have a critical or abnormal lab result, we will notify you by phone as soon as possible.  Submit refill requests through [employer] or call your pharmacy and they will forward the refill request to us. Please allow 3 business days for your refill to be completed.          Additional Information About Your Visit        [employer] Information     [employer] gives you secure access to your electronic health record. If you see a primary care provider, you can also send messages to your care team and make appointments. If you have questions, please call your primary care clinic.  If you do not have a primary care provider, please call [phone] and they will assist you.        Care EveryWhere ID     This is your Care EveryWhere ID. This could be used by other organizations to access your [facility] medical records  [id number]        Your Vitals Were     Pulse Temperature Height Pulse Oximetry BMI (Body Mass Index)       95 97.2  F (36.2  C) (Oral) 1.6 m (5' 3\") 98% 20.87 kg/m2        Blood Pressure from Last 3 Encounters:   11/19/18 117/81   11/05/18 120/90   11/01/18 116/83    Weight from Last 3 Encounters:   11/19/18 53.4 kg (117 lb 12.8 oz)   11/05/18 53.8 kg (118 lb 11.2 oz)   11/01/18 52.8 kg (116 lb 8 oz)              Today, you had the following     No orders found for display       Primary Care Provider Office Phone # Fax #    Apollo Benitez [phone]       VIBRANT " [place] [address] [facility] [address]        Equal Access to Services     PAULIEJOSE CINTIA : Hadii aad ku hadbendale Ordonez, wadelmyran ovalle, kinseyjermain milliganrichardran pierce, nahed witnersalexdameon chi. So [facility] [phone].    ATENCIÓN: Si habla español, tiene a bucio disposición servicios gratuitos de asistencia lingüística. [facility] [phone].    We comply with applicable federal civil rights laws and [place] laws. We do not discriminate on the basis of race, color, national origin, age, disability, sex, sexual orientation, or gender identity.            Thank you!     Thank you for choosing [facility] SOLID ORGAN TRANSPLANT  for your care. Our goal is always to provide you with excellent care. Hearing back from our patients is one way we can continue to improve our services. Please take a few minutes to complete the written survey that you may receive in the mail after your visit with us. Thank you!             Your Updated Medication List - Protect others around you: Learn how to safely use, store and throw away your medicines at www.disposemymeds.org.          This list is accurate as of 11/19/18 11:59 PM.  Always use your most recent med list.                   Brand Name Dispense Instructions for use Diagnosis    albuterol 108 (90 Base) MCG/ACT inhaler    PROAIR HFA/PROVENTIL HFA/VENTOLIN HFA     Inhale 2 puffs into the lungs every 6 hours        aspirin 81 MG tablet     30 tablet    Take 1 tablet (81 mg) by mouth daily    Liver transplanted (H)       azaTHIOprine 50 MG tablet    IMURAN    45 tablet    Take 2 tablets (100 mg) by mouth daily    Liver transplanted (H)       * omeprazole 20 MG CR capsule    priLOSEC          * omeprazole 20 MG tablet     120 tablet    Take 2 tablets (40 mg) by mouth 2 times daily    Liver transplanted (H)       ondansetron 4 MG ODT tab    ZOFRAN-ODT          ondansetron 4 MG tablet    ZOFRAN    18 tablet    Take 1 tablet (4 mg) by mouth 3 times daily         PRAMIPEXOLE DIHYDROCHLORIDE PO      Take 0.5 mg by mouth daily        * sulfamethoxazole-trimethoprim suspension    BACTRIM/SEPTRA    560 mL    Take 10 mLs (80 mg) by mouth daily Dose based on TMP component.    Liver transplanted (H)       * sulfamethoxazole-trimethoprim 400-80 MG per tablet    BACTRIM    8 tablet    Take 1 tablet by mouth twice a week On Monday's and Thursday's    Liver transplanted (H)       valGANciclovir 450 MG tablet    VALCYTE    34 tablet    Take 1 tablet (450 mg) by mouth daily    Liver transplanted (H)       warfarin 2 MG tablet    COUMADIN    8 tablet    Take 2 tablets (4 mg) by mouth daily Every evening or as directed by provider    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)       zolpidem 10 MG tablet    ANDREWIEN          * Notice:  This list has 4 medication(s) that are the same as other medications prescribed for you. Read the directions carefully, and ask your doctor or other care provider to review them with you.

## 2018-11-19 NOTE — NURSING NOTE
"Chief Complaint   Patient presents with     Post-op Visit     liver tx   Blood pressure 117/81, pulse 95, temperature 97.2  F (36.2  C), temperature source Oral, height 1.6 m (5' 3\"), weight 53.4 kg (117 lb 12.8 oz), SpO2 98 %.        Mylene Mixon LPN    "

## 2018-11-19 NOTE — PROGRESS NOTES
HPI      ROS      Physical Exam    Transplant Surgery -OUTPATIENT IMMUNOSUPPRESSION PROGRESS NOTE    Date of Visit: 11/19/2018    Transplants:  8/30/2018 (Liver); Postoperative day:  81  ASSESMENT AND PLAN:  1.Graft Function: Liver allograft: no rejection or technical problems.    2.Immunosuppression Management: keep tacrolimus levels at 8-10; continue Imuran  3.Hypertension: ok4.Renal Function: ok  5.Lab frequency: twice weekly  6.Other:  Wound healed well    Date: November 19, 2018    Transplant:  [x]                             Liver [x]                              Kidney []                             Pancreas []                              Other:             Chief Complaint:  Doing well, no fever or abdominal pain  History of Present Illness:  Post liver transplant    Patient Active Problem List   Diagnosis     Gastric ulcer     Osteoporosis     Bleeding esophageal varices (H)     Hydrothorax - hepatic     Liver transplanted (H)     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)     Open abdominal wall wound     Immunosuppressed status (H)     Acute post-operative pain     Acute blood loss anemia     Mild protein-calorie malnutrition (H)     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)     Hypervolemia     Positive sputum culture in cadaveric donor     Positive urine culture in cadaveric donor     SOCIAL /FAMILY HISTORY: [x]                  No recent change    Past Medical History:   Diagnosis Date     Asthma      Cholangiocarcinoma (H) 06/2014     Cirrhosis of liver with ascites (H) 5/10/2018     Encounter for pleural drainage tube placement      Esophageal varices with hemorrhage (H)      Gastric ulcer      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     Liver transplanted (H) 08/30/2018     Lung metastases (H) 08/2014     Portal vein thrombosis      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) 08/31/2018    Residual thrombus in main and right portal     Past Surgical History:   Procedure Laterality Date      CHOLECYSTECTOMY       HEPATECTOMY PARTIAL       RETURN LIVER TRANSPLANT N/A 2018    Procedure: RETURN LIVER TRANSPLANT;  Open Abdomen, return liver transplant washout with   Mesh and liver stent  placement and  Abdomal Wound closure;  Surgeon: Darren Rod MD;  Location: [facility] OR     TRANSPLANT LIVER RECIPIENT  DONOR N/A 2018    Procedure: TRANSPLANT LIVER RECIPIENT  DONOR;  TRANSPLANT LIVER RECIPIENT  DONOR ;  Surgeon: Darren Rod MD;  Location: [facility] OR     Social History     Social History     Marital status:      Spouse name: N/A     Number of children: N/A     Years of education: N/A     Occupational History     Not on file.     Social History Main Topics     Smoking status: Never Smoker     Smokeless tobacco: Never Used     Alcohol use No      Comment: occ      Drug use: No     Sexual activity: Not on file     Other Topics Concern     Not on file     Social History Narrative     Prescription Medications as of 2018             albuterol (PROAIR HFA/PROVENTIL HFA/VENTOLIN HFA) 108 (90 BASE) MCG/ACT Inhaler Inhale 2 puffs into the lungs every 6 hours    aspirin 81 MG tablet Take 1 tablet (81 mg) by mouth daily    azaTHIOprine (IMURAN) 50 MG tablet Take 2 tablets (100 mg) by mouth daily    omeprazole 20 MG tablet Take 2 tablets (40 mg) by mouth 2 times daily    ondansetron (ZOFRAN) 4 MG tablet Take 1 tablet (4 mg) by mouth 3 times daily    PRAMIPEXOLE DIHYDROCHLORIDE PO Take 0.5 mg by mouth daily    sulfamethoxazole-trimethoprim (BACTRIM/SEPTRA) suspension Take 10 mLs (80 mg) by mouth daily Dose based on TMP component.    tacrolimus (GENERIC EQUIVALENT) 1 MG capsule Take 4 capsules (4 mg) by mouth 2 times daily    valGANciclovir (VALCYTE) 450 MG tablet Take 1 tablet (450 mg) by mouth daily    warfarin (COUMADIN) 2 MG tablet Take 2 tablets (4 mg) by mouth daily Every evening or as directed by provider    zolpidem (AMBIEN) 10 MG tablet     omeprazole  "(PRILOSEC) 20 MG CR capsule     ondansetron (ZOFRAN-ODT) 4 MG ODT tab     sulfamethoxazole-trimethoprim (BACTRIM) 400-80 MG per tablet Take 1 tablet by mouth twice a week On Monday's and Thursday's        Blood transfusion related (informational only) and Dilaudid [hydromorphone]   REVIEW OF SYSTEMS (check box if normal)  [x]               GENERAL  [x]                 PULMONARY [x]                GENITOURINARY  [x]                CNS                 [x]                 CARDIAC  [x]                 ENDOCRINE  [x]                EARS,NOSE,THROAT [x]                 GASTROINTESTINAL [x]                 NEUROLOGIC    [x]                MUSCLOSKELTAL  [x]                  HEMATOLOGY      PHYSICAL EXAM (check box if normal)/81  Pulse 95  Temp 97.2  F (36.2  C) (Oral)  Ht 1.6 m (5' 3\")  Wt 53.4 kg (117 lb 12.8 oz)  SpO2 98%  BMI 20.87 kg/m2        [x]            GENERAL:    [x]       EYES:  ICTERIC   []        YES  []                    NO  [x]           EXTREMITIES: Clubbing []       Y     [x]           N    [x]           EARS, NOSE, THROAT: Membranes Moist    YES   [x]                   NO []                  [x]           LUNGS:  CLEAR    YES       [x]                  NO    []                                [x]           SKIN: Jaundice           YES       []                  NO    [x]                   Rash: YES       []                  NO    [x]                                     [x]             HEART: Regular Rate          YES       [x]                  NO    []                   Incision Clean:  YES       [x]                  NO    []                                [x]                    ABDOMEN: Organomegaly YES       []                  NO    [x]                       [x]                    NEUROLOGICAL:  Nonfocal  YES       [x]                  NO    []                       [x]                    Hernia YES       []                  NO    [x]                   PSYCHIATRIC:  Appropriate  YES       " [x]                  NO    []                       OTHER:                                                                                                   PAIN SCALE:: 3

## 2018-11-19 NOTE — NURSING NOTE
"Here for post liver transplant follow-up. Johnna biggest problem has been related to right sided pain and wretching.  She is scheduled for upper endosc on 12/6.  Today she revealed to me that \"I have never been a good eater\".  She told me she may have a sort of eating disorder and thinks her wretching may actually be due to her prolonged illness (several years).  She told me she now \"makes herself eat\" every 2 hours.  The wretching has decreased and she is gaining weight.  She does not want to see anyone regarding this at this time as she feels she just has to learn to eat again and is overall doing better.      Reviewed recent labs - (all look very good) and assisted with interpretation.     Complaints:  Not liking to eat, but getting better, gaining weight.    Current immunosuppression:    Prograf goal 6-8 (very sensistive to this) and AZA.    Med changes: None.  Updated patient's med card.    Lab frequency:  Every 2 weeks.    Follow-up: Maggie again in December, then Hepatology Dr. Lilly in Feb), derm and PCP annually. Reviewed need for annual follow-up here with hepatology and dermatology.    "

## 2018-11-20 DIAGNOSIS — Z94.4 LIVER TRANSPLANTED (H): ICD-10-CM

## 2018-11-20 RX ORDER — TACROLIMUS 1 MG/1
CAPSULE ORAL
Qty: 270 CAPSULE | Refills: 11 | Status: SHIPPED | OUTPATIENT
Start: 2018-11-20 | End: 2019-01-17

## 2018-11-20 NOTE — TELEPHONE ENCOUNTER
Talked to Sherrie and she will increase her tac dose to 4 mg in the AM, and 5 mg in the PM. She did state she is taking the Bactrim twice a week as well.

## 2018-11-20 NOTE — TELEPHONE ENCOUNTER
Tacrolimus level 5.9.    Please instruct patient to increase tacrolimus dose to 4 mg in the morning and 5 mg in the evening.   Her med list also says she's not taking bactrim.  I did talk w/  Her about this yesterday, please just make sure she is taking 1 single strength tab every Monday and Thursday.

## 2018-11-23 ENCOUNTER — AMBULATORY - RIVER FALLS (OUTPATIENT)
Dept: FAMILY MEDICINE | Facility: CLINIC | Age: 63
End: 2018-11-23
Payer: COMMERCIAL

## 2018-11-23 LAB
LAB SCANNED RESULT: NORMAL
MPX SERIES: NONREACTIVE
WNV RNA SPEC QL NAA+PROBE: NONREACTIVE

## 2018-11-27 ENCOUNTER — TELEPHONE (OUTPATIENT)
Dept: TRANSPLANT | Facility: CLINIC | Age: 63
End: 2018-11-27

## 2018-11-27 NOTE — TELEPHONE ENCOUNTER
Patient Call: General    Reason for call: patient had questions regarding her Endoscopy on 12/6/18  What to do with her coumadin ?    Call back needed? Yes    Return Call Needed  Same as documented in contacts section  When to return call?: Greater than one day: Route standard priority

## 2018-11-29 ENCOUNTER — TEAM CONFERENCE (OUTPATIENT)
Dept: TRANSPLANT | Facility: CLINIC | Age: 63
End: 2018-11-29

## 2018-11-29 ENCOUNTER — TELEPHONE (OUTPATIENT)
Dept: TRANSPLANT | Facility: CLINIC | Age: 63
End: 2018-11-29

## 2018-11-29 DIAGNOSIS — Z94.4 LIVER TRANSPLANTED (H): ICD-10-CM

## 2018-11-29 RX ORDER — AZATHIOPRINE 50 MG/1
TABLET ORAL
Qty: 7 TABLET | Refills: 0 | Status: SHIPPED | OUTPATIENT
Start: 2018-11-29 | End: 2018-12-17

## 2018-11-29 NOTE — TELEPHONE ENCOUNTER
"Post Liver Transplant Team Conference  Date: 11/29/2018  Transplant Coordinator: Abigail Parham  Transplant Surgeon: Darren Rod      Attendees:  [x] Dr. Dias [] Dr. Fields []       [x] Dr. Rod [x] Thien Moody LPN []    [] Dr. Rayo [] Gabriela Moreland NP []    [x] Dr. Lanza [] Maribel Stringer NP []    [] Dr. Blackwood [] Marifer Leija, BECKI []       [] Dr. Lilly [x] Janae Gallegos RN []       [] Dr. Kapil Gamble [x] Abigail Parham, EBCKI []       [] Dr. Staples [] Leighann Rodriguez, BECKI []       [] Dr. Rowe [] Darlene Dooley, RN []       [] Dr. Garcias [] Tye Early, BECKI []         Verbal Plan Read Back:   Doing well other than continued right sided pain.  Getting EGD next week, holding coumadin for 5 days prior.  Wean AZA. Decrease to 25 mg daily for a week, then stop.   Per Maggie \"absolutely\" needs to stay on coumadin for 1 year due to PV thrombus.    Routed to RN Coordinator   Abigail Parham  "

## 2018-11-29 NOTE — TELEPHONE ENCOUNTER
Provider Call: General  Route to LPN    Reason for call: Lab was is wanting to know what is WNV by BELKIS on the patient;s lab order.    Call back needed? Yes    Return Call Needed  Same as documented in contacts section  When to return call?: Same day: Route High Priority

## 2018-11-29 NOTE — TELEPHONE ENCOUNTER
"Verbal Plan Read Back:   Doing well other than continued right sided pain.  Getting EGD next week, holding coumadin for 5 days prior.  Wean AZA. Decrease to 25 mg daily for a week, then stop.   Per Maggie \"absolutely\" needs to stay on coumadin for 1 year due to PV thrombus.  "

## 2018-11-29 NOTE — TELEPHONE ENCOUNTER
Left message at their lab that they need to ask other lab professionals what the BELKIS testing is and what is needed. I explained it is a set of labs under BELKIS and they probably could google it or call one of our [place] labs for more information if needed.

## 2018-11-29 NOTE — TELEPHONE ENCOUNTER
Talked to Sherrie about medication plan. She understands all and I explained for her to stop her coumadin 5 days before her EGD, and that she needs to be on it for a year. She understands with no further questions

## 2018-11-29 NOTE — TELEPHONE ENCOUNTER
Jose is having upper endoscopy on 12/6.  Please call her and her INR clinic to let them know that she needs to hold coumadin starting 12/1.  Can resume day after procedure unless told differently by Dr. Fields day of procedure.

## 2018-11-30 ENCOUNTER — AMBULATORY - RIVER FALLS (OUTPATIENT)
Dept: FAMILY MEDICINE | Facility: CLINIC | Age: 63
End: 2018-11-30
Payer: COMMERCIAL

## 2018-11-30 ENCOUNTER — TELEPHONE (OUTPATIENT)
Dept: GASTROENTEROLOGY | Facility: CLINIC | Age: 63
End: 2018-11-30

## 2018-12-01 NOTE — TELEPHONE ENCOUNTER
VM with request pt contact Endoscopy Pre-assessment RN to review upcoming procedure information.      Telephone call-back number provided.  Babs Gary, RN  [facility] Endoscopy    Additional Information regarding appointment:   Patient scheduled for:  EGD  Indication for procedure: Post transplant Nausea  Date/Arrival time: Thurs. 12/6/18, 0900  Procedure Provider:  Donnie  Referring Provider. Sandeep Cifuentes Type: NPO /p MN, No solid food /p 2200 the night before  Facility location:  [address], [address], [address]  Anticoagulants or blood thinners: warfarin [Last dose should be Saturday, 12/1/2018]  ________________________________

## 2018-12-03 ENCOUNTER — TELEPHONE (OUTPATIENT)
Dept: GASTROENTEROLOGY | Facility: CLINIC | Age: 63
End: 2018-12-03

## 2018-12-03 DIAGNOSIS — Z94.4 LIVER REPLACED BY TRANSPLANT (H): ICD-10-CM

## 2018-12-03 PROCEDURE — 80197 ASSAY OF TACROLIMUS: CPT | Performed by: INTERNAL MEDICINE

## 2018-12-03 NOTE — TELEPHONE ENCOUNTER
Patient scheduled for EGD    Indication for procedure. Severe nausea    Referring Provider. Sandeep Lilly    ? No    Arrival time verified? 0930    Facility location verified: [facility] - [address] [place] [address], [address] Floor, [address]    Instructions given regarding prep and procedure:     Prep Type: NPO /p MN, No solid food /p 2200 the night before    Are you taking any anticoagulants or blood thinners? Warfarin - Last dose 04-Dec-2018    Instructions given? Rec'd & Read    Electronic implanted devices? No    Pre procedure teaching completed? Yes    Transportation from procedure? Yes    H&P / Pre op physical completed? N/A    Babs Gary RN  [facility] Endoscopy

## 2018-12-05 ENCOUNTER — TELEPHONE (OUTPATIENT)
Dept: TRANSPLANT | Facility: CLINIC | Age: 63
End: 2018-12-05

## 2018-12-05 LAB
CREAT SERPL-MCNC: 0.88 MG/DL (ref 0.5–0.99)
GLUCOSE BLD-MCNC: 96 MG/DL (ref 65–139)
HBV SURFACE AB SERPL IA-ACNC: 774 MIU/ML

## 2018-12-05 NOTE — TELEPHONE ENCOUNTER
Patient Call: General  Route to LPN    Reason for call: Pt cancelled endoscopy appt tomorrow 12/6/18 and wanted to notify her care team. Please return her call when available.     Call back needed? Yes    Return Call Needed  Same as documented in contacts section  When to return call?: Greater than one day: Route standard priority

## 2018-12-05 NOTE — TELEPHONE ENCOUNTER
Spoke to Sherrie.  Says she's turned the corner and is finally feeling better.   Stopped taking zofran, has minimal wretching and has gained 3 #.  Is eating!  She called and cancelled her upper endoscopy which I told her is fine - we had scheduled it because of ongoing nausea and wretching.  She also scheduled an appt for herself w/ Dr. Lilly at [facility].  I explained that we don't generally do this but she was happy to get the appt and wants to attend.  Sherrie continues do have labs drawn q 2 weeks.

## 2018-12-06 ENCOUNTER — TELEPHONE (OUTPATIENT)
Dept: PHARMACY | Facility: CLINIC | Age: 63
End: 2018-12-06

## 2018-12-06 ENCOUNTER — TELEPHONE (OUTPATIENT)
Dept: TRANSPLANT | Facility: CLINIC | Age: 63
End: 2018-12-06

## 2018-12-06 LAB
TACROLIMUS BLD-MCNC: 6.4 UG/L (ref 5–15)
TME LAST DOSE: NORMAL H

## 2018-12-06 NOTE — TELEPHONE ENCOUNTER
December 6, 2018: I left a message for Sherrie to let her know that Dr. Lilly closed his clinic on 2/1/19. I rescheduled her appts (lab and MD) on Friday, 2/8/19 and asked to call back and confirm.    Zakiya Hinton  Post-Liver Transplant   [phone]

## 2018-12-06 NOTE — TELEPHONE ENCOUNTER
December 6, 2018: I spoke with Sherrie who approved the appts on 2/8/19.    Zakiya Hinton  Post-Liver Transplant   [phone]

## 2018-12-13 ENCOUNTER — AMBULATORY - RIVER FALLS (OUTPATIENT)
Dept: FAMILY MEDICINE | Facility: CLINIC | Age: 63
End: 2018-12-13
Payer: COMMERCIAL

## 2018-12-17 ENCOUNTER — OFFICE VISIT (OUTPATIENT)
Dept: TRANSPLANT | Facility: CLINIC | Age: 63
End: 2018-12-17
Attending: TRANSPLANT SURGERY
Payer: MEDICARE

## 2018-12-17 VITALS
BODY MASS INDEX: 21.62 KG/M2 | TEMPERATURE: 97.8 F | OXYGEN SATURATION: 98 % | WEIGHT: 122 LBS | HEIGHT: 63 IN | HEART RATE: 84 BPM | SYSTOLIC BLOOD PRESSURE: 121 MMHG | DIASTOLIC BLOOD PRESSURE: 86 MMHG | RESPIRATION RATE: 16 BRPM

## 2018-12-17 DIAGNOSIS — D84.9 IMMUNOSUPPRESSION (H): Primary | ICD-10-CM

## 2018-12-17 DIAGNOSIS — Z94.4 LIVER REPLACED BY TRANSPLANT (H): ICD-10-CM

## 2018-12-17 LAB
ALBUMIN SERPL-MCNC: 3.7 G/DL (ref 3.4–5)
ALP SERPL-CCNC: 74 U/L (ref 40–150)
ALT SERPL W P-5'-P-CCNC: 20 U/L (ref 0–50)
ANION GAP SERPL CALCULATED.3IONS-SCNC: 8 MMOL/L (ref 3–14)
AST SERPL W P-5'-P-CCNC: 14 U/L (ref 0–45)
BILIRUB DIRECT SERPL-MCNC: 0.2 MG/DL (ref 0–0.2)
BILIRUB SERPL-MCNC: 0.8 MG/DL (ref 0.2–1.3)
BUN SERPL-MCNC: 24 MG/DL (ref 7–30)
CALCIUM SERPL-MCNC: 8.1 MG/DL (ref 8.5–10.1)
CHLORIDE SERPL-SCNC: 110 MMOL/L (ref 94–109)
CO2 SERPL-SCNC: 24 MMOL/L (ref 20–32)
CREAT SERPL-MCNC: 0.96 MG/DL (ref 0.52–1.04)
ERYTHROCYTE [DISTWIDTH] IN BLOOD BY AUTOMATED COUNT: 17.5 % (ref 10–15)
GFR SERPL CREATININE-BSD FRML MDRD: 59 ML/MIN/1.7M2
GLUCOSE SERPL-MCNC: 86 MG/DL (ref 70–99)
HCT VFR BLD AUTO: 36.6 % (ref 35–47)
HGB BLD-MCNC: 11.8 G/DL (ref 11.7–15.7)
MAGNESIUM SERPL-MCNC: 1.8 MG/DL (ref 1.6–2.3)
MCH RBC QN AUTO: 28.2 PG (ref 26.5–33)
MCHC RBC AUTO-ENTMCNC: 32.2 G/DL (ref 31.5–36.5)
MCV RBC AUTO: 87 FL (ref 78–100)
PHOSPHATE SERPL-MCNC: 3.1 MG/DL (ref 2.5–4.5)
PLATELET # BLD AUTO: 110 10E9/L (ref 150–450)
POTASSIUM SERPL-SCNC: 4.6 MMOL/L (ref 3.4–5.3)
PROT SERPL-MCNC: 6.8 G/DL (ref 6.8–8.8)
RBC # BLD AUTO: 4.19 10E12/L (ref 3.8–5.2)
SODIUM SERPL-SCNC: 142 MMOL/L (ref 133–144)
TACROLIMUS BLD-MCNC: 6.9 UG/L (ref 5–15)
TME LAST DOSE: NORMAL H
WBC # BLD AUTO: 3.9 10E9/L (ref 4–11)

## 2018-12-17 PROCEDURE — 36415 COLL VENOUS BLD VENIPUNCTURE: CPT | Performed by: INTERNAL MEDICINE

## 2018-12-17 PROCEDURE — 83735 ASSAY OF MAGNESIUM: CPT | Performed by: INTERNAL MEDICINE

## 2018-12-17 PROCEDURE — 80048 BASIC METABOLIC PNL TOTAL CA: CPT | Performed by: INTERNAL MEDICINE

## 2018-12-17 PROCEDURE — 84100 ASSAY OF PHOSPHORUS: CPT | Performed by: INTERNAL MEDICINE

## 2018-12-17 PROCEDURE — G0463 HOSPITAL OUTPT CLINIC VISIT: HCPCS | Mod: ZF

## 2018-12-17 PROCEDURE — 80197 ASSAY OF TACROLIMUS: CPT | Performed by: INTERNAL MEDICINE

## 2018-12-17 PROCEDURE — 85027 COMPLETE CBC AUTOMATED: CPT | Performed by: INTERNAL MEDICINE

## 2018-12-17 PROCEDURE — 80076 HEPATIC FUNCTION PANEL: CPT | Performed by: INTERNAL MEDICINE

## 2018-12-17 ASSESSMENT — PAIN SCALES - GENERAL: PAINLEVEL: NO PAIN (0)

## 2018-12-17 ASSESSMENT — MIFFLIN-ST. JEOR: SCORE: 1087.52

## 2018-12-17 NOTE — LETTER
12/17/2018       RE: Sherrie Lala  [address] [facility] [address]     Dear Colleague,    Thank you for referring your patient, Sherrie Lala, to the [facility] SOLID ORGAN TRANSPLANT at [facility]. Please see a copy of my visit note below.    HPI      ROS      Physical Exam    Transplant Surgery -OUTPATIENT IMMUNOSUPPRESSION PROGRESS NOTE    Date of Visit: 12/17/2018    Transplants:  8/30/2018 (Liver); Postoperative day:  109  ASSESMENT AND PLAN:  1.Graft Function: Liver allograft: no rejection or technical problems.    2.Immunosuppression Management: keep tacrolimus levels around 8; now on monotherapy  3.Hypertension: ok  4.Renal Function: improved  5.Lab frequency: weekly  6.Other:  Wound healthy, next visit to follow with Dr. 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)     Open abdominal wall wound     Immunosuppressed status (H)     Acute post-operative pain     Acute blood loss anemia     Mild protein-calorie malnutrition (H)     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)     Hypervolemia     Positive sputum culture in cadaveric donor     Positive urine culture in cadaveric donor     SOCIAL /FAMILY HISTORY: [x]                  No recent change    Past Medical History:   Diagnosis Date     Asthma      Cholangiocarcinoma (H) 06/2014     Cirrhosis of liver with ascites (H) 5/10/2018     Encounter for pleural drainage tube placement       Esophageal varices with hemorrhage (H)      Gastric ulcer      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     Liver transplanted (H) 2018     Lung metastases (H) 2014     Portal vein thrombosis      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    Residual thrombus in main and right portal     Past Surgical History:   Procedure Laterality Date     CHOLECYSTECTOMY       HEPATECTOMY PARTIAL       RETURN LIVER TRANSPLANT N/A 2018    Procedure: RETURN LIVER TRANSPLANT;  Open Abdomen, return liver transplant washout with   Mesh and liver stent  placement and  Abdomal Wound closure;  Surgeon: Darren Rod MD;  Location:  OR     TRANSPLANT LIVER RECIPIENT  DONOR N/A 2018    Procedure: TRANSPLANT LIVER RECIPIENT  DONOR;  TRANSPLANT LIVER RECIPIENT  DONOR ;  Surgeon: Darren Rod MD;  Location:  OR     Social History     Socioeconomic History     Marital status:      Spouse name: Not on file     Number of children: Not on file     Years of education: Not on file     Highest education level: Not on file   Social Needs     Financial resource strain: Not on file     Food insecurity - worry: Not on file     Food insecurity - inability: Not on file     Transportation needs - medical: Not on file     Transportation needs - non-medical: Not on file   Occupational History     Not on file   Tobacco Use     Smoking status: Never Smoker     Smokeless tobacco: Never Used   Substance and Sexual Activity     Alcohol use: No     Comment: occ      Drug use: No     Sexual activity: Not on file   Other Topics Concern     Parent/sibling w/ CABG, MI or angioplasty before 65F 55M? Not Asked   Social History Narrative     Not on file     Prescription Medications as of 2018       Rx Number Disp Refills Start End Last Dispensed Date Next Fill Date Owning Pharmacy    albuterol (PROAIR HFA/PROVENTIL HFA/VENTOLIN HFA) 108 (90 BASE) MCG/ACT Inhaler            Sig: Inhale 2  puffs into the lungs every 6 hours    Class: Historical    Route: Inhalation    aspirin 81 MG tablet  30 tablet 3 11/5/2018    [facility] [id number] [address]    Sig: Take 1 tablet (81 mg) by mouth daily    Class: E-Prescribe    Route: Oral    omeprazole 20 MG tablet  120 tablet 3 11/5/2018    [facility] [address]    Sig: Take 2 tablets (40 mg) by mouth 2 times daily    Class: E-Prescribe    Route: Oral    PRAMIPEXOLE DIHYDROCHLORIDE PO            Sig: Take 0.5 mg by mouth daily    Class: Historical    Route: Oral    sulfamethoxazole-trimethoprim (BACTRIM) 400-80 MG per tablet  8 tablet 3 11/15/2018    [facility] [address]    Sig: Take 1 tablet by mouth twice a week On Monday's and Thursday's    Class: E-Prescribe    Route: Oral    tacrolimus (GENERIC EQUIVALENT) 1 MG capsule  270 capsule 11 11/20/2018    [place] [address] [address]    Sig: Take 4 mg in the AM, and 5 mg in the PM. 12 hours apart    Class: E-Prescribe    warfarin (COUMADIN) 2 MG tablet  8 tablet 0 9/12/2018    [place], [place] - [address]    Sig: Take 2 tablets (4 mg) by mouth daily Every evening or as directed by provider    Class: E-Prescribe    Route: Oral    zolpidem (AMBIEN) 10 MG tablet    8/24/2018        Sig: Take 10 mg by mouth nightly as needed for sleep     Class: Historical    Route: Oral        Blood transfusion related (informational only) and Dilaudid [hydromorphone]   REVIEW OF SYSTEMS (check box if normal)  [x]               GENERAL  [x]                 PULMONARY [x]                GENITOURINARY  [x]                CNS                 [x]                 CARDIAC  [x]                 ENDOCRINE  [x]                EARS,NOSE,THROAT [x]                 GASTROINTESTINAL [x]                  "NEUROLOGIC    [x]                MUSCLOSKELTAL  [x]                  HEMATOLOGY      PHYSICAL EXAM (check box if normal)/86 (BP Location: Left arm, Patient Position: Sitting, Cuff Size: Adult Regular)   Pulse 84   Temp 97.8  F (36.6  C) (Oral)   Resp 16   Ht 1.6 m (5' 3\")   Wt 55.3 kg (122 lb)   SpO2 98%   BMI 21.61 kg/m         [x]            GENERAL:    [x]       EYES:  ICTERIC   []        YES  []                    NO  [x]           EXTREMITIES: Clubbing []       Y     [x]           N    [x]           EARS, NOSE, THROAT: Membranes Moist    YES   [x]                   NO []                  [x]           LUNGS:  CLEAR    YES       [x]                  NO    []                                [x]           SKIN: Jaundice           YES       []                  NO    [x]                   Rash: YES       []                  NO    [x]                                     [x]             HEART: Regular Rate          YES       [x]                  NO    []                   Incision Clean:  YES       [x]                  NO    []                                [x]                    ABDOMEN: Wound helathy Organomegaly YES       []                  NO    [x]                       [x]                    NEUROLOGICAL:  Nonfocal  YES       [x]                  NO    []                       [x]                    Hernia YES       []                  NO    [x]                   PSYCHIATRIC:  Appropriate  YES       [x]                  NO    []                       OTHER:                                                                                                   PAIN SCALE:: 2    Again, thank you for allowing me to participate in the care of your patient.      Sincerely,    Darren Rod MD      "

## 2018-12-17 NOTE — LETTER
12/17/2018      RE: Sherrie Lala  [address] [facility] [address]       HPI      ROS      Physical Exam    Transplant Surgery -OUTPATIENT IMMUNOSUPPRESSION PROGRESS NOTE    Date of Visit: 12/17/2018    Transplants:  8/30/2018 (Liver); Postoperative day:  109  ASSESMENT AND PLAN:  1.Graft Function: Liver allograft: no rejection or technical problems.    2.Immunosuppression Management: keep tacrolimus levels around 8; now on monotherapy  3.Hypertension: ok  4.Renal Function: improved  5.Lab frequency: weekly  6.Other:  Wound healthy, next visit to follow with Dr. 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)     Open abdominal wall wound     Immunosuppressed status (H)     Acute post-operative pain     Acute blood loss anemia     Mild protein-calorie malnutrition (H)     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)     Hypervolemia     Positive sputum culture in cadaveric donor     Positive urine culture in cadaveric donor     SOCIAL /FAMILY HISTORY: [x]                  No recent change    Past Medical History:   Diagnosis Date     Asthma      Cholangiocarcinoma (H) 06/2014     Cirrhosis of liver with ascites (H) 5/10/2018     Encounter for pleural drainage tube placement      Esophageal varices with hemorrhage (H)      Gastric ulcer      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     Liver transplanted (H) 08/30/2018     Lung metastases (H) 08/2014     Portal vein thrombosis      Portal vein  thrombosis of transplanted liver (H) 2018    Residual thrombus in main and right portal     Past Surgical History:   Procedure Laterality Date     CHOLECYSTECTOMY       HEPATECTOMY PARTIAL       RETURN LIVER TRANSPLANT N/A 2018    Procedure: RETURN LIVER TRANSPLANT;  Open Abdomen, return liver transplant washout with   Mesh and liver stent  placement and  Abdomal Wound closure;  Surgeon: Darren Rod MD;  Location: [facility] OR     TRANSPLANT LIVER RECIPIENT  DONOR N/A 2018    Procedure: TRANSPLANT LIVER RECIPIENT  DONOR;  TRANSPLANT LIVER RECIPIENT  DONOR ;  Surgeon: Darren Rod MD;  Location:  OR     Social History     Socioeconomic History     Marital status:      Spouse name: Not on file     Number of children: Not on file     Years of education: Not on file     Highest education level: Not on file   Social Needs     Financial resource strain: Not on file     Food insecurity - worry: Not on file     Food insecurity - inability: Not on file     Transportation needs - medical: Not on file     Transportation needs - non-medical: Not on file   Occupational History     Not on file   Tobacco Use     Smoking status: Never Smoker     Smokeless tobacco: Never Used   Substance and Sexual Activity     Alcohol use: No     Comment: occ      Drug use: No     Sexual activity: Not on file   Other Topics Concern     Parent/sibling w/ CABG, MI or angioplasty before 65F 55M? Not Asked   Social History Narrative     Not on file     Prescription Medications as of 2018       Rx Number Disp Refills Start End Last Dispensed Date Next Fill Date Owning Pharmacy    albuterol (PROAIR HFA/PROVENTIL HFA/VENTOLIN HFA) 108 (90 BASE) MCG/ACT Inhaler            Sig: Inhale 2 puffs into the lungs every 6 hours    Class: Historical    Route: Inhalation    aspirin 81 MG tablet  30 tablet 3 2018    [facility] - [place], [place] - [address]     Sig: Take 1 tablet (81 mg) by mouth daily    Class: E-Prescribe    Route: Oral    omeprazole 20 MG tablet  120 tablet 3 11/5/2018    [facility] [place] [address] [address]    Sig: Take 2 tablets (40 mg) by mouth 2 times daily    Class: E-Prescribe    Route: Oral    PRAMIPEXOLE DIHYDROCHLORIDE PO            Sig: Take 0.5 mg by mouth daily    Class: Historical    Route: Oral    sulfamethoxazole-trimethoprim (BACTRIM) 400-80 MG per tablet  8 tablet 3 11/15/2018    [facility] [place] [address] [address]    Sig: Take 1 tablet by mouth twice a week On Monday's and Thursday's    Class: E-Prescribe    Route: Oral    tacrolimus (GENERIC EQUIVALENT) 1 MG capsule  270 capsule 11 11/20/2018    [facility] [address] [address]    Sig: Take 4 mg in the AM, and 5 mg in the PM. 12 hours apart    Class: E-Prescribe    warfarin (COUMADIN) 2 MG tablet  8 tablet 0 9/12/2018    [facility] [place], [place] - [address]    Sig: Take 2 tablets (4 mg) by mouth daily Every evening or as directed by provider    Class: E-Prescribe    Route: Oral    zolpidem (AMBIEN) 10 MG tablet    8/24/2018        Sig: Take 10 mg by mouth nightly as needed for sleep     Class: Historical    Route: Oral        Blood transfusion related (informational only) and Dilaudid [hydromorphone]   REVIEW OF SYSTEMS (check box if normal)  [x]               GENERAL  [x]                 PULMONARY [x]                GENITOURINARY  [x]                CNS                 [x]                 CARDIAC  [x]                 ENDOCRINE  [x]                EARS,NOSE,THROAT [x]                 GASTROINTESTINAL [x]                 NEUROLOGIC    [x]                MUSCLOSKELTAL  [x]                  HEMATOLOGY      PHYSICAL EXAM (check box if normal)/86 (BP Location: Left arm, Patient Position: Sitting, Cuff Size: Adult Regular)    "Pulse 84   Temp 97.8  F (36.6  C) (Oral)   Resp 16   Ht 1.6 m (5' 3\")   Wt 55.3 kg (122 lb)   SpO2 98%   BMI 21.61 kg/m         [x]            GENERAL:    [x]       EYES:  ICTERIC   []        YES  []                    NO  [x]           EXTREMITIES: Clubbing []       Y     [x]           N    [x]           EARS, NOSE, THROAT: Membranes Moist    YES   [x]                   NO []                  [x]           LUNGS:  CLEAR    YES       [x]                  NO    []                                [x]           SKIN: Jaundice           YES       []                  NO    [x]                   Rash: YES       []                  NO    [x]                                     [x]             HEART: Regular Rate          YES       [x]                  NO    []                   Incision Clean:  YES       [x]                  NO    []                                [x]                    ABDOMEN: Wound helathy Organomegaly YES       []                  NO    [x]                       [x]                    NEUROLOGICAL:  Nonfocal  YES       [x]                  NO    []                       [x]                    Hernia YES       []                  NO    [x]                   PSYCHIATRIC:  Appropriate  YES       [x]                  NO    []                       OTHER:                                                                                                   PAIN SCALE:: 2    Darren Rod MD      "

## 2018-12-17 NOTE — PROGRESS NOTES
HPI      ROS      Physical Exam    Transplant Surgery -OUTPATIENT IMMUNOSUPPRESSION PROGRESS NOTE    Date of Visit: 12/17/2018    Transplants:  8/30/2018 (Liver); Postoperative day:  109  ASSESMENT AND PLAN:  1.Graft Function: Liver allograft: no rejection or technical problems.    2.Immunosuppression Management: keep tacrolimus levels around 8; now on monotherapy  3.Hypertension: ok  4.Renal Function: improved  5.Lab frequency: weekly  6.Other:  Wound healthy, next visit to follow with Dr. 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)     Open abdominal wall wound     Immunosuppressed status (H)     Acute post-operative pain     Acute blood loss anemia     Mild protein-calorie malnutrition (H)     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)     Hypervolemia     Positive sputum culture in cadaveric donor     Positive urine culture in cadaveric donor     SOCIAL /FAMILY HISTORY: [x]                  No recent change    Past Medical History:   Diagnosis Date     Asthma      Cholangiocarcinoma (H) 06/2014     Cirrhosis of liver with ascites (H) 5/10/2018     Encounter for pleural drainage tube placement      Esophageal varices with hemorrhage (H)      Gastric ulcer      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     Liver transplanted (H) 08/30/2018     Lung metastases (H) 08/2014     Portal vein thrombosis      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) 08/31/2018    Residual thrombus in  main and right portal     Past Surgical History:   Procedure Laterality Date     CHOLECYSTECTOMY       HEPATECTOMY PARTIAL       RETURN LIVER TRANSPLANT N/A 2018    Procedure: RETURN LIVER TRANSPLANT;  Open Abdomen, return liver transplant washout with   Mesh and liver stent  placement and  Abdomal Wound closure;  Surgeon: Darren Rod MD;  Location: [facility] OR     TRANSPLANT LIVER RECIPIENT  DONOR N/A 2018    Procedure: TRANSPLANT LIVER RECIPIENT  DONOR;  TRANSPLANT LIVER RECIPIENT  DONOR ;  Surgeon: Darren Rod MD;  Location:  OR     Social History     Socioeconomic History     Marital status:      Spouse name: Not on file     Number of children: Not on file     Years of education: Not on file     Highest education level: Not on file   Social Needs     Financial resource strain: Not on file     Food insecurity - worry: Not on file     Food insecurity - inability: Not on file     Transportation needs - medical: Not on file     Transportation needs - non-medical: Not on file   Occupational History     Not on file   Tobacco Use     Smoking status: Never Smoker     Smokeless tobacco: Never Used   Substance and Sexual Activity     Alcohol use: No     Comment: occ      Drug use: No     Sexual activity: Not on file   Other Topics Concern     Parent/sibling w/ CABG, MI or angioplasty before 65F 55M? Not Asked   Social History Narrative     Not on file     Prescription Medications as of 2018       Rx Number Disp Refills Start End Last Dispensed Date Next Fill Date Owning Pharmacy    albuterol (PROAIR HFA/PROVENTIL HFA/VENTOLIN HFA) 108 (90 BASE) MCG/ACT Inhaler            Sig: Inhale 2 puffs into the lungs every 6 hours    Class: Historical    Route: Inhalation    aspirin 81 MG tablet  30 tablet 3 2018    [facility] - [place], [place] - [facility] [address]    Sig: Take 1 tablet (81 mg) by mouth daily    Class: E-Prescribe    Route:  "Oral    omeprazole 20 MG tablet  120 tablet 3 11/5/2018    [facility] - [place] [address] [address]    Sig: Take 2 tablets (40 mg) by mouth 2 times daily    Class: E-Prescribe    Route: Oral    PRAMIPEXOLE DIHYDROCHLORIDE PO            Sig: Take 0.5 mg by mouth daily    Class: Historical    Route: Oral    sulfamethoxazole-trimethoprim (BACTRIM) 400-80 MG per tablet  8 tablet 3 11/15/2018    [facility] [place] [address] [address]    Sig: Take 1 tablet by mouth twice a week On Monday's and Thursday's    Class: E-Prescribe    Route: Oral    tacrolimus (GENERIC EQUIVALENT) 1 MG capsule  270 capsule 11 11/20/2018    [place] [facility] [place] [address]    Sig: Take 4 mg in the AM, and 5 mg in the PM. 12 hours apart    Class: E-Prescribe    warfarin (COUMADIN) 2 MG tablet  8 tablet 0 9/12/2018    [facility] [place], [place] - [address]    Sig: Take 2 tablets (4 mg) by mouth daily Every evening or as directed by provider    Class: E-Prescribe    Route: Oral    zolpidem (AMBIEN) 10 MG tablet    8/24/2018        Sig: Take 10 mg by mouth nightly as needed for sleep     Class: Historical    Route: Oral        Blood transfusion related (informational only) and Dilaudid [hydromorphone]   REVIEW OF SYSTEMS (check box if normal)  [x]               GENERAL  [x]                 PULMONARY [x]                GENITOURINARY  [x]                CNS                 [x]                 CARDIAC  [x]                 ENDOCRINE  [x]                EARS,NOSE,THROAT [x]                 GASTROINTESTINAL [x]                 NEUROLOGIC    [x]                MUSCLOSKELTAL  [x]                  HEMATOLOGY      PHYSICAL EXAM (check box if normal)/86 (BP Location: Left arm, Patient Position: Sitting, Cuff Size: Adult Regular)   Pulse 84   Temp 97.8  F (36.6  C) (Oral)   Resp 16   Ht 1.6 m (5' 3\")   " Wt 55.3 kg (122 lb)   SpO2 98%   BMI 21.61 kg/m        [x]            GENERAL:    [x]       EYES:  ICTERIC   []        YES  []                    NO  [x]           EXTREMITIES: Clubbing []       Y     [x]           N    [x]           EARS, NOSE, THROAT: Membranes Moist    YES   [x]                   NO []                  [x]           LUNGS:  CLEAR    YES       [x]                  NO    []                                [x]           SKIN: Jaundice           YES       []                  NO    [x]                   Rash: YES       []                  NO    [x]                                     [x]             HEART: Regular Rate          YES       [x]                  NO    []                   Incision Clean:  YES       [x]                  NO    []                                [x]                    ABDOMEN: Wound helathy Organomegaly YES       []                  NO    [x]                       [x]                    NEUROLOGICAL:  Nonfocal  YES       [x]                  NO    []                       [x]                    Hernia YES       []                  NO    [x]                   PSYCHIATRIC:  Appropriate  YES       [x]                  NO    []                       OTHER:                                                                                                   PAIN SCALE:: 2

## 2018-12-17 NOTE — PATIENT INSTRUCTIONS
Preventive Care:    Colorectal Cancer Screening: During our visit today, we discussed that it is recommended you receive colorectal cancer screening. Please call or make an appointment with your primary care provider to discuss this. You may also call the [facility] scheduling line ([phone]) to set up a colonoscopy appointment.

## 2019-01-08 ENCOUNTER — TELEPHONE (OUTPATIENT)
Dept: TRANSPLANT | Facility: CLINIC | Age: 64
End: 2019-01-08

## 2019-01-08 ENCOUNTER — OFFICE VISIT (OUTPATIENT)
Dept: INFECTIOUS DISEASES | Facility: CLINIC | Age: 64
End: 2019-01-08
Attending: INTERNAL MEDICINE
Payer: MEDICARE

## 2019-01-08 ENCOUNTER — ANCILLARY PROCEDURE (OUTPATIENT)
Dept: GENERAL RADIOLOGY | Facility: CLINIC | Age: 64
End: 2019-01-08
Payer: COMMERCIAL

## 2019-01-08 VITALS
BODY MASS INDEX: 21.74 KG/M2 | OXYGEN SATURATION: 96 % | TEMPERATURE: 97.7 F | DIASTOLIC BLOOD PRESSURE: 75 MMHG | SYSTOLIC BLOOD PRESSURE: 115 MMHG | WEIGHT: 122.7 LBS | HEIGHT: 63 IN | HEART RATE: 86 BPM

## 2019-01-08 DIAGNOSIS — R06.02 SHORTNESS OF BREATH: ICD-10-CM

## 2019-01-08 DIAGNOSIS — J90 PLEURAL EFFUSION, RIGHT: ICD-10-CM

## 2019-01-08 DIAGNOSIS — Z23 NEED FOR ZOSTER VACCINATION: ICD-10-CM

## 2019-01-08 DIAGNOSIS — Z23 NEED FOR TDAP VACCINATION: ICD-10-CM

## 2019-01-08 DIAGNOSIS — Z91.89 AT RISK FOR INFECTION TRANSMITTED FROM DONOR: Primary | ICD-10-CM

## 2019-01-08 DIAGNOSIS — R06.02 SHORTNESS OF BREATH: Primary | ICD-10-CM

## 2019-01-08 DIAGNOSIS — Z23 NEED FOR VACCINATION AGAINST STREPTOCOCCUS PNEUMONIAE USING PNEUMOCOCCAL CONJUGATE VACCINE 13: ICD-10-CM

## 2019-01-08 PROCEDURE — G0463 HOSPITAL OUTPT CLINIC VISIT: HCPCS | Mod: ZF

## 2019-01-08 PROCEDURE — 86698 HISTOPLASMA ANTIBODY: CPT | Performed by: INTERNAL MEDICINE

## 2019-01-08 ASSESSMENT — ENCOUNTER SYMPTOMS
SORE THROAT: 0
COUGH: 0
HEARTBURN: 0
BLOOD IN STOOL: 0
ABDOMINAL PAIN: 1
DIARRHEA: 0
FEVER: 0
CHILLS: 0
SPUTUM PRODUCTION: 0
NAUSEA: 0
WEAKNESS: 0
MYALGIAS: 0
WHEEZING: 0
HEADACHES: 0
VOMITING: 0
HEMATURIA: 0
SHORTNESS OF BREATH: 1
DYSURIA: 0
PALPITATIONS: 0
DIZZINESS: 1
WEIGHT LOSS: 0
CONSTIPATION: 0

## 2019-01-08 ASSESSMENT — MIFFLIN-ST. JEOR: SCORE: 1090.69

## 2019-01-08 ASSESSMENT — PAIN SCALES - GENERAL: PAINLEVEL: NO PAIN (0)

## 2019-01-08 NOTE — LETTER
1/8/2019      RE: Sherrie Lala  [address] [facility] [address]           [facility]    Transplant Infectious Diseases Outpatient Consultation     Patient:  Sherrie Lala, Date of birth 1955, Medical record number [id number]  Date of Visit:  01/08/2019  Consult requested by Dr. Figueroa for evaluation of caseating/calcified node in donor tissue.    ATTESTATION   I interviewed and examined the patient myself. I also reviewed the chart.   I was present with Mark Henriquez during the webb parts of the history and the physical examination.   Mark Henriquez wrote this note as a scribe and this note reflects my findings, assessment, and plan.     Lacy Gong  Seen on 1/8/2019  Attested on 1/9/2019   Pager: [phone]             Recommendations:     1. Continue monthly urine histoplasma antigen tests given initial concern of caseating nodes in donor. Patient is currently 3-4 months post-transplant, was previously doing well, but now new symptoms over last 10 days including shortness of breath and low energy.    2. Repeat fungal serology on next blood draw.     3. If symptoms of dyspnea and fatigue persist will need CXR or Chest CT and possibly right thoracentesis. We did discuss this with the patient; however patient would like to avoid a thoracentesis due to pain of procedure and only monitor by CXR at this time. We discussed monitoring symptoms closely and return to clinic in 1-2 months.  We advised her that her symptomatic pleural effusion was abnormal and could mask an ongoing infection or malignancy, and to be vigilant about new symptoms such as fever, night sweats, increased weight loss, increased shortness of breath, lower energy, etc.     4. Patient was short on time today, but will need to give Prevnar, Shingrix, and Tdap vaccines on next visit.      RTC: 1-2 months         Summary of Presentation:   Transplants:  8/30/2018 (Liver), Postoperative day:  131     This patient is a 63  "year old female with history of metastatic cholangiocarcinoma with susbsequent liver failure s/p liver resection DDLT on 8/30/18. Post-op complicated by significant intraoperative blood loss (~3L), abdomen was left open and closed on 9/1/18. Was given basiliximab induction with steroid taper, then tacrolimus and MMF 1g bid. MMF switched to Imuran 150mg daily on 10/8/18.     ID consulted after \"caseating lymph node\" was found in donor and review of UNOS system initial notes show a calcified granuloma in donor spleen, lung, and calcified hilar LA. Recent autopsy report shows right hilar caseating granulomas with extensive calcification which was AFB negative and fungal stain positive for budding yeast; RUL lung nodule also caseating granuloma with extensive calcification. Calcified node could potentially represent old/dead histoplasma and is worth monitoring without treatment at this time via urine Histo antigen.         Active Problems and Infectious Diseases Issues:   1. 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  Per transplant team, donor was found to have caseating lymph node and UNOS system shows calcified granuloma in donor spleen/lung with extensive calcification. ID consulted and it was recommended at that time to do biweekly urine Histoplasma antigen tests. All have been negative since then (most recent 11/19/18). Recent autopsy reports show right hilar granuloma with extensive calcification which was AFB negative and fungal stain positive for budding yeast.   Will continue to monitor monthly urine histo antigen until February.   Fungal serology on 10/22/18 was negative, but will check another fungal serology with next blood draw given her new onset of symptoms.     2. Right-sided pleural effusion.  Patient has had right-sided pleural effusion since prior to transplant and was previously short of breath. She had multiple thoracenteses, a failed doxycylcine pleurodesis, and eventually got a " "Pleurx catheter and was draining 1L a week. Since transplant, patient has posterior chest pain, but initially no issue with breathing or any SOB, says that PE was not bothering her and energy had been steadily improving until mid-December.  Since around 12/29/18 (10 days ago), patient has new onset of shortness of breath on exertion and after taking a shower, syncopal dizziness with SOB, lower energy, and loss of appetite. No cough, fever, chills, weight loss.   We advised her to obtain a thoracentesis, but patient would like to defer for time being since PE has been improving by CXR and new SOB symptoms are not worsening over last 10 days. Would like to see if it resolves on its own. Was [facility]18 and had abnormal PFTs, see note for details.           Old Problems and Infectious Diseases Issues:   1. None known    Other Infectious Disease issues include:  - PCP prophylaxis: Bactrim  - Serostatus: CMV D-/R-, EBV D+/R+, HSV1/2 ?, VZV+  -  Immunization status: Patient was out of time today, but will give Prevnar, Shingrix and Tdap at next visit. Flu shot on 10/18/18.   - Gamma globulin status: N/A      Attestation:  I interviewed the patient and obtained history from the patient, and by reviewing the patient's chart including outside records, microbiological data, and radiological data. All data are summarized in this notes.    Mark Henriquez,  Medical Student  01/08/2019            Interim History:     Since last visit on 10/17/18, patient has been doing well and improving in overall energy. States that she felt \"almost normal since before transplant\" around early December. However, she notes new symptoms over last 10 days of shortness of breath with exertion and after taking a shower, dizziness when bending over (without falls), loss of energy (needing to lie down on some afternoons), and decreased appetite. Still has right posterior chest pain around lower ribs on touch and " "with deep inhalation, but this is unchanged. She has been around potentially sick relatives who have been coughing (including ), but none have had flu symptoms and she herself has not been sick. No travel. No cough, fever, weight loss, chills, night sweats.         History of the Infectious Disease lllness:       Transplants:  8/30/2018 (Liver); Postoperative day:  131    64yo F with history of metastatic cholangiocarcinoma s/p liver resection and chemoradiation and now s/p OLT on 8/30. 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 She was treated with vancomycin/zosyn/fluconzaole for the three days post-operatively per transplant protocol but has received no other abx this admission. Immunosuppression induction was performed with basilixmiab, MMF, and steroid taper, with Tacrolimus started on 9/1. Her transplant post-op course complicated by significant (3L) blood loss and delayed abdominal closure related to both blood loss and large size of donor liver, requiring ICU stay and intubation from 8/30 to 9/2. Prior to her 8/29 admission for the transplant, she endorses a mild illness \"a week or two ago\" that lasted several days, consisting of subjective fever, loose stool, nausea without vomiting, and fatigue. She treated this symptomatically with OTC meds and it self-resolved prior to being called for the transplant. At this time, her only complaint is tenderness along her incision site, which she says is \"stable\". On 9/5, ID was consulted regarding information about a possible \"caseating lymph node\" in the donor. At that time, it was recommended that a urine histoplama antigen be obtained every other week for the next 6 months. Since then, urine histo labs have been negative (most recent on 10/15/18) and patient has no " complaints of febrile illness, chills headache, cough, muscle aches, etc. Patient had night sweats from transplant until about a week ago when Cellcept was discontinued in lieu of Imuran (10/8/18). Patient also had weight loss from 18 - 10/1/18, but patient feels this was due to loss of appetite and feels her weight is currently stable.           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    Residual thrombus in main and right portal            Past Surgical History:     Past Surgical History:   Procedure Laterality Date     CHOLECYSTECTOMY       HEPATECTOMY PARTIAL       RETURN LIVER TRANSPLANT N/A 2018    Procedure: RETURN LIVER TRANSPLANT;  Open Abdomen, return liver transplant washout with   Mesh and liver stent  placement and  Abdomal Wound closure;  Surgeon: Darren Rod MD;  Location: [facility]     TRANSPLANT LIVER RECIPIENT  DONOR N/A 2018    Procedure: TRANSPLANT LIVER RECIPIENT  DONOR;  TRANSPLANT LIVER RECIPIENT  DONOR ;  Surgeon: Darren Rod MD;  Location: [facility] OR              Social History:     Social History     Tobacco Use     Smoking status: Never Smoker     Smokeless tobacco: Never Used   Substance Use Topics     Alcohol use: No     Comment: occ             Family History:   No family history on file.         Immunizations:     Immunization History   Administered Date(s) Administered     Influenza Vaccine IM 3yrs+ 4 Valent IIV4 2014, 10/12/2016, 2017     Pneumococcal 23 valent 10/18/2012     Td (Adult), Adsorbed  06/01/2000             Allergies:     Allergies   Allergen Reactions     Blood Transfusion Related (Informational Only) Other (See Comments)     Patient has a history of a clinically significant antibody against RBC antigens.  A delay in compatible RBCs may occur.     Dilaudid [Hydromorphone] Itching             Medications:     Current Outpatient Medications   Medication Sig     albuterol (PROAIR HFA/PROVENTIL HFA/VENTOLIN HFA) 108 (90 BASE) MCG/ACT Inhaler Inhale 2 puffs into the lungs every 6 hours     aspirin 81 MG tablet Take 1 tablet (81 mg) by mouth daily     omeprazole 20 MG tablet Take 2 tablets (40 mg) by mouth 2 times daily     PRAMIPEXOLE DIHYDROCHLORIDE PO Take 0.5 mg by mouth daily     sulfamethoxazole-trimethoprim (BACTRIM) 400-80 MG per tablet Take 1 tablet by mouth twice a week On Monday's and Thursday's     tacrolimus (GENERIC EQUIVALENT) 1 MG capsule Take 4 mg in the AM, and 5 mg in the PM. 12 hours apart     warfarin (COUMADIN) 2 MG tablet Take 2 tablets (4 mg) by mouth daily Every evening or as directed by provider (Patient taking differently: Take 4 mg by mouth daily Take 2 mg on Mon&Fri and 4 mg all other days, or as directed by provider)     zolpidem (AMBIEN) 10 MG tablet Take 10 mg by mouth nightly as needed for sleep      No current facility-administered medications for this visit.               Review of Systems:   As mentioned in the interim history otherwise negative by reviewing constitutional symptoms, central and peripheral neurological systems, respiratory system, cardiac system, GI system,  system, musculoskeletal, skin, allergy, and lymphatics.     Review of Systems   Constitutional: Positive for malaise/fatigue. Negative for chills, fever and weight loss.   HENT: Negative for sore throat.    Respiratory: Positive for shortness of breath. Negative for cough, sputum production and wheezing.    Cardiovascular: Negative for chest pain and palpitations.   Gastrointestinal: Positive  "for abdominal pain. Negative for blood in stool, constipation, diarrhea, heartburn, nausea and vomiting.   Genitourinary: Negative for dysuria and hematuria.   Musculoskeletal: Negative for myalgias.   Neurological: Positive for dizziness. Negative for weakness and headaches.     Right posterior chest pain radiating to front along lower ribs on touch and occasionally when taking deep breaths.   Shortness of breath (see note).         Physical Exam:     Vitals:    01/08/19 1520   BP: 115/75   Pulse: 86   Temp: 97.7  F (36.5  C)   TempSrc: Oral   SpO2: 96%   Weight: 55.7 kg (122 lb 11.2 oz)   Height: 1.6 m (5' 3\")      Constitutional: awake, alert, cooperative, no apparent distress and appears at stated age, well nourished.   Head, ENT, Eyes, and Neck: Normocephalic, sinuses non-tender to palpation, external ears without lesions, moist buccal mucosa without oral thrush, tonsils without swelling, erythema, or exudate, good dentition, gums without necrosis or abscesses.   PERRL, EOMI, pink conjunctivae, non-icteric sclera.   Neck supple without rigidity, no cervical LA bilaterally.   Neurologic: Patient is moving all extremities without focal deficit, no focal sensory loss.   Lungs: Diminished air sounds on lower right side, pain on percussion and dullness, no accessory muscle use,   CVS: RRR, normal S1/S2, no murmur  Abdomen: Tender, difficulty using abdominal muscles for lying down/getting up, transplant incisions appear to be completely healed without scabbing, no redness/warmth, no signs of infection.  Extremities: No pitting edema of bilateral lower extremities, no ulcers, normal ROM of all joints, no swelling or erythema of any of joints and no tenderness to palpation.   Skin: no induration, fluctuation and no rash            Laboratory Data:     No results found for: ACD4    Inflammatory Markers  No lab results found.    Immune Globulin Studies    No lab results found.    Metabolic Studies    Recent Labs   Lab " Test 12/17/18 0940 11/19/18  1027 11/05/18 0949 11/01/18  1659 10/22/18  0922 10/15/18  0954  09/02/18  1701 09/02/18  0308  09/01/18  0431 08/31/18  1513    138 138 136 138 137   < >  --  142   < > 143 143   POTASSIUM 4.6 4.3 4.6 4.6 4.2 4.8   < >  --  3.6   < > 3.4 4.0   CHLORIDE 110* 108 107 104 106 106   < >  --  107  --  108 109   CO2 24 25 24 25 24 25   < >  --  25  --  28 28   ANIONGAP 8 5 7 6 8 6   < >  --  10  --  8 6   BUN 24 28 27 30 24 26   < >  --  27  --  23 19   CR 0.96 0.90 1.02 0.98 1.07* 0.99   < >  --  1.09*  --  1.02 0.96   GFRESTIMATED 59* 63 55* 57* 52* 57*   < >  --  51*  --  55* 59*   GLC 86 97 88 92 104* 95   < >  --  129*   < > 118* 96   A1C  --   --   --   --   --   --   --  4.9  --   --   --   --    SHELDON 8.1* 8.5 8.3* 7.5* 8.2* 8.5   < >  --  7.4*  --  7.2* 7.9*   PHOS 3.1 3.9 3.8 3.5 3.3 3.2   < >  --  3.9  --  3.8 4.9*   MAG 1.8 1.8 2.0 1.7 1.9 2.0   < >  --  2.3  --  2.3 1.7   LACT  --   --   --   --   --   --   --   --   --   --  0.9 1.3   CKT  --   --   --   --   --   --   --   --  390*  --   --   --     < > = values in this interval not displayed.       Hepatic Studies    Recent Labs   Lab Test 12/17/18 0940 11/19/18  1027 11/05/18 0949 11/01/18  1659 10/22/18  0922 10/15/18  0954   BILITOTAL 0.8 0.9 0.5 0.4 0.4 0.5   ALKPHOS 74 66 68 66 76 77   PROTTOTAL 6.8 7.2 7.0 6.2* 7.0 6.9   ALBUMIN 3.7 4.1 3.9 3.4 3.8 3.6   AST 14 8 9 16 5 7   ALT 20 14 12 11 10 12       Hematology Studies     Recent Labs   Lab Test 12/17/18  0940 11/19/18  1027 11/05/18  0949 11/01/18  1659 10/22/18  0922 10/15/18  0954  09/17/18  0850 09/12/18  0627 09/11/18  0608 09/10/18  0600 09/09/18  0623 09/08/18  0633   WBC 3.9* 3.1* 3.7* 3.1* 3.7* 4.2   < > 3.4* 4.7 5.2 3.8* 3.8* 3.3*   ANEU  --   --   --   --   --   --   --  2.7 4.0 4.3 2.9 2.9 2.2   ALYM  --   --   --   --   --   --   --  0.4* 0.3* 0.5* 0.4* 0.4* 0.5*   JOSÉ MANUEL  --   --   --   --   --   --   --  0.2 0.3 0.2 0.2 0.2 0.3   AEOS  --   --   --    --   --   --   --  0.1 0.1 0.2 0.2 0.2 0.2   HGB 11.8 11.8 11.6* 10.1* 11.4* 11.3*   < > 9.0* 8.2* 7.9* 7.5* 7.6* 7.8*   HCT 36.6 36.5 36.4 31.2* 35.1 36.3   < > 28.6* 25.6* 24.7* 23.2* 23.8* 24.5*   * 131* 133* 105* 93* 122*   < > 191 101* 93* 69* 65* 57*    < > = values in this interval not displayed.       Clotting Studies    Recent Labs   Lab Test 09/17/18  0850 09/12/18  0627 09/11/18  0608 09/10/18  0600  09/04/18  0552 09/02/18  0308  09/01/18  0933 09/01/18  0431   INR 2.29* 1.90* 1.82* 2.26*   < >  --   --    < > 1.33* 1.41*   PTT  --   --   --   --   --  28 34  --  32 37    < > = values in this interval not displayed.       Urine Studies    Recent Labs   Lab Test 02/26/18  0947   URINEPH 5.0   NITRITE Negative   LEUKEST Negative   WBCU 1         Microbiology:  Last 6 Culture results with specimen source  Culture Micro   Date Value Ref Range Status   09/01/2018 No anaerobes isolated  Final   09/01/2018 Culture negative after 30 days  Final   09/01/2018 No growth  Final   08/29/2018 No VRE isolated  Final    Specimen Description   Date Value Ref Range Status   09/01/2018 Tissue abdomen  Final   09/01/2018 Tissue abdomen  Final   09/01/2018 Tissue abdomen  Final   09/01/2018 Tissue abdomen  Final   08/31/2018 Nares  Final   08/29/2018 Rectal  Final      No results found for: CMV    Last check of C difficile  No results found for: CDBPCT      Virology:  CMV viral loads    undetectable    EBV viral loads   No lab results found.  No results found for: EBVDN, EBRES, EBVDN, EBVSP, EBVPC, EBVPCR    Human Herpes Virus 6 viral loads    No results found for: H6RES No results found for: H6SPEC    CMV Antibody IgG   Date Value Ref Range Status   08/29/2018 <0.2 0.0 - 0.8 AI Final     Comment:     Negative  Antibody index (AI) values reflect qualitative changes in antibody   concentration that cannot be directly associated with clinical condition or   disease state.     05/04/2018 <0.2 0.0 - 0.8 AI Final      Comment:     Negative  Antibody index (AI) values reflect qualitative changes in antibody   concentration that cannot be directly associated with clinical condition or   disease state.     02/26/2018 0.2 0.0 - 0.8 AI Final     Comment:     Negative  Antibody index (AI) values reflect qualitative changes in antibody   concentration that cannot be directly associated with clinical condition or   disease state.       CMV Antibody IgM   Date Value Ref Range Status   08/29/2018 <0.2 0.0 - 0.8 AI Final     Comment:     Negative  Antibody index (AI) values reflect qualitative changes in antibody   concentration that cannot be directly associated with clinical condition or   disease state.     05/04/2018 <0.2 0.0 - 0.8 AI Final     Comment:     Negative  Antibody index (AI) values reflect qualitative changes in antibody   concentration that cannot be directly associated with clinical condition or   disease state.         Pathology:  8/30/2018   FINAL DIAGNOSIS:   A. Gallbladder, donor, cholecystectomy:   - No histopathologic abnormality     B. Liver, native, hepatectomy:   - Sinusoidal congestion with hepatocellular atrophy and nodular   regenerative hyperplasia, consistent with   portal vein obstruction   - Organizing thrombus of portal vein   - Paucity of intrahepatic portal vein branches   - No evidence of significant fibrosis   - See microscopic description       Imaging:  Results for orders placed or performed in visit on 10/01/18   XR Chest 2 Views    Narrative    Exam: XR CHEST 2 VW, 10/1/2018 8:52 AM    Indication: ; Pleural effusion, history of liver transplant    Comparison: Chest x-ray 9/25/2018    Findings:   Chest PA and lateral radiograph obtained demonstrating stable cardiac  and mediastinal silhouettes. Postsurgical appearance of upper abdomen.  Unremarkable soft tissues and no acute bony abnormalities. No  pneumothorax. Stable appearance of moderate right pleural effusion and  likely fluid and/or platelike  atelectasis along the minor fissure. No  acute airspace opacities.       Impression    Impression:   1. No acute cardiopulmonary abnormalities.  2. Stable appearance of moderate right pleural effusion and associated  atelectasis.    I have personally reviewed the examination and initial interpretation  and I agree with the findings.    MD Lacy ANDERSON MD

## 2019-01-08 NOTE — TELEPHONE ENCOUNTER
Call from Sherrie who is waiting clinic right now to see .  She has been feeling more short of breath again and would like to have a CXR as long as she's here (has history of pleural effusion).  Order placed.

## 2019-01-08 NOTE — LETTER
1/8/2019       RE: Sherrie Lala  [address] [place] [id number]     Dear Colleague,    Thank you for referring your patient, Sherrie Lala, to the [facility] AND INFECTIOUS DISEASES at [facility]. Please see a copy of my visit note below.        [facility]    Transplant Infectious Diseases Outpatient Consultation     Patient:  Sherrie Lala, Date of birth 1955, Medical record number [id number]  Date of Visit:  01/08/2019  Consult requested by Dr. Figueroa for evaluation of caseating/calcified node in donor tissue.    ATTESTATION   I interviewed and examined the patient myself. I also reviewed the chart.   I was present with Mark Henriquez during the webb parts of the history and the physical examination.   Mark Henriquez wrote this note as a scribe and this note reflects my findings, assessment, and plan.     Lacy Gong  Seen on 1/8/2019  Attested on 1/9/2019   Pager: [phone]             Recommendations:     1. Continue monthly urine histoplasma antigen tests given initial concern of caseating nodes in donor. Patient is currently 3-4 months post-transplant, was previously doing well, but now new symptoms over last 10 days including shortness of breath and low energy.    2. Repeat fungal serology on next blood draw.     3. If symptoms of dyspnea and fatigue persist will need CXR or Chest CT and possibly right thoracentesis. We did discuss this with the patient; however patient would like to avoid a thoracentesis due to pain of procedure and only monitor by CXR at this time. We discussed monitoring symptoms closely and return to clinic in 1-2 months.  We advised her that her symptomatic pleural effusion was abnormal and could mask an ongoing infection or malignancy, and to be vigilant about new symptoms such as fever, night sweats, increased weight loss, increased shortness of breath, lower energy, etc.     4. Patient was short on time  "today, but will need to give Prevnar, Shingrix, and Tdap vaccines on next visit.      RTC: 1-2 months         Summary of Presentation:   Transplants:  8/30/2018 (Liver), Postoperative day:  131     This patient is a 63 year old female with history of metastatic cholangiocarcinoma with susbsequent liver failure s/p liver resection DDLT on 8/30/18. Post-op complicated by significant intraoperative blood loss (~3L), abdomen was left open and closed on 9/1/18. Was given basiliximab induction with steroid taper, then tacrolimus and MMF 1g bid. MMF switched to Imuran 150mg daily on 10/8/18.     ID consulted after \"caseating lymph node\" was found in donor and review of UNOS system initial notes show a calcified granuloma in donor spleen, lung, and calcified hilar LA. Recent autopsy report shows right hilar caseating granulomas with extensive calcification which was AFB negative and fungal stain positive for budding yeast; RUL lung nodule also caseating granuloma with extensive calcification. Calcified node could potentially represent old/dead histoplasma and is worth monitoring without treatment at this time via urine Histo antigen.         Active Problems and Infectious Diseases Issues:   1. 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  Per transplant team, donor was found to have caseating lymph node and UNOS system shows calcified granuloma in donor spleen/lung with extensive calcification. ID consulted and it was recommended at that time to do biweekly urine Histoplasma antigen tests. All have been negative since then (most recent 11/19/18). Recent autopsy reports show right hilar granuloma with extensive calcification which was AFB negative and fungal stain positive for budding yeast.   Will continue to monitor monthly urine histo antigen until February.   Fungal serology on 10/22/18 was negative, but will check another fungal serology with next blood draw given her new onset of symptoms.     2. " "Right-sided pleural effusion.  Patient has had right-sided pleural effusion since prior to transplant and was previously short of breath. She had multiple thoracenteses, a failed doxycylcine pleurodesis, and eventually got a Pleurx catheter and was draining 1L a week. Since transplant, patient has posterior chest pain, but initially no issue with breathing or any SOB, says that PE was not bothering her and energy had been steadily improving until mid-December.  Since around 12/29/18 (10 days ago), patient has new onset of shortness of breath on exertion and after taking a shower, syncopal dizziness with SOB, lower energy, and loss of appetite. No cough, fever, chills, weight loss.   We advised her to obtain a thoracentesis, but patient would like to defer for time being since PE has been improving by CXR and new SOB symptoms are not worsening over last 10 days. Would like to see if it resolves on its own. Was [facility] 10[facility]15[facility]18 and had abnormal PFTs, see note for details.           Old Problems and Infectious Diseases Issues:   1. None known    Other Infectious Disease issues include:  - PCP prophylaxis: Bactrim  - Serostatus: CMV D-/R-, EBV D+/R+, HSV1/2 ?, VZV+  -  Immunization status: Patient was out of time today, but will give Prevnar, Shingrix and Tdap at next visit. Flu shot on 10/18/18.   - Gamma globulin status: N/A      Attestation:  I interviewed the patient and obtained history from the patient, and by reviewing the patient's chart including outside records, microbiological data, and radiological data. All data are summarized in this notes.    Mark Henriquez,  Medical Student  01/08/2019            Interim History:     Since last visit on 10/17/18, patient has been doing well and improving in overall energy. States that she felt \"almost normal since before transplant\" around early December. However, she notes new symptoms over last 10 days of shortness of breath with exertion " "and after taking a shower, dizziness when bending over (without falls), loss of energy (needing to lie down on some afternoons), and decreased appetite. Still has right posterior chest pain around lower ribs on touch and with deep inhalation, but this is unchanged. She has been around potentially sick relatives who have been coughing (including ), but none have had flu symptoms and she herself has not been sick. No travel. No cough, fever, weight loss, chills, night sweats.         History of the Infectious Disease lllness:       Transplants:  8/30/2018 (Liver); Postoperative day:  131    62yo F with history of metastatic cholangiocarcinoma s/p liver resection and chemoradiation and now s/p OLT on 8/30. 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 She was treated with vancomycin/zosyn/fluconzaole for the three days post-operatively per transplant protocol but has received no other abx this admission. Immunosuppression induction was performed with basilixmiab, MMF, and steroid taper, with Tacrolimus started on 9/1. Her transplant post-op course complicated by significant (3L) blood loss and delayed abdominal closure related to both blood loss and large size of donor liver, requiring ICU stay and intubation from 8/30 to 9/2. Prior to her 8/29 admission for the transplant, she endorses a mild illness \"a week or two ago\" that lasted several days, consisting of subjective fever, loose stool, nausea without vomiting, and fatigue. She treated this symptomatically with OTC meds and it self-resolved prior to being called for the transplant. At this time, her only complaint is tenderness along her incision site, which she says is \"stable\". On 9/5, ID was consulted regarding information about a possible \"caseating lymph node\" in the " donor. At that time, it was recommended that a urine histoplama antigen be obtained every other week for the next 6 months. Since then, urine histo labs have been negative (most recent on 10/15/18) and patient has no complaints of febrile illness, chills headache, cough, muscle aches, etc. Patient had night sweats from transplant until about a week ago when Cellcept was discontinued in lieu of Imuran (10/8/18). Patient also had weight loss from 18 - 10/1/18, but patient feels this was due to loss of appetite and feels her weight is currently stable.           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    Residual thrombus in main and right portal            Past Surgical History:     Past Surgical History:   Procedure Laterality Date     CHOLECYSTECTOMY       HEPATECTOMY PARTIAL       RETURN LIVER TRANSPLANT N/A 2018    Procedure: RETURN LIVER TRANSPLANT;  Open Abdomen, return liver transplant washout with   Mesh and liver stent  placement and  Abdomal Wound closure;  Surgeon: Darren Rod MD;  Location: [facility]     TRANSPLANT LIVER RECIPIENT  DONOR N/A 2018    Procedure: TRANSPLANT LIVER RECIPIENT  DONOR;  TRANSPLANT LIVER RECIPIENT  DONOR ;  Surgeon: Darren Rod MD;  Location:  OR              Social History:     Social History     Tobacco Use     Smoking status: Never Smoker     Smokeless tobacco: Never Used   Substance Use Topics     Alcohol use: No     Comment: occ             Family History:   No family history on file.          Immunizations:     Immunization History   Administered Date(s) Administered     Influenza Vaccine IM 3yrs+ 4 Valent IIV4 09/29/2014, 10/12/2016, 09/28/2017     Pneumococcal 23 valent 10/18/2012     Td (Adult), Adsorbed 06/01/2000             Allergies:     Allergies   Allergen Reactions     Blood Transfusion Related (Informational Only) Other (See Comments)     Patient has a history of a clinically significant antibody against RBC antigens.  A delay in compatible RBCs may occur.     Dilaudid [Hydromorphone] Itching             Medications:     Current Outpatient Medications   Medication Sig     albuterol (PROAIR HFA/PROVENTIL HFA/VENTOLIN HFA) 108 (90 BASE) MCG/ACT Inhaler Inhale 2 puffs into the lungs every 6 hours     aspirin 81 MG tablet Take 1 tablet (81 mg) by mouth daily     omeprazole 20 MG tablet Take 2 tablets (40 mg) by mouth 2 times daily     PRAMIPEXOLE DIHYDROCHLORIDE PO Take 0.5 mg by mouth daily     sulfamethoxazole-trimethoprim (BACTRIM) 400-80 MG per tablet Take 1 tablet by mouth twice a week On Monday's and Thursday's     tacrolimus (GENERIC EQUIVALENT) 1 MG capsule Take 4 mg in the AM, and 5 mg in the PM. 12 hours apart     warfarin (COUMADIN) 2 MG tablet Take 2 tablets (4 mg) by mouth daily Every evening or as directed by provider (Patient taking differently: Take 4 mg by mouth daily Take 2 mg on Mon&Fri and 4 mg all other days, or as directed by provider)     zolpidem (AMBIEN) 10 MG tablet Take 10 mg by mouth nightly as needed for sleep      No current facility-administered medications for this visit.               Review of Systems:   As mentioned in the interim history otherwise negative by reviewing constitutional symptoms, central and peripheral neurological systems, respiratory system, cardiac system, GI system,  system, musculoskeletal, skin, allergy, and lymphatics.     Review of Systems   Constitutional: Positive for malaise/fatigue. Negative for chills, fever and weight loss.   HENT:  "Negative for sore throat.    Respiratory: Positive for shortness of breath. Negative for cough, sputum production and wheezing.    Cardiovascular: Negative for chest pain and palpitations.   Gastrointestinal: Positive for abdominal pain. Negative for blood in stool, constipation, diarrhea, heartburn, nausea and vomiting.   Genitourinary: Negative for dysuria and hematuria.   Musculoskeletal: Negative for myalgias.   Neurological: Positive for dizziness. Negative for weakness and headaches.     Right posterior chest pain radiating to front along lower ribs on touch and occasionally when taking deep breaths.   Shortness of breath (see note).         Physical Exam:     Vitals:    01/08/19 1520   BP: 115/75   Pulse: 86   Temp: 97.7  F (36.5  C)   TempSrc: Oral   SpO2: 96%   Weight: 55.7 kg (122 lb 11.2 oz)   Height: 1.6 m (5' 3\")      Constitutional: awake, alert, cooperative, no apparent distress and appears at stated age, well nourished.   Head, ENT, Eyes, and Neck: Normocephalic, sinuses non-tender to palpation, external ears without lesions, moist buccal mucosa without oral thrush, tonsils without swelling, erythema, or exudate, good dentition, gums without necrosis or abscesses.   PERRL, EOMI, pink conjunctivae, non-icteric sclera.   Neck supple without rigidity, no cervical LA bilaterally.   Neurologic: Patient is moving all extremities without focal deficit, no focal sensory loss.   Lungs: Diminished air sounds on lower right side, pain on percussion and dullness, no accessory muscle use,   CVS: RRR, normal S1/S2, no murmur  Abdomen: Tender, difficulty using abdominal muscles for lying down/getting up, transplant incisions appear to be completely healed without scabbing, no redness/warmth, no signs of infection.  Extremities: No pitting edema of bilateral lower extremities, no ulcers, normal ROM of all joints, no swelling or erythema of any of joints and no tenderness to palpation.   Skin: no induration, " fluctuation and no rash            Laboratory Data:     No results found for: ACD4    Inflammatory Markers  No lab results found.    Immune Globulin Studies    No lab results found.    Metabolic Studies    Recent Labs   Lab Test 12/17/18  0940 11/19/18  1027 11/05/18  0949 11/01/18  1659 10/22/18  0922 10/15/18  0954  09/02/18  1701 09/02/18  0308  09/01/18  0431 08/31/18  1513    138 138 136 138 137   < >  --  142   < > 143 143   POTASSIUM 4.6 4.3 4.6 4.6 4.2 4.8   < >  --  3.6   < > 3.4 4.0   CHLORIDE 110* 108 107 104 106 106   < >  --  107  --  108 109   CO2 24 25 24 25 24 25   < >  --  25  --  28 28   ANIONGAP 8 5 7 6 8 6   < >  --  10  --  8 6   BUN 24 28 27 30 24 26   < >  --  27  --  23 19   CR 0.96 0.90 1.02 0.98 1.07* 0.99   < >  --  1.09*  --  1.02 0.96   GFRESTIMATED 59* 63 55* 57* 52* 57*   < >  --  51*  --  55* 59*   GLC 86 97 88 92 104* 95   < >  --  129*   < > 118* 96   A1C  --   --   --   --   --   --   --  4.9  --   --   --   --    SHELDON 8.1* 8.5 8.3* 7.5* 8.2* 8.5   < >  --  7.4*  --  7.2* 7.9*   PHOS 3.1 3.9 3.8 3.5 3.3 3.2   < >  --  3.9  --  3.8 4.9*   MAG 1.8 1.8 2.0 1.7 1.9 2.0   < >  --  2.3  --  2.3 1.7   LACT  --   --   --   --   --   --   --   --   --   --  0.9 1.3   CKT  --   --   --   --   --   --   --   --  390*  --   --   --     < > = values in this interval not displayed.       Hepatic Studies    Recent Labs   Lab Test 12/17/18  0940 11/19/18  1027 11/05/18  0949 11/01/18  1659 10/22/18  0922 10/15/18  0954   BILITOTAL 0.8 0.9 0.5 0.4 0.4 0.5   ALKPHOS 74 66 68 66 76 77   PROTTOTAL 6.8 7.2 7.0 6.2* 7.0 6.9   ALBUMIN 3.7 4.1 3.9 3.4 3.8 3.6   AST 14 8 9 16 5 7   ALT 20 14 12 11 10 12       Hematology Studies     Recent Labs   Lab Test 12/17/18  0940 11/19/18  1027 11/05/18  0949 11/01/18  1659 10/22/18  0922 10/15/18  0954  09/17/18  0850 09/12/18  0627 09/11/18  0608 09/10/18  0600 09/09/18  0623 09/08/18  0633   WBC 3.9* 3.1* 3.7* 3.1* 3.7* 4.2   < > 3.4* 4.7 5.2 3.8* 3.8* 3.3*    ANEU  --   --   --   --   --   --   --  2.7 4.0 4.3 2.9 2.9 2.2   ALYM  --   --   --   --   --   --   --  0.4* 0.3* 0.5* 0.4* 0.4* 0.5*   JOSÉ MANUEL  --   --   --   --   --   --   --  0.2 0.3 0.2 0.2 0.2 0.3   AEOS  --   --   --   --   --   --   --  0.1 0.1 0.2 0.2 0.2 0.2   HGB 11.8 11.8 11.6* 10.1* 11.4* 11.3*   < > 9.0* 8.2* 7.9* 7.5* 7.6* 7.8*   HCT 36.6 36.5 36.4 31.2* 35.1 36.3   < > 28.6* 25.6* 24.7* 23.2* 23.8* 24.5*   * 131* 133* 105* 93* 122*   < > 191 101* 93* 69* 65* 57*    < > = values in this interval not displayed.       Clotting Studies    Recent Labs   Lab Test 09/17/18  0850 09/12/18  0627 09/11/18  0608 09/10/18  0600  09/04/18  0552 09/02/18  0308  09/01/18  0933 09/01/18  0431   INR 2.29* 1.90* 1.82* 2.26*   < >  --   --    < > 1.33* 1.41*   PTT  --   --   --   --   --  28 34  --  32 37    < > = values in this interval not displayed.       Urine Studies    Recent Labs   Lab Test 02/26/18  0947   URINEPH 5.0   NITRITE Negative   LEUKEST Negative   WBCU 1         Microbiology:  Last 6 Culture results with specimen source  Culture Micro   Date Value Ref Range Status   09/01/2018 No anaerobes isolated  Final   09/01/2018 Culture negative after 30 days  Final   09/01/2018 No growth  Final   08/29/2018 No VRE isolated  Final    Specimen Description   Date Value Ref Range Status   09/01/2018 Tissue abdomen  Final   09/01/2018 Tissue abdomen  Final   09/01/2018 Tissue abdomen  Final   09/01/2018 Tissue abdomen  Final   08/31/2018 Nares  Final   08/29/2018 Rectal  Final      No results found for: CMV    Last check of C difficile  No results found for: CDBPCT      Virology:  CMV viral loads    undetectable    EBV viral loads   No lab results found.  No results found for: EBVDN, EBRES, EBVDN, EBVSP, EBVPC, EBVPCR    Human Herpes Virus 6 viral loads    No results found for: H6RES No results found for: H6SPEC    CMV Antibody IgG   Date Value Ref Range Status   08/29/2018 <0.2 0.0 - 0.8 AI Final      Comment:     Negative  Antibody index (AI) values reflect qualitative changes in antibody   concentration that cannot be directly associated with clinical condition or   disease state.     05/04/2018 <0.2 0.0 - 0.8 AI Final     Comment:     Negative  Antibody index (AI) values reflect qualitative changes in antibody   concentration that cannot be directly associated with clinical condition or   disease state.     02/26/2018 0.2 0.0 - 0.8 AI Final     Comment:     Negative  Antibody index (AI) values reflect qualitative changes in antibody   concentration that cannot be directly associated with clinical condition or   disease state.       CMV Antibody IgM   Date Value Ref Range Status   08/29/2018 <0.2 0.0 - 0.8 AI Final     Comment:     Negative  Antibody index (AI) values reflect qualitative changes in antibody   concentration that cannot be directly associated with clinical condition or   disease state.     05/04/2018 <0.2 0.0 - 0.8 AI Final     Comment:     Negative  Antibody index (AI) values reflect qualitative changes in antibody   concentration that cannot be directly associated with clinical condition or   disease state.         Pathology:  8/30/2018   FINAL DIAGNOSIS:   A. Gallbladder, donor, cholecystectomy:   - No histopathologic abnormality     B. Liver, native, hepatectomy:   - Sinusoidal congestion with hepatocellular atrophy and nodular   regenerative hyperplasia, consistent with   portal vein obstruction   - Organizing thrombus of portal vein   - Paucity of intrahepatic portal vein branches   - No evidence of significant fibrosis   - See microscopic description       Imaging:  Results for orders placed or performed in visit on 10/01/18   XR Chest 2 Views    Narrative    Exam: XR CHEST 2 VW, 10/1/2018 8:52 AM    Indication: ; Pleural effusion, history of liver transplant    Comparison: Chest x-ray 9/25/2018    Findings:   Chest PA and lateral radiograph obtained demonstrating stable cardiac  and  mediastinal silhouettes. Postsurgical appearance of upper abdomen.  Unremarkable soft tissues and no acute bony abnormalities. No  pneumothorax. Stable appearance of moderate right pleural effusion and  likely fluid and/or platelike atelectasis along the minor fissure. No  acute airspace opacities.       Impression    Impression:   1. No acute cardiopulmonary abnormalities.  2. Stable appearance of moderate right pleural effusion and associated  atelectasis.    I have personally reviewed the examination and initial interpretation  and I agree with the findings.    MARILOU RAUSCH MD       Sincerely,    Lacy Gong MD

## 2019-01-08 NOTE — PROGRESS NOTES
[facility]    Transplant Infectious Diseases Outpatient Consultation     Patient:  Sherrie Lala, Date of birth 1955, Medical record number [id number]  Date of Visit:  01/08/2019  Consult requested by Dr. Figueroa for evaluation of caseating/calcified node in donor tissue.    ATTESTATION   I interviewed and examined the patient myself. I also reviewed the chart.   I was present with Mark Henriquez during the webb parts of the history and the physical examination.   Mark Henriquez wrote this note as a scribe and this note reflects my findings, assessment, and plan.     Lacy Gong  Seen on 1/8/2019  Attested on 1/9/2019   Pager: [phone]             Recommendations:     1. Continue monthly urine histoplasma antigen tests given initial concern of caseating nodes in donor. Patient is currently 3-4 months post-transplant, was previously doing well, but now new symptoms over last 10 days including shortness of breath and low energy.    2. Repeat fungal serology on next blood draw.     3. If symptoms of dyspnea and fatigue persist will need CXR or Chest CT and possibly right thoracentesis. We did discuss this with the patient; however patient would like to avoid a thoracentesis due to pain of procedure and only monitor by CXR at this time. We discussed monitoring symptoms closely and return to clinic in 1-2 months.  We advised her that her symptomatic pleural effusion was abnormal and could mask an ongoing infection or malignancy, and to be vigilant about new symptoms such as fever, night sweats, increased weight loss, increased shortness of breath, lower energy, etc.     4. Patient was short on time today, but will need to give Prevnar, Shingrix, and Tdap vaccines on next visit.      RTC: 1-2 months         Summary of Presentation:   Transplants:  8/30/2018 (Liver), Postoperative day:  131     This patient is a 63 year old female with history of metastatic cholangiocarcinoma with susbsequent  "liver failure s/p liver resection DDLT on 8/30/18. Post-op complicated by significant intraoperative blood loss (~3L), abdomen was left open and closed on 9/1/18. Was given basiliximab induction with steroid taper, then tacrolimus and MMF 1g bid. MMF switched to Imuran 150mg daily on 10/8/18.     ID consulted after \"caseating lymph node\" was found in donor and review of UNOS system initial notes show a calcified granuloma in donor spleen, lung, and calcified hilar LA. Recent autopsy report shows right hilar caseating granulomas with extensive calcification which was AFB negative and fungal stain positive for budding yeast; RUL lung nodule also caseating granuloma with extensive calcification. Calcified node could potentially represent old/dead histoplasma and is worth monitoring without treatment at this time via urine Histo antigen.         Active Problems and Infectious Diseases Issues:   1. 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  Per transplant team, donor was found to have caseating lymph node and UNOS system shows calcified granuloma in donor spleen/lung with extensive calcification. ID consulted and it was recommended at that time to do biweekly urine Histoplasma antigen tests. All have been negative since then (most recent 11/19/18). Recent autopsy reports show right hilar granuloma with extensive calcification which was AFB negative and fungal stain positive for budding yeast.   Will continue to monitor monthly urine histo antigen until February.   Fungal serology on 10/22/18 was negative, but will check another fungal serology with next blood draw given her new onset of symptoms.     2. Right-sided pleural effusion.  Patient has had right-sided pleural effusion since prior to transplant and was previously short of breath. She had multiple thoracenteses, a failed doxycylcine pleurodesis, and eventually got a Pleurx catheter and was draining 1L a week. Since transplant, patient has " "posterior chest pain, but initially no issue with breathing or any SOB, says that PE was not bothering her and energy had been steadily improving until mid-December.  Since around 12/29/18 (10 days ago), patient has new onset of shortness of breath on exertion and after taking a shower, syncopal dizziness with SOB, lower energy, and loss of appetite. No cough, fever, chills, weight loss.   We advised her to obtain a thoracentesis, but patient would like to defer for time being since PE has been improving by CXR and new SOB symptoms are not worsening over last 10 days. Would like to see if it resolves on its own. [facility] and had abnormal PFTs, see note for details.           Old Problems and Infectious Diseases Issues:   1. None known    Other Infectious Disease issues include:  - PCP prophylaxis: Bactrim  - Serostatus: CMV D-/R-, EBV D+/R+, HSV1/2 ?, VZV+  -  Immunization status: Patient was out of time today, but will give Prevnar, Shingrix and Tdap at next visit. Flu shot on 10/18/18.   - Gamma globulin status: N/A      Attestation:  I interviewed the patient and obtained history from the patient, and by reviewing the patient's chart including outside records, microbiological data, and radiological data. All data are summarized in this notes.    Mark Henriquez,  Medical Student  01/08/2019            Interim History:     Since last visit on 10/17/18, patient has been doing well and improving in overall energy. States that she felt \"almost normal since before transplant\" around early December. However, she notes new symptoms over last 10 days of shortness of breath with exertion and after taking a shower, dizziness when bending over (without falls), loss of energy (needing to lie down on some afternoons), and decreased appetite. Still has right posterior chest pain around lower ribs on touch and with deep inhalation, but this is unchanged. She has been around potentially " "sick relatives who have been coughing (including ), but none have had flu symptoms and she herself has not been sick. No travel. No cough, fever, weight loss, chills, night sweats.         History of the Infectious Disease lllness:       Transplants:  8/30/2018 (Liver); Postoperative day:  131    62yo F with history of metastatic cholangiocarcinoma s/p liver resection and chemoradiation and now s/p OLT on 8/30. 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 She was treated with vancomycin/zosyn/fluconzaole for the three days post-operatively per transplant protocol but has received no other abx this admission. Immunosuppression induction was performed with basilixmiab, MMF, and steroid taper, with Tacrolimus started on 9/1. Her transplant post-op course complicated by significant (3L) blood loss and delayed abdominal closure related to both blood loss and large size of donor liver, requiring ICU stay and intubation from 8/30 to 9/2. Prior to her 8/29 admission for the transplant, she endorses a mild illness \"a week or two ago\" that lasted several days, consisting of subjective fever, loose stool, nausea without vomiting, and fatigue. She treated this symptomatically with OTC meds and it self-resolved prior to being called for the transplant. At this time, her only complaint is tenderness along her incision site, which she says is \"stable\". On 9/5, ID was consulted regarding information about a possible \"caseating lymph node\" in the donor. At that time, it was recommended that a urine histoplama antigen be obtained every other week for the next 6 months. Since then, urine histo labs have been negative (most recent on 10/15/18) and patient has no complaints of febrile illness, chills headache, cough, muscle aches, etc. Patient " had night sweats from transplant until about a week ago when Cellcept was discontinued in lieu of Imuran (10/8/18). Patient also had weight loss from 18 - 10/1/18, but patient feels this was due to loss of appetite and feels her weight is currently stable.           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    Residual thrombus in main and right portal            Past Surgical History:     Past Surgical History:   Procedure Laterality Date     CHOLECYSTECTOMY       HEPATECTOMY PARTIAL       RETURN LIVER TRANSPLANT N/A 2018    Procedure: RETURN LIVER TRANSPLANT;  Open Abdomen, return liver transplant washout with   Mesh and liver stent  placement and  Abdomal Wound closure;  Surgeon: Darren Rod MD;  Location: [facility] OR     TRANSPLANT LIVER RECIPIENT  DONOR N/A 2018    Procedure: TRANSPLANT LIVER RECIPIENT  DONOR;  TRANSPLANT LIVER RECIPIENT  DONOR ;  Surgeon: Darren Rod MD;  Location: [facility]              Social History:     Social History     Tobacco Use     Smoking status: Never Smoker     Smokeless tobacco: Never Used   Substance Use Topics     Alcohol use: No     Comment: occ             Family History:   No family history on file.         Immunizations:     Immunization History   Administered Date(s) Administered     Influenza Vaccine IM 3yrs+ 4 Valent IIV4 2014, 10/12/2016, 2017     Pneumococcal 23 valent 10/18/2012     Td (Adult), Adsorbed 2000             Allergies:     Allergies   Allergen Reactions     Blood  Transfusion Related (Informational Only) Other (See Comments)     Patient has a history of a clinically significant antibody against RBC antigens.  A delay in compatible RBCs may occur.     Dilaudid [Hydromorphone] Itching             Medications:     Current Outpatient Medications   Medication Sig     albuterol (PROAIR HFA/PROVENTIL HFA/VENTOLIN HFA) 108 (90 BASE) MCG/ACT Inhaler Inhale 2 puffs into the lungs every 6 hours     aspirin 81 MG tablet Take 1 tablet (81 mg) by mouth daily     omeprazole 20 MG tablet Take 2 tablets (40 mg) by mouth 2 times daily     PRAMIPEXOLE DIHYDROCHLORIDE PO Take 0.5 mg by mouth daily     sulfamethoxazole-trimethoprim (BACTRIM) 400-80 MG per tablet Take 1 tablet by mouth twice a week On Monday's and Thursday's     tacrolimus (GENERIC EQUIVALENT) 1 MG capsule Take 4 mg in the AM, and 5 mg in the PM. 12 hours apart     warfarin (COUMADIN) 2 MG tablet Take 2 tablets (4 mg) by mouth daily Every evening or as directed by provider (Patient taking differently: Take 4 mg by mouth daily Take 2 mg on Mon&Fri and 4 mg all other days, or as directed by provider)     zolpidem (AMBIEN) 10 MG tablet Take 10 mg by mouth nightly as needed for sleep      No current facility-administered medications for this visit.               Review of Systems:   As mentioned in the interim history otherwise negative by reviewing constitutional symptoms, central and peripheral neurological systems, respiratory system, cardiac system, GI system,  system, musculoskeletal, skin, allergy, and lymphatics.     Review of Systems   Constitutional: Positive for malaise/fatigue. Negative for chills, fever and weight loss.   HENT: Negative for sore throat.    Respiratory: Positive for shortness of breath. Negative for cough, sputum production and wheezing.    Cardiovascular: Negative for chest pain and palpitations.   Gastrointestinal: Positive for abdominal pain. Negative for blood in stool, constipation, diarrhea,  "heartburn, nausea and vomiting.   Genitourinary: Negative for dysuria and hematuria.   Musculoskeletal: Negative for myalgias.   Neurological: Positive for dizziness. Negative for weakness and headaches.     Right posterior chest pain radiating to front along lower ribs on touch and occasionally when taking deep breaths.   Shortness of breath (see note).         Physical Exam:     Vitals:    01/08/19 1520   BP: 115/75   Pulse: 86   Temp: 97.7  F (36.5  C)   TempSrc: Oral   SpO2: 96%   Weight: 55.7 kg (122 lb 11.2 oz)   Height: 1.6 m (5' 3\")      Constitutional: awake, alert, cooperative, no apparent distress and appears at stated age, well nourished.   Head, ENT, Eyes, and Neck: Normocephalic, sinuses non-tender to palpation, external ears without lesions, moist buccal mucosa without oral thrush, tonsils without swelling, erythema, or exudate, good dentition, gums without necrosis or abscesses.   PERRL, EOMI, pink conjunctivae, non-icteric sclera.   Neck supple without rigidity, no cervical LA bilaterally.   Neurologic: Patient is moving all extremities without focal deficit, no focal sensory loss.   Lungs: Diminished air sounds on lower right side, pain on percussion and dullness, no accessory muscle use,   CVS: RRR, normal S1/S2, no murmur  Abdomen: Tender, difficulty using abdominal muscles for lying down/getting up, transplant incisions appear to be completely healed without scabbing, no redness/warmth, no signs of infection.  Extremities: No pitting edema of bilateral lower extremities, no ulcers, normal ROM of all joints, no swelling or erythema of any of joints and no tenderness to palpation.   Skin: no induration, fluctuation and no rash            Laboratory Data:     No results found for: ACD4    Inflammatory Markers  No lab results found.    Immune Globulin Studies    No lab results found.    Metabolic Studies    Recent Labs   Lab Test 12/17/18  0940 11/19/18  1027 11/05/18  0949 11/01/18  1659 " 10/22/18  0922 10/15/18  0954  09/02/18  1701 09/02/18  0308  09/01/18  0431 08/31/18  1513    138 138 136 138 137   < >  --  142   < > 143 143   POTASSIUM 4.6 4.3 4.6 4.6 4.2 4.8   < >  --  3.6   < > 3.4 4.0   CHLORIDE 110* 108 107 104 106 106   < >  --  107  --  108 109   CO2 24 25 24 25 24 25   < >  --  25  --  28 28   ANIONGAP 8 5 7 6 8 6   < >  --  10  --  8 6   BUN 24 28 27 30 24 26   < >  --  27  --  23 19   CR 0.96 0.90 1.02 0.98 1.07* 0.99   < >  --  1.09*  --  1.02 0.96   GFRESTIMATED 59* 63 55* 57* 52* 57*   < >  --  51*  --  55* 59*   GLC 86 97 88 92 104* 95   < >  --  129*   < > 118* 96   A1C  --   --   --   --   --   --   --  4.9  --   --   --   --    SHELDON 8.1* 8.5 8.3* 7.5* 8.2* 8.5   < >  --  7.4*  --  7.2* 7.9*   PHOS 3.1 3.9 3.8 3.5 3.3 3.2   < >  --  3.9  --  3.8 4.9*   MAG 1.8 1.8 2.0 1.7 1.9 2.0   < >  --  2.3  --  2.3 1.7   LACT  --   --   --   --   --   --   --   --   --   --  0.9 1.3   CKT  --   --   --   --   --   --   --   --  390*  --   --   --     < > = values in this interval not displayed.       Hepatic Studies    Recent Labs   Lab Test 12/17/18  0940 11/19/18  1027 11/05/18  0949 11/01/18  1659 10/22/18  0922 10/15/18  0954   BILITOTAL 0.8 0.9 0.5 0.4 0.4 0.5   ALKPHOS 74 66 68 66 76 77   PROTTOTAL 6.8 7.2 7.0 6.2* 7.0 6.9   ALBUMIN 3.7 4.1 3.9 3.4 3.8 3.6   AST 14 8 9 16 5 7   ALT 20 14 12 11 10 12       Hematology Studies     Recent Labs   Lab Test 12/17/18  0940 11/19/18  1027 11/05/18  0949 11/01/18  1659 10/22/18  0922 10/15/18  0954  09/17/18  0850 09/12/18  0627 09/11/18  0608 09/10/18  0600 09/09/18  0623 09/08/18  0633   WBC 3.9* 3.1* 3.7* 3.1* 3.7* 4.2   < > 3.4* 4.7 5.2 3.8* 3.8* 3.3*   ANEU  --   --   --   --   --   --   --  2.7 4.0 4.3 2.9 2.9 2.2   ALYM  --   --   --   --   --   --   --  0.4* 0.3* 0.5* 0.4* 0.4* 0.5*   JOSÉ MANUEL  --   --   --   --   --   --   --  0.2 0.3 0.2 0.2 0.2 0.3   AEOS  --   --   --   --   --   --   --  0.1 0.1 0.2 0.2 0.2 0.2   HGB 11.8 11.8  11.6* 10.1* 11.4* 11.3*   < > 9.0* 8.2* 7.9* 7.5* 7.6* 7.8*   HCT 36.6 36.5 36.4 31.2* 35.1 36.3   < > 28.6* 25.6* 24.7* 23.2* 23.8* 24.5*   * 131* 133* 105* 93* 122*   < > 191 101* 93* 69* 65* 57*    < > = values in this interval not displayed.       Clotting Studies    Recent Labs   Lab Test 09/17/18  0850 09/12/18  0627 09/11/18  0608 09/10/18  0600  09/04/18  0552 09/02/18  0308  09/01/18  0933 09/01/18  0431   INR 2.29* 1.90* 1.82* 2.26*   < >  --   --    < > 1.33* 1.41*   PTT  --   --   --   --   --  28 34  --  32 37    < > = values in this interval not displayed.       Urine Studies    Recent Labs   Lab Test 02/26/18  0947   URINEPH 5.0   NITRITE Negative   LEUKEST Negative   WBCU 1         Microbiology:  Last 6 Culture results with specimen source  Culture Micro   Date Value Ref Range Status   09/01/2018 No anaerobes isolated  Final   09/01/2018 Culture negative after 30 days  Final   09/01/2018 No growth  Final   08/29/2018 No VRE isolated  Final    Specimen Description   Date Value Ref Range Status   09/01/2018 Tissue abdomen  Final   09/01/2018 Tissue abdomen  Final   09/01/2018 Tissue abdomen  Final   09/01/2018 Tissue abdomen  Final   08/31/2018 Nares  Final   08/29/2018 Rectal  Final      No results found for: CMV    Last check of C difficile  No results found for: CDBPCT      Virology:  CMV viral loads    undetectable    EBV viral loads   No lab results found.  No results found for: EBVDN, EBRES, EBVDN, EBVSP, EBVPC, EBVPCR    Human Herpes Virus 6 viral loads    No results found for: H6RES No results found for: H6SPEC    CMV Antibody IgG   Date Value Ref Range Status   08/29/2018 <0.2 0.0 - 0.8 AI Final     Comment:     Negative  Antibody index (AI) values reflect qualitative changes in antibody   concentration that cannot be directly associated with clinical condition or   disease state.     05/04/2018 <0.2 0.0 - 0.8 AI Final     Comment:     Negative  Antibody index (AI) values reflect  qualitative changes in antibody   concentration that cannot be directly associated with clinical condition or   disease state.     02/26/2018 0.2 0.0 - 0.8 AI Final     Comment:     Negative  Antibody index (AI) values reflect qualitative changes in antibody   concentration that cannot be directly associated with clinical condition or   disease state.       CMV Antibody IgM   Date Value Ref Range Status   08/29/2018 <0.2 0.0 - 0.8 AI Final     Comment:     Negative  Antibody index (AI) values reflect qualitative changes in antibody   concentration that cannot be directly associated with clinical condition or   disease state.     05/04/2018 <0.2 0.0 - 0.8 AI Final     Comment:     Negative  Antibody index (AI) values reflect qualitative changes in antibody   concentration that cannot be directly associated with clinical condition or   disease state.         Pathology:  8/30/2018   FINAL DIAGNOSIS:   A. Gallbladder, donor, cholecystectomy:   - No histopathologic abnormality     B. Liver, native, hepatectomy:   - Sinusoidal congestion with hepatocellular atrophy and nodular   regenerative hyperplasia, consistent with   portal vein obstruction   - Organizing thrombus of portal vein   - Paucity of intrahepatic portal vein branches   - No evidence of significant fibrosis   - See microscopic description       Imaging:  Results for orders placed or performed in visit on 10/01/18   XR Chest 2 Views    Narrative    Exam: XR CHEST 2 VW, 10/1/2018 8:52 AM    Indication: ; Pleural effusion, history of liver transplant    Comparison: Chest x-ray 9/25/2018    Findings:   Chest PA and lateral radiograph obtained demonstrating stable cardiac  and mediastinal silhouettes. Postsurgical appearance of upper abdomen.  Unremarkable soft tissues and no acute bony abnormalities. No  pneumothorax. Stable appearance of moderate right pleural effusion and  likely fluid and/or platelike atelectasis along the minor fissure. No  acute airspace  opacities.       Impression    Impression:   1. No acute cardiopulmonary abnormalities.  2. Stable appearance of moderate right pleural effusion and associated  atelectasis.    I have personally reviewed the examination and initial interpretation  and I agree with the findings.    MARILOU RAUSCH MD

## 2019-01-09 NOTE — TELEPHONE ENCOUNTER
Call to Sherrie after readiing Beltran's note from yesterday's visit at which time he advised.  No answer, left her a message asking her to call me if she develops fever or worsening shortness of breath.  SORAYAI to Dr. Lilly.

## 2019-01-09 NOTE — TELEPHONE ENCOUNTER
"Sherrie called back.  She told me that she really thinks that her symptoms are more likely related to a cold  / virus that other family members have had.  She  Told me that she is \"not at all inclined to do thoracentesis\" and feels it is \"totally unnecessary\" as she has not had a fever or chills.  She will call me if symptoms worsen.  "

## 2019-01-10 ENCOUNTER — AMBULATORY - RIVER FALLS (OUTPATIENT)
Dept: FAMILY MEDICINE | Facility: CLINIC | Age: 64
End: 2019-01-10
Payer: COMMERCIAL

## 2019-01-10 DIAGNOSIS — Z94.4 LIVER REPLACED BY TRANSPLANT (H): ICD-10-CM

## 2019-01-10 PROCEDURE — 80197 ASSAY OF TACROLIMUS: CPT | Performed by: INTERNAL MEDICINE

## 2019-01-11 ENCOUNTER — TELEPHONE (OUTPATIENT)
Dept: GASTROENTEROLOGY | Facility: CLINIC | Age: 64
End: 2019-01-11

## 2019-01-11 LAB
A/G RATIO - HISTORICAL: 2 (ref 1–2.5)
ALBUMIN SERPL-MCNC: 4.3 GM/DL (ref 3.6–5.1)
ALP SERPL-CCNC: 68 UNIT/L (ref 33–130)
ALT SERPL W P-5'-P-CCNC: 5 UNIT/L (ref 6–29)
AST SERPL W P-5'-P-CCNC: 9 UNIT/L (ref 10–35)
BILIRUB DIRECT SERPL-MCNC: 0.1 MG/DL
BILIRUB INDIRECT SERPL-MCNC: 0.5 MG/DL (ref 0.2–1.2)
BILIRUB SERPL-MCNC: 0.6 MG/DL (ref 0.2–1.2)
BUN SERPL-MCNC: 45 MG/DL (ref 7–25)
BUN/CREAT RATIO - HISTORICAL: 28 (ref 6–22)
CALCIUM SERPL-MCNC: 8.9 MG/DL (ref 8.6–10.4)
CHLORIDE BLD-SCNC: 107 MMOL/L (ref 98–110)
CO2 SERPL-SCNC: 27 MMOL/L (ref 20–32)
CREAT SERPL-MCNC: 1.62 MG/DL (ref 0.5–0.99)
EGFRCR SERPLBLD CKD-EPI 2021: 34 ML/MIN/1.73M2
ERYTHROCYTE [DISTWIDTH] IN BLOOD BY AUTOMATED COUNT: 15.7 % (ref 11–15)
GLOBULIN: 2.2 (ref 1.9–3.7)
GLUCOSE BLD-MCNC: 105 MG/DL (ref 65–99)
HCT VFR BLD AUTO: 35.3 % (ref 35–45)
HGB BLD-MCNC: 11.7 GM/DL (ref 11.7–15.5)
INR PPP: 1.4
MAGNESIUM SERPL-MCNC: 2 MG/DL (ref 1.5–2.5)
MCH RBC QN AUTO: 28.7 PG (ref 27–33)
MCHC RBC AUTO-ENTMCNC: 33.1 GM/DL (ref 32–36)
MCV RBC AUTO: 86.5 FL (ref 80–100)
PHOSPHATE SERPL-MCNC: 4.3 MG/DL (ref 2.5–4.5)
PLATELET # BLD AUTO: 101 10*3/UL (ref 140–400)
PMV BLD: 10.6 FL (ref 7.5–12.5)
POTASSIUM BLD-SCNC: 4.6 MMOL/L (ref 3.5–5.3)
PROT SERPL-MCNC: 6.5 GM/DL (ref 6.1–8.1)
PROTHROMBIN TIME: 14.5 S (ref 9–11.5)
RBC # BLD AUTO: 4.08 10*6/UL (ref 3.8–5.1)
SODIUM SERPL-SCNC: 142 MMOL/L (ref 135–146)
WBC # BLD AUTO: 4.5 10*3/UL (ref 3.8–10.8)

## 2019-01-11 NOTE — TELEPHONE ENCOUNTER
Called Sherrie to let her know that her creatinine is up.  I asked her to drink more fluids and also let her know that Dr. Lilly thinks she should consider draining pleural effusion.

## 2019-01-15 ENCOUNTER — TELEPHONE (OUTPATIENT)
Dept: TRANSPLANT | Facility: CLINIC | Age: 64
End: 2019-01-15

## 2019-01-15 NOTE — TELEPHONE ENCOUNTER
Patient Call: General      Reason for call: pt has a lab result she wants to review and has a conflict with an appointment she wants to discuss     Call back needed? Yes    Return Call Needed  Same as documented in contacts section  When to return call?: Greater than one day: Route standard priority

## 2019-01-17 ENCOUNTER — TELEPHONE (OUTPATIENT)
Dept: TRANSPLANT | Facility: CLINIC | Age: 64
End: 2019-01-17

## 2019-01-17 DIAGNOSIS — Z94.4 LIVER TRANSPLANTED (H): ICD-10-CM

## 2019-01-17 RX ORDER — TACROLIMUS 1 MG/1
3 CAPSULE ORAL 2 TIMES DAILY
Qty: 180 CAPSULE | Refills: 11 | Status: SHIPPED | OUTPATIENT
Start: 2019-01-17 | End: 2019-03-12

## 2019-01-17 NOTE — TELEPHONE ENCOUNTER
Tacrolimus level 9.6.    Please instruct patient to decrease tacrolimus dose to 3 mg in the morning and 3 mg in the evening.

## 2019-01-18 LAB
TACROLIMUS BLD-MCNC: 9.6 UG/L (ref 5–15)
TME LAST DOSE: NORMAL H

## 2019-02-08 ENCOUNTER — OFFICE VISIT (OUTPATIENT)
Dept: GASTROENTEROLOGY | Facility: CLINIC | Age: 64
End: 2019-02-08
Attending: INTERNAL MEDICINE
Payer: MEDICARE

## 2019-02-08 ENCOUNTER — TELEPHONE (OUTPATIENT)
Dept: TRANSPLANT | Facility: CLINIC | Age: 64
End: 2019-02-08

## 2019-02-08 ENCOUNTER — ANCILLARY PROCEDURE (OUTPATIENT)
Dept: ULTRASOUND IMAGING | Facility: CLINIC | Age: 64
End: 2019-02-08
Attending: INTERNAL MEDICINE
Payer: COMMERCIAL

## 2019-02-08 VITALS
WEIGHT: 121.2 LBS | BODY MASS INDEX: 21.48 KG/M2 | TEMPERATURE: 97.9 F | SYSTOLIC BLOOD PRESSURE: 119 MMHG | HEIGHT: 63 IN | DIASTOLIC BLOOD PRESSURE: 82 MMHG | HEART RATE: 82 BPM

## 2019-02-08 DIAGNOSIS — Z94.4 LIVER REPLACED BY TRANSPLANT (H): Primary | ICD-10-CM

## 2019-02-08 DIAGNOSIS — Z23 ENCOUNTER FOR IMMUNIZATION: ICD-10-CM

## 2019-02-08 DIAGNOSIS — Z94.4 LIVER REPLACED BY TRANSPLANT (H): ICD-10-CM

## 2019-02-08 DIAGNOSIS — Z91.89 AT RISK FOR INFECTION TRANSMITTED FROM DONOR: ICD-10-CM

## 2019-02-08 LAB
ALBUMIN SERPL-MCNC: 3.8 G/DL (ref 3.4–5)
ALP SERPL-CCNC: 92 U/L (ref 40–150)
ALT SERPL W P-5'-P-CCNC: 17 U/L (ref 0–50)
ANION GAP SERPL CALCULATED.3IONS-SCNC: 9 MMOL/L (ref 3–14)
AST SERPL W P-5'-P-CCNC: 15 U/L (ref 0–45)
BILIRUB DIRECT SERPL-MCNC: 0.2 MG/DL (ref 0–0.2)
BILIRUB SERPL-MCNC: 0.7 MG/DL (ref 0.2–1.3)
BUN SERPL-MCNC: 37 MG/DL (ref 7–30)
CALCIUM SERPL-MCNC: 8.3 MG/DL (ref 8.5–10.1)
CHLORIDE SERPL-SCNC: 105 MMOL/L (ref 94–109)
CO2 SERPL-SCNC: 28 MMOL/L (ref 20–32)
CREAT SERPL-MCNC: 1.48 MG/DL (ref 0.52–1.04)
ERYTHROCYTE [DISTWIDTH] IN BLOOD BY AUTOMATED COUNT: 12.6 % (ref 10–15)
GFR SERPL CREATININE-BSD FRML MDRD: 38 ML/MIN/{1.73_M2}
GLUCOSE SERPL-MCNC: 106 MG/DL (ref 70–99)
HCT VFR BLD AUTO: 35.7 % (ref 35–47)
HGB BLD-MCNC: 11.4 G/DL (ref 11.7–15.7)
MAGNESIUM SERPL-MCNC: 2 MG/DL (ref 1.6–2.3)
MCH RBC QN AUTO: 28.1 PG (ref 26.5–33)
MCHC RBC AUTO-ENTMCNC: 31.9 G/DL (ref 31.5–36.5)
MCV RBC AUTO: 88 FL (ref 78–100)
PHOSPHATE SERPL-MCNC: 4.1 MG/DL (ref 2.5–4.5)
PLATELET # BLD AUTO: 102 10E9/L (ref 150–450)
POTASSIUM SERPL-SCNC: 4 MMOL/L (ref 3.4–5.3)
PROT SERPL-MCNC: 7.6 G/DL (ref 6.8–8.8)
RBC # BLD AUTO: 4.06 10E12/L (ref 3.8–5.2)
SODIUM SERPL-SCNC: 142 MMOL/L (ref 133–144)
TACROLIMUS BLD-MCNC: 7.8 UG/L (ref 5–15)
TME LAST DOSE: NORMAL H
WBC # BLD AUTO: 4 10E9/L (ref 4–11)

## 2019-02-08 PROCEDURE — 80076 HEPATIC FUNCTION PANEL: CPT

## 2019-02-08 PROCEDURE — 80048 BASIC METABOLIC PNL TOTAL CA: CPT

## 2019-02-08 PROCEDURE — 80197 ASSAY OF TACROLIMUS: CPT | Performed by: INTERNAL MEDICINE

## 2019-02-08 PROCEDURE — 90670 PCV13 VACCINE IM: CPT | Mod: ZF | Performed by: INTERNAL MEDICINE

## 2019-02-08 PROCEDURE — G0009 ADMIN PNEUMOCOCCAL VACCINE: HCPCS | Mod: ZF

## 2019-02-08 PROCEDURE — 84100 ASSAY OF PHOSPHORUS: CPT

## 2019-02-08 PROCEDURE — G0463 HOSPITAL OUTPT CLINIC VISIT: HCPCS | Mod: 25,ZF

## 2019-02-08 PROCEDURE — 25000128 H RX IP 250 OP 636: Mod: ZF | Performed by: INTERNAL MEDICINE

## 2019-02-08 PROCEDURE — 83735 ASSAY OF MAGNESIUM: CPT

## 2019-02-08 PROCEDURE — 85027 COMPLETE CBC AUTOMATED: CPT

## 2019-02-08 RX ADMIN — PNEUMOCOCCAL 13-VALENT CONJUGATE VACCINE 0.5 ML: 2.2; 2.2; 2.2; 2.2; 2.2; 4.4; 2.2; 2.2; 2.2; 2.2; 2.2; 2.2; 2.2 INJECTION, SUSPENSION INTRAMUSCULAR at 10:44

## 2019-02-08 ASSESSMENT — MIFFLIN-ST. JEOR: SCORE: 1083.89

## 2019-02-08 ASSESSMENT — PAIN SCALES - GENERAL: PAINLEVEL: NO PAIN (0)

## 2019-02-08 NOTE — NURSING NOTE
"/82   Pulse 82   Temp 97.9  F (36.6  C) (Oral)   Ht 1.6 m (5' 3\")   Wt 55 kg (121 lb 3.2 oz)   BMI 21.47 kg/m    Chief Complaint   Patient presents with     RECHECK     follow up with liver transplant, tzimmer cma       "

## 2019-02-08 NOTE — LETTER
2/8/2019      RE: Sherrie Lala  [address] [place] [id number]       [facility] Hepatology Note    Encounter Date: Feb 8, 2019    CC:  Chief Complaint   Patient presents with     RECHECK     follow up with liver transplant, yani blandon     History of Present Illness:  Ms. Sherrie Lala is a 63 year old female who presents as a follow-up for liver transplant and Susannahimmer CMA. At today's visit the patient states that she has been taking her tacrolimus daily. She states that she always tries to be hydrated. Patient states that she has been getting full immediatly since her liver transplant. She was wondering if it was due to the liver transplant. This weekend she states that she threw up twice and her abdomen was very stiff. She tried to eat less, but more frequently but that doesn't seem to help. Sometimes she feels that her bowls have not been functioning properly. Patient will be getting to see infectious disease doctor in order to remove some of the water in her lungs.     She denies any abdominal pain, itching or skin rash or fatigue.  She denies any increased abdominal girth or lower extremity edema.  She denies any fevers or chills, cough or shortness of breath. Patient is otherwise feeling well and has no further areas of concern.     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)     Hypervolemia     Positive sputum culture in cadaveric donor     Positive urine culture in cadaveric donor     Past Medical History:   Diagnosis Date     Asthma      Cholangiocarcinoma (H) 06/2014     Cirrhosis  of liver with ascites (H) 5/10/2018     Encounter for pleural drainage tube placement      Esophageal varices with hemorrhage (H)      Gastric ulcer      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     Liver transplanted (H) 2018     Lung metastases (H) 2014     Portal vein thrombosis      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    Residual thrombus in main and right portal     Past Surgical History:   Procedure Laterality Date     CHOLECYSTECTOMY       HEPATECTOMY PARTIAL       RETURN LIVER TRANSPLANT N/A 2018    Procedure: RETURN LIVER TRANSPLANT;  Open Abdomen, return liver transplant washout with   Mesh and liver stent  placement and  Abdomal Wound closure;  Surgeon: Darren Rod MD;  Location: [facility] OR     TRANSPLANT LIVER RECIPIENT  DONOR N/A 2018    Procedure: TRANSPLANT LIVER RECIPIENT  DONOR;  TRANSPLANT LIVER RECIPIENT  DONOR ;  Surgeon: Darren Rod MD;  Location: [facility] OR     Medications:  Current Outpatient Medications   Medication Sig Dispense Refill     albuterol (PROAIR HFA/PROVENTIL HFA/VENTOLIN HFA) 108 (90 BASE) MCG/ACT Inhaler Inhale 2 puffs into the lungs every 6 hours       aspirin 81 MG tablet Take 1 tablet (81 mg) by mouth daily 30 tablet 3     omeprazole 20 MG tablet Take 2 tablets (40 mg) by mouth 2 times daily 120 tablet 3     PRAMIPEXOLE DIHYDROCHLORIDE PO Take 0.5 mg by mouth daily       sulfamethoxazole-trimethoprim (BACTRIM) 400-80 MG per tablet Take 1 tablet by mouth twice a week On Monday's and Thursday's 8 tablet 3     tacrolimus (GENERIC EQUIVALENT) 1 MG capsule Take 3 capsules (3 mg) by mouth 2 times daily 180 capsule 11     warfarin (COUMADIN) 2 MG tablet Take 2 tablets (4 mg) by mouth daily Every evening or as directed by provider (Patient taking differently: Take 4 mg by mouth daily Take 2 mg on Mon&Fri and 4 mg all other days, or as directed by provider) 8 tablet 0     zolpidem (AMBIEN) 10 MG tablet Take 10 mg by  "mouth nightly as needed for sleep          Allergies   Allergen Reactions     Blood Transfusion Related (Informational Only) Other (See Comments)     Patient has a history of a clinically significant antibody against RBC antigens.  A delay in compatible RBCs may occur.     Dilaudid [Hydromorphone] Itching       /82   Pulse 82   Temp 97.9  F (36.6  C) (Oral)   Ht 1.6 m (5' 3\")   Wt 55 kg (121 lb 3.2 oz)   BMI 21.47 kg/m       Physical Exam:    In general she appears  thin.  HEENT exam shows no scleral icterus or temporal muscle wasting.  Her chest is clear.  Her abdominal exam shows no increase in girth.  No masses or tenderness to palpation are present.  Her liver is 10 cm in span without left lobe enlargement.  No spleen tip is palpable.  Extremity exam shows no edema.  Skin exam shows no stigmata of chronic liver disease or chronic pruritus, and no suspicious lesions.  Neurologic exam is nonfocal.    Recent Results (from the past 168 hour(s))   Tacrolimus level    Collection Time: 02/08/19  9:51 AM   Result Value Ref Range    Tacrolimus Last Dose 930pm2/7/2019     Tacrolimus Level 7.8 5.0 - 15.0 ug/L   CBC with platelets    Collection Time: 02/08/19  9:55 AM   Result Value Ref Range    WBC 4.0 4.0 - 11.0 10e9/L    RBC Count 4.06 3.8 - 5.2 10e12/L    Hemoglobin 11.4 (L) 11.7 - 15.7 g/dL    Hematocrit 35.7 35.0 - 47.0 %    MCV 88 78 - 100 fl    MCH 28.1 26.5 - 33.0 pg    MCHC 31.9 31.5 - 36.5 g/dL    RDW 12.6 10.0 - 15.0 %    Platelet Count 102 (L) 150 - 450 10e9/L   Basic metabolic panel    Collection Time: 02/08/19  9:55 AM   Result Value Ref Range    Sodium 142 133 - 144 mmol/L    Potassium 4.0 3.4 - 5.3 mmol/L    Chloride 105 94 - 109 mmol/L    Carbon Dioxide 28 20 - 32 mmol/L    Anion Gap 9 3 - 14 mmol/L    Glucose 106 (H) 70 - 99 mg/dL    Urea Nitrogen 37 (H) 7 - 30 mg/dL    Creatinine 1.48 (H) 0.52 - 1.04 mg/dL    GFR Estimate 38 (L) >60 mL/min/[1.73_m2]    GFR Estimate If Black 44 (L) >60 " mL/min/[1.73_m2]    Calcium 8.3 (L) 8.5 - 10.1 mg/dL   Phosphorus    Collection Time: 02/08/19  9:55 AM   Result Value Ref Range    Phosphorus 4.1 2.5 - 4.5 mg/dL   Magnesium    Collection Time: 02/08/19  9:55 AM   Result Value Ref Range    Magnesium 2.0 1.6 - 2.3 mg/dL   Hepatic panel    Collection Time: 02/08/19  9:55 AM   Result Value Ref Range    Bilirubin Direct 0.2 0.0 - 0.2 mg/dL    Bilirubin Total 0.7 0.2 - 1.3 mg/dL    Albumin 3.8 3.4 - 5.0 g/dL    Protein Total 7.6 6.8 - 8.8 g/dL    Alkaline Phosphatase 92 40 - 150 U/L    ALT 17 0 - 50 U/L    AST 15 0 - 45 U/L      Impression/Plan:  1. Status post liver transplant     Liver test look excellent    Kidney function look a bit off, likely related to tacrolimus level      2. Abdominal discomfort     Unclear as to the etiology     Weight stable     3. Health Care Maintenance     Patient will be getting her second pneumon vaccine.      Up to date on cancer screening     No further intervention required. Patient to report changes.     Follow-up in 3 months.       Staff Involved:  Staff/Scribe    Scribe Disclosure:   IAdrian, am serving as a scribe to document services personally performed by Dr. Bradly Lilly, based on data collection and the provider's statements to me.        Bradly Lilly MD      Professor of Medicine  [employer]      Executive Medical Director, Solid Organ Transplant Program  [facility]

## 2019-02-08 NOTE — PATIENT INSTRUCTIONS
Preventive Care:    Colorectal Cancer Screening: During our visit today, we discussed that it is recommended you receive colorectal cancer screening. Please call or make an appointment with your primary care provider to discuss this. You may also call the [email or web address] scheduling line ([phone]) to set up a colonoscopy appointment.

## 2019-02-08 NOTE — TELEPHONE ENCOUNTER
Here for post liver transplant follow-up.  Is now almost 6 mos post transplant.  Remains on coumadin due to PV thrombus.  Is due for US w/ doppler to assess.  Ordered today.    Complaints / Concerns: still notes GI upset / nausea.  Still on protonix bid    Current immunosuppression:    Prograf.  Last level was high, awaiting today's level    Med changes: none.  Updated patient's med card.    Lab frequency:  Labs monthly    Follow-up:  Hepatology in 3 mos, derm and PCP annually.  Reviewed my contact information, now to reach the transplant office during weekday and afterhours.

## 2019-02-08 NOTE — PROGRESS NOTES
[facility] Hepatology Note    Encounter Date: Feb 8, 2019    CC:  Chief Complaint   Patient presents with     RECHECK     follow up with liver transplant, yani blandon     History of Present Illness:  Ms. Sherrie Lala is a 63 year old female who presents as a follow-up for liver transplant and Yani BLANDON. At today's visit the patient states that she has been taking her tacrolimus daily. She states that she always tries to be hydrated. Patient states that she has been getting full immediatly since her liver transplant. She was wondering if it was due to the liver transplant. This weekend she states that she threw up twice and her abdomen was very stiff. She tried to eat less, but more frequently but that doesn't seem to help. Sometimes she feels that her bowls have not been functioning properly. Patient will be getting to see infectious disease doctor in order to remove some of the water in her lungs.     She denies any abdominal pain, itching or skin rash or fatigue.  She denies any increased abdominal girth or lower extremity edema.  She denies any fevers or chills, cough or shortness of breath. Patient is otherwise feeling well and has no further areas of concern.     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)     Hypervolemia     Positive sputum culture in cadaveric donor     Positive urine culture in cadaveric donor     Past Medical History:   Diagnosis Date     Asthma      Cholangiocarcinoma (H) 06/2014     Cirrhosis of liver with ascites (H) 5/10/2018     Encounter for pleural drainage  tube placement      Esophageal varices with hemorrhage (H)      Gastric ulcer      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     Liver transplanted (H) 2018     Lung metastases (H) 2014     Portal vein thrombosis      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    Residual thrombus in main and right portal     Past Surgical History:   Procedure Laterality Date     CHOLECYSTECTOMY       HEPATECTOMY PARTIAL       RETURN LIVER TRANSPLANT N/A 2018    Procedure: RETURN LIVER TRANSPLANT;  Open Abdomen, return liver transplant washout with   Mesh and liver stent  placement and  Abdomal Wound closure;  Surgeon: Darren Rod MD;  Location: [facility] OR     TRANSPLANT LIVER RECIPIENT  DONOR N/A 2018    Procedure: TRANSPLANT LIVER RECIPIENT  DONOR;  TRANSPLANT LIVER RECIPIENT  DONOR ;  Surgeon: Darren Rod MD;  Location: [facility] OR     Medications:  Current Outpatient Medications   Medication Sig Dispense Refill     albuterol (PROAIR HFA/PROVENTIL HFA/VENTOLIN HFA) 108 (90 BASE) MCG/ACT Inhaler Inhale 2 puffs into the lungs every 6 hours       aspirin 81 MG tablet Take 1 tablet (81 mg) by mouth daily 30 tablet 3     omeprazole 20 MG tablet Take 2 tablets (40 mg) by mouth 2 times daily 120 tablet 3     PRAMIPEXOLE DIHYDROCHLORIDE PO Take 0.5 mg by mouth daily       sulfamethoxazole-trimethoprim (BACTRIM) 400-80 MG per tablet Take 1 tablet by mouth twice a week On Monday's and  8 tablet 3     tacrolimus (GENERIC EQUIVALENT) 1 MG capsule Take 3 capsules (3 mg) by mouth 2 times daily 180 capsule 11     warfarin (COUMADIN) 2 MG tablet Take 2 tablets (4 mg) by mouth daily Every evening or as directed by provider (Patient taking differently: Take 4 mg by mouth daily Take 2 mg on Mon&Fri and 4 mg all other days, or as directed by provider) 8 tablet 0     zolpidem (AMBIEN) 10 MG tablet Take 10 mg by mouth nightly as needed for sleep          Allergies   Allergen Reactions  "    Blood Transfusion Related (Informational Only) Other (See Comments)     Patient has a history of a clinically significant antibody against RBC antigens.  A delay in compatible RBCs may occur.     Dilaudid [Hydromorphone] Itching       /82   Pulse 82   Temp 97.9  F (36.6  C) (Oral)   Ht 1.6 m (5' 3\")   Wt 55 kg (121 lb 3.2 oz)   BMI 21.47 kg/m      Physical Exam:    In general she appears thin.  HEENT exam shows no scleral icterus or temporal muscle wasting.  Her chest is clear.  Her abdominal exam shows no increase in girth.  No masses or tenderness to palpation are present.  Her liver is 10 cm in span without left lobe enlargement.  No spleen tip is palpable.  Extremity exam shows no edema.  Skin exam shows no stigmata of chronic liver disease or chronic pruritus, and no suspicious lesions.  Neurologic exam is nonfocal.    Recent Results (from the past 168 hour(s))   Tacrolimus level    Collection Time: 02/08/19  9:51 AM   Result Value Ref Range    Tacrolimus Last Dose 930pm2/7/2019     Tacrolimus Level 7.8 5.0 - 15.0 ug/L   CBC with platelets    Collection Time: 02/08/19  9:55 AM   Result Value Ref Range    WBC 4.0 4.0 - 11.0 10e9/L    RBC Count 4.06 3.8 - 5.2 10e12/L    Hemoglobin 11.4 (L) 11.7 - 15.7 g/dL    Hematocrit 35.7 35.0 - 47.0 %    MCV 88 78 - 100 fl    MCH 28.1 26.5 - 33.0 pg    MCHC 31.9 31.5 - 36.5 g/dL    RDW 12.6 10.0 - 15.0 %    Platelet Count 102 (L) 150 - 450 10e9/L   Basic metabolic panel    Collection Time: 02/08/19  9:55 AM   Result Value Ref Range    Sodium 142 133 - 144 mmol/L    Potassium 4.0 3.4 - 5.3 mmol/L    Chloride 105 94 - 109 mmol/L    Carbon Dioxide 28 20 - 32 mmol/L    Anion Gap 9 3 - 14 mmol/L    Glucose 106 (H) 70 - 99 mg/dL    Urea Nitrogen 37 (H) 7 - 30 mg/dL    Creatinine 1.48 (H) 0.52 - 1.04 mg/dL    GFR Estimate 38 (L) >60 mL/min/[1.73_m2]    GFR Estimate If Black 44 (L) >60 mL/min/[1.73_m2]    Calcium 8.3 (L) 8.5 - 10.1 mg/dL   Phosphorus    Collection " Time: 02/08/19  9:55 AM   Result Value Ref Range    Phosphorus 4.1 2.5 - 4.5 mg/dL   Magnesium    Collection Time: 02/08/19  9:55 AM   Result Value Ref Range    Magnesium 2.0 1.6 - 2.3 mg/dL   Hepatic panel    Collection Time: 02/08/19  9:55 AM   Result Value Ref Range    Bilirubin Direct 0.2 0.0 - 0.2 mg/dL    Bilirubin Total 0.7 0.2 - 1.3 mg/dL    Albumin 3.8 3.4 - 5.0 g/dL    Protein Total 7.6 6.8 - 8.8 g/dL    Alkaline Phosphatase 92 40 - 150 U/L    ALT 17 0 - 50 U/L    AST 15 0 - 45 U/L      Impression/Plan:  1. Status post liver transplant     Liver test look excellent    Kidney function look a bit off, likely related to tacrolimus level      2. Abdominal discomfort     Unclear as to the etiology     Weight stable     3. Health Care Maintenance     Patient will be getting her second pneumon vaccine.      Up to date on cancer screening     No further intervention required. Patient to report changes.     Follow-up in 3 months.       Staff Involved:  Staff/Scribe    Scribe Disclosure:   Adrian SLOAN, am serving as a scribe to document services personally performed by Dr. Bradly Lilly, based on data collection and the provider's statements to me.        Bradly Lilly MD      Professor of Medicine  [employer]      Executive Medical Director, Solid Organ Transplant Program  [facility]

## 2019-02-12 ENCOUNTER — AMBULATORY - RIVER FALLS (OUTPATIENT)
Dept: FAMILY MEDICINE | Facility: CLINIC | Age: 64
End: 2019-02-12
Payer: COMMERCIAL

## 2019-02-14 LAB
ASPERGILLUS AB TITR SER CF: NORMAL {TITER}
B DERMAT AB SER-ACNC: 0.1 IV
COCCIDIOIDES AB TITR SER CF: NORMAL {TITER}
H CAPSUL MYC AB TITR SER CF: NORMAL {TITER}
H CAPSUL YST AB TITR SER CF: NORMAL {TITER}
LAB SCANNED RESULT: NORMAL

## 2019-02-15 ENCOUNTER — AMBULATORY - RIVER FALLS (OUTPATIENT)
Dept: FAMILY MEDICINE | Facility: CLINIC | Age: 64
End: 2019-02-15
Payer: COMMERCIAL

## 2019-02-15 LAB — INR PPP: 2

## 2019-02-22 ENCOUNTER — TELEPHONE (OUTPATIENT)
Dept: TRANSPLANT | Facility: CLINIC | Age: 64
End: 2019-02-22

## 2019-02-22 NOTE — TELEPHONE ENCOUNTER
Call from Sherrie letting me know that she slipped and fell on the ice on 2/20 and has a compacted fracture of her left arm.  Has seen an orthopedist at [facility], plan is sling and immobilization for now - will get a CT again at some point and see if surgery is necessary.  Just wanted us to know.

## 2019-02-25 ENCOUNTER — TRANSFERRED RECORDS (OUTPATIENT)
Dept: HEALTH INFORMATION MANAGEMENT | Facility: CLINIC | Age: 64
End: 2019-02-25

## 2019-02-27 ENCOUNTER — TELEPHONE (OUTPATIENT)
Dept: TRANSPLANT | Facility: CLINIC | Age: 64
End: 2019-02-27

## 2019-02-27 NOTE — TELEPHONE ENCOUNTER
"-Call from Sherrie.  Needs surgery for humerus fracture.  Called ortho clinic here and sent a message to Dr. Kraft at Dr. Lilly's request:    Hi Dr. Kraft -  I'm Abigail, a liver transplant coordinator here at the [facility] that works w/ Dr. Lilly.    This patient of Dr. Lilly's had a humeral fracture on 2/20 - had an xray in [place], then CT yesterday at [facility] and was told that the humeral head has \"fallen off\".  They told her she needs surgery, I called Dr. Lilly, and he suggested I speak w/ you.  I have called your office, they told me you are \"totally booked\" and suggested I call [facility] to see if she can be scheduled with Ligia Alvarado.      Sherrie is anticoagulated.  She is doing well from a liver standpoint but in pain from the fracture.  [facility]    Is there anything you or your team can do to help me get this lady scheduled asap?  Thanks,  Abigail   [phone]    Pager [phone]  "

## 2019-02-28 ENCOUNTER — OFFICE VISIT (OUTPATIENT)
Dept: ORTHOPEDICS | Facility: CLINIC | Age: 64
End: 2019-02-28
Payer: COMMERCIAL

## 2019-02-28 ENCOUNTER — PRE VISIT (OUTPATIENT)
Dept: ORTHOPEDICS | Facility: CLINIC | Age: 64
End: 2019-02-28

## 2019-02-28 VITALS
HEIGHT: 63 IN | DIASTOLIC BLOOD PRESSURE: 81 MMHG | BODY MASS INDEX: 21.62 KG/M2 | OXYGEN SATURATION: 98 % | WEIGHT: 122 LBS | SYSTOLIC BLOOD PRESSURE: 118 MMHG | HEART RATE: 92 BPM

## 2019-02-28 DIAGNOSIS — S42.292A OTHER CLOSED DISPLACED FRACTURE OF PROXIMAL END OF LEFT HUMERUS, INITIAL ENCOUNTER: Primary | ICD-10-CM

## 2019-02-28 PROCEDURE — 86850 RBC ANTIBODY SCREEN: CPT | Performed by: ORTHOPAEDIC SURGERY

## 2019-02-28 PROCEDURE — 86900 BLOOD TYPING SEROLOGIC ABO: CPT | Performed by: ORTHOPAEDIC SURGERY

## 2019-02-28 PROCEDURE — 99203 OFFICE O/P NEW LOW 30 MIN: CPT | Mod: GC | Performed by: ORTHOPAEDIC SURGERY

## 2019-02-28 PROCEDURE — 86901 BLOOD TYPING SEROLOGIC RH(D): CPT | Performed by: ORTHOPAEDIC SURGERY

## 2019-02-28 ASSESSMENT — PAIN SCALES - GENERAL: PAINLEVEL: WORST PAIN (10)

## 2019-02-28 ASSESSMENT — MIFFLIN-ST. JEOR: SCORE: 1087.52

## 2019-02-28 NOTE — NURSING NOTE
"Sherrie Lala's chief complaint for this visit includes:  Chief Complaint   Patient presents with     Consult     left proximal humerus fracture. doi: 2/20/2019     PCP: Apollo Benitez    Referring Provider:  No referring provider defined for this encounter.    /81 (BP Location: Right arm, Patient Position: Sitting, Cuff Size: Adult Regular)   Pulse 92   Ht 1.6 m (5' 3\")   Wt 55.3 kg (122 lb)   SpO2 98%   BMI 21.61 kg/m    Worst Pain (10)     Do you need any medication refills at today's visit? no    "

## 2019-02-28 NOTE — PATIENT INSTRUCTIONS
[facility]  [address] [address] [place] [address]  Phone [phone]  Fax [phone]    SURGICAL INFORMATION & INSTRUCTIONS  Dr. Ligia Alvarado  Name of Surgery: Open reduction internal fixation of humeral fracture    Date of Surgery: 3/4/19?  Will call to verify    If you need to reschedule/schedule your surgery, please contact Varsha, our surgery scheduler at [facility], at [phone].    Arrival Time:     Time of Surgery:      Please arrive early so that we can prepare you for surgery. If you arrive later than your scheduled arrival time, your surgery may be cancelled.  Please note that scheduled times may change, but you will always be notified if there is a change.       Location of Surgery:     ? [facility], [address], 3rd floor for check-in  Phone [phone]  Fax [phone]  Bring parking ticket with you for validation and reduced parking rate  [email or web address]    Prior to surgery    ? Call your insurance company  Ask if you need pre-approval for your surgery.  If pre-approval is needed, please call our surgery scheduler for assistance with the pre-approval process.   If you do not have insurance, please let us know.     ? Schedule an appointment with a Primary Care Provider for a Pre-Op Physical.  This should be done within 30 days of surgery  If you do not have a primary care provider, you may call [email or web address] at [phone], for an appointment.  Please have your office note and any labs or tests faxed to the facility where you are having surgery. Please be sure to request a copy of your pre-op physical and bring it with you on the day of surgery.      Tell your provider if you have any of the following:   - IF you have a pacemaker or an ICD (implanted cardiac defibrillator). If you do, please bring the ID card with you on the day of  surgery  - IF you're a smoker. People who smoke have a higher risk of infection after surgery. Ask your provider how you can quit smoking.  - If you have diabetes, work with your provider to control your blood sugar. If its not well-controlled, we may need to delay surgery (or you may have problems healing afterward).  - If your surgeon asks you to see your dentist: You'll need to complete any dental work before surgery. Your dentist must send a letter to your surgery center saying it's ok to do the surgery.    ? Pre-Op Phone Call  You will receive a pre-op phone call 1-3 days before surgery to review your eating and drinking restrictions, review medications, and confirm surgery times.      ? 7-10 days BEFORE surgery  ? Stop taking aspirin, Plavix or aspirin products 10 days before surgery or as directed by your doctor.  ? Stop taking non-steroidal anti-inflammatory medications (naproxen/Aleve, ibuprofen/Advil/Motrin, celecoxib/Celebrex, meloxicam/Mobic) 1 week before surgery or as directed by your surgeon.  This will reduce the risk of bleeding during surgery.  ? Stop taking fish oil (Omega-3-fatty acid) 1 week before surgery.  ? It is OK to take acetaminophen (Tylenol) up until 2 hours prior to surgery.  ? Take prescription medications as directed by your doctor.  Discuss which medications to take or hold prior to your surgery, with your primary care doctor.   ? If you have diabetes, ask your primary care doctor or endocrinologist how you should take your medication(s).    ? 24 hours BEFORE surgery  Stop drinking alcohol (beer, wine, liquor) at least 24-hours before and after your surgery.     ? Evening BEFORE surgery  - You may eat a normal meal the night before surgery, but eat nothing after midnight.     - Take a shower - to help wash away bacteria (germs) from your skin.  It s normal to have bacteria on your skin and skin protects us from these germs.  When you have surgery, we cut the skin.  Sometimes germs  get into the cuts and cause infection (illness caused by germs).  By following the showering instructions and using the special soap, you will lower the number of germs on your skin.  This decreases your chance of an infection.    - Buy or get 8 ounces of antiseptic surgical soap called 4% CHG.  Common brands of this soap are Hibiclens and Exidine.    - You can find it this soap at your local pharmacy, clinic or retail store.  If you have trouble finding it, ask your pharmacist to help you find the right substitute.    - Do not shave within 12 inches of your incision (surgical cut) area for at least 3 days before surgery.  Shaving can make small cuts in the skin. This puts you at a higher risk of infection.    Items you will need for each shower:   - Newly washed towel  - 4 ounces of one of the recommended soaps    Follow these instructions, the evening before surgery  - Wash your hair and body with your regular shampoo and soap. Make sure you rinse the shampoo and soap from your hair and body.  - Using clean hands, apply about 2 ounces of soap gently on your skin from the neck to your toes. Use on your groin area last. Do not use this soap on your face or head. If you get any soap in your eyes, ears or mouth, rinse right away.  - Once the soap has been on your skin for at least 1 minute, rinse off completely and repeat washing with the surgical soap one more time.  - Rinse well and dry off using a clean towel.  If you feel any tingling, itching or other irritation, rinse right away. It is normal to feel some coolness on the skin after using the antiseptic soap. Your skin may feel a bit dry after the shower, but do not use any lotions, creams or moisturizers. Do not use hair spray or other products in your hair.  - Dress in freshly washed clothes or pajamas. Use fresh pillowcases and sheets on your bed.    ? Day of Surgery  - You may drink clear liquids up to 2 hours before surgery or as directed by your surgeon.   Clear liquids include: Water, Pedialyte, Gatorade, apple juice and liquids you can read through. Please avoid liquids that are red or orange in color.   - Do NOT drink: milk, coffee, liquids that have pulp, orange juice, and lemonade or tomato juice.   - Do NOT chew gum, chew tobacco or suck on hard candy.    - Take another shower          Follow these instructions:      - Wash your hair and body with your regular shampoo and soap. Make sure you rinse the shampoo and soap from your hair and body.  - Using clean hands, apply about 2 ounces of soap gently on your skin from the neck to your toes. Use on your groin area last. Do not use this soap on your face or head. If you get any soap in your eyes, ears or mouth, rinse right away.  - Once the soap has been on your skin for at least 1 minute, rinse off completely and repeat washing with the surgical soap one more time.  - Rinse well and dry off using a clean towel.  If you feel any tingling, itching or other irritation, rinse right away. It is normal to feel some coolness on the skin after using the antiseptic soap. Your skin may feel a bit dry after the shower, but do not use any lotions, creams or moisturizers. Do not use hair spray or other products in your hair.  - Dress in freshly washed clothes.  - Do not wear deodorant, cologne, lotion, makeup, nail polish or jewelry to surgery.    ? If there is any change in your health PRIOR to your surgery, please contact your surgeon's office.  Such as a fever, body aches, fatigue, any flu-like symptoms, rash, or any new injury to related body part.    ? Arrange for someone to drive you home after surgery.    will need to be a responsible adult (18 years or older) that will provide transportation to and from surgery and stay in the waiting room during your surgery. You may not drive yourself or take public transportation to and from surgery.    ? Arrange for someone to stay with you for 24 hours after you go home.    This person must be a responsible adult (18 years or older).    ? Bring these items to the hospital/surgery center:   ? Insurance card(s)  ? Money for parking and co-pays, if needed  ? A list of all the medications you take, including vitamins, minerals, herbs and over the counter medications.    ? A copy of your Advance Health Care Directive, if you have one.  This tells us what treatment(s) you would or would not want, and who would make health care decisions, if you could no longer speak for yourself.    ? A case for glasses, contact lenses, hearing aids or dentures.   ? Your inhaler or CPAP machine, if you use these at home    ? Leave extra cash, jewelry and other valuables at home.       ? Other information:   Sleep Apnea: Let your nurse know if you have a history of sleep apnea, only if you are having surgery at the [place].    When you arrive for surgery  When you get to the surgery center/hospital, you will:  - Check in. If you are under age 18, you must be with a parent or legal guardian.  - Sign consent forms, if you haven t already. These forms state that you know the risks and benefits of surgery. When you sign the forms, you give us permission to do the surgery. Do not sign them unless you understand what will happen during and after your surgery. If you have any questions about your surgery, ask to speak with your doctor before you sign the forms. If you don t understand the answers, ask again.  - Receive a copy of the Patient s Bill of Rights. If you do not receive a copy, please ask for one.  - Change into hospital clothes. Your belongings will be placed in a bag. We will return them to you after surgery.  - Meet with the anesthesia provider. He or she will tell you what kind of anesthesia (medicine) will be used to keep you comfortable during surgery.  - Remember: it s okay to remind doctors and nurses to wash their hands before touching you.  - In most cases, your surgeon will  use a marker to write his or her initials on the surgery site. This ensures that the exact site is operated on.  - For safety reasons, we will ask you the same questions many times. For example, we may ask your name and birth date over and over again.  - Friends and family can stay with you until it s time for surgery. While you re in surgery, they will be in the waiting area. Please note that cell phones are not allowed in some patient care areas.  - If you have questions about what will happen in the operating room, talk to your care team.  - You will meet with an anesthesiologist, before your surgery.  He or she may reference types of anesthesia commonly used for surgeries:   o General:  This involves the use of an IV for injection of medication and anesthesia. You are put into a sleep and have a breathing tube to assist you with breathing.  o Sedation:  You are asleep, but not so deply that you need a breathing tube.   o Local or Regional: a nerve is injected to numb the surgical area.  o Spinal: you are numbed from the waist down with medicine injected into your back.  o Femoral Nerve Block:  Anesthesia injected into the groin of leg which you are having surgery on.      After surgery  We will move you to a recovery room, where we will watch you closely. If you have any pain or discomfort, tell your nurse. He or she will try to make you comfortable.    - If you are staying overnight, we will move you to your hospital room after you are awake.  - If you are going home, we will move you to another room. Friends and family may be able to join you. The length of time you spend in recovery depend on the type of medicine you received, your medical condition, the type of surgery you had, or your response to the anesthesia given during your procedure.  - When you are discharged from the recovery room, the nurses will review instructions with you and your caregiver.  - Please wash your hands every time you touch the  wound or change bandages or dressings.  - Do not submerge the wound in water.  You may not use a bathtub or hot tub until the wound is closed. The wait time frame is generally 2-3 weeks, but any open area can be a source of incoming bacteria, so it is better to be on the safe side and avoid water submersion until your wound is fully healed.  Keep all dressings clean and dry.   - If you had surgery on your arm or leg, elevate it as much as possible to help reduce swelling.  - You may put ice on the surgical area for comfort, keeping ice on area for up to 20 minutes then off for 40 minutes.  You may do this the first few days after surgery to help reduce pain and swelling.   - Many surgical wounds will have small white strips of tape on them called steri-strips. These are to help support the incision and surrounding skin. Do not remove these. The edges will curl and fall off on their own, typically within 7-10 days with normal showering and hygiene.   - Drink at least 8-10 glasses of liquid each day to help your body heal.  - Keep your lungs clear by coughing and taking deep breaths every couple hours.  This is especially important the first 48 hours after surgery.    - Notify your doctor if you have any of the following:   o Fever of 101 F or higher  o Numbness and/or tingling  o Increased pain, redness or swelling  o Drainage from wound  o Prolonged or uncontrolled bleeding  o Difficulty with movement    ? Physical Therapy  Think about where you would like to have physical therapy (PT) after your surgery. You will be instructed by your surgical team on when to start PT after surgery. If you have questions regarding this, please contact the orthopedic clinic.    Follow-up Appointments, in Clinic  If you don't already have an appointment scheduled, please call to set up an appointment at [phone].      ? Post-Op appointments with provider. (2 and 6 weeks post op)    Dealing with pain  A nurse will check your  comfort level often during your stay. He or she will work with you to manage your pain.  It s expected that you will have pain after surgery.  Our goal is to reduce or minimize pain by:   - Remember pain is real. There are many ways to control pain. We can help you decide what works best for you.  - Ask for pain medicine when you need it.  Don t try to  tough it out --this can make you feel worse. Always take your medicine as ordered.  - Medicine doesn t work the same for everyone. If your medicine isn t working, tell your nurse. There may be other medicines or treatments we can try.  - Medication Refills.  If you need refills on your pain medication, please call the clinic as soon as possible.  It may take 72-business hours to obtain a refill.  Refills must be picked up at check-in 2, [facility] or mailed to a pharmacy of your choice.    - It is expected that you will wean off the pain medications in a timely manner.   - Constipation is a common side effect of pain medication, decreased activity and anesthesia from surgery.  Take a stool softener as prescribed by your doctor at the time of discharge.  You may also use over the counter medications as needed.  Be sure to increase your fiber (fruits and vegetables) and your water intake.      Please call the clinic at [phone], if you experience any problems or have questions.  If you are having an emergency, always call [phone] or seek immediate evaluation at the Emergency Room.    Thank you for selecting the [facility] for your care!  ---------------------------------------------        Thanks for coming today.  [facility]  [address] [place] [address]    To schedule future appointments in Ortho Clinic, you may call [phone].    To schedule ordered imaging by your provider:   Call Central [facility] [phone]    To schedule an injection ordered by your provider:  Call Central  Imaging Injection scheduling line: [phone]  MyChart available online at:  [email or web address]    Please call if any further questions or concerns ([phone]).  Clinic hours 8 am to 5 pm.    Return to clinic (call) if symptoms worsen or fail to improve.

## 2019-02-28 NOTE — TELEPHONE ENCOUNTER
Seen for left proximal humeral fracture on 2/20/19.    DOI: 2/20/19 (fall in driveway), [facility], then CT yesterday at [facility]     Has disc of XR, getting disc of CT from [place].     Received imaging and rad report for left shoulder XR done 2/20/19 [facility], in P[facility] now.    [facility] CT [facility], report faxed.    Called Pt to let her know that she doesn't need to  disc if she doesn't want to.    Phillip Albarran RN

## 2019-02-28 NOTE — LETTER
2/28/2019         RE: Sherrie Lala  [address] [place] [id number]        Dear Colleague,    Thank you for referring your patient, Sherrie Lala, to the [facility]. Please see a copy of my visit note below.    Orthopaedic Surgery Clinic Consult  Date of Service: 02/28/19    Chief Complaint: Left shoulder injury    History of Present Illness:  Pt is a RHD 63 year old female, with history of cholangiocarcinoma stage IV (now cancer free), also status post liver transplant in 2018 on tacrolimus and warfarin, presenting with left proximal humerus fracture after a fall on 2/20/2019.  She fell on ice while walking to her car outside, she was taken to an outside facility where x-rays were taken and initially nonoperative management was planned, however subsequent CT showed more severe malalignment and she was referred for surgical consideration.    She has not had prior injuries or issues with the left shoulder.      She was diagnosed with cholangiocarcinoma in 2011, she had subsequent liver resection and in 2014 had chemo and rads for lung metastasis, but now has achieved remission and is not on any maintenance therapy.  In 2018 she developed liver failure as a sequela of the liver resection and this led to the liver transplant.  She takes warfarin for partial portal vein thrombosis of the transplanted liver, this is planned for approximately 1 year duration.  She also did have one DVT prior to her cancer diagnosis, she was treated with a temporary regimen of warfarin at that time.   In 2011 she did have a temporary time period of IV steroid as part of her chemotherapy regimen, currently does not take any steroid.  At this point has transferred her transplant care and all other cares to the [facility].  She actually has been discharged from regular oncology follow-up, but does receive expertise in this regard from her current Hepatologist, Dr. Lilly.  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     Liver transplanted (H) 2018     Lung metastases (H) 2014     Portal vein thrombosis      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    Residual thrombus in main and right portal       Past Surgical History:  Past Surgical History:   Procedure Laterality Date     CHOLECYSTECTOMY       HEPATECTOMY PARTIAL       RETURN LIVER TRANSPLANT N/A 2018    Procedure: RETURN LIVER TRANSPLANT;  Open Abdomen, return liver transplant washout with   Mesh and liver stent  placement and  Abdomal Wound closure;  Surgeon: Darren Rod MD;  Location: [facility] OR     TRANSPLANT LIVER RECIPIENT  DONOR N/A 2018    Procedure: TRANSPLANT LIVER RECIPIENT  DONOR;  TRANSPLANT LIVER RECIPIENT  DONOR ;  Surgeon: Darren Rod MD;  Location: [facility] OR       Medications:  Current Outpatient Medications   Medication Sig Dispense Refill     albuterol (PROAIR HFA/PROVENTIL HFA/VENTOLIN HFA) 108 (90 BASE) MCG/ACT Inhaler Inhale 2 puffs into the lungs every 6 hours       aspirin 81 MG tablet Take 1 tablet (81 mg) by mouth daily 30 tablet 3     omeprazole 20 MG tablet Take 2 tablets (40 mg) by mouth 2 times daily 120 tablet 3     PRAMIPEXOLE DIHYDROCHLORIDE PO Take 0.5 mg by mouth daily       tacrolimus (GENERIC EQUIVALENT) 1 MG capsule Take 3 capsules (3 mg) by mouth 2 times daily 180 capsule 11     warfarin (COUMADIN) 2 MG tablet Take 2 tablets (4 mg) by mouth daily Every evening or as directed by provider (Patient taking differently: Take 4 mg by mouth daily Take 2 mg on Mon&Fri and 4 mg all other days, or as directed by  provider) 8 tablet 0     zolpidem (AMBIEN) 10 MG tablet Take 10 mg by mouth nightly as needed for sleep          Allergies:     Allergies   Allergen Reactions     Blood Transfusion Related (Informational Only) Other (See Comments)     Patient has a history of a clinically significant antibody against RBC antigens.  A delay in compatible RBCs may occur.     Dilaudid [Hydromorphone] Itching       Social History:   Retired, active with yoga, walking, and homemaking, enjoys traveling.  Lives independently in a house with her  in [facility]  Social History     Socioeconomic History     Marital status:      Spouse name: Not on file     Number of children: Not on file     Years of education: Not on file     Highest education level: Not on file   Occupational History     Not on file   Social Needs     Financial resource strain: Not on file     Food insecurity:     Worry: Not on file     Inability: Not on file     Transportation needs:     Medical: Not on file     Non-medical: Not on file   Tobacco Use     Smoking status: Never Smoker     Smokeless tobacco: Never Used   Substance and Sexual Activity     Alcohol use: No     Comment: occ      Drug use: No     Sexual activity: Not on file   Lifestyle     Physical activity:     Days per week: Not on file     Minutes per session: Not on file     Stress: Not on file   Relationships     Social connections:     Talks on phone: Not on file     Gets together: Not on file     Attends Mor[employer] service: Not on file     Active member of club or organization: Not on file     Attends meetings of clubs or organizations: Not on file     Relationship status: Not on file     Intimate partner violence:     Fear of current or ex partner: Not on file     Emotionally abused: Not on file     Physically abused: Not on file     Forced sexual activity: Not on file   Other Topics Concern     Parent/sibling w/ CABG, MI or angioplasty before 65F 55M? Not Asked   Social History  "Narrative     Not on file         Family History:  No family history on file.  Negative for blood clots, bleeding disorders or anesthesia related complications.    Review of Systems:  10 point review of systems is reviewed and is available below.    Physical Exam:  /81 (BP Location: Right arm, Patient Position: Sitting, Cuff Size: Adult Regular)   Pulse 92   Ht 1.6 m (5' 3\")   Wt 55.3 kg (122 lb)   SpO2 98%   BMI 21.61 kg/m     A&O, NAD  HEENT: NCAT, EOMI  Neuro: CN II-XII grossly intact  Neck: Full AROM without difficulty  Resp: equal and nonlabored  CV: RRR  Extremities:  LUE:   Generalized swelling and ecchymosis, no skin compromise   Tender throughout   Range of motion and special tests not assessed   SILT m/u/r/ax nerve distributions   Fires interossei, EPL (thumbs up), FDP (a-o-k) 5/5.  Weakly fires Deltoid limited by pain.   All fingers wwp, radial pulse 2+    Imaging:   X-ray and CT demonstrate proximal humerus fracture through the surgical neck with varus malalignment and shortening.  There is an additional greater tuberosity fragment    Assessment:  1. Left proximal humerus fracture, displaced   2. Complex medical history including liver transplant on tacrolimus and warfarin    Plan:We did recommend fixation of her fracture given her bony pathology and relatively active lifestyle.  We did  that a complete return to full range of motion is unlikely despite any intervention.  Complications such as nonunion, malunion, need for further surgeries, infection, clotting, anesthetic complications, etc are possible.  We also discussed other options such as nonoperative management and arthroplasty options should this be needed.  -Plan for left proximal humerus ORIF with allograft bone, likely Monday 3/4.  -Preop with PCP/PAC as well as with the transplant team  -We will specifically need to address her current anticoagulation jesus-op    Discussed with staff, Dr. Alvarado.    Eric Todd MD  PGY-5, " Orthopaedic Surgery    I have personally examined this patient and have reviewed the clinical presentation and progress note with the resident.  I agree with the treatment plan as outlined.  The plan was formulated with the resident on the day of the resident's note.         Again, thank you for allowing me to participate in the care of your patient.        Sincerely,        Ligia Alvarado MD

## 2019-02-28 NOTE — PROGRESS NOTES
Orthopaedic Surgery Clinic Consult  Date of Service: 02/28/19    Chief Complaint: Left shoulder injury    History of Present Illness:  Pt is a RHD 63 year old female, with history of cholangiocarcinoma stage IV (now cancer free), also status post liver transplant in 2018 on tacrolimus and warfarin, presenting with left proximal humerus fracture after a fall on 2/20/2019.  She fell on ice while walking to her car outside, she was taken to an outside facility where x-rays were taken and initially nonoperative management was planned, however subsequent CT showed more severe malalignment and she was referred for surgical consideration.    She has not had prior injuries or issues with the left shoulder.      She was diagnosed with cholangiocarcinoma in 2011, she had subsequent liver resection and in 2014 had chemo and rads for lung metastasis, but now has achieved remission and is not on any maintenance therapy.  In 2018 she developed liver failure as a sequela of the liver resection and this led to the liver transplant.  She takes warfarin for partial portal vein thrombosis of the transplanted liver, this is planned for approximately 1 year duration.  She also did have one DVT prior to her cancer diagnosis, she was treated with a temporary regimen of warfarin at that time.   In 2011 she did have a temporary time period of IV steroid as part of her chemotherapy regimen, currently does not take any steroid.  At this point has transferred her transplant care and all other cares to the [facility].  She actually has been discharged from regular oncology follow-up, but does receive expertise in this regard from her current Hepatologist, Dr. Lilly.  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     Liver transplanted (H) 2018     Lung metastases (H) 2014     Portal vein thrombosis      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    Residual thrombus in main and right portal       Past Surgical History:  Past Surgical History:   Procedure Laterality Date     CHOLECYSTECTOMY       HEPATECTOMY PARTIAL       RETURN LIVER TRANSPLANT N/A 2018    Procedure: RETURN LIVER TRANSPLANT;  Open Abdomen, return liver transplant washout with   Mesh and liver stent  placement and  Abdomal Wound closure;  Surgeon: Darren Rod MD;  Location: [facility] OR     TRANSPLANT LIVER RECIPIENT  DONOR N/A 2018    Procedure: TRANSPLANT LIVER RECIPIENT  DONOR;  TRANSPLANT LIVER RECIPIENT  DONOR ;  Surgeon: Darren Rod MD;  Location: [facility] OR       Medications:  Current Outpatient Medications   Medication Sig Dispense Refill     albuterol (PROAIR HFA/PROVENTIL HFA/VENTOLIN HFA) 108 (90 BASE) MCG/ACT Inhaler Inhale 2 puffs into the lungs every 6 hours       aspirin 81 MG tablet Take 1 tablet (81 mg) by mouth daily 30 tablet 3     omeprazole 20 MG tablet Take 2 tablets (40 mg) by mouth 2 times daily 120 tablet 3     PRAMIPEXOLE DIHYDROCHLORIDE PO Take 0.5 mg by mouth daily       tacrolimus (GENERIC EQUIVALENT) 1 MG capsule Take 3 capsules (3 mg) by mouth 2 times daily 180 capsule 11     warfarin (COUMADIN) 2 MG tablet Take 2 tablets (4 mg) by mouth daily Every evening or as directed by provider (Patient taking differently: Take 4 mg by mouth daily Take 2 mg on Mon&Fri and 4 mg all other days, or as directed by provider) 8 tablet 0     zolpidem (AMBIEN) 10 MG tablet Take 10 mg by mouth nightly as needed for sleep          Allergies:     Allergies   Allergen Reactions     Blood Transfusion Related (Informational Only) Other (See Comments)      Patient has a history of a clinically significant antibody against RBC antigens.  A delay in compatible RBCs may occur.     Dilaudid [Hydromorphone] Itching       Social History:   Retired, active with yoga, walking, and homemaking, enjoys traveling.  Lives independently in a house with her  in [facility]  Social History     Socioeconomic History     Marital status:      Spouse name: Not on file     Number of children: Not on file     Years of education: Not on file     Highest education level: Not on file   Occupational History     Not on file   Social Needs     Financial resource strain: Not on file     Food insecurity:     Worry: Not on file     Inability: Not on file     Transportation needs:     Medical: Not on file     Non-medical: Not on file   Tobacco Use     Smoking status: Never Smoker     Smokeless tobacco: Never Used   Substance and Sexual Activity     Alcohol use: No     Comment: occ      Drug use: No     Sexual activity: Not on file   Lifestyle     Physical activity:     Days per week: Not on file     Minutes per session: Not on file     Stress: Not on file   Relationships     Social connections:     Talks on phone: Not on file     Gets together: Not on file     Attends Mosque service: Not on file     Active member of club or organization: Not on file     Attends meetings of clubs or organizations: Not on file     Relationship status: Not on file     Intimate partner violence:     Fear of current or ex partner: Not on file     Emotionally abused: Not on file     Physically abused: Not on file     Forced sexual activity: Not on file   Other Topics Concern     Parent/sibling w/ CABG, MI or angioplasty before 65F 55M? Not Asked   Social History Narrative     Not on file         Family History:  No family history on file.  Negative for blood clots, bleeding disorders or anesthesia related complications.    Review of Systems:  10 point review of systems is reviewed and is available  "below.    Physical Exam:  /81 (BP Location: Right arm, Patient Position: Sitting, Cuff Size: Adult Regular)   Pulse 92   Ht 1.6 m (5' 3\")   Wt 55.3 kg (122 lb)   SpO2 98%   BMI 21.61 kg/m    A&O, NAD  HEENT: NCAT, EOMI  Neuro: CN II-XII grossly intact  Neck: Full AROM without difficulty  Resp: equal and nonlabored  CV: RRR  Extremities:  LUE:   Generalized swelling and ecchymosis, no skin compromise   Tender throughout   Range of motion and special tests not assessed   SILT m/u/r/ax nerve distributions   Fires interossei, EPL (thumbs up), FDP (a-o-k) 5/5.  Weakly fires Deltoid limited by pain.   All fingers wwp, radial pulse 2+    Imaging:   X-ray and CT demonstrate proximal humerus fracture through the surgical neck with varus malalignment and shortening.  There is an additional greater tuberosity fragment    Assessment:  1. Left proximal humerus fracture, displaced   2. Complex medical history including liver transplant on tacrolimus and warfarin    Plan:We did recommend fixation of her fracture given her bony pathology and relatively active lifestyle.  We did  that a complete return to full range of motion is unlikely despite any intervention.  Complications such as nonunion, malunion, need for further surgeries, infection, clotting, anesthetic complications, etc are possible.  We also discussed other options such as nonoperative management and arthroplasty options should this be needed.  -Plan for left proximal humerus ORIF with allograft bone, likely Monday 3/4.  -Preop with PCP/PAC as well as with the transplant team  -We will specifically need to address her current anticoagulation jesus-op    Discussed with staff, Dr. Alvarado.    Eric Todd MD  PGY-5, Orthopaedic Surgery    I have personally examined this patient and have reviewed the clinical presentation and progress note with the resident.  I agree with the treatment plan as outlined.  The plan was formulated with the resident on the " day of the resident's note.

## 2019-03-01 ENCOUNTER — OFFICE VISIT - RIVER FALLS (OUTPATIENT)
Dept: FAMILY MEDICINE | Facility: CLINIC | Age: 64
End: 2019-03-01
Payer: COMMERCIAL

## 2019-03-01 ENCOUNTER — TELEPHONE (OUTPATIENT)
Dept: TRANSPLANT | Facility: CLINIC | Age: 64
End: 2019-03-01

## 2019-03-01 ENCOUNTER — DOCUMENTATION ONLY (OUTPATIENT)
Dept: ORTHOPEDICS | Facility: CLINIC | Age: 64
End: 2019-03-01

## 2019-03-01 ENCOUNTER — TELEPHONE (OUTPATIENT)
Dept: ORTHOPEDICS | Facility: CLINIC | Age: 64
End: 2019-03-01

## 2019-03-01 LAB
INR PPP: 2.9
PROTHROMBIN TIME: 34.5 S (ref 10.5–13.1)

## 2019-03-01 RX ORDER — GABAPENTIN 300 MG/1
600 CAPSULE ORAL AT BEDTIME
COMMUNITY
End: 2022-05-01

## 2019-03-01 ASSESSMENT — MIFFLIN-ST. JEOR: SCORE: 1086.13

## 2019-03-01 NOTE — TELEPHONE ENCOUNTER
Call from Sherrie letting me know that she is scheduled for ortho surgery on Monday.    Per Dr. Rod she does need to be bridged w/ lovenox.  I tried to contact a member of the ortho team that saw her, Dr. Schwartz and clinic but have not heard back from them.  Sherrie held her coumadin yesterday. Per Dr. Rod she should start lovenox tomorrow, 30 mg sub q daily.  Per our anticoagulation clinic she should hold lovenox 24 hours before surgery and resume both coumadin and lovenox 24 hours after surgery.  The recommend she have an INR checked Thursday after surgery (3/7), if within her range of 1.5-2 she can stop lovenox.  If not w/in range should continue lovenox and recheck INR Friday, 3/8.  Sherrie is going to local clinic in [place] today for H&P at 11:00a.m..  I attempted to reach the nurse there but was sent to [place].  I left a message w/ all of the above information.

## 2019-03-01 NOTE — TELEPHONE ENCOUNTER
[facility]    Phone Message  Patient is set up for surgery, has questions regarding the surgery and forgot her packet here with the map and instructions - would like  a call from a nurse    May a detailed message be left on voicemail: yes    Reason for Call: Other: Patient is set up for surgery, has questions regarding the surgery and forgot her packet here with the map and instructions - would like  a call from a nurse     Action Taken: Message routed to:  Adult Clinics: Orthopedics p [phone]

## 2019-03-01 NOTE — PROGRESS NOTES
Patient is scheduled for surgery with Dr. Kraft     Spoke or left message with: Dr. Alvarado via e-mail/phone call     Date of Surgery: 3/4/19     Location: [place]     Post-ops Made: 2 wks and 6 wks with Dr. Kraft     Pre-op with surgeon (if applicable): no, patient saw Dr. Alvarado on 2/28/19     H&P: Scheduled with PCP    Additional imaging/appointments: n/a     Surgery packet: given in clinic     Additional comments: n/a

## 2019-03-01 NOTE — TELEPHONE ENCOUNTER
Spoke to Sherrie, she has been seen by local PCP and has had H&P.  This doctor prescribed her lovenox.  She is scheduled for surgery Monday morning at 0730

## 2019-03-01 NOTE — TELEPHONE ENCOUNTER
Called Pt back, gave her the surgery time/location, also emailed her the pre-op packet with map as she forgot it in clinic yesterday.    Phillip Albarran RN

## 2019-03-03 ENCOUNTER — ANESTHESIA EVENT (OUTPATIENT)
Dept: SURGERY | Facility: CLINIC | Age: 64
End: 2019-03-03
Payer: MEDICARE

## 2019-03-04 ENCOUNTER — APPOINTMENT (OUTPATIENT)
Dept: GENERAL RADIOLOGY | Facility: CLINIC | Age: 64
End: 2019-03-04
Attending: ORTHOPAEDIC SURGERY
Payer: MEDICARE

## 2019-03-04 ENCOUNTER — ANESTHESIA (OUTPATIENT)
Dept: SURGERY | Facility: CLINIC | Age: 64
End: 2019-03-04
Payer: MEDICARE

## 2019-03-04 ENCOUNTER — HOSPITAL ENCOUNTER (OUTPATIENT)
Facility: CLINIC | Age: 64
Discharge: HOME | End: 2019-03-04
Attending: ORTHOPAEDIC SURGERY | Admitting: ORTHOPAEDIC SURGERY
Payer: MEDICARE

## 2019-03-04 VITALS
DIASTOLIC BLOOD PRESSURE: 79 MMHG | WEIGHT: 123.9 LBS | HEART RATE: 103 BPM | SYSTOLIC BLOOD PRESSURE: 104 MMHG | TEMPERATURE: 97.9 F | BODY MASS INDEX: 21.95 KG/M2 | HEIGHT: 63 IN | OXYGEN SATURATION: 96 % | RESPIRATION RATE: 16 BRPM

## 2019-03-04 DIAGNOSIS — S42.292S OTHER CLOSED DISPLACED FRACTURE OF PROXIMAL END OF LEFT HUMERUS, SEQUELA: Primary | ICD-10-CM

## 2019-03-04 LAB
ABO + RH BLD: ABNORMAL
ABO + RH BLD: ABNORMAL
BLD GP AB SCN SERPL QL: ABNORMAL
BLD PROD TYP BPU: ABNORMAL
BLOOD BANK CMNT PATIENT-IMP: ABNORMAL
BLOOD BANK CMNT PATIENT-IMP: ABNORMAL
CREAT SERPL-MCNC: 1.58 MG/DL (ref 0.52–1.04)
GFR SERPL CREATININE-BSD FRML MDRD: 35 ML/MIN/{1.73_M2}
GLUCOSE SERPL-MCNC: 90 MG/DL (ref 70–99)
INR PPP: 1.18 (ref 0.86–1.14)
NUM BPU REQUESTED: 1
SPECIMEN EXP DATE BLD: ABNORMAL

## 2019-03-04 PROCEDURE — 27110028 ZZH OR GENERAL SUPPLY NON-STERILE: Performed by: ORTHOPAEDIC SURGERY

## 2019-03-04 PROCEDURE — 71000015 ZZH RECOVERY PHASE 1 LEVEL 2 EA ADDTL HR: Performed by: ORTHOPAEDIC SURGERY

## 2019-03-04 PROCEDURE — 86922 COMPATIBILITY TEST ANTIGLOB: CPT | Performed by: ORTHOPAEDIC SURGERY

## 2019-03-04 PROCEDURE — 85610 PROTHROMBIN TIME: CPT | Performed by: ORTHOPAEDIC SURGERY

## 2019-03-04 PROCEDURE — 86850 RBC ANTIBODY SCREEN: CPT | Performed by: ORTHOPAEDIC SURGERY

## 2019-03-04 PROCEDURE — 40000278 XR SURGERY CARM FLUORO LESS THAN 5 MIN: Mod: TC

## 2019-03-04 PROCEDURE — 25000128 H RX IP 250 OP 636: Performed by: NURSE ANESTHETIST, CERTIFIED REGISTERED

## 2019-03-04 PROCEDURE — 27210794 ZZH OR GENERAL SUPPLY STERILE: Performed by: ORTHOPAEDIC SURGERY

## 2019-03-04 PROCEDURE — 36415 COLL VENOUS BLD VENIPUNCTURE: CPT | Performed by: ORTHOPAEDIC SURGERY

## 2019-03-04 PROCEDURE — 25000566 ZZH SEVOFLURANE, EA 15 MIN: Performed by: ORTHOPAEDIC SURGERY

## 2019-03-04 PROCEDURE — 82565 ASSAY OF CREATININE: CPT | Performed by: ORTHOPAEDIC SURGERY

## 2019-03-04 PROCEDURE — 86901 BLOOD TYPING SEROLOGIC RH(D): CPT | Performed by: ORTHOPAEDIC SURGERY

## 2019-03-04 PROCEDURE — 25000128 H RX IP 250 OP 636: Performed by: ANESTHESIOLOGY

## 2019-03-04 PROCEDURE — C9290 INJ, BUPIVACAINE LIPOSOME: HCPCS | Performed by: ANESTHESIOLOGY

## 2019-03-04 PROCEDURE — 71000014 ZZH RECOVERY PHASE 1 LEVEL 2 FIRST HR: Performed by: ORTHOPAEDIC SURGERY

## 2019-03-04 PROCEDURE — 71000027 ZZH RECOVERY PHASE 2 EACH 15 MINS: Performed by: ORTHOPAEDIC SURGERY

## 2019-03-04 PROCEDURE — 25000125 ZZHC RX 250: Performed by: ORTHOPAEDIC SURGERY

## 2019-03-04 PROCEDURE — 36000066 ZZH SURGERY LEVEL 4 W FLUORO 1ST 30 MIN - UMMC: Performed by: ORTHOPAEDIC SURGERY

## 2019-03-04 PROCEDURE — 36000064 ZZH SURGERY LEVEL 4 EA 15 ADDTL MIN - UMMC: Performed by: ORTHOPAEDIC SURGERY

## 2019-03-04 PROCEDURE — 25800030 ZZH RX IP 258 OP 636: Performed by: NURSE ANESTHETIST, CERTIFIED REGISTERED

## 2019-03-04 PROCEDURE — C1762 CONN TISS, HUMAN(INC FASCIA): HCPCS | Performed by: ORTHOPAEDIC SURGERY

## 2019-03-04 PROCEDURE — 82947 ASSAY GLUCOSE BLOOD QUANT: CPT | Performed by: ORTHOPAEDIC SURGERY

## 2019-03-04 PROCEDURE — C1713 ANCHOR/SCREW BN/BN,TIS/BN: HCPCS | Performed by: ORTHOPAEDIC SURGERY

## 2019-03-04 PROCEDURE — 40000171 ZZH STATISTIC PRE-PROCEDURE ASSESSMENT III: Performed by: ORTHOPAEDIC SURGERY

## 2019-03-04 PROCEDURE — 25000128 H RX IP 250 OP 636: Performed by: ORTHOPAEDIC SURGERY

## 2019-03-04 PROCEDURE — 86900 BLOOD TYPING SEROLOGIC ABO: CPT | Performed by: ORTHOPAEDIC SURGERY

## 2019-03-04 PROCEDURE — 86902 BLOOD TYPE ANTIGEN DONOR EA: CPT | Performed by: ORTHOPAEDIC SURGERY

## 2019-03-04 PROCEDURE — 37000009 ZZH ANESTHESIA TECHNICAL FEE, EACH ADDTL 15 MIN: Performed by: ORTHOPAEDIC SURGERY

## 2019-03-04 PROCEDURE — 37000008 ZZH ANESTHESIA TECHNICAL FEE, 1ST 30 MIN: Performed by: ORTHOPAEDIC SURGERY

## 2019-03-04 DEVICE — IMPLANTABLE DEVICE: Type: IMPLANTABLE DEVICE | Site: HUMERUS | Status: FUNCTIONAL

## 2019-03-04 DEVICE — GRAFT BONE CHIPS CANC 15ML 400145: Type: IMPLANTABLE DEVICE | Site: HUMERUS | Status: FUNCTIONAL

## 2019-03-04 DEVICE — GRAFT BONE CHIPS CANC 30ML 400150: Type: IMPLANTABLE DEVICE | Site: HUMERUS | Status: FUNCTIONAL

## 2019-03-04 DEVICE — IMP SCR ZIM CORT 3.5X22MM SELF TAP SM HEX SS: Type: IMPLANTABLE DEVICE | Site: HUMERUS | Status: FUNCTIONAL

## 2019-03-04 RX ORDER — SODIUM CHLORIDE, SODIUM LACTATE, POTASSIUM CHLORIDE, CALCIUM CHLORIDE 600; 310; 30; 20 MG/100ML; MG/100ML; MG/100ML; MG/100ML
INJECTION, SOLUTION INTRAVENOUS CONTINUOUS
Status: DISCONTINUED | OUTPATIENT
Start: 2019-03-04 | End: 2019-03-04 | Stop reason: HOSPADM

## 2019-03-04 RX ORDER — ONDANSETRON 2 MG/ML
INJECTION INTRAMUSCULAR; INTRAVENOUS PRN
Status: DISCONTINUED | OUTPATIENT
Start: 2019-03-04 | End: 2019-03-04

## 2019-03-04 RX ORDER — SODIUM CHLORIDE, SODIUM LACTATE, POTASSIUM CHLORIDE, CALCIUM CHLORIDE 600; 310; 30; 20 MG/100ML; MG/100ML; MG/100ML; MG/100ML
INJECTION, SOLUTION INTRAVENOUS CONTINUOUS PRN
Status: DISCONTINUED | OUTPATIENT
Start: 2019-03-04 | End: 2019-03-04

## 2019-03-04 RX ORDER — HYDROXYZINE PAMOATE 25 MG/1
25 CAPSULE ORAL 3 TIMES DAILY PRN
Qty: 30 CAPSULE | Refills: 0 | Status: SHIPPED | OUTPATIENT
Start: 2019-03-04 | End: 2019-05-24

## 2019-03-04 RX ORDER — ONDANSETRON 4 MG/1
4 TABLET, ORALLY DISINTEGRATING ORAL EVERY 30 MIN PRN
Status: DISCONTINUED | OUTPATIENT
Start: 2019-03-04 | End: 2019-03-04 | Stop reason: HOSPADM

## 2019-03-04 RX ORDER — NALOXONE HYDROCHLORIDE 0.4 MG/ML
.1-.4 INJECTION, SOLUTION INTRAMUSCULAR; INTRAVENOUS; SUBCUTANEOUS
Status: DISCONTINUED | OUTPATIENT
Start: 2019-03-04 | End: 2019-03-04 | Stop reason: HOSPADM

## 2019-03-04 RX ORDER — CEFAZOLIN SODIUM 1 G/3ML
1 INJECTION, POWDER, FOR SOLUTION INTRAMUSCULAR; INTRAVENOUS SEE ADMIN INSTRUCTIONS
Status: DISCONTINUED | OUTPATIENT
Start: 2019-03-04 | End: 2019-03-04 | Stop reason: HOSPADM

## 2019-03-04 RX ORDER — ONDANSETRON 2 MG/ML
4 INJECTION INTRAMUSCULAR; INTRAVENOUS EVERY 30 MIN PRN
Status: DISCONTINUED | OUTPATIENT
Start: 2019-03-04 | End: 2019-03-04 | Stop reason: HOSPADM

## 2019-03-04 RX ORDER — OXYCODONE HYDROCHLORIDE 5 MG/1
5 TABLET ORAL EVERY 6 HOURS PRN
Qty: 40 TABLET | Refills: 0 | Status: SHIPPED | OUTPATIENT
Start: 2019-03-04 | End: 2021-03-03

## 2019-03-04 RX ORDER — BUPIVACAINE HYDROCHLORIDE 2.5 MG/ML
INJECTION, SOLUTION EPIDURAL; INFILTRATION; INTRACAUDAL PRN
Status: DISCONTINUED | OUTPATIENT
Start: 2019-03-04 | End: 2019-03-04

## 2019-03-04 RX ORDER — CEFAZOLIN SODIUM 2 G/100ML
2 INJECTION, SOLUTION INTRAVENOUS
Status: COMPLETED | OUTPATIENT
Start: 2019-03-04 | End: 2019-03-04

## 2019-03-04 RX ORDER — FENTANYL CITRATE 50 UG/ML
25-50 INJECTION, SOLUTION INTRAMUSCULAR; INTRAVENOUS
Status: DISCONTINUED | OUTPATIENT
Start: 2019-03-04 | End: 2019-03-04 | Stop reason: HOSPADM

## 2019-03-04 RX ORDER — FENTANYL CITRATE 50 UG/ML
25-50 INJECTION, SOLUTION INTRAMUSCULAR; INTRAVENOUS EVERY 5 MIN PRN
Status: DISCONTINUED | OUTPATIENT
Start: 2019-03-04 | End: 2019-03-04 | Stop reason: HOSPADM

## 2019-03-04 RX ORDER — FLUMAZENIL 0.1 MG/ML
0.2 INJECTION, SOLUTION INTRAVENOUS
Status: DISCONTINUED | OUTPATIENT
Start: 2019-03-04 | End: 2019-03-04 | Stop reason: HOSPADM

## 2019-03-04 RX ORDER — DEXAMETHASONE SODIUM PHOSPHATE 4 MG/ML
INJECTION, SOLUTION INTRA-ARTICULAR; INTRALESIONAL; INTRAMUSCULAR; INTRAVENOUS; SOFT TISSUE PRN
Status: DISCONTINUED | OUTPATIENT
Start: 2019-03-04 | End: 2019-03-04

## 2019-03-04 RX ORDER — FENTANYL CITRATE 50 UG/ML
INJECTION, SOLUTION INTRAMUSCULAR; INTRAVENOUS PRN
Status: DISCONTINUED | OUTPATIENT
Start: 2019-03-04 | End: 2019-03-04

## 2019-03-04 RX ORDER — MAGNESIUM HYDROXIDE 1200 MG/15ML
LIQUID ORAL PRN
Status: DISCONTINUED | OUTPATIENT
Start: 2019-03-04 | End: 2019-03-04 | Stop reason: HOSPADM

## 2019-03-04 RX ORDER — PROPOFOL 10 MG/ML
INJECTION, EMULSION INTRAVENOUS PRN
Status: DISCONTINUED | OUTPATIENT
Start: 2019-03-04 | End: 2019-03-04

## 2019-03-04 RX ORDER — DIPHENHYDRAMINE HYDROCHLORIDE 50 MG/ML
INJECTION INTRAMUSCULAR; INTRAVENOUS PRN
Status: DISCONTINUED | OUTPATIENT
Start: 2019-03-04 | End: 2019-03-04

## 2019-03-04 RX ADMIN — Medication 1 TABLET: at 11:49

## 2019-03-04 RX ADMIN — ONDANSETRON 4 MG: 2 INJECTION INTRAMUSCULAR; INTRAVENOUS at 09:13

## 2019-03-04 RX ADMIN — PHENYLEPHRINE HYDROCHLORIDE 100 MCG: 10 INJECTION, SOLUTION INTRAMUSCULAR; INTRAVENOUS; SUBCUTANEOUS at 08:00

## 2019-03-04 RX ADMIN — SODIUM CHLORIDE, POTASSIUM CHLORIDE, SODIUM LACTATE AND CALCIUM CHLORIDE: 600; 310; 30; 20 INJECTION, SOLUTION INTRAVENOUS at 07:40

## 2019-03-04 RX ADMIN — PHENYLEPHRINE HYDROCHLORIDE 0.3 MCG/KG/MIN: 10 INJECTION, SOLUTION INTRAMUSCULAR; INTRAVENOUS; SUBCUTANEOUS at 08:00

## 2019-03-04 RX ADMIN — FENTANYL CITRATE 50 MCG: 50 INJECTION, SOLUTION INTRAMUSCULAR; INTRAVENOUS at 07:14

## 2019-03-04 RX ADMIN — MIDAZOLAM HYDROCHLORIDE 0.5 MG: 1 INJECTION, SOLUTION INTRAMUSCULAR; INTRAVENOUS at 07:14

## 2019-03-04 RX ADMIN — PHENYLEPHRINE HYDROCHLORIDE: 10 INJECTION, SOLUTION INTRAMUSCULAR; INTRAVENOUS; SUBCUTANEOUS at 08:55

## 2019-03-04 RX ADMIN — FENTANYL CITRATE 50 MCG: 50 INJECTION, SOLUTION INTRAMUSCULAR; INTRAVENOUS at 10:45

## 2019-03-04 RX ADMIN — DIPHENHYDRAMINE HYDROCHLORIDE 12.5 MG: 50 INJECTION, SOLUTION INTRAMUSCULAR; INTRAVENOUS at 09:29

## 2019-03-04 RX ADMIN — BUPIVACAINE HYDROCHLORIDE 5 ML: 2.5 INJECTION, SOLUTION EPIDURAL; INFILTRATION; INTRACAUDAL at 07:30

## 2019-03-04 RX ADMIN — HYDROMORPHONE HYDROCHLORIDE 0.2 MG: 1 INJECTION, SOLUTION INTRAMUSCULAR; INTRAVENOUS; SUBCUTANEOUS at 09:29

## 2019-03-04 RX ADMIN — PROPOFOL 110 MG: 10 INJECTION, EMULSION INTRAVENOUS at 07:49

## 2019-03-04 RX ADMIN — SODIUM CHLORIDE, POTASSIUM CHLORIDE, SODIUM LACTATE AND CALCIUM CHLORIDE: 600; 310; 30; 20 INJECTION, SOLUTION INTRAVENOUS at 09:38

## 2019-03-04 RX ADMIN — Medication 80 MG: at 07:51

## 2019-03-04 RX ADMIN — DEXAMETHASONE SODIUM PHOSPHATE 8 MG: 4 INJECTION, SOLUTION INTRAMUSCULAR; INTRAVENOUS at 08:07

## 2019-03-04 RX ADMIN — MIDAZOLAM 1 MG: 1 INJECTION INTRAMUSCULAR; INTRAVENOUS at 07:40

## 2019-03-04 RX ADMIN — FENTANYL CITRATE 50 MCG: 50 INJECTION, SOLUTION INTRAMUSCULAR; INTRAVENOUS at 10:31

## 2019-03-04 RX ADMIN — FENTANYL CITRATE 50 MCG: 50 INJECTION, SOLUTION INTRAMUSCULAR; INTRAVENOUS at 09:54

## 2019-03-04 RX ADMIN — FENTANYL CITRATE 50 MCG: 50 INJECTION, SOLUTION INTRAMUSCULAR; INTRAVENOUS at 07:49

## 2019-03-04 RX ADMIN — BUPIVACAINE 10 ML: 13.3 INJECTION, SUSPENSION, LIPOSOMAL INFILTRATION at 07:30

## 2019-03-04 RX ADMIN — CEFAZOLIN SODIUM 2 G: 2 INJECTION, SOLUTION INTRAVENOUS at 07:57

## 2019-03-04 ASSESSMENT — ENCOUNTER SYMPTOMS: SEIZURES: 0

## 2019-03-04 ASSESSMENT — MIFFLIN-ST. JEOR: SCORE: 1096

## 2019-03-04 NOTE — ANESTHESIA CARE TRANSFER NOTE
Patient: Sherrie Lala    Procedure(s):  OPEN REDUCTION INTERNAL FIXATION HUMERUS PROXIMAL LEFT with Allograft    Diagnosis: Left Proximal Humerus Fracrture  Diagnosis Additional Information: No value filed.    Anesthesia Type:   No value filed.     Note:  Anesthesia Care Transfer Notewriter    Vitals: (Last set prior to Anesthesia Care Transfer)    CRNA VITALS  3/4/2019 0919 - 3/4/2019 1000      3/4/2019             Resp Rate (observed):  2  (Abnormal)                 Electronically Signed By: Cj Dsouza CRNA, APRN CRNA  March 4, 2019  10:00 AM

## 2019-03-04 NOTE — OP NOTE
"Procedure Date: 03/04/2019      PREOPERATIVE DIAGNOSES:      1.  Left shoulder proximal humerus fracture.      2.  Status post liver transplant.         POSTOPERATIVE DIAGNOSES:      1.  Left shoulder proximal humerus fracture.      2.  Status post liver transplant.         SURGICAL PROCEDURE:  Left shoulder open reduction and internal fixation of proximal humerus fracture with allograft bone.      SURGEON:  Eh Kraft MD         ESTIMATED BLOOD LOSS:  125 mL.         IMPLANTS:     1.  Shreyas 4-hole left-sided locking plate with appropriate screws.     2.  There was 45 mL of crouton allograft bone.      INDICATIONS:  Ms. Lala is a very pleasant 63-year-old woman with history of pain and disability in her shoulder after an injury.  After discussion of risks and benefits of surgical versus nonsurgical management, she elected to proceed with surgical remediation.  Preoperative counseling was documented by Dr. Alvarado in her clinic and I assumed care because of scheduling conflicts and the desire of the patient.  I did review the risks and benefits with the patient in the preoperative holding area.  I allowed she and her  to ask any questions.      DESCRIPTION OF PROCEDURE:  The patient was positively identified in the preanesthesia care area, surgical site was initialed by me and her consent was reviewed with her, as I mentioned.  She was then taken to the operating theater, she was placed in a well-padded beachchair position more supine than usual and prepped and draped in the usual sterile fashion for left upper extremity surgery.      Prior to surgical initiation, a \"timeout\" was held in accordance with our hospital policy.  Verbal verification of delivery of prophylactic intravenous antibiotics was performed.      After establishment of a 12 cm anterior deltopectoral incision, I was able to identify the cephalic vein and allowed it to track medially.  Tributaries to the deltoid were ligated and " coagulated.  Working in the subdeltoid space, I was able to place a Brown retractor.  A tenaculum was placed into the bone tendon interface of the subscapularis and a vertically oriented high tensile #2 stitch was placed.  This was repeated for the teres minor, infraspinatus and supraspinatus tendons.  The biceps tendon was opened out of the transverse humeral ligament and amputated off the anterosuperior labrum after was tenodesed to the pectoralis major tendon.  The residual was amputated.      A bone tamp was placed through the fracture site and on the anterior-inferior aspect of the humeral head to reduce, with longitudinal traction, the varus impaction.  I used AP and rotational orthogonal lateral radiographs numerous times during the course of this procedure to verify that acceptable reduction had been obtained.  I placed the plate on.  I fixated with a K-wire to verify its height.  I was then able to fill the humeral head with bone graft.  The 45 mL of morcellized allograft kept it out of varus.  The screws on the shaft, followed by screws on the head were filled after the sutures were tied.  Approach withdrawal was used to verify that none of the screws had penetrated through the articular surface.  All instruments were removed.  Closure was with Ethibonds proximally and distally to candice the interval in case of need for later revision followed by #0 Vicryl, 2-0 Vicryl and 3-0 running subcuticular Monocryl.  Steri-Strips and a sterile nonadherent dressing were applied.  A sling was placed.      POSTOPERATIVE PLAN:   1.  The patient will be discharged home today on oral analgesics.  She should have no active or passive range of motion at this time.      2.  At 1 week she will have 4 views of the shoulder (Velpeau axillary) and begin supine gravity eliminated forward elevation 0 to unlimited and passive external rotation at the side unlimited.   3.  At 6 weeks, she will begin gentle progressive 4-quadrant  stretching.      4.  At 3 months, she will have unrestrictive 4-quadrant stretching, periscapular stabilization and strengthening.  Radiographs will also be obtained at that visit.         GAEL MULLEN MD             D: 2019   T: 2019   MT: [id number]      Name:     REX DAMON   MRN:      [id number]        Account:        [id number]   :      1955           Procedure Date: 2019      Document: [id number]

## 2019-03-04 NOTE — ANESTHESIA POSTPROCEDURE EVALUATION
Anesthesia POST Procedure Evaluation    Patient: Sherrie Lala   MRN:     [id number] Gender:   female   Age:    63 year old :      1955        Preoperative Diagnosis: Left Proximal Humerus Fracrture   Procedure(s):  OPEN REDUCTION INTERNAL FIXATION HUMERUS PROXIMAL LEFT with Allograft   Postop Comments: No value filed.       Anesthesia Type:  General    Reportable Event: NO     PAIN: Uncomplicated   Sign Out status: Comfortable, Well controlled pain     PONV: No PONV   Sign Out status:  No Nausea or Vomiting     Neuro/Psych: Uneventful perioperative course   Sign Out Status: Preoperative baseline; Age appropriate mentation     Airway/Resp.: Uneventful perioperative course   Sign Out Status: Non labored breathing, age appropriate RR; Resp. Status within EXPECTED Parameters     CV: Uneventful perioperative course   Sign Out status: Appropriate BP and perfusion indices; Appropriate HR/Rhythm     Disposition:   Sign Out in:  PACU  Disposition:  Phase II; Home  Recovery Course: Uneventful  Follow-Up: Not required           Last Anesthesia Record Vitals:  CRNA VITALS  3/4/2019 0919 - 3/4/2019 1019      3/4/2019             NIBP:  119/79    Pulse:  79    NIBP Mean:  88    Ht Rate:  79    Temp:  36.3  C (97.3  F) Axillary     Axillary    SpO2:  100 %    EKG:  Sinus rhythm          Last PACU/Preop Vitals:  Vitals:    19 1130 19 1150 19 1230   BP:  104/73 104/79   Pulse:  100 103   Resp: 16 16 16   Temp: 36.6  C (97.9  F)     SpO2: 94% 94% 96%         Electronically Signed By: Rakel Shay MD, 2019, 1:03 PM

## 2019-03-04 NOTE — ANESTHESIA PREPROCEDURE EVALUATION
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     Liver transplanted (H) 2018     Lung metastases (H) 2014     Portal vein thrombosis      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    Residual thrombus in main and right portal      Past Surgical History:   Procedure Laterality Date     CHOLECYSTECTOMY       HEPATECTOMY PARTIAL       RETURN LIVER TRANSPLANT N/A 2018    Procedure: RETURN LIVER TRANSPLANT;  Open Abdomen, return liver transplant washout with   Mesh and liver stent  placement and  Abdomal Wound closure;  Surgeon: Darren Rod MD;  Location: [facility] OR     TRANSPLANT LIVER RECIPIENT  DONOR N/A 2018    Procedure: TRANSPLANT LIVER RECIPIENT  DONOR;  TRANSPLANT LIVER RECIPIENT  DONOR ;  Surgeon: Darren Rod MD;  Location: [facility] OR          Anesthesia Evaluation     . Pt has had prior anesthetic.     No history of anesthetic complications          ROS/MED HX    ENT/Pulmonary: Comment: Had pleural effusion prior to liver Tx. No longer an issue per patient. - neg pulmonary ROS     Neurologic:  - neg neurologic ROS    (-) seizures, CVA and TIA   Cardiovascular: Comment: Partial portal vein thrombosis    (+) ----. Taking blood thinners : . . . :. .       METS/Exercise Tolerance:     Hematologic: Comments: Platelets 102K  Last warfarin Thursday afternoon  Last lovenox saturday       "  Musculoskeletal:  - neg musculoskeletal ROS       GI/Hepatic:     (+) liver disease (Liver Transplant),       Renal/Genitourinary: Comment: Cr 1.48 (thought 2/2 tacro)    (+) chronic renal disease,       Endo:  - neg endo ROS       Psychiatric:  - neg psychiatric ROS       Infectious Disease:  - neg infectious disease ROS       Malignancy:      - no malignancy   Other:                         PHYSICAL EXAM:   Mental Status/Neuro: A/A/O   Airway: Facies: Feasible  Mallampati: II  Mouth/Opening: Full  TM distance: > 6 cm  Neck ROM: Full   Respiratory:    CV:    Comments:                    Lab Results   Component Value Date    WBC 4.0 02/08/2019    HGB 11.4 (L) 02/08/2019    HCT 35.7 02/08/2019     (L) 02/08/2019     02/08/2019    POTASSIUM 4.0 02/08/2019    CHLORIDE 105 02/08/2019    CO2 28 02/08/2019    BUN 37 (H) 02/08/2019    CR 1.48 (H) 02/08/2019     (H) 02/08/2019    SHELDON 8.3 (L) 02/08/2019    PHOS 4.1 02/08/2019    MAG 2.0 02/08/2019    ALBUMIN 3.8 02/08/2019    PROTTOTAL 7.6 02/08/2019    ALT 17 02/08/2019    AST 15 02/08/2019    ALKPHOS 92 02/08/2019    BILITOTAL 0.7 02/08/2019    LIPASE 161 09/01/2018    AMYLASE 39 08/31/2018    PTT 28 09/04/2018    INR 2.29 (H) 09/17/2018    FIBR 481 (H) 09/01/2018    TSH 0.02 (L) 02/26/2018    T4 1.06 02/26/2018    HCGS Positive (A) 02/26/2018       Preop Vitals  BP Readings from Last 3 Encounters:   03/04/19 (!) 141/93   02/28/19 118/81   02/08/19 119/82    Pulse Readings from Last 3 Encounters:   03/04/19 88   02/28/19 92   02/08/19 82      Resp Readings from Last 3 Encounters:   03/04/19 18   12/17/18 16   11/05/18 18    SpO2 Readings from Last 3 Encounters:   03/04/19 98%   02/28/19 98%   01/08/19 96%      Temp Readings from Last 1 Encounters:   03/04/19 36.5  C (97.7  F) (Oral)    Ht Readings from Last 1 Encounters:   03/04/19 1.6 m (5' 2.99\")      Wt Readings from Last 1 Encounters:   03/04/19 56.2 kg (123 lb 14.4 oz)    Estimated body mass " "index is 21.95 kg/m  as calculated from the following:    Height as of this encounter: 1.6 m (5' 2.99\").    Weight as of this encounter: 56.2 kg (123 lb 14.4 oz).     LDA:  Pulmonary Artery Catheter Assessment - Single Lumen 09/01/18 0704 (Active)   Number of days: 184       Closed/Suction Drain 2 Right;Medial RLQ Bulb 19 [place] (Active)   Site Description [facility] 9/12/2018  8:00 AM   Dressing Status Dressing Changed 9/12/2018  8:00 AM   Dressing Change Due 09/13/18 9/12/2018  8:00 AM   Drainage Appearance Serous 9/12/2018  2:17 PM   Status To bulb suction 9/12/2018  8:00 AM   Output (ml) 15 ml 9/12/2018  2:17 PM   Number of days: 184       Closed/Suction Drain 3 Right;Lateral RUQ Bulb 19 [place] (Active)   Site Description [facility] 9/12/2018  8:00 AM   Dressing Status Dressing Changed 9/12/2018  8:00 AM   Dressing Change Due 09/13/18 9/12/2018  8:00 AM   Drainage Appearance Serosanguenous 9/12/2018 12:10 PM   Status To bulb suction 9/12/2018  8:00 AM   Output (ml) 2.5 ml 9/12/2018 12:10 PM   Number of days: 184       NG/OG Tube 10 fr Right nostril (Active)   Site Description [facility] 9/12/2018  8:00 AM   Status Clamped 9/12/2018  8:00 AM   Placement Check [place] unchanged 9/11/2018  4:00 PM   Intake (ml) 30 ml 9/11/2018  8:22 PM   Flush/Free Water (mL) 30 mL 9/11/2018  8:22 PM   Residual (mL) 20 mL 9/9/2018 10:00 PM   Number of days: 185            Assessment:   ASA SCORE: 3    NPO Status: > 2 hours since completed Clear Liquids; > 6 hours since completed Solid Foods   Documentation: H&P complete; Preop Testing complete; Consents complete   Proceeding: Proceed without further delay  Tobacco Use:  NO Active use of Tobacco/UNKNOWN Tobacco use status     Plan:   Anes. Type:  General   Pre-Induction: Midazolam IV; Acetaminophen PO   Induction:  IV (Standard)   Airway: Oral ETT   Access/Monitoring: PIV   Maintenance: Balanced   Emergence: Procedure Site   Logistics: Same Day Surgery     Postop Pain/Sedation " Strategy:  Standard-Options: Opioids PRN     PONV Management:  Adult Risk Factors: Female, Non-Smoker, Postop Opioids  Prevention: Ondansetron; Dexamethasone     CONSENT: Direct conversation   Plan and risks discussed with: Patient          Comments for Plan/Consent:  Discussed risks, benefits, and alternatives of general anesthesia with the patient. Risks discussed included nausea/vomiting, sore throat, dental damage, soft tissue damage, problems with heart, brain, lungs, and rare allergic reactions. Patient was given a chance to ask questions. Patient consents to procedure.     Await INR prior to going to OR                         Rakel Shay MD

## 2019-03-04 NOTE — BRIEF OP NOTE
[facility], [place]    Brief Operative Note    Pre-operative diagnosis: Left Proximal Humerus Fracrture  Post-operative diagnosis * No post-op diagnosis entered *  Procedure: Procedure(s):  OPEN REDUCTION INTERNAL FIXATION HUMERUS PROXIMAL LEFT with Allograft  Surgeon: Surgeon(s) and Role:     * Eh Kraft MD - Primary     * Eric Todd MD - Resident - Assisting  Anesthesia: Combined General with Block   Estimated blood loss: 150 ml  Drains: None  Specimens: * No specimens in log *  Findings:   None.  Complications: None.  Implants: dict.

## 2019-03-04 NOTE — ANESTHESIA CARE TRANSFER NOTE
"Patient: Sherrie Lala    Procedure(s):  OPEN REDUCTION INTERNAL FIXATION HUMERUS PROXIMAL LEFT with Allograft    Diagnosis: Left Proximal Humerus Fracrture  Diagnosis Additional Information: No value filed.    Anesthesia Type:   No value filed.     Note:  Airway :Face Mask  Patient transferred to:PACU  Comments: Sherrie arrived in PACU awake and appropriate. She said her operative shoulder \"ached\", so she was given fentanyl 50 mcg.  Report given and questions answered.Handoff Report: Identifed the Patient, Identified the Reponsible Provider, Reviewed the pertinent medical history, Discussed the surgical course, Reviewed Intra-OP anesthesia mangement and issues during anesthesia, Set expectations for post-procedure period and Allowed opportunity for questions and acknowledgement of understanding      Vitals: (Last set prior to Anesthesia Care Transfer)    CRNA VITALS  3/4/2019 0919 - 3/4/2019 0954      3/4/2019             Resp Rate (observed):  2  (Abnormal)                 Electronically Signed By: Cj Dsouza CRNA, APRN CRNA  March 4, 2019  9:54 AM  "

## 2019-03-04 NOTE — DISCHARGE INSTRUCTIONS
Same-Day Surgery   Adult Discharge Orders & Instructions     For 24 hours after surgery:  1. Get plenty of rest.  A responsible adult must stay with you for at least 24 hours after you leave the hospital.   2. Pain medication can slow your reflexes. Do not drive or use heavy equipment.  If you have weakness or tingling, don't drive or use heavy equipment until this feeling goes away.  3. Mixing alcohol and pain medication can cause dizziness and slow your breathing. It can even be fatal. Do not drink alcohol while taking pain medication.  4. Avoid strenuous or risky activities.  Ask for help when climbing stairs.   5. You may feel lightheaded.  If so, sit for a few minutes before standing.  Have someone help you get up.   6. If you have nausea (feel sick to your stomach), drink only clear liquids such as apple juice, ginger ale, broth or 7-Up.  Rest may also help.  Be sure to drink enough fluids.  Move to a regular diet as you feel able. Take pain medications with a small amount of solid food, such as toast or crackers, to avoid nausea.   7. A slight fever is normal. Call the doctor if your fever is over 100 F (37.7 C) (taken under the tongue) or lasts longer than 24 hours.  8. You may have a dry mouth, muscle aches, trouble sleeping or a sore throat.  These symptoms should go away after 24 hours.  9. Do not make important or legal decisions.   Pain Management:      1. Take pain medication (if prescribed) for pain as directed by your physician.        2. WARNING: If the pain medication you have been prescribed contains Tylenol  (acetaminophen), DO NOT take additional doses of Tylenol (acetaminophen).     Call your doctor for any of the followin.  Signs of infection (fever, growing tenderness at the surgery site, severe pain, a large amount of drainage or bleeding, foul-smelling drainage, redness, swelling).    2.  It has been over 8 to 10 hours since surgery and you are still not able to urinate (pee).    3.   Headache for over 24 hours.    4.  Numbness, tingling or weakness the day after surgery (if you had spinal anesthesia).  To contact a doctor, call _____________________________________ or:      [phone] and ask for the Resident On Call for:          __________________________________________ (answered 24 hours a day)      Emergency Department:  [place] [facility]: [phone]  [place] [facility]: [phone]               Rev. 10/2014   Discharge Instructions: Following Surgery on your Arm or Hand    Comfort:    Keep the affected arm or hand elevated to reduce pain and swelling. Use pillows at night and a sling (if ordered) during the day.    Take the pain medication as ordered.     Care:    Keep the dressing clean, dry and intact.    Watch for drainage    Check color, motion, and sensation of the fingers/hand on a regular basis.     Activity:    Spend a quiet afternoon and evening at home on the day of your surgery.    No tub baths or showers until your dressing/cast is removed by your doctor.     Report to your doctor at once if:    Your fingers below the dressing/cast are numb, difficult or painful to move, and this is not relieved after elevation.    There is a change in the color of your fingers below the dressing/cast that does not go away when elevated.     The affected arm/hand is much cooler or warmer than the other side.    Your temperature is elevated.    There is an odor or unusual drainage on the dressing/cast.    Your dressing/cast is uncomfortably snug or tight.     Follow up:    Call your doctor s office to schedule a follow-up appointment     Rev. 3/2014

## 2019-03-06 DIAGNOSIS — M81.0 AGE-RELATED OSTEOPOROSIS WITHOUT CURRENT PATHOLOGICAL FRACTURE: Primary | ICD-10-CM

## 2019-03-07 ENCOUNTER — TELEPHONE (OUTPATIENT)
Dept: TRANSPLANT | Facility: CLINIC | Age: 64
End: 2019-03-07

## 2019-03-07 DIAGNOSIS — S42.202A CLOSED FRACTURE OF LEFT PROXIMAL HUMERUS: Primary | ICD-10-CM

## 2019-03-07 NOTE — TELEPHONE ENCOUNTER
Call to Sherrie to see how shoulder surgery went.  She said she was discharged same day (3/4) , her care was excellent.  Having quite a bit of pain but overall doing well.  Seeing hillary Baldwin On Monday, 3/11.  Will have labs checked on this day.

## 2019-03-08 ENCOUNTER — TELEPHONE (OUTPATIENT)
Dept: PHARMACY | Facility: CLINIC | Age: 64
End: 2019-03-08

## 2019-03-08 DIAGNOSIS — Z94.4 LIVER TRANSPLANTED (H): ICD-10-CM

## 2019-03-08 LAB
BLD PROD TYP BPU: NORMAL
BLD UNIT ID BPU: 0
BLOOD PRODUCT CODE: NORMAL
BPU ID: NORMAL
TRANSFUSION STATUS PATIENT QL: NORMAL
TRANSFUSION STATUS PATIENT QL: NORMAL

## 2019-03-09 LAB
ABO + RH BLD: ABNORMAL
ABO + RH BLD: ABNORMAL
BLD GP AB SCN SERPL QL: ABNORMAL
BLOOD BANK CMNT PATIENT-IMP: ABNORMAL
BLOOD BANK CMNT PATIENT-IMP: ABNORMAL
SPECIMEN EXP DATE BLD: ABNORMAL

## 2019-03-11 ENCOUNTER — ANCILLARY PROCEDURE (OUTPATIENT)
Dept: GENERAL RADIOLOGY | Facility: CLINIC | Age: 64
End: 2019-03-11
Attending: ORTHOPAEDIC SURGERY
Payer: COMMERCIAL

## 2019-03-11 ENCOUNTER — OFFICE VISIT (OUTPATIENT)
Dept: ORTHOPEDICS | Facility: CLINIC | Age: 64
End: 2019-03-11
Payer: COMMERCIAL

## 2019-03-11 ENCOUNTER — TELEPHONE (OUTPATIENT)
Dept: TRANSPLANT | Facility: CLINIC | Age: 64
End: 2019-03-11

## 2019-03-11 VITALS — BODY MASS INDEX: 21.79 KG/M2 | WEIGHT: 123 LBS | HEIGHT: 63 IN

## 2019-03-11 DIAGNOSIS — S42.292D OTHER CLOSED DISPLACED FRACTURE OF PROXIMAL END OF LEFT HUMERUS WITH ROUTINE HEALING, SUBSEQUENT ENCOUNTER: Primary | ICD-10-CM

## 2019-03-11 DIAGNOSIS — S42.202A CLOSED FRACTURE OF LEFT PROXIMAL HUMERUS: ICD-10-CM

## 2019-03-11 DIAGNOSIS — Z91.89 AT RISK FOR INFECTION TRANSMITTED FROM DONOR: ICD-10-CM

## 2019-03-11 DIAGNOSIS — Z94.4 LIVER REPLACED BY TRANSPLANT (H): ICD-10-CM

## 2019-03-11 DIAGNOSIS — Z94.4 LIVER TRANSPLANTED (H): ICD-10-CM

## 2019-03-11 LAB
ALBUMIN SERPL-MCNC: 3.3 G/DL (ref 3.4–5)
ALP SERPL-CCNC: 103 U/L (ref 40–150)
ALT SERPL W P-5'-P-CCNC: 15 U/L (ref 0–50)
ANION GAP SERPL CALCULATED.3IONS-SCNC: 9 MMOL/L (ref 3–14)
AST SERPL W P-5'-P-CCNC: 12 U/L (ref 0–45)
BILIRUB DIRECT SERPL-MCNC: 0.2 MG/DL (ref 0–0.2)
BILIRUB SERPL-MCNC: 0.7 MG/DL (ref 0.2–1.3)
BUN SERPL-MCNC: 33 MG/DL (ref 7–30)
CALCIUM SERPL-MCNC: 8.2 MG/DL (ref 8.5–10.1)
CHLORIDE SERPL-SCNC: 104 MMOL/L (ref 94–109)
CO2 SERPL-SCNC: 27 MMOL/L (ref 20–32)
CREAT SERPL-MCNC: 1.43 MG/DL (ref 0.52–1.04)
ERYTHROCYTE [DISTWIDTH] IN BLOOD BY AUTOMATED COUNT: 14.2 % (ref 10–15)
GFR SERPL CREATININE-BSD FRML MDRD: 39 ML/MIN/{1.73_M2}
GLUCOSE SERPL-MCNC: 115 MG/DL (ref 70–99)
HCT VFR BLD AUTO: 27.2 % (ref 35–47)
HGB BLD-MCNC: 8.5 G/DL (ref 11.7–15.7)
MAGNESIUM SERPL-MCNC: 2.1 MG/DL (ref 1.6–2.3)
MCH RBC QN AUTO: 27.7 PG (ref 26.5–33)
MCHC RBC AUTO-ENTMCNC: 31.3 G/DL (ref 31.5–36.5)
MCV RBC AUTO: 89 FL (ref 78–100)
PHOSPHATE SERPL-MCNC: 3.6 MG/DL (ref 2.5–4.5)
PLATELET # BLD AUTO: 161 10E9/L (ref 150–450)
POTASSIUM SERPL-SCNC: 3.6 MMOL/L (ref 3.4–5.3)
PROT SERPL-MCNC: 7.1 G/DL (ref 6.8–8.8)
RBC # BLD AUTO: 3.07 10E12/L (ref 3.8–5.2)
SODIUM SERPL-SCNC: 140 MMOL/L (ref 133–144)
TACROLIMUS BLD-MCNC: 4 UG/L (ref 5–15)
TME LAST DOSE: ABNORMAL H
WBC # BLD AUTO: 4.9 10E9/L (ref 4–11)

## 2019-03-11 PROCEDURE — 80197 ASSAY OF TACROLIMUS: CPT | Performed by: INTERNAL MEDICINE

## 2019-03-11 RX ORDER — NICOTINE POLACRILEX 4 MG/1
40 GUM, CHEWING ORAL 2 TIMES DAILY
Qty: 120 TABLET | Refills: 11 | Status: SHIPPED | OUTPATIENT
Start: 2019-03-11 | End: 2020-03-10

## 2019-03-11 ASSESSMENT — MIFFLIN-ST. JEOR: SCORE: 1091.89

## 2019-03-11 NOTE — PROGRESS NOTES
CHIEF COMPLAINT:  Left proximal humerus, status post open reduction internal fixation.  Date of surgery:  03/04/2019.      HISTORY OF PRESENT ILLNESS:  Ms. Lala returns today for followup.  She notes she is having a lot of pain in her periscapular region.  She notes that her sling feels like it is not supporting her but she has a lot of tenderness along her forearm.      PHYSICAL EXAMINATION:  On exam today, her incision is clean, dry and intact.  She has ecchymosis all the way down to the elbow.  She moves her fingers adequately and sets her anterior, middle and posterior deltoid well.      RADIOGRAPHS:  Four views of the shoulder obtained today show interval open reduction internal fixation of the proximal humerus with anatomic alignment, no evidence of hardware failure or penetration.      ASSESSMENT:  Left shoulder status post above procedures, doing well.      PLAN:  I had a nice talk with Ms. Lala today.  I do think she should start on some gentle range of motion passively.  She demonstrated an understanding of this.  I think that getting her into a sling that fits better will help her.  We are transitioning that today by sending [facility] to [facility].      I will see her back at the 6-week candice postoperatively for discontinuation of her sling and resumption of activities of daily living.  She was given a therapy script and this will be advanced at her next visit.  She declined a refill of her pain medication.

## 2019-03-11 NOTE — LETTER
3/11/2019       RE: Sherrie Lala  [address] [place] [place] [facility] [address]     Dear Colleague,    Thank you for referring your patient, Sherrie Lala, [facility] Please see a copy of my visit note below.    CHIEF COMPLAINT:  Left proximal humerus, status post open reduction internal fixation.  Date of surgery:  03/04/2019.      HISTORY OF PRESENT ILLNESS:  Ms. Lala returns today for followup.  She notes she is having a lot of pain in her periscapular region.  She notes that her sling feels like it is not supporting her but she has a lot of tenderness along her forearm.      PHYSICAL EXAMINATION:  On exam today, her incision is clean, dry and intact.  She has ecchymosis all the way down to the elbow.  She moves her fingers adequately and sets her anterior, middle and posterior deltoid well.      RADIOGRAPHS:  Four views of the shoulder obtained today show interval open reduction internal fixation of the proximal humerus with anatomic alignment, no evidence of hardware failure or penetration.      ASSESSMENT:  Left shoulder status post above procedures, doing well.      PLAN:  I had a nice talk with Ms. Lala today.  I do think she should start on some gentle range of motion passively.  She demonstrated an understanding of this.  I think that getting her into a sling that fits better will help her.  We are transitioning that today by sending her to [facility].      I will see her back at the 6-week candice postoperatively for discontinuation of her sling and resumption of activities of daily living.  She was given a therapy script and this will be advanced at her next visit.  She declined a refill of her pain medication.         Again, thank you for allowing me to participate in the care of your patient.      Sincerely,    Eh Kraft MD

## 2019-03-11 NOTE — NURSING NOTE
"Reason For Visit:   Chief Complaint   Patient presents with     Surgical Followup     Open Reduction Internal Fixation Left Proximal Humerus DOS 3/4/19       PCP: Apollo Benitez      ? No  Occupation REtired  Date of injury: 2/20/19  Type of injury: Humerus fx.  Date of surgery: 3/4/19  Type of surgery: ORIF left proximal humerus.  Smoker: No    Right hand dominant    SANE score  Affected shoulder: Left   Right shoulder SANE: 100  Left shoulder SANE: 0    Ht 1.6 m (5' 2.99\")   Wt 55.8 kg (123 lb)   BMI 21.80 kg/m        Pain Assessment  Patient Currently in Pain: Yes  0-10 Pain Scale: 5  Primary Pain Location: Shoulder  Pain Descriptors: Aching, Constant    Shayna Subramanian LPN        "

## 2019-03-12 RX ORDER — TACROLIMUS 1 MG/1
CAPSULE ORAL
Qty: 270 CAPSULE | Refills: 11 | Status: SHIPPED | OUTPATIENT
Start: 2019-03-12 | End: 2020-01-07

## 2019-03-12 NOTE — TELEPHONE ENCOUNTER
Instructed pt increase tacrolimus dose to 4mg am, 5mg pm and recheck all labs next week. Pt able to repeat instructions.

## 2019-03-13 LAB — LAB SCANNED RESULT: NORMAL

## 2019-03-19 ENCOUNTER — AMBULATORY - RIVER FALLS (OUTPATIENT)
Dept: FAMILY MEDICINE | Facility: CLINIC | Age: 64
End: 2019-03-19
Payer: COMMERCIAL

## 2019-03-19 ENCOUNTER — COMMUNICATION - RIVER FALLS (OUTPATIENT)
Dept: FAMILY MEDICINE | Facility: CLINIC | Age: 64
End: 2019-03-19
Payer: COMMERCIAL

## 2019-03-19 DIAGNOSIS — Z94.4 LIVER REPLACED BY TRANSPLANT (H): ICD-10-CM

## 2019-03-19 PROCEDURE — 80197 ASSAY OF TACROLIMUS: CPT | Performed by: INTERNAL MEDICINE

## 2019-03-20 LAB
INR PPP: 1.7
PROTHROMBIN TIME: 16.9 S (ref 9–11.5)

## 2019-03-22 LAB
TACROLIMUS BLD-MCNC: 8.1 UG/L (ref 5–15)
TME LAST DOSE: NORMAL H

## 2019-03-25 LAB
A/G RATIO - HISTORICAL: 1.8 (ref 1–2.5)
ALBUMIN SERPL-MCNC: 4.2 GM/DL (ref 3.6–5.1)
ALP SERPL-CCNC: 99 UNIT/L (ref 33–130)
ALT SERPL W P-5'-P-CCNC: 5 UNIT/L (ref 6–29)
AST SERPL W P-5'-P-CCNC: 8 UNIT/L (ref 10–35)
BASOPHILS # BLD MANUAL: 29 10*3/UL (ref 0–200)
BASOPHILS NFR BLD MANUAL: 0.9 %
BILIRUB DIRECT SERPL-MCNC: 0.1 MG/DL
BILIRUB INDIRECT SERPL-MCNC: 0.4 MG/DL (ref 0.2–1.2)
BILIRUB SERPL-MCNC: 0.5 MG/DL (ref 0.2–1.2)
BUN SERPL-MCNC: 31 MG/DL (ref 7–25)
BUN/CREAT RATIO - HISTORICAL: 26 (ref 6–22)
CALCIUM SERPL-MCNC: 9 MG/DL (ref 8.6–10.4)
CHLORIDE BLD-SCNC: 112 MMOL/L (ref 98–110)
CO2 SERPL-SCNC: 27 MMOL/L (ref 20–32)
CREAT SERPL-MCNC: 1.21 MG/DL (ref 0.5–0.99)
EGFRCR SERPLBLD CKD-EPI 2021: 48 ML/MIN/1.73M2
EOSINOPHIL # BLD MANUAL: 266 10*3/UL (ref 15–500)
EOSINOPHIL NFR BLD MANUAL: 8.3 %
ERYTHROCYTE [DISTWIDTH] IN BLOOD BY AUTOMATED COUNT: 14.6 % (ref 11–15)
GLOBULIN: 2.3 (ref 1.9–3.7)
GLUCOSE BLD-MCNC: 99 MG/DL (ref 65–99)
H CAPSUL AG UR-MCNC: <0.5 NG/ML
HCT VFR BLD AUTO: 29.2 % (ref 35–45)
HGB BLD-MCNC: 9.3 GM/DL (ref 11.7–15.5)
LYMPHOCYTES # BLD MANUAL: 544 10*3/UL (ref 850–3900)
LYMPHOCYTES NFR BLD MANUAL: 17 %
MAGNESIUM SERPL-MCNC: 2.1 MG/DL (ref 1.5–2.5)
MCH RBC QN AUTO: 26.8 PG (ref 27–33)
MCHC RBC AUTO-ENTMCNC: 31.8 GM/DL (ref 32–36)
MCV RBC AUTO: 84.1 FL (ref 80–100)
MONOCYTES # BLD MANUAL: 246 10*3/UL (ref 200–950)
MONOCYTES NFR BLD MANUAL: 7.7 %
NEUTROPHILS # BLD MANUAL: 2115 10*3/UL (ref 1500–7800)
NEUTROPHILS NFR BLD MANUAL: 66.1 %
PHOSPHATE SERPL-MCNC: 3.4 MG/DL (ref 2.5–4.5)
PLATELET # BLD AUTO: 142 10*3/UL (ref 140–400)
PMV BLD: 10.7 FL (ref 7.5–12.5)
POTASSIUM BLD-SCNC: 4.6 MMOL/L (ref 3.5–5.3)
PROT SERPL-MCNC: 6.5 GM/DL (ref 6.1–8.1)
RBC # BLD AUTO: 3.47 10*6/UL (ref 3.8–5.1)
SODIUM SERPL-SCNC: 143 MMOL/L (ref 135–146)
WBC # BLD AUTO: 3.2 10*3/UL (ref 3.8–10.8)

## 2019-03-26 ENCOUNTER — TELEPHONE (OUTPATIENT)
Dept: TRANSPLANT | Facility: CLINIC | Age: 64
End: 2019-03-26

## 2019-03-26 NOTE — TELEPHONE ENCOUNTER
Writer spoke to pt. Pt does not tolerate OTC iron. In the past pt has received IV iron. However pt has reacted to an iron compound which pt will let me know what the name of the iron compound is later today. If Dr. Lilly wants to order an iron infusion, pt prefers to have it completed in [place], [place]. Please advise.

## 2019-03-26 NOTE — TELEPHONE ENCOUNTER
Per Sherrie Castillo needs iron.  Per Dr. Lilly, needs ferrous gluconate 325 mg po tid and Vit C 500 mg daily.

## 2019-03-26 NOTE — LETTER
PHYSICIAN ORDERS           [facility]              Phone# [phone]  Fax# [phone]    DATE & TIME ISSUED: April 10, 2019 8:33 AM  PATIENT NAME: Sherrie Lala   : 1955     [facility] MR# [if applicable]: [id number]     DIAGNOSIS:  Iron Deficiency Anemia  ICD-10 CODE: D50.9     Feraheme 510mg IV followed by a second 510mg IV dose 3 to 8 days after initial dose.  Please draw blood  for Iron studies (TIBC, iron % sat, iron, transferrin), and hgb 30 days following second dose.    Any questions please call: [phone] option 4     Please fax lab results to [phone]    .

## 2019-03-28 NOTE — TELEPHONE ENCOUNTER
Writer spoke to pt  And she said that she reacted to Venofer but remembers getting fereheme that worked out for her.

## 2019-03-29 ENCOUNTER — TRANSFERRED RECORDS (OUTPATIENT)
Dept: HEALTH INFORMATION MANAGEMENT | Facility: CLINIC | Age: 64
End: 2019-03-29

## 2019-04-02 ENCOUNTER — DOCUMENTATION ONLY (OUTPATIENT)
Dept: CARE COORDINATION | Facility: CLINIC | Age: 64
End: 2019-04-02

## 2019-04-02 ENCOUNTER — TELEPHONE (OUTPATIENT)
Dept: TRANSPLANT | Facility: CLINIC | Age: 64
End: 2019-04-02

## 2019-04-02 NOTE — TELEPHONE ENCOUNTER
Pts dental office is wondering if they have anyrequirements to her dental appt (medication, ect). Please connect with the dental office when available

## 2019-04-10 NOTE — TELEPHONE ENCOUNTER
Per Dr. Lilly, follow through with iron infusion.   Writer spoke to pt and instructed her of the need to receive Fehaheme 510mg IV x 2 doses with blood work to follow 30 days after last dose. Pt requested to have the infusion completed at [facility] infusion. Writer created orders and faxed them to [facility].

## 2019-04-18 ENCOUNTER — AMBULATORY - RIVER FALLS (OUTPATIENT)
Dept: FAMILY MEDICINE | Facility: CLINIC | Age: 64
End: 2019-04-18
Payer: COMMERCIAL

## 2019-04-18 DIAGNOSIS — S42.202A CLOSED FRACTURE OF LEFT PROXIMAL HUMERUS: Primary | ICD-10-CM

## 2019-04-18 LAB — INR PPP: 2.6

## 2019-04-22 ENCOUNTER — OFFICE VISIT (OUTPATIENT)
Dept: ORTHOPEDICS | Facility: CLINIC | Age: 64
End: 2019-04-22
Payer: COMMERCIAL

## 2019-04-22 VITALS — HEIGHT: 62 IN | WEIGHT: 123 LBS | BODY MASS INDEX: 22.63 KG/M2

## 2019-04-22 DIAGNOSIS — S42.292D OTHER CLOSED DISPLACED FRACTURE OF PROXIMAL END OF LEFT HUMERUS WITH ROUTINE HEALING, SUBSEQUENT ENCOUNTER: Primary | ICD-10-CM

## 2019-04-22 ASSESSMENT — MIFFLIN-ST. JEOR: SCORE: 1071.17

## 2019-04-22 NOTE — PROGRESS NOTES
CHIEF COMPLAINT:  Left proximal humerus fracture, status post open reduction internal fixation.  Date of surgery:  03/04/2019.      HISTORY OF PRESENT ILLNESS:  Ms. Lala returns today for followup.  She is approximately 6 weeks out from her surgery.  She reports her pain is markedly improved, mostly just occurs with therapy.  She is not taking any additional pain medications.  She is working with physical therapy twice weekly and feels she is improving her motion over no concerns for us today.     PHYSICAL EXAMINATION:  On exam today, her incision is healed.  Sensation intact to light touch in the axillary, median, radial, ulnar nerves.  She is able to fire her deltoid.  Her active forward elevation is approximately 60 degrees, passively 100 degrees.  External rotation actively 30 degrees.  Full painless elbow wrist and hand range of motion.     RADIOGRAPHS: No new imaging today    ASSESSMENT:   6 weeks status post open reduction internal fixation left proximal humerus fracture, recovering appropriately     PLAN:    Continue to work with physical therapy to advance active and active assist shoulder range of motion.  Follow-up at 3 months postoperatively with left shoulder x-rays at that time, will advance to strengthening at that time.    Alba Moncada MD  Patient was seen and evaluated with Dr. Kraft

## 2019-04-22 NOTE — NURSING NOTE
"Reason For Visit:   Chief Complaint   Patient presents with     Surgical Followup     Open Reduction Internal Fixation Left Proximal Humerus DOS 3/4/19     PCP: Apollo Benitez        ? No  Occupation REtired  Date of injury: 2/20/19  Type of injury: Humerus fx.  Date of surgery: 3/4/19  Type of surgery: ORIF left proximal humerus.  Smoker: No     Right hand dominant           SANE score  Affected shoulder: Left   Right shoulder SANE: 100  Left shoulder SANE: 50    Ht 1.575 m (5' 2\")   Wt 55.8 kg (123 lb)   BMI 22.50 kg/m        Pain Assessment  Patient Currently in Pain: Yes  0-10 Pain Scale: 3  Primary Pain Location: Shoulder  Pain Descriptors: Aching    Shayna Subramanian LPN      "

## 2019-04-22 NOTE — LETTER
4/22/2019       RE: Sherrie Lala  [address] [place] [facility] [address]     Dear Colleague,    Thank you for referring your patient, Sherrie Lala, to the [facility] at [facility]. Please see a copy of my visit note below.    CHIEF COMPLAINT:  Left proximal humerus fracture, status post open reduction internal fixation.  Date of surgery:  03/04/2019.      HISTORY OF PRESENT ILLNESS:  Ms. Lala returns today for followup.  She is approximately 6 weeks out from her surgery.  She reports her pain is markedly improved, mostly just occurs with therapy.  She is not taking any additional pain medications.  She is working with physical therapy twice weekly and feels she is improving her motion over no concerns for us today.     PHYSICAL EXAMINATION:  On exam today, her incision is healed.  Sensation intact to light touch in the axillary, median, radial, ulnar nerves.  She is able to fire her deltoid.  Her active forward elevation is approximately 60 degrees, passively 100 degrees.  External rotation actively 30 degrees.  Full painless elbow wrist and hand range of motion.     RADIOGRAPHS: No new imaging today    ASSESSMENT:   6 weeks status post open reduction internal fixation left proximal humerus fracture, recovering appropriately     PLAN:    Continue to work with physical therapy to advance active and active assist shoulder range of motion.  Follow-up at 3 months postoperatively with left shoulder x-rays at that time, will advance to strengthening at that time.    Alba Moncada MD  Patient was seen and evaluated with Dr. Kraft        Again, thank you for allowing me to participate in the care of your patient.      Sincerely,    Eh Kraft MD

## 2019-04-25 ENCOUNTER — AMBULATORY - RIVER FALLS (OUTPATIENT)
Dept: FAMILY MEDICINE | Facility: CLINIC | Age: 64
End: 2019-04-25
Payer: COMMERCIAL

## 2019-04-25 LAB — INR PPP: 1.8

## 2019-05-17 ENCOUNTER — AMBULATORY - RIVER FALLS (OUTPATIENT)
Dept: FAMILY MEDICINE | Facility: CLINIC | Age: 64
End: 2019-05-17
Payer: COMMERCIAL

## 2019-05-17 LAB — INR PPP: 1.5

## 2019-05-24 ENCOUNTER — OFFICE VISIT (OUTPATIENT)
Dept: GASTROENTEROLOGY | Facility: CLINIC | Age: 64
End: 2019-05-24
Attending: INTERNAL MEDICINE
Payer: MEDICARE

## 2019-05-24 ENCOUNTER — TELEPHONE (OUTPATIENT)
Dept: TRANSPLANT | Facility: CLINIC | Age: 64
End: 2019-05-24

## 2019-05-24 VITALS
WEIGHT: 123.4 LBS | BODY MASS INDEX: 22.71 KG/M2 | DIASTOLIC BLOOD PRESSURE: 87 MMHG | HEIGHT: 62 IN | HEART RATE: 84 BPM | OXYGEN SATURATION: 99 % | SYSTOLIC BLOOD PRESSURE: 135 MMHG

## 2019-05-24 DIAGNOSIS — Z94.4 LIVER REPLACED BY TRANSPLANT (H): ICD-10-CM

## 2019-05-24 DIAGNOSIS — Z13.220 LIPID SCREENING: ICD-10-CM

## 2019-05-24 DIAGNOSIS — Z94.4 LIVER REPLACED BY TRANSPLANT (H): Primary | ICD-10-CM

## 2019-05-24 LAB
ALBUMIN SERPL-MCNC: 4 G/DL (ref 3.4–5)
ALP SERPL-CCNC: 102 U/L (ref 40–150)
ALT SERPL W P-5'-P-CCNC: 14 U/L (ref 0–50)
ANION GAP SERPL CALCULATED.3IONS-SCNC: 5 MMOL/L (ref 3–14)
AST SERPL W P-5'-P-CCNC: 6 U/L (ref 0–45)
BILIRUB DIRECT SERPL-MCNC: 0.2 MG/DL (ref 0–0.2)
BILIRUB SERPL-MCNC: 0.7 MG/DL (ref 0.2–1.3)
BUN SERPL-MCNC: 22 MG/DL (ref 7–30)
CALCIUM SERPL-MCNC: 8.9 MG/DL (ref 8.5–10.1)
CHLORIDE SERPL-SCNC: 109 MMOL/L (ref 94–109)
CO2 SERPL-SCNC: 25 MMOL/L (ref 20–32)
CREAT SERPL-MCNC: 1.1 MG/DL (ref 0.52–1.04)
ERYTHROCYTE [DISTWIDTH] IN BLOOD BY AUTOMATED COUNT: 17.1 % (ref 10–15)
GFR SERPL CREATININE-BSD FRML MDRD: 54 ML/MIN/{1.73_M2}
GLUCOSE SERPL-MCNC: 85 MG/DL (ref 70–99)
HCT VFR BLD AUTO: 40.5 % (ref 35–47)
HGB BLD-MCNC: 13 G/DL (ref 11.7–15.7)
MAGNESIUM SERPL-MCNC: 1.9 MG/DL (ref 1.6–2.3)
MCH RBC QN AUTO: 27.1 PG (ref 26.5–33)
MCHC RBC AUTO-ENTMCNC: 32.1 G/DL (ref 31.5–36.5)
MCV RBC AUTO: 85 FL (ref 78–100)
PHOSPHATE SERPL-MCNC: 3.6 MG/DL (ref 2.5–4.5)
PLATELET # BLD AUTO: 120 10E9/L (ref 150–450)
POTASSIUM SERPL-SCNC: 4.2 MMOL/L (ref 3.4–5.3)
PROT SERPL-MCNC: 7.1 G/DL (ref 6.8–8.8)
RBC # BLD AUTO: 4.79 10E12/L (ref 3.8–5.2)
SODIUM SERPL-SCNC: 140 MMOL/L (ref 133–144)
TACROLIMUS BLD-MCNC: 4.1 UG/L (ref 5–15)
TME LAST DOSE: ABNORMAL H
WBC # BLD AUTO: 4.5 10E9/L (ref 4–11)

## 2019-05-24 PROCEDURE — 80197 ASSAY OF TACROLIMUS: CPT | Performed by: INTERNAL MEDICINE

## 2019-05-24 PROCEDURE — 36415 COLL VENOUS BLD VENIPUNCTURE: CPT | Performed by: INTERNAL MEDICINE

## 2019-05-24 PROCEDURE — 80076 HEPATIC FUNCTION PANEL: CPT | Performed by: INTERNAL MEDICINE

## 2019-05-24 PROCEDURE — 84100 ASSAY OF PHOSPHORUS: CPT | Performed by: INTERNAL MEDICINE

## 2019-05-24 PROCEDURE — 83735 ASSAY OF MAGNESIUM: CPT | Performed by: INTERNAL MEDICINE

## 2019-05-24 PROCEDURE — 87385 HISTOPLASMA CAPSUL AG IA: CPT | Performed by: INTERNAL MEDICINE

## 2019-05-24 PROCEDURE — 85027 COMPLETE CBC AUTOMATED: CPT | Performed by: INTERNAL MEDICINE

## 2019-05-24 PROCEDURE — 80048 BASIC METABOLIC PNL TOTAL CA: CPT | Performed by: INTERNAL MEDICINE

## 2019-05-24 PROCEDURE — G0463 HOSPITAL OUTPT CLINIC VISIT: HCPCS | Mod: ZF

## 2019-05-24 ASSESSMENT — MIFFLIN-ST. JEOR: SCORE: 1072.99

## 2019-05-24 ASSESSMENT — PAIN SCALES - GENERAL: PAINLEVEL: NO PAIN (0)

## 2019-05-24 NOTE — TELEPHONE ENCOUNTER
"Here for post liver transplant follow-up.  Is now 9 mos post liver transplant.  She also had  Surgery for humerus fracture this year, is doing PT and doing well w/ recovery.  Labs look great, Dr. Lilly would like Sherrie to have \"iron infusions\" weekly x 4 in [place].  Apparently had them there before, we will call [place] to find out what she had (she thinks it may have been ferrlicet) and give her the same medication.  She will call in a week to set up.    She remains on coumadin for PVT, per [place]ola she is to be on for 1 year.  She will come back to see Dr. lilly in 3 mos and we will do liver US to see if we can stop coumadin.  I encouraged Sherrie to see a dermatologist, placed referral.  She prefers to do here.  Reviewed recent labs and assisted with interpretation.     Complaints / Concerns:none    Current immunosuppression:   Prograf, goal 8     Med changes:none    Lab frequency:  q 2 mos - getting labs today.    Follow-up:  Dr. Lilly in 3 mos, derm and PCP annually.      Reviewed my contact information, now to reach the transplant office during weekday and afterhours.  "

## 2019-05-24 NOTE — PROGRESS NOTES
"HISTORY OF PRESENT ILLNESS:  I had the pleasure of seeing Sherrie Lala for followup in the Liver Transplantation Clinic at the [facility] on 05/24/2019.  Ms. Lala returns for followup now 8 months status post liver transplantation for cirrhosis as a result of a complication of cholangiocarcinoma surgery now 7 years ago.      She is feeling fairly well at this visit.  She denies any abdominal pain, itching or skin rash.  She still has mild fatigue.  She denies any increased abdominal girth or lower extremity edema.      She denies any fevers, chills or cough.  She does have some mild shortness of breath.  She denies any nausea or vomiting and has had alternating diarrhea and constipation.  Her appetite is decreased, but her weight has remained stable.     Current Outpatient Medications   Medication     albuterol (PROAIR HFA/PROVENTIL HFA/VENTOLIN HFA) 108 (90 BASE) MCG/ACT Inhaler     aspirin 81 MG tablet     gabapentin (NEURONTIN) 300 MG capsule     omeprazole 20 MG tablet     PRAMIPEXOLE DIHYDROCHLORIDE PO     tacrolimus (GENERIC EQUIVALENT) 1 MG capsule     warfarin (COUMADIN) 2 MG tablet     zolpidem (AMBIEN) 10 MG tablet     oxyCODONE (ROXICODONE) 5 MG tablet     No current facility-administered medications for this visit.      Vital signs:      BP: 135/87 Pulse: 84     SpO2: 99 %     Height: 157.5 cm (5' 2\") Weight: 56 kg (123 lb 6.4 oz)    In general she looks well.  HEENT exam shows no scleral icterus or temporal muscle wasting.  Her chest is clear.  Her abdominal exam shows no increase in girth.  No masses or tenderness to palpation are present.  Her liver is 10 cm in span without left lobe enlargement.  No spleen tip is palpable.  Extremity exam shows no edema.  Skin exam shows no stigmata of chronic liver disease.  Neurologic exam is nonfocal.     Recent Results (from the past 168 hour(s))   CBC with platelets    Collection Time: 05/24/19 10:57 AM   Result Value Ref Range    " WBC 4.5 4.0 - 11.0 10e9/L    RBC Count 4.79 3.8 - 5.2 10e12/L    Hemoglobin 13.0 11.7 - 15.7 g/dL    Hematocrit 40.5 35.0 - 47.0 %    MCV 85 78 - 100 fl    MCH 27.1 26.5 - 33.0 pg    MCHC 32.1 31.5 - 36.5 g/dL    RDW 17.1 (H) 10.0 - 15.0 %    Platelet Count 120 (L) 150 - 450 10e9/L   Phosphorus    Collection Time: 05/24/19 10:57 AM   Result Value Ref Range    Phosphorus 3.6 2.5 - 4.5 mg/dL   Magnesium    Collection Time: 05/24/19 10:57 AM   Result Value Ref Range    Magnesium 1.9 1.6 - 2.3 mg/dL   Hepatic panel    Collection Time: 05/24/19 10:57 AM   Result Value Ref Range    Bilirubin Direct 0.2 0.0 - 0.2 mg/dL    Bilirubin Total 0.7 0.2 - 1.3 mg/dL    Albumin 4.0 3.4 - 5.0 g/dL    Protein Total 7.1 6.8 - 8.8 g/dL    Alkaline Phosphatase 102 40 - 150 U/L    ALT 14 0 - 50 U/L    AST 6 0 - 45 U/L   Basic metabolic panel    Collection Time: 05/24/19 10:57 AM   Result Value Ref Range    Sodium 140 133 - 144 mmol/L    Potassium 4.2 3.4 - 5.3 mmol/L    Chloride 109 94 - 109 mmol/L    Carbon Dioxide 25 20 - 32 mmol/L    Anion Gap 5 3 - 14 mmol/L    Glucose 85 70 - 99 mg/dL    Urea Nitrogen 22 7 - 30 mg/dL    Creatinine 1.10 (H) 0.52 - 1.04 mg/dL    GFR Estimate 54 (L) >60 mL/min/[1.73_m2]    GFR Estimate If Black 62 >60 mL/min/[1.73_m2]    Calcium 8.9 8.5 - 10.1 mg/dL   Tacrolimus level    Collection Time: 05/24/19 10:57 AM   Result Value Ref Range    Tacrolimus Last Dose 5/23/19 2200     Tacrolimus Level 4.1 (L) 5.0 - 15.0 ug/L      My impression is that Ms. Lala is doing well now 8 months status post liver transplantation for cirrhosis as a complication of cholangiocarcinoma.  Her clinical course of the liver was more than 7 years ago.  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      We will get an ultrasound to follow up on her portal vein thrombosis and determine whether she can actually stop her Coumadin.  I will give her iron  infusions once a week for 4 weeks as she does still appear to be iron deficient.  I, otherwise, will not make additional change to her medical regimen.  I will see her back in the clinic again in 3 months.      Thank you very much for allowing me to participate in the care of your patient.  If you have any questions regarding my recommendations, please do not hesitate to contact me.       Bradly Lilly MD      Professor of Medicine  [employer]      Executive Medical Director, Solid Organ Transplant Program  [facility]

## 2019-05-24 NOTE — LETTER
"5/24/2019     RE: Sherrie Lala  [address] [place] [address]     Dear Colleague,    Thank you for referring your patient, Sherrie Lala, to the [facility] HEPATOLOGY at [facility]. Please see a copy of my visit note below.    HISTORY OF PRESENT ILLNESS:  I had the pleasure of seeing Sherrie Lala for followup in the Liver Transplantation Clinic at the [facility] on 05/24/2019.  Ms. Lala returns for followup now 8 months status post liver transplantation for cirrhosis as a result of a complication of cholangiocarcinoma surgery now 7 years ago.      She is feeling fairly well at this visit.  She denies any abdominal pain, itching or skin rash.  She still has mild fatigue.  She denies any increased abdominal girth or lower extremity edema.      She denies any fevers, chills or cough.  She does have some mild shortness of breath.  She denies any nausea or vomiting and has had alternating diarrhea and constipation.  Her appetite is decreased, but her weight has remained stable.     Current Outpatient Medications   Medication     albuterol (PROAIR HFA/PROVENTIL HFA/VENTOLIN HFA) 108 (90 BASE) MCG/ACT Inhaler     aspirin 81 MG tablet     gabapentin (NEURONTIN) 300 MG capsule     omeprazole 20 MG tablet     PRAMIPEXOLE DIHYDROCHLORIDE PO     tacrolimus (GENERIC EQUIVALENT) 1 MG capsule     warfarin (COUMADIN) 2 MG tablet     zolpidem (AMBIEN) 10 MG tablet     oxyCODONE (ROXICODONE) 5 MG tablet     No current facility-administered medications for this visit.      Vital signs:      BP: 135/87 Pulse: 84     SpO2: 99 %     Height: 157.5 cm (5' 2\") Weight: 56 kg (123 lb 6.4 oz)    In general she looks well.  HEENT exam shows no scleral icterus or temporal muscle wasting.  Her chest is clear.  Her abdominal exam shows no increase in girth.  No masses or tenderness to palpation are present.  Her liver is 10 cm in span without left lobe enlargement.  No spleen tip " is palpable.  Extremity exam shows no edema.  Skin exam shows no stigmata of chronic liver disease.  Neurologic exam is nonfocal.     Recent Results (from the past 168 hour(s))   CBC with platelets    Collection Time: 05/24/19 10:57 AM   Result Value Ref Range    WBC 4.5 4.0 - 11.0 10e9/L    RBC Count 4.79 3.8 - 5.2 10e12/L    Hemoglobin 13.0 11.7 - 15.7 g/dL    Hematocrit 40.5 35.0 - 47.0 %    MCV 85 78 - 100 fl    MCH 27.1 26.5 - 33.0 pg    MCHC 32.1 31.5 - 36.5 g/dL    RDW 17.1 (H) 10.0 - 15.0 %    Platelet Count 120 (L) 150 - 450 10e9/L   Phosphorus    Collection Time: 05/24/19 10:57 AM   Result Value Ref Range    Phosphorus 3.6 2.5 - 4.5 mg/dL   Magnesium    Collection Time: 05/24/19 10:57 AM   Result Value Ref Range    Magnesium 1.9 1.6 - 2.3 mg/dL   Hepatic panel    Collection Time: 05/24/19 10:57 AM   Result Value Ref Range    Bilirubin Direct 0.2 0.0 - 0.2 mg/dL    Bilirubin Total 0.7 0.2 - 1.3 mg/dL    Albumin 4.0 3.4 - 5.0 g/dL    Protein Total 7.1 6.8 - 8.8 g/dL    Alkaline Phosphatase 102 40 - 150 U/L    ALT 14 0 - 50 U/L    AST 6 0 - 45 U/L   Basic metabolic panel    Collection Time: 05/24/19 10:57 AM   Result Value Ref Range    Sodium 140 133 - 144 mmol/L    Potassium 4.2 3.4 - 5.3 mmol/L    Chloride 109 94 - 109 mmol/L    Carbon Dioxide 25 20 - 32 mmol/L    Anion Gap 5 3 - 14 mmol/L    Glucose 85 70 - 99 mg/dL    Urea Nitrogen 22 7 - 30 mg/dL    Creatinine 1.10 (H) 0.52 - 1.04 mg/dL    GFR Estimate 54 (L) >60 mL/min/[1.73_m2]    GFR Estimate If Black 62 >60 mL/min/[1.73_m2]    Calcium 8.9 8.5 - 10.1 mg/dL   Tacrolimus level    Collection Time: 05/24/19 10:57 AM   Result Value Ref Range    Tacrolimus Last Dose 5/23/19 2200     Tacrolimus Level 4.1 (L) 5.0 - 15.0 ug/L      My impression is that Ms. Lala is doing well now 8 months status post liver transplantation for cirrhosis as a complication of cholangiocarcinoma.  Her clinical course of the liver was more than 7 years ago.  She did have solitary  lung metastasis that was transected more than 5 years ago now and she does not help with her oncologist.      We will get an ultrasound to follow up on her portal vein thrombosis and determine whether she can actually stop her Coumadin.  I will give her iron infusions once a week for 4 weeks as she does still appear to be iron deficient.  I, otherwise, will not make additional change to her medical regimen.  I will see her back in the clinic again in 3 months.      Thank you very much for allowing me to participate in the care of your patient.  If you have any questions regarding my recommendations, please do not hesitate to contact me.       Bradly Lilly MD      Professor of Medicine  [employer]      Executive Medical Director, Solid Organ Transplant Program  [facility]

## 2019-05-24 NOTE — LETTER
"5/24/2019      RE: Sherrie Lala  [address] [facility] [address]       HISTORY OF PRESENT ILLNESS:  I had the pleasure of seeing Sherrie Lala for followup in the Liver Transplantation Clinic at the [facility] on 05/24/2019.  Ms. Lala returns for followup now 8 months status post liver transplantation for cirrhosis as a result of a complication of cholangiocarcinoma surgery now 7 years ago.      She is feeling fairly well at this visit.  She denies any abdominal pain, itching or skin rash.  She still has mild fatigue.  She denies any increased abdominal girth or lower extremity edema.      She denies any fevers, chills or cough.  She does have some mild shortness of breath.  She denies any nausea or vomiting and has had alternating diarrhea and constipation.  Her appetite is decreased, but her weight has remained stable.     Current Outpatient Medications   Medication     albuterol (PROAIR HFA/PROVENTIL HFA/VENTOLIN HFA) 108 (90 BASE) MCG/ACT Inhaler     aspirin 81 MG tablet     gabapentin (NEURONTIN) 300 MG capsule     omeprazole 20 MG tablet     PRAMIPEXOLE DIHYDROCHLORIDE PO     tacrolimus (GENERIC EQUIVALENT) 1 MG capsule     warfarin (COUMADIN) 2 MG tablet     zolpidem (AMBIEN) 10 MG tablet     oxyCODONE (ROXICODONE) 5 MG tablet     No current facility-administered medications for this visit.      Vital signs:      BP: 135/87 Pulse: 84     SpO2: 99 %     Height: 157.5 cm (5' 2\") Weight: 56 kg (123 lb 6.4 oz)    In general she looks well.  HEENT exam shows no scleral icterus or temporal muscle wasting.  Her chest is clear.  Her abdominal exam shows no increase in girth.  No masses or tenderness to palpation are present.  Her liver is 10 cm in span without left lobe enlargement.  No spleen tip is palpable.  Extremity exam shows no edema.  Skin exam shows no stigmata of chronic liver disease.  Neurologic exam is nonfocal.     Recent Results (from the past 168 hour(s))   CBC with " platelets    Collection Time: 05/24/19 10:57 AM   Result Value Ref Range    WBC 4.5 4.0 - 11.0 10e9/L    RBC Count 4.79 3.8 - 5.2 10e12/L    Hemoglobin 13.0 11.7 - 15.7 g/dL    Hematocrit 40.5 35.0 - 47.0 %    MCV 85 78 - 100 fl    MCH 27.1 26.5 - 33.0 pg    MCHC 32.1 31.5 - 36.5 g/dL    RDW 17.1 (H) 10.0 - 15.0 %    Platelet Count 120 (L) 150 - 450 10e9/L   Phosphorus    Collection Time: 05/24/19 10:57 AM   Result Value Ref Range    Phosphorus 3.6 2.5 - 4.5 mg/dL   Magnesium    Collection Time: 05/24/19 10:57 AM   Result Value Ref Range    Magnesium 1.9 1.6 - 2.3 mg/dL   Hepatic panel    Collection Time: 05/24/19 10:57 AM   Result Value Ref Range    Bilirubin Direct 0.2 0.0 - 0.2 mg/dL    Bilirubin Total 0.7 0.2 - 1.3 mg/dL    Albumin 4.0 3.4 - 5.0 g/dL    Protein Total 7.1 6.8 - 8.8 g/dL    Alkaline Phosphatase 102 40 - 150 U/L    ALT 14 0 - 50 U/L    AST 6 0 - 45 U/L   Basic metabolic panel    Collection Time: 05/24/19 10:57 AM   Result Value Ref Range    Sodium 140 133 - 144 mmol/L    Potassium 4.2 3.4 - 5.3 mmol/L    Chloride 109 94 - 109 mmol/L    Carbon Dioxide 25 20 - 32 mmol/L    Anion Gap 5 3 - 14 mmol/L    Glucose 85 70 - 99 mg/dL    Urea Nitrogen 22 7 - 30 mg/dL    Creatinine 1.10 (H) 0.52 - 1.04 mg/dL    GFR Estimate 54 (L) >60 mL/min/[1.73_m2]    GFR Estimate If Black 62 >60 mL/min/[1.73_m2]    Calcium 8.9 8.5 - 10.1 mg/dL   Tacrolimus level    Collection Time: 05/24/19 10:57 AM   Result Value Ref Range    Tacrolimus Last Dose 5/23/19 2200     Tacrolimus Level 4.1 (L) 5.0 - 15.0 ug/L      My impression is that Ms. Lala is doing well now 8 months status post liver transplantation for cirrhosis as a complication of cholangiocarcinoma.  Her clinical course of the liver was more than 7 years ago.  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      We will get an ultrasound to follow up on her portal vein thrombosis and  determine whether she can actually stop her Coumadin.  I will give her iron infusions once a week for 4 weeks as she does still appear to be iron deficient.  I, otherwise, will not make additional change to her medical regimen.  I will see her back in the clinic again in 3 months.      Thank you very much for allowing me to participate in the care of your patient.  If you have any questions regarding my recommendations, please do not hesitate to contact me.       Bradly Lilly MD      Professor of Medicine  [employer]      Executive Medical Director, Solid Organ Transplant Program  [facility]    Bradly Lilly MD

## 2019-05-24 NOTE — TELEPHONE ENCOUNTER
Patient Call: Transplant Lab/Orders  Route to LPN  Post Transplant Days: 267  When patient is less than 60 days post-transplant, route high priority    Reason for Call: Missing labs/orders; which labs/orders? Annual orders in EPIC ([facility])  Callback needed? YES    Pt is currently at the [facility] seeing the provider, pt went to get labs done and there are no orders. Please load orders to [facility] and call pt when done

## 2019-05-29 ENCOUNTER — TELEPHONE (OUTPATIENT)
Dept: TRANSPLANT | Facility: CLINIC | Age: 64
End: 2019-05-29

## 2019-05-29 DIAGNOSIS — Z13.220 LIPID SCREENING: ICD-10-CM

## 2019-05-29 DIAGNOSIS — Z94.4 LIVER REPLACED BY TRANSPLANT (H): ICD-10-CM

## 2019-05-29 NOTE — TELEPHONE ENCOUNTER
Writer spoke to pt who plans to have blood drawn including yearly prior to Dr. Lilly's appt in September. Orders placed.

## 2019-05-29 NOTE — TELEPHONE ENCOUNTER
Left a message for pt asking where she plans to get her labs drawn in August as pt will be due for yearly labs as well.

## 2019-05-30 ENCOUNTER — DOCUMENTATION ONLY (OUTPATIENT)
Dept: TRANSPLANT | Facility: CLINIC | Age: 64
End: 2019-05-30

## 2019-05-30 LAB — LAB SCANNED RESULT: NORMAL

## 2019-05-30 NOTE — PROGRESS NOTES
Histoplasmosis urine antigen negative on 2/8, 3/11, 5/24. Not to Dr. Gong to see if we can stop checking. Missed last infectious disease appointment with Dr. Gong.

## 2019-06-03 DIAGNOSIS — S42.292D OTHER CLOSED DISPLACED FRACTURE OF PROXIMAL END OF LEFT HUMERUS WITH ROUTINE HEALING, SUBSEQUENT ENCOUNTER: Primary | ICD-10-CM

## 2019-06-10 ENCOUNTER — ANCILLARY PROCEDURE (OUTPATIENT)
Dept: GENERAL RADIOLOGY | Facility: CLINIC | Age: 64
End: 2019-06-10
Attending: ORTHOPAEDIC SURGERY
Payer: COMMERCIAL

## 2019-06-10 ENCOUNTER — OFFICE VISIT (OUTPATIENT)
Dept: ORTHOPEDICS | Facility: CLINIC | Age: 64
End: 2019-06-10
Payer: COMMERCIAL

## 2019-06-10 DIAGNOSIS — S42.292D OTHER CLOSED DISPLACED FRACTURE OF PROXIMAL END OF LEFT HUMERUS WITH ROUTINE HEALING, SUBSEQUENT ENCOUNTER: Primary | ICD-10-CM

## 2019-06-10 DIAGNOSIS — S42.292D OTHER CLOSED DISPLACED FRACTURE OF PROXIMAL END OF LEFT HUMERUS WITH ROUTINE HEALING, SUBSEQUENT ENCOUNTER: ICD-10-CM

## 2019-06-10 NOTE — PROGRESS NOTES
"CHIEF COMPLAINT:  Left shoulder status post open reduction internal fixation of displaced proximal humerus.  Date of surgery:  03/04/2019.      HISTORY OF PRESENT ILLNESS:  Ms. Lala returns today for followup.  In her own words, she is happy with the surgery and she states \"I am pleased\".      PHYSICAL EXAMINATION:  She has 115 degrees of forward elevation, 40 degrees of external rotation at the side and internal rotation of the back to L1.  She denies pain.      RADIOGRAPHS:  Radiographs obtained today show no evidence of fracture, dislocation or abnormal calcific focus.  The proximal humerus fracture status post open reduction internal fixation with plate and screw construct has healed.  Acceptable alignment is noted.  There is no evidence of screw penetration or hardware failure.      ASSESSMENT:  Status post above procedure, doing well.      PLAN:  I had a nice talk with Ms. Lala today.  I think it is fine for her to progress her activities and see him back at the anniversary of her surgery with repeat radiographs or sooner should any additional problems arise.       "

## 2019-06-10 NOTE — NURSING NOTE
Reason For Visit:   Chief Complaint   Patient presents with     Surgical Followup     DOS 3/4/19 S/P ORIF Left proximal humerus     PCP: Apollo Benitez        ? No  Occupation REtired  Date of injury: 2/20/19  Type of injury: Humerus fx.  Date of surgery: 3/4/19  Type of surgery: ORIF left proximal humerus.  Smoker: No     Right hand dominant           SANE score  Affected shoulder: Left   Right shoulder SANE: 100  Left shoulder SANE: 75    There were no vitals taken for this visit.      Pain Assessment  Patient Currently in Pain: Tiffanie Subramanian LPN

## 2019-06-10 NOTE — LETTER
"6/10/2019       RE: Sherrie Lala  [address] [place] [address]     Dear Colleague,    Thank you for referring your patient, Sherrie Lala, to the [facility] at [facility]. Please see a copy of my visit note below.    CHIEF COMPLAINT:  Left shoulder status post open reduction internal fixation of displaced proximal humerus.  Date of surgery:  03/04/2019.      HISTORY OF PRESENT ILLNESS:  Ms. Lala returns today for followup.  In her own words, she is happy with the surgery and she states \"I am pleased\".      PHYSICAL EXAMINATION:  She has 115 degrees of forward elevation, 40 degrees of external rotation at the side and internal rotation of the back to L1.  She denies pain.      RADIOGRAPHS:  Radiographs obtained today show no evidence of fracture, dislocation or abnormal calcific focus.  The proximal humerus fracture status post open reduction internal fixation with plate and screw construct has healed.  Acceptable alignment is noted.  There is no evidence of screw penetration or hardware failure.      ASSESSMENT:  Status post above procedure, doing well.      PLAN:  I had a nice talk with Ms. Lala today.  I think it is fine for her to progress her activities and see him back at the anniversary of her surgery with repeat radiographs or sooner should any additional problems arise.     Again, thank you for allowing me to participate in the care of your patient.      Sincerely,    Eh Kraft MD      "

## 2019-06-12 ENCOUNTER — TRANSFERRED RECORDS (OUTPATIENT)
Dept: HEALTH INFORMATION MANAGEMENT | Facility: CLINIC | Age: 64
End: 2019-06-12

## 2019-07-26 ENCOUNTER — OFFICE VISIT - RIVER FALLS (OUTPATIENT)
Dept: FAMILY MEDICINE | Facility: CLINIC | Age: 64
End: 2019-07-26
Payer: COMMERCIAL

## 2019-07-26 ENCOUNTER — COMMUNICATION - RIVER FALLS (OUTPATIENT)
Dept: FAMILY MEDICINE | Facility: CLINIC | Age: 64
End: 2019-07-26
Payer: COMMERCIAL

## 2019-07-28 ENCOUNTER — COMMUNICATION - RIVER FALLS (OUTPATIENT)
Dept: FAMILY MEDICINE | Facility: CLINIC | Age: 64
End: 2019-07-28
Payer: COMMERCIAL

## 2019-07-28 LAB
CELIAC DISEASE DUAL AG SCREEN: NORMAL
IMMUNOGLOBULIN A: 153 MG/DL (ref 20–320)
TISSUE TRANSGLUTAMINASE IGA - HISTORICAL: 1 UNIT/ML
TSH SERPL DL<=0.005 MIU/L-ACNC: 1.49 MIU/L (ref 0.4–4.5)

## 2019-07-31 LAB
CAMPYLOBACTER CULTURE: NORMAL
CLOSTRIDIUM DIFFICILE TOXIN A AND B EIA-QUEST: NORMAL
Lab: NORMAL
SALMONELLA/SHIGELLA SCREEN: NORMAL
SHIGA TOXIN 1: NORMAL
TRICHROME #1 - QUEST: NORMAL
WBC STL QL MICRO: NORMAL

## 2019-08-01 ENCOUNTER — COMMUNICATION - RIVER FALLS (OUTPATIENT)
Dept: FAMILY MEDICINE | Facility: CLINIC | Age: 64
End: 2019-08-01
Payer: COMMERCIAL

## 2019-08-13 ENCOUNTER — OFFICE VISIT - RIVER FALLS (OUTPATIENT)
Dept: FAMILY MEDICINE | Facility: CLINIC | Age: 64
End: 2019-08-13
Payer: COMMERCIAL

## 2019-08-27 ENCOUNTER — OFFICE VISIT - RIVER FALLS (OUTPATIENT)
Dept: FAMILY MEDICINE | Facility: CLINIC | Age: 64
End: 2019-08-27
Payer: COMMERCIAL

## 2019-09-05 ENCOUNTER — TELEPHONE (OUTPATIENT)
Dept: GASTROENTEROLOGY | Facility: CLINIC | Age: 64
End: 2019-09-05

## 2019-09-05 NOTE — TELEPHONE ENCOUNTER
Left message for pt reminding them of upcoming appointment.  Instructed pt to bring updated medications list.  Kaylin Turner, CMA

## 2019-09-06 ENCOUNTER — OFFICE VISIT (OUTPATIENT)
Dept: GASTROENTEROLOGY | Facility: CLINIC | Age: 64
End: 2019-09-06
Attending: INTERNAL MEDICINE
Payer: MEDICARE

## 2019-09-06 ENCOUNTER — ANCILLARY PROCEDURE (OUTPATIENT)
Dept: ULTRASOUND IMAGING | Facility: CLINIC | Age: 64
End: 2019-09-06
Attending: INTERNAL MEDICINE
Payer: COMMERCIAL

## 2019-09-06 VITALS
TEMPERATURE: 97.8 F | HEART RATE: 82 BPM | WEIGHT: 126.3 LBS | DIASTOLIC BLOOD PRESSURE: 84 MMHG | BODY MASS INDEX: 23.24 KG/M2 | HEIGHT: 62 IN | SYSTOLIC BLOOD PRESSURE: 130 MMHG | OXYGEN SATURATION: 98 %

## 2019-09-06 DIAGNOSIS — Z94.4 LIVER REPLACED BY TRANSPLANT (H): ICD-10-CM

## 2019-09-06 DIAGNOSIS — Z94.4 LIVER REPLACED BY TRANSPLANT (H): Primary | ICD-10-CM

## 2019-09-06 DIAGNOSIS — Z13.220 LIPID SCREENING: ICD-10-CM

## 2019-09-06 LAB
ALBUMIN SERPL-MCNC: 4 G/DL (ref 3.4–5)
ALBUMIN UR-MCNC: 30 MG/DL
ALP SERPL-CCNC: 91 U/L (ref 40–150)
ALT SERPL W P-5'-P-CCNC: 19 U/L (ref 0–50)
ANION GAP SERPL CALCULATED.3IONS-SCNC: 5 MMOL/L (ref 3–14)
APPEARANCE UR: ABNORMAL
AST SERPL W P-5'-P-CCNC: 10 U/L (ref 0–45)
BACTERIA #/AREA URNS HPF: ABNORMAL /HPF
BILIRUB DIRECT SERPL-MCNC: 0.2 MG/DL (ref 0–0.2)
BILIRUB SERPL-MCNC: 0.7 MG/DL (ref 0.2–1.3)
BILIRUB UR QL STRIP: NEGATIVE
BUN SERPL-MCNC: 30 MG/DL (ref 7–30)
CALCIUM SERPL-MCNC: 8.6 MG/DL (ref 8.5–10.1)
CHLORIDE SERPL-SCNC: 108 MMOL/L (ref 94–109)
CHOLEST SERPL-MCNC: 144 MG/DL
CO2 SERPL-SCNC: 25 MMOL/L (ref 20–32)
COLOR UR AUTO: YELLOW
CREAT SERPL-MCNC: 1.18 MG/DL (ref 0.52–1.04)
CREAT UR-MCNC: 208 MG/DL
ERYTHROCYTE [DISTWIDTH] IN BLOOD BY AUTOMATED COUNT: 12.2 % (ref 10–15)
GFR SERPL CREATININE-BSD FRML MDRD: 49 ML/MIN/{1.73_M2}
GLUCOSE SERPL-MCNC: 85 MG/DL (ref 70–99)
GLUCOSE UR STRIP-MCNC: NEGATIVE MG/DL
HCT VFR BLD AUTO: 38.3 % (ref 35–47)
HDLC SERPL-MCNC: 45 MG/DL
HGB BLD-MCNC: 13 G/DL (ref 11.7–15.7)
HGB UR QL STRIP: ABNORMAL
HYALINE CASTS #/AREA URNS LPF: 3 /LPF (ref 0–2)
KETONES UR STRIP-MCNC: NEGATIVE MG/DL
LDLC SERPL CALC-MCNC: 77 MG/DL
LEUKOCYTE ESTERASE UR QL STRIP: NEGATIVE
MAGNESIUM SERPL-MCNC: 1.8 MG/DL (ref 1.6–2.3)
MCH RBC QN AUTO: 32.4 PG (ref 26.5–33)
MCHC RBC AUTO-ENTMCNC: 33.9 G/DL (ref 31.5–36.5)
MCV RBC AUTO: 96 FL (ref 78–100)
MUCOUS THREADS #/AREA URNS LPF: PRESENT /LPF
NITRATE UR QL: NEGATIVE
NONHDLC SERPL-MCNC: 99 MG/DL
PH UR STRIP: 5 PH (ref 5–7)
PHOSPHATE SERPL-MCNC: 3.7 MG/DL (ref 2.5–4.5)
PLATELET # BLD AUTO: 96 10E9/L (ref 150–450)
POTASSIUM SERPL-SCNC: 4.7 MMOL/L (ref 3.4–5.3)
PROT SERPL-MCNC: 6.9 G/DL (ref 6.8–8.8)
PROT UR-MCNC: 0.46 G/L
PROT/CREAT 24H UR: 0.22 G/G CR (ref 0–0.2)
RBC # BLD AUTO: 4.01 10E12/L (ref 3.8–5.2)
RBC #/AREA URNS AUTO: 5 /HPF (ref 0–2)
SODIUM SERPL-SCNC: 138 MMOL/L (ref 133–144)
SOURCE: ABNORMAL
SP GR UR STRIP: 1.02 (ref 1–1.03)
SQUAMOUS #/AREA URNS AUTO: 1 /HPF (ref 0–1)
TACROLIMUS BLD-MCNC: 25.9 UG/L (ref 5–15)
TME LAST DOSE: ABNORMAL H
TRIGL SERPL-MCNC: 112 MG/DL
UROBILINOGEN UR STRIP-MCNC: 0 MG/DL (ref 0–2)
WBC # BLD AUTO: 3.6 10E9/L (ref 4–11)
WBC #/AREA URNS AUTO: 3 /HPF (ref 0–5)

## 2019-09-06 PROCEDURE — 85027 COMPLETE CBC AUTOMATED: CPT | Performed by: INTERNAL MEDICINE

## 2019-09-06 PROCEDURE — 80197 ASSAY OF TACROLIMUS: CPT | Performed by: INTERNAL MEDICINE

## 2019-09-06 PROCEDURE — 36415 COLL VENOUS BLD VENIPUNCTURE: CPT | Performed by: INTERNAL MEDICINE

## 2019-09-06 PROCEDURE — 81001 URINALYSIS AUTO W/SCOPE: CPT | Performed by: INTERNAL MEDICINE

## 2019-09-06 PROCEDURE — 83735 ASSAY OF MAGNESIUM: CPT | Performed by: INTERNAL MEDICINE

## 2019-09-06 PROCEDURE — G0463 HOSPITAL OUTPT CLINIC VISIT: HCPCS | Mod: ZF

## 2019-09-06 PROCEDURE — 84156 ASSAY OF PROTEIN URINE: CPT | Performed by: INTERNAL MEDICINE

## 2019-09-06 PROCEDURE — 80076 HEPATIC FUNCTION PANEL: CPT | Performed by: INTERNAL MEDICINE

## 2019-09-06 PROCEDURE — 84100 ASSAY OF PHOSPHORUS: CPT | Performed by: INTERNAL MEDICINE

## 2019-09-06 PROCEDURE — 80061 LIPID PANEL: CPT | Performed by: INTERNAL MEDICINE

## 2019-09-06 PROCEDURE — 80048 BASIC METABOLIC PNL TOTAL CA: CPT | Performed by: INTERNAL MEDICINE

## 2019-09-06 ASSESSMENT — PAIN SCALES - GENERAL: PAINLEVEL: NO PAIN (0)

## 2019-09-06 ASSESSMENT — MIFFLIN-ST. JEOR: SCORE: 1086.27

## 2019-09-06 NOTE — LETTER
"9/6/2019      RE: Sherrie Lala  [address] [facility] [address]       I had the pleasure of seeing Sherrie Lala for followup in the Liver Transplantation Clinic at the [facility] on 09/06/2019.  Ms. Lala returns for followup now 1 year status post liver transplantation for secondary biliary cirrhosis, brought on after surgery for cholangiocarcinoma, now probably 7 years ago.      She is doing well at this visit.  She denies any abdominal pain, itching or skin rash.  She has only mild fatigue.  She denies any increased abdominal girth or lower extremity edema.  She denies any fevers or chills, cough or shortness of breath.  She denies any nausea or vomiting.  She does have some intermittent loose stools and a lot of gas.  She saw her primary physician about this, who put her on FOBMAP diet, which she says is helping somewhat.  Her appetite has been good, and her weight has been stable.      She also had a recent bone density by her primary physician, who started her on Fosamax.  Evidently, her bone density was worse than it had been on her preoperative study.     Current Outpatient Medications   Medication     albuterol (PROAIR HFA/PROVENTIL HFA/VENTOLIN HFA) 108 (90 BASE) MCG/ACT Inhaler     aspirin 81 MG tablet     gabapentin (NEURONTIN) 300 MG capsule     omeprazole 20 MG tablet     PRAMIPEXOLE DIHYDROCHLORIDE PO     tacrolimus (GENERIC EQUIVALENT) 1 MG capsule     warfarin (COUMADIN) 2 MG tablet     zolpidem (AMBIEN) 10 MG tablet     oxyCODONE (ROXICODONE) 5 MG tablet     No current facility-administered medications for this visit.      /84 (BP Location: Right arm, Patient Position: Sitting, Cuff Size: Adult Regular)   Pulse 82   Temp 97.8  F (36.6  C) (Oral)   Ht 1.575 m (5' 2.01\")   Wt 57.3 kg (126 lb 4.8 oz)   SpO2 98%   BMI 23.09 kg/m       PHYSICAL EXAMINATION:  In general, she looks quite well.  HEENT exam shows no scleral icterus or temporal muscle wasting.  Her " chest is clear.  Her abdominal exam shows no increase in girth.  No masses or tenderness to palpation is present.  Her liver is 10 cm in span without left lobe enlargement.  No spleen tip is palpable.  Extremity exam shows no edema.  Skin exam shows no suspicious lesions.  Neurologic exam is nonfocal.     Recent Results (from the past 168 hour(s))   Lipid Profile    Collection Time: 09/06/19  9:15 AM   Result Value Ref Range    Cholesterol 144 <200 mg/dL    Triglycerides 112 <150 mg/dL    HDL Cholesterol 45 (L) >49 mg/dL    LDL Cholesterol Calculated 77 <100 mg/dL    Non HDL Cholesterol 99 <130 mg/dL   Hepatic panel    Collection Time: 09/06/19  9:15 AM   Result Value Ref Range    Bilirubin Direct 0.2 0.0 - 0.2 mg/dL    Bilirubin Total 0.7 0.2 - 1.3 mg/dL    Albumin 4.0 3.4 - 5.0 g/dL    Protein Total 6.9 6.8 - 8.8 g/dL    Alkaline Phosphatase 91 40 - 150 U/L    ALT 19 0 - 50 U/L    AST 10 0 - 45 U/L   Magnesium    Collection Time: 09/06/19  9:15 AM   Result Value Ref Range    Magnesium 1.8 1.6 - 2.3 mg/dL   Phosphorus    Collection Time: 09/06/19  9:15 AM   Result Value Ref Range    Phosphorus 3.7 2.5 - 4.5 mg/dL   Basic metabolic panel    Collection Time: 09/06/19  9:15 AM   Result Value Ref Range    Sodium 138 133 - 144 mmol/L    Potassium 4.7 3.4 - 5.3 mmol/L    Chloride 108 94 - 109 mmol/L    Carbon Dioxide 25 20 - 32 mmol/L    Anion Gap 5 3 - 14 mmol/L    Glucose 85 70 - 99 mg/dL    Urea Nitrogen 30 7 - 30 mg/dL    Creatinine 1.18 (H) 0.52 - 1.04 mg/dL    GFR Estimate 49 (L) >60 mL/min/[1.73_m2]    GFR Estimate If Black 57 (L) >60 mL/min/[1.73_m2]    Calcium 8.6 8.5 - 10.1 mg/dL   CBC with platelets    Collection Time: 09/06/19  9:15 AM   Result Value Ref Range    WBC 3.6 (L) 4.0 - 11.0 10e9/L    RBC Count 4.01 3.8 - 5.2 10e12/L    Hemoglobin 13.0 11.7 - 15.7 g/dL    Hematocrit 38.3 35.0 - 47.0 %    MCV 96 78 - 100 fl    MCH 32.4 26.5 - 33.0 pg    MCHC 33.9 31.5 - 36.5 g/dL    RDW 12.2 10.0 - 15.0 %     Platelet Count 96 (L) 150 - 450 10e9/L   UA with Microscopic    Collection Time: 09/06/19  9:20 AM   Result Value Ref Range    Color Urine Yellow     Appearance Urine Slightly Cloudy     Glucose Urine Negative NEG^Negative mg/dL    Bilirubin Urine Negative NEG^Negative    Ketones Urine Negative NEG^Negative mg/dL    Specific Gravity Urine 1.020 1.003 - 1.035    Blood Urine Small (A) NEG^Negative    pH Urine 5.0 5.0 - 7.0 pH    Protein Albumin Urine 30 (A) NEG^Negative mg/dL    Urobilinogen mg/dL 0.0 0.0 - 2.0 mg/dL    Nitrite Urine Negative NEG^Negative    Leukocyte Esterase Urine Negative NEG^Negative    Source Unspecified Urine     WBC Urine 3 0 - 5 /HPF    RBC Urine 5 (H) 0 - 2 /HPF    Bacteria Urine Few (A) NEG^Negative /HPF    Squamous Epithelial /HPF Urine 1 0 - 1 /HPF    Mucous Urine Present (A) NEG^Negative /LPF    Hyaline Casts 3 (H) 0 - 2 /LPF      IMPRESSION:  Ms. Lala is doing well now more than 1 year status post liver transplantation.  I do concur with her being started on the Fosamax given that her bone density is less than what it was pretransplant.  Ordinarily, we would wait until 3 years, as often bone density will improve over that period of time.  However, she was actually below the threshold at the time of original bone density.  She did have a fracture of her arm.  She is on calcium and vitamin D as well.      She does need to see a dermatologist, as she will need annual skin cancer screening.      I will stop her Coumadin, as she is now more than 1 year post transplant, and she was put on it because of the previous portal vein thrombosis.  She is otherwise up to date with regard to vaccines and other cancer screening, and I will see her back in the clinic in 6 months.      Thank you very much for allowing me to participate in the care of this patient.  If you have any questions regarding my recommendations, please do not hesitate to contact me.       Bradly Lilly MD      Professor of  Medicine  [employer]      Executive Medical Director, Solid Organ Transplant Program  [facility]    Bradly Lilly MD

## 2019-09-06 NOTE — NURSING NOTE
"Chief Complaint   Patient presents with     RECHECK     3 month follow up     /84 (BP Location: Right arm, Patient Position: Sitting, Cuff Size: Adult Regular)   Pulse 82   Temp 97.8  F (36.6  C) (Oral)   Ht 1.575 m (5' 2.01\")   Wt 57.3 kg (126 lb 4.8 oz)   SpO2 98%   BMI 23.09 kg/m    Celia Velasquez on 9/6/2019 at 9:36 AM    "

## 2019-09-06 NOTE — PROGRESS NOTES
"I had the pleasure of seeing Sherrie Lala for followup in the Liver Transplantation Clinic at the [facility] on 09/06/2019.  Ms. Lala returns for followup now 1 year status post liver transplantation for secondary biliary cirrhosis, brought on after surgery for cholangiocarcinoma, now probably 7 years ago.      She is doing well at this visit.  She denies any abdominal pain, itching or skin rash.  She has only mild fatigue.  She denies any increased abdominal girth or lower extremity edema.  She denies any fevers or chills, cough or shortness of breath.  She denies any nausea or vomiting.  She does have some intermittent loose stools and a lot of gas.  She saw her primary physician about this, who put her on FOBMAP diet, which she says is helping somewhat.  Her appetite has been good, and her weight has been stable.      She also had a recent bone density by her primary physician, who started her on Fosamax.  Evidently, her bone density was worse than it had been on her preoperative study.     Current Outpatient Medications   Medication     albuterol (PROAIR HFA/PROVENTIL HFA/VENTOLIN HFA) 108 (90 BASE) MCG/ACT Inhaler     aspirin 81 MG tablet     gabapentin (NEURONTIN) 300 MG capsule     omeprazole 20 MG tablet     PRAMIPEXOLE DIHYDROCHLORIDE PO     tacrolimus (GENERIC EQUIVALENT) 1 MG capsule     warfarin (COUMADIN) 2 MG tablet     zolpidem (AMBIEN) 10 MG tablet     oxyCODONE (ROXICODONE) 5 MG tablet     No current facility-administered medications for this visit.      /84 (BP Location: Right arm, Patient Position: Sitting, Cuff Size: Adult Regular)   Pulse 82   Temp 97.8  F (36.6  C) (Oral)   Ht 1.575 m (5' 2.01\")   Wt 57.3 kg (126 lb 4.8 oz)   SpO2 98%   BMI 23.09 kg/m      PHYSICAL EXAMINATION:  In general, she looks quite well.  HEENT exam shows no scleral icterus or temporal muscle wasting.  Her chest is clear.  Her abdominal exam shows no increase in girth.  No masses " or tenderness to palpation is present.  Her liver is 10 cm in span without left lobe enlargement.  No spleen tip is palpable.  Extremity exam shows no edema.  Skin exam shows no suspicious lesions.  Neurologic exam is nonfocal.     Recent Results (from the past 168 hour(s))   Lipid Profile    Collection Time: 09/06/19  9:15 AM   Result Value Ref Range    Cholesterol 144 <200 mg/dL    Triglycerides 112 <150 mg/dL    HDL Cholesterol 45 (L) >49 mg/dL    LDL Cholesterol Calculated 77 <100 mg/dL    Non HDL Cholesterol 99 <130 mg/dL   Hepatic panel    Collection Time: 09/06/19  9:15 AM   Result Value Ref Range    Bilirubin Direct 0.2 0.0 - 0.2 mg/dL    Bilirubin Total 0.7 0.2 - 1.3 mg/dL    Albumin 4.0 3.4 - 5.0 g/dL    Protein Total 6.9 6.8 - 8.8 g/dL    Alkaline Phosphatase 91 40 - 150 U/L    ALT 19 0 - 50 U/L    AST 10 0 - 45 U/L   Magnesium    Collection Time: 09/06/19  9:15 AM   Result Value Ref Range    Magnesium 1.8 1.6 - 2.3 mg/dL   Phosphorus    Collection Time: 09/06/19  9:15 AM   Result Value Ref Range    Phosphorus 3.7 2.5 - 4.5 mg/dL   Basic metabolic panel    Collection Time: 09/06/19  9:15 AM   Result Value Ref Range    Sodium 138 133 - 144 mmol/L    Potassium 4.7 3.4 - 5.3 mmol/L    Chloride 108 94 - 109 mmol/L    Carbon Dioxide 25 20 - 32 mmol/L    Anion Gap 5 3 - 14 mmol/L    Glucose 85 70 - 99 mg/dL    Urea Nitrogen 30 7 - 30 mg/dL    Creatinine 1.18 (H) 0.52 - 1.04 mg/dL    GFR Estimate 49 (L) >60 mL/min/[1.73_m2]    GFR Estimate If Black 57 (L) >60 mL/min/[1.73_m2]    Calcium 8.6 8.5 - 10.1 mg/dL   CBC with platelets    Collection Time: 09/06/19  9:15 AM   Result Value Ref Range    WBC 3.6 (L) 4.0 - 11.0 10e9/L    RBC Count 4.01 3.8 - 5.2 10e12/L    Hemoglobin 13.0 11.7 - 15.7 g/dL    Hematocrit 38.3 35.0 - 47.0 %    MCV 96 78 - 100 fl    MCH 32.4 26.5 - 33.0 pg    MCHC 33.9 31.5 - 36.5 g/dL    RDW 12.2 10.0 - 15.0 %    Platelet Count 96 (L) 150 - 450 10e9/L   UA with Microscopic    Collection Time:  09/06/19  9:20 AM   Result Value Ref Range    Color Urine Yellow     Appearance Urine Slightly Cloudy     Glucose Urine Negative NEG^Negative mg/dL    Bilirubin Urine Negative NEG^Negative    Ketones Urine Negative NEG^Negative mg/dL    Specific Gravity Urine 1.020 1.003 - 1.035    Blood Urine Small (A) NEG^Negative    pH Urine 5.0 5.0 - 7.0 pH    Protein Albumin Urine 30 (A) NEG^Negative mg/dL    Urobilinogen mg/dL 0.0 0.0 - 2.0 mg/dL    Nitrite Urine Negative NEG^Negative    Leukocyte Esterase Urine Negative NEG^Negative    Source Unspecified Urine     WBC Urine 3 0 - 5 /HPF    RBC Urine 5 (H) 0 - 2 /HPF    Bacteria Urine Few (A) NEG^Negative /HPF    Squamous Epithelial /HPF Urine 1 0 - 1 /HPF    Mucous Urine Present (A) NEG^Negative /LPF    Hyaline Casts 3 (H) 0 - 2 /LPF      IMPRESSION:  Ms. Lala is doing well now more than 1 year status post liver transplantation.  I do concur with her being started on the Fosamax given that her bone density is less than what it was pretransplant.  Ordinarily, we would wait until 3 years, as often bone density will improve over that period of time.  However, she was actually below the threshold at the time of original bone density.  She did have a fracture of her arm.  She is on calcium and vitamin D as well.      She does need to see a dermatologist, as she will need annual skin cancer screening.      I will stop her Coumadin, as she is now more than 1 year post transplant, and she was put on it because of the previous portal vein thrombosis.  She is otherwise up to date with regard to vaccines and other cancer screening, and I will see her back in the clinic in 6 months.      Thank you very much for allowing me to participate in the care of this patient.  If you have any questions regarding my recommendations, please do not hesitate to contact me.       Bradly Lilly MD      Professor of Medicine  [employer]      Executive Medical Director, Solid  Organ Transplant Program  [facility]

## 2019-09-09 ENCOUNTER — TELEPHONE (OUTPATIENT)
Dept: TRANSPLANT | Facility: CLINIC | Age: 64
End: 2019-09-09

## 2019-09-09 NOTE — TELEPHONE ENCOUNTER
Tacrolimus level 25.9.    Please call Sherrie, confirm that this is an inaccurate level.  Ask her to repeat prograf level in the next week and make sure it's a 12 hour trough level.

## 2019-09-09 NOTE — TELEPHONE ENCOUNTER
Called pot who confirms she had taken prograf prior to blood draw. Instructed to repeat the prograf level next week.

## 2019-09-09 NOTE — LETTER
PHYSICIAN ORDERS  Lab order for next week    DATE & TIME ISSUED: 2019 10:41 AM  PATIENT NAME: Sherrie Lala   : 1955     [facility] MR# [if applicable]: [id number]     DIAGNOSIS:  Liver transplanted  ICD-10 CODE: Z94.4     Prograf (tacrolimus) level    Any questions please call: [phone] option4    Please fax results to [phone]  .

## 2019-09-10 ENCOUNTER — COMMUNICATION - RIVER FALLS (OUTPATIENT)
Dept: FAMILY MEDICINE | Facility: CLINIC | Age: 64
End: 2019-09-10
Payer: COMMERCIAL

## 2019-09-10 ENCOUNTER — TRANSFERRED RECORDS (OUTPATIENT)
Dept: HEALTH INFORMATION MANAGEMENT | Facility: CLINIC | Age: 64
End: 2019-09-10

## 2019-09-12 ENCOUNTER — TELEPHONE (OUTPATIENT)
Dept: PHARMACY | Facility: CLINIC | Age: 64
End: 2019-09-12

## 2019-09-12 NOTE — TELEPHONE ENCOUNTER
Clinical Pharmacy Consult:                                                      Transplant Specific:   Date of Transplant: 03/30/2018   Type of Transplant: liver  First Transplant: yes  History of rejection: yes and no    Immunosuppression Regimen   TAC 4mg qAM & 5mgqPM  Immunosuppressant Levels:  Supratherapeutic  Patient specific goal: 10  Pt adherent to lab draws: yes  Scr:   Lab Results   Component Value Date    CR 1.18 09/06/2019     Side effects: no side effects    Prophylactic Medications  Antibacterial:  Completed  Scheduled Discontinue Date: 6 months    Antifungal: Not needed thus far  Scheduled Discontinue Date: 3 months    Antiviral: CrCl 40 to 59 mL/minute: Valcyte 450 mg once daily   Scheduled Discontinue Date: 3 months    Acid Reducer: Prilosec (omeprazole)  Scheduled Reviewed Date: not scheduled to dc    Thrombosis Prevention: Aspirin 81 mg PO daily  Scheduled Discontinue Date: not scheduled to dc      Med rec/DUR performed: yes  Med Rec Discrepancies: yes and no    Sherrie reports doing very well.  She knew all of her meds very well.    Eldon Moreno [facility]

## 2019-09-16 ENCOUNTER — TELEPHONE (OUTPATIENT)
Dept: TRANSPLANT | Facility: CLINIC | Age: 64
End: 2019-09-16

## 2019-09-16 NOTE — TELEPHONE ENCOUNTER
Provider Call: Transplant Lab/Orders  Route to LPN  Post Transplant Days: 382  When patient is less than 60 days post-transplant, route high priority  Reason for Call: Clarification; which order? Recent faxed orders  Liver patients reporting abnormal lab results: Route to RN and Page  Document lab facility information when provider is calling about annual lab orders. Delete facility wildcards when not needed.  Facility Name: [facility]    The facility received a fax for the pts transplant labs. They would need to establish care with [facility] in order to get labs done through the facility- they are not a current pt at the facility. Should they keep the labs on file? Is the pt going to establish care? Please connect with the facility when available      Callback needed? yes

## 2019-09-18 ENCOUNTER — AMBULATORY - RIVER FALLS (OUTPATIENT)
Dept: FAMILY MEDICINE | Facility: CLINIC | Age: 64
End: 2019-09-18
Payer: COMMERCIAL

## 2019-09-19 LAB — TACROLIMUS LEVEL: 6.2 MCG/L

## 2019-10-01 ENCOUNTER — OFFICE VISIT (OUTPATIENT)
Dept: DERMATOLOGY | Facility: CLINIC | Age: 64
End: 2019-10-01
Attending: INTERNAL MEDICINE
Payer: COMMERCIAL

## 2019-10-01 DIAGNOSIS — Z12.83 SCREENING EXAM FOR SKIN CANCER: ICD-10-CM

## 2019-10-01 DIAGNOSIS — L82.0 SEBORRHEIC KERATOSES, INFLAMED: Primary | ICD-10-CM

## 2019-10-01 DIAGNOSIS — Z94.89 TRANSPLANT RECIPIENT: ICD-10-CM

## 2019-10-01 DIAGNOSIS — L57.8 SUN-DAMAGED SKIN: ICD-10-CM

## 2019-10-01 DIAGNOSIS — D84.9 IMMUNOSUPPRESSED STATUS (H): ICD-10-CM

## 2019-10-01 RX ORDER — ALENDRONATE SODIUM 5 MG
TABLET ORAL WEEKLY
Status: ON HOLD | COMMUNITY
End: 2021-03-30

## 2019-10-01 ASSESSMENT — PAIN SCALES - GENERAL: PAINLEVEL: NO PAIN (0)

## 2019-10-01 NOTE — LETTER
"10/1/2019       RE: Sherrie Lala  [address] [facility] [address]     Dear Colleague,    Thank you for referring your patient, Sherrie Lala, to the [facility] DERMATOLOGY at [facility]. Please see a copy of my visit note below.    [facility] Dermatology Note      Dermatology Problem List:  1. Seborrheic keratoses on the right temple and posterior right back, questionable Leser-Trelat sign due to cholangiocarcinoma  - Cryotherapy of both areas on 10/1/19    Encounter Date: Oct 1, 2019    CC:  Chief Complaint   Patient presents with     Derm Problem     Sherrie, is being seen today for a transplant skin check, has many moles, has no areas of  concern at this time, as reported by patient.         History of Present Illness:  Ms. Sherrie Lala is a 64 year old female with history of cholangiocarcinoma and secondary biliary cirrhosis s/p liver transplantation who presents as a referral from Dr. Lilly from  for annual skin screening for monitoring of skin cancer. The patient had no concerns regarding new skin lesions, changes in moles, or rash today. She has not had any issues with diarrhea or fever recently. She describes herself as a generally \"moley\" person, and noticed that she gained a lot more moles when she was diagnosed with cancer.     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)     Hypervolemia     Positive sputum culture in cadaveric donor     Positive urine culture in cadaveric donor     Other closed displaced fracture of proximal end of left humerus with " routine healing, subsequent encounter     Past Medical History:   Diagnosis Date     Antiplatelet or antithrombotic long-term use      Asthma      Cholangiocarcinoma (H) 2014     Cirrhosis of liver with ascites (H) 5/10/2018     Encounter for pleural drainage tube placement      Esophageal varices with hemorrhage (H)      Gastric ulcer      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     Liver transplanted (H) 2018     Lung metastases (H) 2014     Portal vein thrombosis      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    Residual thrombus in main and right portal     Past Surgical History:   Procedure Laterality Date     CHOLECYSTECTOMY       HEPATECTOMY PARTIAL       OPEN REDUCTION INTERNAL FIXATION HUMERUS PROXIMAL Left 3/4/2019    Procedure: OPEN REDUCTION INTERNAL FIXATION HUMERUS PROXIMAL LEFT with Allograft;  Surgeon: Eh Kraft MD;  Location: [facility] OR     RETURN LIVER TRANSPLANT N/A 2018    Procedure: RETURN LIVER TRANSPLANT;  Open Abdomen, return liver transplant washout with   Mesh and liver stent  placement and  Abdomal Wound closure;  Surgeon: Darren Rod MD;  Location: [facility] OR     TRANSPLANT LIVER RECIPIENT  DONOR N/A 2018    Procedure: TRANSPLANT LIVER RECIPIENT  DONOR;  TRANSPLANT LIVER RECIPIENT  DONOR ;  Surgeon: Darren Rod MD;  Location: [facility] OR       Social History:  Patient reports that she has never smoked. She has never used smokeless tobacco. She reports current alcohol use. She reports that she does not use drugs.    Family History:  No family history on file.   Brother with basal cell carcinoma  Father with basal cell carcinoma    Medications:  Current Outpatient Medications   Medication Sig Dispense Refill     albuterol (PROAIR HFA/PROVENTIL HFA/VENTOLIN HFA) 108 (90 BASE) MCG/ACT Inhaler Inhale 2 puffs into the lungs every 6 hours       alendronate (FOSAMAX) 5 MG tablet Take by mouth every morning (before  breakfast)       aspirin 81 MG tablet Take 1 tablet (81 mg) by mouth daily 30 tablet 3     gabapentin (NEURONTIN) 300 MG capsule Take 300 mg by mouth At Bedtime       omeprazole 20 MG tablet Take 2 tablets (40 mg) by mouth 2 times daily 120 tablet 11     PRAMIPEXOLE DIHYDROCHLORIDE PO Take 0.5 mg by mouth daily       tacrolimus (ENVARSUS XR) 4 MG 24 hr tablet        tacrolimus (GENERIC EQUIVALENT) 1 MG capsule Take 4 capsules (4 mg) by mouth every morning AND 5 capsules (5 mg) every evening. 270 capsule 11     warfarin (COUMADIN) 2 MG tablet Take 2 tablets (4 mg) by mouth daily Every evening or as directed by provider (Patient taking differently: Take 4 mg by mouth daily Take 2 mg on Mon&Fri and 4 mg all other days, or as directed by provider) 8 tablet 0     zolpidem (AMBIEN) 10 MG tablet Take 10 mg by mouth nightly as needed for sleep        oxyCODONE (ROXICODONE) 5 MG tablet Take 1 tablet (5 mg) by mouth every 6 hours as needed for severe pain (Patient not taking: Reported on 9/6/2019) 40 tablet 0     Allergies   Allergen Reactions     Blood Transfusion Related (Informational Only) Other (See Comments)     Patient has a history of a clinically significant antibody against RBC antigens.  A delay in compatible RBCs may occur.     Dilaudid [Hydromorphone] Itching     Ferrlecit      Venofer [Iron Sucrose]          Review of Systems:  -As per HPI  -Constitutional: Otherwise feeling well today, in usual state of health.  -Skin: As above in HPI. No additional skin concerns.    Physical exam:  Vitals: There were no vitals taken for this visit.  GEN: This is a well developed, well-nourished female in no acute distress, in a pleasant mood.    SKIN: Total skin excluding the undergarment areas was performed. The exam included the head/face, neck, both arms, chest, back, abdomen, both legs, digits and/or nails.   -Rubin skin type: III  -Numerous black, stuck on plaques particularly on the face, arms, and back  -Numerous  small macules  -No other lesions of concern on areas examined.     Impression/Plan:  1. Seborrheic Keratoses    Cryotherapy procedure note (performed by faculty): After verbal consent and discussion of risks and benefits including but no limited to dyspigmentation/scar, blister, infection, recurrence, 2 areas were treated with 1-2mm freeze border for 2 cycles with liquid nitrogen. Post cryotherapy instructions were provided.     2. Multiple clinically benign nevi    Sun precaution was advised including the use of sun screens of SPF 30 or higher, sun protective clothing, and avoidance of tanning beds.    CC Bradly Lilly MD  [address] [place] [address] on close of this encounter.  Follow-up in 1 year, earlier for new or changing lesions.     Staff Involved:  I, Ashley Luke MS3, saw and examined the patient in the presence of Dr. Goetz.    Again, thank you for allowing me to participate in the care of your patient.      Sincerely,    Eldon Goetz MD

## 2019-10-01 NOTE — PATIENT INSTRUCTIONS
Preventive Care:    Colorectal Cancer Screening: During our visit today, we discussed that it is recommended you receive colorectal cancer screening. Please call or make an appointment with your primary care provider to discuss this. You may also call the [facility] scheduling line ([phone]) to set up a colonoscopy appointment.    Cryotherapy    What is it?    Use of a very cold liquid, such as liquid nitrogen, to freeze and destroy abnormal skin cells that need to be removed    What should I expect?    Tenderness and redness    A small blister that might grow and fill with dark purple blood. There may be crusting.    More than one treatment may be needed if the lesions do not go away.    How do I care for the treated area?    Gently wash the area with your hands when bathing.    Use a thin layer of Vaseline to help with healing. You may use a Band-Aid.     The area should heal within 7-10 days and may leave behind a pink or lighter color.     Do not use an antibiotic or Neosporin ointment.     You may take acetaminophen (Tylenol) for pain.     Call your Doctor if you have:    Severe pain    Signs of infection (warmth, redness, cloudy yellow drainage, and or a bad smell)    Questions or concerns    Who should I call with questions?       [facility]: [phone]       [facility]: [phone]       For urgent needs outside of business hours [facility] at [phone]        and ask for the dermatology resident on call

## 2019-10-01 NOTE — PROGRESS NOTES
"[facility] Dermatology Note      Dermatology Problem List:  1. Seborrheic keratoses on the right temple and posterior right back, questionable Leser-Trelat sign due to cholangiocarcinoma  - Cryotherapy of both areas on 10/1/19    Encounter Date: Oct 1, 2019    CC:  Chief Complaint   Patient presents with     Derm Problem     Sherrie, is being seen today for a transplant skin check, has many moles, has no areas of  concern at this time, as reported by patient.         History of Present Illness:  Ms. Sherrie Lala is a 64 year old female with history of cholangiocarcinoma and secondary biliary cirrhosis s/p liver transplantation who presents as a referral from Dr. Lilly from  for annual skin screening for monitoring of skin cancer. The patient had no concerns regarding new skin lesions, changes in moles, or rash today. She has not had any issues with diarrhea or fever recently. She describes herself as a generally \"moley\" person, and noticed that she gained a lot more moles when she was diagnosed with cancer.     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)     Hypervolemia     Positive sputum culture in cadaveric donor     Positive urine culture in cadaveric donor     Other closed displaced fracture of proximal end of left humerus with routine healing, subsequent encounter     Past Medical History:   Diagnosis Date     Antiplatelet or antithrombotic long-term use      Asthma      Cholangiocarcinoma (H) 06/2014     Cirrhosis of liver with ascites (H) 5/10/2018     Encounter for pleural drainage tube " placement      Esophageal varices with hemorrhage (H)      Gastric ulcer      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     Liver transplanted (H) 2018     Lung metastases (H) 2014     Portal vein thrombosis      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    Residual thrombus in main and right portal     Past Surgical History:   Procedure Laterality Date     CHOLECYSTECTOMY       HEPATECTOMY PARTIAL       OPEN REDUCTION INTERNAL FIXATION HUMERUS PROXIMAL Left 3/4/2019    Procedure: OPEN REDUCTION INTERNAL FIXATION HUMERUS PROXIMAL LEFT with Allograft;  Surgeon: Eh Kraft MD;  [facility]     RETURN LIVER TRANSPLANT N/A 2018    Procedure: RETURN LIVER TRANSPLANT;  Open Abdomen, return liver transplant washout with   Mesh and liver stent  placement and  Abdomal Wound closure;  Surgeon: Darren Rod MD;  [facility]     TRANSPLANT LIVER RECIPIENT  DONOR N/A 2018    Procedure: TRANSPLANT LIVER RECIPIENT  DONOR;  TRANSPLANT LIVER RECIPIENT  DONOR ;  Surgeon: Darren Rod MD;  [facility]       Social History:  Patient reports that she has never smoked. She has never used smokeless tobacco. She reports current alcohol use. She reports that she does not use drugs.    Family History:  No family history on file.   Brother with basal cell carcinoma  Father with basal cell carcinoma    Medications:  Current Outpatient Medications   Medication Sig Dispense Refill     albuterol (PROAIR HFA/PROVENTIL HFA/VENTOLIN HFA) 108 (90 BASE) MCG/ACT Inhaler Inhale 2 puffs into the lungs every 6 hours       alendronate (FOSAMAX) 5 MG tablet Take by mouth every morning (before breakfast)       aspirin 81 MG tablet Take 1 tablet (81 mg) by mouth daily 30 tablet 3     gabapentin (NEURONTIN) 300 MG capsule Take 300 mg by mouth At Bedtime       omeprazole 20 MG tablet Take 2 tablets (40 mg) by mouth 2 times daily 120 tablet 11     PRAMIPEXOLE  DIHYDROCHLORIDE PO Take 0.5 mg by mouth daily       tacrolimus (ENVARSUS XR) 4 MG 24 hr tablet        tacrolimus (GENERIC EQUIVALENT) 1 MG capsule Take 4 capsules (4 mg) by mouth every morning AND 5 capsules (5 mg) every evening. 270 capsule 11     warfarin (COUMADIN) 2 MG tablet Take 2 tablets (4 mg) by mouth daily Every evening or as directed by provider (Patient taking differently: Take 4 mg by mouth daily Take 2 mg on Mon&Fri and 4 mg all other days, or as directed by provider) 8 tablet 0     zolpidem (AMBIEN) 10 MG tablet Take 10 mg by mouth nightly as needed for sleep        oxyCODONE (ROXICODONE) 5 MG tablet Take 1 tablet (5 mg) by mouth every 6 hours as needed for severe pain (Patient not taking: Reported on 9/6/2019) 40 tablet 0     Allergies   Allergen Reactions     Blood Transfusion Related (Informational Only) Other (See Comments)     Patient has a history of a clinically significant antibody against RBC antigens.  A delay in compatible RBCs may occur.     Dilaudid [Hydromorphone] Itching     Ferrlecit      Venofer [Iron Sucrose]          Review of Systems:  -As per HPI  -Constitutional: Otherwise feeling well today, in usual state of health.  -Skin: As above in HPI. No additional skin concerns.    Physical exam:  Vitals: There were no vitals taken for this visit.  GEN: This is a well developed, well-nourished female in no acute distress, in a pleasant mood.    SKIN: Total skin excluding the undergarment areas was performed. The exam included the head/face, neck, both arms, chest, back, abdomen, both legs, digits and/or nails.   -Rubin skin type: III  -Numerous black, stuck on plaques particularly on the face, arms, and back  -Numerous small macules  -No other lesions of concern on areas examined.     Impression/Plan:  1. Seborrheic Keratoses    Cryotherapy procedure note (performed by faculty): After verbal consent and discussion of risks and benefits including but no limited to  dyspigmentation/scar, blister, infection, recurrence, 2 areas were treated with 1-2mm freeze border for 2 cycles with liquid nitrogen. Post cryotherapy instructions were provided.     2. Multiple clinically benign nevi    Sun precaution was advised including the use of sun screens of SPF 30 or higher, sun protective clothing, and avoidance of tanning beds.    CC Bradly Lilly MD  [address]  [place] [address] on close of this encounter.  Follow-up in 1 year, earlier for new or changing lesions.     Staff Involved:  I, Ashley Luke MS3, saw and examined the patient in the presence of Dr. Goetz.    Staff Physician:  I was present with the medical student who participated in the service and in the documentation of the note. I have verified the history and personally performed the physical exam and medical decision making. I agree with the assessment and plan of care as documented in the note.     Eldon Goetz MD  Staff Dermatologist and Dermatopathologist  , Department of Dermatology

## 2019-10-01 NOTE — NURSING NOTE
Dermatology Rooming Note    Sherrie Lala's goals for this visit include:   Chief Complaint   Patient presents with     Derm Problem     Sherrie, is being seen today for a transplant skin check, has many moles, has no areas of  concern at this time, as reported by patient.         Seda Colunga LPN

## 2019-10-07 ENCOUNTER — TELEPHONE (OUTPATIENT)
Dept: TRANSPLANT | Facility: CLINIC | Age: 64
End: 2019-10-07

## 2019-10-07 NOTE — TELEPHONE ENCOUNTER
Spoke to Sherrie.  Says she's had a cold for 2 weeks, feels a bit run down but not horrible, has a bad cough, feels short of breath w/ activity.  No fever.  Has history of pleural effusions.  She wanted me to order a chest xray in [place].  I told her I would like her to see her primary care doctor.  She will make an appt today.

## 2019-10-07 NOTE — TELEPHONE ENCOUNTER
Patient Call: Transplant Illness  If the patient reports CHEST PAIN, SEVERE SHORTNESS OF BREATH, ONE SIDED WEAKNESS, or DIFFICULTY SPEAKING: CONTACT RN FACE-TO-FACE IMMEDIATELY    Duration of illness: 5 Days- pt states she has cold like symptoms for 2 weeks  Transplanted organ? liver  Illness: Shortness of breath, Productive cough

## 2019-10-08 ENCOUNTER — OFFICE VISIT - RIVER FALLS (OUTPATIENT)
Dept: FAMILY MEDICINE | Facility: CLINIC | Age: 64
End: 2019-10-08
Payer: COMMERCIAL

## 2019-10-08 ASSESSMENT — MIFFLIN-ST. JEOR: SCORE: 1097.92

## 2019-10-09 ENCOUNTER — DOCUMENTATION ONLY (OUTPATIENT)
Dept: TRANSPLANT | Facility: CLINIC | Age: 64
End: 2019-10-09

## 2019-10-17 ENCOUNTER — TRANSFERRED RECORDS (OUTPATIENT)
Dept: HEALTH INFORMATION MANAGEMENT | Facility: CLINIC | Age: 64
End: 2019-10-17

## 2019-10-17 ENCOUNTER — OFFICE VISIT - RIVER FALLS (OUTPATIENT)
Dept: FAMILY MEDICINE | Facility: CLINIC | Age: 64
End: 2019-10-17
Payer: COMMERCIAL

## 2019-10-17 ASSESSMENT — MIFFLIN-ST. JEOR: SCORE: 1090.66

## 2019-11-04 DIAGNOSIS — J90 PLEURAL EFFUSION: Primary | ICD-10-CM

## 2019-11-06 DIAGNOSIS — Z13.220 LIPID SCREENING: ICD-10-CM

## 2019-11-06 DIAGNOSIS — Z94.4 LIVER REPLACED BY TRANSPLANT (H): ICD-10-CM

## 2019-11-06 LAB
ALBUMIN SERPL-MCNC: 4 G/DL (ref 3.4–5)
ALP SERPL-CCNC: 90 U/L (ref 40–150)
ALT SERPL W P-5'-P-CCNC: 16 U/L (ref 0–50)
ANION GAP SERPL CALCULATED.3IONS-SCNC: 4 MMOL/L (ref 3–14)
AST SERPL W P-5'-P-CCNC: 9 U/L (ref 0–45)
BILIRUB DIRECT SERPL-MCNC: 0.2 MG/DL (ref 0–0.2)
BILIRUB SERPL-MCNC: 0.6 MG/DL (ref 0.2–1.3)
BUN SERPL-MCNC: 30 MG/DL (ref 7–30)
CALCIUM SERPL-MCNC: 8.7 MG/DL (ref 8.5–10.1)
CHLORIDE SERPL-SCNC: 109 MMOL/L (ref 94–109)
CO2 SERPL-SCNC: 26 MMOL/L (ref 20–32)
CREAT SERPL-MCNC: 1.18 MG/DL (ref 0.52–1.04)
ERYTHROCYTE [DISTWIDTH] IN BLOOD BY AUTOMATED COUNT: 11.9 % (ref 10–15)
GFR SERPL CREATININE-BSD FRML MDRD: 49 ML/MIN/{1.73_M2}
GLUCOSE SERPL-MCNC: 94 MG/DL (ref 70–99)
HCT VFR BLD AUTO: 38.7 % (ref 35–47)
HGB BLD-MCNC: 13.1 G/DL (ref 11.7–15.7)
MAGNESIUM SERPL-MCNC: 2.2 MG/DL (ref 1.6–2.3)
MCH RBC QN AUTO: 31.6 PG (ref 26.5–33)
MCHC RBC AUTO-ENTMCNC: 33.9 G/DL (ref 31.5–36.5)
MCV RBC AUTO: 94 FL (ref 78–100)
PHOSPHATE SERPL-MCNC: 3.4 MG/DL (ref 2.5–4.5)
PLATELET # BLD AUTO: 123 10E9/L (ref 150–450)
POTASSIUM SERPL-SCNC: 5.1 MMOL/L (ref 3.4–5.3)
PROT SERPL-MCNC: 6.8 G/DL (ref 6.8–8.8)
RBC # BLD AUTO: 4.14 10E12/L (ref 3.8–5.2)
SODIUM SERPL-SCNC: 138 MMOL/L (ref 133–144)
TACROLIMUS BLD-MCNC: 6.1 UG/L (ref 5–15)
TME LAST DOSE: NORMAL H
WBC # BLD AUTO: 4.1 10E9/L (ref 4–11)

## 2019-11-06 PROCEDURE — 80197 ASSAY OF TACROLIMUS: CPT | Performed by: INTERNAL MEDICINE

## 2019-11-12 ENCOUNTER — OFFICE VISIT - RIVER FALLS (OUTPATIENT)
Dept: FAMILY MEDICINE | Facility: CLINIC | Age: 64
End: 2019-11-12
Payer: COMMERCIAL

## 2019-11-12 ENCOUNTER — TELEPHONE (OUTPATIENT)
Dept: DERMATOLOGY | Facility: CLINIC | Age: 64
End: 2019-11-12

## 2019-11-12 NOTE — TELEPHONE ENCOUNTER
[facility] Call Center    Phone Message    May a detailed message be left on voicemail: no    Reason for Call: Other: .,  nurse requesting a call back to discuss new lesion on pt she suspects is cancer.    Action Taken: Message routed to:  [facility] derm

## 2019-11-13 ENCOUNTER — OFFICE VISIT (OUTPATIENT)
Dept: DERMATOLOGY | Facility: CLINIC | Age: 64
End: 2019-11-13
Payer: COMMERCIAL

## 2019-11-13 DIAGNOSIS — D48.9 NEOPLASM OF UNCERTAIN BEHAVIOR: Primary | ICD-10-CM

## 2019-11-13 DIAGNOSIS — Z94.4 HISTORY OF LIVER TRANSPLANT (H): ICD-10-CM

## 2019-11-13 PROCEDURE — 88305 TISSUE EXAM BY PATHOLOGIST: CPT | Mod: TC | Performed by: DERMATOLOGY

## 2019-11-13 ASSESSMENT — PAIN SCALES - GENERAL: PAINLEVEL: NO PAIN (0)

## 2019-11-13 NOTE — LETTER
11/13/2019       RE: Sherrie Lala  [address] [place] [address]     Dear Colleague,    Thank you for referring your patient, Sherrie Lala, to the [facility] DERMAT[facility]Y at [facility]. Please see a copy of my visit note below.    [facility] Dermatology Note      Dermatology Problem List:  1.  Immunosuppression, s/p liver transplant in August 2018   2. Seborrheic keratoses on the right temple and posterior right back, questionable Leser-Trelat sign due to cholangiocarcinoma  - Cryotherapy of both areas on 10/1/19  2. NUB, left shoulder  - pending biopsy results 11/13/19     Encounter Date: Nov 13, 2019    CC:  Chief Complaint   Patient presents with     Derm Problem     Sherrie is here today for a skin lesion on her left shoulder. The skin lesion appeared less than a month ago       History of Present Illness:  Ms. Sherrie Lala is a 64 year old female who presents today for a spot check. The patient was last seen by Dr. Goetz on 10/1/19 when two seborrheic keratoses were treated with cryotherapy. Today she reports a lesion on her left shoulder. This appeared a few weeks ago and grew very quickly. She is concerned about its appearance. It is asymptomatic. The patient is otherwise feeling well. There are no other skin concerns at this time.    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)     Hypervolemia     Positive sputum culture in cadaveric donor     Positive urine culture in cadaveric donor     Other closed displaced fracture of proximal end of left humerus  with routine healing, subsequent encounter     Past Medical History:   Diagnosis Date     Antiplatelet or antithrombotic long-term use      Asthma      Cholangiocarcinoma (H) 2014     Cirrhosis of liver with ascites (H) 5/10/2018     Encounter for pleural drainage tube placement      Esophageal varices with hemorrhage (H)      Gastric ulcer      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     Liver transplanted (H) 2018     Lung metastases (H) 2014     Portal vein thrombosis      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    Residual thrombus in main and right portal     Past Surgical History:   Procedure Laterality Date     CHOLECYSTECTOMY       HEPATECTOMY PARTIAL       OPEN REDUCTION INTERNAL FIXATION HUMERUS PROXIMAL Left 3/4/2019    Procedure: OPEN REDUCTION INTERNAL FIXATION HUMERUS PROXIMAL LEFT with Allograft;  Surgeon: Eh Kraft MD;  [facility]     RETURN LIVER TRANSPLANT N/A 2018    Procedure: RETURN LIVER TRANSPLANT;  Open Abdomen, return liver transplant washout with   Mesh and liver stent  placement and  Abdomal Wound closure;  Surgeon: Darren Rod MD;  [facility]     TRANSPLANT LIVER RECIPIENT  DONOR N/A 2018    Procedure: TRANSPLANT LIVER RECIPIENT  DONOR;  TRANSPLANT LIVER RECIPIENT  DONOR ;  Surgeon: Darren Rod MD;  [facility]       Social History:  Patient reports that she has never smoked. She has never used smokeless tobacco. She reports current alcohol use. She reports that she does not use drugs.    Family History:  Family History   Problem Relation Age of Onset     Skin Cancer Father      Skin Cancer Brother      Melanoma No family hx of        Medications:  Current Outpatient Medications   Medication Sig Dispense Refill     albuterol (PROAIR HFA/PROVENTIL HFA/VENTOLIN HFA) 108 (90 BASE) MCG/ACT Inhaler Inhale 2 puffs into the lungs every 6 hours       alendronate (FOSAMAX) 5 MG tablet Take by  mouth every morning (before breakfast)       aspirin 81 MG tablet Take 1 tablet (81 mg) by mouth daily 30 tablet 3     gabapentin (NEURONTIN) 300 MG capsule Take 300 mg by mouth At Bedtime       omeprazole 20 MG tablet Take 2 tablets (40 mg) by mouth 2 times daily 120 tablet 11     PRAMIPEXOLE DIHYDROCHLORIDE PO Take 0.5 mg by mouth daily       tacrolimus (ENVARSUS XR) 4 MG 24 hr tablet        tacrolimus (GENERIC EQUIVALENT) 1 MG capsule Take 4 capsules (4 mg) by mouth every morning AND 5 capsules (5 mg) every evening. 270 capsule 11     warfarin (COUMADIN) 2 MG tablet Take 2 tablets (4 mg) by mouth daily Every evening or as directed by provider (Patient taking differently: Take 4 mg by mouth daily Take 2 mg on Mon&Fri and 4 mg all other days, or as directed by provider) 8 tablet 0     zolpidem (AMBIEN) 10 MG tablet Take 10 mg by mouth nightly as needed for sleep        oxyCODONE (ROXICODONE) 5 MG tablet Take 1 tablet (5 mg) by mouth every 6 hours as needed for severe pain (Patient not taking: Reported on 9/6/2019) 40 tablet 0       Allergies   Allergen Reactions     Blood Transfusion Related (Informational Only) Other (See Comments)     Patient has a history of a clinically significant antibody against RBC antigens.  A delay in compatible RBCs may occur.     Dilaudid [Hydromorphone] Itching     Ferrlecit      Venofer [Iron Sucrose]        Review of Systems:  -Skin Establ Pt: The patient denies any new rash, pruritus, or lesions that are symptomatic, changing or bleeding, except as per HPI.  -Constitutional: The patient is feeling generally well.    Physical exam:  GEN: This is a well developed, well-nourished female in no acute distress, in a pleasant mood.    SKIN: Focused examination of the left shoulder was performed.  - 8 mm cratoriform papule with keratinaceous debris on the left shoulder.   - No other lesions of concern on areas examined.           Impression/Plan:  1. Neoplasm of uncertain behavior - left  shoulder. DDx includes SCC (keratoacanthoma) vs other.     After discussion of benefits and risks including but not limited to bleeding, infection, scar, incomplete removal, recurrence, and non-diagnostic biopsy, written consent and photographs were obtained. The area was cleaned with isopropyl alcohol. 0.75 mL of 1% lidocaine with epinephrine was injected to obtain adequate anesthesia of the lesion on the left shoulder. A shave biopsy was performed. Hemostasis was achieved with aluminium chloride. Vaseline and a sterile dressing were applied. The patient tolerated the procedure and no complications were noted. The patient was provided with verbal and written post care instructions.     Follow-up for skin check as planned, sooner pending biopsy results.       Staff Involved:    Scribe Disclosure  I, John Massey, am serving as a scribe to document services personally performed by Dr. Lynda Wolfe, based on data collection and the provider's statements to me.     Provider Disclosure:   The documentation recorded by the scribe accurately reflects the services I personally performed and the decisions made by me.    Lynda Wolfe MD    Department of Dermatology  [facility]: Phone: [phone], Fax: [phone]

## 2019-11-13 NOTE — NURSING NOTE
Dermatology Rooming Note    Sherrie Lala's goals for this visit include:   Chief Complaint   Patient presents with     Derm Problem     Sherrie is here today for a skin lesion on her left shoulder. The skin lesion appeared less than a month ago     Kristen Walker CMA

## 2019-11-13 NOTE — PATIENT INSTRUCTIONS

## 2019-11-13 NOTE — PROGRESS NOTES
[facility] Dermatology Note      Dermatology Problem List:  1. Immunosuppression, s/p liver transplant in August 2018   2. Seborrheic keratoses on the right temple and posterior right back, questionable Leser-Trelat sign due to cholangiocarcinoma  - Cryotherapy of both areas on 10/1/19  2. NUB, left shoulder  - pending biopsy results 11/13/19     Encounter Date: Nov 13, 2019    CC:  Chief Complaint   Patient presents with     Derm Problem     Sherrie is here today for a skin lesion on her left shoulder. The skin lesion appeared less than a month ago         History of Present Illness:  Ms. Sherrie Lala is a 64 year old female who presents today for a spot check. The patient was last seen by Dr. Goetz on 10/1/19 when two seborrheic keratoses were treated with cryotherapy. Today she reports a lesion on her left shoulder. This appeared a few weeks ago and grew very quickly. She is concerned about its appearance. It is asymptomatic. The patient is otherwise feeling well. There are no other skin concerns at this time.      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)     Hypervolemia     Positive sputum culture in cadaveric donor     Positive urine culture in cadaveric donor     Other closed displaced fracture of proximal end of left humerus with routine healing, subsequent encounter     Past Medical History:   Diagnosis Date     Antiplatelet or antithrombotic long-term use      Asthma      Cholangiocarcinoma (H) 06/2014     Cirrhosis of liver with ascites (H) 5/10/2018     Encounter for pleural  drainage tube placement      Esophageal varices with hemorrhage (H)      Gastric ulcer      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     Liver transplanted (H) 2018     Lung metastases (H) 2014     Portal vein thrombosis      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    Residual thrombus in main and right portal     Past Surgical History:   Procedure Laterality Date     CHOLECYSTECTOMY       HEPATECTOMY PARTIAL       OPEN REDUCTION INTERNAL FIXATION HUMERUS PROXIMAL Left 3/4/2019    Procedure: OPEN REDUCTION INTERNAL FIXATION HUMERUS PROXIMAL LEFT with Allograft;  Surgeon: Eh Kraft MD;  [facility]     RETURN LIVER TRANSPLANT N/A 2018    Procedure: RETURN LIVER TRANSPLANT;  Open Abdomen, return liver transplant washout with   Mesh and liver stent  placement and  Abdomal Wound closure;  Surgeon: Darren Rod MD;  [facility]     TRANSPLANT LIVER RECIPIENT  DONOR N/A 2018    Procedure: TRANSPLANT LIVER RECIPIENT  DONOR;  TRANSPLANT LIVER RECIPIENT  DONOR ;  Surgeon: Darren Rod MD;  [facility]       Social History:  Patient reports that she has never smoked. She has never used smokeless tobacco. She reports current alcohol use. She reports that she does not use drugs.    Family History:  Family History   Problem Relation Age of Onset     Skin Cancer Father      Skin Cancer Brother      Melanoma No family hx of        Medications:  Current Outpatient Medications   Medication Sig Dispense Refill     albuterol (PROAIR HFA/PROVENTIL HFA/VENTOLIN HFA) 108 (90 BASE) MCG/ACT Inhaler Inhale 2 puffs into the lungs every 6 hours       alendronate (FOSAMAX) 5 MG tablet Take by mouth every morning (before breakfast)       aspirin 81 MG tablet Take 1 tablet (81 mg) by mouth daily 30 tablet 3     gabapentin (NEURONTIN) 300 MG capsule Take 300 mg by mouth At Bedtime       omeprazole 20 MG tablet Take 2 tablets (40 mg) by mouth 2 times  daily 120 tablet 11     PRAMIPEXOLE DIHYDROCHLORIDE PO Take 0.5 mg by mouth daily       tacrolimus (ENVARSUS XR) 4 MG 24 hr tablet        tacrolimus (GENERIC EQUIVALENT) 1 MG capsule Take 4 capsules (4 mg) by mouth every morning AND 5 capsules (5 mg) every evening. 270 capsule 11     warfarin (COUMADIN) 2 MG tablet Take 2 tablets (4 mg) by mouth daily Every evening or as directed by provider (Patient taking differently: Take 4 mg by mouth daily Take 2 mg on Mon&Fri and 4 mg all other days, or as directed by provider) 8 tablet 0     zolpidem (AMBIEN) 10 MG tablet Take 10 mg by mouth nightly as needed for sleep        oxyCODONE (ROXICODONE) 5 MG tablet Take 1 tablet (5 mg) by mouth every 6 hours as needed for severe pain (Patient not taking: Reported on 9/6/2019) 40 tablet 0       Allergies   Allergen Reactions     Blood Transfusion Related (Informational Only) Other (See Comments)     Patient has a history of a clinically significant antibody against RBC antigens.  A delay in compatible RBCs may occur.     Dilaudid [Hydromorphone] Itching     Ferrlecit      Venofer [Iron Sucrose]        Review of Systems:  -Skin Establ Pt: The patient denies any new rash, pruritus, or lesions that are symptomatic, changing or bleeding, except as per HPI.  -Constitutional: The patient is feeling generally well.    Physical exam:  GEN: This is a well developed, well-nourished female in no acute distress, in a pleasant mood.    SKIN: Focused examination of the left shoulder was performed.  - 8 mm cratoriform papule with keratinaceous debris on the left shoulder.   - No other lesions of concern on areas examined.           Impression/Plan:  1. Neoplasm of uncertain behavior - left shoulder. DDx includes SCC (keratoacanthoma) vs other.     After discussion of benefits and risks including but not limited to bleeding, infection, scar, incomplete removal, recurrence, and non-diagnostic biopsy, written consent and photographs were obtained.  The area was cleaned with isopropyl alcohol. 0.75 mL of 1% lidocaine with epinephrine was injected to obtain adequate anesthesia of the lesion on the left shoulder. A shave biopsy was performed. Hemostasis was achieved with aluminium chloride. Vaseline and a sterile dressing were applied. The patient tolerated the procedure and no complications were noted. The patient was provided with verbal and written post care instructions.     Follow-up for skin check as planned, sooner pending biopsy results.       Staff Involved:    Scribe Disclosure  I, John Massey, am serving as a scribe to document services personally performed by Dr. Lynda Wolfe, based on data collection and the provider's statements to me.     Provider Disclosure:   The documentation recorded by the scribe accurately reflects the services I personally performed and the decisions made by me.    Lynda Wolfe MD    Department of Dermatology  [facility]: Phone: [phone], Fax: [phone]

## 2019-11-14 ENCOUNTER — TELEPHONE (OUTPATIENT)
Dept: TRANSPLANT | Facility: CLINIC | Age: 64
End: 2019-11-14

## 2019-11-14 LAB — COPATH REPORT: NORMAL

## 2019-11-14 NOTE — TELEPHONE ENCOUNTER
Returned Sherrie's call.  She wanted me to know that she had a new squamous cell on left shoulder.  She is getting an appointment to see derm again.

## 2019-11-14 NOTE — TELEPHONE ENCOUNTER
Patient Call: General  Route to LPN    Reason for call: please connect with pt regarding most reason doctor visit    Call back needed? Yes    Return Call Needed  Same as documented in contacts section  When to return call?: Greater than one day: Route standard priority

## 2019-11-15 NOTE — TELEPHONE ENCOUNTER
I called the patient back and she was scheduled for an excision on 12/09/19.   Estrellita Butts, JO

## 2019-11-15 NOTE — TELEPHONE ENCOUNTER
[facility] Call Center    Phone Message    May a detailed message be left on voicemail: yes    Reason for Call: Other: The patient got the biopsy Results back and in the note the  is requesting she come in to get it cut out please call patient and get her schedule for this procedure thank you.     Action Taken: Message routed to:  [facility] derm

## 2019-11-20 ENCOUNTER — DOCUMENTATION ONLY (OUTPATIENT)
Dept: CARE COORDINATION | Facility: CLINIC | Age: 64
End: 2019-11-20

## 2019-12-09 ENCOUNTER — OFFICE VISIT (OUTPATIENT)
Dept: DERMATOLOGY | Facility: CLINIC | Age: 64
End: 2019-12-09
Payer: COMMERCIAL

## 2019-12-09 VITALS — HEART RATE: 70 BPM | DIASTOLIC BLOOD PRESSURE: 86 MMHG | SYSTOLIC BLOOD PRESSURE: 140 MMHG

## 2019-12-09 DIAGNOSIS — C44.629 SQUAMOUS CELL CARCINOMA OF LEFT UPPER EXTREMITY: Primary | ICD-10-CM

## 2019-12-09 PROCEDURE — 88305 TISSUE EXAM BY PATHOLOGIST: CPT | Mod: TC | Performed by: DERMATOLOGY

## 2019-12-09 RX ORDER — LIDOCAINE HYDROCHLORIDE AND EPINEPHRINE 10; 10 MG/ML; UG/ML
3 INJECTION, SOLUTION INFILTRATION; PERINEURAL ONCE
Status: DISCONTINUED | OUTPATIENT
Start: 2019-12-09 | End: 2021-03-19

## 2019-12-09 ASSESSMENT — PAIN SCALES - GENERAL
PAINLEVEL: MILD PAIN (2)
PAINLEVEL: MILD PAIN (2)

## 2019-12-09 NOTE — LETTER
12/9/2019       RE: Sherrie Lala  [address] [facility] [address] [facility] [facility] [id number]     Dear Colleague,    Thank you for referring your patient, Sherrie Lala, to the [facility] DERMATOLOGY at [facility]. Please see a copy of my visit note below.    DERMATOLOGIC EXCISION SURGERY PROCEDURE NOTE   Dermatology Problem List:  1. Hx liver transplant in August 2018 on tacrolimus  2. Seborrheic keratoses on the right temple and posterior right back, questionable Leser-Trelat sign due to cholangiocarcinoma  - Cryotherapy of both areas on 10/1/19  3. Well-differentiated SCC, left shoulder s/p excision 12/9/19      NAME OF PROCEDURE: Excision with complex linear closure  Staff surgeon: Kenyatta  Resident: Tomás  Scrub nurse: Jacinto  PRE-OPERATIVE DIAGNOSIS: well-differentiated squamous cell carcinoma  POST-OPERATIVE DIAGNOSIS: Same   LOCATION: left shoulder  PRE-OPERATIVE SIZE: 0.9x1.3 cm  MARGIN: 0.5 cm  FINAL DEFECT SIZE: 1.9x2.3 cm  FINAL REPAIR LENGTH: 5.7 cm   ANESTHESIA: 1% lidocaine with 1:100,000 epinephrine    INDICATIONS: This patient presented with a 0.9x1.3 cm well-differentiated squamous cell carcinoma. Excision was indicated.   We discussed the principles of treatment and most likely complications including bleeding, infection, scarring, alteration in skin color and sensation, wound dehiscence,muscle weakness in the area, or recurrence of the lesion or disease. We reviewed that on occasion, after healing, a secondary procedure or revision may be recommended in order to obtain the best cosmetic or functional result.   PROCEDURE: The patient was taken to the operative suite. Time-out was performed. The treatment area was anesthetized. The area was washed with chlorhexidine, rinsed with saline and draped with sterile towels. The lesion was delineated and excised down to deep subcutaneous fat in a fusiform manner. Hemostasis was obtained by electrocoagulation.   REPAIR: A complex layered linear  closure was selected as the procedure which would maximally preserve both function and cosmesis and for the following reasons: 1) the defect was widely undermined; 2) multiple deep plication and/or layered sutures placed; 3) wound size, depth, tension, and location.   After the excision of the tumor, the area was extensively and carefully undermined. Hemostasis was obtained with spot electrocautery and ligation of vessels where necessary. An initial deep plication sutures of 4-0 Vicryl sutures were placed in the deep, subcutaneous and fascial planes to appose the lateral margins. The subcutaneous and dermal layers were then closed with additional 4-0 Monocryl sutures. The epidermis was then carefully approximated along the length of the wound using 3-0 and 4-0 Prolene interrupted and running sutures.   Estimated blood loss was less than 10 ml for all surgical sites. A sterile pressure dressing was applied and wound care instructions, with a written handout, were given. The patient was discharged from dermatology clinic alert and ambulatory.    FU for suture removal in 14 days.      Staff Involved:  Resident/Staff   Divina England MD  PGY2 Dermatology    Staff Physician Comments:   I saw and evaluated the patient with the resident and I edited the assessment and plan as documented in the note. I was present for the key portions of the above major procedure and examination.    Cj You MD   of Dermatology  Department of Dermatology  [facility]                      Lidocaine-epinephrine 1-1:031010 % injection   9mL once for one use, starting 12/9/2019 ending 12/9/2019,  2mL disp, R-0, injection  Injected by Dr. You

## 2019-12-09 NOTE — PROGRESS NOTES
Lidocaine-epinephrine 1-1:540063 % injection   9mL once for one use, starting 12/9/2019 ending 12/9/2019,  2mL disp, R-0, injection  Injected by Dr. You

## 2019-12-09 NOTE — NURSING NOTE
Dermatology Rooming Note    Sherrie Lala's goals for this visit include:   Chief Complaint   Patient presents with     Procedure     Sherrie is here today for a excision on her left shoulder      MARCELLA Hubbard

## 2019-12-09 NOTE — PROGRESS NOTES
DERMATOLOGIC EXCISION SURGERY PROCEDURE NOTE   Dermatology Problem List:  1. Hx liver transplant in August 2018 on tacrolimus  2. Seborrheic keratoses on the right temple and posterior right back, questionable Leser-Trelat sign due to cholangiocarcinoma  - Cryotherapy of both areas on 10/1/19  3. Well-differentiated SCC, left shoulder s/p excision 12/9/19      NAME OF PROCEDURE: Excision with complex linear closure  Staff surgeon: Kenyatta  Resident: Tomás  Scrub nurse: Jacinto  PRE-OPERATIVE DIAGNOSIS: well-differentiated squamous cell carcinoma  POST-OPERATIVE DIAGNOSIS: Same   LOCATION: left shoulder  PRE-OPERATIVE SIZE: 0.9x1.3 cm  MARGIN: 0.5 cm  FINAL DEFECT SIZE: 1.9x2.3 cm  FINAL REPAIR LENGTH: 5.7 cm   ANESTHESIA: 1% lidocaine with 1:100,000 epinephrine    INDICATIONS: This patient presented with a 0.9x1.3 cm well-differentiated squamous cell carcinoma. Excision was indicated.   We discussed the principles of treatment and most likely complications including bleeding, infection, scarring, alteration in skin color and sensation, wound dehiscence,muscle weakness in the area, or recurrence of the lesion or disease. We reviewed that on occasion, after healing, a secondary procedure or revision may be recommended in order to obtain the best cosmetic or functional result.   PROCEDURE: The patient was taken to the operative suite. Time-out was performed. The treatment area was anesthetized. The area was washed with chlorhexidine, rinsed with saline and draped with sterile towels. The lesion was delineated and excised down to deep subcutaneous fat in a fusiform manner. Hemostasis was obtained by electrocoagulation.   REPAIR: A complex layered linear closure was selected as the procedure which would maximally preserve both function and cosmesis and for the following reasons: 1) the defect was widely undermined; 2) multiple deep plication and/or layered sutures placed; 3) wound size, depth, tension, and location.    After the excision of the tumor, the area was extensively and carefully undermined. Hemostasis was obtained with spot electrocautery and ligation of vessels where necessary. An initial deep plication sutures of 4-0 Vicryl sutures were placed in the deep, subcutaneous and fascial planes to appose the lateral margins. The subcutaneous and dermal layers were then closed with additional 4-0 Monocryl sutures. The epidermis was then carefully approximated along the length of the wound using 3-0 and 4-0 Prolene interrupted and running sutures.   Estimated blood loss was less than 10 ml for all surgical sites. A sterile pressure dressing was applied and wound care instructions, with a written handout, were given. The patient was discharged from dermatology clinic alert and ambulatory.    FU for suture removal in 14 days.      Staff Involved:  Resident/Staff   Divina England MD  PGY2 Dermatology    Staff Physician Comments:   I saw and evaluated the patient with the resident and I edited the assessment and plan as documented in the note. I was present for the key portions of the above major procedure and examination.    Cj You MD   of Dermatology  Department of Dermatology  [facility]

## 2019-12-09 NOTE — PATIENT INSTRUCTIONS

## 2019-12-10 LAB — COPATH REPORT: NORMAL

## 2019-12-19 ENCOUNTER — COMMUNICATION - RIVER FALLS (OUTPATIENT)
Dept: FAMILY MEDICINE | Facility: CLINIC | Age: 64
End: 2019-12-19
Payer: COMMERCIAL

## 2019-12-19 ENCOUNTER — OFFICE VISIT (OUTPATIENT)
Dept: PULMONOLOGY | Facility: CLINIC | Age: 64
End: 2019-12-19
Payer: MEDICARE

## 2019-12-19 ENCOUNTER — ANCILLARY PROCEDURE (OUTPATIENT)
Dept: GENERAL RADIOLOGY | Facility: CLINIC | Age: 64
End: 2019-12-19
Payer: COMMERCIAL

## 2019-12-19 VITALS
SYSTOLIC BLOOD PRESSURE: 148 MMHG | WEIGHT: 114 LBS | HEIGHT: 63 IN | DIASTOLIC BLOOD PRESSURE: 87 MMHG | HEART RATE: 69 BPM | BODY MASS INDEX: 20.2 KG/M2 | OXYGEN SATURATION: 97 %

## 2019-12-19 DIAGNOSIS — J90 PLEURAL EFFUSION: ICD-10-CM

## 2019-12-19 DIAGNOSIS — Z23 ENCOUNTER FOR ADMINISTRATION OF VACCINE: Primary | ICD-10-CM

## 2019-12-19 DIAGNOSIS — R06.02 SHORTNESS OF BREATH: ICD-10-CM

## 2019-12-19 PROBLEM — J94.8 HYDROTHORAX: Status: RESOLVED | Noted: 2018-05-10 | Resolved: 2019-12-19

## 2019-12-19 PROCEDURE — 90686 IIV4 VACC NO PRSV 0.5 ML IM: CPT | Mod: ZF

## 2019-12-19 PROCEDURE — G0463 HOSPITAL OUTPT CLINIC VISIT: HCPCS | Mod: 25,ZF

## 2019-12-19 PROCEDURE — 25000128 H RX IP 250 OP 636: Mod: ZF

## 2019-12-19 PROCEDURE — G0008 ADMIN INFLUENZA VIRUS VAC: HCPCS | Mod: ZF

## 2019-12-19 RX ADMIN — INFLUENZA A VIRUS A/BRISBANE/02/2018 IVR-190 (H1N1) ANTIGEN (FORMALDEHYDE INACTIVATED), INFLUENZA A VIRUS A/KANSAS/14/2017 X-327 (H3N2) ANTIGEN (FORMALDEHYDE INACTIVATED), INFLUENZA B VIRUS B/PHUKET/3073/2013 ANTIGEN (FORMALDEHYDE INACTIVATED), AND INFLUENZA B VIRUS B/MARYLAND/15/2016 BX-69A ANTIGEN (FORMALDEHYDE INACTIVATED) 0.5 ML: 15; 15; 15; 15 INJECTION, SUSPENSION INTRAMUSCULAR at 12:04

## 2019-12-19 ASSESSMENT — ENCOUNTER SYMPTOMS
PALPITATIONS: 0
JAUNDICE: 0
ORTHOPNEA: 0
DIARRHEA: 0
BLOOD IN STOOL: 0
LEG PAIN: 0
NAUSEA: 1
LIGHT-HEADEDNESS: 0
SLEEP DISTURBANCES DUE TO BREATHING: 0
DYSPNEA ON EXERTION: 1
HYPERTENSION: 0
BLOATING: 1
BOWEL INCONTINENCE: 0
EXERCISE INTOLERANCE: 0
SKIN CHANGES: 1
HYPOTENSION: 0
SYNCOPE: 0
CONSTIPATION: 0
HEARTBURN: 1
RECTAL PAIN: 0

## 2019-12-19 ASSESSMENT — MIFFLIN-ST. JEOR: SCORE: 1046.23

## 2019-12-19 ASSESSMENT — PAIN SCALES - GENERAL: PAINLEVEL: NO PAIN (0)

## 2019-12-19 NOTE — PROGRESS NOTES
[facility]  Pulmonary Medicine  Visit Clinic Note  December 19, 2019         ASSESSMENT & PLAN       Shortness of Breath: She has minimal shortness of breath.  Occasionally, she will have spasms of shortness of breath that are with exertion such as climbing stairs.  This happens infrequently.  She does have airflow obstruction, and this could be the cause . She takes an albuterol inhaler as needed, and I think this is appropriate for now since her symptoms are not frequent.  Her exercise tolerance seems appropriate, and I encouraged her to maintain this.     Pleural effusion: much smaller than before.  It is still present. I suspect she has an area of entrapped lung in her right pleural space related to her liver transplant.  She has some pain in that area.  No need for further follow up imagine.      I administered the flu vaccine today.     Reuben Thomas MD          Today's visit note:     Chief Complaint: Sherrie Lala is a 64 year old year old female who is being seen for Follow Up (sob)      HISTORY OF PRESENT ILLNESS:    This is a 64-year-old female with a history of liver transplant in September 2018 who is presenting to the pulmonary clinic today for follow-up on the pleural effusion.    Shortly after her liver transplant, she did have shortness of breath and a right-sided pleural effusion.  We discussed thoracentesis at that time, but she declined.  Fortunately the effusion has resolved for the most part over time.  Her shortness of breath is also improved.  She does have occasions where she will experience shortness of breath, and this is relieved by an albuterol inhaler.  This happens only about 3 times a month, and is usually when she climbs up a flight of stairs.  She does get regular exercise and walks approximately 2 miles a day.  She does not have a history of asthma, however she has been prescribed inhaled steroids in the past.  These have not been beneficial for her.  She has  right posterior back pain which is very mild and manageable without any medications.  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     Liver transplanted (H) 2018     Lung metastases (H) 2014     Portal vein thrombosis      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    Residual thrombus in main and right portal     Past Surgical History:   Procedure Laterality Date     CHOLECYSTECTOMY       HEPATECTOMY PARTIAL       OPEN REDUCTION INTERNAL FIXATION HUMERUS PROXIMAL Left 3/4/2019    Procedure: OPEN REDUCTION INTERNAL FIXATION HUMERUS PROXIMAL LEFT with Allograft;  Surgeon: Eh Kraft MD;  Location: [facility] OR     RETURN LIVER TRANSPLANT N/A 2018    Procedure: RETURN LIVER TRANSPLANT;  Open Abdomen, return liver transplant washout with   Mesh and liver stent  placement and  Abdomal Wound closure;  Surgeon: Darren Rod MD;  Location: [facility] OR     TRANSPLANT LIVER RECIPIENT  DONOR N/A 2018    Procedure: TRANSPLANT LIVER RECIPIENT  DONOR;  TRANSPLANT LIVER RECIPIENT  DONOR ;  Surgeon: Darren Rod MD;  Location: [facility] OR           Family History:     Family History   Problem Relation Age of Onset     Skin Cancer Father      Skin Cancer Brother      Melanoma No family hx of               Social History:     Social History     Socioeconomic History     Marital status:      Spouse name: Not on file     Number of children: Not on file     Years of education: Not on file     Highest education level: Not on file   Occupational History     Not on file   Social Needs      Financial resource strain: Not on file     Food insecurity:     Worry: Not on file     Inability: Not on file     Transportation needs:     Medical: Not on file     Non-medical: Not on file   Tobacco Use     Smoking status: Never Smoker     Smokeless tobacco: Never Used   Substance and Sexual Activity     Alcohol use: Yes     Comment: occ      Drug use: No     Sexual activity: Not on file   Lifestyle     Physical activity:     Days per week: Not on file     Minutes per session: Not on file     Stress: Not on file   Relationships     Social connections:     Talks on phone: Not on file     Gets together: Not on file     Attends Gnosticism service: Not on file     Active member of club or organization: Not on file     Attends meetings of clubs or organizations: Not on file     Relationship status: Not on file     Intimate partner violence:     Fear of current or ex partner: Not on file     Emotionally abused: Not on file     Physically abused: Not on file     Forced sexual activity: Not on file   Other Topics Concern     Parent/sibling w/ CABG, MI or angioplasty before 65F 55M? Not Asked   Social History Narrative     Not on file            Medications:     Current Outpatient Medications   Medication     albuterol (PROAIR HFA/PROVENTIL HFA/VENTOLIN HFA) 108 (90 BASE) MCG/ACT Inhaler     alendronate (FOSAMAX) 5 MG tablet     aspirin 81 MG tablet     gabapentin (NEURONTIN) 300 MG capsule     omeprazole 20 MG tablet     PRAMIPEXOLE DIHYDROCHLORIDE PO     tacrolimus (ENVARSUS XR) 4 MG 24 hr tablet     tacrolimus (GENERIC EQUIVALENT) 1 MG capsule     warfarin (COUMADIN) 2 MG tablet     zolpidem (AMBIEN) 10 MG tablet     oxyCODONE (ROXICODONE) 5 MG tablet     Current Facility-Administered Medications   Medication     lidocaine 1% with EPINEPHrine 1:100,000 injection 3 mL            Review of Systems:       Answers for HPI/ROS submitted by the patient on 12/19/2019   General Symptoms: No  Skin Symptoms: Yes  HENT Symptoms:  "No  EYE SYMPTOMS: Yes  HEART SYMPTOMS: Yes  LUNG SYMPTOMS: Yes  INTESTINAL SYMPTOMS: Yes  URINARY SYMPTOMS: No  GYNECOLOGIC SYMPTOMS: No  BREAST SYMPTOMS: No  SKELETAL SYMPTOMS: No  BLOOD SYMPTOMS: No  NERVOUS SYSTEM SYMPTOMS: No  MENTAL HEALTH SYMPTOMS: No  Changes in moles/birth marks: Yes  Floaters: Yes  Difficulty breathing on exertion: Yes  Chest pain or pressure: No  Fast or irregular heartbeat: No  Pain in legs with walking: No  Trouble breathing while lying down: No  Fingers or toes appear blue: No  High blood pressure: No  Low blood pressure: No  Fainting: No  Murmurs: No  Pacemaker: No  Varicose veins: No  Edema or swelling: Yes  Wake up at night with shortness of breath: No  Light-headedness: No  Exercise intolerance: No  Heart burn or indigestion: Yes  Nausea: Yes  Bloating: Yes  Constipation: No  Diarrhea: No  Blood in stool: No  Black stools: No  Rectal or Anal pain: No  Fecal incontinence: No  Yellowing of skin or eyes: No  Vomit with blood: No  Change in stools: No        PHYSICAL EXAM:  BP (!) 148/87   Pulse 69   Ht 1.6 m (5' 3\")   Wt 51.7 kg (114 lb)   SpO2 97%   BMI 20.19 kg/m       General: NAD  Eyes: Anicteric  Ears: Hearing grossly normal  Mouth: Oral mucosa is moist, without any lesions. No oropharyngeal exudate.  Neck: supple, no thyromegaly  Lymphatics: No cervical or supraclavicular nodes  Respiratory: Good air movement. No crackles. No rhonchi. No wheezes  Cardiac: RRR, normal S1, S2. No murmurs.   Musculoskeletal: Extremities normal. No clubbing. No cyanosis. No edema.  Skin: No rash on limited exam  Neuro: Normal mentation. Normal speech.  Psych:Normal affect           Data:   All laboratory and imaging data reviewed.      PFT:       PFT Interpretation:  Severe airflow obstruciton.    Valid Maneuver    CXR:  FINDINGS: PA and lateral chest. Trachea is midline, cardiac silhouette  is stable. Reduced, now small right pleural effusion. No pneumothorax  or focal pulmonary opacity. Status " post plate-screw fixation of the  left humerus. Embolization material and surgical clips projecting over  the abdomen.                                                                      IMPRESSION:  1. Reduced right pleural effusion and overlying atelectasis, now  trace.     Recent Results (from the past 168 hour(s))   Pulmonary Function Test    Collection Time: 12/19/19  9:59 AM   Result Value Ref Range    FVC-Pred 3.00 L    FVC-Pre 2.11 L    FVC-%Pred-Pre 70 %    FEV1-Pre 1.18 L    FEV1-%Pred-Pre 50 %    FEV1FVC-Pred 79 %    FEV1FVC-Pre 56 %    FEFMax-Pred 6.03 L/sec    FEFMax-Pre 3.28 L/sec    FEFMax-%Pred-Pre 54 %    FEF2575-Pred 2.14 L/sec    FEF2575-Pre 0.53 L/sec    UKJ6577-%Pred-Pre 24 %    ExpTime-Pre 8.72 sec    FIFMax-Pre 3.97 L/sec    FEV1FEV6-Pred 80 %    FEV1FEV6-Pre 57 %

## 2019-12-19 NOTE — NURSING NOTE
Chief Complaint   Patient presents with     Follow Up     sob     Vitals were taken and medications were reconciled.     MARCELLA Carroll

## 2019-12-20 LAB
EXPTIME-PRE: 8.72 SEC
FEF2575-%PRED-PRE: 24 %
FEF2575-PRE: 0.53 L/SEC
FEF2575-PRED: 2.14 L/SEC
FEFMAX-%PRED-PRE: 54 %
FEFMAX-PRE: 3.28 L/SEC
FEFMAX-PRED: 6.03 L/SEC
FEV1-%PRED-PRE: 50 %
FEV1-PRE: 1.18 L
FEV1FEV6-PRE: 57 %
FEV1FEV6-PRED: 80 %
FEV1FVC-PRE: 56 %
FEV1FVC-PRED: 79 %
FIFMAX-PRE: 3.97 L/SEC
FVC-%PRED-PRE: 70 %
FVC-PRE: 2.11 L
FVC-PRED: 3 L

## 2020-01-06 DIAGNOSIS — Z94.4 LIVER REPLACED BY TRANSPLANT (H): ICD-10-CM

## 2020-01-06 DIAGNOSIS — Z13.220 LIPID SCREENING: ICD-10-CM

## 2020-01-06 LAB
ALBUMIN SERPL-MCNC: 4.1 G/DL (ref 3.4–5)
ALP SERPL-CCNC: 74 U/L (ref 40–150)
ALT SERPL W P-5'-P-CCNC: 17 U/L (ref 0–50)
ANION GAP SERPL CALCULATED.3IONS-SCNC: 6 MMOL/L (ref 3–14)
AST SERPL W P-5'-P-CCNC: 6 U/L (ref 0–45)
BILIRUB DIRECT SERPL-MCNC: <0.1 MG/DL (ref 0–0.2)
BILIRUB SERPL-MCNC: 0.4 MG/DL (ref 0.2–1.3)
BUN SERPL-MCNC: 37 MG/DL (ref 7–30)
CALCIUM SERPL-MCNC: 9 MG/DL (ref 8.5–10.1)
CHLORIDE SERPL-SCNC: 108 MMOL/L (ref 94–109)
CO2 SERPL-SCNC: 24 MMOL/L (ref 20–32)
CREAT SERPL-MCNC: 1.15 MG/DL (ref 0.52–1.04)
ERYTHROCYTE [DISTWIDTH] IN BLOOD BY AUTOMATED COUNT: 12.2 % (ref 10–15)
GFR SERPL CREATININE-BSD FRML MDRD: 51 ML/MIN/{1.73_M2}
GLUCOSE SERPL-MCNC: 88 MG/DL (ref 70–99)
HCT VFR BLD AUTO: 39.5 % (ref 35–47)
HGB BLD-MCNC: 13.1 G/DL (ref 11.7–15.7)
MAGNESIUM SERPL-MCNC: 1.7 MG/DL (ref 1.6–2.3)
MCH RBC QN AUTO: 31 PG (ref 26.5–33)
MCHC RBC AUTO-ENTMCNC: 33.2 G/DL (ref 31.5–36.5)
MCV RBC AUTO: 94 FL (ref 78–100)
PHOSPHATE SERPL-MCNC: 3.5 MG/DL (ref 2.5–4.5)
PLATELET # BLD AUTO: 102 10E9/L (ref 150–450)
POTASSIUM SERPL-SCNC: 4.4 MMOL/L (ref 3.4–5.3)
PROT SERPL-MCNC: 6.8 G/DL (ref 6.8–8.8)
RBC # BLD AUTO: 4.22 10E12/L (ref 3.8–5.2)
SODIUM SERPL-SCNC: 138 MMOL/L (ref 133–144)
TACROLIMUS BLD-MCNC: 6.3 UG/L (ref 5–15)
TME LAST DOSE: NORMAL H
WBC # BLD AUTO: 4 10E9/L (ref 4–11)

## 2020-01-06 PROCEDURE — 80197 ASSAY OF TACROLIMUS: CPT | Performed by: INTERNAL MEDICINE

## 2020-01-07 DIAGNOSIS — Z94.4 LIVER TRANSPLANTED (H): ICD-10-CM

## 2020-01-07 RX ORDER — TACROLIMUS 1 MG/1
4 CAPSULE ORAL EVERY 12 HOURS
Qty: 240 CAPSULE | Refills: 11 | Status: SHIPPED | OUTPATIENT
Start: 2020-01-07 | End: 2020-03-05

## 2020-01-07 RX ORDER — TACROLIMUS 1 MG/1
CAPSULE ORAL
Qty: 270 CAPSULE | Refills: 11 | Status: SHIPPED | OUTPATIENT
Start: 2020-01-07 | End: 2020-01-07

## 2020-01-07 NOTE — TELEPHONE ENCOUNTER
ISSUE:   Tacrolimus IR level 6.3 on 1/6, goal 3-5 per protocol, current dose 4 mg in the morning and 5 mg in the evening.    PLAN:   Please call patient and confirm this was an accurate 12-hour trough. Verify Tacrolimus IR dose 4 mg in a.m, 5 mg in pm.   Confirm no new medications or illness. Confirm no missed doses. If accurate trough and accurate dose, decrease Tacrolimus IR dose to 4 mg BID and repeat labs in 2-3 months Abigail Parham RN    OUTCOME:   Spoke with patient, they confirm accurate trough level and current dose 4mg am, 5mg pm  . Patient confirmed dose change to 4 mg BID and to repeat labs in 2-3 months. Orders sent to preferred pharmacy for dose change and lab for repeat labs. Patient voiced understanding of plan.  Parisa Thrasher LPN

## 2020-01-07 NOTE — TELEPHONE ENCOUNTER
DIAGNOSIS: R knee pain, workup for possible knee replacement, appt per pt   APPOINTMENT DATE: 1/14   NOTES STATUS DETAILS   OFFICE NOTE from referring provider N/A    OFFICE NOTE from other specialist internal  Nitin Goodson MD    DISCHARGE SUMMARY from hospital N/A    DISCHARGE REPORT from the ER N/A    OPERATIVE REPORT N/A    MEDICATION LIST Internal    MRI N/A    CT SCAN N/A    XRAYS (IMAGES & REPORTS) internal 1/14/20

## 2020-01-14 ENCOUNTER — ANCILLARY PROCEDURE (OUTPATIENT)
Dept: GENERAL RADIOLOGY | Facility: CLINIC | Age: 65
End: 2020-01-14
Payer: COMMERCIAL

## 2020-01-14 ENCOUNTER — PRE VISIT (OUTPATIENT)
Dept: ORTHOPEDICS | Facility: CLINIC | Age: 65
End: 2020-01-14

## 2020-01-14 ENCOUNTER — OFFICE VISIT (OUTPATIENT)
Dept: ORTHOPEDICS | Facility: CLINIC | Age: 65
End: 2020-01-14
Payer: COMMERCIAL

## 2020-01-14 VITALS — BODY MASS INDEX: 23.04 KG/M2 | RESPIRATION RATE: 16 BRPM | HEIGHT: 63 IN | WEIGHT: 130 LBS

## 2020-01-14 DIAGNOSIS — M17.11 PRIMARY OSTEOARTHRITIS OF RIGHT KNEE: Primary | ICD-10-CM

## 2020-01-14 DIAGNOSIS — M25.561 CHRONIC PAIN OF RIGHT KNEE: Primary | ICD-10-CM

## 2020-01-14 DIAGNOSIS — G89.29 CHRONIC PAIN OF RIGHT KNEE: ICD-10-CM

## 2020-01-14 DIAGNOSIS — G89.29 CHRONIC PAIN OF RIGHT KNEE: Primary | ICD-10-CM

## 2020-01-14 DIAGNOSIS — M25.561 CHRONIC PAIN OF RIGHT KNEE: ICD-10-CM

## 2020-01-14 ASSESSMENT — MIFFLIN-ST. JEOR: SCORE: 1118.81

## 2020-01-14 NOTE — LETTER
"  1/14/2020      RE: Sherrie Lala  [address] [facility] [address]       Sports Medicine Clinic Visit    PCP: Apollo Benitez    Sherrie Lala is a 64 year old female who is seen  as self referral presenting with right knee pain/djd. Swelling after walking, unstable, pain.     Injury:NA     Location of Pain: right knee  Duration of Pain: 15 year(s)  Rating of Pain: 5/10  Pain is better with: Tylenol and Ibuprofen  Pain is worse with: walking, lying in bed, constant pain  Additional Features: Retired  Treatment so far consists of: Tylenol and Ibuprofen  Prior History of related problems: None     Resp 16   Ht 1.6 m (5' 3\")   Wt 59 kg (130 lb)   BMI 23.03 kg/m            PMH:  Past Medical History:   Diagnosis Date     Antiplatelet or antithrombotic long-term use      Asthma      Cholangiocarcinoma (H) 06/2014     Cirrhosis of liver with ascites (H) 5/10/2018     Encounter for pleural drainage tube placement      Esophageal varices with hemorrhage (H)      Gastric ulcer      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     Liver transplanted (H) 08/30/2018     Lung metastases (H) 08/2014     Portal vein thrombosis      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) 08/31/2018    Residual thrombus in main and right portal       Active problem list:  Patient Active Problem List   Diagnosis     Gastric ulcer     Osteoporosis     Bleeding esophageal varices (H)     Pleural effusion     Liver transplanted (H)     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)     Open abdominal wall wound     Immunosuppressed status (H)     Acute post-operative pain     Acute blood loss anemia     Mild protein-calorie malnutrition (H)     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)     Hypervolemia     Positive sputum culture in cadaveric donor     Positive urine culture in cadaveric donor     Other closed displaced fracture of proximal end of left humerus with routine healing, subsequent encounter     Shortness of breath       FH:  Family " History   Problem Relation Age of Onset     Skin Cancer Father      Skin Cancer Brother      Melanoma No family hx of        SH:  Social History     Socioeconomic History     Marital status:      Spouse name: Not on file     Number of children: Not on file     Years of education: Not on file     Highest education level: Not on file   Occupational History     Not on file   Social Needs     Financial resource strain: Not on file     Food insecurity:     Worry: Not on file     Inability: Not on file     Transportation needs:     Medical: Not on file     Non-medical: Not on file   Tobacco Use     Smoking status: Never Smoker     Smokeless tobacco: Never Used   Substance and Sexual Activity     Alcohol use: Yes     Comment: occ      Drug use: No     Sexual activity: Not on file   Lifestyle     Physical activity:     Days per week: Not on file     Minutes per session: Not on file     Stress: Not on file   Relationships     Social connections:     Talks on phone: Not on file     Gets together: Not on file     Attends Jain service: Not on file     Active member of club or organization: Not on file     Attends meetings of clubs or organizations: Not on file     Relationship status: Not on file     Intimate partner violence:     Fear of current or ex partner: Not on file     Emotionally abused: Not on file     Physically abused: Not on file     Forced sexual activity: Not on file   Other Topics Concern     Parent/sibling w/ CABG, MI or angioplasty before 65F 55M? Not Asked   Social History Narrative     Not on file       MEDS:  See EMR, reviewed  ALL:  See EMR, reviewed    REVIEW OF SYSTEMS:  CONSTITUTIONAL:NEGATIVE for fever, chills, change in weight  INTEGUMENTARY/SKIN: NEGATIVE for worrisome rashes, moles or lesions  EYES: NEGATIVE for vision changes or irritation  ENT/MOUTH: NEGATIVE for ear, mouth and throat problems  RESP:NEGATIVE for significant cough or SOB  BREAST: NEGATIVE for masses, tenderness or  discharge  CV: NEGATIVE for chest pain, palpitations or peripheral edema  GI: NEGATIVE for nausea, abdominal pain, heartburn, or change in bowel habits  :NEGATIVE for frequency, dysuria, or hematuria  :NEGATIVE for frequency, dysuria, or hematuria  NEURO: NEGATIVE for weakness, dizziness or paresthesias  ENDOCRINE: NEGATIVE for temperature intolerance, skin/hair changes  HEME/ALLERGY/IMMUNE: NEGATIVE for bleeding problems  PSYCHIATRIC: NEGATIVE for changes in mood or affect          Subjective: This 64-year-old female was told by orthopedists 6 years ago that she should consider knee replacement.  She had severe signs of DJD in her right knee.  But she was dealing with other medical diagnoses at the time, that eventually needed a liver transplant.  She has had a successful liver transplant in 2018 and indicates that she has been healthy since then.  She was able to tolerate and recover from a recent ORIF of the shoulder.  She likes to try to be active with walking and yoga but the knee is a constant problem in her life.  It feels unstable.  She notices that the knee is becoming more valgus in it's deformity.  She feels it is unstable and she cannot trust it and so she is constantly limiting her activities.  She likes to be involved in yoga and walking for exercise.  She has gotten to the point that her other medical problems have stabilized enough that she would consider knee replacement.    Objective she has a valgus deformity with subluxation noted at her right knee.  I can flex and extend it fully.  She is tenderness over the medial joint line.  Nontender over the lateral joint line.  Nontender over the patellar tendon or peds anserine bursa.  Normal range of motion at the right hip.  No knee effusion.  Overlying skin is intact.  Peripheral pulses strong and symmetrical.    Assessment right-sided knee DJD, severe    Plan: We went over x-rays which show severe bone-on-bone degenerative change with subluxation  and valgus deformity.  She would like to discuss options with the surgeon.  Referral was placed.  We discussed brace options.  She declines a brace.  This note was created with the use of Dragon software and unintentional spelling or errors may have occurred.      Nitin Goodson MD

## 2020-01-14 NOTE — PROGRESS NOTES
"Sports Medicine Clinic Visit    PCP: Apollo Benitez    Sherrie Lala is a 64 year old female who is seen  as self referral presenting with right knee pain/djd. Swelling after walking, unstable, pain.     Injury:NA     Location of Pain: right knee  Duration of Pain: 15 year(s)  Rating of Pain: 5/10  Pain is better with: Tylenol and Ibuprofen  Pain is worse with: walking, lying in bed, constant pain  Additional Features: Retired  Treatment so far consists of: Tylenol and Ibuprofen  Prior History of related problems: None     Resp 16   Ht 1.6 m (5' 3\")   Wt 59 kg (130 lb)   BMI 23.03 kg/m           PMH:  Past Medical History:   Diagnosis Date     Antiplatelet or antithrombotic long-term use      Asthma      Cholangiocarcinoma (H) 06/2014     Cirrhosis of liver with ascites (H) 5/10/2018     Encounter for pleural drainage tube placement      Esophageal varices with hemorrhage (H)      Gastric ulcer      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     Liver transplanted (H) 08/30/2018     Lung metastases (H) 08/2014     Portal vein thrombosis      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) 08/31/2018    Residual thrombus in main and right portal       Active problem list:  Patient Active Problem List   Diagnosis     Gastric ulcer     Osteoporosis     Bleeding esophageal varices (H)     Pleural effusion     Liver transplanted (H)     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)     Open abdominal wall wound     Immunosuppressed status (H)     Acute post-operative pain     Acute blood loss anemia     Mild protein-calorie malnutrition (H)     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)     Hypervolemia     Positive sputum culture in cadaveric donor     Positive urine culture in cadaveric donor     Other closed displaced fracture of proximal end of left humerus with routine healing, subsequent encounter     Shortness of breath       FH:  Family History   Problem Relation Age of Onset     Skin Cancer Father      Skin " Cancer Brother      Melanoma No family hx of        SH:  Social History     Socioeconomic History     Marital status:      Spouse name: Not on file     Number of children: Not on file     Years of education: Not on file     Highest education level: Not on file   Occupational History     Not on file   Social Needs     Financial resource strain: Not on file     Food insecurity:     Worry: Not on file     Inability: Not on file     Transportation needs:     Medical: Not on file     Non-medical: Not on file   Tobacco Use     Smoking status: Never Smoker     Smokeless tobacco: Never Used   Substance and Sexual Activity     Alcohol use: Yes     Comment: occ      Drug use: No     Sexual activity: Not on file   Lifestyle     Physical activity:     Days per week: Not on file     Minutes per session: Not on file     Stress: Not on file   Relationships     Social connections:     Talks on phone: Not on file     Gets together: Not on file     Attends Moravian service: Not on file     Active member of club or organization: Not on file     Attends meetings of clubs or organizations: Not on file     Relationship status: Not on file     Intimate partner violence:     Fear of current or ex partner: Not on file     Emotionally abused: Not on file     Physically abused: Not on file     Forced sexual activity: Not on file   Other Topics Concern     Parent/sibling w/ CABG, MI or angioplasty before 65F 55M? Not Asked   Social History Narrative     Not on file       MEDS:  See EMR, reviewed  ALL:  See EMR, reviewed    REVIEW OF SYSTEMS:  CONSTITUTIONAL:NEGATIVE for fever, chills, change in weight  INTEGUMENTARY/SKIN: NEGATIVE for worrisome rashes, moles or lesions  EYES: NEGATIVE for vision changes or irritation  ENT/MOUTH: NEGATIVE for ear, mouth and throat problems  RESP:NEGATIVE for significant cough or SOB  BREAST: NEGATIVE for masses, tenderness or discharge  CV: NEGATIVE for chest pain, palpitations or peripheral edema  GI:  NEGATIVE for nausea, abdominal pain, heartburn, or change in bowel habits  :NEGATIVE for frequency, dysuria, or hematuria  :NEGATIVE for frequency, dysuria, or hematuria  NEURO: NEGATIVE for weakness, dizziness or paresthesias  ENDOCRINE: NEGATIVE for temperature intolerance, skin/hair changes  HEME/ALLERGY/IMMUNE: NEGATIVE for bleeding problems  PSYCHIATRIC: NEGATIVE for changes in mood or affect          Subjective: This 64-year-old female was told by orthopedists 6 years ago that she should consider knee replacement.  She had severe signs of DJD in her right knee.  But she was dealing with other medical diagnoses at the time, that eventually needed a liver transplant.  She has had a successful liver transplant in 2018 and indicates that she has been healthy since then.  She was able to tolerate and recover from a recent ORIF of the shoulder.  She likes to try to be active with walking and yoga but the knee is a constant problem in her life.  It feels unstable.  She notices that the knee is becoming more valgus in it's deformity.  She feels it is unstable and she cannot trust it and so she is constantly limiting her activities.  She likes to be involved in yoga and walking for exercise.  She has gotten to the point that her other medical problems have stabilized enough that she would consider knee replacement.    Objective she has a valgus deformity with subluxation noted at her right knee.  I can flex and extend it fully.  She is tenderness over the medial joint line.  Nontender over the lateral joint line.  Nontender over the patellar tendon or peds anserine bursa.  Normal range of motion at the right hip.  No knee effusion.  Overlying skin is intact.  Peripheral pulses strong and symmetrical.    Assessment right-sided knee DJD, severe    Plan: We went over x-rays which show severe bone-on-bone degenerative change with subluxation and valgus deformity.  She would like to discuss options with the surgeon.   Referral was placed.  We discussed brace options.  She declines a brace.  This note was created with the use of Dragon software and unintentional spelling or errors may have occurred.

## 2020-01-28 DIAGNOSIS — M25.569 KNEE PAIN: Primary | ICD-10-CM

## 2020-02-05 ENCOUNTER — OFFICE VISIT (OUTPATIENT)
Dept: ORTHOPEDICS | Facility: CLINIC | Age: 65
End: 2020-02-05
Attending: FAMILY MEDICINE
Payer: COMMERCIAL

## 2020-02-05 ENCOUNTER — ANCILLARY PROCEDURE (OUTPATIENT)
Dept: GENERAL RADIOLOGY | Facility: CLINIC | Age: 65
End: 2020-02-05
Attending: ORTHOPAEDIC SURGERY
Payer: COMMERCIAL

## 2020-02-05 VITALS — HEIGHT: 63 IN | WEIGHT: 130 LBS | BODY MASS INDEX: 23.04 KG/M2

## 2020-02-05 DIAGNOSIS — M25.569 KNEE PAIN: ICD-10-CM

## 2020-02-05 DIAGNOSIS — M17.11 PRIMARY LOCALIZED OSTEOARTHRITIS OF RIGHT KNEE: Primary | ICD-10-CM

## 2020-02-05 ASSESSMENT — ENCOUNTER SYMPTOMS
ARTHRALGIAS: 1
JOINT SWELLING: 1
BACK PAIN: 0
MUSCLE CRAMPS: 0
NECK PAIN: 0
MYALGIAS: 0

## 2020-02-05 ASSESSMENT — KOOS JR: HOW SEVERE IS YOUR KNEE STIFFNESS AFTER FIRST WAKING IN MORNING: MILD

## 2020-02-05 ASSESSMENT — ACTIVITIES OF DAILY LIVING (ADL): FUNCTION,_DAILY_LIVING_SCORE: 73.52

## 2020-02-05 ASSESSMENT — MIFFLIN-ST. JEOR: SCORE: 1118.81

## 2020-02-05 NOTE — NURSING NOTE
Teaching Flowsheet   Relevant Diagnosis: Right knee osteoarthritis  Teaching Topic: Right total knee arthroplasty.    Patient lives in [place], [place] with her  Bryant who will be her . Health history positive for liver transplant  8/31/18, hx of portal vein thrombosis, DVT after cancer diagnosis, hx of cancer, treated. Patient will have preop clearance through the PAC clinic     Person(s) involved in teaching:   Patient     Motivation Level:  Asks Questions: Yes  Eager to Learn: Yes  Cooperative: Yes  Receptive (willing/able to accept information): Yes  Any cultural factors/Rastafarian beliefs that may influence understanding or compliance? No     Patient demonstrates understanding of the following:  Reason for the appointment, diagnosis and treatment plan: Yes  Knowledge of proper use of medications and conditions for which they are ordered (with special attention to potential side effects or drug interactions): Yes  Which situations necessitate calling provider and whom to contact: Yes     Teaching Concerns Addressed: Patient will have preop done at PAC. Discussed total joint class with patient.     Proper use and care of assistive devices. (medical equip, care aids, etc.): Yes  Nutritional needs and diet plan: Yes  Pain management techniques: Yes  Wound Care: Yes  How and/when to access community resources: Yes     Instructional Materials Used/Given: Preoperative teaching packet, surgical soap x2, dental card, total joint class booklet and flyer.

## 2020-02-05 NOTE — NURSING NOTE
"Reason For Visit:   Chief Complaint   Patient presents with     Consult     Right knee pain. OA        Primary MD: Apollo Benitez  Referring MD: Nitin Goodson          Ht 1.6 m (5' 3\")   Wt 59 kg (130 lb)   BMI 23.03 kg/m      Pain Assessment  Patient Currently in Pain: Yes  0-10 Pain Scale: 3  Primary Pain Location: Knee  Pain Descriptors: Discomfort    Current Outpatient Medications   Medication     albuterol (PROAIR HFA/PROVENTIL HFA/VENTOLIN HFA) 108 (90 BASE) MCG/ACT Inhaler     alendronate (FOSAMAX) 5 MG tablet     aspirin 81 MG tablet     gabapentin (NEURONTIN) 300 MG capsule     omeprazole 20 MG tablet     PRAMIPEXOLE DIHYDROCHLORIDE PO     tacrolimus (GENERIC EQUIVALENT) 1 MG capsule     warfarin (COUMADIN) 2 MG tablet     zolpidem (AMBIEN) 10 MG tablet     oxyCODONE (ROXICODONE) 5 MG tablet     Current Facility-Administered Medications   Medication     lidocaine 1% with EPINEPHrine 1:100,000 injection 3 mL          Allergies   Allergen Reactions     Blood Transfusion Related (Informational Only) Other (See Comments)     Patient has a history of a clinically significant antibody against RBC antigens.  A delay in compatible RBCs may occur.     Dilaudid [Hydromorphone] Itching     Ferrlecit      Venofer [Iron Sucrose]            Shayna Subramanian LPN    "

## 2020-02-05 NOTE — PROGRESS NOTES
[facility], Orthopaedic Surgery, Arthritis, Hip and Knee Replacement    Sherrie Lala MRN# [id number]   Age: 64 year old YOB: 1955     Requesting physician: Nitin Goodson              History of Present Illness:   Sherrie Lala is a 64 year old woman with history of progressive right knee pain and deformity secondary to osteoarthritis who presents for evaluation of total knee arthroplasty. She has experienced progressive increase in pain in the right knee and varus deformity for greater than 9 years. She has not focused on treatment for this pain for the past 9 years while she has been receiving treatment for cholangiocarcinoma including liver transplantation in 2018. Her health is now stable and she would like to consider treatment for her knee pain. She has tried cortisone injection, ice, and ibuprofen in the past, however these have not helped her knee pain. The pain is limiting her in her usual daily activities including walking and yoga. She had a right ACL tear with reconstruction when she was a teenager. She would like to consider total joint arthroplasty.         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)     Hypervolemia     Positive sputum culture in cadaveric donor     Positive urine culture in cadaveric donor     Other closed displaced fracture of proximal end of left humerus with routine healing, subsequent encounter     Shortness of breath     Past Medical History:   Diagnosis Date     Antiplatelet or antithrombotic long-term use      Asthma      Cholangiocarcinoma (H) 06/2014      Cirrhosis of liver with ascites (H) 5/10/2018     Encounter for pleural drainage tube placement      Esophageal varices with hemorrhage (H)      Gastric ulcer      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     Liver transplanted (H) 2018     Lung metastases (H) 2014     Portal vein thrombosis      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    Residual thrombus in main and right portal     Prior history of blood clot: yes, DVT - not currently on blood thinners - only ASA  Prior history of bleeding problems: yes, ASA  Prior history of anesthetic complications: none  Currently on opioids:  none  History of Diabetes (if so, recent A1c): no           Past Surgical History:     Past Surgical History:   Procedure Laterality Date     CHOLECYSTECTOMY       HEPATECTOMY PARTIAL       OPEN REDUCTION INTERNAL FIXATION HUMERUS PROXIMAL Left 3/4/2019    Procedure: OPEN REDUCTION INTERNAL FIXATION HUMERUS PROXIMAL LEFT with Allograft;  Surgeon: Eh Kraft MD;  Location: [facility]     RETURN LIVER TRANSPLANT N/A 2018    Procedure: RETURN LIVER TRANSPLANT;  Open Abdomen, return liver transplant washout with   Mesh and liver stent  placement and  Abdomal Wound closure;  Surgeon: Darren Rod MD;  [facility]: [facility]     TRANSPLANT LIVER RECIPIENT  DONOR N/A 2018    Procedure: TRANSPLANT LIVER RECIPIENT  DONOR;  TRANSPLANT LIVER RECIPIENT  DONOR ;  Surgeon: Darren Rod MD;  Location:  OR            Social History:     Social History     Socioeconomic History     Marital status:      Spouse name: Not on file     Number of children: Not on file     Years of education: Not on file     Highest education level: Not on file   Occupational History     Not on file   Social Needs     Financial resource strain: Not on file     Food insecurity:     Worry: Not on file     Inability: Not on file     Transportation needs:     Medical: Not on file     Non-medical: Not  on file   Tobacco Use     Smoking status: Never Smoker     Smokeless tobacco: Never Used   Substance and Sexual Activity     Alcohol use: Yes     Comment: occ      Drug use: No     Sexual activity: Not on file   Lifestyle     Physical activity:     Days per week: Not on file     Minutes per session: Not on file     Stress: Not on file   Relationships     Social connections:     Talks on phone: Not on file     Gets together: Not on file     Attends Buddhism service: Not on file     Active member of club or organization: Not on file     Attends meetings of clubs or organizations: Not on file     Relationship status: Not on file     Intimate partner violence:     Fear of current or ex partner: Not on file     Emotionally abused: Not on file     Physically abused: Not on file     Forced sexual activity: Not on file   Other Topics Concern     Parent/sibling w/ CABG, MI or angioplasty before 65F 55M? Not Asked   Social History Narrative     Not on file       Smoking: non smoker  Lives in [place], good family support.         Family History:       Family History   Problem Relation Age of Onset     Skin Cancer Father      Skin Cancer Brother      Melanoma No family hx of               Medications:     Current Outpatient Medications   Medication Sig     albuterol (PROAIR HFA/PROVENTIL HFA/VENTOLIN HFA) 108 (90 BASE) MCG/ACT Inhaler Inhale 2 puffs into the lungs every 6 hours     alendronate (FOSAMAX) 5 MG tablet Take by mouth every morning (before breakfast)     aspirin 81 MG tablet Take 1 tablet (81 mg) by mouth daily     gabapentin (NEURONTIN) 300 MG capsule Take 300 mg by mouth At Bedtime     omeprazole 20 MG tablet Take 2 tablets (40 mg) by mouth 2 times daily     PRAMIPEXOLE DIHYDROCHLORIDE PO Take 0.5 mg by mouth daily     tacrolimus (GENERIC EQUIVALENT) 1 MG capsule Take 4 capsules (4 mg) by mouth every 12 hours     warfarin (COUMADIN) 2 MG tablet Take 2 tablets (4 mg) by mouth daily Every evening or as directed by  "provider (Patient taking differently: Take 4 mg by mouth daily Take 2 mg on Mon&Fri and 4 mg all other days, or as directed by provider)     zolpidem (AMBIEN) 10 MG tablet Take 10 mg by mouth nightly as needed for sleep      oxyCODONE (ROXICODONE) 5 MG tablet Take 1 tablet (5 mg) by mouth every 6 hours as needed for severe pain (Patient not taking: Reported on 9/6/2019)     Current Facility-Administered Medications   Medication     lidocaine 1% with EPINEPHrine 1:100,000 injection 3 mL            Review of Systems:   A comprehensive 10 point review of systems (constitutional, ENT, cardiac, peripheral vascular, lymphatic, respiratory, GI, , Musculoskeletal, skin, Neurological) was performed and found to be negative except as described in this note.     Also see intake form completed by patient.             Physical Exam:     EXAMINATION pertinent findings:   VITAL SIGNS: Height 1.6 m (5' 3\"), weight 59 kg (130 lb).  Body mass index is 23.03 kg/m .  GEN: AOx3, cooperative, no distress  RESP: non labored breathing   ABD: benign   SKIN: grossly normal   LYMPHATIC: grossly normal   NEURO: grossly normal   VASCULAR: satisfactory perfusion of all extremities  MUSCULOSKELETAL:   She walks with a stiff right leg favoring her left leg. The right knee is swollen most prominent on the medial side. The is a varus deformity of the right knee joint that is reducible by manipulation. Knee flexion is limited to 120 degrees secondary to pain. Knee extension to 0 degrees. There is tenderness to palpation of the knee joint most prominent over the medial joint line. She is able to straight leg raise without difficulty.  Ankle plantar flexion dorsiflexion is intact.  Intact sensation throughout her foot.  2+ DP pulse.  Extensor mechanism is fully intact without extensor lag.             Data:   Imaging:   AP and lateral x-ray of right knee show degenerative joint disease with loss of joint space most prominent in the medial " compartment. There is medial translation of the femur relative to the tibia with varus deformity of the knee. Full length bilateral lower extremity x-rays show normal left knee and bilateral hip joints.         Assessment and Plan:   Assessment:  Sherrie is a pleasant 64 year old woman with hx of liver transplant and end stage right knee osteoarthritis.  I had a long discussion with the patient regarding ongoing management options.  Reviewed surgical and nonsurgical treatments.  We reviewed total knee replacement in detail including the procedure, the implants, the recovery process, and long-term outcomes.  We reviewed that the risks of the surgery include but are not limited to infection, wound problems, stiffness, persistent pain, swelling, clicking, loosening, revision surgery.  We also reviewed less common risks such as neurovascular injury fracture, and other implant-related issues.  We reviewed other medical complications such as a blood clot.  We discussed that the vast majority of cases have a highly successful outcome.  However there is a small subset of patients that do experience complications or problems following the knee replacement and these problems can be very debilitating and painful and sometimes do not improve.  Due to the patient's transplant , they may be at a slightly higher risk of infection.  Based on a discussion of the risks and benefits, we believe that the benefits far outweigh the risks at this point and the patient   would like to proceed with surgery.  We will work on scheduling surgery at a time that works well for them in the next few months.  They will contact us if they have any questions or concerns leading up to surgery.       Nitin Jacobsen M.D.     Arthritis and Joint Replacement  Department of Orthopaedic Surgery, [facility]  [email or web address]  [phone] (pager)           Review of Systems:       Answers for HPI/ROS submitted by the patient on  2/5/2020   General Symptoms: No  Skin Symptoms: No  HENT Symptoms: No  EYE SYMPTOMS: No  HEART SYMPTOMS: No  LUNG SYMPTOMS: No  INTESTINAL SYMPTOMS: No  URINARY SYMPTOMS: No  GYNECOLOGIC SYMPTOMS: No  BREAST SYMPTOMS: No  SKELETAL SYMPTOMS: Yes  BLOOD SYMPTOMS: No  NERVOUS SYSTEM SYMPTOMS: No  MENTAL HEALTH SYMPTOMS: No  Back pain: No  Muscle aches: No  Neck pain: No  Swollen joints: Yes  Joint pain: Yes  Bone pain: No  Muscle cramps: No

## 2020-02-06 ENCOUNTER — TELEPHONE (OUTPATIENT)
Dept: ORTHOPEDICS | Facility: CLINIC | Age: 65
End: 2020-02-06

## 2020-02-06 ENCOUNTER — HOSPITAL ENCOUNTER (INPATIENT)
Facility: CLINIC | Age: 65
Setting detail: SURGERY ADMIT
End: 2020-02-06
Attending: ORTHOPAEDIC SURGERY | Admitting: ORTHOPAEDIC SURGERY
Payer: MEDICARE

## 2020-02-06 DIAGNOSIS — M17.11 PRIMARY LOCALIZED OSTEOARTHRITIS OF RIGHT KNEE: ICD-10-CM

## 2020-02-06 NOTE — TELEPHONE ENCOUNTER
Patient is scheduled for surgery with Dr. Jacobsen      Spoke or left message with: Sherrie Lala    Date of Surgery: 6/15/2020    Location: [facility]    Post-op: 2 week & 6 week scheduled    H&P: Scheduled with PAC    Additional imaging/appointments: N/A    Surgery packet: Given in clinic     Additional comments: N/A

## 2020-02-09 NOTE — TELEPHONE ENCOUNTER
FUTURE VISIT INFORMATION      SURGERY INFORMATION:    Date: 6.15.20    [facility]    Surgeon: Dr. Jacobsen    Anesthesia Type: Spinal    Consult: 2.5.20 Dr. Jacobsen    Procedure: arthroplasty knee total right        RECORDS REQUESTED FROM:       Primary Care Provider: DOYLE ODELL    Most recent EKG with tracings/strips: 3.13.15 possibly-N[facility]    Most recent ECHO: 10.31.17 N[facility]     PFT: 12.19.19    Action MJ 2.9.20 1:12 PM   Action Taken Requested most recent EKG and echo from Nery.

## 2020-02-11 NOTE — TELEPHONE ENCOUNTER
Patient called to reschedule surgery with Dr. Jacobsen. Originally scheduled on 6/15/2020 but wanted to push surgery out a little farther. Now is scheduled for 9/14/2020/      Patient is scheduled for surgery with Dr. Jacobsen    Spoke or left message with: Sherrie    Date of Surgery: 9/14/2020    Location: [facility]    Post-op: 2 week & 6 week have been updated    H&P: Scheduled with PAC on 8/18/2020    Additional imaging/appointments: N/A    Surgery packet: Given in clinic     Additional comments: N/A

## 2020-02-20 RX ORDER — GABAPENTIN 100 MG/1
100 CAPSULE ORAL
Status: CANCELLED | OUTPATIENT
Start: 2020-09-14

## 2020-02-20 RX ORDER — CELECOXIB 200 MG/1
200 CAPSULE ORAL ONCE
Status: CANCELLED | OUTPATIENT
Start: 2020-09-14

## 2020-02-20 RX ORDER — ACETAMINOPHEN 325 MG/1
975 TABLET ORAL ONCE
Status: CANCELLED | OUTPATIENT
Start: 2020-09-14

## 2020-02-20 RX ORDER — CEFAZOLIN SODIUM 1 G/3ML
1 INJECTION, POWDER, FOR SOLUTION INTRAMUSCULAR; INTRAVENOUS SEE ADMIN INSTRUCTIONS
Status: CANCELLED | OUTPATIENT
Start: 2020-09-14

## 2020-02-20 RX ORDER — CEFAZOLIN SODIUM 2 G/100ML
2 INJECTION, SOLUTION INTRAVENOUS
Status: CANCELLED | OUTPATIENT
Start: 2020-09-14

## 2020-02-25 ENCOUNTER — COMMUNICATION - RIVER FALLS (OUTPATIENT)
Dept: FAMILY MEDICINE | Facility: CLINIC | Age: 65
End: 2020-02-25
Payer: COMMERCIAL

## 2020-03-03 ENCOUNTER — TELEPHONE (OUTPATIENT)
Dept: GASTROENTEROLOGY | Facility: CLINIC | Age: 65
End: 2020-03-03

## 2020-03-04 ENCOUNTER — HOSPITAL ENCOUNTER (OUTPATIENT)
Facility: AMBULATORY SURGERY CENTER | Age: 65
End: 2020-03-04
Attending: INTERNAL MEDICINE
Payer: COMMERCIAL

## 2020-03-04 ENCOUNTER — TELEPHONE (OUTPATIENT)
Dept: GASTROENTEROLOGY | Facility: CLINIC | Age: 65
End: 2020-03-04

## 2020-03-04 ENCOUNTER — OFFICE VISIT (OUTPATIENT)
Dept: GASTROENTEROLOGY | Facility: CLINIC | Age: 65
End: 2020-03-04
Attending: INTERNAL MEDICINE
Payer: MEDICARE

## 2020-03-04 VITALS
BODY MASS INDEX: 23.55 KG/M2 | HEART RATE: 79 BPM | HEIGHT: 63 IN | SYSTOLIC BLOOD PRESSURE: 148 MMHG | TEMPERATURE: 98.2 F | WEIGHT: 132.9 LBS | DIASTOLIC BLOOD PRESSURE: 92 MMHG

## 2020-03-04 DIAGNOSIS — Z94.4 LIVER REPLACED BY TRANSPLANT (H): ICD-10-CM

## 2020-03-04 DIAGNOSIS — M81.0 AGE-RELATED OSTEOPOROSIS WITHOUT CURRENT PATHOLOGICAL FRACTURE: Primary | ICD-10-CM

## 2020-03-04 DIAGNOSIS — Z13.220 LIPID SCREENING: ICD-10-CM

## 2020-03-04 DIAGNOSIS — I10 BENIGN ESSENTIAL HYPERTENSION: ICD-10-CM

## 2020-03-04 DIAGNOSIS — Z94.4 LIVER REPLACED BY TRANSPLANT (H): Primary | ICD-10-CM

## 2020-03-04 LAB
ALBUMIN SERPL-MCNC: 3.9 G/DL (ref 3.4–5)
ALP SERPL-CCNC: 76 U/L (ref 40–150)
ALT SERPL W P-5'-P-CCNC: 15 U/L (ref 0–50)
ANION GAP SERPL CALCULATED.3IONS-SCNC: 4 MMOL/L (ref 3–14)
AST SERPL W P-5'-P-CCNC: 11 U/L (ref 0–45)
BILIRUB DIRECT SERPL-MCNC: 0.2 MG/DL (ref 0–0.2)
BILIRUB SERPL-MCNC: 0.7 MG/DL (ref 0.2–1.3)
BUN SERPL-MCNC: 36 MG/DL (ref 7–30)
CALCIUM SERPL-MCNC: 8.3 MG/DL (ref 8.5–10.1)
CHLORIDE SERPL-SCNC: 107 MMOL/L (ref 94–109)
CO2 SERPL-SCNC: 26 MMOL/L (ref 20–32)
CREAT SERPL-MCNC: 1.3 MG/DL (ref 0.52–1.04)
ERYTHROCYTE [DISTWIDTH] IN BLOOD BY AUTOMATED COUNT: 11.7 % (ref 10–15)
GFR SERPL CREATININE-BSD FRML MDRD: 44 ML/MIN/{1.73_M2}
GLUCOSE SERPL-MCNC: 92 MG/DL (ref 70–99)
HCT VFR BLD AUTO: 39.4 % (ref 35–47)
HGB BLD-MCNC: 13.3 G/DL (ref 11.7–15.7)
MAGNESIUM SERPL-MCNC: 1.9 MG/DL (ref 1.6–2.3)
MCH RBC QN AUTO: 30.4 PG (ref 26.5–33)
MCHC RBC AUTO-ENTMCNC: 33.8 G/DL (ref 31.5–36.5)
MCV RBC AUTO: 90 FL (ref 78–100)
PHOSPHATE SERPL-MCNC: 4.1 MG/DL (ref 2.5–4.5)
PLATELET # BLD AUTO: 116 10E9/L (ref 150–450)
POTASSIUM SERPL-SCNC: 4.9 MMOL/L (ref 3.4–5.3)
PROT SERPL-MCNC: 6.6 G/DL (ref 6.8–8.8)
RBC # BLD AUTO: 4.37 10E12/L (ref 3.8–5.2)
SODIUM SERPL-SCNC: 137 MMOL/L (ref 133–144)
TACROLIMUS BLD-MCNC: 6.6 UG/L (ref 5–15)
TME LAST DOSE: NORMAL H
WBC # BLD AUTO: 5.5 10E9/L (ref 4–11)

## 2020-03-04 PROCEDURE — G0463 HOSPITAL OUTPT CLINIC VISIT: HCPCS | Mod: ZF

## 2020-03-04 PROCEDURE — 36415 COLL VENOUS BLD VENIPUNCTURE: CPT | Performed by: INTERNAL MEDICINE

## 2020-03-04 PROCEDURE — 84100 ASSAY OF PHOSPHORUS: CPT | Performed by: INTERNAL MEDICINE

## 2020-03-04 PROCEDURE — 80048 BASIC METABOLIC PNL TOTAL CA: CPT | Performed by: INTERNAL MEDICINE

## 2020-03-04 PROCEDURE — 80076 HEPATIC FUNCTION PANEL: CPT | Performed by: INTERNAL MEDICINE

## 2020-03-04 PROCEDURE — 80197 ASSAY OF TACROLIMUS: CPT | Performed by: INTERNAL MEDICINE

## 2020-03-04 PROCEDURE — 85027 COMPLETE CBC AUTOMATED: CPT | Performed by: INTERNAL MEDICINE

## 2020-03-04 PROCEDURE — 83735 ASSAY OF MAGNESIUM: CPT | Performed by: INTERNAL MEDICINE

## 2020-03-04 RX ORDER — AMLODIPINE BESYLATE 5 MG/1
5 TABLET ORAL DAILY
Qty: 90 TABLET | Refills: 3 | Status: SHIPPED | OUTPATIENT
Start: 2020-03-04 | End: 2020-03-05

## 2020-03-04 RX ORDER — AMLODIPINE BESYLATE 5 MG/1
5 TABLET ORAL DAILY
Qty: 90 TABLET | Refills: 3 | Status: SHIPPED | OUTPATIENT
Start: 2020-03-04 | End: 2020-03-04

## 2020-03-04 ASSESSMENT — MIFFLIN-ST. JEOR: SCORE: 1131.96

## 2020-03-04 ASSESSMENT — PAIN SCALES - GENERAL: PAINLEVEL: NO PAIN (0)

## 2020-03-04 NOTE — LETTER
3/4/2020      RE: Sherrie Lala  [address] [facility] [address] [address]       HISTORY OF PRESENT ILLNESS:  I had the pleasure of seeing Sherrie aLla for followup in the Liver Transplant Clinic at the [facility] on 03/04/2020.  Ms. Lala returns for followup now 1-1/2 years status post liver transplantation for cirrhosis, status post resection of cholangiocarcinoma.      She is doing fairly well at this visit.  She does note some occasional abdominal pain.  She does feel like she almost gets intermittent bowel obstruction where she will get the onset of pain that resolves when she has a bowel movement.  She feels nauseated but does not vomit.  There have not been any fevers associated with it.  She denies any itching or skin rash.  She has mild fatigue.  She denies any increased abdominal girth or lower extremity edema.      She denies any fevers or chills.  She does complain of some shortness of breath.  She has had pulmonary function tests that do show restrictive lung disease thought secondary to previous radiation therapy to her lungs.  She otherwise denies any diarrhea or constipation.  She says her appetite has been good, and her weight has been stable.      She does need knee surgery as she is bone on bone in her right knee.  She still is able to walk 3 miles a day and she is scheduled right now tentatively for knee surgery in September.     Current Outpatient Medications   Medication     albuterol (PROAIR HFA/PROVENTIL HFA/VENTOLIN HFA) 108 (90 BASE) MCG/ACT Inhaler     alendronate (FOSAMAX) 5 MG tablet     amLODIPine (NORVASC) 5 MG tablet     aspirin 81 MG tablet     gabapentin (NEURONTIN) 300 MG capsule     omeprazole 20 MG tablet     PRAMIPEXOLE DIHYDROCHLORIDE PO     tacrolimus (GENERIC EQUIVALENT) 1 MG capsule     zolpidem (AMBIEN) 10 MG tablet     oxyCODONE (ROXICODONE) 5 MG tablet     warfarin (COUMADIN) 2 MG tablet     Current Facility-Administered Medications   Medication      "lidocaine 1% with EPINEPHrine 1:100,000 injection 3 mL     BP (!) 148/92   Pulse 79   Temp 98.2  F (36.8  C) (Oral)   Ht 1.6 m (5' 3\")   Wt 60.3 kg (132 lb 14.4 oz)   BMI 23.54 kg/m      PHYSICAL EXAMINATION:  In general, she looks well.  HEENT exam shows no scleral icterus or temporal muscle wasting.  Chest is clear.  Abdominal exam shows no increase in girth.  No masses or tenderness to palpation are present.  Her liver is 10 cm in span without left lobe enlargement.  No spleen tip is palpable, and extremity exam shows no edema.  Skin exam shows no suspicious lesions.  Neurologic exam is nonfocal.     Recent Results (from the past 168 hour(s))   Hepatic panel    Collection Time: 03/04/20 12:36 PM   Result Value Ref Range    Bilirubin Direct 0.2 0.0 - 0.2 mg/dL    Bilirubin Total 0.7 0.2 - 1.3 mg/dL    Albumin 3.9 3.4 - 5.0 g/dL    Protein Total 6.6 (L) 6.8 - 8.8 g/dL    Alkaline Phosphatase 76 40 - 150 U/L    ALT 15 0 - 50 U/L    AST 11 0 - 45 U/L   Magnesium    Collection Time: 03/04/20 12:36 PM   Result Value Ref Range    Magnesium 1.9 1.6 - 2.3 mg/dL   Phosphorus    Collection Time: 03/04/20 12:36 PM   Result Value Ref Range    Phosphorus 4.1 2.5 - 4.5 mg/dL   Basic metabolic panel    Collection Time: 03/04/20 12:36 PM   Result Value Ref Range    Sodium 137 133 - 144 mmol/L    Potassium 4.9 3.4 - 5.3 mmol/L    Chloride 107 94 - 109 mmol/L    Carbon Dioxide 26 20 - 32 mmol/L    Anion Gap 4 3 - 14 mmol/L    Glucose 92 70 - 99 mg/dL    Urea Nitrogen 36 (H) 7 - 30 mg/dL    Creatinine 1.30 (H) 0.52 - 1.04 mg/dL    GFR Estimate 44 (L) >60 mL/min/[1.73_m2]    GFR Estimate If Black 51 (L) >60 mL/min/[1.73_m2]    Calcium 8.3 (L) 8.5 - 10.1 mg/dL   CBC with platelets    Collection Time: 03/04/20 12:36 PM   Result Value Ref Range    WBC 5.5 4.0 - 11.0 10e9/L    RBC Count 4.37 3.8 - 5.2 10e12/L    Hemoglobin 13.3 11.7 - 15.7 g/dL    Hematocrit 39.4 35.0 - 47.0 %    MCV 90 78 - 100 fl    MCH 30.4 26.5 - 33.0 pg    MCHC " 33.8 31.5 - 36.5 g/dL    RDW 11.7 10.0 - 15.0 %    Platelet Count 116 (L) 150 - 450 10e9/L      IMPRESSION:  My impression is that Ms. Lala is 1-1/2 years status post liver transplantation for liver disease after resection of cholangiocarcinoma.  She did have a lung VATS at the time and did get some radiation therapy which has given her some restrictive lung disease.      Her liver tests are excellent, her kidney function is good, as are her blood counts.      She does have my blessing to go forward with knee replacement surgery in September.  She is up to date with regard to vaccines but is due for a colonoscopy.  She will get that done through [facility].  She is up to date with regard to bone density studies as well.  I have recommended that she see a dermatologist for skin cancer screening.  I will see her back in the clinic again in 6 months.      Thank you very much for allowing me to participate in the care of this patient.  If you have any questions regarding my recommendations, please do not hesitate to contact me.       Bradly Lilly MD      Professor of Medicine  [employer]      Executive Medical Director, Solid Organ Transplant Program  [facility]    PHYSICAL EXAMINATION:  In general, she looks well.  HEENT exam shows no scleral icterus or temporal muscle wasting.  Chest is clear.  Abdominal exam shows no increase in girth.  No masses or tenderness to palpation are present.  Her liver is 10 cm in span without left lobe enlargement.  No spleen tip is palpable, and extremity exam shows no edema.  Skin exam shows no stigmata of chronic liver disease.  Neurologic exam is nonfocal.       Bradly Lilly MD

## 2020-03-04 NOTE — PROGRESS NOTES
HISTORY OF PRESENT ILLNESS:  I had the pleasure of seeing Sherrie Lala for followup in the Liver Transplant Clinic at the [facility] on 03/04/2020.  Ms. Lala returns for followup now 1-1/2 years status post liver transplantation for cirrhosis, status post resection of cholangiocarcinoma.      She is doing fairly well at this visit.  She does note some occasional abdominal pain.  She does feel like she almost gets intermittent bowel obstruction where she will get the onset of pain that resolves when she has a bowel movement.  She feels nauseated but does not vomit.  There have not been any fevers associated with it.  She denies any itching or skin rash.  She has mild fatigue.  She denies any increased abdominal girth or lower extremity edema.      She denies any fevers or chills.  She does complain of some shortness of breath.  She has had pulmonary function tests that do show restrictive lung disease thought secondary to previous radiation therapy to her lungs.  She otherwise denies any diarrhea or constipation.  She says her appetite has been good, and her weight has been stable.      She does need knee surgery as she is bone on bone in her right knee.  She still is able to walk 3 miles a day and she is scheduled right now tentatively for knee surgery in September.     Current Outpatient Medications   Medication     albuterol (PROAIR HFA/PROVENTIL HFA/VENTOLIN HFA) 108 (90 BASE) MCG/ACT Inhaler     alendronate (FOSAMAX) 5 MG tablet     amLODIPine (NORVASC) 5 MG tablet     aspirin 81 MG tablet     gabapentin (NEURONTIN) 300 MG capsule     omeprazole 20 MG tablet     PRAMIPEXOLE DIHYDROCHLORIDE PO     tacrolimus (GENERIC EQUIVALENT) 1 MG capsule     zolpidem (AMBIEN) 10 MG tablet     oxyCODONE (ROXICODONE) 5 MG tablet     warfarin (COUMADIN) 2 MG tablet     Current Facility-Administered Medications   Medication     lidocaine 1% with EPINEPHrine 1:100,000 injection 3 mL     BP (!) 148/92   " Pulse 79   Temp 98.2  F (36.8  C) (Oral)   Ht 1.6 m (5' 3\")   Wt 60.3 kg (132 lb 14.4 oz)   BMI 23.54 kg/m      PHYSICAL EXAMINATION:  In general, she looks well.  HEENT exam shows no scleral icterus or temporal muscle wasting.  Chest is clear.  Abdominal exam shows no increase in girth.  No masses or tenderness to palpation are present.  Her liver is 10 cm in span without left lobe enlargement.  No spleen tip is palpable, and extremity exam shows no edema.  Skin exam shows no suspicious lesions.  Neurologic exam is nonfocal.     Recent Results (from the past 168 hour(s))   Hepatic panel    Collection Time: 03/04/20 12:36 PM   Result Value Ref Range    Bilirubin Direct 0.2 0.0 - 0.2 mg/dL    Bilirubin Total 0.7 0.2 - 1.3 mg/dL    Albumin 3.9 3.4 - 5.0 g/dL    Protein Total 6.6 (L) 6.8 - 8.8 g/dL    Alkaline Phosphatase 76 40 - 150 U/L    ALT 15 0 - 50 U/L    AST 11 0 - 45 U/L   Magnesium    Collection Time: 03/04/20 12:36 PM   Result Value Ref Range    Magnesium 1.9 1.6 - 2.3 mg/dL   Phosphorus    Collection Time: 03/04/20 12:36 PM   Result Value Ref Range    Phosphorus 4.1 2.5 - 4.5 mg/dL   Basic metabolic panel    Collection Time: 03/04/20 12:36 PM   Result Value Ref Range    Sodium 137 133 - 144 mmol/L    Potassium 4.9 3.4 - 5.3 mmol/L    Chloride 107 94 - 109 mmol/L    Carbon Dioxide 26 20 - 32 mmol/L    Anion Gap 4 3 - 14 mmol/L    Glucose 92 70 - 99 mg/dL    Urea Nitrogen 36 (H) 7 - 30 mg/dL    Creatinine 1.30 (H) 0.52 - 1.04 mg/dL    GFR Estimate 44 (L) >60 mL/min/[1.73_m2]    GFR Estimate If Black 51 (L) >60 mL/min/[1.73_m2]    Calcium 8.3 (L) 8.5 - 10.1 mg/dL   CBC with platelets    Collection Time: 03/04/20 12:36 PM   Result Value Ref Range    WBC 5.5 4.0 - 11.0 10e9/L    RBC Count 4.37 3.8 - 5.2 10e12/L    Hemoglobin 13.3 11.7 - 15.7 g/dL    Hematocrit 39.4 35.0 - 47.0 %    MCV 90 78 - 100 fl    MCH 30.4 26.5 - 33.0 pg    MCHC 33.8 31.5 - 36.5 g/dL    RDW 11.7 10.0 - 15.0 %    Platelet Count 116 (L) " 150 - 450 10e9/L      IMPRESSION:  My impression is that Ms. Lala is 1-1/2 years status post liver transplantation for liver disease after resection of cholangiocarcinoma.  She did have a lung VATS at the time and did get some radiation therapy which has given her some restrictive lung disease.      Her liver tests are excellent, her kidney function is good, as are her blood counts.      She does have my blessing to go forward with knee replacement surgery in September.  She is up to date with regard to vaccines but is due for a colonoscopy.  She will get that done through [facility].  She is up to date with regard to bone density studies as well.  I have recommended that she see a dermatologist for skin cancer screening.  I will see her back in the clinic again in 6 months.      Thank you very much for allowing me to participate in the care of this patient.  If you have any questions regarding my recommendations, please do not hesitate to contact me.       Bradly Lilly MD      Professor of Medicine  [employer]      Executive Medical Director, Solid Organ Transplant Program  [facility]

## 2020-03-04 NOTE — NURSING NOTE
"BP (!) 148/92   Pulse 79   Temp 98.2  F (36.8  C) (Oral)   Ht 1.6 m (5' 3\")   Wt 60.3 kg (132 lb 14.4 oz)   BMI 23.54 kg/m    Chief Complaint   Patient presents with     RECHECK     follow up with liver transplant, [facility]       "

## 2020-03-05 ENCOUNTER — COMMUNICATION - RIVER FALLS (OUTPATIENT)
Dept: FAMILY MEDICINE | Facility: CLINIC | Age: 65
End: 2020-03-05
Payer: COMMERCIAL

## 2020-03-05 ENCOUNTER — TELEPHONE (OUTPATIENT)
Dept: TRANSPLANT | Facility: CLINIC | Age: 65
End: 2020-03-05

## 2020-03-05 DIAGNOSIS — Z94.4 LIVER TRANSPLANTED (H): ICD-10-CM

## 2020-03-05 DIAGNOSIS — Z94.4 LIVER REPLACED BY TRANSPLANT (H): Primary | ICD-10-CM

## 2020-03-05 DIAGNOSIS — Z13.220 LIPID SCREENING: ICD-10-CM

## 2020-03-05 DIAGNOSIS — I10 BENIGN ESSENTIAL HYPERTENSION: ICD-10-CM

## 2020-03-05 RX ORDER — TACROLIMUS 1 MG/1
3 CAPSULE ORAL EVERY 12 HOURS
Qty: 540 CAPSULE | Refills: 3 | Status: SHIPPED | OUTPATIENT
Start: 2020-03-05 | End: 2020-09-01

## 2020-03-05 RX ORDER — AMLODIPINE BESYLATE 5 MG/1
5 TABLET ORAL DAILY
Qty: 90 TABLET | Refills: 3 | Status: SHIPPED | OUTPATIENT
Start: 2020-03-05 | End: 2021-01-20

## 2020-03-05 NOTE — PROGRESS NOTES
PHYSICAL EXAMINATION:  In general, she looks well.  HEENT exam shows no scleral icterus or temporal muscle wasting.  Chest is clear.  Abdominal exam shows no increase in girth.  No masses or tenderness to palpation are present.  Her liver is 10 cm in span without left lobe enlargement.  No spleen tip is palpable, and extremity exam shows no edema.  Skin exam shows no stigmata of chronic liver disease.  Neurologic exam is nonfocal.

## 2020-03-05 NOTE — TELEPHONE ENCOUNTER
Pt seen Dr Lilly yesterday  Ordered Amlodipine  Was not called in correctly  Needs clarification

## 2020-03-05 NOTE — TELEPHONE ENCOUNTER
ISSUE:   Tacrolimus IR level 6.6 on 3/4 , goal 3-5, dose 4 mg BID.    PLAN:   Please call patient and confirm this was an accurate 12-hour trough. Verify Tacrolimus IR dose. Confirm no new medications or illness. Confirm no missed doses. If accurate trough and accurate dose, decrease Tacrolimus IR dose to 3 mg BID and repeat prograf level in 1 week.  All other labs every 2-3 mos.  Abigail Parham RN    OUTCOME:   Spoke with patient, they confirm accurate trough level and current dose 4 mg BID. Patient confirmed dose change to 3 mg BID and to repeat labs in 1 weeks. Orders sent to preferred pharmacy for dose change and lab for repeat labs. Patient voiced understanding of plan.     Parisa Thrasher LPN

## 2020-03-10 ENCOUNTER — HEALTH MAINTENANCE LETTER (OUTPATIENT)
Age: 65
End: 2020-03-10

## 2020-03-10 DIAGNOSIS — Z94.4 LIVER TRANSPLANTED (H): ICD-10-CM

## 2020-03-10 DIAGNOSIS — Z94.4 LIVER REPLACED BY TRANSPLANT (H): ICD-10-CM

## 2020-03-10 LAB
TACROLIMUS BLD-MCNC: 4.8 UG/L (ref 5–15)
TME LAST DOSE: ABNORMAL H

## 2020-03-10 PROCEDURE — 80197 ASSAY OF TACROLIMUS: CPT | Performed by: INTERNAL MEDICINE

## 2020-03-10 RX ORDER — NICOTINE POLACRILEX 4 MG/1
40 GUM, CHEWING ORAL 2 TIMES DAILY
Qty: 120 TABLET | Refills: 11 | Status: SHIPPED | OUTPATIENT
Start: 2020-03-10 | End: 2020-03-19

## 2020-03-18 DIAGNOSIS — Z94.4 LIVER TRANSPLANTED (H): ICD-10-CM

## 2020-03-19 RX ORDER — NICOTINE POLACRILEX 4 MG/1
40 GUM, CHEWING ORAL DAILY
Qty: 180 TABLET | Refills: 3 | Status: SHIPPED | OUTPATIENT
Start: 2020-03-19 | End: 2022-11-01

## 2020-03-19 NOTE — TELEPHONE ENCOUNTER
Rec'd the following question from insurance in response to my Prior Authorization request. Please advise.

## 2020-03-19 NOTE — TELEPHONE ENCOUNTER
Central Prior Authorization Team   Phone: [phone]    PA Initiation    Medication: Omeprazole 20MG dr capsules  Insurance Company: [employer] - Phone [phone] Fax [phone]  Pharmacy Filling the Rx: [employer] [facility] - [facility] [address]  Filling Pharmacy Phone: [phone]  Filling Pharmacy Fax: [phone]  Start Date: 3/19/2020

## 2020-05-06 ENCOUNTER — TELEPHONE (OUTPATIENT)
Dept: TRANSPLANT | Facility: CLINIC | Age: 65
End: 2020-05-06
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
## 2020-05-07 NOTE — TELEPHONE ENCOUNTER
"Call to Jose to let her know that Dr. Lilly says \"the portal HTN is old and not clinically relevant\".  Dr. Lilly authorized ordering a CXR.  "

## 2020-05-07 NOTE — TELEPHONE ENCOUNTER
"Spoke to Sherrie, gave her the below information.  She would like us to fax an order for chest xray PA and Lateral x 1 to evaluate new onset / worsening shortness of breath to the [facility] radiology department in [place], [place].  Sign w/ Dr. Maxx barrera.  On the order also ask them to \"push\" the film to the [facility] radiology dept.  "

## 2020-05-08 ENCOUNTER — TRANSFERRED RECORDS (OUTPATIENT)
Dept: HEALTH INFORMATION MANAGEMENT | Facility: CLINIC | Age: 65
End: 2020-05-08

## 2020-05-08 DIAGNOSIS — Z11.59 ENCOUNTER FOR SCREENING FOR OTHER VIRAL DISEASES: Primary | ICD-10-CM

## 2020-05-12 ENCOUNTER — TELEPHONE (OUTPATIENT)
Dept: TRANSPLANT | Facility: CLINIC | Age: 65
End: 2020-05-12

## 2020-05-12 DIAGNOSIS — R06.02 SHORTNESS OF BREATH ON EXERTION: Primary | ICD-10-CM

## 2020-05-12 NOTE — TELEPHONE ENCOUNTER
Dr. Lilly reviewed Chest x ray.  He suggests arterial blood gas and pulmonary function tests.  Call to Sherrie to tell her this, no answer.  ASked her to return my call to discuss.

## 2020-05-14 ENCOUNTER — TELEPHONE (OUTPATIENT)
Dept: TRANSPLANT | Facility: CLINIC | Age: 65
End: 2020-05-14

## 2020-05-20 ENCOUNTER — PRE VISIT (OUTPATIENT)
Dept: SURGERY | Facility: CLINIC | Age: 65
End: 2020-05-20

## 2020-05-21 ENCOUNTER — APPOINTMENT (OUTPATIENT)
Dept: LAB | Facility: CLINIC | Age: 65
End: 2020-05-21
Payer: COMMERCIAL

## 2020-05-21 DIAGNOSIS — R06.02 SHORTNESS OF BREATH ON EXERTION: ICD-10-CM

## 2020-05-22 ENCOUNTER — TELEPHONE (OUTPATIENT)
Dept: TRANSPLANT | Facility: CLINIC | Age: 65
End: 2020-05-22

## 2020-05-22 LAB
BASE DEFICIT BLDA-SCNC: 2.7 MMOL/L
COHGB MFR BLD: 2.7 % (ref 0–2)
HCO3 BLD-SCNC: 21 MMOL/L (ref 21–28)
HGB BLD-MCNC: 13.7 G/DL (ref 11.7–15.7)
METHGB MFR BLD: 0 % (ref 0–3)
O2/TOTAL GAS SETTING VFR VENT: 21 %
OXYHGB MFR BLD: 96 % (ref 92–100)
PCO2 BLD: 36 MM HG (ref 35–45)
PH BLD: 7.39 PH (ref 7.35–7.45)
PO2 BLD: 107 MM HG (ref 80–105)

## 2020-05-22 NOTE — TELEPHONE ENCOUNTER
"Spoke to Sherrie.  Had PFT's and ABG yesterday.    She says she still is concerned about \"why I always feel short of breath\".   This shortness of breath doesn't keep her from doing the things she wants to do \"but it's always there when I try to do anything - yesterday I tried to walk up a hill and I barely made it.\"  Results sent to Dr. Lilly.  "

## 2020-05-27 ENCOUNTER — AMBULATORY - RIVER FALLS (OUTPATIENT)
Dept: FAMILY MEDICINE | Facility: CLINIC | Age: 65
End: 2020-05-27
Payer: COMMERCIAL

## 2020-05-28 LAB
DLCOCOR-%PRED-PRE: 98 %
DLCOCOR-PRE: 19.04 ML/MIN/MMHG
DLCOUNC-%PRED-PRE: 96 %
DLCOUNC-PRE: 18.71 ML/MIN/MMHG
DLCOUNC-PRED: 19.32 ML/MIN/MMHG
ERV-%PRED-PRE: 68 %
ERV-PRE: 0.57 L
ERV-PRED: 0.83 L
EXPTIME-PRE: 7.59 SEC
FEF2575-%PRED-POST: 49 %
FEF2575-%PRED-PRE: 33 %
FEF2575-POST: 1.04 L/SEC
FEF2575-PRE: 0.71 L/SEC
FEF2575-PRED: 2.1 L/SEC
FEFMAX-%PRED-PRE: 60 %
FEFMAX-PRE: 3.6 L/SEC
FEFMAX-PRED: 5.97 L/SEC
FEV1-%PRED-PRE: 62 %
FEV1-PRE: 1.45 L
FEV1FEV6-PRE: 63 %
FEV1FEV6-PRED: 80 %
FEV1FVC-PRE: 61 %
FEV1FVC-PRED: 79 %
FEV1SVC-PRE: 59 %
FEV1SVC-PRED: 76 %
FIFMAX-PRE: 4.1 L/SEC
FIO2-PRE: 21 %
FRCPLETH-%PRED-PRE: 105 %
FRCPLETH-PRE: 2.79 L
FRCPLETH-PRED: 2.65 L
FVC-%PRED-PRE: 79 %
FVC-PRE: 2.37 L
FVC-PRED: 2.97 L
IC-%PRED-PRE: 83 %
IC-PRE: 1.87 L
IC-PRED: 2.25 L
RVPLETH-%PRED-PRE: 116 %
RVPLETH-PRE: 2.22 L
RVPLETH-PRED: 1.9 L
TLCPLETH-%PRED-PRE: 97 %
TLCPLETH-PRE: 4.66 L
TLCPLETH-PRED: 4.77 L
VA-%PRED-PRE: 78 %
VA-PRE: 3.59 L
VC-%PRED-PRE: 79 %
VC-PRE: 2.44 L
VC-PRED: 3.08 L

## 2020-06-03 ENCOUNTER — OFFICE VISIT - RIVER FALLS (OUTPATIENT)
Dept: FAMILY MEDICINE | Facility: CLINIC | Age: 65
End: 2020-06-03
Payer: COMMERCIAL

## 2020-06-03 LAB
ALBUMIN SERPL-MCNC: 4.3 G/DL
ALP SERPL-CCNC: 78 UNIT/L
ALT SERPL W P-5'-P-CCNC: 14 UNIT/L
AST SERPL W P-5'-P-CCNC: 19 UNIT/L
BILIRUB SERPL-MCNC: 0.3 MG/DL
BUN SERPL-MCNC: 27 MG/DL
CALCIUM SERPL-MCNC: 9.1 MEQ/DL
CHLORIDE BLD-SCNC: 105 MEQ/L
CO2 SERPL-SCNC: 20 MEQ/L
CREAT SERPL-MCNC: 1.08 MG/DL
GLUCOSE BLD-MCNC: 93 MG/DL
HGB BLD-MCNC: 13.4 G/DL
HGB BLD-MCNC: 13.7 G/DL
PLATELET # BLD AUTO: 131 X10
POTASSIUM BLD-SCNC: 4.4 MEQ/L
PROT SERPL-MCNC: 7.2 GM/DL
SODIUM SERPL-SCNC: 142 MEQ/L

## 2020-06-05 ENCOUNTER — TELEPHONE (OUTPATIENT)
Dept: ORTHOPEDICS | Facility: CLINIC | Age: 65
End: 2020-06-05

## 2020-06-05 NOTE — TELEPHONE ENCOUNTER
Received voicemail message from patient asking if her surgery scheduled with Dr Jacobsen for 9/14/20 will remain scheduled amidst the pandemic. If so, she would like to delay the surgery into October if possible. Please call back at [phone].

## 2020-06-05 NOTE — TELEPHONE ENCOUNTER
Returned phone call to patient. We discussed that currently the ORs are only scheduling out 6 weeks at a time at this point. Patient stated understanding and will call closer to surgery date if she still wants to reschedule.

## 2020-06-09 LAB
1,25(OH)2D SERPL-MCNC: 54 PG/ML (ref 18–72)
BUN SERPL-MCNC: 25 MG/DL (ref 7–25)
BUN/CREAT RATIO - HISTORICAL: 20 (ref 6–22)
CALCIUM SERPL-MCNC: 8.7 MG/DL (ref 8.6–10.4)
CHLORIDE BLD-SCNC: 106 MMOL/L (ref 98–110)
CO2 SERPL-SCNC: 27 MMOL/L (ref 20–32)
CREAT SERPL-MCNC: 1.27 MG/DL (ref 0.5–0.99)
EGFRCR SERPLBLD CKD-EPI 2021: 45 ML/MIN/1.73M2
GLUCOSE BLD-MCNC: 96 MG/DL (ref 65–99)
POTASSIUM BLD-SCNC: 4.9 MMOL/L (ref 3.5–5.3)
SODIUM SERPL-SCNC: 139 MMOL/L (ref 135–146)
VITAMIN D2, 1,25 (OH)2 - QUEST: <8 PG/ML
VITAMIN D3, 1,25 (OH)2 - QUEST: 54 PG/ML

## 2020-06-10 ENCOUNTER — COMMUNICATION - RIVER FALLS (OUTPATIENT)
Dept: FAMILY MEDICINE | Facility: CLINIC | Age: 65
End: 2020-06-10
Payer: COMMERCIAL

## 2020-06-11 ENCOUNTER — TELEPHONE (OUTPATIENT)
Dept: TRANSPLANT | Facility: CLINIC | Age: 65
End: 2020-06-11

## 2020-06-15 NOTE — TELEPHONE ENCOUNTER
Discussed pulminary referral with pt. Pt agrees. Sent schedulers a message to contact pt to schedule.

## 2020-06-30 ENCOUNTER — OFFICE VISIT - RIVER FALLS (OUTPATIENT)
Dept: FAMILY MEDICINE | Facility: CLINIC | Age: 65
End: 2020-06-30
Payer: COMMERCIAL

## 2020-07-06 ENCOUNTER — VIRTUAL VISIT (OUTPATIENT)
Dept: PULMONOLOGY | Facility: CLINIC | Age: 65
End: 2020-07-06
Payer: MEDICARE

## 2020-07-06 DIAGNOSIS — J44.9 STAGE 2 MODERATE COPD BY GOLD CLASSIFICATION (H): Primary | ICD-10-CM

## 2020-07-06 NOTE — PROGRESS NOTES
"Sherrie Lala is a 65 year old female who is being evaluated via a billable telephone visit.      The patient has been notified of following:     \"This telephone visit will be conducted via a call between you and your physician/provider. We have found that certain health care needs can be provided without the need for a physical exam.  This service lets us provide the care you need with a short phone conversation.  If a prescription is necessary we can send it directly to your pharmacy.  If lab work is needed we can place an order for that and you can then stop by our lab to have the test done at a later time.    Telephone visits are billed at different rates depending on your insurance coverage. During this emergency period, for some insurers they may be billed the same as an in-person visit.  Please reach out to your insurance provider with any questions.    If during the course of the call the physician/provider feels a telephone visit is not appropriate, you will not be charged for this service.\"    Patient has given verbal consent for Telephone visit?  Yes    What phone number would you like to be contacted at? [phone]    How would you like to obtain your AVS? Mail a copy    CC: Shortness of breath     This visit was conducted over the telephone due to the coronavirus pandemic.    HPI: This is a 65-year-old female with history of a liver transplant who is presenting for evaluation of shortness of breath. Ever since the liver transplant, she has had some shortness of breath.  This is always with exertion, as well as with bending over.  We have been monitoring this, as she was hoping for improvement with time post transplant however she has never experienced full recovery of her breathing yet.  Immediately post transplant, she did have a right pleural effusion.  Repeat imaging shows that this effusion is gone, but it does appear that she has some pleural tethering down in the right lower lobe.  She denies any " significant cough, chest tightness or wheezing.  She does have an albuterol inhaler, which she will use when short of breath and she does get relief.    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     Liver transplanted (H) 2018     Lung metastases (H) 2014     Portal vein thrombosis      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    Residual thrombus in main and right portal     Past Surgical History:   Procedure Laterality Date     CHOLECYSTECTOMY       HEPATECTOMY PARTIAL       OPEN REDUCTION INTERNAL FIXATION HUMERUS PROXIMAL Left 3/4/2019    Procedure: OPEN REDUCTION INTERNAL FIXATION HUMERUS PROXIMAL LEFT with Allograft;  Surgeon: Eh Kraft MD;  Location: [facility] OR     RETURN LIVER TRANSPLANT N/A 2018    Procedure: RETURN LIVER TRANSPLANT;  Open Abdomen, return liver transplant washout with   Mesh and liver stent  placement and  Abdomal Wound closure;  Surgeon: Darren Rod MD;  Location: [facility] OR     TRANSPLANT LIVER RECIPIENT  DONOR N/A 2018    Procedure: TRANSPLANT LIVER RECIPIENT  DONOR;  TRANSPLANT LIVER RECIPIENT  DONOR ;  Surgeon: Darren Rod MD;  Location: [facility] OR     Data:  PFT 2020:      Moderate airflow obstruction.  Normal lung volumes.  Normal diffusion capacity.    Chest x-ray: I have reviewed her chest x-ray again.  Normal lung parenchyma.  Blunting of the right costophrenic angle    Assessment and plan:  Shortness of breath  History of liver transplant  History of pleural effusion  Airflow obstruction, consistent with COPD    She has persistent shortness of breath with exertion as  well as moderate airflow obstruction on spirometry.  She does not have a smoking history.  Her symptoms and spirometry is consistent with COPD, and I think it is appropriate to try treating that.  Asthma could also fits, but she does not have any other triggers for her breathing.  Always seems exertional.  I will start her on Stiolto, long-acting beta agonist and long-acting muscarinic antagonist.  I have asked her to get in touch with us in about 6 weeks to let us know if this is helping or not.  If it is not helping, we could try an inhaled steroid, and possibly repeat imaging      Phone call duration:  16 minutes    Jesus Thomas MD

## 2020-07-06 NOTE — LETTER
7/6/2020         RE: Sherrie Lala  [address] [facility] [address]        Dear Colleague,    Thank you for referring your patient, Sherrie Lala, to the [facility]. Please see a copy of my visit note below.    Sherrie Lala is a 65 year old female who is being evaluated via a billable telephone visit.          CC: Shortness of breath     This visit was conducted over the telephone due to the coronavirus pandemic.    HPI: This is a 65-year-old female with history of a liver transplant who is presenting for evaluation of shortness of breath. Ever since the liver transplant, she has had some shortness of breath.  This is always with exertion, as well as with bending over.  We have been monitoring this, as she was hoping for improvement with time post transplant however she has never experienced full recovery of her breathing yet.  Immediately post transplant, she did have a right pleural effusion.  Repeat imaging shows that this effusion is gone, but it does appear that she has some pleural tethering down in the right lower lobe.  She denies any significant cough, chest tightness or wheezing.  She does have an albuterol inhaler, which she will use when short of breath and she does get relief.    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     Liver transplanted (H) 08/30/2018     Lung metastases (H) 08/2014     Portal vein thrombosis      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) 08/31/2018    Residual thrombus in main and right portal     Past Surgical History:   Procedure Laterality Date     CHOLECYSTECTOMY       HEPATECTOMY PARTIAL       OPEN  REDUCTION INTERNAL FIXATION HUMERUS PROXIMAL Left 3/4/2019    Procedure: OPEN REDUCTION INTERNAL FIXATION HUMERUS PROXIMAL LEFT with Allograft;  Surgeon: Eh Kraft MD;  Location: [facility]     RETURN LIVER TRANSPLANT N/A 2018    Procedure: RETURN LIVER TRANSPLANT;  Open Abdomen, return liver transplant washout with   Mesh and liver stent  placement and  Abdomal Wound closure;  Surgeon: Darren Rod MD;  [facility]     TRANSPLANT LIVER RECIPIENT  DONOR N/A 2018    Procedure: TRANSPLANT LIVER RECIPIENT  DONOR;  TRANSPLANT LIVER RECIPIENT  DONOR ;  Surgeon: Darren Rod MD;  Location: [facility] OR     Data:  PFT 2020:      Moderate airflow obstruction.  Normal lung volumes.  Normal diffusion capacity.    Chest x-ray: I have reviewed her chest x-ray again.  Normal lung parenchyma.  Blunting of the right costophrenic angle    Assessment and plan:  Shortness of breath  History of liver transplant  History of pleural effusion  Airflow obstruction, consistent with COPD    She has persistent shortness of breath with exertion as well as moderate airflow obstruction on spirometry.  She does not have a smoking history.  Her symptoms and spirometry is consistent with COPD, and I think it is appropriate to try treating that.  Asthma could also fits, but she does not have any other triggers for her breathing.  Always seems exertional.  I will start her on Stiolto, long-acting beta agonist and long-acting muscarinic antagonist.  I have asked her to get in touch with us in about 6 weeks to let us know if this is helping or not.  If it is not helping, we could try an inhaled steroid, and possibly repeat imaging      Phone call duration:  16 minutes    Jesus Thomas MD

## 2020-07-29 NOTE — TELEPHONE ENCOUNTER
FUTURE VISIT INFORMATION      SURGERY INFORMATION:    Date: 9/14/20    Location: [facility]    Surgeon:  Nitin Jacobsen MD     Anesthesia Type:  spinal    Procedure: ARTHROPLASTY, KNEE, TOTAL, RIGHT     RECORDS REQUESTED FROM:       Primary Care Provider: DOYLE ODELL     Most recent EKG with tracings/strips: 3.13.15 [facility]     Most recent ECHO: 10.31.17 [facility]      PFT: 12.19.19     Action MJ 2.9.20 1:12 PM   Action Taken Requested most recent EKG and echo from [facility].

## 2020-08-04 ENCOUNTER — TELEPHONE (OUTPATIENT)
Dept: ORTHOPEDICS | Facility: CLINIC | Age: 65
End: 2020-08-04

## 2020-08-04 NOTE — TELEPHONE ENCOUNTER
Received phone call from Sherrie. Patient states she would like to cancel surgery as she is not comfortable moving forward with surgery as she has history of a transplant. Patient states that she will call me when she feels she is comfortable enough to reschedule. Patient was given [phone] to call to schedule when ready.

## 2020-08-18 ENCOUNTER — PRE VISIT (OUTPATIENT)
Dept: SURGERY | Facility: CLINIC | Age: 65
End: 2020-08-18

## 2020-09-01 ENCOUNTER — TELEPHONE (OUTPATIENT)
Dept: TRANSPLANT | Facility: CLINIC | Age: 65
End: 2020-09-01

## 2020-09-01 DIAGNOSIS — Z94.4 LIVER TRANSPLANTED (H): Primary | ICD-10-CM

## 2020-09-01 DIAGNOSIS — Z94.4 LIVER TRANSPLANTED (H): ICD-10-CM

## 2020-09-01 RX ORDER — TACROLIMUS 1 MG/1
3 CAPSULE ORAL EVERY 12 HOURS
Qty: 540 CAPSULE | Refills: 3 | Status: SHIPPED | OUTPATIENT
Start: 2020-09-01 | End: 2020-09-01

## 2020-09-01 RX ORDER — TACROLIMUS 1 MG/1
3 CAPSULE ORAL EVERY 12 HOURS
Qty: 540 CAPSULE | Refills: 3 | Status: SHIPPED | OUTPATIENT
Start: 2020-09-01 | End: 2020-09-02

## 2020-09-01 NOTE — TELEPHONE ENCOUNTER
Patient Call: Voicemail  Date/Time: 9/1/20 / 4:51 pm  Reason for call: [facility] (new pharmacy for patient) will need a new prescription for medication Tacrolimus 1mg   With diagnosis code and date of transplant.

## 2020-09-02 ENCOUNTER — OFFICE VISIT (OUTPATIENT)
Dept: GASTROENTEROLOGY | Facility: CLINIC | Age: 65
End: 2020-09-02
Attending: INTERNAL MEDICINE
Payer: MEDICARE

## 2020-09-02 VITALS
OXYGEN SATURATION: 98 % | DIASTOLIC BLOOD PRESSURE: 90 MMHG | BODY MASS INDEX: 23.7 KG/M2 | SYSTOLIC BLOOD PRESSURE: 134 MMHG | WEIGHT: 133.8 LBS | HEART RATE: 67 BPM

## 2020-09-02 DIAGNOSIS — Z94.4 LIVER REPLACED BY TRANSPLANT (H): Primary | ICD-10-CM

## 2020-09-02 DIAGNOSIS — Z13.220 LIPID SCREENING: ICD-10-CM

## 2020-09-02 DIAGNOSIS — Z94.4 LIVER REPLACED BY TRANSPLANT (H): ICD-10-CM

## 2020-09-02 DIAGNOSIS — Z94.4 LIVER TRANSPLANTED (H): ICD-10-CM

## 2020-09-02 LAB
ALBUMIN SERPL-MCNC: 4.2 G/DL (ref 3.4–5)
ALBUMIN UR-MCNC: 30 MG/DL
ALP SERPL-CCNC: 71 U/L (ref 40–150)
ALT SERPL W P-5'-P-CCNC: 20 U/L (ref 0–50)
ANION GAP SERPL CALCULATED.3IONS-SCNC: 6 MMOL/L (ref 3–14)
APPEARANCE UR: ABNORMAL
AST SERPL W P-5'-P-CCNC: 14 U/L (ref 0–45)
BASOPHILS # BLD AUTO: 0 10E9/L (ref 0–0.2)
BASOPHILS NFR BLD AUTO: 0.8 %
BILIRUB DIRECT SERPL-MCNC: 0.2 MG/DL (ref 0–0.2)
BILIRUB SERPL-MCNC: 0.8 MG/DL (ref 0.2–1.3)
BILIRUB UR QL STRIP: NEGATIVE
BUN SERPL-MCNC: 22 MG/DL (ref 7–30)
CALCIUM SERPL-MCNC: 8.8 MG/DL (ref 8.5–10.1)
CHLORIDE SERPL-SCNC: 107 MMOL/L (ref 94–109)
CHOLEST SERPL-MCNC: 148 MG/DL
CO2 SERPL-SCNC: 26 MMOL/L (ref 20–32)
COLOR UR AUTO: ABNORMAL
CREAT SERPL-MCNC: 1 MG/DL (ref 0.52–1.04)
CREAT UR-MCNC: 221 MG/DL
DIFFERENTIAL METHOD BLD: ABNORMAL
EOSINOPHIL # BLD AUTO: 0.6 10E9/L (ref 0–0.7)
EOSINOPHIL NFR BLD AUTO: 10.5 %
ERYTHROCYTE [DISTWIDTH] IN BLOOD BY AUTOMATED COUNT: 12.5 % (ref 10–15)
GFR SERPL CREATININE-BSD FRML MDRD: 60 ML/MIN/{1.73_M2}
GLUCOSE SERPL-MCNC: 91 MG/DL (ref 70–99)
GLUCOSE UR STRIP-MCNC: NEGATIVE MG/DL
HCT VFR BLD AUTO: 42.3 % (ref 35–47)
HDLC SERPL-MCNC: 60 MG/DL
HGB BLD-MCNC: 14.1 G/DL (ref 11.7–15.7)
HGB UR QL STRIP: ABNORMAL
HYALINE CASTS #/AREA URNS LPF: 3 /LPF (ref 0–2)
IMM GRANULOCYTES # BLD: 0 10E9/L (ref 0–0.4)
IMM GRANULOCYTES NFR BLD: 0.2 %
KETONES UR STRIP-MCNC: 5 MG/DL
LDLC SERPL CALC-MCNC: 74 MG/DL
LEUKOCYTE ESTERASE UR QL STRIP: NEGATIVE
LYMPHOCYTES # BLD AUTO: 0.8 10E9/L (ref 0.8–5.3)
LYMPHOCYTES NFR BLD AUTO: 14.6 %
MCH RBC QN AUTO: 30.6 PG (ref 26.5–33)
MCHC RBC AUTO-ENTMCNC: 33.3 G/DL (ref 31.5–36.5)
MCV RBC AUTO: 92 FL (ref 78–100)
MONOCYTES # BLD AUTO: 0.3 10E9/L (ref 0–1.3)
MONOCYTES NFR BLD AUTO: 6.3 %
MUCOUS THREADS #/AREA URNS LPF: PRESENT /LPF
NEUTROPHILS # BLD AUTO: 3.5 10E9/L (ref 1.6–8.3)
NEUTROPHILS NFR BLD AUTO: 67.6 %
NITRATE UR QL: NEGATIVE
NONHDLC SERPL-MCNC: 88 MG/DL
NRBC # BLD AUTO: 0 10*3/UL
NRBC BLD AUTO-RTO: 0 /100
PH UR STRIP: 5 PH (ref 5–7)
PLATELET # BLD AUTO: 144 10E9/L (ref 150–450)
POTASSIUM SERPL-SCNC: 4.9 MMOL/L (ref 3.4–5.3)
PROT SERPL-MCNC: 7.4 G/DL (ref 6.8–8.8)
PROT UR-MCNC: 0.34 G/L
PROT/CREAT 24H UR: 0.16 G/G CR (ref 0–0.2)
RBC # BLD AUTO: 4.61 10E12/L (ref 3.8–5.2)
RBC #/AREA URNS AUTO: 12 /HPF (ref 0–2)
SODIUM SERPL-SCNC: 138 MMOL/L (ref 133–144)
SOURCE: ABNORMAL
SP GR UR STRIP: 1.02 (ref 1–1.03)
SQUAMOUS #/AREA URNS AUTO: 1 /HPF (ref 0–1)
TACROLIMUS BLD-MCNC: 3.4 UG/L (ref 5–15)
TME LAST DOSE: ABNORMAL H
TRIGL SERPL-MCNC: 70 MG/DL
UROBILINOGEN UR STRIP-MCNC: 2 MG/DL (ref 0–2)
WBC # BLD AUTO: 5.2 10E9/L (ref 4–11)
WBC #/AREA URNS AUTO: 0 /HPF (ref 0–5)

## 2020-09-02 PROCEDURE — 80197 ASSAY OF TACROLIMUS: CPT | Performed by: INTERNAL MEDICINE

## 2020-09-02 PROCEDURE — G0463 HOSPITAL OUTPT CLINIC VISIT: HCPCS | Mod: ZF

## 2020-09-02 RX ORDER — TACROLIMUS 1 MG/1
3 CAPSULE ORAL EVERY 12 HOURS
Qty: 540 CAPSULE | Refills: 3 | Status: ON HOLD | OUTPATIENT
Start: 2020-09-02 | End: 2021-04-06

## 2020-09-02 RX ORDER — TACROLIMUS 1 MG/1
CAPSULE ORAL 2 TIMES DAILY
Qty: 1 CAPSULE | COMMUNITY
Start: 2020-09-02 | End: 2021-03-19

## 2020-09-02 ASSESSMENT — PAIN SCALES - GENERAL: PAINLEVEL: NO PAIN (0)

## 2020-09-02 NOTE — LETTER
9/2/2020         RE: Sherrie Lala  [address] [place] [address]      SUBJECTIVE:  I had the pleasure of seeing Sherrie Lala for followup in the Liver Transplantation Clinic at the [facility] on 09/02/2020.  Ms. 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  Secondary biliary cirrhosis is the results of previous surgery for cholangiocarcinoma, now 8 years ago.      She is doing well at this visit.  She still has some abdominal discomfort and has some occasional loose stools.  She has no sharp abdominal pain.  She denies any itching, skin rash or fatigue.  She denies any increased abdominal girth or lower extremity edema.  She has not had any gastrointestinal bleeding.      She denies any fevers or chills.  Her shortness of breath has improved for reasons that are not entirely clear to me.  She denies any cough.  She denies any nausea or vomiting.  She does have some intermittent diarrhea which can occasionally wake her at night.  Her appetite is good, and her weight has been stable.  There have been no other new events since she was last seen.     Current Outpatient Medications   Medication     albuterol (PROAIR HFA/PROVENTIL HFA/VENTOLIN HFA) 108 (90 BASE) MCG/ACT Inhaler     alendronate (FOSAMAX) 5 MG tablet     amLODIPine (NORVASC) 5 MG tablet     aspirin 81 MG tablet     gabapentin (NEURONTIN) 300 MG capsule     omeprazole 20 MG tablet     PRAMIPEXOLE DIHYDROCHLORIDE PO     tacrolimus (GENERIC EQUIVALENT) 1 MG capsule     tacrolimus (GENERIC EQUIVALENT) 1 MG capsule     umeclidinium-vilanterol (ANORO ELLIPTA) 62.5-25 MCG/INH oral inhaler     zolpidem (AMBIEN) 10 MG tablet     oxyCODONE (ROXICODONE) 5 MG tablet     warfarin (COUMADIN) 2 MG tablet     Current Facility-Administered Medications   Medication     lidocaine 1% with EPINEPHrine 1:100,000 injection 3 mL     BP (!) 134/90   Pulse 67   Wt 60.7 kg (133 lb 12.8  oz)   SpO2 98%   BMI 23.70 kg/m      PHYSICAL EXAMINATION:  In general, she looks quite well.  HEENT exam shows no scleral icterus or temporal muscle wasting.  Chest is clear.  Abdominal exam shows no increase in girth.  No masses or tenderness to palpation are present.  Her liver is 10 cm in span without left lobe enlargement.  No spleen tip is palpable, and extremity exam shows no edema.  Skin exam shows no suspicious lesions.  Neurologic exam is nonfocal.     Recent Results (from the past 168 hour(s))   Lipid Profile    Collection Time: 09/02/20 10:49 AM   Result Value Ref Range    Cholesterol 148 <200 mg/dL    Triglycerides 70 <150 mg/dL    HDL Cholesterol 60 >49 mg/dL    LDL Cholesterol Calculated 74 <100 mg/dL    Non HDL Cholesterol 88 <130 mg/dL   Hepatic panel    Collection Time: 09/02/20 10:49 AM   Result Value Ref Range    Bilirubin Direct 0.2 0.0 - 0.2 mg/dL    Bilirubin Total 0.8 0.2 - 1.3 mg/dL    Albumin 4.2 3.4 - 5.0 g/dL    Protein Total 7.4 6.8 - 8.8 g/dL    Alkaline Phosphatase 71 40 - 150 U/L    ALT 20 0 - 50 U/L    AST 14 0 - 45 U/L   Basic metabolic panel    Collection Time: 09/02/20 10:49 AM   Result Value Ref Range    Sodium 138 133 - 144 mmol/L    Potassium 4.9 3.4 - 5.3 mmol/L    Chloride 107 94 - 109 mmol/L    Carbon Dioxide 26 20 - 32 mmol/L    Anion Gap 6 3 - 14 mmol/L    Glucose 91 70 - 99 mg/dL    Urea Nitrogen 22 7 - 30 mg/dL    Creatinine 1.00 0.52 - 1.04 mg/dL    GFR Estimate 60 (L) >60 mL/min/[1.73_m2]    GFR Estimate If Black 69 >60 mL/min/[1.73_m2]    Calcium 8.8 8.5 - 10.1 mg/dL   CBC with platelets differential    Collection Time: 09/02/20 10:49 AM   Result Value Ref Range    WBC 5.2 4.0 - 11.0 10e9/L    RBC Count 4.61 3.8 - 5.2 10e12/L    Hemoglobin 14.1 11.7 - 15.7 g/dL    Hematocrit 42.3 35.0 - 47.0 %    MCV 92 78 - 100 fl    MCH 30.6 26.5 - 33.0 pg    MCHC 33.3 31.5 - 36.5 g/dL    RDW 12.5 10.0 - 15.0 %    Platelet Count 144 (L) 150 - 450 10e9/L    Diff Method Automated  Method     % Neutrophils 67.6 %    % Lymphocytes 14.6 %    % Monocytes 6.3 %    % Eosinophils 10.5 %    % Basophils 0.8 %    % Immature Granulocytes 0.2 %    Nucleated RBCs 0 0 /100    Absolute Neutrophil 3.5 1.6 - 8.3 10e9/L    Absolute Lymphocytes 0.8 0.8 - 5.3 10e9/L    Absolute Monocytes 0.3 0.0 - 1.3 10e9/L    Absolute Eosinophils 0.6 0.0 - 0.7 10e9/L    Absolute Basophils 0.0 0.0 - 0.2 10e9/L    Abs Immature Granulocytes 0.0 0 - 0.4 10e9/L    Absolute Nucleated RBC 0.0    Protein  random urine with Creat Ratio    Collection Time: 09/02/20 10:53 AM   Result Value Ref Range    Protein Random Urine 0.34 g/L    Protein Total Urine g/gr Creatinine 0.16 0 - 0.2 g/g Cr   UA with Microscopic    Collection Time: 09/02/20 10:53 AM   Result Value Ref Range    Color Urine Ariana     Appearance Urine Cloudy     Glucose Urine Negative NEG^Negative mg/dL    Bilirubin Urine Negative NEG^Negative    Ketones Urine 5 (A) NEG^Negative mg/dL    Specific Gravity Urine 1.025 1.003 - 1.035    Blood Urine Small (A) NEG^Negative    pH Urine 5.0 5.0 - 7.0 pH    Protein Albumin Urine 30 (A) NEG^Negative mg/dL    Urobilinogen mg/dL 2.0 0.0 - 2.0 mg/dL    Nitrite Urine Negative NEG^Negative    Leukocyte Esterase Urine Negative NEG^Negative    Source Midstream Urine     WBC Urine 0 0 - 5 /HPF    RBC Urine 12 (H) 0 - 2 /HPF    Squamous Epithelial /HPF Urine 1 0 - 1 /HPF    Mucous Urine Present (A) NEG^Negative /LPF    Hyaline Casts 3 (H) 0 - 2 /LPF   Creatinine urine calculation only    Collection Time: 09/02/20 10:53 AM   Result Value Ref Range    Creatinine Urine 221 mg/dL          IMPRESSION:  My impression is that Ms. Lala is now 2 years status post liver transplantation for secondary biliary cirrhosis as a result of previous cholangiocarcinoma surgery.  She is really doing quite well.  I will not be making any change to her medical regimen.  I will see her back in the clinic in 6 months.      She does have osteoporosis and has been on  Fosamax, but does not appear to be taking calcium and vitamin D.  I did recommend she does need to take additional calcium and vitamin D and I will check her bone density in 1 year.      I also did discuss COVID-19 with her and the fact that she does need to have a flu shot this fall.  She also will be high priority for the COVID vaccine when it becomes available.      Thank you very much for allowing me to participate in the care of this patient.  If you have any questions regarding my recommendations, please do not hesitate to contact me.         Bradly Lilly MD      Professor of Medicine  [employer]      Executive Medical Director, Solid Organ Transplant Program  [facility]

## 2020-09-02 NOTE — PROGRESS NOTES
SUBJECTIVE:  I had the pleasure of seeing Sherrie Lala for followup in the Liver Transplantation Clinic at the [facility] on 09/02/2020.  Ms. 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  Secondary biliary cirrhosis is the results of previous surgery for cholangiocarcinoma, now 8 years ago.      She is doing well at this visit.  She still has some abdominal discomfort and has some occasional loose stools.  She has no sharp abdominal pain.  She denies any itching, skin rash or fatigue.  She denies any increased abdominal girth or lower extremity edema.  She has not had any gastrointestinal bleeding.      She denies any fevers or chills.  Her shortness of breath has improved for reasons that are not entirely clear to me.  She denies any cough.  She denies any nausea or vomiting.  She does have some intermittent diarrhea which can occasionally wake her at night.  Her appetite is good, and her weight has been stable.  There have been no other new events since she was last seen.     Current Outpatient Medications   Medication     albuterol (PROAIR HFA/PROVENTIL HFA/VENTOLIN HFA) 108 (90 BASE) MCG/ACT Inhaler     alendronate (FOSAMAX) 5 MG tablet     amLODIPine (NORVASC) 5 MG tablet     aspirin 81 MG tablet     gabapentin (NEURONTIN) 300 MG capsule     omeprazole 20 MG tablet     PRAMIPEXOLE DIHYDROCHLORIDE PO     tacrolimus (GENERIC EQUIVALENT) 1 MG capsule     tacrolimus (GENERIC EQUIVALENT) 1 MG capsule     umeclidinium-vilanterol (ANORO ELLIPTA) 62.5-25 MCG/INH oral inhaler     zolpidem (AMBIEN) 10 MG tablet     oxyCODONE (ROXICODONE) 5 MG tablet     warfarin (COUMADIN) 2 MG tablet     Current Facility-Administered Medications   Medication     lidocaine 1% with EPINEPHrine 1:100,000 injection 3 mL     BP (!) 134/90   Pulse 67   Wt 60.7 kg (133 lb 12.8 oz)   SpO2 98%   BMI 23.70 kg/m      PHYSICAL EXAMINATION:  In general, she  looks quite well.  HEENT exam shows no scleral icterus or temporal muscle wasting.  Chest is clear.  Abdominal exam shows no increase in girth.  No masses or tenderness to palpation are present.  Her liver is 10 cm in span without left lobe enlargement.  No spleen tip is palpable, and extremity exam shows no edema.  Skin exam shows no suspicious lesions.  Neurologic exam is nonfocal.     Recent Results (from the past 168 hour(s))   Lipid Profile    Collection Time: 09/02/20 10:49 AM   Result Value Ref Range    Cholesterol 148 <200 mg/dL    Triglycerides 70 <150 mg/dL    HDL Cholesterol 60 >49 mg/dL    LDL Cholesterol Calculated 74 <100 mg/dL    Non HDL Cholesterol 88 <130 mg/dL   Hepatic panel    Collection Time: 09/02/20 10:49 AM   Result Value Ref Range    Bilirubin Direct 0.2 0.0 - 0.2 mg/dL    Bilirubin Total 0.8 0.2 - 1.3 mg/dL    Albumin 4.2 3.4 - 5.0 g/dL    Protein Total 7.4 6.8 - 8.8 g/dL    Alkaline Phosphatase 71 40 - 150 U/L    ALT 20 0 - 50 U/L    AST 14 0 - 45 U/L   Basic metabolic panel    Collection Time: 09/02/20 10:49 AM   Result Value Ref Range    Sodium 138 133 - 144 mmol/L    Potassium 4.9 3.4 - 5.3 mmol/L    Chloride 107 94 - 109 mmol/L    Carbon Dioxide 26 20 - 32 mmol/L    Anion Gap 6 3 - 14 mmol/L    Glucose 91 70 - 99 mg/dL    Urea Nitrogen 22 7 - 30 mg/dL    Creatinine 1.00 0.52 - 1.04 mg/dL    GFR Estimate 60 (L) >60 mL/min/[1.73_m2]    GFR Estimate If Black 69 >60 mL/min/[1.73_m2]    Calcium 8.8 8.5 - 10.1 mg/dL   CBC with platelets differential    Collection Time: 09/02/20 10:49 AM   Result Value Ref Range    WBC 5.2 4.0 - 11.0 10e9/L    RBC Count 4.61 3.8 - 5.2 10e12/L    Hemoglobin 14.1 11.7 - 15.7 g/dL    Hematocrit 42.3 35.0 - 47.0 %    MCV 92 78 - 100 fl    MCH 30.6 26.5 - 33.0 pg    MCHC 33.3 31.5 - 36.5 g/dL    RDW 12.5 10.0 - 15.0 %    Platelet Count 144 (L) 150 - 450 10e9/L    Diff Method Automated Method     % Neutrophils 67.6 %    % Lymphocytes 14.6 %    % Monocytes 6.3 %     % Eosinophils 10.5 %    % Basophils 0.8 %    % Immature Granulocytes 0.2 %    Nucleated RBCs 0 0 /100    Absolute Neutrophil 3.5 1.6 - 8.3 10e9/L    Absolute Lymphocytes 0.8 0.8 - 5.3 10e9/L    Absolute Monocytes 0.3 0.0 - 1.3 10e9/L    Absolute Eosinophils 0.6 0.0 - 0.7 10e9/L    Absolute Basophils 0.0 0.0 - 0.2 10e9/L    Abs Immature Granulocytes 0.0 0 - 0.4 10e9/L    Absolute Nucleated RBC 0.0    Protein  random urine with Creat Ratio    Collection Time: 09/02/20 10:53 AM   Result Value Ref Range    Protein Random Urine 0.34 g/L    Protein Total Urine g/gr Creatinine 0.16 0 - 0.2 g/g Cr   UA with Microscopic    Collection Time: 09/02/20 10:53 AM   Result Value Ref Range    Color Urine Ariana     Appearance Urine Cloudy     Glucose Urine Negative NEG^Negative mg/dL    Bilirubin Urine Negative NEG^Negative    Ketones Urine 5 (A) NEG^Negative mg/dL    Specific Gravity Urine 1.025 1.003 - 1.035    Blood Urine Small (A) NEG^Negative    pH Urine 5.0 5.0 - 7.0 pH    Protein Albumin Urine 30 (A) NEG^Negative mg/dL    Urobilinogen mg/dL 2.0 0.0 - 2.0 mg/dL    Nitrite Urine Negative NEG^Negative    Leukocyte Esterase Urine Negative NEG^Negative    Source Midstream Urine     WBC Urine 0 0 - 5 /HPF    RBC Urine 12 (H) 0 - 2 /HPF    Squamous Epithelial /HPF Urine 1 0 - 1 /HPF    Mucous Urine Present (A) NEG^Negative /LPF    Hyaline Casts 3 (H) 0 - 2 /LPF   Creatinine urine calculation only    Collection Time: 09/02/20 10:53 AM   Result Value Ref Range    Creatinine Urine 221 mg/dL          IMPRESSION:  My impression is that Ms. Lala is now 2 years status post liver transplantation for secondary biliary cirrhosis as a result of previous cholangiocarcinoma surgery.  She is really doing quite well.  I will not be making any change to her medical regimen.  I will see her back in the clinic in 6 months.      She does have osteoporosis and has been on Fosamax, but does not appear to be taking calcium and vitamin D.  I did  recommend she does need to take additional calcium and vitamin D and I will check her bone density in 1 year.      I also did discuss COVID-19 with her and the fact that she does need to have a flu shot this fall.  She also will be high priority for the COVID vaccine when it becomes available.      Thank you very much for allowing me to participate in the care of this patient.  If you have any questions regarding my recommendations, please do not hesitate to contact me.         Bradly Lilly MD      Professor of Medicine  [employer]      Executive Medical Director, Solid Organ Transplant Program  [facility]

## 2020-09-02 NOTE — NURSING NOTE
Chief Complaint   Patient presents with     RECHECK     6 month f/u     Blood pressure (!) 134/90, pulse 67, weight 60.7 kg (133 lb 12.8 oz), SpO2 98 %.    Zakiya Block, CMA

## 2020-09-04 ENCOUNTER — TELEPHONE (OUTPATIENT)
Dept: TRANSPLANT | Facility: CLINIC | Age: 65
End: 2020-09-04

## 2020-09-04 DIAGNOSIS — R31.9 HEMATURIA: Primary | ICD-10-CM

## 2020-09-04 NOTE — TELEPHONE ENCOUNTER
Per Dr. Lilly, CT is to look for stones that might explain ongoing hematuria.  Sherrie aware.  Order placed.  Message to .

## 2020-09-04 NOTE — TELEPHONE ENCOUNTER
Returned call. Sherrie was not aware that Dr. Lilly wants CT.  Told her it's likely because of recurrent hematuria - looking for nephrolithiasis.  Awaiting word from Dr. Lilly as to reason for CT.

## 2020-09-16 ENCOUNTER — OFFICE VISIT - RIVER FALLS (OUTPATIENT)
Dept: FAMILY MEDICINE | Facility: CLINIC | Age: 65
End: 2020-09-16
Payer: COMMERCIAL

## 2020-09-16 LAB
ALBUMIN SERPL-MCNC: 4.2 G/DL
ALP SERPL-CCNC: 71 UNIT/L
ALT SERPL W P-5'-P-CCNC: 20 UNIT/L
AST SERPL W P-5'-P-CCNC: 14 UNIT/L
BILIRUB SERPL-MCNC: 0.8 MG/DL
BUN SERPL-MCNC: 22 MG/DL
CALCIUM SERPL-MCNC: 8.8 MEQ/DL
CREAT SERPL-MCNC: 1 MG/DL
GLUCOSE BLD-MCNC: 91 MG/DL
HGB BLD-MCNC: 14.1 G/DL
POTASSIUM BLD-SCNC: 4 MEQ/L
PROT SERPL-MCNC: 7.4 GM/DL
RBC # BLD AUTO: 4.61 X10
SODIUM SERPL-SCNC: 138 MEQ/L
WBC # BLD AUTO: 5.2 X10

## 2020-09-18 ENCOUNTER — AMBULATORY - RIVER FALLS (OUTPATIENT)
Dept: FAMILY MEDICINE | Facility: CLINIC | Age: 65
End: 2020-09-18
Payer: COMMERCIAL

## 2020-10-01 ENCOUNTER — TELEPHONE (OUTPATIENT)
Dept: TRANSPLANT | Facility: CLINIC | Age: 65
End: 2020-10-01

## 2020-10-01 NOTE — TELEPHONE ENCOUNTER
Informed pt that if she requested a reminder call, the department would send one and that there is no prep except to arrive 30 minutes early.

## 2020-10-01 NOTE — TELEPHONE ENCOUNTER
Patient Call: General  Route to LPN    Reason for call: pt usually get reminders re her scans  Has a CT scheduled for tomorrow and has not been called re teaching  And if OK to drink fluids     Call back needed? Yes    Return Call Needed  Same as documented in contacts section  When to return call?: Greater than one day: Route standard priority

## 2020-10-02 ENCOUNTER — ANCILLARY PROCEDURE (OUTPATIENT)
Dept: CT IMAGING | Facility: CLINIC | Age: 65
End: 2020-10-02
Attending: INTERNAL MEDICINE
Payer: COMMERCIAL

## 2020-10-02 DIAGNOSIS — R31.9 HEMATURIA: ICD-10-CM

## 2020-10-02 PROCEDURE — 74176 CT ABD & PELVIS W/O CONTRAST: CPT | Performed by: RADIOLOGY

## 2020-10-13 ENCOUNTER — AMBULATORY - RIVER FALLS (OUTPATIENT)
Dept: FAMILY MEDICINE | Facility: CLINIC | Age: 65
End: 2020-10-13
Payer: COMMERCIAL

## 2020-10-16 ENCOUNTER — DOCUMENTATION ONLY (OUTPATIENT)
Dept: TRANSPLANT | Facility: CLINIC | Age: 65
End: 2020-10-16

## 2020-10-22 ENCOUNTER — COMMUNICATION - RIVER FALLS (OUTPATIENT)
Dept: FAMILY MEDICINE | Facility: CLINIC | Age: 65
End: 2020-10-22
Payer: COMMERCIAL

## 2020-11-04 ENCOUNTER — COMMUNICATION - RIVER FALLS (OUTPATIENT)
Dept: FAMILY MEDICINE | Facility: CLINIC | Age: 65
End: 2020-11-04
Payer: COMMERCIAL

## 2020-12-15 ENCOUNTER — COMMUNICATION - RIVER FALLS (OUTPATIENT)
Dept: FAMILY MEDICINE | Facility: CLINIC | Age: 65
End: 2020-12-15
Payer: COMMERCIAL

## 2020-12-27 ENCOUNTER — HEALTH MAINTENANCE LETTER (OUTPATIENT)
Age: 65
End: 2020-12-27

## 2020-12-30 ENCOUNTER — TELEPHONE (OUTPATIENT)
Dept: TRANSPLANT | Facility: CLINIC | Age: 65
End: 2020-12-30

## 2020-12-30 ENCOUNTER — OFFICE VISIT - RIVER FALLS (OUTPATIENT)
Dept: FAMILY MEDICINE | Facility: CLINIC | Age: 65
End: 2020-12-30
Payer: COMMERCIAL

## 2020-12-30 NOTE — TELEPHONE ENCOUNTER
"Call back to Sherrie.  She tells me that she \"feels like her liver is growing - she feels a little thicker\".   No pain, swelling anywhere.  Otherwise she \"feels great\".  I told her it is likely nothing, her weight is stable.  She is due for labs, will go in for them soon and I told her I will call her if anything looks out of the norm.  "

## 2020-12-30 NOTE — TELEPHONE ENCOUNTER
Patient Call: Sherrie would not state what or why  Just that she wanted to talk with her care coordinator         Call back needed? Yes    Return Call Needed  Same as documented in contacts section  When to return call?: Same day: Route High Priority

## 2021-01-05 ENCOUNTER — COMMUNICATION - RIVER FALLS (OUTPATIENT)
Dept: FAMILY MEDICINE | Facility: CLINIC | Age: 66
End: 2021-01-05
Payer: COMMERCIAL

## 2021-01-07 ENCOUNTER — AMBULATORY - RIVER FALLS (OUTPATIENT)
Dept: FAMILY MEDICINE | Facility: CLINIC | Age: 66
End: 2021-01-07
Payer: COMMERCIAL

## 2021-01-08 LAB
A/G RATIO - HISTORICAL: 2 (ref 1–2.5)
ALBUMIN SERPL-MCNC: 4.4 GM/DL (ref 3.6–5.1)
ALP SERPL-CCNC: 64 UNIT/L (ref 37–153)
ALT SERPL W P-5'-P-CCNC: 8 UNIT/L (ref 6–29)
AST SERPL W P-5'-P-CCNC: 9 UNIT/L (ref 10–35)
BASOPHILS # BLD MANUAL: 29 10*3/UL (ref 0–200)
BASOPHILS NFR BLD MANUAL: 0.6 %
BILIRUB DIRECT SERPL-MCNC: 0.1 MG/DL
BILIRUB INDIRECT SERPL-MCNC: 0.6 MG/DL (ref 0.2–1.2)
BILIRUB SERPL-MCNC: 0.7 MG/DL (ref 0.2–1.2)
EOSINOPHIL # BLD MANUAL: 490 10*3/UL (ref 15–500)
EOSINOPHIL NFR BLD MANUAL: 10 %
ERYTHROCYTE [DISTWIDTH] IN BLOOD BY AUTOMATED COUNT: 12.5 % (ref 11–15)
GLOBULIN: 2.2 (ref 1.9–3.7)
HCT VFR BLD AUTO: 39.9 % (ref 35–45)
HGB BLD-MCNC: 13.5 GM/DL (ref 11.7–15.5)
LYMPHOCYTES # BLD MANUAL: 970 10*3/UL (ref 850–3900)
LYMPHOCYTES NFR BLD MANUAL: 19.8 %
MCH RBC QN AUTO: 30.3 PG (ref 27–33)
MCHC RBC AUTO-ENTMCNC: 33.8 GM/DL (ref 32–36)
MCV RBC AUTO: 89.5 FL (ref 80–100)
MONOCYTES # BLD MANUAL: 319 10*3/UL (ref 200–950)
MONOCYTES NFR BLD MANUAL: 6.5 %
NEUTROPHILS # BLD MANUAL: 3092 10*3/UL (ref 1500–7800)
NEUTROPHILS NFR BLD MANUAL: 63.1 %
PLATELET # BLD AUTO: 139 10*3/UL (ref 140–400)
PMV BLD: 11.1 FL (ref 7.5–12.5)
PROT SERPL-MCNC: 6.6 GM/DL (ref 6.1–8.1)
RBC # BLD AUTO: 4.46 10*6/UL (ref 3.8–5.1)
TACROLIMUS LEVEL: 4.8 MCG/L
WBC # BLD AUTO: 4.9 10*3/UL (ref 3.8–10.8)

## 2021-01-12 ENCOUNTER — COMMUNICATION - RIVER FALLS (OUTPATIENT)
Dept: FAMILY MEDICINE | Facility: CLINIC | Age: 66
End: 2021-01-12
Payer: COMMERCIAL

## 2021-01-20 DIAGNOSIS — I10 BENIGN ESSENTIAL HYPERTENSION: ICD-10-CM

## 2021-01-20 RX ORDER — AMLODIPINE BESYLATE 5 MG/1
5 TABLET ORAL DAILY
Qty: 90 TABLET | Refills: 3 | Status: SHIPPED | OUTPATIENT
Start: 2021-01-20 | End: 2021-12-29

## 2021-02-23 DIAGNOSIS — M25.569 KNEE PAIN: Primary | ICD-10-CM

## 2021-03-02 ENCOUNTER — DOCUMENTATION ONLY (OUTPATIENT)
Dept: CARE COORDINATION | Facility: CLINIC | Age: 66
End: 2021-03-02

## 2021-03-03 ENCOUNTER — ANCILLARY PROCEDURE (OUTPATIENT)
Dept: GENERAL RADIOLOGY | Facility: CLINIC | Age: 66
End: 2021-03-03
Attending: ORTHOPAEDIC SURGERY
Payer: COMMERCIAL

## 2021-03-03 ENCOUNTER — OFFICE VISIT (OUTPATIENT)
Dept: ORTHOPEDICS | Facility: CLINIC | Age: 66
End: 2021-03-03
Payer: COMMERCIAL

## 2021-03-03 DIAGNOSIS — M25.569 KNEE PAIN: ICD-10-CM

## 2021-03-03 DIAGNOSIS — M17.11 PRIMARY LOCALIZED OSTEOARTHRITIS OF RIGHT KNEE: Primary | ICD-10-CM

## 2021-03-03 PROCEDURE — 99214 OFFICE O/P EST MOD 30 MIN: CPT | Performed by: ORTHOPAEDIC SURGERY

## 2021-03-03 PROCEDURE — 77073 BONE LENGTH STUDIES: CPT | Performed by: RADIOLOGY

## 2021-03-03 PROCEDURE — 73560 X-RAY EXAM OF KNEE 1 OR 2: CPT | Mod: XU | Performed by: RADIOLOGY

## 2021-03-03 NOTE — PROGRESS NOTES
[facility], Orthopaedic Surgery, Arthritis, Hip and Knee Replacement    Sherrie Lala MRN# [id number]   Age: 64 year old YOB: 1955     Requesting physician: Nitin Goodson              History of Present Illness:   Sherrie returns for follow-up for her right knee osteoarthritis.  I last saw her about a year ago.  She notes that since that time her right knee has gotten progressively more more painful.  Her normal activities are also more more limited.  She is able to continue to walk longer distances.  However if he does so she is unable to walk the following day due to pain in the knee.  The knee is gotten stiffer.  She has not had any changes to her past medical history.  She does have a history of a liver transplant and she continues to do well from this standpoint.  She also has a history of a blood clot but is not on any blood thinners aside from a baby aspirin.  Due to the severity of the symptoms and impact is having on her quality of life she is interested in considering more definitive treatment management options at this point for the knee.         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)     Hypervolemia     Positive sputum culture in cadaveric donor     Positive urine culture in cadaveric donor     Other closed displaced fracture of proximal end of left humerus with routine healing, subsequent encounter     Shortness of breath     Primary localized osteoarthritis of right knee     Past Medical History:   Diagnosis Date     Antiplatelet or antithrombotic long-term use      Asthma       Cholangiocarcinoma (H) 2014     Cirrhosis of liver with ascites (H) 5/10/2018     Encounter for pleural drainage tube placement      Esophageal varices with hemorrhage (H)      Gastric ulcer      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     Liver transplanted (H) 2018     Lung metastases (H) 2014     Portal vein thrombosis      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    Residual thrombus in main and right portal     Prior history of blood clot: yes, DVT - not currently on blood thinners - only ASA  Prior history of bleeding problems: yes, ASA  Prior history of anesthetic complications: none  Currently on opioids:  none  History of Diabetes (if so, recent A1c): no           Past Surgical History:     Past Surgical History:   Procedure Laterality Date     CHOLECYSTECTOMY       HEPATECTOMY PARTIAL       OPEN REDUCTION INTERNAL FIXATION HUMERUS PROXIMAL Left 3/4/2019    Procedure: OPEN REDUCTION INTERNAL FIXATION HUMERUS PROXIMAL LEFT with Allograft;  Surgeon: Eh Kraft MD;  Location: [facility] OR     RETURN LIVER TRANSPLANT N/A 2018    Procedure: RETURN LIVER TRANSPLANT;  Open Abdomen, return liver transplant washout with   Mesh and liver stent  placement and  Abdomal Wound closure;  Surgeon: Darren Rod MD;  Location: [facility] OR     TRANSPLANT LIVER RECIPIENT  DONOR N/A 2018    Procedure: TRANSPLANT LIVER RECIPIENT  DONOR;  TRANSPLANT LIVER RECIPIENT  DONOR ;  Surgeon: Darren Rod MD;  Location: [facility] OR            Social History:     Social History     Socioeconomic History     Marital status:      Spouse name: Not on file     Number of children: Not on file     Years of education: Not on file     Highest education level: Not on file   Occupational History     Not on file   Social Needs     Financial resource strain: Not on file     Food insecurity     Worry: Not on file     Inability: Not on file     Transportation needs     Medical:  Not on file     Non-medical: Not on file   Tobacco Use     Smoking status: Never Smoker     Smokeless tobacco: Never Used   Substance and Sexual Activity     Alcohol use: Yes     Comment: occ      Drug use: No     Sexual activity: Not on file   Lifestyle     Physical activity     Days per week: Not on file     Minutes per session: Not on file     Stress: Not on file   Relationships     Social connections     Talks on phone: Not on file     Gets together: Not on file     Attends Confucianist service: Not on file     Active member of club or organization: Not on file     Attends meetings of clubs or organizations: Not on file     Relationship status: Not on file     Intimate partner violence     Fear of current or ex partner: Not on file     Emotionally abused: Not on file     Physically abused: Not on file     Forced sexual activity: Not on file   Other Topics Concern     Parent/sibling w/ CABG, MI or angioplasty before 65F 55M? Not Asked   Social History Narrative     Not on file       Smoking: non smoker  Lives in [place], good family support.         Family History:       Family History   Problem Relation Age of Onset     Skin Cancer Father      Skin Cancer Brother      Melanoma No family hx of               Medications:     Current Outpatient Medications   Medication Sig     albuterol (PROAIR HFA/PROVENTIL HFA/VENTOLIN HFA) 108 (90 BASE) MCG/ACT Inhaler Inhale 2 puffs into the lungs every 6 hours     alendronate (FOSAMAX) 5 MG tablet Take by mouth every morning (before breakfast)     amLODIPine (NORVASC) 5 MG tablet TAKE 1 TABLET (5 MG) BY MOUTH DAILY     aspirin 81 MG tablet Take 1 tablet (81 mg) by mouth daily     gabapentin (NEURONTIN) 300 MG capsule Take 300 mg by mouth At Bedtime     omeprazole 20 MG tablet Take 2 tablets (40 mg) by mouth daily     PRAMIPEXOLE DIHYDROCHLORIDE PO Take 0.5 mg by mouth daily     tacrolimus (GENERIC EQUIVALENT) 1 MG capsule TAKE 1 CAPSULE SENT TO DR FOR NEW SCRIPT WITH DX CODE AND  DATE OF TRANSPLANT     tacrolimus (GENERIC EQUIVALENT) 1 MG capsule Take 3 capsules (3 mg) by mouth every 12 hours     umeclidinium-vilanterol (ANORO ELLIPTA) 62.5-25 MCG/INH oral inhaler Inhale 1 puff into the lungs daily     zolpidem (AMBIEN) 10 MG tablet Take 10 mg by mouth nightly as needed for sleep      Current Facility-Administered Medications   Medication     lidocaine 1% with EPINEPHrine 1:100,000 injection 3 mL                  Physical Exam:     EXAMINATION pertinent findings:   VITAL SIGNS: There were no vitals taken for this visit.  There is no height or weight on file to calculate BMI.  GEN: AOx3, cooperative, no distress  RESP: non labored breathing   ABD: benign   SKIN: grossly normal   LYMPHATIC: grossly normal   NEURO: grossly normal   VASCULAR: satisfactory perfusion of all extremities  MUSCULOSKELETAL:   She walks with a stiff right leg favoring her left leg. The right knee is swollen most prominent on the medial side. The is a varus deformity of the right knee joint that is reducible by manipulation. Knee flexion is limited to 120 degrees secondary to pain. Knee extension to 0 degrees. There is tenderness to palpation of the knee joint most prominent over the medial joint line. She is able to straight leg raise without difficulty.  Ankle plantar flexion dorsiflexion is intact.  Intact sensation throughout her foot.  2+ DP pulse.  Extensor mechanism is fully intact without extensor lag.             Data:   Imaging:   AP and lateral x-ray of right knee show degenerative joint disease with loss of joint space most prominent in the medial compartment. There is medial translation of the femur relative to the tibia with varus deformity of the knee. Full length bilateral lower extremity x-rays show normal left knee and bilateral hip joints.         Assessment and Plan:   Assessment:  Sherrie is a pleasant 64 year old woman with hx of liver transplant and end stage right knee osteoarthritis.  I had a long  discussion with the patient regarding ongoing management options.  Reviewed surgical and nonsurgical treatments.  We reviewed total knee replacement in detail including the procedure, the implants, the recovery process, and long-term outcomes.  We reviewed that the risks of the surgery include but are not limited to infection, wound problems, stiffness, persistent pain, swelling, clicking, loosening, revision surgery.  We also reviewed less common risks such as neurovascular injury fracture, and other implant-related issues.  We reviewed other medical complications such as a blood clot.  We discussed that the vast majority of cases have a highly successful outcome.  However there is a small subset of patients that do experience complications or problems following the knee replacement and these problems can be very debilitating and painful and sometimes do not improve.  Due to the patient's transplant , they may be at a slightly higher risk of infection.  Based on a discussion of the risks and benefits, we believe that the benefits far outweigh the risks at this point and the patient   would like to proceed with surgery.  We will work on scheduling surgery at a time that works well for them in the next few months.  They will contact us if they have any questions or concerns leading up to surgery.       Nitin Jacobsen M.D.     Arthritis and Joint Replacement  Department of Orthopaedic Surgery, [facility]  [email or web address]  [phone] (pager)           Review of Systems:

## 2021-03-03 NOTE — LETTER
3/3/2021         RE: Sherrie Lala  [address] [facility] [address]        Dear Colleague,    Thank you for referring your patient, Sherrie Lala, to the [facility] [place]. Please see a copy of my visit note below.    [facility], Orthopaedic Surgery, Arthritis, Hip and Knee Replacement    Sherrie Lala MRN# [id number]   Age: 64 year old YOB: 1955     Requesting physician: Nitin Goodson              History of Present Illness:   Sherrie returns for follow-up for her right knee osteoarthritis.  I last saw her about a year ago.  She notes that since that time her right knee has gotten progressively more more painful.  Her normal activities are also more more limited.  She is able to continue to walk longer distances.  However if he does so she is unable to walk the following day due to pain in the knee.  The knee is gotten stiffer.  She has not had any changes to her past medical history.  She does have a history of a liver transplant and she continues to do well from this standpoint.  She also has a history of a blood clot but is not on any blood thinners aside from a baby aspirin.  Due to the severity of the symptoms and impact is having on her quality of life she is interested in considering more definitive treatment management options at this point for the knee.         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)     Hypervolemia     Positive sputum culture in cadaveric donor     Positive urine culture in cadaveric donor     Other closed displaced fracture of  proximal end of left humerus with routine healing, subsequent encounter     Shortness of breath     Primary localized osteoarthritis of right knee     Past Medical History:   Diagnosis Date     Antiplatelet or antithrombotic long-term use      Asthma      Cholangiocarcinoma (H) 2014     Cirrhosis of liver with ascites (H) 5/10/2018     Encounter for pleural drainage tube placement      Esophageal varices with hemorrhage (H)      Gastric ulcer      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     Liver transplanted (H) 2018     Lung metastases (H) 2014     Portal vein thrombosis      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    Residual thrombus in main and right portal     Prior history of blood clot: yes, DVT - not currently on blood thinners - only ASA  Prior history of bleeding problems: yes, ASA  Prior history of anesthetic complications: none  Currently on opioids:  none  History of Diabetes (if so, recent A1c): no           Past Surgical History:     Past Surgical History:   Procedure Laterality Date     CHOLECYSTECTOMY       HEPATECTOMY PARTIAL       OPEN REDUCTION INTERNAL FIXATION HUMERUS PROXIMAL Left 3/4/2019    Procedure: OPEN REDUCTION INTERNAL FIXATION HUMERUS PROXIMAL LEFT with Allograft;  Surgeon: Eh Kraft MD;  [facility]     RETURN LIVER TRANSPLANT N/A 2018    Procedure: RETURN LIVER TRANSPLANT;  Open Abdomen, return liver transplant washout with   Mesh and liver stent  placement and  Abdomal Wound closure;  Surgeon: Darren Rod MD;  [facility]     TRANSPLANT LIVER RECIPIENT  DONOR N/A 2018    Procedure: TRANSPLANT LIVER RECIPIENT  DONOR;  TRANSPLANT LIVER RECIPIENT  DONOR ;  Surgeon: Darren Rod MD;  [facility]            Social History:     Social History     Socioeconomic History     Marital status:      Spouse name: Not on file     Number of children: Not on file     Years of education:  Not on file     Highest education level: Not on file   Occupational History     Not on file   Social Needs     Financial resource strain: Not on file     Food insecurity     Worry: Not on file     Inability: Not on file     Transportation needs     Medical: Not on file     Non-medical: Not on file   Tobacco Use     Smoking status: Never Smoker     Smokeless tobacco: Never Used   Substance and Sexual Activity     Alcohol use: Yes     Comment: occ      Drug use: No     Sexual activity: Not on file   Lifestyle     Physical activity     Days per week: Not on file     Minutes per session: Not on file     Stress: Not on file   Relationships     Social connections     Talks on phone: Not on file     Gets together: Not on file     Attends Hinduism service: Not on file     Active member of club or organization: Not on file     Attends meetings of clubs or organizations: Not on file     Relationship status: Not on file     Intimate partner violence     Fear of current or ex partner: Not on file     Emotionally abused: Not on file     Physically abused: Not on file     Forced sexual activity: Not on file   Other Topics Concern     Parent/sibling w/ CABG, MI or angioplasty before 65F 55M? Not Asked   Social History Narrative     Not on file       Smoking: non smoker  Lives in [place], good family support.         Family History:       Family History   Problem Relation Age of Onset     Skin Cancer Father      Skin Cancer Brother      Melanoma No family hx of               Medications:     Current Outpatient Medications   Medication Sig     albuterol (PROAIR HFA/PROVENTIL HFA/VENTOLIN HFA) 108 (90 BASE) MCG/ACT Inhaler Inhale 2 puffs into the lungs every 6 hours     alendronate (FOSAMAX) 5 MG tablet Take by mouth every morning (before breakfast)     amLODIPine (NORVASC) 5 MG tablet TAKE 1 TABLET (5 MG) BY MOUTH DAILY     aspirin 81 MG tablet Take 1 tablet (81 mg) by mouth daily     gabapentin (NEURONTIN) 300 MG capsule Take 300  mg by mouth At Bedtime     omeprazole 20 MG tablet Take 2 tablets (40 mg) by mouth daily     PRAMIPEXOLE DIHYDROCHLORIDE PO Take 0.5 mg by mouth daily     tacrolimus (GENERIC EQUIVALENT) 1 MG capsule TAKE 1 CAPSULE SENT TO DR FOR NEW SCRIPT WITH DX CODE AND DATE OF TRANSPLANT     tacrolimus (GENERIC EQUIVALENT) 1 MG capsule Take 3 capsules (3 mg) by mouth every 12 hours     umeclidinium-vilanterol (ANORO ELLIPTA) 62.5-25 MCG/INH oral inhaler Inhale 1 puff into the lungs daily     zolpidem (AMBIEN) 10 MG tablet Take 10 mg by mouth nightly as needed for sleep      Current Facility-Administered Medications   Medication     lidocaine 1% with EPINEPHrine 1:100,000 injection 3 mL                  Physical Exam:     EXAMINATION pertinent findings:   VITAL SIGNS: There were no vitals taken for this visit.  There is no height or weight on file to calculate BMI.  GEN: AOx3, cooperative, no distress  RESP: non labored breathing   ABD: benign   SKIN: grossly normal   LYMPHATIC: grossly normal   NEURO: grossly normal   VASCULAR: satisfactory perfusion of all extremities  MUSCULOSKELETAL:   She walks with a stiff right leg favoring her left leg. The right knee is swollen most prominent on the medial side. The is a varus deformity of the right knee joint that is reducible by manipulation. Knee flexion is limited to 120 degrees secondary to pain. Knee extension to 0 degrees. There is tenderness to palpation of the knee joint most prominent over the medial joint line. She is able to straight leg raise without difficulty.  Ankle plantar flexion dorsiflexion is intact.  Intact sensation throughout her foot.  2+ DP pulse.  Extensor mechanism is fully intact without extensor lag.             Data:   Imaging:   AP and lateral x-ray of right knee show degenerative joint disease with loss of joint space most prominent in the medial compartment. There is medial translation of the femur relative to the tibia with varus deformity of the knee.  Full length bilateral lower extremity x-rays show normal left knee and bilateral hip joints.         Assessment and Plan:   Assessment:  Sherrie is a pleasant 64 year old woman with hx of liver transplant and end stage right knee osteoarthritis.  I had a long discussion with the patient regarding ongoing management options.  Reviewed surgical and nonsurgical treatments.  We reviewed total knee replacement in detail including the procedure, the implants, the recovery process, and long-term outcomes.  We reviewed that the risks of the surgery include but are not limited to infection, wound problems, stiffness, persistent pain, swelling, clicking, loosening, revision surgery.  We also reviewed less common risks such as neurovascular injury fracture, and other implant-related issues.  We reviewed other medical complications such as a blood clot.  We discussed that the vast majority of cases have a highly successful outcome.  However there is a small subset of patients that do experience complications or problems following the knee replacement and these problems can be very debilitating and painful and sometimes do not improve.  Due to the patient's transplant , they may be at a slightly higher risk of infection.  Based on a discussion of the risks and benefits, we believe that the benefits far outweigh the risks at this point and the patient   would like to proceed with surgery.  We will work on scheduling surgery at a time that works well for them in the next few months.  They will contact us if they have any questions or concerns leading up to surgery.       Nitin Jacobsen M.D.     Arthritis and Joint Replacement  Department of Orthopaedic Surgery, [facility]  [email or web address]  [phone] (pager)           Review of Systems:           [facility], Orthopaedic Surgery, Arthritis, Hip and Knee Replacement    Sherrie Lala MRN# [id number]   Age: 64 year old YOB: 1955      Requesting physician: Nitin Goodson              History of Present Illness:   Sherrie Lala is a 64 year old woman with history of progressive right knee pain and deformity secondary to osteoarthritis who presents for evaluation of total knee arthroplasty. She has experienced progressive increase in pain in the right knee and varus deformity for greater than 9 years. She has not focused on treatment for this pain for the past 9 years while she has been receiving treatment for cholangiocarcinoma including liver transplantation in 2018. Her health is now stable and she would like to consider treatment for her knee pain. She has tried cortisone injection, ice, and ibuprofen in the past, however these have not helped her knee pain. The pain is limiting her in her usual daily activities including walking and yoga. She had a right ACL tear with reconstruction when she was a teenager. She would like to consider total joint arthroplasty.         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)     Hypervolemia     Positive sputum culture in cadaveric donor     Positive urine culture in cadaveric donor     Other closed displaced fracture of proximal end of left humerus with routine healing, subsequent encounter     Shortness of breath     Primary localized osteoarthritis of right knee     Past Medical History:   Diagnosis Date     Antiplatelet or antithrombotic long-term use      Asthma      Cholangiocarcinoma (H) 06/2014     Cirrhosis of liver with ascites (H) 5/10/2018     Encounter for pleural drainage tube placement       Esophageal varices with hemorrhage (H)      Gastric ulcer      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     Liver transplanted (H) 2018     Lung metastases (H) 2014     Portal vein thrombosis      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    Residual thrombus in main and right portal     Prior history of blood clot: yes, DVT - not currently on blood thinners - only ASA  Prior history of bleeding problems: yes, ASA  Prior history of anesthetic complications: none  Currently on opioids:  none  History of Diabetes (if so, recent A1c): no           Past Surgical History:     Past Surgical History:   Procedure Laterality Date     CHOLECYSTECTOMY       HEPATECTOMY PARTIAL       OPEN REDUCTION INTERNAL FIXATION HUMERUS PROXIMAL Left 3/4/2019    Procedure: OPEN REDUCTION INTERNAL FIXATION HUMERUS PROXIMAL LEFT with Allograft;  Surgeon: Eh Kraft MD;  [facility]     RETURN LIVER TRANSPLANT N/A 2018    Procedure: RETURN LIVER TRANSPLANT;  Open Abdomen, return liver transplant washout with   Mesh and liver stent  placement and  Abdomal Wound closure;  Surgeon: Darren Rod MD;  [facility]     TRANSPLANT LIVER RECIPIENT  DONOR N/A 2018    Procedure: TRANSPLANT LIVER RECIPIENT  DONOR;  TRANSPLANT LIVER RECIPIENT  DONOR ;  Surgeon: Darren Rod MD;  [facility]            Social History:     Social History     Socioeconomic History     Marital status:      Spouse name: Not on file     Number of children: Not on file     Years of education: Not on file     Highest education level: Not on file   Occupational History     Not on file   Social Needs     Financial resource strain: Not on file     Food insecurity     Worry: Not on file     Inability: Not on file     Transportation needs     Medical: Not on file     Non-medical: Not on file   Tobacco Use     Smoking status: Never Smoker     Smokeless tobacco: Never Used    Substance and Sexual Activity     Alcohol use: Yes     Comment: occ      Drug use: No     Sexual activity: Not on file   Lifestyle     Physical activity     Days per week: Not on file     Minutes per session: Not on file     Stress: Not on file   Relationships     Social connections     Talks on phone: Not on file     Gets together: Not on file     Attends Alevism service: Not on file     Active member of club or organization: Not on file     Attends meetings of clubs or organizations: Not on file     Relationship status: Not on file     Intimate partner violence     Fear of current or ex partner: Not on file     Emotionally abused: Not on file     Physically abused: Not on file     Forced sexual activity: Not on file   Other Topics Concern     Parent/sibling w/ CABG, MI or angioplasty before 65F 55M? Not Asked   Social History Narrative     Not on file       Smoking: non smoker  Lives in [place], good family support.         Family History:       Family History   Problem Relation Age of Onset     Skin Cancer Father      Skin Cancer Brother      Melanoma No family hx of               Medications:     Current Outpatient Medications   Medication Sig     albuterol (PROAIR HFA/PROVENTIL HFA/VENTOLIN HFA) 108 (90 BASE) MCG/ACT Inhaler Inhale 2 puffs into the lungs every 6 hours     alendronate (FOSAMAX) 5 MG tablet Take by mouth every morning (before breakfast)     amLODIPine (NORVASC) 5 MG tablet TAKE 1 TABLET (5 MG) BY MOUTH DAILY     aspirin 81 MG tablet Take 1 tablet (81 mg) by mouth daily     gabapentin (NEURONTIN) 300 MG capsule Take 300 mg by mouth At Bedtime     omeprazole 20 MG tablet Take 2 tablets (40 mg) by mouth daily     oxyCODONE (ROXICODONE) 5 MG tablet Take 1 tablet (5 mg) by mouth every 6 hours as needed for severe pain     PRAMIPEXOLE DIHYDROCHLORIDE PO Take 0.5 mg by mouth daily     tacrolimus (GENERIC EQUIVALENT) 1 MG capsule TAKE 1 CAPSULE SENT TO  FOR NEW SCRIPT WITH DX CODE AND DATE OF  TRANSPLANT     tacrolimus (GENERIC EQUIVALENT) 1 MG capsule Take 3 capsules (3 mg) by mouth every 12 hours     umeclidinium-vilanterol (ANORO ELLIPTA) 62.5-25 MCG/INH oral inhaler Inhale 1 puff into the lungs daily     warfarin (COUMADIN) 2 MG tablet Take 2 tablets (4 mg) by mouth daily Every evening or as directed by provider     zolpidem (AMBIEN) 10 MG tablet Take 10 mg by mouth nightly as needed for sleep      Current Facility-Administered Medications   Medication     lidocaine 1% with EPINEPHrine 1:100,000 injection 3 mL                  Physical Exam:     EXAMINATION pertinent findings:   VITAL SIGNS: There were no vitals taken for this visit.  There is no height or weight on file to calculate BMI.  GEN: AOx3, cooperative, no distress  RESP: non labored breathing   ABD: benign   SKIN: grossly normal   LYMPHATIC: grossly normal   NEURO: grossly normal   VASCULAR: satisfactory perfusion of all extremities  MUSCULOSKELETAL:   She walks with a stiff right leg favoring her left leg. The right knee is swollen most prominent on the medial side. The is a varus deformity of the right knee joint that is reducible by manipulation. Knee flexion is limited to 120 degrees secondary to pain. Knee extension to 0 degrees. There is tenderness to palpation of the knee joint most prominent over the medial joint line. She is able to straight leg raise without difficulty.  Ankle plantar flexion dorsiflexion is intact.  Intact sensation throughout her foot.  2+ DP pulse.  Extensor mechanism is fully intact without extensor lag.             Data:   Imaging:   AP and lateral x-ray of right knee show degenerative joint disease with loss of joint space most prominent in the medial compartment. There is medial translation of the femur relative to the tibia with varus deformity of the knee. Full length bilateral lower extremity x-rays show normal left knee and bilateral hip joints.         Assessment and Plan:   Assessment:  Sherrie pereira  pleasant 64 year old woman with hx of liver transplant and end stage right knee osteoarthritis.  I had a long discussion with the patient regarding ongoing management options.  Reviewed surgical and nonsurgical treatments.  We reviewed total knee replacement in detail including the procedure, the implants, the recovery process, and long-term outcomes.  We reviewed that the risks of the surgery include but are not limited to infection, wound problems, stiffness, persistent pain, swelling, clicking, loosening, revision surgery.  We also reviewed less common risks such as neurovascular injury fracture, and other implant-related issues.  We reviewed other medical complications such as a blood clot.  We discussed that the vast majority of cases have a highly successful outcome.  However there is a small subset of patients that do experience complications or problems following the knee replacement and these problems can be very debilitating and painful and sometimes do not improve.  Due to the patient's transplant , they may be at a slightly higher risk of infection.  Based on a discussion of the risks and benefits, we believe that the benefits far outweigh the risks at this point and the patient   would like to proceed with surgery.  We will work on scheduling surgery at a time that works well for them in the next few months.  They will contact us if they have any questions or concerns leading up to surgery.       Nitin Jacobsen M.D.     Arthritis and Joint Replacement  Department of Orthopaedic Surgery, [facility]  [email or web address]  [phone] (pager)

## 2021-03-03 NOTE — NURSING NOTE
Teaching Flowsheet   Relevant Diagnosis: Right knee osteoarthritis  Teaching Topic: Right total knee arthroplasty    Patient states her  Bryant will be her support person/. Patient's health history is positive for HTN on amlodipine, status post liver transplant, mild asthma, hx of cholangiocarcinoma in 2011, blood clot with the cancer, shoulder fracture repair. Patient will have her preop done through [facility].     Person(s) involved in teaching:   Patient     Motivation Level:  Asks Questions: Yes  Eager to Learn: Yes  Cooperative: Yes  Receptive (willing/able to accept information): Yes  Any cultural factors/Voodoo beliefs that may influence understanding or compliance? No     Patient demonstrates understanding of the following:  Reason for the appointment, diagnosis and treatment plan: Yes  Knowledge of proper use of medications and conditions for which they are ordered (with special attention to potential side effects or drug interactions): Yes  Which situations necessitate calling provider and whom to contact: Yes     Teaching Concerns Addressed: Discussed total joint online class. Patient understands he will need a preop exam within 30 days of date of surgery. He understands the expected length of stay and the expectation of outpatient physical therapy. Discussed dental prophylaxis as well as the need for COVID testing 4 days prior to surgery. Discussed the GetWell Loop.     Proper use and care of assistive devices (medical equip, care aids, etc.): Yes  Nutritional needs and diet plan: Yes  Pain management techniques: Yes  Wound Care: Yes  How and/when to access community resources: Yes     Instructional Materials Used/Given: Preoperative teaching packet, surgical soap x2, dental card, total joint class booklet.

## 2021-03-03 NOTE — PROGRESS NOTES
[facility], Orthopaedic Surgery, Arthritis, Hip and Knee Replacement    Sherrie Lala MRN# [id number]   Age: 64 year old YOB: 1955     Requesting physician: Nitin Goodson              History of Present Illness:   Sherrie Lala is a 64 year old woman with history of progressive right knee pain and deformity secondary to osteoarthritis who presents for evaluation of total knee arthroplasty. She has experienced progressive increase in pain in the right knee and varus deformity for greater than 9 years. She has not focused on treatment for this pain for the past 9 years while she has been receiving treatment for cholangiocarcinoma including liver transplantation in 2018. Her health is now stable and she would like to consider treatment for her knee pain. She has tried cortisone injection, ice, and ibuprofen in the past, however these have not helped her knee pain. The pain is limiting her in her usual daily activities including walking and yoga. She had a right ACL tear with reconstruction when she was a teenager. She would like to consider total joint arthroplasty.         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)     Hypervolemia     Positive sputum culture in cadaveric donor     Positive urine culture in cadaveric donor     Other closed displaced fracture of proximal end of left humerus with routine healing, subsequent encounter     Shortness of breath     Primary localized osteoarthritis of right knee     Past Medical History:   Diagnosis Date     Antiplatelet or antithrombotic long-term use       Asthma      Cholangiocarcinoma (H) 2014     Cirrhosis of liver with ascites (H) 5/10/2018     Encounter for pleural drainage tube placement      Esophageal varices with hemorrhage (H)      Gastric ulcer      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     Liver transplanted (H) 2018     Lung metastases (H) 2014     Portal vein thrombosis      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    Residual thrombus in main and right portal     Prior history of blood clot: yes, DVT - not currently on blood thinners - only ASA  Prior history of bleeding problems: yes, ASA  Prior history of anesthetic complications: none  Currently on opioids:  none  History of Diabetes (if so, recent A1c): no           Past Surgical History:     Past Surgical History:   Procedure Laterality Date     CHOLECYSTECTOMY       HEPATECTOMY PARTIAL       OPEN REDUCTION INTERNAL FIXATION HUMERUS PROXIMAL Left 3/4/2019    Procedure: OPEN REDUCTION INTERNAL FIXATION HUMERUS PROXIMAL LEFT with Allograft;  Surgeon: Eh Kraft MD;  [facility]     RETURN LIVER TRANSPLANT N/A 2018    Procedure: RETURN LIVER TRANSPLANT;  Open Abdomen, return liver transplant washout with   Mesh and liver stent  placement and  Abdomal Wound closure;  Surgeon: Darren Rod MD;  [facility]     TRANSPLANT LIVER RECIPIENT  DONOR N/A 2018    Procedure: TRANSPLANT LIVER RECIPIENT  DONOR;  TRANSPLANT LIVER RECIPIENT  DONOR ;  Surgeon: Darren Rod MD;  [facility]            Social History:     Social History     Socioeconomic History     Marital status:      Spouse name: Not on file     Number of children: Not on file     Years of education: Not on file     Highest education level: Not on file   Occupational History     Not on file   Social Needs     Financial resource strain: Not on file     Food insecurity     Worry: Not on file     Inability: Not on file     Transportation  needs     Medical: Not on file     Non-medical: Not on file   Tobacco Use     Smoking status: Never Smoker     Smokeless tobacco: Never Used   Substance and Sexual Activity     Alcohol use: Yes     Comment: occ      Drug use: No     Sexual activity: Not on file   Lifestyle     Physical activity     Days per week: Not on file     Minutes per session: Not on file     Stress: Not on file   Relationships     Social connections     Talks on phone: Not on file     Gets together: Not on file     Attends Jain service: Not on file     Active member of club or organization: Not on file     Attends meetings of clubs or organizations: Not on file     Relationship status: Not on file     Intimate partner violence     Fear of current or ex partner: Not on file     Emotionally abused: Not on file     Physically abused: Not on file     Forced sexual activity: Not on file   Other Topics Concern     Parent/sibling w/ CABG, MI or angioplasty before 65F 55M? Not Asked   Social History Narrative     Not on file       Smoking: non smoker  Lives in [place], good family support.         Family History:       Family History   Problem Relation Age of Onset     Skin Cancer Father      Skin Cancer Brother      Melanoma No family hx of               Medications:     Current Outpatient Medications   Medication Sig     albuterol (PROAIR HFA/PROVENTIL HFA/VENTOLIN HFA) 108 (90 BASE) MCG/ACT Inhaler Inhale 2 puffs into the lungs every 6 hours     alendronate (FOSAMAX) 5 MG tablet Take by mouth every morning (before breakfast)     amLODIPine (NORVASC) 5 MG tablet TAKE 1 TABLET (5 MG) BY MOUTH DAILY     aspirin 81 MG tablet Take 1 tablet (81 mg) by mouth daily     gabapentin (NEURONTIN) 300 MG capsule Take 300 mg by mouth At Bedtime     omeprazole 20 MG tablet Take 2 tablets (40 mg) by mouth daily     oxyCODONE (ROXICODONE) 5 MG tablet Take 1 tablet (5 mg) by mouth every 6 hours as needed for severe pain     PRAMIPEXOLE DIHYDROCHLORIDE PO Take  0.5 mg by mouth daily     tacrolimus (GENERIC EQUIVALENT) 1 MG capsule TAKE 1 CAPSULE SENT TO  FOR NEW SCRIPT WITH DX CODE AND DATE OF TRANSPLANT     tacrolimus (GENERIC EQUIVALENT) 1 MG capsule Take 3 capsules (3 mg) by mouth every 12 hours     umeclidinium-vilanterol (ANORO ELLIPTA) 62.5-25 MCG/INH oral inhaler Inhale 1 puff into the lungs daily     warfarin (COUMADIN) 2 MG tablet Take 2 tablets (4 mg) by mouth daily Every evening or as directed by provider     zolpidem (AMBIEN) 10 MG tablet Take 10 mg by mouth nightly as needed for sleep      Current Facility-Administered Medications   Medication     lidocaine 1% with EPINEPHrine 1:100,000 injection 3 mL                  Physical Exam:     EXAMINATION pertinent findings:   VITAL SIGNS: There were no vitals taken for this visit.  There is no height or weight on file to calculate BMI.  GEN: AOx3, cooperative, no distress  RESP: non labored breathing   ABD: benign   SKIN: grossly normal   LYMPHATIC: grossly normal   NEURO: grossly normal   VASCULAR: satisfactory perfusion of all extremities  MUSCULOSKELETAL:   She walks with a stiff right leg favoring her left leg. The right knee is swollen most prominent on the medial side. The is a varus deformity of the right knee joint that is reducible by manipulation. Knee flexion is limited to 120 degrees secondary to pain. Knee extension to 0 degrees. There is tenderness to palpation of the knee joint most prominent over the medial joint line. She is able to straight leg raise without difficulty.  Ankle plantar flexion dorsiflexion is intact.  Intact sensation throughout her foot.  2+ DP pulse.  Extensor mechanism is fully intact without extensor lag.             Data:   Imaging:   AP and lateral x-ray of right knee show degenerative joint disease with loss of joint space most prominent in the medial compartment. There is medial translation of the femur relative to the tibia with varus deformity of the knee. Full length  bilateral lower extremity x-rays show normal left knee and bilateral hip joints.         Assessment and Plan:   Assessment:  Sherrie is a pleasant 64 year old woman with hx of liver transplant and end stage right knee osteoarthritis.  I had a long discussion with the patient regarding ongoing management options.  Reviewed surgical and nonsurgical treatments.  We reviewed total knee replacement in detail including the procedure, the implants, the recovery process, and long-term outcomes.  We reviewed that the risks of the surgery include but are not limited to infection, wound problems, stiffness, persistent pain, swelling, clicking, loosening, revision surgery.  We also reviewed less common risks such as neurovascular injury fracture, and other implant-related issues.  We reviewed other medical complications such as a blood clot.  We discussed that the vast majority of cases have a highly successful outcome.  However there is a small subset of patients that do experience complications or problems following the knee replacement and these problems can be very debilitating and painful and sometimes do not improve.  Due to the patient's transplant , they may be at a slightly higher risk of infection.  Based on a discussion of the risks and benefits, we believe that the benefits far outweigh the risks at this point and the patient   would like to proceed with surgery.  We will work on scheduling surgery at a time that works well for them in the next few months.  They will contact us if they have any questions or concerns leading up to surgery.       Nitin Jacobsen M.D.     Arthritis and Joint Replacement  Department of Orthopaedic Surgery, [facility]  [email or web address]  [phone] (pager)

## 2021-03-08 ENCOUNTER — TELEPHONE (OUTPATIENT)
Dept: ORTHOPEDICS | Facility: CLINIC | Age: 66
End: 2021-03-08

## 2021-03-08 ENCOUNTER — MYC MEDICAL ADVICE (OUTPATIENT)
Dept: ORTHOPEDICS | Facility: CLINIC | Age: 66
End: 2021-03-08

## 2021-03-08 NOTE — TELEPHONE ENCOUNTER
Patient is scheduled for surgery with Dr. Jacobsen      Spoke or left message with: Spoke with Sherrie    Date of Surgery: 3/29/21    Location: [place]    Informed patient they will need an adult  Yes    Pre-op with surgeon (if applicable): Complete    H&P: Scheduled with PAC    Additional imaging/appointments: Patient will await call from covid scheduling team    Surgery packet: Given in clinic     Additional comments: Patient will receive arrival time at PAC

## 2021-03-09 RX ORDER — CELECOXIB 200 MG/1
400 CAPSULE ORAL ONCE
Status: CANCELLED | OUTPATIENT
Start: 2021-03-29

## 2021-03-10 ENCOUNTER — OFFICE VISIT - RIVER FALLS (OUTPATIENT)
Dept: FAMILY MEDICINE | Facility: CLINIC | Age: 66
End: 2021-03-10
Payer: COMMERCIAL

## 2021-03-10 NOTE — TELEPHONE ENCOUNTER
FUTURE VISIT INFORMATION      SURGERY INFORMATION:    Date: 3.29.21    [facility]    Surgeon:  Delmar    Anesthesia Type:  spinal    Procedure: R knee arthorplasty    Consult: 3.3.21    RECORDS REQUESTED FROM:       Primary Care Provider: Emmanuel    Most recent EKG+ Tracing: 3.1.19    Most recent ECHO: 2.27.18    Most recent PFT's: 5.21.20

## 2021-03-12 ENCOUNTER — TRANSCRIBE ORDERS (OUTPATIENT)
Dept: OTHER | Age: 66
End: 2021-03-12

## 2021-03-12 DIAGNOSIS — K58.9 IRRITABLE BOWEL SYNDROME: Primary | ICD-10-CM

## 2021-03-12 DIAGNOSIS — M17.11 PRIMARY LOCALIZED OSTEOARTHRITIS OF RIGHT KNEE: Primary | ICD-10-CM

## 2021-03-13 DIAGNOSIS — Z11.59 ENCOUNTER FOR SCREENING FOR OTHER VIRAL DISEASES: ICD-10-CM

## 2021-03-19 ENCOUNTER — ANESTHESIA EVENT (OUTPATIENT)
Dept: SURGERY | Facility: CLINIC | Age: 66
DRG: 470 | End: 2021-03-19
Payer: MEDICARE

## 2021-03-19 ENCOUNTER — PRE VISIT (OUTPATIENT)
Dept: SURGERY | Facility: CLINIC | Age: 66
End: 2021-03-19

## 2021-03-19 ENCOUNTER — OFFICE VISIT (OUTPATIENT)
Dept: SURGERY | Facility: CLINIC | Age: 66
End: 2021-03-19
Payer: COMMERCIAL

## 2021-03-19 VITALS
RESPIRATION RATE: 12 BRPM | BODY MASS INDEX: 25.16 KG/M2 | HEART RATE: 71 BPM | TEMPERATURE: 97.7 F | HEIGHT: 63 IN | DIASTOLIC BLOOD PRESSURE: 89 MMHG | WEIGHT: 142 LBS | SYSTOLIC BLOOD PRESSURE: 144 MMHG | OXYGEN SATURATION: 99 %

## 2021-03-19 DIAGNOSIS — Z01.818 PRE-OP EVALUATION: Primary | ICD-10-CM

## 2021-03-19 DIAGNOSIS — Z01.818 PRE-OP EVALUATION: ICD-10-CM

## 2021-03-19 DIAGNOSIS — M17.11 PRIMARY LOCALIZED OSTEOARTHRITIS OF RIGHT KNEE: ICD-10-CM

## 2021-03-19 LAB
ANION GAP SERPL CALCULATED.3IONS-SCNC: 2 MMOL/L (ref 3–14)
BUN SERPL-MCNC: 36 MG/DL (ref 7–30)
CALCIUM SERPL-MCNC: 8.9 MG/DL (ref 8.5–10.1)
CHLORIDE SERPL-SCNC: 111 MMOL/L (ref 94–109)
CO2 SERPL-SCNC: 27 MMOL/L (ref 20–32)
CREAT SERPL-MCNC: 1.15 MG/DL (ref 0.52–1.04)
GFR SERPL CREATININE-BSD FRML MDRD: 50 ML/MIN/{1.73_M2}
GLUCOSE SERPL-MCNC: 88 MG/DL (ref 70–99)
POTASSIUM SERPL-SCNC: 4.7 MMOL/L (ref 3.4–5.3)
SODIUM SERPL-SCNC: 141 MMOL/L (ref 133–144)

## 2021-03-19 PROCEDURE — 86900 BLOOD TYPING SEROLOGIC ABO: CPT | Mod: 90 | Performed by: PATHOLOGY

## 2021-03-19 PROCEDURE — 99204 OFFICE O/P NEW MOD 45 MIN: CPT | Performed by: PHYSICIAN ASSISTANT

## 2021-03-19 PROCEDURE — 36415 COLL VENOUS BLD VENIPUNCTURE: CPT | Performed by: PATHOLOGY

## 2021-03-19 PROCEDURE — 86901 BLOOD TYPING SEROLOGIC RH(D): CPT | Mod: 90 | Performed by: PATHOLOGY

## 2021-03-19 PROCEDURE — 80048 BASIC METABOLIC PNL TOTAL CA: CPT | Performed by: PATHOLOGY

## 2021-03-19 PROCEDURE — 86850 RBC ANTIBODY SCREEN: CPT | Mod: 90 | Performed by: PATHOLOGY

## 2021-03-19 ASSESSMENT — MIFFLIN-ST. JEOR: SCORE: 1168.24

## 2021-03-19 ASSESSMENT — LIFESTYLE VARIABLES: TOBACCO_USE: 0

## 2021-03-19 ASSESSMENT — PAIN SCALES - GENERAL: PAINLEVEL: NO PAIN (0)

## 2021-03-19 NOTE — H&P
Pre-Operative H & P     CC:  Preoperative exam to assess for increased cardiopulmonary risk while undergoing surgery and anesthesia.    Date of Encounter: 3/19/2021  Primary Care Physician:  Apollo Benitez  associated diagnosis: OA    HPI  Sherrie Lala is a 65 year old female who presents for pre-operative H & P in preparation for R TKA with Dr. Jacobsen on 3/29/21 at [facility]. Patient is being evaluated for comorbid conditions of asthma, prior pleural effusion, h/o blood transfusion with antibodies, GERD, s/p liver transplant      Ms. Lala has a history of progressive right knee pain over many years. She has a deformity of her right knee secondary to OA. She was seen by Dr. Jacobsen for further evaluation. She has tried injections, ice and ibuprofen for her pain without significant relief. She is now scheduled for the above procedure.      History is obtained from the patient and chart review.      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) 08/31/2018    Residual thrombus in main and right portal       Past Surgical History  Past Surgical History:   Procedure Laterality Date     CHOLECYSTECTOMY       HEPATECTOMY PARTIAL       OPEN REDUCTION INTERNAL FIXATION HUMERUS PROXIMAL Left 03/04/2019    Procedure: OPEN REDUCTION INTERNAL FIXATION HUMERUS PROXIMAL LEFT with Allograft;  Surgeon: Eh Kraft MD;  [facility]     RETURN LIVER TRANSPLANT N/A 09/01/2018    Procedure: RETURN LIVER TRANSPLANT;   Open Abdomen, return liver transplant washout with   Mesh and liver stent  placement and  Abdomal Wound closure;  Surgeon: Darren Rod MD;  [facility]     TONSILLECTOMY       TRANSPLANT LIVER RECIPIENT  DONOR N/A 2018    Procedure: TRANSPLANT LIVER RECIPIENT  DONOR;  TRANSPLANT LIVER RECIPIENT  DONOR ;  Surgeon: Darren Rod MD;  [facility]       Hx of Blood transfusions/reactions: Patient has a history of transfusion with antibodies. She will complete T&S today and will come back within 3 days of surgery to repeat     Hx of abnormal bleeding or anti-platelet use: ASA 81mg     Menstrual history: No LMP recorded. Patient is postmenopausal.    Steroid use in the last year: denies    Personal or FH with difficulty with Anesthesia:  Patient has a history of PONV, but has no family history of anesthesia complications.      Prior to Admission Medications  Current Outpatient Medications   Medication Sig Dispense Refill     albuterol (PROAIR HFA/PROVENTIL HFA/VENTOLIN HFA) 108 (90 BASE) MCG/ACT Inhaler Inhale 2 puffs into the lungs every 6 hours       alendronate (FOSAMAX) 5 MG tablet Take by mouth once a week        amLODIPine (NORVASC) 5 MG tablet TAKE 1 TABLET (5 MG) BY MOUTH DAILY (Patient taking differently: Take 5 mg by mouth At Bedtime ) 90 tablet 3     aspirin 81 MG tablet Take 1 tablet (81 mg) by mouth daily (Patient taking differently: Take 81 mg by mouth every morning ) 30 tablet 3     calcium carbonate-vitamin D (OS-SHELDON) 500-400 MG-UNIT tablet Take 1 tablet by mouth every morning       gabapentin (NEURONTIN) 300 MG capsule Take 300 mg by mouth At Bedtime       omeprazole 20 MG tablet Take 2 tablets (40 mg) by mouth daily (Patient taking differently: Take 40 mg by mouth every morning ) 180 tablet 3     PRAMIPEXOLE DIHYDROCHLORIDE PO Take 0.5 mg by mouth At Bedtime        zolpidem (AMBIEN) 10 MG tablet Take 10 mg by mouth nightly as needed for sleep         tacrolimus (GENERIC EQUIVALENT) 1 MG capsule Take 3 capsules (3 mg) by mouth every 12 hours (Patient taking differently: Take 3 mg by mouth 2 times daily ) 540 capsule 3       Allergies  Allergies   Allergen Reactions     Blood Transfusion Related (Informational Only) Other (See Comments)     Patient has a history of a clinically significant antibody against RBC antigens.  A delay in compatible RBCs may occur.     Dilaudid [Hydromorphone] Itching     Ferrlecit      Venofer [Iron Sucrose]        Social History  Social History     Socioeconomic History     Marital status:      Spouse name: Not on file     Number of children: Not on file     Years of education: Not on file     Highest education level: Not on file   Occupational History     Not on file   Social Needs     Financial resource strain: Not on file     Food insecurity     Worry: Not on file     Inability: Not on file     Transportation needs     Medical: Not on file     Non-medical: Not on file   Tobacco Use     Smoking status: Never Smoker     Smokeless tobacco: Never Used   Substance and Sexual Activity     Alcohol use: Yes     Comment: occ      Drug use: No     Sexual activity: Not on file   Lifestyle     Physical activity     Days per week: Not on file     Minutes per session: Not on file     Stress: Not on file   Relationships     Social connections     Talks on phone: Not on file     Gets together: Not on file     Attends Latter-day service: Not on file     Active member of club or organization: Not on file     Attends meetings of clubs or organizations: Not on file     Relationship status: Not on file     Intimate partner violence     Fear of current or ex partner: Not on file     Emotionally abused: Not on file     Physically abused: Not on file     Forced sexual activity: Not on file   Other Topics Concern     Parent/sibling w/ CABG, MI or angioplasty before 65F 55M? Not Asked   Social History Narrative     Not on file       Family  History  Family History   Problem Relation Age of Onset     Skin Cancer Father      Skin Cancer Brother      Melanoma No family hx of      Anesthesia Reaction No family hx of      Deep Vein Thrombosis (DVT) No family hx of        ROS/MED HX  ENT/Pulmonary:     (+) Intermittent, asthma Treatment: Inhaler prn,   (-) tobacco use   Neurologic:  - neg neurologic ROS     Cardiovascular:     (+) hypertension-----Taking blood thinners Previous cardiac testing   Echo: Date: Results:    Stress Test: Date: 2018 Results:  Normal, low-risk dobutamine echocardiogram.      The target heart rate was achieved. Normal heart rate response to dobutamine.   Blunted blood pressure response to dobutamine.   Normal biventricular size, thickness, and global systolic function at   baseline, LVEF=60-65%.   With dobutamine, LVEF increased to >70% and LV cavity size decreased   appropriately. No regional wall motion abnormalities at rest or with   dobutamine.   No angina was elicited. No ECG evidence of ischemia. Occasional PVCs with   stress, expected.      No significant valvular abnormalities noted on screening Doppler exam. normal   biventricular function. The visualized ascending aorta is normal. There is a   small pericardial effusion, measured at 1.3cm. While incompletely evaluated,   likely has minimal clinical significance.     ECG Reviewed: Date: 2019 Results:  NSR  Cath:  Date: Results:   (-) murmur   METS/Exercise Tolerance: >4 METS Comment: Walks 2 miles daily   Hematologic:     (+) History of blood clots, pt is not anticoagulated, history of blood transfusion, previous transfusion reaction,     Musculoskeletal:   (+) arthritis,     GI/Hepatic: Comment: IBS    (+) GERD, Asymptomatic on medication, liver disease,     Renal/Genitourinary:  - neg Renal ROS     Endo:  - neg endo ROS     Psychiatric/Substance Use:  - neg psychiatric ROS     Infectious Disease:  - neg infectious disease ROS     Malignancy:   (+) Malignancy, History of  "GI.GI CA  Remission status post Chemo, Surgery and Radiation.        Other:            The complete review of systems is negative other than noted in the HPI or here.   Temp: 97.7  F (36.5  C) Temp src: Oral BP: (!) 144/89 Pulse: 71   Resp: 12 SpO2: 99 %         142 lbs 0 oz  5' 3\"[PT REPORTED[   Body mass index is 25.15 kg/m .       Physical Exam  Constitutional: Awake, alert, cooperative, no apparent distress, and appears stated age.  Eyes: Pupils equal, round and reactive to light, extra ocular muscles intact, sclera clear, conjunctiva normal.  HENT: Normocephalic, oral pharynx with moist mucus membranes, good dentition.   Respiratory: Clear to auscultation bilaterally, no crackles or wheezing.  Cardiovascular: Regular rate and rhythm, normal S1 and S2, and no murmur noted.  Carotids no bruits. No edema. Palpable pulses to radial arteries.   GI: Normal bowel sounds, soft, non-distended, non-tender  Genitourinary:  deferred  Skin: Warm and dry.  No rashes at anticipated surgical site.   Musculoskeletal: Full ROM of neck. There is no redness, warmth, or swelling of the exposed joints. Gross motor strength is normal.    Neurologic: Awake, alert, oriented to name, place and time. Cranial nerves II-XII are grossly intact. Gait is normal.   Neuropsychiatric: Calm, cooperative. Normal affect.     Labs: (personally reviewed)  Component      Latest Ref Rng & Units 1/7/2021 3/19/2021   WBC Count (External)      3.8 - 10.8 Thousand/uL 4.9    RBC Count (External)      3.80 - 5.10 Million/uL 4.46    Hemoglobin (External)      11.7 - 15.5 g/dL 13.5    Hematocrit (External)      35.0 - 45.0 % 39.9    MCV (External)      80.0 - 100.0 fL 89.5    MCH (External)      27.0 - 33.0 pg 30.3    MCHC (External)      32.0 - 36.0 g/dL 33.8    RDW (External)      11.0 - 15.0 % 12.5    Platelet Count (External)      140 - 400 Thousand/uL 139 (L)    Absolute Neutrophils (External)      1,500 - 7,800 cells/uL 3,092    Absolute Lymphocytes " (External)      850 - 3,900 cells/uL 970    Absolute Monocytes (External)      200 - 950 cells/uL 319    Absolute Eosinophils (External)      15 - 500 cells/uL 490    Absolute Basophils (External)      0 - 200 cells/uL 29    % Neutrophils (External)      % 63.1    % Lymphocytes (External)      % 19.8    % Monocytes (External)      % 6.5    % Eosinophils (External)      % 10.0    % Basophils (External)      % 0.6    Sodium      133 - 144 mmol/L  141   Potassium      3.4 - 5.3 mmol/L  4.7   Chloride      94 - 109 mmol/L  111 (H)   Carbon Dioxide      20 - 32 mmol/L  27   Anion Gap      3 - 14 mmol/L  2 (L)   Glucose      70 - 99 mg/dL  88   Urea Nitrogen      7 - 30 mg/dL  36 (H)   Creatinine      0.52 - 1.04 mg/dL  1.15 (H)   GFR Estimate      >60 mL/min/1.73:m2  50 (L)   GFR Estimate If Black      >60 mL/min/1.73:m2  58 (L)   Calcium      8.5 - 10.1 mg/dL  8.9   Protein Total (External)      6.1 - 8.1 g/dL 6.6    Albumin (External)      3.6 - 5.1 g/dL 4.4    Bilirubin Total (External)      0.2 - 1.2 mg/dL 0.7    Bilirubin Direct (External)      <=0.2 mg/dL 0.1    Alk Phosphatase (External)      37 - 153 U/L 64    AST (External)      10 - 35 U/L 9 (L)    ALT (External)      6 - 29 U/L 8      EKG 2019   NSR      Stress test 2018   Normal, low-risk dobutamine echocardiogram.       The target heart rate was achieved. Normal heart rate response to dobutamine.   Blunted blood pressure response to dobutamine.   Normal biventricular size, thickness, and global systolic function at   baseline, LVEF=60-65%.   With dobutamine, LVEF increased to >70% and LV cavity size decreased   appropriately. No regional wall motion abnormalities at rest or with   dobutamine.   No angina was elicited. No ECG evidence of ischemia. Occasional PVCs with   stress, expected.       No significant valvular abnormalities noted on screening Doppler exam. normal   biventricular function. The visualized ascending aorta is normal. There is a   small  pericardial effusion, measured at 1.3cm. While incompletely evaluated,   likely has minimal clinical significance.       Outside records reviewed from: care everywhere    ASSESSMENT and PLAN  Sherrie Lala is a 65 year old female scheduled for R TKA on 3/29/21 by Dr. Jacobsen in treatment of primary OA.  PAC referral for risk assessment and optimization for anesthesia with comorbid conditions of asthma, prior pleural effusion, h/o blood transfusion with antibodies, GERD, s/p liver transplant:      Pre-operative considerations:   1.  Cardiac:  Functional status- METS >4- reports she walks about 2 miles on a regular basis without BRIONES or CP. denies cardiac symptoms.  previous cardiac testing as above. intermediate risk surgery with 0.4% (RCRI #) risk of major adverse cardiac event.   ~hypertension using norvasc      2.  Pulm:  Airway feasible.  JOHANNA risk: low. COVID testing per surgery team   ~intermittent asthma using albuterol. Reports rare use  ~h/o lung cancer s/p radiation therapy     3.  GI:  H/o PONV, anti-emetic intervention recommended.   ~GERD using omeprazole   ~h/o cholangiocarcinoma s/p liver transplant in 2018. maintained on tacrolimus      4.  heme: h/o blood transfusion with antibodies. will complete T&S today and will have patient return within 3 days of surgery to repeat. h/o portal vein thrombosis      5. Msk: right knee OA. Above procedure planned.       VTE risk:  1.8%      Patient is optimized and is acceptable candidate for the proposed procedure.  No further diagnostic evaluation is needed.     45 minutes were spent completing chart review, seeing the patient, reviewing labs and test results, and completing documentation     Kyung Gunderson PA-C  Preoperative Assessment Center  [facility]  Phone: [phone]  Fax: [phone]

## 2021-03-19 NOTE — PATIENT INSTRUCTIONS
Preparing for Your Surgery      Name:  Sherrie Lala   MRN:  [id number]   :  1955   Today's Date:  3/19/2021       Arriving for surgery:  Surgery date:  2021  Arrival time:  5:30 am    Restrictions due to COVID 19:  One consistent visitor per patient is allowed.  The visitor will be allowed in the pre-op area.  Visitors are asked to leave the building during the surgery.  No ill visitors.  All visitors must wear face mask.     parking is available for anyone with mobility limitations or disabilities.  ([facility]  24 hours/ 7 days a week; [facility]  7 am- 3:30 pm, Mon- Fri)    Please come to:     [facility] [address]  [facility]     [address] [place]  [address]    -Parking is available in the Green Ramp     -Proceed to the 3rd floor, check in at the Adult Surgery Waiting Lounge. [phone]    If an escort is needed stop at the Information Desk in the lobby.         What can I eat or drink?  -  You may eat and drink normally for up to 8 hours before your surgery. (Until 3/28/21 at 11 pm)  -  You may have clear liquids until 2 hours before surgery. (Until 5:30 am arrival time)    Examples of clear liquids:  Water  Clear broth  Juices (apple, white grape, white cranberry  and cider) without pulp  Noncarbonated, powder based beverages  (lemonade and Adloph-Aid)  Sodas (Sprite, 7-Up, ginger ale and seltzer)  Coffee or tea (without milk or cream)  Gatorade    -  No Alcohol for at least 24 hours before surgery     Which medicines can I take?    Hold Aspirin for 7 days before surgery.     Hold Multivitamins for 7 days before surgery.  Hold Supplements for 7 days before surgery.  Hold Ibuprofen (Advil, Motrin) for 1 day before surgery--unless otherwise directed by surgeon.  Hold Naproxen (Aleve) for 4 days before surgery.    -  DO NOT take these medications the day of surgery:  Calcium      -  PLEASE TAKE these  medications the day of surgery:  Inhaler as usual and bring to hospital  Omeprazole   Tacrolimus       How do I prepare myself?  - Please take 2 showers before surgery using Scrubcare or Hibiclens soap.    Use this soap only from the neck to your toes.     Leave the soap on your skin for one minute--then rinse thoroughly.      You may use your own shampoo and conditioner; no other hair products.   - Please remove all jewelry and body piercings.  - No lotions, deodorants or fragrance.  - No makeup or fingernail polish.   - Bring your ID and insurance card.    - All patients are required to have a Covid-19 test within 4 days of surgery/procedure.      -Patients will be contacted by the [facility] scheduling team within 1 week of surgery to make an appointment.      - Patients may call the Scheduling team at [phone] if they have not been scheduled within 4 days of  surgery.        Questions or Concerns:    - For any questions regarding the day of surgery or your hospital stay, please contact the Pre Admission Nursing Office at [phone].       - If you have health changes between today and your surgery please call your surgeon.       For questions after surgery please call your surgeons office.       OPTIMAL RECOVERY AFTER ORTHOPEDIC SURGERY   TOTAL HIP AND KNEE REPLACEMENTS   SPINE SURGERY      Begin hydrating yourself by drinking at least 8-10 glasses of clear liquids for 24 hours before surgery:      Suggested clear liquids:   Water    Clear Juices   Clear Broth   Non- carbonated beverages    (Crystal Light or Adolph Aid)   Sodas    (Sprite, 7 up, ginger ale, seltzer)   Gatorade              Drink clear liquids up until 2 hours before your surgery.       We would like you to purchase a drink such as Gatorade or Ensure Clear (not the milkshake type).  Drink this before bedtime and on the way into the hospital, drink between 8-10 ounces or until you feel hydrated.        Keeping well hydrated leads to  your veins being plump, you wake up faster, and you are less likely to be nauseated. Start drinking water as soon as you can after surgery and advance to clear liquids and food as tolerated.  IV fluids contain salt, drinking fluids will minimize the amount of IV fluids you need and decrease the amount of salt you get.                 The most common reason for the patient to be readmitted is dehydration. Staying hydrated after you go home from the hospital is very important.  Ensure or Ensure Clear are good options to keep you hydrated.

## 2021-03-19 NOTE — ANESTHESIA PREPROCEDURE EVALUATION
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     Liver transplanted (H) 2018     Lung metastases (H) 2014     Portal vein thrombosis      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    Residual thrombus in main and right portal      Past Surgical History:   Procedure Laterality Date     CHOLECYSTECTOMY       HEPATECTOMY PARTIAL       OPEN REDUCTION INTERNAL FIXATION HUMERUS PROXIMAL Left 3/4/2019    Procedure: OPEN REDUCTION INTERNAL FIXATION HUMERUS PROXIMAL LEFT with Allograft;  Surgeon: Eh Kraft MD;  Location: [facility] OR     RETURN LIVER TRANSPLANT N/A 2018    Procedure: RETURN LIVER TRANSPLANT;  Open Abdomen, return liver transplant washout with   Mesh and liver stent  placement and  Abdomal Wound closure;  Surgeon: Darren Rod MD;  Location: [facility] OR     TRANSPLANT LIVER RECIPIENT  DONOR N/A 2018    Procedure: TRANSPLANT LIVER RECIPIENT  DONOR;  TRANSPLANT LIVER RECIPIENT  DONOR ;  Surgeon: Darren Rod MD;  Location: [facility] OR      Allergies   Allergen Reactions     Blood Transfusion Related (Informational Only) Other (See Comments)     Patient has a history of a clinically significant antibody against RBC antigens.  A delay in compatible RBCs may occur.     Dilaudid [Hydromorphone] Itching     Ferrlecit      Venofer [Iron Sucrose]       Social History     Tobacco Use     Smoking status: Never Smoker      Smokeless tobacco: Never Used   Substance Use Topics     Alcohol use: Yes     Comment: occ       Wt Readings from Last 1 Encounters:   03/19/21 64.4 kg (142 lb)        Anesthesia Evaluation   Pt has had prior anesthetic.     History of anesthetic complications  - PONV.      ROS/MED HX  ENT/Pulmonary:     (+) Intermittent, asthma Treatment: Inhaler prn,   (-) tobacco use   Neurologic:  - neg neurologic ROS     Cardiovascular:     (+) hypertension-----Taking blood thinners Previous cardiac testing   Echo: Date: Results:    Stress Test: Date: 2018 Results:  Normal, low-risk dobutamine echocardiogram.      The target heart rate was achieved. Normal heart rate response to dobutamine.   Blunted blood pressure response to dobutamine.   Normal biventricular size, thickness, and global systolic function at   baseline, LVEF=60-65%.   With dobutamine, LVEF increased to >70% and LV cavity size decreased   appropriately. No regional wall motion abnormalities at rest or with   dobutamine.   No angina was elicited. No ECG evidence of ischemia. Occasional PVCs with   stress, expected.      No significant valvular abnormalities noted on screening Doppler exam. normal   biventricular function. The visualized ascending aorta is normal. There is a   small pericardial effusion, measured at 1.3cm. While incompletely evaluated,   likely has minimal clinical significance.     ECG Reviewed: Date: 2019 Results:  NSR  Cath:  Date: Results:   (-) murmur   METS/Exercise Tolerance: >4 METS Comment: Walks 2 miles daily   Hematologic:     (+) History of blood clots, pt is not anticoagulated, history of blood transfusion, previous transfusion reaction,     Musculoskeletal:   (+) arthritis,     GI/Hepatic: Comment: IBS    (+) GERD, Asymptomatic on medication, liver disease,     Renal/Genitourinary:  - neg Renal ROS     Endo:  - neg endo ROS     Psychiatric/Substance Use:  - neg psychiatric ROS     Infectious Disease:  - neg infectious disease  ROS     Malignancy:   (+) Malignancy, History of GI.GI CA  Remission status post Chemo, Surgery and Radiation.        Other:            Physical Exam    Airway        Mallampati: I   TM distance: > 3 FB   Neck ROM: full   Mouth opening: > 3 cm    Respiratory Devices and Support         Dental  no notable dental history         Cardiovascular          Rhythm and rate: regular and normal (-) no peripheral edema and no murmur    Pulmonary   pulmonary exam normal        breath sounds clear to auscultation           OUTSIDE LABS:  CBC:   Lab Results   Component Value Date    WBC 5.2 09/02/2020    WBC 5.5 03/04/2020    HGB 14.1 09/02/2020    HGB 13.7 05/21/2020    HCT 42.3 09/02/2020    HCT 39.4 03/04/2020     (L) 09/02/2020     (L) 03/04/2020     BMP:   Lab Results   Component Value Date     09/02/2020     03/04/2020    POTASSIUM 4.9 09/02/2020    POTASSIUM 4.9 03/04/2020    CHLORIDE 107 09/02/2020    CHLORIDE 107 03/04/2020    CO2 26 09/02/2020    CO2 26 03/04/2020    BUN 22 09/02/2020    BUN 36 (H) 03/04/2020    CR 1.00 09/02/2020    CR 1.30 (H) 03/04/2020    GLC 91 09/02/2020    GLC 92 03/04/2020     COAGS:   Lab Results   Component Value Date    PTT 28 09/04/2018    INR 1.18 (H) 03/04/2019    FIBR 481 (H) 09/01/2018     POC:   Lab Results   Component Value Date    BGM 94 09/07/2018    HCGS Positive (A) 02/26/2018     HEPATIC:   Lab Results   Component Value Date    ALBUMIN 4.2 09/02/2020    PROTTOTAL 7.4 09/02/2020    ALT 20 09/02/2020    AST 14 09/02/2020    ALKPHOS 71 09/02/2020    BILITOTAL 0.8 09/02/2020     OTHER:   Lab Results   Component Value Date    PH 7.39 05/21/2020    LACT 0.9 09/01/2018    A1C 4.9 09/02/2018    SHELDON 8.8 09/02/2020    PHOS 4.1 03/04/2020    MAG 1.9 03/04/2020    LIPASE 161 09/01/2018    AMYLASE 39 08/31/2018    TSH 0.02 (L) 02/26/2018    T4 1.06 02/26/2018       Anesthesia Plan    ASA Status:  3   NPO Status:  NPO Appropriate    Anesthesia Type: Spinal.    Induction: N/a.   Maintenance: TIVA.        Consents    Anesthesia Plan(s) and associated risks, benefits, and realistic alternatives discussed. Questions answered and patient/representative(s) expressed understanding.     - Discussed with:  Patient    Use of blood products discussed: Yes.     - Discussed with: Patient.     Postoperative Care    Pain management: Multi-modal analgesia.   PONV prophylaxis: Ondansetron (or other 5HT-3)     Comments:              PAC Discussion and Assessment    ASA Classification: 3  Case is suitable for: [place]  Anesthetic techniques and relevant risks discussed: Regional                  PAC Resident/NP Anesthesia Assessment: Sherrie Lala is a 65 year old female scheduled for R TKA on 3/29/21 by Dr. Jacobsen in treatment of primary OA.  PAC referral for risk assessment and optimization for anesthesia with comorbid conditions of asthma, prior pleural effusion, h/o blood transfusion with antibodies, GERD, s/p liver transplant:      Pre-operative considerations:   1.  Cardiac:  Functional status- METS >4- reports she walks about 2 miles on a regular basis without BRIONES or CP. denies cardiac symptoms.  previous cardiac testing as above. intermediate risk surgery with 0.4% (RCRI #) risk of major adverse cardiac event.   ~hypertension using norvasc      2.  Pulm:  Airway feasible.  JOHANNA risk: low. COVID testing per surgery team   ~intermittent asthma using albuterol. Reports rare use  ~h/o lung cancer s/p radiation therapy     3.  GI:  H/o PONV, anti-emetic intervention recommended.   ~GERD using omeprazole   ~h/o cholangiocarcinoma s/p liver transplant in 2018. maintained on tacrolimus      4.  heme: h/o blood transfusion with antibodies. will complete T&S today and will have patient return within 3 days of surgery to repeat. h/o portal vein thrombosis      5. Msk: right knee OA. Above procedure planned.       VTE risk:  1.8%      Patient is optimized and is acceptable candidate for the  proposed procedure.  No further diagnostic evaluation is needed.      **For further details of assessment, testing and physical exam please see H and P completed on same date.         TORO Romano PA-C

## 2021-03-22 ENCOUNTER — TELEPHONE (OUTPATIENT)
Dept: ORTHOPEDICS | Facility: CLINIC | Age: 66
End: 2021-03-22

## 2021-03-22 NOTE — TELEPHONE ENCOUNTER
[facility] Call Center    Phone Message    May a detailed message be left on voicemail: yes     Reason for Call: Other: Patient's date of surgery is scheduled for Monday, 03/29 - she was offered a covid vaccine this Wednesday, 03/24. She thought Mahi said as long as it's 3 days prior to her date of surgery then it should be fine but she wanted to double check.    Action Taken: Message routed to:  [facility] ([facility]): Orthopedics    Travel Screening: Not Applicable

## 2021-03-26 ENCOUNTER — OFFICE VISIT (OUTPATIENT)
Dept: GASTROENTEROLOGY | Facility: CLINIC | Age: 66
End: 2021-03-26
Attending: INTERNAL MEDICINE
Payer: COMMERCIAL

## 2021-03-26 ENCOUNTER — TELEPHONE (OUTPATIENT)
Dept: TRANSPLANT | Facility: CLINIC | Age: 66
End: 2021-03-26

## 2021-03-26 VITALS
BODY MASS INDEX: 25.42 KG/M2 | TEMPERATURE: 98 F | SYSTOLIC BLOOD PRESSURE: 156 MMHG | HEART RATE: 70 BPM | DIASTOLIC BLOOD PRESSURE: 93 MMHG | WEIGHT: 143.5 LBS | OXYGEN SATURATION: 98 %

## 2021-03-26 DIAGNOSIS — Z94.4 LIVER REPLACED BY TRANSPLANT (H): Primary | ICD-10-CM

## 2021-03-26 DIAGNOSIS — Z94.4 LIVER TRANSPLANTED (H): ICD-10-CM

## 2021-03-26 DIAGNOSIS — Z94.4 LIVER REPLACED BY TRANSPLANT (H): ICD-10-CM

## 2021-03-26 DIAGNOSIS — Z13.220 LIPID SCREENING: ICD-10-CM

## 2021-03-26 DIAGNOSIS — Z11.59 ENCOUNTER FOR SCREENING FOR OTHER VIRAL DISEASES: ICD-10-CM

## 2021-03-26 DIAGNOSIS — Z94.4 LIVER TRANSPLANTED (H): Primary | ICD-10-CM

## 2021-03-26 DIAGNOSIS — Z01.818 PRE-OP EVALUATION: ICD-10-CM

## 2021-03-26 LAB
ALBUMIN SERPL-MCNC: 4 G/DL (ref 3.4–5)
ALP SERPL-CCNC: 77 U/L (ref 40–150)
ALT SERPL W P-5'-P-CCNC: 17 U/L (ref 0–50)
ANION GAP SERPL CALCULATED.3IONS-SCNC: 3 MMOL/L (ref 3–14)
AST SERPL W P-5'-P-CCNC: 8 U/L (ref 0–45)
BILIRUB DIRECT SERPL-MCNC: 0.1 MG/DL (ref 0–0.2)
BILIRUB SERPL-MCNC: 0.3 MG/DL (ref 0.2–1.3)
BUN SERPL-MCNC: 32 MG/DL (ref 7–30)
CALCIUM SERPL-MCNC: 8.8 MG/DL (ref 8.5–10.1)
CHLORIDE SERPL-SCNC: 111 MMOL/L (ref 94–109)
CO2 SERPL-SCNC: 27 MMOL/L (ref 20–32)
CREAT SERPL-MCNC: 1.06 MG/DL (ref 0.52–1.04)
ERYTHROCYTE [DISTWIDTH] IN BLOOD BY AUTOMATED COUNT: 12 % (ref 10–15)
GFR SERPL CREATININE-BSD FRML MDRD: 55 ML/MIN/{1.73_M2}
GLUCOSE SERPL-MCNC: 97 MG/DL (ref 70–99)
HCT VFR BLD AUTO: 39.2 % (ref 35–47)
HGB BLD-MCNC: 13.1 G/DL (ref 11.7–15.7)
LABORATORY COMMENT REPORT: NORMAL
MCH RBC QN AUTO: 30.2 PG (ref 26.5–33)
MCHC RBC AUTO-ENTMCNC: 33.4 G/DL (ref 31.5–36.5)
MCV RBC AUTO: 90 FL (ref 78–100)
PLATELET # BLD AUTO: 140 10E9/L (ref 150–450)
POTASSIUM SERPL-SCNC: 4.4 MMOL/L (ref 3.4–5.3)
PROT SERPL-MCNC: 7.2 G/DL (ref 6.8–8.8)
RBC # BLD AUTO: 4.34 10E12/L (ref 3.8–5.2)
SARS-COV-2 RNA RESP QL NAA+PROBE: NEGATIVE
SARS-COV-2 RNA RESP QL NAA+PROBE: NORMAL
SODIUM SERPL-SCNC: 141 MMOL/L (ref 133–144)
SPECIMEN SOURCE: NORMAL
SPECIMEN SOURCE: NORMAL
TACROLIMUS BLD-MCNC: 6.1 UG/L (ref 5–15)
TME LAST DOSE: NORMAL H
WBC # BLD AUTO: 5.6 10E9/L (ref 4–11)

## 2021-03-26 PROCEDURE — 80197 ASSAY OF TACROLIMUS: CPT | Performed by: PATHOLOGY

## 2021-03-26 PROCEDURE — 85027 COMPLETE CBC AUTOMATED: CPT | Performed by: PATHOLOGY

## 2021-03-26 PROCEDURE — 99214 OFFICE O/P EST MOD 30 MIN: CPT | Performed by: INTERNAL MEDICINE

## 2021-03-26 PROCEDURE — 86850 RBC ANTIBODY SCREEN: CPT | Performed by: PATHOLOGY

## 2021-03-26 PROCEDURE — U0005 INFEC AGEN DETEC AMPLI PROBE: HCPCS | Performed by: PATHOLOGY

## 2021-03-26 PROCEDURE — 86900 BLOOD TYPING SEROLOGIC ABO: CPT | Performed by: PATHOLOGY

## 2021-03-26 PROCEDURE — 80076 HEPATIC FUNCTION PANEL: CPT | Performed by: PATHOLOGY

## 2021-03-26 PROCEDURE — G0463 HOSPITAL OUTPT CLINIC VISIT: HCPCS

## 2021-03-26 PROCEDURE — 86902 BLOOD TYPE ANTIGEN DONOR EA: CPT | Mod: 90 | Performed by: PATHOLOGY

## 2021-03-26 PROCEDURE — 36415 COLL VENOUS BLD VENIPUNCTURE: CPT | Performed by: PATHOLOGY

## 2021-03-26 PROCEDURE — 86922 COMPATIBILITY TEST ANTIGLOB: CPT | Performed by: PATHOLOGY

## 2021-03-26 PROCEDURE — 80048 BASIC METABOLIC PNL TOTAL CA: CPT | Performed by: PATHOLOGY

## 2021-03-26 PROCEDURE — 86901 BLOOD TYPING SEROLOGIC RH(D): CPT | Performed by: PATHOLOGY

## 2021-03-26 PROCEDURE — U0003 INFECTIOUS AGENT DETECTION BY NUCLEIC ACID (DNA OR RNA); SEVERE ACUTE RESPIRATORY SYNDROME CORONAVIRUS 2 (SARS-COV-2) (CORONAVIRUS DISEASE [COVID-19]), AMPLIFIED PROBE TECHNIQUE, MAKING USE OF HIGH THROUGHPUT TECHNOLOGIES AS DESCRIBED BY CMS-2020-01-R: HCPCS | Performed by: PATHOLOGY

## 2021-03-26 SDOH — HEALTH STABILITY: MENTAL HEALTH: HOW MANY DRINKS CONTAINING ALCOHOL DO YOU HAVE ON A TYPICAL DAY WHEN YOU ARE DRINKING?: NOT ASKED

## 2021-03-26 SDOH — HEALTH STABILITY: MENTAL HEALTH: HOW OFTEN DO YOU HAVE SIX OR MORE DRINKS ON ONE OCCASION?: NOT ASKED

## 2021-03-26 SDOH — HEALTH STABILITY: MENTAL HEALTH: HOW OFTEN DO YOU HAVE A DRINK CONTAINING ALCOHOL?: NOT ASKED

## 2021-03-26 ASSESSMENT — PAIN SCALES - GENERAL: PAINLEVEL: NO PAIN (0)

## 2021-03-26 NOTE — NURSING NOTE
Chief Complaint   Patient presents with     RECHECK     follow up       BP (!) 156/93   Pulse 70   Temp 98  F (36.7  C)   Wt 65.1 kg (143 lb 8 oz)   SpO2 98%   BMI 25.42 kg/m      Angélica THOMPSON, JO

## 2021-03-26 NOTE — PROGRESS NOTES
I had the pleasure of seeing Sherrie Lala for followup in the Liver Transplant Clinic at the [facility] on 03/26/2021.        Ms. Lala returns now 2-1/2 years status post liver transplantation for secondary biliary cirrhosis.      She is doing fairly well at this point in time.  She is going to get a knee replacement of her right knee on Monday.        She does complain of some abdominal bloating and alternating diarrhea and constipation and has been diagnosed with irritable bowel syndrome.  She has been started on some fiber and is in the process of increasing the dose.  She does have fairly striking urgency, which alternates with constipation.  She has not noted any blood in her bowel movements.  She denies any nausea or vomiting.      She denies any itching, skin rash or fatigue.  She denies any lower extremity edema.  She states her appetite has been good, and her weight has been creeping up a few pounds.  She has had the first dose of the COVID-19 vaccine.     Current Outpatient Medications   Medication     albuterol (PROAIR HFA/PROVENTIL HFA/VENTOLIN HFA) 108 (90 BASE) MCG/ACT Inhaler     alendronate (FOSAMAX) 5 MG tablet     amLODIPine (NORVASC) 5 MG tablet     aspirin 81 MG tablet     calcium carbonate-vitamin D (OS-SHELDON) 500-400 MG-UNIT tablet     gabapentin (NEURONTIN) 300 MG capsule     omeprazole 20 MG tablet     PRAMIPEXOLE DIHYDROCHLORIDE PO     tacrolimus (GENERIC EQUIVALENT) 1 MG capsule     zolpidem (AMBIEN) 10 MG tablet     No current facility-administered medications for this visit.      BP (!) 156/93   Pulse 70   Temp 98  F (36.7  C)   Wt 65.1 kg (143 lb 8 oz)   SpO2 98%   BMI 25.42 kg/m      PHYSICAL EXAMINATION:  She looks well.  HEENT exam shows no scleral icterus or temporal muscle wasting.  Her chest is clear.  Her abdominal exam shows no increase in girth.  No masses or tenderness to palpation are present.  Her liver is 10 cm in span without left lobe  enlargement.  No spleen tip is palpable.  Extremity exam shows no edema.  Skin exam shows no suspicious lesions.  Neurologic exam is nonfocal.     Recent Results (from the past 168 hour(s))   ABO/Rh type and screen    Collection Time: 03/26/21  8:41 AM   Result Value Ref Range    Units Ordered 2     ABO AB     RH(D) Pos     Antibody Screen Pos (A)     Test Valid Only At          [facility]    Specimen Expires 03/29/2021     Crossmatch Red Blood Cells     Blood Bank Comment       Delay in availability of Red Blood Cells called to  Dr. Nitin Jacobsen at 1109 3/26/21 by SD     Blood component    Collection Time: 03/26/21  8:41 AM   Result Value Ref Range    Unit Number [id number]     Blood Component Type Red Blood Cells Leukocyte Reduced     Division Number 00     Status of Unit No longer available 03/30/2021 0300     Blood Product Code [id number]     Unit Status RET    Blood component    Collection Time: 03/26/21  8:41 AM   Result Value Ref Range    Unit Number [id number]     Blood Component Type Red Blood Cells Leukocyte Reduced     Division Number 00     Status of Unit No longer available 03/30/2021 0300     Blood Product Code [id number]     Unit Status RET    Tacrolimus level    Collection Time: 03/26/21  8:43 AM   Result Value Ref Range    Tacrolimus Last Dose 2100 3/25/21     Tacrolimus Level 6.1 5.0 - 15.0 ug/L   CBC with platelets    Collection Time: 03/26/21  8:43 AM   Result Value Ref Range    WBC 5.6 4.0 - 11.0 10e9/L    RBC Count 4.34 3.8 - 5.2 10e12/L    Hemoglobin 13.1 11.7 - 15.7 g/dL    Hematocrit 39.2 35.0 - 47.0 %    MCV 90 78 - 100 fl    MCH 30.2 26.5 - 33.0 pg    MCHC 33.4 31.5 - 36.5 g/dL    RDW 12.0 10.0 - 15.0 %    Platelet Count 140 (L) 150 - 450 10e9/L   Basic metabolic panel    Collection Time: 03/26/21  8:43 AM   Result Value Ref Range    Sodium 141 133 - 144 mmol/L    Potassium 4.4 3.4 - 5.3 mmol/L    Chloride 111 (H) 94 - 109 mmol/L    Carbon  Dioxide 27 20 - 32 mmol/L    Anion Gap 3 3 - 14 mmol/L    Glucose 97 70 - 99 mg/dL    Urea Nitrogen 32 (H) 7 - 30 mg/dL    Creatinine 1.06 (H) 0.52 - 1.04 mg/dL    GFR Estimate 55 (L) >60 mL/min/[1.73_m2]    GFR Estimate If Black 64 >60 mL/min/[1.73_m2]    Calcium 8.8 8.5 - 10.1 mg/dL   Hepatic panel    Collection Time: 03/26/21  8:43 AM   Result Value Ref Range    Bilirubin Direct 0.1 0.0 - 0.2 mg/dL    Bilirubin Total 0.3 0.2 - 1.3 mg/dL    Albumin 4.0 3.4 - 5.0 g/dL    Protein Total 7.2 6.8 - 8.8 g/dL    Alkaline Phosphatase 77 40 - 150 U/L    ALT 17 0 - 50 U/L    AST 8 0 - 45 U/L   Asymptomatic COVID-19 Virus (Coronavirus) by PCR    Collection Time: 03/26/21  8:44 AM    Specimen: Nasopharyngeal   Result Value Ref Range    COVID-19 Virus PCR to [facility] - Source Nasopharyngeal     COVID-19 Virus PCR to [facility] - Result       Test received-See reflex to IDDL test SARS CoV2 (COVID-19) Virus RT-PCR   SARS-CoV-2 COVID-19 Virus (Coronavirus) by PCR    Collection Time: 03/26/21  8:44 AM    Specimen: Nasopharyngeal   Result Value Ref Range    SARS-CoV-2 Virus Specimen Source Nasopharyngeal     SARS-CoV-2 PCR Result NEGATIVE     SARS-CoV-2 PCR Comment       Testing was performed using the Simplexa COVID-19 Direct Assay on the [place] [employer] MDX   instrument. Additional information about this Emergency Use Authorization (EUA) assay can   be found via the Lab Guide.     Glucose by meter    Collection Time: 03/29/21  6:08 AM   Result Value Ref Range    Glucose 94 70 - 99 mg/dL   ABO/Rh Type and Screen    Collection Time: 03/29/21 12:54 PM   Result Value Ref Range    ABO AB     RH(D) Pos     Antibody Screen Pos (A)     Test Valid Only At          [facility]    Specimen Expires 04/01/2021     Blood Bank Comment       Delay in availability of Red Blood Cells called to  Carlos Barrow RN at 1341 3/29/21 by SD     Hemoglobin    Collection Time: 03/30/21  6:21 AM   Result Value Ref  Range    Hemoglobin 11.1 (L) 11.7 - 15.7 g/dL   Basic metabolic panel    Collection Time: 03/30/21  6:21 AM   Result Value Ref Range    Sodium 140 133 - 144 mmol/L    Potassium 4.3 3.4 - 5.3 mmol/L    Chloride 107 94 - 109 mmol/L    Carbon Dioxide 26 20 - 32 mmol/L    Anion Gap 7 3 - 14 mmol/L    Glucose 120 (H) 70 - 99 mg/dL    Urea Nitrogen 28 7 - 30 mg/dL    Creatinine 0.90 0.52 - 1.04 mg/dL    GFR Estimate 68 >60 mL/min/[1.73_m2]    GFR Estimate If Black 79 >60 mL/min/[1.73_m2]    Calcium 8.0 (L) 8.5 - 10.1 mg/dL   Hepatic panel    Collection Time: 03/30/21  6:21 AM   Result Value Ref Range    Bilirubin Direct 1.0 (H) 0.0 - 0.2 mg/dL    Bilirubin Total 1.7 (H) 0.2 - 1.3 mg/dL    Albumin 3.4 3.4 - 5.0 g/dL    Protein Total 6.5 (L) 6.8 - 8.8 g/dL    Alkaline Phosphatase 142 40 - 150 U/L     (HH) 0 - 50 U/L     (HH) 0 - 45 U/L   Blood culture    Collection Time: 03/30/21  8:39 PM    Specimen: Blood    Right Arm   Result Value Ref Range    Specimen Description Blood Right Arm     Culture Micro No growth after 17 hours    UA with Microscopic reflex to Culture    Collection Time: 03/30/21 10:35 PM    Specimen: Urine clean catch; Midstream Urine   Result Value Ref Range    Color Urine Yellow     Appearance Urine Clear     Glucose Urine Negative NEG^Negative mg/dL    Bilirubin Urine Negative NEG^Negative    Ketones Urine 20 (A) NEG^Negative mg/dL    Specific Gravity Urine 1.021 1.003 - 1.035    Blood Urine Small (A) NEG^Negative    pH Urine 6.0 5.0 - 7.0 pH    Protein Albumin Urine 30 (A) NEG^Negative mg/dL    Urobilinogen mg/dL 2.0 0.0 - 2.0 mg/dL    Nitrite Urine Negative NEG^Negative    Leukocyte Esterase Urine Small (A) NEG^Negative    Source Midstream Urine     WBC Urine 12 (H) 0 - 5 /HPF    RBC Urine 11 (H) 0 - 2 /HPF    Squamous Epithelial /HPF Urine 0 0 - 1 /HPF    Transitional Epi <1 0 - 1 /HPF    Mucous Urine Present (A) NEG^Negative /LPF   Urine Culture Aerobic Bacterial    Collection Time:  03/30/21 10:35 PM    Specimen: Unspecified Urine   Result Value Ref Range    Specimen Description Unspecified Urine     Special Requests Specimen received in preservative     Culture Micro PENDING    Basic metabolic panel    Collection Time: 03/31/21  6:50 AM   Result Value Ref Range    Sodium 136 133 - 144 mmol/L    Potassium 4.2 3.4 - 5.3 mmol/L    Chloride 106 94 - 109 mmol/L    Carbon Dioxide 23 20 - 32 mmol/L    Anion Gap 7 3 - 14 mmol/L    Glucose 100 (H) 70 - 99 mg/dL    Urea Nitrogen 21 7 - 30 mg/dL    Creatinine 0.93 0.52 - 1.04 mg/dL    GFR Estimate 65 >60 mL/min/[1.73_m2]    GFR Estimate If Black 75 >60 mL/min/[1.73_m2]    Calcium 8.3 (L) 8.5 - 10.1 mg/dL   Magnesium    Collection Time: 03/31/21  6:50 AM   Result Value Ref Range    Magnesium 2.0 1.6 - 2.3 mg/dL   Phosphorus    Collection Time: 03/31/21  6:50 AM   Result Value Ref Range    Phosphorus 2.2 (L) 2.5 - 4.5 mg/dL   Hepatic panel    Collection Time: 03/31/21  6:50 AM   Result Value Ref Range    Bilirubin Direct 0.5 (H) 0.0 - 0.2 mg/dL    Bilirubin Total 1.4 (H) 0.2 - 1.3 mg/dL    Albumin 3.1 (L) 3.4 - 5.0 g/dL    Protein Total 6.3 (L) 6.8 - 8.8 g/dL    Alkaline Phosphatase 134 40 - 150 U/L     (H) 0 - 50 U/L    AST 96 (H) 0 - 45 U/L   Tacrolimus level    Collection Time: 03/31/21  6:50 AM   Result Value Ref Range    Tacrolimus Last Dose Not Provided     Tacrolimus Level 4.5 (L) 5.0 - 15.0 ug/L   CBC with platelets differential    Collection Time: 03/31/21  6:50 AM   Result Value Ref Range    WBC 6.5 4.0 - 11.0 10e9/L    RBC Count 3.21 (L) 3.8 - 5.2 10e12/L    Hemoglobin 9.4 (L) 11.7 - 15.7 g/dL    Hematocrit 28.8 (L) 35.0 - 47.0 %    MCV 90 78 - 100 fl    MCH 29.3 26.5 - 33.0 pg    MCHC 32.6 31.5 - 36.5 g/dL    RDW 12.2 10.0 - 15.0 %    Platelet Count 99 (L) 150 - 450 10e9/L    Diff Method Automated Method     % Neutrophils 79.4 %    % Lymphocytes 9.4 %    % Monocytes 9.1 %    % Eosinophils 1.2 %    % Basophils 0.3 %    % Immature  Granulocytes 0.6 %    Nucleated RBCs 0 0 /100    Absolute Neutrophil 5.1 1.6 - 8.3 10e9/L    Absolute Lymphocytes 0.6 (L) 0.8 - 5.3 10e9/L    Absolute Monocytes 0.6 0.0 - 1.3 10e9/L    Absolute Eosinophils 0.1 0.0 - 0.7 10e9/L    Absolute Basophils 0.0 0.0 - 0.2 10e9/L    Abs Immature Granulocytes 0.0 0 - 0.4 10e9/L    Absolute Nucleated RBC 0.0    US Lower Extremity Venous Duplex Right    Collection Time: 03/31/21 10:36 AM   Result Value Ref Range    Radiologist flags Potential hematoma and right lower extremity DVT (Urgent)    MRA Abdomen wo & w Contrast    Collection Time: 03/31/21 12:03 PM   Result Value Ref Range    Radiologist flags (Urgent)      Continued high suspicion for hepatic artery stenosis.   Partial thromboplastin time    Collection Time: 03/31/21  2:12 PM   Result Value Ref Range    PTT 39 (H) 22 - 37 sec   CBC with platelets    Collection Time: 03/31/21  2:12 PM   Result Value Ref Range    WBC 7.5 4.0 - 11.0 10e9/L    RBC Count 3.06 (L) 3.8 - 5.2 10e12/L    Hemoglobin 9.5 (L) 11.7 - 15.7 g/dL    Hematocrit 27.6 (L) 35.0 - 47.0 %    MCV 90 78 - 100 fl    MCH 31.0 26.5 - 33.0 pg    MCHC 34.4 31.5 - 36.5 g/dL    RDW 12.1 10.0 - 15.0 %    Platelet Count 114 (L) 150 - 450 10e9/L      My impression is that Ms. Lala is 2-1/2 years status post liver transplantation for secondary biliary cirrhosis.  Thus far, her liver and kidney function is fine.  Her blood counts are good as well.  I do think she is a good candidate for knee replacement surgery.  She has had the first vaccine for COVID-19 and I have encouraged her to get the second.  Otherwise, my plan will be to see her back in the clinic in 6 months.      Thank very much for allowing me to participate in the care of this patient.  If you have any questions regarding my recommendations please do not hesitate to contact me.     Bradly Lilly MD      Professor of Medicine  [employer]      Executive Medical Director, Solid  Organ Transplant Program  [facility]

## 2021-03-26 NOTE — LETTER
3/26/2021         RE: Sherrie Lala  [address] [facility] [address]        Dear Colleague,    Thank you for referring your patient, Sherrie Lala, to the [facility] [place]. Please see a copy of my visit note below.    I had the pleasure of seeing Sherrie Lala for followup in the Liver Transplant Clinic at the [facility] on 03/26/2021.        Ms. Lala returns now 2-1/2 years status post liver transplantation for secondary biliary cirrhosis.      She is doing fairly well at this point in time.  She is going to get a knee replacement of her right knee on Monday.        She does complain of some abdominal bloating and alternating diarrhea and constipation and has been diagnosed with irritable bowel syndrome.  She has been started on some fiber and is in the process of increasing the dose.  She does have fairly striking urgency, which alternates with constipation.  She has not noted any blood in her bowel movements.  She denies any nausea or vomiting.      She denies any itching, skin rash or fatigue.  She denies any lower extremity edema.  She states her appetite has been good, and her weight has been creeping up a few pounds.  She has had the first dose of the COVID-19 vaccine.     Current Outpatient Medications   Medication     albuterol (PROAIR HFA/PROVENTIL HFA/VENTOLIN HFA) 108 (90 BASE) MCG/ACT Inhaler     alendronate (FOSAMAX) 5 MG tablet     amLODIPine (NORVASC) 5 MG tablet     aspirin 81 MG tablet     calcium carbonate-vitamin D (OS-SHELDON) 500-400 MG-UNIT tablet     gabapentin (NEURONTIN) 300 MG capsule     omeprazole 20 MG tablet     PRAMIPEXOLE DIHYDROCHLORIDE PO     tacrolimus (GENERIC EQUIVALENT) 1 MG capsule     zolpidem (AMBIEN) 10 MG tablet     No current facility-administered medications for this visit.      BP (!) 156/93   Pulse 70   Temp 98  F (36.7  C)   Wt 65.1 kg (143 lb 8 oz)   SpO2 98%   BMI 25.42 kg/m      PHYSICAL  EXAMINATION:  She looks well.  HEENT exam shows no scleral icterus or temporal muscle wasting.  Her chest is clear.  Her abdominal exam shows no increase in girth.  No masses or tenderness to palpation are present.  Her liver is 10 cm in span without left lobe enlargement.  No spleen tip is palpable.  Extremity exam shows no edema.  Skin exam shows no suspicious lesions.  Neurologic exam is nonfocal.     Recent Results (from the past 168 hour(s))   ABO/Rh type and screen    Collection Time: 03/26/21  8:41 AM   Result Value Ref Range    Units Ordered 2     ABO AB     RH(D) Pos     Antibody Screen Pos (A)     Test Valid Only At          [facility]    Specimen Expires 03/29/2021     Crossmatch Red Blood Cells     Blood Bank Comment       Delay in availability of Red Blood Cells called to  Dr. Nitin Jacobsen at 1109 3/26/21 by SD     Blood component    Collection Time: 03/26/21  8:41 AM   Result Value Ref Range    Unit Number [id number]     Blood Component Type Red Blood Cells Leukocyte Reduced     Division Number 00     Status of Unit No longer available 03/30/2021 0300     Blood Product Code [id number]     Unit Status RET    Blood component    Collection Time: 03/26/21  8:41 AM   Result Value Ref Range    Unit Number [id number]     Blood Component Type Red Blood Cells Leukocyte Reduced     Division Number 00     Status of Unit No longer available 03/30/2021 0300     Blood Product Code [id number]     Unit Status RET    Tacrolimus level    Collection Time: 03/26/21  8:43 AM   Result Value Ref Range    Tacrolimus Last Dose 2100 3/25/21     Tacrolimus Level 6.1 5.0 - 15.0 ug/L   CBC with platelets    Collection Time: 03/26/21  8:43 AM   Result Value Ref Range    WBC 5.6 4.0 - 11.0 10e9/L    RBC Count 4.34 3.8 - 5.2 10e12/L    Hemoglobin 13.1 11.7 - 15.7 g/dL    Hematocrit 39.2 35.0 - 47.0 %    MCV 90 78 - 100 fl    MCH 30.2 26.5 - 33.0 pg    MCHC 33.4 31.5 - 36.5 g/dL    RDW  12.0 10.0 - 15.0 %    Platelet Count 140 (L) 150 - 450 10e9/L   Basic metabolic panel    Collection Time: 03/26/21  8:43 AM   Result Value Ref Range    Sodium 141 133 - 144 mmol/L    Potassium 4.4 3.4 - 5.3 mmol/L    Chloride 111 (H) 94 - 109 mmol/L    Carbon Dioxide 27 20 - 32 mmol/L    Anion Gap 3 3 - 14 mmol/L    Glucose 97 70 - 99 mg/dL    Urea Nitrogen 32 (H) 7 - 30 mg/dL    Creatinine 1.06 (H) 0.52 - 1.04 mg/dL    GFR Estimate 55 (L) >60 mL/min/[1.73_m2]    GFR Estimate If Black 64 >60 mL/min/[1.73_m2]    Calcium 8.8 8.5 - 10.1 mg/dL   Hepatic panel    Collection Time: 03/26/21  8:43 AM   Result Value Ref Range    Bilirubin Direct 0.1 0.0 - 0.2 mg/dL    Bilirubin Total 0.3 0.2 - 1.3 mg/dL    Albumin 4.0 3.4 - 5.0 g/dL    Protein Total 7.2 6.8 - 8.8 g/dL    Alkaline Phosphatase 77 40 - 150 U/L    ALT 17 0 - 50 U/L    AST 8 0 - 45 U/L   Asymptomatic COVID-19 Virus (Coronavirus) by PCR    Collection Time: 03/26/21  8:44 AM    Specimen: Nasopharyngeal   Result Value Ref Range    COVID-19 Virus PCR to [facility] - Source Nasopharyngeal     COVID-19 Virus PCR to [facility] - Result       Test received-See reflex to IDDL test SARS CoV2 (COVID-19) Virus RT-PCR   SARS-CoV-2 COVID-19 Virus (Coronavirus) by PCR    Collection Time: 03/26/21  8:44 AM    Specimen: Nasopharyngeal   Result Value Ref Range    SARS-CoV-2 Virus Specimen Source Nasopharyngeal     SARS-CoV-2 PCR Result NEGATIVE     SARS-CoV-2 PCR Comment       Testing was performed using the Simplexa COVID-19 Direct Assay on the [employer] L[employer] MDX   instrument. Additional information about this Emergency Use Authorization (EUA) assay can   be found via the Lab Guide.     Glucose by meter    Collection Time: 03/29/21  6:08 AM   Result Value Ref Range    Glucose 94 70 - 99 mg/dL   ABO/Rh Type and Screen    Collection Time: 03/29/21 12:54 PM   Result Value Ref Range    ABO AB     RH(D) Pos     Antibody Screen Pos (A)     Test Valid Only At          [place] [facility]    Specimen Expires 04/01/2021     Blood Bank Comment       Delay in availability of Red Blood Cells called to  Carlos Barrow RN at 1341 3/29/21 by SD     Hemoglobin    Collection Time: 03/30/21  6:21 AM   Result Value Ref Range    Hemoglobin 11.1 (L) 11.7 - 15.7 g/dL   Basic metabolic panel    Collection Time: 03/30/21  6:21 AM   Result Value Ref Range    Sodium 140 133 - 144 mmol/L    Potassium 4.3 3.4 - 5.3 mmol/L    Chloride 107 94 - 109 mmol/L    Carbon Dioxide 26 20 - 32 mmol/L    Anion Gap 7 3 - 14 mmol/L    Glucose 120 (H) 70 - 99 mg/dL    Urea Nitrogen 28 7 - 30 mg/dL    Creatinine 0.90 0.52 - 1.04 mg/dL    GFR Estimate 68 >60 mL/min/[1.73_m2]    GFR Estimate If Black 79 >60 mL/min/[1.73_m2]    Calcium 8.0 (L) 8.5 - 10.1 mg/dL   Hepatic panel    Collection Time: 03/30/21  6:21 AM   Result Value Ref Range    Bilirubin Direct 1.0 (H) 0.0 - 0.2 mg/dL    Bilirubin Total 1.7 (H) 0.2 - 1.3 mg/dL    Albumin 3.4 3.4 - 5.0 g/dL    Protein Total 6.5 (L) 6.8 - 8.8 g/dL    Alkaline Phosphatase 142 40 - 150 U/L     (HH) 0 - 50 U/L     (HH) 0 - 45 U/L   Blood culture    Collection Time: 03/30/21  8:39 PM    Specimen: Blood    Right Arm   Result Value Ref Range    Specimen Description Blood Right Arm     Culture Micro No growth after 17 hours    UA with Microscopic reflex to Culture    Collection Time: 03/30/21 10:35 PM    Specimen: Urine clean catch; Midstream Urine   Result Value Ref Range    Color Urine Yellow     Appearance Urine Clear     Glucose Urine Negative NEG^Negative mg/dL    Bilirubin Urine Negative NEG^Negative    Ketones Urine 20 (A) NEG^Negative mg/dL    Specific Gravity Urine 1.021 1.003 - 1.035    Blood Urine Small (A) NEG^Negative    pH Urine 6.0 5.0 - 7.0 pH    Protein Albumin Urine 30 (A) NEG^Negative mg/dL    Urobilinogen mg/dL 2.0 0.0 - 2.0 mg/dL    Nitrite Urine Negative NEG^Negative    Leukocyte Esterase Urine Small (A) NEG^Negative     Source Midstream Urine     WBC Urine 12 (H) 0 - 5 /HPF    RBC Urine 11 (H) 0 - 2 /HPF    Squamous Epithelial /HPF Urine 0 0 - 1 /HPF    Transitional Epi <1 0 - 1 /HPF    Mucous Urine Present (A) NEG^Negative /LPF   Urine Culture Aerobic Bacterial    Collection Time: 03/30/21 10:35 PM    Specimen: Unspecified Urine   Result Value Ref Range    Specimen Description Unspecified Urine     Special Requests Specimen received in preservative     Culture Micro PENDING    Basic metabolic panel    Collection Time: 03/31/21  6:50 AM   Result Value Ref Range    Sodium 136 133 - 144 mmol/L    Potassium 4.2 3.4 - 5.3 mmol/L    Chloride 106 94 - 109 mmol/L    Carbon Dioxide 23 20 - 32 mmol/L    Anion Gap 7 3 - 14 mmol/L    Glucose 100 (H) 70 - 99 mg/dL    Urea Nitrogen 21 7 - 30 mg/dL    Creatinine 0.93 0.52 - 1.04 mg/dL    GFR Estimate 65 >60 mL/min/[1.73_m2]    GFR Estimate If Black 75 >60 mL/min/[1.73_m2]    Calcium 8.3 (L) 8.5 - 10.1 mg/dL   Magnesium    Collection Time: 03/31/21  6:50 AM   Result Value Ref Range    Magnesium 2.0 1.6 - 2.3 mg/dL   Phosphorus    Collection Time: 03/31/21  6:50 AM   Result Value Ref Range    Phosphorus 2.2 (L) 2.5 - 4.5 mg/dL   Hepatic panel    Collection Time: 03/31/21  6:50 AM   Result Value Ref Range    Bilirubin Direct 0.5 (H) 0.0 - 0.2 mg/dL    Bilirubin Total 1.4 (H) 0.2 - 1.3 mg/dL    Albumin 3.1 (L) 3.4 - 5.0 g/dL    Protein Total 6.3 (L) 6.8 - 8.8 g/dL    Alkaline Phosphatase 134 40 - 150 U/L     (H) 0 - 50 U/L    AST 96 (H) 0 - 45 U/L   Tacrolimus level    Collection Time: 03/31/21  6:50 AM   Result Value Ref Range    Tacrolimus Last Dose Not Provided     Tacrolimus Level 4.5 (L) 5.0 - 15.0 ug/L   CBC with platelets differential    Collection Time: 03/31/21  6:50 AM   Result Value Ref Range    WBC 6.5 4.0 - 11.0 10e9/L    RBC Count 3.21 (L) 3.8 - 5.2 10e12/L    Hemoglobin 9.4 (L) 11.7 - 15.7 g/dL    Hematocrit 28.8 (L) 35.0 - 47.0 %    MCV 90 78 - 100 fl    MCH 29.3 26.5 - 33.0  pg    MCHC 32.6 31.5 - 36.5 g/dL    RDW 12.2 10.0 - 15.0 %    Platelet Count 99 (L) 150 - 450 10e9/L    Diff Method Automated Method     % Neutrophils 79.4 %    % Lymphocytes 9.4 %    % Monocytes 9.1 %    % Eosinophils 1.2 %    % Basophils 0.3 %    % Immature Granulocytes 0.6 %    Nucleated RBCs 0 0 /100    Absolute Neutrophil 5.1 1.6 - 8.3 10e9/L    Absolute Lymphocytes 0.6 (L) 0.8 - 5.3 10e9/L    Absolute Monocytes 0.6 0.0 - 1.3 10e9/L    Absolute Eosinophils 0.1 0.0 - 0.7 10e9/L    Absolute Basophils 0.0 0.0 - 0.2 10e9/L    Abs Immature Granulocytes 0.0 0 - 0.4 10e9/L    Absolute Nucleated RBC 0.0    US Lower Extremity Venous Duplex Right    Collection Time: 03/31/21 10:36 AM   Result Value Ref Range    Radiologist flags Potential hematoma and right lower extremity DVT (Urgent)    MRA Abdomen wo & w Contrast    Collection Time: 03/31/21 12:03 PM   Result Value Ref Range    Radiologist flags (Urgent)      Continued high suspicion for hepatic artery stenosis.   Partial thromboplastin time    Collection Time: 03/31/21  2:12 PM   Result Value Ref Range    PTT 39 (H) 22 - 37 sec   CBC with platelets    Collection Time: 03/31/21  2:12 PM   Result Value Ref Range    WBC 7.5 4.0 - 11.0 10e9/L    RBC Count 3.06 (L) 3.8 - 5.2 10e12/L    Hemoglobin 9.5 (L) 11.7 - 15.7 g/dL    Hematocrit 27.6 (L) 35.0 - 47.0 %    MCV 90 78 - 100 fl    MCH 31.0 26.5 - 33.0 pg    MCHC 34.4 31.5 - 36.5 g/dL    RDW 12.1 10.0 - 15.0 %    Platelet Count 114 (L) 150 - 450 10e9/L      My impression is that Ms. Lala is 2-1/2 years status post liver transplantation for secondary biliary cirrhosis.  Thus far, her liver and kidney function is fine.  Her blood counts are good as well.  I do think she is a good candidate for knee replacement surgery.  She has had the first vaccine for COVID-19 and I have encouraged her to get the second.  Otherwise, my plan will be to see her back in the clinic in 6 months.      Thank very much for allowing me to  participate in the care of this patient.  If you have any questions regarding my recommendations please do not hesitate to contact me.     Bradly Lilly MD      Professor of Medicine  [employer]      Executive Medical Director, Solid Organ Transplant Program  [facility]

## 2021-03-27 LAB
ABO + RH BLD: ABNORMAL
ABO + RH BLD: ABNORMAL
BLD GP AB SCN SERPL QL: ABNORMAL
BLD PROD TYP BPU: ABNORMAL
BLOOD BANK CMNT PATIENT-IMP: ABNORMAL
BLOOD BANK CMNT PATIENT-IMP: ABNORMAL
NUM BPU REQUESTED: 2
SPECIMEN EXP DATE BLD: ABNORMAL

## 2021-03-29 ENCOUNTER — ANESTHESIA (OUTPATIENT)
Dept: SURGERY | Facility: CLINIC | Age: 66
DRG: 470 | End: 2021-03-29
Payer: MEDICARE

## 2021-03-29 ENCOUNTER — HOSPITAL ENCOUNTER (INPATIENT)
Facility: CLINIC | Age: 66
LOS: 7 days | Discharge: HOME | DRG: 470 | End: 2021-04-06
Attending: ORTHOPAEDIC SURGERY | Admitting: ORTHOPAEDIC SURGERY
Payer: MEDICARE

## 2021-03-29 ENCOUNTER — APPOINTMENT (OUTPATIENT)
Dept: GENERAL RADIOLOGY | Facility: CLINIC | Age: 66
DRG: 470 | End: 2021-03-29
Attending: PHYSICIAN ASSISTANT
Payer: MEDICARE

## 2021-03-29 ENCOUNTER — APPOINTMENT (OUTPATIENT)
Dept: PHYSICAL THERAPY | Facility: CLINIC | Age: 66
DRG: 470 | End: 2021-03-29
Attending: ORTHOPAEDIC SURGERY
Payer: MEDICARE

## 2021-03-29 DIAGNOSIS — R79.89 ELEVATED LFTS: ICD-10-CM

## 2021-03-29 DIAGNOSIS — M17.11 PRIMARY LOCALIZED OSTEOARTHRITIS OF RIGHT KNEE: Primary | ICD-10-CM

## 2021-03-29 LAB
ABO + RH BLD: ABNORMAL
ABO + RH BLD: ABNORMAL
BLD GP AB SCN SERPL QL: ABNORMAL
BLOOD BANK CMNT PATIENT-IMP: ABNORMAL
BLOOD BANK CMNT PATIENT-IMP: ABNORMAL
GLUCOSE BLDC GLUCOMTR-MCNC: 94 MG/DL (ref 70–99)
SPECIMEN EXP DATE BLD: ABNORMAL

## 2021-03-29 PROCEDURE — 97110 THERAPEUTIC EXERCISES: CPT | Mod: GP | Performed by: PHYSICAL THERAPIST

## 2021-03-29 PROCEDURE — 370N000017 HC ANESTHESIA TECHNICAL FEE, PER MIN: Performed by: ORTHOPAEDIC SURGERY

## 2021-03-29 PROCEDURE — 278N000051 HC OR IMPLANT GENERAL: Performed by: ORTHOPAEDIC SURGERY

## 2021-03-29 PROCEDURE — 97161 PT EVAL LOW COMPLEX 20 MIN: CPT | Mod: GP | Performed by: PHYSICAL THERAPIST

## 2021-03-29 PROCEDURE — 272N000002 HC OR SUPPLY OTHER OPNP: Performed by: ORTHOPAEDIC SURGERY

## 2021-03-29 PROCEDURE — 999N000141 HC STATISTIC PRE-PROCEDURE NURSING ASSESSMENT: Performed by: ORTHOPAEDIC SURGERY

## 2021-03-29 PROCEDURE — 258N000003 HC RX IP 258 OP 636: Performed by: ORTHOPAEDIC SURGERY

## 2021-03-29 PROCEDURE — 360N000077 HC SURGERY LEVEL 4, PER MIN: Performed by: ORTHOPAEDIC SURGERY

## 2021-03-29 PROCEDURE — C1776 JOINT DEVICE (IMPLANTABLE): HCPCS | Performed by: ORTHOPAEDIC SURGERY

## 2021-03-29 PROCEDURE — 250N000009 HC RX 250: Performed by: ORTHOPAEDIC SURGERY

## 2021-03-29 PROCEDURE — 999N000065 XR KNEE PORT RT 1/2 VW: Mod: RT

## 2021-03-29 PROCEDURE — 250N000013 HC RX MED GY IP 250 OP 250 PS 637: Performed by: ORTHOPAEDIC SURGERY

## 2021-03-29 PROCEDURE — 250N000012 HC RX MED GY IP 250 OP 636 PS 637: Performed by: INTERNAL MEDICINE

## 2021-03-29 PROCEDURE — 710N000010 HC RECOVERY PHASE 1, LEVEL 2, PER MIN: Performed by: ORTHOPAEDIC SURGERY

## 2021-03-29 PROCEDURE — 73560 X-RAY EXAM OF KNEE 1 OR 2: CPT | Mod: 26 | Performed by: RADIOLOGY

## 2021-03-29 PROCEDURE — 250N000011 HC RX IP 250 OP 636: Performed by: ORTHOPAEDIC SURGERY

## 2021-03-29 PROCEDURE — 250N000013 HC RX MED GY IP 250 OP 250 PS 637: Performed by: PHYSICIAN ASSISTANT

## 2021-03-29 PROCEDURE — 250N000011 HC RX IP 250 OP 636

## 2021-03-29 PROCEDURE — 250N000013 HC RX MED GY IP 250 OP 250 PS 637: Performed by: INTERNAL MEDICINE

## 2021-03-29 PROCEDURE — 0SRC0J9 REPLACEMENT OF RIGHT KNEE JOINT WITH SYNTHETIC SUBSTITUTE, CEMENTED, OPEN APPROACH: ICD-10-PCS | Performed by: ORTHOPAEDIC SURGERY

## 2021-03-29 PROCEDURE — 86900 BLOOD TYPING SEROLOGIC ABO: CPT | Performed by: ORTHOPAEDIC SURGERY

## 2021-03-29 PROCEDURE — 258N000001 HC RX 258: Performed by: ORTHOPAEDIC SURGERY

## 2021-03-29 PROCEDURE — 86901 BLOOD TYPING SEROLOGIC RH(D): CPT | Performed by: ORTHOPAEDIC SURGERY

## 2021-03-29 PROCEDURE — 250N000011 HC RX IP 250 OP 636: Performed by: PHYSICIAN ASSISTANT

## 2021-03-29 PROCEDURE — 97530 THERAPEUTIC ACTIVITIES: CPT | Mod: GP | Performed by: PHYSICAL THERAPIST

## 2021-03-29 PROCEDURE — 250N000013 HC RX MED GY IP 250 OP 250 PS 637: Performed by: CLINICAL NURSE SPECIALIST

## 2021-03-29 PROCEDURE — 82962 GLUCOSE BLOOD TEST: CPT

## 2021-03-29 PROCEDURE — 258N000003 HC RX IP 258 OP 636

## 2021-03-29 PROCEDURE — 86850 RBC ANTIBODY SCREEN: CPT | Performed by: ORTHOPAEDIC SURGERY

## 2021-03-29 PROCEDURE — 99204 OFFICE O/P NEW MOD 45 MIN: CPT | Performed by: INTERNAL MEDICINE

## 2021-03-29 PROCEDURE — 97116 GAIT TRAINING THERAPY: CPT | Mod: GP | Performed by: PHYSICAL THERAPIST

## 2021-03-29 PROCEDURE — 36415 COLL VENOUS BLD VENIPUNCTURE: CPT | Performed by: ORTHOPAEDIC SURGERY

## 2021-03-29 PROCEDURE — 272N000001 HC OR GENERAL SUPPLY STERILE: Performed by: ORTHOPAEDIC SURGERY

## 2021-03-29 PROCEDURE — C1713 ANCHOR/SCREW BN/BN,TIS/BN: HCPCS | Performed by: ORTHOPAEDIC SURGERY

## 2021-03-29 DEVICE — IMPLANTABLE DEVICE
Type: IMPLANTABLE DEVICE | Site: KNEE | Status: FUNCTIONAL
Brand: PERSONA® NATURAL TIBIA®

## 2021-03-29 DEVICE — BONE CEMENT SIMPLEX W/TOBRAMYCIN 6197-9-001: Type: IMPLANTABLE DEVICE | Site: KNEE | Status: FUNCTIONAL

## 2021-03-29 DEVICE — IMPLANTABLE DEVICE
Type: IMPLANTABLE DEVICE | Site: KNEE | Status: FUNCTIONAL
Brand: PERSONA™

## 2021-03-29 RX ORDER — SODIUM CHLORIDE, SODIUM LACTATE, POTASSIUM CHLORIDE, CALCIUM CHLORIDE 600; 310; 30; 20 MG/100ML; MG/100ML; MG/100ML; MG/100ML
INJECTION, SOLUTION INTRAVENOUS CONTINUOUS
Status: ACTIVE | OUTPATIENT
Start: 2021-03-29 | End: 2021-03-29

## 2021-03-29 RX ORDER — NALOXONE HYDROCHLORIDE 0.4 MG/ML
0.4 INJECTION, SOLUTION INTRAMUSCULAR; INTRAVENOUS; SUBCUTANEOUS
Status: DISCONTINUED | OUTPATIENT
Start: 2021-03-29 | End: 2021-04-06 | Stop reason: HOSPADM

## 2021-03-29 RX ORDER — OXYCODONE HYDROCHLORIDE 10 MG/1
10 TABLET ORAL EVERY 4 HOURS PRN
Status: DISCONTINUED | OUTPATIENT
Start: 2021-03-29 | End: 2021-04-02

## 2021-03-29 RX ORDER — MAGNESIUM HYDROXIDE 1200 MG/15ML
LIQUID ORAL PRN
Status: DISCONTINUED | OUTPATIENT
Start: 2021-03-29 | End: 2021-03-29 | Stop reason: HOSPADM

## 2021-03-29 RX ORDER — PROPOFOL 10 MG/ML
INJECTION, EMULSION INTRAVENOUS CONTINUOUS PRN
Status: DISCONTINUED | OUTPATIENT
Start: 2021-03-29 | End: 2021-03-29

## 2021-03-29 RX ORDER — METHOCARBAMOL 500 MG/1
500 TABLET, FILM COATED ORAL 3 TIMES DAILY PRN
Status: DISCONTINUED | OUTPATIENT
Start: 2021-03-29 | End: 2021-03-30

## 2021-03-29 RX ORDER — HYDROXYZINE HYDROCHLORIDE 25 MG/1
25 TABLET, FILM COATED ORAL EVERY 6 HOURS PRN
Status: DISCONTINUED | OUTPATIENT
Start: 2021-03-29 | End: 2021-04-06 | Stop reason: HOSPADM

## 2021-03-29 RX ORDER — NALOXONE HYDROCHLORIDE 0.4 MG/ML
0.2 INJECTION, SOLUTION INTRAMUSCULAR; INTRAVENOUS; SUBCUTANEOUS
Status: DISCONTINUED | OUTPATIENT
Start: 2021-03-29 | End: 2021-04-06 | Stop reason: HOSPADM

## 2021-03-29 RX ORDER — SODIUM CHLORIDE, SODIUM LACTATE, POTASSIUM CHLORIDE, CALCIUM CHLORIDE 600; 310; 30; 20 MG/100ML; MG/100ML; MG/100ML; MG/100ML
INJECTION, SOLUTION INTRAVENOUS CONTINUOUS PRN
Status: DISCONTINUED | OUTPATIENT
Start: 2021-03-29 | End: 2021-03-29

## 2021-03-29 RX ORDER — LIDOCAINE 40 MG/G
CREAM TOPICAL
Status: DISCONTINUED | OUTPATIENT
Start: 2021-03-29 | End: 2021-04-06 | Stop reason: HOSPADM

## 2021-03-29 RX ORDER — OXYCODONE HYDROCHLORIDE 5 MG/1
5 TABLET ORAL EVERY 4 HOURS PRN
Status: DISCONTINUED | OUTPATIENT
Start: 2021-03-29 | End: 2021-04-02

## 2021-03-29 RX ORDER — CELECOXIB 100 MG/1
100 CAPSULE ORAL 2 TIMES DAILY
Status: DISCONTINUED | OUTPATIENT
Start: 2021-03-29 | End: 2021-03-30

## 2021-03-29 RX ORDER — CEFAZOLIN SODIUM 2 G/100ML
2 INJECTION, SOLUTION INTRAVENOUS
Status: COMPLETED | OUTPATIENT
Start: 2021-03-29 | End: 2021-03-29

## 2021-03-29 RX ORDER — BUPIVACAINE HYDROCHLORIDE 7.5 MG/ML
INJECTION, SOLUTION EPIDURAL; RETROBULBAR PRN
Status: DISCONTINUED | OUTPATIENT
Start: 2021-03-29 | End: 2021-03-29

## 2021-03-29 RX ORDER — PRAMIPEXOLE DIHYDROCHLORIDE 0.5 MG/1
0.5 TABLET ORAL AT BEDTIME
Status: DISCONTINUED | OUTPATIENT
Start: 2021-03-29 | End: 2021-04-06 | Stop reason: HOSPADM

## 2021-03-29 RX ORDER — ACETAMINOPHEN 325 MG/1
975 TABLET ORAL ONCE
Status: COMPLETED | OUTPATIENT
Start: 2021-03-29 | End: 2021-03-29

## 2021-03-29 RX ORDER — POLYETHYLENE GLYCOL 3350 17 G/17G
17 POWDER, FOR SOLUTION ORAL DAILY
Status: DISCONTINUED | OUTPATIENT
Start: 2021-03-30 | End: 2021-04-06 | Stop reason: HOSPADM

## 2021-03-29 RX ORDER — ONDANSETRON 2 MG/ML
4 INJECTION INTRAMUSCULAR; INTRAVENOUS EVERY 30 MIN PRN
Status: DISCONTINUED | OUTPATIENT
Start: 2021-03-29 | End: 2021-03-30

## 2021-03-29 RX ORDER — PROCHLORPERAZINE MALEATE 5 MG
10 TABLET ORAL EVERY 6 HOURS PRN
Status: DISCONTINUED | OUTPATIENT
Start: 2021-03-29 | End: 2021-04-06 | Stop reason: HOSPADM

## 2021-03-29 RX ORDER — CEFAZOLIN SODIUM 1 G/3ML
1 INJECTION, POWDER, FOR SOLUTION INTRAMUSCULAR; INTRAVENOUS EVERY 8 HOURS
Status: COMPLETED | OUTPATIENT
Start: 2021-03-29 | End: 2021-03-30

## 2021-03-29 RX ORDER — BISACODYL 10 MG
10 SUPPOSITORY, RECTAL RECTAL DAILY PRN
Status: DISCONTINUED | OUTPATIENT
Start: 2021-03-29 | End: 2021-04-06 | Stop reason: HOSPADM

## 2021-03-29 RX ORDER — SODIUM CHLORIDE 9 MG/ML
INJECTION, SOLUTION INTRAVENOUS
Status: DISCONTINUED
Start: 2021-03-29 | End: 2021-03-30 | Stop reason: HOSPADM

## 2021-03-29 RX ORDER — FENTANYL CITRATE 50 UG/ML
25-50 INJECTION, SOLUTION INTRAMUSCULAR; INTRAVENOUS
Status: DISCONTINUED | OUTPATIENT
Start: 2021-03-29 | End: 2021-03-29

## 2021-03-29 RX ORDER — AMOXICILLIN 250 MG
1 CAPSULE ORAL 2 TIMES DAILY
Status: DISCONTINUED | OUTPATIENT
Start: 2021-03-29 | End: 2021-04-06 | Stop reason: HOSPADM

## 2021-03-29 RX ORDER — SODIUM CHLORIDE, SODIUM LACTATE, POTASSIUM CHLORIDE, CALCIUM CHLORIDE 600; 310; 30; 20 MG/100ML; MG/100ML; MG/100ML; MG/100ML
INJECTION, SOLUTION INTRAVENOUS CONTINUOUS
Status: DISCONTINUED | OUTPATIENT
Start: 2021-03-29 | End: 2021-03-29

## 2021-03-29 RX ORDER — ONDANSETRON 2 MG/ML
4 INJECTION INTRAMUSCULAR; INTRAVENOUS EVERY 6 HOURS PRN
Status: DISCONTINUED | OUTPATIENT
Start: 2021-03-29 | End: 2021-04-06 | Stop reason: HOSPADM

## 2021-03-29 RX ORDER — ONDANSETRON 4 MG/1
4 TABLET, ORALLY DISINTEGRATING ORAL EVERY 30 MIN PRN
Status: DISCONTINUED | OUTPATIENT
Start: 2021-03-29 | End: 2021-03-30

## 2021-03-29 RX ORDER — OXYCODONE HYDROCHLORIDE 5 MG/1
5 TABLET ORAL EVERY 4 HOURS PRN
Qty: 40 TABLET | Refills: 0 | Status: SHIPPED | OUTPATIENT
Start: 2021-03-29 | End: 2021-04-06

## 2021-03-29 RX ORDER — TACROLIMUS 1 MG/1
3 CAPSULE ORAL EVERY 12 HOURS
Status: DISCONTINUED | OUTPATIENT
Start: 2021-03-29 | End: 2021-03-31

## 2021-03-29 RX ORDER — DOCUSATE SODIUM 100 MG/1
100 CAPSULE, LIQUID FILLED ORAL 2 TIMES DAILY
Status: DISCONTINUED | OUTPATIENT
Start: 2021-03-29 | End: 2021-04-06 | Stop reason: HOSPADM

## 2021-03-29 RX ORDER — ALBUTEROL SULFATE 90 UG/1
2 AEROSOL, METERED RESPIRATORY (INHALATION) EVERY 6 HOURS
Status: DISCONTINUED | OUTPATIENT
Start: 2021-03-29 | End: 2021-03-30

## 2021-03-29 RX ORDER — HYDROMORPHONE HCL IN WATER/PF 6 MG/30 ML
0.4 PATIENT CONTROLLED ANALGESIA SYRINGE INTRAVENOUS
Status: DISCONTINUED | OUTPATIENT
Start: 2021-03-29 | End: 2021-03-31

## 2021-03-29 RX ORDER — AMOXICILLIN 250 MG
1 CAPSULE ORAL 2 TIMES DAILY
Qty: 30 TABLET | Refills: 0 | Status: SHIPPED | OUTPATIENT
Start: 2021-03-29 | End: 2022-04-07

## 2021-03-29 RX ORDER — OXYCODONE HYDROCHLORIDE 5 MG/1
5 TABLET ORAL EVERY 4 HOURS PRN
Status: DISCONTINUED | OUTPATIENT
Start: 2021-03-29 | End: 2021-03-29

## 2021-03-29 RX ORDER — METHOCARBAMOL 500 MG/1
500 TABLET, FILM COATED ORAL 3 TIMES DAILY PRN
Qty: 30 TABLET | Refills: 0 | Status: SHIPPED | OUTPATIENT
Start: 2021-03-29 | End: 2021-03-30

## 2021-03-29 RX ORDER — HYDROMORPHONE HCL IN WATER/PF 6 MG/30 ML
0.2 PATIENT CONTROLLED ANALGESIA SYRINGE INTRAVENOUS
Status: DISCONTINUED | OUTPATIENT
Start: 2021-03-29 | End: 2021-03-31

## 2021-03-29 RX ORDER — ASPIRIN 81 MG/1
162 TABLET ORAL DAILY
Status: DISCONTINUED | OUTPATIENT
Start: 2021-03-30 | End: 2021-04-01

## 2021-03-29 RX ORDER — ONDANSETRON 4 MG/1
4 TABLET, ORALLY DISINTEGRATING ORAL EVERY 6 HOURS PRN
Status: DISCONTINUED | OUTPATIENT
Start: 2021-03-29 | End: 2021-04-06 | Stop reason: HOSPADM

## 2021-03-29 RX ORDER — POLYETHYLENE GLYCOL 3350 17 G/17G
17 POWDER, FOR SOLUTION ORAL DAILY
Qty: 7 PACKET | Refills: 0 | Status: SHIPPED | OUTPATIENT
Start: 2021-03-30 | End: 2022-04-07

## 2021-03-29 RX ORDER — FAMOTIDINE 20 MG/1
20 TABLET, FILM COATED ORAL 2 TIMES DAILY
Status: DISCONTINUED | OUTPATIENT
Start: 2021-03-29 | End: 2021-04-06 | Stop reason: HOSPADM

## 2021-03-29 RX ORDER — AMLODIPINE BESYLATE 5 MG/1
5 TABLET ORAL DAILY
Status: DISCONTINUED | OUTPATIENT
Start: 2021-03-30 | End: 2021-04-06 | Stop reason: HOSPADM

## 2021-03-29 RX ORDER — CEFAZOLIN SODIUM 2 G/100ML
2 INJECTION, SOLUTION INTRAVENOUS SEE ADMIN INSTRUCTIONS
Status: DISCONTINUED | OUTPATIENT
Start: 2021-03-29 | End: 2021-03-29 | Stop reason: HOSPADM

## 2021-03-29 RX ADMIN — PRAMIPEXOLE DIHYDROCHLORIDE 0.5 MG: 0.5 TABLET ORAL at 22:07

## 2021-03-29 RX ADMIN — DOCUSATE SODIUM 100 MG: 100 CAPSULE, LIQUID FILLED ORAL at 11:29

## 2021-03-29 RX ADMIN — CELECOXIB 100 MG: 100 CAPSULE ORAL at 22:07

## 2021-03-29 RX ADMIN — PROPOFOL 40 MCG/KG/MIN: 10 INJECTION, EMULSION INTRAVENOUS at 07:40

## 2021-03-29 RX ADMIN — ONDANSETRON 4 MG: 4 TABLET, ORALLY DISINTEGRATING ORAL at 12:08

## 2021-03-29 RX ADMIN — TACROLIMUS 3 MG: 1 CAPSULE ORAL at 18:36

## 2021-03-29 RX ADMIN — METHOCARBAMOL 500 MG: 500 TABLET ORAL at 14:06

## 2021-03-29 RX ADMIN — MIDAZOLAM 2 MG: 1 INJECTION INTRAMUSCULAR; INTRAVENOUS at 07:16

## 2021-03-29 RX ADMIN — FAMOTIDINE 20 MG: 20 TABLET ORAL at 11:28

## 2021-03-29 RX ADMIN — ACETAMINOPHEN 975 MG: 325 TABLET, FILM COATED ORAL at 06:28

## 2021-03-29 RX ADMIN — CEFAZOLIN 1 G: 1 INJECTION, POWDER, FOR SOLUTION INTRAMUSCULAR; INTRAVENOUS at 23:44

## 2021-03-29 RX ADMIN — DOCUSATE SODIUM AND SENNOSIDES 1 TABLET: 8.6; 5 TABLET ORAL at 11:28

## 2021-03-29 RX ADMIN — OXYCODONE HYDROCHLORIDE 10 MG: 10 TABLET ORAL at 16:17

## 2021-03-29 RX ADMIN — CEFAZOLIN 1 G: 1 INJECTION, POWDER, FOR SOLUTION INTRAMUSCULAR; INTRAVENOUS at 16:36

## 2021-03-29 RX ADMIN — SODIUM CHLORIDE, POTASSIUM CHLORIDE, SODIUM LACTATE AND CALCIUM CHLORIDE: 600; 310; 30; 20 INJECTION, SOLUTION INTRAVENOUS at 07:21

## 2021-03-29 RX ADMIN — OXYCODONE HYDROCHLORIDE 5 MG: 5 TABLET ORAL at 11:53

## 2021-03-29 RX ADMIN — PHENYLEPHRINE HYDROCHLORIDE 0.25 MCG/KG/MIN: 10 INJECTION INTRAVENOUS at 07:52

## 2021-03-29 RX ADMIN — DOCUSATE SODIUM 100 MG: 100 CAPSULE, LIQUID FILLED ORAL at 19:59

## 2021-03-29 RX ADMIN — OXYCODONE HYDROCHLORIDE 5 MG: 5 TABLET ORAL at 19:58

## 2021-03-29 RX ADMIN — OXYCODONE HYDROCHLORIDE 5 MG: 5 TABLET ORAL at 12:04

## 2021-03-29 RX ADMIN — DOCUSATE SODIUM AND SENNOSIDES 1 TABLET: 8.6; 5 TABLET ORAL at 19:59

## 2021-03-29 RX ADMIN — OXYCODONE HYDROCHLORIDE 5 MG: 5 TABLET ORAL at 22:07

## 2021-03-29 RX ADMIN — CELECOXIB 100 MG: 100 CAPSULE ORAL at 11:53

## 2021-03-29 RX ADMIN — METHOCARBAMOL 500 MG: 500 TABLET ORAL at 19:58

## 2021-03-29 RX ADMIN — BUPIVACAINE HYDROCHLORIDE 1.2 ML: 7.5 INJECTION, SOLUTION EPIDURAL; RETROBULBAR at 07:35

## 2021-03-29 RX ADMIN — Medication 2 G: at 07:40

## 2021-03-29 RX ADMIN — FAMOTIDINE 20 MG: 20 TABLET ORAL at 19:58

## 2021-03-29 ASSESSMENT — MIFFLIN-ST. JEOR: SCORE: 1144.13

## 2021-03-29 NOTE — ANESTHESIA PROCEDURE NOTES
Intrathecal injection Procedure Note  Pre-Procedure   Staff -        Anesthesiologist:  Campos Champion MD       Resident/Fellow: Yaniv England MD       Performed By: resident       Location: OR       Procedure Start/Stop Times: 3/29/2021 7:25 AM and 3/29/2021 7:35 AM       Pre-Anesthestic Checklist: patient identified, IV checked, risks and benefits discussed, informed consent, monitors and equipment checked, pre-op evaluation, at physician/surgeon's request and post-op pain management  Timeout:       Correct Patient: Yes        Correct Procedure: Yes        Correct Site: Yes        Correct Position: Yes   Procedure Documentation  Procedure: intrathecal injection       Patient Position: sitting       Skin prep: Chloraprep       Insertion Site: L3-4. (midline approach).       Needle Gauge: 25.        Needle Length (Inches): 3.5        Spinal Needle Type: Pencan       Introducer used       Introducer: 20 G       # of attempts: 1 and  # of redirects:  3    Assessment/Narrative         Paresthesias: No.       CSF fluid: clear.    Medication(s) Administered   Medication Administration Time: 3/29/2021 7:35 AM

## 2021-03-29 NOTE — BRIEF OP NOTE
[facility]    Brief Operative Note    Pre-operative diagnosis: Primary localized osteoarthritis of right knee [M17.11]  Post-operative diagnosis Same as pre-operative diagnosis    Procedure: Procedure(s):  ARTHROPLASTY, KNEE, TOTAL, RIGHT  Surgeon: Surgeon(s) and Role:     * Nitin Jacobsen MD - Primary     * Nitin Ferris PA-C - Assisting     * Yaniv Carey PA-C - Assisting  Anesthesia: Spinal   Estimated blood loss: Less than 100 ml  Drains: None  Specimens: * No specimens in log *  Findings:   See Op Note.  Complications: None.  Implants:   Implant Name Type Inv. Item Serial No.  Lot No. LRB No. Used Action   BONE CEMENT SIMPLEX W/TOBRAMYCIN 6197-9-001 Cement, Bone BONE CEMENT SIMPLEX W/TOBRAMYCIN 6197-9-001  [employer] [id number] Right 2 Implanted   [employer] Persona Femur PS Narrow Right Sz 8 Total Joint Component/Insert   [employer] [id number] Right 1 Implanted   IMP TIBIAL ZIM PSN NP STM 5DEG SZ ER -507-02 Total Joint Component/Insert IMP TIBIAL ZIM PSN NP STM 5DEG SZ ER -762-02  [employer] [id number] Right 1 Implanted   IMP PATELLA ZIM KNEE ALL DIDIER 32MM -714-32 Total Joint Component/Insert IMP PATELLA ZIM KNEE ALL DIDIER 32MM -764-32  [employer] [id number] Right 1 Implanted   Clifton Persona Articular Surface Fixed Bearing PS Right 13mm ht Total Joint Component/Insert   [employer] [id number] Right 1 Implanted       Plan:  Ortho Primary  Activity: Up with assist.  Weight bearing status: WBAT.  Antibiotics: Ancef x 24 hours.  Diet: Begin with clear fluids and progress diet as tolerated.  DVT prophylaxis:  mg x 4 weeks, SCDs in the hospital.  Bracing/Splinting: None.  Wound Care: Aquacel/Tegaderm x 7 days.  Drains: None.  Pain management: transition from IV to orals as tolerated.   X-rays: AP knee XR in PACU.  Physical Therapy: ROM, ADL's.  Occupational Therapy: ADL's.  Labs: Trend Hgb on POD  #1.  Consults: Hospitalist, PT, OT - appreciate assistance in caring for this patient.  Follow-up: Clinic with Dr. Jacobsen's team in 2 weeks.   Disposition: Pending progress with therapies, pain control on orals, and medical stability, anticipate discharge to home on POD #1.      I positioned the patient. I placed and held retractors to provide adequate exposure that allowed the case to proceed in a safe, efficient manner. I assisted in identifying and protecting important structures. I suctioned fluids when needed. I assisted in positioning and holding the operative extremity in proper positioning during surgery to allow the surgeon to perform various steps of the case. I assisted with the proper selection and positioning of any implants required for the case. I performed wound closure of the operative incisions.    An experienced physician assistant was medically necessary to ensure a safe and efficient procedure. The assistance that I provided decreased operative time and thereby, reduced the risk of infection and complications from prolonged time of anesthesia. My assistance was vital in achieving best practices.    No qualified residents or fellows were available to assist with this case.      ERICA Rahman, PAMarkC  Physician Assistant, Orthopedic Surgery  Pager: [phone]     Please page me directly with any questions/concerns during regular weekday hours before 4pm. If there is no response, or if it is a weekend, holiday, or during evening hours then please page the orthopaedic surgery resident on call.

## 2021-03-29 NOTE — PROGRESS NOTES
PACU to Inpatient Nursing Handoff    Patient Sherrie Lala is a 65 year old female who speaks English.   Procedure Procedure(s):  ARTHROPLASTY, KNEE, TOTAL, RIGHT   Surgeon(s) Primary: Nitin Jacobsen MD  Assisting: Yaniv Carey PA-C; Nitin Ferris PA-C     Allergies   Allergen Reactions     Blood Transfusion Related (Informational Only) Other (See Comments)     Patient has a history of a clinically significant antibody against RBC antigens.  A delay in compatible RBCs may occur.     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    Anesthesia Spinal   Dermatome Level Dermatomes Left: S2  Dermatomes Right: S2   Preop Meds acetaminophen (Tylenol) - time given: 0635   Nerve block Not applicable   Intraop Meds Not applicable   Local Meds Yes - Local Cocktail (morphine, ropivacaine, epinephrine) and spinal anesthesia   Antibiotics cefazolin (Ancef) - last given at 0740     Pain Patient Currently in Pain: denies   PACU meds  Not applicable   PCA / epidural No   Capnography     Telemetry ECG Rhythm: Normal sinus rhythm   Inpatient Telemetry Monitor Ordered? No        Labs Glucose Lab Results   Component Value Date    GLC 97 03/26/2021       Hgb Lab Results   Component Value Date    HGB 13.1 03/26/2021       INR Lab Results   Component Value Date    INR 1.18 03/04/2019      PACU Imaging Completed     Wound/Incision Incision/Surgical Site 03/29/21 Right  Knee (Active)   Incision Assessment UTV 03/29/21 0920   Jillian-Incision Assessment UTV 03/29/21 0920   Closure JACQUELINE 03/29/21 0920   Incision Drainage Amount None 03/29/21 0920   Dressing Intervention Clean, dry, intact 03/29/21 0920   Number of days: 0      CMS        Equipment ice pack   Other LDA       IV Access Peripheral IV 03/29/21 Right Lower forearm (Active)   Site Assessment WDL 03/29/21 0920   Line Status Infusing 03/29/21 0920   Dressing Intervention New dressing  03/29/21 0712   Phlebitis Scale 0-->no symptoms 03/29/21 0920   Infiltration Scale 0 03/29/21 0920   Infiltration Site Treatment Method  None 03/29/21 0712   If infiltrated, was a Vesicant infusing? No 03/29/21 0712   Number of days: 0       Pulmonary Artery Catheter Assessment - Single Lumen 09/01/18 0704 (Active)   Number of days: 940      Blood Products Not applicable EBL 0 mL   Intake/Output Date 03/29/21 0700 - 03/30/21 0659   Shift 5761-8633 5314-5212 2533-7454 24 Hour Total   INTAKE   I.V. 600   600   Shift Total(mL/kg) 600(9.68)   600(9.68)   OUTPUT   Shift Total(mL/kg)       Weight (kg) 62 62 62 62      Drains / Bruner     Time of void PreOp Void Prior to Procedure: 0630 (03/29/21 0646)    PostOp      Diapered? No   Bladder Scan Bladder Scan Volume (mL): 443 ml (03/29/21 1015)   PO    water and ice chips     Vitals    B/P: 119/81  T: 97.5  F (36.4  C)    Temp src: Oral  P:  Pulse: 67 (03/29/21 1000)          R: 18  O2:  SpO2: 100 %    O2 Device: None (Room air) (03/29/21 1000)    Oxygen Delivery: 6 LPM (03/29/21 0920)         Family/support present Sister (Janae) available via phone   Patient belongings     Patient transported on bed   DC meds/scripts (obs/outpt) Not applicable   Inpatient Pain Meds Released? Yes       Special needs/considerations None   Tasks needing completion None       Tanisha Lin, RN  ASCOM [id number]

## 2021-03-29 NOTE — ANESTHESIA POSTPROCEDURE EVALUATION
Patient: Sherrie Lala    Procedure(s):  ARTHROPLASTY, KNEE, TOTAL, RIGHT    Diagnosis:Primary localized osteoarthritis of right knee [M17.11]  Diagnosis Additional Information: No value filed.    Anesthesia Type:  Spinal    Note:  Disposition: Admission   Postop Pain Control: Uneventful            Sign Out: Well controlled pain   PONV: No   Neuro/Psych: Uneventful            Sign Out: Acceptable/Baseline neuro status   Airway/Respiratory: Uneventful            Sign Out: Acceptable/Baseline resp. status   CV/Hemodynamics: Uneventful            Sign Out: Acceptable CV status   Other NRE:    DID A NON-ROUTINE EVENT OCCUR?          Last vitals:  Vitals:    03/29/21 0945 03/29/21 1000 03/29/21 1015   BP: 117/75 119/81 125/77   Pulse: 67 67 67   Resp: 12 18 11   Temp:  36.4  C (97.5  F)    SpO2: 99% 100% 99%       Last vitals prior to Anesthesia Care Transfer:  CRNA VITALS  3/29/2021 0847 - 3/29/2021 0947      3/29/2021             Pulse:  81    SpO2:  100 %    EKG:  Sinus rhythm          Electronically Signed By: Elias Dias MD  March 29, 2021  10:55 AM

## 2021-03-29 NOTE — PLAN OF CARE
"  VS: /72 (BP Location: Left arm)   Pulse 78   Temp 98.5  F (36.9  C) (Oral)   Resp 16   Ht 1.6 m (5' 3\")   Wt 62 kg (136 lb 11 oz)   SpO2 99%   BMI 24.21 kg/m      O2: > 90% on RA   Output: Voiding spontaneously in toilet   Last BM: LBM 3/28   Activity: SBA w/ gait belt and walker. WBAT   Skin: R knee incision   Pain: Managed w/ 10 mg PO Oxy q 4 hr   CMS: Intact   Dressing: R knee incision   Diet: Regular   LDA: RUE PIV saline locked   Equipment: Gait belt, walker, call light within reach   Plan: Plan to discharge home when able   Additional Info: Pt refused capno and IV fluids, stating she was drinking adequately. MD aware.      "

## 2021-03-29 NOTE — ANESTHESIA CARE TRANSFER NOTE
Patient: Sherrie Lala    Procedure(s):  ARTHROPLASTY, KNEE, TOTAL, RIGHT    Diagnosis: Primary localized osteoarthritis of right knee [M17.11]  Diagnosis Additional Information: No value filed.    Anesthesia Type:   Spinal     Note:    Oropharynx: spontaneously breathing  Level of Consciousness: awake  Oxygen Supplementation: face mask    Independent Airway: airway patency satisfactory and stable  Dentition: dentition unchanged  Vital Signs Stable: post-procedure vital signs reviewed and stable  Report to RN Given: handoff report given  Patient transferred to: PACU    Handoff Report: Identifed the Patient, Identified the Reponsible Provider, Reviewed the pertinent medical history, Discussed the surgical course, Reviewed Intra-OP anesthesia mangement and issues during anesthesia, Set expectations for post-procedure period and Allowed opportunity for questions and acknowledgement of understanding      Vitals: (Last set prior to Anesthesia Care Transfer)  CRNA VITALS  3/29/2021 0847 - 3/29/2021 0921      3/29/2021             Pulse:  81    SpO2:  100 %        Electronically Signed By: Yaniv England MD  March 29, 2021  9:21 AM

## 2021-03-29 NOTE — CONSULTS
[facility]  Consult Note - Hospitalist Service     Date of Admission:  3/29/2021  Consult Requested by: Yaniv Carey PA-C  Reason for Consult: Postoperative co managemnet     Assessment & Plan   Sherrie Lala is a 65 year old female admitted on 3/29/2021. 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 Additionally, she has h/o HTN , CKD and reactive airway disease  ( asthma).   She underwent rt total knee arthroplasty today. Internal medicine consulted for postoperative co management.  Individual problems and their management are outlined below      S/P Rt Knee arthroplasty, POD#0:  Management by primary team. Please see their notes for further details  Consider stopping IV fluids as patient is tolerating oral fluids   Pain control with acetaminophen 975 mg every 8 hrs for 3 days    Oxycodone 5-10 mg every 3 hrs PRN and IV hydromorphone 0.2-0.mg q 2 hrs  PRN for breakthrough pain   Resume robaxin for spasm   DVT prophylaxis with  SCDs while in hospital  Perioperative antibiotics as per primary team   Overnight Capnography monitoring  PT/OT as per protocol  Zohreh to come out on POD#1 unless clinically indicated to remain.  Incentive spirometry and aggressive bowel regimen (This has been ordered)  We will monitor for acute blood loss anemia. Pre op Hgb at 13.1     S/P Liver Transplant:  Stable baseline liver function   Resume home dos of Tacrolimus 3 mg BID     HTN:   Resume home dose of Amlodipine 5 mg with hold parameters       Asthma:   Stable. PRN Albuetrol     RLS:   Resume home dose of pramipexole        The patient's care was discussed with the Bedside Nurse, Patient, Patient's Family and Primary team.    Sriram Berkowitz MD  [facility]  Contact information available via [facility]  Paging/Directory    ______________________________________________________________________    Chief Complaint   S/P Rt Total Knee arthroplasty     History is obtained from the patient, pre op physical and perioperative notes     History of Present Illness   Sherrie Lala is a 65 year old female who underwent the above surgery today.  The surgery was done under spinal anesthesia with estimated blood loss of 100 mL.  Postoperatively, patient recovered well.  I met patient up on floor 5 Ortho, where she was resting comfortably.  Her sister was at bedside.    Patient denies any chest pain or shortness of breath.  She denies any fevers or chills.  She denies any nausea, vomiting or any diarrhea.  We discussed in length about patient's past medical history, including her recent liver transplant 2.5 years ago.  She tells me that her only immunosuppressant medication is tacrolimus 3 mg twice daily.  She has not had any breaks in taking the tacrolimus, and took the medication this morning.  She is in a pleasant mood.  All her medications were discussed and reconciled.    Review of Systems   The 10 point Review of Systems is negative other than noted in the HPI or here.     Past Medical History    I have reviewed this patient's medical history and updated it with pertinent information if needed.   Past Medical History:   Diagnosis Date     Antiplatelet or antithrombotic long-term use      Asthma      Cholangiocarcinoma (H) 06/2014     Cirrhosis of liver with ascites (H) 5/10/2018     Encounter for pleural drainage tube placement      Esophageal varices with hemorrhage (H)      Gastric ulcer      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     Liver transplanted (H) 08/30/2018     Lung metastases (H) 08/2014     Portal vein thrombosis      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) 08/31/2018    Residual thrombus in main and right portal       Past Surgical History   I have reviewed this patient's surgical history and updated it  with pertinent information if needed.  Past Surgical History:   Procedure Laterality Date     CHOLECYSTECTOMY       HEPATECTOMY PARTIAL       OPEN REDUCTION INTERNAL FIXATION HUMERUS PROXIMAL Left 2019    Procedure: OPEN REDUCTION INTERNAL FIXATION HUMERUS PROXIMAL LEFT with Allograft;  Surgeon: Eh Kraft MD;  Location: [facility] OR     RETURN LIVER TRANSPLANT N/A 2018    Procedure: RETURN LIVER TRANSPLANT;  Open Abdomen, return liver transplant washout with   Mesh and liver stent  placement and  Abdomal Wound closure;  Surgeon: Darren Rod MD;  Location: [facility] OR     TONSILLECTOMY       TRANSPLANT LIVER RECIPIENT  DONOR N/A 2018    Procedure: TRANSPLANT LIVER RECIPIENT  DONOR;  TRANSPLANT LIVER RECIPIENT  DONOR ;  Surgeon: Darren Rod MD;  Location: [facility] OR       Social History   I have reviewed this patient's social history and updated it with pertinent information if needed.  Social History     Tobacco Use     Smoking status: Never Smoker     Smokeless tobacco: Never Used   Substance Use Topics     Alcohol use: Yes     Comment: occ /rare     Drug use: No       Family History   I have reviewed this patient's family history and updated it with pertinent information if needed.  Family History   Problem Relation Age of Onset     Skin Cancer Father      Skin Cancer Brother      Melanoma No family hx of      Anesthesia Reaction No family hx of      Deep Vein Thrombosis (DVT) No family hx of        Medications   I have reviewed this patient's current medications    Allergies   Allergies   Allergen Reactions     Blood Transfusion Related (Informational Only) Other (See Comments)     Patient has a history of a clinically significant antibody against RBC antigens.  A delay in compatible RBCs may occur.     Dilaudid [Hydromorphone] Itching     Ferrlecit      Venofer [Iron Sucrose]        Physical Exam   Vital Signs: Temp: 98.5  F (36.9  C) Temp src: Oral BP:  130/72 Pulse: 78   Resp: 16 SpO2: 99 % O2 Device: None (Room air) Oxygen Delivery: 6 LPM  Weight: 136 lbs 10.96 oz    Constitutional: awake, alert, cooperative, no apparent distress, and appears stated age  Eyes: Lids and lashes normal, pupils equal, round and reactive to light, extra ocular muscles intact, sclera clear, conjunctiva normal  ENT: Normocephalic, without obvious abnormality, atraumatic, sinuses nontender on palpation, external ears without lesions, oral pharynx with moist mucous membranes, tonsils without erythema or exudates, gums normal and good dentition.  Respiratory: No increased work of breathing, good air exchange, clear to auscultation bilaterally, no crackles or wheezing  Cardiovascular: Normal apical impulse, regular rate and rhythm, normal S1 and S2, no S3 or S4, and no murmur noted  GI: No scars, normal bowel sounds, soft, non-distended, non-tender, no masses palpated, no hepatosplenomegally  Skin: no bruising or bleeding  Musculoskeletal: Rt lower extremitiy in crepe bandage. No drainage. No drain. o distal deficits   Neurologic: Awake, alert, oriented to name, place and time.  Cranial nerves II-XII are grossly intact.  Motor is 5 out of 5 bilaterally.  Cerebellar finger to nose, heel to shin intact.  Sensory is intact.  Babinski down going, Romberg negative, and gait is normal.    Data   Results for orders placed or performed during the hospital encounter of 03/29/21 (from the past 24 hour(s))   Glucose by meter   Result Value Ref Range    Glucose 94 70 - 99 mg/dL   XR Knee Port Right 1/2 Views    Narrative    Exam: 2 views of the right knee dated 3/29/2021.    COMPARISON: 3/3/2021.    CLINICAL HISTORY: Postop evaluation.    FINDINGS: AP and lateral views of the right knee were obtained. There  are new postsurgical changes of placement of a right total knee  arthroplasty. The hardware appears intact. Postoperative air is noted.      Impression    IMPRESSION: New postsurgical changes of  placement of a right total  knee arthroplasty, without complication.    LIYAH RIGGS MD   ABO/Rh Type and Screen   Result Value Ref Range    ABO AB     RH(D) Pos     Antibody Screen Pos (A)     Test Valid Only At          [facility]    Specimen Expires 04/01/2021     Blood Bank Comment       Delay in availability of Red Blood Cells called to  Carlos Barrow RN at 1341 3/29/21 by SD

## 2021-03-29 NOTE — OP NOTE
PREOPERATIVE DIAGNOSIS: Degenerative arthritis, right knee.  POSTOPERATIVE DIAGNOSIS: Same as pre-op.  PROCEDURE: right Total Knee Arthroplasty  DATE OF SURGERY: 3/29/2021   ASSISTANTS: VANESSA Cedeno; VANESSA Rahman    COMPONENTS USED:  1. Shreyas Persona Size 8 Narrow PS Femoral Component  2. Shreyas Persona Size E Tibial Component  3. Shreyas Size 32 Patellar Button    4. Shreyas Persona PS Polyethylene Liner, Size 13    ANESTHESIA: Spinal  COMPLICATIONS: None  EBL: 100cc    SECONDARY DIAGNOSES: History of liver transplant on immunosuppressive medication.    FINDINGS: Preop ROM under anesthesia: 0 to 120.  Cartilage loss, sclerotic bone, and osteophyte formation of the medial, lateral and patellofemoral compartment.  Femoral, tibial, and patellar components cemented into place without complication.  Following placement of the polyethylene liner the knee appeared to be in neutral alignment, have full extension, flexion to 130, good patellar tracking, and stable to to anterior and posterior stress in flexion and varus/valgus stress in extension, 30 degrees, and 90 degrees.  The wound was closed without tension.      INDICATIONS: Sherrie Lala is a 65 year old female with longstanding history of right knee pain.  The patient was recently seen in clinic and found to have degenerative arthritis of the knee.  The patient was followed and noted to have failed conservative treatment options. Radiographs and preoperative clinical symptoms are consistent with degenerative arthritis as the cause of the patient's pain.   We discussed the risks, benefits, and alternatives to total knee arthroplasty with the patient.  Please see clinic note for full details of the preoperative discussion.  Following that the discussion, the patient elected to proceed with total knee arthroplasty.      DESCRIPTION OF PROCEDURE: We met the patient in the preoperative area.  There we again reviewed the risks, benefits, and alternatives to  total knee arthroplasty.  Informed written consent was obtained.  The operative Right knee was marked with an indelible marker after patient confirmation of the operative site.   Preoperative exam was notable for:   RLE:  Skin clean and dry on the surgical leg  EHL/fhl/gs/at intact  [place]: dp/sp/t/s/s  2+ dp pulse     All the patient's questions were answered.  The patient was taken to the operating room and underwent induction of  Spinal anesthesia.  The patient was placed on the operating table in the supine position.  Bony prominences were well padded and the patient was secured to the table in the standard fashion.  An SCD was placed on the nonoperative leg.   A tourniquet was placed on the operative thigh and the patient was prepped and draped in the normal sterile fashion.       A timeout was performed in which the patient's name, MRN, imaging, preoperative plan and laterality was reviewed.  We also confirmed that the patient received the appropriate preoperative IV antibiotics.           A standard midline incision was made. Dissection was carried down to the knee retinaculum and the quadriceps tendon. A standard medial parapatellar arthrotomy was performed. Subperiosteal dissection was carried out along the medial proximal tibia. The fat pad was removed.  Care was taken to protect the patellar tendon and extensor mechanism during fat pad removal. The tissue along the anterior aspect of the distal femur was also removed.  The patella was subluxed and the knee was flexed.     The ACL and PCL were released.  A drill was advanced down the femoral canal in a retrograde direction. The sword was set at 5 degrees of valgus in accordance with the preoperative plan and was advanced into the canal.  The distal femoral cutting block was then placed and pinned. The jaswant was removed and the distal femur cuts were made medially and laterally.  The cutting block was removed and the jaswant with the alignment piece was inserted  into the canal to ensure proper angle of the cut and a flat cut.      The posterior referencing femoral sizing block was used and the femur was measured to be a size 8.  Two holes were drilled to obtain 3 degrees of external rotation with respect to the posterior condylar axis of the femur.  The femoral cutting block was then slid over the pins and fixed onto the bone.  The anterior cut, posterior cut, anterior and posterior chamfer cuts were made.  The block was then removed and excess bone fragments were removed.     Attention was then directed towards the proximal tibia. The meniscus and soft tissue was removed to expose the medial and lateral tibial plateau. Retractors were placed around the knee to obtain good visualization, then the extramedullary tibial alignment guide was placed in the appropriate position. The 2-10 guide was used to select the resection level of the tibia and the cutting guide was pinned into position. With care directed towards preserving the posterior nerves and blood vessels and the medial collateral ligament, the saw was used to cut the proximal tibia.     Lamina spreaders were used to visualize the posterior knee. A curved osteotome was used to remove posterior osteophytes. Remnants of meniscus were also removed. Our anesthetic injection was then introduced through the posterior capsule with extreme care directed towards not injuring the posterior neurovascular structures.  The posterolateral capsular tissue was avoided.  The sizing blocks were used to measure and confirm appropriate sizing of the flexion and extension gaps.  The drop jaswant was used to confirm proper alignment of the cut.    The proximal tibia was again exposed and sized. The tibia was measured to be a size E.   The tibial trial was placed into proper rotation and pinned into place.      The femoral trial was placed and the box cutting guide was used and the notch cut was performed. The excess bone was removed.  The karlos  holes were drilled.  The trial liner was placed. The knee was found to have excellent range of motion from full extension to 130 of flexion and was stable to anterior and posterior stress in flexion and extension as well as varus/valgus stress in 0, 30, and 90 degrees of flexion.     The patella was then exposed.  Soft tissues were removed around the patella for visualization. The patella thickness was measured with a caliper to be 23, and a measured resection of 9 mm was made.  A caliper was then used to measure the residual thickness of the patella to be 14.  The patella was measured with the lollipops and found to be a size 32. The drill guide was placed and the three lug holes were drilled. The patella trial was then placed and the knee was ranged. The patella was found to track well.      The patellar and femoral trials were removed and the final tibial preparation was done. The stove pipe was used to guide the drill, and then the wing punch was used. The tibial trial was then removed. The tourniquet was inflated.    A bone plug was placed into the femur and then pulsitile lavage was used to clean the bone in preparation for cementing. The bone was dried. Cement was mixed, and then all the components were cemented into place. While the cement was hardening, the remainder of the anesthetic injection was spread around the deep retinacular layer and the subcutaneous layer with a spinal needle.   The knee was irrigated with betadine lavage for approximately 3 minutes.  Once the cement had hardened, the excess cement was removed.  The tourniquet was released. Total tourniquet time was 15 minutes.  Hemostasis was obtained.      Various trial liners were then used. The 13 mm liner fit best and the knee had full extension to 130 degrees and was stable to anterior and posterior stress in flexion and extension as well as varus/valgus stress in 0, 30, and 90 degrees of flexion.  The trial liner was removed and the real  liner was placed.      The knee was again copiously irrigated.  No drain was used.  Closure was performed with a #1 symmetric stratafix in the retinacular layer, 2-0 stratafix spiral in the  subcutaneous tissues, and monocryl in the skin.  A sterile dressing was applied.  The patient was then awakened, transferred to the [facility], and brought to the recovery room in stable condition. There were no complications.  VANESSA Cedeno acted as a trained surgical assistant and was a necessary part of the procedure due to the complexity of the operation.  No resident physician was available to assist.  The surgical assistant actively participated and was needed to help with soft tissue retraction, preparation and implantation of the components, and positioning of the limb during key steps of the procedure.  The assistant was necessary to allow for appropriate patient safety and successful completion of the case.

## 2021-03-29 NOTE — LETTER
Transition Communication Hand-off for Care Transitions to Next Level of Care Provider    Name: Sherrie Lala  : 1955  MRN #: [id number]  Primary Care Provider: Apollo Benitez     Primary Clinic: [address] [address]     Reason for Hospitalization:  Primary localized osteoarthritis of right knee [M17.11]  Admit Date/Time: 3/29/2021  5:45 AM  Discharge Date: 21  Payor Source: Payor: MEDICA / Plan: MEDICA PRIME SOLUTION / Product Type: Indemnity /       Discharge Plan: home with OP PT       Discharge Needs Assessment:  Needs      Most Recent Value   Equipment Currently Used at Home  none   # of Referrals Placed by CTS  External Care Coordination, Durable Medical Equipment (DME)        Follow-up plan:    Future Appointments   Date Time Provider Department Center   2021  6:30 PM Seda Rea PT [facility]   2021  3:00 PM Nitin Ferris PA-C [facility]   [facility] 12[facility]   2021 12:55 PM [facility]   2021  1:15 PM Nitin Jacobsen MD [facility]       Any outstanding tests or procedures:        Referrals     Future Labs/Procedures    Occupational Therapy Referral     Process Instructions:    Work Related Injury: Functional Capacity and Work Conditioning are only offered at [facility] (service can be provided by PT or OT).    *This therapy referral will be filtered to a centralized scheduling office at [facility] and the patient will receive a call to schedule an appointment at a [facility] location most convenient for them. *    Comments:    [facility]  [address] [place] [address]  Phone [phone]  Fax [phone]        Please be aware that coverage of these services is subject to the terms and limitations of your health insurance plan.  Call member services at your health plan with any benefit or coverage questions.  [facility]  [place]  Please call the facility your provider referred you to schedule your appointment            Supplies     Future Labs/Procedures    [facility]     Process Instructions:    By signing this order, the Authorizing Provider is attesting that they have completed a face-to-face evaluation on the patient to determine their need for this equipment in the last 60 days. A new face-to-face evaluation is required each time  A new prescription for one of the specified items is ordered.   If an additional provider completed the evaluation, please indicate their name below.     **As of 2018, an order requisition and face sheet will print for all DME orders. Please give printed order and face sheet to patient if not obtaining product from [facility] DME closet.     Comments:    DME Documentation: Describe the reason for need to support medical necessity: Impaired gait status post knee surgery. I, the undersigned, certify that the above prescribed supplies are medically necessary for this patient and is both reasonable and necessary in reference to accepted standards of medical practice in the treatment of this patient's condition and is not prescribed as a convenience.    [facility]     Process Instructions:    By signing this order, the Authorizing Provider is attesting that they have completed a face-to-face evaluation on the patient to determine their need for this equipment in the last 60 days. A new face-to-face evaluation is required each time  A new prescription for one of the specified items is ordered.   If an additional provider completed the evaluation, please indicate their name below.     **As of 2018, an order requisition and face sheet will print for all DME orders. Please give printed order and face sheet to patient if not obtaining product from [facility] DME closet.     Comments:    DME Documentation: Describe the reason for need to support medical necessity: Impaired gait status  post knee surgery. I, the undersigned, certify that the above prescribed supplies are medically necessary for this patient and is both reasonable and necessary in reference to accepted standards of medical practice in the treatment of this patient's condition and is not prescribed as a convenience.    [facility]     Process Instructions:    By signing this order, the Authorizing Provider is attesting that they have completed a face-to-face evaluation on the patient to determine their need for this equipment in the last 60 days. A new face-to-face evaluation is required each time  A new prescription for one of the specified items is ordered.   If an additional provider completed the evaluation, please indicate their name below.     **As of 2018, an order requisition and face sheet will print for all DME orders. Please give printed order and face sheet to patient if not obtaining product from [facility] DME closet.     Comments:    DME Documentation: Describe the reason for need to support medical necessity: Impaired gait status post knee surgery. I, the undersigned, certify that the above prescribed supplies are medically necessary for this patient and is both reasonable and necessary in reference to accepted standards of medical practice in the treatment of this patient's condition and is not prescribed as a convenience.          Key Recommendations:  See AVS    Bonita Toledo RN    AVS/Discharge Summary is the source of truth; this is a helpful guide for improved communication of patient story

## 2021-03-29 NOTE — PROGRESS NOTES
03/29/21 1425   Quick Adds   Quick Adds Certification   Type of Visit Initial PT Evaluation   Living Environment   People in home spouse   Current Living Arrangements house   Home Accessibility stairs to enter home;stairs within home   Number of Stairs, Main Entrance 1   Stair Railings, Main Entrance none   Number of Stairs, Within Home, Primary 6   Stair Railings, Within Home, Primary railings on both sides of stairs   Transportation Anticipated family or friend will provide   Self-Care   Usual Activity Tolerance excellent   Current Activity Tolerance excellent   Regular Exercise Yes   Activity/Exercise Type walking   Exercise Amount/Frequency 3-5 times/wk   Equipment Currently Used at Home none   Disability/Function   Hearing Difficulty or Deaf no   Wear Glasses or Blind yes   Vision Management glasses   Concentrating, Remembering or Making Decisions Difficulty no   Difficulty Communicating no   Difficulty Eating/Swallowing no   Walking or Climbing Stairs Difficulty yes   Walking or Climbing Stairs other (see comments)  (no equipment)   Dressing/Bathing Difficulty no   Toileting issues no   Doing Errands Independently Difficulty (such as shopping) no   Fall history within last six months no   Change in Functional Status Since Onset of Current Illness/Injury no   General Information   Onset of Illness/Injury or Date of Surgery 03/29/21   Referring Physician Dr GWYN Jacobsen   Patient/Family Therapy Goals Statement (PT) PLOF without pain   Pertinent History of Current Problem (include personal factors and/or comorbidities that impact the POC) Pt is a 65 year old fem s/p R TKA. PMH liver transplant, OA   Existing Precautions/Restrictions fall   Weight-Bearing Status - RLE weight-bearing as tolerated   Cognition   Orientation Status (Cognition) oriented x 4   Affect/Mental Status (Cognition) WFL   Follows Commands (Cognition) WFL   Pain Assessment   Patient Currently in Pain Yes, see Vital Sign flowsheet   Posture     Posture Not impaired   Range of Motion (ROM)   ROM Comment o-10-30   Strength   Strength Comments Pt is below baseline for mobility   Bed Mobility   Comment (Bed Mobility) SBA for bed mobility   Transfers   Transfer Safety Comments SBA for sit to stand transfers with a FWW   Gait/Stairs (Locomotion)   [place] Level (Gait) contact guard   Assistive Device (Gait) walker, front-wheeled   Distance in Feet (Required for LE Total Joints) 75'   Pattern (Gait) step-to   Comment (Gait/Stairs) Pt has not completed her stair goal   Balance   Balance no deficits were identified   Sensory Examination   Sensory Perception patient reports no sensory changes   Coordination   Coordination no deficits were identified   Muscle Tone   Muscle Tone no deficits were identified   Clinical Impression   Criteria for Skilled Therapeutic Intervention yes, treatment indicated   PT Diagnosis (PT) weakness    Influenced by the following impairments weakness   Functional limitations due to impairments weakness   Clinical Presentation Stable/Uncomplicated   Clinical Presentation Rationale Pt presents as medically diagnosed   Clinical Decision Making (Complexity) low complexity   Therapy Frequency (PT) 2x/day   Predicted Duration of Therapy Intervention (days/wks) 1   Planned Therapy Interventions (PT) bed mobility training;gait training;home exercise program;strengthening;stair training;patient/family education;transfer training   Risk & Benefits of therapy have been explained evaluation/treatment results reviewed;care plan/treatment goals reviewed;risks/benefits reviewed;current/potential barriers reviewed;participants voiced agreement with care plan;participants included;patient;sibling   PT Discharge Planning    PT Discharge Recommendation (DC Rec) home with outpatient physical therapy   PT Rationale for DC Rec Pt is mobilizing well for home DC   PT Brief overview of current status  SBA for bed mob, transfers, and gait   Therapy  Certification   Start of care date 03/29/21   Certification date from 03/29/21   Certification date to 03/30/21   Medical Diagnosis RTKA   Total Evaluation Time   Total Evaluation Time (Minutes) 10

## 2021-03-29 NOTE — PLAN OF CARE
[unfilled]                                                                              [facility]      OUTPATIENT PHYSICAL THERAPY EVALUATION  PLAN OF TREATMENT FOR OUTPATIENT REHABILITATION  (COMPLETE FOR INITIAL CLAIMS ONLY)  Patient's Last Name, First Name, M.I.  YOB: 1955  Sherrie Lala                        Provider's Name  [facility] Medical Record No.  [id number]                               Onset Date:  03/29/21   Start of Care Date:  03/29/21      Type:     _X_PT   ___OT   ___SLP Medical Diagnosis:  RTKA                        PT Diagnosis:  weakness    Visits from SOC:  1   _________________________________________________________________________________  Plan of Treatment/Functional Goals    Planned Interventions: bed mobility training, gait training, home exercise program, strengthening, stair training, patient/family education, transfer training     Goals: See Physical Therapy Goals on Care Plan in [facility] electronic health record.    Therapy Frequency: 2x/day  Predicted Duration of Therapy Intervention: 1  _________________________________________________________________________________    I CERTIFY THE NEED FOR THESE SERVICES FURNISHED UNDER        THIS PLAN OF TREATMENT AND WHILE UNDER MY CARE     (Physician co-signature of this document indicates review and certification of the therapy plan).              Certification date from: 03/29/21, Certification date to: 03/30/21    Referring Physician: Dr GWYN Jacobsen            Initial Assessment        See Physical Therapy evaluation dated 03/29/21 in Epic electronic health record.

## 2021-03-30 ENCOUNTER — APPOINTMENT (OUTPATIENT)
Dept: ULTRASOUND IMAGING | Facility: CLINIC | Age: 66
DRG: 470 | End: 2021-03-30
Attending: INTERNAL MEDICINE
Payer: MEDICARE

## 2021-03-30 ENCOUNTER — APPOINTMENT (OUTPATIENT)
Dept: PHYSICAL THERAPY | Facility: CLINIC | Age: 66
DRG: 470 | End: 2021-03-30
Attending: ORTHOPAEDIC SURGERY
Payer: MEDICARE

## 2021-03-30 LAB
ALBUMIN SERPL-MCNC: 3.4 G/DL (ref 3.4–5)
ALBUMIN UR-MCNC: 30 MG/DL
ALP SERPL-CCNC: 142 U/L (ref 40–150)
ALT SERPL W P-5'-P-CCNC: 545 U/L (ref 0–50)
ANION GAP SERPL CALCULATED.3IONS-SCNC: 7 MMOL/L (ref 3–14)
APPEARANCE UR: CLEAR
AST SERPL W P-5'-P-CCNC: 585 U/L (ref 0–45)
BILIRUB DIRECT SERPL-MCNC: 1 MG/DL (ref 0–0.2)
BILIRUB SERPL-MCNC: 1.7 MG/DL (ref 0.2–1.3)
BILIRUB UR QL STRIP: NEGATIVE
BLD PROD TYP BPU: NORMAL
BLD PROD TYP BPU: NORMAL
BLD UNIT ID BPU: 0
BLD UNIT ID BPU: 0
BLOOD PRODUCT CODE: NORMAL
BLOOD PRODUCT CODE: NORMAL
BPU ID: NORMAL
BPU ID: NORMAL
BUN SERPL-MCNC: 28 MG/DL (ref 7–30)
CALCIUM SERPL-MCNC: 8 MG/DL (ref 8.5–10.1)
CHLORIDE SERPL-SCNC: 107 MMOL/L (ref 94–109)
CO2 SERPL-SCNC: 26 MMOL/L (ref 20–32)
COLOR UR AUTO: YELLOW
CREAT SERPL-MCNC: 0.9 MG/DL (ref 0.52–1.04)
GFR SERPL CREATININE-BSD FRML MDRD: 68 ML/MIN/{1.73_M2}
GLUCOSE SERPL-MCNC: 120 MG/DL (ref 70–99)
GLUCOSE UR STRIP-MCNC: NEGATIVE MG/DL
HGB BLD-MCNC: 11.1 G/DL (ref 11.7–15.7)
HGB UR QL STRIP: ABNORMAL
KETONES UR STRIP-MCNC: 20 MG/DL
LEUKOCYTE ESTERASE UR QL STRIP: ABNORMAL
MUCOUS THREADS #/AREA URNS LPF: PRESENT /LPF
NITRATE UR QL: NEGATIVE
PH UR STRIP: 6 PH (ref 5–7)
POTASSIUM SERPL-SCNC: 4.3 MMOL/L (ref 3.4–5.3)
PROT SERPL-MCNC: 6.5 G/DL (ref 6.8–8.8)
RBC #/AREA URNS AUTO: 11 /HPF (ref 0–2)
SODIUM SERPL-SCNC: 140 MMOL/L (ref 133–144)
SOURCE: ABNORMAL
SP GR UR STRIP: 1.02 (ref 1–1.03)
SQUAMOUS #/AREA URNS AUTO: 0 /HPF (ref 0–1)
TRANS CELLS #/AREA URNS HPF: <1 /HPF (ref 0–1)
TRANSFUSION STATUS PATIENT QL: NORMAL
UROBILINOGEN UR STRIP-MCNC: 2 MG/DL (ref 0–2)
WBC #/AREA URNS AUTO: 12 /HPF (ref 0–5)

## 2021-03-30 PROCEDURE — 250N000013 HC RX MED GY IP 250 OP 250 PS 637: Performed by: INTERNAL MEDICINE

## 2021-03-30 PROCEDURE — 97116 GAIT TRAINING THERAPY: CPT | Mod: GP

## 2021-03-30 PROCEDURE — 250N000013 HC RX MED GY IP 250 OP 250 PS 637: Performed by: PHYSICIAN ASSISTANT

## 2021-03-30 PROCEDURE — 85018 HEMOGLOBIN: CPT | Performed by: PHYSICIAN ASSISTANT

## 2021-03-30 PROCEDURE — 250N000013 HC RX MED GY IP 250 OP 250 PS 637: Performed by: SURGERY

## 2021-03-30 PROCEDURE — 36415 COLL VENOUS BLD VENIPUNCTURE: CPT | Performed by: PHYSICIAN ASSISTANT

## 2021-03-30 PROCEDURE — 250N000011 HC RX IP 250 OP 636: Performed by: PHYSICIAN ASSISTANT

## 2021-03-30 PROCEDURE — 999N000111 HC STATISTIC OT IP EVAL DEFER: Performed by: OCCUPATIONAL THERAPIST

## 2021-03-30 PROCEDURE — 80076 HEPATIC FUNCTION PANEL: CPT | Performed by: PHYSICIAN ASSISTANT

## 2021-03-30 PROCEDURE — 250N000013 HC RX MED GY IP 250 OP 250 PS 637: Performed by: CLINICAL NURSE SPECIALIST

## 2021-03-30 PROCEDURE — 258N000003 HC RX IP 258 OP 636: Performed by: INTERNAL MEDICINE

## 2021-03-30 PROCEDURE — 99214 OFFICE O/P EST MOD 30 MIN: CPT | Performed by: INTERNAL MEDICINE

## 2021-03-30 PROCEDURE — 87086 URINE CULTURE/COLONY COUNT: CPT | Performed by: ORTHOPAEDIC SURGERY

## 2021-03-30 PROCEDURE — 36415 COLL VENOUS BLD VENIPUNCTURE: CPT

## 2021-03-30 PROCEDURE — 250N000013 HC RX MED GY IP 250 OP 250 PS 637: Performed by: STUDENT IN AN ORGANIZED HEALTH CARE EDUCATION/TRAINING PROGRAM

## 2021-03-30 PROCEDURE — 93975 VASCULAR STUDY: CPT | Mod: 26 | Performed by: RADIOLOGY

## 2021-03-30 PROCEDURE — 250N000012 HC RX MED GY IP 250 OP 636 PS 637: Performed by: INTERNAL MEDICINE

## 2021-03-30 PROCEDURE — 99207 PR CDG-CODE CATEGORY CHANGED: CPT | Performed by: INTERNAL MEDICINE

## 2021-03-30 PROCEDURE — 87040 BLOOD CULTURE FOR BACTERIA: CPT

## 2021-03-30 PROCEDURE — 999N000127 HC STATISTIC PERIPHERAL IV START W US GUIDANCE

## 2021-03-30 PROCEDURE — 80048 BASIC METABOLIC PNL TOTAL CA: CPT | Performed by: PHYSICIAN ASSISTANT

## 2021-03-30 PROCEDURE — 97110 THERAPEUTIC EXERCISES: CPT | Mod: GP

## 2021-03-30 PROCEDURE — 76700 US EXAM ABDOM COMPLETE: CPT

## 2021-03-30 PROCEDURE — 81001 URINALYSIS AUTO W/SCOPE: CPT

## 2021-03-30 RX ORDER — GABAPENTIN 300 MG/1
600 CAPSULE ORAL AT BEDTIME
Status: DISCONTINUED | OUTPATIENT
Start: 2021-03-30 | End: 2021-04-06 | Stop reason: HOSPADM

## 2021-03-30 RX ORDER — METHOCARBAMOL 500 MG/1
500 TABLET, FILM COATED ORAL EVERY 4 HOURS PRN
Qty: 30 TABLET | Refills: 0 | Status: SHIPPED | OUTPATIENT
Start: 2021-03-30 | End: 2021-03-30

## 2021-03-30 RX ORDER — METHOCARBAMOL 500 MG/1
500 TABLET, FILM COATED ORAL 4 TIMES DAILY
Status: DISCONTINUED | OUTPATIENT
Start: 2021-03-30 | End: 2021-03-30

## 2021-03-30 RX ORDER — METHOCARBAMOL 500 MG/1
500 TABLET, FILM COATED ORAL EVERY 4 HOURS PRN
Qty: 50 TABLET | Refills: 0 | Status: SHIPPED | OUTPATIENT
Start: 2021-03-30 | End: 2022-04-07

## 2021-03-30 RX ORDER — METHOCARBAMOL 500 MG/1
500 TABLET, FILM COATED ORAL 4 TIMES DAILY PRN
Status: DISCONTINUED | OUTPATIENT
Start: 2021-03-30 | End: 2021-04-06 | Stop reason: HOSPADM

## 2021-03-30 RX ORDER — CELECOXIB 100 MG/1
100 CAPSULE ORAL 2 TIMES DAILY
Qty: 28 CAPSULE | Refills: 0 | Status: SHIPPED | OUTPATIENT
Start: 2021-03-30 | End: 2021-03-30

## 2021-03-30 RX ORDER — ALENDRONATE SODIUM 70 MG/1
70 TABLET ORAL
COMMUNITY
End: 2022-11-01

## 2021-03-30 RX ORDER — ONDANSETRON 4 MG/1
4 TABLET, FILM COATED ORAL EVERY 6 HOURS PRN
Qty: 30 TABLET | Refills: 0 | Status: SHIPPED | OUTPATIENT
Start: 2021-03-30 | End: 2022-04-07

## 2021-03-30 RX ORDER — PRAMIPEXOLE DIHYDROCHLORIDE 0.25 MG/1
0.75 TABLET ORAL AT BEDTIME
COMMUNITY
End: 2022-11-01

## 2021-03-30 RX ORDER — SODIUM CHLORIDE 9 MG/ML
INJECTION, SOLUTION INTRAVENOUS CONTINUOUS
Status: ACTIVE | OUTPATIENT
Start: 2021-03-30 | End: 2021-03-31

## 2021-03-30 RX ORDER — ALBUTEROL SULFATE 90 UG/1
2 AEROSOL, METERED RESPIRATORY (INHALATION) EVERY 6 HOURS PRN
Status: DISCONTINUED | OUTPATIENT
Start: 2021-03-30 | End: 2021-04-06 | Stop reason: HOSPADM

## 2021-03-30 RX ADMIN — OXYCODONE HYDROCHLORIDE 10 MG: 10 TABLET ORAL at 06:08

## 2021-03-30 RX ADMIN — OXYCODONE HYDROCHLORIDE 10 MG: 10 TABLET ORAL at 14:30

## 2021-03-30 RX ADMIN — GABAPENTIN 600 MG: 300 CAPSULE ORAL at 21:28

## 2021-03-30 RX ADMIN — OXYCODONE HYDROCHLORIDE 10 MG: 10 TABLET ORAL at 10:32

## 2021-03-30 RX ADMIN — METHOCARBAMOL 500 MG: 500 TABLET, FILM COATED ORAL at 14:30

## 2021-03-30 RX ADMIN — OMEPRAZOLE 40 MG: 20 CAPSULE, DELAYED RELEASE ORAL at 08:01

## 2021-03-30 RX ADMIN — METHOCARBAMOL 500 MG: 500 TABLET ORAL at 08:02

## 2021-03-30 RX ADMIN — FAMOTIDINE 20 MG: 20 TABLET ORAL at 08:04

## 2021-03-30 RX ADMIN — SODIUM CHLORIDE: 9 INJECTION, SOLUTION INTRAVENOUS at 22:29

## 2021-03-30 RX ADMIN — OXYCODONE HYDROCHLORIDE 10 MG: 10 TABLET ORAL at 22:27

## 2021-03-30 RX ADMIN — OXYCODONE HYDROCHLORIDE 10 MG: 10 TABLET ORAL at 02:01

## 2021-03-30 RX ADMIN — METHOCARBAMOL 500 MG: 500 TABLET, FILM COATED ORAL at 20:01

## 2021-03-30 RX ADMIN — DOCUSATE SODIUM AND SENNOSIDES 1 TABLET: 8.6; 5 TABLET ORAL at 08:01

## 2021-03-30 RX ADMIN — OXYCODONE HYDROCHLORIDE 10 MG: 10 TABLET ORAL at 18:18

## 2021-03-30 RX ADMIN — DOCUSATE SODIUM AND SENNOSIDES 1 TABLET: 8.6; 5 TABLET ORAL at 20:00

## 2021-03-30 RX ADMIN — POLYETHYLENE GLYCOL 3350 17 G: 17 POWDER, FOR SOLUTION ORAL at 08:04

## 2021-03-30 RX ADMIN — ONDANSETRON 4 MG: 4 TABLET, ORALLY DISINTEGRATING ORAL at 09:44

## 2021-03-30 RX ADMIN — METHOCARBAMOL 500 MG: 500 TABLET ORAL at 02:01

## 2021-03-30 RX ADMIN — FAMOTIDINE 20 MG: 20 TABLET ORAL at 20:01

## 2021-03-30 RX ADMIN — TACROLIMUS 3 MG: 1 CAPSULE ORAL at 06:08

## 2021-03-30 RX ADMIN — DOCUSATE SODIUM 100 MG: 100 CAPSULE, LIQUID FILLED ORAL at 20:01

## 2021-03-30 RX ADMIN — PRAMIPEXOLE DIHYDROCHLORIDE 0.5 MG: 0.5 TABLET ORAL at 21:28

## 2021-03-30 RX ADMIN — DOCUSATE SODIUM 100 MG: 100 CAPSULE, LIQUID FILLED ORAL at 08:02

## 2021-03-30 RX ADMIN — CELECOXIB 100 MG: 100 CAPSULE ORAL at 08:03

## 2021-03-30 RX ADMIN — ASPIRIN 162 MG: 81 TABLET, COATED ORAL at 08:01

## 2021-03-30 RX ADMIN — TACROLIMUS 3 MG: 1 CAPSULE ORAL at 18:18

## 2021-03-30 ASSESSMENT — MIFFLIN-ST. JEOR: SCORE: 1152.36

## 2021-03-30 NOTE — PROGRESS NOTES
Patient dose not want to transfer to [place] now  Spoke with Dr. Edge, who spoke with Dr. Lilly, and still recommend transferring for further evaluation with imaging and possible IR procedure    Patient notified with plan of care. She has reluctantly agreed to transfer    Discussed with Charge RN to initiate process for transfer    Steffen Hameed MD  [facility]  Contact information available via [facility] Paging/Directory

## 2021-03-30 NOTE — CONSULTS
Care Management Discharge Note    Discharge Date: 03/30/21       Discharge Disposition: Outpatient Rehab (PT, OT, SLP, Cardiac or Pulmonary)    Discharge Services:    [facility]   [address] [place] [address]   Phone [phone]   Fax [phone]     Discharge DME:      Discharge Transportation: family or friend will provide    Handoff Referral Completed: Yes    Additional Information:  Per patient request a referral was sent to [facility] for outpatient physical therapy. No further discharge concerns at this time. RNCC available as needed.    Alyce Perry RN, BSN  Care Coordinator, [facility]  Phone [phone]  Pager [phone]

## 2021-03-30 NOTE — PROGRESS NOTES
A/Ox's 4. Pt rated pain as tolerable. Oxycodone, Robaxin and Ice packs given for pain control. Dressing CDI. CMS intact. Tolerated regular diet. Denied any nausea, CP, SOB, lightheadedness or dizziness. Voiding without pain or difficulty. Passing flatus. Up with SBA. Pt ambulated in the halls several times. Resting in bed at this time with call light in reach. Able to make needs known. Continue to monitor.

## 2021-03-30 NOTE — PLAN OF CARE
"  VS: /79 (BP Location: Right arm)   Pulse 87   Temp 99  F (37.2  C) (Oral)   Resp 14   Ht 1.6 m (5' 3\")   Wt 62 kg (136 lb 11 oz)   SpO2 98%   BMI 24.21 kg/m     O2: Room air. O2 sats > 92%   Output: Voiding w/o difficulty. Dark, dannie urine.   Last BM: 3/29   Activity: SBA with walker and gait belt   Skin: No concerns.   Pain: Managed with PRN oxycodone and robaxin. Celebrex discontinued d/t creatine level.   CMS: Intact.   Dressing: Clean, dry, Intact. Ace wrapped.   Diet: Regular. Decent appetite.   LDA: R forearm PIV. Saline locked.   Equipment: Walker, gait belt.   Plan: Plan to discharge home when medically stable.   Additional Info: Patient has history of liver transplant. Had dark, dannie urine which she stated her urine looked like prior to transplant. Liver panel drawn with critical liver enzyme levels. STAT liver US completed and transplant team would like patient to be transferred to [facility]. Transfer order placed and waiting for bed placement.      Pt. discharged at 1905 via [facility] transport  to  on [place]. Pt. was accompanied by sister, and left with personal belongings. Report called to Alba CONNELL. Pt. had no further questions at the time of discharge and no unmet needs were identified.  "

## 2021-03-30 NOTE — PLAN OF CARE
Physical Therapy Discharge Summary    Reason for therapy discharge:    All goals and outcomes met, no further needs identified.    Progress towards therapy goal(s). See goals on Care Plan in [facility] electronic health record for goal details.  Goals met    Therapy recommendation(s):    Continued therapy is recommended.  Rationale/Recommendations:  OP PT per protocol.

## 2021-03-30 NOTE — PLAN OF CARE
OT: eval orders received.  Per chart review and discussion with interdisciplinary team and pt. Pt. SBA/Rehan with BADLs and functional mob. amb. with FWW.  No further OT indicated at this time. OT orders completed.

## 2021-03-30 NOTE — PROGRESS NOTES
Orthopaedic Surgery Progress Note 03/30/2021    S: No acute events overnight.  Pain well controlled - requesting robaxin increased frequency. No Tylenol due to liver transplant. Denies numbness or tingling. Denies chest pain, SOB, nausea/vomiting. Tolerating diet. No BM +flatus. Voiding spontaneously.  Ambulating, working with PT. Eager to discharge home today.    O:  Temp: 98.2  F (36.8  C) Temp src: Oral BP: 131/82 Pulse: 79   Resp: 16 SpO2: 98 % O2 Device: None (Room air) Oxygen Delivery: 6 LPM    Exam:  Gen: No acute distress, resting comfortably in bed.  Resp: Non-labored breathing  MSK:  RLE:  - Dressings c/d/i  - SILT femoral/tibial/sural/saphenous/DP/SP nerves  - Fires TA, EHL, FHL, GaSC  - PT/DP pulses 2+, foot wwp    Recent Labs   Lab 03/30/21  0621 03/26/21  0843   WBC  --  5.6   HGB 11.1* 13.1   PLT  --  140*     Imaging:  XR R knee 3/29 demonstrates well positioned TKA components without fracture or dislocation    Assessment: Sherrie Lala is a 65 year old female with PMH significant for liver transplant s/p R TKA on 3/29/21 with Dr. Jacobsen.     Plan:  Ortho Primary  Activity: Up with assist.  Weight bearing status: WBAT.  Antibiotics: Ancef x 24 hours.  Diet: Begin with clear fluids and progress diet as tolerated.  DVT prophylaxis:  mg x 4 weeks, SCDs in the hospital.  Bracing/Splinting: None.  Wound Care: Aquacel/Tegaderm x 7 days.  Drains: None.  Pain management: transition from IV to orals as tolerated.   X-rays: AP knee XR in PACU.  Physical Therapy: ROM, ADL's.  Occupational Therapy: ADL's.  Labs: Trend Hgb on POD #1.  Consults: Hospitalist, PT, OT - appreciate assistance in caring for this patient.  Follow-up: Clinic with Dr. Jacobsen's team in 2 weeks.   Disposition: Pending progress with therapies, pain control on orals, and medical stability, anticipate discharge to home on POD #1.    Future Appointments   Date Time Provider Department Center   3/30/2021  7:30 AM Mandie Howard, OT UROT  [place]   3/30/2021  8:45 AM Tamica Hayden, PT URPT [place]   3/30/2021  2:45 PM Tamica Hayden, PT URPT [place]   4/16/2021  3:00 PM Nitin Ferris PA-C [facility]   5/5/2021 12:45 PM [facility]   5/5/2021 12:55 [facility]   5/5/2021  1:15 PM Nitin Jacobsen MD [facility]       Staff: Delmar Walker MD  Orthopaedic Surgery PGY4  Pager [phone]    Please page me directly with any questions/concerns prior to paging the orthopaedic surgery resident on call. Thanks!

## 2021-03-30 NOTE — PROGRESS NOTES
"[facility], [place]   Internal Medicine Daily Note           Interval History/Events     Overnight events reviewed  Patient had dark orange urine last evening and this morning  Has some abdominal soreness on the right side, reports it is chronic   No nausea, vomiting, chest pain, shortness of breath  No lightheadedness or dizziness.        Review of Systems        4 point ROS including Respiratory, CV, GI and , other than that noted above is negative      Medications   I have reviewed current medications  in the \"current medication\" section of Epic.  Relevant changes include:     Physical Exam   General:       Vital signs:    Blood pressure 123/79, pulse 87, temperature 99  F (37.2  C), temperature source Oral, resp. rate 14, height 1.6 m (5' 3\"), weight 62 kg (136 lb 11 oz), SpO2 98 %, not currently breastfeeding.  Estimated body mass index is 24.21 kg/m  as calculated from the following:    Height as of this encounter: 1.6 m (5' 3\").    Weight as of this encounter: 62 kg (136 lb 11 oz).    No intake or output data in the 24 hours ending 03/30/21 1547     Constitutional: Laying in bed in no acute distress  Eye: No icterus, no pallor  Mouth/ENT: Normal oral mucosa  Cardiovascular: S1, S2 normal.   Respiratory: B/LC TA  GI: Soft, NT, BS+  :   Neurology: Alert,awake, and oriented. No focal neuro deficit.   Psych: Mood stable.   MSK:   Integumentary:   Heme/Lymph/Imm:      Laboratory and Imaging Studies     I have reviewed  laboratory and imaging studies in the Epic. Pertinent findings are as below:    BMP  Recent Labs   Lab 03/30/21  0621 03/26/21  0843    141   POTASSIUM 4.3 4.4   CHLORIDE 107 111*   SHELDON 8.0* 8.8   CO2 26 27   BUN 28 32*   CR 0.90 1.06*   * 97     CBC  Recent Labs   Lab 03/30/21  0621 03/26/21  0843   WBC  --  5.6   RBC  --  4.34   HGB 11.1* 13.1   HCT  --  39.2   MCV  --  90   MCH  --  30.2   MCHC  --  33.4   RDW  --  12.0   PLT  --  140*     INRNo lab " results found in last 7 days.  LFTs  Recent Labs   Lab 03/30/21  0621 03/26/21  0843   ALKPHOS 142 77   * 8   * 17   BILITOTAL 1.7* 0.3   PROTTOTAL 6.5* 7.2   ALBUMIN 3.4 4.0      PANCNo lab results found in last 7 days.        Impression/Plan          65 year old female admitted on 3/29/2021. 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 Additionally, she has h/o HTN , CKD and reactive airway disease  ( asthma).   She underwent rt total knee arthroplasty today. Internal medicine consulted for postoperative co management.  Individual problems and their management are outlined below        # s/p Rt Knee arthroplasty on 03/30:  Management primarily per Ortho team.  - Wound cares, Dressings, Surgical pain management, DVT Prophylaxis  per primary team.   - Monitor anemia, hemodynamics, Input/Output, Capnography (for 24 hours)  - Encourage Incentive spirometry  - Laxatives for constipation prophylaxis     # Anemia: Most likely due to Hemodilution, blood loss, and post surgical inflammation.   - Transfuse if Hg < 7 gm/dl         # s/p  Liver Transplant:  Patient had orange dark urine.   Liver biochemistry was changed. ALT//585, and Bilirubin 1.7 mg/dl. US was done on 03/30 which showed   Elevated velocity of hepatic artery near the anastomosis at 297  cm/s with markedly decreased intrahepatic artery velocity with  spectral broadening. This may represent poststenotic flow.  Patent exam the transplant liver.  Hepatosplenomegaly.  Prominent pancreatic duct, stable from previous exam.  Case was discussed with Transplant team. Advised to transfer to [facility] for further evaluation and treatment.    Resume home dos of Tacrolimus 3 mg BID     # HTN:   Resume home dose of Amlodipine 5 mg with hold parameters         # Asthma:   Stable. PRN Albuetrol      # RLS:   Resume home dose of pramipexole        The patient's care  was discussed with the Bedside Nurse, Patient, Patient's Family and Primary team.       Pt's care was discussed with bedside RN, patient and  during Care Team Rounds.               Steffen Hameed MD  Hospitalist ( Internal medicine)  Pager: [phone]

## 2021-03-30 NOTE — PHARMACY-ADMISSION MEDICATION HISTORY
Admission Medication History Completed by Pharmacy    See [facility] Admission Navigator for allergy information, preferred outpatient pharmacy, prior to admission medications and immunization status.     Medication History Sources:     Patient    Pharmacy fill history via [employer]    Changes made to PTA medication list (reason):    Added: None    Deleted: None    Changed:   o Albuterol 2 puffs q6 hours --> prn (per pt)  o Alendronate 5 mg once weekly --> 70 mg once weekly (pt pt, fill history)  o Gabapentin 300 mg HS --> 600 mg HS (per pt, fill history)  o Omeprazole 40 mg daily --> 20 mg bid (per pt)  o Pramipexole 0.5 mg HS --> 0.75 mg HS (per pt, fill history)    Additional Information:    Pt was a good historian and knew the names, strengths, and dosages of her medications.    Takes alendronate on Mondays    Prior to Admission medications    Medication Sig Last Dose Taking? Auth Provider   albuterol (PROAIR HFA/PROVENTIL HFA/VENTOLIN HFA) 108 (90 BASE) MCG/ACT Inhaler Inhale 2 puffs into the lungs every 6 hours as needed for shortness of breath / dyspnea or wheezing  Past Month at Unknown time Yes Reported, Patient   alendronate (FOSAMAX) 70 MG tablet Take 70 mg by mouth every 7 days Past Week at Unknown time Yes Unknown, Entered By History   amLODIPine (NORVASC) 5 MG tablet TAKE 1 TABLET (5 MG) BY MOUTH DAILY  Patient taking differently: Take 5 mg by mouth At Bedtime  3/28/2021 at 2000 Yes Bradly Lilly MD   aspirin (ASA) 81 MG EC tablet Take 1 tablets (81 mg) by mouth daily  Yes Bruna Meraz APRN CNS   calcium carbonate-vitamin D (OS-SHELDON) 500-400 MG-UNIT tablet Take 1 tablet by mouth every morning Past Week at Unknown time Yes Reported, Patient   gabapentin (NEURONTIN) 300 MG capsule Take 600 mg by mouth At Bedtime  3/28/2021 at 2000 Yes Reported, Patient   omeprazole 20 MG tablet Take 2 tablets (40 mg) by mouth daily  Patient taking differently: Take 20 mg by mouth 2 times daily  3/28/2021 at 2000 Yes  Bradly Lilly MD   pramipexole (MIRAPEX) 0.25 MG tablet Take 0.75 mg by mouth At Bedtime 3/28/2021 Yes Unknown, Entered By History   tacrolimus (GENERIC EQUIVALENT) 1 MG capsule Take 3 capsules (3 mg) by mouth every 12 hours  Patient taking differently: Take 3 mg by mouth 2 times daily  3/29/2021 at 0715 Yes Bradly Lilly MD   zolpidem (AMBIEN) 10 MG tablet Take 10 mg by mouth nightly as needed for sleep  Past Week at Unknown time Yes Reported, Patient       Date completed: 03/30/21    Medication history completed by: Lynn Brennan, [facility]

## 2021-03-31 ENCOUNTER — APPOINTMENT (OUTPATIENT)
Dept: MRI IMAGING | Facility: CLINIC | Age: 66
DRG: 470 | End: 2021-03-31
Attending: PHYSICIAN ASSISTANT
Payer: MEDICARE

## 2021-03-31 ENCOUNTER — APPOINTMENT (OUTPATIENT)
Dept: ULTRASOUND IMAGING | Facility: CLINIC | Age: 66
DRG: 470 | End: 2021-03-31
Attending: PHYSICIAN ASSISTANT
Payer: MEDICARE

## 2021-03-31 LAB
ALBUMIN SERPL-MCNC: 3.1 G/DL (ref 3.4–5)
ALP SERPL-CCNC: 134 U/L (ref 40–150)
ALT SERPL W P-5'-P-CCNC: 243 U/L (ref 0–50)
ANION GAP SERPL CALCULATED.3IONS-SCNC: 7 MMOL/L (ref 3–14)
APTT PPP: 39 SEC (ref 22–37)
AST SERPL W P-5'-P-CCNC: 96 U/L (ref 0–45)
BASOPHILS # BLD AUTO: 0 10E9/L (ref 0–0.2)
BASOPHILS NFR BLD AUTO: 0.3 %
BILIRUB DIRECT SERPL-MCNC: 0.5 MG/DL (ref 0–0.2)
BILIRUB SERPL-MCNC: 1.4 MG/DL (ref 0.2–1.3)
BUN SERPL-MCNC: 21 MG/DL (ref 7–30)
CALCIUM SERPL-MCNC: 8.3 MG/DL (ref 8.5–10.1)
CHLORIDE SERPL-SCNC: 106 MMOL/L (ref 94–109)
CO2 SERPL-SCNC: 23 MMOL/L (ref 20–32)
CREAT SERPL-MCNC: 0.93 MG/DL (ref 0.52–1.04)
DIFFERENTIAL METHOD BLD: ABNORMAL
EOSINOPHIL # BLD AUTO: 0.1 10E9/L (ref 0–0.7)
EOSINOPHIL NFR BLD AUTO: 1.2 %
ERYTHROCYTE [DISTWIDTH] IN BLOOD BY AUTOMATED COUNT: 12.1 % (ref 10–15)
ERYTHROCYTE [DISTWIDTH] IN BLOOD BY AUTOMATED COUNT: 12.2 % (ref 10–15)
GFR SERPL CREATININE-BSD FRML MDRD: 65 ML/MIN/{1.73_M2}
GLUCOSE SERPL-MCNC: 100 MG/DL (ref 70–99)
HCT VFR BLD AUTO: 27.6 % (ref 35–47)
HCT VFR BLD AUTO: 28.8 % (ref 35–47)
HGB BLD-MCNC: 9.4 G/DL (ref 11.7–15.7)
HGB BLD-MCNC: 9.5 G/DL (ref 11.7–15.7)
IMM GRANULOCYTES # BLD: 0 10E9/L (ref 0–0.4)
IMM GRANULOCYTES NFR BLD: 0.6 %
LYMPHOCYTES # BLD AUTO: 0.6 10E9/L (ref 0.8–5.3)
LYMPHOCYTES NFR BLD AUTO: 9.4 %
MAGNESIUM SERPL-MCNC: 2 MG/DL (ref 1.6–2.3)
MCH RBC QN AUTO: 29.3 PG (ref 26.5–33)
MCH RBC QN AUTO: 31 PG (ref 26.5–33)
MCHC RBC AUTO-ENTMCNC: 32.6 G/DL (ref 31.5–36.5)
MCHC RBC AUTO-ENTMCNC: 34.4 G/DL (ref 31.5–36.5)
MCV RBC AUTO: 90 FL (ref 78–100)
MCV RBC AUTO: 90 FL (ref 78–100)
MONOCYTES # BLD AUTO: 0.6 10E9/L (ref 0–1.3)
MONOCYTES NFR BLD AUTO: 9.1 %
NEUTROPHILS # BLD AUTO: 5.1 10E9/L (ref 1.6–8.3)
NEUTROPHILS NFR BLD AUTO: 79.4 %
NRBC # BLD AUTO: 0 10*3/UL
NRBC BLD AUTO-RTO: 0 /100
PHOSPHATE SERPL-MCNC: 2.2 MG/DL (ref 2.5–4.5)
PLATELET # BLD AUTO: 114 10E9/L (ref 150–450)
PLATELET # BLD AUTO: 99 10E9/L (ref 150–450)
POTASSIUM SERPL-SCNC: 4.2 MMOL/L (ref 3.4–5.3)
PROT SERPL-MCNC: 6.3 G/DL (ref 6.8–8.8)
RADIOLOGIST FLAGS: ABNORMAL
RADIOLOGIST FLAGS: ABNORMAL
RBC # BLD AUTO: 3.06 10E12/L (ref 3.8–5.2)
RBC # BLD AUTO: 3.21 10E12/L (ref 3.8–5.2)
SODIUM SERPL-SCNC: 136 MMOL/L (ref 133–144)
TACROLIMUS BLD-MCNC: 4.5 UG/L (ref 5–15)
TME LAST DOSE: ABNORMAL H
UFH PPP CHRO-ACNC: 0.12 IU/ML
WBC # BLD AUTO: 6.5 10E9/L (ref 4–11)
WBC # BLD AUTO: 7.5 10E9/L (ref 4–11)

## 2021-03-31 PROCEDURE — 93971 EXTREMITY STUDY: CPT | Mod: RT

## 2021-03-31 PROCEDURE — 120N000002 HC R&B MED SURG/OB UMMC

## 2021-03-31 PROCEDURE — 36415 COLL VENOUS BLD VENIPUNCTURE: CPT | Performed by: INTERNAL MEDICINE

## 2021-03-31 PROCEDURE — 85730 THROMBOPLASTIN TIME PARTIAL: CPT | Performed by: PHYSICIAN ASSISTANT

## 2021-03-31 PROCEDURE — 99223 1ST HOSP IP/OBS HIGH 75: CPT | Mod: GC | Performed by: INTERNAL MEDICINE

## 2021-03-31 PROCEDURE — 250N000013 HC RX MED GY IP 250 OP 250 PS 637: Performed by: SURGERY

## 2021-03-31 PROCEDURE — 85025 COMPLETE CBC W/AUTO DIFF WBC: CPT | Performed by: INTERNAL MEDICINE

## 2021-03-31 PROCEDURE — 83735 ASSAY OF MAGNESIUM: CPT | Performed by: INTERNAL MEDICINE

## 2021-03-31 PROCEDURE — 85027 COMPLETE CBC AUTOMATED: CPT | Performed by: INTERNAL MEDICINE

## 2021-03-31 PROCEDURE — 250N000011 HC RX IP 250 OP 636: Performed by: PHYSICIAN ASSISTANT

## 2021-03-31 PROCEDURE — 84100 ASSAY OF PHOSPHORUS: CPT | Performed by: INTERNAL MEDICINE

## 2021-03-31 PROCEDURE — 250N000013 HC RX MED GY IP 250 OP 250 PS 637: Performed by: CLINICAL NURSE SPECIALIST

## 2021-03-31 PROCEDURE — 93971 EXTREMITY STUDY: CPT | Mod: 26 | Performed by: RADIOLOGY

## 2021-03-31 PROCEDURE — 85520 HEPARIN ASSAY: CPT | Performed by: INTERNAL MEDICINE

## 2021-03-31 PROCEDURE — 250N000013 HC RX MED GY IP 250 OP 250 PS 637: Performed by: INTERNAL MEDICINE

## 2021-03-31 PROCEDURE — 80048 BASIC METABOLIC PNL TOTAL CA: CPT | Performed by: INTERNAL MEDICINE

## 2021-03-31 PROCEDURE — 36415 COLL VENOUS BLD VENIPUNCTURE: CPT | Performed by: PHYSICIAN ASSISTANT

## 2021-03-31 PROCEDURE — 250N000013 HC RX MED GY IP 250 OP 250 PS 637: Performed by: PHYSICIAN ASSISTANT

## 2021-03-31 PROCEDURE — 250N000012 HC RX MED GY IP 250 OP 636 PS 637: Performed by: INTERNAL MEDICINE

## 2021-03-31 PROCEDURE — 85303 CLOT INHIBIT PROT C ACTIVITY: CPT | Performed by: PHYSICIAN ASSISTANT

## 2021-03-31 PROCEDURE — 85027 COMPLETE CBC AUTOMATED: CPT | Performed by: PHYSICIAN ASSISTANT

## 2021-03-31 PROCEDURE — 74185 MRA ABD W OR W/O CNTRST: CPT | Mod: 26 | Performed by: RADIOLOGY

## 2021-03-31 PROCEDURE — 80197 ASSAY OF TACROLIMUS: CPT | Performed by: NURSE PRACTITIONER

## 2021-03-31 PROCEDURE — 85306 CLOT INHIBIT PROT S FREE: CPT | Performed by: PHYSICIAN ASSISTANT

## 2021-03-31 PROCEDURE — 250N000012 HC RX MED GY IP 250 OP 636 PS 637: Performed by: PHYSICIAN ASSISTANT

## 2021-03-31 PROCEDURE — A9585 GADOBUTROL INJECTION: HCPCS | Performed by: SURGERY

## 2021-03-31 PROCEDURE — 80076 HEPATIC FUNCTION PANEL: CPT | Performed by: INTERNAL MEDICINE

## 2021-03-31 PROCEDURE — 74185 MRA ABD W OR W/O CNTRST: CPT

## 2021-03-31 PROCEDURE — 255N000002 HC RX 255 OP 636: Performed by: SURGERY

## 2021-03-31 RX ORDER — HEPARIN SODIUM 10000 [USP'U]/100ML
0-5000 INJECTION, SOLUTION INTRAVENOUS CONTINUOUS
Status: DISPENSED | OUTPATIENT
Start: 2021-03-31 | End: 2021-04-01

## 2021-03-31 RX ORDER — GADOBUTROL 604.72 MG/ML
7.5 INJECTION INTRAVENOUS ONCE
Status: COMPLETED | OUTPATIENT
Start: 2021-03-31 | End: 2021-03-31

## 2021-03-31 RX ORDER — TACROLIMUS 1 MG/1
4 CAPSULE ORAL
Status: DISCONTINUED | OUTPATIENT
Start: 2021-03-31 | End: 2021-04-02

## 2021-03-31 RX ADMIN — PRAMIPEXOLE DIHYDROCHLORIDE 0.5 MG: 0.5 TABLET ORAL at 21:51

## 2021-03-31 RX ADMIN — ONDANSETRON 4 MG: 4 TABLET, ORALLY DISINTEGRATING ORAL at 20:29

## 2021-03-31 RX ADMIN — TACROLIMUS 3 MG: 1 CAPSULE ORAL at 08:39

## 2021-03-31 RX ADMIN — ONDANSETRON 4 MG: 4 TABLET, ORALLY DISINTEGRATING ORAL at 11:54

## 2021-03-31 RX ADMIN — OXYCODONE HYDROCHLORIDE 10 MG: 10 TABLET ORAL at 20:29

## 2021-03-31 RX ADMIN — METHOCARBAMOL 500 MG: 500 TABLET, FILM COATED ORAL at 10:37

## 2021-03-31 RX ADMIN — OMEPRAZOLE 40 MG: 20 CAPSULE, DELAYED RELEASE ORAL at 08:31

## 2021-03-31 RX ADMIN — AMLODIPINE BESYLATE 5 MG: 5 TABLET ORAL at 08:31

## 2021-03-31 RX ADMIN — HEPARIN SODIUM 900 UNITS/HR: 10000 INJECTION, SOLUTION INTRAVENOUS at 23:12

## 2021-03-31 RX ADMIN — POLYETHYLENE GLYCOL 3350 17 G: 17 POWDER, FOR SOLUTION ORAL at 13:18

## 2021-03-31 RX ADMIN — FAMOTIDINE 20 MG: 20 TABLET ORAL at 08:32

## 2021-03-31 RX ADMIN — DOCUSATE SODIUM AND SENNOSIDES 1 TABLET: 8.6; 5 TABLET ORAL at 13:17

## 2021-03-31 RX ADMIN — OXYCODONE HYDROCHLORIDE 10 MG: 10 TABLET ORAL at 11:51

## 2021-03-31 RX ADMIN — DOCUSATE SODIUM AND SENNOSIDES 1 TABLET: 8.6; 5 TABLET ORAL at 20:29

## 2021-03-31 RX ADMIN — HYDROXYZINE HYDROCHLORIDE 25 MG: 25 TABLET, FILM COATED ORAL at 23:08

## 2021-03-31 RX ADMIN — TACROLIMUS 4 MG: 1 CAPSULE ORAL at 19:10

## 2021-03-31 RX ADMIN — GADOBUTROL 7.5 ML: 604.72 INJECTION INTRAVENOUS at 12:04

## 2021-03-31 RX ADMIN — GABAPENTIN 600 MG: 300 CAPSULE ORAL at 21:51

## 2021-03-31 RX ADMIN — DOCUSATE SODIUM 100 MG: 100 CAPSULE, LIQUID FILLED ORAL at 20:29

## 2021-03-31 RX ADMIN — METHOCARBAMOL 500 MG: 500 TABLET, FILM COATED ORAL at 19:10

## 2021-03-31 RX ADMIN — ASPIRIN 162 MG: 81 TABLET, COATED ORAL at 08:31

## 2021-03-31 RX ADMIN — OXYCODONE HYDROCHLORIDE 10 MG: 10 TABLET ORAL at 16:08

## 2021-03-31 RX ADMIN — METHOCARBAMOL 500 MG: 500 TABLET, FILM COATED ORAL at 03:21

## 2021-03-31 RX ADMIN — OXYCODONE HYDROCHLORIDE 10 MG: 10 TABLET ORAL at 06:40

## 2021-03-31 RX ADMIN — FAMOTIDINE 20 MG: 20 TABLET ORAL at 20:29

## 2021-03-31 RX ADMIN — ONDANSETRON 4 MG: 2 INJECTION INTRAMUSCULAR; INTRAVENOUS at 03:21

## 2021-03-31 RX ADMIN — HEPARIN SODIUM 750 UNITS/HR: 10000 INJECTION, SOLUTION INTRAVENOUS at 16:00

## 2021-03-31 RX ADMIN — DOCUSATE SODIUM 100 MG: 100 CAPSULE, LIQUID FILLED ORAL at 13:17

## 2021-03-31 ASSESSMENT — ACTIVITIES OF DAILY LIVING (ADL)
ADLS_ACUITY_SCORE: 14
ADLS_ACUITY_SCORE: 16
ADLS_ACUITY_SCORE: 14

## 2021-03-31 NOTE — PROGRESS NOTES
Admitted/transferred from: [place] [id number] RN full   skin assessment completed by Alba Landa RN and Missy VENTURA RN.  Skin assessment finding: skin intact, no problems    Interventions/actions:  Will continue to monitor.

## 2021-03-31 NOTE — PROGRESS NOTES
Orthopaedic Surgery Progress Note 03/31/2021    S: Patient transferred to [place] yesterday due to elevated LFTs and dark urine.  No acute events overnight. Patient reports that her knee pain has been well controlled, however she reports increased calf pain. Has been up ambulating without difficulty. Denies numbness or tingling. Denies chest pain, SOB, nausea/vomiting. Tolerating diet. No BM +flatus. Voiding spontaneously.      O:  Temp: 100.1  F (37.8  C) Temp src: Axillary BP: 128/82 Pulse: 95   Resp: 16 SpO2: 98 % O2 Device: None (Room air)      Exam:  Gen: No acute distress, resting comfortably in bed.  Resp: Non-labored breathing  MSK:  RLE:  - Dressings c/d/i. Ace removed.   - tenderness and swelling throughout right calf. Compartments soft and compressible. Tolerated passive stretch of toes.   - SILT femoral/tibial/sural/saphenous/DP/SP nerves  - Fires TA, EHL, FHL, GaSC  - PT/DP pulses 2+, foot wwp    Recent Labs   Lab 03/31/21  0650 03/30/21  0621 03/26/21  0843   WBC 6.5  --  5.6   HGB 9.4* 11.1* 13.1   PLT 99*  --  140*     Imaging:  XR R knee 3/29 demonstrates well positioned TKA components without fracture or dislocation    Doppler right LE 3/31: The common femoral vein and duplicated femoral veins in the right lower extremity have normal waveforms and are fully compressible. Right popliteal vein is partially compressible. Color flow shows adherent clot along the wall. There is partial compressibility of the right peroneal vein. One of the 2 posterior tibial veins is noncompressible. Anechoic vascular area in the proximal right calf suggests potential hematoma. This is avascular.   Impression:   1. Deep vein thrombosis of the patient's right lower extremity starting at the popliteal vein and going distally.  2. Possible right calf hematoma.    Assessment: Sherrie Lala is a 65 year old female with PMH significant for liver transplant s/p R TKA on 3/29/21 with Dr. Jacobsen. Now with DVT to RLE. Defer to  primary team/medicine on anticoagulation recommendations. Paged VANESSA Jacob and Dr. Edge.     Plan:  Ortho Primary  Activity: Up with assist.  Weight bearing status: WBAT.  Antibiotics: Ancef x 24 hours.  Diet: Begin with clear fluids and progress diet as tolerated.  DVT prophylaxis:  mg x 4 weeks, SCDs in the hospital. - Defer to primary team on DVT treatment.   Bracing/Splinting: None.  Wound Care: Aquacel/Tegaderm x 7 days.  Drains: None.  Pain management: transition from IV to orals as tolerated.   X-rays: AP knee XR in PACU.  Physical Therapy: ROM, ADL's.  Occupational Therapy: ADL's.  Labs: Trend Hgb on POD #1.  Consults: Hospitalist, PT, OT - appreciate assistance in caring for this patient.  Follow-up: Clinic with Dr. Jacobsen's team in 2 weeks.   Disposition: Pending progress with therapies, pain control on orals, and medical stability, anticipate discharge to home in 1-2 days.     Future Appointments   Date Time Provider Department Center   3/30/2021  7:30 AM Mandie Howard OT UROT [place]   3/30/2021  8:45 AM Tamica Hayden, PT URPT [place]   3/30/2021  2:45 PM Tamica Hayden, PT URPT [place]   4/16/2021  3:00 PM Nitin Ferris PA-C [facility]   5/5/2021 12:45 PM [facility]   [facility]/5[facility]2021 12:55 [facility]   5/5/2021  1:15 PM Nitin Jacobsen MD [facility]       Staff: eDlmar JEREZ PA-C  3/31/2021 12:02 PM  Orthopaedic Surgery  Pager: [phone]     Thank you for allowing me to participate in this patient's care. Please page me at [phone] with any questions/concerns. If there is no response, if it is a weekend, or if it is during evening hours, please page the orthopaedic surgery resident on call.

## 2021-03-31 NOTE — UTILIZATION REVIEW
"  Admission Status; Secondary Review Determination         Under the authority of the Utilization Management Committee, the utilization review process indicated a secondary review on the above patient.  The review outcome is based on review of the medical records, discussions with staff, and applying clinical experience noted on the date of the review.        (X)      Inpatient Status Appropriate - This patient's medical care is consistent with medical management for inpatient care and reasonable inpatient medical practice.      () Observation Status Appropriate - This patient does not meet hospital inpatient criteria and is placed in observation status. If this patient's primary payer is Medicare and was admitted as an inpatient, Condition Code 44 should be used and patient status changed to \"observation\".   () Admission Status NOT Appropriate - This patient's medical care is not consistent with medical management for Inpatient or Observation Status.          RATIONALE FOR DETERMINATION     66 yo female with a h/o Liver tpt 2018 for secondary biliary cirrhosis due to cholecystectomy for cholangiocarcinoma, temi HTN, CKD, and asthma. who underwent routine R TKA on 3/29 at [facility].  Discharge was planned 3/30, but patient noticed some \"brown\" urine this morning and began feeling feverish.  Her LFTs were elevated.  She was pancultured, and transferred to the [facility] for further management.  She is appropriate for Inpatient status.      The severity of illness, intensity of service provided, expected LOS and risk for adverse outcome make the care complex, high risk and appropriate for hospital admission.        The information on this document is developed by the utilization review team in order for the business office to ensure compliance.  This only denotes the appropriateness of proper admission status and does not reflect the quality of care rendered.         The definitions of Inpatient Status and Observation " Status used in making the determination above are those provided in the CMS Coverage Manual, Chapter 1 and Chapter 6, section 70.4.      Sincerely,     Cj Alarcon MD  Physician Advisor  Utilization Review/ Case Management  [facility].

## 2021-03-31 NOTE — PLAN OF CARE
Max temp 100.1, MD notified face to face, UA collected, slightly dirty, MD notified. Blood cultures drawn. AOVSS on RA. A&Ox4. Denies nausea. Pain in R knee controlled with PRN Robaxin, PRN Oxycodone, and ice packs. ACE wrap in place, pt is weight bearing as tolerated on RLE, up with SBA and WW. Tolerating regular diet. C/o nausea, PRN IV Zofran given with relief. Sister cleared to stay with pt overnight, at bedside supportive of pt. Plan for MRI today. Cont with plan of care.
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
## 2021-03-31 NOTE — PLAN OF CARE
Patient had an MRI and an ultra sound today. Right leg incision clean and intact. Oxycodone for pain. Tolerating diet. Waiting to start heparin drip after hepatology consult ok's.

## 2021-03-31 NOTE — CONSULTS
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      Since the evening of the arthroplasty on 3/29/21, patient began to have right leg pain, swelling and dysesthesias which worsened over the next couple of days. This led to a right lower extremity doppler today 3/31 which demonstrated deep vein thrombosis starting at the popliteal vein and extending distally with a possible right calf hematoma.     Hematology was consulted today for assist in management of the RLE DVT.        Review of Systems:  Patient endorsed pain in the right leg. It was also subjectively swollen and erythematous. Otherwise she was recuperating well from the procedure. No chest pain or shortness of breath. Remaining comprehensive ROS was also non-contributory.    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) 08/31/2018    Residual thrombus in main and right portal       Past Surgical History:  Past Surgical History:   Procedure  Laterality Date     ARTHROPLASTY KNEE Right 3/29/2021    Procedure: ARTHROPLASTY, KNEE, TOTAL, RIGHT;  Surgeon: Nitin Jacobsen MD;  Location: [facility]     CHOLECYSTECTOMY       HEPATECTOMY PARTIAL       OPEN REDUCTION INTERNAL FIXATION HUMERUS PROXIMAL Left 2019    Procedure: OPEN REDUCTION INTERNAL FIXATION HUMERUS PROXIMAL LEFT with Allograft;  Surgeon: Eh Kraft MD;  Location: [facility] OR     RETURN LIVER TRANSPLANT N/A 2018    Procedure: RETURN LIVER TRANSPLANT;  Open Abdomen, return liver transplant washout with   Mesh and liver stent  placement and  Abdomal Wound closure;  Surgeon: Darren Rod MD;  Location: [facility]     TONSILLECTOMY       TRANSPLANT LIVER RECIPIENT  DONOR N/A 2018    Procedure: TRANSPLANT LIVER RECIPIENT  DONOR;  TRANSPLANT LIVER RECIPIENT  DONOR ;  Surgeon: Darren Rod MD;  Location: [facility] OR       Social History:  Social History     Socioeconomic History     Marital status:      Spouse name: None     Number of children: None     Years of education: None     Highest education level: None   Occupational History     None   Social Needs     Financial resource strain: None     Food insecurity     Worry: None     Inability: None     Transportation needs     Medical: None     Non-medical: None   Tobacco Use     Smoking status: Never Smoker     Smokeless tobacco: Never Used   Substance and Sexual Activity     Alcohol use: Yes     Comment: occ /rare     Drug use: No     Sexual activity: None   Lifestyle     Physical activity     Days per week: None     Minutes per session: None     Stress: None   Relationships     Social connections     Talks on phone: None     Gets together: None     Attends Nondenominational service: None     Active member of club or organization: None     Attends meetings of clubs or organizations: None     Relationship status: None     Intimate partner violence     Fear of current or ex partner: None      "Emotionally abused: None     Physically abused: None     Forced sexual activity: None   Other Topics Concern     Parent/sibling w/ CABG, MI or angioplasty before 65F 55M? Not Asked   Social History Narrative     None        Family History:  Family History   Problem Relation Age of Onset     Skin Cancer Father      Skin Cancer Brother      Melanoma No family hx of      Anesthesia Reaction No family hx of      Deep Vein Thrombosis (DVT) No family hx of        Inpatient Medications:    amLODIPine  5 mg Oral Daily     aspirin  162 mg Oral Daily     docusate sodium  100 mg Oral BID     famotidine  20 mg Oral BID    Or     famotidine  20 mg Intravenous BID     gabapentin  600 mg Oral At Bedtime     omeprazole  40 mg Oral QAM     polyethylene glycol  17 g Oral Daily     pramipexole  0.5 mg Oral At Bedtime     senna-docusate  1 tablet Oral BID     sodium chloride (PF)  3 mL Intracatheter Q8H     tacrolimus  4 mg Oral BID IS       Physical Exam:    Blood pressure 125/80, pulse 92, temperature 100.2  F (37.9  C), temperature source Oral, resp. rate 16, height 1.6 m (5' 3\"), weight 62.8 kg (138 lb 8 oz), SpO2 99 %, not currently breastfeeding.  General: No acute distress, alert and cooperative  HEENT: NC/AT, PERRLA, EOMI, oropharynx unremarkable  Neck: Supple, normal ROM  CV: RRR  Resp: CTAB, normal respiratory effort on ambient air  GI: Soft, non-tender, non-distended, bowel sounds present and normoactive  MSK: RLE swelling and erythema (actual exam was deferred due to dysesthesia)  Neuro: No gross focal deficits    Labs & Studies: I personally reviewed the following studies:  ROUTINE LABS (Last four results):  CMP  Recent Labs   Lab 03/31/21  0650 03/30/21  0621 03/26/21  0843    140 141   POTASSIUM 4.2 4.3 4.4   CHLORIDE 106 107 111*   CO2 23 26 27   ANIONGAP 7 7 3   * 120* 97   BUN 21 28 32*   CR 0.93 0.90 1.06*   GFRESTIMATED 65 68 55*   GFRESTBLACK 75 79 64   SHELDON 8.3* 8.0* 8.8   MAG 2.0  --   --    PHOS 2.2* "  --   --    PROTTOTAL 6.3* 6.5* 7.2   ALBUMIN 3.1* 3.4 4.0   BILITOTAL 1.4* 1.7* 0.3   ALKPHOS 134 142 77   AST 96* 585* 8   * 545* 17     CBC  Recent Labs   Lab 03/31/21  1412 03/31/21  0650 03/30/21  0621 03/26/21  0843   WBC 7.5 6.5  --  5.6   RBC 3.06* 3.21*  --  4.34   HGB 9.5* 9.4* 11.1* 13.1   HCT 27.6* 28.8*  --  39.2   MCV 90 90  --  90   MCH 31.0 29.3  --  30.2   MCHC 34.4 32.6  --  33.4   RDW 12.1 12.2  --  12.0   * 99*  --  140*     INRNo lab results found in last 7 days.    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     # RLE DVT near the popliteal trifurcation with distal extension, provoked  Doppler RLE on 3/31/21 demonstrated DVT starting the popliteal vein extending distally. This was presumed provoked in the classic setting of R total knee arthroplasty. Onset of associated symptoms of RLE pain, swelling and dysesthesia on the evening of the procedure also corroboratively supports the temporality of association. Patient reports personal history of a remote clot about ten years ago when she was diagnosed with cancer. No family history to suspect inherited thrombophilia. Patient is not an active smoker. She is not on oral contraceptives.  - Given this is presumed provoked DVT, primary treatment would comprise 3 months of anticoagulation and then we can stop. We notice that protein C and S levels were inadvertently sent but work-up for inherited thrombophilia is not indicated in this case and the results would not change our management. Also, these levels can be falsely depressed in the setting of an  acute DVT.   - Given patient is slated to undergo a liver biopsy (see below), we agree with IV heparin drip for now for ease of reversibility of anticoagulation.  - She will need to be switched to an alternate agent for anticoagulation after the temporizing heparin drip. Thinking about what those options might look like, DOAC's may not be currently appropriate given the acute liver injury but can certainly be considered once/if liver function improves. With DOAC's, drug-drug interaction with long-term tacrolimus should also be ruled out.      # Acute liver injury   # Secondary biliary cirrhosis due to surgery for cholangiocarcinoma  # S/P liver transplant in 2018    Patient was found to have acutely elevated LFT's on 3/30/21. Doppler liver 3/30 and MRI abdomen 3/31 showed high index of suspicion for hepatic artery stenosis.  - Tentative plan is for liver biopsy.  - Continue to monitor LFT's.      We will continue to follow this patient. Please do not hesitate to page with any questions or concerns. Patient was seen and plan of care developed with attending physician Dr. Kingston.    Scooby Coles  Hematology/Oncology Fellow  [facility]  Pager: [phone]    Attending  The patient was seen and examined by me separate from the resident/fellow provider.The note above reflects my assessment and plan. I have personally reviewed today's labs,vital and radiology results. The points of care that were added by me are:    Has DVT in right leg following TKA. Has elevated LFTs Will give therapeutic heparin for now.  Doron Kingston M.D.  [phone]

## 2021-03-31 NOTE — PLAN OF CARE
PT 7C: Cancel - PT orders acknowledged. Pt not appropriate for PT evaluation due to new DVT and yet to be anticoagulated. Per PT on [facility] yesterday, recommended discharge to home with assist and HHPT. Will re-assess on 4/1 as medically appropriate.

## 2021-04-01 ENCOUNTER — APPOINTMENT (OUTPATIENT)
Dept: ULTRASOUND IMAGING | Facility: CLINIC | Age: 66
DRG: 470 | End: 2021-04-01
Attending: PHYSICIAN ASSISTANT
Payer: MEDICARE

## 2021-04-01 ENCOUNTER — APPOINTMENT (OUTPATIENT)
Dept: GENERAL RADIOLOGY | Facility: CLINIC | Age: 66
DRG: 470 | End: 2021-04-01
Attending: PHYSICIAN ASSISTANT
Payer: MEDICARE

## 2021-04-01 LAB
ALBUMIN SERPL-MCNC: 2.8 G/DL (ref 3.4–5)
ALP SERPL-CCNC: 113 U/L (ref 40–150)
ALT SERPL W P-5'-P-CCNC: 140 U/L (ref 0–50)
ANION GAP SERPL CALCULATED.3IONS-SCNC: 7 MMOL/L (ref 3–14)
AST SERPL W P-5'-P-CCNC: 28 U/L (ref 0–45)
BACTERIA SPEC CULT: NORMAL
BASOPHILS # BLD AUTO: 0 10E9/L (ref 0–0.2)
BASOPHILS NFR BLD AUTO: 0.6 %
BILIRUB DIRECT SERPL-MCNC: 0.3 MG/DL (ref 0–0.2)
BILIRUB SERPL-MCNC: 0.9 MG/DL (ref 0.2–1.3)
BUN SERPL-MCNC: 20 MG/DL (ref 7–30)
CALCIUM SERPL-MCNC: 8.2 MG/DL (ref 8.5–10.1)
CHLORIDE SERPL-SCNC: 104 MMOL/L (ref 94–109)
CO2 SERPL-SCNC: 24 MMOL/L (ref 20–32)
CREAT SERPL-MCNC: 0.98 MG/DL (ref 0.52–1.04)
DIFFERENTIAL METHOD BLD: ABNORMAL
EOSINOPHIL # BLD AUTO: 0.1 10E9/L (ref 0–0.7)
EOSINOPHIL NFR BLD AUTO: 1.7 %
ERYTHROCYTE [DISTWIDTH] IN BLOOD BY AUTOMATED COUNT: 12.2 % (ref 10–15)
GFR SERPL CREATININE-BSD FRML MDRD: 61 ML/MIN/{1.73_M2}
GLUCOSE SERPL-MCNC: 87 MG/DL (ref 70–99)
HCT VFR BLD AUTO: 24.3 % (ref 35–47)
HGB BLD-MCNC: 8.2 G/DL (ref 11.7–15.7)
IMM GRANULOCYTES # BLD: 0 10E9/L (ref 0–0.4)
IMM GRANULOCYTES NFR BLD: 0.6 %
LYMPHOCYTES # BLD AUTO: 0.7 10E9/L (ref 0.8–5.3)
LYMPHOCYTES NFR BLD AUTO: 12.2 %
Lab: NORMAL
MAGNESIUM SERPL-MCNC: 2 MG/DL (ref 1.6–2.3)
MCH RBC QN AUTO: 30.3 PG (ref 26.5–33)
MCHC RBC AUTO-ENTMCNC: 33.7 G/DL (ref 31.5–36.5)
MCV RBC AUTO: 90 FL (ref 78–100)
MONOCYTES # BLD AUTO: 0.4 10E9/L (ref 0–1.3)
MONOCYTES NFR BLD AUTO: 7.9 %
NEUTROPHILS # BLD AUTO: 4.1 10E9/L (ref 1.6–8.3)
NEUTROPHILS NFR BLD AUTO: 77 %
NRBC # BLD AUTO: 0 10*3/UL
NRBC BLD AUTO-RTO: 0 /100
PHOSPHATE SERPL-MCNC: 2.6 MG/DL (ref 2.5–4.5)
PLATELET # BLD AUTO: 103 10E9/L (ref 150–450)
POTASSIUM SERPL-SCNC: 4.1 MMOL/L (ref 3.4–5.3)
PROT C ACT/NOR PPP CHRO: 69 % (ref 70–170)
PROT S FREE AG ACT/NOR PPP IA: 106 % (ref 55–125)
PROT SERPL-MCNC: 6.2 G/DL (ref 6.8–8.8)
RBC # BLD AUTO: 2.71 10E12/L (ref 3.8–5.2)
SODIUM SERPL-SCNC: 134 MMOL/L (ref 133–144)
SPECIMEN SOURCE: NORMAL
UFH PPP CHRO-ACNC: 0.17 IU/ML
UFH PPP CHRO-ACNC: 0.21 IU/ML
WBC # BLD AUTO: 5.3 10E9/L (ref 4–11)

## 2021-04-01 PROCEDURE — 250N000013 HC RX MED GY IP 250 OP 250 PS 637: Performed by: SURGERY

## 2021-04-01 PROCEDURE — 250N000013 HC RX MED GY IP 250 OP 250 PS 637: Performed by: PHYSICIAN ASSISTANT

## 2021-04-01 PROCEDURE — 93975 VASCULAR STUDY: CPT

## 2021-04-01 PROCEDURE — 71046 X-RAY EXAM CHEST 2 VIEWS: CPT

## 2021-04-01 PROCEDURE — 80048 BASIC METABOLIC PNL TOTAL CA: CPT | Performed by: INTERNAL MEDICINE

## 2021-04-01 PROCEDURE — 80076 HEPATIC FUNCTION PANEL: CPT | Performed by: INTERNAL MEDICINE

## 2021-04-01 PROCEDURE — 99233 SBSQ HOSP IP/OBS HIGH 50: CPT | Mod: GC | Performed by: INTERNAL MEDICINE

## 2021-04-01 PROCEDURE — 83735 ASSAY OF MAGNESIUM: CPT | Performed by: INTERNAL MEDICINE

## 2021-04-01 PROCEDURE — 250N000011 HC RX IP 250 OP 636: Performed by: PHYSICIAN ASSISTANT

## 2021-04-01 PROCEDURE — 36415 COLL VENOUS BLD VENIPUNCTURE: CPT | Performed by: INTERNAL MEDICINE

## 2021-04-01 PROCEDURE — 85520 HEPARIN ASSAY: CPT | Performed by: SURGERY

## 2021-04-01 PROCEDURE — 250N000013 HC RX MED GY IP 250 OP 250 PS 637: Performed by: INTERNAL MEDICINE

## 2021-04-01 PROCEDURE — 250N000012 HC RX MED GY IP 250 OP 636 PS 637: Performed by: PHYSICIAN ASSISTANT

## 2021-04-01 PROCEDURE — 85520 HEPARIN ASSAY: CPT | Performed by: INTERNAL MEDICINE

## 2021-04-01 PROCEDURE — 84100 ASSAY OF PHOSPHORUS: CPT | Performed by: INTERNAL MEDICINE

## 2021-04-01 PROCEDURE — 120N000002 HC R&B MED SURG/OB UMMC

## 2021-04-01 PROCEDURE — 250N000013 HC RX MED GY IP 250 OP 250 PS 637: Performed by: CLINICAL NURSE SPECIALIST

## 2021-04-01 PROCEDURE — 93975 VASCULAR STUDY: CPT | Mod: 26 | Performed by: RADIOLOGY

## 2021-04-01 PROCEDURE — 71046 X-RAY EXAM CHEST 2 VIEWS: CPT | Mod: 26 | Performed by: RADIOLOGY

## 2021-04-01 PROCEDURE — 85025 COMPLETE CBC W/AUTO DIFF WBC: CPT | Performed by: INTERNAL MEDICINE

## 2021-04-01 PROCEDURE — 36415 COLL VENOUS BLD VENIPUNCTURE: CPT | Performed by: SURGERY

## 2021-04-01 RX ADMIN — OXYCODONE HYDROCHLORIDE 10 MG: 10 TABLET ORAL at 10:01

## 2021-04-01 RX ADMIN — DOCUSATE SODIUM AND SENNOSIDES 1 TABLET: 8.6; 5 TABLET ORAL at 09:04

## 2021-04-01 RX ADMIN — ASPIRIN 162 MG: 81 TABLET, COATED ORAL at 09:04

## 2021-04-01 RX ADMIN — TACROLIMUS 4 MG: 1 CAPSULE ORAL at 09:06

## 2021-04-01 RX ADMIN — OXYCODONE HYDROCHLORIDE 10 MG: 10 TABLET ORAL at 00:40

## 2021-04-01 RX ADMIN — HEPARIN SODIUM 1050 UNITS/HR: 10000 INJECTION, SOLUTION INTRAVENOUS at 06:53

## 2021-04-01 RX ADMIN — OXYCODONE HYDROCHLORIDE 10 MG: 10 TABLET ORAL at 14:00

## 2021-04-01 RX ADMIN — HEPARIN SODIUM 1200 UNITS/HR: 10000 INJECTION, SOLUTION INTRAVENOUS at 14:00

## 2021-04-01 RX ADMIN — APIXABAN 10 MG: 5 TABLET, FILM COATED ORAL at 19:10

## 2021-04-01 RX ADMIN — METHOCARBAMOL 500 MG: 500 TABLET, FILM COATED ORAL at 18:33

## 2021-04-01 RX ADMIN — FAMOTIDINE 20 MG: 20 TABLET ORAL at 21:11

## 2021-04-01 RX ADMIN — HEPARIN SODIUM 1050 UNITS/HR: 10000 INJECTION, SOLUTION INTRAVENOUS at 12:26

## 2021-04-01 RX ADMIN — METHOCARBAMOL 500 MG: 500 TABLET, FILM COATED ORAL at 11:37

## 2021-04-01 RX ADMIN — HYDROXYZINE HYDROCHLORIDE 25 MG: 25 TABLET, FILM COATED ORAL at 21:11

## 2021-04-01 RX ADMIN — OXYCODONE HYDROCHLORIDE 10 MG: 10 TABLET ORAL at 18:33

## 2021-04-01 RX ADMIN — OMEPRAZOLE 40 MG: 20 CAPSULE, DELAYED RELEASE ORAL at 09:03

## 2021-04-01 RX ADMIN — PRAMIPEXOLE DIHYDROCHLORIDE 0.5 MG: 0.5 TABLET ORAL at 21:11

## 2021-04-01 RX ADMIN — FAMOTIDINE 20 MG: 20 TABLET ORAL at 09:05

## 2021-04-01 RX ADMIN — OXYCODONE HYDROCHLORIDE 10 MG: 10 TABLET ORAL at 05:35

## 2021-04-01 RX ADMIN — POLYETHYLENE GLYCOL 3350 17 G: 17 POWDER, FOR SOLUTION ORAL at 09:08

## 2021-04-01 RX ADMIN — OXYCODONE HYDROCHLORIDE 10 MG: 10 TABLET ORAL at 23:13

## 2021-04-01 RX ADMIN — DOCUSATE SODIUM 100 MG: 100 CAPSULE, LIQUID FILLED ORAL at 09:04

## 2021-04-01 RX ADMIN — AMLODIPINE BESYLATE 5 MG: 5 TABLET ORAL at 09:05

## 2021-04-01 RX ADMIN — METHOCARBAMOL 500 MG: 500 TABLET, FILM COATED ORAL at 07:06

## 2021-04-01 RX ADMIN — TACROLIMUS 4 MG: 1 CAPSULE ORAL at 18:14

## 2021-04-01 RX ADMIN — GABAPENTIN 600 MG: 300 CAPSULE ORAL at 21:11

## 2021-04-01 ASSESSMENT — ACTIVITIES OF DAILY LIVING (ADL)
ADLS_ACUITY_SCORE: 14
ADLS_ACUITY_SCORE: 16

## 2021-04-01 NOTE — PROGRESS NOTES
Ortho paged due to increased R knee pain unrelieved with Oxycodone, PO. Numbness at the R ankle. Writer requested ortho to see patient.

## 2021-04-01 NOTE — PLAN OF CARE
Max temp 100.2 orally, AOVSS on RA. A&Ox4. Denies nausea this shift. Denies SOB. Pain in R knee controlled with PRN Oxycodone. Pain worse with movement. R BLE reddened, hot and mild edema. Elevated on pillows, intermittent ice packs applied. Heparin gtt infusing at 900 units/hr most of shift, 0500 Hep Xa 0.17, cont rate increased to 1050 units/hr, bolus given in addition. Next Hep Xa level at 1300. Pt up with 1 assist, WW and GB to commode. Sister at bedside supportive of pt. Cont with plan of care.

## 2021-04-01 NOTE — PLAN OF CARE
Assumed care of Patient from [id number]. Febrile with low grade fever at  throughout shift, non-pharm measures taken. OVSS. Patient reports pain unrelieved with Oxycodone, Robaxin, and Gabapentin. MD notified.Tolerating a regular diet. Zofran administered for nausea with relief. Patient reports passing flatus and having a last BM on 3/29/2021, prior to surgery. Knee dressing c/d/I. Pt on Heparin drip adjusted for Hep Xa level. Pt reported increased knee, calf, and ankle pain with intermittent numbness. MD notified. Pt start on T-tube-cool setting with leg elevated. Pt report itching after Oxycodone, received Atarax. Pt voiding spontaneously with adequate output. Up in halls with assist of 1 and walker/gait belt. Using IS. Continue plan of care.

## 2021-04-01 NOTE — PLAN OF CARE
PT: Pt with new blood clot, anticoagulation initiated yesterday late pm and PT will initiate once pt has been anticoagulates for at least 24 hours and is at therapeutic range. Cancel PT eval for today, will assess status tomorrow.

## 2021-04-01 NOTE — PLAN OF CARE
Assumed care from 2947-1027   Max temp 100.9 MD notified face to face when team was in rounding on patient in the morning.  All other vitals stable.Denies nausea. Pain in R knee controlled with PRN Robaxin, PRN Oxycodone, and ice packs. Right leg less red compared to yesterday. Pt verbalized that the knee pain today is not as severe as it was yesterday.  Pt weight bearing as tolerated on RLE, up with SBA and walker. Tolerating regular diet. Patient sister Janae at bedside supportive of cares.     On continuous Heparin gtt- Plan is to transition to eliquis tonight    Had liver ultrasound today and chest x-ray

## 2021-04-01 NOTE — PROVIDER NOTIFICATION
Text paged Paged Transplant team at [phone]]     Patient requested if she can have a throat culture done to rule out strep throat. Patient says her throat is sore     No new orders at this time

## 2021-04-01 NOTE — PROGRESS NOTES
Orthopaedic Surgery Progress Note 04/01/2021    S: Patient reports significant pain and swelling to the RLE overnight. Evaluated by Overnight ortho resident. Reports pain and swelling has significantly improved this AM. Denies numbness or tingling. Plans to work with PT today.     Acute DVT of the RLE found on ultrasound yesterday. Started on IV heparin pending transplant plan. Hematology consulted recommending 3 months anticoagulation.     O:  Temp: 100.9  F (38.3  C) Temp src: Oral BP: 132/83 Pulse: 100   Resp: 12 SpO2: 98 % O2 Device: None (Room air)      Exam:  Gen: No acute distress, resting comfortably in bed.  Resp: Non-labored breathing  MSK:  RLE:  - Dressings c/d/i. Ace removed.   - tenderness and swelling throughout right calf. Compartments soft and compressible. Tolerated passive stretch of toes.   - SILT femoral/tibial/sural/saphenous/DP/SP nerves  - Fires TA, EHL, FHL, GaSC  - PT/DP pulses 2+, foot wwp    Recent Labs   Lab 04/01/21  0451 03/31/21  1412 03/31/21  0650   WBC 5.3 7.5 6.5   HGB 8.2* 9.5* 9.4*   * 114* 99*     Imaging:  XR R knee 3/29 demonstrates well positioned TKA components without fracture or dislocation    Doppler right LE 3/31: The common femoral vein and duplicated femoral veins in the right lower extremity have normal waveforms and are fully compressible. Right popliteal vein is partially compressible. Color flow shows adherent clot along the wall. There is partial compressibility of the right peroneal vein. One of the 2 posterior tibial veins is noncompressible. Anechoic vascular area in the proximal right calf suggests potential hematoma. This is avascular.   Impression:   1. Deep vein thrombosis of the patient's right lower extremity starting at the popliteal vein and going distally.  2. Possible right calf hematoma.    Assessment: Sherrie Lala is a 65 year old female with PMH significant for liver transplant s/p R TKA on 3/29/21 with Dr. Jacobsen. Now with provoked acute  DVT to RLE. Heparin IV drop per hematology pending plan for liver procedure. Then 3 months anticoagulation.     Plan:  Transplant Primary  Activity: Up with assist.  Weight bearing status: WBAT.  Antibiotics: Ancef x 24 hours - completed.   Diet:Ok for diet from orthopedics perspective.   DVT prophylaxis: IV heparin drip for acute DVT per hematology recs.   Bracing/Splinting: None.  Wound Care: Aquacel/Tegaderm x 7 days.  Drains: None.  Pain management: transition from IV to orals as tolerated.   X-rays: AP knee XR in PACU.  Physical Therapy: ROM, ADL's.  Occupational Therapy: ADL's.  Labs: Trend Hgb on POD #1.  Consults: Hospitalist, PT, OT - appreciate assistance in caring for this patient.  Follow-up: Clinic with Dr. Jacobsen's team in 2 weeks.   Disposition: Pending progress with therapies, pain control on orals, and medical stability, anticipate discharge to home in 1-2 days.     Future Appointments   Date Time Provider Department Center   3/30/2021  7:30 AM Mandie Howard, KYA [facility] [place]   3/30/2021  8:45 AM Tamica Hayden, PT [facility] [place]   3/30/2021  2:45 PM Tamica Hayden, PT [facility] [place]   4/16/2021  3:00 PM Nitin Ferris PA-C [facility]   5/5/2021 12:45 PM [id number]   5/5/2021 12:55 PM [facility]   5/5/2021  1:15 PM Nitin Jacobsen MD [facility]       Staff: Delmar JEREZ PA-C  4/1/2021 9:29 AM  Orthopaedic Surgery  Pager: [phone]     Thank you for allowing me to participate in this patient's care. Please page me at [phone] with any questions/concerns. If there is no response, if it is a weekend, or if it is during evening hours, please page the orthopaedic surgery resident on call.

## 2021-04-01 NOTE — PROGRESS NOTES
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      # RLE DVT near the popliteal trifurcation with distal extension, provoked  Doppler RLE on 3/31/21 demonstrated DVT starting the popliteal vein extending distally. This was presumed provoked in the classic setting of R total knee arthroplasty. Onset of associated symptoms of RLE pain, swelling and dysesthesia on the evening of the procedure also corroboratively supports the temporality of association. Patient reports personal history of a remote clot about ten years ago when she was diagnosed with cancer. No family history to suspect inherited thrombophilia. Patient is not an active smoker. She is not on oral contraceptives.  - Given this is presumed provoked DVT, primary treatment would comprise 3 months of anticoagulation and then we can stop. We notice that protein C and S levels were inadvertently sent but work-up for inherited thrombophilia is not indicated in this case and the results would not change our management. Also, these levels can be falsely depressed in the setting of an acute DVT.   - Given transaminitis has nearly resolved and there are no plans for liver biopsy, we can transition to a DOAC (apixaban). Starting dose would be 10 mg BID x 7 days followed by 5 mg BID. There are no prohibitive interactions between apixaban and tacrolimus.       # Acute liver injury, near  "resolved   # Secondary biliary cirrhosis due to surgery for cholangiocarcinoma  # S/P liver transplant in 2018    Patient was found to have acutely elevated transaminases on 3/30/21. Doppler liver 3/30 and MRI abdomen 3/31 showed high index of suspicion for hepatic artery stenosis. Earlier plan for liver biopsy is currently on hold. Patient is getting a repeat US liver today.   - Management per transplant team.         We will sign off. Please do not hesitate to page with any questions or concerns. Patient was seen and plan of care developed with attending physician Dr. Kingston.     Scooby Coles  Hematology/Oncology Fellow  [facility]  Pager: [phone]  Attending  The patient was seen and examined by me separate from the resident/fellow provider.The note above reflects my assessment and plan. I have personally reviewed today's labs,vital and radiology results. The points of care that were added by me are:    Tolerated high intensity heparin well.Can  Start apixaban tonight as outlined above  Doron Kingston M.D.  [phone]      =================================================================================      Interval History:  No major overnight events. Right leg pain and discomfort is improved. No bleeding episodes on heparin drip. No interim interval health concerns.     Physical Exam:    Blood pressure 132/83, pulse 100, temperature 100.9  F (38.3  C), resp. rate 12, height 1.6 m (5' 3\"), weight 62.8 kg (138 lb 8 oz), SpO2 98 %, not currently breastfeeding.  General: No acute distress, alert and cooperative  HEENT: NC/AT, PERRLA, EOMI, oropharynx unremarkable  Neck: Supple, normal ROM  CV: RRR  Resp: CTAB, normal respiratory effort on ambient air  GI: Soft, non-tender, non-distended, bowel sounds present and normoactive  MSK: RLE swelling and erythema (actual exam was deferred due to dysesthesia)  Neuro: No gross focal deficits       Labs:   CMP  Recent Labs   Lab 04/01/21  0451 " 03/31/21  0650 03/30/21  0621 03/26/21  0843    136 140 141   POTASSIUM 4.1 4.2 4.3 4.4   CHLORIDE 104 106 107 111*   CO2 24 23 26 27   ANIONGAP 7 7 7 3   GLC 87 100* 120* 97   BUN 20 21 28 32*   CR 0.98 0.93 0.90 1.06*   GFRESTIMATED 61 65 68 55*   GFRESTBLACK 71 75 79 64   SHELDON 8.2* 8.3* 8.0* 8.8   MAG 2.0 2.0  --   --    PHOS 2.6 2.2*  --   --    PROTTOTAL 6.2* 6.3* 6.5* 7.2   ALBUMIN 2.8* 3.1* 3.4 4.0   BILITOTAL 0.9 1.4* 1.7* 0.3   ALKPHOS 113 134 142 77   AST 28 96* 585* 8   * 243* 545* 17     CBC  Recent Labs   Lab 04/01/21  0451 03/31/21  1412 03/31/21  0650 03/30/21  0621 03/26/21  0843   WBC 5.3 7.5 6.5  --  5.6   RBC 2.71* 3.06* 3.21*  --  4.34   HGB 8.2* 9.5* 9.4* 11.1* 13.1   HCT 24.3* 27.6* 28.8*  --  39.2   MCV 90 90 90  --  90   MCH 30.3 31.0 29.3  --  30.2   MCHC 33.7 34.4 32.6  --  33.4   RDW 12.2 12.1 12.2  --  12.0   * 114* 99*  --  140*     INRNo lab results found in last 7 days.    Inpatient medication list:  Current Facility-Administered Medications   Medication     albuterol (PROAIR HFA/PROVENTIL HFA/VENTOLIN HFA) 108 (90 Base) MCG/ACT inhaler 2 puff     amLODIPine (NORVASC) tablet 5 mg     apixaban ANTICOAGULANT (ELIQUIS) tablet 10 mg    Followed by     [START ON 4/8/2021] apixaban ANTICOAGULANT (ELIQUIS) tablet 5 mg     aspirin EC tablet 162 mg     bisacodyl (DULCOLAX) Suppository 10 mg     docusate sodium (COLACE) capsule 100 mg     famotidine (PEPCID) tablet 20 mg    Or     famotidine (PEPCID) infusion 20 mg     gabapentin (NEURONTIN) capsule 600 mg     heparin 25,000 units in 0.45% NaCl 250 mL ANTICOAGULANT infusion     hydrOXYzine (ATARAX) tablet 25 mg     lidocaine (LMX4) cream     lidocaine 1 % 0.1-1 mL     magnesium hydroxide (MILK OF MAGNESIA) suspension 30 mL     methocarbamol (ROBAXIN) tablet 500 mg     naloxone (NARCAN) injection 0.2 mg     naloxone (NARCAN) injection 0.2 mg     naloxone (NARCAN) injection 0.4 mg     naloxone (NARCAN) injection 0.4 mg      omeprazole (priLOSEC) CR capsule 40 mg     ondansetron (ZOFRAN-ODT) ODT tab 4 mg    Or     ondansetron (ZOFRAN) injection 4 mg     oxyCODONE (ROXICODONE) tablet 5 mg    Or     oxyCODONE IR (ROXICODONE) tablet 10 mg     polyethylene glycol (MIRALAX) Packet 17 g     pramipexole (MIRAPEX) tablet 0.5 mg     prochlorperazine (COMPAZINE) injection 10 mg    Or     prochlorperazine (COMPAZINE) tablet 10 mg     senna-docusate (SENOKOT-S/PERICOLACE) 8.6-50 MG per tablet 1 tablet     sodium chloride (PF) 0.9% PF flush 3 mL     sodium chloride (PF) 0.9% PF flush 3 mL     sodium chloride (PF) 0.9% PF flush 3 mL     sodium phosphate (FLEET ENEMA) 1 enema     tacrolimus (GENERIC EQUIVALENT) capsule 4 mg

## 2021-04-01 NOTE — PLAN OF CARE
OT/7C: Cancel. Pt remains medically inappropriate for OT eval due to new DVT. Will evaluate pt once pt has been on anticoagulation for 24 hours and/or has reached therapeutic range.

## 2021-04-01 NOTE — PROGRESS NOTES
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 She was noted to have dark urine post-operatively and LFTs were checked and found to be elevated. She was transferred to transplant service for ongoing management.     Graft function:   DDLT 8/30/18: LFTs elevated on admission, today decreased though significantly increased from baseline. Tbili 0.9 (1.4), Alk phos 113 (134),  (243), AST 28 (96), direct bilirubin 0.3. Ultrasound with anastomotic stenosis and sluggish intrahepatic flow. MRA/MVA with suggestion of hepatic artery  Stenosis and IVC stenosis, repeat US pending today.    Immunosuppression management:   Tacrolimus: Goal level 3-5. Increase goal 6-8 d/t possible rejection. Increase dose from 3 mg BID to 4 mg BID.   Complexity of management: Medium. Contributing factors: organ dysfunction  Neuro:  Acute post-operative pain: oxycodone 5-10 mg Q3H PRN, hydroxyzine 25 mg Q6H PRN, Robaxin QID PRN. Stop IV dilaudid.   Restless Leg Syndrome: PTA pramipexole  Hematology:   Acute blood loss anemia: Hgb 8.2 (from 11.1). Continue to monitor.   Acute DVT: Deep vein thrombosis RLQ starting at the popliteal vein and going distally, possible right calf hematoma. Heparin gtt low intensity. Discussed with ortho team prior to starting. Hematology consult for hypercoagulability. Check protein S and protein C. Will start patient on eliquis today.  Cardiorespiratory:    Hx HTN: continue PTA Norvasc 5 mg daily  Hx Asthma: continue albuterol inhaler Q6H PRN  GI/Nutrition:  Diet: Regular  Bowel regimen: colace 100 mg BID, Miralax 17 g daily, Senokot-s BID, PRN suppository and PRN MOM  Hx GERD: continue PTA  "prilosec  Endocrine: no acute issues  Fluid/Electrolytes: no acute issues  : no acute issues  Infectious disease: Febrile to 100.9 this AM, likely from DVT; WBC 5.3  - Bcx x2 and UA w/ reflex  MSK:  S/p Right total knee arthroplasty: WBAT. PT/OT. Ortho consulting, saw patient today.   Prophylaxis: DVT Eliquis, fall, GI (Pepcid)  Disposition: 7A    Medical Decision Making: Medium  Subsequent visit 12628 (moderate level decision making)    DREA/Fellow/Resident Provider: Rylie Duncan MD Fellow [id number]    Faculty: Pamela Edge MD    __________________________________________________________________  Transplant History:    8/30/2018 (Liver), Postoperative day: 945     Interval History: History is obtained from the patient. No abdominal pain. No nausea. Right calf pain.     ROS:   A 10-point review of systems was negative except as noted above.    Curent Meds:    amLODIPine  5 mg Oral Daily     aspirin  162 mg Oral Daily     docusate sodium  100 mg Oral BID     famotidine  20 mg Oral BID    Or     famotidine  20 mg Intravenous BID     gabapentin  600 mg Oral At Bedtime     omeprazole  40 mg Oral QAM     polyethylene glycol  17 g Oral Daily     pramipexole  0.5 mg Oral At Bedtime     senna-docusate  1 tablet Oral BID     sodium chloride (PF)  3 mL Intracatheter Q8H     tacrolimus  4 mg Oral BID IS       Physical Exam:     Admit Weight: 62 kg (136 lb 11 oz)    Current Vitals:   /83 (BP Location: Left arm, Cuff Size: Adult Regular)   Pulse 100   Temp 100.9  F (38.3  C)   Resp 12   Ht 1.6 m (5' 3\")   Wt 62.8 kg (138 lb 8 oz)   SpO2 98%   BMI 24.53 kg/m      Vital sign ranges:    Temp:  [100.2  F (37.9  C)-100.9  F (38.3  C)] 100.9  F (38.3  C)  Pulse:  [] 100  Resp:  [12-16] 12  BP: ()/(50-83) 132/83  SpO2:  [97 %-99 %] 98 %    General Appearance: in no apparent distress.   Skin: normal, warm, No rashes, induration, or jaundice  Heart: well perfused  Lungs: NLB  Abdomen: soft, nontender to " palpation and nondistended. Previous surgical scars healed. No appreciable HSM.   : Deferred  Extremities: No edema:  There is no skin breakdown. RLE wrapped 2/2 recent TKA. Right calf ttp.   Neurologic: CN 2-12 grossly intact. Tremor absent..     Frailty Scores     Frailty Scores 12/19/2019 2/28/2018 2/27/2018    Final Score Not Frail Not Frail Not Frail    Final Score Number 1 1 1          Data:   CMP  Recent Labs   Lab 04/01/21  0451 03/31/21  0650    136   POTASSIUM 4.1 4.2   CHLORIDE 104 106   CO2 24 23   GLC 87 100*   BUN 20 21   CR 0.98 0.93   GFRESTIMATED 61 65   GFRESTBLACK 71 75   SHELDON 8.2* 8.3*   MAG 2.0 2.0   PHOS 2.6 2.2*   ALBUMIN 2.8* 3.1*   BILITOTAL 0.9 1.4*   ALKPHOS 113 134   AST 28 96*   * 243*     CBC  Recent Labs   Lab 04/01/21  0451 03/31/21  1412   HGB 8.2* 9.5*   WBC 5.3 7.5   * 114*

## 2021-04-02 ENCOUNTER — APPOINTMENT (OUTPATIENT)
Dept: PHYSICAL THERAPY | Facility: CLINIC | Age: 66
DRG: 470 | End: 2021-04-02
Attending: PHYSICIAN ASSISTANT
Payer: MEDICARE

## 2021-04-02 ENCOUNTER — APPOINTMENT (OUTPATIENT)
Dept: CT IMAGING | Facility: CLINIC | Age: 66
DRG: 470 | End: 2021-04-02
Attending: NURSE PRACTITIONER
Payer: MEDICARE

## 2021-04-02 PROBLEM — D62 ACUTE BLOOD LOSS ANEMIA: Status: ACTIVE | Noted: 2018-09-12

## 2021-04-02 PROBLEM — I82.401 ACUTE DEEP VEIN THROMBOSIS (DVT) OF RIGHT LOWER EXTREMITY (H): Status: ACTIVE | Noted: 2021-04-02

## 2021-04-02 PROBLEM — R79.89 ELEVATED LFTS: Status: ACTIVE | Noted: 2021-03-31

## 2021-04-02 LAB
ABO + RH BLD: ABNORMAL
ABO + RH BLD: ABNORMAL
ALBUMIN SERPL-MCNC: 2.3 G/DL (ref 3.4–5)
ALP SERPL-CCNC: 87 U/L (ref 40–150)
ALT SERPL W P-5'-P-CCNC: 81 U/L (ref 0–50)
ANION GAP SERPL CALCULATED.3IONS-SCNC: 7 MMOL/L (ref 3–14)
AST SERPL W P-5'-P-CCNC: 17 U/L (ref 0–45)
BASOPHILS # BLD AUTO: 0 10E9/L (ref 0–0.2)
BASOPHILS NFR BLD AUTO: 0.5 %
BILIRUB DIRECT SERPL-MCNC: 0.3 MG/DL (ref 0–0.2)
BILIRUB SERPL-MCNC: 0.6 MG/DL (ref 0.2–1.3)
BLD GP AB SCN SERPL QL: ABNORMAL
BLD PROD TYP BPU: ABNORMAL
BLD PROD TYP BPU: NORMAL
BLD UNIT ID BPU: 0
BLOOD BANK CMNT PATIENT-IMP: ABNORMAL
BLOOD BANK CMNT PATIENT-IMP: ABNORMAL
BLOOD PRODUCT CODE: NORMAL
BPU ID: NORMAL
BUN SERPL-MCNC: 25 MG/DL (ref 7–30)
CALCIUM SERPL-MCNC: 7.9 MG/DL (ref 8.5–10.1)
CHLORIDE SERPL-SCNC: 103 MMOL/L (ref 94–109)
CO2 SERPL-SCNC: 26 MMOL/L (ref 20–32)
CREAT SERPL-MCNC: 1.13 MG/DL (ref 0.52–1.04)
DIFFERENTIAL METHOD BLD: ABNORMAL
EOSINOPHIL # BLD AUTO: 0.2 10E9/L (ref 0–0.7)
EOSINOPHIL NFR BLD AUTO: 3.8 %
ERYTHROCYTE [DISTWIDTH] IN BLOOD BY AUTOMATED COUNT: 12.1 % (ref 10–15)
GFR SERPL CREATININE-BSD FRML MDRD: 51 ML/MIN/{1.73_M2}
GLUCOSE SERPL-MCNC: 98 MG/DL (ref 70–99)
HCT VFR BLD AUTO: 20.8 % (ref 35–47)
HGB BLD-MCNC: 6.9 G/DL (ref 11.7–15.7)
IMM GRANULOCYTES # BLD: 0 10E9/L (ref 0–0.4)
IMM GRANULOCYTES NFR BLD: 0.5 %
LYMPHOCYTES # BLD AUTO: 0.6 10E9/L (ref 0.8–5.3)
LYMPHOCYTES NFR BLD AUTO: 13.8 %
MAGNESIUM SERPL-MCNC: 2.3 MG/DL (ref 1.6–2.3)
MCH RBC QN AUTO: 30.9 PG (ref 26.5–33)
MCHC RBC AUTO-ENTMCNC: 33.2 G/DL (ref 31.5–36.5)
MCV RBC AUTO: 93 FL (ref 78–100)
MONOCYTES # BLD AUTO: 0.4 10E9/L (ref 0–1.3)
MONOCYTES NFR BLD AUTO: 8.8 %
NEUTROPHILS # BLD AUTO: 2.9 10E9/L (ref 1.6–8.3)
NEUTROPHILS NFR BLD AUTO: 72.6 %
NRBC # BLD AUTO: 0 10*3/UL
NRBC BLD AUTO-RTO: 0 /100
NUM BPU REQUESTED: 1
PHOSPHATE SERPL-MCNC: 3 MG/DL (ref 2.5–4.5)
PLATELET # BLD AUTO: 97 10E9/L (ref 150–450)
POTASSIUM SERPL-SCNC: 3.9 MMOL/L (ref 3.4–5.3)
PROT SERPL-MCNC: 5.6 G/DL (ref 6.8–8.8)
RBC # BLD AUTO: 2.23 10E12/L (ref 3.8–5.2)
SODIUM SERPL-SCNC: 135 MMOL/L (ref 133–144)
SPECIMEN EXP DATE BLD: ABNORMAL
TACROLIMUS BLD-MCNC: 4.8 UG/L (ref 5–15)
TME LAST DOSE: ABNORMAL H
TRANSFUSION STATUS PATIENT QL: NORMAL
TRANSFUSION STATUS PATIENT QL: NORMAL
WBC # BLD AUTO: 4 10E9/L (ref 4–11)

## 2021-04-02 PROCEDURE — 97161 PT EVAL LOW COMPLEX 20 MIN: CPT | Mod: GP

## 2021-04-02 PROCEDURE — 250N000013 HC RX MED GY IP 250 OP 250 PS 637: Performed by: PHYSICIAN ASSISTANT

## 2021-04-02 PROCEDURE — 86901 BLOOD TYPING SEROLOGIC RH(D): CPT | Performed by: SURGERY

## 2021-04-02 PROCEDURE — 86850 RBC ANTIBODY SCREEN: CPT | Performed by: SURGERY

## 2021-04-02 PROCEDURE — 120N000002 HC R&B MED SURG/OB UMMC

## 2021-04-02 PROCEDURE — 71275 CT ANGIOGRAPHY CHEST: CPT | Mod: 26 | Performed by: RADIOLOGY

## 2021-04-02 PROCEDURE — 86902 BLOOD TYPE ANTIGEN DONOR EA: CPT | Performed by: SURGERY

## 2021-04-02 PROCEDURE — 86922 COMPATIBILITY TEST ANTIGLOB: CPT | Performed by: SURGERY

## 2021-04-02 PROCEDURE — 97530 THERAPEUTIC ACTIVITIES: CPT | Mod: GP

## 2021-04-02 PROCEDURE — 74174 CTA ABD&PLVS W/CONTRAST: CPT

## 2021-04-02 PROCEDURE — 85025 COMPLETE CBC W/AUTO DIFF WBC: CPT | Performed by: INTERNAL MEDICINE

## 2021-04-02 PROCEDURE — 250N000013 HC RX MED GY IP 250 OP 250 PS 637: Performed by: CLINICAL NURSE SPECIALIST

## 2021-04-02 PROCEDURE — 250N000011 HC RX IP 250 OP 636: Performed by: STUDENT IN AN ORGANIZED HEALTH CARE EDUCATION/TRAINING PROGRAM

## 2021-04-02 PROCEDURE — 250N000012 HC RX MED GY IP 250 OP 636 PS 637: Performed by: PHYSICIAN ASSISTANT

## 2021-04-02 PROCEDURE — 250N000013 HC RX MED GY IP 250 OP 250 PS 637: Performed by: SURGERY

## 2021-04-02 PROCEDURE — 80076 HEPATIC FUNCTION PANEL: CPT | Performed by: INTERNAL MEDICINE

## 2021-04-02 PROCEDURE — 83735 ASSAY OF MAGNESIUM: CPT | Performed by: INTERNAL MEDICINE

## 2021-04-02 PROCEDURE — 80197 ASSAY OF TACROLIMUS: CPT

## 2021-04-02 PROCEDURE — 86900 BLOOD TYPING SEROLOGIC ABO: CPT | Performed by: SURGERY

## 2021-04-02 PROCEDURE — 97116 GAIT TRAINING THERAPY: CPT | Mod: GP

## 2021-04-02 PROCEDURE — 80048 BASIC METABOLIC PNL TOTAL CA: CPT | Performed by: INTERNAL MEDICINE

## 2021-04-02 PROCEDURE — 36415 COLL VENOUS BLD VENIPUNCTURE: CPT | Performed by: INTERNAL MEDICINE

## 2021-04-02 PROCEDURE — 250N000013 HC RX MED GY IP 250 OP 250 PS 637: Performed by: INTERNAL MEDICINE

## 2021-04-02 PROCEDURE — 36415 COLL VENOUS BLD VENIPUNCTURE: CPT

## 2021-04-02 PROCEDURE — 84100 ASSAY OF PHOSPHORUS: CPT | Performed by: INTERNAL MEDICINE

## 2021-04-02 PROCEDURE — 97110 THERAPEUTIC EXERCISES: CPT | Mod: GP

## 2021-04-02 PROCEDURE — 74174 CTA ABD&PLVS W/CONTRAST: CPT | Mod: 26 | Performed by: RADIOLOGY

## 2021-04-02 PROCEDURE — 258N000003 HC RX IP 258 OP 636: Performed by: NURSE PRACTITIONER

## 2021-04-02 PROCEDURE — 250N000012 HC RX MED GY IP 250 OP 636 PS 637: Performed by: SURGERY

## 2021-04-02 PROCEDURE — 71275 CT ANGIOGRAPHY CHEST: CPT

## 2021-04-02 PROCEDURE — 99207 PR SATISFY VISIT NUMBER: CPT | Performed by: SURGERY

## 2021-04-02 PROCEDURE — P9016 RBC LEUKOCYTES REDUCED: HCPCS | Performed by: SURGERY

## 2021-04-02 RX ORDER — OXYCODONE HYDROCHLORIDE 5 MG/1
5 TABLET ORAL EVERY 6 HOURS PRN
Status: DISCONTINUED | OUTPATIENT
Start: 2021-04-02 | End: 2021-04-02

## 2021-04-02 RX ORDER — OXYCODONE HYDROCHLORIDE 10 MG/1
10 TABLET ORAL EVERY 4 HOURS PRN
Status: DISCONTINUED | OUTPATIENT
Start: 2021-04-02 | End: 2021-04-06 | Stop reason: HOSPADM

## 2021-04-02 RX ORDER — OXYCODONE HYDROCHLORIDE 5 MG/1
5 TABLET ORAL EVERY 4 HOURS PRN
Status: DISCONTINUED | OUTPATIENT
Start: 2021-04-02 | End: 2021-04-06 | Stop reason: HOSPADM

## 2021-04-02 RX ORDER — OXYCODONE HCL 5 MG/5 ML
10 SOLUTION, ORAL ORAL ONCE
Status: COMPLETED | OUTPATIENT
Start: 2021-04-02 | End: 2021-04-02

## 2021-04-02 RX ORDER — TACROLIMUS 5 MG/1
5 CAPSULE ORAL
Status: DISCONTINUED | OUTPATIENT
Start: 2021-04-02 | End: 2021-04-06

## 2021-04-02 RX ORDER — IOPAMIDOL 755 MG/ML
125 INJECTION, SOLUTION INTRAVASCULAR ONCE
Status: COMPLETED | OUTPATIENT
Start: 2021-04-02 | End: 2021-04-02

## 2021-04-02 RX ORDER — OXYCODONE HYDROCHLORIDE 10 MG/1
10 TABLET ORAL EVERY 6 HOURS PRN
Status: DISCONTINUED | OUTPATIENT
Start: 2021-04-02 | End: 2021-04-02

## 2021-04-02 RX ADMIN — OXYCODONE HYDROCHLORIDE 10 MG: 5 SOLUTION ORAL at 13:41

## 2021-04-02 RX ADMIN — HYDROXYZINE HYDROCHLORIDE 25 MG: 25 TABLET, FILM COATED ORAL at 22:05

## 2021-04-02 RX ADMIN — GABAPENTIN 600 MG: 300 CAPSULE ORAL at 22:05

## 2021-04-02 RX ADMIN — TACROLIMUS 4 MG: 1 CAPSULE ORAL at 08:23

## 2021-04-02 RX ADMIN — OMEPRAZOLE 40 MG: 20 CAPSULE, DELAYED RELEASE ORAL at 08:21

## 2021-04-02 RX ADMIN — DOCUSATE SODIUM 100 MG: 100 CAPSULE, LIQUID FILLED ORAL at 08:21

## 2021-04-02 RX ADMIN — APIXABAN 10 MG: 5 TABLET, FILM COATED ORAL at 22:05

## 2021-04-02 RX ADMIN — PRAMIPEXOLE DIHYDROCHLORIDE 0.5 MG: 0.5 TABLET ORAL at 22:05

## 2021-04-02 RX ADMIN — APIXABAN 10 MG: 5 TABLET, FILM COATED ORAL at 08:21

## 2021-04-02 RX ADMIN — OXYCODONE HYDROCHLORIDE 10 MG: 10 TABLET ORAL at 17:33

## 2021-04-02 RX ADMIN — METHOCARBAMOL 500 MG: 500 TABLET, FILM COATED ORAL at 13:45

## 2021-04-02 RX ADMIN — IOPAMIDOL 125 ML: 755 INJECTION, SOLUTION INTRAVENOUS at 10:52

## 2021-04-02 RX ADMIN — DOCUSATE SODIUM AND SENNOSIDES 1 TABLET: 8.6; 5 TABLET ORAL at 08:21

## 2021-04-02 RX ADMIN — POLYETHYLENE GLYCOL 3350 17 G: 17 POWDER, FOR SOLUTION ORAL at 08:31

## 2021-04-02 RX ADMIN — OXYCODONE HYDROCHLORIDE 10 MG: 10 TABLET ORAL at 03:56

## 2021-04-02 RX ADMIN — TACROLIMUS 5 MG: 5 CAPSULE ORAL at 17:30

## 2021-04-02 RX ADMIN — METHOCARBAMOL 500 MG: 500 TABLET, FILM COATED ORAL at 08:21

## 2021-04-02 RX ADMIN — FAMOTIDINE 20 MG: 20 TABLET ORAL at 22:05

## 2021-04-02 RX ADMIN — SODIUM CHLORIDE 1000 ML: 9 INJECTION, SOLUTION INTRAVENOUS at 09:00

## 2021-04-02 RX ADMIN — FAMOTIDINE 20 MG: 20 TABLET ORAL at 08:21

## 2021-04-02 RX ADMIN — OXYCODONE HYDROCHLORIDE 5 MG: 5 TABLET ORAL at 22:05

## 2021-04-02 RX ADMIN — OXYCODONE HYDROCHLORIDE 5 MG: 5 TABLET ORAL at 08:21

## 2021-04-02 ASSESSMENT — ACTIVITIES OF DAILY LIVING (ADL)
ADLS_ACUITY_SCORE: 14
ADLS_ACUITY_SCORE: 15
ADLS_ACUITY_SCORE: 14

## 2021-04-02 NOTE — PROGRESS NOTES
04/02/21 1400   Quick Adds   Type of Visit PT Re-evaluation   Living Environment   People in home spouse   Current Living Arrangements house   Home Accessibility stairs to enter home;stairs within home   Number of Stairs, Main Entrance 1   Stair Railings, Main Entrance none   Number of Stairs, Within Home, Primary 7   Stair Railings, Within Home, Primary railing on right side (ascending)   Transportation Anticipated family or friend will provide   Living Environment Comments walk in shower, will stay lower lever initially with bed/bathroom and living area.   Self-Care   Usual Activity Tolerance excellent   Current Activity Tolerance moderate   Regular Exercise Yes   Activity/Exercise Type walking   Exercise Amount/Frequency 3-5 times/wk   Equipment Currently Used at Home none   Activity/Exercise/Self-Care Comment Pt has wh walker, ice machine,  will be home and very supportive   Disability/Function   Hearing Difficulty or Deaf no   Wear Glasses or Blind yes   Vision Management glasses   Concentrating, Remembering or Making Decisions Difficulty no   Difficulty Communicating no   Difficulty Eating/Swallowing no   Walking or Climbing Stairs Difficulty no   Dressing/Bathing Difficulty no   Toileting issues no   Doing Errands Independently Difficulty (such as shopping) no   Change in Functional Status Since Onset of Current Illness/Injury yes   General Information   Onset of Illness/Injury or Date of Surgery 03/29/21   Referring Physician Pamela Edge MD   Patient/Family Therapy Goals Statement (PT) return home, be able to work on knee   Pertinent History of Current Problem (include personal factors and/or comorbidities that impact the POC) 65 year old female with PMH significant for liver transplant s/p R TKA on 3/29/21 with Dr. Jacobsen. Now with provoked acute DVT to RLE. Heparin IV drop per hematology pending plan for liver procedure. Then 3 months anticoagulation. Anemia this AM for 6.9.    Existing  Precautions/Restrictions   (R LE blood clot, no 24 hours post initiating anticoagulation)   Weight-Bearing Status - LLE full weight-bearing   Weight-Bearing Status - RLE weight-bearing as tolerated   General Observations Pt seated in chair, very agreeable to PT   Cognition   Orientation Status (Cognition) oriented x 4   Affect/Mental Status (Cognition) WFL   Follows Commands (Cognition) WFL   Posture    Posture Protracted shoulders   Range of Motion (ROM)   ROM Comment R knee decreased to 40 deg AROM today, significant edema consistent with postop complicated by DVT   Strength   Strength Comments R LE decreased consistent with postop   Bed Mobility   Comment (Bed Mobility) CGA sit<>sup    Transfers   Transfer Safety Comments SBA sit<>stand   Gait/Stairs (Locomotion)   Comment (Gait/Stairs) SBA amb 150' wh walker, antalgic R WB   Clinical Impression   Criteria for Skilled Therapeutic Intervention yes, treatment indicated   PT Diagnosis (PT) impaired functional mobility   Influenced by the following impairments decreased strength, ROM, balance, inc venus   Functional limitations due to impairments decreased I with transfers, gait, bed mob, barrier navigation in light of postop medical complications, PT reevaluated due to functional changes   Clinical Presentation Stable/Uncomplicated   Clinical Presentation Rationale clinical judgement   Clinical Decision Making (Complexity) low complexity   Therapy Frequency (PT) 2x/day   Predicted Duration of Therapy Intervention (days/wks) 4/4   Planned Therapy Interventions (PT) balance training;bed mobility training;cryotherapy;gait training;home exercise program;ROM (range of motion);stair training;strengthening;transfer training   Risk & Benefits of therapy have been explained evaluation/treatment results reviewed;care plan/treatment goals reviewed;risks/benefits reviewed;current/potential barriers reviewed;participants voiced agreement with care plan;participants  included;patient;spouse/significant other   PT Discharge Planning    PT Discharge Recommendation (DC Rec) home with assist;home with outpatient physical therapy   PT Rationale for DC Rec Pt CGA to SBA mobility with antalgic gait given medical complications postop, anticiapte continued progress to be safe to d/c home with SO, CPM for home, OP PT initiate next week   PT Brief overview of current status  CGA transfers with pt using belt as leg , CGA to amb with wh walker with antalgic gait pattern with R WB, CPM set for pt and donned in bed, advise leaving CPM on bed and have pt exit bed to L if possible   Total Evaluation Time   Total Evaluation Time (Minutes) 9

## 2021-04-02 NOTE — PLAN OF CARE
VSS on RA. A&Ox4. Encouraging IS. Denies nausea this shift. Pain in R knee controlled with PRN Oxycodone. Pain worse with movement. RLE incision with dressing, edema and redness improving. Intermittent ice packs applied. Pt up with 1 assist, WW and GB to commode. Sister at bedside supportive of pt. Possible discharge today vs tomorrow. Cont with plan of care.

## 2021-04-02 NOTE — PROVIDER NOTIFICATION
MD notified of Hgb 6.9, soon at bedside to get consent. 1 unit PRBC's ordered and being prepared.

## 2021-04-02 NOTE — PROGRESS NOTES
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 She was noted to have dark urine post-operatively and LFTs were checked and found to be elevated. She was transferred to transplant service for ongoing management.     Graft function:   DDLT 8/30/18: LFTs elevated on admission, today decreased . Tbili 0.6 (0.9), Alk phos 87 (113), ALT 81 (140), AST 17 (28), direct bilirubin 0.3. Ultrasound with anastomotic stenosis and sluggish intrahepatic flow. MRA/MVA with suggestion of hepatic artery  Stenosis and IVC stenosis, CTA pending today  Immunosuppression management:   Tacrolimus: Goal level 3-5. Increase goal 6-8 d/t possible rejection. Increase dose from 4 mg BID to 5 mg BID.   Complexity of management: Medium. Contributing factors: organ dysfunction  Neuro:  Acute post-operative pain: oxycodone 5-10 mg Q6H PRN, hydroxyzine 25 mg Q6H PRN, Robaxin QID PRN.    Restless Leg Syndrome: PTA pramipexole  Hematology:   Acute blood loss anemia: Hgb 6.9 (from 8.2). likely 2/2 hemodilution and hematoma in calf, will transfuse 1 unit(s) PRBC, cont to monitor  Acute DVT: Deep vein thrombosis RLQ starting at the popliteal vein and going distally, possible right calf hematoma. Heparin gtt low intensity. Discussed with ortho team prior to starting. Hematology consult for hypercoagulability. Check protein S and protein C. Pt started on eliquis yesterday, will continue  Cardiorespiratory:    Hx HTN: continue PTA Norvasc 5 mg daily  Hx Asthma: continue albuterol inhaler Q6H PRN  GI/Nutrition:  Diet: Regular  Bowel regimen: colace 100 mg BID, Miralax 17 g daily, Senokot-s BID, PRN suppository and PRN MOM  Hx GERD: continue PTA  "prilosec  Endocrine: no acute issues  Fluid/Electrolytes: no acute issues  : no acute issues  Infectious disease: Febrile to 100.2 this AM, likely from DVT; WBC 4  - Bcx x2, UA w/ reflex, CXR  MSK:  S/p Right total knee arthroplasty: WBAT. PT/OT. Ortho consulting, saw patient today.   Prophylaxis: DVT Eliquis, fall, GI (Pepcid)  Disposition: 7A    Medical Decision Making: Medium  Subsequent visit 02164 (moderate level decision making)    DREA/Fellow/Resident Provider: Rylie Duncan MD Fellow [id number]    Faculty: Pamela Edge MD    __________________________________________________________________  Transplant History:    8/30/2018 (Liver), Postoperative day: 946     Interval History: History is obtained from the patient. No abdominal pain. No nausea. Right calf pain.   ROS:   A 10-point review of systems was negative except as noted above.    Curent Meds:    amLODIPine  5 mg Oral Daily     apixaban ANTICOAGULANT  10 mg Oral BID    Followed by     [START ON 4/8/2021] apixaban ANTICOAGULANT  5 mg Oral BID     docusate sodium  100 mg Oral BID     famotidine  20 mg Oral BID    Or     famotidine  20 mg Intravenous BID     gabapentin  600 mg Oral At Bedtime     omeprazole  40 mg Oral QAM     polyethylene glycol  17 g Oral Daily     pramipexole  0.5 mg Oral At Bedtime     senna-docusate  1 tablet Oral BID     sodium chloride (PF)  3 mL Intracatheter Q8H     tacrolimus  4 mg Oral BID IS       Physical Exam:     Admit Weight: 62 kg (136 lb 11 oz)    Current Vitals:   BP 91/56 (BP Location: Right arm)   Pulse 97   Temp 100.2  F (37.9  C) (Oral)   Resp 16   Ht 1.6 m (5' 3\")   Wt 62.8 kg (138 lb 8 oz)   SpO2 96%   BMI 24.53 kg/m      Vital sign ranges:    Temp:  [99  F (37.2  C)-100.2  F (37.9  C)] 100.2  F (37.9  C)  Pulse:  [90-97] 97  Resp:  [14-16] 16  BP: ()/(56-64) 91/56  SpO2:  [96 %-100 %] 96 %    General Appearance: in no apparent distress.   Skin: normal, warm, No rashes, induration, or " jaundice  Heart: well perfused  Lungs: NLB  Abdomen: soft, nontender to palpation and nondistended. Previous surgical scars healed. No appreciable HSM.   : Deferred  Extremities:  RLE wrapped 2/2 recent TKA. Right calf ttp.   Neurologic: CN 2-12 grossly intact. Tremor absent..     Frailty Scores     Frailty Scores 12/19/2019 2/28/2018 2/27/2018    Final Score Not Frail Not Frail Not Frail    Final Score Number 1 1 1          Data:   CMP  Recent Labs   Lab 04/02/21 0449 04/01/21  0451    134   POTASSIUM 3.9 4.1   CHLORIDE 103 104   CO2 26 24   GLC 98 87   BUN 25 20   CR 1.13* 0.98   GFRESTIMATED 51* 61   GFRESTBLACK 59* 71   SHELDON 7.9* 8.2*   MAG 2.3 2.0   PHOS 3.0 2.6   ALBUMIN 2.3* 2.8*   BILITOTAL 0.6 0.9   ALKPHOS 87 113   AST 17 28   ALT 81* 140*     CBC  Recent Labs   Lab 04/02/21 0449 04/01/21  0451   HGB 6.9* 8.2*   WBC 4.0 5.3   PLT 97* 103*

## 2021-04-02 NOTE — DISCHARGE SUMMARY
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 She was noted to have dark urine post-operatively and LFTs were checked and found to be elevated. She was transferred to transplant service for ongoing management.      Hospital Course   Graft function:   DDLT 8/30/18: LFTs elevated on admission, trending down to normal range on discharge. Ultrasound on 3/30 with anastomotic stenosis and sluggish intrahepatic flow. MRA/MVA with suggestion of hepatic artery stenosis and IVC stenosis, CTA showing right hepatic artery narrowing, measuring 1 mm in diameter. IR was consulted for angiogram on 4/5:  -No stenosis of the native hepatic artery   -No stenosis at the anastomosis or of the proper transplant artery   -Mild (20-30%) narrowing of the right hepatic artery, which does not appear   hemodynamically significant   -No narrowing of the left hepatic artery     Immunosuppression management:   Tacrolimus: Goal level increased to 6-8 due to elevated LFTs on admission, due to normalization of LFTs will return to goal of 3-5 PTA. 4/6 level 7.7 (11 hour trough), will discharge on PTA dose of 3 mg BID.     Neuro:  Acute post-operative pain: oxycodone 5-10 mg Q6H PRN, hydroxyzine 25 mg Q6H PRN, Robaxin QID PRN.    Restless Leg  Syndrome: PTA pramipexole    Hematology:   Acute blood loss anemia: Hgb 6.9 on 4/2 likely 2/2 hemodilution and hematoma in calf, transfused 1 unit PRBC, HGB stable on the day of discharge.  Acute DVT: Deep vein thrombosis RLQ starting at the popliteal vein and going distally, possible right calf hematoma. Heparin gtt high intensity. Discussed with ortho team prior to starting. Hematology consulted for hypercoagulability. Pt started on eliquis 10 mg BID x 1 week then 5 mg BID x 3 months. ASA discontinued.   Pancytopenia: Chronic, stable  Leukopenia: Acute this hospitalization due to unknown cause, monitor on discharge.     Cardiorespiratory:    Hx HTN: continue PTA Norvasc 5 mg daily  Hx Asthma: continue albuterol inhaler Q6H PRN    GI/Nutrition:  Diet: Regular diet, tolerating adequate oral intake  Bowel regimen: colace 100 mg BID, Miralax 17 g daily, Senokot-s BID, PRN suppository and PRN MOM  Hx GERD: continue PTA prilosec    Infectious disease: Tmax 100.9, likely from DVT; WBC WNL  - Bcx x2, UA w/ reflex, CXR all WNL    MSK:  S/p Right total knee arthroplasty: 3/29/21. WBAT. PT/OT. Ortho consulting, plan to follow up with Dr. Jacobsen in clinic in 2 weeks.      Significant Results and Procedures   3/29/21:   [facility]     Brief Operative Note  Pre-operative diagnosis:         Primary localized osteoarthritis of right knee [M17.11]  Post-operative diagnosis        Same as pre-operative diagnosis     Procedure:      Procedure(s):  ARTHROPLASTY, KNEE, TOTAL, RIGHT  Surgeon:         Surgeon(s) and Role:     * Nitin Jacobsen MD - Primary     * Nitin Ferris PA-C - Assisting     * Yaniv Carey PA-C - Assisting  Anesthesia:     Spinal    Estimated blood loss:  Less than 100 ml  Drains: None  Specimens:     * No specimens in log *  Findings:                     See Op Note.  Complications:            None.  Implants:           Implant Name Type Inv. Item  Serial No.  Lot No. LRB No. Used Action   BONE CEMENT SIMPLEX W/TOBRAMYCIN 6197-9-001 Cement, Bone BONE CEMENT SIMPLEX W/TOBRAMYCIN 6197-9-001   [employer] [id number] Right 2 Implanted   [employer] Persona Femur PS Narrow Right Sz 8 Total Joint Component/Insert     [employer] [id number] Right 1 Implanted   IMP TIBIAL ZIM PSN NP STM 5DEG SZ ER -712-02 Total Joint Component/Insert IMP TIBIAL ZIM PSN NP STM 5DEG SZ ER -034-02   [employer] [id number] Right 1 Implanted   IMP PATELLA ZIM KNEE ALL DIDIER 32MM -328-32 Total Joint Component/Insert IMP PATELLA ZIM KNEE ALL DIDIER 32MM -944-32   [employer] [id number] Right 1 Implanted   [employer]ifton Persona Articular Surface Fixed Bearing PS Right 13mm ht Total Joint Component/Insert     [employer]ON [id number] Right 1 Implanted        Pending Results   These results will be followed up by transplant coordinator  Unresulted Labs Ordered in the Past 30 Days of this Admission     Date and Time Order Name Status Description    4/5/2021 1800 Tacrolimus level In process           Code Status   Full    Primary Care Physician   Apollo Benitez    General Appearance: in no apparent distress.   Skin: normal, warm, No rashes, induration, or jaundice  Heart: well perfused  Lungs: NLB  Abdomen: soft, nontender to palpation and nondistended. Previous surgical scars healed. No appreciable HSM.   : Deferred  Extremities: RLE wrapped 2/2 recent TKA. Right calf non tender.   Neurologic: Tremor absent.    Time Spent on this Encounter   I, Alyce Figueroa PA-C, personally saw the patient today and spent greater than 30 minutes discharging this patient.    Discharge Disposition   Discharged to home  Condition at discharge: Stable    Consultations This Hospital Stay   INTERNAL MEDICINE ADULT IP CONSULT FOR [facility]  PHYSICAL THERAPY ADULT IP CONSULT  OCCUPATIONAL THERAPY ADULT IP CONSULT  CARE MANAGEMENT / SOCIAL WORK IP CONSULT  MEDICATION HISTORY IP PHARMACY  "CONSULT  VASCULAR ACCESS CARE ADULT IP CONSULT  PHYSICAL THERAPY ADULT IP CONSULT  PHYSICAL THERAPY ADULT IP CONSULT  OCCUPATIONAL THERAPY ADULT IP CONSULT  PHARMACY IP CONSULT  PHARMACY IP CONSULT  HEMATOLOGY ADULT IP CONSULT    Discharge Orders      Occupational Therapy Referral      Reason for your hospital stay    You stayed in the hospital overnight following your surgery     Contact Surgeon Team    You may experience symptoms that require follow-up before your scheduled appointment. Refer to the \"Stoplight Tool\" for instructions on when to contact Dr. Jacobsen's office if you are concerned about pain control, blood clots, constipation, or if you are unable to urinate.    Regular business hours (Monday - Friday, 8am - 5pm):  [facility]: [phone]  [facility]: [phone]    After hours and weekends:  [facility] on call Orthopedic resident: [phone]     Orthopedic Urgent Care    If you are not able to reach Dr. Jacobsen's office and you need immediate care, go to the Orthopedic Urgent Care at your Surgeon's office.  Do NOT go to the Emergency Room unless you have shortness of breath, chest pain, or other signs of a medical emergency.     Call 911    Call 911 immediately if you experience sudden-onset chest pain, arm weakness/numbness, slurred speech, or shortness of breath     Breathing exercises    Perform breathing exercises using your Incentive Spirometer 10 times per hour while awake for 2 weeks.     Fever Management    A low grade fever can be expected after surgery.  Use acetaminophen (TYLENOL) as needed for fever management.  Contact your Surgeon Team if you have a fever greater than 101.5 F, chills, and/or night sweats.     Constipation management    Constipation (hard, dry bowel movements) is expected after surgery due to the combination of being less active, the anesthetic, and the opioid pain medication.  You can do the " following to help reduce constipation:  ~  FLUIDS:  Drink clear liquids (water or Gatorade), or juice (apple/prune).  ~  DIET:  Eat a fiber rich diet.    ~  ACTIVITY:  Get up and move around several times a day.  Increase your activity as you are able.  MEDICATIONS:  Reduce the risk of constipation by starting medications before you are constipated.  You can take Miralax   (1 packet as directed) and/or a stool softener (Senokot 1-2 tablets 1-2 times a day).  If you already have constipation and these medications are not working, you can get magnesium citrate and use as directed.  If you continue to have constipation you can try an over the counter suppository or enema.  Call your Surgeon Team if it has been greater than 3 days since your last bowel movement.     Reduced Urine Output    Changes in the amount of fluids you drank before and after surgery may result in problems urinating.  It is important to stay well-hydrated after surgery and drink plenty of water. If it has been greater than 8 hours since you have urinated despite drinking plenty of water, call your Surgeon Team.     Anticoagulation - aspirin    Take the aspirin as prescribed for a total of four weeks after surgery.  This is given to help minimize your risk of blood clot.     Activity - Exercises to prevent blood clots    Unless otherwise directed by your Surgeon team, perform the following exercises at least three times per day for the first four weeks after surgery to prevent blood clots in your legs: 1) Point and flex your feet (Ankle Pumps), 2) Move your ankle around in big circles, 3) Wiggle your toes, 4) Walk, even for short distances, several times a day, will help decrease the risk of blood clots.     Pain after Surgery    Pain after surgery is normal and expected.  You will have some amount of pain for several weeks after surgery.  Your pain will improve with time.  There are several things you can do to help reduce your pain including:  "rest, ice, elevation, and using pain medications as needed. Contact your Surgeon Team if you have pain that persists or worsens after surgery despite rest, ice, elevation, and taking your medication(s) as prescribed. Contact your Surgeon Team if you have new numbness, tingling, or weakness in your operative extremity.     Swelling after Surgery    Swelling and/or bruising of the surgical extremity is common and may persist for several months after surgery. In addition to frequent icing and elevation, gentle compressive support with an ACE wrap or tubigrip may help with swelling. Apply compression regularly, removing at least twice daily to perform skin checks. Contact your Surgeon Team if your swelling increases and is NOT associated with an increase in your activity level, or if your swelling increases and is associated with redness and pain.     LOWER Extremity Elevation    Swelling is expected for several months after surgery. This type of swelling is usually associated with gravity and activity, and can be improved with elevation.   The best way to do this is to get your \"toes above your nose\" by laying down and placing several pillows lengthwise under your calf and heel. When elevating your leg keep your knee completely straight. Perform this elevation as often as possible especially for the first two weeks after surgery.     Cold therapy    Ice can be used to control swelling and discomfort after surgery. Place a thin towel over your operative site and apply the ice pack overtop. Leave ice pack in place for 20 minutes, then remove for 20 minutes. Repeat this 20 minutes on/20 minutes off routine as often as tolerated.     NSAID Instructions    You may have been discharged with an anti-inflammatory medication for pain management to use in combination with acetaminophen (TYLENOL) and the narcotic pain medication.  Take this medication exactly as directed.  You should only take one anti-inflammatory at a time.  " Some common anti-inflammatories include: ibuprofen (ADVIL, MOTRIN), naproxen (ALEVE, NAPROSYN), celecoxib (CELEBREX), meloxicam (MOBIC), ketorolac (TORADOL).  Take this medication with food and water.     Opioids - Tapering Instructions    In the first three days following surgery, your symptoms may warrant use of the narcotic pain medication every four to six hours as prescribed. This is normal. As your pain symptoms improve, focus your efforts on decreasing (tapering) use of narcotic medications. The most successful tapering strategy is to first, decrease the number of tablets you take every 4-6 hours to the minimum prescribed. Then, increase the amount of time between doses.  For example:  First, taper to   or 1 tablet every 4-6 hours.  Then, taper to   or 1 tablet every 6-8 hours.  Then, taper to   or 1 tablet every 8-10 hours.  Then, taper to   or 1 tablet every 10-12 hours.  Then, taper to   or 1 tablet at bedtime.  The bedtime dose can help with comfort during sleep and is typically the last dose to be discontinued after surgery.     Follow Up Care    You are scheduled for a post operative wound check with Dr. Jacobsen's clinic approximately two weeks after surgery. At approximately six weeks after surgery, you will see Dr. Jacobsen in clinic. During this visit, repeat X rays will be performed.      Your follow up appointments will be at the location that you regularly see Dr. Jacobsen:    [facility]  [address] [place] [address] [phone]    [facility]  [address] [place] [id number] [phone]     Activity - Total Knee Arthroplasty     Return to Driving    Return to driving - Driving is NOT permitted until directed by your provider. Under no circumstance are you permitted to drive while using narcotic pain medications.     Dressing / Wound Care - Wound    You have a clean dressing on your surgical wound. Dressing change  instructions as follows: remove your dressing in 7 days, and leave incision open to air if there as no drainage at this time. If experiencing any drainage from your incision after removing the dressing, contact your Surgeon Team. Contact your Surgeon Team if you have increased redness, warmth around the surgical wound, and/or drainage from the surgical wound.     Dressing / Wound Care - NO Tub Bathing    Tub bathing, swimming, or any other activities that will cause your incision to be submerged in water should be avoided until allowed by your Surgeon.     Opioid Instructions (Less than 65 years)    You were discharged with an opioid medication (hydromorphone, oxycodone, hydrocodone, or tramadol). This medication should only be taken for breakthrough pain that is not controlled with acetaminophen (TYLENOL). If you rate your pain less than 3 you do not need this medication.  Pain rating 0-3:  You do not need this medication.  Pain rating 4-6:  Take 1 tablet every 4-6 hours as needed  Pain rating 7-10:  Take 2 tablets every 4-6 hours as needed.  Do not exceed 6 tablets per day     Return to athletic activities    Return to athletic activities- Activities such as swimming, bicycling, jogging, running, and stop-and-go sports should be avoided until permitted by your Provider.     Return to School / Work    Return to School / Work: You may return to work/school when directed by your Provider.     Weight bearing as tolerated    Weight bearing as tolerated on your operative extremity.     Shower with wound/dressing covered    Showering:  You may shower three days after surgery provided the surgical dressing is covered with saran wrap (or any other non-permeable covering) to allow the dressing to remain dry. Once the dressing is removed on the seventh day after surgery, as long as there is no persistent active drainage you may continue to shower in the usual fashion allowing water and soap to gently run over the incision.  You are strictly prohibited from soaking or submerging the surgical wound underwater.     Tub Bathing:  Tub bathing, swimming, or any other activities that cause your incision to be submerged should be avoided until allowed by your provider. Typically, patients are allowed to return to these activities six weeks after surgery.     Reason for your hospital stay    Knee replacement complicated by elevated LFTs and DVT.     Activity    Your activity upon discharge: activity as tolerated, ambulate in house and no driving while on analgesics     Monitor and record    Temperature, incision pain and color.     When to contact your care team    Contact your transplant coordinator, [phone], with any questions or concerns or if unable to take your medications for any reason. Also contact with pain in calf, fever >100.5 degrees or redness/swelling at incision.     Wound care and dressings    Instructions to care for your wound at home: keep incision clean and dry, do not submerge in water.     Adult [facility] Follow-up and recommended labs and tests    Follow up with Dr. Jacobsen in ortho clinic in 2 weeks.      Follow up with hematology in 2-3 months.     Follow up with your primary care provider.     Follow up with Dr. Lilly in hepatology clinic as scheduled.     Appointments on [facility] and/or [facility] (with [facility] or [facility] provider or service). Call [phone] if you haven't heard regarding these appointments within 7 days of discharge.     ABO/Rh Type and Screen     Crutches DME    DME Documentation: Describe the reason for need to support medical necessity: Impaired gait status post knee surgery. I, the undersigned, certify that the above prescribed supplies are medically necessary for this patient and is both reasonable and necessary in reference to accepted standards of medical practice in the treatment of this patient's condition and is not prescribed as a convenience.     [facility]    DME Documentation:  Describe the reason for need to support medical necessity: Impaired gait status post knee surgery. I, the undersigned, certify that the above prescribed supplies are medically necessary for this patient and is both reasonable and necessary in reference to accepted standards of medical practice in the treatment of this patient's condition and is not prescribed as a convenience.     [facility]    DME Documentation: Describe the reason for need to support medical necessity: Impaired gait status post knee surgery. I, the undersigned, certify that the above prescribed supplies are medically necessary for this patient and is both reasonable and necessary in reference to accepted standards of medical practice in the treatment of this patient's condition and is not prescribed as a convenience.     Regular Diet Adult     Diet    Follow this diet upon discharge: Orders Placed This Encounter      Regular Diet Adult     Assign Questionnaire Series to Patient     Discharge Medications   Current Discharge Medication List      START taking these medications    Details   aspirin (ASA) 81 MG EC tablet Take 2 tablets (162 mg) by mouth daily  Qty: 60 tablet, Refills: 0    Associated Diagnoses: Primary localized osteoarthritis of right knee      methocarbamol (ROBAXIN) 500 MG tablet Take 1 tablet (500 mg) by mouth every 4 hours as needed for muscle spasms  Qty: 50 tablet, Refills: 0    Associated Diagnoses: Primary localized osteoarthritis of right knee      ondansetron (ZOFRAN) 4 MG tablet Take 1 tablet (4 mg) by mouth every 6 hours as needed for nausea  Qty: 30 tablet, Refills: 0    Associated Diagnoses: Primary localized osteoarthritis of right knee      oxyCODONE (ROXICODONE) 5 MG tablet Take 1 tablet (5 mg) by mouth every 4 hours as needed  Qty: 40 tablet, Refills: 0    Associated Diagnoses: Primary localized osteoarthritis of right knee      polyethylene glycol (MIRALAX) 17 g packet Take 17 g by mouth daily  Qty: 7 packet,  DISPLAY PLAN FREE TEXT Refills: 0    Associated Diagnoses: Primary localized osteoarthritis of right knee      senna-docusate (SENOKOT-S/PERICOLACE) 8.6-50 MG tablet Take 1 tablet by mouth 2 times daily  Qty: 30 tablet, Refills: 0    Associated Diagnoses: Primary localized osteoarthritis of right knee         CONTINUE these medications which have NOT CHANGED    Details   albuterol (PROAIR HFA/PROVENTIL HFA/VENTOLIN HFA) 108 (90 BASE) MCG/ACT Inhaler Inhale 2 puffs into the lungs every 6 hours as needed for shortness of breath / dyspnea or wheezing       alendronate (FOSAMAX) 70 MG tablet Take 70 mg by mouth every 7 days      amLODIPine (NORVASC) 5 MG tablet TAKE 1 TABLET (5 MG) BY MOUTH DAILY  Qty: 90 tablet, Refills: 3    Associated Diagnoses: Benign essential hypertension      calcium carbonate-vitamin D (OS-SHELDON) 500-400 MG-UNIT tablet Take 1 tablet by mouth every morning      gabapentin (NEURONTIN) 300 MG capsule Take 600 mg by mouth At Bedtime       omeprazole 20 MG tablet Take 2 tablets (40 mg) by mouth daily  Qty: 180 tablet, Refills: 3    Associated Diagnoses: Liver transplanted (H)      pramipexole (MIRAPEX) 0.25 MG tablet Take 0.75 mg by mouth At Bedtime      tacrolimus (GENERIC EQUIVALENT) 1 MG capsule Take 3 capsules (3 mg) by mouth every 12 hours  Qty: 540 capsule, Refills: 3    Comments: Notes to Pharmacy: TXP DT 8/30/2018 (Liver) TXP Dischg DT 9/12/2018 DX Liver replaced by transplant Z94.4 TX [facility] ([place], [place])  Associated Diagnoses: Liver transplanted (H)      zolpidem (AMBIEN) 10 MG tablet Take 10 mg by mouth nightly as needed for sleep          STOP taking these medications       aspirin 81 MG tablet Comments:   Reason for Stopping:             Allergies   Allergies   Allergen Reactions     Blood Transfusion Related (Informational Only) Other (See Comments)     Patient has a history of a clinically significant antibody against RBC antigens.  A delay in compatible  RBCs may occur.     Dilaudid [Hydromorphone] Itching     Ferrlecit      Venofer [Iron Sucrose]      Data   Most Recent 3 CBC's:  Recent Labs   Lab Test 04/02/21 0449 04/01/21 0451 03/31/21  1412   WBC 4.0 5.3 7.5   HGB 6.9* 8.2* 9.5*   MCV 93 90 90   PLT 97* 103* 114*      Most Recent 3 BMP's:  Recent Labs   Lab Test 04/02/21 0449 04/01/21 0451 03/31/21  0650    134 136   POTASSIUM 3.9 4.1 4.2   CHLORIDE 103 104 106   CO2 26 24 23   BUN 25 20 21   CR 1.13* 0.98 0.93   ANIONGAP 7 7 7   SHELDON 7.9* 8.2* 8.3*   GLC 98 87 100*     Most Recent 2 LFT's:  Recent Labs   Lab Test 04/02/21 0449 04/01/21  0451   AST 17 28   ALT 81* 140*   ALKPHOS 87 113   BILITOTAL 0.6 0.9     Most Recent INR's and Anticoagulation Dosing History:  Anticoagulation Dose History     Recent Dosing and Labs Latest Ref Rng & Units 9/8/2018 9/9/2018 9/10/2018 9/11/2018 9/12/2018 9/17/2018 3/4/2019    Warfarin 1 mg - - - 1 mg - - - -    Warfarin 4 mg - - - - 4 mg - - -    Warfarin 6 mg - 6 mg 6 mg - - - - -    INR 0.86 - 1.14 1.27(H) 1.51(H) 2.26(H) 1.82(H) 1.90(H) 2.29(H) 1.18(H)        Most Recent 3 Troponin's:No lab results found.  Most Recent Cholesterol Panel:  Recent Labs   Lab Test 09/02/20  1049   CHOL 148   LDL 74   HDL 60   TRIG 70     Most Recent 6 Bacteria Isolates From Any Culture (See EPIC Reports for Culture Details):  Recent Labs   Lab Test 03/30/21  2235 03/30/21 2039 09/01/18  1050 08/29/18  2355   CULT <10,000 colonies/mL  urogenital norma  Susceptibility testing in progress   No growth after 3 days Culture negative after 30 days  No anaerobes isolated  No growth No VRE isolated     Most Recent TSH, T4 and A1c Labs:  Recent Labs   Lab Test 09/02/18  1701 02/26/18  0952   TSH  --  0.02*   T4  --  1.06   A1C 4.9  --

## 2021-04-02 NOTE — PROGRESS NOTES
Orthopaedic Surgery Progress Note 04/02/2021    S: Patient reports pain and swelling has significantly improved in past 24 hours. However she expresses frustration that she has been confined to bed and has not worked with PT. Per chart PT cancelled due to recent DVT. She states that she has been working on flexing her knee but can only get it to 20. Denies numbness or tingling.     Switch from IV heparin to Eloquis yesterday. Patient with anemia this AM - planning for one unit pRBCs.     O:  Temp: 99  F (37.2  C) Temp src: Oral BP: 104/62 Pulse: 93   Resp: 16 SpO2: 100 % O2 Device: None (Room air)      Exam:  Gen: No acute distress, resting comfortably in bed.  Resp: Non-labored breathing  MSK:  RLE:  - Dressings c/d/i. Ace removed.   - tenderness and swelling throughout right calf. Compartments soft and compressible. Tolerated passive stretch of toes.   - SILT femoral/tibial/sural/saphenous/DP/SP nerves  - Fires TA, EHL, FHL, GaSC  - PT/DP pulses 2+, foot wwp    Recent Labs   Lab 04/02/21  0449 04/01/21  0451 03/31/21  1412   WBC 4.0 5.3 7.5   HGB 6.9* 8.2* 9.5*   PLT 97* 103* 114*     Imaging:  XR R knee 3/29 demonstrates well positioned TKA components without fracture or dislocation    Doppler right LE 3/31: The common femoral vein and duplicated femoral veins in the right lower extremity have normal waveforms and are fully compressible. Right popliteal vein is partially compressible. Color flow shows adherent clot along the wall. There is partial compressibility of the right peroneal vein. One of the 2 posterior tibial veins is noncompressible. Anechoic vascular area in the proximal right calf suggests potential hematoma. This is avascular.   Impression:   1. Deep vein thrombosis of the patient's right lower extremity starting at the popliteal vein and going distally.  2. Possible right calf hematoma.    Assessment: Sherrie Lala is a 65 year old female with PMH significant for liver transplant s/p R TKA on  3/29/21 with Dr. Jacobsen. Now with provoked acute DVT to RLE. Heparin IV drop per hematology pending plan for liver procedure. Then 3 months anticoagulation. Anemia this AM for 6.9.     Plan for 4/2/21: Ordered CPM. Plan to work with PT today.     Plan:  Transplant Primary  Activity: Up with assist.  Weight bearing status: WBAT.  Antibiotics: Ancef x 24 hours - completed.   Diet:Ok for diet from orthopedics perspective.   DVT prophylaxis: Eloquis per hematology recommendations.   Bracing/Splinting: None.  Wound Care: Aquacel/Tegaderm x 7 days.  Drains: None.  Pain management: transition from IV to orals as tolerated.   X-rays: AP knee XR in PACU.  Physical Therapy: ROM, ADL's.  Occupational Therapy: ADL's.  Labs: Trend Hgb on POD #1.  Consults: Hospitalist, PT, OT - appreciate assistance in caring for this patient.  Follow-up: Clinic with Dr. Jacobsen's team in 2 weeks.   Disposition: Pending progress with therapies, pain control on orals, and medical stability, anticipate discharge to home in 1-2 days.     Future Appointments   Date Time Provider Department Center   3/30/2021  7:30 AM Mandie Howard, KYA [facility]OT [place]   3/30/2021  8:45 AM Tamica Hayden, PT [facility] [place]   3/30/2021  2:45 PM Tamica Hayden, PT [facility] [place]   4/16/2021  3:00 PM Nitin Ferris PA-C Counts include 234 beds at the [facility]   5/5/2021 12:45 PM [facility]   5/5/2021 12:55 PM [facility]   5/5/2021  1:15 PM Nitin Jacobsen MD Counts include 234 beds at the [facility]     Staff: Delmar JEREZ PA-C  4/2/2021 7:14 AM  Orthopaedic Surgery  Pager: [phone]     Thank you for allowing me to participate in this patient's care. Please page me at [phone] with any questions/concerns. If there is no response, if it is a weekend, or if it is during evening hours, please page the orthopaedic surgery resident on call.

## 2021-04-02 NOTE — PLAN OF CARE
OT/7D: DEFER. OT orders received and appreciated. Per PT, pt does not require two skilled disciplines this stay and PT to address any functional tranfers with pt. IP OT to sign off at this time. Please consult again if new needs arise.

## 2021-04-03 ENCOUNTER — APPOINTMENT (OUTPATIENT)
Dept: PHYSICAL THERAPY | Facility: CLINIC | Age: 66
DRG: 470 | End: 2021-04-03
Attending: ORTHOPAEDIC SURGERY
Payer: MEDICARE

## 2021-04-03 LAB
ALBUMIN SERPL-MCNC: 2.2 G/DL (ref 3.4–5)
ALP SERPL-CCNC: 80 U/L (ref 40–150)
ALT SERPL W P-5'-P-CCNC: 54 U/L (ref 0–50)
ANION GAP SERPL CALCULATED.3IONS-SCNC: 4 MMOL/L (ref 3–14)
APTT PPP: 42 SEC (ref 22–37)
APTT PPP: 45 SEC (ref 22–37)
APTT PPP: 46 SEC (ref 22–37)
AST SERPL W P-5'-P-CCNC: 8 U/L (ref 0–45)
BASOPHILS # BLD AUTO: 0 10E9/L (ref 0–0.2)
BASOPHILS NFR BLD AUTO: 0.3 %
BILIRUB DIRECT SERPL-MCNC: 0.3 MG/DL (ref 0–0.2)
BILIRUB SERPL-MCNC: 0.7 MG/DL (ref 0.2–1.3)
BUN SERPL-MCNC: 27 MG/DL (ref 7–30)
CALCIUM SERPL-MCNC: 8.2 MG/DL (ref 8.5–10.1)
CHLORIDE SERPL-SCNC: 106 MMOL/L (ref 94–109)
CO2 SERPL-SCNC: 27 MMOL/L (ref 20–32)
CREAT SERPL-MCNC: 1.07 MG/DL (ref 0.52–1.04)
DIFFERENTIAL METHOD BLD: ABNORMAL
EOSINOPHIL # BLD AUTO: 0.2 10E9/L (ref 0–0.7)
EOSINOPHIL NFR BLD AUTO: 4.9 %
ERYTHROCYTE [DISTWIDTH] IN BLOOD BY AUTOMATED COUNT: 12.4 % (ref 10–15)
ERYTHROCYTE [DISTWIDTH] IN BLOOD BY AUTOMATED COUNT: 12.4 % (ref 10–15)
GFR SERPL CREATININE-BSD FRML MDRD: 55 ML/MIN/{1.73_M2}
GLUCOSE SERPL-MCNC: 106 MG/DL (ref 70–99)
HCT VFR BLD AUTO: 22.8 % (ref 35–47)
HCT VFR BLD AUTO: 25.1 % (ref 35–47)
HGB BLD-MCNC: 7.5 G/DL (ref 11.7–15.7)
HGB BLD-MCNC: 8.3 G/DL (ref 11.7–15.7)
IMM GRANULOCYTES # BLD: 0 10E9/L (ref 0–0.4)
IMM GRANULOCYTES NFR BLD: 0.5 %
LYMPHOCYTES # BLD AUTO: 0.3 10E9/L (ref 0.8–5.3)
LYMPHOCYTES NFR BLD AUTO: 8.7 %
MAGNESIUM SERPL-MCNC: 2.4 MG/DL (ref 1.6–2.3)
MCH RBC QN AUTO: 29.5 PG (ref 26.5–33)
MCH RBC QN AUTO: 29.8 PG (ref 26.5–33)
MCHC RBC AUTO-ENTMCNC: 32.9 G/DL (ref 31.5–36.5)
MCHC RBC AUTO-ENTMCNC: 33.1 G/DL (ref 31.5–36.5)
MCV RBC AUTO: 89 FL (ref 78–100)
MCV RBC AUTO: 91 FL (ref 78–100)
MONOCYTES # BLD AUTO: 0.4 10E9/L (ref 0–1.3)
MONOCYTES NFR BLD AUTO: 10.6 %
NEUTROPHILS # BLD AUTO: 2.8 10E9/L (ref 1.6–8.3)
NEUTROPHILS NFR BLD AUTO: 75 %
NRBC # BLD AUTO: 0 10*3/UL
NRBC BLD AUTO-RTO: 0 /100
PHOSPHATE SERPL-MCNC: 3.2 MG/DL (ref 2.5–4.5)
PLATELET # BLD AUTO: 112 10E9/L (ref 150–450)
PLATELET # BLD AUTO: 139 10E9/L (ref 150–450)
POTASSIUM SERPL-SCNC: 4.2 MMOL/L (ref 3.4–5.3)
PROT SERPL-MCNC: 5.6 G/DL (ref 6.8–8.8)
RBC # BLD AUTO: 2.52 10E12/L (ref 3.8–5.2)
RBC # BLD AUTO: 2.81 10E12/L (ref 3.8–5.2)
SODIUM SERPL-SCNC: 136 MMOL/L (ref 133–144)
UFH PPP CHRO-ACNC: >1.1 IU/ML
UFH PPP CHRO-ACNC: >1.1 IU/ML
WBC # BLD AUTO: 3.7 10E9/L (ref 4–11)
WBC # BLD AUTO: 4.8 10E9/L (ref 4–11)

## 2021-04-03 PROCEDURE — 80076 HEPATIC FUNCTION PANEL: CPT | Performed by: INTERNAL MEDICINE

## 2021-04-03 PROCEDURE — 250N000013 HC RX MED GY IP 250 OP 250 PS 637: Performed by: PHYSICIAN ASSISTANT

## 2021-04-03 PROCEDURE — 250N000013 HC RX MED GY IP 250 OP 250 PS 637: Performed by: INTERNAL MEDICINE

## 2021-04-03 PROCEDURE — 120N000002 HC R&B MED SURG/OB UMMC

## 2021-04-03 PROCEDURE — 250N000013 HC RX MED GY IP 250 OP 250 PS 637: Performed by: NURSE PRACTITIONER

## 2021-04-03 PROCEDURE — 250N000013 HC RX MED GY IP 250 OP 250 PS 637: Performed by: CLINICAL NURSE SPECIALIST

## 2021-04-03 PROCEDURE — 250N000011 HC RX IP 250 OP 636: Performed by: SURGERY

## 2021-04-03 PROCEDURE — 85730 THROMBOPLASTIN TIME PARTIAL: CPT | Performed by: NURSE PRACTITIONER

## 2021-04-03 PROCEDURE — 250N000013 HC RX MED GY IP 250 OP 250 PS 637: Performed by: SURGERY

## 2021-04-03 PROCEDURE — 83735 ASSAY OF MAGNESIUM: CPT | Performed by: INTERNAL MEDICINE

## 2021-04-03 PROCEDURE — 85027 COMPLETE CBC AUTOMATED: CPT | Performed by: NURSE PRACTITIONER

## 2021-04-03 PROCEDURE — 36415 COLL VENOUS BLD VENIPUNCTURE: CPT | Performed by: NURSE PRACTITIONER

## 2021-04-03 PROCEDURE — 97116 GAIT TRAINING THERAPY: CPT | Mod: GP

## 2021-04-03 PROCEDURE — 80048 BASIC METABOLIC PNL TOTAL CA: CPT | Performed by: INTERNAL MEDICINE

## 2021-04-03 PROCEDURE — 85520 HEPARIN ASSAY: CPT | Performed by: NURSE PRACTITIONER

## 2021-04-03 PROCEDURE — 97530 THERAPEUTIC ACTIVITIES: CPT | Mod: GP

## 2021-04-03 PROCEDURE — 250N000012 HC RX MED GY IP 250 OP 636 PS 637: Performed by: SURGERY

## 2021-04-03 PROCEDURE — 97110 THERAPEUTIC EXERCISES: CPT | Mod: GP

## 2021-04-03 PROCEDURE — 85730 THROMBOPLASTIN TIME PARTIAL: CPT | Performed by: SURGERY

## 2021-04-03 PROCEDURE — 258N000003 HC RX IP 258 OP 636

## 2021-04-03 PROCEDURE — 85025 COMPLETE CBC W/AUTO DIFF WBC: CPT | Performed by: INTERNAL MEDICINE

## 2021-04-03 PROCEDURE — 36415 COLL VENOUS BLD VENIPUNCTURE: CPT | Performed by: INTERNAL MEDICINE

## 2021-04-03 PROCEDURE — 84100 ASSAY OF PHOSPHORUS: CPT | Performed by: INTERNAL MEDICINE

## 2021-04-03 PROCEDURE — 36415 COLL VENOUS BLD VENIPUNCTURE: CPT | Performed by: SURGERY

## 2021-04-03 RX ORDER — HEPARIN SODIUM 10000 [USP'U]/100ML
0-5000 INJECTION, SOLUTION INTRAVENOUS CONTINUOUS
Status: DISCONTINUED | OUTPATIENT
Start: 2021-04-03 | End: 2021-04-05

## 2021-04-03 RX ORDER — OXYCODONE HYDROCHLORIDE 5 MG/1
5 TABLET ORAL ONCE
Status: COMPLETED | OUTPATIENT
Start: 2021-04-03 | End: 2021-04-03

## 2021-04-03 RX ORDER — SODIUM CHLORIDE 9 MG/ML
INJECTION, SOLUTION INTRAVENOUS
Status: COMPLETED
Start: 2021-04-03 | End: 2021-04-03

## 2021-04-03 RX ORDER — HEPARIN SODIUM 10000 [USP'U]/100ML
12 INJECTION, SOLUTION INTRAVENOUS CONTINUOUS
Status: DISCONTINUED | OUTPATIENT
Start: 2021-04-03 | End: 2021-04-03

## 2021-04-03 RX ORDER — HEPARIN SODIUM 10000 [USP'U]/100ML
0-5000 INJECTION, SOLUTION INTRAVENOUS CONTINUOUS
Status: DISCONTINUED | OUTPATIENT
Start: 2021-04-03 | End: 2021-04-03

## 2021-04-03 RX ADMIN — OXYCODONE HYDROCHLORIDE 5 MG: 5 TABLET ORAL at 09:01

## 2021-04-03 RX ADMIN — FAMOTIDINE 20 MG: 20 TABLET ORAL at 08:33

## 2021-04-03 RX ADMIN — OXYCODONE HYDROCHLORIDE 10 MG: 10 TABLET ORAL at 20:47

## 2021-04-03 RX ADMIN — OXYCODONE HYDROCHLORIDE 5 MG: 5 TABLET ORAL at 13:51

## 2021-04-03 RX ADMIN — TACROLIMUS 5 MG: 5 CAPSULE ORAL at 08:34

## 2021-04-03 RX ADMIN — PRAMIPEXOLE DIHYDROCHLORIDE 0.5 MG: 0.5 TABLET ORAL at 21:55

## 2021-04-03 RX ADMIN — TACROLIMUS 5 MG: 5 CAPSULE ORAL at 18:05

## 2021-04-03 RX ADMIN — FAMOTIDINE 20 MG: 20 TABLET ORAL at 20:18

## 2021-04-03 RX ADMIN — HYDROXYZINE HYDROCHLORIDE 25 MG: 25 TABLET, FILM COATED ORAL at 13:51

## 2021-04-03 RX ADMIN — OXYCODONE HYDROCHLORIDE 5 MG: 5 TABLET ORAL at 06:18

## 2021-04-03 RX ADMIN — POLYETHYLENE GLYCOL 3350 17 G: 17 POWDER, FOR SOLUTION ORAL at 12:13

## 2021-04-03 RX ADMIN — METHOCARBAMOL 500 MG: 500 TABLET, FILM COATED ORAL at 06:18

## 2021-04-03 RX ADMIN — OMEPRAZOLE 40 MG: 20 CAPSULE, DELAYED RELEASE ORAL at 08:33

## 2021-04-03 RX ADMIN — METHOCARBAMOL 500 MG: 500 TABLET, FILM COATED ORAL at 20:47

## 2021-04-03 RX ADMIN — HYDROXYZINE HYDROCHLORIDE 25 MG: 25 TABLET, FILM COATED ORAL at 21:55

## 2021-04-03 RX ADMIN — SODIUM CHLORIDE 500 ML: 9 INJECTION, SOLUTION INTRAVENOUS at 12:14

## 2021-04-03 RX ADMIN — HEPARIN SODIUM 800 UNITS/HR: 10000 INJECTION, SOLUTION INTRAVENOUS at 20:18

## 2021-04-03 RX ADMIN — APIXABAN 10 MG: 5 TABLET, FILM COATED ORAL at 08:32

## 2021-04-03 RX ADMIN — DOCUSATE SODIUM AND SENNOSIDES 1 TABLET: 8.6; 5 TABLET ORAL at 08:33

## 2021-04-03 RX ADMIN — GABAPENTIN 600 MG: 300 CAPSULE ORAL at 21:54

## 2021-04-03 RX ADMIN — DOCUSATE SODIUM 100 MG: 100 CAPSULE, LIQUID FILLED ORAL at 08:33

## 2021-04-03 ASSESSMENT — MIFFLIN-ST. JEOR: SCORE: 1176.4

## 2021-04-03 ASSESSMENT — ACTIVITIES OF DAILY LIVING (ADL)
ADLS_ACUITY_SCORE: 16
ADLS_ACUITY_SCORE: 15
ADLS_ACUITY_SCORE: 16
ADLS_ACUITY_SCORE: 16
ADLS_ACUITY_SCORE: 15
ADLS_ACUITY_SCORE: 15

## 2021-04-03 NOTE — PLAN OF CARE
Paged provider:    Lab called, Heparin is greater than 1.10. Do you want to make any changes to the current orders?    Pt has questions she wants answered before beginning heparin medication.    Pt's temp fluctuating btwn 100.1-100.4. R knee noted to be red, I outlined area. Pt stated it hasn't looked like this. It's also warm to touch.     Awaiting response.

## 2021-04-03 NOTE — PLAN OF CARE
VSS on RA. T max 100.2F. Pt received 1 unit of blood for hgb 6.9 this AM, no s/s of reaction noted. Pain somewhat managed with PRN Oxycodone and Robaxin. Denies nausea. Tolerating reg diet. Voiding. LBM yesterday. Right knee edematous, dressing CDI. Pt worked with PT this afternoon and tolerated CPM machine for a while. Ambulating in [place] with SBA and walker. CT of abdomen and chest done today. Continue to monitor and with POC.

## 2021-04-03 NOTE — PHARMACY-CONSULT NOTE
Heparin Infusion Monitoring using aPTT    This patient recently received a dose of a Factor Xa inhibitor (apixaban) and has now been started on a heparin infusion. The recent dose of the Factor Xa inhibitor is causing an elevation of the Heparin Xa level unrelated to the degree of anticoagulation present from the medication. In this situation, the Heparin Xa level is not a reliable monitoring parameter for the heparin infusion. Until the Factor Xa inhibitor effects on the lab assay are gone (which may take a few days), this patient's heparin infusion will be monitored and adjusted based on aPTT levels.       The pharmacist has updated the heparin infusion, bolus, and lab orders to reflect aPTT monitoring.     The pharmacist has contacted the nurse so they are aware that both aPTT levels and Heparin Xa levels should be drawn daily and 6 hours after each dose adjustment, but the heparin infusion should only be adjusted based on the aPTT result for now.    The pharmacist will continue to follow aPTT and Heparin Xa levels and once they correlate, they will change the orders back to the standard heparin Xa protocol.     Yaniv Riggins, SaniyaD

## 2021-04-03 NOTE — PROGRESS NOTES
Orthopaedic Surgery Progress Note 04/03/2021    S: Patient with anemia yesterday, status post transfusion.  Repeat hemoglobin pending this morning.  Started utilizing CPM, reporting improvement in the stiffness.  Transitioned to Eliquis.  Discussing plan for discharge when cleared by medicine    O:  Temp: 99.5  F (37.5  C) Temp src: Oral BP: 121/69 Pulse: 90   Resp: 16 SpO2: 97 % O2 Device: None (Room air)      Exam:  Gen: No acute distress, resting comfortably in bed.  Resp: Non-labored breathing  MSK:  RLE:  - Dressings c/d/i.   - All compartments soft and compressible. Tolerated passive stretch of toes.   - SILT femoral/tibial/sural/saphenous/DP/SP nerves  - Fires TA, EHL, FHL, GaSC  - PT/DP pulses 2+, foot wwp    Recent Labs   Lab 04/02/21  0449 04/01/21  0451 03/31/21  1412   WBC 4.0 5.3 7.5   HGB 6.9* 8.2* 9.5*   PLT 97* 103* 114*     Imaging:  No new imaging    Assessment: Sherrie Lala is a 65 year old female with PMH significant for liver transplant s/p R TKA on 3/29/21 with Dr. Jacobsen. Now with provoked acute DVT to RLE. Heparin IV drop per hematology pending plan for liver procedure. Then 3 months anticoagulation. Anemia 6.9, status post transfusion.  Repeat hemoglobin pending    Plan:  Transplant Primary  Activity: Up with assist.  Weight bearing status: WBAT.  Antibiotics: Ancef x 24 hours - completed.   Diet:Ok for diet from orthopedics perspective.   DVT prophylaxis: Eloquis per hematology recommendations.   Bracing/Splinting: None.  Wound Care: Tegaderm x 7 days, currently c/d/i  Drains: None.  Pain management: transition from IV to orals as tolerated.   X-rays: AP knee XR in PACU.  Physical Therapy: ROM, ADL's.  Occupational Therapy: ADL's.  Labs: Trend Hgb on POD #1.  Consults: Hospitalist, PT, OT - appreciate assistance in caring for this patient.  Follow-up: Clinic with Dr. Jacobsen's team in 2 weeks.   Disposition:  Okay to discharge to home when cleared by transplant team.    Future Appointments    Date Time Provider Department Center   3/30/2021  7:30 AM Mandie Howard, OT UROT [place]   3/30/2021  8:45 AM Tamica Hayden, PT URPT [place]   3/30/2021  2:45 PM Tamica Hayden, PT URPT [place]   4/16/2021  3:00 PM [facility]   5/5/2021 12:45 [facility]   5/5/2021 12:55 PM [facility]   5/5/2021  1:15 PM Nitin Jacobsen MD [facility]     Staff: Delmar Anderson MD  PGY-4, Orthopaedic Surgery

## 2021-04-03 NOTE — PLAN OF CARE
Right leg has been painful today. Received oxycodone and robaxin this morning. Ambulated in halls twice today with PT. Plan is to start heparin drip this evening, and have an IR procedure Monday, did get Eliquis this morning. CPM machine on bed, using prn.
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
## 2021-04-03 NOTE — PROGRESS NOTES
Pt is AxO, AVSS on RA. Pain managed with Oxy and Robaxin. Pt up with SBA and walker. Pt used CPM machine tonight before sleep for about an hour. Tolerating diet. Adequate UOP. No BM overnight. R knee dressing is CDI. Pt appeared to rest well overnight.

## 2021-04-04 ENCOUNTER — APPOINTMENT (OUTPATIENT)
Dept: ULTRASOUND IMAGING | Facility: CLINIC | Age: 66
DRG: 470 | End: 2021-04-04
Attending: NURSE PRACTITIONER
Payer: MEDICARE

## 2021-04-04 LAB
ALBUMIN SERPL-MCNC: 2.2 G/DL (ref 3.4–5)
ALP SERPL-CCNC: 88 U/L (ref 40–150)
ALT SERPL W P-5'-P-CCNC: 47 U/L (ref 0–50)
ANION GAP SERPL CALCULATED.3IONS-SCNC: 4 MMOL/L (ref 3–14)
APTT PPP: 49 SEC (ref 22–37)
APTT PPP: 52 SEC (ref 22–37)
APTT PPP: 53 SEC (ref 22–37)
AST SERPL W P-5'-P-CCNC: 8 U/L (ref 0–45)
BASOPHILS # BLD AUTO: 0 10E9/L (ref 0–0.2)
BASOPHILS NFR BLD AUTO: 0.5 %
BILIRUB DIRECT SERPL-MCNC: 0.3 MG/DL (ref 0–0.2)
BILIRUB SERPL-MCNC: 0.9 MG/DL (ref 0.2–1.3)
BUN SERPL-MCNC: 29 MG/DL (ref 7–30)
CALCIUM SERPL-MCNC: 8 MG/DL (ref 8.5–10.1)
CHLORIDE SERPL-SCNC: 106 MMOL/L (ref 94–109)
CO2 SERPL-SCNC: 27 MMOL/L (ref 20–32)
CREAT SERPL-MCNC: 1.06 MG/DL (ref 0.52–1.04)
DIFFERENTIAL METHOD BLD: ABNORMAL
EOSINOPHIL # BLD AUTO: 0.2 10E9/L (ref 0–0.7)
EOSINOPHIL NFR BLD AUTO: 3.8 %
ERYTHROCYTE [DISTWIDTH] IN BLOOD BY AUTOMATED COUNT: 12.6 % (ref 10–15)
GFR SERPL CREATININE-BSD FRML MDRD: 55 ML/MIN/{1.73_M2}
GLUCOSE SERPL-MCNC: 103 MG/DL (ref 70–99)
HCT VFR BLD AUTO: 24.2 % (ref 35–47)
HGB BLD-MCNC: 7.8 G/DL (ref 11.7–15.7)
IMM GRANULOCYTES # BLD: 0 10E9/L (ref 0–0.4)
IMM GRANULOCYTES NFR BLD: 0.8 %
LACTATE BLD-SCNC: 0.8 MMOL/L (ref 0.7–2)
LYMPHOCYTES # BLD AUTO: 0.5 10E9/L (ref 0.8–5.3)
LYMPHOCYTES NFR BLD AUTO: 12.3 %
MAGNESIUM SERPL-MCNC: 2.4 MG/DL (ref 1.6–2.3)
MCH RBC QN AUTO: 30.1 PG (ref 26.5–33)
MCHC RBC AUTO-ENTMCNC: 32.2 G/DL (ref 31.5–36.5)
MCV RBC AUTO: 93 FL (ref 78–100)
MONOCYTES # BLD AUTO: 0.3 10E9/L (ref 0–1.3)
MONOCYTES NFR BLD AUTO: 8.7 %
NEUTROPHILS # BLD AUTO: 2.9 10E9/L (ref 1.6–8.3)
NEUTROPHILS NFR BLD AUTO: 73.9 %
NRBC # BLD AUTO: 0 10*3/UL
NRBC BLD AUTO-RTO: 0 /100
PHOSPHATE SERPL-MCNC: 3.7 MG/DL (ref 2.5–4.5)
PLATELET # BLD AUTO: 126 10E9/L (ref 150–450)
POTASSIUM SERPL-SCNC: 4.2 MMOL/L (ref 3.4–5.3)
PROT SERPL-MCNC: 5.8 G/DL (ref 6.8–8.8)
RBC # BLD AUTO: 2.59 10E12/L (ref 3.8–5.2)
SODIUM SERPL-SCNC: 137 MMOL/L (ref 133–144)
TACROLIMUS BLD-MCNC: 6.2 UG/L (ref 5–15)
TME LAST DOSE: NORMAL H
WBC # BLD AUTO: 3.9 10E9/L (ref 4–11)

## 2021-04-04 PROCEDURE — 36415 COLL VENOUS BLD VENIPUNCTURE: CPT | Performed by: PHYSICIAN ASSISTANT

## 2021-04-04 PROCEDURE — 250N000013 HC RX MED GY IP 250 OP 250 PS 637: Performed by: SURGERY

## 2021-04-04 PROCEDURE — 80197 ASSAY OF TACROLIMUS: CPT | Performed by: PHYSICIAN ASSISTANT

## 2021-04-04 PROCEDURE — 250N000012 HC RX MED GY IP 250 OP 636 PS 637: Performed by: SURGERY

## 2021-04-04 PROCEDURE — 93971 EXTREMITY STUDY: CPT | Mod: 26

## 2021-04-04 PROCEDURE — 250N000011 HC RX IP 250 OP 636: Performed by: SURGERY

## 2021-04-04 PROCEDURE — 84100 ASSAY OF PHOSPHORUS: CPT | Performed by: INTERNAL MEDICINE

## 2021-04-04 PROCEDURE — 120N000011 HC R&B TRANSPLANT UMMC

## 2021-04-04 PROCEDURE — 93971 EXTREMITY STUDY: CPT | Mod: RT

## 2021-04-04 PROCEDURE — 250N000013 HC RX MED GY IP 250 OP 250 PS 637: Performed by: INTERNAL MEDICINE

## 2021-04-04 PROCEDURE — 85730 THROMBOPLASTIN TIME PARTIAL: CPT | Performed by: INTERNAL MEDICINE

## 2021-04-04 PROCEDURE — 36415 COLL VENOUS BLD VENIPUNCTURE: CPT | Performed by: SURGERY

## 2021-04-04 PROCEDURE — 83735 ASSAY OF MAGNESIUM: CPT | Performed by: INTERNAL MEDICINE

## 2021-04-04 PROCEDURE — 83605 ASSAY OF LACTIC ACID: CPT | Performed by: SURGERY

## 2021-04-04 PROCEDURE — 85025 COMPLETE CBC W/AUTO DIFF WBC: CPT | Performed by: INTERNAL MEDICINE

## 2021-04-04 PROCEDURE — 250N000013 HC RX MED GY IP 250 OP 250 PS 637: Performed by: CLINICAL NURSE SPECIALIST

## 2021-04-04 PROCEDURE — 36415 COLL VENOUS BLD VENIPUNCTURE: CPT | Performed by: INTERNAL MEDICINE

## 2021-04-04 PROCEDURE — 85730 THROMBOPLASTIN TIME PARTIAL: CPT | Performed by: SURGERY

## 2021-04-04 PROCEDURE — 80076 HEPATIC FUNCTION PANEL: CPT | Performed by: INTERNAL MEDICINE

## 2021-04-04 PROCEDURE — 80048 BASIC METABOLIC PNL TOTAL CA: CPT | Performed by: INTERNAL MEDICINE

## 2021-04-04 PROCEDURE — 250N000013 HC RX MED GY IP 250 OP 250 PS 637: Performed by: PHYSICIAN ASSISTANT

## 2021-04-04 RX ORDER — SODIUM CHLORIDE 9 MG/ML
INJECTION, SOLUTION INTRAVENOUS CONTINUOUS
Status: DISCONTINUED | OUTPATIENT
Start: 2021-04-05 | End: 2021-04-05

## 2021-04-04 RX ADMIN — HEPARIN SODIUM 1250 UNITS/HR: 10000 INJECTION, SOLUTION INTRAVENOUS at 19:10

## 2021-04-04 RX ADMIN — FAMOTIDINE 20 MG: 20 TABLET ORAL at 20:04

## 2021-04-04 RX ADMIN — DOCUSATE SODIUM 100 MG: 100 CAPSULE, LIQUID FILLED ORAL at 08:36

## 2021-04-04 RX ADMIN — FAMOTIDINE 20 MG: 20 TABLET ORAL at 08:36

## 2021-04-04 RX ADMIN — TACROLIMUS 5 MG: 5 CAPSULE ORAL at 17:31

## 2021-04-04 RX ADMIN — GABAPENTIN 600 MG: 300 CAPSULE ORAL at 21:30

## 2021-04-04 RX ADMIN — PRAMIPEXOLE DIHYDROCHLORIDE 0.5 MG: 0.5 TABLET ORAL at 21:30

## 2021-04-04 RX ADMIN — HEPARIN SODIUM 1100 UNITS/HR: 10000 INJECTION, SOLUTION INTRAVENOUS at 17:34

## 2021-04-04 RX ADMIN — OXYCODONE HYDROCHLORIDE 5 MG: 5 TABLET ORAL at 17:31

## 2021-04-04 RX ADMIN — METHOCARBAMOL 500 MG: 500 TABLET, FILM COATED ORAL at 12:44

## 2021-04-04 RX ADMIN — HEPARIN SODIUM 950 UNITS/HR: 10000 INJECTION, SOLUTION INTRAVENOUS at 03:29

## 2021-04-04 RX ADMIN — TACROLIMUS 5 MG: 5 CAPSULE ORAL at 08:37

## 2021-04-04 RX ADMIN — METHOCARBAMOL 500 MG: 500 TABLET, FILM COATED ORAL at 20:04

## 2021-04-04 RX ADMIN — OXYCODONE HYDROCHLORIDE 10 MG: 10 TABLET ORAL at 21:30

## 2021-04-04 RX ADMIN — AMLODIPINE BESYLATE 5 MG: 5 TABLET ORAL at 08:36

## 2021-04-04 RX ADMIN — OXYCODONE HYDROCHLORIDE 10 MG: 10 TABLET ORAL at 06:58

## 2021-04-04 RX ADMIN — POLYETHYLENE GLYCOL 3350 17 G: 17 POWDER, FOR SOLUTION ORAL at 08:38

## 2021-04-04 RX ADMIN — METHOCARBAMOL 500 MG: 500 TABLET, FILM COATED ORAL at 02:57

## 2021-04-04 RX ADMIN — HYDROXYZINE HYDROCHLORIDE 25 MG: 25 TABLET, FILM COATED ORAL at 17:31

## 2021-04-04 RX ADMIN — OXYCODONE HYDROCHLORIDE 10 MG: 10 TABLET ORAL at 12:44

## 2021-04-04 RX ADMIN — OMEPRAZOLE 40 MG: 20 CAPSULE, DELAYED RELEASE ORAL at 08:37

## 2021-04-04 RX ADMIN — OXYCODONE HYDROCHLORIDE 10 MG: 10 TABLET ORAL at 02:58

## 2021-04-04 ASSESSMENT — ACTIVITIES OF DAILY LIVING (ADL)
ADLS_ACUITY_SCORE: 15
ADLS_ACUITY_SCORE: 16
ADLS_ACUITY_SCORE: 15
ADLS_ACUITY_SCORE: 15

## 2021-04-04 NOTE — PLAN OF CARE
1300 to 1930:Pt. transferred from [facility] to  for ongoing  transplant  services management.Pt. is alert and oriented x 4,c/o right knee pain 5/10 oxycodone 10 mg  with robaxin given one time and oxycodone 5 mg with atarax given one time 4-5 hours apart per PRN order. Patient verbalized partial  pain control.pt is up with SBA walked hallways with walker, with her ,pt.tolerated well.Right leg is swollen and warm,dressing is clean and dry,pt reported slight numbness and tinging in her right leg.US ordered to re evaluate for DVT,will be done tonight.Heparin drip is infusing at 1100 units/hr.PTT is 53,pt. will receive bolus 1950 units and dose will be increased 150 units which will be 1250 units/hr.PTT recheck tomorrow at 0045 AM.Will continue to monitor.

## 2021-04-04 NOTE — PLAN OF CARE
Pt AVSS. Temp 99.6, 99.1 Pt sleeping at long intervals tonight. RLE elevated and remains enlarged, red and hot to touch. Red area on knee marked, w/o changes. PP2+. Pt having min pain at rest, rated a 2-3 but with activity and OOB rated pain a 9-10. Oxycodone and robaxin given with relief. Pt amb to BR with SBA/walker. Lungs clear, dim in bases. PIV intact. Heparin gtt currently at 950 units/hr after receiving a 1950 unit bolus following PTT of 52. Next PTT level scheduled for 0930. Cont to monitor RLE, maintain pain control, monitor Heparin gtt.

## 2021-04-04 NOTE — PROGRESS NOTES
Orthopaedic Surgery Progress Note 04/04/2021    S: Patient sleeping this morning upon examination.  Repeat hemoglobin demonstrating 7.8 no other acute events.    O:  Temp: 99.1  F (37.3  C) Temp src: Oral BP: 129/76 Pulse: 85   Resp: 18 SpO2: 99 % O2 Device: None (Room air)      Exam:  Gen: Sleeping soundly  Resp: Non-labored breathing  MSK:  RLE:  - Dressings c/d/i.   - All compartments soft and compressible.  7.8  - PT/DP pulses 2+, foot wwp    Recent Labs   Lab 04/04/21  0547 04/03/21  1147 04/03/21  0713   WBC 3.9* 4.8 3.7*   HGB 7.8* 8.3* 7.5*   * 139* 112*     Imaging:  No new imaging    Assessment: Sherrie Lala is a 65 year old female with PMH significant for liver transplant s/p R TKA on 3/29/21 with Dr. Jacobsen. Now with provoked acute DVT to RLE. Heparin IV drop per hematology pending plan for liver procedure. Then 3 months anticoagulation. Anemia 6.9, status post transfusion.  Repeat hemoglobin 7.8.    Plan:  Transplant Primary  Activity: Up with assist.  Weight bearing status: WBAT.  Antibiotics: Ancef x 24 hours - completed.   Diet:Ok for diet from orthopedics perspective.   DVT prophylaxis: Eloquis per hematology recommendations.   Bracing/Splinting: None.  Wound Care: Tegaderm x 7 days, currently c/d/i  Drains: None.  Pain management: transition from IV to orals as tolerated.   X-rays: AP knee XR in PACU.  Physical Therapy: ROM, ADL's.  Occupational Therapy: ADL's.  Labs: Trend Hgb on POD #1.  Consults: Hospitalist, PT, OT - appreciate assistance in caring for this patient.  Follow-up: Clinic with Dr. Jacobsen's team in 2 weeks.   Disposition:  Okay to discharge to home when cleared by transplant team.    Future Appointments   Date Time Provider Department Center   3/30/2021  7:30 AM Mandie Howard, KYA UROT [place]   3/30/2021  8:45 AM Tamica Hayden, PT URPT [place]   3/30/2021  2:45 PM Tamica Hayden, PT URPT [place]   4/16/2021  3:00 PM [facility]   [facility]  [facility]   [facility]/5[facility]21 12[facility]   5/5/2021  1:15 PM Nitin Jacobsen MD [facility]     Staff: Delmar Anderson MD  PGY-4, Orthopaedic Surgery

## 2021-04-04 NOTE — PROGRESS NOTES
Admitted/transferred from:   2 RN   skin assessment completed by Josseline Santana RN and Bailey NICOLE  Skin assessment finding:right knee incision covered with dressing,otherwise skin intact   Interventions/actions: none     Will continue to monitor.

## 2021-04-04 NOTE — PLAN OF CARE
Patient transferred to  to be on the transplant floor, report called to receiving RN. Heparin drip bolus and rate changed, now at 1100 units/hour. Recheck for 16:15. Pain managed with Oxycodone. Right leg is cool to the touch, improved from last night per patient. Patient showered, up with walker, using CPM machine while in bed. Followed by PT. IR procedure planned for tomorrow.
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
## 2021-04-04 NOTE — PLAN OF CARE
"A&Ox4, VSS except temperature at 100.1-100.4 F, MD's aware. Heparin level > 1.1, MD's paged. Heparin order set to start infusing at 2000. Pharmacy called and stated to adjust infusion based on PTT level. Patient voiced her concerns in regards to the heparin drip and stated she would like her questions answered, prior to starting. MD's paged. Pt was told per another provider that she would need to ask her questions tomorrow morning when her MD's round. Pt stated she was on board with waiting until tomorrow, and in the mean time to begin the infusion as scheduled. PRN Oxycodone/Robaxin administered d/t pain. Regular diet, tolerating well. Writer outlined R knee redness, continue to monitor. R knee is warm to touch and according to patient \"redder than before.\" Page MD's if temperature exceeds 101.3.  "

## 2021-04-05 ENCOUNTER — APPOINTMENT (OUTPATIENT)
Dept: INTERVENTIONAL RADIOLOGY/VASCULAR | Facility: CLINIC | Age: 66
DRG: 470 | End: 2021-04-05
Attending: NURSE PRACTITIONER
Payer: MEDICARE

## 2021-04-05 ENCOUNTER — APPOINTMENT (OUTPATIENT)
Dept: PHYSICAL THERAPY | Facility: CLINIC | Age: 66
DRG: 470 | End: 2021-04-05
Attending: ORTHOPAEDIC SURGERY
Payer: MEDICARE

## 2021-04-05 LAB
ALBUMIN SERPL-MCNC: 2.4 G/DL (ref 3.4–5)
ALP SERPL-CCNC: 101 U/L (ref 40–150)
ALT SERPL W P-5'-P-CCNC: 38 U/L (ref 0–50)
ANION GAP SERPL CALCULATED.3IONS-SCNC: 6 MMOL/L (ref 3–14)
APTT PPP: 72 SEC (ref 22–37)
APTT PPP: 77 SEC (ref 22–37)
AST SERPL W P-5'-P-CCNC: 9 U/L (ref 0–45)
BACTERIA SPEC CULT: NO GROWTH
BASOPHILS # BLD AUTO: 0 10E9/L (ref 0–0.2)
BASOPHILS NFR BLD AUTO: 0.3 %
BILIRUB DIRECT SERPL-MCNC: 0.4 MG/DL (ref 0–0.2)
BILIRUB SERPL-MCNC: 1.1 MG/DL (ref 0.2–1.3)
BUN SERPL-MCNC: 23 MG/DL (ref 7–30)
CALCIUM SERPL-MCNC: 8.4 MG/DL (ref 8.5–10.1)
CHLORIDE SERPL-SCNC: 106 MMOL/L (ref 94–109)
CO2 SERPL-SCNC: 26 MMOL/L (ref 20–32)
CREAT SERPL-MCNC: 0.94 MG/DL (ref 0.52–1.04)
DIFFERENTIAL METHOD BLD: ABNORMAL
EOSINOPHIL # BLD AUTO: 0.1 10E9/L (ref 0–0.7)
EOSINOPHIL NFR BLD AUTO: 3.8 %
ERYTHROCYTE [DISTWIDTH] IN BLOOD BY AUTOMATED COUNT: 12.7 % (ref 10–15)
GFR SERPL CREATININE-BSD FRML MDRD: 64 ML/MIN/{1.73_M2}
GLUCOSE SERPL-MCNC: 100 MG/DL (ref 70–99)
HCT VFR BLD AUTO: 23.4 % (ref 35–47)
HGB BLD-MCNC: 7.6 G/DL (ref 11.7–15.7)
IMM GRANULOCYTES # BLD: 0 10E9/L (ref 0–0.4)
IMM GRANULOCYTES NFR BLD: 0.8 %
INR PPP: 1.19 (ref 0.86–1.14)
LABORATORY COMMENT REPORT: NORMAL
LYMPHOCYTES # BLD AUTO: 0.5 10E9/L (ref 0.8–5.3)
LYMPHOCYTES NFR BLD AUTO: 13.7 %
MAGNESIUM SERPL-MCNC: 2.1 MG/DL (ref 1.6–2.3)
MCH RBC QN AUTO: 29.8 PG (ref 26.5–33)
MCHC RBC AUTO-ENTMCNC: 32.5 G/DL (ref 31.5–36.5)
MCV RBC AUTO: 92 FL (ref 78–100)
MONOCYTES # BLD AUTO: 0.3 10E9/L (ref 0–1.3)
MONOCYTES NFR BLD AUTO: 8.2 %
NEUTROPHILS # BLD AUTO: 2.7 10E9/L (ref 1.6–8.3)
NEUTROPHILS NFR BLD AUTO: 73.2 %
NRBC # BLD AUTO: 0 10*3/UL
NRBC BLD AUTO-RTO: 0 /100
PHOSPHATE SERPL-MCNC: 3.2 MG/DL (ref 2.5–4.5)
PLATELET # BLD AUTO: 147 10E9/L (ref 150–450)
POTASSIUM SERPL-SCNC: 4.6 MMOL/L (ref 3.4–5.3)
PROT SERPL-MCNC: 6.1 G/DL (ref 6.8–8.8)
RBC # BLD AUTO: 2.55 10E12/L (ref 3.8–5.2)
SARS-COV-2 RNA RESP QL NAA+PROBE: NEGATIVE
SODIUM SERPL-SCNC: 137 MMOL/L (ref 133–144)
SPECIMEN SOURCE: NORMAL
SPECIMEN SOURCE: NORMAL
UFH PPP CHRO-ACNC: 1.06 IU/ML
WBC # BLD AUTO: 3.7 10E9/L (ref 4–11)

## 2021-04-05 PROCEDURE — 36246 INS CATH ABD/L-EXT ART 2ND: CPT

## 2021-04-05 PROCEDURE — 85027 COMPLETE CBC AUTOMATED: CPT | Performed by: INTERNAL MEDICINE

## 2021-04-05 PROCEDURE — 85730 THROMBOPLASTIN TIME PARTIAL: CPT | Performed by: INTERNAL MEDICINE

## 2021-04-05 PROCEDURE — 250N000013 HC RX MED GY IP 250 OP 250 PS 637: Performed by: SURGERY

## 2021-04-05 PROCEDURE — 36246 INS CATH ABD/L-EXT ART 2ND: CPT | Mod: GC | Performed by: RADIOLOGY

## 2021-04-05 PROCEDURE — 97530 THERAPEUTIC ACTIVITIES: CPT | Mod: GP

## 2021-04-05 PROCEDURE — 97116 GAIT TRAINING THERAPY: CPT | Mod: GP

## 2021-04-05 PROCEDURE — 76937 US GUIDE VASCULAR ACCESS: CPT

## 2021-04-05 PROCEDURE — 85520 HEPARIN ASSAY: CPT | Performed by: INTERNAL MEDICINE

## 2021-04-05 PROCEDURE — 250N000012 HC RX MED GY IP 250 OP 636 PS 637: Performed by: SURGERY

## 2021-04-05 PROCEDURE — C1769 GUIDE WIRE: HCPCS

## 2021-04-05 PROCEDURE — 250N000009 HC RX 250: Performed by: STUDENT IN AN ORGANIZED HEALTH CARE EDUCATION/TRAINING PROGRAM

## 2021-04-05 PROCEDURE — 80048 BASIC METABOLIC PNL TOTAL CA: CPT | Performed by: INTERNAL MEDICINE

## 2021-04-05 PROCEDURE — 250N000013 HC RX MED GY IP 250 OP 250 PS 637: Performed by: INTERNAL MEDICINE

## 2021-04-05 PROCEDURE — 36247 INS CATH ABD/L-EXT ART 3RD: CPT

## 2021-04-05 PROCEDURE — 99152 MOD SED SAME PHYS/QHP 5/>YRS: CPT

## 2021-04-05 PROCEDURE — 75774 ARTERY X-RAY EACH VESSEL: CPT

## 2021-04-05 PROCEDURE — U0005 INFEC AGEN DETEC AMPLI PROBE: HCPCS | Performed by: NURSE PRACTITIONER

## 2021-04-05 PROCEDURE — 99232 SBSQ HOSP IP/OBS MODERATE 35: CPT | Performed by: TRANSPLANT SURGERY

## 2021-04-05 PROCEDURE — 84100 ASSAY OF PHOSPHORUS: CPT | Performed by: INTERNAL MEDICINE

## 2021-04-05 PROCEDURE — 250N000013 HC RX MED GY IP 250 OP 250 PS 637: Performed by: PHYSICIAN ASSISTANT

## 2021-04-05 PROCEDURE — 99153 MOD SED SAME PHYS/QHP EA: CPT

## 2021-04-05 PROCEDURE — 250N000011 HC RX IP 250 OP 636: Performed by: STUDENT IN AN ORGANIZED HEALTH CARE EDUCATION/TRAINING PROGRAM

## 2021-04-05 PROCEDURE — 272N000143 HC KIT CR3

## 2021-04-05 PROCEDURE — 36415 COLL VENOUS BLD VENIPUNCTURE: CPT | Performed by: SURGERY

## 2021-04-05 PROCEDURE — 272N000566 HC SHEATH CR3

## 2021-04-05 PROCEDURE — 97110 THERAPEUTIC EXERCISES: CPT | Mod: GP

## 2021-04-05 PROCEDURE — 255N000002 HC RX 255 OP 636: Performed by: RADIOLOGY

## 2021-04-05 PROCEDURE — 36415 COLL VENOUS BLD VENIPUNCTURE: CPT | Performed by: INTERNAL MEDICINE

## 2021-04-05 PROCEDURE — 85610 PROTHROMBIN TIME: CPT | Performed by: NURSE PRACTITIONER

## 2021-04-05 PROCEDURE — 75726 ARTERY X-RAYS ABDOMEN: CPT

## 2021-04-05 PROCEDURE — 85520 HEPARIN ASSAY: CPT | Performed by: SURGERY

## 2021-04-05 PROCEDURE — 250N000013 HC RX MED GY IP 250 OP 250 PS 637: Performed by: CLINICAL NURSE SPECIALIST

## 2021-04-05 PROCEDURE — 258N000003 HC RX IP 258 OP 636: Performed by: NURSE PRACTITIONER

## 2021-04-05 PROCEDURE — 76937 US GUIDE VASCULAR ACCESS: CPT | Mod: 26 | Performed by: RADIOLOGY

## 2021-04-05 PROCEDURE — 258N000003 HC RX IP 258 OP 636: Performed by: STUDENT IN AN ORGANIZED HEALTH CARE EDUCATION/TRAINING PROGRAM

## 2021-04-05 PROCEDURE — 99152 MOD SED SAME PHYS/QHP 5/>YRS: CPT | Mod: GC | Performed by: RADIOLOGY

## 2021-04-05 PROCEDURE — 85730 THROMBOPLASTIN TIME PARTIAL: CPT | Performed by: SURGERY

## 2021-04-05 PROCEDURE — 83735 ASSAY OF MAGNESIUM: CPT | Performed by: INTERNAL MEDICINE

## 2021-04-05 PROCEDURE — 85610 PROTHROMBIN TIME: CPT | Performed by: INTERNAL MEDICINE

## 2021-04-05 PROCEDURE — U0003 INFECTIOUS AGENT DETECTION BY NUCLEIC ACID (DNA OR RNA); SEVERE ACUTE RESPIRATORY SYNDROME CORONAVIRUS 2 (SARS-COV-2) (CORONAVIRUS DISEASE [COVID-19]), AMPLIFIED PROBE TECHNIQUE, MAKING USE OF HIGH THROUGHPUT TECHNOLOGIES AS DESCRIBED BY CMS-2020-01-R: HCPCS | Performed by: NURSE PRACTITIONER

## 2021-04-05 PROCEDURE — 250N000011 HC RX IP 250 OP 636: Performed by: SURGERY

## 2021-04-05 PROCEDURE — 272N000504 HC NEEDLE CR4

## 2021-04-05 PROCEDURE — C1760 CLOSURE DEV, VASC: HCPCS

## 2021-04-05 PROCEDURE — 80076 HEPATIC FUNCTION PANEL: CPT | Performed by: INTERNAL MEDICINE

## 2021-04-05 PROCEDURE — 85025 COMPLETE CBC W/AUTO DIFF WBC: CPT | Performed by: INTERNAL MEDICINE

## 2021-04-05 PROCEDURE — 120N000011 HC R&B TRANSPLANT UMMC

## 2021-04-05 PROCEDURE — C1887 CATHETER, GUIDING: HCPCS

## 2021-04-05 RX ORDER — SODIUM CHLORIDE 9 MG/ML
INJECTION, SOLUTION INTRAVENOUS CONTINUOUS
Status: DISCONTINUED | OUTPATIENT
Start: 2021-04-05 | End: 2021-04-05

## 2021-04-05 RX ORDER — HEPARIN SODIUM 200 [USP'U]/100ML
1 INJECTION, SOLUTION INTRAVENOUS CONTINUOUS PRN
Status: DISCONTINUED | OUTPATIENT
Start: 2021-04-05 | End: 2021-04-05 | Stop reason: HOSPADM

## 2021-04-05 RX ORDER — ACETAMINOPHEN 500 MG
500 TABLET ORAL EVERY 6 HOURS PRN
Status: DISCONTINUED | OUTPATIENT
Start: 2021-04-05 | End: 2021-04-06 | Stop reason: HOSPADM

## 2021-04-05 RX ORDER — NALOXONE HYDROCHLORIDE 0.4 MG/ML
0.4 INJECTION, SOLUTION INTRAMUSCULAR; INTRAVENOUS; SUBCUTANEOUS
Status: DISCONTINUED | OUTPATIENT
Start: 2021-04-05 | End: 2021-04-05 | Stop reason: HOSPADM

## 2021-04-05 RX ORDER — IODIXANOL 320 MG/ML
150 INJECTION, SOLUTION INTRAVASCULAR ONCE
Status: COMPLETED | OUTPATIENT
Start: 2021-04-05 | End: 2021-04-05

## 2021-04-05 RX ORDER — FLUMAZENIL 0.1 MG/ML
0.2 INJECTION, SOLUTION INTRAVENOUS
Status: DISCONTINUED | OUTPATIENT
Start: 2021-04-05 | End: 2021-04-05 | Stop reason: HOSPADM

## 2021-04-05 RX ORDER — NALOXONE HYDROCHLORIDE 0.4 MG/ML
0.2 INJECTION, SOLUTION INTRAMUSCULAR; INTRAVENOUS; SUBCUTANEOUS
Status: DISCONTINUED | OUTPATIENT
Start: 2021-04-05 | End: 2021-04-05 | Stop reason: HOSPADM

## 2021-04-05 RX ORDER — FENTANYL CITRATE 50 UG/ML
25-50 INJECTION, SOLUTION INTRAMUSCULAR; INTRAVENOUS EVERY 5 MIN PRN
Status: DISCONTINUED | OUTPATIENT
Start: 2021-04-05 | End: 2021-04-05 | Stop reason: HOSPADM

## 2021-04-05 RX ADMIN — OXYCODONE HYDROCHLORIDE 5 MG: 5 TABLET ORAL at 14:25

## 2021-04-05 RX ADMIN — OXYCODONE HYDROCHLORIDE 10 MG: 10 TABLET ORAL at 06:06

## 2021-04-05 RX ADMIN — PRAMIPEXOLE DIHYDROCHLORIDE 0.5 MG: 0.5 TABLET ORAL at 22:18

## 2021-04-05 RX ADMIN — SODIUM CHLORIDE: 9 INJECTION, SOLUTION INTRAVENOUS at 13:00

## 2021-04-05 RX ADMIN — TACROLIMUS 5 MG: 5 CAPSULE ORAL at 08:45

## 2021-04-05 RX ADMIN — OXYCODONE HYDROCHLORIDE 10 MG: 10 TABLET ORAL at 22:18

## 2021-04-05 RX ADMIN — SODIUM CHLORIDE: 9 INJECTION, SOLUTION INTRAVENOUS at 00:24

## 2021-04-05 RX ADMIN — MIDAZOLAM 1 MG: 1 INJECTION INTRAMUSCULAR; INTRAVENOUS at 11:09

## 2021-04-05 RX ADMIN — TACROLIMUS 5 MG: 5 CAPSULE ORAL at 18:14

## 2021-04-05 RX ADMIN — FENTANYL CITRATE 50 MCG: 50 INJECTION, SOLUTION INTRAMUSCULAR; INTRAVENOUS at 11:10

## 2021-04-05 RX ADMIN — METHOCARBAMOL 500 MG: 500 TABLET, FILM COATED ORAL at 16:25

## 2021-04-05 RX ADMIN — AMLODIPINE BESYLATE 5 MG: 5 TABLET ORAL at 08:45

## 2021-04-05 RX ADMIN — OMEPRAZOLE 40 MG: 20 CAPSULE, DELAYED RELEASE ORAL at 08:45

## 2021-04-05 RX ADMIN — APIXABAN 10 MG: 5 TABLET, FILM COATED ORAL at 20:27

## 2021-04-05 RX ADMIN — OXYCODONE HYDROCHLORIDE 5 MG: 5 TABLET ORAL at 16:25

## 2021-04-05 RX ADMIN — FAMOTIDINE 20 MG: 20 TABLET ORAL at 20:27

## 2021-04-05 RX ADMIN — GABAPENTIN 600 MG: 300 CAPSULE ORAL at 22:17

## 2021-04-05 RX ADMIN — FAMOTIDINE 20 MG: 20 TABLET ORAL at 08:45

## 2021-04-05 RX ADMIN — MIDAZOLAM 1 MG: 1 INJECTION INTRAMUSCULAR; INTRAVENOUS at 11:19

## 2021-04-05 RX ADMIN — HEPARIN SODIUM 1250 UNITS/HR: 10000 INJECTION, SOLUTION INTRAVENOUS at 13:01

## 2021-04-05 RX ADMIN — DOCUSATE SODIUM 100 MG: 100 CAPSULE, LIQUID FILLED ORAL at 08:45

## 2021-04-05 RX ADMIN — FENTANYL CITRATE 50 MCG: 50 INJECTION, SOLUTION INTRAMUSCULAR; INTRAVENOUS at 11:20

## 2021-04-05 RX ADMIN — LIDOCAINE HYDROCHLORIDE 6 ML: 10 INJECTION, SOLUTION EPIDURAL; INFILTRATION; INTRACAUDAL; PERINEURAL at 11:21

## 2021-04-05 RX ADMIN — IODIXANOL 50 ML: 320 INJECTION, SOLUTION INTRAVASCULAR at 11:42

## 2021-04-05 ASSESSMENT — ACTIVITIES OF DAILY LIVING (ADL)
ADLS_ACUITY_SCORE: 15

## 2021-04-05 NOTE — PROGRESS NOTES
1900 - 2330    66 y/o F w/hx of liver transplant in 2018 (secondary to biliary cirrhosis d/t surgery for cholangiocarcinoma), hypertension, CKD, GERD, asthma, osteoarthritis of R knee s/p total knee arthroplasty 3/29/21. Following arthroplasty pt found to have dark urine, elevated LFTs.     Vitals: Temp: 98.2  F (36.8  C) Temp src: Oral BP: (!) 138/93 Pulse: 103   Resp: 18 SpO2: 100 % O2 Device: None (Room air)      Pain/Nausea: Denies nausea; endorses R knee pain.   Interventions: 500mg robaxin x1, 5mg oxycodone x1, cold packs.   Diet: Regular. NPO @0000.   BG orders: None.   LDA: LPIV w/heparin gtt @1250units/hr. Recheck at 0045. Second PIV placed this shift.   GI: Reporting frequent stools, semi-loose. Held bowel meds at 2000.   : Voiding spontaneously; urine remains icteric.   Skin: WDL. Incision has liquid bandage, steri-strips.   Neuro: A&Ox4.   Mobility: SBA w/walker.   Plan: NS @75mL/hr to be started at 0000. Angiogram in AM.

## 2021-04-05 NOTE — PROGRESS NOTES
Patient Name: Sherrie Lala  Medical Record Number: [id number]  Today's Date: 4/5/2021    Procedure: Image guided Visceral Angiogram, right groin procedural access.  Proceduralist: Dr. Valdez, Dr. Ordonez.    Patient in room: 1029  Procedure Start: 1114  Procedure end: 1145  Patient out of room: 1152  Sedation medications administered:  Midazolam 2 mg, Fentanyl 100 mcg    Report given to: OTONIEL Rico RN  : N/A    Other Notes: Pt arrived to IR room 1 from . Consent reviewed. Pt denies any questions or concerns regarding procedure. Pt positioned supine and monitored per protocol.     Sheath removed at 1137 Manual pressure held for 5 minutes until 1144;  Mynx 5fr  Closure device deployed (ref [id number], lot [id number]).   Groin site appearance: WDL  Pedal pulse assessment:  intact   Additional Puncture site(s):    Bedrest for 3 hours until 1444    Pt tolerated procedure without any noted complications. Pt transferred back to .

## 2021-04-05 NOTE — PLAN OF CARE
7A - Pt at IR for angiogram with R groin access this AM, not available for PT, will re-assess this PM as appropriate.

## 2021-04-05 NOTE — CONSULTS
"    Interventional Radiology Consult Service Note    Patient is on IR schedule 4/5 for a hepatic angiogram.   INR pending.   Pt is currently NPO.  Consent will be done prior to procedure.     Please contact the IR charge RN at [phone] for estimated time of procedure.     Case discussed with Dr. Wright and Alyce Figueroa PA-C. 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 She was noted to have dark urine post-operatively and LFTs were checked and found to be elevated. She was transferred to transplant service for ongoing management. US and CTA concerning for hepatic artery stenosis. IR is consulted for hepatic angiogram. Given size of hepatic artery on imaging there is concern that even if dx catheter angiogram is possible with IR, no intervention will be possible. Additionally, adjacent clips will make diagnostic imaging challenging.     Expected date of discharge: TBD    Vitals:   /81 (BP Location: Right arm)   Pulse 83   Temp 99.1  F (37.3  C) (Oral)   Resp 18   Ht 1.6 m (5' 3\")   Wt 65.2 kg (143 lb 12.8 oz)   SpO2 96%   BMI 25.47 kg/m      Pertinent Labs:     Lab Results   Component Value Date    WBC 3.7 (L) 04/05/2021    WBC 3.9 (L) 04/04/2021    WBC 4.8 04/03/2021       Lab Results   Component Value Date    HGB 7.6 04/05/2021    HGB 7.8 04/04/2021    HGB 8.3 04/03/2021       Lab Results   Component Value Date     04/05/2021     04/04/2021     04/03/2021       Lab Results   Component Value Date    INR 1.18 (H) 03/04/2019    PTT 72 (H) 04/05/2021       Lab Results   Component Value Date    POTASSIUM 4.6 04/05/2021        DANILO Wright CNP  Interventional Radiology  Pager: [phone]    "

## 2021-04-05 NOTE — PROGRESS NOTES
"/69 (BP Location: Right arm)   Pulse 100   Temp 98.4  F (36.9  C) (Oral)   Resp 18   Ht 1.6 m (5' 3\")   Wt 65.2 kg (143 lb 12.8 oz)   SpO2 95%   BMI 25.47 kg/m       [id number]. Liver team. VSS, slightly tachycardic. LS clear. A&Ox4, in good mood. No BG. Pain 6/10, oxy given x1. Regular diet post-angiogram. Two LPIV infusing NS 100ml/hr per orders. Heparin gtt stopped post angiogram. Stand by assist. Right knee incision appeared clean, dry, and intact, swelling noted in knee and calf. Tea colored urine still present. Will continue to monitor, follow POC and update provider with changes in condition.       "

## 2021-04-05 NOTE — CONSULTS
Care Management Initial Consult    General Information  Assessment completed with: Patient, Care Team Member, -chart review,    Type of CM/SW Visit: Initial Assessment    Primary Care Provider verified and updated as needed: Yes   Readmission within the last 30 days:        Reason for Consult: care coordination/care conference, discharge planning  Advance Care Planning:            Communication Assessment  Patient's communication style: spoken language (English or Bilingual)    Hearing Difficulty or Deaf: no   Wear Glasses or Blind: yes    Cognitive  Cognitive/Neuro/Behavioral: WDL  Level of Consciousness: alert  Arousal Level: opens eyes spontaneously  Orientation: oriented x 4  Mood/Behavior: calm, cooperative, behavior appropriate to situation  Best Language: 0 - No aphasia  Speech: clear, spontaneous, logical    Living Environment:   People in home: spouse  (Pt spouse, Ash.  Pt sister Janae will be staying with pt and spouse post hospitalization)  Current living Arrangements: house      Able to return to prior arrangements: yes       Family/Social Support:  Care provided by: self  Provides care for: no one  Marital Status:   , Children, Sibling(s)  Ash       Description of Support System: Supportive, Involved    Support Assessment: Adequate family and caregiver support    Current Resources:   Patient receiving home care services: No     Community Resources: None  Equipment currently used at home: none  Supplies currently used at home: None    Employment/Financial:  Employment Status:          Financial Concerns: No concerns identified           Lifestyle & Psychosocial Needs:        Socioeconomic History     Marital status:      Spouse name: Not on file     Number of children: Not on file     Years of education: Not on file     Highest education level: Not on file     Tobacco Use     Smoking status: Never Smoker     Smokeless tobacco: Never Used   Substance and Sexual Activity      Alcohol use: Yes     Comment: occ /rare     Drug use: No       Functional Status:  Prior to admission patient needed assistance: Pt was independent with all her own care needs.           Additional Information:  Pt s/p right TKA on 3/29/2021 transferred from [facility] to  for on going medical management secondary to elevated LFT's and dark urine post operatively.  Pt with a medical history significant for liver transplant 2018.   Per chart review noted pt with new DVT with plan to discharge on Eliquis x's 3 months.   Per care team rounds team anticipates possible discharge tomorrow.  Per chart review pt plans to pursue outpatient PT at [facility].  Previous RNCC assisted in securing outpatient PT orders.  Referral noted in Epic.    Confirmed with JUNE Montes [facility] Liaison that pt [employer] Part D Prescription drug plan will cover Eliquis with pt out of pocket cost being:  Pt has $439.28 deductible once that is met the patients monthly copay will be $47.  Reviewed with [facility].    Met with pt.  Introduced RNCC role.  Reviewed anticipated plan for discharge.  Pt notes no concerns or questions.  Per pt her sister Janae plans to come and stay with pt for a couple of weeks post hospitalization.    Reviewed out of pocket cost for Eliquis.  Pt notes no concerns or questions.      Anticipate pt will discharge home with outpatient rehab services.     Bella Diana RN BSN, PHN, ACM-RN  7A RN Care Coordinator  Phone: [phone]  Pager [phone]    4/5/2021 2:01 PM

## 2021-04-05 NOTE — PLAN OF CARE
"[id number]  /81 (BP Location: Right arm)   Pulse 83   Temp 99.1  F (37.3  C) (Oral)   Resp 18   Ht 1.6 m (5' 3\")   Wt 65.2 kg (143 lb 12.8 oz)   SpO2 96%   BMI 25.47 kg/m      Neuro: A&O x 4, pleasant.   Cardiac: Afebrile. HR stable, AP regular. Soft Bps high 90s/60s.   Respiratory: LS clear bilaterally, denied SOB. Sp02 mid 90s on RA.   BG: None   Pain and Nausea: Severe pain on RLE especially with movement. Leg also swollen. Had DVT, repeat doppler ultrasound to LLE done around midnight, results pending. Oxy 10 mg x 1 for pain. Denied nausea.   Outstanding Labs: Unremarkable  Diet: NPO since midnight.   LDA: L PIV x 2, one infusing Heparin @ 1250u/hr. Last PTT around 1A and was therapeutic, next draw scheduled for 0800. Other PIV running NS @ 75.   Skin: R knee incision still has operative dressing in place, drsg CDI. RLE severe edema (tight, non-pitting).   GI/: Voiding tea-colored urine. LBM yesterday.   Mobility: SBA with a walker. Ambulated to the BR.     Plan of Care: Angiogram for this morning. Follow POC and update team with changes.       "

## 2021-04-05 NOTE — PROCEDURES
[facility]    Procedure: IR Procedure Note    Date/Time: 4/5/2021 11:52 AM  Performed by: Bradly Ordonez MD  Authorized by: Bradly Ordonez MD     UNIVERSAL PROTOCOL   Site Marked: NA  Prior Images Obtained and Reviewed:  Yes  Required items: Required blood products, implants, devices and special equipment available    Patient identity confirmed:  Verbally with patient, arm band, provided demographic data and hospital-assigned identification number  Patient was reevaluated immediately before administering moderate or deep sedation or anesthesia  Confirmation Checklist:  Patient's identity using two indicators, relevant allergies, procedure was appropriate and matched the consent or emergent situation and correct equipment/implants were available  Time out: Immediately prior to the procedure a time out was called    Universal Protocol: the Joint Commission Universal Protocol was followed    Preparation: Patient was prepped and draped in usual sterile fashion           ANESTHESIA    Anesthesia: Local infiltration  Local Anesthetic:  Lidocaine 1% without epinephrine      SEDATION    Patient Sedated: Yes    Sedation Type:  Moderate (conscious) sedation  Sedation:  Fentanyl and midazolam  Vital signs: Vital signs monitored during sedation    See dictated procedure note for full details.  Findings: No stenosis of the native hepatic artery  No stenosis at the anastomosis or of the proper transplant artery  Mild (20-30%) narrowing of the right hepatic artery, which does not appear hemodynamically significant  No narrowing of the left hepatic artery    Specimens: none    Complications: None    Condition: Stable    Plan: 3 hrs bedrest  May resume previous diet    PROCEDURE   Patient Tolerance:  Patient tolerated the procedure well with no immediate complications    Length of time physician/provider present for 1:1 monitoring during sedation: 30

## 2021-04-05 NOTE — PROGRESS NOTES
Immunosuppression Note:    Sherrie Lala is a 66 year old female who is seen today  for immunosuppression management     I, Darren Rod MD, I have examined the patient with the resident/PA/Fellow, discussed and agree with the note and findings.  I have reviewed today's vital signs, medications, labs and imaging. I reviewed the immunosuppression medications and levels. 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 She was noted to have dark urine post-operatively and LFTs were checked and found to be elevated. She was transferred to transplant service for ongoing management. Found to have hepatic artery stenosis.     Graft function:   DDLT 8/30/18: LFTs elevated on admission, and then normalized without intervention. -3/30 Ultrasound with anastomotic stenosis and sluggish intrahepatic flow.   -3/31 MRA/MVA with suggestion of hepatic artery stenosis and IVC stenosis.  -4/2 CTA with 1mm right hepatic artery, HA anastomosis obscured by metallic artifact.  Holding Eliquis and started heparin gtt. IR to perform angiogram today 04/05.    Immunosuppression management:   Tacrolimus: Goal level 3-5 on admission. Increased goal 6-8 d/t elevated LFTs.  Complexity of management: Medium. Contributing factors: organ dysfunction  Neuro:  Acute post-operative pain: Oxycodone 5-10 mg Q6H PRN, hydroxyzine 25 mg Q6H PRN, Robaxin QID PRN.    Restless Leg Syndrome: PTA pramipexole  Hematology:   Acute blood loss anemia: Hgb 7-8. Likely 2/2 hemodilution and hematoma in calf. Transfused 4/2.  Acute DVT: Deep vein  thrombosis RLQ starting at the popliteal vein and going distally, possible right calf hematoma. Heparin gtt low intensity. Discussed with ortho team prior to starting. Hematology consulted, recommended Eliquis x3 months for provoked DVT (currently on hold for procedure). On heparin gtt in the jesus-procedural period today.   Cardiorespiratory:    Hx HTN: Continue PTA Norvasc 5 mg daily.  Hx Asthma: Continue albuterol inhaler Q6H PRN.  GI/Nutrition:  Diet: Regular  Bowel regimen: colace 100 mg BID, Miralax 17 g daily, Senokot-s BID, PRN suppository and PRN MOM  Hx GERD: continue PTA prilosec  Endocrine: no acute issues  Fluid/Electrolytes: no acute issues  : No acute issues  Infectious disease: Tmax 99.7 F likely from DVT; WBC 3.7  - Bcx x2, UA w/ reflex, CXR  MSK:  S/p Right total knee arthroplasty: WBAT. PT/OT. Ortho consulting  Prophylaxis: DVT Eliquis, fall, GI (Pepcid)  Disposition: 7A    Medical Decision Making: Medium  Subsequent visit 80052 (moderate level decision making)    DREA/Fellow/Resident Provider:    Dc Brody MD  Surgery Resident PGY3      Faculty: Darren Rod MD    __________________________________________________________________  Transplant History:    8/30/2018 (Liver), Postoperative day: 949     Interval History: History is obtained from the patient.   Having ongoing pain in right calf. No nausea / vomiting.      ROS:   A 10-point review of systems was negative except as noted above.    Curent Meds:    amLODIPine  5 mg Oral Daily     docusate sodium  100 mg Oral BID     famotidine  20 mg Oral BID     gabapentin  600 mg Oral At Bedtime     iodixanol  150 mL INTRA-ARTERIAL Once     omeprazole  40 mg Oral QAM     polyethylene glycol  17 g Oral Daily     pramipexole  0.5 mg Oral At Bedtime     senna-docusate  1 tablet Oral BID     sodium chloride (PF)  3 mL Intracatheter Q8H     tacrolimus  5 mg Oral BID IS       Physical Exam:     Admit Weight: 62 kg (136 lb 11 oz)    Current  "Vitals:   /79 (BP Location: Right arm)   Pulse 94   Temp 99.1  F (37.3  C) (Oral)   Resp 16   Ht 1.6 m (5' 3\")   Wt 65.2 kg (143 lb 12.8 oz)   SpO2 96%   BMI 25.47 kg/m      Vital sign ranges:    Temp:  [98.2  F (36.8  C)-99.4  F (37.4  C)] 99.1  F (37.3  C)  Pulse:  [] 94  Resp:  [15-18] 16  BP: ()/() 123/79  SpO2:  [90 %-100 %] 96 %    General Appearance: in no apparent distress. ON the phone with daughter  Skin: normal, warm, No rashes, induration, or jaundice  Heart: well perfused  Lungs: NLB  Abdomen: flat   : Deferred  Extremities:  RLE +2 edema, incision covered.   Neurologic: A&O x4, no tremor    Frailty Scores     Frailty Scores 12/19/2019 2/28/2018 2/27/2018    Final Score Not Frail Not Frail Not Frail    Final Score Number 1 1 1          Data:   CMP  Recent Labs   Lab 04/05/21  0806 04/04/21  0547    137   POTASSIUM 4.6 4.2   CHLORIDE 106 106   CO2 26 27   * 103*   BUN 23 29   CR 0.94 1.06*   GFRESTIMATED 64 55*   GFRESTBLACK 74 64   SHELDON 8.4* 8.0*   MAG 2.1 2.4*   PHOS 3.2 3.7   ALBUMIN 2.4* 2.2*   BILITOTAL 1.1 0.9   ALKPHOS 101 88   AST 9 8   ALT 38 47     CBC  Recent Labs   Lab 04/05/21  0806 04/04/21  0547   HGB 7.6* 7.8*   WBC 3.7* 3.9*   * 126*     "

## 2021-04-06 ENCOUNTER — APPOINTMENT (OUTPATIENT)
Dept: PHYSICAL THERAPY | Facility: CLINIC | Age: 66
DRG: 470 | End: 2021-04-06
Attending: ORTHOPAEDIC SURGERY
Payer: MEDICARE

## 2021-04-06 ENCOUNTER — PATIENT OUTREACH (OUTPATIENT)
Dept: CARE COORDINATION | Facility: CLINIC | Age: 66
End: 2021-04-06

## 2021-04-06 VITALS
WEIGHT: 143.8 LBS | BODY MASS INDEX: 25.48 KG/M2 | HEART RATE: 80 BPM | HEIGHT: 63 IN | DIASTOLIC BLOOD PRESSURE: 81 MMHG | OXYGEN SATURATION: 98 % | TEMPERATURE: 98.1 F | SYSTOLIC BLOOD PRESSURE: 136 MMHG | RESPIRATION RATE: 16 BRPM

## 2021-04-06 LAB
ALBUMIN SERPL-MCNC: 2.3 G/DL (ref 3.4–5)
ALP SERPL-CCNC: 82 U/L (ref 40–150)
ALT SERPL W P-5'-P-CCNC: 30 U/L (ref 0–50)
ANION GAP SERPL CALCULATED.3IONS-SCNC: 3 MMOL/L (ref 3–14)
AST SERPL W P-5'-P-CCNC: 7 U/L (ref 0–45)
BASOPHILS # BLD AUTO: 0 10E9/L (ref 0–0.2)
BASOPHILS NFR BLD AUTO: 0.3 %
BILIRUB DIRECT SERPL-MCNC: 0.3 MG/DL (ref 0–0.2)
BILIRUB SERPL-MCNC: 1 MG/DL (ref 0.2–1.3)
BUN SERPL-MCNC: 23 MG/DL (ref 7–30)
CALCIUM SERPL-MCNC: 8.5 MG/DL (ref 8.5–10.1)
CHLORIDE SERPL-SCNC: 108 MMOL/L (ref 94–109)
CO2 SERPL-SCNC: 27 MMOL/L (ref 20–32)
CREAT SERPL-MCNC: 0.97 MG/DL (ref 0.52–1.04)
DIFFERENTIAL METHOD BLD: ABNORMAL
EOSINOPHIL # BLD AUTO: 0.2 10E9/L (ref 0–0.7)
EOSINOPHIL NFR BLD AUTO: 4.8 %
ERYTHROCYTE [DISTWIDTH] IN BLOOD BY AUTOMATED COUNT: 12.3 % (ref 10–15)
GFR SERPL CREATININE-BSD FRML MDRD: 62 ML/MIN/{1.73_M2}
GLUCOSE SERPL-MCNC: 98 MG/DL (ref 70–99)
HCT VFR BLD AUTO: 22.2 % (ref 35–47)
HGB BLD-MCNC: 7.3 G/DL (ref 11.7–15.7)
IMM GRANULOCYTES # BLD: 0 10E9/L (ref 0–0.4)
IMM GRANULOCYTES NFR BLD: 0.8 %
LYMPHOCYTES # BLD AUTO: 0.5 10E9/L (ref 0.8–5.3)
LYMPHOCYTES NFR BLD AUTO: 12.8 %
MAGNESIUM SERPL-MCNC: 2 MG/DL (ref 1.6–2.3)
MCH RBC QN AUTO: 30.5 PG (ref 26.5–33)
MCHC RBC AUTO-ENTMCNC: 32.9 G/DL (ref 31.5–36.5)
MCV RBC AUTO: 93 FL (ref 78–100)
MONOCYTES # BLD AUTO: 0.3 10E9/L (ref 0–1.3)
MONOCYTES NFR BLD AUTO: 8.8 %
NEUTROPHILS # BLD AUTO: 2.7 10E9/L (ref 1.6–8.3)
NEUTROPHILS NFR BLD AUTO: 72.5 %
NRBC # BLD AUTO: 0 10*3/UL
NRBC BLD AUTO-RTO: 0 /100
PHOSPHATE SERPL-MCNC: 4 MG/DL (ref 2.5–4.5)
PLATELET # BLD AUTO: 149 10E9/L (ref 150–450)
POTASSIUM SERPL-SCNC: 4.5 MMOL/L (ref 3.4–5.3)
PROT SERPL-MCNC: 5.8 G/DL (ref 6.8–8.8)
RBC # BLD AUTO: 2.39 10E12/L (ref 3.8–5.2)
SODIUM SERPL-SCNC: 137 MMOL/L (ref 133–144)
TACROLIMUS BLD-MCNC: 7.7 UG/L (ref 5–15)
TME LAST DOSE: NORMAL H
WBC # BLD AUTO: 3.8 10E9/L (ref 4–11)

## 2021-04-06 PROCEDURE — 83735 ASSAY OF MAGNESIUM: CPT | Performed by: INTERNAL MEDICINE

## 2021-04-06 PROCEDURE — 250N000013 HC RX MED GY IP 250 OP 250 PS 637: Performed by: SURGERY

## 2021-04-06 PROCEDURE — 80197 ASSAY OF TACROLIMUS: CPT | Performed by: PHYSICIAN ASSISTANT

## 2021-04-06 PROCEDURE — 97530 THERAPEUTIC ACTIVITIES: CPT | Mod: GP | Performed by: REHABILITATION PRACTITIONER

## 2021-04-06 PROCEDURE — 36415 COLL VENOUS BLD VENIPUNCTURE: CPT | Performed by: INTERNAL MEDICINE

## 2021-04-06 PROCEDURE — 36415 COLL VENOUS BLD VENIPUNCTURE: CPT | Performed by: PHYSICIAN ASSISTANT

## 2021-04-06 PROCEDURE — 250N000013 HC RX MED GY IP 250 OP 250 PS 637: Performed by: PHYSICIAN ASSISTANT

## 2021-04-06 PROCEDURE — 250N000013 HC RX MED GY IP 250 OP 250 PS 637: Performed by: INTERNAL MEDICINE

## 2021-04-06 PROCEDURE — 250N000013 HC RX MED GY IP 250 OP 250 PS 637: Performed by: STUDENT IN AN ORGANIZED HEALTH CARE EDUCATION/TRAINING PROGRAM

## 2021-04-06 PROCEDURE — 250N000013 HC RX MED GY IP 250 OP 250 PS 637: Performed by: CLINICAL NURSE SPECIALIST

## 2021-04-06 PROCEDURE — 84100 ASSAY OF PHOSPHORUS: CPT | Performed by: INTERNAL MEDICINE

## 2021-04-06 PROCEDURE — 85025 COMPLETE CBC W/AUTO DIFF WBC: CPT | Performed by: INTERNAL MEDICINE

## 2021-04-06 PROCEDURE — 80048 BASIC METABOLIC PNL TOTAL CA: CPT | Performed by: INTERNAL MEDICINE

## 2021-04-06 PROCEDURE — 97116 GAIT TRAINING THERAPY: CPT | Mod: GP | Performed by: REHABILITATION PRACTITIONER

## 2021-04-06 PROCEDURE — 80076 HEPATIC FUNCTION PANEL: CPT | Performed by: INTERNAL MEDICINE

## 2021-04-06 PROCEDURE — 250N000012 HC RX MED GY IP 250 OP 636 PS 637: Performed by: SURGERY

## 2021-04-06 PROCEDURE — 97110 THERAPEUTIC EXERCISES: CPT | Mod: GP | Performed by: REHABILITATION PRACTITIONER

## 2021-04-06 RX ORDER — ONDANSETRON 4 MG/1
4 TABLET, ORALLY DISINTEGRATING ORAL EVERY 6 HOURS PRN
Qty: 20 TABLET | Refills: 0 | Status: SHIPPED | OUTPATIENT
Start: 2021-04-06 | End: 2022-04-07

## 2021-04-06 RX ORDER — OXYCODONE HYDROCHLORIDE 5 MG/1
5 TABLET ORAL EVERY 4 HOURS PRN
Qty: 20 TABLET | Refills: 0 | Status: SHIPPED | OUTPATIENT
Start: 2021-04-06 | End: 2021-04-06

## 2021-04-06 RX ORDER — TACROLIMUS 5 MG/1
5 CAPSULE ORAL 2 TIMES DAILY
Qty: 60 CAPSULE | Refills: 11 | Status: SHIPPED | OUTPATIENT
Start: 2021-04-06 | End: 2021-04-06

## 2021-04-06 RX ORDER — TACROLIMUS 1 MG/1
3 CAPSULE ORAL
Status: DISCONTINUED | OUTPATIENT
Start: 2021-04-06 | End: 2021-04-06 | Stop reason: HOSPADM

## 2021-04-06 RX ORDER — TACROLIMUS 1 MG/1
3 CAPSULE ORAL 2 TIMES DAILY
Qty: 180 CAPSULE | Refills: 11 | Status: SHIPPED | OUTPATIENT
Start: 2021-04-06 | End: 2021-10-29

## 2021-04-06 RX ORDER — OXYCODONE HYDROCHLORIDE 5 MG/1
5 TABLET ORAL EVERY 4 HOURS PRN
Qty: 20 TABLET | Refills: 0 | Status: SHIPPED | OUTPATIENT
Start: 2021-04-06 | End: 2022-04-07

## 2021-04-06 RX ORDER — ACETAMINOPHEN 500 MG
500 TABLET ORAL EVERY 6 HOURS PRN
Qty: 30 TABLET | Refills: 1 | Status: SHIPPED | OUTPATIENT
Start: 2021-04-06

## 2021-04-06 RX ORDER — DOCUSATE SODIUM 100 MG/1
100 CAPSULE, LIQUID FILLED ORAL 2 TIMES DAILY
Qty: 50 CAPSULE | Refills: 1 | Status: SHIPPED | OUTPATIENT
Start: 2021-04-06 | End: 2022-04-07

## 2021-04-06 RX ADMIN — AMLODIPINE BESYLATE 5 MG: 5 TABLET ORAL at 08:24

## 2021-04-06 RX ADMIN — OMEPRAZOLE 40 MG: 20 CAPSULE, DELAYED RELEASE ORAL at 08:24

## 2021-04-06 RX ADMIN — ACETAMINOPHEN 500 MG: 500 TABLET, FILM COATED ORAL at 05:38

## 2021-04-06 RX ADMIN — OXYCODONE HYDROCHLORIDE 5 MG: 5 TABLET ORAL at 05:38

## 2021-04-06 RX ADMIN — OXYCODONE HYDROCHLORIDE 5 MG: 5 TABLET ORAL at 09:50

## 2021-04-06 RX ADMIN — METHOCARBAMOL 500 MG: 500 TABLET, FILM COATED ORAL at 09:55

## 2021-04-06 RX ADMIN — TACROLIMUS 5 MG: 5 CAPSULE ORAL at 08:24

## 2021-04-06 RX ADMIN — APIXABAN 10 MG: 5 TABLET, FILM COATED ORAL at 08:24

## 2021-04-06 RX ADMIN — FAMOTIDINE 20 MG: 20 TABLET ORAL at 08:25

## 2021-04-06 ASSESSMENT — ACTIVITIES OF DAILY LIVING (ADL)
ADLS_ACUITY_SCORE: 15

## 2021-04-06 NOTE — PLAN OF CARE
Physical Therapy Discharge Summary    Reason for therapy discharge:    Discharged to home with outpatient therapy.    Progress towards therapy goal(s). See goals on Care Plan in [facility] electronic health record for goal details.  Goals partially met.  Barriers to achieving goals:   discharge from facility.    Therapy recommendation(s):    Continued therapy is recommended.  Rationale/Recommendations:  Pt would benefit from additional skilled PT to address functional mobility, transfers, ROM, strength and gait.

## 2021-04-06 NOTE — DISCHARGE SUMMARY
Afebrile, VSS on RA.  Alert and oriented x4.  Oxycodone 5 mg and Robaxin each  Given x1 for right knee pain.  Right knee incision CDI.  Discharge instructions and medications reviewed with Pt. PIV removed. Pt. transported  by wheelchair  with belongings to front door to front door.  Pt. discharged to home.

## 2021-04-06 NOTE — PLAN OF CARE
Sherrie appeared to sleep soundly throughout the night. VSS. Heparin gtt continues at 1500 units and waiting this mornings Xa lever. R leg remains tight and shiny and did use ice to her knee this morning and medicated her with 5mg of oxycone and 500mg of tylenol and will check back to see how her pain was doing.Also gave her ice paks with some relief to use. Sherrie was up to the bathroom x1 with walker and the use of a gait belt to help with nonweight bearing to her right leg.  Will continue to monitor and report any significant changes.

## 2021-04-06 NOTE — PLAN OF CARE
"  /75 (BP Location: Right arm)   Pulse 92   Temp 98.3  F (36.8  C) (Oral)   Resp 16   Ht 1.6 m (5' 3\")   Wt 65.2 kg (143 lb 12.8 oz)   SpO2 96%   BMI 25.47 kg/m      8705-1224  HR 90s; OVSS on RA, no s/s of distress. DNR/DNI. A/Ox4, pleasant and cooperative with cares; in good spirits. Endorsed right knee and calf pain; treated with prn oxy x2 and robaxin x1; also on scheduled gabapentin and mirapex. Worked with PT today and tolerated well. Right knee incision CDI; right calf tender and painful to touch--restarted on eliquis this evening. Had angiogram today--negative for significant stenosis. Both left PIVs SL. Up SBA with walker/GB and NWB on right leg; voiding tea colored urine; also had 1 medium BM this shift. Denied n/v--on regular diet with fair appetite. Covid swab sent and negative. Plan for possible discharge tomorrow with home rehab per MD notes. Pt spent most of shift in bed or up in chair; on phone speaking with family/friends most of shift. Pt sleeping now; call light and belongings within reach. Will continue to monitor and continue POC/notify team as needed.   "

## 2021-04-06 NOTE — PROGRESS NOTES
"Care Management Follow Up    Length of Stay (days): 7    Expected Discharge Date: 04/06/21     Concerns to be Addressed: discharge planning  Patient plan of care discussed at interdisciplinary rounds: No, not yet    Anticipated Discharge Disposition: Home with  OP PT.      Anticipated Discharge Services:   Anticipated Discharge DME: None      Referrals Placed by CM/SW: home care.       Additional Information:  Called patient to review medicare appeal IMM form. She mentioned she now wants home care PT instead of OP PT. She was told by inpatient PT that she \"is a week behind\" and would benefit from home PT. She previously used [facility] and selects them as her first choice. Reviewed with patient that home care agencies have been short staffed and may not be able to get to her home until next week. Patient is uncertain if she is okay with this and would like to discuss once agency is obtained.     These are all the home care agencies in pt area:   [facility] [phone]- referral/orders sent. They need to review and could not give a start date at this time.   [facility]- declined because she doesn' have [facility] - are not currently staffing pt area  [facility] [phone])- earliest start is 4/15  [facility]- [phone]  earliest start is \"early next week\" they won't know actual date until Thursday.     Called patient with update - and she will now do OP PT and will have inpatient PT call her OP PT location to discuss care with them.  Canceled referrals, changed orders back to OP PT.       Bonita Toledo, RN, [place]  Float Care Coordinator  Covering 7A CC   Pager: [phone]            "

## 2021-04-07 NOTE — PROGRESS NOTES
The patient was contacted by another RN for post-hospital follow up. Will close encounter at this time.    Rossy Coronel CMA, [facility]  Post Hospital Discharge Team

## 2021-04-08 ENCOUNTER — MYC MEDICAL ADVICE (OUTPATIENT)
Dept: ORTHOPEDICS | Facility: CLINIC | Age: 66
End: 2021-04-08

## 2021-04-08 DIAGNOSIS — R79.89 ELEVATED LFTS: ICD-10-CM

## 2021-04-08 DIAGNOSIS — Z98.890 STATUS POST KNEE SURGERY: Primary | ICD-10-CM

## 2021-04-08 RX ORDER — OXYCODONE HYDROCHLORIDE 5 MG/1
5 TABLET ORAL EVERY 4 HOURS PRN
Qty: 20 TABLET | Refills: 0 | OUTPATIENT
Start: 2021-04-08

## 2021-04-08 RX ORDER — HYDROXYZINE HYDROCHLORIDE 25 MG/1
25 TABLET, FILM COATED ORAL EVERY 8 HOURS PRN
Qty: 18 TABLET | Refills: 0 | Status: SHIPPED | OUTPATIENT
Start: 2021-04-09 | End: 2022-04-07

## 2021-04-08 RX ORDER — OXYCODONE HYDROCHLORIDE 5 MG/1
5 TABLET ORAL EVERY 6 HOURS PRN
Qty: 24 TABLET | Refills: 0 | Status: SHIPPED | OUTPATIENT
Start: 2021-04-09 | End: 2022-04-07

## 2021-04-08 NOTE — TELEPHONE ENCOUNTER
RN sent med refill to Nitin LEIAS to sign and authorize.    RN then notify patient about this plan via Mychart.    Semaj Wright RN

## 2021-04-08 NOTE — TELEPHONE ENCOUNTER
Oxycodone and hydroxyzine refill provider. Medications were post-dated to be picked up on 4/9 due to recent refill by Danika Tan PA-C on 4/6/2021    Nitin Ferris PA-C 4/8/2021 5:22 PM  Orthopaedic Surgery    Please page me at [phone] with any questions/concerns. If there is no response, if it is a weekend, or if it is during evening hours, please page the orthopaedic surgery resident on call.

## 2021-04-12 ENCOUNTER — COMMUNICATION - RIVER FALLS (OUTPATIENT)
Dept: FAMILY MEDICINE | Facility: CLINIC | Age: 66
End: 2021-04-12
Payer: COMMERCIAL

## 2021-04-13 ENCOUNTER — TELEPHONE (OUTPATIENT)
Dept: TRANSPLANT | Facility: CLINIC | Age: 66
End: 2021-04-13

## 2021-04-13 NOTE — TELEPHONE ENCOUNTER
Sherrie is scheduled for her 2nd COVID vaccine this week.  She is wondering if it's OK for her to have this done.  Told her yes.

## 2021-04-16 ENCOUNTER — OFFICE VISIT (OUTPATIENT)
Dept: ORTHOPEDICS | Facility: CLINIC | Age: 66
End: 2021-04-16
Payer: COMMERCIAL

## 2021-04-16 DIAGNOSIS — Z98.890 STATUS POST KNEE SURGERY: Primary | ICD-10-CM

## 2021-04-16 PROCEDURE — 99024 POSTOP FOLLOW-UP VISIT: CPT | Performed by: PHYSICIAN ASSISTANT

## 2021-04-16 NOTE — NURSING NOTE
Reason For Visit:   Chief Complaint   Patient presents with     Surgical Followup     DOS 3/29/21 Right TKA        There were no vitals taken for this visit.    Pain Assessment  Patient Currently in Pain: Yes  0-10 Pain Scale: 6  Primary Pain Location: Knee    Shayy FLASH Manjarrez

## 2021-04-16 NOTE — LETTER
4/16/2021         RE: Sherrie Lala  [address] [facility] [address]        Dear Colleague,    Thank you for referring your patient, Sherrie Lala, to the [facility] [place]. Please see a copy of my visit note below.    ASSESSMENT/PLAN:  Sherrie Lala is a 66 year old who is status post right total knee arthroplasty with Dr. Jacobsen on 3/29/2021.    Sherrie is motivated following surgery and seems to be progressing appropriately. Her pain is controlled and is improving with each day. She ambulates without assistive devices, slowly and with an antalgic gait. Range of motion is within reasonable limits at this time (-/0/55 degrees), however, concern would grow if progress does not continue over the next four weeks.     Basic wound cares were performed at today's visit including removal of nylon sutures and steristrips (if present). Monocryl suture tails (if present) were clipped to the level of the skin. We spent time discussing future wound/incision cares. We reviewed recommendations for bathing and hygiene.    The patient will see Dr. Jacobsen at six to eight weeks post op. Sherrie has our clinic number and will call with any questions or concerns.    Nitin Ferris PA-C  Orthopaedic Surgery    _________________________________    HISTORY OF PRESENT ILLNESS:  Sherrie Lala is a 66 year old female who is approximately two weeks status post the above procedure.    Weight bearing status/devices: full weight bearing on right lower extremity  Pain level and management: pain is 7/10, currently using acetaminophen, hydroxyzine  Physical therapy & ROM: daily home exercises     The patient endorses swelling around the surgical incision but denies surrounding redness. The incision has been dry, without discharge or drainage. Sherrie denies recent fevers and chills, as well as any other symptoms concerning for infection.     DVT prophylaxis: apixaban  Patient denies calf pain or  tenderness.      MEDICATIONS:   Current Outpatient Rx   Medication Sig Dispense Refill     acetaminophen (TYLENOL) 500 MG tablet Take 1 tablet (500 mg) by mouth every 6 hours as needed for mild pain 30 tablet 1     albuterol (PROAIR HFA/PROVENTIL HFA/VENTOLIN HFA) 108 (90 BASE) MCG/ACT Inhaler Inhale 2 puffs into the lungs every 6 hours as needed for shortness of breath / dyspnea or wheezing        alendronate (FOSAMAX) 70 MG tablet Take 70 mg by mouth every 7 days       amLODIPine (NORVASC) 5 MG tablet TAKE 1 TABLET (5 MG) BY MOUTH DAILY (Patient taking differently: Take 5 mg by mouth At Bedtime ) 90 tablet 3     apixaban ANTICOAGULANT (ELIQUIS) 5 MG tablet Take 1 tablet (5 mg) by mouth 2 times daily 60 tablet 2     calcium carbonate-vitamin D (OS-SHELDON) 500-400 MG-UNIT tablet Take 1 tablet by mouth every morning       docusate sodium (COLACE) 100 MG capsule Take 1 capsule (100 mg) by mouth 2 times daily 50 capsule 1     gabapentin (NEURONTIN) 300 MG capsule Take 600 mg by mouth At Bedtime        hydrOXYzine (ATARAX) 25 MG tablet Take 1 tablet (25 mg) by mouth every 8 hours as needed for itching 18 tablet 0     methocarbamol (ROBAXIN) 500 MG tablet Take 1 tablet (500 mg) by mouth every 4 hours as needed for muscle spasms 50 tablet 0     omeprazole 20 MG tablet Take 2 tablets (40 mg) by mouth daily (Patient taking differently: Take 20 mg by mouth 2 times daily ) 180 tablet 3     ondansetron (ZOFRAN) 4 MG tablet Take 1 tablet (4 mg) by mouth every 6 hours as needed for nausea 30 tablet 0     ondansetron (ZOFRAN-ODT) 4 MG ODT tab Take 1 tablet (4 mg) by mouth every 6 hours as needed for nausea or vomiting 20 tablet 0     oxyCODONE (ROXICODONE) 5 MG tablet Take 1 tablet (5 mg) by mouth every 6 hours as needed for pain 24 tablet 0     oxyCODONE (ROXICODONE) 5 MG tablet Take 1 tablet (5 mg) by mouth every 4 hours as needed for moderate to severe pain 20 tablet 0     polyethylene glycol (MIRALAX) 17 g packet Take 17 g by  mouth daily 7 packet 0     pramipexole (MIRAPEX) 0.25 MG tablet Take 0.75 mg by mouth At Bedtime       senna-docusate (SENOKOT-S/PERICOLACE) 8.6-50 MG tablet Take 1 tablet by mouth 2 times daily 30 tablet 0     tacrolimus (GENERIC EQUIVALENT) 1 MG capsule Take 3 capsules (3 mg) by mouth 2 times daily 180 capsule 11     zolpidem (AMBIEN) 10 MG tablet Take 10 mg by mouth nightly as needed for sleep            ALLERGIES: Blood transfusion related (informational only), Dilaudid [hydromorphone], Ferrlecit, and Venofer [iron sucrose]       PHYSICAL EXAMINATION:   On physical examination the patient is comfortable and is in no acute distress. The affect is appropriate and breathing is non-labored.    Surgical wound: The surgical wound was exposed and found to be clean and dry, without drainage or discharge. There is mild edema around the incision. There is no erythema. The skin was appropriately warm to touch.     ROM: -/0/55 degrees  Isometric activation of the quadriceps: good  SLR: with 5 degree extensor lag  Gait: antalgic, slow and guarded    Motor intact distally throughout the tibial and peroneal nerve distributions, 5/5 strength with tibialis anterior, gastrocnemius and soleus, EHL, FHL firing  Sensation intact to light touch throughout superficial peroneal, deep peroneal, tibial, saphenous, and sural nerves  Dorsalis pedis and posterior tibial pulses palpable, toes warm and well-perfused      Nitin Ferris PA-C

## 2021-04-21 ENCOUNTER — OFFICE VISIT - RIVER FALLS (OUTPATIENT)
Dept: FAMILY MEDICINE | Facility: CLINIC | Age: 66
End: 2021-04-21
Payer: COMMERCIAL

## 2021-04-21 LAB
ALBUMIN SERPL-MCNC: 2.4 G/DL
ALP SERPL-CCNC: 101 UNIT/L
ALT SERPL W P-5'-P-CCNC: 38 UNIT/L
AST SERPL W P-5'-P-CCNC: 9 UNIT/L
BILIRUB SERPL-MCNC: 1.1 MG/DL
CALCIUM SERPL-MCNC: 8.4 MEQ/DL
CHLORIDE BLD-SCNC: 106 MEQ/L
CREAT SERPL-MCNC: 0.94 MG/DL
GLUCOSE BLD-MCNC: 100 MG/DL
HCT VFR BLD AUTO: 23.4 %
HGB BLD-MCNC: 7.6 G/DL
MAGNESIUM SERPL-MCNC: 2.1 MG/DL
PHOSPHATE SERPL-MCNC: 3.2 MG/DL
PLATELET # BLD AUTO: 147 X10
POTASSIUM BLD-SCNC: 4.6 MEQ/L
PROT SERPL-MCNC: 6.1 GM/DL
RBC # BLD AUTO: 2.55 X10
SODIUM SERPL-SCNC: 137 MEQ/L
WBC # BLD AUTO: 3.7 X10

## 2021-04-21 NOTE — PROGRESS NOTES
ASSESSMENT/PLAN:  Sherrie Lala is a 66 year old who is status post right total knee arthroplasty with Dr. Jacobsen on 3/29/2021.    Sherrie is motivated following surgery and seems to be progressing appropriately. Her pain is controlled and is improving with each day. She ambulates without assistive devices, slowly and with an antalgic gait. Range of motion is within reasonable limits at this time (-/0/55 degrees), however, concern would grow if progress does not continue over the next four weeks.     Basic wound cares were performed at today's visit including removal of nylon sutures and steristrips (if present). Monocryl suture tails (if present) were clipped to the level of the skin. We spent time discussing future wound/incision cares. We reviewed recommendations for bathing and hygiene.    The patient will see Dr. Jacobsen at six to eight weeks post op. Sherrie has our clinic number and will call with any questions or concerns.    Nitin Ferris PA-C  Orthopaedic Surgery    _________________________________    HISTORY OF PRESENT ILLNESS:  Sherrie Lala is a 66 year old female who is approximately two weeks status post the above procedure.    Weight bearing status/devices: full weight bearing on right lower extremity  Pain level and management: pain is 7/10, currently using acetaminophen, hydroxyzine  Physical therapy & ROM: daily home exercises     The patient endorses swelling around the surgical incision but denies surrounding redness. The incision has been dry, without discharge or drainage. Sherrie denies recent fevers and chills, as well as any other symptoms concerning for infection.     DVT prophylaxis: apixaban  Patient denies calf pain or tenderness.      MEDICATIONS:   Current Outpatient Rx   Medication Sig Dispense Refill     acetaminophen (TYLENOL) 500 MG tablet Take 1 tablet (500 mg) by mouth every 6 hours as needed for mild pain 30 tablet 1     albuterol (PROAIR HFA/PROVENTIL HFA/VENTOLIN HFA) 108 (90 BASE)  MCG/ACT Inhaler Inhale 2 puffs into the lungs every 6 hours as needed for shortness of breath / dyspnea or wheezing        alendronate (FOSAMAX) 70 MG tablet Take 70 mg by mouth every 7 days       amLODIPine (NORVASC) 5 MG tablet TAKE 1 TABLET (5 MG) BY MOUTH DAILY (Patient taking differently: Take 5 mg by mouth At Bedtime ) 90 tablet 3     apixaban ANTICOAGULANT (ELIQUIS) 5 MG tablet Take 1 tablet (5 mg) by mouth 2 times daily 60 tablet 2     calcium carbonate-vitamin D (OS-SHELDON) 500-400 MG-UNIT tablet Take 1 tablet by mouth every morning       docusate sodium (COLACE) 100 MG capsule Take 1 capsule (100 mg) by mouth 2 times daily 50 capsule 1     gabapentin (NEURONTIN) 300 MG capsule Take 600 mg by mouth At Bedtime        hydrOXYzine (ATARAX) 25 MG tablet Take 1 tablet (25 mg) by mouth every 8 hours as needed for itching 18 tablet 0     methocarbamol (ROBAXIN) 500 MG tablet Take 1 tablet (500 mg) by mouth every 4 hours as needed for muscle spasms 50 tablet 0     omeprazole 20 MG tablet Take 2 tablets (40 mg) by mouth daily (Patient taking differently: Take 20 mg by mouth 2 times daily ) 180 tablet 3     ondansetron (ZOFRAN) 4 MG tablet Take 1 tablet (4 mg) by mouth every 6 hours as needed for nausea 30 tablet 0     ondansetron (ZOFRAN-ODT) 4 MG ODT tab Take 1 tablet (4 mg) by mouth every 6 hours as needed for nausea or vomiting 20 tablet 0     oxyCODONE (ROXICODONE) 5 MG tablet Take 1 tablet (5 mg) by mouth every 6 hours as needed for pain 24 tablet 0     oxyCODONE (ROXICODONE) 5 MG tablet Take 1 tablet (5 mg) by mouth every 4 hours as needed for moderate to severe pain 20 tablet 0     polyethylene glycol (MIRALAX) 17 g packet Take 17 g by mouth daily 7 packet 0     pramipexole (MIRAPEX) 0.25 MG tablet Take 0.75 mg by mouth At Bedtime       senna-docusate (SENOKOT-S/PERICOLACE) 8.6-50 MG tablet Take 1 tablet by mouth 2 times daily 30 tablet 0     tacrolimus (GENERIC EQUIVALENT) 1 MG capsule Take 3 capsules (3 mg) by  mouth 2 times daily 180 capsule 11     zolpidem (AMBIEN) 10 MG tablet Take 10 mg by mouth nightly as needed for sleep            ALLERGIES: Blood transfusion related (informational only), Dilaudid [hydromorphone], Ferrlecit, and Venofer [iron sucrose]       PHYSICAL EXAMINATION:   On physical examination the patient is comfortable and is in no acute distress. The affect is appropriate and breathing is non-labored.    Surgical wound: The surgical wound was exposed and found to be clean and dry, without drainage or discharge. There is mild edema around the incision. There is no erythema. The skin was appropriately warm to touch.     ROM: -/0/55 degrees  Isometric activation of the quadriceps: good  SLR: with 5 degree extensor lag  Gait: antalgic, slow and guarded    Motor intact distally throughout the tibial and peroneal nerve distributions, 5/5 strength with tibialis anterior, gastrocnemius and soleus, EHL, FHL firing  Sensation intact to light touch throughout superficial peroneal, deep peroneal, tibial, saphenous, and sural nerves  Dorsalis pedis and posterior tibial pulses palpable, toes warm and well-perfused

## 2021-04-22 ENCOUNTER — COMMUNICATION - RIVER FALLS (OUTPATIENT)
Dept: FAMILY MEDICINE | Facility: CLINIC | Age: 66
End: 2021-04-22
Payer: COMMERCIAL

## 2021-04-22 LAB
ERYTHROCYTE [DISTWIDTH] IN BLOOD BY AUTOMATED COUNT: 13.3 % (ref 11–15)
FERRITIN SERPL-MCNC: 398 NG/ML (ref 16–288)
HCT VFR BLD AUTO: 35.1 % (ref 35–45)
HGB BLD-MCNC: 11.1 GM/DL (ref 11.7–15.5)
IRON SATURATION: 23 (ref 16–45)
IRON SERPL-MCNC: 59 MCG/DL (ref 45–160)
MCH RBC QN AUTO: 28.5 PG (ref 27–33)
MCHC RBC AUTO-ENTMCNC: 31.6 GM/DL (ref 32–36)
MCV RBC AUTO: 90 FL (ref 80–100)
PLATELET # BLD AUTO: 302 10*3/UL (ref 140–400)
PMV BLD: 10.1 FL (ref 7.5–12.5)
RBC # BLD AUTO: 3.9 10*6/UL (ref 3.8–5.1)
TIBC - QUEST: 257 (ref 250–450)
WBC # BLD AUTO: 5.5 10*3/UL (ref 3.8–10.8)

## 2021-04-24 ENCOUNTER — HEALTH MAINTENANCE LETTER (OUTPATIENT)
Age: 66
End: 2021-04-24

## 2021-05-05 ENCOUNTER — ANCILLARY PROCEDURE (OUTPATIENT)
Dept: GENERAL RADIOLOGY | Facility: CLINIC | Age: 66
End: 2021-05-05
Attending: ORTHOPAEDIC SURGERY
Payer: COMMERCIAL

## 2021-05-05 ENCOUNTER — OFFICE VISIT (OUTPATIENT)
Dept: ORTHOPEDICS | Facility: CLINIC | Age: 66
End: 2021-05-05
Payer: COMMERCIAL

## 2021-05-05 DIAGNOSIS — M17.11 PRIMARY LOCALIZED OSTEOARTHRITIS OF RIGHT KNEE: ICD-10-CM

## 2021-05-05 DIAGNOSIS — Z98.890 STATUS POST KNEE SURGERY: Primary | ICD-10-CM

## 2021-05-05 PROCEDURE — 77073 BONE LENGTH STUDIES: CPT | Mod: XE | Performed by: RADIOLOGY

## 2021-05-05 PROCEDURE — 73560 X-RAY EXAM OF KNEE 1 OR 2: CPT | Mod: RT | Performed by: RADIOLOGY

## 2021-05-05 PROCEDURE — 99024 POSTOP FOLLOW-UP VISIT: CPT | Performed by: ORTHOPAEDIC SURGERY

## 2021-05-05 NOTE — LETTER
5/5/2021         RE: Sherrie Lala  [address]  [facility] [address]        Dear Colleague,    Thank you for referring your patient, Sherrie Lala, to the [facility] [place]. Please see a copy of my visit note below.           Interval History:   Sherrie Lala is a 66 year old female who is here follow up for:   Right TKA - 3/29/21    Sherrie returns for follow-up of the right total knee replacement.  She notes that things have started to greatly improve over the last 3 weeks or so.  She had some difficulties with the recovery process over the first 2 weeks due to issues around her liver and the blood clot.  However over the last 3 weeks her motion, mobility has greatly improved.  The pain in her knee has also greatly improved.  She has weaned off of pain medication appropriately.  She is no longer using any assistive devices.  She has not had any problems with the incision.  Her motion is up to about 80 her start physical therapy.         Physical Exam:     NAD  AOx3  Interactive and cooperative with the exam.  The incision is well-healed.  She has no pain with knee range of motion.  Her motion is from 5 to 80 degrees.         Imaging:      X-rays demonstrate well fixed well-positioned cemented knee replacement implants.       Assessment and Plan:        Sherrie Lala is a 66 year old female is s/p the above procedure.    She is making good progress following the total knee replacement.  Her knee does remain somewhat stiff.  She notes that the motion is however improving.  We discussed that if the motion remains somewhat limited it might be beneficial to consider manipulation under anesthesia.  I will see her back in clinic in 2 weeks to ensure that things are improving.  If not and if she remains at 90 degrees or so we may consider manipulation under anesthesia.  Otherwise she is doing very good job with physical therapy, strengthening stretching and I am hopeful that things will  improve.  She will let me know if she has any questions or concerns in the meantime.    Nitin Jacobsen M.D.     Arthritis and Joint Replacement  Department of Orthopaedic Surgery, [facility]  [email or web address]  [phone] (pager)

## 2021-05-05 NOTE — PROGRESS NOTES
Interval History:   Sherrie Lala is a 66 year old female who is here follow up for:   Right TKA - 3/29/21    Sherrie returns for follow-up of the right total knee replacement.  She notes that things have started to greatly improve over the last 3 weeks or so.  She had some difficulties with the recovery process over the first 2 weeks due to issues around her liver and the blood clot.  However over the last 3 weeks her motion, mobility has greatly improved.  The pain in her knee has also greatly improved.  She has weaned off of pain medication appropriately.  She is no longer using any assistive devices.  She has not had any problems with the incision.  Her motion is up to about 80 her start physical therapy.         Physical Exam:     NAD  AOx3  Interactive and cooperative with the exam.  The incision is well-healed.  She has no pain with knee range of motion.  Her motion is from 5 to 80 degrees.         Imaging:      X-rays demonstrate well fixed well-positioned cemented knee replacement implants.       Assessment and Plan:        Sherrie Lala is a 66 year old female is s/p the above procedure.    She is making good progress following the total knee replacement.  Her knee does remain somewhat stiff.  She notes that the motion is however improving.  We discussed that if the motion remains somewhat limited it might be beneficial to consider manipulation under anesthesia.  I will see her back in clinic in 2 weeks to ensure that things are improving.  If not and if she remains at 90 degrees or so we may consider manipulation under anesthesia.  Otherwise she is doing very good job with physical therapy, strengthening stretching and I am hopeful that things will improve.  She will let me know if she has any questions or concerns in the meantime.    Nitin Jacobsen M.D.     Arthritis and Joint Replacement  Department of Orthopaedic Surgery, [facility]  [email or web address]  [phone]  (pager)

## 2021-05-19 ENCOUNTER — TELEPHONE (OUTPATIENT)
Dept: ORTHOPEDICS | Facility: CLINIC | Age: 66
End: 2021-05-19

## 2021-05-19 ENCOUNTER — OFFICE VISIT (OUTPATIENT)
Dept: ORTHOPEDICS | Facility: CLINIC | Age: 66
End: 2021-05-19
Payer: COMMERCIAL

## 2021-05-19 DIAGNOSIS — Z96.651 STATUS POST TOTAL RIGHT KNEE REPLACEMENT: Primary | ICD-10-CM

## 2021-05-19 PROCEDURE — 99024 POSTOP FOLLOW-UP VISIT: CPT | Performed by: ORTHOPAEDIC SURGERY

## 2021-05-19 RX ORDER — AMOXICILLIN 500 MG/1
2000 CAPSULE ORAL 2 TIMES DAILY
Qty: 4 CAPSULE | Refills: 11 | Status: SHIPPED | OUTPATIENT
Start: 2021-05-19 | End: 2021-05-19

## 2021-05-19 RX ORDER — AMOXICILLIN 500 MG/1
CAPSULE ORAL
Qty: 4 CAPSULE | Refills: 11 | Status: SHIPPED | OUTPATIENT
Start: 2021-05-19 | End: 2023-03-29

## 2021-05-19 NOTE — PROGRESS NOTES
Interval History:   Sherrie Lala is a 66 year old female who is here follow up for:   Right TKA - 3/29/21    Sherrie returns today for range of motion check.  I last saw her about 2 weeks ago.  At that point she had been somewhat behind on range of motion due to the issues around her recovery process.  Over the last couple weeks she feels like things have been steadily improving.  She is now beyond 90 degrees pretty easily with physical therapy.  She feels like her can progress continues to steadily improve.  She has not had problems with the incision.  She is no longer using any assistive devices.  She does have persistent pain with weightbearing but this is also steadily improving compared to her last visit.         Physical Exam:     NAD  AOx3  Interactive and cooperative with the exam.  The incision is well-healed.  She has no pain with knee range of motion.  Her motion is from 5 to 95 degrees.         Imaging:      X-rays demonstrate well fixed well-positioned cemented knee replacement implants.       Assessment and Plan:        Sherrie Lala is a 66 year old female is s/p the above procedure.    She has made excellent progress over the last 2 weeks.  She will continue to work on the knee range of motion exercises.  We discussed the manipulation again and given the progress she has made I think it is okay to continue to follow this conservatively.  I will see her back in clinic in 1 month.  She will let me know if she has any questions or concerns in the meantime.    Nitin Jacobsen M.D.     Arthritis and Joint Replacement  Department of Orthopaedic Surgery, [facility]  [email or web address]  [phone] (pager)

## 2021-05-19 NOTE — TELEPHONE ENCOUNTER
[facility] Call Center    Phone Message    May a detailed message be left on voicemail: yes     Reason for Call: Other: Please call back with clarification of Amoxicillan noted for 4 capsuls 2x daily QTY 4 11 refills, is Quantity supposed to be 40? Please call [phone]     Action Taken: Message routed to:  [facility] ([facility] Ortho    Travel Screening: Not Applicable

## 2021-05-19 NOTE — LETTER
5/19/2021         RE: Sherrie Lala  [address] [facility] [address]        Dear Colleague,    Thank you for referring your patient, Sherrie Lala, to the [facility] [place]. Please see a copy of my visit note below.           Interval History:   Sherrie Lala is a 66 year old female who is here follow up for:   Right TKA - 3/29/21    Sherrie returns today for range of motion check.  I last saw her about 2 weeks ago.  At that point she had been somewhat behind on range of motion due to the issues around her recovery process.  Over the last couple weeks she feels like things have been steadily improving.  She is now beyond 90 degrees pretty easily with physical therapy.  She feels like her can progress continues to steadily improve.  She has not had problems with the incision.  She is no longer using any assistive devices.  She does have persistent pain with weightbearing but this is also steadily improving compared to her last visit.         Physical Exam:     NAD  AOx3  Interactive and cooperative with the exam.  The incision is well-healed.  She has no pain with knee range of motion.  Her motion is from 5 to 95 degrees.         Imaging:      X-rays demonstrate well fixed well-positioned cemented knee replacement implants.       Assessment and Plan:        Sherrie Lala is a 66 year old female is s/p the above procedure.    She has made excellent progress over the last 2 weeks.  She will continue to work on the knee range of motion exercises.  We discussed the manipulation again and given the progress she has made I think it is okay to continue to follow this conservatively.  I will see her back in clinic in 1 month.  She will let me know if she has any questions or concerns in the meantime.    Nitin Jacobsen M.D.     Arthritis and Joint Replacement  Department of Orthopaedic Surgery, [facility]  [email or web address]  [phone] (pager)

## 2021-05-19 NOTE — TELEPHONE ENCOUNTER
Returned call to [facility] to clarify the dental antibiotic prescription. Advised that I will send a new corrected Rx. Understanding expressed.

## 2021-05-24 ENCOUNTER — COMMUNICATION - RIVER FALLS (OUTPATIENT)
Dept: FAMILY MEDICINE | Facility: CLINIC | Age: 66
End: 2021-05-24
Payer: COMMERCIAL

## 2021-06-02 ENCOUNTER — OFFICE VISIT - RIVER FALLS (OUTPATIENT)
Dept: FAMILY MEDICINE | Facility: CLINIC | Age: 66
End: 2021-06-02
Payer: COMMERCIAL

## 2021-06-02 ASSESSMENT — MIFFLIN-ST. JEOR: SCORE: 1103.13

## 2021-06-16 ENCOUNTER — OFFICE VISIT (OUTPATIENT)
Dept: ORTHOPEDICS | Facility: CLINIC | Age: 66
End: 2021-06-16
Payer: COMMERCIAL

## 2021-06-16 DIAGNOSIS — Z96.651 STATUS POST TOTAL RIGHT KNEE REPLACEMENT: Primary | ICD-10-CM

## 2021-06-16 PROCEDURE — 99024 POSTOP FOLLOW-UP VISIT: CPT | Performed by: ORTHOPAEDIC SURGERY

## 2021-06-16 NOTE — LETTER
6/16/2021         RE: Sherrie Lala  [address] [facility] [address]        Dear Colleague,    Thank you for referring your patient, Sherrie Lala, to the [facility] [place]. Please see a copy of my visit note below.           Interval History:   Sherrie Lala is a 66 year old female who is here follow up for:   Right TKA - 3/29/21    Sherrie returns for follow-up following the right total knee replacement.  She notes that things have greatly improved over the last few weeks.  Her range of motion has been improving.  She is doing the stationary bike about 3 times per day.  She has not had any problems with the incision.  All in all she is making good progress.  She is no longer using any assistive devices.  She has mild pain in the lateral aspect of the knee but the symptoms seem to be improving and are particularly improved after she uses the bicycle.         Physical Exam:     NAD  AOx3  Interactive and cooperative with the exam.  The incision is well-healed.  She has no pain with knee range of motion.  Her motion is from 0 to 100 degrees.  She walks with a normal gait.       Imaging:      X-rays demonstrate well fixed well-positioned cemented knee replacement implants.       Assessment and Plan:        Sherrie Lala is a 66 year old female is s/p the above procedure.      She is doing very well following her knee replacement.  She has made excellent progress with regards to her range of motion.  She will continue to do the strengthening and stretching exercises.  She will let us know if she has any questions or concerns regarding the knee going forward.  Otherwise if things continue to go well and steadily improve she can follow-up for a routine 1 to 2-year follow-up visit with x-rays.    Nitin Jacobsen M.D.     Arthritis and Joint Replacement  Department of Orthopaedic Surgery, [facility]  [email or web address]  [phone] (pager)

## 2021-06-16 NOTE — PROGRESS NOTES
Interval History:   Sherrie Lala is a 66 year old female who is here follow up for:   Right TKA - 3/29/21    Sherrie returns for follow-up following the right total knee replacement.  She notes that things have greatly improved over the last few weeks.  Her range of motion has been improving.  She is doing the stationary bike about 3 times per day.  She has not had any problems with the incision.  All in all she is making good progress.  She is no longer using any assistive devices.  She has mild pain in the lateral aspect of the knee but the symptoms seem to be improving and are particularly improved after she uses the bicycle.         Physical Exam:     NAD  AOx3  Interactive and cooperative with the exam.  The incision is well-healed.  She has no pain with knee range of motion.  Her motion is from 0 to 100 degrees.  She walks with a normal gait.       Imaging:      X-rays demonstrate well fixed well-positioned cemented knee replacement implants.       Assessment and Plan:        Sherrie Lala is a 66 year old female is s/p the above procedure.      She is doing very well following her knee replacement.  She has made excellent progress with regards to her range of motion.  She will continue to do the strengthening and stretching exercises.  She will let us know if she has any questions or concerns regarding the knee going forward.  Otherwise if things continue to go well and steadily improve she can follow-up for a routine 1 to 2-year follow-up visit with x-rays.    Nitin Jacobsen M.D.     Arthritis and Joint Replacement  Department of Orthopaedic Surgery, [facility]  [email or web address]  [phone] (pager)

## 2021-07-02 DIAGNOSIS — R79.89 ELEVATED LFTS: ICD-10-CM

## 2021-07-02 RX ORDER — APIXABAN 5 MG/1
TABLET, FILM COATED ORAL
Qty: 60 TABLET | Refills: 1 | OUTPATIENT
Start: 2021-07-02

## 2021-07-02 NOTE — TELEPHONE ENCOUNTER
Pt was called by RN regarding message about her anticoagulant refill, Eloquis 5mg twice daily.  This medication was started in hospital following right total knee replacement on 3/29/21.  Pt was asked to call back to review long term anticoagulation due to postop Rt Lower leg DVT.  Pt phoned RN and she agreed to speak with her hematologist/oncologist about anticoagulation therapy for long term.  She will call back if needed.  Irma Moses RN  [phone]

## 2021-07-06 ENCOUNTER — OFFICE VISIT (OUTPATIENT)
Dept: DERMATOLOGY | Facility: CLINIC | Age: 66
End: 2021-07-06
Payer: COMMERCIAL

## 2021-07-06 DIAGNOSIS — R21 RASH: Primary | ICD-10-CM

## 2021-07-06 DIAGNOSIS — L82.0 INFLAMED SEBORRHEIC KERATOSIS: ICD-10-CM

## 2021-07-06 DIAGNOSIS — L58.1 CHRONIC RADIATION DERMATITIS: ICD-10-CM

## 2021-07-06 PROCEDURE — 11104 PUNCH BX SKIN SINGLE LESION: CPT | Mod: GC | Performed by: DERMATOLOGY

## 2021-07-06 PROCEDURE — 99213 OFFICE O/P EST LOW 20 MIN: CPT | Mod: 25 | Performed by: DERMATOLOGY

## 2021-07-06 PROCEDURE — 88305 TISSUE EXAM BY PATHOLOGIST: CPT | Performed by: DERMATOLOGY

## 2021-07-06 PROCEDURE — 17110 DESTRUCTION B9 LES UP TO 14: CPT | Mod: XS | Performed by: DERMATOLOGY

## 2021-07-06 ASSESSMENT — PAIN SCALES - GENERAL: PAINLEVEL: NO PAIN (0)

## 2021-07-06 NOTE — PROGRESS NOTES
[facility] Dermatology Note  Encounter Date: Jul 6, 2021  Office Visit     Dermatology Problem List:  1. Hx liver transplant in August 2018 on tacrolimus  2. Hypopigmented macule with features of sclerosis, LS v radiation dermatitis - right back  3. Seborrheic keratoses on the right temple and posterior right back  - Cryotherapy of both areas on 10/1/19 and 76/21  4. Well-differentiated SCC, left shoulder s/p excision 12/9/19  ____________________________________________    Assessment & Plan:     # Hypopigmented macule with features of sclerosis, LS v radiation dermatitis - right back  Has had radiation to chest from lung cancer many years ago. C/f radiation dermatitis, but LS also considered. Pt denies vulvur changes or symptoms so lower likelihood.  -punch biopsy taken today, will contact with biopsy results    # Seborrheic keratoses on the right temple and posterior right back  - Cryotherapy of both areas performed today    Procedures Performed:   - Punch biopsy procedure note, location(s): right mid back. After discussion of benefits and risks including but not limited to bleeding, infection, scar, incomplete removal, recurrence, and non-diagnostic biopsy, written consent and photographs were obtained. The area was cleaned with isopropyl alcohol. 0.5mL of 1% lidocaine with epinephrine was injected to obtain adequate anesthesia and a 4 mm punch biopsy was performed at site(s). 4-0 Prolene sutures were utilized to approximate the epidermal edges. White petrolatum ointment and a bandage was applied to the wound. Explicit verbal and written wound care instructions were provided. The patient left the dermatology clinic in good condition.   - Cryotherapy procedure note: After verbal consent and discussion of risks and benefits including but no limited to dyspigmentation/scar, blister, and pain, 2 was(were) treated with 1-2mm freeze border for 2 cycles with liquid nitrogen. Post cryotherapy  instructions were provided.       Follow-up: 3mo    This patient was seen and discussed with Dr. Goetz.    Althea Anderson MD  IM Resident PGY-2    Staff Physician Comments:   I saw and evaluated the patient with the resident and I agree with the assessment and plan.  I was present for the key portions of the above major procedure and examination. I have made edits if needed.    Eldon Goetz MD  Staff Dermatologist and Dermatopathologist  , Department of Dermatology    ____________________________________________    CC: Skin Check (pt states she is here for a full body skin check, spot on back and right side of pt face )    HPI:  Ms. Sherrie Lala is a(n) 66 year old female who presents today as return for skin check. H/o liver transplant 8/2018 on tacrolimus. Last skin check 12/2019. She has had multiple SKs s/p cryotherapy. SCC s/p excision 2019. Has a h/o as well of lung cancer with radiation. Also new dx of thyroid cancer yesterday.     Patient has several itching spots on her back that have been bothering her. She is unsure if they have been changing size but they have been itchy more lately and bothering her. She also has a known SK on her right temple which she is interested in getting removed today.    Patient is otherwise feeling well, without additional skin concerns.    Labs Reviewed:  N/A    Physical Exam:  Vitals: There were no vitals taken for this visit.  SKIN: Total skin excluding the undergarment areas was performed. The exam included the head/face, neck, both arms, chest, back, abdomen, both legs, digits and/or nails.   - numerous irregular brown to tan macules, some with irregular borders c/w atypical and normal melanocytic nevi across back, upper arms and shoulders  - 1cm brown papule on right side of face near hairline with smooth border but some color variegation, c/w SK  - 2cm macule on right mid back with fine scale, hypopigmentation, irregular borders with  surrounding pink discoloration in irregular pattern  - 3x2cm flat papule in center of midback with fine white scale c/w SK  - No other lesions of concern on areas examined.     Medications:  Current Outpatient Medications   Medication     acetaminophen (TYLENOL) 500 MG tablet     albuterol (PROAIR HFA/PROVENTIL HFA/VENTOLIN HFA) 108 (90 BASE) MCG/ACT Inhaler     alendronate (FOSAMAX) 70 MG tablet     amLODIPine (NORVASC) 5 MG tablet     apixaban ANTICOAGULANT (ELIQUIS) 5 MG tablet     calcium carbonate-vitamin D (OS-SHELDON) 500-400 MG-UNIT tablet     docusate sodium (COLACE) 100 MG capsule     gabapentin (NEURONTIN) 300 MG capsule     hydrOXYzine (ATARAX) 25 MG tablet     methocarbamol (ROBAXIN) 500 MG tablet     omeprazole 20 MG tablet     ondansetron (ZOFRAN) 4 MG tablet     polyethylene glycol (MIRALAX) 17 g packet     pramipexole (MIRAPEX) 0.25 MG tablet     senna-docusate (SENOKOT-S/PERICOLACE) 8.6-50 MG tablet     tacrolimus (GENERIC EQUIVALENT) 1 MG capsule     zolpidem (AMBIEN) 10 MG tablet     amoxicillin (AMOXIL) 500 MG capsule     ondansetron (ZOFRAN-ODT) 4 MG ODT tab     oxyCODONE (ROXICODONE) 5 MG tablet     oxyCODONE (ROXICODONE) 5 MG tablet     No current facility-administered medications for this visit.       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)     Hypervolemia     Positive sputum culture in cadaveric donor     Positive urine culture in cadaveric donor     Other closed displaced fracture of proximal end of left humerus with routine healing, subsequent encounter     Shortness of breath     Primary  localized osteoarthritis of right knee     Elevated LFTs     Acute deep vein thrombosis (DVT) of right lower extremity (H)     Past Medical History:   Diagnosis Date     Antiplatelet or antithrombotic long-term use      Asthma      Cholangiocarcinoma (H) 06/2014     Cirrhosis of liver with ascites (H) 5/10/2018     Encounter for pleural drainage tube placement      Esophageal varices with hemorrhage (H)      Gastric ulcer      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 on close of this encounter.

## 2021-07-06 NOTE — PATIENT INSTRUCTIONS

## 2021-07-06 NOTE — LETTER
7/6/2021       RE: Sherrie Lala  [address] [facility] [address]     Dear Colleague,    Thank you for referring your patient, Sherrie Lala, to the [facility] DERMATOLOGY CLINIC [place] at [facility]. Please see a copy of my visit note below.    [facility] Dermatology Note  Encounter Date: Jul 6, 2021  Office Visit     Dermatology Problem List:  1. Hx liver transplant in August 2018 on tacrolimus  2. Hypopigmented macule with features of sclerosis, LS v radiation dermatitis - right back  3. Seborrheic keratoses on the right temple and posterior right back  - Cryotherapy of both areas on 10/1/19 and 76/21  4. Well-differentiated SCC, left shoulder s/p excision 12/9/19  ____________________________________________    Assessment & Plan:     # Hypopigmented macule with features of sclerosis, LS v radiation dermatitis - right back  Has had radiation to chest from lung cancer many years ago. C/f radiation dermatitis, but LS also considered. Pt denies vulvur changes or symptoms so lower likelihood.  -punch biopsy taken today, will contact with biopsy results    # Seborrheic keratoses on the right temple and posterior right back  - Cryotherapy of both areas performed today    Procedures Performed:   - Punch biopsy procedure note, location(s): right mid back. After discussion of benefits and risks including but not limited to bleeding, infection, scar, incomplete removal, recurrence, and non-diagnostic biopsy, written consent and photographs were obtained. The area was cleaned with isopropyl alcohol. 0.5mL of 1% lidocaine with epinephrine was injected to obtain adequate anesthesia and a 4 mm punch biopsy was performed at site(s). 4-0 Prolene sutures were utilized to approximate the epidermal edges. White petrolatum ointment and a bandage was applied to the wound. Explicit verbal and written wound care instructions were provided. The patient  left the dermatology clinic in good condition.   - Cryotherapy procedure note: After verbal consent and discussion of risks and benefits including but no limited to dyspigmentation/scar, blister, and pain, 2 was(were) treated with 1-2mm freeze border for 2 cycles with liquid nitrogen. Post cryotherapy instructions were provided.       Follow-up: 3mo    This patient was seen and discussed with Dr. Goetz.    Althea Anderson MD  IM Resident PGY-2    Staff Physician Comments:   I saw and evaluated the patient with the resident and I agree with the assessment and plan.  I was present for the key portions of the above major procedure and examination. I have made edits if needed.    Eldon Goetz MD  Staff Dermatologist and Dermatopathologist  , Department of Dermatology    ____________________________________________    CC: Skin Check (pt states she is here for a full body skin check, spot on back and right side of pt face )    HPI:  Ms. Sherrie Lala is a(n) 66 year old female who presents today as return for skin check. H/o liver transplant 8/2018 on tacrolimus. Last skin check 12/2019. She has had multiple SKs s/p cryotherapy. SCC s/p excision 2019. Has a h/o as well of lung cancer with radiation. Also new dx of thyroid cancer yesterday.     Patient has several itching spots on her back that have been bothering her. She is unsure if they have been changing size but they have been itchy more lately and bothering her. She also has a known SK on her right temple which she is interested in getting removed today.    Patient is otherwise feeling well, without additional skin concerns.    Labs Reviewed:  N/A    Physical Exam:  Vitals: There were no vitals taken for this visit.  SKIN: Total skin excluding the undergarment areas was performed. The exam included the head/face, neck, both arms, chest, back, abdomen, both legs, digits and/or nails.   - numerous irregular brown to tan macules, some with  irregular borders c/w atypical and normal melanocytic nevi across back, upper arms and shoulders  - 1cm brown papule on right side of face near hairline with smooth border but some color variegation, c/w SK  - 2cm macule on right mid back with fine scale, hypopigmentation, irregular borders with surrounding pink discoloration in irregular pattern  - 3x2cm flat papule in center of midback with fine white scale c/w SK  - No other lesions of concern on areas examined.     Medications:  Current Outpatient Medications   Medication     acetaminophen (TYLENOL) 500 MG tablet     albuterol (PROAIR HFA/PROVENTIL HFA/VENTOLIN HFA) 108 (90 BASE) MCG/ACT Inhaler     alendronate (FOSAMAX) 70 MG tablet     amLODIPine (NORVASC) 5 MG tablet     apixaban ANTICOAGULANT (ELIQUIS) 5 MG tablet     calcium carbonate-vitamin D (OS-SHELDON) 500-400 MG-UNIT tablet     docusate sodium (COLACE) 100 MG capsule     gabapentin (NEURONTIN) 300 MG capsule     hydrOXYzine (ATARAX) 25 MG tablet     methocarbamol (ROBAXIN) 500 MG tablet     omeprazole 20 MG tablet     ondansetron (ZOFRAN) 4 MG tablet     polyethylene glycol (MIRALAX) 17 g packet     pramipexole (MIRAPEX) 0.25 MG tablet     senna-docusate (SENOKOT-S/PERICOLACE) 8.6-50 MG tablet     tacrolimus (GENERIC EQUIVALENT) 1 MG capsule     zolpidem (AMBIEN) 10 MG tablet     amoxicillin (AMOXIL) 500 MG capsule     ondansetron (ZOFRAN-ODT) 4 MG ODT tab     oxyCODONE (ROXICODONE) 5 MG tablet     oxyCODONE (ROXICODONE) 5 MG tablet     No current facility-administered medications for this visit.       Past Medical History:   Patient Active Problem List   Diagnosis     Gastric ulcer     Osteoporosis     Bleeding esophageal varices (H)     Pleural effusion     Liver transplanted (H)     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)     Open abdominal wall wound     Immunosuppressed status (H)     Acute post-operative pain     Acute blood loss anemia     Mild protein-calorie malnutrition (H)     Portal  vein thrombosis of transplanted liver (H)     Hypervolemia     Positive sputum culture in cadaveric donor     Positive urine culture in cadaveric donor     Other closed displaced fracture of proximal end of left humerus with routine healing, subsequent encounter     Shortness of breath     Primary localized osteoarthritis of right knee     Elevated LFTs     Acute deep vein thrombosis (DVT) of right lower extremity (H)     Past Medical History:   Diagnosis Date     Antiplatelet or antithrombotic long-term use      Asthma      Cholangiocarcinoma (H) 06/2014     Cirrhosis of liver with ascites (H) 5/10/2018     Encounter for pleural drainage tube placement      Esophageal varices with hemorrhage (H)      Gastric ulcer      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 on close of this encounter.

## 2021-07-08 LAB — COPATH REPORT: NORMAL

## 2021-07-23 RX ORDER — CLOBETASOL PROPIONATE 0.5 MG/G
OINTMENT TOPICAL 2 TIMES DAILY
Qty: 30 G | Refills: 0 | Status: SHIPPED | OUTPATIENT
Start: 2021-07-23 | End: 2022-04-07

## 2021-08-26 ENCOUNTER — OFFICE VISIT - RIVER FALLS (OUTPATIENT)
Dept: FAMILY MEDICINE | Facility: CLINIC | Age: 66
End: 2021-08-26
Payer: COMMERCIAL

## 2021-08-26 ENCOUNTER — LAB REQUISITION (OUTPATIENT)
Dept: LAB | Facility: CLINIC | Age: 66
End: 2021-08-26
Payer: MEDICARE

## 2021-08-26 DIAGNOSIS — U07.1 COVID-19: ICD-10-CM

## 2021-08-26 LAB
A/G RATIO - HISTORICAL: 1.8 (ref 1–2.5)
ALBUMIN SERPL-MCNC: 4.2 GM/DL (ref 3.6–5.1)
ALP SERPL-CCNC: 71 UNIT/L (ref 37–153)
ALT SERPL W P-5'-P-CCNC: 8 UNIT/L (ref 6–29)
AST SERPL W P-5'-P-CCNC: 10 UNIT/L (ref 10–35)
BILIRUB SERPL-MCNC: 0.6 MG/DL (ref 0.2–1.2)
BUN SERPL-MCNC: 30 MG/DL (ref 7–25)
BUN/CREAT RATIO - HISTORICAL: 28 (ref 6–22)
CALCIUM SERPL-MCNC: 9.1 MG/DL (ref 8.6–10.4)
CHLORIDE BLD-SCNC: 104 MMOL/L (ref 98–110)
CO2 SERPL-SCNC: 29 MMOL/L (ref 20–32)
CREAT SERPL-MCNC: 1.07 MG/DL (ref 0.5–0.99)
EGFRCR SERPLBLD CKD-EPI 2021: 55 ML/MIN/1.73M2
ERYTHROCYTE [DISTWIDTH] IN BLOOD BY AUTOMATED COUNT: 13.4 % (ref 11–15)
GLOBULIN: 2.4 (ref 1.9–3.7)
GLUCOSE BLD-MCNC: 91 MG/DL (ref 65–99)
HCT VFR BLD AUTO: 37.6 % (ref 35–45)
HGB BLD-MCNC: 12.9 GM/DL (ref 11.7–15.5)
MCH RBC QN AUTO: 30.3 PG (ref 27–33)
MCHC RBC AUTO-ENTMCNC: 34.3 GM/DL (ref 32–36)
MCV RBC AUTO: 88.3 FL (ref 80–100)
PLATELET # BLD AUTO: 154 10*3/UL (ref 140–400)
PMV BLD: 9.2 FL (ref 7.5–12.5)
POTASSIUM BLD-SCNC: 4.4 MMOL/L (ref 3.5–5.3)
PROT SERPL-MCNC: 6.6 GM/DL (ref 6.1–8.1)
RBC # BLD AUTO: 4.26 10*6/UL (ref 3.8–5.1)
SODIUM SERPL-SCNC: 140 MMOL/L (ref 135–146)
WBC # BLD AUTO: 5.5 10*3/UL (ref 3.8–10.8)

## 2021-08-26 PROCEDURE — U0003 INFECTIOUS AGENT DETECTION BY NUCLEIC ACID (DNA OR RNA); SEVERE ACUTE RESPIRATORY SYNDROME CORONAVIRUS 2 (SARS-COV-2) (CORONAVIRUS DISEASE [COVID-19]), AMPLIFIED PROBE TECHNIQUE, MAKING USE OF HIGH THROUGHPUT TECHNOLOGIES AS DESCRIBED BY CMS-2020-01-R: HCPCS | Mod: ORL | Performed by: EMERGENCY MEDICINE

## 2021-08-26 ASSESSMENT — MIFFLIN-ST. JEOR: SCORE: 1115.83

## 2021-08-27 ENCOUNTER — COMMUNICATION - RIVER FALLS (OUTPATIENT)
Dept: FAMILY MEDICINE | Facility: CLINIC | Age: 66
End: 2021-08-27
Payer: COMMERCIAL

## 2021-08-27 LAB — SARS-COV-2 RNA RESP QL NAA+PROBE: NEGATIVE

## 2021-08-30 LAB — SARS-COV-2 RNA RESP QL NAA+PROBE: NEGATIVE

## 2021-09-14 DIAGNOSIS — Z13.220 LIPID SCREENING: ICD-10-CM

## 2021-09-14 DIAGNOSIS — Z94.4 LIVER REPLACED BY TRANSPLANT (H): ICD-10-CM

## 2021-09-16 ENCOUNTER — TELEPHONE (OUTPATIENT)
Dept: GASTROENTEROLOGY | Facility: CLINIC | Age: 66
End: 2021-09-16

## 2021-09-16 NOTE — TELEPHONE ENCOUNTER
Patient called to schedule her 6 month Follow-up w/[facility], but I see no standing lab orders can you look into.  thankk you

## 2021-09-17 ENCOUNTER — DOCUMENTATION ONLY (OUTPATIENT)
Dept: TRANSPLANT | Facility: CLINIC | Age: 66
End: 2021-09-17

## 2021-09-17 NOTE — PROGRESS NOTES
Annual chart review completed.      Patient is up to date with  appointments.  Please send letter reminding Sherrie to have lab testing every 3-4 mos

## 2021-10-04 ENCOUNTER — HEALTH MAINTENANCE LETTER (OUTPATIENT)
Age: 66
End: 2021-10-04

## 2021-10-25 ENCOUNTER — OFFICE VISIT - RIVER FALLS (OUTPATIENT)
Dept: FAMILY MEDICINE | Facility: CLINIC | Age: 66
End: 2021-10-25
Payer: COMMERCIAL

## 2021-10-29 ENCOUNTER — OFFICE VISIT (OUTPATIENT)
Dept: GASTROENTEROLOGY | Facility: CLINIC | Age: 66
End: 2021-10-29
Attending: INTERNAL MEDICINE
Payer: MEDICARE

## 2021-10-29 ENCOUNTER — LAB (OUTPATIENT)
Dept: LAB | Facility: CLINIC | Age: 66
End: 2021-10-29
Payer: COMMERCIAL

## 2021-10-29 VITALS
SYSTOLIC BLOOD PRESSURE: 111 MMHG | HEART RATE: 89 BPM | BODY MASS INDEX: 22.99 KG/M2 | WEIGHT: 129.8 LBS | OXYGEN SATURATION: 97 % | DIASTOLIC BLOOD PRESSURE: 77 MMHG

## 2021-10-29 DIAGNOSIS — Z94.4 LIVER REPLACED BY TRANSPLANT (H): Primary | ICD-10-CM

## 2021-10-29 DIAGNOSIS — R79.89 ELEVATED LFTS: ICD-10-CM

## 2021-10-29 DIAGNOSIS — Z94.4 LIVER REPLACED BY TRANSPLANT (H): ICD-10-CM

## 2021-10-29 DIAGNOSIS — R31.29 MICROSCOPIC HEMATURIA: ICD-10-CM

## 2021-10-29 DIAGNOSIS — Z13.220 LIPID SCREENING: ICD-10-CM

## 2021-10-29 PROBLEM — C80.1 CANCER (H): Status: ACTIVE | Noted: 2021-07-06

## 2021-10-29 LAB
ALBUMIN SERPL-MCNC: 3.2 G/DL (ref 3.4–5)
ALBUMIN UR-MCNC: 30 MG/DL
ALP SERPL-CCNC: 82 U/L (ref 40–150)
ALT SERPL W P-5'-P-CCNC: 13 U/L (ref 0–50)
ANION GAP SERPL CALCULATED.3IONS-SCNC: 5 MMOL/L (ref 3–14)
APPEARANCE UR: CLEAR
AST SERPL W P-5'-P-CCNC: 8 U/L (ref 0–45)
BILIRUB DIRECT SERPL-MCNC: 0.2 MG/DL (ref 0–0.2)
BILIRUB SERPL-MCNC: 0.5 MG/DL (ref 0.2–1.3)
BILIRUB UR QL STRIP: NEGATIVE
BUN SERPL-MCNC: 27 MG/DL (ref 7–30)
CALCIUM SERPL-MCNC: 8.6 MG/DL (ref 8.5–10.1)
CHLORIDE BLD-SCNC: 107 MMOL/L (ref 94–109)
CHOLEST SERPL-MCNC: 127 MG/DL
CO2 SERPL-SCNC: 27 MMOL/L (ref 20–32)
COLOR UR AUTO: YELLOW
CREAT SERPL-MCNC: 1.1 MG/DL (ref 0.52–1.04)
CREAT UR-MCNC: 165 MG/DL
ERYTHROCYTE [DISTWIDTH] IN BLOOD BY AUTOMATED COUNT: 12.8 % (ref 10–15)
FASTING STATUS PATIENT QL REPORTED: YES
GFR SERPL CREATININE-BSD FRML MDRD: 53 ML/MIN/1.73M2
GLUCOSE BLD-MCNC: 120 MG/DL (ref 70–99)
GLUCOSE UR STRIP-MCNC: NEGATIVE MG/DL
HCT VFR BLD AUTO: 36 % (ref 35–47)
HDLC SERPL-MCNC: 30 MG/DL
HGB BLD-MCNC: 11.8 G/DL (ref 11.7–15.7)
HGB UR QL STRIP: ABNORMAL
KETONES UR STRIP-MCNC: NEGATIVE MG/DL
LDLC SERPL CALC-MCNC: 81 MG/DL
LEUKOCYTE ESTERASE UR QL STRIP: NEGATIVE
MCH RBC QN AUTO: 29.9 PG (ref 26.5–33)
MCHC RBC AUTO-ENTMCNC: 32.8 G/DL (ref 31.5–36.5)
MCV RBC AUTO: 91 FL (ref 78–100)
MUCOUS THREADS #/AREA URNS LPF: PRESENT /LPF
NITRATE UR QL: NEGATIVE
NONHDLC SERPL-MCNC: 97 MG/DL
PH UR STRIP: 5 [PH] (ref 5–7)
PLATELET # BLD AUTO: 160 10E3/UL (ref 150–450)
POTASSIUM BLD-SCNC: 4.3 MMOL/L (ref 3.4–5.3)
PROT SERPL-MCNC: 7.2 G/DL (ref 6.8–8.8)
PROT UR-MCNC: 0.62 G/L
PROT/CREAT 24H UR: 0.38 G/G CR (ref 0–0.2)
RBC # BLD AUTO: 3.94 10E6/UL (ref 3.8–5.2)
RBC URINE: 17 /HPF
SODIUM SERPL-SCNC: 139 MMOL/L (ref 133–144)
SP GR UR STRIP: 1.02 (ref 1–1.03)
SQUAMOUS EPITHELIAL: 1 /HPF
TACROLIMUS BLD-MCNC: 8.6 UG/L (ref 5–15)
TME LAST DOSE: NORMAL H
TME LAST DOSE: NORMAL H
TRIGL SERPL-MCNC: 80 MG/DL
UROBILINOGEN UR STRIP-MCNC: 2 MG/DL
WBC # BLD AUTO: 6.6 10E3/UL (ref 4–11)
WBC URINE: 2 /HPF

## 2021-10-29 PROCEDURE — 82248 BILIRUBIN DIRECT: CPT | Performed by: PATHOLOGY

## 2021-10-29 PROCEDURE — 99214 OFFICE O/P EST MOD 30 MIN: CPT | Performed by: INTERNAL MEDICINE

## 2021-10-29 PROCEDURE — 80053 COMPREHEN METABOLIC PANEL: CPT | Performed by: PATHOLOGY

## 2021-10-29 PROCEDURE — 36415 COLL VENOUS BLD VENIPUNCTURE: CPT | Performed by: PATHOLOGY

## 2021-10-29 PROCEDURE — 81001 URINALYSIS AUTO W/SCOPE: CPT | Performed by: PATHOLOGY

## 2021-10-29 PROCEDURE — 84156 ASSAY OF PROTEIN URINE: CPT | Performed by: PATHOLOGY

## 2021-10-29 PROCEDURE — 85027 COMPLETE CBC AUTOMATED: CPT | Performed by: PATHOLOGY

## 2021-10-29 PROCEDURE — 80061 LIPID PANEL: CPT | Performed by: PATHOLOGY

## 2021-10-29 PROCEDURE — 80197 ASSAY OF TACROLIMUS: CPT | Performed by: INTERNAL MEDICINE

## 2021-10-29 RX ORDER — LEVOTHYROXINE SODIUM 125 UG/1
125 TABLET ORAL DAILY
COMMUNITY
Start: 2021-10-13

## 2021-10-29 RX ORDER — TACROLIMUS 1 MG/1
2 CAPSULE ORAL EVERY 12 HOURS
Qty: 360 CAPSULE | Refills: 3 | Status: SHIPPED | OUTPATIENT
Start: 2021-10-29 | End: 2022-11-10

## 2021-10-29 ASSESSMENT — PAIN SCALES - GENERAL: PAINLEVEL: MODERATE PAIN (5)

## 2021-10-29 NOTE — LETTER
OUTPATIENT OR TRANSITIONAL CARE  LABORATORY TEST ORDER  [facility]  Fax# [phone]     Patient Name:    Sherrie Lala                                           Transplant Date:   8/30/2018   YOB: 1955                                               Issue Date:           10/29/2021   [facility] MR#:    [id number]                                           Expiration Date:   10/29/2022        Diagnoses:            [x]?      Liver Transplant (ICD-10 Z94.4)                                 [x]?      Long term use of medications (ICD-10 Z79.899)      Please fax results to [phone]     Frequency: EVERY 2-3 months and PRN        [x]?         CBC with Platelets   [x]?         Basic Metabolic Panel (Sodium, Potassium, Chloride, CO2, Creatinine, Urea Nitrogen, Glucose, Calcium)  [x]?         Hepatic panel (Albumin, Alk Phos, ALT, AST, Direct and Total Bili)  [x]?         Tacrolimus drug level - 12 hour trough, please document time of last dose         Other Frequency: annually due October 2022  [x]?         Fasting lipid panel  [x]?         urine protein/creatinine ratio  [x]?         Urinalysis with reflex to micro    If you have questions, please call [phone] or [phone].    .

## 2021-10-29 NOTE — TELEPHONE ENCOUNTER
ISSUE:   Tacrolimus IR level 8.6 on 10/29, goal 3-5, dose 3 mg BID.    PLAN:   Please call patient and confirm this was an accurate 12-hour trough. Verify Tacrolimus IR dose. Confirm no new medications or illness. Confirm no missed doses. If accurate trough and accurate dose, decrease Tacrolimus IR dose to 2 mg BID and repeat labs in a month.  Abigail Parham RN  OUTCOME:   Spoke with patient, they confirm accurate trough level and current dose 3 mg BID. Patient confirmed dose change to 2 mg BID and to repeat labs in 1 months. Orders sent to preferred pharmacy for dose change and lab for repeat labs. Patient voiced understanding of plan.     Parisa Thrasher LPN

## 2021-10-29 NOTE — NURSING NOTE
Chief Complaint   Patient presents with     RECHECK     6 month f/u     Blood pressure 111/77, pulse 89, weight 58.9 kg (129 lb 12.8 oz), SpO2 97 %, not currently breastfeeding.    Zakiya Block, CMA

## 2021-10-29 NOTE — LETTER
10/29/2021         RE: Sherrie Lala  [address] [facility] [address]        Dear Colleague,    Thank you for referring your patient, Sherrie Lala, to the [facility] HEPATOLOGY CLINIC [place]. Please see a copy of my visit note below.    HISTORY OF PRESENT ILLNESS:  I had the pleasure of seeing Bradly Lala for followup in the Liver Transplant Clinic at the [facility] on 10/29/2021.  Ms. 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    Since she was last seen, she was diagnosed with thyroid cancer.  She underwent a total laryngectomy at [facility] and had several foci of thyroid cancer, the largest of which was about 2 cm in diameter.  All the margins were negative and it appears as though surgery was curative.    She also had a flu shot about 8 days ago and did become quite ill after the flu shot.  Illness consisted of nausea and vomiting and eventually she was actually seen in the Emergency Room in the [facility] system.  They did a complete blood workup as well as a CT scan of her abdomen, none of which were remarkable.    At present, she actually feels fairly well.  She does note some very mild right upper quadrant pain.  She denies any itching or skin rash.  She has improving fatigue.  She denies any increased abdominal girth or lower extremity edema.    She denies any additional fevers or chills, cough or shortness of breath.  She denies any nausea or vomiting, diarrhea or constipation.  Her appetite is returning and her weight is for the most part unchanged.    She also had knee replacement surgery about 1 year ago and has had less than ideal outcome from that.    Current Outpatient Medications   Medication     acetaminophen (TYLENOL) 500 MG tablet     albuterol (PROAIR HFA/PROVENTIL HFA/VENTOLIN HFA) 108 (90 BASE) MCG/ACT Inhaler     alendronate (FOSAMAX) 70 MG tablet      amLODIPine (NORVASC) 5 MG tablet     apixaban ANTICOAGULANT (ELIQUIS) 5 MG tablet     calcium carbonate-vitamin D (OS-SHELDON) 500-400 MG-UNIT tablet     clobetasol (TEMOVATE) 0.05 % external ointment     docusate sodium (COLACE) 100 MG capsule     gabapentin (NEURONTIN) 300 MG capsule     hydrOXYzine (ATARAX) 25 MG tablet     levothyroxine (SYNTHROID/LEVOTHROID) 125 MCG tablet     methocarbamol (ROBAXIN) 500 MG tablet     omeprazole 20 MG tablet     ondansetron (ZOFRAN) 4 MG tablet     polyethylene glycol (MIRALAX) 17 g packet     pramipexole (MIRAPEX) 0.25 MG tablet     senna-docusate (SENOKOT-S/PERICOLACE) 8.6-50 MG tablet     tacrolimus (GENERIC EQUIVALENT) 1 MG capsule     zolpidem (AMBIEN) 10 MG tablet     amoxicillin (AMOXIL) 500 MG capsule     ondansetron (ZOFRAN-ODT) 4 MG ODT tab     oxyCODONE (ROXICODONE) 5 MG tablet     oxyCODONE (ROXICODONE) 5 MG tablet     No current facility-administered medications for this visit.     /77   Pulse 89   Wt 58.9 kg (129 lb 12.8 oz)   SpO2 97%   BMI 22.99 kg/m      PHYSICAL EXAMINATION:    GENERAL:  She looks quite well.    HEENT:  Shows no scleral icterus or temporal muscle wasting.  CHEST:  Clear.  ABDOMEN:  No increase in girth.  No masses or tenderness to palpation are present.  Her liver is 10 cm in span without left lobe enlargement.  No spleen tip is palpable.  EXTREMITIES:  No edema.  SKIN:  No stigmata of chronic liver disease.  NEUROLOGIC: Nonfocal.      Recent Results (from the past 168 hour(s))   Basic metabolic panel    Collection Time: 10/29/21  8:06 AM   Result Value Ref Range    Sodium 139 133 - 144 mmol/L    Potassium 4.3 3.4 - 5.3 mmol/L    Chloride 107 94 - 109 mmol/L    Carbon Dioxide (CO2) 27 20 - 32 mmol/L    Anion Gap 5 3 - 14 mmol/L    Urea Nitrogen 27 7 - 30 mg/dL    Creatinine 1.10 (H) 0.52 - 1.04 mg/dL    Calcium 8.6 8.5 - 10.1 mg/dL    Glucose 120 (H) 70 - 99 mg/dL    GFR Estimate 53 (L) >60 mL/min/1.73m2   CBC with platelets     Collection Time: 10/29/21  8:06 AM   Result Value Ref Range    WBC Count 6.6 4.0 - 11.0 10e3/uL    RBC Count 3.94 3.80 - 5.20 10e6/uL    Hemoglobin 11.8 11.7 - 15.7 g/dL    Hematocrit 36.0 35.0 - 47.0 %    MCV 91 78 - 100 fL    MCH 29.9 26.5 - 33.0 pg    MCHC 32.8 31.5 - 36.5 g/dL    RDW 12.8 10.0 - 15.0 %    Platelet Count 160 150 - 450 10e3/uL   Hepatic panel    Collection Time: 10/29/21  8:06 AM   Result Value Ref Range    Bilirubin Total 0.5 0.2 - 1.3 mg/dL    Bilirubin Direct 0.2 0.0 - 0.2 mg/dL    Protein Total 7.2 6.8 - 8.8 g/dL    Albumin 3.2 (L) 3.4 - 5.0 g/dL    Alkaline Phosphatase 82 40 - 150 U/L    AST 8 0 - 45 U/L    ALT 13 0 - 50 U/L   Lipid Profile    Collection Time: 10/29/21  8:06 AM   Result Value Ref Range    Cholesterol 127 <200 mg/dL    Triglycerides 80 <150 mg/dL    Direct Measure HDL 30 (L) >=50 mg/dL    LDL Cholesterol Calculated 81 <=100 mg/dL    Non HDL Cholesterol 97 <130 mg/dL    Patient Fasting > 8hrs? Yes    Protein  random urine with Creat Ratio    Collection Time: 10/29/21  8:20 AM   Result Value Ref Range    Total Protein Random Urine g/L 0.62 g/L    Total Protein Urine g/gr Creatinine 0.38 (H) 0.00 - 0.20 g/g Cr    Creatinine Urine mg/dL 165 mg/dL   UA reflex to Microscopic    Collection Time: 10/29/21  8:20 AM   Result Value Ref Range    Color Urine Yellow Colorless, Straw, Light Yellow, Yellow    Appearance Urine Clear Clear    Glucose Urine Negative Negative mg/dL    Bilirubin Urine Negative Negative    Ketones Urine Negative Negative mg/dL    Specific Gravity Urine 1.024 1.003 - 1.035    Blood Urine Moderate (A) Negative    pH Urine 5.0 5.0 - 7.0    Protein Albumin Urine 30  (A) Negative mg/dL    Urobilinogen Urine 2.0 Normal, 2.0 mg/dL    Nitrite Urine Negative Negative    Leukocyte Esterase Urine Negative Negative    RBC Urine 17 (H) <=2 /HPF    WBC Urine 2 <=5 /HPF    Squamous Epithelials Urine 1 <=1 /HPF    Mucus Urine Present (A) None Seen /LPF      IMPRESSION:  Ms. Lala  is now more than 3 years status post liver transplantation.  Her liver tests are excellent, as is her CBC and electrolytes.  Her creatinine is stable at 1.10.  She has received 3 doses of the Pfizer vaccine.  As mentioned, she did get the flu shot as well.      She does have some microscopic hematuria and I will refer her to Urology for a workup there.  It has been persistent now over the past year and a half.  She has not previously had a workup for the hematuria.  Otherwise, I will see her back in the clinic again in 6 months.    Thank you very much for allowing me to participate in the care of this patient.  If you have any questions regarding my recommendations, please do not hesitate to contact me.           Bradly Lilly MD      Professor of Medicine  [employer]      Executive Medical Director, Solid Organ Transplant Program  [facility]           Again, thank you for allowing me to participate in the care of your patient.        Sincerely,        Bradly Lilly MD

## 2021-10-29 NOTE — PROGRESS NOTES
HISTORY OF PRESENT ILLNESS:  I had the pleasure of seeing Bradly Lala for followup in the Liver Transplant Clinic at the [facility] on 10/29/2021.  Ms. 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    Since she was last seen, she was diagnosed with thyroid cancer.  She underwent a total laryngectomy at [facility] and had several foci of thyroid cancer, the largest of which was about 2 cm in diameter.  All the margins were negative and it appears as though surgery was curative.    She also had a flu shot about 8 days ago and did become quite ill after the flu shot.  Illness consisted of nausea and vomiting and eventually she was actually seen in the Emergency Room in the [facility] system.  They did a complete blood workup as well as a CT scan of her abdomen, none of which were remarkable.    At present, she actually feels fairly well.  She does note some very mild right upper quadrant pain.  She denies any itching or skin rash.  She has improving fatigue.  She denies any increased abdominal girth or lower extremity edema.    She denies any additional fevers or chills, cough or shortness of breath.  She denies any nausea or vomiting, diarrhea or constipation.  Her appetite is returning and her weight is for the most part unchanged.    She also had knee replacement surgery about 1 year ago and has had less than ideal outcome from that.    Current Outpatient Medications   Medication     acetaminophen (TYLENOL) 500 MG tablet     albuterol (PROAIR HFA/PROVENTIL HFA/VENTOLIN HFA) 108 (90 BASE) MCG/ACT Inhaler     alendronate (FOSAMAX) 70 MG tablet     amLODIPine (NORVASC) 5 MG tablet     apixaban ANTICOAGULANT (ELIQUIS) 5 MG tablet     calcium carbonate-vitamin D (OS-SHELDON) 500-400 MG-UNIT tablet     clobetasol (TEMOVATE) 0.05 % external ointment     docusate sodium (COLACE) 100 MG capsule     gabapentin  (NEURONTIN) 300 MG capsule     hydrOXYzine (ATARAX) 25 MG tablet     levothyroxine (SYNTHROID/LEVOTHROID) 125 MCG tablet     methocarbamol (ROBAXIN) 500 MG tablet     omeprazole 20 MG tablet     ondansetron (ZOFRAN) 4 MG tablet     polyethylene glycol (MIRALAX) 17 g packet     pramipexole (MIRAPEX) 0.25 MG tablet     senna-docusate (SENOKOT-S/PERICOLACE) 8.6-50 MG tablet     tacrolimus (GENERIC EQUIVALENT) 1 MG capsule     zolpidem (AMBIEN) 10 MG tablet     amoxicillin (AMOXIL) 500 MG capsule     ondansetron (ZOFRAN-ODT) 4 MG ODT tab     oxyCODONE (ROXICODONE) 5 MG tablet     oxyCODONE (ROXICODONE) 5 MG tablet     No current facility-administered medications for this visit.     /77   Pulse 89   Wt 58.9 kg (129 lb 12.8 oz)   SpO2 97%   BMI 22.99 kg/m      PHYSICAL EXAMINATION:    GENERAL:  She looks quite well.    HEENT:  Shows no scleral icterus or temporal muscle wasting.  CHEST:  Clear.  ABDOMEN:  No increase in girth.  No masses or tenderness to palpation are present.  Her liver is 10 cm in span without left lobe enlargement.  No spleen tip is palpable.  EXTREMITIES:  No edema.  SKIN:  No stigmata of chronic liver disease.  NEUROLOGIC: Nonfocal.      Recent Results (from the past 168 hour(s))   Basic metabolic panel    Collection Time: 10/29/21  8:06 AM   Result Value Ref Range    Sodium 139 133 - 144 mmol/L    Potassium 4.3 3.4 - 5.3 mmol/L    Chloride 107 94 - 109 mmol/L    Carbon Dioxide (CO2) 27 20 - 32 mmol/L    Anion Gap 5 3 - 14 mmol/L    Urea Nitrogen 27 7 - 30 mg/dL    Creatinine 1.10 (H) 0.52 - 1.04 mg/dL    Calcium 8.6 8.5 - 10.1 mg/dL    Glucose 120 (H) 70 - 99 mg/dL    GFR Estimate 53 (L) >60 mL/min/1.73m2   CBC with platelets    Collection Time: 10/29/21  8:06 AM   Result Value Ref Range    WBC Count 6.6 4.0 - 11.0 10e3/uL    RBC Count 3.94 3.80 - 5.20 10e6/uL    Hemoglobin 11.8 11.7 - 15.7 g/dL    Hematocrit 36.0 35.0 - 47.0 %    MCV 91 78 - 100 fL    MCH 29.9 26.5 - 33.0 pg    MCHC 32.8  31.5 - 36.5 g/dL    RDW 12.8 10.0 - 15.0 %    Platelet Count 160 150 - 450 10e3/uL   Hepatic panel    Collection Time: 10/29/21  8:06 AM   Result Value Ref Range    Bilirubin Total 0.5 0.2 - 1.3 mg/dL    Bilirubin Direct 0.2 0.0 - 0.2 mg/dL    Protein Total 7.2 6.8 - 8.8 g/dL    Albumin 3.2 (L) 3.4 - 5.0 g/dL    Alkaline Phosphatase 82 40 - 150 U/L    AST 8 0 - 45 U/L    ALT 13 0 - 50 U/L   Lipid Profile    Collection Time: 10/29/21  8:06 AM   Result Value Ref Range    Cholesterol 127 <200 mg/dL    Triglycerides 80 <150 mg/dL    Direct Measure HDL 30 (L) >=50 mg/dL    LDL Cholesterol Calculated 81 <=100 mg/dL    Non HDL Cholesterol 97 <130 mg/dL    Patient Fasting > 8hrs? Yes    Protein  random urine with Creat Ratio    Collection Time: 10/29/21  8:20 AM   Result Value Ref Range    Total Protein Random Urine g/L 0.62 g/L    Total Protein Urine g/gr Creatinine 0.38 (H) 0.00 - 0.20 g/g Cr    Creatinine Urine mg/dL 165 mg/dL   UA reflex to Microscopic    Collection Time: 10/29/21  8:20 AM   Result Value Ref Range    Color Urine Yellow Colorless, Straw, Light Yellow, Yellow    Appearance Urine Clear Clear    Glucose Urine Negative Negative mg/dL    Bilirubin Urine Negative Negative    Ketones Urine Negative Negative mg/dL    Specific Gravity Urine 1.024 1.003 - 1.035    Blood Urine Moderate (A) Negative    pH Urine 5.0 5.0 - 7.0    Protein Albumin Urine 30  (A) Negative mg/dL    Urobilinogen Urine 2.0 Normal, 2.0 mg/dL    Nitrite Urine Negative Negative    Leukocyte Esterase Urine Negative Negative    RBC Urine 17 (H) <=2 /HPF    WBC Urine 2 <=5 /HPF    Squamous Epithelials Urine 1 <=1 /HPF    Mucus Urine Present (A) None Seen /LPF      IMPRESSION:  Ms. Lala is now more than 3 years status post liver transplantation.  Her liver tests are excellent, as is her CBC and electrolytes.  Her creatinine is stable at 1.10.  She has received 3 doses of the Pfizer vaccine.  As mentioned, she did get the flu shot as well.       She does have some microscopic hematuria and I will refer her to Urology for a workup there.  It has been persistent now over the past year and a half.  She has not previously had a workup for the hematuria.  Otherwise, I will see her back in the clinic again in 6 months.    Thank you very much for allowing me to participate in the care of this patient.  If you have any questions regarding my recommendations, please do not hesitate to contact me.           Bradly Lilly MD      Professor of Medicine  [employer]      Executive Medical Director, Solid Organ Transplant Program  [facility]

## 2021-11-02 ENCOUNTER — TELEPHONE (OUTPATIENT)
Dept: TRANSPLANT | Facility: CLINIC | Age: 66
End: 2021-11-02

## 2021-11-02 ENCOUNTER — OFFICE VISIT (OUTPATIENT)
Dept: DERMATOLOGY | Facility: CLINIC | Age: 66
End: 2021-11-02
Payer: COMMERCIAL

## 2021-11-02 DIAGNOSIS — C44.629 SQUAMOUS CELL CARCINOMA OF LEFT UPPER EXTREMITY: ICD-10-CM

## 2021-11-02 DIAGNOSIS — L58.1 CHRONIC RADIATION DERMATITIS: Primary | ICD-10-CM

## 2021-11-02 DIAGNOSIS — D48.9 NEOPLASM OF UNCERTAIN BEHAVIOR: ICD-10-CM

## 2021-11-02 DIAGNOSIS — L82.0 INFLAMED SEBORRHEIC KERATOSIS: ICD-10-CM

## 2021-11-02 PROCEDURE — 17110 DESTRUCTION B9 LES UP TO 14: CPT | Performed by: DERMATOLOGY

## 2021-11-02 PROCEDURE — 99213 OFFICE O/P EST LOW 20 MIN: CPT | Mod: 25 | Performed by: DERMATOLOGY

## 2021-11-02 PROCEDURE — 11102 TANGNTL BX SKIN SINGLE LES: CPT | Mod: XS | Performed by: DERMATOLOGY

## 2021-11-02 PROCEDURE — 88305 TISSUE EXAM BY PATHOLOGIST: CPT | Mod: TC | Performed by: DERMATOLOGY

## 2021-11-02 RX ORDER — CLOBETASOL PROPIONATE 0.5 MG/G
OINTMENT TOPICAL 2 TIMES DAILY
Qty: 30 G | Refills: 0 | Status: SHIPPED | OUTPATIENT
Start: 2021-11-02 | End: 2022-04-07

## 2021-11-02 ASSESSMENT — PAIN SCALES - GENERAL: PAINLEVEL: NO PAIN (0)

## 2021-11-02 NOTE — LETTER
11/2/2021       RE: Sherrie Lala  [address] [facility] [address]     Dear Colleague,    Thank you for referring your patient, Sherrie Lala, to the [facility] DERMATOLOGY [facility] [place] [facility]. Please see a copy of my visit note below.    [facility] Dermatology Note  Encounter Date: Nov 2, 2021  Office Visit     Dermatology Problem List:  # NUB  - right medial cheek, s/p shave 11/2/21  1. Hx liver transplant in August 2018 on tacrolimus  2. Hypopigmented macule with features of sclerosis, radiation dermatitis s/p bx 7/6/2021  - clobetasol ointment 0.05% BID for 3 weeks  3. Seborrheic keratoses on the right temple and posterior right back  - Cryotherapy of both areas on 10/1/19 and 7/6/21  - Cryotherapy of collar line 11/2/21  4. 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 C/f radiation dermatitis, but LS also considered. Pt denies vulvur changes or symptoms so lower likelihood. Biopsy from July suggestive of radiation dermatitis.  - Will start clobetasol ointment 0.05% BID for 3 weeks  - Discussed risks of skin atrophy with medication so will use in short bursts  - If no improvement, will stop use     # Inflamed Seborrheic keratoses on the right and left shirt collar line x4. Itch and are irritated with clothes and jewelry  - Cryotherapy of both areas performed today    # Hx liver transplant in 2018 on tacrolimus.  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 After discussion of benefits and risks including but not limited to bleeding, infection, scar, incomplete removal, recurrence, and non-diagnostic biopsy, written consent and photographs were obtained. The area was cleaned with isopropyl alcohol. 0.5mL of 1% lidocaine with epinephrine was injected to obtain adequate anesthesia of lesion(s). Shave biopsy at site(s) performed. Hemostasis was achieved with aluminium chloride. Petrolatum ointment and a sterile dressing were applied. The patient tolerated the procedure and no complications were noted. The patient was provided with verbal and written post care instructions.   - Cryotherapy procedure note, location(s): left and right shirt collar line. After verbal consent and discussion of risks and benefits including, but not limited to, dyspigmentation/scar, blister, and pain, 4 lesion(s) was(were) treated with 1-2 mm freeze border for 1-2 cycles with liquid nitrogen. Post cryotherapy instructions were provided.    Follow-up: 6 month(s) in-person, or earlier for new or changing lesions    Staff and Resident:  IShawn MD, discussed and evaluated the patient with Dr. Goetz.    Scribe Disclosure:  LUTHER, Apollo Rivera, am serving as a scribe to document services personally performed by Eldon Goetz MD based on data collection and the provider's statements to me.     Staff Physician Comments:   I saw and evaluated the patient with the resident and I agree with the assessment and plan.  I was present for the key portions of the above major procedure and examination. I have made edits if needed.    Eldon Goetz MD  Staff Dermatologist and Dermatopathologist  , Department of Dermatology    ____________________________________________    CC: Skin Check (Sherrie is here today for a skin check.)    HPI:  Ms. Sherrie Lala is a(n) 66 year old female who presents today as a return patient for skin  check. Last seen by Dr. Goetz on 7/6/2021, at which time patient underwent punch biopsy for treatment of hypopigmented macule with features of sclerosis which returned as consistent with radiation dermatitis. Additionally, patient underwent cryo for treatment of seborrheic keratoses.    Today,states radiation dermatitis rash is still itchy. Her areas frozen with LN have since flattened and are no longer itchy. She has more dark scaly spots that she says are itchy and get irritated with jewelry and clothes. Using sunscreen and sun protective clothing. No new, bleeding, growing or painful lesions.    Patient is otherwise feeling well, without additional skin concerns.    Labs Reviewed:  N/A    Physical Exam:  Vitals: There were no vitals taken for this visit.  SKIN: Waist-up skin, which includes the head/face, neck, both arms, chest, back, abdomen, digits and/or nails was examined. Plus lower legs  - 3 mm pink papule with pigment globule at 9 o'clock position of right medial cheek  - Overlying right scapula, there is a scaly plaque that is hypopigmented  - Central back there is a hypopigmented patch  - Multiple waxy, stuck on appearing papules and plaques of face, arms, trunk; around shirt line there is erythematous base  - Previous site of skin cancer examined with no evidence of recurrence  - No other lesions of concern on areas examined.     Medications:  Current Outpatient Medications   Medication     acetaminophen (TYLENOL) 500 MG tablet     albuterol (PROAIR HFA/PROVENTIL HFA/VENTOLIN HFA) 108 (90 BASE) MCG/ACT Inhaler     alendronate (FOSAMAX) 70 MG tablet     amLODIPine (NORVASC) 5 MG tablet     amoxicillin (AMOXIL) 500 MG capsule     apixaban ANTICOAGULANT (ELIQUIS) 5 MG tablet     calcium carbonate-vitamin D (OS-SHELDON) 500-400 MG-UNIT tablet     clobetasol (TEMOVATE) 0.05 % external ointment     clobetasol (TEMOVATE) 0.05 % external ointment     docusate sodium (COLACE) 100 MG capsule     gabapentin  (NEURONTIN) 300 MG capsule     hydrOXYzine (ATARAX) 25 MG tablet     levothyroxine (SYNTHROID/LEVOTHROID) 125 MCG tablet     methocarbamol (ROBAXIN) 500 MG tablet     omeprazole 20 MG tablet     ondansetron (ZOFRAN) 4 MG tablet     ondansetron (ZOFRAN-ODT) 4 MG ODT tab     oxyCODONE (ROXICODONE) 5 MG tablet     oxyCODONE (ROXICODONE) 5 MG tablet     polyethylene glycol (MIRALAX) 17 g packet     pramipexole (MIRAPEX) 0.25 MG tablet     senna-docusate (SENOKOT-S/PERICOLACE) 8.6-50 MG tablet     tacrolimus (GENERIC EQUIVALENT) 1 MG capsule     zolpidem (AMBIEN) 10 MG tablet     No current facility-administered medications for this visit.      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)     Hypervolemia     Positive sputum culture in cadaveric donor     Positive urine culture in cadaveric donor     Other closed displaced fracture of proximal end of left humerus with routine healing, subsequent encounter     Shortness of breath     Primary localized osteoarthritis of right knee     Elevated LFTs     Acute deep vein thrombosis (DVT) of right lower extremity (H)     Cancer (H)     Past Medical History:   Diagnosis Date     Antiplatelet or antithrombotic long-term use      Asthma      Cancer (H) 07/06/2021     Cholangiocarcinoma (H) 06/2014     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

## 2021-11-02 NOTE — LETTER
OUTPATIENT OR TRANSITIONAL CARE  LABORATORY TEST ORDER  [facility]  Fax# [phone]     Patient Name:    Sherrie Lala                                           Transplant Date:   8/30/2018   YOB: 1955                                               Issue Date:           11/01/2021   [facility] MR#:    [id number]                                           Expiration Date:   10/29/2022        Diagnoses:            [x]??      Liver Transplant (ICD-10 Z94.4)                                 [x]??      Long term use of medications (ICD-10 Z79.899)      Please fax results to [phone]     Frequency: this week        Urine culture       If you have questions, please call [phone] or [phone].    .

## 2021-11-02 NOTE — NURSING NOTE
Lidocaine-epinephrine 1-1:204487 % injection   0.5mL once for one use, starting 11/2/2021 ending 11/2/2021,  2mL disp, R-0, injection  Injected by Lolita Hoyos, CMA

## 2021-11-02 NOTE — NURSING NOTE
Dermatology Rooming Note    Sherrie Lala's goals for this visit include:   Chief Complaint   Patient presents with     Skin Check     Sherrie is here today for a skin check.     Lolita Hoyos, CMA

## 2021-11-02 NOTE — PATIENT INSTRUCTIONS
Cryotherapy Instructions    For the areas treated with liquid nitrogen (cryotherapy or freezing) today, they are expected to get pink, puffy, and perhaps even blister. The area should then crust up and fall off and the goal is to have nice new skin underneath. There is nothing special that you need to do for these areas. You can wash them as you do normal skin.     Sometimes a blister develops; if a blister does develop do NOT pop it. However, if it breaks open on its own, be sure to wash it with soap and water daily and put plain vasaline or petroleum jelly and a bandaid on it until the skin is healed.     Please call the clinic if you have any questions or concerns.       Shave Biopsy Instructions    For the biopsied area, leave the bandaid on for 24 hours. After 24 hours, wash the biopsied area with soap and water and then put on a Aquaphor, Vasaline, Vaniply, or plain petroleum jelly and cover a bandaid. Wash and replace the ointment and a bandaid every day until the area heals. If you develop spreading redness, pus, or tenderness, please call the clinic. You may experience some mild itching as the skin heals. Please try to avoid scratching the area.     If the biopsy area were to start to bleed, apply firm direct pressure for 15 minutes without peeking. If after 15 minutes, the area is still bleeding, you can repeat the 15 minutes of pressure for 2 more times. But, if after 45 minutes, it is still bleeding, please call the clinic or go to urgent care/the emergency department.     It may take up to 2 weeks to get a result from the biopsy taken today. If you have not received a message or notification of the result after 2 weeks, please call our office.       Start clobetasol ointment twice daily for 3 weeks. If it helps, great! Okay to use for short bursts at a time. If no improvement, stop using.    Wound Care After a Biopsy    What is a skin biopsy?  A skin biopsy allows the doctor to examine a very small  piece of tissue under the microscope to determine the diagnosis and the best treatment for the skin condition. A local anesthetic (numbing medicine)  is injected with a very small needle into the skin area to be tested. A small piece of skin is taken from the area. Sometimes a suture (stitch) is used.     What are the risks of a skin biopsy?  I will experience scar, bleeding, swelling, pain, crusting and redness. I may experience incomplete removal or recurrence. Risks of this procedure are excessive bleeding, bruising, infection, nerve damage, numbness, thick (hypertrophic or keloidal) scar and non-diagnostic biopsy.    How should I care for my wound for the first 24 hours?    Keep the wound dry and covered for 24 hours    If it bleeds, hold direct pressure on the area for 15 minutes. If bleeding does not stop then go to the emergency room    Avoid strenuous exercise the first 1-2 days or as your doctor instructs you    How should I care for the wound after 24 hours?    After 24 hours, remove the bandage    You may bathe or shower as normal    If you had a scalp biopsy, you can shampoo as usual and can use shower water to clean the biopsy site daily    Clean the wound twice a day with gentle soap and water    Do not scrub, be gentle    Apply white petroleum/Vaseline after cleaning the wound with a cotton swab or a clean finger, and keep the site covered with a Bandaid /bandage. Bandages are not necessary with a scalp biopsy    If you are unable to cover the site with a Bandaid /bandage, re-apply ointment 2-3 times a day to keep the site moist. Moisture will help with healing    Avoid strenuous activity for first 1-2 days    Avoid lakes, rivers, pools, and oceans until the stitches are removed or the site is healed    How do I clean my wound?    Wash hands thoroughly with soap or use hand  before all wound care    Clean the wound with gentle soap and water    Apply white petroleum/Vaseline  to wound after  it is clean    Replace the Bandaid /bandage to keep the wound covered for the first few days or as instructed by your doctor    If you had a scalp biopsy, warm shower water to the area on a daily basis should suffice    What should I use to clean my wound?     Cotton-tipped applicators (Qtips )    White petroleum jelly (Vaseline ). Use a clean new container and use Q-tips to apply.    Bandaids   as needed    Gentle soap     How should I care for my wound long term?    Do not get your wound dirty    Keep up with wound care for one week or until the area is healed.    A small scab will form and fall off by itself when the area is completely healed. The area will be red and will become pink in color as it heals. Sun protection is very important for how your scar will turn out. Sunscreen with an SPF 30 or greater is recommended once the area is healed.    If you have stitches, stitches need to be removed in 14 days. You may return to our clinic for this or you may have it done locally at your doctor s office.    You should have some soreness but it should be mild and slowly go away over several days. Talk to your doctor about using tylenol for pain,    When should I call my doctor?  If you have increased:     Pain or swelling    Pus or drainage (clear or slightly yellow drainage is ok)    Temperature over 100F    Spreading redness or warmth around wound    When will I hear about my results?  The biopsy results can take 2 weeks to come back.  Your results will automatically release to [employer] before your provider has even reviewed them.  The clinic will call you with the results, send you a [employer] message, or have you schedule a follow-up clinic or phone time to discuss the results.  Contact our clinics if you do not hear from us in 2 weeks.    Who should I call with questions?    [facility]: [phone]    [facility]: [phone]    For urgent needs  outside of business hours call the [facility] at [phone] and ask for the dermatology resident on call

## 2021-11-02 NOTE — PROGRESS NOTES
[facility] Dermatology Note  Encounter Date: Nov 2, 2021  Office Visit     Dermatology Problem List:  # NUB  - right medial cheek, s/p shave 11/2/21  1. Hx liver transplant in August 2018 on tacrolimus  2. Hypopigmented macule with features of sclerosis, radiation dermatitis s/p bx 7/6/2021  - clobetasol ointment 0.05% BID for 3 weeks  3. Seborrheic keratoses on the right temple and posterior right back  - Cryotherapy of both areas on 10/1/19 and 7/6/21  - Cryotherapy of collar line 11/2/21  4. 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 C/f radiation dermatitis, but LS also considered. Pt denies vulvur changes or symptoms so lower likelihood. Biopsy from July suggestive of radiation dermatitis.  - Will start clobetasol ointment 0.05% BID for 3 weeks  - Discussed risks of skin atrophy with medication so will use in short bursts  - If no improvement, will stop use     # Inflamed Seborrheic keratoses on the right and left shirt collar line x4. Itch and are irritated with clothes and jewelry  - Cryotherapy of both areas performed today    # Hx liver transplant in 2018 on tacrolimus.  # Well-differentiated SCC, left shoulder s/p excision 12/9/19  - Regular skin checks recommended  - Recommended daily SPF > 35 and sun protective clothing    Procedures Performed:   - Shave biopsy procedure note, location(s): right medial cheek. After discussion of benefits and risks including but not limited to bleeding, infection, scar, incomplete removal, recurrence, and non-diagnostic biopsy, written consent and photographs were obtained. The area was cleaned with isopropyl alcohol. 0.5mL of 1%  lidocaine with epinephrine was injected to obtain adequate anesthesia of lesion(s). Shave biopsy at site(s) performed. Hemostasis was achieved with aluminium chloride. Petrolatum ointment and a sterile dressing were applied. The patient tolerated the procedure and no complications were noted. The patient was provided with verbal and written post care instructions.   - Cryotherapy procedure note, location(s): left and right shirt collar line. After verbal consent and discussion of risks and benefits including, but not limited to, dyspigmentation/scar, blister, and pain, 4 lesion(s) was(were) treated with 1-2 mm freeze border for 1-2 cycles with liquid nitrogen. Post cryotherapy instructions were provided.    Follow-up: 6 month(s) in-person, or earlier for new or changing lesions    Staff and Resident:  IShawn MD, discussed and evaluated the patient with Dr. Goetz.    Scribe Disclosure:  IApollo, am serving as a scribe to document services personally performed by Eldon Goetz MD based on data collection and the provider's statements to me.     Staff Physician Comments:   I saw and evaluated the patient with the resident and I agree with the assessment and plan.  I was present for the key portions of the above major procedure and examination. I have made edits if needed.    Eldon Goetz MD  Staff Dermatologist and Dermatopathologist  , Department of Dermatology    ____________________________________________    CC: Skin Check (Sherrie is here today for a skin check.)    HPI:  Ms. Sherrie Lala is a(n) 66 year old female who presents today as a return patient for skin check. Last seen by Dr. Goetz on 7/6/2021, at which time patient underwent punch biopsy for treatment of hypopigmented macule with features of sclerosis which returned as consistent with radiation dermatitis. Additionally, patient underwent cryo for treatment of seborrheic keratoses.    Today,states  radiation dermatitis rash is still itchy. Her areas frozen with LN have since flattened and are no longer itchy. She has more dark scaly spots that she says are itchy and get irritated with jewelry and clothes. Using sunscreen and sun protective clothing. No new, bleeding, growing or painful lesions.    Patient is otherwise feeling well, without additional skin concerns.    Labs Reviewed:  N/A    Physical Exam:  Vitals: There were no vitals taken for this visit.  SKIN: Waist-up skin, which includes the head/face, neck, both arms, chest, back, abdomen, digits and/or nails was examined. Plus lower legs  - 3 mm pink papule with pigment globule at 9 o'clock position of right medial cheek  - Overlying right scapula, there is a scaly plaque that is hypopigmented  - Central back there is a hypopigmented patch  - Multiple waxy, stuck on appearing papules and plaques of face, arms, trunk; around shirt line there is erythematous base  - Previous site of skin cancer examined with no evidence of recurrence  - No other lesions of concern on areas examined.     Medications:  Current Outpatient Medications   Medication     acetaminophen (TYLENOL) 500 MG tablet     albuterol (PROAIR HFA/PROVENTIL HFA/VENTOLIN HFA) 108 (90 BASE) MCG/ACT Inhaler     alendronate (FOSAMAX) 70 MG tablet     amLODIPine (NORVASC) 5 MG tablet     amoxicillin (AMOXIL) 500 MG capsule     apixaban ANTICOAGULANT (ELIQUIS) 5 MG tablet     calcium carbonate-vitamin D (OS-SHELDON) 500-400 MG-UNIT tablet     clobetasol (TEMOVATE) 0.05 % external ointment     clobetasol (TEMOVATE) 0.05 % external ointment     docusate sodium (COLACE) 100 MG capsule     gabapentin (NEURONTIN) 300 MG capsule     hydrOXYzine (ATARAX) 25 MG tablet     levothyroxine (SYNTHROID/LEVOTHROID) 125 MCG tablet     methocarbamol (ROBAXIN) 500 MG tablet     omeprazole 20 MG tablet     ondansetron (ZOFRAN) 4 MG tablet     ondansetron (ZOFRAN-ODT) 4 MG ODT tab     oxyCODONE (ROXICODONE) 5 MG tablet      oxyCODONE (ROXICODONE) 5 MG tablet     polyethylene glycol (MIRALAX) 17 g packet     pramipexole (MIRAPEX) 0.25 MG tablet     senna-docusate (SENOKOT-S/PERICOLACE) 8.6-50 MG tablet     tacrolimus (GENERIC EQUIVALENT) 1 MG capsule     zolpidem (AMBIEN) 10 MG tablet     No current facility-administered medications for this visit.      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)     Hypervolemia     Positive sputum culture in cadaveric donor     Positive urine culture in cadaveric donor     Other closed displaced fracture of proximal end of left humerus with routine healing, subsequent encounter     Shortness of breath     Primary localized osteoarthritis of right knee     Elevated LFTs     Acute deep vein thrombosis (DVT) of right lower extremity (H)     Cancer (H)     Past Medical History:   Diagnosis Date     Antiplatelet or antithrombotic long-term use      Asthma      Cancer (H) 07/06/2021     Cholangiocarcinoma (H) 06/2014     Cirrhosis of liver with ascites (H) 05/10/2018     Encounter for pleural drainage tube placement      Esophageal varices with hemorrhage (H)      Gastric ulcer      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

## 2021-11-04 LAB
PATH REPORT.COMMENTS IMP SPEC: NORMAL
PATH REPORT.COMMENTS IMP SPEC: NORMAL
PATH REPORT.FINAL DX SPEC: NORMAL
PATH REPORT.GROSS SPEC: NORMAL
PATH REPORT.MICROSCOPIC SPEC OTHER STN: NORMAL
PATH REPORT.RELEVANT HX SPEC: NORMAL

## 2021-11-09 NOTE — TELEPHONE ENCOUNTER
MEDICAL RECORDS REQUEST   [facility]  Urology Clinic  [address] [place] [address]  PHONE: [phone]  Fax: [phone]        FUTURE VISIT INFORMATION                                                   Sherrie Lala, : 1955 scheduled for future visit at [facility]    APPOINTMENT INFORMATION:    Date: 21    Provider:  [facility]    Reason for Visit/Diagnosis: appt per hepatology- microscopic hematuria, med recs in epic    REFERRAL INFORMATION:    Referring provider:  [facility]    Specialty: Gastro    Referring providers clinic:  [place]    Clinic contact number:      RECORDS REQUESTED FOR VISIT                                                     NOTES  STATUS/DETAILS   OFFICE NOTE from referring provider  yes   OFFICE NOTE from other specialist  no   DISCHARGE SUMMARY from hospital  no   DISCHARGE REPORT from the ER  no   OPERATIVE REPORT  no   MEDICATION LIST  yes   LABS     URINALYSIS (UA)  yes   URINE CYTOLOGY  no     PRE-VISIT CHECKLIST      Record collection complete Yes - records in Epic   Appointment appropriately scheduled           (right time/right provider) Yes   Joint diagnostic appointment coordinated correctly          (ensure right order & amount of time) Yes   MyChart activation Yes   Questionnaire complete No

## 2021-11-11 ENCOUNTER — PRE VISIT (OUTPATIENT)
Dept: UROLOGY | Facility: CLINIC | Age: 66
End: 2021-11-11
Payer: COMMERCIAL

## 2021-11-11 NOTE — TELEPHONE ENCOUNTER
Reason for visit: hematuria consult     Relevant information: history of Liver Tx    Records/imaging/labs/orders: all records available    Pt called: no need for a call    At Rooming: collect a urine/pvr unless ascites, then catheterized PVR

## 2021-11-17 ENCOUNTER — OFFICE VISIT - RIVER FALLS (OUTPATIENT)
Dept: FAMILY MEDICINE | Facility: CLINIC | Age: 66
End: 2021-11-17
Payer: COMMERCIAL

## 2021-11-18 ENCOUNTER — PRE VISIT (OUTPATIENT)
Dept: UROLOGY | Facility: CLINIC | Age: 66
End: 2021-11-18

## 2021-12-16 ENCOUNTER — COMMUNICATION - RIVER FALLS (OUTPATIENT)
Dept: FAMILY MEDICINE | Facility: CLINIC | Age: 66
End: 2021-12-16
Payer: COMMERCIAL

## 2021-12-29 DIAGNOSIS — I10 BENIGN ESSENTIAL HYPERTENSION: ICD-10-CM

## 2021-12-29 RX ORDER — AMLODIPINE BESYLATE 5 MG/1
TABLET ORAL
Qty: 90 TABLET | Refills: 3 | Status: SHIPPED | OUTPATIENT
Start: 2021-12-29 | End: 2023-01-19

## 2022-02-11 VITALS
SYSTOLIC BLOOD PRESSURE: 116 MMHG | DIASTOLIC BLOOD PRESSURE: 78 MMHG | WEIGHT: 136.6 LBS | TEMPERATURE: 96.2 F | BODY MASS INDEX: 23.7 KG/M2 | WEIGHT: 133.8 LBS | BODY MASS INDEX: 24.2 KG/M2 | HEART RATE: 80 BPM | SYSTOLIC BLOOD PRESSURE: 118 MMHG | DIASTOLIC BLOOD PRESSURE: 74 MMHG | HEART RATE: 79 BPM

## 2022-02-11 VITALS
SYSTOLIC BLOOD PRESSURE: 116 MMHG | BODY MASS INDEX: 22.61 KG/M2 | DIASTOLIC BLOOD PRESSURE: 92 MMHG | RESPIRATION RATE: 18 BRPM | OXYGEN SATURATION: 98 % | TEMPERATURE: 98.3 F | HEART RATE: 84 BPM | OXYGEN SATURATION: 97 % | SYSTOLIC BLOOD PRESSURE: 110 MMHG | TEMPERATURE: 97.4 F | WEIGHT: 130 LBS | DIASTOLIC BLOOD PRESSURE: 76 MMHG | HEIGHT: 63 IN | SYSTOLIC BLOOD PRESSURE: 110 MMHG | WEIGHT: 126 LBS | OXYGEN SATURATION: 98 % | WEIGHT: 127.6 LBS | HEART RATE: 80 BPM | HEART RATE: 60 BPM | DIASTOLIC BLOOD PRESSURE: 62 MMHG | TEMPERATURE: 97.8 F | SYSTOLIC BLOOD PRESSURE: 144 MMHG | BODY MASS INDEX: 22.32 KG/M2 | BODY MASS INDEX: 23.78 KG/M2 | HEIGHT: 63 IN | DIASTOLIC BLOOD PRESSURE: 78 MMHG | HEIGHT: 63 IN | HEART RATE: 86 BPM | BODY MASS INDEX: 22.32 KG/M2 | TEMPERATURE: 97 F

## 2022-02-11 VITALS
HEART RATE: 79 BPM | DIASTOLIC BLOOD PRESSURE: 66 MMHG | TEMPERATURE: 97.9 F | OXYGEN SATURATION: 96 % | BODY MASS INDEX: 23.67 KG/M2 | SYSTOLIC BLOOD PRESSURE: 110 MMHG | WEIGHT: 133.6 LBS

## 2022-02-11 VITALS
HEART RATE: 81 BPM | DIASTOLIC BLOOD PRESSURE: 68 MMHG | SYSTOLIC BLOOD PRESSURE: 104 MMHG | HEART RATE: 73 BPM | TEMPERATURE: 97.5 F | OXYGEN SATURATION: 98 % | TEMPERATURE: 98.1 F | WEIGHT: 136 LBS | BODY MASS INDEX: 24.09 KG/M2 | OXYGEN SATURATION: 99 % | DIASTOLIC BLOOD PRESSURE: 78 MMHG | BODY MASS INDEX: 23.4 KG/M2 | SYSTOLIC BLOOD PRESSURE: 122 MMHG | WEIGHT: 132.1 LBS

## 2022-02-11 VITALS
BODY MASS INDEX: 22.36 KG/M2 | TEMPERATURE: 98 F | WEIGHT: 126.2 LBS | SYSTOLIC BLOOD PRESSURE: 104 MMHG | HEART RATE: 74 BPM | DIASTOLIC BLOOD PRESSURE: 67 MMHG

## 2022-02-11 VITALS
DIASTOLIC BLOOD PRESSURE: 64 MMHG | SYSTOLIC BLOOD PRESSURE: 110 MMHG | HEIGHT: 63 IN | WEIGHT: 125 LBS | TEMPERATURE: 97.4 F | BODY MASS INDEX: 22.15 KG/M2 | HEART RATE: 76 BPM

## 2022-02-11 VITALS
DIASTOLIC BLOOD PRESSURE: 62 MMHG | SYSTOLIC BLOOD PRESSURE: 110 MMHG | HEART RATE: 96 BPM | HEART RATE: 87 BPM | TEMPERATURE: 97.1 F | BODY MASS INDEX: 22.14 KG/M2 | TEMPERATURE: 96.7 F | OXYGEN SATURATION: 93 % | TEMPERATURE: 99.4 F | HEIGHT: 63 IN | BODY MASS INDEX: 21.55 KG/M2 | WEIGHT: 121.6 LBS | HEART RATE: 94 BPM | OXYGEN SATURATION: 95 % | OXYGEN SATURATION: 98 % | SYSTOLIC BLOOD PRESSURE: 102 MMHG | SYSTOLIC BLOOD PRESSURE: 104 MMHG | DIASTOLIC BLOOD PRESSURE: 58 MMHG | WEIGHT: 121.2 LBS | DIASTOLIC BLOOD PRESSURE: 60 MMHG | BODY MASS INDEX: 21.48 KG/M2 | HEIGHT: 63 IN | WEIGHT: 125 LBS

## 2022-02-11 VITALS
WEIGHT: 127 LBS | SYSTOLIC BLOOD PRESSURE: 116 MMHG | BODY MASS INDEX: 21.68 KG/M2 | TEMPERATURE: 97.9 F | DIASTOLIC BLOOD PRESSURE: 79 MMHG | HEART RATE: 75 BPM | HEIGHT: 64 IN | OXYGEN SATURATION: 97 %

## 2022-02-11 VITALS
HEIGHT: 64 IN | SYSTOLIC BLOOD PRESSURE: 109 MMHG | OXYGEN SATURATION: 100 % | TEMPERATURE: 97.3 F | BODY MASS INDEX: 22.63 KG/M2 | DIASTOLIC BLOOD PRESSURE: 66 MMHG | HEART RATE: 83 BPM

## 2022-02-11 VITALS
BODY MASS INDEX: 21.62 KG/M2 | HEIGHT: 63 IN | RESPIRATION RATE: 16 BRPM | OXYGEN SATURATION: 97 % | WEIGHT: 122 LBS | SYSTOLIC BLOOD PRESSURE: 90 MMHG | DIASTOLIC BLOOD PRESSURE: 66 MMHG | HEART RATE: 72 BPM | TEMPERATURE: 96.8 F

## 2022-02-11 VITALS
BODY MASS INDEX: 22.83 KG/M2 | SYSTOLIC BLOOD PRESSURE: 122 MMHG | OXYGEN SATURATION: 99 % | HEART RATE: 81 BPM | TEMPERATURE: 97.5 F | WEIGHT: 124.8 LBS | DIASTOLIC BLOOD PRESSURE: 78 MMHG

## 2022-02-11 VITALS
TEMPERATURE: 97.7 F | HEART RATE: 76 BPM | SYSTOLIC BLOOD PRESSURE: 114 MMHG | OXYGEN SATURATION: 99 % | BODY MASS INDEX: 22.63 KG/M2 | HEIGHT: 64 IN | HEIGHT: 64 IN | BODY MASS INDEX: 22.16 KG/M2 | WEIGHT: 129.8 LBS | DIASTOLIC BLOOD PRESSURE: 76 MMHG

## 2022-02-11 VITALS
HEIGHT: 63 IN | HEART RATE: 68 BPM | DIASTOLIC BLOOD PRESSURE: 70 MMHG | TEMPERATURE: 97.4 F | BODY MASS INDEX: 21.79 KG/M2 | WEIGHT: 123 LBS | SYSTOLIC BLOOD PRESSURE: 104 MMHG

## 2022-02-16 NOTE — TELEPHONE ENCOUNTER
---------------------  From: Venus Contreras RN (Phone Messages Pool ([id number]))   To: [employer] Message Pool ([id number]);     Sent: 4/12/2021 8:39:10 AM CDT  Subject: Covid vaccine contraindications     Time of Call:  0814  Return call at:_     Person Calling:  patient  Phone number:  [phone]    Note:   She is wondering if she should have her second Covid vaccine on 4/14/2021. She wonders if her recent helath issues with DVT and elevated liver enzymes, and history of recent blood transfusion.    Last office visit and reason:  pre op px---------------------  From: Jacey Dowd CMA ([employer] Message Pool ([id number]))   To: Elijah Benitez MD;     Sent: 4/13/2021 7:08:18 AM CDT  Subject: FW: Covid vaccine contraindications---------------------  From: Elijah Benitez MD   To: [employer] Pool ([id number]);     Sent: 4/13/2021 7:15:32 AM CDT  Subject: RE: Covid vaccine contraindications     I would direct her to review with her transplant team.  From my standpoint, I would not advise delaying COVID vaccination.  Reports of DVT development and potential association with vaccinations seems limited to [employer] vaccine which is not available in the [place].  I don't think other recent health events would preclude vaccinationpt contacted at 0847 and advised, she expressed understanding and will  reach out to her transplant team

## 2022-02-16 NOTE — PROGRESS NOTES
Patient:   SHERRIE DAMON            MRN: [id number]            FIN: [id number]               Age:   65 years     Sex:  Female     :  1955   Associated Diagnoses:   H/O liver transplant; Immunosuppressed status; Irritable bowel syndrome   Author:   Elijah Benitez MD      Visit Information      Date of Service: 03/10/2021 09:28 am  Performing Location: [facility]  Encounter#: [id number]      Primary Care Provider (PCP):  Elijah Benitez MD    NPI# [id number]      Referring Provider:  Elijah Benitez MD    NPI# [id number]      Chief Complaint   3/10/2021 10:01 AM CST   Bowel issues - bloating, distended abd, discomfort x 3days with no BM and then explosive diarrhea - wants to know if this normal, has been since her transplant.  Constant RUQ pain            Additional Information:No additional information recorded during visit.   Chief complaint and symptoms as noted above and confirmed with patient.  Recent lab and diagnostic studies reviewed with patient      History of Present Illness   3/10/21: Sherrie presented to clinic with chronic irritable bowel complaints.  We discussed this last time in 2020 with same related complaints.  She feels like since transplantation she has had more irregularity with her bowels and will wax and wane between explosive diarrhea with significant abdominal cramping to bloated constipated features.  She has tried dietary modification without any notable improvement.  Has been seen through [place] [facility] and had diagnostic colonoscopy in the 2020 which showed only isolated adenomatous polyp without any inflammatory bowel disease.  She did have an MRI of her abdomen which did not demonstrate any small bowel ileitis.  She has mentioned symptoms to her transplant coordinator that has not felt like she is had any clear direction for further pursuits.  Weights have remained stable.  She says that her symptoms greatly impact her quality of life and would like further  guidance         Review of Systems   Constitutional:  No fever, No chills.    Eye:  Negative except as documented in history of present illness.    Ear/Nose/Mouth/Throat:  Negative except as documented in history of present illness.    Respiratory:  No shortness of breath.    Cardiovascular:  No chest pain, No palpitations, No peripheral edema, No syncope.    Gastrointestinal:  Diarrhea, Constipation, No nausea, No vomiting.         Abdominal pain: All quadrants.    Genitourinary:  No dysuria, No hematuria.    Hematology/Lymphatics:  Negative except as documented in history of present illness.    Endocrine:  No excessive thirst, No polyuria.    Immunologic:  Immunocompromised, No recurrent fevers.    Musculoskeletal:  No joint pain, No muscle pain.    Integumentary:  No skin lesion.    Neurologic:  Alert and oriented X4, No numbness, No tingling, No headache.       Health Status   Allergies:    Allergic Reactions (Selected)  Severity Not Documented  Dilaudid (Itching)  Nonallergic Reactions (Selected)  Unknown  HYDROmorphone (Itching)  Iron sucrose (Gi upset)  Sodium ferric gluconate complex (Other - describe in comment field)   Medications:  (Selected)   Prescriptions  Prescribed  Albuterol (Eqv-ProAir HFA) 90 mcg/inh inhalation aerosol: See Instructions, Instructions: INHALE 2 PUFFS FOUR TIMES DAILY AS NEEDED FOR WHEEZING, # 3 EA, 1 Refill(s), Type: Maintenance, Pharmacy: [facility], 65, in, 11/12/19 8:15:00 CST, Height Measured, 133.8, lb, 06/30/20 16:37:00 CDT, W...  alendronate 70 mg oral tablet: = 1 tab(s) ( 70 mg ), Oral, qweek, Instructions: with 6-8 oz plain water, at least 30 minutes before first food, beverage, or medication of the day, # 12 tab(s), 3 Refill(s), Type: Maintenance, Pharmacy: [facility], 1 tab(s) Oral qw...  gabapentin 300 mg oral capsule: = 2 cap(s) ( 600 mg ), Oral, hs, # 180 cap(s), 1 Refill(s), Type: Maintenance, Pharmacy: [facility] Mail  [facility], Appt due for further refills, 2 cap(s) Oral hs, 65, in, 11/12/19 8:15:00 CST, Height Measured, 133.8, lb, 06/30/20 16:37:00 CDT, We...  omeprazole 40 mg oral delayed release capsule: = 1 cap(s) ( 40 mg ), Oral, daily, # 90 cap(s), 1 Refill(s), Type: Maintenance, Pharmacy: [facility], 1 cap(s) Oral daily, 65, in, 11/12/19 8:15:00 CST, Height Measured, 136.6, lb, 05/27/20 13:53:00 CDT, Weight Measured  pramipexole 0.25 mg oral tablet: = 3 tab(s) ( 0.75 mg ), Oral, hs, # 270 tab(s), 1 Refill(s), Type: Maintenance, Pharmacy: [facility], increased dose, 3 tab(s) Oral hs, 65, in, 11/12/19 8:15:00 CST, Height Measured, 133.8, lb, 06/30/20 16:37:00 CDT, Weight Measured...  zolpidem 10 mg oral tablet: See Instructions, Instructions: TAKE 1 TABLET ONE TIME DAILY AT BEDTIME AS NEEDED FOR SLEEP, # 90 tab(s), 1 Refill(s), Type: Soft Stop, Pharmacy: [facility], TAKE 1 TABLET ONE TIME DAILY AT BEDTIME AS NEEDED FOR SLEEP, 65, in, 11/12...  Documented Medications  Documented  acetaminophen 500 mg oral tablet: 1-2 tab(s), Oral, q6 hrs, PRN: as needed for pain, 0 Refill(s), Type: Maintenance  amLODIPine 5 mg oral tablet: 0 Refill(s), Type: Soft Stop  aspirin 81 mg oral tablet: = 1 tab(s) ( 81 mg ), Oral, daily, 0 Refill(s), Type: Maintenance  tacrolimus 1 mg oral capsule: = 3 cap(s) ( 3 mg ), Oral, q12 hrs, 0 Refill(s), Type: Maintenance,    Medications          *denotes recorded medication          *acetaminophen 500 mg oral tablet: 1-2 tab(s), Oral, q6 hrs, PRN: as needed for pain, 0 Refill(s).          Albuterol (Eqv-ProAir HFA) 90 mcg/inh inhalation aerosol: See Instructions, INHALE 2 PUFFS FOUR TIMES DAILY AS NEEDED FOR WHEEZING, 3 EA, 1 Refill(s).          alendronate 70 mg oral tablet: 70 mg, 1 tab(s), Oral, qweek, with 6-8 oz plain water, at least 30 minutes before first food, beverage, or medication of the day, 12 tab(s), 3 Refill(s).          *amLODIPine 5  mg oral tablet: 0 Refill(s).          *aspirin 81 mg oral tablet: 81 mg, 1 tab(s), Oral, daily, 0 Refill(s).          gabapentin 300 mg oral capsule: 600 mg, 2 cap(s), Oral, hs, 180 cap(s), 1 Refill(s).          omeprazole 40 mg oral delayed release capsule: 40 mg, 1 cap(s), Oral, daily, 90 cap(s), 1 Refill(s).          pramipexole 0.25 mg oral tablet: 0.75 mg, 3 tab(s), Oral, hs, 270 tab(s), 1 Refill(s).          *tacrolimus 1 mg oral capsule: 3 mg, 3 cap(s), Oral, q12 hrs, 0 Refill(s).          zolpidem 10 mg oral tablet: See Instructions, TAKE 1 TABLET ONE TIME DAILY AT BEDTIME AS NEEDED FOR SLEEP, 90 tab(s), 1 Refill(s).       Problem list:    All Problems  Acquired thrombocytopenia / SNOMED CT 514673321 / Confirmed  Anticoagulated / SNOMED CT 653992619 / Confirmed  Duodenal ulcer / SNOMED CT 39622124 / Confirmed  End stage liver disease / SNOMED CT 3466786679 / Confirmed  Gastric ulcer / SNOMED CT 7221509422 / Confirmed  H/O liver transplant / SNOMED CT 638408306 / Confirmed  History of cholangiocarcinoma / SNOMED CT 0128257188 / Confirmed  H/O liver cancer / SNOMED CT 6798399147 / Confirmed  Hypertension / SNOMED CT 9903443351 / Confirmed  Hypokalemia / SNOMED CT 96027074 / Confirmed  Lung mass / SNOMED CT 484336252 / Confirmed  Asthma, moderate persistent / SNOMED CT 2635866292 / Confirmed  Osteopenia / SNOMED CT 316646297 / Confirmed  Pancytopenia / SNOMED CT 952916 / Confirmed  Restless leg syndrome / SNOMED CT 97925344 / Confirmed  Fibroid uterus / SNOMED CT 381664354 / Confirmed  Inactive: Anticoagulated / SNOMED CT 32803727  Resolved: History of DVT (deep vein thrombosis) / SNOMED CT 3161939309  Resolved: History of ankle fracture / SNOMED CT 6106196521  Resolved: History of arm fracture / SNOMED CT 4454134260  Resolved: Inpatient stay / SNOMED CT 666534120  Resolved: Inpatient stay / SNOMED CT 200131610  Resolved: Inpatient stay / SNOMED CT 026496017  Resolved: Inpatient stay / SNOMED CT  161553738  Resolved: Inpatient stay / SNOMED CT 631148354  Resolved: Inpatient stay / SNOMED CT 658344164  Resolved: Pregnancy / SNOMED CT 305414959  Resolved: Pregnancy / SNOMED CT 951872823  Canceled: Choliangiocarcinoma  Canceled: History of liver cancer / SNOMED CT 5168532801      Histories   Past Medical History:    Active  Acquired thrombocytopenia (559353428): Onset on 10/10/2012 at 57 years.  History of cholangiocarcinoma (0081258173): Onset in 2011 at 55 years.  Asthma, moderate persistent (8890026171)  Hypertension (0288984621)  Fibroid uterus (570276296)  Osteopenia (784141553)  Restless leg syndrome (13013538)  Gastric ulcer (8242760218)  Duodenal ulcer (75585017)  Lung mass (415244082)  Comments:  11/20/2017 CST 8:44 AM Debra Wilson  Right  End stage liver disease (2908484359)  Hypokalemia (01613096)  Pancytopenia (304740)  Resolved  Inpatient stay (228445337): Onset on 8/30/2018 at 63 years.  Resolved on 9/12/2018 at 63 years.  Comments:  9/18/2018 CDT 2:04 PM CDT - Jess Wellington  @ [facility] for liver transplant.  Inpatient stay (041074901): Onset on 5/3/2018 at 63 years.  Resolved on 5/4/2018 at 63 years.  Comments:  5/10/2018 CDT 10:45 AM CDT - Debra Perez  @[place], [place] - Organ transplant candidate  Inpatient stay (675310419): Onset on 10/30/2017 at 62 years.  Resolved on 11/3/2017 at 62 years.  Comments:  11/20/2017 CST 8:39 AM Debra Wilson  @[place], [place] - Cholangiocarcinoma  Inpatient stay (638583446): Onset on 3/13/2015 at 59 years.  Resolved on 3/17/2015 at 59 years.  Comments:  11/20/2017 CST 8:39 AM Debra Wilson  @[place], [place] - Bleeding esophageal varices  Inpatient stay (583189622): Onset on 6/12/2014 at 59 years.  Resolved on 6/13/2014 at 59 years.  Comments:  11/20/2017 CST 8:38 AM CST - Debra Perez  @[facility], [place] - Pneumonia  Inpatient stay (076517305): Onset on 6/22/2011 at 56  years.  Resolved.  Comments:  2017 CST 8:38 AM REYNA Perez  Debra  @[facility], [place] - Chemotherapy induced nausea and vomiting  History of DVT (deep vein thrombosis) (7085430976): Onset on 2/15/2011 at 55 years.  Resolved.  Pregnancy (161493650):  Resolved on 1979 at 24 years.  Pregnancy (637187058):  Resolved on 1981 at 26 years.  History of ankle fracture (3781987073):  Resolved.  Comments:  2017 CST 8:42 AM Debra Wilson  Right  History of arm fracture (1703073005):  Resolved.  Comments:  2017 CST 8:43 AM Debra Wilson  Right   Family History:    Hypertension  Mother ()  Sister  Osteoporosis  Mother ()  MS - Multiple sclerosis  Sister     Procedure history:    Esophagogastroduodenoscopy (849636713) on 2020 at 65 Years.  Comments:  2020 1:49 PM Tamera Velazquez  Indication: Diarrhea.  Colonoscopy (673719821) on 2020 at 65 Years.  Comments:  2020 1:48 PM Tamera Velazquez  Indication: Change in bowel habits.  Sedation: MAC.  Findings: One 10 mm sessile polyp in ascending colon.  Recommendation: Repeat in 3 years.  LTx - Liver transplant (5467328526) on 2018 at 63 Years.  Esophagogastroduodenoscopy (072777695) on 2018 at 62 Years.  Comments:  2018 1:14 PM Tamera Worley  Indication:  Varices, follow up.   Sedation:  MAC.  Recommendation:  Repeat in 1 year.  Pleural catheter (0272588106) on 2017 at 62 Years.  Comments:  2018 1:43 PM Tamera Worley  Right side insertion.  Thoracoscopic procedure (0584234727) on 10/30/2017 at 62 Years.  Comments:  2017 2:23 PM CST Jacey Buitrago CMA  Right Video assisted thoracoscopc surther  pleural biopsy  doxycycline pleurodesis  VATS - Video assisted thorascopic surgery (415436990) on 10/30/2017 at 62 Years.  Comments:  2017 2:59 PM CST - Tamera Green  Right-sided pleural biopsy.  Esophagogastroduodenoscopy (504713939) on 2016 at 61  Years.  Comments:  8/11/2016 8:13 AM CDT - Tamera Green  Indication:  Gastric ulcer follow up.  Sedation:  MAC.  Upper GI endoscopy (9624433728) on 6/17/2016 at 61 Years.  Comments:  6/23/2016 1:29 PM CDT - Jacey Dowd CMA  Acute gastric ulcer w/o hemorrhage or perforation, esophageal varices w/o bleeding.  Varices are scarred and not amenable to banding at this time  Esophagogastroduodenoscopy (222809792) on 6/17/2016 at 61 Years.  Esophagogastroduodenoscopy (886673635) on 4/11/2016 at 61 Years.  Comments:  4/19/2016 7:57 AM CDT - Tamera Green  Indication: Varices, follow up.  Sedation: MAC.  Impression:  H. pylori negative.  Upper GI endoscopy on 1/14/2016 at 60 Years.  Esophagogastroduodenoscopy (234190138) on 3/13/2015 at 59 Years.  Wedge resection of liver (722603756) in the month of 10/2012 at 57 Years.  Upper GI endoscopy (1112684822) on 6/8/2012 at 57 Years.  HPV - Human papillomavirus test negative (8994074098) on 5/8/2012 at 57 Years.  Comments:  5/14/2012 11:41 AM CDT - Xi Dove  Low risk for cervical cancer. OK to have pap q 3 yrs.  Upper GI endoscopy (3148073206) on 3/2/2012 at 56 Years.  Upper GI endoscopy (1487770169) on 1/5/2012 at 56 Years.  Upper GI endoscopy (5313100531) on 11/22/2011 at 56 Years.  Esophagogastroduodenoscopy (655413646) on 9/6/2011 at 56 Years.  Esophagogastroduodenoscopy (206895181) on 5/23/2011 at 56 Years.  Colonoscopy (322174354) on 2/10/2011 at 55 Years.  Esophagogastroduodenoscopy (492756645) on 2/10/2011 at 55 Years.  Comments:  7/10/2011 7:21 PM CDT - Doron Hutchison MD  Bleeding in the duodenal bulb  Biopsy of liver (549489655) on 1/28/2011 at 55 Years.  HPV - Human papillomavirus test negative (5248486741) on 4/15/2010 at 55 Years.  Comments:  4/26/2011 1:04 PM TOY - Rula TORREZ, Xi  Low risk for cervical cancer. OK to have pap q 3 yrs.  Mammogram - screening (738144740) on 4/9/2010 at 55 Years.  Mammography; bilateral (51757) on 4/9/2010 at 55  Years.  Comments:  2011 8:48 AM CST - Anne Marie OSORIO, Ramya  Normal-no radiographic evidence for malignancy.  DEXA - Dual energy X-ray photon absorptiometry (265368690) on 2009 at 53 Years.  Comments:  4/15/2010 1:43 PM CDT - Rula TORREZ, Xi  Due again in 5 yrs, ()  Tubal ligation (399350188) in  at 27 Years.   section (57681629) in  at 25 Years.  Primary repair of torn ligament of knee, collateral (64050367) in  at 16 Years.  Tonsillectomy and adenoidectomy (154710158) in  at 8 Years.  Arm fracture (818.0).  Comments:  2011 2:11 PM ELLENT - Nathaly Lim  Right  Arm fracture (818.0).  Comments:  2011 2:12 PM ELLENT - Nathaly Lim  Left  Ankle fracture, right (824.8).   Social History:        Electronic Cigarette/Vaping Assessment            Electronic Cigarette Use: Never.      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      Sexual Assessment            Sexually active: Yes.  Number of current partners 1.  Other contraceptive use: Ann Marie.        Physical Examination   vital signs stable, as noted above   Vital Signs   3/10/2021 10:01 AM CST Temperature Tympanic 98.1 DegF    Peripheral Pulse Rate 73 bpm    Systolic Blood Pressure 122 mmHg    Diastolic Blood Pressure 78 mmHg    Mean Arterial Pressure 93 mmHg    Oxygen Saturation 98 %      Measurements from flowsheet : Measurements   3/10/2021 10:01 AM CST   Weight Measured - Standard                136 lb     General:  Alert and oriented, No acute distress.    Respiratory:  Lungs are clear to auscultation, Respirations are non-labored.    Cardiovascular:  Normal rate, Regular rhythm, No murmur.    Gastrointestinal:  Soft, Non-tender, Non-distended, liver transplant laparotomy scar.  Soft; nondistended.  Generalized discomfort to palpation;  non-focal.    Musculoskeletal:  Normal range of motion.    Integumentary:  focal paronychia changes at base of left index finger without an clear fluctuance or pus under tension.  Not appearing amenable to drainagew.    Neurologic:  Alert, Oriented.    Cognition and Speech:  Oriented, Speech clear and coherent.    Psychiatric:  Appropriate mood & affect.       Review / Management   Results review:  Lab results   1/7/2021 11:03 AM CST Bili Total 0.7 mg/dL    Bili Direct 0.1 mg/dL    Bili Indirect 0.6    Alk Phos 64 unit/L    AST/SGOT 9 unit/L  LOW    ALT/SGPT 8 unit/L    Protein Total 6.6 gm/dL    Albumin Level 4.4 gm/dL    Globulin 2.2    A/G Ratio 2.0    WBC 4.9    RBC 4.46    Hgb 13.5 gm/dL    Hct 39.9 %    MCV 89.5 fL    MCH 30.3 pg    MCHC 33.8 gm/dL    RDW 12.5 %    Platelet 139  LOW    MPV 11.1 fL    Lymphs 19.8 %    Abs Lymphs 970    Neutrophils 63.1 %    Abs Neutrophils 3,092    Monocytes 6.5 %    Abs Monocytes 319    Eosinophils 10.0 %    Abs Eosinophils 490    Basophils 0.6 %    Abs Basophils 29    Tacrolimus () Dose 4.8 mcg/L  LOW   .       Impression and Plan   Diagnosis     H/O liver transplant (FMY82-PZ Z94.4).     Immunosuppressed status (NWH44-WE D89.9).     Irritable bowel syndrome (YQD48-SW K58.9).         .) chronic diarrhea/IBS complaints   - not maintained on Cellcept (had been on Cellcept initially after transplant)   - normal celiac serologic testing   - (7/2019) C. 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 and  previous partial hepatectomy (h/o cholangiocarcinoma)  transplant coordinator: Abigail Dione: [phone]   - complicated by portal vein thrombosis - completed 1 yr of warfarin therapy   - induction IS: basilixumab, maintenance: tacrolimus   - CMV -/-, EBV +/+   - doing remarkably well    plan as previously outlined and not discussed at today's visit     .) osteoporosis, severe   - DEXA (8/2019): T scores ranging between -2.8 to -3.6; FRAX score 17%/6%   - previous traumatic humeral fracture after OLTx   - calcium/vitamin D   - alendronate 70mg qweek    .) recurrent, metastatic cholangiocarcinoma; followed by Dr. Langford   - originally diagnosed in 1/2011   - s/p neoadjuvant cisplatin + gemcitabine followed by bulk resection, recurrent with further debulking in 10/2012   - in 8/2013, biopsy confirmation of lung nodule as cholangiocarcinoma, s/p XRT   - most recent surveillance CT C/A/P showing no active malignancy

## 2022-02-16 NOTE — NURSING NOTE
Depression Screening Entered On:  12/30/2020 3:14 PM CST    Performed On:  12/30/2020 3:14 PM CST by Jacey Dowd CMA               Depression Screening   Little Interest - Pleasure in Activities :   Not at all   Feeling Down, Depressed, Hopeless :   Not at all   Initial Depression Screen Score :   0 Score   Poor Appetite or Overeating :   Not at all   Trouble Falling or Staying Asleep :   Not at all   Feeling Tired or Little Energy :   Not at all   Feeling Bad About Yourself :   Not at all   Trouble Concentrating :   Not at all   Moving or Speaking Slowly :   Not at all   Thoughts Better Off Dead or Hurting Self :   Not at all   BRENDA Difficulty with Work, Home, Others :   Not difficult at all   Detailed Depression Screen Score :   0    Total Depression Screen Score :   0    Jacey Dowd CMA - 12/30/2020 3:14 PM CST

## 2022-02-16 NOTE — TELEPHONE ENCOUNTER
Patient has appointment to see Dr. Miguelangel Alvarez at [facility] in [place] 02/21/2019 at 2:00pm.

## 2022-02-16 NOTE — RESULTS
Anticoagulation Therapy Entered On:  2/15/2019 4:31 PM CST    Performed On:  2/15/2019 2:17 PM CST by Venus Contreras RN               Warfarin Management   Week 1 Sunday Dose :   4    Week 1 Monday Dose :   2    Week 1 Tuesday Dose :   4    Week 1 Wednesday Dose :   4    Week 1 Thursday Dose :   4    Week 1 Friday Dose :   2    Week 1 Saturday Dose :   4    Week 1 Dose Total :   24    Week 2 Sunday Dose :   4    Week 2 Monday Dose :   2    Week 2 Tuesday Dose :   4    Week 2 Wednesday Dose :   4    Week 2 Thursday Dose :   4    Week 2 Friday Dose :   2    Week 2 Saturday Dose :   4    Week 2 Dose Total :   24    Planned Duration :   Indefinite   Indication :   Other: Post liver transplant   Warfarin Management Comments :   [facility]  [address], [address]  [phone]  Capillary Specimen     International Normalization Ratio :   2.0    INR Range :   Other   INR Therapeutic Range :   Yes   Warfarin Abnormal Findings :   none   Anticoagulation Recheck :   4 weeks   Warfarin Physician :   Elijah Benitez MD   Warfarin Special Instructions :   INR = 2.0 per POC Patient is to stay on 2 mg warfarin on Mon and Fri and 4 mg the rest of the days of the week Recheck INR in 4 weeks per [facility] Patient advised in office.   Venus Contreras RN - 2/15/2019 4:28 PM CST

## 2022-02-16 NOTE — LETTER
(Inserted Image. Unable to display)                                                                                                [address] [address]                                                October 18, 2019Re: REX DAMON1955 [facility]  ([facility])  [address] [place] [address]Zo:  [facility]  ([facility]) The following patient has been referred to your office/practice:  REX NELSON Appointment:  Appointment is PendingPlease refer to the attached  clinical documentation for a summary of REX's care.  Please do not hesitate to contact our office if any additional clinical questions arise.  All relevant records and transition of care documents should be mailed or faxed.Your assistance in providing continuity of care is appreciated. Sincerely, [facility] [address] [place] [address](P) [phone](F) [phone]

## 2022-02-16 NOTE — TELEPHONE ENCOUNTER
---------------------  From: Jacey Dowd CMA ([employer] Message Pool ([id number]))   To: Elijah Benitez MD;     Sent: 6/5/2020 2:10:32 PM CDT  Subject: FW: General Message     Pt contacted and advised    She's asking for a refill of her omeprazole - was taking 20mg BID and pharm switched to 40mg QD - okay to refill?      ---------------------  From: Elijah Benitez MD   To: Mamina Shkola ([id number] [facility]);     Sent: 6/5/2020 12:49:53 PM CDT  Subject: General Message     Please share with Sherrie that I reviewed her BMP results.  Everything appears stable.  Apologies for not reported sooner; still waiting on 1,25 hydroxyvitamin D levels (which won't be overly helpful).---------------------  From: Elijah Benitez MD   To: Recurly Pool ([id number] [facility]);     Sent: 6/5/2020 3:52:03 PM CDT  Subject: RE: General Message     yina

## 2022-02-16 NOTE — NURSING NOTE
Depression Screening Entered On:  11/18/2021 3:42 PM CST    Performed On:  11/17/2021 3:42 PM CST by Jacey Dowd CMA               Depression Screening   Little Interest - Pleasure in Activities :   Not at all   Feeling Down, Depressed, Hopeless :   Not at all   Initial Depression Screen Score :   0 Score   Poor Appetite or Overeating :   Not at all   Trouble Falling or Staying Asleep :   Several days   Feeling Tired or Little Energy :   Not at all   Feeling Bad About Yourself :   Not at all   Trouble Concentrating :   Not at all   Moving or Speaking Slowly :   Not at all   Thoughts Better Off Dead or Hurting Self :   Not at all   Difficulty at Work, Home, Getting Along :   Not difficult at all   Detailed Depression Screen Score :   1    Total Depression Screen Score :   1    Jacey Dowd CMA - 11/18/2021 3:42 PM CST

## 2022-02-16 NOTE — TELEPHONE ENCOUNTER
---------------------  From: Lurdes NARANJO Galina (eRx Pool (32224_[facility]))   To: [facility] Message Pool ([place] [facility] [place]);     Sent: 8/18/2020 10:09:38 AM CDT  Subject: FW: Medication Management   Due Date/Time: 8/18/2020 10:51:00 AM CDT     Please advise as I'm not sure that this is on the med list. I see the neb solution but is this an inhaler?    med check and ACT UTD.      ** Patient matched by Galina Willson on 8/18/2020 10:05:55 AM CDT **        ------------------------------------------  From: [facility]  To: Elijah Benitez MD  Sent: August 17, 2020 10:51:40 AM CDT  Subject: Medication Management  Due: August 12, 2020 4:17:26 PM CDT     ** On Hold Pending Signature **     Drug: albuterol (Albuterol (Eqv-ProAir HFA) 90 mcg/inh inhalation aerosol), INHALE 2 PUFFS FOUR TIMES DAILY AS NEEDED FOR WHEEZING  Quantity: 2 EA  Days Supply: 0  Refills: 1  Substitutions Allowed  Notes from Pharmacy:     Dispensed Drug: albuterol (Albuterol (Eqv-ProAir HFA) 90 mcg/inh inhalation aerosol), INHALE 2 PUFFS FOUR TIMES DAILY AS NEEDED FOR WHEEZING  Quantity: 2 EA  Days Supply: 50  Refills: 0  Substitutions Allowed  Notes from Pharmacy:  ---------------------------------------------------------------  From: Jacey Dowd CMA   To: [facility]    Sent: 8/18/2020 2:15:48 PM CDT  Subject: FW: Medication Management     ** Submitted: **  Order:albuterol (Albuterol (Eqv-ProAir HFA) 90 mcg/inh inhalation aerosol)  See Instructions  INHALE 2 PUFFS FOUR TIMES DAILY AS NEEDED FOR WHEEZING  Qty:  3 EA        Refills:  1          Substitutions Allowed     Route To Pharmacy - [facility]    Signed by Jacey Dowd CMA  8/18/2020 7:15:00 PM [facility]    ** Submitted: **  Complete:albuterol (albuterol 90 mcg/inh inhalation aerosol)   Signed by Jacey Dowd CMA  8/18/2020 7:15:00 PM [facility]    ** Not Approved:  **  albuterol (ALBUTEROL SULFATE  (90 Base) MCG/ACT Aerosol Solution)   INHALE 2 PUFFS FOUR TIMES DAILY AS NEEDED FOR WHEEZING  Qty:  2 EA        Days Supply:  50        Refills:  0          Substitutions Allowed     Route To Pharmacy - [facility]   Signed by Jacey Dowd CMA 6/3/20

## 2022-02-16 NOTE — TELEPHONE ENCOUNTER
---------------------  From: Venus Contreras RN (Unit 2 Pool ([address]224_[facility]) )   To: Unit 2 Pool ([facility]224_[facility]) ;     Sent: 1/5/2021 4:39:05 PM CST  Subject: lab order     Patient called. She leaves a message that she has an outside order per Dr Lilly and would like to schedule labs to be done. I do see we have an order on file from Dr. Lilly dated 9/8/20. I have left a message at her home phone asking for a call back. When we speak to her we can confirm if she has a new order to hand carry or needs labs from the Sept order.Called patient. We discuss labs. We have the current lab on file. She will schedule labs from orders per Dr. Lilly from 9/20. Non fasting.

## 2022-02-16 NOTE — TELEPHONE ENCOUNTER
---------------------  From: Su Lindsey LPN (Phone Messages Pool ([facility]))   To: Phone Messages Pool ([id number]);     Sent: 12/16/2020 11:31:21 AM CST  Subject: refills     Phone Message    PCP:   RICHARD      Time of Call:  10:18am       Person Calling:  pt  Phone number:  [phone]    Returned call at: 11:28am    Note:   Pt  LM stating she needs refills of gabapentin, pramipexole and zolpidem. Pt says she has enough gabapentin and pramipexole for now but is running low on zolpidem.    Pt says she thought her refills were all taken care of. She says the pharmacy has been calling but told her we do not return their calls.    pramipexole 3 tabs PO hs #270- last prescribed 11/4  gabapentin 1 tab PO hs #30- last prescribed 12/15  zolpidem 10mg- 6/4/20 #90 with 1     Reminder letter sent 12/4    LM for call back- needs appt for med check    Last office visit and reason:  6/30/20 Paronychia---------------------  From: Galina Willson (Phone Messages Pool ([facility]))   To: [facility] Message Pool ([facility]);     Sent: 12/16/2020 1:05:04 PM CST  Subject: FW: refills     Pt called back and she agreed to make med check appt, I transferred her to scheduling and she is scheduled for 12/30/20. Pt says she should be good on all her medications until then but only has about 8 tabs of zolpidem left so she is wondering if she can get a small refill until appt. Please advise.  ** On hold pending signature **  Order:zolpidem (zolpidem 10 mg oral tablet)  See Instructions  TAKE 1 TABLET ONE TIME DAILY AT BEDTIME AS NEEDED FOR SLEEP  Qty:  90 tab(s)        Refills:  1          Route To Pharmacy - [facility]---------------------  From: Jacey Dowd CMA ([facility] Message Pool ([address]224_[facility]))   To: Elijah Benitez MD;     Sent: 12/16/2020 1:18:43 PM CST  Subject: FW: refills

## 2022-02-16 NOTE — PROGRESS NOTES
Patient:   REX DAMON            MRN: [id number]            FIN: [id number]               Age:   62 years     Sex:  Female     :  1955   Associated Diagnoses:   Dyspnea   Author:   Nancy Borjas      Visit Information      Primary Care Provider (PCP):  Elijah Benitez MD    NPI# [id number]      Chief Complaint   9/3/2017 10:58 AM CDT    Patient presents with SOB x 3 days. Also c/o right side back pain and fever. Had thoracentesis here in [place].      History of Present Illness   PPC with sudden SOB over past 24 hours.  Has hx of metastatic cholangiocarcinoma diagnosed 2011, radiation therapy 3 yrs ago, previous left hepatic lobectomy and cholecystectomy.  RLL pulmonary mestastatice disease treate dwith radiofrequency ablation and SBRT, then pt developed cirrhosis with splenomegaly and esophageal varices.  Pt has been seeing Dr Langford.  She was told cancer was in remission but developed some difficulty breathing which was assessed for pleural effusion, thoracentesis with pathology results pending.   She had thoracentesis done this past week and has since developed dyspnea and low grade fever      Review of Systems   Constitutional:  Weakness, Fatigue.    Respiratory:  Negative except as documented in history of present illness.    Gastrointestinal:  Negative except as documented in history of present illness.    Hematology/Lymphatics:  Negative except as documented in history of present illness.    Endocrine:  Negative except as documented in history of present illness.    Immunologic:  Negative except as documented in history of present illness.    Musculoskeletal:  Negative except as documented in history of present illness.    Neurologic:  Negative.    Psychiatric:  Anxiety.             Health Status   Allergies:    Allergic Reactions (Selected)  No known allergies   Medications:  (Selected)   Prescriptions  Prescribed  Flovent  mcg/inh inhalation aerosol: 1 puff(s), inh, bid, # 3 EA, 0 Refill(s),  Type: Maintenance  Nexium 40 mg oral delayed release capsule: 2 cap(s) ( 80 mg ), po, bid, # 30 cap(s), 0 Refill(s), Type: Maintenance, Pharmacy: [id number], 1 cap(s) po daily,Instr:Due appt with Dr. Parker for further refills.  Ventolin HFA 90 mcg/inh inhalation aerosol: 2 puff(s), inh, qid, PRN: as needed for wheezing, # 1 EA, 11 Refill(s), Type: Maintenance  furosemide 80 mg oral tablet: 1 tab(s) ( 80 mg ), po, bid, # 60 tab(s), 2 Refill(s), Type: Maintenance, Pharmacy: [id number], 1 tab(s) po bid  gabapentin 300 mg oral capsule: 1 cap(s) ( 300 mg ), po, tid, # 270 cap(s), 0 Refill(s), Type: Maintenance  pramipexole 0.25 mg oral tablet: 2 tab(s) ( 0.5 mg ), po, hs, # 180 tab(s), 1 Refill(s), Type: Maintenance, Pharmacy: [facility], 2 tab(s) po hs  zolpidem 10 mg oral tablet: 1 tab(s) ( 10 mg ), PO, Once a day (at bedtime), PRN: for sleep, # 90 tab(s), 1 Refill(s), Type: Maintenance, Pharmacy: [facility], 1 tab(s) po hs,PRN:for sleep  Documented Medications  Documented  acetaminophen: ( 650 mg ), po, q4 hrs, PRN: as needed for pain, 0 Refill(s), Type: Maintenance  rifampin 300 mg oral capsule: 1 cap(s) ( 300 mg ), po, daily, 0 Refill(s), Type: Maintenance  rifaximin 550 mg oral tablet: 1 tab(s) ( 550 mg ), po, bid, 0 Refill(s), Type: Maintenance  spironolactone 100 mg oral tablet: 1 tab(s) ( 100 mg ), po, daily, 0 Refill(s), Type: Maintenance      Histories   Family History:    Hypertension  Mother ()  Sister  Osteoporosis  Mother ()  MS - Multiple sclerosis  Sister        Physical Examination   Vital Signs   9/3/2017 10:58 AM CDT Temperature Tympanic 99.4 DegF    Peripheral Pulse Rate 96 bpm    Systolic Blood Pressure 104 mmHg    Diastolic Blood Pressure 58 mmHg  LOW    Mean Arterial Pressure 73 mmHg    BP Site Right arm    BP Method Manual    Oxygen Saturation 93 %  LOW      Measurements from flowsheet : Measurements    9/3/2017 10:58 AM CDT Height Measured - Standard 63 in    Weight Measured - Standard 121.2 lb    BSA 1.56 m2    Body Mass Index 21.47 kg/m2      General:  Alert and oriented.    Neurologic:  Alert, Oriented.    Psychiatric:  Cooperative, Appropriate mood & affect.      no further assessment was done   pt was taken to ED  report given to Dr Harman      Impression and Plan   Diagnosis     Dyspnea ([id number] R06.00).     Course:  Worsening.    Summary:  To [facility] ED via wheelchair.

## 2022-02-16 NOTE — TELEPHONE ENCOUNTER
---------------------  From: Venus Contreras RN   Sent: 6/11/2019 9:53:46 AM CDT  Subject: INR order     Time of Call:  0900  Return call at:0950     Person Calling:  patient  Phone number:      Note:   Requests order for INR to be sent to the infusion center at [facility] for next INR to be done 6/14/19. Order is faxed and patient is advised. She will be available by cell phone to discuss results on 6/14/19.

## 2022-02-16 NOTE — NURSING NOTE
Comprehensive Intake Entered On:  6/2/2021 10:36 AM CDT    Performed On:  6/2/2021 10:32 AM CDT by Jacey Dowd CMA               Summary   Chief Complaint :   med check    Menstrual Status :   Postmenopausal   Weight Measured :   127 lb(Converted to: 127 lb 0 oz, 57.606 kg)    Height Measured :   63.5 in(Converted to: 5 ft 3 in, 161.29 cm)    Body Mass Index :   22.14 kg/m2   Body Surface Area :   1.6 m2   Systolic Blood Pressure :   116 mmHg   Diastolic Blood Pressure :   79 mmHg   Mean Arterial Pressure :   91 mmHg   Peripheral Pulse Rate :   75 bpm   Temperature Tympanic :   97.9 DegF(Converted to: 36.6 DegC)    Oxygen Saturation :   97 %   Jacey Dowd CMA - 6/2/2021 10:32 AM CDT   Health Status   Allergies Verified? :   Yes   Medication History Verified? :   Yes   Medical History Verified? :   Yes   Pre-Visit Planning Status :   Completed   Tobacco Use? :   Never smoker   Jacey Dowd CMA - 6/2/2021 10:32 AM CDT   ID Risk Screen   Recent Travel History :   No recent travel   Family Member Travel History :   No recent travel   Other Exposure to Infectious Disease :   Unknown   COVID-19 Testing Status :   No positive COVID-19 test   Jacey Dowd CMA - 6/2/2021 10:32 AM CDT   Social History   Social History   (As Of: 6/2/2021 10:36:59 AM CDT)   Alcohol:        Never   (Last Updated: 5/11/2018 2:52:02 PM CDT by Alyce Katz)          Tobacco:        Never (less than 100 in lifetime)   (Last Updated: 12/30/2020 11:29:49 AM CST by Jacey Dowd CMA)          Electronic Cigarette/Vaping:        Electronic Cigarette Use: Never.   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   (Last Updated: 4/15/2010 2:36:13 PM CDT by Xi Dove)          Sexual:        Sexually active: Yes.  Number of current partners 1.  Other contraceptive use: Ann Marie.   (Last Updated: 4/15/2010 2:36:58 PM CDT by  Rula TORREZ, Xi)

## 2022-02-16 NOTE — PROGRESS NOTES
Patient:   SHERRIE DAMON            MRN: [id number]            FIN: [id number]               Age:   66 years     Sex:  Female     :  1955   Associated Diagnoses:   H/O liver transplant; Immunosuppressed status; Irritable bowel syndrome   Author:   Elijah Benitez MD      Visit Information      Date of Service: 2021 02:55 pm  Performing Location: [facility]  Encounter#: [id number]      Primary Care Provider (PCP):  Elijah Benitez MD    NPI# [id number]      Referring Provider:  Elijah Benitez MD    NPI# [id number]      Chief Complaint   2021 3:26 PM CST   f/u chronic              Additional Information:No additional information recorded during visit.   Chief complaint and symptoms as noted above and confirmed with patient.  Recent lab and diagnostic studies reviewed with patient      History of Present Illness   2021:Sherrie presents for hospital follow-up after recent elective right total knee arthroplasty through [place] complicated by postoperative elevation of liver function studies as well as an acute right lower extremity DVT.  She did have hepatic angiogram performed which did not demonstrate any evidence of significant arterial stenosis and had no intervention.  Her liver function studies trended down subsequently.  Treated with Eliquis related to her DVT.  She has been slow with rehab because of associated pain.  Did require 1 unit of packed red blood cells and had a discharge hemoglobin in the low sevens still feels quite weak and short winded.  Concerned on where her hemoglobin has been recovering since that time.  2021: Sherrie comes in the clinic for follow-up.  Overall doing okay.  Still participating in outpatient rehab related to her right knee total arthroplasty.  Still has some limitations in range of motion with flexion.  She is hesitant to pursue any further surgical intervention at this point.  Remains on Eliquis related to postoperative DVT.  Intentions were to be on  blood thinner for just 3 months.  Planning on seeing Dr. Langford in the future about further thrombophilia evaluation.  Chronic insomnia using zolpidem on a nightly basis.  Tolerating therapies well and feels like she does best with continuance of hypnotic therapy.  11/17/2021: Sherrie returns to clinic for follow-up.  Overall doing well.  Recently diagnosed with papillary carcinoma of the thyroid had a subtotal thyroidectomy and now on thyroid hormone replacement being managed by Dr. Gaviria through [facility].  Following closely with Dr. Driver genetics related to remote history of cholangiocarcinoma.  Remains on alendronate for osteoporosis.  No recent issues from a transplant standpoint.  Generally feeling well.           Review of Systems   Constitutional:  No fever, No chills.    Eye:  Negative except as documented in history of present illness.    Ear/Nose/Mouth/Throat:  Negative except as documented in history of present illness.    Respiratory:  No shortness of breath.    Cardiovascular:  No chest pain, No palpitations, No peripheral edema, No syncope.    Gastrointestinal:  No nausea, No vomiting, No diarrhea, No constipation, No abdominal pain.    Genitourinary:  No dysuria, No hematuria.    Hematology/Lymphatics:  Negative except as documented in history of present illness.    Endocrine:  No excessive thirst, No polyuria.    Immunologic:  Immunocompromised, No recurrent fevers.    Musculoskeletal:  No joint pain, No muscle pain, No decreased range of motion.    Integumentary:  No skin lesion.    Neurologic:  Alert and oriented X4, No numbness, No tingling, No headache.       Health Status   Allergies:    Allergic Reactions (Selected)  Severity Not Documented  Dilaudid (Itching)  Nonallergic Reactions (Selected)  Unknown  HYDROmorphone (Itching)  Iron sucrose (Gi upset)  Sodium ferric gluconate complex (Other - describe in comment field)   Medications:  (Selected)   Prescriptions  Prescribed  Albuterol  (Eqv-ProAir HFA) 90 mcg/inh inhalation aerosol: See Instructions, Instructions: INHALE 2 PUFFS FOUR TIMES DAILY AS NEEDED FOR WHEEZING, # 3 EA, 0 Refill(s), Type: Maintenance, Pharmacy: [facility], INHALE 2 PUFFS FOUR TIMES DAILY AS NEEDED FOR WHEEZING, 63.5, in, 08/26/21 10:05:0...  alendronate 70 mg oral tablet: See Instructions, Instructions: TAKE 1 TABLET EVERY WEEK WITH 6-8 OZ PLAIN WATER, AT LEAST 30 MINUTES BEFORE FIRST FOOD, BEVERAGE, OR MEDICATION OF THE DAY, # 12 tab(s), 3 Refill(s), Type: Maintenance, Pharmacy: [facility], TAKE 1 T...  gabapentin 300 mg oral capsule: = 2 cap(s) ( 600 mg ), Oral, hs, # 180 cap(s), 1 Refill(s), Type: Maintenance, Pharmacy: [facility], 2 cap(s) Oral hs, 63.5, in, 11/17/21 15:26:00 CST, Height Measured, 129.8, lb, 08/26/21 10:05:00 CDT, Weight Measured  omeprazole 40 mg oral delayed release capsule: = 1 cap(s) ( 40 mg ), Oral, daily, # 90 cap(s), 3 Refill(s), Type: Maintenance, Pharmacy: [facility], 1 cap(s) Oral daily, 63.5, in, 11/17/21 15:26:00 CST, Height Measured, 129.8, lb, 08/26/21 10:05:00 CDT, Weight Measured  pramipexole 0.25 mg oral tablet: = 3 tab(s) ( 0.75 mg ), Oral, hs, # 270 tab(s), 3 Refill(s), Type: Maintenance, Pharmacy: [facility], 3 tab(s) Oral hs, 63.5, in, 11/17/21 15:26:00 CST, Height Measured, 129.8, lb, 08/26/21 10:05:00 CDT, Weight Measured  zolpidem 10 mg oral tablet: See Instructions, Instructions: TAKE 1 TABLET ONE TIME DAILY AT BEDTIME AS NEEDED FOR SLEEP, # 90 tab(s), 1 Refill(s), Type: Soft Stop, Pharmacy: [facility], TAKE 1 TABLET ONE TIME DAILY AT BEDTIME AS NEEDED FOR SLEEP, 63.5, in, 11/...  Documented Medications  Documented  amLODIPine 5 mg oral tablet: 0 Refill(s), Type: Soft Stop  aspirin 81 mg oral delayed release tablet: = 1 tab(s) ( 81 mg ), Oral, daily, 0 Refill(s), Type: Maintenance  clobetasol 0.05% topical ointment:  Instructions: 30 gm, 0 Refill(s), Type: Soft Stop  levothyroxine 125 mcg (0.125 mg) oral tablet: = 1 tab(s) ( 125 mcg ), Oral, daily, 0 Refill(s), Type: Maintenance  tacrolimus 1 mg oral capsule: = 2 cap(s) ( 2 mg ), Oral, q12 hrs, 0 Refill(s), Type: Maintenance,    Medications          *denotes recorded medication          Albuterol (Eqv-ProAir HFA) 90 mcg/inh inhalation aerosol: See Instructions, INHALE 2 PUFFS FOUR TIMES DAILY AS NEEDED FOR WHEEZING, 3 EA, 0 Refill(s).          alendronate 70 mg oral tablet: See Instructions, TAKE 1 TABLET EVERY WEEK WITH 6-8 OZ PLAIN WATER, AT LEAST 30 MINUTES BEFORE FIRST FOOD, BEVERAGE, OR MEDICATION OF THE DAY, 12 tab(s), 3 Refill(s).          *amLODIPine 5 mg oral tablet: 0 Refill(s).          *aspirin 81 mg oral delayed release tablet: 81 mg, 1 tab(s), Oral, daily, 0 Refill(s).          *clobetasol 0.05% topical ointment: 30 gm, 0 Refill(s).          gabapentin 300 mg oral capsule: 600 mg, 2 cap(s), Oral, hs, 180 cap(s), 1 Refill(s).          *levothyroxine 125 mcg (0.125 mg) oral tablet: 125 mcg, 1 tab(s), Oral, daily, 0 Refill(s).          omeprazole 40 mg oral delayed release capsule: 40 mg, 1 cap(s), Oral, daily, 90 cap(s), 3 Refill(s).          pramipexole 0.25 mg oral tablet: 0.75 mg, 3 tab(s), Oral, hs, 270 tab(s), 3 Refill(s).          *tacrolimus 1 mg oral capsule: 2 mg, 2 cap(s), Oral, q12 hrs, 0 Refill(s).          zolpidem 10 mg oral tablet: See Instructions, TAKE 1 TABLET ONE TIME DAILY AT BEDTIME AS NEEDED FOR SLEEP, 90 tab(s), 1 Refill(s).       Problem list:    All Problems  Acquired thrombocytopenia / SNOMED CT 922137977 / Confirmed  Anticoagulated / SNOMED CT 813750579 / Confirmed  Duodenal ulcer / SNOMED CT 80741907 / Confirmed  End stage liver disease / SNOMED CT 3976896166 / Confirmed  Gastric ulcer / SNOMED CT 6839919843 / Confirmed  H/O liver transplant / SNOMED CT 434278233 / Confirmed  History of cholangiocarcinoma / SNOMED CT 9678358792 /  Confirmed  H/O liver cancer / SNOMED CT 6056700197 / Confirmed  Hypertension / SNOMED CT 5174685950 / Confirmed  Hypokalemia / SNOMED CT 87476031 / Confirmed  Lung mass / SNOMED CT 484464632 / Confirmed  Asthma, moderate persistent / SNOMED CT 2389797682 / Confirmed  Osteopenia / SNOMED CT 306879107 / Confirmed  Pancytopenia / SNOMED CT 221753 / Confirmed  Restless leg syndrome / SNOMED CT 14546920 / Confirmed  Fibroid uterus / SNOMED CT 728514959 / Confirmed  Inactive: Anticoagulated / SNOMED CT 56646197  Resolved: History of DVT (deep vein thrombosis) / SNOMED CT 2851923739  Resolved: History of ankle fracture / SNOMED CT 9138554337  Resolved: History of arm fracture / SNOMED CT 3470335369  Resolved: Inpatient stay / SNOMED CT 449153416  Resolved: Inpatient stay / SNOMED CT 216437973  Resolved: Inpatient stay / SNOMED CT 163580715  Resolved: Inpatient stay / SNOMED CT 171888624  Resolved: Inpatient stay / SNOMED CT 776561416  Resolved: Inpatient stay / SNOMED CT 038598471  Resolved: Inpatient stay / SNOMED CT 789900734  Resolved: Pregnancy / SNOMED CT 570249177  Resolved: Pregnancy / SNOMED CT 683517652  Canceled: Choliangiocarcinoma  Canceled: History of liver cancer / SNOMED CT 8593411128      Histories   Past Medical History:    Active  Acquired thrombocytopenia (117579669): Onset on 10/10/2012 at 57 years.  History of cholangiocarcinoma (1213181186): Onset in 2011 at 55 years.  Asthma, moderate persistent (2876968603)  Hypertension (8019292730)  Fibroid uterus (287736457)  Osteopenia (592290794)  Restless leg syndrome (69940378)  Gastric ulcer (3252825453)  Duodenal ulcer (99008975)  Lung mass (528027989)  Comments:  11/20/2017 CST 8:44 AM CST - Debra Perez  Right  End stage liver disease (7439921515)  Hypokalemia (54069774)  Pancytopenia (668114)  Resolved  Inpatient stay (234232441): Onset on 3/29/2021 at 65 years.  Resolved on 4/6/2021 at 65 years.  Comments:  4/9/2021 CDT 11:29 AM CDT - Tamera Green  [facility] - Localized osteoarthritis of right knee.  Inpatient stay (875292358): Onset on 2018 at 63 years.  Resolved on 2018 at 63 years.  Comments:  2018 CDT 2:04 PM CDT - Jess Wellington  @ [facility] for liver transplant.  Inpatient stay (000252245): Onset on 5/3/2018 at 63 years.  Resolved on 2018 at 63 years.  Comments:  5/10/2018 CDT 10:45 AM CDT - Debra Perez  @[place], [place] - Organ transplant candidate  Inpatient stay (232461722): Onset on 10/30/2017 at 62 years.  Resolved on 11/3/2017 at 62 years.  Comments:  2017 CST 8:39 AM Debra Wilson  @[place], [place] - Cholangiocarcinoma  Inpatient stay (007655201): Onset on 3/13/2015 at 59 years.  Resolved on 3/17/2015 at 59 years.  Comments:  2017 CST 8:39 AM Debra Wilson  @[place], [place] - Bleeding esophageal varices  Inpatient stay (140745258): Onset on 2014 at 59 years.  Resolved on 2014 at 59 years.  Comments:  2017 CST 8:38 AM Debra Wilson  @[facility], [place] - Pneumonia  Inpatient stay (464038223): Onset on 2011 at 56 years.  Resolved.  Comments:  2017 CST 8:38 AM Debra Wilson  @[facility], [place] - Chemotherapy induced nausea and vomiting  History of DVT (deep vein thrombosis) (1250895507): Onset on 2/15/2011 at 55 years.  Resolved.  Pregnancy (494021312):  Resolved on 1979 at 24 years.  Pregnancy (948884759):  Resolved on 1981 at 26 years.  History of ankle fracture (6287374157):  Resolved.  Comments:  2017 CST 8:42 AM Debra Wilson  Right  History of arm fracture (4101445034):  Resolved.  Comments:  2017 CST 8:43 AM Debra Wilson  Right   Family History:    Hypertension  Mother ()  Sister  Osteoporosis  Mother ()  MS - Multiple sclerosis  Sister     Procedure history:    Total thyroidectomy (75587512) on 2021 at 66  Years.  Comments:  11/17/2021 3:15 PM REYNA Dowd CMAJacey  Stage 1 papillary thyroid cancer  left lobe and right lobe w/o evidence of lymph node involvement  Arthroscopy of knee (759279322) on 3/29/2021 at 65 Years.  Comments:  4/9/2021 11:28 AM Tamera Velazquez  Total right.  Esophagogastroduodenoscopy (626452936) on 6/26/2020 at 65 Years.  Comments:  7/14/2020 1:49 PM Tamera Velazquez  Indication: Diarrhea.  Colonoscopy (606400043) on 6/26/2020 at 65 Years.  Comments:  7/14/2020 1:48 PM Tamera Velazquez  Indication: Change in bowel habits.  Sedation: MAC.  Findings: One 10 mm sessile polyp in ascending colon.  Recommendation: Repeat in 3 years.  LTx - Liver transplant (8486442818) on 8/29/2018 at 63 Years.  Esophagogastroduodenoscopy (453979609) on 1/30/2018 at 62 Years.  Comments:  2/8/2018 1:14 PM Tamera Worley  Indication:  Varices, follow up.   Sedation:  MAC.  Recommendation:  Repeat in 1 year.  Pleural catheter (1155341894) on 12/21/2017 at 62 Years.  Comments:  1/18/2018 1:43 PM Tamera Worley  Right side insertion.  Thoracoscopic procedure (1845743501) on 10/30/2017 at 62 Years.  Comments:  11/7/2017 2:23 PM REYNA Dowd CMAJacey  Right Video assisted thoracoscopc surther  pleural biopsy  doxycycline pleurodesis  VATS - Video assisted thorascopic surgery (522421777) on 10/30/2017 at 62 Years.  Comments:  11/16/2017 2:59 PM Tamera Worley  Right-sided pleural biopsy.  Esophagogastroduodenoscopy (600240808) on 8/9/2016 at 61 Years.  Comments:  8/11/2016 8:13 AM Tamera Velazquez  Indication:  Gastric ulcer follow up.  Sedation:  MAC.  Upper GI endoscopy (3122282432) on 6/17/2016 at 61 Years.  Comments:  6/23/2016 1:29 PM CDT - Jacey Dowd CMA  Acute gastric ulcer w/o hemorrhage or perforation, esophageal varices w/o bleeding.  Varices are scarred and not amenable to banding at this time  Esophagogastroduodenoscopy (371933954) on 6/17/2016 at 61  Years.  Esophagogastroduodenoscopy (721612529) on 2016 at 61 Years.  Comments:  2016 7:57 AM CDT - Tamera Green  Indication: Varices, follow up.  Sedation: MAC.  Impression:  H. pylori negative.  Upper GI endoscopy on 2016 at 60 Years.  Esophagogastroduodenoscopy (018259208) on 3/13/2015 at 59 Years.  Wedge resection of liver (514375821) in the month of 10/2012 at 57 Years.  Upper GI endoscopy (2085015639) on 2012 at 57 Years.  HPV - Human papillomavirus test negative (5925576662) on 2012 at 57 Years.  Comments:  2012 11:41 AM TOY - Xi Dove  Low risk for cervical cancer. OK to have pap q 3 yrs.  Upper GI endoscopy (1460107074) on 3/2/2012 at 56 Years.  Upper GI endoscopy (7424520793) on 2012 at 56 Years.  Upper GI endoscopy (8845322539) on 2011 at 56 Years.  Esophagogastroduodenoscopy (219603900) on 2011 at 56 Years.  Esophagogastroduodenoscopy (647056533) on 2011 at 56 Years.  Colonoscopy (827261228) on 2/10/2011 at 55 Years.  Esophagogastroduodenoscopy (244465465) on 2/10/2011 at 55 Years.  Comments:  7/10/2011 7:21 PM ELLENT - Doron Hutchison MD  Bleeding in the duodenal bulb  Biopsy of liver (324349159) on 2011 at 55 Years.  HPV - Human papillomavirus test negative (6206396809) on 4/15/2010 at 55 Years.  Comments:  2011 1:04 PM TOY - Xi Dove  Low risk for cervical cancer. OK to have pap q 3 yrs.  Mammogram - screening (712434772) on 2010 at 55 Years.  Mammography; bilateral (39505) on 2010 at 55 Years.  Comments:  2011 8:48 AM REYNA - Bottolfson CMA, Ramya  Normal-no radiographic evidence for malignancy.  DEXA - Dual energy X-ray photon absorptiometry (805420030) on 2009 at 53 Years.  Comments:  4/15/2010 1:43 PM ELLENT - Xi Dove  Due again in 5 yrs, ()  Tubal ligation (797563891) in  at 27 Years.   section (86309572) in  at 25 Years.  Primary repair of torn ligament of knee,  collateral (06572089) in 1972 at 16 Years.  Tonsillectomy and adenoidectomy (306423972) in 1963 at 8 Years.  Arm fracture (818.0).  Comments:  4/25/2011 2:11 PM CDT - Nathaly Lim  Right  Arm fracture (818.0).  Comments:  4/25/2011 2:12 PM CDT - Nathaly Lim  Left  Ankle fracture, right (824.8).   Social History:        Electronic Cigarette/Vaping Assessment            Electronic Cigarette Use: Never.      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      Sexual Assessment            Sexually active: Yes.  Number of current partners 1.  Other contraceptive use: Ann Marie.        Physical Examination   vital signs stable, as noted above   Vital Signs   11/17/2021 3:26 PM CST Temperature Tympanic 97.3 DegF  LOW    Peripheral Pulse Rate 83 bpm    Systolic Blood Pressure 109 mmHg    Diastolic Blood Pressure 66 mmHg    Mean Arterial Pressure 80 mmHg    Oxygen Saturation 100 %      Measurements from flowsheet : Measurements   11/17/2021 3:26 PM CST   Height Measured - Standard                63.5 in     General:  Alert and oriented, No acute distress.    HENT:  thyroidectomy incisional scar.    Respiratory:  Lungs are clear to auscultation, Respirations are non-labored.    Cardiovascular:  Normal rate, Regular rhythm, No murmur.    Gastrointestinal:  Soft, Non-tender, Non-distended, liver transplant laparotomy scar.  Soft; nondistended.  Generalized discomfort to palpation; non-focal.    Musculoskeletal:  Normal range of motion, Normal strength.    Neurologic:  Alert, Oriented.    Cognition and Speech:  Oriented, Speech clear and coherent.    Psychiatric:  Appropriate mood & affect.       Review / Management   Results review:  Lab results   8/26/2021 11:30 AM CDT Coronavirus SARS-CoV-2 (COVID-19) TR Negative   8/26/2021 11:02 AM CDT Sodium Level 140  mmol/L    Potassium Level 4.4 mmol/L    Chloride Level 104 mmol/L    CO2 Level 29 mmol/L    Glucose Level 91 mg/dL    BUN 30 mg/dL  HI    Creatinine 1.07 mg/dL  HI    BUN/Creat Ratio 28  HI    eGFR 55 mL/min/1.73m2  LOW    eGFR African American 64 mL/min/1.73m2    Calcium Level 9.1 mg/dL    Bili Total 0.6 mg/dL    Alk Phos 71 unit/L    AST/SGOT 10 unit/L    ALT/SGPT 8 unit/L    Protein Total 6.6 gm/dL    Albumin Level 4.2 gm/dL    Globulin 2.4    A/G Ratio 1.8    WBC 5.5    RBC 4.26    Hgb 12.9 gm/dL    Hct 37.6 %    MCV 88.3 fL    MCH 30.3 pg    MCHC 34.3 gm/dL    RDW 13.4 %    Platelet 154    MPV 9.2 fL   .       Impression and Plan   Diagnosis     H/O liver transplant (UQI10-PB Z94.4).     Immunosuppressed status (ABV72-EC D89.9).     Irritable bowel syndrome (VSE28-NB K58.9).         .) papillary carcinoma of thyroid stage 1, subtotal thyroidectomy 8/2021  - doing well  - following with endocrinology, Dr. Lucero  - maintained on levothyroxine - dosing through endo    .)  right total knee arthroplasty (4/2021), complicated by post-operative LFTs rise and acute DVT  - acute right LUE DVT  - treated with Eliquis x 3 months  - doing well    .) chronic diarrhea/IBS complaints   - not maintained on Cellcept (had been on Cellcept initially after transplant)   - normal celiac serologic testing   - (7/2019) C. 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 Langford   - originally diagnosed in 1/2011   - s/p neoadjuvant cisplatin + gemcitabine followed by bulk resection, recurrent with further debulking in 10/2012   - in 8/2013, biopsy confirmation of lung nodule as cholangiocarcinoma, s/p XRT   - most recent surveillance PET (6/2021): no malignancy    .) chronic insomnia  - using zolpidem 10mg qhs on scheduled basis - tolerates well    .) health maintenance   - CRC screening due in '23   - annual mammo   - DEXA (9/2020) - osteoporosis   - completed Pfizer COVID vaccination x3    RTC q6 months

## 2022-02-16 NOTE — PROGRESS NOTES
Patient:   SHERRIE DAMON            MRN: [id number]            FIN: [id number]               Age:   66 years     Sex:  Female     :  1955   Associated Diagnoses:   H/O liver transplant; Immunosuppressed status; Irritable bowel syndrome   Author:   Elijah Benitez MD      Visit Information      Date of Service: 2021 10:12 am  Performing Location: [facility]  Encounter#: [id number]      Primary Care Provider (PCP):  Elijah Benitez MD    NPI# [id number]      Referring Provider:  Elijah Benitez MD, NPI# [id number]      Chief Complaint   2021 10:32 AM CDT    med check              Additional Information:No additional information recorded during visit.   Chief complaint and symptoms as noted above and confirmed with patient.  Recent lab and diagnostic studies reviewed with patient      History of Present Illness   2021:Sherrie presents for hospital follow-up after recent elective right total knee arthroplasty through [place] complicated by postoperative elevation of liver function studies as well as an acute right lower extremity DVT.  She did have hepatic angiogram performed which did not demonstrate any evidence of significant arterial stenosis and had no intervention.  Her liver function studies trended down subsequently.  Treated with Eliquis related to her DVT.  She has been slow with rehab because of associated pain.  Did require 1 unit of packed red blood cells and had a discharge hemoglobin in the low sevens still feels quite weak and short winded.  Concerned on where her hemoglobin has been recovering since that time.  2021: Sherrie comes in the clinic for follow-up.  Overall doing okay.  Still participating in outpatient rehab related to her right knee total arthroplasty.  Still has some limitations in range of motion with flexion.  She is hesitant to pursue any further surgical intervention at this point.  Remains on Eliquis related to postoperative DVT.  Intentions were to be on  blood thinner for just 3 months.  Planning on seeing Dr. Langford in the future about further thrombophilia evaluation.  Chronic insomnia using zolpidem on a nightly basis.  Tolerating therapies well and feels like she does best with continuance of hypnotic therapy.         Review of Systems   Constitutional:  No fever, No chills.    Eye:  Negative except as documented in history of present illness.    Ear/Nose/Mouth/Throat:  Negative except as documented in history of present illness.    Respiratory:  No shortness of breath.    Cardiovascular:  No chest pain, No palpitations, No peripheral edema, No syncope.    Gastrointestinal:  Diarrhea, No nausea, No vomiting, No constipation, No abdominal pain.    Genitourinary:  No dysuria, No hematuria.    Hematology/Lymphatics:  Negative except as documented in history of present illness.    Endocrine:  No excessive thirst, No polyuria.    Immunologic:  Immunocompromised, No recurrent fevers.    Musculoskeletal:  Joint pain, Decreased range of motion, No muscle pain.    Integumentary:  No skin lesion.    Neurologic:  Alert and oriented X4, No numbness, No tingling, No headache.       Health Status   Allergies:    Allergic Reactions (Selected)  Severity Not Documented  Dilaudid (Itching)  Nonallergic Reactions (Selected)  Unknown  HYDROmorphone (Itching)  Iron sucrose (Gi upset)  Sodium ferric gluconate complex (Other - describe in comment field)   Medications:  (Selected)   Prescriptions  Prescribed  Albuterol (Eqv-ProAir HFA) 90 mcg/inh inhalation aerosol: See Instructions, Instructions: INHALE 2 PUFFS FOUR TIMES DAILY AS NEEDED FOR WHEEZING, # 3 EA, 1 Refill(s), Type: Maintenance, Pharmacy: [facility], 65, in, 11/12/19 8:15:00 CST, Height Measured, 133.8, lb, 06/30/20 16:37:00 CDT, W...  alendronate 70 mg oral tablet: = 1 tab(s) ( 70 mg ), Oral, qweek, Instructions: with 6-8 oz plain water, at least 30 minutes before first food, beverage, or medication of  the day, # 12 tab(s), 3 Refill(s), Type: Maintenance, Pharmacy: [facility], 1 tab(s) Oral qw...  gabapentin 300 mg oral capsule: = 2 cap(s) ( 600 mg ), Oral, hs, # 180 cap(s), 1 Refill(s), Type: Maintenance, Pharmacy: [facility], Appt due for further refills, 2 cap(s) Oral hs, 65, in, 11/12/19 8:15:00 CST, Height Measured, 133.8, lb, 06/30/20 16:37:00 CDT, We...  omeprazole 40 mg oral delayed release capsule: = 1 cap(s) ( 40 mg ), Oral, daily, # 90 cap(s), 1 Refill(s), Type: Maintenance, Pharmacy: [facility], 1 cap(s) Oral daily, 65, in, 11/12/19 8:15:00 CST, Height Measured, 136.6, lb, 05/27/20 13:53:00 CDT, Weight Measured  pramipexole 0.25 mg oral tablet: = 3 tab(s) ( 0.75 mg ), Oral, hs, # 270 tab(s), 1 Refill(s), Type: Maintenance, Pharmacy: [facility], increased dose, 3 tab(s) Oral hs, 65, in, 11/12/19 8:15:00 CST, Height Measured, 133.8, lb, 06/30/20 16:37:00 CDT, Weight Measured...  zolpidem 10 mg oral tablet: See Instructions, Instructions: TAKE 1 TABLET ONE TIME DAILY AT BEDTIME AS NEEDED FOR SLEEP, # 90 tab(s), 1 Refill(s), Type: Soft Stop, Pharmacy: [facility], TAKE 1 TABLET ONE TIME DAILY AT BEDTIME AS NEEDED FOR SLEEP, 63, in, 11/12...  Documented Medications  Documented  Eliquis 5 mg oral tablet: = 1 tab(s) ( 5 mg ), Oral, bid, 0 Refill(s), Type: Soft Stop  acetaminophen 500 mg oral tablet: 1-2 tab(s), Oral, q6 hrs, PRN: as needed for pain, 0 Refill(s), Type: Maintenance  amLODIPine 5 mg oral tablet: 0 Refill(s), Type: Soft Stop  oxyCODONE 5 mg oral tablet: 0 Refill(s), Type: Soft Stop  tacrolimus 1 mg oral capsule: = 3 cap(s) ( 3 mg ), Oral, q12 hrs, 0 Refill(s), Type: Maintenance,    Medications          *denotes recorded medication          *acetaminophen 500 mg oral tablet: 1-2 tab(s), Oral, q6 hrs, PRN: as needed for pain, 0 Refill(s).          Albuterol (Eqv-ProAir HFA) 90 mcg/inh inhalation aerosol:  See Instructions, INHALE 2 PUFFS FOUR TIMES DAILY AS NEEDED FOR WHEEZING, 3 EA, 1 Refill(s).          alendronate 70 mg oral tablet: 70 mg, 1 tab(s), Oral, qweek, with 6-8 oz plain water, at least 30 minutes before first food, beverage, or medication of the day, 12 tab(s), 3 Refill(s).          *amLODIPine 5 mg oral tablet: 0 Refill(s).          *Eliquis 5 mg oral tablet: 5 mg, 1 tab(s), Oral, bid, 0 Refill(s).          gabapentin 300 mg oral capsule: 600 mg, 2 cap(s), Oral, hs, 180 cap(s), 1 Refill(s).          omeprazole 40 mg oral delayed release capsule: 40 mg, 1 cap(s), Oral, daily, 90 cap(s), 1 Refill(s).          *oxyCODONE 5 mg oral tablet: 0 Refill(s).          pramipexole 0.25 mg oral tablet: 0.75 mg, 3 tab(s), Oral, hs, 270 tab(s), 1 Refill(s).          *tacrolimus 1 mg oral capsule: 3 mg, 3 cap(s), Oral, q12 hrs, 0 Refill(s).          zolpidem 10 mg oral tablet: See Instructions, TAKE 1 TABLET ONE TIME DAILY AT BEDTIME AS NEEDED FOR SLEEP, 90 tab(s), 1 Refill(s).       Problem list:    All Problems  Acquired thrombocytopenia / SNOMED CT 036039824 / Confirmed  Anticoagulated / SNOMED CT 317589934 / Confirmed  Duodenal ulcer / SNOMED CT 10074513 / Confirmed  End stage liver disease / SNOMED CT 4094903894 / Confirmed  Gastric ulcer / SNOMED CT 6967460116 / Confirmed  H/O liver transplant / SNOMED CT 438314732 / Confirmed  History of cholangiocarcinoma / SNOMED CT 1003007427 / Confirmed  H/O liver cancer / SNOMED CT 6666903866 / Confirmed  Hypertension / SNOMED CT 5377833124 / Confirmed  Hypokalemia / SNOMED CT 31255520 / Confirmed  Lung mass / SNOMED CT 373202289 / Confirmed  Asthma, moderate persistent / SNOMED CT 6914170657 / Confirmed  Osteopenia / SNOMED CT 930192849 / Confirmed  Pancytopenia / SNOMED CT 257634 / Confirmed  Restless leg syndrome / SNOMED CT 42667744 / Confirmed  Fibroid uterus / SNOMED CT 667007483 / Confirmed  Inactive: Anticoagulated / SNOMED CT 19481508  Resolved: History of DVT  (deep vein thrombosis) / SNOMED CT 8008453972  Resolved: History of ankle fracture / SNOMED CT 0825894144  Resolved: History of arm fracture / SNOMED CT 9615702554  Resolved: Inpatient stay / SNOMED CT 317361318  Resolved: Inpatient stay / SNOMED CT 153737098  Resolved: Inpatient stay / SNOMED CT 271801441  Resolved: Inpatient stay / SNOMED CT 565429956  Resolved: Inpatient stay / SNOMED CT 032453327  Resolved: Inpatient stay / SNOMED CT 189649037  Resolved: Inpatient stay / SNOMED CT 266231928  Resolved: Pregnancy / SNOMED CT 845446113  Resolved: Pregnancy / SNOMED CT 428317815  Canceled: Choliangiocarcinoma  Canceled: History of liver cancer / SNOMED CT 6977583389      Histories   Past Medical History:    Active  Acquired thrombocytopenia (246034932): Onset on 10/10/2012 at 57 years.  History of cholangiocarcinoma (2229684788): Onset in 2011 at 55 years.  Asthma, moderate persistent (1838019606)  Hypertension (4256801972)  Fibroid uterus (681050662)  Osteopenia (774730079)  Restless leg syndrome (00657794)  Gastric ulcer (1375684937)  Duodenal ulcer (31898873)  Lung mass (504500143)  Comments:  11/20/2017 CST 8:44 AM CST - Debra Perez  Right  End stage liver disease (8492766426)  Hypokalemia (10628328)  Pancytopenia (294305)  Resolved  Inpatient stay (598029086): Onset on 3/29/2021 at 65 years.  Resolved on 4/6/2021 at 65 years.  Comments:  4/9/2021 CDT 11:29 AM CDT - Tamera Green  [facility] - Localized osteoarthritis of right knee.  Inpatient stay (570600324): Onset on 8/30/2018 at 63 years.  Resolved on 9/12/2018 at 63 years.  Comments:  9/18/2018 CDT 2:04 PM CDT - Jess Wellington  @ [facility] for liver transplant.  Inpatient stay (996119724): Onset on 5/3/2018 at 63 years.  Resolved on 5/4/2018 at 63 years.  Comments:  5/10/2018 CDT 10:45 AM CDT - Debra Perez  @[facility], [place] - Organ transplant candidate  Inpatient stay (883813855): Onset on 10/30/2017 at 62 years.  Resolved on  11/3/2017 at 62 years.  Comments:  2017 CST 8:39 AM Debra Wilson  @[facility], [place], [place] - Cholangiocarcinoma  Inpatient stay (260322695): Onset on 3/13/2015 at 59 years.  Resolved on 3/17/2015 at 59 years.  Comments:  2017 CST 8:39 AM Debra Wilson  @[facility], [facility], [place] - Bleeding esophageal varices  Inpatient stay (425445635): Onset on 2014 at 59 years.  Resolved on 2014 at 59 years.  Comments:  2017 CST 8:38 AM Debra Wilson  @[facility], [place] - Pneumonia  Inpatient stay (372055031): Onset on 2011 at 56 years.  Resolved.  Comments:  2017 CST 8:38 AM Debra Wilson  @[facility], [place] - Chemotherapy induced nausea and vomiting  History of DVT (deep vein thrombosis) (5929245723): Onset on 2/15/2011 at 55 years.  Resolved.  Pregnancy (475372856):  Resolved on 1979 at 24 years.  Pregnancy (522812176):  Resolved on 1981 at 26 years.  History of ankle fracture (4463967000):  Resolved.  Comments:  2017 CST 8:42 AM Debra Wilson  History of arm fracture (4237854242):  Resolved.  Comments:  2017 CST 8:43 AM Debra Wilson  Right   Family History:    Hypertension  Mother ()  Sister  Osteoporosis  Mother ()  MS - Multiple sclerosis  Sister     Procedure history:    Arthroscopy of knee (886763339) on 3/29/2021 at 65 Years.  Comments:  2021 11:28 AM CDT - Tamera Green  Total right.  Esophagogastroduodenoscopy (019046747) on 2020 at 65 Years.  Comments:  2020 1:49 PM CDT - Tamera Green  Indication: Diarrhea.  Colonoscopy (589290437) on 2020 at 65 Years.  Comments:  2020 1:48 PM CDT - Tamera Green  Indication: Change in bowel habits.  Sedation: MAC.  Findings: One 10 mm sessile polyp in ascending colon.  Recommendation: Repeat in 3 years.  LTx - Liver transplant (7580042598) on 2018 at 63 Years.  Esophagogastroduodenoscopy  (849332181) on 1/30/2018 at 62 Years.  Comments:  2/8/2018 1:14 PM Tamera Worley  Indication:  Varices, follow up.   Sedation:  MAC.  Recommendation:  Repeat in 1 year.  Pleural catheter (5759039606) on 12/21/2017 at 62 Years.  Comments:  1/18/2018 1:43 PM Tamera Worley  Right side insertion.  Thoracoscopic procedure (7552322858) on 10/30/2017 at 62 Years.  Comments:  11/7/2017 2:23 PM CST - Jacey Dowd CMA  Right Video assisted thoracoscopc surther  pleural biopsy  doxycycline pleurodesis  VATS - Video assisted thorascopic surgery (596664755) on 10/30/2017 at 62 Years.  Comments:  11/16/2017 2:59 PM Tamera Worley  Right-sided pleural biopsy.  Esophagogastroduodenoscopy (972128688) on 8/9/2016 at 61 Years.  Comments:  8/11/2016 8:13 AM Tamera Velazquez  Indication:  Gastric ulcer follow up.  Sedation:  MAC.  Upper GI endoscopy (7131989144) on 6/17/2016 at 61 Years.  Comments:  6/23/2016 1:29 PM CDT Jacey Buitrago CMA  Acute gastric ulcer w/o hemorrhage or perforation, esophageal varices w/o bleeding.  Varices are scarred and not amenable to banding at this time  Esophagogastroduodenoscopy (879835251) on 6/17/2016 at 61 Years.  Esophagogastroduodenoscopy (029875433) on 4/11/2016 at 61 Years.  Comments:  4/19/2016 7:57 AM Tamera Velazquez  Indication: Varices, follow up.  Sedation: MAC.  Impression:  H. pylori negative.  Upper GI endoscopy on 1/14/2016 at 60 Years.  Esophagogastroduodenoscopy (429914393) on 3/13/2015 at 59 Years.  Wedge resection of liver (745828858) in the month of 10/2012 at 57 Years.  Upper GI endoscopy (3872115761) on 6/8/2012 at 57 Years.  HPV - Human papillomavirus test negative (2999634968) on 5/8/2012 at 57 Years.  Comments:  5/14/2012 11:41 AM ELLENT - Rula TORREZ, Xi  Low risk for cervical cancer. OK to have pap q 3 yrs.  Upper GI endoscopy (2193675144) on 3/2/2012 at 56 Years.  Upper GI endoscopy (1610551822) on 1/5/2012 at 56 Years.  Upper GI endoscopy  (9388818217) on 2011 at 56 Years.  Esophagogastroduodenoscopy (420441413) on 2011 at 56 Years.  Esophagogastroduodenoscopy (776518628) on 2011 at 56 Years.  Colonoscopy (227804528) on 2/10/2011 at 55 Years.  Esophagogastroduodenoscopy (748739153) on 2/10/2011 at 55 Years.  Comments:  7/10/2011 7:21 PM CDT - Doron Hutchison MD  Bleeding in the duodenal bulb  Biopsy of liver (291561009) on 2011 at 55 Years.  HPV - Human papillomavirus test negative (4045883980) on 4/15/2010 at 55 Years.  Comments:  2011 1:04 PM TOY - Xi Dove  Low risk for cervical cancer. OK to have pap q 3 yrs.  Mammogram - screening (091353320) on 2010 at 55 Years.  Mammography; bilateral (65416) on 2010 at 55 Years.  Comments:  2011 8:48 AM CST - Ramya Kenney CMA  Normal-no radiographic evidence for malignancy.  DEXA - Dual energy X-ray photon absorptiometry (606090546) on 2009 at 53 Years.  Comments:  4/15/2010 1:43 PM TOY - Xi Dove  Due again in 5 yrs, ()  Tubal ligation (751266414) in  at 27 Years.   section (69440108) in  at 25 Years.  Primary repair of torn ligament of knee, collateral (84738430) in  at 16 Years.  Tonsillectomy and adenoidectomy (004985680) in  at 8 Years.  Arm fracture (818.0).  Comments:  2011 2:11 PM TOY - Nathaly Lim  Right  Arm fracture (818.0).  Comments:  2011 2:12 PM TOY - Nathaly Lim  Left  Ankle fracture, right (824.8).   Social History:        Electronic Cigarette/Vaping Assessment            Electronic Cigarette Use: Never.      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      Sexual Assessment            Sexually active: Yes.  Number of current partners 1.  Other contraceptive use: Ann Marie.        Physical Examination   vital  signs stable, as noted above   Vital Signs   6/2/2021 10:32 AM CDT Temperature Tympanic 97.9 DegF    Peripheral Pulse Rate 75 bpm    Systolic Blood Pressure 116 mmHg    Diastolic Blood Pressure 79 mmHg    Mean Arterial Pressure 91 mmHg    Oxygen Saturation 97 %      Measurements from flowsheet : Measurements   6/2/2021 10:32 AM CDT Height Measured - Standard 63.5 in    Weight Measured - Standard 127 lb    BSA 1.6 m2    Body Mass Index 22.14 kg/m2      General:  Alert and oriented, No acute distress.    Respiratory:  Lungs are clear to auscultation, Respirations are non-labored.    Cardiovascular:  Normal rate, Regular rhythm, No murmur.    Gastrointestinal:  Soft, Non-tender, Non-distended, liver transplant laparotomy scar.  Soft; nondistended.  Generalized discomfort to palpation; non-focal.    Musculoskeletal:  Normal range of motion, right knee midline scar; limited PROM with flexion beyond ~95 degrees.    Neurologic:  Alert, Oriented.    Cognition and Speech:  Oriented, Speech clear and coherent.    Psychiatric:  Appropriate mood & affect.       Review / Management   Results review:  Lab results   4/21/2021 11:01 AM CDT Iron Level 59 mcg/dL    TIBC 257    Iron Saturation 23    Ferritin 398 ng/mL  HI    WBC 5.5    RBC 3.90    Hgb 11.1 gm/dL  LOW    Hct 35.1 %    MCV 90.0 fL    MCH 28.5 pg    MCHC 31.6 gm/dL  LOW    RDW 13.3 %    Platelet 302    MPV 10.1 fL   3/30/2021 6:28 AM CDT Sodium Level  mEq/L    Potassium Level TR 4.6 mEq/L    Chloride  mEq/L    Glucose Level  mg/dL    Creatinine TR 0.94 mg/dL    Calcium TR 8.4 mEq/dL    Phosphorus Level TR 3.2 mg/dL    Magnesium TR 2.1 mg/dL    Bili Total TR 1.1 mg/dL    Alk Phos  unit/L    AST TR 9 unit/L    ALT TR 38 unit/L    Protein Total TR 6.1 gm/dL    Albumin Level TR 2.4 g/dL    WBC TR 3.7 x10^3/uL    RBC TR 2.55 x10^6/uL    Hgb TR 7.6 g/dL    Hct TR 23.4 %    Platelet  x10^3/uL   .       Impression and Plan   Diagnosis     H/O liver  transplant (DST50-CP Z94.4).     Immunosuppressed status (VVS70-EI D89.9).     Irritable bowel syndrome (XJR62-SX K58.9).           .)  right total knee arthroplasty (4/2021), complicated by post-operative LFTs rise and acute DVT  - acute right LUE DVT   - started on Eliquis, now 5mg BID - intended 3 month duration of therapy  - post-operative anemia, subsequent hemoglobin normalizing    .) chronic diarrhea/IBS complaints   - not maintained on Cellcept (had been on Cellcept initially after transplant)   - normal celiac serologic testing   - (7/2019) C. 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 Langford   - originally diagnosed in 1/2011   - s/p neoadjuvant cisplatin + gemcitabine followed by bulk resection, recurrent with further debulking in 10/2012   - in 8/2013, biopsy confirmation of lung nodule as cholangiocarcinoma, s/p XRT   - most recent surveillance CT C/A/P showing no active malignancy    .) chronic insomnia  - using zolpidem 10mg qhs on scheduled basis - tolerates well    .) health maintenance   - CRC screening due in '23   - annual  mammo   - DEXA (9/2020) - osteoporosis    RTC q6 months

## 2022-02-16 NOTE — TELEPHONE ENCOUNTER
Entered by Jacey Dowd CMA on October 01, 2019 4:08:51 PM CDT  ---------------------  From: Jacey Dowd CMA   To: [facility]    Sent: 10/1/2019 4:08:51 PM CDT  Subject: Medication Management     ** Submitted: **  Order:albuterol (albuterol 90 mcg/inh inhalation aerosol)  2 puff(s)  NEB  qid  Qty:  18 gm        Days Supply:  25        Refills:  5          Substitutions Allowed     PRN  AS NEEDED FOR WHEEZING      Route To Pharmacy - [facility]    Signed by Jacey Dowd CMA  10/1/2019 4:08:00 PM    ** Submitted: **  Complete:albuterol (Ventolin HFA 90 mcg/inh inhalation aerosol)   Signed by Jacey Dowd CMA  10/1/2019 4:08:00 PM    ** Not Approved:  **  albuterol (ALBUTEROL SULFATE  (90 Base) MCG/ACT Aerosol Solution)  INHALE 2 PUFFS FOUR TIMES DAILY AS NEEDED FOR WHEEZING  Qty:  18 gm        Days Supply:  25        Refills:  5          Substitutions Allowed     Route To Pharmacy - [facility]   Signed by Jacey Dowd CMA          last seen 8/27  rtc for 6mos      ** Patient matched by Jacey Dowd CMA on 10/1/2019 4:08:00 PM CDT **      ------------------------------------------  From: [facility]  To: Elijah Benitez MD  Sent: September 30, 2019 12:28:06 PM CDT  Subject: Medication Management  Due: October 1, 2019 12:28:06 PM CDT    ** On Hold Pending Signature **  Drug: albuterol (Ventolin HFA 90 mcg/inh inhalation aerosol)  INHALE 2 PUFFS FOUR TIMES DAILY AS NEEDED FOR WHEEZING  Quantity: 18 gm       Days Supply: 25        Refills: 5  Substitutions Allowed  Notes from Pharmacy:     Dispensed Drug: albuterol (albuterol 90 mcg/inh inhalation aerosol)  INHALE 2 PUFFS FOUR TIMES DAILY AS NEEDED FOR WHEEZING  Quantity: 18 gm       Days Supply: 25        Refills: 5  Substitutions Allowed  Notes from Pharmacy:   ------------------------------------------

## 2022-02-16 NOTE — TELEPHONE ENCOUNTER
---------------------  From: Yasemin Tamayo CMA (eRx Pool ([id number]_[facility]))   To: Elijah Benitez MD;     Sent: 9/10/2019 12:38:47 PM CDT  Subject: FW: Medication Management   Due Date/Time: 9/10/2019 1:04:00 PM CDT       PCP:   [facility]    Medication:   Zolpidem  Last Filled:  3/20/2019    Quantity:  90  Refills:  0  CSA on file?   N/A     Date of last office visit and reason:   8/27/2019 Osteoporosis w/ [facility]  Date of last labs pertaining to condition:  N/A    Note:  Please advise    Return to Clinic order placed?  Due back 2/2020    Resource:   [employer]  Phone:   _        ** Patient matched by Yasemin Tamayo CMA on 9/10/2019 12:35:48 PM CDT **        ------------------------------------------  From: [facility]  To: Elijah Benitez MD  Sent: September 9, 2019 1:04:57 PM CDT  Subject: Medication Management  Due: September 10, 2019 1:04:57 PM CDT    ** On Hold Pending Signature **  Drug: zolpidem (zolpidem 10 mg oral tablet)  TAKE 1 TABLET ONE TIME DAILY AT BEDTIME AS NEEDED FOR SLEEP  Quantity: 90 tab(s)     Days Supply: 90        Refills: 1  Substitutions Allowed  Notes from Pharmacy:     Dispensed Drug: zolpidem (zolpidem 10 mg oral tablet)  TAKE 1 TABLET ONE TIME DAILY AT BEDTIME AS NEEDED FOR SLEEP  Quantity: 90 tab(s)     Days Supply: 90        Refills: 1  Substitutions Allowed  Notes from Pharmacy:   ---------------------------------------------------------------  From: Elijah Benitez MD   To: [facility]    Sent: 9/10/2019 2:52:48 PM CDT  Subject: FW: Medication Management     ** Submitted: **  Complete:zolpidem (zolpidem 10 mg oral tablet)   Signed by Elijah Benitez MD  9/10/2019 2:52:00 PM    ** Approved **  zolpidem (ZOLPIDEM TARTRATE 10 MG Tablet)  TAKE 1 TABLET ONE TIME DAILY AT BEDTIME AS NEEDED FOR SLEEP  Qty:  90 tab(s)        Days Supply:  90        Refills:  1          Substitutions Allowed     Route To Pharmacy - [facility]

## 2022-02-16 NOTE — TELEPHONE ENCOUNTER
---------------------  From: Rossy Thapa RN   Sent: 8/23/2019 8:48:56 AM CDT  Subject: INR reminder     Patient is 14 days past due for INR.  First letter has been printed from [employer] and sent to [facility] for scanning and mailing.

## 2022-02-16 NOTE — NURSING NOTE
Comprehensive Intake Entered On:  10/17/2019 3:38 PM CDT    Performed On:  10/17/2019 3:32 PM CDT by Ligia Rodriguez LPN               Summary   Chief Complaint :   Follow up cough and SOB, SOB has improved, but continues.    Menstrual Status :   Postmenopausal   Weight Measured :   126 lb(Converted to: 126 lb 0 oz, 57.15 kg)    Height Measured :   63 in(Converted to: 5 ft 3 in, 160.02 cm)    Body Mass Index :   22.32 kg/m2   Body Surface Area :   1.59 m2   Systolic Blood Pressure :   110 mmHg   Diastolic Blood Pressure :   62 mmHg   Mean Arterial Pressure :   78 mmHg   Peripheral Pulse Rate :   84 bpm   BP Site :   Right arm   Pulse Site :   Radial artery   BP Method :   Manual   HR Method :   Manual   Temperature Tympanic :   98.3 DegF(Converted to: 36.8 DegC)    Respiratory Rate :   18 br/min   Oxygen Saturation :   97 %   Ligia Rodriguez LPN - 10/17/2019 3:32 PM CDT   Health Status   Allergies Verified? :   Yes   Medication History Verified? :   Yes   Pre-Visit Planning Status :   Completed   Ligia Rodriguez LPN - 10/17/2019 3:32 PM CDT   Consents   Consent for Immunization Exchange :   Consent Granted   Consent for Immunizations to Providers :   Consent Granted   Ligia Rodriguez LPN - 10/17/2019 3:32 PM CDT   Meds / Allergies   (As Of: 10/17/2019 3:38:10 PM CDT)   Allergies (Active)   Dilaudid  Estimated Onset Date:   Unspecified ; Reactions:   Itching ; Created By:   Jacey Dowd CMA; Reaction Status:   Active ; Category:   Drug ; Substance:   Dilaudid ; Type:   Allergy ; Updated By:   Jacey Dowd CMA; Reviewed Date:   10/8/2019 2:03 PM CDT      HYDROmorphone  Estimated Onset Date:   6/11/2014 ; Reactions:   Itching ; Created By:   Jacey Dowd CMA; Reaction Status:   Active ; Category:   Drug ; Substance:   HYDROmorphone ; Type:   <not entered> ; Severity:   Unknown ; Updated By:   Jacey Dowd CMA; Reviewed Date:   10/8/2019 2:03 PM CDT      iron sucrose  Estimated Onset Date:   <not  entered> 7/16/2015 ; Reactions:   GI Upset ; Created By:   Jacey Dowd CMA; Reaction Status:   Active ; Category:   Drug ; Substance:   iron sucrose ; Type:   <not entered> ; Severity:   Unknown ; Updated By:   Jacey Dowd CMA; Reviewed Date:   10/8/2019 2:03 PM CDT      sodium ferric gluconate complex  Estimated Onset Date:   <not entered> 12/10/2015 ; Reactions:   Other - Describe In Comment Field ; Created By:   Jacey Dowd CMA; Reaction Status:   Active ; Category:   Drug ; Substance:   sodium ferric gluconate complex ; Type:   <not entered> ; Severity:   Unknown ; Updated By:   Jacey Dowd CMA; Reviewed Date:   10/8/2019 2:03 PM CDT        Medication List   (As Of: 10/17/2019 3:38:10 PM CDT)   Prescription/Discharge Order    predniSONE  :   predniSONE ; Status:   Prescribed ; Ordered As Mnemonic:   predniSONE 10 mg oral tablet ; Simple Display Line:   4 tabs daily for 3 days taper bey 1 tab every 3 days, Oral, daily, 30 tab(s), 0 Refill(s) ; Ordering Provider:   Gianfranco Lopez MD; Catalog Code:   predniSONE ; Order Dt/Tm:   10/8/2019 1:53:39 PM CDT          albuterol  :   albuterol ; Status:   Prescribed ; Ordered As Mnemonic:   albuterol 90 mcg/inh inhalation aerosol ; Simple Display Line:   2 puff(s), NEB, qid, PRN: AS NEEDED FOR WHEEZING, 18 gm, 5 Refill(s) ; Ordering Provider:   Elijah Benitez MD; Catalog Code:   albuterol ; Order Dt/Tm:   10/1/2019 4:08:35 PM CDT          zolpidem 10 mg oral tablet  :   zolpidem 10 mg oral tablet ; Status:   Prescribed ; Ordered As Mnemonic:   zolpidem 10 mg oral tablet ; Simple Display Line:   See Instructions, TAKE 1 TABLET ONE TIME DAILY AT BEDTIME AS NEEDED FOR SLEEP, 90 tab(s), 1 Refill(s) ; Ordering Provider:   Elijah Benitez MD; Catalog Code:   zolpidem ; Order Dt/Tm:   9/10/2019 2:52:45 PM CDT          alendronate  :   alendronate ; Status:   Prescribed ; Ordered As Mnemonic:   alendronate 70 mg oral tablet ; Simple Display Line:   70 mg, 1 tab(s), Oral,  qweek, with 6-8 oz plain water, at least 30 minutes before first food, beverage, or medication of the day, 12 tab(s), 3 Refill(s) ; Ordering Provider:   Elijah Benitez MD; Catalog Code:   alendronate ; Order Dt/Tm:   8/27/2019 11:47:29 AM CDT          gabapentin  :   gabapentin ; Status:   Prescribed ; Ordered As Mnemonic:   gabapentin 300 mg oral capsule ; Simple Display Line:   1 cap(s), Oral, qhs, 90 tab(s), 1 Refill(s) ; Ordering Provider:   Elijah Benitez MD; Catalog Code:   gabapentin ; Order Dt/Tm:   7/26/2019 11:49:08 AM CDT          pramipexole  :   pramipexole ; Status:   Prescribed ; Ordered As Mnemonic:   pramipexole 0.25 mg oral tablet ; Simple Display Line:   2 tab(s), Oral, qhs, 180 tab(s), 1 Refill(s) ; Ordering Provider:   Elijah Benitez MD; Catalog Code:   pramipexole ; Order Dt/Tm:   7/26/2019 11:49:08 AM CDT            Home Meds    aspirin  :   aspirin ; Status:   Documented ; Ordered As Mnemonic:   aspirin 81 mg oral tablet ; Simple Display Line:   81 mg, 1 tab(s), Oral, daily, 0 Refill(s) ; Catalog Code:   aspirin ; Order Dt/Tm:   3/1/2019 10:58:12 AM CST          tacrolimus  :   tacrolimus ; Status:   Documented ; Ordered As Mnemonic:   tacrolimus 1 mg oral capsule ; Simple Display Line:   See Instructions, 4mg AM/ 5mg PM, 0 Refill(s) ; Catalog Code:   tacrolimus ; Order Dt/Tm:   10/12/2018 4:10:27 PM CDT          acetaminophen  :   acetaminophen ; Status:   Documented ; Ordered As Mnemonic:   acetaminophen 500 mg oral tablet ; Simple Display Line:   1-2 tab(s), Oral, q6 hrs, PRN: as needed for pain, 0 Refill(s) ; Catalog Code:   acetaminophen ; Order Dt/Tm:   10/12/2018 4:08:10 PM CDT          omeprazole  :   omeprazole ; Status:   Documented ; Ordered As Mnemonic:   omeprazole 20 mg oral delayed release capsule ; Simple Display Line:   40 mg, 2 cap(s), PO, bid, 90 cap(s), 0 Refill(s) ; Catalog Code:   omeprazole ; Order Dt/Tm:   5/9/2018 10:52:40 AM CDT

## 2022-02-16 NOTE — PROGRESS NOTES
Patient:   REX DAMON            MRN: [id number]            FIN: [id number]               Age:   62 years     Sex:  Female     :  1955   Associated Diagnoses:   None   Author:   Emmanuel ATKINSON, Elijah      Procedure   EKG procedure   Indication: pre-op.     Position: supine.     EKG findings   Interpretation: by primary care provider.     Rhythm: heart rate  76  beats/min, sinus normal.     Axis: normal axis, normal configuration.     Within normal limits.     Intervals: CT normal, QRS normal, QT normal.     Normal EKG.     P waves: normal.     QRS complex: normal, no Q waves present.     ST-T-U complex: normal.     Interpretation: no ST-T wave abnormalities.     Discussed: with patient.        Impression and Plan   Diagnosis     Normal EKG.     Orders

## 2022-02-16 NOTE — LETTER
(Inserted Image. Unable to display)   August 01, 2019      ERX DAMON  [address] [place] [id number]        Dear REX,     Thank you for selecting [facility] ([facility]viously [facility]) for your healthcare needs. Below you will find the results of your recent test(s) done at our clinic.      Stool studies were all normal.  C. difficile testing could not be completed because the stool was not fully liquified.  I'm not concerned by this.  Assuming stools are not exclusively liquid; likelihood of C. difficile is very low.        Result Name Current Result Reference Range   Fecal Fat Ql  NORMAL 7/27/2019 NORMAL -    Fecal Leukocyte Stain  See comment 7/27/2019    Clostridium difficile Toxin  See comment 7/27/2019    Culture Campylobacter  See comment 7/27/2019    Culture Salmonella/Shigella  See comment 7/27/2019    Shiga Toxin  See comment 7/27/2019    Trichrome 1  See comment 7/27/2019        Please contact me or my assistant at [phone] if you have any questions or concerns.     Sincerely,        Elijah Benitez MD    What do your labs mean?  Below is a glossary of commonly ordered labs:  LDL - Bad Cholesterol  HDL - Good Cholesterol  AST/ALT - Liver Function  Cr/Creatinine - Kidney Function  Microalbumin - Kidney Function  BUN - Kidney Function  PSA - Prostate   TSH - Thyroid Hormone  HgbA1c - Diabetes Test  Hgb (Hemoglobin) - Red Blood Cells

## 2022-02-16 NOTE — NURSING NOTE
Hearing and Vision Screening Entered On:  7/26/2019 10:45 AM CDT    Performed On:  7/26/2019 10:45 AM CDT by Jacey Dowd CMA               Hearing and Vision Screening   Audiogram Result Right Ear :   Pass   Audiogram Result Left Ear :   Pass   Jacey Dowd CMA - 7/26/2019 10:45 AM CDT

## 2022-02-16 NOTE — TELEPHONE ENCOUNTER
---------------------  From: Jacey Dowd CMA (eRx Pool ([id number]))   To: Cj Rome PA-C;     Sent: 12/19/2019 7:54:21 PM CST  Subject: FW: Medication Management   Due Date/Time: 12/20/2019 3:09:00 PM CST     you authorized this earlier today but was sent to  and should go to her mail order - could you please fill to [facility]    you ok'd #[phone] with no refills    Thanks!        ** Not Approved: duplicate **  gabapentin (GABAPENTIN 300 MG Capsule)  TAKE 1 CAPSULE AT BEDTIME  Qty:  90 cap(s)        Days Supply:  90        Refills:  0          Substitutions Allowed     Route To Pharmacy - [facility]   Signed by Jacey Dowd CMA            ** Submitted: **  Order:pramipexole (pramipexole 0.25 mg oral tablet)  See Instructions  TAKE 2 TABLETS AT BEDTIME  Qty:  180 tab(s)        Days Supply:  90        Refills:  1          Substitutions Allowed     Route To Pharmacy - [facility]    Signed by Jacey Dowd CMA  12/19/2019 7:52:00 PM    ** Submitted: **  Complete:pramipexole (pramipexole 0.25 mg oral tablet)   Signed by Jacey Dowd CMA  12/19/2019 7:52:00 PM    ** Not Approved:  **  pramipexole (PRAMIPEXOLE DIHYDROCHLORIDE 0.25 MG Tablet)  TAKE 2 TABLETS AT BEDTIME  Qty:  180 tab(s)        Days Supply:  90        Refills:  0          Substitutions Allowed     Route To Pharmacy - [facility]   Signed by Jacey Dowd CMA              ** Patient matched by Brett Marcelo on 12/19/2019 3:49:54 PM CST **      ------------------------------------------  From: [facility]  To: Elijah Benitez MD  Sent: December 19, 2019 3:09:47 PM CST  Subject: Medication Management  Due: December 20, 2019 3:09:47 PM CST    ** On Hold Pending Signature **  Drug: gabapentin (gabapentin 300 mg oral capsule)  TAKE 1 CAPSULE AT BEDTIME  Quantity: 90 cap(s)  Days Supply: 0  Refills: 1  Substitutions Allowed  Notes from Pharmacy:     Dispensed Drug:  gabapentin (gabapentin 300 mg oral capsule)  TAKE 1 CAPSULE AT BEDTIME  Quantity: 90 cap(s)  Days Supply: 90  Refills: 0  Substitutions Allowed  Notes from Pharmacy:     ** On Hold Pending Signature **  Drug: zolpidem (zolpidem 10 mg oral tablet)  TAKE 1 TABLET ONE TIME DAILY AT BEDTIME AS NEEDED FOR SLEEP  Quantity: 90 tab(s)  Days Supply: 0  Refills: 1  Substitutions Allowed  Notes from Pharmacy:     Dispensed Drug: zolpidem (zolpidem 10 mg oral tablet)  TAKE 1 TABLET ONE TIME DAILY AT BEDTIME AS NEEDED FOR SLEEP  Quantity: 90 tab(s)  Days Supply: 90  Refills: 0  Substitutions Allowed  Notes from Pharmacy:     ** On Hold Pending Signature **  Drug: pramipexole (pramipexole 0.25 mg oral tablet)  TAKE 2 TABLETS AT BEDTIME  Quantity: 180 tab(s)  Days Supply: 0  Refills: 1  Substitutions Allowed  Notes from Pharmacy:     Dispensed Drug: pramipexole (pramipexole 0.25 mg oral tablet)  TAKE 2 TABLETS AT BEDTIME  Quantity: 180 tab(s)  Days Supply: 90  Refills: 0  Substitutions Allowed  Notes from Pharmacy:   ------------------------------------------I cannot approve the request in this manner because it is a controlled Rx. I have to cancel this and then   go to the chart and order from there, which I did

## 2022-02-16 NOTE — NURSING NOTE
Comprehensive Intake Entered On:  8/27/2019 11:28 AM CDT    Performed On:  8/27/2019 11:27 AM CDT by Jacey Dowd CMA               Summary   Chief Complaint :   f/u - review Dexa results   Menstrual Status :   Postmenopausal   Weight Measured :   130 lb(Converted to: 130 lb 0 oz, 58.97 kg)    Systolic Blood Pressure :   116 mmHg   Diastolic Blood Pressure :   76 mmHg   Mean Arterial Pressure :   89 mmHg   Peripheral Pulse Rate :   60 bpm   BP Site :   Right arm   Temperature Tympanic :   97 DegF(Converted to: 36.1 DegC)  (LOW)    Jacey Dowd CMA - 8/27/2019 11:27 AM CDT   Health Status   Allergies Verified? :   Yes   Medication History Verified? :   Yes   Medical History Verified? :   Yes   Pre-Visit Planning Status :   Completed   Tobacco Use? 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   (Last Updated: 4/15/2010 2:36:13 PM CDT by Xi Dove)          Sexual:        Sexually active: Yes.  Number of current partners 1.  Other contraceptive use: Ann Marie.   (Last Updated: 4/15/2010 2:36:58 PM CDT by Xi Dove)

## 2022-02-16 NOTE — TELEPHONE ENCOUNTER
---------------------  From: Yasemin Riley (Phone Messages Pool ([id number]_[facility]))   Sent: 7/26/2019 1:21:41 PM CDT  Subject: Med refill      Phone Message    PCP:   RICHARD-    Time of Call:  11:59am       Person Calling:  pt  Phone number:  [phone]  Returned call at:   Last office visit and reason:  f/u 7/26/19    Note: Pt calling to make sure gabapentin was sent to [facility].    Called pt back and told her they had been sent.  
bilateral normal...

## 2022-02-16 NOTE — NURSING NOTE
Comprehensive Intake Entered On:  12/30/2020 11:31 AM CST    Performed On:  12/30/2020 11:29 AM CST by Jacey Dowd CMA               Summary   Chief Complaint :   f/u chronic - med refills   Jacey Dowd CMA - 12/30/2020 11:33 AM CST   Menstrual Status :   Postmenopausal   Jacey Dowd CMA - 12/30/2020 11:29 AM CST   Health Status   Allergies Verified? :   Yes   Medication History Verified? :   Yes   Medical History Verified? :   Yes   Pre-Visit Planning Status :   Completed   Tobacco Use? :   Never smoker   Jacey Dowd CMA - 12/30/2020 11:33 AM CST   ID Risk Screen   Recent Travel History :   No recent travel   Family Member Travel History :   No recent travel   Other Exposure to Infectious Disease :   Unknown   Jacey Dowd CMA - 12/30/2020 11:29 AM CST   Social History   Social History   (As Of: 12/30/2020 11:34:02 AM CST)   Alcohol:        Never   (Last Updated: 5/11/2018 2:52:02 PM CDT by Alyce Katz)          Tobacco:        Never (less than 100 in lifetime)   (Last Updated: 12/30/2020 11:29:49 AM CST by Jacey Dowd CMA)          Electronic Cigarette/Vaping:        Electronic Cigarette Use: Never.   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   (Last Updated: 4/15/2010 2:36:13 PM CDT by Xi Dove)          Sexual:        Sexually active: Yes.  Number of current partners 1.  Other contraceptive use: Ann Marie.   (Last Updated: 4/15/2010 2:36:58 PM CDT by Xi Dove)

## 2022-02-16 NOTE — PROGRESS NOTES
Patient:   SHERRIE DAMON            MRN: [id number]            FIN: [id number]               Age:   65 years     Sex:  Female     :  1955   Associated Diagnoses:   H/O liver transplant; Immunosuppressed status; Paronychia, finger   Author:   Eljiah Benitez MD      Visit Information      Date of Service: 2020 04:20 pm  Performing Location: [facility]  Encounter#: [id number]      Primary Care Provider (PCP):  Elijah Benitez MD    NPI# [id number]      Referring Provider:  Elijah Benitez MD    NPI# [id number]      Chief Complaint   2020 4:37 PM CDT    check left first finger - started noticing soreness/redness -  the last 4 days              Additional Information:No additional information recorded during visit.   Chief complaint and symptoms as noted above and confirmed with patient.  Recent lab and diagnostic studies reviewed with patient      History of Present Illness   2020: Sherrie presents with approximate 2-day history of paronychia development involving index finger.  She has been working in the garden recently which is her only known risk factor.  Area is becoming steadily worse.  Has been rinsing her hand or hot water.         Review of Systems   Constitutional:  No fever, No chills.    Eye:  Negative except as documented in history of present illness.    Ear/Nose/Mouth/Throat:  Negative except as documented in history of present illness.    Respiratory:  No shortness of breath.    Cardiovascular:  No chest pain, No palpitations, No peripheral edema, No syncope.    Gastrointestinal:  Diarrhea, No nausea, No vomiting, No abdominal pain.    Genitourinary:  No dysuria, No hematuria.    Hematology/Lymphatics:  Negative except as documented in history of present illness.    Endocrine:  No excessive thirst, No polyuria.    Immunologic:  Immunocompromised, No recurrent fevers.    Musculoskeletal:  No joint pain, No muscle pain.    Integumentary:  Skin lesion.    Neurologic:  Alert and oriented  X4, No numbness, No tingling, No headache.       Health Status   Allergies:    Allergic Reactions (Selected)  Severity Not Documented  Dilaudid (Itching)  Nonallergic Reactions (Selected)  Unknown  HYDROmorphone (Itching)  Iron sucrose (Gi upset)  Sodium ferric gluconate complex (Other - describe in comment field)   Medications:  (Selected)   Prescriptions  Prescribed  albuterol 90 mcg/inh inhalation aerosol: 2 puff(s), NEB, qid, PRN: AS NEEDED FOR WHEEZING, # 2 EA, 1 Refill(s), Type: Maintenance, Pharmacy: [facility], 2 puff(s) NEB qid,PRN:AS NEEDED FOR WHEEZING, 65, in, 11/12/19 8:15:00 CST, Height Measured, 136.6, lb, 05/27/20 13:53:0...  alendronate 70 mg oral tablet: = 1 tab(s) ( 70 mg ), Oral, qweek, Instructions: with 6-8 oz plain water, at least 30 minutes before first food, beverage, or medication of the day, # 12 tab(s), 3 Refill(s), Type: Maintenance, Pharmacy: [facility], 1 tab(s) Oral qw...  cephalexin 500 mg oral capsule: = 1 cap(s) ( 500 mg ), Oral, qid, x 7 day(s), # 28 cap(s), 0 Refill(s), Type: Acute, Pharmacy: [facility], 1 cap(s) Oral qid,x7 day(s), 65, in, 11/12/19 8:15:00 CST, Height Measured, 133.8, lb, 06/30/20 16:37:00 CDT, Weight Me...  gabapentin 300 mg oral capsule: = 1 cap(s), Oral, qhs, # 90 tab(s), 1 Refill(s), Type: Soft Stop, Pharmacy: [facility], 1 cap(s) Oral qhs, 65, in, 11/12/19 8:15:00 CST, Height Measured, 136.6, lb, 05/27/20 13:53:00 CDT, Weight Measured  omeprazole 40 mg oral delayed release capsule: = 1 cap(s) ( 40 mg ), Oral, daily, # 90 cap(s), 1 Refill(s), Type: Maintenance, Pharmacy: [facility], 1 cap(s) Oral daily, 65, in, 11/12/19 8:15:00 CST, Height Measured, 136.6, lb, 05/27/20 13:53:00 CDT, Weight Measured  pramipexole 0.25 mg oral tablet: See Instructions, Instructions: TAKE 2 TABLETS AT BEDTIME, # 180 tab(s), 1 Refill(s), Type: Maintenance, Pharmacy: [facility], TAKE 2 TABLETS AT BEDTIME, 65, in, 11/12/19 8:15:00 CST, Height Measured, 136.6, lb, 05/27/20 13:53:00 CD...  zolpidem 10 mg oral tablet: See Instructions, Instructions: TAKE 1 TABLET ONE TIME DAILY AT BEDTIME AS NEEDED FOR SLEEP, # 90 tab(s), 1 Refill(s), Type: Soft Stop, Pharmacy: [facility], TAKE 1 TABLET ONE TIME DAILY AT BEDTIME AS NEEDED FOR SLEEP, 65, in, 11/12...  Documented Medications  Documented  acetaminophen 500 mg oral tablet: 1-2 tab(s), Oral, q6 hrs, PRN: as needed for pain, 0 Refill(s), Type: Maintenance  amLODIPine 5 mg oral tablet: 0 Refill(s), Type: Soft Stop  aspirin 81 mg oral tablet: = 1 tab(s) ( 81 mg ), Oral, daily, 0 Refill(s), Type: Maintenance  tacrolimus 1 mg oral capsule: = 3 cap(s) ( 3 mg ), Oral, q12 hrs, 0 Refill(s), Type: Maintenance,    Medications          *denotes recorded medication          *acetaminophen 500 mg oral tablet: 1-2 tab(s), Oral, q6 hrs, PRN: as needed for pain, 0 Refill(s).          albuterol 90 mcg/inh inhalation aerosol: 2 puff(s), NEB, qid, PRN: AS NEEDED FOR WHEEZING, 2 EA, 1 Refill(s).          alendronate 70 mg oral tablet: 70 mg, 1 tab(s), Oral, qweek, with 6-8 oz plain water, at least 30 minutes before first food, beverage, or medication of the day, 12 tab(s), 3 Refill(s).          *amLODIPine 5 mg oral tablet: 0 Refill(s).          *aspirin 81 mg oral tablet: 81 mg, 1 tab(s), Oral, daily, 0 Refill(s).          cephalexin 500 mg oral capsule: 500 mg, 1 cap(s), Oral, qid, for 7 day(s), 28 cap(s), 0 Refill(s).          gabapentin 300 mg oral capsule: 1 cap(s), Oral, qhs, 90 tab(s), 1 Refill(s).          omeprazole 40 mg oral delayed release capsule: 40 mg, 1 cap(s), Oral, daily, 90 cap(s), 1 Refill(s).          pramipexole 0.25 mg oral tablet: See Instructions, TAKE 2 TABLETS AT BEDTIME, 180 tab(s), 1 Refill(s).          *tacrolimus 1 mg oral capsule: 3 mg, 3 cap(s), Oral, q12 hrs, 0 Refill(s).          zolpidem 10  mg oral tablet: See Instructions, TAKE 1 TABLET ONE TIME DAILY AT BEDTIME AS NEEDED FOR SLEEP, 90 tab(s), 1 Refill(s).       Problem list:    All Problems  Acquired thrombocytopenia / SNOMED CT 844288047 / Confirmed  Anticoagulated / SNOMED CT 139211143 / Confirmed  Duodenal ulcer / SNOMED CT 12602300 / Confirmed  End stage liver disease / SNOMED CT 5400741950 / Confirmed  Gastric ulcer / SNOMED CT 1747060942 / Confirmed  H/O liver transplant / SNOMED CT 992333044 / Confirmed  History of cholangiocarcinoma / SNOMED CT 0828594259 / Confirmed  H/O liver cancer / SNOMED CT 9140078408 / Confirmed  Hypertension / SNOMED CT 0135409532 / Confirmed  Hypokalemia / SNOMED CT 34353109 / Confirmed  Lung mass / SNOMED CT 471546678 / Confirmed  Asthma, moderate persistent / SNOMED CT 7723214807 / Confirmed  Osteopenia / SNOMED CT 814818375 / Confirmed  Pancytopenia / SNOMED CT 219565 / Confirmed  Restless leg syndrome / SNOMED CT 65528020 / Confirmed  Fibroid uterus / SNOMED CT 019054693 / Confirmed  Inactive: Anticoagulated / SNOMED CT 96960950  Resolved: History of DVT (deep vein thrombosis) / SNOMED CT 3660834969  Resolved: History of ankle fracture / SNOMED CT 4914035230  Resolved: History of arm fracture / SNOMED CT 9992742276  Resolved: Inpatient stay / SNOMED CT 878657611  Resolved: Inpatient stay / SNOMED CT 531709606  Resolved: Inpatient stay / SNOMED CT 521809516  Resolved: Inpatient stay / SNOMED CT 504126984  Resolved: Inpatient stay / SNOMED CT 387204034  Resolved: Inpatient stay / SNOMED CT 016193209  Resolved: Pregnancy / SNOMED CT 136724041  Resolved: Pregnancy / SNOMED CT 365526354  Canceled: Choliangiocarcinoma  Canceled: History of liver cancer / SNOMED CT 6415518874      Histories   Past Medical History:    Active  Acquired thrombocytopenia (234673501): Onset on 10/10/2012 at 57 years.  History of cholangiocarcinoma (1726676095): Onset in 2011 at 55 years.  Asthma, moderate persistent  (7575851955)  Hypertension (8306990566)  Fibroid uterus (429484892)  Osteopenia (729325084)  Restless leg syndrome (98804743)  Gastric ulcer (8293479365)  Duodenal ulcer (31856400)  Lung mass (678995206)  Comments:  11/20/2017 CST 8:44 AM Debra Wilson  Right  End stage liver disease (0647928096)  Hypokalemia (92319485)  Pancytopenia (186468)  Resolved  Inpatient stay (042451412): Onset on 8/30/2018 at 63 years.  Resolved on 9/12/2018 at 63 years.  Comments:  9/18/2018 CDT 2:04 PM CDT - Jess Wellington  @ [facility] for liver transplant.  Inpatient stay (458906236): Onset on 5/3/2018 at 63 years.  Resolved on 5/4/2018 at 63 years.  Comments:  5/10/2018 CDT 10:45 AM CD - Debra Perez  @[place], [place] - Organ transplant candidate  Inpatient stay (532726714): Onset on 10/30/2017 at 62 years.  Resolved on 11/3/2017 at 62 years.  Comments:  11/20/2017 CST 8:39 AM Debra Wilson  @[place], [place] - Cholangiocarcinoma  Inpatient stay (348784816): Onset on 3/13/2015 at 59 years.  Resolved on 3/17/2015 at 59 years.  Comments:  11/20/2017 CST 8:39 AM Debra Wilson  @[place], [place] - Bleeding esophageal varices  Inpatient stay (150354978): Onset on 6/12/2014 at 59 years.  Resolved on 6/13/2014 at 59 years.  Comments:  11/20/2017 CST 8:38 AM Debra Wilson  @[facility], [place] - Pneumonia  Inpatient stay (463120668): Onset on 6/22/2011 at 56 years.  Resolved.  Comments:  11/20/2017 CST 8:38 AM Debra Wilson  @[facility], [place] - Chemotherapy induced nausea and vomiting  History of DVT (deep vein thrombosis) (4923722834): Onset on 2/15/2011 at 55 years.  Resolved.  Pregnancy (656729569):  Resolved on 11/11/1979 at 24 years.  Pregnancy (179235956):  Resolved on 8/31/1981 at 26 years.  History of ankle fracture (0429673251):  Resolved.  Comments:  11/20/2017 CST 8:42 AM REYNA - Debra Perez  Right  History of arm fracture  (5582327048):  Resolved.  Comments:  2017 CST 8:43 AM REYNA Flores Chris  Debra Yanez   Family History:    Hypertension  Mother ()  Sister  Osteoporosis  Mother ()  MS - Multiple sclerosis  Sister     Procedure history:    LTx - Liver transplant (6353799983) on 2018 at 63 Years.  Esophagogastroduodenoscopy (584769647) on 2018 at 62 Years.  Comments:  2018 1:14 PM Tamera Worley  Indication:  Varices, follow up.   Sedation:  MAC.  Recommendation:  Repeat in 1 year.  Pleural catheter (0751548077) on 2017 at 62 Years.  Comments:  2018 1:43 PM Tamera Worley  Right side insertion.  Thoracoscopic procedure (7707852960) on 10/30/2017 at 62 Years.  Comments:  2017 2:23 PM Jacey Nelson CMA  Right Video assisted thoracoscopc surther  pleural biopsy  doxycycline pleurodesis  VATS - Video assisted thorascopic surgery (561709740) on 10/30/2017 at 62 Years.  Comments:  2017 2:59 PM Tamera Worley  Right-sided pleural biopsy.  Esophagogastroduodenoscopy (451308175) on 2016 at 61 Years.  Comments:  2016 8:13 AM Tamera Velazquez  Indication:  Gastric ulcer follow up.  Sedation:  MAC.  Upper GI endoscopy (9886964406) on 2016 at 61 Years.  Comments:  2016 1:29 PM CDT - Jacey Dowd CMA  Acute gastric ulcer w/o hemorrhage or perforation, esophageal varices w/o bleeding.  Varices are scarred and not amenable to banding at this time  Esophagogastroduodenoscopy (989208409) on 2016 at 61 Years.  Esophagogastroduodenoscopy (988742042) on 2016 at 61 Years.  Comments:  2016 7:57 AM Tamera Velazquez  Indication: Varices, follow up.  Sedation: MAC.  Impression:  H. pylori negative.  Upper GI endoscopy on 2016 at 60 Years.  Esophagogastroduodenoscopy (956906527) on 3/13/2015 at 59 Years.  Wedge resection of liver (204249928) in the month of 10/2012 at 57 Years.  Upper GI endoscopy (4888735333) on 2012 at 57 Years.  HPV -  Human papillomavirus test negative (5681501810) on 2012 at 57 Years.  Comments:  2012 11:41 AM TOY - Xi Dove  Low risk for cervical cancer. OK to have pap q 3 yrs.  Upper GI endoscopy (5102232979) on 3/2/2012 at 56 Years.  Upper GI endoscopy (9187930512) on 2012 at 56 Years.  Upper GI endoscopy (5187544733) on 2011 at 56 Years.  Esophagogastroduodenoscopy (461292489) on 2011 at 56 Years.  Esophagogastroduodenoscopy (146096315) on 2011 at 56 Years.  Colonoscopy (424619312) on 2/10/2011 at 55 Years.  Esophagogastroduodenoscopy (258853814) on 2/10/2011 at 55 Years.  Comments:  7/10/2011 7:21 PM CDT - Doron Hutchison MD  Bleeding in the duodenal bulb  Biopsy of liver (387405085) on 2011 at 55 Years.  HPV - Human papillomavirus test negative (8980015675) on 4/15/2010 at 55 Years.  Comments:  2011 1:04 PM TOY - Xi Dove  Low risk for cervical cancer. OK to have pap q 3 yrs.  Mammogram - screening (184373027) on 2010 at 55 Years.  Mammography; bilateral (16864) on 2010 at 55 Years.  Comments:  2011 8:48 AM REYNA - Ramya Kenney CMA  Normal-no radiographic evidence for malignancy.  DEXA - Dual energy X-ray photon absorptiometry (555903645) on 2009 at 53 Years.  Comments:  4/15/2010 1:43 PM TOY - Xi Dove  Due again in 5 yrs, ()  Tubal ligation (584898235) in  at 27 Years.   section (45061341) in  at 25 Years.  Primary repair of torn ligament of knee, collateral (85225986) in  at 16 Years.  Tonsillectomy and adenoidectomy (213333971) in 1963 at 8 Years.  Arm fracture (818.0).  Comments:  2011 2:11 PM Nathaly Valle  Right  Arm fracture (818.0).  Comments:  2011 2:12 PM Nathaly Valle  Left  Ankle fracture, right (824.8).   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      Sexual Assessment            Sexually active: Yes.  Number of current partners 1.  Other contraceptive use: Ann Marie.        Physical Examination   vital signs stable, as noted above   Vital Signs   6/30/2020 4:37 PM CDT Temperature Tympanic 96.2 DegF  LOW    Peripheral Pulse Rate 80 bpm    Systolic Blood Pressure 116 mmHg    Diastolic Blood Pressure 74 mmHg    Mean Arterial Pressure 88 mmHg      Measurements from flowsheet : Measurements   6/30/2020 4:37 PM CDT    Weight Measured - Standard                133.8 lb     General:  Alert and oriented, No acute distress.    Respiratory:  Respirations are non-labored.    Cardiovascular:  Normal rate.    Musculoskeletal:  Normal range of motion.    Integumentary:  focal paronychia changes at base of left index finger without an clear fluctuance or pus under tension.  Not appearing amenable to drainagew.    Neurologic:  Alert, Oriented.    Cognition and Speech:  Oriented, Speech clear and coherent.    Psychiatric:  Appropriate mood & affect.       Review / Management   Results review:  Lab results   5/27/2020 2:01 PM CDT Sodium Level 139 mmol/L    Potassium Level 4.9 mmol/L    Chloride Level 106 mmol/L    CO2 Level 27 mmol/L    Glucose Level 96 mg/dL    BUN 25 mg/dL    Creatinine 1.27 mg/dL  HI    BUN/Creat Ratio 20    eGFR 45 mL/min/1.73m2  LOW    eGFR African American 52 mL/min/1.73m2  LOW    Calcium Level 8.7 mg/dL    Vitamin D, 1, 25 Dihydroxy Total 54 pg/mL    Vitamin D2, 1, 25 Dihydroxy <8 pg/mL    Vitamin D3, 1, 25 Dihydroxy 54 pg/mL   5/22/2020 8:47 AM CDT Hgb TR 13.7 g/dL   4/25/2020 8:48 AM CDT Sodium Level  mEq/L    Potassium Level TR 4.4 mEq/L    Chloride  mEq/L    CO2 TR 20 mEq/L    Glucose Level TR 93 mg/dL    BUN TR 27 mg/dL    Creatinine TR 1.08 mg/dL    Calcium TR 9.1 mEq/dL    Bili Total TR 0.3 mg/dL    Alk Phos TR 78 unit/L    AST TR 19 unit/L    ALT TR 14  unit/L    Protein Total TR 7.2 gm/dL    Albumin Level TR 4.3 g/dL    Hgb TR 13.4 g/dL    Platelet  x10^3/uL   .       Impression and Plan   Diagnosis     H/O liver transplant (EHC50-MH Z94.4).     Immunosuppressed status (NAE65-OI D89.9).     Paronychia, finger (NBS70-AU L03.019).       .) acute paronychia of left index finger  - not currently looking amenable to I&D  - Rx for Keflex 500mg QID given immunosuppressed status   - advised to soak finger regularly (3-4x/day for 20-30 min soakings) in warm soapy water  - can submerge in ice water for more numbing effect  - if more amenable to I&D, advised to return for reassessment

## 2022-02-16 NOTE — NURSING NOTE
Comprehensive Intake Entered On:  3/1/2019 11:02 AM CST    Performed On:  3/1/2019 10:52 AM CST by Seda Alba CMA               Summary   Chief Complaint :   preop DOS 3/4/19 Dr. Alvarado for  left shoulder ORIF @ [facility]    Menstrual Status :   Postmenopausal   Weight Measured :   125 lb(Converted to: 125 lb 0 oz, 56.70 kg)    Height Measured :   63 in(Converted to: 5 ft 3 in, 160.02 cm)    Body Mass Index :   22.14 kg/m2   Body Surface Area :   1.59 m2   Systolic Blood Pressure :   110 mmHg   Diastolic Blood Pressure :   64 mmHg   Mean Arterial Pressure :   79 mmHg   Peripheral Pulse Rate :   76 bpm   BP Site :   Right arm   Pulse Site :   Radial artery   BP Method :   Manual   HR Method :   Manual   Temperature Tympanic :   97.4 DegF(Converted to: 36.3 DegC)  (LOW)    Seda Alba CMA - 3/1/2019 10:52 AM CST   Health Status   Allergies Verified? :   Yes   Medication History Verified? :   Yes   Medical History Verified? :   No   Pre-Visit Planning Status :   Not completed   Tobacco Use? :   Never smoker   Seda Alba CMA - 3/1/2019 10:52 AM CST   Demographics   Last Name :   Spike   Address :   [address] [place] [address]   First Name :   Sherrie Ott Initial :   JUNE   Responsible Party Date of Birth () :   1955 CST   City :   [place]   State :   [place]   Zip Code :   [id number]   Seda Alba CMA - 3/1/2019 10:52 AM CST   Providers Grid   Provider Name :    Abigail Parham              Provider Specialty :    Transplant Coordinator              Reason for Seeing Provider :    [facility] [phone]                Sdea Alba CMA - 3/1/2019 10:52 AM CST         Meds / Allergies   (As Of: 3/1/2019 11:02:13 AM CST)   Allergies (Active)   Dilaudid  Estimated Onset Date:   Unspecified ; Reactions:   Itching ; Created By:   Jacey Dowd CMA; Reaction Status:   Active ; Category:   Drug ; Substance:   Dilaudid ; Type:   Allergy ; Updated By:   Jacey Dowd CMA; Reviewed Date:   3/1/2019 10:57 AM CST         Medication List   (As Of: 3/1/2019 11:02:13 AM CST)   Prescription/Discharge Order    albuterol  :   albuterol ; Status:   Prescribed ; Ordered As Mnemonic:   Ventolin HFA 90 mcg/inh inhalation aerosol ; Simple Display Line:   See Instructions, INHALE 2 PUFFS FOUR TIMES DAILY AS NEEDED FOR WHEEZING, 1 EA, 5 Refill(s) ; Ordering Provider:   Elijah Benitez MD; Catalog Code:   albuterol ; Order Dt/Tm:   10/24/2018 1:52:22 PM          cyclobenzaprine  :   cyclobenzaprine ; Status:   Processing ; Ordered As Mnemonic:   cyclobenzaprine 5 mg oral tablet ; Ordering Provider:   Elijah Benitez MD; Action Display:   Complete ; Catalog Code:   cyclobenzaprine ; Order Dt/Tm:   3/1/2019 10:59:05 AM          gabapentin  :   gabapentin ; Status:   Prescribed ; Ordered As Mnemonic:   gabapentin 300 mg oral capsule ; Simple Display Line:   1 cap(s), Oral, qhs, 90 cap(s), 0 Refill(s) ; Ordering Provider:   Elijah Benitez MD; Catalog Code:   gabapentin ; Order Dt/Tm:   2/27/2019 8:33:13 AM          oxyCODONE  :   oxyCODONE ; Status:   Prescribed ; Ordered As Mnemonic:   oxyCODONE 5 mg oral tablet ; Simple Display Line:   5 mg, 1 tab(s), Oral, q3-4 hrs, PRN: as needed for pain, 60 tab(s), 0 Refill(s) ; Ordering Provider:   Elijah Benitez MD; Catalog Code:   oxyCODONE ; Order Dt/Tm:   9/14/2018 4:37:53 PM          potassium chloride  :   potassium chloride ; Status:   Processing ; Ordered As Mnemonic:   potassium chloride 20 mEq oral tablet, extended release ; Ordering Provider:   Elijah Benitez MD; Action Display:   Complete ; Catalog Code:   potassium chloride ; Order Dt/Tm:   3/1/2019 10:59:35 AM          pramipexole  :   pramipexole ; Status:   Prescribed ; Ordered As Mnemonic:   pramipexole 0.25 mg oral tablet ; Simple Display Line:   0.5 mg, 2 tab(s), Oral, qpm, 180 tab(s), 1 Refill(s) ; Ordering Provider:   Elijah Benitez MD; Catalog Code:   pramipexole ; Order Dt/Tm:   10/24/2018 1:51:27 PM          warfarin  :   warfarin ; Status:    Prescribed ; Ordered As Mnemonic:   warfarin 2 mg oral tablet ; Simple Display Line:   See Instructions, TAKE TWO TABLETS BY MOUTH EVERY DAY - OR INSTRUCTIONS FROM PROVIDER IF DIFFERENT, 180 tab(s), 1 Refill(s) ; Ordering Provider:   Elijah Benitez MD; Catalog Code:   warfarin ; Order Dt/Tm:   1/10/2019 10:52:40 AM          zolpidem  :   zolpidem ; Status:   Prescribed ; Ordered As Mnemonic:   zolpidem 10 mg oral tablet ; Simple Display Line:   See Instructions, TAKE 1 TABLET ONE TIME DAILY AT BEDTIME AS NEEDED FOR SLEEP, 90 tab(s), 1 Refill(s) ; Ordering Provider:   Elijah Benitez MD; Catalog Code:   zolpidem ; Order Dt/Tm:   10/25/2018 6:54:20 PM ; Comment:   faxed to [facility]    acetaminophen  :   acetaminophen ; Status:   Documented ; Ordered As Mnemonic:   acetaminophen 500 mg oral tablet ; Simple Display Line:   1-2 tab(s), Oral, q6 hrs, PRN: as needed for pain, 0 Refill(s) ; Catalog Code:   acetaminophen ; Order Dt/Tm:   10/12/2018 4:08:10 PM          aspirin  :   aspirin ; Status:   Completed ; Ordered As Mnemonic:   aspirin 325 mg oral tablet ; Simple Display Line:   325 mg, 1 tab(s), Oral, daily, 0 Refill(s) ; Catalog Code:   aspirin ; Order Dt/Tm:   10/12/2018 4:08:43 PM          aspirin  :   aspirin ; Status:   Documented ; Ordered As Mnemonic:   aspirin 81 mg oral tablet ; Simple Display Line:   81 mg, 1 tab(s), Oral, daily, 0 Refill(s) ; Catalog Code:   aspirin ; Order Dt/Tm:   3/1/2019 10:58:12 AM          azaTHIOprine  :   azaTHIOprine ; Status:   Processing ; Ordered As Mnemonic:   Imuran 50 mg oral tablet ; Action Display:   Complete ; Catalog Code:   azaTHIOprine ; Order Dt/Tm:   3/1/2019 10:58:31 AM          bumetanide  :   bumetanide ; Status:   Processing ; Ordered As Mnemonic:   bumetanide ; Simple Display Line:   daily, 0 Refill(s) ; Action Display:   Delete ; Catalog Code:   bumetanide ; Order Dt/Tm:   3/1/2019 10:58:41 AM          calcium carbonate  :   calcium  carbonate ; Status:   Processing ; Ordered As Mnemonic:   calcium carbonate 500 mg (200 mg elemental calcium) oral tablet, chewable ; Action Display:   Complete ; Catalog Code:   calcium carbonate ; Order Dt/Tm:   3/1/2019 10:58:50 AM          calcium carbonate  :   calcium carbonate ; Status:   Processing ; Ordered As Mnemonic:   Maalox Regular Strength ; Action Display:   Complete ; Catalog Code:   calcium carbonate ; Order Dt/Tm:   3/1/2019 10:58:56 AM          cholecalciferol  :   cholecalciferol ; Status:   Processing ; Ordered As Mnemonic:   Vitamin D3 1000 intl units oral tablet ; Action Display:   Complete ; Catalog Code:   cholecalciferol ; Order Dt/Tm:   3/1/2019 10:59:00 AM          Miscellaneous Prescription  :   Miscellaneous Prescription ; Status:   Processing ; Ordered As Mnemonic:   ProSource Protein ; Action Display:   Complete ; Catalog Code:   Miscellaneous Prescription ; Order Dt/Tm:   3/1/2019 10:59:13 AM          mycophenolate mofetil  :   mycophenolate mofetil ; Status:   Processing ; Ordered As Mnemonic:   mycophenolate mofetil 250 mg oral capsule ; Action Display:   Complete ; Catalog Code:   mycophenolate mofetil ; Order Dt/Tm:   3/1/2019 10:59:18 AM          omeprazole  :   omeprazole ; Status:   Documented ; Ordered As Mnemonic:   omeprazole 20 mg oral delayed release capsule ; Simple Display Line:   20 mg, 1 cap(s), PO, bid, 90 cap(s), 0 Refill(s) ; Catalog Code:   omeprazole ; Order Dt/Tm:   5/9/2018 10:52:40 AM          senna  :   senna ; Status:   Processing ; Ordered As Mnemonic:   senna ; Action Display:   Complete ; Catalog Code:   senna ; Order Dt/Tm:   3/1/2019 10:59:44 AM          sulfamethoxazole-trimethoprim  :   sulfamethoxazole-trimethoprim ; Status:   Processing ; Ordered As Mnemonic:   Bactrim ; Action Display:   Complete ; Catalog Code:   sulfamethoxazole-trimethoprim ; Order Dt/Tm:   3/1/2019 10:59:48 AM          tacrolimus  :   tacrolimus ; Status:   Documented ;  Ordered As Mnemonic:   tacrolimus 1 mg oral capsule ; Simple Display Line:   See Instructions, 4mg AM/ 5mg PM, 0 Refill(s) ; Catalog Code:   tacrolimus ; Order Dt/Tm:   10/12/2018 4:10:27 PM          valGANciclovir  :   valGANciclovir ; Status:   Processing ; Ordered As Mnemonic:   valGANciclovir 450 mg oral tablet ; Action Display:   Complete ; Catalog Code:   valGANciclovir ; Order Dt/Tm:   3/1/2019 10:59:55 AM

## 2022-02-16 NOTE — LETTER
(Inserted Image. Unable to display)   June 30, 2021  REX DAMON  [address] [place] [id number]          Dear REX,      Thank you for selecting [facility] for your healthcare needs.    Our records indicate you are due for the following services:     Follow-up office visit     (FYI   Regarding office visits: In some instances, a video visit or telephone visit may be offered as an option.)        To schedule an appointment or if you have further questions, please contact your clinic at [phone].      Powered by [employer]    Sincerely,    Elijah Benitez M.D.
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
## 2022-02-16 NOTE — NURSING NOTE
Comprehensive Intake Entered On:  10/8/2019 12:05 PM CDT    Performed On:  10/8/2019 12:03 PM CDT by Kamilah Espinoza               Summary   Chief Complaint :   Cough and SOB x3 weeks   Menstrual Status :   Postmenopausal   Weight Measured :   127.6 lb(Converted to: 127 lb 10 oz, 57.88 kg)    Height Measured :   63 in(Converted to: 5 ft 3 in, 160.02 cm)    Body Mass Index :   22.6 kg/m2   Body Surface Area :   1.6 m2   Systolic Blood Pressure :   110 mmHg   Diastolic Blood Pressure :   78 mmHg   Mean Arterial Pressure :   89 mmHg   Peripheral Pulse Rate :   86 bpm   BP Method :   Electronic   HR Method :   Electronic   Temperature Tympanic :   97.8 DegF(Converted to: 36.6 DegC)  (LOW)    Oxygen Saturation :   98 %   Kamilah Espinoza - 10/8/2019 12:03 PM CDT   Health Status   Allergies Verified? :   Yes   Medication History Verified? :   Yes   Tobacco Use? :   Never smoker   Kamilah Espinoza - 10/8/2019 12:03 PM CDT   Meds / Allergies   (As Of: 10/8/2019 12:05:03 PM CDT)   Allergies (Active)   Dilaudid  Estimated Onset Date:   Unspecified ; Reactions:   Itching ; Created By:   Jacey Dowd CMA; Reaction Status:   Active ; Category:   Drug ; Substance:   Dilaudid ; Type:   Allergy ; Updated By:   Jacey Dowd CMA; Reviewed Date:   3/1/2019 10:57 AM CST      HYDROmorphone  Estimated Onset Date:   6/11/2014 ; Reactions:   Itching ; Created By:   Jacey Dowd CMA; Reaction Status:   Active ; Category:   Drug ; Substance:   HYDROmorphone ; Type:   <not entered> ; Severity:   Unknown ; Updated By:   Jacey Dowd CMA; Reviewed Date:   7/26/2019 11:51 AM CDT      iron sucrose  Estimated Onset Date:   <not entered> 7/16/2015 ; Reactions:   GI Upset ; Created By:   Jacey Dowd CMA; Reaction Status:   Active ; Category:   Drug ; Substance:   iron sucrose ; Type:   <not entered> ; Severity:   Unknown ; Updated By:   Jacey Dowd CMA; Reviewed Date:   7/26/2019 11:51 AM CDT      sodium ferric gluconate complex   Estimated Onset Date:   <not entered> 12/10/2015 ; Reactions:   Other - Describe In Comment Field ; Created By:   Jacey Dowd CMA; Reaction Status:   Active ; Category:   Drug ; Substance:   sodium ferric gluconate complex ; Type:   <not entered> ; Severity:   Unknown ; Updated By:   Jacey Dowd CMA; Reviewed Date:   7/26/2019 11:51 AM CDT        Medication List   (As Of: 10/8/2019 12:05:03 PM CDT)   Prescription/Discharge Order    albuterol  :   albuterol ; Status:   Prescribed ; Ordered As Mnemonic:   albuterol 90 mcg/inh inhalation aerosol ; Simple Display Line:   2 puff(s), NEB, qid, PRN: AS NEEDED FOR WHEEZING, 18 gm, 5 Refill(s) ; Ordering Provider:   Elijah Benitez MD; Catalog Code:   albuterol ; Order Dt/Tm:   10/1/2019 4:08:35 PM CDT          zolpidem 10 mg oral tablet  :   zolpidem 10 mg oral tablet ; Status:   Prescribed ; Ordered As Mnemonic:   zolpidem 10 mg oral tablet ; Simple Display Line:   See Instructions, TAKE 1 TABLET ONE TIME DAILY AT BEDTIME AS NEEDED FOR SLEEP, 90 tab(s), 1 Refill(s) ; Ordering Provider:   Elijah Benitez MD; Catalog Code:   zolpidem ; Order Dt/Tm:   9/10/2019 2:52:45 PM CDT          alendronate  :   alendronate ; Status:   Prescribed ; Ordered As Mnemonic:   alendronate 70 mg oral tablet ; Simple Display Line:   70 mg, 1 tab(s), Oral, qweek, with 6-8 oz plain water, at least 30 minutes before first food, beverage, or medication of the day, 12 tab(s), 3 Refill(s) ; Ordering Provider:   Elijah Benitez MD; Catalog Code:   alendronate ; Order Dt/Tm:   8/27/2019 11:47:29 AM CDT          gabapentin  :   gabapentin ; Status:   Prescribed ; Ordered As Mnemonic:   gabapentin 300 mg oral capsule ; Simple Display Line:   1 cap(s), Oral, qhs, 90 tab(s), 1 Refill(s) ; Ordering Provider:   Elijah Benitez MD; Catalog Code:   gabapentin ; Order Dt/Tm:   7/26/2019 11:49:08 AM CDT          pramipexole  :   pramipexole ; Status:   Prescribed ; Ordered As Mnemonic:   pramipexole 0.25 mg oral tablet ;  Simple Display Line:   2 tab(s), Oral, qhs, 180 tab(s), 1 Refill(s) ; Ordering Provider:   Elijah Benitez MD; Catalog Code:   pramipexole ; Order Dt/Tm:   7/26/2019 11:49:08 AM CDT            Home Meds    aspirin  :   aspirin ; Status:   Documented ; Ordered As Mnemonic:   aspirin 81 mg oral tablet ; Simple Display Line:   81 mg, 1 tab(s), Oral, daily, 0 Refill(s) ; Catalog Code:   aspirin ; Order Dt/Tm:   3/1/2019 10:58:12 AM CST          tacrolimus  :   tacrolimus ; Status:   Documented ; Ordered As Mnemonic:   tacrolimus 1 mg oral capsule ; Simple Display Line:   See Instructions, 4mg AM/ 5mg PM, 0 Refill(s) ; Catalog Code:   tacrolimus ; Order Dt/Tm:   10/12/2018 4:10:27 PM CDT          acetaminophen  :   acetaminophen ; Status:   Documented ; Ordered As Mnemonic:   acetaminophen 500 mg oral tablet ; Simple Display Line:   1-2 tab(s), Oral, q6 hrs, PRN: as needed for pain, 0 Refill(s) ; Catalog Code:   acetaminophen ; Order Dt/Tm:   10/12/2018 4:08:10 PM CDT          omeprazole  :   omeprazole ; Status:   Documented ; Ordered As Mnemonic:   omeprazole 20 mg oral delayed release capsule ; Simple Display Line:   40 mg, 2 cap(s), PO, bid, 90 cap(s), 0 Refill(s) ; Catalog Code:   omeprazole ; Order Dt/Tm:   5/9/2018 10:52:40 AM CDT

## 2022-02-16 NOTE — RESULTS
Patient:   REX DAMON            MRN: [id number]            FIN: [id number]               Age:   66 years     Sex:  Female     :  1955   Associated Diagnoses:   None   Author:   Foster ATKINSON, Doron      Procedure   EKG procedure   Indication: Preoperative.     EKG findings   Interpretation: by primary care provider.     Sinus rhythm. Normal QTc and PA interval with no qwaves or ST changes. Good R wave progression .        Impression and Plan   Diagnosis     normal EKG.

## 2022-02-16 NOTE — TELEPHONE ENCOUNTER
---------------------  From: Rossy Thapa RN   Sent: 7/12/2019 10:24:12 AM CDT  Subject: INR reminder       Called patient due to being 28 days past due for INR.  She states she had an INR done at infusion center on 6/14/19 and it was supposed to have been faxed to us.  We did not receive any result from this lab test.  [facility] does show INR on 6/14/19 = 1.4.  Patient has continued her warfarin at 2 mg M/W/F and 4 mg ROW and said she is at the infusion center right now and would like an order for them to draw INR today.  She also requested refill of warfarin.    Will bring INR order to infusion center at [facility] and refill warfarin.  Will not send reminder letter and will document INR result from 6/14/19
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
## 2022-02-16 NOTE — PROGRESS NOTES
Patient:   REX DAMON            MRN: [id number]            FIN: [id number]               Age:   63 years     Sex:  Female     :  1955   Associated Diagnoses:   Pre-operative examination; Fracture of humeral head, left, closed; H/O liver transplant   Author:   Wild ENGEL, Cj PELLETIER      Preoperative Information   Procedure/ Case: left impacted displaced proximal humerus   Location scheduled: [facility]   Date/ Time scheduled: 3/4/2019 8:00:00 AM   surgeon= Dr Eh Kraft      Chief Complaint   3/1/2019 10:52 AM CST    preop DOS 3/4/19 Dr. Alvarado for  left shoulder ORIF @ [facility]     slipped and fell on ice on  onto left shoulder, was seen in [place] [facility], diagnosed with left impacted displaced humerus, now   scheduled for repair      Review of Systems   Constitutional:  Negative.    Ear/Nose/Mouth/Throat:  Negative.    Respiratory:  Negative.    Cardiovascular:  Negative.    Gastrointestinal:  Negative.       Health Status   Allergies:    Allergic Reactions (Selected)  Severity Not Documented  Dilaudid (Itching)   Medications:  (Selected)   Prescriptions  Prescribed  Ventolin HFA 90 mcg/inh inhalation aerosol: See Instructions, Instructions: INHALE 2 PUFFS FOUR TIMES DAILY AS NEEDED FOR WHEEZING, # 1 EA, 5 Refill(s), Type: Maintenance, Pharmacy: [facility]  gabapentin 300 mg oral capsule: = 1 cap(s), Oral, qhs, # 90 cap(s), 0 Refill(s), Type: Soft Stop, Pharmacy: [facility], change in quanity- verified with pt that she takes qhs, 1 cap(s) Oral qhs  oxyCODONE 5 mg oral tablet: = 1 tab(s) ( 5 mg ), Oral, q3-4 hrs, PRN: as needed for pain, # 60 tab(s), 0 Refill(s), Type: Maintenance, Pharmacy: [facility], 1 tab(s) Oral q3-4 hrs,PRN:as needed for pain  pramipexole 0.25 mg oral tablet: = 2 tab(s) ( 0.5 mg ), Oral, qpm, # 180 tab(s), 1 Refill(s), Type: Maintenance, Pharmacy: [facility]  warfarin 2  mg oral tablet: See Instructions, Instructions: TAKE TWO TABLETS BY MOUTH EVERY DAY - OR INSTRUCTIONS FROM PROVIDER IF DIFFERENT, # 180 tab(s), 1 Refill(s), Type: Maintenance, Pharmacy: [facility], TAKE TWO TABLETS BY MOUTH EVERY DAY - OR INS...  zolpidem 10 mg oral tablet: See Instructions, Instructions: TAKE 1 TABLET ONE TIME DAILY AT BEDTIME AS NEEDED FOR SLEEP, # 90 tab(s), 1 Refill(s), Type: Soft Stop  Documented Medications  Documented  acetaminophen 500 mg oral tablet: 1-2 tab(s), Oral, q6 hrs, PRN: as needed for pain, 0 Refill(s), Type: Maintenance  aspirin 81 mg oral tablet: = 1 tab(s) ( 81 mg ), Oral, daily, 0 Refill(s), Type: Maintenance  omeprazole 20 mg oral delayed release capsule: 1 cap(s) ( 20 mg ), PO, bid, # 90 cap(s), 0 Refill(s), Type: Maintenance  tacrolimus 1 mg oral capsule: See Instructions, Instructions: 4mg AM/ 5mg PM, 0 Refill(s), Type: Maintenance   Problem list:    All Problems  Hypertension / SNOMED CT 6729024911 / Confirmed  Acquired thrombocytopenia / SNOMED CT 874500082 / Confirmed  History of ankle fracture / SNOMED CT 0430549215 / Confirmed  History of arm fracture / SNOMED CT 5079369209 / Confirmed  Fibroid uterus / SNOMED CT 291052832 / Confirmed  Gastric ulcer / SNOMED CT 5244084945 / Confirmed  H/O liver transplant / SNOMED CT 417359415 / Confirmed  History of liver cancer / SNOMED CT 2996929901 / Confirmed  Asthma, moderate persistent / SNOMED CT 3300779179 / Confirmed  End stage liver disease / SNOMED CT 4654817303 / Confirmed  History of cholangiocarcinoma / SNOMED CT 4100602243 / Confirmed  Pancytopenia / SNOMED CT 737085 / Confirmed  Anticoagulated / SNOMED CT 843030901 / Confirmed  Lung mass / SNOMED CT 029188839 / Confirmed  Osteopenia / SNOMED CT 158063310 / Confirmed  Restless leg syndrome / SNOMED CT 70560986 / Confirmed  Hypokalemia / SNOMED CT 77688855 / Confirmed  Duodenal ulcer / SNOMED CT 73369943 / Confirmed  Inactive: Anticoagulated /  SNOMED CT 20961286  Resolved: History of DVT (deep vein thrombosis) / SNOMED CT 2225273288  Resolved: Pregnancy / SNOMED CT 930221310  Resolved: Pregnancy / SNOMED CT 622786788  Resolved: Inpatient stay / SNOMED CT 575189099  Resolved: Inpatient stay / SNOMED CT 355830576  Resolved: Inpatient stay / SNOMED CT 385002243  Resolved: Inpatient stay / SNOMED CT 883630530  Resolved: Inpatient stay / SNOMED CT 491197458  Resolved: Inpatient stay / SNOMED CT 435436408  Canceled: Choliangiocarcinoma      Histories   Past Medical History:    Active  Acquired thrombocytopenia (044888076): Onset on 10/10/2012 at 57 years.  History of cholangiocarcinoma (4589921631): Onset in 2011 at 55 years.  Asthma, moderate persistent (8434333260)  Hypertension (0004122154)  Fibroid uterus (898510098)  Osteopenia (430941921)  Restless leg syndrome (55591424)  Gastric ulcer (5530077575)  History of ankle fracture (4154024628)  Comments:  11/20/2017 CST 8:42 AM Debra Wilson  Right  Duodenal ulcer (22418705)  Lung mass (238982473)  Comments:  11/20/2017 CST 8:44 AM Debra Wilson  Right  History of arm fracture (2253544212)  Comments:  11/20/2017 CST 8:43 AM Debra Wilson  Right  History of liver cancer (3030245699)  End stage liver disease (0644568586)  Hypokalemia (33192488)  Pancytopenia (066508)  Resolved  Inpatient stay (552665012): Onset on 8/30/2018 at 63 years.  Resolved on 9/12/2018 at 63 years.  Comments:  9/18/2018 CDT 2:04 PM CDT - Jess Wellington  @ [facility] for liver transplant.  Inpatient stay (941934551): Onset on 5/3/2018 at 63 years.  Resolved on 5/4/2018 at 63 years.  Comments:  5/10/2018 CDT 10:45 AM CDT - Debra Perez  [facility] [place] [facility]  Inpatient stay (930127821): Onset on 10/30/2017 at 62 years.  Resolved on 11/3/2017 at 62 years.  Comments:  11/20/2017 CST 8:39 AM CST - Debra Perez  @[facility], [place], [place] - Cholangiocarcinoma  Inpatient  stay (996837596): Onset on 3/13/2015 at 59 years.  Resolved on 3/17/2015 at 59 years.  Comments:  2017 CST 8:39 AM Debra Wilson  @[facility], [place] - Bleeding esophageal varices  Inpatient stay (757859685): Onset on 2014 at 59 years.  Resolved on 2014 at 59 years.  Comments:  2017 CST 8:38 AM Debra Wilson  @[facility], [place] - Pneumonia  Inpatient stay (081119803): Onset on 2011 at 56 years.  Resolved.  Comments:  2017 CST 8:38 AM Debra Wilson  @[facility], [place] - Chemotherapy induced nausea and vomiting  History of DVT (deep vein thrombosis) (1189579913): Onset on 2/15/2011 at 55 years.  Resolved.  Pregnancy (926979548):  Resolved on 1979 at 24 years.  Pregnancy (572580706):  Resolved on 1981 at 26 years.   Family History:    Hypertension  Mother ()  Sister  Osteoporosis  Mother ()  MS - Multiple sclerosis  Sister     Procedure history:    LTx - Liver transplant (8603703686) on 2018 at 63 Years.  Esophagogastroduodenoscopy (022947064) on 2018 at 62 Years.  Comments:  2018 1:14 PM Tamera Worley  Indication:  Varices, follow up.   Sedation:  MAC.  Recommendation:  Repeat in 1 year.  Pleural catheter (7956272520) on 2017 at 62 Years.  Comments:  2018 1:43 PM Tamera Worley  Right side insertion.  Thoracoscopic procedure (9247691080) on 10/30/2017 at 62 Years.  Comments:  2017 2:23 PM CST - Jacey Dowd CMA  Right Video assisted thoracoscopc surther  pleural biopsy  doxycycline pleurodesis  VATS - Video assisted thorascopic surgery (519740352) on 10/30/2017 at 62 Years.  Comments:  2017 2:59 PM REYNA - Tamera Green  Right-sided pleural biopsy.  Esophagogastroduodenoscopy (645875584) on 2016 at 61 Years.  Comments:  2016 8:13 AM CDT - Tamera Green  Indication:  Gastric ulcer follow up.  Sedation:  MAC.  Upper GI endoscopy (4093258559) on 2016  at 61 Years.  Comments:  6/23/2016 1:29 PM CDT - Jacey Dowd CMA  Acute gastric ulcer w/o hemorrhage or perforation, esophageal varices w/o bleeding.  Varices are scarred and not amenable to banding at this time  Esophagogastroduodenoscopy (209009672) on 6/17/2016 at 61 Years.  Esophagogastroduodenoscopy (301458861) on 4/11/2016 at 61 Years.  Comments:  4/19/2016 7:57 AM CDT - Tamera Green  Indication: Varices, follow up.  Sedation: MAC.  Impression:  H. pylori negative.  Upper GI endoscopy on 1/14/2016 at 60 Years.  Esophagogastroduodenoscopy (253334660) on 3/13/2015 at 59 Years.  Wedge resection of liver (997118941) in the month of 10/2012 at 57 Years.  Upper GI endoscopy (0708246403) on 6/8/2012 at 57 Years.  HPV - Human papillomavirus test negative (0709187270) on 5/8/2012 at 57 Years.  Comments:  5/14/2012 11:41 AM ELLENT - Xi Dove  Low risk for cervical cancer. OK to have pap q 3 yrs.  Upper GI endoscopy (7194929441) on 3/2/2012 at 56 Years.  Upper GI endoscopy (6775216516) on 1/5/2012 at 56 Years.  Upper GI endoscopy (7501593170) on 11/22/2011 at 56 Years.  Esophagogastroduodenoscopy (144766171) on 9/6/2011 at 56 Years.  Esophagogastroduodenoscopy (196344644) on 5/23/2011 at 56 Years.  Colonoscopy (305341350) on 2/10/2011 at 55 Years.  Esophagogastroduodenoscopy (755699058) on 2/10/2011 at 55 Years.  Comments:  7/10/2011 7:21 PM CDT - Doron Hutchison MD  Bleeding in the duodenal bulb  Biopsy of liver (982101955) on 1/28/2011 at 55 Years.  HPV - Human papillomavirus test negative (1265837429) on 4/15/2010 at 55 Years.  Comments:  4/26/2011 1:04 PM ELLENT - Rula TORREZ Xi  Low risk for cervical cancer. OK to have pap q 3 yrs.  Mammogram - screening (368781194) on 4/9/2010 at 55 Years.  Mammography; bilateral (77772) on 4/9/2010 at 55 Years.  Comments:  1/26/2011 8:48 AM CST - Ramya Kenney CMA  Normal-no radiographic evidence for malignancy.  DEXA - Dual energy X-ray photon  delfina (876375861) on 2009 at 53 Years.  Comments:  4/15/2010 1:43 PM CDT - Rula TORREZ, Xi  Due again in 5 yrs, ()  Tubal ligation (962841917) in  at 27 Years.   section (63427292) in  at 25 Years.  Primary repair of torn ligament of knee, collateral (21903518) in  at 16 Years.  Tonsillectomy and adenoidectomy (050543387) in  at 8 Years.  Arm fracture (818.0).  Comments:  2011 2:11 PM Nathaly Valle  Right  Arm fracture (818.0).  Comments:  2011 2:12 PM Nathaly Valle  Left  Ankle fracture, right (824.8).   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      Sexual Assessment            Sexually active: Yes.  Number of current partners 1.  Other contraceptive use: Ann Marie.     Has a  history of anemia.  Has a history of DVT or pulmonary embolism.  Has no personal history of bleeding problems.   Has  no personal history of anesthesia reactions.  Has  no  personal history of sleep apnea  Patient does not have active tuberculosis.    S/he has  taken aspirin or aspirin-containing products in the last week.     S/he  has not taken Plavix (Clopidogrel) in the last 2 weeks.    S/he has taken warfarin in the past week.  Stopped warfarin on , will be bridged with lovenox,           hold lovenox 24 hr before surgery, resume lovenox and warfarin 24 hrs after surgery  S/he has not been on corticosteroids for more than 2 weeks recently.   S/he is DNR before, during or after surgery.          Chest pain / SOB walking up 2 flights of steps? No  Pain in neck or jaw? No  CAD MI?  No  Afib? No  Heart Failure? No  Asthma  or Bronchitis? No  Diabetes? No       Insulin/Orals?   Seizure Disorder? No  CKD? No  Thyroid Disease? No  Liver Disease? Yes, had liver transplant  8/30/2018  CVA? No      Physical Examination   Vital Signs   3/1/2019 10:52 AM CST Temperature Tympanic 97.4 DegF  LOW    Peripheral Pulse Rate 76 bpm    Pulse Site Radial artery    HR Method Manual    Systolic Blood Pressure 110 mmHg    Diastolic Blood Pressure 64 mmHg    Mean Arterial Pressure 79 mmHg    BP Site Right arm    BP Method Manual      Measurements from flowsheet : Measurements   3/1/2019 10:52 AM CST Height Measured - Standard 63 in    Weight Measured - Standard 125 lb    BSA 1.59 m2    Body Mass Index 22.14 kg/m2      General:  No acute distress.    Eye:  Pupils are equal, round and reactive to light, Extraocular movements are intact.    HENT:  Tympanic membranes are clear, No pharyngeal erythema, No sinus tenderness.    Neck:  Supple, Non-tender, No lymphadenopathy.    Respiratory:  Lungs are clear to auscultation.    Cardiovascular:  Normal rate, Regular rhythm, No murmur.    Gastrointestinal:  Soft, Non-tender, Non-distended, Normal bowel sounds, No organomegaly.    Neurologic:  Cranial Nerves II-XII are grossly intact, Normal deep tendon reflexes.    Psychiatric:  Appropriate mood & affect.       Review / Management   Results review:  Lab results   3/1/2019 11:59 AM CST Sodium Level 141 mmol/L    Potassium Level 4.7 mmol/L    Chloride Level 105 mmol/L    CO2 Level 26 mmol/L    AGAP 10    Glucose Level 95 mg/dL    BUN 50 mg/dL    Creatinine Level 1.66 mg/dL    BUN/Creat Ratio 30    eGFR  38    eGFR Non-African American 31    Calcium Level 9.1 mg/dL    Bilirubin Total 0.6 mg/dL    Bilirubin Direct 0.2 mg/dL    Bilirubin Indirect 0.4 mg/dL    Alkaline Phosphatase 82 IU/L    AST/SGOT 11 IU/L    ALT/SGPT 25 IU/L    Protein Total 6.7 g/dL    Albumin Level 3.9 g/dL    Globulin 2.8 g/dL    A/G Ratio 1.4    WBC 3.2    RBC 3.40    Hgb 10.0 g/dL    Hct 29.7 %    MCV 87 fL    MCH 29.4 pg    MCHC 33.7 g/dL    RDW 14.5 %    Platelet 118    MPV 9.9 fL    Lymphocytes 17.6 %    Abs Lymphocytes 0.6     Neutrophils 59.8 %    Abs Neutrophils 1.9    Monocytes 12.1 %    Abs Monocytes 0.4    Eosinophils 9.9 %    Abs Eosinophils 0.3    Basophils 0.6 %    Abs Basophils 0.0   3/1/2019 11:52 AM CST INR 2.9  HI    Prothrombin Time 34.5  HI   .    ECG interpretation:  Within normal limits, Normal sinus rhythm, No ST-T changes, will be over-read by Dr Lopez.       Impression and Plan   Diagnosis     Pre-operative examination (PPT59-IW Z01.818).     Fracture of humeral head, left, closed (TFT92-OR S42.292A).     H/O liver transplant (DST04-EE Z94.4).     Condition:  she has stopped warfarin on 2/27, will take 30 mg lovenox on 3/2  hold lovenox on 3/3 and 3/4, restart lovenox and warfarin on 3/5  recheck INR on 3/7.    Orders     Orders   Pharmacy:  Lovenox 30 mg/0.3 mL injectable solution (Prescribe): See Instructions, Instructions: 30 mg sc injection on Saturday, hold on Sunday and Monday, restart on Tuesday, # 7 EA, 0 Refill(s), Type: Maintenance, [facility], 30 mg sc injection on Saturday, hold on Sunday and Monday, restart on Tuesday.     Orders   Lab (Gen Lab  Reference Lab):  POC Prothrombin Time with INR* ([facility]) (Order): Specimen Type: Fingerstick, Collection Date: 3/1/2019 11:39 AM CST  Requests (Lab):  Hepatic Function Panel (Request) (Order): Priority: Urgent, Pre-operative examination  Fracture of humeral head, left, closed  H/O liver transplant  Basic Metabolic Panel (Request) (Order): Priority: Urgent, Pre-operative examination  Fracture of humeral head, left, closed  H/O liver transplant  CBC w/ Diff/Plt (Request) (Order): Priority: Urgent, Pre-operative examination  Fracture of humeral head, left, closed  H/O liver transplant.     Orders   Charges (Evaluation and Management):  58056 office outpatient visit 25 minutes (Charge) (Order): Quantity: 1, Pre-operative examination  Fracture of humeral head, left, closed  H/O liver transplant.     Approved for surgery  without restrictions.

## 2022-02-16 NOTE — PROGRESS NOTES
Patient:   SHERRIE DAMON            MRN: [id number]            FIN: [id number]               Age:   61 years     Sex:  Female     :  1955   Associated Diagnoses:   Cholangiocarcinoma; Asthma, Moderate Persistent; Thoracic back pain   Author:   Elijah Benitez MD      Visit Information      Date of Service: 2017 08:10 am  Performing Location: [facility]  Encounter#: [id number]      Primary Care Provider (PCP):  Elijah Benitez MD    NPI# [id number]      Referring Provider:  No referring provider recorded for selected visit.      Chief Complaint   2017 8:25 AM CST    increased SOB, pains in right side - recently seen by [facility] and CT/MRI ordered and started on gabapentin which is uncertain if helping.  Not scheduled to see GI until Nov/Dec and [facility] wondeing if needs to be sooner due to increased varices              Additional Information:No additional information recorded during visit.   Chief complaint and symptoms as noted above and confirmed with patient.  Recent lab and diagnostic studies reviewed with patient      History of Present Illness   10/12/2016: Sherrie presents to clinic with several concerns.  Describes having persistent right-sided upper thoracic back pain for some time.  She says it is around the previous radiation site for her cholangiocarcinoma.  She does have a remote history of pleural effusions, though has not occurred for a number of years.  Also describes a one-month history of dyspnea on exertion with some audible wheezing.  She has a remote history of asthma and had been maintained on a combination inhaled corticosteroid and long-acting beta agonist several years ago.  She stopped her inhalers on her own thinking they were providing much benefit.  She has been using an old prescription albuterol over recent days which has provided some temporary relief.    2017: Presents with persistent posterior right thoracic back pains aggravated with particular movement.  Pains  are now persistent in nature.  She was seen by Dr. Langford recently.  Surveillance imaging showing no evidence of active cancer.  She has received previous radiation therapy to her thoracic chest wall.  She started on gabapentin this past weekend, currently taking 300 mg twice daily.  Pains substantially interfering with her quality of life.         Review of Systems   Constitutional:  No fever, No chills.    Eye:  Negative except as documented in history of present illness.    Ear/Nose/Mouth/Throat:  Negative except as documented in history of present illness.    Respiratory:  No shortness of breath.    Cardiovascular:  No chest pain, No palpitations, No peripheral edema, No syncope.    Gastrointestinal:  No nausea, No vomiting, No abdominal pain.    Genitourinary:  No dysuria, No hematuria.    Hematology/Lymphatics:  Negative except as documented in history of present illness.    Endocrine:  No excessive thirst, No polyuria.    Immunologic:  No recurrent fevers.    Musculoskeletal:  No joint pain, No muscle pain     Back pain: In the middle of the back.    Neurologic:  Alert and oriented X4, No numbness, No tingling, No headache.       Health Status   Allergies:    Allergic Reactions (Selected)  No known allergies   Medications:  (Selected)   Prescriptions  Prescribed  Flovent  mcg/inh inhalation aerosol: 1 puff(s), inh, bid, # 1 EA, 2 Refill(s), Type: Maintenance, Pharmacy: [facility], 1 puff(s) inh bid  Nexium 40 mg oral delayed release capsule: 2 cap(s) ( 80 mg ), po, bid, # 30 cap(s), 0 Refill(s), Type: Maintenance, Pharmacy: [facility] #[id number], 1 cap(s) po daily,Instr:Due appt with Dr. Parker for further refills.  Patanol 0.1% ophthalmic solution: 1 drop(s), Both eyes, BID, # 5 mL, 5 Refill(s), Type: Maintenance, Pharmacy: [facility] #[id number], 1 drop(s) both eyes bid  Ventolin HFA 90 mcg/inh inhalation aerosol: 2 puff(s), inh, qid, # 1 EA, 11 Refill(s), Type:  Maintenance, Pharmacy: [facility] #[id number], 2 puff(s) inh qid  furosemide 80 mg oral tablet: 1 tab(s) ( 80 mg ), po, bid, # 60 tab(s), 2 Refill(s), Type: Maintenance, Pharmacy: [facility] #322, 1 tab(s) po bid  gabapentin 300 mg oral capsule: 1 cap(s) ( 300 mg ), po, bid, # 12 cap(s), 0 Refill(s), Type: Maintenance, Pharmacy: [facility], 1 cap(s) po bid  gabapentin 300 mg oral capsule: 2 cap(s) ( 600 mg ), po, bid, Instructions: see titration instructions, # 120 cap(s), 3 Refill(s), Type: Maintenance, Pharmacy: [facility], 2 cap(s) po bid,Instr:see titration instructions  pramipexole 0.25 mg oral tablet: 2 tab(s) ( 0.5 mg ), po, hs, # 180 tab(s), 1 Refill(s), Type: Maintenance, change in dose, 2 tab(s) po hs  zolpidem 10 mg oral tablet: 1 tab(s) ( 10 mg ), PO, Once a day (at bedtime), PRN: for sleep, # 90 tab(s), 1 Refill(s), Type: Maintenance  Documented Medications  Documented  acetaminophen: ( 650 mg ), po, q4 hrs, PRN: as needed for pain, 0 Refill(s), Type: Maintenance  rifampin 300 mg oral capsule: 1 cap(s) ( 300 mg ), po, daily, 0 Refill(s), Type: Maintenance  rifaximin 550 mg oral tablet: 1 tab(s) ( 550 mg ), po, bid, 0 Refill(s), Type: Maintenance  spironolactone 100 mg oral tablet: 1 tab(s) ( 100 mg ), po, daily, 0 Refill(s), Type: Maintenance   Problem list:    All Problems  Acquired thrombocytopenia / ICD-9-.49 / Confirmed  Asthma, Moderate Persistent / ICD-9-.90 / Confirmed  Cholangiocarcinoma / ICD-9-.1 / Confirmed  Fibroid Uterus / ICD-9-.9 / Confirmed  Hypertension / SNOMED CT 6896862452 / Confirmed  Osteopenia / ICD-9-.90 / Confirmed  Restless Leg Syndrome / ICD-9-.94 / Confirmed  Inactive: Anticoagulated / ICD-9-CM V58.61  Resolved: *Hospitalized@[facility] - Bleeding esophageal varices  Resolved: *Hospitalized@[facility] - Chemotherapy induced nausea and vomiting  Resolved: *Hospitalized@[facility]  - Pneumonia  Resolved: DVT (Deep Venous Thrombosis) / ICD-9-.40  Resolved: Pregnancy / SNOMED CT 525367424  Resolved: Pregnancy / SNOMED CT 944428850  Canceled: Choliangiocarcinoma      Histories   Past Medical History:    Active  Asthma, Moderate Persistent (493.90)  Hypertension (7525223200)  Fibroid Uterus (218.9)  Osteopenia (733.90)  Cholangiocarcinoma (155.1)  Resolved  *Hospitalized@[facility] - Bleeding esophageal varices: Onset on 3/13/2015 at 59 years.  Resolved on 3/17/2015 at 59 years.  *Hospitalized@[facility] - Pneumonia: Onset on 2014 at 59 years.  Resolved on 2014 at 59 years.  *Hospitalized@[facility] - Chemotherapy induced nausea and vomiting: Onset on 2011 at 56 years.  Resolved.  DVT (Deep Venous Thrombosis) (453.40): Onset on 2/15/2011 at 55 years.  Resolved.  Pregnancy (161907432):  Resolved on 1979 at 24 years.  Pregnancy (001579155):  Resolved on 1981 at 26 years.   Family History:    Hypertension  Mother ()  Sister  Osteoporosis  Mother ()  MS - Multiple sclerosis  Sister     Procedure history:    Esophagogastroduodenoscopy (974174086) on 2016 at 61 Years.  Comments:  2016 8:13 AM - Tamera Green  Indication:  Gastric ulcer follow up.  Sedation:  MAC.  Upper GI endoscopy (4532993586) on 2016 at 61 Years.  Comments:  2016 1:29 PM - Nile OSORIO, Jacey  Acute gastric ulcer w/o hemorrhage or perforation, esophageal varices w/o bleeding.  Varices are scarred and not amenable to banding at this time  Esophagogastroduodenoscopy (741234940) on 2016 at 61 Years.  Esophagogastroduodenoscopy (831355531) on 2016 at 61 Years.  Comments:  2016 7:57 AM - Tamera Green  Indication: Varices, follow up.  Sedation: MAC.  Impression:  H. pylori negative.  Upper GI endoscopy on 2016 at 60 Years.  Esophagogastroduodenoscopy (953626920) on 3/13/2015 at 59 Years.  Upper GI endoscopy (3078538781) on 2012 at 57 Years.  HPV - Human  papillomavirus test negative (3370715764) on 2012 at 57 Years.  Comments:  2012 11:41 AM - Xi Dove  Low risk for cervical cancer. OK to have pap q 3 yrs.  Upper GI endoscopy (1283426201) on 3/2/2012 at 56 Years.  Upper GI endoscopy (8025937051) on 2012 at 56 Years.  Upper GI endoscopy (7909399548) on 2011 at 56 Years.  Esophagogastroduodenoscopy (275762139) on 2011 at 56 Years.  Esophagogastroduodenoscopy (332141761) on 2011 at 56 Years.  Colonoscopy (918077825) on 2/10/2011 at 55 Years.  Esophagogastroduodenoscopy (338255060) on 2/10/2011 at 55 Years.  Comments:  7/10/2011 7:21 PM - Doron Hutchison MD  Bleeding in the duodenal bulb  HPV - Human papillomavirus test negative (8468849227) on 4/15/2010 at 55 Years.  Comments:  2011 1:04 PM - Xi Dove  Low risk for cervical cancer. OK to have pap q 3 yrs.  Mammogram - screening (313325387) on 2010 at 55 Years.  Mammography; bilateral (17609) on 2010 at 55 Years.  Comments:  2011 8:48 AM - Ramya Kenney CMA  Normal-no radiographic evidence for malignancy.  DEXA - Dual energy X-ray photon absorptiometry (943059994) on 2009 at 53 Years.  Comments:  4/15/2010 1:43 PM - Xi Dove  Due again in 5 yrs, ()  Tubal ligation (817728621) in  at 27 Years.   section (55298443) in  at 25 Years.   - Spontaneous vaginal delivery (9252263286) in  at 23 Years.  Primary repair of torn ligament of knee, collateral (71852987) in  at 16 Years.  Tonsillectomy and adenoidectomy (812902487) in  at 8 Years.  Arm fracture (818.0).  Comments:  2011 2:11 PM - Nathaly Lim  Right  Arm fracture (818.0).  Comments:  2011 2:12 PM - Nathaly Lim  Left  Ankle fracture, right (824.8).   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      Sexual Assessment            Sexually active: Yes.  Number of current partners 1.  Other contraceptive use: Ann Marie.        Physical Examination   vital signs stable, as noted above   Vital Signs   2/22/2017 8:25 AM CST Temperature Tympanic 97.9 DegF    Peripheral Pulse Rate 79 bpm    Systolic Blood Pressure 110 mmHg    Diastolic Blood Pressure 66 mmHg    Mean Arterial Pressure 81 mmHg    BP Site Right arm    Oxygen Saturation 96 %      Measurements from flowsheet : Measurements   2/22/2017 8:25 AM CST    Weight Measured - Standard                133.6 lb     General:  Alert and oriented, No acute distress.    Eye:  Extraocular movements are intact.    HENT:  Normocephalic, Oral mucosa is moist.    Neck:  Supple.    Respiratory:  Respirations are non-labored.    Cardiovascular:  Normal rate, Regular rhythm.    Gastrointestinal:  Soft.    Musculoskeletal:  Normal range of motion, Normal strength, reproducible pains along right mid thoracic region.    Neurologic:  Alert, Oriented, Normal motor function, No focal deficits.    Cognition and Speech:  Oriented, Speech clear and coherent.    Psychiatric:  Appropriate mood & affect.       Impression and Plan   Diagnosis     Cholangiocarcinoma (UAI00-GO C22.1).     Asthma, Moderate Persistent (UEK80-JT J45.40).     Thoracic back pain (JZZ35-FB M54.6).         .) posterior, thoracic back pains - suspicious there is some residual, late effect from previous XRT use vs. thoracic radiculopathy   - never with distinct zoster feature to skin changes   - titration instructions to start gabapentin with goal of 600mg BID   - arrange for MRI of thoracic spine   - referral to Dr. Slade for assessment    - does have splenomegaly, portal hypertension, and recurrent ascites (with history of bleeding)    - resolution of confusion with lactulose therapy.  No clinical evidence of active SBP, GI  bleeding.  Hgb stable.    - avoidance of hypokalemia which can promote further ammoniogenesis    - continue with regular surveillance via her GI doc.  I don't think CT imaging should outweigh endoscopic assessment (low grade varices in 10/2016)    plan as previously outlined:     .) recurrent, metastatic cholangiocarcinoma; followed by Dr. Langford   - originally diagnosed in 1/2011   - s/p neoadjuvant cisplatin + gemcitabine followed by bulk resection, recurrent with further debulking in 10/2012   - in 8/2013, biopsy confirmation of lung nodule as cholangiocarcinoma, s/p XRT   - most recent surveillance CT C/A/P showing no active malignancy

## 2022-02-16 NOTE — TELEPHONE ENCOUNTER
Entered by Angélica Aldana CMA on December 15, 2020 6:04:33 AM CST  ---------------------  From: Angélica Aldana CMA   To: [facility]    Sent: 12/15/2020 6:04:33 AM CST  Subject: Medication Management     ** Submitted: **  Order:gabapentin (gabapentin 300 mg oral capsule)  1 cap(s)  Oral  hs  Qty:  30 cap(s)        Refills:  0          Substitutions Allowed     Route To Pharmacy - [facility]    Signed by Angélica Aldana CMA  12/15/2020 12:04:00 PM [facility]    ** Submitted: **  Complete:gabapentin (gabapentin 300 mg oral capsule)   Signed by Angélica Aldana CMA  12/15/2020 12:04:00 PM [facility]    ** Not Approved:  **  gabapentin (GABAPENTIN 300 MG Capsule)  TAKE 1 CAPSULE AT BEDTIME  Qty:  90 cap(s)        Days Supply:  90        Refills:  0          Substitutions Allowed     Route To Pharmacy - [facility]   Signed by Angélica Aldana CMA            ** Not Approved: Patient has requested refill too soon, #270, 0 sent 11/4/20 **  pramipexole (PRAMIPEXOLE DIHYDROCHLORIDE 0.25 MG Tablet)  TAKE 3 TABLETS AT BEDTIME  (INCREASED  DOSE)  Qty:  270 tab(s)        Days Supply:  90        Refills:  0          Substitutions Allowed     Route To Pharmacy - [facility]   Signed by Angélica Aldana CMA            ------------------------------------------  From: [facility]  To: Elijah Benitez MD  Sent: December 13, 2020 9:24:17 AM CST  Subject: Medication Management  Due: December 8, 2020 10:07:06 AM CST     ** On Hold Pending Signature **     Drug: gabapentin (gabapentin 300 mg oral capsule), TAKE 1 CAPSULE AT BEDTIME  Quantity: 90 cap(s)  Days Supply: 0  Refills: 1  Substitutions Allowed  Notes from Pharmacy:     Dispensed Drug: gabapentin (gabapentin 300 mg oral capsule), TAKE 1 CAPSULE AT BEDTIME  Quantity: 90 cap(s)  Days Supply: 90  Refills: 0  Substitutions Allowed  Notes from Pharmacy:     ** On Hold Pending Signature **     Drug: pramipexole (pramipexole 0.25 mg  oral tablet), TAKE 3 TABLETS AT BEDTIME (INCREASED DOSE)  Quantity: 270 tab(s)  Days Supply: 0  Refills: 0  Substitutions Allowed  Notes from Pharmacy:     Dispensed Drug: pramipexole (pramipexole 0.25 mg oral tablet), TAKE 3 TABLETS AT BEDTIME (INCREASED DOSE)  Quantity: 270 tab(s)  Days Supply: 90  Refills: 0  Substitutions Allowed  Notes from Pharmacy:  ------------------------------------------Med check due this month  6/30/20 paronychia

## 2022-02-16 NOTE — LETTER
(Inserted Image. Unable to display)     December 04, 2020      REX DAMON  [address] [place], [place] [id number]          Dear REX,      Thank you for selecting [facility] ([facility]viously [facility] for your healthcare needs.    Our records indicate you are due for the following services:     Follow-up office visit.    (FYI   Regarding office visits: In some instances, a video visit or telephone visit may be offered as an option.)      To schedule an appointment or if you have further questions, please contact your primary clinic:   Counts include 234 beds at the [facility]       [phone]   [facility]       [phone]              [facility]     [phone]      Powered by [employer] and [employer]    Sincerely,    Elijah Benitez MD

## 2022-02-16 NOTE — NURSING NOTE
Comprehensive Intake Entered On:  10/25/2021 12:01 PM CDT    Performed On:  10/25/2021 11:53 AM CDT by Galina Willson               Summary   Chief Complaint :   c/o vomiting all day 2 days ago, has not vomited today or yesterday, but has body aches and unable to eat or drink anything. Hx of liver transplant. States she got her flu shot last thursday- not sure if it is related to her sxs.    Menstrual Status :   Postmenopausal   Height Measured :   63.5 in(Converted to: 5 ft 3 in, 161.29 cm)    Galina Willson - 10/25/2021 11:53 AM CDT   Health Status   Allergies Verified? :   Yes   Medication History Verified? :   Yes   Medical History Verified? :   Yes   Pre-Visit Planning Status :   Completed   Tobacco Use? :   Never smoker   Galina Willson - 10/25/2021 11:53 AM CDT   Consents   Consent for Immunization Exchange :   Consent Granted   Consent for Immunizations to Providers :   Consent Granted   Galina Willson - 10/25/2021 11:53 AM CDT   Meds / Allergies   (As Of: 10/25/2021 12:01:56 PM CDT)   Allergies (Active)   Dilaudid  Estimated Onset Date:   Unspecified ; Reactions:   Itching ; Created By:   Jacey Dowd CMA; Reaction Status:   Active ; Category:   Drug ; Substance:   Dilaudid ; Type:   Allergy ; Updated By:   Jacey Dowd CMA; Reviewed Date:   10/25/2021 11:59 AM CDT      HYDROmorphone  Estimated Onset Date:   6/11/2014 ; Reactions:   Itching ; Created By:   Jacey Dowd CMA; Reaction Status:   Active ; Category:   Drug ; Substance:   HYDROmorphone ; Type:   <not entered> ; Severity:   Unknown ; Updated By:   Jacey Dowd CMA; Reviewed Date:   10/25/2021 11:59 AM CDT      iron sucrose  Estimated Onset Date:   <not entered> 7/16/2015 ; Reactions:   GI Upset ; Created By:   Jacey Dowd CMA; Reaction Status:   Active ; Category:   Drug ; Substance:   iron sucrose ; Type:   <not entered> ; Severity:   Unknown ; Updated By:   Jacey Dowd CMA; Reviewed Date:   10/25/2021 11:59 AM CDT      sodium  ferric gluconate complex  Estimated Onset Date:   <not entered> 12/10/2015 ; Reactions:   Other - Describe In Comment Field ; Created By:   Jacey Dowd CMA; Reaction Status:   Active ; Category:   Drug ; Substance:   sodium ferric gluconate complex ; Type:   <not entered> ; Severity:   Unknown ; Updated By:   Jacey Dowd CMA; Reviewed Date:   10/25/2021 11:59 AM CDT        Medication List   (As Of: 10/25/2021 12:01:56 PM CDT)   Prescription/Discharge Order    albuterol  :   albuterol ; Status:   Prescribed ; Ordered As Mnemonic:   Albuterol (Eqv-ProAir HFA) 90 mcg/inh inhalation aerosol ; Simple Display Line:   See Instructions, INHALE 2 PUFFS FOUR TIMES DAILY AS NEEDED FOR WHEEZING, 3 EA, 0 Refill(s) ; Ordering Provider:   Elijah Benitez MD; Catalog Code:   albuterol ; Order Dt/Tm:   10/1/2021 4:21:16 PM CDT          alendronate  :   alendronate ; Status:   Prescribed ; Ordered As Mnemonic:   alendronate 70 mg oral tablet ; Simple Display Line:   See Instructions, TAKE 1 TABLET EVERY WEEK WITH 6-8 OZ PLAIN WATER, AT LEAST 30 MINUTES BEFORE FIRST FOOD, BEVERAGE, OR MEDICATION OF THE DAY, 12 tab(s), 0 Refill(s) ; Ordering Provider:   Elijah Benitez MD; Catalog Code:   alendronate ; Order Dt/Tm:   10/1/2021 4:21:01 PM CDT          gabapentin  :   gabapentin ; Status:   Prescribed ; Ordered As Mnemonic:   gabapentin 300 mg oral capsule ; Simple Display Line:   600 mg, 2 cap(s), Oral, hs, 180 cap(s), 1 Refill(s) ; Ordering Provider:   Elijah Benitez MD; Catalog Code:   gabapentin ; Order Dt/Tm:   6/2/2021 1:12:20 PM CDT          omeprazole  :   omeprazole ; Status:   Prescribed ; Ordered As Mnemonic:   omeprazole 40 mg oral delayed release capsule ; Simple Display Line:   40 mg, 1 cap(s), Oral, daily, 90 cap(s), 1 Refill(s) ; Ordering Provider:   Elijah Benitez MD; Catalog Code:   omeprazole ; Order Dt/Tm:   6/2/2021 1:03:08 PM CDT          pramipexole  :   pramipexole ; Status:   Prescribed ; Ordered As Mnemonic:   pramipexole  0.25 mg oral tablet ; Simple Display Line:   0.75 mg, 3 tab(s), Oral, hs, 270 tab(s), 1 Refill(s) ; Ordering Provider:   Elijah Benitez MD; Catalog Code:   pramipexole ; Order Dt/Tm:   6/2/2021 12:49:23 PM CDT          zolpidem  :   zolpidem ; Status:   Prescribed ; Ordered As Mnemonic:   zolpidem 10 mg oral tablet ; Simple Display Line:   See Instructions, TAKE 1 TABLET ONE TIME DAILY AT BEDTIME AS NEEDED FOR SLEEP, 90 tab(s), 1 Refill(s) ; Ordering Provider:   Elijah Benitez MD; Catalog Code:   zolpidem ; Order Dt/Tm:   5/27/2021 2:22:12 PM CDT            Home Meds    levothyroxine  :   levothyroxine ; Status:   Documented ; Ordered As Mnemonic:   levothyroxine 125 mcg (0.125 mg) oral tablet ; Simple Display Line:   125 mcg, 1 tab(s), Oral, daily, 0 Refill(s) ; Catalog Code:   levothyroxine ; Order Dt/Tm:   10/25/2021 12:01:15 PM CDT          aspirin  :   aspirin ; Status:   Documented ; Ordered As Mnemonic:   aspirin 81 mg oral delayed release tablet ; Simple Display Line:   81 mg, 1 tab(s), Oral, daily, 0 Refill(s) ; Catalog Code:   aspirin ; Order Dt/Tm:   8/26/2021 10:07:39 AM CDT          amlodipine  :   amlodipine ; Status:   Documented ; Ordered As Mnemonic:   amLODIPine 5 mg oral tablet ; Simple Display Line:   0 Refill(s) ; Catalog Code:   amLODIPine ; Order Dt/Tm:   6/3/2020 10:54:53 AM CDT          tacrolimus  :   tacrolimus ; Status:   Documented ; Ordered As Mnemonic:   tacrolimus 1 mg oral capsule ; Simple Display Line:   3 mg, 3 cap(s), Oral, q12 hrs, 0 Refill(s) ; Catalog Code:   tacrolimus ; Order Dt/Tm:   10/12/2018 4:10:27 PM CDT

## 2022-02-16 NOTE — TELEPHONE ENCOUNTER
---------------------  From: Soha Lay RN (INR Pool ( [id number]))   To: Usha Guillen;     Sent: 1/12/2021 8:33:13 AM CST  Subject: Outside Order     Patient had labs done per Dr. Bradly Lilly at [facility] program on 1/7/21.  Are you able to resend these results? Fax: [phone]    Patient called this morning and states they have not received the results.     Thanks!---------------------  From: Usha Guillen   To: INR Pool ( [id number]);     Sent: 1/12/2021 9:02:25 AM CST  Subject: RE: Outside Order     I will resend now. thanks

## 2022-02-16 NOTE — PROCEDURES
Accession Number:       [id number]  CLINICAL INFORMATION::     Postmenopausal  LMP::     POSTMENOPAUSAL  PREV. PAP::     2012  PREV. BX::     NONE GIVEN  SOURCE::     Cervix  STATEMENT OF ADEQUACY::     Satisfactory for evaluation. Endocervical/transformation zone component present.  INTERPRETATION/RESULT::     Negative for intraepithelial lesion or malignancy.  COMMENT::     This Pap test has been evaluated with computer assisted technology.  CYTOTECHNOLOGIST::     SHIMA STEIN(ASCP) CT Screening location: [place] [address]  HPV mRNA E6/E7:     Not Detected       This test was performed using the APTIMA HPV Assay ([employer]       This assay detects E6/E7 viral messenger RNA (mRNA) from 14       high-risk HPV types (16,18,31,33,35,39,45,51,52,56,58,59,66,68).

## 2022-02-16 NOTE — TELEPHONE ENCOUNTER
---------------------  From: Rossy Thapa RN   Sent: 6/28/2019 4:37:06 PM CDT  Subject: INR reminder     Patient is 14 days past due for INR.  First letter has been printed from [employer] and sent to [place] for scanning and mailing.

## 2022-02-16 NOTE — TELEPHONE ENCOUNTER
---------------------  From: Su Lindsey LPN (Phone Messages Pool ([id number]_[facility]))   Sent: 11/3/2021 10:00:47 AM CDT  Subject: outside lab     Phone Message    PCP:   RICHARD      Time of Call:  9:11am       Person Calling:  pt  Phone number:  [phone]    Returned call at: 9:55am    Note:   Pt LM stating her doctor at [facility] told her that they left an order in her chart for a urine sample.  Pt says he just  tried to make an appt and was told we do not have an order.    Returned call and pt said lab was ordered by Dr. Bradly Lilly. Pt says she got a call yesterday that she needed to have a urine culture. Told pt we do not have an order for this.    Explained to pt that we are still on a separate computer system from other [facility] so we cannot see the orders they place. Told pt she will need to ask ordering provider's office to fax us the order.    Last office visit and reason:  10/25/21 vomiting

## 2022-02-16 NOTE — RESULTS
Anticoagulation Therapy Entered On:  5/17/2019 4:26 PM CDT    Performed On:  5/17/2019 4:21 PM CDT by Venus Contreras RN               Warfarin Management   Week 1 Sunday Dose :   4    Week 1 Monday Dose :   2    Week 1 Tuesday Dose :   4    Week 1 Wednesday Dose :   2    Week 1 Thursday Dose :   4    Week 1 Friday Dose :   2    Week 1 Saturday Dose :   4    Week 1 Dose Total :   22    Week 2 Sunday Dose :   4    Week 2 Monday Dose :   2    Week 2 Tuesday Dose :   4    Week 2 Wednesday Dose :   2    Week 2 Thursday Dose :   4    Week 2 Friday Dose :   2    Week 2 Saturday Dose :   4    Week 2 Dose Total :   22    Planned Duration :   Indefinite   Indication :   Other: Post liver transplant   Warfarin Management Comments :   [facility] Office  [address], [address]  [phone]  Capillary Specimen     International Normalization Ratio :   1.5    INR Range :   Other   INR Therapeutic Range :   Yes   Warfarin Abnormal Findings :   none   Anticoagulation Recheck :   4 weeks   Warfarin Physician :   Elijah Benitez MD   Warfarin Special Instructions :   INR = 1.5 per POC Patient is to stay on 2mg warfarin on Mon, Wed, Fri and 4 mg the rest of the days of the week Recheck INR in 4 weeks per [facility] Patient advised in office.   Venus Contreras RN - 5/17/2019 4:23 PM CDT

## 2022-02-16 NOTE — TELEPHONE ENCOUNTER
---------------------  From: Chante Dowd CMAe   Sent: 11/2/2021 9:01:00 AM CDT  Subject: General Message-omeprazole     Phone Message    PCP:   RICHARD      Time of Call:  [id number]       Person Calling:  self  Phone number:  _    Note:   pt calling stating she has an appt 11/17 with RICHARD,  but will need her omeprazole refilled prior to appt to [facility].  Rx sent

## 2022-02-16 NOTE — NURSING NOTE
Comprehensive Intake Entered On:  4/21/2021 10:34 AM CDT    Performed On:  4/21/2021 10:29 AM CDT by Jacey Dowd CMA               Summary   Chief Complaint :   s/p right knee replacement - developled DVT, liver enzymes increased, anemic - rec'd 1 unit of blood - feels orthostatic, SOB - hgb 7.3 when discharged    Menstrual Status :   Postmenopausal   Weight Measured :   132.1 lb(Converted to: 132 lb 2 oz, 59.920 kg)    Systolic Blood Pressure :   104 mmHg   Diastolic Blood Pressure :   68 mmHg   Mean Arterial Pressure :   80 mmHg   Peripheral Pulse Rate :   81 bpm   Temperature Tympanic :   97.5 DegF(Converted to: 36.4 DegC)  (LOW)    Oxygen Saturation :   99 %   Jacey Dodw CMA - 4/21/2021 10:29 AM CDT   Health Status   Allergies Verified? :   Yes   Medication History Verified? :   Yes   Medical History Verified? :   Yes   Pre-Visit Planning Status :   Completed   Tobacco Use? :   Never smoker   Jacey Dowd CMA - 4/21/2021 10:29 AM CDT   ID Risk Screen   Recent Travel History :   No recent travel   Family Member Travel History :   No recent travel   Other Exposure to Infectious Disease :   Unknown   COVID-19 Testing Status :   No positive COVID-19 test   Jacey Dowd CMA - 4/21/2021 10:29 AM CDT   Social History   Social History   (As Of: 4/21/2021 10:34:03 AM CDT)   Alcohol:        Never   (Last Updated: 5/11/2018 2:52:02 PM CDT by Alyce Katz)          Tobacco:        Never (less than 100 in lifetime)   (Last Updated: 12/30/2020 11:29:49 AM CST by Jacey Dowd CMA)          Electronic Cigarette/Vaping:        Electronic Cigarette Use: Never.   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   (Last Updated: 4/15/2010 2:36:13 PM CDT by Xi Dove)          Sexual:        Sexually active: Yes.  Number of current partners 1.  Other contraceptive use: Ann Marie.   (Last Updated: 4/15/2010  2:36:58 PM CDT by Rula TORREZ, Xi)

## 2022-02-16 NOTE — NURSING NOTE
Comprehensive Intake Entered On:  7/26/2019 10:43 AM CDT    Performed On:  7/26/2019 10:41 AM CDT by Jacey Dowd CMA               Summary   Systolic Blood Pressure :   122 mmHg   Diastolic Blood Pressure :   78 mmHg   Mean Arterial Pressure :   93 mmHg   Peripheral Pulse Rate :   81 bpm   Temperature Tympanic :   97.5 DegF(Converted to: 36.4 DegC)  (LOW)    Oxygen Saturation :   99 %   Jacey Dowd CMA - 7/26/2019 10:44 AM CDT   Chief Complaint :   f/u - s/p liver transplant 8/29/2018   Menstrual Status :   Postmenopausal   Weight Measured :   124.8 lb(Converted to: 124 lb 13 oz, 56.61 kg)    Jacey Dowd CMA - 7/26/2019 10:41 AM CDT   Health Status   Allergies Verified? :   Yes   Medication History Verified? :   Yes   Medical History Verified? :   Yes   Pre-Visit Planning Status :   Completed   Tobacco Use? :   Never smoker   Jacey Dowd CMA - 7/26/2019 10:41 AM CDT   Meds / Allergies   (As Of: 7/26/2019 10:43:01 AM CDT)   Allergies (Active)   Dilaudid  Estimated Onset Date:   Unspecified ; Reactions:   Itching ; Created By:   Jacey Dowd CMA; Reaction Status:   Active ; Category:   Drug ; Substance:   Dilaudid ; Type:   Allergy ; Updated By:   Jacey Dowd CMA; Reviewed Date:   3/1/2019 10:57 AM CST        Medication List   (As Of: 7/26/2019 10:43:01 AM CDT)   Prescription/Discharge Order    albuterol  :   albuterol ; Status:   Prescribed ; Ordered As Mnemonic:   Ventolin HFA 90 mcg/inh inhalation aerosol ; Simple Display Line:   See Instructions, INHALE 2 PUFFS FOUR TIMES DAILY AS NEEDED FOR WHEEZING, 1 EA, 5 Refill(s) ; Ordering Provider:   Elijah Benitez MD; Catalog Code:   albuterol ; Order Dt/Tm:   10/24/2018 1:52:22 PM          enoxaparin  :   enoxaparin ; Status:   Completed ; Ordered As Mnemonic:   Lovenox 30 mg/0.3 mL injectable solution ; Simple Display Line:   See Instructions, 30 mg sc injection on Saturday, hold on Sunday and Monday, restart on Tuesday, 7 EA, 0 Refill(s) ; Ordering Provider:    Wild ENGEL, Cj PELLETIER; Catalog Code:   enoxaparin ; Order Dt/Tm:   3/1/2019 11:35:22 AM          gabapentin 300 mg oral capsule  :   gabapentin 300 mg oral capsule ; Status:   Prescribed ; Ordered As Mnemonic:   gabapentin 300 mg oral capsule ; Simple Display Line:   1 cap(s), Oral, qhs, 90 unknown unit ; Ordering Provider:   Elijah Benitez MD; Catalog Code:   gabapentin ; Order Dt/Tm:   5/15/2019 4:29:55 PM          oxyCODONE  :   oxyCODONE ; Status:   Completed ; Ordered As Mnemonic:   oxyCODONE 5 mg oral tablet ; Simple Display Line:   5 mg, 1 tab(s), Oral, q3-4 hrs, PRN: as needed for pain, 60 tab(s), 0 Refill(s) ; Ordering Provider:   Elijah Benitez MD; Catalog Code:   oxyCODONE ; Order Dt/Tm:   9/14/2018 4:37:53 PM          pramipexole 0.25 mg oral tablet  :   pramipexole 0.25 mg oral tablet ; Status:   Prescribed ; Ordered As Mnemonic:   pramipexole 0.25 mg oral tablet ; Simple Display Line:   2 tab(s), Oral, qhs, 180 tab(s), 1 Refill(s) ; Ordering Provider:   Elijah Benitez MD; Catalog Code:   pramipexole ; Order Dt/Tm:   3/20/2019 2:12:32 PM          warfarin  :   warfarin ; Status:   Prescribed ; Ordered As Mnemonic:   warfarin 2 mg oral tablet ; Simple Display Line:   See Instructions, take one tab daily 3 days a week and 2 tabs daily 4 days a week-OR AS DIRECTED BY PROVIDER, 130 tab(s), 1 Refill(s) ; Ordering Provider:   Elijah Benitez MD; Catalog Code:   warfarin ; Order Dt/Tm:   7/12/2019 10:39:14 AM          zolpidem 10 mg oral tablet  :   zolpidem 10 mg oral tablet ; Status:   Prescribed ; Ordered As Mnemonic:   zolpidem 10 mg oral tablet ; Simple Display Line:   See Instructions, TAKE 1 TABLET ONE TIME DAILY AT BEDTIME AS NEEDED FOR SLEEP, 90 tab(s), 1 Refill(s) ; Ordering Provider:   Elijah Benitze MD; Catalog Code:   zolpidem ; Order Dt/Tm:   3/20/2019 2:12:32 PM            Home Meds    acetaminophen  :   acetaminophen ; Status:   Documented ; Ordered As Mnemonic:   acetaminophen 500 mg oral tablet ; Simple  Display Line:   1-2 tab(s), Oral, q6 hrs, PRN: as needed for pain, 0 Refill(s) ; Catalog Code:   acetaminophen ; Order Dt/Tm:   10/12/2018 4:08:10 PM          aspirin  :   aspirin ; Status:   Documented ; Ordered As Mnemonic:   aspirin 81 mg oral tablet ; Simple Display Line:   81 mg, 1 tab(s), Oral, daily, 0 Refill(s) ; Catalog Code:   aspirin ; Order Dt/Tm:   3/1/2019 10:58:12 AM          omeprazole  :   omeprazole ; Status:   Documented ; Ordered As Mnemonic:   omeprazole 20 mg oral delayed release capsule ; Simple Display Line:   40 mg, 2 cap(s), PO, bid, 90 cap(s), 0 Refill(s) ; Catalog Code:   omeprazole ; Order Dt/Tm:   5/9/2018 10:52:40 AM          tacrolimus  :   tacrolimus ; Status:   Documented ; Ordered As Mnemonic:   tacrolimus 1 mg oral capsule ; Simple Display Line:   See Instructions, 4mg AM/ 5mg PM, 0 Refill(s) ; Catalog Code:   tacrolimus ; Order Dt/Tm:   10/12/2018 4:10:27 PM            Social History   Social History   (As Of: 7/26/2019 10:43:01 AM CDT)   Alcohol:        Never   (Last Updated: 5/11/2018 2:52:02 PM CDT by Alyce Katz)          Tobacco:        Never (less than 100 in lifetime)   (Last Updated: 9/14/2018 4:16:22 PM CDT by Carlos Nagy CMA)          Employment/School:        Employed, 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   (Last Updated: 4/15/2010 2:36:13 PM CDT by Xi Dove)          Sexual:        Sexually active: Yes.  Number of current partners 1.  Other contraceptive use: Ann Marie.   (Last Updated: 4/15/2010 2:36:58 PM CDT by Xi Dove)

## 2022-02-16 NOTE — TELEPHONE ENCOUNTER
Entered by Jacey Dowd CMA on June 02, 2021 12:49:56 PM CDT  ---------------------  From: Jacey Dowd CMA   To: [facility]    Sent: 6/2/2021 12:49:56 PM CDT  Subject: Medication Management     ** Submitted: **  Order:pramipexole (pramipexole 0.25 mg oral tablet)  3 tab(s)  Oral  hs  Qty:  270 tab(s)        Refills:  1          Substitutions Allowed     Route To Pharmacy [facility]    Signed by Jacey Dowd CMA  6/2/2021 5:49:00 PM UT    ** Submitted: **  Complete:pramipexole (pramipexole 0.25 mg oral tablet)   Signed by Jacey Dowd CMA  6/2/2021 5:49:00 PM UT    ** Not Approved:  **  pramipexole (PRAMIPEXOLE DIHYDROCHLORIDE 0.25 MG Tablet)  TAKE 3 TABLETS AT BEDTIME  Qty:  270 tab(s)        Days Supply:  90        Refills:  0          Substitutions Allowed     Route To Pharmacy [facility]   Signed by Jacey Dowd CMA            ** Patient matched by Jacey Dowd CMA on 6/2/2021 12:49:15 PM CDT **      ------------------------------------------  From: [facility]  To: Elijah Benitez MD  Sent: June 1, 2021 1:13:49 AM CDT  Subject: Medication Management  Due: May 14, 2021 8:11:22 PM CDT     ** On Hold Pending Signature **     Drug: pramipexole (pramipexole 0.25 mg oral tablet), TAKE 3 TABLETS AT BEDTIME  Quantity: 270 tab(s)  Days Supply: 0  Refills: 1  Substitutions Allowed  Notes from Pharmacy:     Dispensed Drug: pramipexole (pramipexole 0.25 mg oral tablet), TAKE 3 TABLETS AT BEDTIME  Quantity: 270 tab(s)  Days Supply: 90  Refills: 0  Substitutions Allowed  Notes from Pharmacy:  ------------------------------------------appt today

## 2022-02-16 NOTE — TELEPHONE ENCOUNTER
Entered by Angélica Aldana CMA on November 04, 2020 7:32:24 AM CST  ---------------------  From: Angélica Aldana CMA   To: [facility]    Sent: 11/4/2020 7:32:20 AM CST  Subject: Medication Management     ** Not Approved: Patient has requested refill too soon, #180, 1 sent 6/3/20 **  pramipexole (PRAMIPEXOLE DIHYDROCHLORIDE 0.25 MG Tablet)  TAKE 2 TABLETS AT BEDTIME  Qty:  180 tab(s)        Days Supply:  90        Refills:  0          Substitutions Allowed     Route To Pharmacy - [facility]   Signed by Angélica Aldana CMA            ** Patient matched by Angélica Aldana CMA on 11/4/2020 7:21:33 AM CST **      ------------------------------------------  From: [facility]  To: Elijah Benitez MD  Sent: November 3, 2020 8:55:42 AM CST  Subject: Medication Management  Due: October 31, 2020 3:13:34 PM CDT     ** On Hold Pending Signature **     Drug: pramipexole (pramipexole 0.25 mg oral tablet), TAKE 2 TABLETS AT BEDTIME  Quantity: 180 tab(s)  Days Supply: 0  Refills: 1  Substitutions Allowed  Notes from Pharmacy:     Dispensed Drug: pramipexole (pramipexole 0.25 mg oral tablet), TAKE 2 TABLETS AT BEDTIME  Quantity: 180 tab(s)  Days Supply: 90  Refills: 0  Substitutions Allowed  Notes from Pharmacy:  ------------------------------------------

## 2022-02-16 NOTE — PROGRESS NOTES
Patient:   REX DAMON            MRN: [id number]            FIN: [id number]               Age:   66 years     Sex:  Female     :  1955   Associated Diagnoses:   Pre-op exam; Encounter for preoperative screening laboratory testing for COVID-19 virus; Thyroid cancer   Author:   Joya Muller      Preoperative Information   Here for preop history and physical      Chief Complaint   2021 10:05 AM CDT   pre-op exam: thyroidectomy scheduled 21 at [facility] with Dr Flako Alvarez, needs COVID testing done today     Surgery is being done for thyroid cancer per orion, at [facility], she is not sure if this is a day surgery or if she will spend the night  Procedure is 2021.  Has had her COVID vaccine x2      Review of Systems   Constitutional:  Negative.    Weight has been stable, no nutritional concerns  No Allergies to latex, iodine, contrast dye, shell fish or local anesthetics   Eye:  Negative.    Ear/Nose/Mouth/Throat:  Negative.    Respiratory:  she has lung nodules that are being monitored  Has asthma that is in good control, she was advised to bring inhaler to surgery.    No history of sleep apnea   Cardiovascular:  Negative.    Gastrointestinal:  Negative.    Genitourinary:  Negative.    Pregnancy ruled out-   Hematology/Lymphatics:  She developed DVT after knee surgery last spring. She was able to stop the Eliquis the end of  and was advised to continue baby aspirn daily.   .    Endocrine:  Negative.    Immunologic:  Immunocompromised, liver transplant recipient for Liver cancer 3 year ago.    Musculoskeletal:  Negative.    Integumentary:  Negative.    Neurologic:  Negative.    Psychiatric:  Negative.    All other systems reviewed and negative   Has no history of anemia.    Has  no personal or family history of anesthesia reactions.  Patient does not have active tuberculosis.    S/he  has  taken Aspirin , 81 mg daily     S/he  has not taken Plavix (Clopidogrel) in  the last 2 weeks.    S/he  has not taken warfarin in the past week.    S/he  has not been on corticosteroids for more than 2 weeks recently.      S/he  is not DNR before, during or after surgery.    Chest pain / SOB walking up 2 flights of steps? No  Pain in neck or jaw? No  CAD MI?  NO  Afib? NO  Heart Failure? No  Diabetes? NO       Seizure Disorder? No  CKD? No    CVA? No           Health Status   Allergies:    Allergic Reactions (Selected)  Severity Not Documented  Dilaudid (Itching)  Nonallergic Reactions (Selected)  Unknown  HYDROmorphone (Itching)  Iron sucrose (Gi upset)  Sodium ferric gluconate complex (Other - describe in comment field)   Problem list:    All Problems  Acquired thrombocytopenia / SNOMED CT 068046191 / Confirmed  Anticoagulated / SNOMED CT 575996873 / Confirmed  Duodenal ulcer / SNOMED CT 24405324 / Confirmed  End stage liver disease / SNOMED CT 1508147780 / Confirmed  Gastric ulcer / SNOMED CT 6865949117 / Confirmed  H/O liver transplant / SNOMED CT 350076913 / Confirmed  History of cholangiocarcinoma / SNOMED CT 4390971132 / Confirmed  H/O liver cancer / SNOMED CT 0619246333 / Confirmed  Hypertension / SNOMED CT 8633654110 / Confirmed  Hypokalemia / SNOMED CT 97917603 / Confirmed  Lung mass / SNOMED CT 005876363 / Confirmed  Right  Asthma, moderate persistent / SNOMED CT 3092591672 / Confirmed  Osteopenia / SNOMED CT 940075051 / Confirmed  Pancytopenia / SNOMED CT 929182 / Confirmed  Restless leg syndrome / SNOMED CT 29080062 / Confirmed  Fibroid uterus / SNOMED CT 174972112 / Confirmed  Inactive: Anticoagulated / SNOMED CT 54306333  Resolved: History of DVT (deep vein thrombosis) / SNOMED CT 6375703721  Resolved: History of ankle fracture / SNOMED CT 6144588595  Right  Resolved: History of arm fracture / SNOMED CT 6624969840  Right  Resolved: Inpatient stay / SNOMED CT 934666149  [facility] [place] - Chem[facility] induced nausea and vomiting  Resolved: Inpatient stay / SNOMED  CT 227867304  @[facility], [place] - Pneumonia  Resolved: Inpatient stay / SNOMED CT 835308150  @[facility], [place] - Bleeding esophageal varices  Resolved: Inpatient stay / SNOMED CT 109965166  @[facility], [facility], [place] - Cholangiocarcinoma  Resolved: Inpatient stay / SNOMED CT 924785360  [facility], [place] - Organ transplant candidate  Resolved: Inpatient stay / SNOMED CT 199640796  @ [facility] for liver [facility].  Resolved: Inpatient stay / SNOMED CT 659917805  [facility] [place] [facility]  - Localized osteoarthritis of right knee.  Resolved: Pregnancy / SNOMED CT 577987306  Resolved: Pregnancy / SNOMED CT 209463493  Canceled: Choliangiocarcinoma  previously misspelled in chart  Canceled: History of liver cancer / SNOMED CT 5645475697   Medications:  (Selected)   Prescriptions  Prescribed  Albuterol (Eqv-ProAir HFA) 90 mcg/inh inhalation aerosol: See Instructions, Instructions: INHALE 2 PUFFS FOUR TIMES DAILY AS NEEDED FOR WHEEZING, # 3 EA, 1 Refill(s), Type: Maintenance, Pharmacy: [facility], 63, in, 11/12/19 8:15:00 CST, Height Measured, 133.8, lb, 06/30/20 16:37:00 CDT, W...  alendronate 70 mg oral tablet: = 1 tab(s) ( 70 mg ), Oral, qweek, Instructions: with 6-8 oz plain water, at least 30 minutes before first food, beverage, or medication of the day, # 12 tab(s), 1 Refill(s), Type: Maintenance, Pharmacy: [facility], 1 tab(s) Oral qw...  gabapentin 300 mg oral capsule: = 2 cap(s) ( 600 mg ), Oral, hs, # 180 cap(s), 1 Refill(s), Type: Maintenance, Pharmacy: [facility], Appt due for further refills, 2 cap(s) Oral hs, 63.5, in, 06/02/21 10:32:00 CDT, Height Measured, 127, lb, 06/02/21 10:32:00 CDT, W...  omeprazole 40 mg oral delayed release capsule: = 1 cap(s) ( 40 mg ), Oral, daily, # 90 cap(s), 1 Refill(s), Type: Maintenance, Pharmacy: [facility], 1 cap(s) Oral daily, 63.5, in, 06/02/21  10:32:00 CDT, Height Measured, 127, lb, 06/02/21 10:32:00 CDT, Weight Measured  pramipexole 0.25 mg oral tablet: = 3 tab(s) ( 0.75 mg ), Oral, hs, # 270 tab(s), 1 Refill(s), Type: Maintenance, Pharmacy: [facility], 3 tab(s) Oral hs, 63.5, in, 06/02/21 10:32:00 CDT, Height Measured, 127, lb, 06/02/21 10:32:00 CDT, Weight Measured  zolpidem 10 mg oral tablet: See Instructions, Instructions: TAKE 1 TABLET ONE TIME DAILY AT BEDTIME AS NEEDED FOR SLEEP, # 90 tab(s), 1 Refill(s), Type: Soft Stop, Pharmacy: [facility], TAKE 1 TABLET ONE TIME DAILY AT BEDTIME AS NEEDED FOR SLEEP, 63, in, 11/12...  Documented Medications  Documented  amLODIPine 5 mg oral tablet: 0 Refill(s), Type: Soft Stop  aspirin 81 mg oral delayed release tablet: = 1 tab(s) ( 81 mg ), Oral, daily, 0 Refill(s), Type: Maintenance  tacrolimus 1 mg oral capsule: = 3 cap(s) ( 3 mg ), Oral, q12 hrs, 0 Refill(s), Type: Maintenance      Histories   Past Medical History:    Active  Acquired thrombocytopenia (497557707): Onset on 10/10/2012 at 57 years.  History of cholangiocarcinoma (9251954737): Onset in 2011 at 55 years.  Asthma, moderate persistent (2458284516)  Hypertension (3533805116)  Fibroid uterus (653814640)  Osteopenia (408007447)  Restless leg syndrome (52129932)  Gastric ulcer (8638890355)  Duodenal ulcer (93170567)  Lung mass (613341717)  Comments:  11/20/2017 CST 8:44 AM CST - Debra Perez  Right  End stage liver disease (7875060508)  Hypokalemia (38458994)  Pancytopenia (625294)  Resolved  Inpatient stay (060153564): Onset on 3/29/2021 at 65 years.  Resolved on 4/6/2021 at 65 years.  Comments:  4/9/2021 CDT 11:29 AM CDT - Tamera Green   Y[place], HAILEE ocampo  - Localized osteoarthritis of right knee.  Inpatient stay (126028481): Onset on 8/30/2018 at 63 years.  Resolved on 9/12/2018 at 63 years.  Comments:  9/18/2018 CDT 2:04 PM CDT - Jess Wellington  @ [facility] [place] for liver transplant.  Inpatient stay  (577573755): Onset on 5/3/2018 at 63 years.  Resolved on 2018 at 63 years.  Comments:  5/10/2018 CDT 10:45 AM CDT - Debra Perez  @[facility], [place] - Organ transplant candidate  Inpatient stay (518789695): Onset on 10/30/2017 at 62 years.  Resolved on 11/3/2017 at 62 years.  Comments:  2017 CST 8:39 AM Debra Wilson  @[facility], [facility], [place] - Cholangiocarcinoma  Inpatient stay (403060253): Onset on 3/13/2015 at 59 years.  Resolved on 3/17/2015 at 59 years.  Comments:  2017 CST 8:39 AM Debra Wilson  @[place], [place] - Bleeding esophageal varices  Inpatient stay (310580842): Onset on 2014 at 59 years.  Resolved on 2014 at 59 years.  Comments:  2017 CST 8:38 AM Debra Wilson  @[facility], [place] - Pneumonia  Inpatient stay (494859576): Onset on 2011 at 56 years.  Resolved.  Comments:  2017 CST 8:38 AM Debra Wilson  @[facility], [place] - Chemotherapy induced nausea and vomiting  History of DVT (deep vein thrombosis) (0094443255): Onset on 2/15/2011 at 55 years.  Resolved.  Pregnancy (867287103):  Resolved on 1979 at 24 years.  Pregnancy (439851915):  Resolved on 1981 at 26 years.  History of ankle fracture (2900609374):  Resolved.  Comments:  2017 CST 8:42 AM Debra Wilson  Right  History of arm fracture (1510282278):  Resolved.  Comments:  2017 CST 8:43 AM Debra Wilson  Right   Family History:    Hypertension  Mother ()  Sister  Osteoporosis  Mother ()  MS - Multiple sclerosis  Sister     Procedure history:    Arthroscopy of knee (SNOMED CT 835267264) on 3/29/2021 at 65 Years.  Comments:  2021 11:28 AM CDT - Tamera Green  Total right.  Esophagogastroduodenoscopy (SNOMED CT 434792194) on 2020 at 65 Years.  Comments:  2020 1:49 PM CDT - Tamera Green  Indication: Diarrhea.  Colonoscopy (SNOMED CT 884989483) on 2020 at 65  Years.  Comments:  7/14/2020 1:48 PM Tamera Velazquez  Indication: Change in bowel habits.  Sedation: MAC.  Findings: One 10 mm sessile polyp in ascending colon.  Recommendation: Repeat in 3 years.  LTx - Liver transplant (SNOMED CT 5257313943) on 8/29/2018 at 63 Years.  Esophagogastroduodenoscopy (SNOMED CT 477090553) performed by Rafy Chisholm MD on 1/30/2018 at 62 Years.  Comments:  2/8/2018 1:14 PM Tamera Worley  Indication:  Varices, follow up.   Sedation:  MAC.  Recommendation:  Repeat in 1 year.  Pleural catheter (SNOMED CT 4068894912) performed by Donovan Stevens MD on 12/21/2017 at 62 Years.  Comments:  1/18/2018 1:43 PM Tamera Worley  Right side insertion.  Thoracoscopic procedure (SNOMED CT 0427916950) performed by Donovan Stevens MD on 10/30/2017 at 62 Years.  Comments:  11/7/2017 2:23 PM CST - Jacey Dowd CMA  Right Video assisted thoracoscopc surther  pleural biopsy  doxycycline pleurodesis  VATS - Video assisted thorascopic surgery (SNOMED CT 021880289) performed by Donovan Stevens MD on 10/30/2017 at 62 Years.  Comments:  11/16/2017 2:59 PM Tamera Worley  Right-sided pleural biopsy.  Esophagogastroduodenoscopy (SNOMED CT 232279113) performed by Rafy Chisholm MD on 8/9/2016 at 61 Years.  Comments:  8/11/2016 8:13 AM Tamera Velazquez  Indication:  Gastric ulcer follow up.  Sedation:  MAC.  Upper GI endoscopy (SNOMED CT 4926920871) on 6/17/2016 at 61 Years.  Comments:  6/23/2016 1:29 PM CDT - Jacey Dowd CMA  Acute gastric ulcer w/o hemorrhage or perforation, esophageal varices w/o bleeding.  Varices are scarred and not amenable to banding at this time  Esophagogastroduodenoscopy (SNOMED CT 626434890) performed by Rafy Maxwell MD on 6/17/2016 at 61 Years.  Esophagogastroduodenoscopy (SNOMED CT 203237277) performed by Rafy Chisholm MD on 4/11/2016 at 61 Years.  Comments:  4/19/2016 7:57 AM ELLENT - Tamera Green  Indication: Varices, follow up.  Sedation:  MAC.  Impression:  H. pylori negative.  Upper GI endoscopy performed by Rafy Chisholm MD on 1/14/2016 at 60 Years.  Esophagogastroduodenoscopy (SNOMED CT 699601427) on 3/13/2015 at 59 Years.  Wedge resection of liver (SNOMED CT 741870662) in the month of 10/2012 at 57 Years.  Upper GI endoscopy (SNOMED CT 9228574517) performed by Ash Tinajero MD on 6/8/2012 at 57 Years.  HPV - Human papillomavirus test negative (SNOMED CT 7097226529) on 5/8/2012 at 57 Years.  Comments:  5/14/2012 11:41 AM ELLENT - Xi Dove  Low risk for cervical cancer. OK to have pap q 3 yrs.  Upper GI endoscopy (SNOMED CT 0815399510) performed by Ash Tinajero MD on 3/2/2012 at 56 Years.  Upper GI endoscopy (SNOMED CT 9314204934) performed by Ash Tinajero MD on 1/5/2012 at 56 Years.  Upper GI endoscopy (SNOMED CT 8420973184) on 11/22/2011 at 56 Years.  Esophagogastroduodenoscopy (SNOMED CT 821698772) performed by Lizet ATKINSON [facility] on 9/6/2011 at 56 Years.  Esophagogastroduodenoscopy (SNOMED CT 482289877) performed by Jairo Tan on 5/23/2011 at 56 Years.  Colonoscopy (SNOMED CT 754310013) on 2/10/2011 at 55 Years.  Esophagogastroduodenoscopy (SNOMED CT 159757448) performed by Doron Hutchison MD on 2/10/2011 at 55 Years.  Comments:  7/10/2011 7:21 PM CDT - Doron Hutchison MD  Bleeding in the duodenal bulb  Biopsy of liver (SNOMED CT 091033668) on 1/28/2011 at 55 Years.  HPV - Human papillomavirus test negative (SNOMED CT 7774831055) on 4/15/2010 at 55 Years.  Comments:  4/26/2011 1:04 PM ELLENT - Xi Dove  Low risk for cervical cancer. OK to have pap q 3 yrs.  Mammogram - screening (SNOMED CT 874656712) on 4/9/2010 at 55 Years.  Mammography; bilateral (CPT-4 07059) on 4/9/2010 at 55 Years.  Comments:  1/26/2011 8:48 AM REYNA - Ramya Kenney CMA  Normal-no radiographic evidence for malignancy.  DEXA - Dual energy X-ray photon absorptiometry ([facility] CT 426652905) on 4/1/2009 at 53  Years.  Comments:  4/15/2010 1:43 PM CDT - Rula NPMarkC, Xi  Due again in 5 yrs, ()  Tubal ligation (SNOMED CT 813971482) in  at 27 Years.   section (SNOMED CT 08810865) in  at 25 Years.  Primary repair of torn ligament of knee, collateral (SNOMED CT 96842302) in  at 16 Years.  Tonsillectomy and adenoidectomy (SNOMED CT 309968784) in  at 8 Years.  Arm fracture (ICD-9-.0).  Comments:  2011 2:11 PM CDT - Nathaly Lim  Right  Arm fracture (ICD-9-.0).  Comments:  2011 2:12 PM ELLENT - Nathaly Lim  Left  Ankle fracture, right (ICD-9-.8).   Social History:        Electronic Cigarette/Vaping Assessment            Electronic Cigarette Use: Never.      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      Sexual Assessment            Sexually active: Yes.  Number of current partners 1.  Other contraceptive use: Ann Marie.  ,        Electronic Cigarette/Vaping Assessment            Electronic Cigarette Use: Never.      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      Sexual Assessment            Sexually active: Yes.  Number of current partners 1.  Other contraceptive use: Ann Marie.        Physical Examination   Vital Signs   2021 10:05 AM CDT Temperature Tympanic 97.7 DegF  LOW    Peripheral Pulse Rate 76 bpm    Systolic Blood Pressure 114 mmHg    Diastolic Blood Pressure 76 mmHg    Mean Arterial Pressure 89 mmHg    BP Site Right arm    BP Method Manual    Oxygen Saturation 99 %      Measurements from flowsheet : Measurements   2021 10:05 AM CDT Height Measured - Standard 63.5 in    Weight Measured - Standard 129.8 lb    BSA  1.62 m2    Body Mass Index 22.63 kg/m2      General:  Alert and oriented, No acute distress.    Eye:  Normal conjunctiva.    HENT:  Normocephalic, Tympanic membranes are clear, Oral mucosa is moist, No pharyngeal erythema, Mallampati Score 1.    Neck:  Supple, Non-tender.    Respiratory:  Breath sounds are equal, Symmetrical chest wall expansion.         Respirations: Are within normal limits.         Pattern: Regular.         Breath sounds: Bilateral, Within normal limits.    Cardiovascular:  Normal rate, Regular rhythm, No murmur, Good pulses equal in all extremities, Normal peripheral perfusion, No edema.    Gastrointestinal:  Soft, Non-tender, Non-distended.    Musculoskeletal:  Normal range of motion, Normal strength, Normal gait.    Integumentary:  Warm, Dry, Intact, No rash.    Neurologic:  Alert, Oriented, Normal motor function, No focal deficits.    Psychiatric:  Cooperative, Appropriate mood & affect.       Review / Management   ECG interpretation:  Time  8/26/2021 1:25:00 PM, Normal sinus rhythm.       Impression and Plan   Diagnosis     Pre-op exam (AHT92-JM Z01.818).     Encounter for preoperative screening laboratory testing for COVID-19 virus (BQQ32-ET Z01.812).     Thyroid cancer ([id number] C73).     Condition:  Stable,  Stable, no contraindication for general anesthesia or surgical procedure pending CBC and comprehensive Metabolic panel and COVID 19 test completed today. She will continue baby aspirn daily and hold day of surgery.    Orders     Orders (Selected)   Outpatient Orders  Ordered (In Transit)  CBC (h/h, RBC, indices, WBC, Plt)* ([facility]): Specimen Type: Blood, Collection Date: 08/26/21 10:48:00 CDT  Comprehensive Metabolic Panel* ([facility]): Specimen Type: Serum, Collection Date: 08/26/21 10:48:00 CDT  Completed  SARS-CoV-2 RNA (COVID-19), Qualitative NAAT (Request): Specimen Type: Anterior Nares, Encounter for preoperative screening laboratory testing for COVID-19 virus  Thyroid cancer.

## 2022-02-16 NOTE — PROGRESS NOTES
Patient:   REX DAMON            MRN: [id number]            FIN: [id number]               Age:   64 years     Sex:  Female     :  1955   Associated Diagnoses:   Changing skin lesion   Author:   Joya Muller      Visit Information      Date of Service: 2019 08:08 am  Performing Location: [facility]  Encounter#: [id number]      Primary Care Provider (PCP):  Elijah Benitez MD    NPI# [id number]      Referring Provider:  Joya Muller    NPI# [id number]      Chief Complaint   2019 8:15 AM REYNA   has a lump/scab on left shoulder, present for 5-6 weeks, painful, causing some achey pain in upper arm, not improving        History of Present Illness   Rex has a spot left shoulder that appeared about 6 weeks ago, actually just after she saw Dermatologist Eldon Goetz at [facility]  had skin screening as she is on immunosuppressive therapy for liver transplant  This lesion 'hurts' and aches into her arm  she broke this arm last spring and has hardware in place, she is worried about having lesion removed as she has had sepsis in past  the lesion bleeds  she has never had skin cancer      Health Status   Allergies:    Allergic Reactions (Selected)  Severity Not Documented  Dilaudid (Itching)  Nonallergic Reactions (Selected)  Unknown  HYDROmorphone (Itching)  Iron sucrose (Gi upset)  Sodium ferric gluconate complex (Other - describe in comment field)   Medications:  (Selected)   Prescriptions  Prescribed  albuterol 90 mcg/inh inhalation aerosol: 2 puff(s), NEB, qid, PRN: AS NEEDED FOR WHEEZING, # 18 gm, 5 Refill(s), Type: Maintenance, Pharmacy: [facility]  alendronate 70 mg oral tablet: = 1 tab(s) ( 70 mg ), Oral, qweek, Instructions: with 6-8 oz plain water, at least 30 minutes before first food, beverage, or medication of the day, # 12 tab(s), 3 Refill(s), Type: Maintenance, Pharmacy: [facility], 1 tab(s) Oral qw...  gabapentin 300 mg oral capsule: =  1 cap(s), Oral, qhs, # 90 tab(s), 1 Refill(s), Type: Soft Stop, Pharmacy: [email or web address], keep on file and pt will notify when needed, 1 cap(s) Oral qhs  pramipexole 0.25 mg oral tablet: = 2 tab(s), Oral, qhs, # 180 tab(s), 1 Refill(s), Type: Soft Stop, Pharmacy: [email or web address], keep on hold and pt will notify when needed, 2 tab(s) Oral qhs  zolpidem 10 mg oral tablet: See Instructions, Instructions: TAKE 1 TABLET ONE TIME DAILY AT BEDTIME AS NEEDED FOR SLEEP, # 90 tab(s), 1 Refill(s), Type: Soft Stop, Pharmacy: [email or web address]  Documented Medications  Documented  acetaminophen 500 mg oral tablet: 1-2 tab(s), Oral, q6 hrs, PRN: as needed for pain, 0 Refill(s), Type: Maintenance  aspirin 81 mg oral tablet: = 1 tab(s) ( 81 mg ), Oral, daily, 0 Refill(s), Type: Maintenance  omeprazole 20 mg oral delayed release capsule: = 2 cap(s) ( 40 mg ), PO, bid, # 90 cap(s), 0 Refill(s), Type: Maintenance  tacrolimus 1 mg oral capsule: See Instructions, Instructions: 4mg AM/ 5mg PM, 0 Refill(s), Type: Maintenance   Problem list:    All Problems  Acquired thrombocytopenia / SNOMED CT 997387166 / Confirmed  Anticoagulated / SNOMED CT 812856519 / Confirmed  Asthma, moderate persistent / SNOMED CT 2237036610 / Confirmed  Duodenal ulcer / SNOMED CT 23235787 / Confirmed  End stage liver disease / SNOMED CT 7994698780 / Confirmed  Fibroid uterus / SNOMED CT 905974321 / Confirmed  Gastric ulcer / SNOMED CT 4726341585 / Confirmed  H/O liver cancer / SNOMED CT 0597969077 / Confirmed  H/O liver transplant / SNOMED CT 754454132 / Confirmed  History of cholangiocarcinoma / SNOMED CT 1949592836 / Confirmed  Hypertension / SNOMED CT 8212531712 / Confirmed  Hypokalemia / SNOMED CT 20897933 / Confirmed  Lung mass / SNOMED CT 313999555 / Confirmed  Right  Osteopenia / SNOMED CT 691824568 / Confirmed  Pancytopenia / SNOMED CT 833351 / Confirmed  Restless leg syndrome / SNOMED CT 35672766 /  Confirmed  Inactive: Anticoagulated / SNOMED CT 67065895  Resolved: History of ankle fracture / SNOMED CT 4579735503  Right  Resolved: History of arm fracture / SNOMED CT 6474590289  Right  Resolved: History of DVT (deep vein thrombosis) / SNOMED CT 9241069788  Resolved: Inpatient stay / SNOMED CT 242250038  @[facility], [place] - Chemotherapy induced nausea and vomiting  Resolved: Inpatient stay / SNOMED CT 063947010  @[facility], [place] - Pneumonia  Resolved: Inpatient stay / SNOMED CT 665280420  @[place], [place] - Bleeding esophageal varices  Resolved: Inpatient stay / SNOMED CT 161943652  @[place], [place] - Cholangiocarcinoma  Resolved: Inpatient stay / SNOMED CT 613792492  @[place], [place] - Organ transplant candidate  Resolved: Inpatient stay / SNOMED CT 073096706  @ [facility] for liver transplant.  Resolved: Pregnancy / SNOMED CT 289507410  Resolved: Pregnancy / SNOMED CT 865210130  Canceled: Choliangiocarcinoma  previously misspelled in chart  Canceled: History of liver cancer / SNOMED CT 4060057027      Histories   Past Medical History:    Active  Acquired thrombocytopenia (784459966): Onset on 10/10/2012 at 57 years.  History of cholangiocarcinoma (1781299564): Onset in 2011 at 55 years.  Asthma, moderate persistent (8482815864)  Hypertension (3899218888)  Fibroid uterus (882294777)  Osteopenia (390441711)  Restless leg syndrome (63927053)  Gastric ulcer (2417031914)  Duodenal ulcer (67442628)  Lung mass (293180169)  Comments:  11/20/2017 CST 8:44 AM CST - Debra Perez  Right  End stage liver disease (6270115852)  Hypokalemia (32389272)  Pancytopenia (493133)  Resolved  Inpatient stay (592816843): Onset on 8/30/2018 at 63 years.  Resolved on 9/12/2018 at 63 years.  Comments:  9/18/2018 CDT 2:04 PM CDT - Jess Wellington  @ [facility] for liver transplant.  Inpatient stay (010754276): Onset on 5/3/2018 at 63 years.  Resolved on  2018 at 63 years.  Comments:  5/10/2018 CDT 10:45 AM CDT - Debra Perez  @[facility], [place] - Organ transplant candidate  Inpatient stay (301975049): Onset on 10/30/2017 at 62 years.  Resolved on 11/3/2017 at 62 years.  Comments:  2017 CST 8:39 AM Debra Wilson  @[facility] - Cholangiocarcinoma  Inpatient stay (929994602): Onset on 3/13/2015 at 59 years.  Resolved on 3/17/2015 at 59 years.  Comments:  2017 CST 8:39 AM Debra Wilson  @[facility], [place] - Bleeding esophageal varices  Inpatient stay (549327419): Onset on 2014 at 59 years.  Resolved on 2014 at 59 years.  Comments:  2017 CST 8:38 AM Debra Wilson  @[facility], [place] - Pneumonia  Inpatient stay (444262604): Onset on 2011 at 56 years.  Resolved.  Comments:  2017 CST 8:38 AM Debra Wilson  @[facility], [place] - Chemotherapy induced nausea and vomiting  History of DVT (deep vein thrombosis) (7797000984): Onset on 2/15/2011 at 55 years.  Resolved.  Pregnancy (477448737):  Resolved on 1979 at 24 years.  Pregnancy (451237997):  Resolved on 1981 at 26 years.  History of ankle fracture (7639087051):  Resolved.  Comments:  2017 CST 8:42 AM Debra Wilson  Right  History of arm fracture (7660681642):  Resolved.  Comments:  2017 CST 8:43 AM Debra Wilson  Right   Family History:    Hypertension  Mother ()  Sister  Osteoporosis  Mother ()  MS - Multiple sclerosis  Sister     Procedure history:    LTx - Liver transplant (SNOMED CT 1291388188) on 2018 at 63 Years.  Esophagogastroduodenoscopy (SNOMED CT 396308922) performed by Rafy Chisholm MD on 2018 at 62 Years.  Comments:  2018 1:14 PM CST - Tamera Green  Indication:  Varices, follow up.   Sedation:  MAC.  Recommendation:  Repeat in 1 year.  Pleural catheter (SNOMED CT 4922933465) performed by Donovan Stevens MD  on 12/21/2017 at 62 Years.  Comments:  1/18/2018 1:43 PM CST - Gordon Greenia  Right side insertion.  Thoracoscopic procedure (SNOMED CT 2236215093) performed by Donovan Stevens MD on 10/30/2017 at 62 Years.  Comments:  11/7/2017 2:23 PM CST - Branstad CMA, Jacey  Right Video assisted thoracoscopc surther  pleural biopsy  doxycycline pleurodesis  VATS - Video assisted thorascopic surgery (SNOMED CT 152550508) performed by Donovan Stevens MD on 10/30/2017 at 62 Years.  Comments:  11/16/2017 2:59 PM CST - Tamera Green  Right-sided pleural biopsy.  Esophagogastroduodenoscopy (SNOMED CT 172055472) performed by Rafy Chisholm MD on 8/9/2016 at 61 Years.  Comments:  8/11/2016 8:13 AM CDT - Tamera Green  Indication:  Gastric ulcer follow up.  Sedation:  MAC.  Upper GI endoscopy (SNOMED CT 2722854388) on 6/17/2016 at 61 Years.  Comments:  6/23/2016 1:29 PM CDT - Branstad CMA, Jacey  Acute gastric ulcer w/o hemorrhage or perforation, esophageal varices w/o bleeding.  Varices are scarred and not amenable to banding at this time  Esophagogastroduodenoscopy (SNOMED CT 494507276) performed by Rafy Maxwell MD on 6/17/2016 at 61 Years.  Esophagogastroduodenoscopy (SNOMED CT 953053746) performed by Rafy Chisholm MD on 4/11/2016 at 61 Years.  Comments:  4/19/2016 7:57 AM CDT - Tamera Green  Indication: Varices, follow up.  Sedation: MAC.  Impression:  H. pylori negative.  Upper GI endoscopy performed by Rafy Chisholm MD on 1/14/2016 at 60 Years.  Esophagogastroduodenoscopy (SNOMED CT 773348995) on 3/13/2015 at 59 Years.  Wedge resection of liver (SNOMED CT 798926068) in the month of 10/2012 at 57 Years.  Upper GI endoscopy (SNOMED CT 5840399378) performed by Ash Tinajero MD on 6/8/2012 at 57 Years.  HPV - Human papillomavirus test negative (SNOMED CT 7580390161) on 5/8/2012 at 57 Years.  Comments:  5/14/2012 11:41 AM ELLENT - Rula TORREZ, Xi  Low risk for cervical cancer. OK to have pap q 3 yrs.  Upper GI endoscopy  (SNOMED CT 6021202714) performed by Ash Tinajero MD on 3/2/2012 at 56 Years.  Upper GI endoscopy (SNOMED CT 1177289161) performed by Ash Tinajero MD on 2012 at 56 Years.  Upper GI endoscopy (SNOMED CT 5291903989) on 2011 at 56 Years.  Esophagogastroduodenoscopy (SNOMED CT 433947719) performed by Lionel Hinds MDerico on 2011 at 56 Years.  Esophagogastroduodenoscopy (SNOMED CT 638257562) performed by Jairo Tan on 2011 at 56 Years.  Colonoscopy (SNOMED CT 183954421) on 2/10/2011 at 55 Years.  Esophagogastroduodenoscopy (SNOMED CT 938400451) performed by Doron Hutchison MD on 2/10/2011 at 55 Years.  Comments:  7/10/2011 7:21 PM CDT - Doron Hutchison MD  Bleeding in the duodenal bulb  Biopsy of liver (SNOMED CT 475229685) on 2011 at 55 Years.  HPV - Human papillomavirus test negative (SNOMED CT 8545839420) on 4/15/2010 at 55 Years.  Comments:  2011 1:04 PM ELLENT - Xi Dove  Low risk for cervical cancer. OK to have pap q 3 yrs.  Mammogram - screening (SNOMED CT 515477220) on 2010 at 55 Years.  Mammography; bilateral (CPT-4 00209) on 2010 at 55 Years.  Comments:  2011 8:48 AM CST - Ramya Kenney CMA  Normal-no radiographic evidence for malignancy.  DEXA - Dual energy X-ray photon absorptiometry (SNOMED CT 014915639) on 2009 at 53 Years.  Comments:  4/15/2010 1:43 PM ELLENT - Xi Dove  Due again in 5 yrs, ()  Tubal ligation (SNOMED CT 930807036) in  at 27 Years.   section (SNOMED CT 91631286) in  at 25 Years.  Primary repair of torn ligament of knee, collateral (SNOMED CT 07082561) in  at 16 Years.  Tonsillectomy and adenoidectomy (SNOMED CT 625512293) in 1963 at 8 Years.  Arm fracture (ICD-9-.0).  Comments:  2011 2:11 PM Nathaly Valle  Right  Arm fracture (ICD-9-.0).  Comments:  2011 2:12 PM Nathaly Valle  Left  Ankle fracture, right (ICD-9-.8).   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      Sexual Assessment            Sexually active: Yes.  Number of current partners 1.  Other contraceptive use: Ann Marie.        Physical Examination   Vital Signs   11/12/2019 8:15 AM CST Temperature Tympanic 97.4 DegF  LOW    Peripheral Pulse Rate 80 bpm    Systolic Blood Pressure 144 mmHg  HI    Diastolic Blood Pressure 92 mmHg  HI    Mean Arterial Pressure 109 mmHg    BP Site Right arm    BP Method Manual    Oxygen Saturation 98 %      Measurements from flowsheet : Measurements   11/12/2019 8:15 AM CST Height Measured - Standard 63 in    Ht/Wt Measurement Refused by Patient? Yes      General:  Alert and oriented, No acute distress, left lateral shoulder with nearly 1 cm diameter nodular lesion with ulcerated/scabbed center.       Review / Management   Course:  I did speak with Dr Eldon Goetz's staff, they inform me that they contacted Sherrie and have her scheduled to see dr Goetz.       Impression and Plan   Diagnosis     Changing skin lesion ([id number] L98.9).     Patient Instructions:       Counseled: Patient, Regarding diagnosis, Regarding treatment, Regarding medications, Verbalized understanding, 15 min in exam and coordination of care and counseling.

## 2022-02-16 NOTE — PROGRESS NOTES
Chief Complaint    c/o vomiting all day 2 days ago, has not vomited today or yesterday, but has body aches and unable to eat or drink anything. Hx of liver transplant. States she got her flu shot last thursday- not sure if it is related to her sxs.  History of Present Illness       Patient is a 66-year-old female on video visit today       Video start time 12:14 PM       Video end time 12:22 PM       Patient at home in [facility]       Physician in clinic [facility]       Patient received her flu shot October 21 October 22 she felt achy       October 23 she was vomiting all day       Since then she has not been able to eat or drink. She has had no fever. She is not vomiting currently. She had no cough, runny nose, shortness of breath.       She feels like she does need some IV fluids       She does complain of some right flank pain       Does not recall her last urine output       History of liver transplant  Review of Systems      Negative except as listed in HPI.  Physical Exam   Vitals & Measurements    HT: 63.5 in       video visit      in bed.  alert and oriented and in no acute distress         Assessment/Plan       Dehydration (E86.0)         will have her be seen in ER due to her complicated medical history.  would like to have labs done before IV fluids started.  also may need work up for the flank pain.        she would like to just go to the infusion center        called infusion center at the hospital and they agree that ER visit is more appropriate.  discussed this with patient.        called ER and gave report to Jake.       H/O liver transplant (Z94.4)       Vomiting (R11.10)  Patient Information     Name:REX DAMON      Address:      [address] [place] [id number]     Sex:Female     YOB: 1955     [phone]     Emergency Contact:MOUSTAPHA DAMON     MRN:[id number]     FIN:[id number]     Location:[facility]     Date of Service:10/25/2021       Primary Care Physician:       Elijah Benitez MD, [phone]      Attending Physician:       Stephanie Miranda MD, [phone]  Problem List/Past Medical History    Ongoing     Acquired thrombocytopenia     Anticoagulated     Anticoagulated     Asthma, moderate persistent     Duodenal ulcer     End stage liver disease     Fibroid uterus     Gastric ulcer     H/O liver cancer     H/O liver transplant     History of cholangiocarcinoma     Hypertension     Hypokalemia     Lung mass       Comments: Right     Osteopenia     Pancytopenia     Restless leg syndrome    Historical     History of ankle fracture       Comments: Right     History of arm fracture       Comments: Right     History of DVT (deep vein thrombosis)     Inpatient stay       Comments: @[facility] [place], [place] - Chemotherapy induced nausea and vomiting     Inpatient stay       Comments: @[facility] [place], [place] - Pneumonia     Inpatient stay       Comments: @[facility] [place] - Bleeding esophageal varices     Inpatient stay       Comments: @[facility] [place] - Cholangiocarcinoma     Inpatient stay       Comments: @[facility], [place] - Organ transplant candidate     Inpatient stay       Comments: @ [facility] for liver transplant.     Inpatient stay       Comments:  [facility] - Localized osteoarthritis of right knee.     Pregnancy     Pregnancy  Procedure/Surgical History     Arthroscopy of knee (03/29/2021)      Comments: Total right..     Colonoscopy (06/26/2020)      Comments: Indication: Change in bowel habits.      Sedation: MAC.      Findings: One 10 mm sessile polyp in ascending colon.      Recommendation: Repeat in 3 years..     Esophagogastroduodenoscopy (06/26/2020)      Comments: Indication: Diarrhea..     LTx - Liver transplant (08/29/2018)     Esophagogastroduodenoscopy (01/30/2018)      Comments: Indication:  Varices, follow up.      Sedation:  MAC.      Recommendation:   Repeat in 1 year..     Pleural catheter (2017)      Comments: Right side insertion..     Thoracoscopic procedure (10/30/2017)      Comments: Right Video assisted thoracoscopc surther      pleural biopsy      doxycycline pleurodesis.     VATS - Video assisted thorascopic surgery (10/30/2017)      Comments: Right-sided pleural biopsy..     Esophagogastroduodenoscopy (2016)      Comments: Indication:  Gastric ulcer follow up.      Sedation:  MAC..     Esophagogastroduodenoscopy (2016)     Upper GI endoscopy (2016)      Comments: Acute gastric ulcer w/o hemorrhage or perforation, esophageal varices w/o bleeding.  Varices are scarred and not amenable to banding at this time.     Esophagogastroduodenoscopy (2016)      Comments: Indication: Varices, follow up.      Sedation: MAC.      Impression:  H. pylori negative..     Upper GI endoscopy (2016)     Esophagogastroduodenoscopy (2015)     Wedge resection of liver (10/2012)     Upper GI endoscopy (2012)     HPV - Human papillomavirus test negative (2012)      Comments: Low risk for cervical cancer. OK to have pap q 3 yrs..     Upper GI endoscopy (2012)     Upper GI endoscopy (2012)     Upper GI endoscopy (2011)     Esophagogastroduodenoscopy (2011)     Esophagogastroduodenoscopy (2011)     Colonoscopy (02/10/2011)     Esophagogastroduodenoscopy (02/10/2011)      Comments: Bleeding in the duodenal bulb.     Biopsy of liver (2011)     HPV - Human papillomavirus test negative (04/15/2010)      Comments: Low risk for cervical cancer. OK to have pap q 3 yrs..     Mammogram - screening (2010)     Mammography; bilateral (2010)      Comments: Normal-no radiographic evidence for malignancy..     DEXA - Dual energy X-ray photon absorptiometry (2009)      Comments: Due again in 5 yrs, ().     Tubal ligation ()      section ()     Primary repair of torn  ligament of knee, collateral (1972)     Tonsillectomy and adenoidectomy (1963)     Ankle fracture, right     Arm fracture      Comments: Left.     Arm fracture      Comments: Right.  Medications    Albuterol (Eqv-ProAir HFA) 90 mcg/inh inhalation aerosol, See Instructions    alendronate 70 mg oral tablet, See Instructions    amLODIPine 5 mg oral tablet    aspirin 81 mg oral delayed release tablet, 81 mg= 1 tab(s), Oral, daily    gabapentin 300 mg oral capsule, 600 mg= 2 cap(s), Oral, hs, 1 refills    levothyroxine 125 mcg (0.125 mg) oral tablet, 125 mcg= 1 tab(s), Oral, daily    omeprazole 40 mg oral delayed release capsule, 40 mg= 1 cap(s), Oral, daily, 1 refills    pramipexole 0.25 mg oral tablet, 0.75 mg= 3 tab(s), Oral, hs, 1 refills    tacrolimus 1 mg oral capsule, 3 mg= 3 cap(s), Oral, q12 hrs    zolpidem 10 mg oral tablet, See Instructions, 1 refills  Allergies    HYDROmorphone (Itching)    iron sucrose (GI Upset)    sodium ferric gluconate complex (Other - Describe In Comment Field)    Dilaudid (Itching)  Social History    Smoking Status     Never smoker     Alcohol      Never     Electronic Cigarette/Vaping      Electronic Cigarette Use: Never.     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     Sexual      Sexually active: Yes. Number of current partners 1. Other contraceptive use: Ann Marie.     Tobacco      Never (less than 100 in lifetime)  Family History    Hypertension: Mother and Sister.Negative: Father.    MS - Multiple sclerosis: Sister.    Osteoporosis: Mother.  Lab Results       Lab Results (Last 4 results within 90 days)        Sodium Level: 140 mmol/L [135 mmol/L - 146 mmol/L] (08/26/21 11:02:00)       Potassium Level: 4.4 mmol/L [3.5 mmol/L - 5.3 mmol/L] (08/26/21 11:02:00)       Chloride Level: 104 mmol/L [98 mmol/L - 110 mmol/L] (08/26/21 11:02:00)       CO2 Level: 29 mmol/L [20 mmol/L - 32 mmol/L] (08/26/21 11:02:00)       Glucose Level: 91 mg/dL [65 mg/dL -  99 mg/dL] (08/26/21 11:02:00)       BUN: 30 mg/dL High [7 mg/dL - 25 mg/dL] (08/26/21 11:02:00)       Creatinine Level: 1.07 mg/dL High [0.5 mg/dL - 0.99 mg/dL] (08/26/21 11:02:00)       BUN/Creat Ratio: 28 High [6  - 22] (08/26/21 11:02:00)       eGFR: 55 mL/min/1.73m2 Low (08/26/21 11:02:00)       eGFR : 64 mL/min/1.73m2 (08/26/21 11:02:00)       Calcium Level: 9.1 mg/dL [8.6 mg/dL - 10.4 mg/dL] (08/26/21 11:02:00)       Bilirubin Total: 0.6 mg/dL [0.2 mg/dL - 1.2 mg/dL] (08/26/21 11:02:00)       Alkaline Phosphatase: 71 unit/L [37 unit/L - 153 unit/L] (08/26/21 11:02:00)       AST/SGOT: 10 unit/L [10 unit/L - 35 unit/L] (08/26/21 11:02:00)       ALT/SGPT: 8 unit/L [6 unit/L - 29 unit/L] (08/26/21 11:02:00)       Protein Total: 6.6 gm/dL [6.1 gm/dL - 8.1 gm/dL] (08/26/21 11:02:00)       Albumin Level: 4.2 gm/dL [3.6 gm/dL - 5.1 gm/dL] (08/26/21 11:02:00)       Globulin: 2.4 [1.9  - 3.7] (08/26/21 11:02:00)       A/G Ratio: 1.8 [1  - 2.5] (08/26/21 11:02:00)       WBC: 5.5 [3.8  - 10.8] (08/26/21 11:02:00)       RBC: 4.26 [3.8  - 5.1] (08/26/21 11:02:00)       Hgb: 12.9 gm/dL [11.7 gm/dL - 15.5 gm/dL] (08/26/21 11:02:00)       Hct: 37.6 % [35 % - 45 %] (08/26/21 11:02:00)       MCV: 88.3 fL [80 fL - 100 fL] (08/26/21 11:02:00)       MCH: 30.3 pg [27 pg - 33 pg] (08/26/21 11:02:00)       MCHC: 34.3 gm/dL [32 gm/dL - 36 gm/dL] (08/26/21 11:02:00)       RDW: 13.4 % [11 % - 15 %] (08/26/21 11:02:00)       Platelet: 154 [140  - 400] (08/26/21 11:02:00)       MPV: 9.2 fL [7.5 fL - 12.5 fL] (08/26/21 11:02:00)       Coronavirus SARS-CoV-2 (COVID-19) TR: Negative (08/26/21 11:30:00)  Immunizations       Scheduled Immunizations       Dose Date(s)       influenza virus vaccine, inactivated       10/01/2013, 12/19/2019       pneumococcal (PCV13)       02/08/2019       SARS-CoV-2 (COVID-19) Pfizer-162b2       03/24/2021, 04/14/2021       Other Immunizations               influenza virus vaccine, inactivated        09/29/2014, 10/12/2016, 09/28/2017, 10/13/2020       pneumococcal (PPSV23)       10/18/2012       Td       01/01/1993, 01/01/2000       pneumococcal (PCV13)       05/09/2018

## 2022-02-16 NOTE — NURSING NOTE
Hearing and Vision Screening Entered On:  6/2/2021 10:48 AM CDT    Performed On:  6/2/2021 10:47 AM CDT by Jacey Dowd CMA               Hearing and Vision Screening   Audiogram Result Right Ear :   Pass   Audiogram Result Left Ear :   Pass   Jacey Dowd CMA - 6/2/2021 10:47 AM CDT

## 2022-02-16 NOTE — NURSING NOTE
Comprehensive Intake Entered On:  3/10/2021 10:05 AM CST    Performed On:  3/10/2021 10:01 AM CST by Jacey Dowd CMA               Summary   Chief Complaint :   Bowel issues - bloating, distended abd, discomfort x 3days with no BM and then explosive diarrhea - wants to know if this normal, has been since her transplant.  Constant RUQ pain    Menstrual Status :   Postmenopausal   Weight Measured :   136 lb(Converted to: 136 lb 0 oz, 61.689 kg)    Systolic Blood Pressure :   122 mmHg   Diastolic Blood Pressure :   78 mmHg   Mean Arterial Pressure :   93 mmHg   Peripheral Pulse Rate :   73 bpm   Temperature Tympanic :   98.1 DegF(Converted to: 36.7 DegC)    Oxygen Saturation :   98 %   Jacey Dowd CMA - 3/10/2021 10:01 AM CST   Health Status   Allergies Verified? :   Yes   Medication History Verified? :   Yes   Medical History Verified? :   Yes   Pre-Visit Planning Status :   Completed   Tobacco Use? :   Never smoker   Jacey Dowd CMA - 3/10/2021 10:01 AM CST   ID Risk Screen   Recent Travel History :   Last travel within 7 days   Family Member Travel History :   No recent travel   Other Exposure to Infectious Disease :   Unknown   COVID-19 Testing Status :   No COVID-19 test performed   Jacey Dowd CMA - 3/10/2021 10:01 AM CST   Social History   Social History   (As Of: 3/10/2021 10:05:20 AM CST)   Alcohol:        Never   (Last Updated: 5/11/2018 2:52:02 PM CDT by Alyce Katz)          Tobacco:        Never (less than 100 in lifetime)   (Last Updated: 12/30/2020 11:29:49 AM CST by Jacey Dowd CMA)          Electronic Cigarette/Vaping:        Electronic Cigarette Use: Never.   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   (Last Updated: 4/15/2010 2:36:13 PM CDT by Xi Dove)          Sexual:        Sexually active: Yes.  Number of current partners 1.  Other contraceptive use: Ann Marie.    (Last Updated: 4/15/2010 2:36:58 PM CDT by Rula TORREZ, Xi)            OB/GYN History   Menstrual Status :   Postmenopausal   [place] Jacey OSORIO - 3/10/2021 10:01 AM CST   Pregnancy History   (As Of: 3/10/2021 10:05:20 AM CST)    - 2, Para Fullterm - 2, Para  - , Abortions - , Para Living - 2      Delivery/Outcome Date: 1979    Delivery Method:   Vaginal ; Gender:   Female ;  Outcome:   Live Birth          Delivery/Outcome Date: 1981    Delivery Method:    ; Gender:   Male ;  Outcome:   Live Birth

## 2022-02-16 NOTE — NURSING NOTE
Comprehensive Intake Entered On:  8/26/2021 10:10 AM CDT    Performed On:  8/26/2021 10:05 AM CDT by Sherice Green LPN               Summary   Chief Complaint :   pre-op exam: thyroidectomy scheduled 8/30/21 at [facility] with Dr Flako Alvarez, needs COVID testing done today   Menstrual Status :   Postmenopausal   Weight Measured :   129.8 lb(Converted to: 129 lb 13 oz, 58.876 kg)    Height Measured :   63.5 in(Converted to: 5 ft 3 in, 161.29 cm)    Body Mass Index :   22.63 kg/m2   Body Surface Area :   1.62 m2   Systolic Blood Pressure :   114 mmHg   Diastolic Blood Pressure :   76 mmHg   Mean Arterial Pressure :   89 mmHg   Peripheral Pulse Rate :   76 bpm   BP Site :   Right arm   BP Method :   Manual   Temperature Tympanic :   97.7 DegF(Converted to: 36.5 DegC)  (LOW)    Oxygen Saturation :   99 %   Sherice Green LPN - 8/26/2021 10:05 AM CDT   Health Status   Allergies Verified? :   Yes   Medication History Verified? :   Yes   Medical History Verified? :   No   Pre-Visit Planning Status :   Completed   Tobacco Use? :   Never smoker   Sherice Green LPN - 8/26/2021 10:05 AM CDT   Meds / Allergies   (As Of: 8/26/2021 10:10:20 AM CDT)   Allergies (Active)   Dilaudid  Estimated Onset Date:   Unspecified ; Reactions:   Itching ; Created By:   Jacey Dowd CMA; Reaction Status:   Active ; Category:   Drug ; Substance:   Dilaudid ; Type:   Allergy ; Updated By:   Jacey Dowd CMA; Reviewed Date:   10/8/2019 2:03 PM CDT      HYDROmorphone  Estimated Onset Date:   6/11/2014 ; Reactions:   Itching ; Created By:   Jacey Dowd CMA; Reaction Status:   Active ; Category:   Drug ; Substance:   HYDROmorphone ; Type:   <not entered> ; Severity:   Unknown ; Updated By:   Jacey Dowd CMA; Reviewed Date:   10/8/2019 2:03 PM CDT      iron sucrose  Estimated Onset Date:   <not entered> 7/16/2015 ; Reactions:   GI Upset ; Created By:   Jacey Dowd CMA; Reaction Status:   Active ; Category:   Drug ;  Substance:   iron sucrose ; Type:   <not entered> ; Severity:   Unknown ; Updated By:   Jacey Dowd CMA; Reviewed Date:   10/8/2019 2:03 PM CDT      sodium ferric gluconate complex  Estimated Onset Date:   <not entered> 12/10/2015 ; Reactions:   Other - Describe In Comment Field ; Created By:   Jacey Dowd CMA; Reaction Status:   Active ; Category:   Drug ; Substance:   sodium ferric gluconate complex ; Type:   <not entered> ; Severity:   Unknown ; Updated By:   Jacey Dowd CMA; Reviewed Date:   10/8/2019 2:03 PM CDT        Medication List   (As Of: 8/26/2021 10:10:20 AM CDT)   Prescription/Discharge Order    zolpidem  :   zolpidem ; Status:   Prescribed ; Ordered As Mnemonic:   zolpidem 10 mg oral tablet ; Simple Display Line:   See Instructions, TAKE 1 TABLET ONE TIME DAILY AT BEDTIME AS NEEDED FOR SLEEP, 90 tab(s), 1 Refill(s) ; Ordering Provider:   Elijah Benitez MD; Catalog Code:   zolpidem ; Order Dt/Tm:   5/27/2021 2:22:12 PM CDT          albuterol  :   albuterol ; Status:   Prescribed ; Ordered As Mnemonic:   Albuterol (Eqv-ProAir HFA) 90 mcg/inh inhalation aerosol ; Simple Display Line:   See Instructions, INHALE 2 PUFFS FOUR TIMES DAILY AS NEEDED FOR WHEEZING, 3 EA, 1 Refill(s) ; Ordering Provider:   Elijah Benitez MD; Catalog Code:   albuterol ; Order Dt/Tm:   8/18/2020 2:15:26 PM CDT          pramipexole  :   pramipexole ; Status:   Prescribed ; Ordered As Mnemonic:   pramipexole 0.25 mg oral tablet ; Simple Display Line:   0.75 mg, 3 tab(s), Oral, hs, 270 tab(s), 1 Refill(s) ; Ordering Provider:   Elijah Benitez MD; Catalog Code:   pramipexole ; Order Dt/Tm:   6/2/2021 12:49:23 PM CDT          gabapentin  :   gabapentin ; Status:   Prescribed ; Ordered As Mnemonic:   gabapentin 300 mg oral capsule ; Simple Display Line:   600 mg, 2 cap(s), Oral, hs, 180 cap(s), 1 Refill(s) ; Ordering Provider:   Elijah Benitez MD; Catalog Code:   gabapentin ; Order Dt/Tm:   6/2/2021 1:12:20 PM CDT          alendronate  :    alendronate ; Status:   Prescribed ; Ordered As Mnemonic:   alendronate 70 mg oral tablet ; Simple Display Line:   70 mg, 1 tab(s), Oral, qweek, with 6-8 oz plain water, at least 30 minutes before first food, beverage, or medication of the day, 12 tab(s), 1 Refill(s) ; Ordering Provider:   Elijah Benitez MD; Catalog Code:   alendronate ; Order Dt/Tm:   6/2/2021 1:03:07 PM CDT          omeprazole  :   omeprazole ; Status:   Prescribed ; Ordered As Mnemonic:   omeprazole 40 mg oral delayed release capsule ; Simple Display Line:   40 mg, 1 cap(s), Oral, daily, 90 cap(s), 1 Refill(s) ; Ordering Provider:   Elijah Benitez MD; Catalog Code:   omeprazole ; Order Dt/Tm:   6/2/2021 1:03:08 PM CDT            Home Meds    amlodipine  :   amlodipine ; Status:   Documented ; Ordered As Mnemonic:   amLODIPine 5 mg oral tablet ; Simple Display Line:   0 Refill(s) ; Catalog Code:   amLODIPine ; Order Dt/Tm:   6/3/2020 10:54:53 AM CDT          tacrolimus  :   tacrolimus ; Status:   Documented ; Ordered As Mnemonic:   tacrolimus 1 mg oral capsule ; Simple Display Line:   3 mg, 3 cap(s), Oral, q12 hrs, 0 Refill(s) ; Catalog Code:   tacrolimus ; Order Dt/Tm:   10/12/2018 4:10:27 PM CDT          aspirin  :   aspirin ; Status:   Documented ; Ordered As Mnemonic:   aspirin 81 mg oral delayed release tablet ; Simple Display Line:   81 mg, 1 tab(s), Oral, daily, 0 Refill(s) ; Catalog Code:   aspirin ; Order Dt/Tm:   8/26/2021 10:07:39 AM CDT

## 2022-02-16 NOTE — TELEPHONE ENCOUNTER
Entered by Jacey Dowd CMA on October 01, 2021 4:21:26 PM CDT  ---------------------  From: Jacey Dowd CMA   To: [facility]    Sent: 10/1/2021 4:21:26 PM CDT  Subject: Medication Management     ** Submitted: **  Order:albuterol (Albuterol (Eqv-ProAir HFA) 90 mcg/inh inhalation aerosol)  See Instructions  INHALE 2 PUFFS FOUR TIMES DAILY AS NEEDED FOR WHEEZING  Qty:  3 EA        Days Supply:  75        Refills:  0          Substitutions Allowed     Route To Pharmacy - [facility]    Signed by Jacey Dowd CMA  10/1/2021 9:21:00 PM [facility]    ** Submitted: **  Complete:albuterol (Albuterol (Eqv-ProAir HFA) 90 mcg/inh inhalation aerosol)   Signed by Jacey Dowd CMA  10/1/2021 9:21:00 PM [facility]    ** Not Approved:  **  albuterol (ALBUTEROL SULFATE  (90 Base) MCG/ACT Aerosol Solution)  INHALE 2 PUFFS FOUR TIMES DAILY AS NEEDED FOR WHEEZING  Qty:  3 EA        Days Supply:  75        Refills:  0          Substitutions Allowed     Route To Pharmacy [facility]   Signed by Jacey Dowd CMA            ** Submitted: **  Order:alendronate (alendronate 70 mg oral tablet)  See Instructions  TAKE 1 TABLET EVERY WEEK WITH 6-8 OZ PLAIN WATER, AT LEAST 30 MINUTES BEFORE FIRST FOOD, BEVERAGE, OR MEDICATION OF THE DAY  Qty:  12 tab(s)        Days Supply:  84        Refills:  0          Substitutions Allowed     Route To Pharmacy - [facility]    Signed by Jacey Dowd CMA  10/1/2021 9:21:00 PM [facility]    ** Submitted: **  Complete:alendronate (alendronate 70 mg oral tablet)   Signed by Jacey Dowd CMA  10/1/2021 9:21:00 PM [facility]    ** Not Approved:  **  alendronate (ALENDRONATE SODIUM 70 MG Tablet)  TAKE 1 TABLET EVERY WEEK WITH 6-8 OZ PLAIN WATER, AT LEAST 30 MINUTES BEFORE FIRST FOOD, BEVERAGE, OR MEDICATION OF THE DAY  Qty:  12 tab(s)        Days Supply:  84        Refills:  0          Substitutions Allowed     Route To Pharmacy - [facility]  [facility]   Signed by Jacey Dowd CMA            ** Not Approved: filled 6/2/21 #90 with 1RF **  omeprazole (OMEPRAZOLE 40 MG Capsule Delayed Release)  TAKE 1 CAPSULE EVERY DAY  Qty:  90 cap(s)        Days Supply:  90        Refills:  0          Substitutions Allowed     Route To Pharmacy - [facility]   Signed by Jacey Dowd CMA            ** Patient matched by Jacey Dowd CMA on 10/1/2021 4:19:55 PM CDT **      ------------------------------------------  From: [facility]  To: Elijah Benitez MD  Sent: September 30, 2021 11:21:02 AM CDT  Subject: Medication Management  Due: September 30, 2021 6:09:59 PM CDT     ** On Hold Pending Signature **     Drug: alendronate (alendronate 70 mg oral tablet), TAKE 1 TABLET EVERY WEEK WITH 6-8 OZ PLAIN WATER, AT LEAST 30 MINUTES BEFORE FIRST FOOD, BEVERAGE, OR MEDICATION OF THE DAY  Quantity: 12 tab(s)  Days Supply: 0  Refills: 1  Substitutions Allowed  Notes from Pharmacy:     Dispensed Drug: alendronate (alendronate 70 mg oral tablet), TAKE 1 TABLET EVERY WEEK WITH 6-8 OZ PLAIN WATER, AT LEAST 30 MINUTES BEFORE FIRST FOOD, BEVERAGE, OR MEDICATION OF THE DAY  Quantity: 12 tab(s)  Days Supply: 84  Refills: 0  Substitutions Allowed  Notes from Pharmacy:     ** On Hold Pending Signature **     Drug: albuterol (Albuterol (Eqv-ProAir HFA) 90 mcg/inh inhalation aerosol), INHALE 2 PUFFS FOUR TIMES DAILY AS NEEDED FOR WHEEZING  Quantity: 3 EA  Days Supply: 0  Refills: 1  Substitutions Allowed  Notes from Pharmacy:     Dispensed Drug: albuterol (Albuterol (Eqv-ProAir HFA) 90 mcg/inh inhalation aerosol), INHALE 2 PUFFS FOUR TIMES DAILY AS NEEDED FOR WHEEZING  Quantity: 3 EA  Days Supply: 75  Refills: 0  Substitutions Allowed  Notes from Pharmacy:     ** On Hold Pending Signature **     Drug: omeprazole (omeprazole 40 mg oral delayed release capsule), TAKE 1 CAPSULE EVERY DAY  Quantity: 90 cap(s)  Days Supply: 0  Refills: 1  Substitutions  Allowed  Notes from Pharmacy:     Dispensed Drug: omeprazole (omeprazole 40 mg oral delayed release capsule), TAKE 1 CAPSULE EVERY DAY  Quantity: 90 cap(s)  Days Supply: 90  Refills: 0  Substitutions Allowed  Notes from Pharmacy:  ------------------------------------------RT Dec  last visit 6/2/21

## 2022-02-16 NOTE — PROGRESS NOTES
Patient:   SHERRIE DAMON            MRN: [id number]            FIN: [id number]               Age:   63 years     Sex:  Female     :  1955   Associated Diagnoses:   Cholangiocarcinoma; Asthma, Moderate Persistent; Thoracic back pain; Recurrent right pleural effusion; Budd-Chiari syndrome; Obstructive liver cirrhosis; Hypokalemia   Author:   Elijah Benitez MD      Visit Information      Date of Service: 2018 10:40 am  Performing Location: [facility]  Encounter#: [id number]      Primary Care Provider (PCP):  Elijah Benitez MD    NPI# [id number]      Referring Provider:  Elijah Benitez MD    NPI# [id number]      Chief Complaint   2018 2:28 PM CDT     f/u [facility] - recently found to have low K+ and hgb              Additional Information:No additional information recorded during visit.   Chief complaint and symptoms as noted above and confirmed with patient.  Recent lab and diagnostic studies reviewed with patient      History of Present Illness   10/12/2016: Sherrie presents to clinic with several concerns.  Describes having persistent right-sided upper thoracic back pain for some time.  She says it is around the previous radiation site for her cholangiocarcinoma.  She does have a remote history of pleural effusions, though has not occurred for a number of years.  Also describes a one-month history of dyspnea on exertion with some audible wheezing.  She has a remote history of asthma and had been maintained on a combination inhaled corticosteroid and long-acting beta agonist several years ago.  She stopped her inhalers on her own thinking they were providing much benefit.  She has been using an old prescription albuterol over recent days which has provided some temporary relief.    2017: Presents with cough and dyspnea on exertion.  She was seen in the emergency room on September 3 for similar symptoms with associated fever and hypoxia.  X-ray at that time showing a persistent right-sided pleural  effusion.  No described infiltrate pattern though was started on levofloxacin for pneumonia concerns.  In general she says that she felt better on antibiotics.  Says that symptoms have progressively worsening this week.  No fever chills just has a hard time catching her breath.  Review of records shows similar episode at the same time this past year which was treated with inhaled steroids and albuterol    10/17/2017:  Presents for preoperative assessment for planned right-sided thoracoscopy and potential pleurodesis scheduled for  at [facility].  She has been dealing with persistent cough and shortness of breath.  Recently underwent CT of chest earlier this month showing recurrence of a large sized pleural effusion with associated compression atelectasis.  She has seen benefit and use of inhaled steroids in terms of bronchospasm and cough.    2018: Sherrie presenting for hospital follow-up.  Recently admitted at [facility] as an alternative backup for potential  donor liver transplant.  She is currently being evaluated for transplant due to worsening liver function.  Testing demonstrating a Budd-Chiari development with substantially limited hepatic vascular flow.  Consideration for transplant suspended due to significant hypokalemia with a potassium of 2.6.  She also saw interval drop in hemoglobin down to 10.0 without any evidence of active bleeding.  She is a Pleurx catheter drain and will drain between 1-2 L of pleural fluid per day.         Review of Systems   Constitutional:  Weakness, Fatigue, No fever, No chills.    Eye:  Negative except as documented in history of present illness.    Ear/Nose/Mouth/Throat:  Negative except as documented in history of present illness.    Respiratory:  Shortness of breath, No cough.    Cardiovascular:  Peripheral edema, No chest pain, No palpitations, No syncope.    Gastrointestinal:  No nausea, No vomiting, No abdominal pain.     Genitourinary:  No dysuria, No hematuria.    Hematology/Lymphatics:  Negative except as documented in history of present illness.    Endocrine:  No excessive thirst, No polyuria.    Immunologic:  No recurrent fevers.    Musculoskeletal:  No joint pain, No muscle pain     Back pain: In the middle of the back.    Neurologic:  Alert and oriented X4, No numbness, No tingling, No headache.       Health Status   Allergies:    Allergic Reactions (Selected)  Severity Not Documented  Dilaudid (Itching)   Medications:  (Selected)   Prescriptions  Prescribed  Flovent  mcg/inh inhalation aerosol: 1 puff(s), inh, bid, # 3 EA, 0 Refill(s), Type: Maintenance, Pharmacy: [facility], 1 puff(s) inh bid  Nexium 40 mg oral delayed release capsule: 2 cap(s) ( 80 mg ), po, bid, # 30 cap(s), 0 Refill(s), Type: Maintenance, Pharmacy: [facility], 1 cap(s) po daily,Instr:Due appt with Dr. Parker for further refills.  Ventolin HFA 90 mcg/inh inhalation aerosol: 2 puff(s), inh, qid, PRN: as needed for wheezing, # 1 EA, 11 Refill(s), Type: Maintenance, Pharmacy: [facility], 2 puff(s) inh qid,PRN:as needed for wheezing  furosemide 80 mg oral tablet: 1 tab(s) ( 80 mg ), po, bid, # 60 tab(s), 2 Refill(s), Type: Maintenance, Pharmacy: [facility], 1 tab(s) po bid  gabapentin 300 mg oral capsule: 1 cap(s) ( 300 mg ), po, hs, # 270 cap(s), 1 Refill(s), Type: Maintenance, Pharmacy: [facility], 1 cap(s) po hs  potassium chloride 20 mEq oral tablet, extended release: 1 tab(s) ( 20 mEq ), po, bid, # 180 tab(s), 3 Refill(s), Type: Maintenance, Pharmacy: [facility], 1 tab(s) po bid  pramipexole 0.25 mg oral tablet: 2 tab(s) ( 0.5 mg ), po, hs, # 180 tab(s), 1 Refill(s), Type: Maintenance, Pharmacy: [facility], 2 tab(s) po hs  zolpidem 10 mg oral tablet: See Instructions, Instructions: TAKE 1 TABLET ONE TIME  DAILY AT BEDTIME AS NEEDED FOR SLEEP, # 90 tab(s), 1 Refill(s), Type: Soft Stop  Documented Medications  Documented  Vitamin D3 1000 intl units oral tablet: 1 tab(s) ( 1,000 International Unit ), po, daily, 0 Refill(s), Type: Maintenance  calcium carbonate 500 mg (200 mg elemental calcium) oral tablet, chewable: 1 tab(s) ( 500 mg ), chewed, bid, 0 Refill(s), Type: Maintenance  ciprofloxacin 750 mg oral tablet: 1 tab(s) ( 750 mg ), po, qweek, 0 Refill(s), Type: Maintenance  omeprazole 20 mg oral delayed release capsule: 1 cap(s) ( 20 mg ), PO, bid, # 90 cap(s), 0 Refill(s), Type: Maintenance  rifampin 300 mg oral capsule: 1 cap(s) ( 300 mg ), po, daily, 0 Refill(s), Type: Maintenance  rifaximin 550 mg oral tablet: 1 tab(s) ( 550 mg ), po, bid, 0 Refill(s), Type: Maintenance  spironolactone 100 mg oral tablet: 1 tab(s) ( 100 mg ), po, daily, 0 Refill(s), Type: Maintenance   Problem list:    All Problems  Acquired thrombocytopenia / SNOMED CT 773542537 / Confirmed  Duodenal ulcer / SNOMED CT 69781573 / Confirmed  Gastric ulcer / SNOMED CT 1547571846 / Confirmed  History of ankle fracture / SNOMED CT 8251276158 / Confirmed  History of arm fracture / SNOMED CT 9999248984 / Confirmed  History of liver cancer / SNOMED CT 0088094721 / Confirmed  History of cholangiocarcinoma / SNOMED CT 0344518410 / Confirmed  Hypertension / SNOMED CT 5533330233 / Confirmed  Lung mass / SNOMED CT 002524281 / Confirmed  Asthma, moderate persistent / SNOMED CT 7700183023 / Confirmed  Osteopenia / SNOMED CT 989351312 / Confirmed  Restless leg syndrome / SNOMED CT 50642217 / Confirmed  Fibroid uterus / SNOMED CT 851714150 / Confirmed  Inactive: Anticoagulated / SNOMED CT 13951101  Resolved: History of DVT (deep vein thrombosis) / SNOMED CT 7734390650  Resolved: Inpatient stay / SNOMED CT 211699526  Resolved: Inpatient stay / SNOMED CT 638113115  Resolved: Inpatient stay / SNOMED CT 109883393  Resolved: Inpatient stay / SNOMED CT  972215796  Resolved: Pregnancy / SNOMED CT 852489189  Resolved: Pregnancy / SNOMED CT 110464663  Canceled: Choliangiocarcinoma      Histories   Past Medical History:    Active  Acquired thrombocytopenia (764914650): Onset on 10/10/2012 at 57 years.  Asthma, moderate persistent (8002314415)  Hypertension (3745704009)  Fibroid uterus (715243393)  Osteopenia (428440019)  History of cholangiocarcinoma (4151604517)  Restless leg syndrome (78388974)  Gastric ulcer (0770540621)  History of ankle fracture (0309288924)  Comments:  11/20/2017 CST 8:42 AM Debra Wilson  Right  Duodenal ulcer (95620477)  Lung mass (428653684)  Comments:  11/20/2017 CST 8:44 AM Debra Wilson  Right  History of arm fracture (5331168870)  Comments:  11/20/2017 CST 8:43 AM Debra Wilson  Right  History of liver cancer (1424981456)  Resolved  Inpatient stay (991750780): Onset on 10/30/2017 at 62 years.  Resolved on 11/3/2017 at 62 years.  Comments:  11/20/2017 CST 8:39 AM Debra Wilson  @[facility], [place], [place] - Cholangiocarcinoma  Inpatient stay (273091908): Onset on 3/13/2015 at 59 years.  Resolved on 3/17/2015 at 59 years.  Comments:  11/20/2017 CST 8:39 AM Debra Wilson  @[place], [place] - Bleeding esophageal varices  Inpatient stay (662779053): Onset on 6/12/2014 at 59 years.  Resolved on 6/13/2014 at 59 years.  Comments:  11/20/2017 CST 8:38 AM Debra Wilson  @[facility] [place] [place] - Pneumonia  Inpatient stay (414771861): Onset on 6/22/2011 at 56 years.  Resolved.  Comments:  11/20/2017 CST 8:38 AM Debra Wilson  @[facility] [place] [place] - Chemotherapy induced nausea and vomiting  History of DVT (deep vein thrombosis) (6320879830): Onset on 2/15/2011 at 55 years.  Resolved.  Pregnancy (674717494):  Resolved on 11/11/1979 at 24 years.  Pregnancy (839275389):  Resolved on 8/31/1981 at 26 years.   Family History:    Hypertension  Mother  ()  Sister  Osteoporosis  Mother ()  MS - Multiple sclerosis  Sister     Procedure history:    Esophagogastroduodenoscopy (915644017) on 2018 at 62 Years.  Comments:  2018 1:14 PM - Tamera Green  Indication:  Varices, follow up.   Sedation:  MAC.  Recommendation:  Repeat in 1 year.  Pleural catheter (6896601066) on 2017 at 62 Years.  Comments:  2018 1:43 PM - Tamera Green  Right side insertion.  Thoracoscopic procedure (7707915842) on 10/30/2017 at 62 Years.  Comments:  2017 2:23 PM - Jacey Dowd CMA  Right Video assisted thoracoscopc surther  pleural biopsy  doxycycline pleurodesis  VATS - Video assisted thorascopic surgery (038225200) on 10/30/2017 at 62 Years.  Comments:  2017 2:59 PM - Tamera Green  Right-sided pleural biopsy.  Esophagogastroduodenoscopy (298491659) on 2016 at 61 Years.  Comments:  2016 8:13 AM - Tamera Green  Indication:  Gastric ulcer follow up.  Sedation:  MAC.  Upper GI endoscopy (1753465389) on 2016 at 61 Years.  Comments:  2016 1:29 PM - Jacey Dowd CMA  Acute gastric ulcer w/o hemorrhage or perforation, esophageal varices w/o bleeding.  Varices are scarred and not amenable to banding at this time  Esophagogastroduodenoscopy (747392026) on 2016 at 61 Years.  Esophagogastroduodenoscopy (782290206) on 2016 at 61 Years.  Comments:  2016 7:57 AM - Tamera Green  Indication: Varices, follow up.  Sedation: MAC.  Impression:  H. pylori negative.  Upper GI endoscopy on 2016 at 60 Years.  Esophagogastroduodenoscopy (692892215) on 3/13/2015 at 59 Years.  Wedge resection of liver (270844643) in the month of 10/2012 at 57 Years.  Upper GI endoscopy (2796238890) on 2012 at 57 Years.  HPV - Human papillomavirus test negative (3677781476) on 2012 at 57 Years.  Comments:  2012 11:41 ANGI - Rula TORREZ, Xi  Low risk for cervical cancer. OK to have pap q 3 yrs.  Upper GI endoscopy (2941006476) on  3/2/2012 at 56 Years.  Upper GI endoscopy (7844854366) on 2012 at 56 Years.  Upper GI endoscopy (4053707661) on 2011 at 56 Years.  Esophagogastroduodenoscopy (511912874) on 2011 at 56 Years.  Esophagogastroduodenoscopy (679198723) on 2011 at 56 Years.  Colonoscopy (600847255) on 2/10/2011 at 55 Years.  Esophagogastroduodenoscopy (488032263) on 2/10/2011 at 55 Years.  Comments:  7/10/2011 7:21 PM - Doron Hutchison MD  Bleeding in the duodenal bulb  Biopsy of liver (491637217) on 2011 at 55 Years.  HPV - Human papillomavirus test negative (4420842234) on 4/15/2010 at 55 Years.  Comments:  2011 1:04 PM - Xi Dove  Low risk for cervical cancer. OK to have pap q 3 yrs.  Mammogram - screening (070651719) on 2010 at 55 Years.  Mammography; bilateral (03034) on 2010 at 55 Years.  Comments:  2011 8:48 AM - Ramya Kenney CMA  Normal-no radiographic evidence for malignancy.  DEXA - Dual energy X-ray photon absorptiometry (530756694) on 2009 at 53 Years.  Comments:  4/15/2010 1:43 PM - Xi Dove  Due again in 5 yrs, ()  Tubal ligation (549790648) in  at 27 Years.   section (38851825) in  at 25 Years.  Primary repair of torn ligament of knee, collateral (58191804) in  at 16 Years.  Tonsillectomy and adenoidectomy (481311780) in  at 8 Years.  Arm fracture (818.0).  Comments:  2011 2:11 PM - Nathaly Lim  Right  Arm fracture (818.0).  Comments:  2011 2:12 PM - Nathaly Lim  Left  Ankle fracture, right (824.8).   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      Sexual Assessment            Sexually active: Yes.  Number of current partners 1.  Other contraceptive use: Ann Marie.        Physical  Examination   vital signs stable, as noted above   Vital Signs   5/9/2018 2:28 PM CDT Temperature Tympanic 98 DegF    Peripheral Pulse Rate 74 bpm    Systolic Blood Pressure 104 mmHg    Diastolic Blood Pressure 67 mmHg    Mean Arterial Pressure 79 mmHg      Measurements from flowsheet : Measurements   5/9/2018 2:28 PM CDT     Weight Measured - Standard                126.2 lb     General:  Alert and oriented, No acute distress.    Eye:  Extraocular movements are intact.    HENT:  Normocephalic, Oral mucosa is moist.    Neck:  Supple.    Respiratory:  Lungs are clear to auscultation, No chest wall tenderness, diminished breath sounds at right base; no pleural rub, right sided pleurex catheter in place.    Cardiovascular:  Normal rate, Regular rhythm, 1+ pitting edema in both lower extremities.    Gastrointestinal:  Soft, Non-tender.    Musculoskeletal:  Normal range of motion, Normal strength.    Neurologic:  Alert, Oriented, Normal motor function, No focal deficits.    Cognition and Speech:  Oriented, Speech clear and coherent.    Psychiatric:  Appropriate mood & affect.       Review / Management   Results review:  Lab results   5/4/2018 10:48 AM CDT Potassium Level TR 3.1 mEq/L    INR TR 1.80   5/4/2018 10:47 AM CDT Sodium Level  mEq/L    Potassium Level TR 2.6 mEq/L    Chloride  mEq/L    CO2 TR 29 mEq/L    Glucose Level  mg/dL    BUN TR 11 mg/dL    Creatinine TR 0.62 mg/dL    Calcium TR 8.2 mEq/dL    Bili Total TR 2.2 mg/dL    Alk Phos  unit/L    AST TR 26 unit/L    ALT TR 21 unit/L    WBC TR 1.9 x10^3/uL    Hgb TR 10.2 g/dL   2/26/2018 10:46 AM CST Sodium Level  mEq/L    Potassium Level TR 3.1 mEq/L    Chloride  mEq/L    CO2 TR 29 mEq/L    Glucose Level TR 78 mg/dL    BUN TR 16 mg/dL    Creatinine TR 0.69 mg/dL    Calcium TR 8.4 mEq/dL   2/20/2018 10:45 AM CST Potassium Level TR 3.3 mEq/L    Glucose Level  mg/dL    Creatinine TR 0.70 mg/dL   .       Impression and Plan    Diagnosis     Cholangiocarcinoma (VLF68-HX C22.1).     Asthma, Moderate Persistent (ODM09-JQ J45.40).     Thoracic back pain (PND85-HP M54.6).     Recurrent right pleural effusion (RAY15-GY J90).     Budd-Chiari syndrome (LBW61-ZW I82.0).     Hypokalemia (JXF01-NJ E87.6).     Obstructive liver cirrhosis (OKV28-QL K74.60).           .) liver failure (previous left hepatectomy), Budd-Chiari syndrome - more recent decompensation of liver due to Budd-Chiari   - not deemed TIPS candidate due to vascular anatomy   - following through [facility] for potential OLTx evalauation   - does have splenomegaly, portal hypertension, and esophageal varices   - chronic right sided hydrothoracic with communication to peritoneum - pleurX catheter in place   - maintained on spironolactone 100mg daily and furosemide 80mg BID   - rifaxamin BID   - esopmeprazole 40mg BID    .) pancytopenia   - platelets 30K, WBC 1900, Hgb ~10    .) hypokalemia   - will have her increase KCl to 20mEq BID   - could consider increasing spironolactone in the future     .) posterior, thoracic back pains - suspicious there is some residual, late effect from previous XRT use vs. thoracic radiculopathy   - gabapentin 300mg TID    .) recurrent, metastatic cholangiocarcinoma; followed by Dr. Langford   - originally diagnosed in 1/2011   - s/p neoadjuvant cisplatin + gemcitabine followed by bulk resection, recurrent with further debulking in 10/2012   - in 8/2013, biopsy confirmation of lung nodule as cholangiocarcinoma, s/p XRT   - most recent surveillance CT C/A/P showing no active malignancy

## 2022-02-16 NOTE — NURSING NOTE
Comprehensive Intake Entered On:  11/17/2021 3:28 PM CST    Performed On:  11/17/2021 3:26 PM CST by Jacey Dowd CMA               Summary   Chief Complaint :   f/u chronic    Menstrual Status :   Postmenopausal   Height Measured :   63.5 in(Converted to: 5 ft 3 in, 161.29 cm)    Systolic Blood Pressure :   109 mmHg   Diastolic Blood Pressure :   66 mmHg   Mean Arterial Pressure :   80 mmHg   Peripheral Pulse Rate :   83 bpm   Temperature Tympanic :   97.3 DegF(Converted to: 36.3 DegC)  (LOW)    Oxygen Saturation :   100 %   Jacey Dowd CMA - 11/17/2021 3:26 PM CST   Health Status   Allergies Verified? :   Yes   Medication History Verified? :   Yes   Medical History Verified? :   Yes   Pre-Visit Planning Status :   Completed   Tobacco Use? :   Never smoker   Jacey Dowd CMA - 11/17/2021 3:26 PM CST   Consents   Consent for Immunization Exchange :   Consent Granted   Consent for Immunizations to Providers :   Consent Granted   Jacey Dowd CMA - 11/17/2021 3:26 PM CST   Social History   Social History   (As Of: 11/17/2021 3:28:50 PM CST)   Alcohol:        Never   (Last Updated: 5/11/2018 2:52:02 PM CDT by Alyce Katz)          Tobacco:        Never (less than 100 in lifetime)   (Last Updated: 12/30/2020 11:29:49 AM CST by Jacey Dowd CMA)          Electronic Cigarette/Vaping:        Electronic Cigarette Use: Never.   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   (Last Updated: 4/15/2010 2:36:13 PM CDT by Xi Dove)          Sexual:        Sexually active: Yes.  Number of current partners 1.  Other contraceptive use: Ann Marie.   (Last Updated: 4/15/2010 2:36:58 PM CDT by Xi Dove)

## 2022-02-16 NOTE — PROGRESS NOTES
Patient:   SHERRIE DAMON            MRN: [id number]            FIN: [id number]               Age:   62 years     Sex:  Female     :  1955   Associated Diagnoses:   Cholangiocarcinoma; Asthma, Moderate Persistent; Thoracic back pain   Author:   Elijah Benitez MD      Visit Information      Date of Service: 2017 03:40 pm  Performing Location: [facility]  Encounter#: [id number]      Primary Care Provider (PCP):  Elijah Benitez MD    NPI# [id number]      Referring Provider:  Elijah Benitez MD    NPI# [id number]      Chief Complaint            Additional Information:No additional information recorded during visit.   Chief complaint and symptoms as noted above and confirmed with patient.  Recent lab and diagnostic studies reviewed with patient      History of Present Illness   10/12/2016: Sherrie presents to clinic with several concerns.  Describes having persistent right-sided upper thoracic back pain for some time.  She says it is around the previous radiation site for her cholangiocarcinoma.  She does have a remote history of pleural effusions, though has not occurred for a number of years.  Also describes a one-month history of dyspnea on exertion with some audible wheezing.  She has a remote history of asthma and had been maintained on a combination inhaled corticosteroid and long-acting beta agonist several years ago.  She stopped her inhalers on her own thinking they were providing much benefit.  She has been using an old prescription albuterol over recent days which has provided some temporary relief.    2017: Presents with cough and dyspnea on exertion.  She was seen in the emergency room on September 3 for similar symptoms with associated fever and hypoxia.  X-ray at that time showing a persistent right-sided pleural effusion.  No described infiltrate pattern though was started on levofloxacin for pneumonia concerns.  In general she says that she felt better on antibiotics.  Says that symptoms  have progressively worsening this week.  No fever chills just has a hard time catching her breath.  Review of records shows similar episode at the same time this past year which was treated with inhaled steroids and albuterol         Review of Systems   Constitutional:  No fever, No chills.    Eye:  Negative except as documented in history of present illness.    Ear/Nose/Mouth/Throat:  Negative except as documented in history of present illness.    Respiratory:  Shortness of breath, Cough.    Cardiovascular:  No chest pain, No palpitations, No peripheral edema, No syncope.    Gastrointestinal:  No nausea, No vomiting, No abdominal pain.    Genitourinary:  No dysuria, No hematuria.    Hematology/Lymphatics:  Negative except as documented in history of present illness.    Endocrine:  No excessive thirst, No polyuria.    Immunologic:  No recurrent fevers.    Musculoskeletal:  No joint pain, No muscle pain     Back pain: In the middle of the back.    Neurologic:  Alert and oriented X4, No numbness, No tingling, No headache.       Health Status   Allergies:    Allergic Reactions (Selected)  No known allergies   Medications:  (Selected)   Prescriptions  Prescribed  Flovent  mcg/inh inhalation aerosol: 1 puff(s), inh, bid, # 3 EA, 0 Refill(s), Type: Maintenance, Pharmacy: [facility], 1 puff(s) inh bid  Nexium 40 mg oral delayed release capsule: 2 cap(s) ( 80 mg ), po, bid, # 30 cap(s), 0 Refill(s), Type: Maintenance, Pharmacy: [id number], 1 cap(s) po daily,Instr:Due appt with Dr. Parker for further refills.  Ventolin HFA 90 mcg/inh inhalation aerosol: 2 puff(s), inh, qid, PRN: as needed for wheezing, # 1 EA, 11 Refill(s), Type: Maintenance, Pharmacy: [facility], 2 puff(s) inh qid,PRN:as needed for wheezing  furosemide 80 mg oral tablet: 1 tab(s) ( 80 mg ), po, bid, # 60 tab(s), 2 Refill(s), Type: Maintenance, Pharmacy: [facility] [id number], 1 tab(s)  po bid  gabapentin 300 mg oral capsule: 1 cap(s) ( 300 mg ), po, tid, # 270 cap(s), 0 Refill(s), Type: Maintenance  pramipexole 0.25 mg oral tablet: 2 tab(s) ( 0.5 mg ), po, hs, # 180 tab(s), 1 Refill(s), Type: Maintenance, Pharmacy: [facility], 2 tab(s) po hs  zolpidem 10 mg oral tablet: 1 tab(s) ( 10 mg ), PO, Once a day (at bedtime), PRN: for sleep, # 90 tab(s), 1 Refill(s), Type: Maintenance, Pharmacy: [facility], 1 tab(s) po hs,PRN:for sleep  Documented Medications  Documented  acetaminophen: ( 650 mg ), po, q4 hrs, PRN: as needed for pain, 0 Refill(s), Type: Maintenance  rifampin 300 mg oral capsule: 1 cap(s) ( 300 mg ), po, daily, 0 Refill(s), Type: Maintenance  rifaximin 550 mg oral tablet: 1 tab(s) ( 550 mg ), po, bid, 0 Refill(s), Type: Maintenance  spironolactone 100 mg oral tablet: 1 tab(s) ( 100 mg ), po, daily, 0 Refill(s), Type: Maintenance   Problem list:    All Problems  Acquired thrombocytopenia / ICD-9-.49 / Confirmed  Asthma, Moderate Persistent / ICD-9-.90 / Confirmed  Cholangiocarcinoma / ICD-9-.1 / Confirmed  Fibroid Uterus / ICD-9-.9 / Confirmed  Hypertension / SNOMED CT 6645929925 / Confirmed  Osteopenia / ICD-9-.90 / Confirmed  Restless Leg Syndrome / ICD-9-.94 / Confirmed  Inactive: Anticoagulated / ICD-9-CM V58.61  Resolved: *Hospitalized@[facility] - Bleeding esophageal varices  Resolved: *Hospitalized@[facility] - Chemotherapy induced nausea and vomiting  Resolved: *Hospitalized@[facility] - Pneumonia  Resolved: DVT (Deep Venous Thrombosis) / ICD-9-.40  Resolved: Pregnancy / SNOMED CT 104171056  Resolved: Pregnancy / SNOMED CT 510141576  Canceled: Choliangiocarcinoma      Histories   Past Medical History:    Active  Asthma, Moderate Persistent (493.90)  Hypertension (3200272122)  Fibroid Uterus (218.9)  Osteopenia (733.90)  Cholangiocarcinoma (155.1)  Resolved  *Hospitalized@[facility]  esophageal varices: Onset on 3/13/2015 at 59 years.  Resolved on 3/17/2015 at 59 years.  *Hospitalized@[facility] - Pneumonia: Onset on 2014 at 59 years.  Resolved on 2014 at 59 years.  *Hospitalized@[facility] - Chemotherapy induced nausea and vomiting: Onset on 2011 at 56 years.  Resolved.  DVT (Deep Venous Thrombosis) (453.40): Onset on 2/15/2011 at 55 years.  Resolved.  Pregnancy (606619322):  Resolved on 1979 at 24 years.  Pregnancy (008715653):  Resolved on 1981 at 26 years.   Family History:    Hypertension  Mother ()  Sister  Osteoporosis  Mother ()  MS - Multiple sclerosis  Sister     Procedure history:    Esophagogastroduodenoscopy (685944102) on 2016 at 61 Years.  Comments:  2016 8:13 AM - Tamera Green  Indication:  Gastric ulcer follow up.  Sedation:  MAC.  Upper GI endoscopy (3512702204) on 2016 at 61 Years.  Comments:  2016 1:29 PM - Branstad JO, Jacey  Acute gastric ulcer w/o hemorrhage or perforation, esophageal varices w/o bleeding.  Varices are scarred and not amenable to banding at this time  Esophagogastroduodenoscopy (154326331) on 2016 at 61 Years.  Esophagogastroduodenoscopy (459646659) on 2016 at 61 Years.  Comments:  2016 7:57 AM - Tamera Green  Indication: Varices, follow up.  Sedation: MAC.  Impression:  H. pylori negative.  Upper GI endoscopy on 2016 at 60 Years.  Esophagogastroduodenoscopy (276729355) on 3/13/2015 at 59 Years.  Upper GI endoscopy (8188150298) on 2012 at 57 Years.  HPV - Human papillomavirus test negative (9016887222) on 2012 at 57 Years.  Comments:  2012 11:41 AM - Xi Dove  Low risk for cervical cancer. OK to have pap q 3 yrs.  Upper GI endoscopy (3626282335) on 3/2/2012 at 56 Years.  Upper GI endoscopy (5985066406) on 2012 at 56 Years.  Upper GI endoscopy (3737646971) on 2011 at 56 Years.  Esophagogastroduodenoscopy (823625310) on 2011 at 56  Years.  Esophagogastroduodenoscopy (266493708) on 2011 at 56 Years.  Colonoscopy (600287637) on 2/10/2011 at 55 Years.  Esophagogastroduodenoscopy (157114005) on 2/10/2011 at 55 Years.  Comments:  7/10/2011 7:21 PM - Doron Hutchison MD  Bleeding in the duodenal bulb  HPV - Human papillomavirus test negative (1842902856) on 4/15/2010 at 55 Years.  Comments:  2011 1:04 PM - Xi Dove  Low risk for cervical cancer. OK to have pap q 3 yrs.  Mammogram - screening (756417070) on 2010 at 55 Years.  Mammography; bilateral (95403) on 2010 at 55 Years.  Comments:  2011 8:48 AM - Ramya Kenney CMA  Normal-no radiographic evidence for malignancy.  DEXA - Dual energy X-ray photon absorptiometry (497369381) on 2009 at 53 Years.  Comments:  4/15/2010 1:43 PM - Xi Dove  Due again in 5 yrs, ()  Tubal ligation (715490420) in  at 27 Years.   section (51138886) in  at 25 Years.   - Spontaneous vaginal delivery (9647426787) in  at 23 Years.  Primary repair of torn ligament of knee, collateral (49865777) in  at 16 Years.  Tonsillectomy and adenoidectomy (391876278) in  at 8 Years.  Arm fracture (818.0).  Comments:  2011 2:11 PM - Nathaly Lim  Right  Arm fracture (818.0).  Comments:  2011 2:12 PM - Nathaly Lim  Left  Ankle fracture, right (824.8).   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      Sexual Assessment            Sexually active: Yes.  Number of current partners 1.  Other contraceptive use: Ann Marie.        Physical Examination   vital signs stable, as noted above   VS/Measurements   General:  Alert and oriented, No acute distress.    Eye:  Extraocular movements are intact.    HENT:   Normocephalic, Oral mucosa is moist.    Neck:  Supple.    Respiratory:  Respirations are non-labored, diffuse, late expiratory rhonchi.    Cardiovascular:  Normal rate, Regular rhythm.    Gastrointestinal:  Soft.    Musculoskeletal:  Normal range of motion, Normal strength.    Neurologic:  Alert, Oriented, Normal motor function, No focal deficits.    Cognition and Speech:  Oriented, Speech clear and coherent.    Psychiatric:  Appropriate mood & affect.       Impression and Plan   Diagnosis     Cholangiocarcinoma (ZVU10-MT C22.1).     Asthma, Moderate Persistent (VUD08-WF J45.40).     Thoracic back pain (AHS42-VA M54.6).         .) acute bronchospasm - appears to have intermittent asthmatic features  - recommending to use Flovent on scheduled basis for next 1-2 months; albuterol as needed  - no current pneumonia features and just finished levaquin course    plan as previously outlined:    .) posterior, thoracic back pains - suspicious there is some residual, late effect from previous XRT use vs. thoracic radiculopathy   - gabapentin 300mg TID    - does have splenomegaly, portal hypertension, and recurrent ascites (with history of bleeding)    - resolution of confusion with lactulose therapy.  No clinical evidence of active SBP, GI bleeding.  Hgb stable.    .) recurrent, metastatic cholangiocarcinoma; followed by Dr. Langford   - originally diagnosed in 1/2011   - s/p neoadjuvant cisplatin + gemcitabine followed by bulk resection, recurrent with further debulking in 10/2012   - in 8/2013, biopsy confirmation of lung nodule as cholangiocarcinoma, s/p XRT   - most recent surveillance CT C/A/P showing no active malignancy

## 2022-02-16 NOTE — PROGRESS NOTES
Patient:   SHERRIE DAMON            MRN: [id number]            FIN: [id number]               Age:   66 years     Sex:  Female     :  1955   Associated Diagnoses:   H/O liver transplant; Immunosuppressed status; Irritable bowel syndrome   Author:   Elijah Benitez MD      Visit Information      Date of Service: 2021 10:14 am  Performing Location: [facility]  Encounter#: [id number]      Primary Care Provider (PCP):  Elijah Benitez MD    NPI# [id number]      Referring Provider:  Elijah Benitez MD    NPI# [id number]      Chief Complaint   2021 10:29 AM CDT   s/p right knee replacement - developled DVT, liver enzymes increased, anemic - rec'd 1 unit of blood - feels orthostatic, SOB - hgb 7.3 when discharged              Additional Information:No additional information recorded during visit.   Chief complaint and symptoms as noted above and confirmed with patient.  Recent lab and diagnostic studies reviewed with patient      History of Present Illness   2021: Sherrie presents for hospital follow-up after recent elective right total knee arthroplasty through [place] complicated by postoperative elevation of liver function studies as well as an acute right lower extremity DVT.  She did have hepatic angiogram performed which did not demonstrate any evidence of significant arterial stenosis and had no intervention.  Her liver function studies trended down subsequently.  Treated with Eliquis related to her DVT.  She has been slow with rehab because of associated pain.  Did require 1 unit of packed red blood cells and had a discharge hemoglobin in the low sevens still feels quite weak and short winded.  Concerned on where her hemoglobin has been recovering since that time.         Review of Systems   Constitutional:  No fever, No chills.    Eye:  Negative except as documented in history of present illness.    Ear/Nose/Mouth/Throat:  Negative except as documented in history of present illness.     Respiratory:  No shortness of breath.    Cardiovascular:  No chest pain, No palpitations, No peripheral edema, No syncope.    Gastrointestinal:  Diarrhea, Constipation, No nausea, No vomiting, No abdominal pain.    Genitourinary:  No dysuria, No hematuria.    Hematology/Lymphatics:  Negative except as documented in history of present illness.    Endocrine:  No excessive thirst, No polyuria.    Immunologic:  Immunocompromised, No recurrent fevers.    Musculoskeletal:  Joint pain, Decreased range of motion, No muscle pain.    Integumentary:  No skin lesion.    Neurologic:  Alert and oriented X4, No numbness, No tingling, No headache.       Health Status   Allergies:    Allergic Reactions (Selected)  Severity Not Documented  Dilaudid (Itching)  Nonallergic Reactions (Selected)  Unknown  HYDROmorphone (Itching)  Iron sucrose (Gi upset)  Sodium ferric gluconate complex (Other - describe in comment field)   Medications:  (Selected)   Prescriptions  Prescribed  Albuterol (Eqv-ProAir HFA) 90 mcg/inh inhalation aerosol: See Instructions, Instructions: INHALE 2 PUFFS FOUR TIMES DAILY AS NEEDED FOR WHEEZING, # 3 EA, 1 Refill(s), Type: Maintenance, Pharmacy: [facility], 63, in, 11/12/19 8:15:00 CST, Height Measured, 133.8, lb, 06/30/20 16:37:00 CDT, W...  alendronate 70 mg oral tablet: = 1 tab(s) ( 70 mg ), Oral, qweek, Instructions: with 6-8 oz plain water, at least 30 minutes before first food, beverage, or medication of the day, # 12 tab(s), 3 Refill(s), Type: Maintenance, Pharmacy: [facility], 1 tab(s) Oral qw...  gabapentin 300 mg oral capsule: = 2 cap(s) ( 600 mg ), Oral, hs, # 180 cap(s), 1 Refill(s), Type: Maintenance, Pharmacy: [facility], Appt due for further refills, 2 cap(s) Oral hs, 63, in, 11/12/19 8:15:00 CST, Height Measured, 133.8, lb, 06/30/20 16:37:00 CDT, We...  omeprazole 40 mg oral delayed release capsule: = 1 cap(s) ( 40 mg ), Oral, daily, # 90  cap(s), 1 Refill(s), Type: Maintenance, Pharmacy: [facility], 1 cap(s) Oral daily, 63, in, 11/12/19 8:15:00 CST, Height Measured, 136.6, lb, 05/27/20 13:53:00 CDT, Weight Measured  pramipexole 0.25 mg oral tablet: = 3 tab(s) ( 0.75 mg ), Oral, hs, # 270 tab(s), 1 Refill(s), Type: Maintenance, Pharmacy: [facility], increased dose, 3 tab(s) Oral hs, 63, in, 11/12/19 8:15:00 CST, Height Measured, 133.8, lb, 06/30/20 16:37:00 CDT, Weight Measured...  zolpidem 10 mg oral tablet: See Instructions, Instructions: TAKE 1 TABLET ONE TIME DAILY AT BEDTIME AS NEEDED FOR SLEEP, # 90 tab(s), 1 Refill(s), Type: Soft Stop, Pharmacy: [facility], TAKE 1 TABLET ONE TIME DAILY AT BEDTIME AS NEEDED FOR SLEEP, 63, in, 11/12...  Documented Medications  Documented  Eliquis 5 mg oral tablet: 0 Refill(s), Type: Soft Stop  acetaminophen 500 mg oral tablet: 1-2 tab(s), Oral, q6 hrs, PRN: as needed for pain, 0 Refill(s), Type: Maintenance  amLODIPine 5 mg oral tablet: 0 Refill(s), Type: Soft Stop  aspirin 81 mg oral tablet: = 1 tab(s) ( 81 mg ), Oral, daily, 0 Refill(s), Type: Maintenance  oxyCODONE 5 mg oral tablet: 0 Refill(s), Type: Soft Stop  tacrolimus 1 mg oral capsule: = 3 cap(s) ( 3 mg ), Oral, q12 hrs, 0 Refill(s), Type: Maintenance,    Medications          *denotes recorded medication          *acetaminophen 500 mg oral tablet: 1-2 tab(s), Oral, q6 hrs, PRN: as needed for pain, 0 Refill(s).          Albuterol (Eqv-ProAir HFA) 90 mcg/inh inhalation aerosol: See Instructions, INHALE 2 PUFFS FOUR TIMES DAILY AS NEEDED FOR WHEEZING, 3 EA, 1 Refill(s).          alendronate 70 mg oral tablet: 70 mg, 1 tab(s), Oral, qweek, with 6-8 oz plain water, at least 30 minutes before first food, beverage, or medication of the day, 12 tab(s), 3 Refill(s).          *amLODIPine 5 mg oral tablet: 0 Refill(s).          *Eliquis 5 mg oral tablet: 0 Refill(s).          *aspirin 81 mg oral tablet: 81  mg, 1 tab(s), Oral, daily, 0 Refill(s).          gabapentin 300 mg oral capsule: 600 mg, 2 cap(s), Oral, hs, 180 cap(s), 1 Refill(s).          omeprazole 40 mg oral delayed release capsule: 40 mg, 1 cap(s), Oral, daily, 90 cap(s), 1 Refill(s).          *oxyCODONE 5 mg oral tablet: 0 Refill(s).          pramipexole 0.25 mg oral tablet: 0.75 mg, 3 tab(s), Oral, hs, 270 tab(s), 1 Refill(s).          *tacrolimus 1 mg oral capsule: 3 mg, 3 cap(s), Oral, q12 hrs, 0 Refill(s).          zolpidem 10 mg oral tablet: See Instructions, TAKE 1 TABLET ONE TIME DAILY AT BEDTIME AS NEEDED FOR SLEEP, 90 tab(s), 1 Refill(s).       Problem list:    All Problems  Acquired thrombocytopenia / SNOMED CT 760922176 / Confirmed  Anticoagulated / SNOMED CT 268459074 / Confirmed  Duodenal ulcer / SNOMED CT 27849682 / Confirmed  End stage liver disease / SNOMED CT 5582310066 / Confirmed  Gastric ulcer / SNOMED CT 7975220105 / Confirmed  H/O liver transplant / SNOMED CT 495118834 / Confirmed  History of cholangiocarcinoma / SNOMED CT 7747599816 / Confirmed  H/O liver cancer / SNOMED CT 4975029528 / Confirmed  Hypertension / SNOMED CT 1156072839 / Confirmed  Hypokalemia / SNOMED CT 86819337 / Confirmed  Lung mass / SNOMED CT 847867034 / Confirmed  Asthma, moderate persistent / SNOMED CT 5780370140 / Confirmed  Osteopenia / SNOMED CT 156787267 / Confirmed  Pancytopenia / SNOMED CT 134631 / Confirmed  Restless leg syndrome / SNOMED CT 39628869 / Confirmed  Fibroid uterus / SNOMED CT 603642264 / Confirmed  Inactive: Anticoagulated / SNOMED CT 15780662  Resolved: History of DVT (deep vein thrombosis) / SNOMED CT 8877373885  Resolved: History of ankle fracture / SNOMED CT 3229794861  Resolved: History of arm fracture / SNOMED CT 3733515106  Resolved: Inpatient stay / SNOMED CT 782751702  Resolved: Inpatient stay / SNOMED CT 035434439  Resolved: Inpatient stay / SNOMED CT 320219971  Resolved: Inpatient stay / SNOMED CT 509974937  Resolved: Inpatient  stay / SNOMED CT 178858892  Resolved: Inpatient stay / SNOMED CT 092587667  Resolved: Inpatient stay / SNOMED CT 736036450  Resolved: Pregnancy / SNOMED CT 497897165  Resolved: Pregnancy / SNOMED CT 640988505  Canceled: Choliangiocarcinoma  Canceled: History of liver cancer / SNOMED CT 8212732607      Histories   Past Medical History:    Active  Acquired thrombocytopenia (732589893): Onset on 10/10/2012 at 57 years.  History of cholangiocarcinoma (6834788835): Onset in 2011 at 55 years.  Asthma, moderate persistent (0094420561)  Hypertension (9838032320)  Fibroid uterus (810728820)  Osteopenia (456344883)  Restless leg syndrome (83320508)  Gastric ulcer (8975010310)  Duodenal ulcer (81067796)  Lung mass (813856604)  Comments:  11/20/2017 CST 8:44 AM Debra Wilson  Right  End stage liver disease (7936280411)  Hypokalemia (87649787)  Pancytopenia (048928)  Resolved  Inpatient stay (381425331): Onset on 3/29/2021 at 65 years.  Resolved on 4/6/2021 at 65 years.  Comments:  4/9/2021 CDT 11:29 AM CDT - Tamera Green  [facility] - Localized osteoarthritis of right knee.  Inpatient stay (990962011): Onset on 8/30/2018 at 63 years.  Resolved on 9/12/2018 at 63 years.  Comments:  9/18/2018 CDT 2:04 PM CDT - Jess Wellington  @ [facility] for liver transplant.  Inpatient stay (897726439): Onset on 5/3/2018 at 63 years.  Resolved on 5/4/2018 at 63 years.  Comments:  5/10/2018 CDT 10:45 AM TOY - Debra Perez  @[facility] [place] - Organ transplant candidate  Inpatient stay (476781258): Onset on 10/30/2017 at 62 years.  Resolved on 11/3/2017 at 62 years.  Comments:  11/20/2017 CST 8:39 AM Debra Wilson  @[place], [place] - Cholangiocarcinoma  Inpatient stay (722078776): Onset on 3/13/2015 at 59 years.  Resolved on 3/17/2015 at 59 years.  Comments:  11/20/2017 CST 8:39 AM CST - Debra Perez  @[place], [place] - Bleeding esophageal varices  Inpatient stay  (429158382): Onset on 2014 at 59 years.  Resolved on 2014 at 59 years.  Comments:  2017 CST 8:38 AM Debra Wilson  @[facility] [place] - Pneumonia  Inpatient stay (861921019): Onset on 2011 at 56 years.  Resolved.  Comments:  2017 CST 8:38 AM Debra Wilson  @[facility], [place] - Chemotherapy induced nausea and vomiting  History of DVT (deep vein thrombosis) (2507971304): Onset on 2/15/2011 at 55 years.  Resolved.  Pregnancy (348907129):  Resolved on 1979 at 24 years.  Pregnancy (251800644):  Resolved on 1981 at 26 years.  History of ankle fracture (8693723211):  Resolved.  Comments:  2017 CST 8:42 AM Debra Wilson  Right  History of arm fracture (2520540641):  Resolved.  Comments:  2017 CST 8:43 AM Debra Wilson  Right   Family History:    Hypertension  Mother ()  Sister  Osteoporosis  Mother ()  MS - Multiple sclerosis  Sister     Procedure history:    Arthroscopy of knee (519494767) on 3/29/2021 at 65 Years.  Comments:  2021 11:28 AM Tamera Velazquez  Total right.  Esophagogastroduodenoscopy (522620979) on 2020 at 65 Years.  Comments:  2020 1:49 PM Tamera Velazquez  Indication: Diarrhea.  Colonoscopy (794107576) on 2020 at 65 Years.  Comments:  2020 1:48 PM Tamera Velazquez  Indication: Change in bowel habits.  Sedation: MAC.  Findings: One 10 mm sessile polyp in ascending colon.  Recommendation: Repeat in 3 years.  LTx - Liver transplant (2764105086) on 2018 at 63 Years.  Esophagogastroduodenoscopy (915969421) on 2018 at 62 Years.  Comments:  2018 1:14 PM Tamera Worley  Indication:  Varices, follow up.   Sedation:  MAC.  Recommendation:  Repeat in 1 year.  Pleural catheter (0588497150) on 2017 at 62 Years.  Comments:  2018 1:43 PM CST - Tamera Green  Right side insertion.  Thoracoscopic procedure (9476004290) on 10/30/2017 at 62  Years.  Comments:  11/7/2017 2:23 PM CST - Nile OSORIOJacey  Right Video assisted thoracoscopc surther  pleural biopsy  doxycycline pleurodesis  VATS - Video assisted thorascopic surgery (002386678) on 10/30/2017 at 62 Years.  Comments:  11/16/2017 2:59 PM Tamera Worley  Right-sided pleural biopsy.  Esophagogastroduodenoscopy (975882018) on 8/9/2016 at 61 Years.  Comments:  8/11/2016 8:13 AM Tamera Velazquez  Indication:  Gastric ulcer follow up.  Sedation:  MAC.  Upper GI endoscopy (9048649247) on 6/17/2016 at 61 Years.  Comments:  6/23/2016 1:29 PM CDT - Nile OSORIO Jacey  Acute gastric ulcer w/o hemorrhage or perforation, esophageal varices w/o bleeding.  Varices are scarred and not amenable to banding at this time  Esophagogastroduodenoscopy (444877868) on 6/17/2016 at 61 Years.  Esophagogastroduodenoscopy (225158566) on 4/11/2016 at 61 Years.  Comments:  4/19/2016 7:57 AM Tamera Velazquez  Indication: Varices, follow up.  Sedation: MAC.  Impression:  H. pylori negative.  Upper GI endoscopy on 1/14/2016 at 60 Years.  Esophagogastroduodenoscopy (832251090) on 3/13/2015 at 59 Years.  Wedge resection of liver (478835400) in the month of 10/2012 at 57 Years.  Upper GI endoscopy (6898450330) on 6/8/2012 at 57 Years.  HPV - Human papillomavirus test negative (2858176572) on 5/8/2012 at 57 Years.  Comments:  5/14/2012 11:41 AM TOY - Xi Dove  Low risk for cervical cancer. OK to have pap q 3 yrs.  Upper GI endoscopy (1911537082) on 3/2/2012 at 56 Years.  Upper GI endoscopy (2176668480) on 1/5/2012 at 56 Years.  Upper GI endoscopy (2529527814) on 11/22/2011 at 56 Years.  Esophagogastroduodenoscopy (400894961) on 9/6/2011 at 56 Years.  Esophagogastroduodenoscopy (583906702) on 5/23/2011 at 56 Years.  Colonoscopy (570802834) on 2/10/2011 at 55 Years.  Esophagogastroduodenoscopy (764575434) on 2/10/2011 at 55 Years.  Comments:  7/10/2011 7:21 PM CDT - Foster ATKINSON, Doron Casanova in the duodenal  bulb  Biopsy of liver (380699109) on 2011 at 55 Years.  HPV - Human papillomavirus test negative (5878571380) on 4/15/2010 at 55 Years.  Comments:  2011 1:04 PM CDT - Xi Dove  Low risk for cervical cancer. OK to have pap q 3 yrs.  Mammogram - screening (571744809) on 2010 at 55 Years.  Mammography; bilateral (22413) on 2010 at 55 Years.  Comments:  2011 8:48 AM CST - Brookeadrian OSORIO, Ramya  Normal-no radiographic evidence for malignancy.  DEXA - Dual energy X-ray photon absorptiometry (478658192) on 2009 at 53 Years.  Comments:  4/15/2010 1:43 PM CDT - Xi Dove  Due again in 5 yrs, ()  Tubal ligation (723112225) in  at 27 Years.   section (11382840) in  at 25 Years.  Primary repair of torn ligament of knee, collateral (00995754) in  at 16 Years.  Tonsillectomy and adenoidectomy (546410064) in  at 8 Years.  Arm fracture (818.0).  Comments:  2011 2:11 PM CDT - Nathaly Lim  Right  Arm fracture (818.0).  Comments:  2011 2:12 PM ELLENT - Nathaly Lim  Left  Ankle fracture, right (824.8).   Social History:        Electronic Cigarette/Vaping Assessment            Electronic Cigarette Use: Never.      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      Sexual Assessment            Sexually active: Yes.  Number of current partners 1.  Other contraceptive use: Ann Marie.        Physical Examination   vital signs stable, as noted above   Vital Signs   2021 10:29 AM CDT Temperature Tympanic 97.5 DegF  LOW    Peripheral Pulse Rate 81 bpm    Systolic Blood Pressure 104 mmHg    Diastolic Blood Pressure 68 mmHg    Mean Arterial Pressure 80 mmHg    Oxygen Saturation 99 %      Measurements from flowsheet : Measurements   2021 10:29 AM CDT   Weight Measured - Standard                 132.1 lb     General:  Alert and oriented, No acute distress.    Respiratory:  Lungs are clear to auscultation, Respirations are non-labored.    Cardiovascular:  Normal rate, Regular rhythm, No murmur.    Gastrointestinal:  Soft, Non-tender, Non-distended, liver transplant laparotomy scar.  Soft; nondistended.  Generalized discomfort to palpation; non-focal.    Musculoskeletal:  Normal range of motion, right knee incision well approximated; expected post-operative effusion.    Neurologic:  Alert, Oriented.    Cognition and Speech:  Oriented, Speech clear and coherent.    Psychiatric:  Appropriate mood & affect.       Review / Management   Results review:  Lab results   3/30/2021 6:28 AM CDT Sodium Level  mEq/L    Potassium Level TR 4.6 mEq/L    Chloride  mEq/L    Glucose Level  mg/dL    Creatinine TR 0.94 mg/dL    Calcium TR 8.4 mEq/dL    Phosphorus Level TR 3.2 mg/dL    Magnesium TR 2.1 mg/dL    Bili Total TR 1.1 mg/dL    Alk Phos  unit/L    AST TR 9 unit/L    ALT TR 38 unit/L    Protein Total TR 6.1 gm/dL    Albumin Level TR 2.4 g/dL    WBC TR 3.7 x10^3/uL    RBC TR 2.55 x10^6/uL    Hgb TR 7.6 g/dL    Hct TR 23.4 %    Platelet  x10^3/uL   .       Impression and Plan   Diagnosis     H/O liver transplant (BAB75-OK Z94.4).     Immunosuppressed status (LJT25-ZY D89.9).     Irritable bowel syndrome (MQB55-XI K58.9).           .) hospital f/u, elective right total knee arthroplasty, complicated by post-operative LFTs rise and acute DVT  discharge summary reviewed and meds reconciled   - angiogram by IR - patent hepatic arteries - no stenting pursued  - LFTs trended downward through hospitalization  - acute right LUE DVT   - started on Eliquis, now 5mg BID - intended 3 month duration of therapy  - post-operative anemia, discharge Hgb 7.3   - received 1 unit PRBCs during hospital   - recheck hgb, iron studies today    plan as previously outlined and not discussed at today's  visit     .) chronic diarrhea/IBS complaints   - not maintained on Cellcept (had been on Cellcept initially after transplant)   - normal celiac serologic testing   - (7/2019) C. 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 Langford   - originally diagnosed in 1/2011   - s/p neoadjuvant cisplatin + gemcitabine followed by bulk resection, recurrent with further debulking in 10/2012   - in 8/2013, biopsy confirmation of lung nodule as cholangiocarcinoma, s/p XRT   - most recent surveillance CT C/A/P showing no active malignancy

## 2022-02-16 NOTE — NURSING NOTE
Asthma Control Test (ACT) Total Entered On:  11/13/2019 1:40 PM CST    Performed On:  11/13/2019 1:40 PM CST by Sherice Green LPN               Asthma Control Test (ACT) Total   Asthma Control Test Total (Adult) :   16    Asthma Action Plan Provided? :   No   Sherice Green LPN - 11/13/2019 1:40 PM CST

## 2022-02-16 NOTE — TELEPHONE ENCOUNTER
---------------------  From: Shayy Guzman RN (Phone Messages Pool ([id number]_[facility]))   To: Advanced Practice Provider [place] ([address] [place] [address]);     Sent: 12/16/2021 12:19:19 PM CST  Subject: Zolpidem refill       PCP:  RICHARD      Time of Call:  12:06pm       Person Calling:  pt  Phone number:  [phone]    Note:   Pt called in, stating she was seen by [facility] in Nov. Stated [facility] renewed all of her medications. Stated she is trying to order refills of Omeprazole, Mirapex and Zolpidem from [employer] mail order and is receiving a message that there are no refills available. Pt requesting refills to be sent.   Chart reviewed; pt's medications were renewed on 11/17/21 with a year supply of refills.   Sent refills of Omeprazole and Mirapex per protocol.     Zolpidem 10mg 1 tab HS PRN #90 1 refill RX 11/17/21.    Please advise.    Last office visit and reason:  11/17/21 liver tx, thyroid CA, osteoporosis [facility]---------------------  From: Wild ENGEL, Cj PELLETIER (Advanced Practice Provider Pool ([id number]))   To: Phone Messages Pool ([id number]);     Sent: 12/16/2021 2:08:40 PM CST  Subject: RE: Zolpidem refill     zolpidem Rx is resentTC made to pt, informed refill was sent. Pt expressed appreciation.

## 2022-02-16 NOTE — RESULTS
Anticoagulation Therapy Entered On:  4/18/2019 11:10 AM CDT    Performed On:  4/18/2019 10:22 AM CDT by Rossy Thapa RN               Warfarin Management   Week 1 Sunday Dose :   4    Week 1 Monday Dose :   2    Week 1 Tuesday Dose :   4    Week 1 Wednesday Dose :   2    Week 1 Thursday Dose :   0    Week 1 Friday Dose :   2    Week 1 Saturday Dose :   4    Week 1 Dose Total :   18    Planned Duration :   Indefinite   Indication :   Other: Post liver transplant   Warfarin Management Comments :   Lab test performed by:  [facility] Office  [address] [place] [address]  Phone # [phone]  Fax# [phone]  Capillary   International Normalization Ratio :   2.6    INR Range :   Other   INR Therapeutic Range :   No   Warfarin Abnormal Findings :   Bruising easily   Rossy Thapa RN - 4/18/2019 11:08 AM CDT   Warfarin Management and Results Grid   Signs of Thrombolic :   No   Signs of Warfarin Intolerance :   No   Changes in Diet or Alcohol Intake :   No   Changes in Medication or Antibiotics :   No   Unusual Bleeding or Bruising :   Yes   Rectal Bleeding or Black Stools :   No   Vitamin K Food Handout :   No   Heart Valve Replacement :   No   Rossy Thapa RN - 4/18/2019 11:08 AM CDT   Anticoagulation Recheck :   1 week   Warfarin Special Instructions :   Per TFS hold warfarin today then start 2 mg M/W/F and 4 mg ROW; recheck 1 week; patient notified by phone.   Rossy Thapa RN - 4/18/2019 11:08 AM CDT

## 2022-02-16 NOTE — PROGRESS NOTES
Patient:   SHERRIE DAMON            MRN: [id number]            FIN: [id number]               Age:   64 years     Sex:  Female     :  1955   Associated Diagnoses:   Cholangiocarcinoma; Asthma, Moderate Persistent; Thoracic back pain; Recurrent right pleural effusion; Budd-Chiari syndrome; Hypokalemia; Obstructive liver cirrhosis   Author:   Elijah Benitez MD      Visit Information      Date of Service: 2019 11:16 am  Performing Location: [facility]  Encounter#: [id number]      Primary Care Provider (PCP):  Elijah Benitez MD    NPI# [id number]      Referring Provider:  Elijah Benitez MD    NPI# [id number]      Chief Complaint   2019 11:27 AM CDT   f/u - review Dexa results              Additional Information:No additional information recorded during visit.   Chief complaint and symptoms as noted above and confirmed with patient.  Recent lab and diagnostic studies reviewed with patient      History of Present Illness   10/12/2016: Sherrie presents to clinic with several concerns.  Describes having persistent right-sided upper thoracic back pain for some time.  She says it is around the previous radiation site for her cholangiocarcinoma.  She does have a remote history of pleural effusions, though has not occurred for a number of years.  Also describes a one-month history of dyspnea on exertion with some audible wheezing.  She has a remote history of asthma and had been maintained on a combination inhaled corticosteroid and long-acting beta agonist several years ago.  She stopped her inhalers on her own thinking they were providing much benefit.  She has been using an old prescription albuterol over recent days which has provided some temporary relief.    2018: Sherrie presents to clinic for hospital follow-up after recent admission to the [facility] for a  donor liver transplant.  She underwent orthotopic liver transplant on  which was complicated by anastomotic  biliary leak as well as required abdominal closure on postoperative day #1.  Also noted to have a postoperative portal vein thrombus.  She was treated with induction immunosuppression with basiliximab and started on tacrolimus CellCept based he was suppression regimen.  She required 11 units of packed red blood cells intraoperatively.  She was started on warfarin beginning on September 5.  Her discharge warfarin requirement was 4 mg daily.  Significant issues with postoperative nutrition.  Requiring feeding tube placement and is running on nightly tube feeds.  Appetite is been substantially reduced.  Overall feels well.  She was discharged home.  Her sister will be staying with her to help her in these perioperative days.  Scheduled to return to the [facility] this coming Monday for postoperative assessment.    7/26/2019: Sherrie returns for follow-up.  From a liver transplant standpoint she has done remarkably well.  Remains on warfarin though there is talked that she will likely stop this 1 year after transplant which would be an September.  Tolerating Prograf without issues.  Good underlying allograft function.  Primary complaint is related to irritable bowel complaints.  This precedes her transplantation and symptoms persist.  Not on CellCept therapy.  No formal diagnosis of celiac disease that she raises questions of this is possible.  Has not pursued any particular dietary restrictions including lactose or gluten free diet.    8/27/2019: Sherrie returns to clinic at my direction for evaluation of her osteoporosis.  She underwent bone density study at my direction which demonstrated severe osteoporosis and all measured bone sites.  She already has had a humeral fracture from fall posttransplant.  This is her only known fracture.  She is physically active and walks on a regular basis.  Currently not taking any means of calcium and vitamin D supplement.         Review of Systems   Constitutional:  Weakness, Fatigue, No  fever, No chills.    Eye:  Negative except as documented in history of present illness.    Ear/Nose/Mouth/Throat:  Negative except as documented in history of present illness.    Respiratory:  No shortness of breath, No cough.    Cardiovascular:  No chest pain, No palpitations, No peripheral edema, No syncope.    Gastrointestinal:  No nausea, No vomiting, No diarrhea, No abdominal pain.    Genitourinary:  No dysuria, No hematuria.    Hematology/Lymphatics:  Negative except as documented in history of present illness.    Endocrine:  No excessive thirst, No polyuria.    Immunologic:  No recurrent fevers.    Musculoskeletal:  No joint pain, No muscle pain     Back pain: In the middle of the back.    Neurologic:  Alert and oriented X4, No numbness, No tingling, No headache.       Health Status   Allergies:    Allergic Reactions (Selected)  Severity Not Documented  Dilaudid (Itching)  Nonallergic Reactions (Selected)  Unknown  HYDROmorphone (Itching)  Iron sucrose (Gi upset)  Sodium ferric gluconate complex (Other - describe in comment field)   Medications:  (Selected)   Prescriptions  Prescribed  Ventolin HFA 90 mcg/inh inhalation aerosol: See Instructions, Instructions: INHALE 2 PUFFS FOUR TIMES DAILY AS NEEDED FOR WHEEZING, # 1 EA, 5 Refill(s), Type: Maintenance, Pharmacy: [facility]  alendronate 70 mg oral tablet: = 1 tab(s) ( 70 mg ), Oral, qweek, Instructions: with 6-8 oz plain water, at least 30 minutes before first food, beverage, or medication of the day, # 12 tab(s), 3 Refill(s), Type: Maintenance, Pharmacy: [facility], 1 tab(s) Oral qw...  gabapentin 300 mg oral capsule: = 1 cap(s), Oral, qhs, # 90 tab(s), 1 Refill(s), Type: Soft Stop, Pharmacy: [facility], keep on file and pt will notify when needed, 1 cap(s) Oral qhs  pramipexole 0.25 mg oral tablet: = 2 tab(s), Oral, qhs, # 180 tab(s), 1 Refill(s), Type: Soft Stop, Pharmacy: [facility], keep on hold and pt will notify when needed, 2 tab(s) Oral qhs  warfarin 2 mg oral tablet: See Instructions, Instructions: take one tab daily 3 days a week and 2 tabs daily 4 days a week-OR AS DIRECTED BY PROVIDER, # 130 tab(s), 1 Refill(s), Type: Maintenance, Pharmacy: [facility], take one tab daily 3 days a week a...  zolpidem 10 mg oral tablet: See Instructions, Instructions: TAKE 1 TABLET ONE TIME DAILY AT BEDTIME AS NEEDED FOR SLEEP, # 90 tab(s), 1 Refill(s), Type: Soft Stop, Pharmacy: [facility]  Documented Medications  Documented  acetaminophen 500 mg oral tablet: 1-2 tab(s), Oral, q6 hrs, PRN: as needed for pain, 0 Refill(s), Type: Maintenance  aspirin 81 mg oral tablet: = 1 tab(s) ( 81 mg ), Oral, daily, 0 Refill(s), Type: Maintenance  omeprazole 20 mg oral delayed release capsule: = 2 cap(s) ( 40 mg ), PO, bid, # 90 cap(s), 0 Refill(s), Type: Maintenance  tacrolimus 1 mg oral capsule: See Instructions, Instructions: 4mg AM/ 5mg PM, 0 Refill(s), Type: Maintenance,    Medications          *denotes recorded medication          *acetaminophen 500 mg oral tablet: 1-2 tab(s), Oral, q6 hrs, PRN: as needed for pain, 0 Refill(s).          Ventolin HFA 90 mcg/inh inhalation aerosol: See Instructions, INHALE 2 PUFFS FOUR TIMES DAILY AS NEEDED FOR WHEEZING, 1 EA, 5 Refill(s).          alendronate 70 mg oral tablet: 70 mg, 1 tab(s), Oral, qweek, with 6-8 oz plain water, at least 30 minutes before first food, beverage, or medication of the day, 12 tab(s), 3 Refill(s).          *aspirin 81 mg oral tablet: 81 mg, 1 tab(s), Oral, daily, 0 Refill(s).          gabapentin 300 mg oral capsule: 1 cap(s), Oral, qhs, 90 tab(s), 1 Refill(s).          *omeprazole 20 mg oral delayed release capsule: 40 mg, 2 cap(s), PO, bid, 90 cap(s), 0 Refill(s).          pramipexole 0.25 mg oral tablet: 2 tab(s), Oral, qhs, 180 tab(s), 1 Refill(s).          *tacrolimus 1 mg oral capsule: See  Instructions, 4mg AM/ 5mg PM, 0 Refill(s).          warfarin 2 mg oral tablet: See Instructions, take one tab daily 3 days a week and 2 tabs daily 4 days a week-OR AS DIRECTED BY PROVIDER, 130 tab(s), 1 Refill(s).          zolpidem 10 mg oral tablet: See Instructions, TAKE 1 TABLET ONE TIME DAILY AT BEDTIME AS NEEDED FOR SLEEP, 90 tab(s), 1 Refill(s).       Problem list:    All Problems  Acquired thrombocytopenia / SNOMED CT 327614818 / Confirmed  Anticoagulated / SNOMED CT 535925964 / Confirmed  Duodenal ulcer / SNOMED CT 08228735 / Confirmed  End stage liver disease / SNOMED CT 8203180608 / Confirmed  Gastric ulcer / SNOMED CT 4114956032 / Confirmed  H/O liver transplant / SNOMED CT 737260179 / Confirmed  History of cholangiocarcinoma / SNOMED CT 4352713743 / Confirmed  H/O liver cancer / SNOMED CT 8932869582 / Confirmed  Hypertension / SNOMED CT 7760386062 / Confirmed  Hypokalemia / SNOMED CT 80456655 / Confirmed  Lung mass / SNOMED CT 485090368 / Confirmed  Asthma, moderate persistent / SNOMED CT 0416364724 / Confirmed  Osteopenia / SNOMED CT 048350660 / Confirmed  Pancytopenia / SNOMED CT 461861 / Confirmed  Restless leg syndrome / SNOMED CT 91062816 / Confirmed  Fibroid uterus / SNOMED CT 612464950 / Confirmed  Inactive: Anticoagulated / SNOMED CT 18501324  Resolved: History of DVT (deep vein thrombosis) / SNOMED CT 8643717203  Resolved: History of ankle fracture / SNOMED CT 0590707445  Resolved: History of arm fracture / SNOMED CT 2172467072  Resolved: Inpatient stay / SNOMED CT 434091185  Resolved: Inpatient stay / SNOMED CT 971035903  Resolved: Inpatient stay / SNOMED CT 055087792  Resolved: Inpatient stay / SNOMED CT 556805173  Resolved: Inpatient stay / SNOMED CT 944782220  Resolved: Inpatient stay / SNOMED CT 988722436  Resolved: Pregnancy / SNOMED CT 260504125  Resolved: Pregnancy / SNOMED CT 084144471  Canceled: Choliangiocarcinoma  Canceled: History of liver cancer / SNOMED CT 1646577413       Histories   Past Medical History:    Active  Acquired thrombocytopenia (740401766): Onset on 10/10/2012 at 57 years.  History of cholangiocarcinoma (2846530520): Onset in 2011 at 55 years.  Asthma, moderate persistent (2635677764)  Hypertension (4976360697)  Fibroid uterus (069963832)  Osteopenia (217811393)  Restless leg syndrome (15870825)  Gastric ulcer (8936022669)  Duodenal ulcer (88880832)  Lung mass (155464609)  Comments:  11/20/2017 CST 8:44 AM Debra Wilson  Right  End stage liver disease (0952778391)  Hypokalemia (07874178)  Pancytopenia (629596)  Resolved  Inpatient stay (479832676): Onset on 8/30/2018 at 63 years.  Resolved on 9/12/2018 at 63 years.  Comments:  9/18/2018 CDT 2:04 PM CDT - Jess Wellington  @ [facility] for liver transplant.  Inpatient stay (843438332): Onset on 5/3/2018 at 63 years.  Resolved on 5/4/2018 at 63 years.  Comments:  5/10/2018 CDT 10:45 AM [facility] - Debra Perez  @[place], [place] - Organ transplant candidate  Inpatient stay (790913465): Onset on 10/30/2017 at 62 years.  Resolved on 11/3/2017 at 62 years.  Comments:  11/20/2017 CST 8:39 AM Debra Wilson  @[place], [place] - Cholangiocarcinoma  Inpatient stay (309465007): Onset on 3/13/2015 at 59 years.  Resolved on 3/17/2015 at 59 years.  Comments:  11/20/2017 CST 8:39 AM Debra Wilson  @[place], [place] - Bleeding esophageal varices  Inpatient stay (999592313): Onset on 6/12/2014 at 59 years.  Resolved on 6/13/2014 at 59 years.  Comments:  11/20/2017 CST 8:38 AM Debra Wilson  @[place], [place] - Pneumonia  Inpatient stay (101496871): Onset on 6/22/2011 at 56 years.  Resolved.  Comments:  11/20/2017 CST 8:38 AM CST - Debra Perez  @[facility], [place] - Chemotherapy induced nausea and vomiting  History of DVT (deep vein thrombosis) (6331676432): Onset on 2/15/2011 at 55 years.  Resolved.  Pregnancy (796625008):  Resolved on  1979 at 24 years.  Pregnancy (367031115):  Resolved on 1981 at 26 years.  History of ankle fracture (8728557429):  Resolved.  Comments:  2017 CST 8:42 AM REYNA Perez Debra  Right  History of arm fracture (3633118165):  Resolved.  Comments:  2017 CST 8:43 AM REYNA Flores Debra Perez  Right   Family History:    Hypertension  Mother ()  Sister  Osteoporosis  Mother ()  MS - Multiple sclerosis  Sister     Procedure history:    LTx - Liver transplant (7501738212) on 2018 at 63 Years.  Esophagogastroduodenoscopy (333639237) on 2018 at 62 Years.  Comments:  2018 1:14 PM Tamera Worley  Indication:  Varices, follow up.   Sedation:  MAC.  Recommendation:  Repeat in 1 year.  Pleural catheter (2610749761) on 2017 at 62 Years.  Comments:  2018 1:43 PM Tamera Worley  Right side insertion.  Thoracoscopic procedure (1647385952) on 10/30/2017 at 62 Years.  Comments:  2017 2:23 PM Jacey Nelson CMA  Right Video assisted thoracoscopc surther  pleural biopsy  doxycycline pleurodesis  VATS - Video assisted thorascopic surgery (050573828) on 10/30/2017 at 62 Years.  Comments:  2017 2:59 PM Tamera Worley  Right-sided pleural biopsy.  Esophagogastroduodenoscopy (114627064) on 2016 at 61 Years.  Comments:  2016 8:13 AM Tamera Velazquez  Indication:  Gastric ulcer follow up.  Sedation:  MAC.  Upper GI endoscopy (2486079996) on 2016 at 61 Years.  Comments:  2016 1:29 PM Jacey Lares CMA  Acute gastric ulcer w/o hemorrhage or perforation, esophageal varices w/o bleeding.  Varices are scarred and not amenable to banding at this time  Esophagogastroduodenoscopy (634634796) on 2016 at 61 Years.  Esophagogastroduodenoscopy (296122103) on 2016 at 61 Years.  Comments:  2016 7:57 AM CDT - Tamera Green  Indication: Varices, follow up.  Sedation: MAC.  Impression:  H. pylori negative.  Upper GI endoscopy on  2016 at 60 Years.  Esophagogastroduodenoscopy (290952406) on 3/13/2015 at 59 Years.  Wedge resection of liver (247370096) in the month of 10/2012 at 57 Years.  Upper GI endoscopy (6374858863) on 2012 at 57 Years.  HPV - Human papillomavirus test negative (7884256233) on 2012 at 57 Years.  Comments:  2012 11:41 AM Xi Rodas  Low risk for cervical cancer. OK to have pap q 3 yrs.  Upper GI endoscopy (1350164887) on 3/2/2012 at 56 Years.  Upper GI endoscopy (0447494166) on 2012 at 56 Years.  Upper GI endoscopy (8220639671) on 2011 at 56 Years.  Esophagogastroduodenoscopy (232952890) on 2011 at 56 Years.  Esophagogastroduodenoscopy (798300538) on 2011 at 56 Years.  Colonoscopy (594817466) on 2/10/2011 at 55 Years.  Esophagogastroduodenoscopy (951492323) on 2/10/2011 at 55 Years.  Comments:  7/10/2011 7:21 PM TOY - Doron Hutchison MD  Bleeding in the duodenal bulb  Biopsy of liver (391234403) on 2011 at 55 Years.  HPV - Human papillomavirus test negative (0436352808) on 4/15/2010 at 55 Years.  Comments:  2011 1:04 PM Xi Rodas  Low risk for cervical cancer. OK to have pap q 3 yrs.  Mammogram - screening (633220396) on 2010 at 55 Years.  Mammography; bilateral (05907) on 2010 at 55 Years.  Comments:  2011 8:48 AM REYNA - Ramya Kenney CMA  Normal-no radiographic evidence for malignancy.  DEXA - Dual energy X-ray photon absorptiometry (407728754) on 2009 at 53 Years.  Comments:  4/15/2010 1:43 PM Xi Rodas  Due again in 5 yrs, ()  Tubal ligation (372665803) in  at 27 Years.   section (41231585) in  at 25 Years.  Primary repair of torn ligament of knee, collateral (37322730) in  at 16 Years.  Tonsillectomy and adenoidectomy (921152239) in  at 8 Years.  Arm fracture (818.0).  Comments:  2011 2:11 PM TOY - Nathaly Lim  Right  Arm fracture  (818.0).  Comments:  4/25/2011 2:12 PM CDT - Nathaly Lim  Left  Ankle fracture, right (824.8).   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      Sexual Assessment            Sexually active: Yes.  Number of current partners 1.  Other contraceptive use: Ann Marie.        Physical Examination   vital signs stable, as noted above   Vital Signs   8/27/2019 11:27 AM CDT Temperature Tympanic 97 DegF  LOW    Peripheral Pulse Rate 60 bpm    Systolic Blood Pressure 116 mmHg    Diastolic Blood Pressure 76 mmHg    Mean Arterial Pressure 89 mmHg    BP Site Right arm      Measurements from flowsheet : Measurements   8/27/2019 11:27 AM CDT   Weight Measured - Standard                130 lb     General:  Alert and oriented, No acute distress.    Eye:  Extraocular movements are intact.    HENT:  Normocephalic, Oral mucosa is moist.    Respiratory:  Respirations are non-labored.    Cardiovascular:  Normal rate.    Gastrointestinal:  Soft,   V-shaped abdominal incision scar.    Musculoskeletal:  Normal range of motion, Normal strength.    Neurologic:  Alert, Oriented.    Cognition and Speech:  Oriented, Speech clear and coherent.    Psychiatric:  Appropriate mood & affect.       Review / Management   Results review:  Lab results   7/27/2019 9:23 AM CDT Fecal Fat Qualitative NORMAL    Fecal Leukocyte Stain See comment    Clostridium difficile Toxin See comment    Culture Campylobacter See comment    Culture Salmonella/Shigella See comment    Shiga Toxin See comment    Trichrome 1 See comment   7/26/2019 11:33 AM CDT Immunoglobulin IgA 153 mg/dL    TSH 1.49 mIU/L    Celiac Disease Interp See comment    Tissue Transglutaminase IgA 1 unit/mL   .       Impression and Plan   Diagnosis     Cholangiocarcinoma (LRC97-IF C22.1).     Asthma, Moderate  Persistent (KQD54-IO J45.40).     Thoracic back pain (UYH38-LI M54.6).     Recurrent right pleural effusion (YYN49-IB J90).     Budd-Chiari syndrome (FDY06-GJ I82.0).     Hypokalemia (BTO86-XA E87.6).     Obstructive liver cirrhosis (FVV97-EF K74.60).       .) osteoporosis, severe   - DEXA (8/2019): T scores ranging between -2.8 to -3.6; FRAX score 17%/6%   - previous traumatic humeral fracture after OLTx   - advised to begin on calcium/vitamin D   - beginning on alendronate 70mg qweek   - plans to reassess vitamin D levels in the future   - plans for repeat DEXA in 1 yr; consider referral to bone endocrinologist if scores not improving    plan as previously outlined:     .)  OLTx (8/30/2018) at [facility] for Budd-Chiari syndrome and previous partial hepatectomy (h/o cholangiocarcinoma)  transplant coordinator: Abigail Parham: [phone]   - complicated by portal vein thrombosis - on warfarin - tentative plans to stop therapy 9/2020   - induction IS: basilixumab, maintenance: tacrolimus   - CMV -/-, EBV +/+   - doing remarkably well    .) chronic diarrhea   - not maintained on Cellcept (had been on Cellcept initially after transplant)   - normal celiac serologic testing   - C. difficile antigen, stool Cx, qualitative fat, WBCs: wnl   - advised to trial FODMAPs diet; consider trial of lactose free therapy   - trial Imodium prn    .) posterior, thoracic back pains, chronic - suspicious there is some residual, late effect from previous XRT use vs. thoracic radiculopathy   - gabapentin 300mg qhs    .) recurrent, metastatic cholangiocarcinoma; followed by Dr. Langford   - originally diagnosed in 1/2011   - s/p neoadjuvant cisplatin + gemcitabine followed by bulk resection, recurrent with further debulking in 10/2012   - in 8/2013, biopsy confirmation of lung nodule as cholangiocarcinoma, s/p XRT   - most recent surveillance CT C/A/P showing no active malignancy    .) health maintenance   - arrange for mammo and DEXA   - should  discuss Shingrix through transplant center     close f/u through [facility]    RTC in 6 months      Professional Services   Counseling Summary:  This was a 25 minute visit with greater than 50% of that time spent counseling the patient.

## 2022-02-16 NOTE — TELEPHONE ENCOUNTER
---------------------  From: Su Lindsey LPN (Phone Messages Pool ([id number]_[facility]))   To: [facility] Message Pool ([id number]);     Sent: 11/4/2020 8:37:42 AM CST  Subject:  pramipexole dose change     Phone Message    PCP:   RICHARD      Time of Call:  8:21am       Person Calling:  pt  Phone number:  [phone]    Note:   Pt LM requesting a refill of her pramipexole. Pt says she has been taking 3- 0.25mg tabs at night instead of 2 0.25mg tabs because her restless legs have been really bothering her.    Please advise in Rx change.   Rx was sent 6/3/20 for pramipexole 0.25mg 2 tabs PO hs #180 with 1 refill    Last office visit and reason:  6/30/20 paronychia---------------------  From: Jacey Dowd CMA ([facility] Message Pool ([id number]))   To: Elijah Benitez MD;     Sent: 11/4/2020 11:16:25 AM CST  Subject: FW: pramipexole dose change---------------------  From: Elijah Benitez MD   To: [facility] Message Pool ([id number]);     Sent: 11/4/2020 11:35:09 AM CST  Subject: RE: pramipexole dose change     okay with 0.75mg qhs dosingpt contacted at 1230 and advised

## 2022-02-16 NOTE — TELEPHONE ENCOUNTER
Entered by Carlos Nagy CMA on April 26, 2019 9:11:33 AM CDT  ---------------------  From: Carlos Nagy CMA   To: [facility]    Sent: 4/26/2019 9:11:33 AM CDT  Subject: Medication Management     ** Submitted: **  Complete:gabapentin (gabapentin 300 mg oral capsule)   Signed by Carlos Nagy CMA  4/26/2019 9:11:00 AM    ** Approved **  gabapentin (GABAPENTIN 300MG CAPS)  TAKE ONE CAPSULE BY MOUTH AT BEDTIME  Qty:  90 unknown unit        Days Supply:  90        Refills:  0          Substitutions Allowed     Route To Pharmacy - [facility]   Signed by Carlos Nagy CMA            ------------------------------------------  From: [facility]  To: Elijah Benitez MD  Sent: April 26, 2019 8:43:08 AM CDT  Subject: Medication Management  Due: April 27, 2019 8:43:08 AM CDT    ** On Hold Pending Signature **  Drug: gabapentin (gabapentin 300 mg oral capsule)  TAKE ONE CAPSULE BY MOUTH AT BEDTIME  Quantity: 90 unknown unit  Days Supply: 90        Refills: 0  Substitutions Allowed  Notes from Pharmacy:     Dispensed Drug: gabapentin (gabapentin 300 mg oral capsule)  TAKE ONE CAPSULE BY MOUTH AT BEDTIME  Quantity: 90 unknown unit  Days Supply: 90        Refills: 0  Substitutions Allowed  Notes from Pharmacy:   ------------------------------------------Med Refill      Date of last office visit and reason:  3-1-19 Preop      Date of last Med Check / Px:     Date of last labs pertaining to med:      Note:  Rx filled per protocol.  Carlos Nagy CMA    RTC order in chart:  no    For Protocol refill, has patient been contacted:  _

## 2022-02-16 NOTE — TELEPHONE ENCOUNTER
Entered by Jacey Dowd CMA on December 15, 2021 2:55:10 PM CST  ---------------------  From: Jacey Dowd CMA   To: [facility]    Sent: 12/15/2021 2:55:10 PM CST  Subject: Medication Management     ** Not Approved: this was filled x 1year on 11/17/21 #90 with 3RF's **  omeprazole (OMEPRAZOLE 40 MG Capsule Delayed Release)  TAKE 1 CAPSULE EVERY DAY  Qty:  90 cap(s)        Days Supply:  90        Refills:  0          Substitutions Allowed     Route To Pharmacy - [facility]   Signed by Jacey Dowd CMA            ------------------------------------------  From: [facility]  To: Elijah Benitez MD  Sent: December 13, 2021 12:48:15 PM CST  Subject: Medication Management  Due: November 16, 2021 3:34:48 PM CST     ** On Hold Pending Signature **     Drug: omeprazole (omeprazole 40 mg oral delayed release capsule), TAKE 1 CAPSULE EVERY DAY  Quantity: 90 cap(s)  Days Supply: 0  Refills: 0  Substitutions Allowed  Notes from Pharmacy:     Dispensed Drug: omeprazole (omeprazole 40 mg oral delayed release capsule), TAKE 1 CAPSULE EVERY DAY  Quantity: 90 cap(s)  Days Supply: 90  Refills: 0  Substitutions Allowed  Notes from Pharmacy:  ------------------------------------------

## 2022-02-16 NOTE — LETTER
(Inserted Image. Unable to display)   [address] [place] [address]  April 22, 2021      REX DAMON  [address] [place] [id number]        Dear REX,     Thank you for selecting [facility] ([facility]viously [facility]) for your healthcare needs. Below you will find the results of your recent test(s) done at our clinic.      Blood counts are recovering nicely since surgery.  I would expect your previous fatigue complaints to be improving with time.        Result Name Current Result Previous Result Reference Range   Iron Level (mcg/dL)  59 4/21/2021  45 - 160   TIBC  257 4/21/2021  250 - 450   Iron Saturation  23 4/21/2021  16 - 45   Ferritin (ng/mL) ((H)) 398 4/21/2021  16 - 288   WBC  5.5 4/21/2021  4.9 1/7/2021 3.8 - 10.8   RBC  3.90 4/21/2021  4.46 1/7/2021 3.80 - 5.10   Hgb (gm/dL) ((L)) 11.1 4/21/2021  13.5 1/7/2021 11.7 - 15.5   Hct (%)  35.1 4/21/2021  39.9 1/7/2021 35.0 - 45.0   MCV (fL)  90.0 4/21/2021  89.5 1/7/2021 80.0 - 100.0   MCH (pg)  28.5 4/21/2021  30.3 1/7/2021 27.0 - 33.0   MCHC (gm/dL) ((L)) 31.6 4/21/2021  33.8 1/7/2021 32.0 - 36.0   RDW (%)  13.3 4/21/2021  12.5 1/7/2021 11.0 - 15.0   Platelet  302 4/21/2021 ((L)) 139 1/7/2021 140 - 400   MPV (fL)  10.1 4/21/2021  11.1 1/7/2021 7.5 - 12.5       Please contact me or my assistant at [phone] if you have any questions or concerns.     Sincerely,        Elijah Benitez MD    What do your labs mean?  Below is a glossary of commonly ordered labs:  LDL - Bad Cholesterol  HDL - Good Cholesterol  AST/ALT - Liver Function  Cr/Creatinine - Kidney Function  Microalbumin - Kidney Function  BUN - Kidney Function  PSA - Prostate   TSH - Thyroid Hormone  HgbA1c - Diabetes Test  Hgb (Hemoglobin) - Red Blood Cells

## 2022-02-16 NOTE — NURSING NOTE
Anticoagulation Therapy Entered On:  3/20/2019 9:26 AM CDT    Performed On:  3/20/2019 9:21 AM CDT by Venus Contreras RN               Warfarin Management   Week 1 Sunday Dose :   4    Week 1 Monday Dose :   2    Week 1 Tuesday Dose :   4    Week 1 Wednesday Dose :   4    Week 1 Thursday Dose :   4    Week 1 Friday Dose :   2    Week 1 Saturday Dose :   4    Week 1 Dose Total :   24    Week 2 Sunday Dose :   4    Week 2 Monday Dose :   2    Week 2 Tuesday Dose :   4    Week 2 Wednesday Dose :   4    Week 2 Thursday Dose :   4    Week 2 Friday Dose :   2    Week 2 Saturday Dose :   4    Week 2 Dose Total :   24    Planned Duration :   Indefinite   Indication :   Other: Post liver transplant   INR Range :   Other   (Comment: 1.5 - 2.0 [Venus Contreras RN - 3/20/2019 9:21 AM CDT] )   INR Therapeutic Range :   Yes   Warfarin Abnormal Findings :   none   Anticoagulation Recheck :   4 weeks   Warfarin Physician :   Elijah Benitez MD, RN, Elizabeth - 3/20/2019 9:21 AM CDT   Warfarin Special Instructions :   INR = 1.7 per [facility] on 3/19/19 Patient is to stay on 2mg warfarin on Mon, and Fri and 4 mg the rest of the days of the week Recheck INR in 4 weeks per protocol. Patient advised in office.   Venus Contreras RN - 3/20/2019 9:29 AM CDT

## 2022-02-16 NOTE — NURSING NOTE
Call received from Newton CONNELL, Rachell stating that patients INR was 2.3 today and she continues taking Coumadin 4mg daily.  Questioned Rachell on [facility] orders to have INR checked twice weekly and it has been 2 weeks since last INR was taken.  Rachell stated that patient has been getting her INR checked twice a week at [facility].  Rachell was informed that [facility] has not received any lab results from the [facility] so it looks as though her INR hasn't been checked for 2 weeks.  Also clarified with Rachell that [facility] is the provider that is taking care of her INR and Coumadin dosing.  Rachell was not aware of patients INR goal range of 1.5-2.0.    Spoke with [facility] regarding patients INR and he gave new orders.      Call made to Rachell and informed her of the new orders.  Rachell stated that she will call patient and inform her.

## 2022-02-16 NOTE — TELEPHONE ENCOUNTER
---------------------  From: Su Lindsey LPN (Phone Messages Pool ([id number]_[facility]))   To: [facility] Message Pool ([id number]_[facility]);     Sent: 3/1/2019 9:35:23 AM CST  Subject: lovenox bridging/preop     Phone Message    PCP:   RICHARD      Time of Call:  9:25am       Person Calling:  Abigail- transplant coordinator at [facility]  Phone number:  [phone]- page her    Note:   Abigail ELMORE stating pt is seeing [facility] at 11am today for a H&P prior to orthopedic surgery on Monday. Abigail says pt is on Coumadin as she has had a liver transplant. Abigail says pt needs to be bridged with lovenox and is asking that [facility] order this for pt.    Abigail says if [facility] has their own protocol for lovenox bridging that is fine otherwise their recommendation for their team at [facility] would be:    30mg of lovenox tomorrow  hold lovenox for 24 hours before surgery  restart lovenox 24 hours after surgery  recheck INR on Thursday- if in range can stop lovenox.    Abigail says coumadin can usually be resumed the day after surgery.    Abigail says pt's INR's are handled at [facility] and not with them.    Call with any questions.    Last office visit and reason:  _---------------------  From: Ligia Rodriguez LPN ([facility] Message Pool ([id number]_[facility]))   To: Wild ENGEL, Cj PELLETIER;     Sent: 3/1/2019 11:05:44 AM CST  Subject: FW: lovenox bridging/preop

## 2022-02-16 NOTE — TELEPHONE ENCOUNTER
---------------------  From: Alvina Tse MA (eRx Pool ([id number]_[facility]))   To: Elijah Benitez MD;     Sent: 2/25/2019 10:21:06 AM CST  Subject: FW: Medication Management   Due Date/Time: 2/25/2019 3:22:00 PM CST     last visit:  9/24/18 hospital follow up  last filled:  2/16/18  #270 and 1 refill        ------------------------------------------  From: [facility]  To: Elijah Benitez MD  Sent: February 24, 2019 3:22:58 PM CST  Subject: Medication Management  Due: February 25, 2019 3:22:58 PM CST    ** On Hold Pending Signature **  Drug: gabapentin (Neurontin 300 mg oral capsule)  TAKE ONE CAPSULE BY MOUTH AT BEDTIME  Quantity: 270 unknown unit Days Supply: 270        Refills: 1  Substitutions Allowed  Notes from Pharmacy:     Dispensed Drug: gabapentin (gabapentin 300 mg oral capsule)  TAKE ONE CAPSULE BY MOUTH AT BEDTIME  Quantity: 270 unknown unit Days Supply: 270        Refills: 1  Substitutions Allowed  Notes from Pharmacy:   ---------------------------------------------------------------  From: Elijah Benitez MD   To: [facility]    Sent: 2/25/2019 11:07:53 AM CST  Subject: FW: Medication Management     ** Submitted: **  Complete:gabapentin (gabapentin 300 mg oral capsule)   Signed by Elijah Benitez MD  2/25/2019 11:07:00 AM    ** Approved **  gabapentin (GABAPENTIN 300MG CAPS)  TAKE ONE CAPSULE BY MOUTH AT BEDTIME  Qty:  270 unknown unit        Days Supply:  270        Refills:  1          Substitutions Allowed     Route To Pharmacy - [facility]

## 2022-02-16 NOTE — TELEPHONE ENCOUNTER
---------------------  From: Jacey Dowd CMA ([facility] Message Pool ([id number]_[facility]))   To: Elijah Benitez MD;     Sent: 5/25/2021 10:58:14 AM CDT  Subject: Med Management     ** On hold pending signature **  Order:zolpidem (zolpidem 10 mg oral tablet)  See Instructions  TAKE 1 TABLET ONE TIME DAILY AT BEDTIME AS NEEDED FOR SLEEP  Qty:  90 tab(s)        Refills:  1          Route To Pharmacy - [facility]        Med Refill [id number]  from [facility]      Date of last office visit and reason:  4/21 hosp f/u        RTC order in chart:  yes, placed for June - ? needed since she was just seen    please advise on add'l refills - RTC??   Proposal sent  ** Approved **  Order:zolpidem (zolpidem 10 mg oral tablet)  See Instructions  TAKE 1 TABLET ONE TIME DAILY AT BEDTIME AS NEEDED FOR SLEEP  Qty:  90 tab(s)        Refills:  1          Route To Pharmacy - [facility]    Signed by Elijah Benitez MD  5/27/2021 7:22:00 PM [facility]

## 2022-02-16 NOTE — LETTER
(Inserted Image. Unable to display)   Smitha 10, 2020      SHERRIE DAMON  [address] [place] [id number]        Dear SHERRIE,     Thank you for selecting [facility] ([facility] for your healthcare needs. Below you will find the results of your recent test(s) done at our clinic.      Sherrie, here is a copy of  your labs.  Everything appearing stable without any specific concerns.        Result Name Current Result Previous Result Reference Range   Sodium Level (mmol/L)  139 5/27/2020  143 3/19/2019 135 - 146   Potassium Level (mmol/L)  4.9 5/27/2020  4.6 3/19/2019 3.5 - 5.3   Chloride Level (mmol/L)  106 5/27/2020 ((H)) 112 3/19/2019 98 - 110   CO2 Level (mmol/L)  27 5/27/2020  27 3/19/2019 20 - 32   Glucose Level (mg/dL)  96 5/27/2020  99 3/19/2019 65 - 99   BUN (mg/dL)  25 5/27/2020 ((H)) 31 3/19/2019 7 - 25   Creatinine Level (mg/dL) ((H)) 1.27 5/27/2020 ((H)) 1.21 3/19/2019 0.50 - 0.99   BUN/Creat Ratio  20 5/27/2020 ((H)) 26 3/19/2019 6 - 22   eGFR (mL/min/1.73m2) ((L)) 45 5/27/2020 ((L)) 48 3/19/2019 > OR = 60 -    eGFR  (mL/min/1.73m2) ((L)) 52 5/27/2020 ((L)) 56 3/19/2019 > OR = 60 -    Calcium Level (mg/dL)  8.7 5/27/2020  9.0 3/19/2019 8.6 - 10.4   Vitamin D, 1, 25 Dihydroxy Total (pg/mL)  54 5/27/2020  18 - 72   Vitamin D2, 1, 25 Dihydroxy (pg/mL)  <8 5/27/2020     Vitamin D3, 1, 25 Dihydroxy (pg/mL)  54 5/27/2020         Please contact me or my assistant at [phone] if you have any questions or concerns.     Sincerely,        Elijah Benitez MD    What do your labs mean?  Below is a glossary of commonly ordered labs:  LDL - Bad Cholesterol  HDL - Good Cholesterol  AST/ALT - Liver Function  Cr/Creatinine - Kidney Function  Microalbumin - Kidney Function  BUN - Kidney Function  PSA - Prostate   TSH - Thyroid Hormone  HgbA1c - Diabetes Test  Hgb (Hemoglobin) - Red Blood Cells

## 2022-02-16 NOTE — NURSING NOTE
Anticoagulation Therapy Entered On:  1/11/2019 9:35 AM CST    Performed On:  1/11/2019 9:34 AM CST by Rossy Thapa RN               Warfarin Management   Week 1 Sunday Dose :   4    Week 1 Monday Dose :   2    Week 1 Tuesday Dose :   4    Week 1 Wednesday Dose :   4    Week 1 Thursday Dose :   4    Week 1 Friday Dose :   2    Week 1 Saturday Dose :   4    Week 1 Dose Total :   24    Week 2 Sunday Dose :   4    Week 2 Monday Dose :   2    Week 2 Tuesday Dose :   4    Week 2 Wednesday Dose :   4    Week 2 Thursday Dose :   4    Week 2 Friday Dose :   2    Week 2 Saturday Dose :   4    Week 2 Dose Total :   24    Planned Duration :   Indefinite   Indication :   Other: Post liver transplant   Warfarin Management Comments :   Lab test performed by:  [facility] Office  [facility] [place] [address]  Phone # [phone]  Fax# [phone]   INR Range :   Other   INR Therapeutic Range :   No   Rossy Thapa RN - 1/11/2019 9:34 AM CST   Warfarin Management and Results Grid   Signs of Thrombolic :   No   Signs of Warfarin Intolerance :   No   Changes in Diet or Alcohol Intake :   No   Changes in Medication or Antibiotics :   No   Unusual Bleeding or Bruising :   No   Rectal Bleeding or Black Stools :   No   Vitamin K Food Handout :   No   Heart Valve Replacement :   No   Rossy Thapa RN - 1/11/2019 9:34 AM CST   Anticoagulation Recheck :   4 weeks   Warfarin Special Instructions :   [facility] venous INR on 1/10/19 = 1.4; per [facility] continue warfarin 2 mg M/F and 4 mg ROW; recheck 4 weeks; notified pt by phone on 1/11/19   Rossy Thapa RN - 1/11/2019 9:34 AM CST

## 2022-02-16 NOTE — NURSING NOTE
Comprehensive Intake Entered On:  6/3/2020 10:57 AM CDT    Performed On:  6/3/2020 10:55 AM CDT by Jacey Dowd CMA   Chief Complaint :   6 month check - verbal consent for telemed visit   Menstrual Status :   Postmenopausal   Jacey Dowd CMA - 6/3/2020 10:55 AM CDT   Health Status   Allergies Verified? :   Yes   Medication History Verified? :   Yes   Medical History Verified? :   Yes   Pre-Visit Planning Status :   Completed   Tobacco Use? :   Never smoker   Jacey Dowd CMA - 6/3/2020 10:55 AM CDT   ID Risk Screen   Recent Travel History :   No recent travel   Family Member Travel History :   No recent travel   Other Exposure to Infectious Disease :   Unknown   Jacey Dowd CMA - 6/3/2020 10:55 AM CDKAYY

## 2022-02-16 NOTE — TELEPHONE ENCOUNTER
---------------------  From: Sherice Green LPN (Phone Messages Pool ([id number]_[facility]))   To: [facility] Provider [place] ([id number]_[facility]);     Sent: 12/19/2019 8:15:23 AM CST  Subject: Medication Refills       Phone Message    PCP: RICHARD    Time of call: 7:56am message left    Person calling: Sherrie  Contact # : [phone]    MESSAGE: Pt calls requesting some medication refills:  1) Gabapentin 300mg 1 cap po hs #90+, last prescribed 7/26/19  2) Zolpidem 10mg 1 tab po hs prn sleep #90+1, last prescribed 9/10/19    Pt is due for annual exam.    Last visit/reason: 11/12/19 - changing skin lesion---------------------  From: Wild ENGEL, Cj PELLETIER (Advanced Practice Provider Pool ([id number]_[facility]))   To: Phone [email or web address] ([id number] [facility] [place]);     Sent: 12/19/2019 2:42:02 PM CST  Subject: RE: Medication Refills     Rxs refilledPatient notified.

## 2022-02-16 NOTE — PROGRESS NOTES
Patient:   SHERRIE DAMON            MRN: [id number]            FIN: [id number]               Age:   65 years     Sex:  Female     :  1955   Associated Diagnoses:   Chronic diarrhea; H/O liver transplant; Osteoporosis   Author:   Elijah Benitez MD      Visit Information      Date of Service: 2020 07:59 am  Performing Location: [facility]  Encounter#: [id number]      Primary Care Provider (PCP):  Elijah Benitez MD    NPI# [id number]      Referring Provider:  Elijah Benitez MD    NPI# [id number]   Visit type:  Telephone Encounter.    Source of history:  Patient.    Location of patient:  Home  Call Start Time:   1100  Call End Time:    _1115      Chief Complaint   6/3/2020 10:55 AM CDT    6 month check - verbal consent for telemed visit     _      History of Present Illness   Today's visit was conducted via telephone due to the COVID-19 pandemic. Patient's consent to telephone visit was obtained and documented.      Reason for visit:    I spoke with Sherrie via telephone visit for general follow-up.  She has still been troubled by chronic diarrhea.  She says she goes back and forth between a general bloating constipated feeling to having uncontrollable diarrhea with some incontinence.  She has addressed this with her transplant clinic.  She has had work-up including qualitative stool studies, normal C. difficile assays and normal celiac screening.  Immunosuppression use limited only to Prograf at this point.  She would like to have this further explored with gastroenterology.  Had been referred through the [facility] though had been canceled due to coronavirus pandemic.  She would like to pursue more local GI opportunities.  Also had been scheduled for colonoscopy which was additionally delayed due to pandemic.  Doing well on alendronate therapy without any issues.  Due for 1 year follow-up bone density studies later this          Review of Systems   Constitutional:  No fever, No chills.     Respiratory:  No shortness of breath, No cough.    Cardiovascular:       Chest pain: Lateral.    Gastrointestinal:  Diarrhea, Constipation.       Impression and Plan   Diagnosis     Chronic diarrhea (JTD07-DM K52.9).     H/O liver transplant (KGS56-EZ Z94.4).     Osteoporosis (SNA15-RA M81.0).        Health Status   Allergies:    Allergic Reactions (Selected)  Severity Not Documented  Dilaudid (Itching)  Nonallergic Reactions (Selected)  Unknown  HYDROmorphone (Itching)  Iron sucrose (Gi upset)  Sodium ferric gluconate complex (Other - describe in comment field)   Medications:  (Selected)   Prescriptions  Prescribed  albuterol 90 mcg/inh inhalation aerosol: 2 puff(s), NEB, qid, PRN: AS NEEDED FOR WHEEZING, # 2 EA, 1 Refill(s), Type: Maintenance, Pharmacy: [facility], 2 puff(s) NEB qid,PRN:AS NEEDED FOR WHEEZING, 65, in, 11/12/19 8:15:00 CST, Height Measured, 136.6, lb, 05/27/20 13:53:0...  alendronate 70 mg oral tablet: = 1 tab(s) ( 70 mg ), Oral, qweek, Instructions: with 6-8 oz plain water, at least 30 minutes before first food, beverage, or medication of the day, # 12 tab(s), 3 Refill(s), Type: Maintenance, Pharmacy: [facility], 1 tab(s) Oral qw...  gabapentin 300 mg oral capsule: = 1 cap(s), Oral, qhs, # 90 tab(s), 1 Refill(s), Type: Soft Stop, Pharmacy: [facility], 1 cap(s) Oral qhs, 65, in, 11/12/19 8:15:00 CST, Height Measured, 136.6, lb, 05/27/20 13:53:00 CDT, Weight Measured  pramipexole 0.25 mg oral tablet: See Instructions, Instructions: TAKE 2 TABLETS AT BEDTIME, # 180 tab(s), 1 Refill(s), Type: Maintenance, [facility], TAKE 2 TABLETS AT BEDTIME, 65, in, 11/12/19 8:15:00 CST, Height Measured, 136.6, lb, 05/27/20 13:53:00 CD...  zolpidem 10 mg oral tablet: See Instructions, Instructions: TAKE 1 TABLET ONE TIME DAILY AT BEDTIME AS NEEDED FOR SLEEP, # 90 tab(s), 1 Refill(s), Type: Soft Stop, Pharmacy: [facility], TAKE 1 TABLET ONE TIME DAILY AT BEDTIME AS NEEDED FOR SLEEP  Documented Medications  Documented  acetaminophen 500 mg oral tablet: 1-2 tab(s), Oral, q6 hrs, PRN: as needed for pain, 0 Refill(s), Type: Maintenance  amLODIPine 5 mg oral tablet: 0 Refill(s), Type: Soft Stop  aspirin 81 mg oral tablet: = 1 tab(s) ( 81 mg ), Oral, daily, 0 Refill(s), Type: Maintenance  omeprazole 20 mg oral delayed release capsule: = 1 cap(s) ( 20 mg ), PO, bid, # 90 cap(s), 0 Refill(s), Type: Maintenance  tacrolimus 1 mg oral capsule: = 3 cap(s) ( 3 mg ), Oral, q12 hrs, 0 Refill(s), Type: Maintenance,    Medications          *denotes recorded medication          *acetaminophen 500 mg oral tablet: 1-2 tab(s), Oral, q6 hrs, PRN: as needed for pain, 0 Refill(s).          albuterol 90 mcg/inh inhalation aerosol: 2 puff(s), NEB, qid, PRN: AS NEEDED FOR WHEEZING, 2 EA, 1 Refill(s).          alendronate 70 mg oral tablet: 70 mg, 1 tab(s), Oral, qweek, with 6-8 oz plain water, at least 30 minutes before first food, beverage, or medication of the day, 12 tab(s), 3 Refill(s).          *amLODIPine 5 mg oral tablet: 0 Refill(s).          *aspirin 81 mg oral tablet: 81 mg, 1 tab(s), Oral, daily, 0 Refill(s).          gabapentin 300 mg oral capsule: 1 cap(s), Oral, qhs, 90 tab(s), 1 Refill(s).          *omeprazole 20 mg oral delayed release capsule: 20 mg, 1 cap(s), PO, bid, 90 cap(s), 0 Refill(s).          pramipexole 0.25 mg oral tablet: See Instructions, TAKE 2 TABLETS AT BEDTIME, 180 tab(s), 1 Refill(s).          *tacrolimus 1 mg oral capsule: 3 mg, 3 cap(s), Oral, q12 hrs, 0 Refill(s).          zolpidem 10 mg oral tablet: See Instructions, TAKE 1 TABLET ONE TIME DAILY AT BEDTIME AS NEEDED FOR SLEEP, 90 tab(s), 1 Refill(s).       Problem list:    All Problems  Acquired thrombocytopenia / SNOMED CT 960437132 / Confirmed  Anticoagulated / SNOMED CT 865710027 / Confirmed  Duodenal ulcer / SNOMED CT 84961666 / Confirmed  End stage  liver disease / SNOMED CT 9584989147 / Confirmed  Gastric ulcer / SNOMED CT 6445988011 / Confirmed  H/O liver transplant / SNOMED CT 762006229 / Confirmed  History of cholangiocarcinoma / SNOMED CT 7638404612 / Confirmed  H/O liver cancer / SNOMED CT 5534886251 / Confirmed  Hypertension / SNOMED CT 6247214655 / Confirmed  Hypokalemia / SNOMED CT 99666457 / Confirmed  Lung mass / SNOMED CT 912272277 / Confirmed  Asthma, moderate persistent / SNOMED CT 2767994963 / Confirmed  Osteopenia / SNOMED CT 823327387 / Confirmed  Pancytopenia / SNOMED CT 641590 / Confirmed  Restless leg syndrome / SNOMED CT 93301711 / Confirmed  Fibroid uterus / SNOMED CT 298477876 / Confirmed      Histories   Past Medical History:    Active  Acquired thrombocytopenia (638001175): Onset on 10/10/2012 at 57 years.  History of cholangiocarcinoma (6224154818): Onset in 2011 at 55 years.  Asthma, moderate persistent (9233117902)  Hypertension (9434915347)  Fibroid uterus (990441995)  Osteopenia (532927139)  Restless leg syndrome (62696414)  Gastric ulcer (8781720418)  Duodenal ulcer (56786917)  Lung mass (639601612)  Comments:  11/20/2017 CST 8:44 AM CST - Debra Perez  Right  End stage liver disease (9764420798)  Hypokalemia (26527186)  Pancytopenia (759650)  Resolved  Inpatient stay (247652968): Onset on 8/30/2018 at 63 years.  Resolved on 9/12/2018 at 63 years.  Comments:  9/18/2018 CDT 2:04 PM CDT - Jess Wellington  @ [facility] for liver transplant.  Inpatient stay (361630024): Onset on 5/3/2018 at 63 years.  Resolved on 5/4/2018 at 63 years.  Comments:  5/10/2018 CDT 10:45 AM CDT - Debra Perez  [facility] [place] - Organ transplant candidate  Inpatient stay (996812065): Onset on 10/30/2017 at 62 years.  Resolved on 11/3/2017 at 62 years.  Comments:  11/20/2017 CST 8:39 AM CST - Debra Perez  @[facility], [place], [place] - Cholangiocarcinoma  Inpatient stay (049621782): Onset on 3/13/2015 at 59 years.  Resolved on  3/17/2015 at 59 years.  Comments:  2017 CST 8:39 AM Debra Wilson  @[facility], [place], [place] - Bleeding esophageal varices  Inpatient stay (641696644): Onset on 2014 at 59 years.  Resolved on 2014 at 59 years.  Comments:  2017 CST 8:38 AM Debra Wilson  @[facility], [place] - Pneumonia  Inpatient stay (663270099): Onset on 2011 at 56 years.  Resolved.  Comments:  2017 CST 8:38 AM Debra Wilson  @[facility], [place] - Chemotherapy induced nausea and vomiting  History of DVT (deep vein thrombosis) (5833014575): Onset on 2/15/2011 at 55 years.  Resolved.  Pregnancy (532917057):  Resolved on 1979 at 24 years.  Pregnancy (369068463):  Resolved on 1981 at 26 years.  History of ankle fracture (5205368549):  Resolved.  Comments:  2017 CST 8:42 AM Debra Wilson  History of arm fracture (0611948127):  Resolved.  Comments:  2017 CST 8:43 AM Debra Wilson  Right   Family History:    Hypertension  Mother ()  Sister  Osteoporosis  Mother ()  MS - Multiple sclerosis  Sister     Procedure history:    LTx - Liver transplant (3224565446) on 2018 at 63 Years.  Esophagogastroduodenoscopy (539689408) on 2018 at 62 Years.  Comments:  2018 1:14 PM Tamera Worley  Indication:  Varices, follow up.   Sedation:  MAC.  Recommendation:  Repeat in 1 year.  Pleural catheter (8224973339) on 2017 at 62 Years.  Comments:  2018 1:43 PM Tamera Worley  Right side insertion.  Thoracoscopic procedure (6219817154) on 10/30/2017 at 62 Years.  Comments:  2017 2:23 PM CST - Jacey Dowd CMA  Right Video assisted thoracoscopc surther  pleural biopsy  doxycycline pleurodesis  VATS - Video assisted thorascopic surgery (273470703) on 10/30/2017 at 62 Years.  Comments:  2017 2:59 PM CST - Tamera Green  Right-sided pleural biopsy.  Esophagogastroduodenoscopy (058738554) on 2016 at  61 Years.  Comments:  8/11/2016 8:13 AM CDT - Tamera Green  Indication:  Gastric ulcer follow up.  Sedation:  MAC.  Upper GI endoscopy (7077256451) on 6/17/2016 at 61 Years.  Comments:  6/23/2016 1:29 PM CDT - Jacey Dowd CMA  Acute gastric ulcer w/o hemorrhage or perforation, esophageal varices w/o bleeding.  Varices are scarred and not amenable to banding at this time  Esophagogastroduodenoscopy (574032120) on 6/17/2016 at 61 Years.  Esophagogastroduodenoscopy (129783937) on 4/11/2016 at 61 Years.  Comments:  4/19/2016 7:57 AM CDT - Tamera Green  Indication: Varices, follow up.  Sedation: MAC.  Impression:  H. pylori negative.  Upper GI endoscopy on 1/14/2016 at 60 Years.  Esophagogastroduodenoscopy (537882572) on 3/13/2015 at 59 Years.  Wedge resection of liver (783362075) in the month of 10/2012 at 57 Years.  Upper GI endoscopy (0529882501) on 6/8/2012 at 57 Years.  HPV - Human papillomavirus test negative (4874334485) on 5/8/2012 at 57 Years.  Comments:  5/14/2012 11:41 AM CDT - Xi Dove  Low risk for cervical cancer. OK to have pap q 3 yrs.  Upper GI endoscopy (7772740145) on 3/2/2012 at 56 Years.  Upper GI endoscopy (4561966128) on 1/5/2012 at 56 Years.  Upper GI endoscopy (9823231725) on 11/22/2011 at 56 Years.  Esophagogastroduodenoscopy (799708905) on 9/6/2011 at 56 Years.  Esophagogastroduodenoscopy (973757840) on 5/23/2011 at 56 Years.  Colonoscopy (293720484) on 2/10/2011 at 55 Years.  Esophagogastroduodenoscopy (949006475) on 2/10/2011 at 55 Years.  Comments:  7/10/2011 7:21 PM CDT - Doron Hutchison MD  Bleeding in the duodenal bulb  Biopsy of liver (175300623) on 1/28/2011 at 55 Years.  HPV - Human papillomavirus test negative (9326614548) on 4/15/2010 at 55 Years.  Comments:  4/26/2011 1:04 PM TOY - Rula TORREZ, Xi  Low risk for cervical cancer. OK to have pap q 3 yrs.  Mammogram - screening (129481347) on 4/9/2010 at 55 Years.  Mammography; bilateral (63980) on 4/9/2010 at  55 Years.  Comments:  2011 8:48 AM CST - Anne Marie JO, Ramya  Normal-no radiographic evidence for malignancy.  DEXA - Dual energy X-ray photon absorptiometry (452422009) on 2009 at 53 Years.  Comments:  4/15/2010 1:43 PM CDT - Rula GRIFFITHC, Xi  Due again in 5 yrs, ()  Tubal ligation (367324939) in  at 27 Years.   section (39979365) in  at 25 Years.  Primary repair of torn ligament of knee, collateral (08873733) in  at 16 Years.  Tonsillectomy and adenoidectomy (084961122) in  at 8 Years.  Arm fracture (818.0).  Comments:  2011 2:11 PM CDT - Nathaly Lim  Right  Arm fracture (818.0).  Comments:  2011 2:12 PM CDT - Nathaly Lim  Left  Ankle fracture, right (824.8).   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      Sexual Assessment            Sexually active: Yes.  Number of current partners 1.  Other contraceptive use: Ann Marie.        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 (h/o cholangiocarcinoma)  transplant coordinator: Abigail Parham: [phone]   - complicated by portal vein thrombosis - on warfarin - stopped therapy in 2019   - induction IS: basilixumab, maintenance: tacrolimus   - CMV -/-, EBV +/+   - doing remarkably well    .) osteoporosis, severe   - DEXA (2019): T scores ranging between -2.8 to -3.6; FRAX score 17%/6%   - previous traumatic humeral fracture after OLTx   - on calcium/vitamin D   - on alendronate 70mg qweek (begun ~2019)   - repeat DEXA in 2020; consider referral to bone endocrinologist if scores not improving    .) chronic diarrhea   - not maintained on Cellcept (had been on Cellcept initially after transplant)   - normal celiac serologic testing   - C. difficile  antigen, stool Cx, qualitative fat, WBCs: wnl   - no sustained improvement with FODMAPs diet   - referring to [facility] (previously had colonoscopy scheduled which was postponed with COVID pandemic)    .) posterior, thoracic back pains, chronic - suspicious there is some residual, late effect from previous XRT use vs. thoracic radiculopathy   - gabapentin 300mg qhs    .) recurrent, metastatic cholangiocarcinoma; followed by Dr. Langford   - originally diagnosed in 1/2011   - s/p neoadjuvant cisplatin + gemcitabine followed by bulk resection, recurrent with further debulking in 10/2012   - in 8/2013, biopsy confirmation of lung nodule as cholangiocarcinoma, s/p XRT   - most recent surveillance CT C/A/P showing no active malignancy

## 2022-02-16 NOTE — NURSING NOTE
Quick Intake Entered On:  5/27/2020 1:54 PM CDT    Performed On:  5/27/2020 1:51 PM CDT by Rossy Thapa RN               Summary   Chief Complaint :   BP, Wt   Menstrual Status :   Postmenopausal   Weight Measured :   136.6 lb(Converted to: 136 lb 10 oz, 61.96 kg)    Systolic Blood Pressure :   118 mmHg   Diastolic Blood Pressure :   78 mmHg   Mean Arterial Pressure :   91 mmHg   Peripheral Pulse Rate :   79 bpm   BP Site :   Right arm   Pulse Site :   Brachial artery   BP Method :   Electronic   HR Method :   Electronic   Rossy Thapa RN - 5/27/2020 1:53 PM CDT

## 2022-02-16 NOTE — TELEPHONE ENCOUNTER
---------------------  From: Karen Castillo CMA (Phone Messages Pool ([id number]_[facility]))   To: [facility] Message Pool ([id number]_[place]);     Sent: 2/25/2020 7:00:07 PM CST  Subject: volunteering / mantoux     Phone Message    PCP:   RICHARD      Time of Call:  1751       Person Calling:  pt  Phone number:  [phone]    Returned call at: _    Note:   Pt would like to volunteer with hospice care but they would require a mantoux test. Pt wondering if it is a good idea with liver transplant hx.     Last office visit and reason:  11/12/1 skin lesion NCB---------------------  From: Jacey Dowd CMA ([facility] Message Pool ([id number]_[facility]))   To: Elijah Benitez MD;     Sent: 2/26/2020 9:02:18 AM CST  Subject: FW: volunteering / mantoux---------------------  From: Elijah Benitez MD   To: [facility] Message Pool ([id number]_[place] [facility] [place]);     Sent: 2/26/2020 9:51:18 AM CST  Subject: RE: volunteering / mantoux     There's no danger, though Mantoux is typically not as reliable a test in immunosuppressed individuals.  She would be better served with quantiferon Gold test.contacted pt at 1033 and advised.  She will discuss with Hospice and get back to us or schedule, if needed

## 2022-02-16 NOTE — PROGRESS NOTES
Patient:   SHERRIE DAMON            MRN: [id number]            FIN: [id number]               Age:   64 years     Sex:  Female     :  1955   Associated Diagnoses:   Cholangiocarcinoma; Asthma, Moderate Persistent; Thoracic back pain; Recurrent right pleural effusion; Budd-Chiari syndrome; Hypokalemia; Obstructive liver cirrhosis   Author:   Elijah Benitez MD      Visit Information      Date of Service: 2019 10:30 am  Performing Location: [facility]  Encounter#: [id number]      Primary Care Provider (PCP):  Elijah Benitez MD    NPI# [id number]      Referring Provider:  Elijah Benitez MD    NPI# [id number]      Chief Complaint            Additional Information:No additional information recorded during visit.   Chief complaint and symptoms as noted above and confirmed with patient.  Recent lab and diagnostic studies reviewed with patient      History of Present Illness   10/12/2016: Sherrie presents to clinic with several concerns.  Describes having persistent right-sided upper thoracic back pain for some time.  She says it is around the previous radiation site for her cholangiocarcinoma.  She does have a remote history of pleural effusions, though has not occurred for a number of years.  Also describes a one-month history of dyspnea on exertion with some audible wheezing.  She has a remote history of asthma and had been maintained on a combination inhaled corticosteroid and long-acting beta agonist several years ago.  She stopped her inhalers on her own thinking they were providing much benefit.  She has been using an old prescription albuterol over recent days which has provided some temporary relief.      2018: Sherrie presents to clinic for hospital follow-up after recent admission to the [facility] for a  donor liver transplant.  She underwent orthotopic liver transplant on  which was complicated by anastomotic biliary leak as well as required abdominal closure on  postoperative day #1.  Also noted to have a postoperative portal vein thrombus.  She was treated with induction immunosuppression with basiliximab and started on tacrolimus CellCept based he was suppression regimen.  She required 11 units of packed red blood cells intraoperatively.  She was started on warfarin beginning on September 5.  Her discharge warfarin requirement was 4 mg daily.  Significant issues with postoperative nutrition.  Requiring feeding tube placement and is running on nightly tube feeds.  Appetite is been substantially reduced.  Overall feels well.  She was discharged home.  Her sister will be staying with her to help her in these perioperative days.  Scheduled to return to the [facility] this coming Monday for postoperative assessment.    7/26/2019: Sherrie returns for follow-up.  From a liver transplant standpoint she has done remarkably well.  Remains on warfarin though there is talked that she will likely stop this 1 year after transplant which would be an September.  Tolerating Prograf without issues.  Good underlying allograft function.  Primary complaint is related to irritable bowel complaints.  This precedes her transplantation and symptoms persist.  Not on CellCept therapy.  No formal diagnosis of celiac disease that she raises questions of this is possible.  Has not pursued any particular dietary restrictions including lactose or gluten free diet.         Review of Systems   Constitutional:  Weakness, Fatigue, No fever, No chills.    Eye:  Negative except as documented in history of present illness.    Ear/Nose/Mouth/Throat:  Negative except as documented in history of present illness.    Respiratory:  No shortness of breath, No cough.    Cardiovascular:  No chest pain, No palpitations, No peripheral edema, No syncope.    Gastrointestinal:  Diarrhea, No nausea, No vomiting, No abdominal pain.    Genitourinary:  No dysuria, No hematuria.    Hematology/Lymphatics:  Negative except as  documented in history of present illness.    Endocrine:  No excessive thirst, No polyuria.    Immunologic:  No recurrent fevers.    Musculoskeletal:  No joint pain, No muscle pain     Back pain: In the middle of the back.    Neurologic:  Alert and oriented X4, No numbness, No tingling, No headache.       Health Status   Allergies:    Allergic Reactions (Selected)  Severity Not Documented  Dilaudid (Itching)  Nonallergic Reactions (Selected)  Unknown  HYDROmorphone (Itching)  Iron sucrose (Gi upset)  Sodium ferric gluconate complex (Other - describe in comment field)   Medications:  (Selected)   Prescriptions  Prescribed  Ventolin HFA 90 mcg/inh inhalation aerosol: See Instructions, Instructions: INHALE 2 PUFFS FOUR TIMES DAILY AS NEEDED FOR WHEEZING, # 1 EA, 5 Refill(s), Type: Maintenance, Pharmacy: [facility]  cholestyramine 4 g/5 g oral powder for reconstitution: = 5 gm, Oral, bid, # 60 packet(s), 2 Refill(s), Type: Maintenance, Pharmacy: [facility], 5 gm Oral bid  gabapentin 300 mg oral capsule: = 1 cap(s), Oral, qhs, # 90 tab(s), 1 Refill(s), Type: Soft Stop, Pharmacy: [facility], keep on file and pt will notify when needed, 1 cap(s) Oral qhs  pramipexole 0.25 mg oral tablet: = 2 tab(s), Oral, qhs, # 180 tab(s), 1 Refill(s), Type: Soft Stop, Pharmacy: [facility], keep on hold and pt will notify when needed, 2 tab(s) Oral qhs  warfarin 2 mg oral tablet: See Instructions, Instructions: take one tab daily 3 days a week and 2 tabs daily 4 days a week-OR AS DIRECTED BY PROVIDER, # 130 tab(s), 1 Refill(s), Type: Maintenance, Pharmacy: [facility], take one tab daily 3 days a week a...  zolpidem 10 mg oral tablet: See Instructions, Instructions: TAKE 1 TABLET ONE TIME DAILY AT BEDTIME AS NEEDED FOR SLEEP, # 90 tab(s), 1 Refill(s), Type: Soft Stop, Pharmacy: [facility]  Documented  Medications  Documented  acetaminophen 500 mg oral tablet: 1-2 tab(s), Oral, q6 hrs, PRN: as needed for pain, 0 Refill(s), Type: Maintenance  aspirin 81 mg oral tablet: = 1 tab(s) ( 81 mg ), Oral, daily, 0 Refill(s), Type: Maintenance  omeprazole 20 mg oral delayed release capsule: = 2 cap(s) ( 40 mg ), PO, bid, # 90 cap(s), 0 Refill(s), Type: Maintenance  tacrolimus 1 mg oral capsule: See Instructions, Instructions: 4mg AM/ 5mg PM, 0 Refill(s), Type: Maintenance,    Medications          *denotes recorded medication          *acetaminophen 500 mg oral tablet: 1-2 tab(s), Oral, q6 hrs, PRN: as needed for pain, 0 Refill(s).          Ventolin HFA 90 mcg/inh inhalation aerosol: See Instructions, INHALE 2 PUFFS FOUR TIMES DAILY AS NEEDED FOR WHEEZING, 1 EA, 5 Refill(s).          *aspirin 81 mg oral tablet: 81 mg, 1 tab(s), Oral, daily, 0 Refill(s).          cholestyramine 4 g/5 g oral powder for reconstitution: 5 gm, Oral, bid, 60 packet(s), 2 Refill(s).          gabapentin 300 mg oral capsule: 1 cap(s), Oral, qhs, 90 tab(s), 1 Refill(s).          *omeprazole 20 mg oral delayed release capsule: 40 mg, 2 cap(s), PO, bid, 90 cap(s), 0 Refill(s).          pramipexole 0.25 mg oral tablet: 2 tab(s), Oral, qhs, 180 tab(s), 1 Refill(s).          *tacrolimus 1 mg oral capsule: See Instructions, 4mg AM/ 5mg PM, 0 Refill(s).          warfarin 2 mg oral tablet: See Instructions, take one tab daily 3 days a week and 2 tabs daily 4 days a week-OR AS DIRECTED BY PROVIDER, 130 tab(s), 1 Refill(s).          zolpidem 10 mg oral tablet: See Instructions, TAKE 1 TABLET ONE TIME DAILY AT BEDTIME AS NEEDED FOR SLEEP, 90 tab(s), 1 Refill(s).     Problem list:    All Problems  Acquired thrombocytopenia / SNOMED CT 790161464 / Confirmed  Anticoagulated / SNOMED CT 320949700 / Confirmed  Duodenal ulcer / SNOMED CT 86244054 / Confirmed  End stage liver disease / SNOMED CT 9887987728 / Confirmed  Gastric ulcer / SNOMED CT 4822927998 /  Confirmed  History of ankle fracture / SNOMED CT 8131847607 / Confirmed  History of arm fracture / SNOMED CT 0902228507 / Confirmed  H/O liver transplant / SNOMED CT 829968983 / Confirmed  History of liver cancer / SNOMED CT 1444858703 / Confirmed  History of cholangiocarcinoma / SNOMED CT 4193660895 / Confirmed  Hypertension / SNOMED CT 1323802982 / Confirmed  Hypokalemia / SNOMED CT 05287172 / Confirmed  Lung mass / SNOMED CT 857859681 / Confirmed  Asthma, moderate persistent / SNOMED CT 2355730615 / Confirmed  Osteopenia / SNOMED CT 817615519 / Confirmed  Pancytopenia / SNOMED CT 556051 / Confirmed  Restless leg syndrome / SNOMED CT 57013692 / Confirmed  Fibroid uterus / SNOMED CT 997058376 / Confirmed  Inactive: Anticoagulated / SNOMED CT 34008785  Resolved: History of DVT (deep vein thrombosis) / SNOMED CT 0910805918  Resolved: Inpatient stay / SNOMED CT 609410200  Resolved: Inpatient stay / SNOMED CT 414469439  Resolved: Inpatient stay / SNOMED CT 706951597  Resolved: Inpatient stay / SNOMED CT 172114724  Resolved: Inpatient stay / SNOMED CT 019211077  Resolved: Inpatient stay / SNOMED CT 777231270  Resolved: Pregnancy / SNOMED CT 494736443  Resolved: Pregnancy / SNOMED CT 368681424  Canceled: Choliangiocarcinoma      Histories   Past Medical History:    Active  Acquired thrombocytopenia (952174901): Onset on 10/10/2012 at 57 years.  History of cholangiocarcinoma (7873219357): Onset in 2011 at 55 years.  Asthma, moderate persistent (5427056974)  Hypertension (0866942811)  Fibroid uterus (900144709)  Osteopenia (785773262)  Restless leg syndrome (86185037)  Gastric ulcer (1856455610)  History of ankle fracture (9742143744)  Comments:  11/20/2017 CST 8:42 AM Debra Wilson  Right  Duodenal ulcer (12187066)  Lung mass (192224623)  Comments:  11/20/2017 CST 8:44 AM Debra Wilson  Right  History of arm fracture (4725515375)  Comments:  11/20/2017 CST 8:43 AM Debra Wilson  Right  History of liver  cancer (6003874964)  End stage liver disease (5447942020)  Hypokalemia (55397802)  Pancytopenia (788405)  Resolved  Inpatient stay (180369644): Onset on 2018 at 63 years.  Resolved on 2018 at 63 years.  Comments:  2018 CDT 2:04 PM CDT - Eliz Jess  @ [facility] for liver transplant.  Inpatient stay (648109657): Onset on 5/3/2018 at 63 years.  Resolved on 2018 at 63 years.  Comments:  5/10/2018 CDT 10:45 AM CDT - Debra Perez  @[place], [place] - Organ transplant candidate  Inpatient stay (108838889): Onset on 10/30/2017 at 62 years.  Resolved on 11/3/2017 at 62 years.  Comments:  2017 CST 8:39 AM Debra Wilson  @[place], [place] - Cholangiocarcinoma  Inpatient stay (611249171): Onset on 3/13/2015 at 59 years.  Resolved on 3/17/2015 at 59 years.  Comments:  2017 CST 8:39 AM Debra Wilson  @[place], [place] - Bleeding esophageal varices  Inpatient stay (548058131): Onset on 2014 at 59 years.  Resolved on 2014 at 59 years.  Comments:  2017 CST 8:38 AM Debra Wilson  @[facility], [place] - Pneumonia  Inpatient stay (602727560): Onset on 2011 at 56 years.  Resolved.  Comments:  2017 CST 8:38 AM Debra Wilson  @[facility], [place] - Chemotherapy induced nausea and vomiting  History of DVT (deep vein thrombosis) (3743754844): Onset on 2/15/2011 at 55 years.  Resolved.  Pregnancy (698324280):  Resolved on 1979 at 24 years.  Pregnancy (066557316):  Resolved on 1981 at 26 years.   Family History:    Hypertension  Mother ()  Sister  Osteoporosis  Mother ()  MS - Multiple sclerosis  Sister     Procedure history:    LTx - Liver transplant (5606553992) on 2018 at 63 Years.  Esophagogastroduodenoscopy (814889826) on 2018 at 62 Years.  Comments:  2018 1:14 PM REYNA - Tamera Green  Indication:  Varices, follow up.   Sedation:   MAC.  Recommendation:  Repeat in 1 year.  Pleural catheter (3738548032) on 12/21/2017 at 62 Years.  Comments:  1/18/2018 1:43 PM Tamera Worley  Right side insertion.  Thoracoscopic procedure (9370343700) on 10/30/2017 at 62 Years.  Comments:  11/7/2017 2:23 PM CST - Jacey Dowd CMA  Right Video assisted thoracoscopc surther  pleural biopsy  doxycycline pleurodesis  VATS - Video assisted thorascopic surgery (471624497) on 10/30/2017 at 62 Years.  Comments:  11/16/2017 2:59 PM Tamera Worley  Right-sided pleural biopsy.  Esophagogastroduodenoscopy (109722614) on 8/9/2016 at 61 Years.  Comments:  8/11/2016 8:13 AM Tamera Velazquez  Indication:  Gastric ulcer follow up.  Sedation:  MAC.  Upper GI endoscopy (9017895723) on 6/17/2016 at 61 Years.  Comments:  6/23/2016 1:29 PM CDT - Jacey Dowd CMA  Acute gastric ulcer w/o hemorrhage or perforation, esophageal varices w/o bleeding.  Varices are scarred and not amenable to banding at this time  Esophagogastroduodenoscopy (724974347) on 6/17/2016 at 61 Years.  Esophagogastroduodenoscopy (592868603) on 4/11/2016 at 61 Years.  Comments:  4/19/2016 7:57 AM Tamera Velazquez  Indication: Varices, follow up.  Sedation: MAC.  Impression:  H. pylori negative.  Upper GI endoscopy on 1/14/2016 at 60 Years.  Esophagogastroduodenoscopy (197673014) on 3/13/2015 at 59 Years.  Wedge resection of liver (710260681) in the month of 10/2012 at 57 Years.  Upper GI endoscopy (8587823677) on 6/8/2012 at 57 Years.  HPV - Human papillomavirus test negative (2743153980) on 5/8/2012 at 57 Years.  Comments:  5/14/2012 11:41 AM ELLENT - Rula TORREZ, Xi  Low risk for cervical cancer. OK to have pap q 3 yrs.  Upper GI endoscopy (7013358794) on 3/2/2012 at 56 Years.  Upper GI endoscopy (1195481187) on 1/5/2012 at 56 Years.  Upper GI endoscopy (6442775527) on 11/22/2011 at 56 Years.  Esophagogastroduodenoscopy (268383201) on 9/6/2011 at 56 Years.  Esophagogastroduodenoscopy  (789009976) on 2011 at 56 Years.  Colonoscopy (056622133) on 2/10/2011 at 55 Years.  Esophagogastroduodenoscopy (472881546) on 2/10/2011 at 55 Years.  Comments:  7/10/2011 7:21 PM CDT - Doron Hutchison MD  Bleeding in the duodenal bulb  Biopsy of liver (067041944) on 2011 at 55 Years.  HPV - Human papillomavirus test negative (0138152054) on 4/15/2010 at 55 Years.  Comments:  2011 1:04 PM ELLENT - Xi Dove  Low risk for cervical cancer. OK to have pap q 3 yrs.  Mammogram - screening (243134461) on 2010 at 55 Years.  Mammography; bilateral (83324) on 2010 at 55 Years.  Comments:  2011 8:48 AM CST - Ramya Kenney CMA  Normal-no radiographic evidence for malignancy.  DEXA - Dual energy X-ray photon absorptiometry (597547769) on 2009 at 53 Years.  Comments:  4/15/2010 1:43 PM ELLENT - Xi Dove  Due again in 5 yrs, ()  Tubal ligation (545492750) in  at 27 Years.   section (17386158) in  at 25 Years.  Primary repair of torn ligament of knee, collateral (03807537) in  at 16 Years.  Tonsillectomy and adenoidectomy (501879635) in  at 8 Years.  Arm fracture (818.0).  Comments:  2011 2:11 PM TOY - Nathaly Lim  Right  Arm fracture (818.0).  Comments:  2011 2:12 PM TOY - Nathaly Lim  Left  Ankle fracture, right (824.8).   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      Sexual Assessment            Sexually active: Yes.  Number of current partners 1.  Other contraceptive use: Ann Marie.      Physical Examination   vital signs stable, as noted above   VS/Measurements   General:  Alert and oriented, No acute distress.    Eye:  Extraocular movements are intact.    HENT:  Normocephalic, Oral mucosa is moist.    Neck:  Supple.    Respiratory:   Lungs are clear to auscultation, Respirations are non-labored, Breath sounds are equal, No chest wall tenderness, diminished breath sounds at right base; no pleural rub.    Cardiovascular:  Normal rate, Regular rhythm.    Gastrointestinal:  Soft, Non-tender, Non-distended,   V-shaped abdominal incision scar.    Musculoskeletal:  Normal range of motion, Normal strength.    Neurologic:  Alert, Oriented, Normal motor function, No focal deficits.    Cognition and Speech:  Oriented, Speech clear and coherent.    Psychiatric:  Appropriate mood & affect.       Review / Management   Results review:  Lab results   7/26/2019 11:33 AM CDT Immunoglobulin IgA 153 mg/dL    TSH 1.49 mIU/L    Celiac Disease Interp See comment    Tissue Transglutaminase IgA 1 unit/mL   5/17/2019 4:21 PM CDT INR 1.5   4/25/2019 10:33 AM CDT INR 1.8   .       Impression and Plan   Diagnosis     Cholangiocarcinoma (OZW43-UJ C22.1).     Asthma, Moderate Persistent (GLQ61-OW J45.40).     Thoracic back pain (TIW09-KQ M54.6).     Recurrent right pleural effusion (JUL69-BM J90).     Budd-Chiari syndrome (FYI39-PW I82.0).     Hypokalemia (KXM40-XP E87.6).     Obstructive liver cirrhosis (YPT74-MN K74.60).           .)  OLTx (8/30/2018) at [facility] for Budd-Chiari syndrome and previous partial hepatectomy (h/o cholangiocarcinoma)  transplant coordinator: Abigail Parham: [phone]   - complicated by portal vein thrombosis - on warfarin - tentative plans to stop therapy 9/2020   - induction IS: basilixumab, maintenance: tacrolimus   - CMV -/-, EBV +/+   - doing remarkably well    .) chronic diarrhea   - not maintained on Cellcept (had been on Cellcept initially after transplant)   - normal celiac serologic testing   - will process C. difficile antigen, stool Cx, qualitative fat, WBCs   - advised to trial FODMAPs diet; consider trial of lactose free therapy   - Rx for cholestyramine trial   - trial Imodium prn    .) posterior, thoracic back pains, chronic - suspicious  there is some residual, late effect from previous XRT use vs. thoracic radiculopathy   - gabapentin 300mg qhs    .) recurrent, metastatic cholangiocarcinoma; followed by Dr. Langford   - originally diagnosed in 1/2011   - s/p neoadjuvant cisplatin + gemcitabine followed by bulk resection, recurrent with further debulking in 10/2012   - in 8/2013, biopsy confirmation of lung nodule as cholangiocarcinoma, s/p XRT   - most recent surveillance CT C/A/P showing no active malignancy    .) health maintenance   - arrange for mammo and DEXA   - should discuss Shingrix through transplant center     close f/u through [facility]    RTC annually

## 2022-02-16 NOTE — LETTER
(Inserted Image. Unable to display)            August 27, 2021      SHERRIE DAMON      [address] [place] [id number]        Dear SHERRIE,    Thank you for selecting [facility] for your healthcare needs.  Below you will find the results of the recent tests done at our clinic.      Pre op labs are all acceptable, Sherrie. These will go to your surgeon as well.  Take care.      Result Name Current Result Previous Result Reference Range   Sodium Level (mmol/L)  140 8/26/2021  139 5/27/2020 135 - 146   Potassium Level (mmol/L)  4.4 8/26/2021  4.9 5/27/2020 3.5 - 5.3   Chloride Level (mmol/L)  104 8/26/2021  106 5/27/2020 98 - 110   CO2 Level (mmol/L)  29 8/26/2021  27 5/27/2020 20 - 32   Glucose Level (mg/dL)  91 8/26/2021  96 5/27/2020 65 - 99   BUN (mg/dL) ((H)) 30 8/26/2021  25 5/27/2020 7 - 25   Creatinine Level (mg/dL) ((H)) 1.07 8/26/2021 ((H)) 1.27 5/27/2020 0.50 - 0.99   BUN/Creat Ratio ((H)) 28 8/26/2021  20 5/27/2020 6 - 22   eGFR (mL/min/1.73m2) ((L)) 55 8/26/2021 ((L)) 45 5/27/2020 > OR = 60 -    eGFR  (mL/min/1.73m2)  64 8/26/2021 ((L)) 52 5/27/2020 > OR = 60 -    Calcium Level (mg/dL)  9.1 8/26/2021  8.7 5/27/2020 8.6 - 10.4   Bilirubin Total (mg/dL)  0.6 8/26/2021  0.7 1/7/2021 0.2 - 1.2   Alkaline Phosphatase (unit/L)  71 8/26/2021  64 1/7/2021 37 - 153   AST/SGOT (unit/L)  10 8/26/2021 ((L)) 9 1/7/2021 10 - 35   ALT/SGPT (unit/L)  8 8/26/2021  8 1/7/2021 6 - 29   Protein Total (gm/dL)  6.6 8/26/2021  6.6 1/7/2021 6.1 - 8.1   Albumin Level (gm/dL)  4.2 8/26/2021  4.4 1/7/2021 3.6 - 5.1   Globulin  2.4 8/26/2021  2.2 1/7/2021 1.9 - 3.7   A/G Ratio  1.8 8/26/2021  2.0 1/7/2021 1.0 - 2.5   WBC  5.5 8/26/2021  5.5 4/21/2021 3.8 - 10.8   RBC  4.26 8/26/2021  3.90 4/21/2021 3.80 - 5.10   Hgb (gm/dL)  12.9 8/26/2021 ((L)) 11.1 4/21/2021 11.7 - 15.5   Hct (%)  37.6 8/26/2021  35.1 4/21/2021 35.0 - 45.0   MCV (fL)  88.3 8/26/2021  90.0 4/21/2021 80.0 - 100.0   MCH (pg)  30.3  8/26/2021  28.5 4/21/2021 27.0 - 33.0   MCHC (gm/dL)  34.3 8/26/2021 ((L)) 31.6 4/21/2021 32.0 - 36.0   RDW (%)  13.4 8/26/2021  13.3 4/21/2021 11.0 - 15.0   Platelet  154 8/26/2021  302 4/21/2021 140 - 400   MPV (fL)  9.2 8/26/2021  10.1 4/21/2021 7.5 - 12.5       Please contact me or my assistant at [phone] if you have any questions or concerns.     Sincerely,        JADA Kitchen-NP  Family Nurse Practitioner      What do your labs mean?  Below is a glossary of commonly ordered labs:  LDL   Bad Cholesterol   HDL   Good Cholesterol  AST/ALT   Liver Function   Cr/Creatinine   Kidney Function  Microalbumin   Kidney Function  BUN   Kidney Function  PSA   Prostate    TSH   Thyroid Hormone  HgbA1c   Diabetes Test   Hgb (Hemoglobin)   Red Blood Cells  WBC   White Blood Cell Count

## 2022-02-16 NOTE — TELEPHONE ENCOUNTER
---------------------  From: Angélica Aldana CMA (Phone Messages Pool ([id number]_[facility]))   To: [facility] Message Pool ([id number] [facility] [place]);     Sent: 2/20/2019 10:32:58 AM CST !  Subject: Phone Note: referral     Phone Message    PCP:   RICHARD      Time of Call:  10:27 am       Person Calling:  VANESSA Trinh from [facility]  Phone number:  [phone]    Note:   Parisa calls stating that pt was seen at the ER with a broken arm (impacted displaced proximal humerus) and will need follow up with an orthopedic surgeon this week. She is wondering who you recommend to refer her to, as pt isn't wanting to be referred to their ortho providers.   She states she is going to hold patient there until hearing back from you.---------------------  From: Jacey Dowd CMA ([facility] Message Pool ([id number]_[facility]))   To: Elijah Benitez MD;     Sent: 2/20/2019 10:37:54 AM CST  Subject: FW: Phone Note: referral     RACHAEL    talked with Cathi in referrals and she will call to set up an appt and I will notify ER with date/time  pt will bring CD to apptappt scheduled with Dr Alvarez 2/21 at 2pm tomorrow in [place].  To check in by 130p.  info given to Parisa and she will be faxing us and Dr Alvarez her note.  Pt has a copy of her CD that she will hand carry with her

## 2022-02-16 NOTE — PROGRESS NOTES
Patient:   SHERRIE DAMON            MRN: [id number]            FIN: [id number]               Age:   65 years     Sex:  Female     :  1955   Associated Diagnoses:   Chronic diarrhea; H/O liver transplant; Osteoporosis   Author:   Elijah Benitez MD      Visit Information      Date of Service: 2020 06:40 am  Performing Location: [facility]  Encounter#: [id number]      Primary Care Provider (PCP):  Elijah Benitez MD    NPI# [id number]      Referring Provider:  Elijah Benitez MD    NPI# [id number]   Visit type:  Telephone Encounter.    Source of history:  Patient.    Location of patient:  Home  Call Start Time:   1145  Call End Time:    _1200      Chief Complaint   2020 11:29 AM CST  f/u chronic - med refills     _      History of Present Illness   Today's visit was conducted via telephone due to the COVID-19 pandemic. Patient's consent to telephone visit was obtained and documented.      Reason for visit:    I spoke to Sherrie via telephone visit for general follow-up.  Had worked with gastroenterologist this fall related to diarrheal complaints.  Her diagnostic work-up was unremarkable and was advised multiple dietary intervention changes.  She does feel like this has been working for her.  She did reach out to her transplant center due to complaints of increased abdominal distention.  She raised concerns whether her liver could be growing.  I reassured her this was not possible though she should contact transplant center for further input.  Has been socially distancing though having regular contact with her daughter's family.  No known Covid exposures.  No infectious complications         Review of Systems   Constitutional:  No fever, No chills.    Respiratory:  No shortness of breath, No cough.    Cardiovascular:       Chest pain: Lateral.    Gastrointestinal:  Diarrhea, Constipation.       Impression and Plan   Diagnosis     Chronic diarrhea (AIA55-HJ K52.9).     H/O liver transplant (PJS85-ZN  Z94.4).     Osteoporosis (UQF02-JC M81.0).        Health Status   Allergies:    Allergic Reactions (Selected)  Severity Not Documented  Dilaudid (Itching)  Nonallergic Reactions (Selected)  Unknown  HYDROmorphone (Itching)  Iron sucrose (Gi upset)  Sodium ferric gluconate complex (Other - describe in comment field)   Medications:  (Selected)   Prescriptions  Prescribed  Albuterol (Eqv-ProAir HFA) 90 mcg/inh inhalation aerosol: See Instructions, Instructions: INHALE 2 PUFFS FOUR TIMES DAILY AS NEEDED FOR WHEEZING, # 3 EA, 1 Refill(s), Type: Maintenance, Pharmacy: [facility], 65, in, 11/12/19 8:15:00 CST, Height Measured, 133.8, lb, 06/30/20 16:37:00 CDT, W...  alendronate 70 mg oral tablet: = 1 tab(s) ( 70 mg ), Oral, qweek, Instructions: with 6-8 oz plain water, at least 30 minutes before first food, beverage, or medication of the day, # 12 tab(s), 3 Refill(s), Type: Maintenance, Pharmacy: [facility], 1 tab(s) Oral qw...  gabapentin 300 mg oral capsule: = 1 cap(s) ( 300 mg ), Oral, hs, # 30 cap(s), 0 Refill(s), Type: Maintenance, Pharmacy: [facility], Appt due for further refills, 1 cap(s) Oral hs, 65, in, 11/12/19 8:15:00 CST, Height Measured, 133.8, lb, 06/30/20 16:37:00 CDT, Lamine...  omeprazole 40 mg oral delayed release capsule: = 1 cap(s) ( 40 mg ), Oral, daily, # 90 cap(s), 1 Refill(s), Type: Maintenance, Pharmacy: [facility], 1 cap(s) Oral daily, 65, in, 11/12/19 8:15:00 CST, Height Measured, 136.6, lb, 05/27/20 13:53:00 CDT, Weight Measured  pramipexole 0.25 mg oral tablet: = 3 tab(s) ( 0.75 mg ), Oral, hs, # 270 tab(s), 0 Refill(s), Type: Maintenance, Pharmacy: [facility], increased dose, 3 tab(s) Oral hs, 65, in, 11/12/19 8:15:00 CST, Height Measured, 133.8, lb, 06/30/20 16:37:00 CDT, Weight Measured...  zolpidem 10 mg oral tablet: See Instructions, Instructions: TAKE 1 TABLET ONE TIME DAILY AT BEDTIME AS NEEDED  FOR SLEEP, # 90 tab(s), 1 Refill(s), Type: Soft Stop, Pharmacy: [facility], TAKE 1 TABLET ONE TIME DAILY AT BEDTIME AS NEEDED FOR SLEEP, 65, in, 11/12...  Documented Medications  Documented  acetaminophen 500 mg oral tablet: 1-2 tab(s), Oral, q6 hrs, PRN: as needed for pain, 0 Refill(s), Type: Maintenance  amLODIPine 5 mg oral tablet: 0 Refill(s), Type: Soft Stop  aspirin 81 mg oral tablet: = 1 tab(s) ( 81 mg ), Oral, daily, 0 Refill(s), Type: Maintenance  tacrolimus 1 mg oral capsule: = 3 cap(s) ( 3 mg ), Oral, q12 hrs, 0 Refill(s), Type: Maintenance,    Medications          *denotes recorded medication          *acetaminophen 500 mg oral tablet: 1-2 tab(s), Oral, q6 hrs, PRN: as needed for pain, 0 Refill(s).          Albuterol (Eqv-ProAir HFA) 90 mcg/inh inhalation aerosol: See Instructions, INHALE 2 PUFFS FOUR TIMES DAILY AS NEEDED FOR WHEEZING, 3 EA, 1 Refill(s).          alendronate 70 mg oral tablet: 70 mg, 1 tab(s), Oral, qweek, with 6-8 oz plain water, at least 30 minutes before first food, beverage, or medication of the day, 12 tab(s), 3 Refill(s).          *amLODIPine 5 mg oral tablet: 0 Refill(s).          *aspirin 81 mg oral tablet: 81 mg, 1 tab(s), Oral, daily, 0 Refill(s).          gabapentin 300 mg oral capsule: 300 mg, 1 cap(s), Oral, hs, 30 cap(s), 0 Refill(s).          omeprazole 40 mg oral delayed release capsule: 40 mg, 1 cap(s), Oral, daily, 90 cap(s), 1 Refill(s).          pramipexole 0.25 mg oral tablet: 0.75 mg, 3 tab(s), Oral, hs, 270 tab(s), 0 Refill(s).          *tacrolimus 1 mg oral capsule: 3 mg, 3 cap(s), Oral, q12 hrs, 0 Refill(s).          zolpidem 10 mg oral tablet: See Instructions, TAKE 1 TABLET ONE TIME DAILY AT BEDTIME AS NEEDED FOR SLEEP, 90 tab(s), 1 Refill(s).       Problem list:    All Problems  Acquired thrombocytopenia / SNOMED CT 577755816 / Confirmed  Anticoagulated / SNOMED CT 973284686 / Confirmed  Duodenal ulcer / SNOMED CT 89069043 / Confirmed  End  stage liver disease / SNOMED CT 4392112914 / Confirmed  Gastric ulcer / SNOMED CT 7738674697 / Confirmed  H/O liver transplant / SNOMED CT 475342727 / Confirmed  History of cholangiocarcinoma / SNOMED CT 3696182355 / Confirmed  H/O liver cancer / SNOMED CT 1368582662 / Confirmed  Hypertension / SNOMED CT 3991159814 / Confirmed  Hypokalemia / SNOMED CT 78447406 / Confirmed  Lung mass / SNOMED CT 715869887 / Confirmed  Asthma, moderate persistent / SNOMED CT 7010522639 / Confirmed  Osteopenia / SNOMED CT 849706610 / Confirmed  Pancytopenia / SNOMED CT 681850 / Confirmed  Restless leg syndrome / SNOMED CT 22235109 / Confirmed  Fibroid uterus / SNOMED CT 630126553 / Confirmed      Histories   Past Medical History:    Active  Acquired thrombocytopenia (138172173): Onset on 10/10/2012 at 57 years.  History of cholangiocarcinoma (5169297993): Onset in 2011 at 55 years.  Asthma, moderate persistent (2483224405)  Hypertension (6675568448)  Fibroid uterus (112697697)  Osteopenia (572826045)  Restless leg syndrome (92754795)  Gastric ulcer (3318518426)  Duodenal ulcer (44369978)  Lung mass (863714163)  Comments:  11/20/2017 CST 8:44 AM CST - Debra Perez  Right  End stage liver disease (2149689059)  Hypokalemia (80345835)  Pancytopenia (053213)  Resolved  Inpatient stay (555574986): Onset on 8/30/2018 at 63 years.  Resolved on 9/12/2018 at 63 years.  Comments:  9/18/2018 CDT 2:04 PM CDT - Jess Wellington  @ [facility] for liver transplant.  Inpatient stay (010189290): Onset on 5/3/2018 at 63 years.  Resolved on 5/4/2018 at 63 years.  Comments:  5/10/2018 CDT 10:45 AM CDT - Debra Perez  @[facility] [place] - Organ transplant candidate  Inpatient stay (799137761): Onset on 10/30/2017 at 62 years.  Resolved on 11/3/2017 at 62 years.  Comments:  11/20/2017 CST 8:39 AM CST - Debra Perez  @[facility], [place], [place] - Cholangiocarcinoma  Inpatient stay (179091856): Onset on 3/13/2015 at 59 years.   Resolved on 3/17/2015 at 59 years.  Comments:  2017 CST 8:39 AM Debra Wilson  @[facility], [place], [place] - Bleeding esophageal varices  Inpatient stay (239296395): Onset on 2014 at 59 years.  Resolved on 2014 at 59 years.  Comments:  2017 CST 8:38 AM Debra Wilson  @[facility], [place] - Pneumonia  Inpatient stay (365235448): Onset on 2011 at 56 years.  Resolved.  Comments:  2017 CST 8:38 AM Debra Wilson  @[facility], [place] - Chemotherapy induced nausea and vomiting  History of DVT (deep vein thrombosis) (3438830810): Onset on 2/15/2011 at 55 years.  Resolved.  Pregnancy (598816912):  Resolved on 1979 at 24 years.  Pregnancy (987285076):  Resolved on 1981 at 26 years.  History of ankle fracture (6605935980):  Resolved.  Comments:  2017 CST 8:42 AM Debra Wilson  Right  History of arm fracture (5825663988):  Resolved.  Comments:  2017 CST 8:43 AM Debra Wilson  Right   Family History:    Hypertension  Mother ()  Sister  Osteoporosis  Mother ()  MS - Multiple sclerosis  Sister     Procedure history:    Esophagogastroduodenoscopy (636882851) on 2020 at 65 Years.  Comments:  2020 1:49 PM Tamera Velazquez  Indication: Diarrhea.  Colonoscopy (912055037) on 2020 at 65 Years.  Comments:  2020 1:48 PM Tamera Velazquez  Indication: Change in bowel habits.  Sedation: MAC.  Findings: One 10 mm sessile polyp in ascending colon.  Recommendation: Repeat in 3 years.  LTx - Liver transplant (3445455666) on 2018 at 63 Years.  Esophagogastroduodenoscopy (023369425) on 2018 at 62 Years.  Comments:  2018 1:14 PM Tamera Worley  Indication:  Varices, follow up.   Sedation:  MAC.  Recommendation:  Repeat in 1 year.  Pleural catheter (2788469466) on 2017 at 62 Years.  Comments:  2018 1:43 PM CST - Tamera Green  Right side insertion.  Thoracoscopic  procedure (3377777348) on 10/30/2017 at 62 Years.  Comments:  11/7/2017 2:23 PM REYNA - Nile OSORIOJacey  Right Video assisted thoracoscopc surther  pleural biopsy  doxycycline pleurodesis  VATS - Video assisted thorascopic surgery (127405500) on 10/30/2017 at 62 Years.  Comments:  11/16/2017 2:59 PM Tamera Worley  Right-sided pleural biopsy.  Esophagogastroduodenoscopy (395966609) on 8/9/2016 at 61 Years.  Comments:  8/11/2016 8:13 AM Tamera Velazquez  Indication:  Gastric ulcer follow up.  Sedation:  MAC.  Upper GI endoscopy (0805599638) on 6/17/2016 at 61 Years.  Comments:  6/23/2016 1:29 PM ELLENT - iNle OSORIOChantee  Acute gastric ulcer w/o hemorrhage or perforation, esophageal varices w/o bleeding.  Varices are scarred and not amenable to banding at this time  Esophagogastroduodenoscopy (321699883) on 6/17/2016 at 61 Years.  Esophagogastroduodenoscopy (422114615) on 4/11/2016 at 61 Years.  Comments:  4/19/2016 7:57 AM Tamera Velazquez  Indication: Varices, follow up.  Sedation: MAC.  Impression:  H. pylori negative.  Upper GI endoscopy on 1/14/2016 at 60 Years.  Esophagogastroduodenoscopy (651998042) on 3/13/2015 at 59 Years.  Wedge resection of liver (625173436) in the month of 10/2012 at 57 Years.  Upper GI endoscopy (7041747768) on 6/8/2012 at 57 Years.  HPV - Human papillomavirus test negative (4044635152) on 5/8/2012 at 57 Years.  Comments:  5/14/2012 11:41 AM TOY - Xi Dove  Low risk for cervical cancer. OK to have pap q 3 yrs.  Upper GI endoscopy (6677884508) on 3/2/2012 at 56 Years.  Upper GI endoscopy (5406995198) on 1/5/2012 at 56 Years.  Upper GI endoscopy (1569641429) on 11/22/2011 at 56 Years.  Esophagogastroduodenoscopy (701674318) on 9/6/2011 at 56 Years.  Esophagogastroduodenoscopy (312425007) on 5/23/2011 at 56 Years.  Colonoscopy (294210756) on 2/10/2011 at 55 Years.  Esophagogastroduodenoscopy (334086053) on 2/10/2011 at 55 Years.  Comments:  7/10/2011 7:21 PM CDT -  Doron Hutchison MD  Bleeding in the duodenal bulb  Biopsy of liver (862854064) on 2011 at 55 Years.  HPV - Human papillomavirus test negative (1396813426) on 4/15/2010 at 55 Years.  Comments:  2011 1:04 PM CDT - Xi Dove  Low risk for cervical cancer. OK to have pap q 3 yrs.  Mammogram - screening (946985544) on 2010 at 55 Years.  Mammography; bilateral (67540) on 2010 at 55 Years.  Comments:  2011 8:48 AM CST - Anne Marie OSORIO, Ramya  Normal-no radiographic evidence for malignancy.  DEXA - Dual energy X-ray photon absorptiometry (777968532) on 2009 at 53 Years.  Comments:  4/15/2010 1:43 PM ELLENT - Xi Dove  Due again in 5 yrs, ()  Tubal ligation (186679713) in  at 27 Years.   section (62375915) in  at 25 Years.  Primary repair of torn ligament of knee, collateral (90847657) in  at 16 Years.  Tonsillectomy and adenoidectomy (298742813) in  at 8 Years.  Arm fracture (818.0).  Comments:  2011 2:11 PM TOY - Nathaly Lim  Right  Arm fracture (818.0).  Comments:  2011 2:12 PM TOY - Nathaly Lim  Left  Ankle fracture, right (824.8).   Social History:        Electronic Cigarette/Vaping Assessment            Electronic Cigarette Use: Never.      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      Sexual Assessment            Sexually active: Yes.  Number of current partners 1.  Other contraceptive use: Ann Marie.        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 (h/o cholangiocarcinoma)  transplant coordinator: Abigail Parham: [phone]   - complicated by portal vein thrombosis - previously on warfarin - stopped therapy in 2019   - induction IS: basilixumab, maintenance: tacrolimus   - CMV -/-, EBV  +/+   - doing remarkably well    .) osteoporosis, severe   - DEXA (8/2019): T scores ranging between -2.8 to -3.6; FRAX score 17%/6%   - previous traumatic humeral fracture after OLTx   - on calcium/vitamin D   - on alendronate 70mg qweek (begun ~9/2019)   - DEXA in 9/2020; generally improving T scores    .) posterior, thoracic back pains, chronic - suspicious there is some residual, late effect from previous XRT use vs. thoracic radiculopathy   - gabapentin 300mg qhs    .) recurrent, metastatic cholangiocarcinoma; followed by Dr. Langford   - originally diagnosed in 1/2011   - s/p neoadjuvant cisplatin + gemcitabine followed by bulk resection, recurrent with further debulking in 10/2012   - in 8/2013, biopsy confirmation of lung nodule as cholangiocarcinoma, s/p XRT   - more recent surveillance CT C/A/P showing no active malignancy    .) Covid19 pandemic  - discussed importance of social distancing, hand hygiene, and unique aspects related to individualized health  - discussed s/s and appropriate measures in context of worsening or developing respiratory symptoms     RTC in 6 months

## 2022-02-16 NOTE — TELEPHONE ENCOUNTER
---------------------  From: Venus Contreras RN   To: [facility] Message Pool ([id number]_[facility]);     Cc: INR Pool ( [id number]_[facility]);      Sent: 9/6/2019 12:25:50 PM CDT  Subject: Warfarin discontinued per Dr. Lilly     Call to patient. She is due for INR per our report of missed appointments. She tells me Dr. Lilly has discontinued her warfarin. She now takes an 81mg daily aspirin. She will be removed from warfarin clinic.noted

## 2022-02-16 NOTE — RESULTS
Anticoagulation Therapy Entered On:  4/25/2019 11:11 AM CDT    Performed On:  4/25/2019 10:33 AM CDT by Rossy Thapa RN               Warfarin Management   Week 1 Sunday Dose :   4    Week 1 Monday Dose :   2    Week 1 Tuesday Dose :   4    Week 1 Wednesday Dose :   2    Week 1 Thursday Dose :   4    Week 1 Friday Dose :   2    Week 1 Saturday Dose :   4    Week 1 Dose Total :   22    Week 2 Sunday Dose :   4    Week 2 Monday Dose :   2    Week 2 Tuesday Dose :   4    Week 2 Wednesday Dose :   2    Week 2 Thursday Dose :   4    Week 2 Friday Dose :   2    Week 2 Saturday Dose :   4    Week 2 Dose Total :   22    Planned Duration :   Indefinite   Indication :   Other: Post liver transplant   Warfarin Management Comments :   Lab test performed by:  [facility]  [address] [place] [address]  Phone # [phone]  Fax# [phone]  Capillary   International Normalization Ratio :   1.8    INR Range :   Other   INR Therapeutic Range :   Yes   Rossy Thapa RN - 4/25/2019 11:10 AM CDT   Warfarin Management and Results Grid   Signs of Thrombolic :   No   Signs of Warfarin Intolerance :   No   Changes in Diet or Alcohol Intake :   No   Changes in Medication or Antibiotics :   No   Unusual Bleeding or Bruising :   No   Rectal Bleeding or Black Stools :   No   Vitamin K Food Handout :   No   Heart Valve Replacement :   No   Rossy Thapa RN - 4/25/2019 11:10 AM CDT   Anticoagulation Recheck :   2 weeks   Warfarin Special Instructions :   Per [facility] continue warfarin 2 mg M/W/F and 4 mg ROW; recheck 2 weeks; notified by phone.   Rossy Thapa RN - 4/25/2019 11:10 AM CDT

## 2022-02-16 NOTE — TELEPHONE ENCOUNTER
Entered by Jacquie Huff CMA on October 22, 2020 12:27:55 PM CDT  ---------------------  From: Jacquie Huff CMA   To: [facility]    Sent: 10/22/2020 12:27:54 PM CDT  Subject: Medication Management     ** Submitted: **  Order:gabapentin (gabapentin 300 mg oral capsule)  1 cap(s)  Oral  hs  Qty:  90 cap(s)        Refills:  0          Substitutions Allowed     Route To Pharmacy - [facility]    Signed by Jacquie Huff CMA  10/22/2020 5:27:00 PM [facility] [[email or web address]]    ** Submitted: **  Complete:gabapentin (gabapentin 300 mg oral capsule)   Signed by Jacquie Huff CMA  10/22/2020 5:27:00 PM [facility] [[email or web address]]    ** Not Approved:  **  gabapentin (GABAPENTIN 300 MG Capsule)  TAKE 1 CAPSULE AT BEDTIME  Qty:  90 cap(s)        Days Supply:  90        Refills:  0          Substitutions Allowed     Route To Pharmacy - [facility]   Signed by Jacquie Huff CMA            ** Patient matched by Jacquie Huff CMA on 10/22/2020 12:26:20 PM CDT **      ------------------------------------------  From: [facility]  To: Cj Rome PA-C  Sent: October 22, 2020 12:21:57 PM CDT  Subject: Medication Management  Due: October 8, 2020 2:03:37 PM CDT     ** On Hold Pending Signature **     Drug: gabapentin (gabapentin 300 mg oral capsule), TAKE 1 CAPSULE AT BEDTIME  Quantity: 90 cap(s)  Days Supply: 0  Refills: 1  Substitutions Allowed  Notes from Pharmacy:     Dispensed Drug: gabapentin (gabapentin 300 mg oral capsule), TAKE 1 CAPSULE AT BEDTIME  Quantity: 90 cap(s)  Days Supply: 90  Refills: 0  Substitutions Allowed  Notes from Pharmacy:  ------------------------------------------

## 2022-02-16 NOTE — NURSING NOTE
Depression Screening Entered On:  7/31/2019 2:47 PM CDT    Performed On:  7/26/2019 2:46 PM CDT by Anabela Fox               Depression Screening   Little Interest - Pleasure in Activities :   Not at all   Feeling Down, Depressed, Hopeless :   Not at all   Initial Depression Screen Score :   0    Trouble Falling or Staying Asleep :   Several days   Feeling Tired or Little Energy :   Several days   Poor Appetite or Overeating :   Several days   Feeling Bad About Yourself :   Not at all   Trouble Concentrating :   Not at all   Moving or Speaking Slowly :   Not at all   Thoughts Better Off Dead or Hurting Self :   Not at all   Detailed Depression Screen Score :   3    Total Depression Screen Score :   3    BRENDA Difficulty with Work, Home, Others :   Not difficult at all   Anabela Fox - 7/31/2019 2:46 PM CDT

## 2022-02-16 NOTE — LETTER
(Inserted Image. Unable to display)     February 28, 2020      REX DAMON  [address] [place], [place] [id number]          Dear REX,      Thank you for selecting [facility] ([facility]viously [facility]) for your healthcare needs.      Our records indicate you are due for the following services:     Hypertension check ~ please remember to bring your at-home blood pressure readings with you to your appointment.     Non-Fasting Labs.    If you had your labs done at another facility or with Direct Access Lab Testing at [facility], please bring in a copy of the results to your next visit, mail a copy, or drop off a copy of your results to your Healthcare Provider.      To schedule an appointment or if you have further questions, please contact your primary clinic:   [facility]       [phone]   Critical access hospital       [phone]              [facility]     [phone]      Powered by [employer]    Sincerely,    Elijah Benitez MD

## 2022-02-16 NOTE — PROGRESS NOTES
Patient:   REX DAMON            MRN: [id number]            FIN: [id number]               Age:   64 years     Sex:  Female     :  1955   Associated Diagnoses:   Acquired bronchiectasis   Author:   Gianfranco Lopez MD      Visit Information      Date of Service: 10/08/2019 12:01 pm  Performing Location: [facility]  Encounter#: [id number]      Primary Care Provider (PCP):  Elijah Benitez MD    NPI# [id number]      Referring Provider:  Gianfranco Lopez MD    NPI# [id number]      Chief Complaint   10/8/2019 12:03 PM CDT   Cough and SOB x3 weeks        History of Present Illness   chief complaint and symptoms as noted above confirmed with patient   no fever  no uri sxs  no chest pain  Clear scant sputum      Review of Systems   Constitutional:  Negative except as documented in history of present illness.    Ear/Nose/Mouth/Throat:  Negative.    Respiratory:  Negative except as documented in history of present illness.    Cardiovascular:  Negative.    Gastrointestinal:  Negative.    Neurologic:  Negative.       Health Status   Allergies:    Allergic Reactions (Selected)  Severity Not Documented  Dilaudid (Itching)  Nonallergic Reactions (Selected)  Unknown  HYDROmorphone (Itching)  Iron sucrose (Gi upset)  Sodium ferric gluconate complex (Other - describe in comment field)   Medications:  (Selected)   Prescriptions  Prescribed  Azithromycin 5 Day Dose Pack 250 mg oral tablet: 2 tabs today then 1 a day for 4 days, PO, qAM, x 5 day(s), Instructions: as directed on package labeling, # 6 tab(s), 0 Refill(s), Type: Acute, [facility], 2 tabs today then 1 a day for 4 days Oral qam,x5 day(s),Inst...  albuterol 90 mcg/inh inhalation aerosol: 2 puff(s), NEB, qid, PRN: AS NEEDED FOR WHEEZING, # 18 gm, 5 Refill(s), Type: Maintenance, Pharmacy: [facility]  alendronate 70 mg oral tablet: = 1 tab(s) ( 70 mg ), Oral, qweek, Instructions: with 6-8 oz plain water,  at least 30 minutes before first food, beverage, or medication of the day, # 12 tab(s), 3 Refill(s), Type: Maintenance, Pharmacy: [facility], 1 tab(s) Oral qw...  gabapentin 300 mg oral capsule: = 1 cap(s), Oral, qhs, # 90 tab(s), 1 Refill(s), Type: Soft Stop, Pharmacy: [facility], keep on file and pt will notify when needed, 1 cap(s) Oral qhs  pramipexole 0.25 mg oral tablet: = 2 tab(s), Oral, qhs, # 180 tab(s), 1 Refill(s), Type: Soft Stop, Pharmacy: [facility], keep on hold and pt will notify when needed, 2 tab(s) Oral qhs  predniSONE 10 mg oral tablet: 4 tabs daily for 3 days taper bey 1 tab every 3 days, Oral, daily, # 30 tab(s), 0 Refill(s), Type: Maintenance, Pharmacy: [facility], 4 tabs daily for 3 days taper bey 1 tab every 3 days Oral daily  zolpidem 10 mg oral tablet: See Instructions, Instructions: TAKE 1 TABLET ONE TIME DAILY AT BEDTIME AS NEEDED FOR SLEEP, # 90 tab(s), 1 Refill(s), Type: Soft Stop, Pharmacy: [facility]  Documented Medications  Documented  acetaminophen 500 mg oral tablet: 1-2 tab(s), Oral, q6 hrs, PRN: as needed for pain, 0 Refill(s), Type: Maintenance  aspirin 81 mg oral tablet: = 1 tab(s) ( 81 mg ), Oral, daily, 0 Refill(s), Type: Maintenance  omeprazole 20 mg oral delayed release capsule: = 2 cap(s) ( 40 mg ), PO, bid, # 90 cap(s), 0 Refill(s), Type: Maintenance  tacrolimus 1 mg oral capsule: See Instructions, Instructions: 4mg AM/ 5mg PM, 0 Refill(s), Type: Maintenance,    Medications          *denotes recorded medication          *acetaminophen 500 mg oral tablet: 1-2 tab(s), Oral, q6 hrs, PRN: as needed for pain, 0 Refill(s).          albuterol 90 mcg/inh inhalation aerosol: 2 puff(s), NEB, qid, PRN: AS NEEDED FOR WHEEZING, 18 gm, 5 Refill(s).          alendronate 70 mg oral tablet: 70 mg, 1 tab(s), Oral, qweek, with 6-8 oz plain water, at least 30 minutes before first food,  beverage, or medication of the day, 12 tab(s), 3 Refill(s).          *aspirin 81 mg oral tablet: 81 mg, 1 tab(s), Oral, daily, 0 Refill(s).          Azithromycin 5 Day Dose Pack 250 mg oral tablet: 2 tabs today then 1 a day for 4 days, PO, qAM, for 5 day(s), as directed on package labeling, 6 tab(s), 0 Refill(s).          gabapentin 300 mg oral capsule: 1 cap(s), Oral, qhs, 90 tab(s), 1 Refill(s).          *omeprazole 20 mg oral delayed release capsule: 40 mg, 2 cap(s), PO, bid, 90 cap(s), 0 Refill(s).          pramipexole 0.25 mg oral tablet: 2 tab(s), Oral, qhs, 180 tab(s), 1 Refill(s).          predniSONE 10 mg oral tablet: 4 tabs daily for 3 days taper bey 1 tab every 3 days, Oral, daily, 30 tab(s), 0 Refill(s).          *tacrolimus 1 mg oral capsule: See Instructions, 4mg AM/ 5mg PM, 0 Refill(s).          zolpidem 10 mg oral tablet: See Instructions, TAKE 1 TABLET ONE TIME DAILY AT BEDTIME AS NEEDED FOR SLEEP, 90 tab(s), 1 Refill(s).       Problem list:    All Problems (Selected)  Acquired thrombocytopenia / SNOMED CT 682610381 / Confirmed  Anticoagulated / SNOMED CT 901997751 / Confirmed  Duodenal ulcer / SNOMED CT 77434536 / Confirmed  End stage liver disease / SNOMED CT 5752199252 / Confirmed  Gastric ulcer / SNOMED CT 3550607713 / Confirmed  H/O liver transplant / SNOMED CT 449960468 / Confirmed  History of cholangiocarcinoma / SNOMED CT 8701963669 / Confirmed  H/O liver cancer / SNOMED CT 1527737556 / Confirmed  Hypertension / SNOMED CT 3331203042 / Confirmed  Hypokalemia / SNOMED CT 93054193 / Confirmed  Lung mass / SNOMED CT 495281304 / Confirmed  Asthma, moderate persistent / SNOMED CT 5714900017 / Confirmed  Osteopenia / SNOMED CT 492087266 / Confirmed  Pancytopenia / SNOMED CT 981112 / Confirmed  Restless leg syndrome / SNOMED CT 13861175 / Confirmed  Fibroid uterus / SNOMED CT 618270612 / Confirmed      Histories   Past Medical History:    Active  Acquired thrombocytopenia (466430442): Onset on  10/10/2012 at 57 years.  History of cholangiocarcinoma (4715914196): Onset in 2011 at 55 years.  Asthma, moderate persistent (8842291993)  Hypertension (0039510318)  Fibroid uterus (033028643)  Osteopenia (909607182)  Restless leg syndrome (78774622)  Gastric ulcer (5654897406)  Duodenal ulcer (46012640)  Lung mass (657965217)  Comments:  11/20/2017 CST 8:44 AM Debra Wilson  Right  End stage liver disease (0126345683)  Hypokalemia (49476427)  Pancytopenia (673945)  Resolved  Inpatient stay (120137887): Onset on 8/30/2018 at 63 years.  Resolved on 9/12/2018 at 63 years.  Comments:  9/18/2018 CDT 2:04 PM CDT - Jess Wellington  @ [facility] for [facility].  Inpatient stay ([id number]): Onset on 5/3/2018 at 63 years.  Resolved on 5/4/2018 at 63 years.  Comments:  5/10/2018 CDT 10:45 AM CDT - Debra Perez  @[place], [place] - Organ transplant candidate  Inpatient stay (275895610): Onset on 10/30/2017 at 62 years.  Resolved on 11/3/2017 at 62 years.  Comments:  11/20/2017 CST 8:39 AM Debra Wilson  @[place], [place] - Cholangiocarcinoma  Inpatient stay (468688220): Onset on 3/13/2015 at 59 years.  Resolved on 3/17/2015 at 59 years.  Comments:  11/20/2017 CST 8:39 AM Debra Wilson  @[place], [place] - Bleeding esophageal varices  Inpatient stay (690894654): Onset on 6/12/2014 at 59 years.  Resolved on 6/13/2014 at 59 years.  Comments:  11/20/2017 CST 8:38 AM Debra Wilson  @[facility], [place] - Pneumonia  Inpatient stay (428923120): Onset on 6/22/2011 at 56 years.  Resolved.  Comments:  11/20/2017 CST 8:38 AM Debra Wilson  @[facility], [place] - Chemotherapy induced nausea and vomiting  History of DVT (deep vein thrombosis) (6963931224): Onset on 2/15/2011 at 55 years.  Resolved.  Pregnancy (147582792):  Resolved on 11/11/1979 at 24 years.  Pregnancy (001921792):  Resolved on 8/31/1981 at 26 years.  History of ankle  fracture (4091891946):  Resolved.  Comments:  2017 CST 8:42 AM REYNA Debra Medeiros  Right  History of arm fracture (5475839906):  Resolved.  Comments:  2017 CST 8:43 AM REYNA Flores Gill Perezri  Right   Family History:    Hypertension  Mother ()  Sister  Osteoporosis  Mother ()  MS - Multiple sclerosis  Sister     Procedure history:    LTx - Liver transplant (SNOMED CT 8108481013) on 2018 at 63 Years.  Esophagogastroduodenoscopy (SNOMED CT 938368478) performed by Rafy Chisholm MD on 2018 at 62 Years.  Comments:  2018 1:14 PM REYNA - Tamera Green  Indication:  Varices, follow up.   Sedation:  MAC.  Recommendation:  Repeat in 1 year.  Pleural catheter (SNOMED CT 0638227250) performed by Donovan Stevens MD on 2017 at 62 Years.  Comments:  2018 1:43 PM Tamera Worley  Right side insertion.  Thoracoscopic procedure (SNOMED CT 7716985769) performed by Donovan Stevens MD on 10/30/2017 at 62 Years.  Comments:  2017 2:23 PM CST - RishabhstJacey harvey CMA  Right Video assisted thoracoscopc surther  pleural biopsy  doxycycline pleurodesis  VATS - Video assisted thorascopic surgery (SNOMED CT 404958126) performed by Donovan Stevens MD on 10/30/2017 at 62 Years.  Comments:  2017 2:59 PM Tamera Worley  Right-sided pleural biopsy.  Esophagogastroduodenoscopy (SNOMED CT 114420774) performed by Rafy Chisholm MD on 2016 at 61 Years.  Comments:  2016 8:13 AM CDT - Tamera Green  Indication:  Gastric ulcer follow up.  Sedation:  MAC.  Upper GI endoscopy (SNOMED CT 2001910466) on 2016 at 61 Years.  Comments:  2016 1:29 PM CDT - Branstad CMA, Jacey  Acute gastric ulcer w/o hemorrhage or perforation, esophageal varices w/o bleeding.  Varices are scarred and not amenable to banding at this time  Esophagogastroduodenoscopy (SNOMED CT 152234265) performed by Rafy Maxwell MD on 2016 at 61 Years.  Esophagogastroduodenoscopy ([facility] CT 182807338)  performed by Rafy Chisholm MD on 4/11/2016 at 61 Years.  Comments:  4/19/2016 7:57 AM CDT - Tamera Green  Indication: Varices, follow up.  Sedation: MAC.  Impression:  H. pylori negative.  Upper GI endoscopy performed by Rafy Chisholm MD on 1/14/2016 at 60 Years.  Esophagogastroduodenoscopy (SNOMED CT 089873306) on 3/13/2015 at 59 Years.  Wedge resection of liver (SNOMED CT 892969288) in the month of 10/2012 at 57 Years.  Upper GI endoscopy (SNOMED CT 2687986519) performed by Ash Tinajero MD on 6/8/2012 at 57 Years.  HPV - Human papillomavirus test negative (SNOMED CT 9404182187) on 5/8/2012 at 57 Years.  Comments:  5/14/2012 11:41 AM ELLENT - Rula TORREZ, Xi  Low risk for cervical cancer. OK to have pap q 3 yrs.  Upper GI endoscopy (SNOMED CT 2995135375) performed by Ash Tinajero MD on 3/2/2012 at 56 Years.  Upper GI endoscopy (SNOMED CT 6760509881) performed by Ash Tinajero MD on 1/5/2012 at 56 Years.  Upper GI endoscopy (SNOMED CT 9795676344) on 11/22/2011 at 56 Years.  Esophagogastroduodenoscopy (SNOMED CT 262133902) performed by Lizet ATKINSON [facility] on 9/6/2011 at 56 Years.  Esophagogastroduodenoscopy (SNOMED CT 258633926) performed by Jairo Tan on 5/23/2011 at 56 Years.  Colonoscopy (SNOMED CT 938569775) on 2/10/2011 at 55 Years.  Esophagogastroduodenoscopy (SNOMED CT 506924802) performed by Doron Hutchison MD on 2/10/2011 at 55 Years.  Comments:  7/10/2011 7:21 PM CDT - Doron Hutchison MD  Bleeding in the duodenal bulb  Biopsy of liver (SNOMED CT 258082776) on 1/28/2011 at 55 Years.  HPV - Human papillomavirus test negative (SNOMED CT 4943021451) on 4/15/2010 at 55 Years.  Comments:  4/26/2011 1:04 PM CDT - Rula TORREZ, Xi  Low risk for cervical cancer. OK to have pap q 3 yrs.  Mammogram - screening (SNOMED CT 217125744) on 4/9/2010 at 55 Years.  Mammography; bilateral (CPT-4 35579) on 4/9/2010 at 55 Years.  Comments:  1/26/2011 8:48 AM REYAN - Anne Marie OSORIO,  Ramya  Normal-no radiographic evidence for malignancy.  DEXA - Dual energy X-ray photon absorptiometry (SNOMED CT 148502869) on 2009 at 53 Years.  Comments:  4/15/2010 1:43 PM CDT - Rula TORREZ, Xi  Due again in 5 yrs, ()  Tubal ligation (SNOMED CT 742889529) in  at 27 Years.   section (SNOMED CT 31614827) in  at 25 Years.  Primary repair of torn ligament of knee, collateral (SNOMED CT 26272715) in  at 16 Years.  Tonsillectomy and adenoidectomy (SNOMED CT 078730325) in  at 8 Years.  Arm fracture (ICD-9-.0).  Comments:  2011 2:11 PM CDT - Nathaly Lim  Right  Arm fracture (ICD-9-.0).  Comments:  2011 2:12 PM CDT - Nathaly Lim  Left  Ankle fracture, right (ICD-9-.8).   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      Sexual Assessment            Sexually active: Yes.  Number of current partners 1.  Other contraceptive use: Ann Marie.        Physical Examination   Vital Signs   10/8/2019 12:03 PM CDT Temperature Tympanic 97.8 DegF  LOW    Peripheral Pulse Rate 86 bpm    HR Method Electronic    Systolic Blood Pressure 110 mmHg    Diastolic Blood Pressure 78 mmHg    Mean Arterial Pressure 89 mmHg    BP Method Electronic    Oxygen Saturation 98 %      Measurements from flowsheet : Measurements   10/8/2019 12:03 PM CDT Height Measured - Standard 63 in    Weight Measured - Standard 127.6 lb    BSA 1.6 m2    Body Mass Index 22.6 kg/m2      General:  Alert and oriented, No acute distress.    Eye:  Normal conjunctiva.    HENT:  No pharyngeal erythema.    Neck:  Supple, Non-tender.    Respiratory:  Lungs are clear to auscultation, Respirations are non-labored.    Cardiovascular:  Normal rate, Regular rhythm, No edema.    Neurologic:  Alert, Oriented.       Review /  Management   Results review:  Lab results   10/8/2019 12:20 PM CDT WBC 5.9    RBC 3.86    Hgb 12.3 g/dL    Hct 35.7 %    MCV 93 fL    MCH 31.9 pg    MCHC 34.5 g/dL    RDW 12.3 %    Platelet 111    MPV 10.1 fL    Lymphocytes 11.5 %    Abs Lymphocytes 0.7    Neutrophils 76.5 %    Abs Neutrophils 4.5    Monocytes 4.9 %    Abs Monocytes 0.3    Eosinophils 6.8 %    Abs Eosinophils 0.4    Basophils 0.3 %    Abs Basophils 0.0    D-Dimer 0.38   9/18/2019 9:09 AM CDT Tacrolimus () Dose 6.2 mcg/L   7/27/2019 9:23 AM CDT Fecal Fat Ql NORMAL    Fecal Leukocyte Stain See comment    Clostridium difficile Toxin See comment    Culture Campylobacter See comment    Culture Salmonella/Shigella See comment    Shiga Toxin See comment    Trichrome 1 See comment   7/26/2019 11:33 AM CDT Immunoglob IgA 153 mg/dL    TSH 1.49 mIU/L    Celiac Disease Interp See comment    Tissue Transglutaminase IgA 1 unit/mL   .    Radiology results   right sided changes      Impression and Plan   Diagnosis     Acquired bronchiectasis (YRG43-XD J47.9).     Course:  Not progressing as expected.    Plan:  Patient with cough and shortness of breath.  She does have a history of requiring inhalers even things like Advair in the past but has not taken it for some time now.  No formal diagnosis of asthma.  Her chest x-ray showed some right-sided changes so we did do a CT given her history of her cancer in her transplant which only showed bronchiectasis otherwise stable.  We will give her a trial on both Zithromax for atypical infection and prednisone consider getting back on a maintenance inhaler.  .    Patient Instructions:       Counseled: Patient, Regarding diagnosis, Regarding treatment, Regarding medications.

## 2022-02-16 NOTE — PROGRESS NOTES
Patient:   SHERRIE DAMON            MRN: [id number]            FIN: [id number]               Age:   62 years     Sex:  Female     :  1955   Associated Diagnoses:   Cholangiocarcinoma; Asthma, Moderate Persistent; Thoracic back pain; Recurrent right pleural effusion   Author:   Elijah Benitez MD      Visit Information      Date of Service: 10/17/2017 10:51 am  Performing Location: [facility]  Encounter#: [id number]      Primary Care Provider (PCP):  Elijah Benitez MD    NPI# [id number]      Referring Provider:  Elijah Benitez MD    NPI# [id number]      Chief Complaint   10/17/2017 10:59 AM CDT  Preop - scheduled for Right video assisted thoracoscopy at [facility] on 10/30              Additional Information:No additional information recorded during visit.   Chief complaint and symptoms as noted above and confirmed with patient.  Recent lab and diagnostic studies reviewed with patient      History of Present Illness   10/12/2016: Sherrie presents to clinic with several concerns.  Describes having persistent right-sided upper thoracic back pain for some time.  She says it is around the previous radiation site for her cholangiocarcinoma.  She does have a remote history of pleural effusions, though has not occurred for a number of years.  Also describes a one-month history of dyspnea on exertion with some audible wheezing.  She has a remote history of asthma and had been maintained on a combination inhaled corticosteroid and long-acting beta agonist several years ago.  She stopped her inhalers on her own thinking they were providing much benefit.  She has been using an old prescription albuterol over recent days which has provided some temporary relief.    2017: Presents with cough and dyspnea on exertion.  She was seen in the emergency room on September 3 for similar symptoms with associated fever and hypoxia.  X-ray at that time showing a persistent right-sided pleural effusion.  No described infiltrate  pattern though was started on levofloxacin for pneumonia concerns.  In general she says that she felt better on antibiotics.  Says that symptoms have progressively worsening this week.  No fever chills just has a hard time catching her breath.  Review of records shows similar episode at the same time this past year which was treated with inhaled steroids and albuterol    10/17/2017: Presents for preoperative assessment for planned right-sided thoracoscopy and potential pleurodesis scheduled for October 30 at [facility].  She has been dealing with persistent cough and shortness of breath.  Recently underwent CT of chest earlier this month showing recurrence of a large sized pleural effusion with associated compression atelectasis.  She has seen benefit and use of inhaled steroids in terms of bronchospasm and cough.         Review of Systems   Constitutional:  No fever, No chills.    Eye:  Negative except as documented in history of present illness.    Ear/Nose/Mouth/Throat:  Negative except as documented in history of present illness.    Respiratory:  Shortness of breath, Cough.    Cardiovascular:  No chest pain, No palpitations, No peripheral edema, No syncope.    Gastrointestinal:  No nausea, No vomiting, No abdominal pain.    Genitourinary:  No dysuria, No hematuria.    Hematology/Lymphatics:  Negative except as documented in history of present illness.    Endocrine:  No excessive thirst, No polyuria.    Immunologic:  No recurrent fevers.    Musculoskeletal:  No joint pain, No muscle pain     Back pain: In the middle of the back.    Neurologic:  Alert and oriented X4, No numbness, No tingling, No headache.       Health Status   Allergies:    Allergic Reactions (Selected)  No known allergies   Medications:  (Selected)   Prescriptions  Prescribed  Flovent  mcg/inh inhalation aerosol: 1 puff(s), inh, bid, # 3 EA, 0 Refill(s), Type: Maintenance, [facility], 1 puff(s) inh  bid  Nexium 40 mg oral delayed release capsule: 2 cap(s) ( 80 mg ), po, bid, # 30 cap(s), 0 Refill(s), Type: Maintenance, Pharmacy: [id number], 1 cap(s) po daily,Instr:Due appt with Dr. Parker for further refills.  Ventolin HFA 90 mcg/inh inhalation aerosol: 2 puff(s), inh, qid, PRN: as needed for wheezing, # 1 EA, 11 Refill(s), Type: Maintenance, Pharmacy: [facility], 2 puff(s) inh qid,PRN:as needed for wheezing  furosemide 80 mg oral tablet: 1 tab(s) ( 80 mg ), po, bid, # 60 tab(s), 2 Refill(s), Type: Maintenance, Pharmacy: [id number], 1 tab(s) po bid  gabapentin 300 mg oral capsule: 1 cap(s) ( 300 mg ), po, hs, # 270 cap(s), 0 Refill(s), Type: Maintenance  pramipexole 0.25 mg oral tablet: 2 tab(s) ( 0.5 mg ), po, hs, # 180 tab(s), 1 Refill(s), Type: Maintenance, Pharmacy: [facility], 2 tab(s) po hs  zolpidem 10 mg oral tablet: 1 tab(s) ( 10 mg ), PO, Once a day (at bedtime), PRN: for sleep, # 90 tab(s), 1 Refill(s), Type: Maintenance, Pharmacy: [facility], 1 tab(s) po hs,PRN:for sleep  Documented Medications  Documented  acetaminophen: ( 650 mg ), po, q4 hrs, PRN: as needed for pain, 0 Refill(s), Type: Maintenance  rifampin 300 mg oral capsule: 1 cap(s) ( 300 mg ), po, daily, 0 Refill(s), Type: Maintenance  rifaximin 550 mg oral tablet: 1 tab(s) ( 550 mg ), po, bid, 0 Refill(s), Type: Maintenance  spironolactone 100 mg oral tablet: 1 tab(s) ( 100 mg ), po, daily, 0 Refill(s), Type: Maintenance   Problem list:    All Problems  Acquired thrombocytopenia / ICD-9-.49 / Confirmed  Asthma, Moderate Persistent / ICD-9-.90 / Confirmed  Cholangiocarcinoma / ICD-9-.1 / Confirmed  Fibroid Uterus / ICD-9-.9 / Confirmed  Hypertension / SNOMED CT 6655752227 / Confirmed  Osteopenia / ICD-9-.90 / Confirmed  Restless Leg Syndrome / ICD-9-.94 / Confirmed  Inactive: Anticoagulated / ICD-9-CM  V58.61  Resolved: *Hospitalized@[facility] - Bleeding esophageal varices  Resolved: *Hospitalized@[facility] - Chemotherapy induced nausea and vomiting  Resolved: *Hospitalized@[facility] - Pneumonia  Resolved: DVT (Deep Venous Thrombosis) / ICD-9-.40  Resolved: Pregnancy / SNOMED CT 758204322  Resolved: Pregnancy / SNOMED CT 674935634  Canceled: Choliangiocarcinoma      Histories   Past Medical History:    Active  Asthma, Moderate Persistent (493.90)  Hypertension (6873788218)  Fibroid Uterus (218.9)  Osteopenia (733.90)  Cholangiocarcinoma (155.1)  Resolved  *Hospitalized@[facility] - Bleeding esophageal varices: Onset on 3/13/2015 at 59 years.  Resolved on 3/17/2015 at 59 years.  *Hospitalized@[facility] - Pneumonia: Onset on 2014 at 59 years.  Resolved on 2014 at 59 years.  *Hospitalized@[facility] - Chemotherapy induced nausea and vomiting: Onset on 2011 at 56 years.  Resolved.  DVT (Deep Venous Thrombosis) (453.40): Onset on 2/15/2011 at 55 years.  Resolved.  Pregnancy (511181792):  Resolved on 1979 at 24 years.  Pregnancy (166181674):  Resolved on 1981 at 26 years.   Family History:    Hypertension  Mother ()  Sister  Osteoporosis  Mother ()  MS - Multiple sclerosis  Sister     Procedure history:    Esophagogastroduodenoscopy (992659689) on 2016 at 61 Years.  Comments:  2016 8:13 AM - Tamera Green  Indication:  Gastric ulcer follow up.  Sedation:  MAC.  Upper GI endoscopy (3768596751) on 2016 at 61 Years.  Comments:  2016 1:29 PM - Branstad CMA, Jacey  Acute gastric ulcer w/o hemorrhage or perforation, esophageal varices w/o bleeding.  Varices are scarred and not amenable to banding at this time  Esophagogastroduodenoscopy (010405698) on 2016 at 61 Years.  Esophagogastroduodenoscopy (555629820) on 2016 at 61 Years.  Comments:  2016 7:57 AM - Tamera Green  Indication: Varices, follow up.  Sedation: MAC.  Impression:  H. pylori  negative.  Upper GI endoscopy on 2016 at 60 Years.  Esophagogastroduodenoscopy (477726615) on 3/13/2015 at 59 Years.  Upper GI endoscopy (5735756527) on 2012 at 57 Years.  HPV - Human papillomavirus test negative (0463079773) on 2012 at 57 Years.  Comments:  2012 11:41 AM - Xi Dove  Low risk for cervical cancer. OK to have pap q 3 yrs.  Upper GI endoscopy (6800239881) on 3/2/2012 at 56 Years.  Upper GI endoscopy (4623990222) on 2012 at 56 Years.  Upper GI endoscopy (1772267316) on 2011 at 56 Years.  Esophagogastroduodenoscopy (047682590) on 2011 at 56 Years.  Esophagogastroduodenoscopy (049268410) on 2011 at 56 Years.  Colonoscopy (462730127) on 2/10/2011 at 55 Years.  Esophagogastroduodenoscopy (125802900) on 2/10/2011 at 55 Years.  Comments:  7/10/2011 7:21 PM - Doron Hutchison MD  Bleeding in the duodenal bulb  HPV - Human papillomavirus test negative (4010009406) on 4/15/2010 at 55 Years.  Comments:  2011 1:04 PM - Xi Dove  Low risk for cervical cancer. OK to have pap q 3 yrs.  Mammogram - screening (885038527) on 2010 at 55 Years.  Mammography; bilateral (74299) on 2010 at 55 Years.  Comments:  2011 8:48 AM - Ramya Kenney CMA  Normal-no radiographic evidence for malignancy.  DEXA - Dual energy X-ray photon absorptiometry (889918491) on 2009 at 53 Years.  Comments:  4/15/2010 1:43 PM - Xi Dove  Due again in 5 yrs, ()  Tubal ligation (528589944) in  at 27 Years.   section (22246870) in  at 25 Years.   - Spontaneous vaginal delivery (2270890387) in  at 23 Years.  Primary repair of torn ligament of knee, collateral (67700381) in  at 16 Years.  Tonsillectomy and adenoidectomy (010414296) in  at 8 Years.  Arm fracture (818.0).  Comments:  2011 2:11 PM - Nathaly Lim  Right  Arm fracture (818.0).  Comments:  2011 2:12 PM - Nathaly Lim  Left  Ankle fracture,  right (824.8).   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.      Sexual Assessment            Sexually active: Yes.  Number of current partners 1.  Other contraceptive use: Ann Marie.        Physical Examination   vital signs stable, as noted above   Vital Signs   10/17/2017 10:59 AM CDT Temperature Tympanic 96.7 DegF  LOW    Peripheral Pulse Rate 87 bpm    Systolic Blood Pressure 110 mmHg    Diastolic Blood Pressure 62 mmHg    Mean Arterial Pressure 78 mmHg    BP Site Right arm    Oxygen Saturation 95 %      Measurements from flowsheet : Measurements   10/17/2017 10:59 AM CDT Height Measured - Standard 63 in    Weight Measured - Standard 121.6 lb    BSA 1.56 m2    Body Mass Index 21.54 kg/m2      General:  Alert and oriented, No acute distress.    Eye:  Extraocular movements are intact.    HENT:  Normocephalic, Oral mucosa is moist.    Neck:  Supple.    Respiratory:  Lungs are clear to auscultation, No chest wall tenderness, diminished breath sounds at right base; no pleural rub.    Cardiovascular:  Normal rate, Regular rhythm.    Gastrointestinal:  Soft.    Musculoskeletal:  Normal range of motion, Normal strength.    Neurologic:  Alert, Oriented, Normal motor function, No focal deficits.    Cognition and Speech:  Oriented, Speech clear and coherent.    Psychiatric:  Appropriate mood & affect.       Review / Management   Results review:  Lab results   9/3/2017 7:37 AM CDT Sodium Level  mEq/L    Potassium Level TR 3.4 mEq/L    Chloride  mEq/L    Glucose Level  mg/dL    BUN TR 15 mg/dL    Creatinine TR 0.74 mg/dL    BUN/Creatinine Ratio TR 20    Calcium TR 8.98 mEq/dL    WBC TR 4.5 x10^3/uL    RBC TR 4.25 x10^6/uL    Hgb TR 13.7 g/dL    Hct TR 39.3 %    Platelet TR 40 x10^3/uL    .       Impression and Plan   Diagnosis     Cholangiocarcinoma (JYH06-PL C22.1).     Asthma, Moderate Persistent (FPV22-YR J45.40).     Thoracic back pain (KED18-LC M54.6).     Recurrent right pleural effusion (PTA68-ZF J90).         .) pre-op, recurrent right pleural effusion - scheduled thoracoscopy, potential pleurodesis, scheduled for 10/30 at [facility]; Dr. Stevens   ECG: NSR  - check CBC, BMP, LFTs, INR   - remain on flovent BID until procedure; can stop afterwards   - no medication changes necessary   - low risk procedure in patient with low risk patient profile; no further pre-operative risk assessment indicated    plan as previously outlined:    .) posterior, thoracic back pains - suspicious there is some residual, late effect from previous XRT use vs. thoracic radiculopathy   - gabapentin 300mg TID    - does have splenomegaly, portal hypertension, and recurrent ascites (with history of bleeding)    - resolution of confusion with lactulose therapy.  No clinical evidence of active SBP, GI bleeding.  Hgb stable.    .) recurrent, metastatic cholangiocarcinoma; followed by Dr. Langford   - originally diagnosed in 1/2011   - s/p neoadjuvant cisplatin + gemcitabine followed by bulk resection, recurrent with further debulking in 10/2012   - in 8/2013, biopsy confirmation of lung nodule as cholangiocarcinoma, s/p XRT   - most recent surveillance CT C/A/P showing no active malignancy

## 2022-02-16 NOTE — PROGRESS NOTES
Patient:   SHERRIE DAMON            MRN: [id number]            FIN: [id number]               Age:   64 years     Sex:  Female     :  1955   Associated Diagnoses:   Cholangiocarcinoma; Asthma, Moderate Persistent; Thoracic back pain; Recurrent right pleural effusion; Budd-Chiari syndrome; Hypokalemia; Obstructive liver cirrhosis   Author:   Elijah Benitez MD      Visit Information      Date of Service: 10/17/2019 03:25 pm  Performing Location: [facility]  Encounter#: [id number]      Primary Care Provider (PCP):  Elijah Benitez MD    NPI# [id number]      Referring Provider:  Elijah Benitez MD    NPI# [id number]      Chief Complaint   10/17/2019 3:32 PM CDT   Follow up cough and SOB, SOB has improved, but continues.              Additional Information:No additional information recorded during visit.   Chief complaint and symptoms as noted above and confirmed with patient.  Recent lab and diagnostic studies reviewed with patient      History of Present Illness   10/12/2016: Sherrie presents to clinic with several concerns.  Describes having persistent right-sided upper thoracic back pain for some time.  She says it is around the previous radiation site for her cholangiocarcinoma.  She does have a remote history of pleural effusions, though has not occurred for a number of years.  Also describes a one-month history of dyspnea on exertion with some audible wheezing.  She has a remote history of asthma and had been maintained on a combination inhaled corticosteroid and long-acting beta agonist several years ago.  She stopped her inhalers on her own thinking they were providing much benefit.  She has been using an old prescription albuterol over recent days which has provided some temporary relief.    2019: Sherrie returns for follow-up.  From a liver transplant standpoint she has done remarkably well.  Remains on warfarin though there is talked that she will likely stop this 1 year after transplant which would be  an September.  Tolerating Prograf without issues.  Good underlying allograft function.  Primary complaint is related to irritable bowel complaints.  This precedes her transplantation and symptoms persist.  Not on CellCept therapy.  No formal diagnosis of celiac disease that she raises questions of this is possible.  Has not pursued any particular dietary restrictions including lactose or gluten free diet.    8/27/2019: Sherrie returns to clinic at my direction for evaluation of her osteoporosis.  She underwent bone density study at my direction which demonstrated severe osteoporosis and all measured bone sites.  She already has had a humeral fracture from fall posttransplant.  This is her only known fracture.  She is physically active and walks on a regular basis.  Currently not taking any means of calcium and vitamin D supplement.    10/17/2019: Sherrie presents to clinic for follow-up.  Was seen by Dr. Lopez in my absence this past week.  Did have a CT of chest which did not demonstrate any significant pleural effusion or any intraparenchymal pathology.  Had had several week history of productive cough preceding that.  Was started on a prednisone taper and 5-day course of azithromycin.  Still describe having persistent dyspnea through this past weekend.  She self started on furosemide 80 mg daily which she has been taking for the past 4 days.  She feels like temporally this did have some improvement in her breathing.  Has been using albuterol on an intermittent basis this week.  Generally feels better overall though still concerned about any underlying disease.  Of note she did have a persistent right-sided pleural effusion preceding her liver transplant felt to be partly secondary to history of a cholangiocarcinoma.  She did have a pleural drain in place for several months.         Review of Systems   Constitutional:  Weakness, Fatigue, No fever, No chills.    Eye:  Negative except as documented in history of  present illness.    Ear/Nose/Mouth/Throat:  Negative except as documented in history of present illness.    Respiratory:  Shortness of breath, Cough, Sputum production.    Cardiovascular:  No chest pain, No palpitations, No peripheral edema, No syncope.    Gastrointestinal:  No nausea, No vomiting, No diarrhea, No abdominal pain.    Genitourinary:  No dysuria, No hematuria.    Hematology/Lymphatics:  Negative except as documented in history of present illness.    Endocrine:  No excessive thirst, No polyuria.    Immunologic:  No recurrent fevers.    Musculoskeletal:  No joint pain, No muscle pain     Back pain: In the middle of the back.    Neurologic:  Alert and oriented X4, No numbness, No tingling, No headache.       Health Status   Allergies:    Allergic Reactions (Selected)  Severity Not Documented  Dilaudid (Itching)  Nonallergic Reactions (Selected)  Unknown  HYDROmorphone (Itching)  Iron sucrose (Gi upset)  Sodium ferric gluconate complex (Other - describe in comment field)   Medications:  (Selected)   Prescriptions  Prescribed  albuterol 90 mcg/inh inhalation aerosol: 2 puff(s), NEB, qid, PRN: AS NEEDED FOR WHEEZING, # 18 gm, 5 Refill(s), Type: Maintenance, Pharmacy: [facility]  alendronate 70 mg oral tablet: = 1 tab(s) ( 70 mg ), Oral, qweek, Instructions: with 6-8 oz plain water, at least 30 minutes before first food, beverage, or medication of the day, # 12 tab(s), 3 Refill(s), Type: Maintenance, Pharmacy: [facility], 1 tab(s) Oral qw...  gabapentin 300 mg oral capsule: = 1 cap(s), Oral, qhs, # 90 tab(s), 1 Refill(s), Type: Soft Stop, Pharmacy: [facility], keep on file and pt will notify when needed, 1 cap(s) Oral qhs  pramipexole 0.25 mg oral tablet: = 2 tab(s), Oral, qhs, # 180 tab(s), 1 Refill(s), Type: Soft Stop, Pharmacy: [facility], keep on hold and pt will notify when needed, 2 tab(s) Oral qhs  predniSONE 10 mg oral tablet:  4 tabs daily for 3 days taper bey 1 tab every 3 days, Oral, daily, # 30 tab(s), 0 Refill(s), Type: Maintenance, Pharmacy: [facility], 4 tabs daily for 3 days taper bey 1 tab every 3 days Oral daily  zolpidem 10 mg oral tablet: See Instructions, Instructions: TAKE 1 TABLET ONE TIME DAILY AT BEDTIME AS NEEDED FOR SLEEP, # 90 tab(s), 1 Refill(s), Type: Soft Stop, Pharmacy: [facility]  Documented Medications  Documented  acetaminophen 500 mg oral tablet: 1-2 tab(s), Oral, q6 hrs, PRN: as needed for pain, 0 Refill(s), Type: Maintenance  aspirin 81 mg oral tablet: = 1 tab(s) ( 81 mg ), Oral, daily, 0 Refill(s), Type: Maintenance  omeprazole 20 mg oral delayed release capsule: = 2 cap(s) ( 40 mg ), PO, bid, # 90 cap(s), 0 Refill(s), Type: Maintenance  tacrolimus 1 mg oral capsule: See Instructions, Instructions: 4mg AM/ 5mg PM, 0 Refill(s), Type: Maintenance,    Medications          *denotes recorded medication          *acetaminophen 500 mg oral tablet: 1-2 tab(s), Oral, q6 hrs, PRN: as needed for pain, 0 Refill(s).          albuterol 90 mcg/inh inhalation aerosol: 2 puff(s), NEB, qid, PRN: AS NEEDED FOR WHEEZING, 18 gm, 5 Refill(s).          alendronate 70 mg oral tablet: 70 mg, 1 tab(s), Oral, qweek, with 6-8 oz plain water, at least 30 minutes before first food, beverage, or medication of the day, 12 tab(s), 3 Refill(s).          *aspirin 81 mg oral tablet: 81 mg, 1 tab(s), Oral, daily, 0 Refill(s).          gabapentin 300 mg oral capsule: 1 cap(s), Oral, qhs, 90 tab(s), 1 Refill(s).          *omeprazole 20 mg oral delayed release capsule: 40 mg, 2 cap(s), PO, bid, 90 cap(s), 0 Refill(s).          pramipexole 0.25 mg oral tablet: 2 tab(s), Oral, qhs, 180 tab(s), 1 Refill(s).          predniSONE 10 mg oral tablet: 4 tabs daily for 3 days taper bey 1 tab every 3 days, Oral, daily, 30 tab(s), 0 Refill(s).          *tacrolimus 1 mg oral capsule: See Instructions, 4mg AM/ 5mg PM,  0 Refill(s).          zolpidem 10 mg oral tablet: See Instructions, TAKE 1 TABLET ONE TIME DAILY AT BEDTIME AS NEEDED FOR SLEEP, 90 tab(s), 1 Refill(s).       Problem list:    All Problems  Acquired thrombocytopenia / SNOMED CT 661299405 / Confirmed  Anticoagulated / SNOMED CT 506774698 / Confirmed  Duodenal ulcer / SNOMED CT 74854758 / Confirmed  End stage liver disease / SNOMED CT 3433227152 / Confirmed  Gastric ulcer / SNOMED CT 7896193226 / Confirmed  H/O liver transplant / SNOMED CT 078414199 / Confirmed  History of cholangiocarcinoma / SNOMED CT 7529646481 / Confirmed  H/O liver cancer / SNOMED CT 6346619366 / Confirmed  Hypertension / SNOMED CT 8471946636 / Confirmed  Hypokalemia / SNOMED CT 69550899 / Confirmed  Lung mass / SNOMED CT 858651541 / Confirmed  Asthma, moderate persistent / SNOMED CT 2320672695 / Confirmed  Osteopenia / SNOMED CT 827216685 / Confirmed  Pancytopenia / SNOMED CT 470873 / Confirmed  Restless leg syndrome / SNOMED CT 83634489 / Confirmed  Fibroid uterus / SNOMED CT 057120429 / Confirmed  Inactive: Anticoagulated / SNOMED CT 16974906  Resolved: History of DVT (deep vein thrombosis) / SNOMED CT 4769635125  Resolved: History of ankle fracture / SNOMED CT 5677992437  Resolved: History of arm fracture / SNOMED CT 2667302155  Resolved: Inpatient stay / SNOMED CT 192629002  Resolved: Inpatient stay / SNOMED CT 963287754  Resolved: Inpatient stay / SNOMED CT 187669813  Resolved: Inpatient stay / SNOMED CT 821230080  Resolved: Inpatient stay / SNOMED CT 240672362  Resolved: Inpatient stay / SNOMED CT 315218477  Resolved: Pregnancy / SNOMED CT 650230935  Resolved: Pregnancy / SNOMED CT 779081843  Canceled: Choliangiocarcinoma  Canceled: History of liver cancer / SNOMED CT 5563505933      Histories   Past Medical History:    Active  Acquired thrombocytopenia (986644415): Onset on 10/10/2012 at 57 years.  History of cholangiocarcinoma (0566551745): Onset in 2011 at 55 years.  Asthma, moderate  persistent (6235462668)  Hypertension (0533315323)  Fibroid uterus (610580227)  Osteopenia (578758748)  Restless leg syndrome (72688995)  Gastric ulcer (7285647513)  Duodenal ulcer (74542487)  Lung mass (503045179)  Comments:  11/20/2017 CST 8:44 AM Debra Wilson  Right  End stage liver disease (5084065167)  Hypokalemia (68203523)  Pancytopenia (464957)  Resolved  Inpatient stay (871816952): Onset on 8/30/2018 at 63 years.  Resolved on 9/12/2018 at 63 years.  Comments:  9/18/2018 CDT 2:04 PM CDT - Jess Wellington  @ [facility] for liver transplant.  Inpatient stay (932833128): Onset on 5/3/2018 at 63 years.  Resolved on 5/4/2018 at 63 years.  Comments:  5/10/2018 CDT 10:45 AM CDT - Debra Perez  @[place], [place] - Organ transplant candidate  Inpatient stay (824545732): Onset on 10/30/2017 at 62 years.  Resolved on 11/3/2017 at 62 years.  Comments:  11/20/2017 CST 8:39 AM Debra Wilson  @[place], [place] - Cholangiocarcinoma  Inpatient stay (106554810): Onset on 3/13/2015 at 59 years.  Resolved on 3/17/2015 at 59 years.  Comments:  11/20/2017 CST 8:39 AM Debra Wilson  @[place], [place] - Bleeding esophageal varices  Inpatient stay (807725704): Onset on 6/12/2014 at 59 years.  Resolved on 6/13/2014 at 59 years.  Comments:  11/20/2017 CST 8:38 AM Debra Wilson  @[place], [place] - Pneumonia  Inpatient stay (450009833): Onset on 6/22/2011 at 56 years.  Resolved.  Comments:  11/20/2017 CST 8:38 AM Debra Wilson  @[facility], [place] - Chemotherapy induced nausea and vomiting  History of DVT (deep vein thrombosis) (4264856434): Onset on 2/15/2011 at 55 years.  Resolved.  Pregnancy (851367596):  Resolved on 11/11/1979 at 24 years.  Pregnancy (887087151):  Resolved on 8/31/1981 at 26 years.  History of ankle fracture (8982233258):  Resolved.  Comments:  11/20/2017 CST 8:42 AM CST - Debra Perez  Right  History of arm  fracture (4589368152):  Resolved.  Comments:  2017 CST 8:43 AM REYNA Flores Chris  Debra Yanez   Family History:    Hypertension  Mother ()  Sister  Osteoporosis  Mother ()  MS - Multiple sclerosis  Sister     Procedure history:    LTx - Liver transplant (3417050078) on 2018 at 63 Years.  Esophagogastroduodenoscopy (443006118) on 2018 at 62 Years.  Comments:  2018 1:14 PM Tamera Worley  Indication:  Varices, follow up.   Sedation:  MAC.  Recommendation:  Repeat in 1 year.  Pleural catheter (1705330820) on 2017 at 62 Years.  Comments:  2018 1:43 PM Tamera Worley  Right side insertion.  Thoracoscopic procedure (4800074642) on 10/30/2017 at 62 Years.  Comments:  2017 2:23 PM Jacey Nelson CMA  Right Video assisted thoracoscopc surther  pleural biopsy  doxycycline pleurodesis  VATS - Video assisted thorascopic surgery (446077385) on 10/30/2017 at 62 Years.  Comments:  2017 2:59 PM Tamera Worley  Right-sided pleural biopsy.  Esophagogastroduodenoscopy (507792296) on 2016 at 61 Years.  Comments:  2016 8:13 AM Tamera Velazquez  Indication:  Gastric ulcer follow up.  Sedation:  MAC.  Upper GI endoscopy (2578121038) on 2016 at 61 Years.  Comments:  2016 1:29 PM CDT - Jacey Dowd CMA  Acute gastric ulcer w/o hemorrhage or perforation, esophageal varices w/o bleeding.  Varices are scarred and not amenable to banding at this time  Esophagogastroduodenoscopy (053096307) on 2016 at 61 Years.  Esophagogastroduodenoscopy (201554058) on 2016 at 61 Years.  Comments:  2016 7:57 AM Tamera Velazquez  Indication: Varices, follow up.  Sedation: MAC.  Impression:  H. pylori negative.  Upper GI endoscopy on 2016 at 60 Years.  Esophagogastroduodenoscopy (353280712) on 3/13/2015 at 59 Years.  Wedge resection of liver (837272240) in the month of 10/2012 at 57 Years.  Upper GI endoscopy (3805059524) on 2012 at 57  Years.  HPV - Human papillomavirus test negative (1727540904) on 2012 at 57 Years.  Comments:  2012 11:41 AM TOY - Xi Dove  Low risk for cervical cancer. OK to have pap q 3 yrs.  Upper GI endoscopy (7879384976) on 3/2/2012 at 56 Years.  Upper GI endoscopy (6309583828) on 2012 at 56 Years.  Upper GI endoscopy (4885420215) on 2011 at 56 Years.  Esophagogastroduodenoscopy (437255940) on 2011 at 56 Years.  Esophagogastroduodenoscopy (782104150) on 2011 at 56 Years.  Colonoscopy (648974103) on 2/10/2011 at 55 Years.  Esophagogastroduodenoscopy (914320189) on 2/10/2011 at 55 Years.  Comments:  7/10/2011 7:21 PM CDT - Doron Hutchison MD  Bleeding in the duodenal bulb  Biopsy of liver (795274410) on 2011 at 55 Years.  HPV - Human papillomavirus test negative (7965277358) on 4/15/2010 at 55 Years.  Comments:  2011 1:04 PM Xi Rodas  Low risk for cervical cancer. OK to have pap q 3 yrs.  Mammogram - screening (154405443) on 2010 at 55 Years.  Mammography; bilateral (43905) on 2010 at 55 Years.  Comments:  2011 8:48 AM REYNA - Ramya Kenney CMA  Normal-no radiographic evidence for malignancy.  DEXA - Dual energy X-ray photon absorptiometry (457903113) on 2009 at 53 Years.  Comments:  4/15/2010 1:43 PM Xi Rodas  Due again in 5 yrs, ()  Tubal ligation (646284229) in  at 27 Years.   section (31036352) in  at 25 Years.  Primary repair of torn ligament of knee, collateral (68570032) in  at 16 Years.  Tonsillectomy and adenoidectomy (814936443) in 1963 at 8 Years.  Arm fracture (818.0).  Comments:  2011 2:11 PM Nathaly Valle  Right  Arm fracture (818.0).  Comments:  2011 2:12 PM Nathaly Valle  Left  Ankle fracture, right (824.8).   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      Sexual Assessment            Sexually active: Yes.  Number of current partners 1.  Other contraceptive use: Ann Marie.        Physical Examination   vital signs stable, as noted above   Vital Signs   10/17/2019 3:32 PM CDT Temperature Tympanic 98.3 DegF    Peripheral Pulse Rate 84 bpm    Pulse Site Radial artery    HR Method Manual    Respiratory Rate 18 br/min    Systolic Blood Pressure 110 mmHg    Diastolic Blood Pressure 62 mmHg    Mean Arterial Pressure 78 mmHg    BP Site Right arm    BP Method Manual    Oxygen Saturation 97 %      Measurements from flowsheet : Measurements   10/17/2019 3:32 PM CDT Height Measured - Standard 63 in    Weight Measured - Standard 126 lb    BSA 1.59 m2    Body Mass Index 22.32 kg/m2      General:  Alert and oriented, No acute distress.    Eye:  Extraocular movements are intact.    HENT:  Normocephalic, Oral mucosa is moist.    Respiratory:  Respirations are non-labored, Breath sounds are equal, Symmetrical chest wall expansion, late, faint expiratory wheeze, primarily in right lung.    Cardiovascular:  Normal rate, Regular rhythm, No murmur.    Gastrointestinal:  Soft, Non-tender, Non-distended,   V-shaped abdominal incision scar.    Musculoskeletal:  Normal range of motion, Normal strength.    Neurologic:  Alert, Oriented.    Cognition and Speech:  Oriented, Speech clear and coherent.    Psychiatric:  Appropriate mood & affect.       Review / Management   Results review:  Lab results   10/8/2019 12:20 PM CDT WBC 5.9    RBC 3.86    Hgb 12.3 g/dL    Hct 35.7 %    MCV 93 fL    MCH 31.9 pg    MCHC 34.5 g/dL    RDW 12.3 %    Platelet 111    MPV 10.1 fL    Lymphs 11.5 %    Abs Lymphs 0.7    Neutrophils 76.5 %    Abs Neutrophils 4.5    Monocytes 4.9 %    Abs Monocytes 0.3    Eosinophils 6.8 %    Abs Eosinophils 0.4    Basophils 0.3 %    Abs Basophils 0.0    D-Dimer 0.38   9/18/2019 9:09 AM CDT  Tacrolimus () Dose 6.2 mcg/L   7/27/2019 9:23 AM CDT Fecal Fat Qualitative NORMAL    Fecal Leukocyte Stain See comment    Clostridium difficile Toxin See comment    Culture Campylobacter See comment    Culture Salmonella/Shigella See comment    Shiga Toxin See comment    Trichrome 1 See comment   7/26/2019 11:33 AM CDT Immunoglobulin IgA 153 mg/dL    TSH 1.49 mIU/L    Celiac Disease Interp See comment    Tissue Transglutaminase IgA 1 unit/mL   .       Impression and Plan   Diagnosis     Cholangiocarcinoma (BIG66-JE C22.1).     Asthma, Moderate Persistent (DCV30-RD J45.40).     Thoracic back pain (ERW27-NM M54.6).     Recurrent right pleural effusion (KEX36-ZI J90).     Budd-Chiari syndrome (UUO63-WA I82.0).     Hypokalemia (OHA91-LF E87.6).     Obstructive liver cirrhosis (AIP39-FI K74.60).           .)  OLTx (8/30/2018) at [facility] for Budd-Chiari syndrome and previous partial hepatectomy (h/o cholangiocarcinoma)  transplant coordinator: Abigail Parham: [phone]   - complicated by portal vein thrombosis - on warfarin - stopped therapy in 9/2019   - induction IS: basilixumab, maintenance: tacrolimus   - CMV -/-, EBV +/+   - doing remarkably well    .) persistent cough  - CT chest (10/8): stable appearing pulmonary nodules; no infiltrate; no effusion   - started on azithromycin x 5 days and prednisone taper at that time - didn't notice significant symptom improvement   - self-started on furosemide 80mg daily 4 days prior - I don't think this is having an impact; advised to stop   - still has persistent productive cough   - she expresses concern about persist symptoms, especially with return of pleuritic   - I'd like her to see pulmonology through the [facility] for further input; previous as seen Dr. Prajapati after OLTx    plan as previously outlined:     .) osteoporosis, severe   - DEXA (8/2019): T scores ranging between -2.8 to -3.6; FRAX score 17%/6%   - previous traumatic humeral fracture after OLTx   - advised to  begin on calcium/vitamin D   - beginning on alendronate 70mg qweek   - plans to reassess vitamin D levels in the future   - plans for repeat DEXA in 1 yr; consider referral to bone endocrinologist if scores not improving    .) chronic diarrhea   - not maintained on Cellcept (had been on Cellcept initially after transplant)   - normal celiac serologic testing   - C. difficile antigen, stool Cx, qualitative fat, WBCs: wnl   - improved with FODMAPs diet    .) posterior, thoracic back pains, chronic - suspicious there is some residual, late effect from previous XRT use vs. thoracic radiculopathy   - gabapentin 300mg qhs    .) recurrent, metastatic cholangiocarcinoma; followed by Dr. Langford   - originally diagnosed in 1/2011   - s/p neoadjuvant cisplatin + gemcitabine followed by bulk resection, recurrent with further debulking in 10/2012   - in 8/2013, biopsy confirmation of lung nodule as cholangiocarcinoma, s/p XRT   - most recent surveillance CT C/A/P showing no active malignancy    .) health maintenance   - arrange for mammo and DEXA   - should discuss Shingrix through transplant center     close f/u through [facility] as previously scheduled      Professional Services   Counseling Summary:  This was a 25 minute visit with greater than 50% of that time spent counseling the patient.

## 2022-02-16 NOTE — PROGRESS NOTES
Patient:   SHERRIE DAMON            MRN: [id number]            FIN: [id number]               Age:   63 years     Sex:  Female     :  1955   Associated Diagnoses:   Cholangiocarcinoma; Asthma, Moderate Persistent; Thoracic back pain; Recurrent right pleural effusion; Budd-Chiari syndrome; Hypokalemia; Obstructive liver cirrhosis   Author:   Elijah Benitez MD      Visit Information      Date of Service: 2018 03:44 pm  Performing Location: [facility]  Encounter#: [id number]      Primary Care Provider (PCP):  Elijah Benitez MD    NPI# [id number]      Referring Provider:  Elijah Benitez MD    NPI# [id number]      Chief Complaint            Additional Information:No additional information recorded during visit.   Chief complaint and symptoms as noted above and confirmed with patient.  Recent lab and diagnostic studies reviewed with patient      History of Present Illness   10/12/2016: Sherrie presents to clinic with several concerns.  Describes having persistent right-sided upper thoracic back pain for some time.  She says it is around the previous radiation site for her cholangiocarcinoma.  She does have a remote history of pleural effusions, though has not occurred for a number of years.  Also describes a one-month history of dyspnea on exertion with some audible wheezing.  She has a remote history of asthma and had been maintained on a combination inhaled corticosteroid and long-acting beta agonist several years ago.  She stopped her inhalers on her own thinking they were providing much benefit.  She has been using an old prescription albuterol over recent days which has provided some temporary relief.    2017: Presents with cough and dyspnea on exertion.  She was seen in the emergency room on September 3 for similar symptoms with associated fever and hypoxia.  X-ray at that time showing a persistent right-sided pleural effusion.  No described infiltrate pattern though was started on levofloxacin for  pneumonia concerns.  In general she says that she felt better on antibiotics.  Says that symptoms have progressively worsening this week.  No fever chills just has a hard time catching her breath.  Review of records shows similar episode at the same time this past year which was treated with inhaled steroids and albuterol    10/17/2017:  Presents for preoperative assessment for planned right-sided thoracoscopy and potential pleurodesis scheduled for  at [facility].  She has been dealing with persistent cough and shortness of breath.  Recently underwent CT of chest earlier this month showing recurrence of a large sized pleural effusion with associated compression atelectasis.  She has seen benefit and use of inhaled steroids in terms of bronchospasm and cough.    2018: Sherrie presenting for hospital follow-up.  Recently admitted at [facility] as an alternative backup for potential  donor liver transplant.  She is currently being evaluated for transplant due to worsening liver function.  Testing demonstrating a Budd-Chiari development with substantially limited hepatic vascular flow.  Consideration for transplant suspended due to significant hypokalemia with a potassium of 2.6.  She also saw interval drop in hemoglobin down to 10.0 without any evidence of active bleeding.  She is a Pleurx catheter drain and will drain between 1-2 L of pleural fluid per day.    2018: /Sherrie presents to clinic for hospital follow-up after recent admission to the [facility] for a  donor liver transplant.  She underwent orthotopic liver transplant on  which was complicated by anastomotic biliary leak as well as required abdominal closure on postoperative day #1.  Also noted to have a postoperative portal vein thrombus.  She was treated with induction immunosuppression with basiliximab and started on tacrolimus CellCept based he was suppression regimen.  She  required 11 units of packed red blood cells intraoperatively.  She was started on warfarin beginning on September 5.  Her discharge warfarin requirement was 4 mg daily.  Significant issues with postoperative nutrition.  Requiring feeding tube placement and is running on nightly tube feeds.  Appetite is been substantially reduced.  Overall feels well.  She was discharged home.  Her sister will be staying with her to help her in these perioperative days.  Scheduled to return to the [facility] this coming Monday for postoperative assessment.         Review of Systems   Constitutional:  Weakness, Fatigue, No fever, No chills.    Eye:  Negative except as documented in history of present illness.    Ear/Nose/Mouth/Throat:  Negative except as documented in history of present illness.    Respiratory:  Shortness of breath, No cough.    Cardiovascular:  No chest pain, No palpitations, No peripheral edema, No syncope.    Gastrointestinal:  No nausea, No vomiting, No abdominal pain.    Genitourinary:  No dysuria, No hematuria.    Hematology/Lymphatics:  Negative except as documented in history of present illness.    Endocrine:  No excessive thirst, No polyuria.    Immunologic:  No recurrent fevers.    Musculoskeletal:  No joint pain, No muscle pain     Back pain: In the middle of the back.    Neurologic:  Alert and oriented X4, No numbness, No tingling, No headache.       Health Status   Allergies:    Allergic Reactions (Selected)  Severity Not Documented  Dilaudid (Itching)   Medications:  (Selected)   Prescriptions  Prescribed  Dulcolax Laxative 10 mg rectal suppository: = 1 supp ( 10 mg ), MN, Daily, PRN: for constipation, # 10 supp, 0 Refill(s), Type: Maintenance, Pharmacy: [facility], 1 supp MN daily,PRN:for constipation  Ventolin HFA 90 mcg/inh inhalation aerosol: 2 puff(s), inh, qid, PRN: as needed for wheezing, # 1 EA, 11 Refill(s), Type: Maintenance, Pharmacy: [facility], 2  puff(s) inh qid,PRN:as needed for wheezing  cyclobenzaprine 5 mg oral tablet: = 1 tab(s) ( 5 mg ), Oral, tid, Instructions: 5-10 mg tid PRN for pain, PRN: for pain, # 40 tab(s), 0 Refill(s), Type: Maintenance, Pharmacy: [facility], 1 tab(s) Oral tid,PRN:for pain,Instr:5-10 mg tid PRN for pain  gabapentin 300 mg oral capsule: 1 cap(s) ( 300 mg ), po, hs, # 270 cap(s), 1 Refill(s), Type: Maintenance, Pharmacy: [facility], 1 cap(s) po hs  oxyCODONE 5 mg oral tablet: = 1 tab(s) ( 5 mg ), Oral, q3-4 hrs, PRN: as needed for pain, # 60 tab(s), 0 Refill(s), Type: Maintenance, Pharmacy: [facility], 1 tab(s) Oral q3-4 hrs,PRN:as needed for pain  potassium chloride 20 mEq oral tablet, extended release: 1 tab(s) ( 20 mEq ), po, bid, # 180 tab(s), 3 Refill(s), Type: Maintenance, Pharmacy: [facility], 1 tab(s) po bid  pramipexole 0.25 mg oral tablet: 2 tab(s) ( 0.5 mg ), po, hs, # 180 tab(s), 1 Refill(s), Type: Maintenance, Pharmacy: [facility], 2 tab(s) po hs  warfarin 2 mg oral tablet: See Instructions, Instructions: 2 tabs daily; follow instructions as per warfarin clinic, # 100 tab(s), 0 Refill(s), Type: Maintenance, Pharmacy: [facility], 2 tabs daily; follow instructions as per warfarin clinic  zolpidem 10 mg oral tablet: See Instructions, Instructions: TAKE 1 TABLET ONE TIME DAILY AT BEDTIME AS NEEDED FOR SLEEP, # 90 tab(s), 1 Refill(s), Type: Soft Stop  Documented Medications  Documented  Bactrim: Oral, daily, 0 Refill(s), Type: Maintenance  Colace 50 mg oral capsule: = 1 cap(s) ( 50 mg ), Oral, bid, Instructions:  mg BID, 0 Refill(s), Type: Maintenance  Maalox Regular Strength: See Instructions, Instructions: 30 Ml PRN, 0 Refill(s), Type: Maintenance  ProSource Protein: ProSource Protein, Supply, 0 Refill(s), Type: Maintenance  Vitamin D3 1000 intl units oral tablet: 1 tab(s) (  1,000 International Unit ), po, daily, 0 Refill(s), Type: Maintenance  calcium carbonate 500 mg (200 mg elemental calcium) oral tablet, chewable: 1 tab(s) ( 500 mg ), chewed, bid, 0 Refill(s), Type: Maintenance  mycophenolate mofetil 250 mg oral capsule: = 3 cap(s) ( 750 mg ), Oral, bid, 0 Refill(s), Type: Maintenance  omeprazole 20 mg oral delayed release capsule: 1 cap(s) ( 20 mg ), PO, bid, # 90 cap(s), 0 Refill(s), Type: Maintenance  senna: Instructions: 5-10 ml bid, 0 Refill(s), Type: Maintenance  tacrolimus 5 mg oral capsule: = 1 cap(s) ( 5 mg ), Oral, bid, 0 Refill(s), Type: Maintenance  valGANciclovir 450 mg oral tablet: = 1 tab(s) ( 450 mg ), Oral, daily, 0 Refill(s), Type: Maintenance  warfarin 4 mg oral tablet: = 1 tab(s) ( 4 mg ), PO, Daily, # 30 tab(s), 0 Refill(s), Type: Maintenance  Suspended  bumetanide: daily, 0 Refill(s), Type: Maintenance   Problem list:    All Problems  Acquired thrombocytopenia / SNOMED CT 775879873 / Confirmed  Duodenal ulcer / SNOMED CT 61368132 / Confirmed  End stage liver disease / SNOMED CT 1370268747 / Confirmed  Gastric ulcer / SNOMED CT 3463471610 / Confirmed  History of ankle fracture / SNOMED CT 9800005288 / Confirmed  History of arm fracture / SNOMED CT 3160028522 / Confirmed  History of liver cancer / SNOMED CT 1795209647 / Confirmed  History of cholangiocarcinoma / SNOMED CT 8758919920 / Confirmed  Hypertension / SNOMED CT 9790465792 / Confirmed  Hypokalemia / SNOMED CT 87424032 / Confirmed  Lung mass / SNOMED CT 271253872 / Confirmed  Asthma, moderate persistent / SNOMED CT 6695917839 / Confirmed  Osteopenia / SNOMED CT 803630155 / Confirmed  Pancytopenia / SNOMED CT 112360 / Confirmed  Restless leg syndrome / SNOMED CT 20285598 / Confirmed  Fibroid uterus / SNOMED CT 430703396 / Confirmed  Inactive: Anticoagulated / SNOMED CT 44922874  Resolved: History of DVT (deep vein thrombosis) / SNOMED CT 3493426867  Resolved: Inpatient stay / SNOMED CT 563696109  Resolved:  Inpatient stay / SNOMED CT 494539465  Resolved: Inpatient stay / SNOMED CT 974725341  Resolved: Inpatient stay / SNOMED CT 563462378  Resolved: Inpatient stay / SNOMED CT 113061551  Resolved: Pregnancy / SNOMED CT 190595458  Resolved: Pregnancy / SNOMED CT 103046741  Canceled: Choliangiocarcinoma      Histories   Past Medical History:    Active  Acquired thrombocytopenia (439528936): Onset on 10/10/2012 at 57 years.  History of cholangiocarcinoma (6667583219): Onset in 2011 at 55 years.  Asthma, moderate persistent (2172050659)  Hypertension (8976188926)  Fibroid uterus (489038894)  Osteopenia (362889208)  Restless leg syndrome (01981660)  Gastric ulcer (9355227125)  History of ankle fracture (8287113684)  Comments:  11/20/2017 CST 8:42 AM Debra Wilson  Right  Duodenal ulcer (55855290)  Lung mass (752060949)  Comments:  11/20/2017 CST 8:44 AM Debra Wilson  Right  History of arm fracture (0810773431)  Comments:  11/20/2017 CST 8:43 AM Debra Wilson  Right  History of liver cancer (5248414034)  End stage liver disease (5137042726)  Hypokalemia (08423016)  Pancytopenia (293648)  Resolved  Inpatient stay (756116819): Onset on 5/3/2018 at 63 years.  Resolved on 5/4/2018 at 63 years.  Comments:  5/10/2018 CDT 10:45 AM CDT - Debra Perez  @[facility] [place] - Organ transplant candidate  Inpatient stay (765416617): Onset on 10/30/2017 at 62 years.  Resolved on 11/3/2017 at 62 years.  Comments:  11/20/2017 CST 8:39 AM Debra Wilson  @[place], [place] - Cholangiocarcinoma  Inpatient stay (509576758): Onset on 3/13/2015 at 59 years.  Resolved on 3/17/2015 at 59 years.  Comments:  11/20/2017 CST 8:39 AM Debra Wilson  @[place], [place] - Bleeding esophageal varices  Inpatient stay (461738803): Onset on 6/12/2014 at 59 years.  Resolved on 6/13/2014 at 59 years.  Comments:  11/20/2017 CST 8:38 AM REYNA - Debra Perze  @[place], [place] -  Pneumonia  Inpatient stay (618660268): Onset on 2011 at 56 years.  Resolved.  Comments:  2017 CST 8:38 AM CST - Debra Perez  @[facility], [place] - Chemotherapy induced nausea and vomiting  History of DVT (deep vein thrombosis) (0437062956): Onset on 2/15/2011 at 55 years.  Resolved.  Pregnancy (592292776):  Resolved on 1979 at 24 years.  Pregnancy (699414653):  Resolved on 1981 at 26 years.   Family History:    Hypertension  Mother ()  Sister  Osteoporosis  Mother ()  MS - Multiple sclerosis  Sister     Procedure history:    LTx - Liver transplant (0603237272) on 2018 at 63 Years.  Esophagogastroduodenoscopy (112895235) on 2018 at 62 Years.  Comments:  2018 1:14 PM - Tamera Green  Indication:  Varices, follow up.   Sedation:  MAC.  Recommendation:  Repeat in 1 year.  Pleural catheter (5323950398) on 2017 at 62 Years.  Comments:  2018 1:43 PM - Tamera Green  Right side insertion.  Thoracoscopic procedure (5676587701) on 10/30/2017 at 62 Years.  Comments:  2017 2:23 PM - Jacey Dowd CMA  Right Video assisted thoracoscopc surther  pleural biopsy  doxycycline pleurodesis  VATS - Video assisted thorascopic surgery (787320078) on 10/30/2017 at 62 Years.  Comments:  2017 2:59 PM - Tamera Green  Right-sided pleural biopsy.  Esophagogastroduodenoscopy (544796909) on 2016 at 61 Years.  Comments:  2016 8:13 AM - Tamera Green  Indication:  Gastric ulcer follow up.  Sedation:  MAC.  Upper GI endoscopy (8066402193) on 2016 at 61 Years.  Comments:  2016 1:29 PM - Jacey Dowd CMA  Acute gastric ulcer w/o hemorrhage or perforation, esophageal varices w/o bleeding.  Varices are scarred and not amenable to banding at this time  Esophagogastroduodenoscopy (994739189) on 2016 at 61 Years.  Esophagogastroduodenoscopy (528032840) on 2016 at 61 Years.  Comments:  2016 7:57 AM - Tamera Green  Indication: Varices,  follow up.  Sedation: MAC.  Impression:  H. pylori negative.  Upper GI endoscopy on 2016 at 60 Years.  Esophagogastroduodenoscopy (889322912) on 3/13/2015 at 59 Years.  Wedge resection of liver (461320051) in the month of 10/2012 at 57 Years.  Upper GI endoscopy (9566218635) on 2012 at 57 Years.  HPV - Human papillomavirus test negative (9686584592) on 2012 at 57 Years.  Comments:  2012 11:41 AM - iX Dove  Low risk for cervical cancer. OK to have pap q 3 yrs.  Upper GI endoscopy (3617924269) on 3/2/2012 at 56 Years.  Upper GI endoscopy (9286415916) on 2012 at 56 Years.  Upper GI endoscopy (4260954983) on 2011 at 56 Years.  Esophagogastroduodenoscopy (271548331) on 2011 at 56 Years.  Esophagogastroduodenoscopy (530150937) on 2011 at 56 Years.  Colonoscopy (537626789) on 2/10/2011 at 55 Years.  Esophagogastroduodenoscopy (661638880) on 2/10/2011 at 55 Years.  Comments:  7/10/2011 7:21 PM - Doron Hutchison MD  Bleeding in the duodenal bulb  Biopsy of liver (830545273) on 2011 at 55 Years.  HPV - Human papillomavirus test negative (5432997431) on 4/15/2010 at 55 Years.  Comments:  2011 1:04 PM - Xi Dove  Low risk for cervical cancer. OK to have pap q 3 yrs.  Mammogram - screening (035109510) on 2010 at 55 Years.  Mammography; bilateral (94490) on 2010 at 55 Years.  Comments:  2011 8:48 AM - Ramya Kenney CMA  Normal-no radiographic evidence for malignancy.  DEXA - Dual energy X-ray photon absorptiometry (167289482) on 2009 at 53 Years.  Comments:  4/15/2010 1:43 PM - Hortencia-Woll NP-C, Xi  Due again in 5 yrs, ()  Tubal ligation (401040729) in  at 27 Years.   section (72791042) in  at 25 Years.  Primary repair of torn ligament of knee, collateral (54007022) in  at 16 Years.  Tonsillectomy and adenoidectomy (284003009) in  at 8 Years.  Arm fracture (818.0).  Comments:  2011 2:11 PM -  Nathaly Lim  Right  Arm fracture (818.0).  Comments:  4/25/2011 2:12 PM - Nathaly Lim  Left  Ankle fracture, right (824.8).   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      Sexual Assessment            Sexually active: Yes.  Number of current partners 1.  Other contraceptive use: Ann Marie.        Physical Examination   vital signs stable, as noted above   VS/Measurements   General:  Alert and oriented, No acute distress.    Eye:  Extraocular movements are intact.    HENT:  Normocephalic, Oral mucosa is moist.    Neck:  Supple.    Respiratory:  Lungs are clear to auscultation, No chest wall tenderness, diminished breath sounds at right base; no pleural rub, right sided pleurex catheter in place.    Cardiovascular:  Normal rate, Regular rhythm.    Gastrointestinal:  Soft, Non-tender, NG feeding tube secured.  V-shaped abdominal incision - two SANTOS drains secured.    Musculoskeletal:  Normal range of motion, Normal strength.    Neurologic:  Alert, Oriented, Normal motor function, No focal deficits.    Cognition and Speech:  Oriented, Speech clear and coherent.    Psychiatric:  Appropriate mood & affect.       Review / Management   Results review      Impression and Plan   Diagnosis     Cholangiocarcinoma (WTK60-WR C22.1).     Asthma, Moderate Persistent (RDV26-OZ J45.40).     Thoracic back pain (SVE60-XP M54.6).     Recurrent right pleural effusion (QNT59-PS J90).     Budd-Chiari syndrome (YKU38-OD I82.0).     Hypokalemia (UIS44-GB E87.6).     Obstructive liver cirrhosis (CAG25-GG K74.60).           .) hospital f/u; s/p OLTx (8/30/2018) at [facility] for Budd-Chiari syndrome and previous partial hepatectomy (h/o cholangiocarcinoma)  transplant coordinator: Abigail Parham: [phone]   - complicated by portal vein thrombosis - on  warfarin; monitoring INR 2x/week through home health for now   - induction IS: basilixumab, maintenance: Cellcept and tacrolimus   - CMV -/-, EBV +/+   - post-op malnutrition: feeding tube in place; on nightly tube feeds; no issues with dysphagia/nausea   - Rx provided for oxycodone #60 tabs for post-operative pain    .) posterior, thoracic back pains - suspicious there is some residual, late effect from previous XRT use vs. thoracic radiculopathy   - gabapentin 300mg qhs    .) recurrent, metastatic cholangiocarcinoma; followed by Dr. Langford   - originally diagnosed in 1/2011   - s/p neoadjuvant cisplatin + gemcitabine followed by bulk resection, recurrent with further debulking in 10/2012   - in 8/2013, biopsy confirmation of lung nodule as cholangiocarcinoma, s/p XRT   - most recent surveillance CT C/A/P showing no active malignancy     close f/u through [facility]

## 2022-02-16 NOTE — TELEPHONE ENCOUNTER
---------------------  From: Jacey Dowd CMA (eRx Pool ([id number]_[facility]))   To: Elijah Benitez MD;     Sent: 5/15/2019 11:56:49 AM CDT  Subject: FW: Medication Management   Due Date/Time: 5/15/2019 8:29:00 PM CDT     when do you want to see Sherrie again  Last seen 3/1/2019 preop - [facility]  you last seen 9/2018 hosp f/u after liver transplat    Please advise on refill and when visit needed       ------------------------------------------  From: [facility]  To: Elijah Benitez MD  Sent: May 14, 2019 8:29:50 PM CDT  Subject: Medication Management  Due: May 15, 2019 8:29:50 PM CDT    ** On Hold Pending Signature **  Drug: gabapentin (gabapentin 300 mg oral capsule)  TAKE ONE CAPSULE BY MOUTH AT BEDTIME  Quantity: 90 unknown unit  Days Supply: 90        Refills: 0  Substitutions Allowed  Notes from Pharmacy:     Dispensed Drug: gabapentin (gabapentin 300 mg oral capsule)  TAKE ONE CAPSULE BY MOUTH AT BEDTIME  Quantity: 90 unknown unit  Days Supply: 90        Refills: 0  Substitutions Allowed  Notes from Pharmacy:   ---------------------------------------------------------------  From: Elijah Benitez MD   To: [facility]    Sent: 5/15/2019 4:29:55 PM CDT  Subject: FW: Medication Management     ** Submitted: **  Complete:gabapentin (gabapentin 300 mg oral capsule)   Signed by Elijah Benitez MD  5/15/2019 4:29:00 PM    ** Approved **  gabapentin (GABAPENTIN 300MG CAPS)  TAKE ONE CAPSULE BY MOUTH AT BEDTIME  Qty:  90 unknown unit        Days Supply:  90        Refills:  0          Substitutions Allowed     Route To Pharmacy - [facility]

## 2022-02-16 NOTE — LETTER
(Inserted Image. Unable to display)   July 28, 2019      REX DAMON  [address] [place] [phone]        Dear REX,     Thank you for selecting [facility] ([facility]viously [facility] for your healthcare needs. Below you will find the results of your recent test(s) done at our clinic.      Blood testing for celiac disease is normal.  This does not entirely rule out the possibilities of celiac disease, though makes it very unlikely.        Result Name Current Result Reference Range   Immunoglob IgA (mg/dL)  153 7/26/2019 20 - 320   TSH (mIU/L)  1.49 7/26/2019 0.40 - 4.50   Celiac Disease Interp  See comment 7/26/2019    Tissue Transglutaminase IgA (unit/mL)  1 7/26/2019        Please contact me or my assistant at [phone] if you have any questions or concerns.     Sincerely,        Elijah Benitez MD    What do your labs mean?  Below is a glossary of commonly ordered labs:  LDL - Bad Cholesterol  HDL - Good Cholesterol  AST/ALT - Liver Function  Cr/Creatinine - Kidney Function  Microalbumin - Kidney Function  BUN - Kidney Function  PSA - Prostate   TSH - Thyroid Hormone  HgbA1c - Diabetes Test  Hgb (Hemoglobin) - Red Blood Cells

## 2022-02-16 NOTE — TELEPHONE ENCOUNTER
Entered by Carlos Nagy CMA on March 05, 2020 3:34:55 PM CST  ---------------------  From: Carlos Nagy CMA   To: [facility]    Sent: 3/5/2020 3:34:52 PM CST  Subject: Medication Management     ** Not Approved: Patient has requested refill too soon, Pt given #180 and 1 refill 12/19/19 **  pramipexole (PRAMIPEXOLE DIHYDROCHLORIDE 0.25 MG Tablet)  TAKE 2 TABLETS EVERY EVENING  Qty:  180 tab(s)        Days Supply:  90        Refills:  0          Substitutions Allowed     Route To Pharmacy - [facility]   Signed by Carlos Nagy CMA            ** Patient matched by Carlos Nagy CMA on 3/5/2020 3:33:47 PM CST **      ------------------------------------------  From: [facility]  To: Elijah Benitez MD  Sent: March 5, 2020 1:36:26 PM CST  Subject: Medication Management  Due: March 6, 2020 1:36:26 PM CST    ** On Hold Pending Signature **  Drug: pramipexole (pramipexole 0.25 mg oral tablet)  TAKE 2 TABLETS EVERY EVENING  Quantity: 180 tab(s)  Days Supply: 0  Refills: 1  Substitutions Allowed  Notes from Pharmacy:     Dispensed Drug: pramipexole (pramipexole 0.25 mg oral tablet)  TAKE 2 TABLETS EVERY EVENING  Quantity: 180 tab(s)  Days Supply: 90  Refills: 0  Substitutions Allowed  Notes from Pharmacy:   ------------------------------------------

## 2022-02-16 NOTE — TELEPHONE ENCOUNTER
---------------------  From: Angélica Aldana CMA (eRx Pool ([id number]_[facility]))   To: RICHARD Message Pool ([id number] [facility] [place]);     Sent: 3/20/2019 11:50:12 AM CDT  Subject: FW: Medication Management   Due Date/Time: 3/21/2019 11:00:00 AM CDT     Medication Refill needing approval    PCP:   RICHARD    Medication:   pramipexole  Last Filled:  10/24/18    Quantity:  180  Refills:  1  CSA on file?   no     Medication:   zolpidem  Last Filled:  10/25/18    Quantity:  90  Refills:  1     Date of last office visit and reason:   3/1/19; preop  Date of last labs pertaining to condition:  3/1/19    Return to Clinic order placed?  no          ** Patient matched by Angélica Aldana CMA on 3/20/2019 11:41:55 AM CDT **      ------------------------------------------  From: [facility]  To: Elijah Benitez MD  Sent: March 20, 2019 11:00:09 AM CDT  Subject: Medication Management  Due: March 21, 2019 11:00:09 AM CDT    ** On Hold Pending Signature **  Drug: pramipexole (pramipexole 0.25 mg oral tablet)  TAKE 2 TABLETS AT BEDTIME  Quantity: 180 tab(s)    Days Supply: 90        Refills: 1  Substitutions Allowed  Notes from Pharmacy:     Dispensed Drug: pramipexole (pramipexole 0.25 mg oral tablet)  TAKE 2 TABLETS AT BEDTIME  Quantity: 180 tab(s)    Days Supply: 90        Refills: 1  Substitutions Allowed  Notes from Pharmacy:     ** On Hold Pending Signature **  Drug: zolpidem (zolpidem 10 mg oral tablet)  TAKE 1 TABLET ONE TIME DAILY AT BEDTIME AS NEEDED FOR SLEEP  Quantity: 90 tab(s)     Days Supply: 90        Refills: 1  Substitutions Allowed  Notes from Pharmacy:     Dispensed Drug: zolpidem (zolpidem 10 mg oral tablet)  TAKE 1 TABLET ONE TIME DAILY AT BEDTIME AS NEEDED FOR SLEEP  Quantity: 90 tab(s)     Days Supply: 90        Refills: 1  Substitutions Allowed  Notes from Pharmacy:   ---------------------------------------------------------------  From: Jacey Dowd CMA ([facility] Message Pool ([id number]_[facility]))   To:  Elijah Benitez MD;     Sent: 3/20/2019 1:47:41 PM CDT  Subject: FW: Medication Management   Due Date/Time: 3/21/2019 11:00:00 AM CDT     please advise on both refills  When do you want to see pt again?---------------------  From: Elijah Benitez MD   To: [facility]    Sent: 3/20/2019 2:12:33 PM CDT  Subject: FW: Medication Management     ** Submitted: **  Complete:pramipexole (pramipexole 0.25 mg oral tablet)   Signed by Elijah Benitez MD  3/20/2019 2:12:00 PM    ** Submitted: **  Complete:zolpidem (zolpidem 10 mg oral tablet)  faxed to [facility] pharmacy       Signed by Elijah Benitez MD  3/20/2019 2:12:00 PM    ** Approved **  zolpidem (ZOLPIDEM TARTRATE 10 MG Tablet)  TAKE 1 TABLET ONE TIME DAILY AT BEDTIME AS NEEDED FOR SLEEP  Qty:  90 tab(s)        Days Supply:  90        Refills:  1          Substitutions Allowed     Route To Pharmacy - [facility]       ** Approved **  pramipexole (PRAMIPEXOLE DIHYDROCHLORIDE 0.25 MG Tablet)  TAKE 2 TABLETS AT BEDTIME  Qty:  180 tab(s)        Days Supply:  90        Refills:  1          Substitutions Allowed     Route To Pharmacy - [facility]

## 2022-02-16 NOTE — NURSING NOTE
Comprehensive Intake Entered On:  6/30/2020 4:40 PM CDT    Performed On:  6/30/2020 4:37 PM CDT by Jacey Dowd CMA               Summary   Chief Complaint :   check left first finger - started noticing soreness/redness -  the last 4 days   Menstrual Status :   Postmenopausal   Weight Measured :   133.8 lb(Converted to: 133 lb 13 oz, 60.69 kg)    Systolic Blood Pressure :   116 mmHg   Diastolic Blood Pressure :   74 mmHg   Mean Arterial Pressure :   88 mmHg   Peripheral Pulse Rate :   80 bpm   Temperature Tympanic :   96.2 DegF(Converted to: 35.7 DegC)  (LOW)    Jacey Dowd CMA - 6/30/2020 4:37 PM CDT   Health Status   Allergies Verified? :   Yes   Medication History Verified? :   Yes   Medical History Verified? :   Yes   Pre-Visit Planning Status :   Completed   Tobacco Use? 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   (Last Updated: 4/15/2010 2:36:13 PM CDT by Xi Dove)          Sexual:        Sexually active: Yes.  Number of current partners 1.  Other contraceptive use: Ann Marie.   (Last Updated: 4/15/2010 2:36:58 PM CDT by Xi Dove)

## 2022-02-16 NOTE — LETTER
(Inserted Image. Unable to display)   September 18, 2020      REX DAMON  [address] [place] [id number]        Dear REX,      Thank you for selecting [facility] ([facility]viously [facility] for your healthcare needs.      This letter is to inform you that we have received a copy of your mammogram results.  Your results were normal unless noted below.  You will also receive notice of your results from the radiologist within 7-10 days.  This letter is to let you know I have also received a copy and it is filed in your clinic chart.      Please plan on having your next mammogram done in 12 months.          Please contact me or my assistant at [phone] if you have any questions or concerns.     Sincerely,        Elijah Benitez MD

## 2022-02-16 NOTE — TELEPHONE ENCOUNTER
Entered by Angélica Aldana CMA on May 24, 2021 9:26:14 PM CDT  ---------------------  From: Angélica Aldana CMA   To: [facility]    Sent: 5/24/2021 9:26:10 PM CDT  Subject: Medication Management     ** Not Approved: Patient has requested refill too soon, #180, 1 sent 12/30/20 **  gabapentin (GABAPENTIN 300 MG Capsule)  TAKE 2 CAPSULES AT BEDTIME  Qty:  180 cap(s)        Days Supply:  90        Refills:  0          Substitutions Allowed     Route To Pharmacy - [facility]   Signed by Angélica lAdana CMA            ** Patient matched by Angélica Aldana CMA on 5/24/2021 9:24:23 PM CDT **      ------------------------------------------  From: [facility]  To: Elijah Benitez MD  Sent: May 21, 2021 7:10:15 PM CDT  Subject: Medication Management  Due: May 14, 2021 8:11:22 PM CDT     ** On Hold Pending Signature **     Drug: gabapentin (gabapentin 300 mg oral capsule), TAKE 2 CAPSULES AT BEDTIME  Quantity: 180 cap(s)  Days Supply: 0  Refills: 1  Substitutions Allowed  Notes from Pharmacy:     Dispensed Drug: gabapentin (gabapentin 300 mg oral capsule), TAKE 2 CAPSULES AT BEDTIME  Quantity: 180 cap(s)  Days Supply: 90  Refills: 0  Substitutions Allowed  Notes from Pharmacy:  ------------------------------------------

## 2022-02-16 NOTE — NURSING NOTE
Comprehensive Intake Entered On:  11/12/2019 8:21 AM CST    Performed On:  11/12/2019 8:15 AM CST by Sherice Green LPN               Summary   Chief Complaint :   has a lump/scab on left shoulder, present for 5-6 weeks, painful, causing some achey pain in upper arm, not improving   Menstrual Status :   Postmenopausal   Height Measured :   63 in(Converted to: 5 ft 3 in, 160.02 cm)    Ht/Wt Measurement Refused by Patient? :   Yes   Systolic Blood Pressure :   144 mmHg (HI)    Diastolic Blood Pressure :   92 mmHg (HI)    Mean Arterial Pressure :   109 mmHg   Peripheral Pulse Rate :   80 bpm   BP Site :   Right arm   BP Method :   Manual   Temperature Tympanic :   97.4 DegF(Converted to: 36.3 DegC)  (LOW)    Oxygen Saturation :   98 %   Sherice Green LPN - 11/12/2019 8:15 AM CST   Health Status   Allergies Verified? :   Yes   Medication History Verified? :   Yes   Medical History Verified? :   No   Pre-Visit Planning Status :   Completed   Tobacco Use? :   Never smoker   Sherice Green LPN - 11/12/2019 8:15 AM CST   Meds / Allergies   (As Of: 11/12/2019 8:21:25 AM CST)   Allergies (Active)   Dilaudid  Estimated Onset Date:   Unspecified ; Reactions:   Itching ; Created By:   Jacey Dowd CMA; Reaction Status:   Active ; Category:   Drug ; Substance:   Dilaudid ; Type:   Allergy ; Updated By:   Jacey Dowd CMA; Reviewed Date:   10/8/2019 2:03 PM CDT      HYDROmorphone  Estimated Onset Date:   6/11/2014 ; Reactions:   Itching ; Created By:   Jacey Dowd CMA; Reaction Status:   Active ; Category:   Drug ; Substance:   HYDROmorphone ; Type:   <not entered> ; Severity:   Unknown ; Updated By:   Jacey Dowd CMA; Reviewed Date:   10/8/2019 2:03 PM CDT      iron sucrose  Estimated Onset Date:   <not entered> 7/16/2015 ; Reactions:   GI Upset ; Created By:   Jacey Dowd CMA; Reaction Status:   Active ; Category:   Drug ; Substance:   iron sucrose ; Type:   <not entered> ; Severity:   Unknown ; Updated By:    Jacey Dowd CMA; Reviewed Date:   10/8/2019 2:03 PM CDT      sodium ferric gluconate complex  Estimated Onset Date:   <not entered> 12/10/2015 ; Reactions:   Other - Describe In Comment Field ; Created By:   Jacey Dowd CMA; Reaction Status:   Active ; Category:   Drug ; Substance:   sodium ferric gluconate complex ; Type:   <not entered> ; Severity:   Unknown ; Updated By:   Jacey Dowd CMA; Reviewed Date:   10/8/2019 2:03 PM CDT        Medication List   (As Of: 11/12/2019 8:21:25 AM CST)   Prescription/Discharge Order    gabapentin  :   gabapentin ; Status:   Prescribed ; Ordered As Mnemonic:   gabapentin 300 mg oral capsule ; Simple Display Line:   1 cap(s), Oral, qhs, 90 tab(s), 1 Refill(s) ; Ordering Provider:   Elijah Benitez MD; Catalog Code:   gabapentin ; Order Dt/Tm:   7/26/2019 11:49:08 AM CDT          alendronate  :   alendronate ; Status:   Prescribed ; Ordered As Mnemonic:   alendronate 70 mg oral tablet ; Simple Display Line:   70 mg, 1 tab(s), Oral, qweek, with 6-8 oz plain water, at least 30 minutes before first food, beverage, or medication of the day, 12 tab(s), 3 Refill(s) ; Ordering Provider:   Elijah Benitez MD; Catalog Code:   alendronate ; Order Dt/Tm:   8/27/2019 11:47:29 AM CDT          pramipexole  :   pramipexole ; Status:   Prescribed ; Ordered As Mnemonic:   pramipexole 0.25 mg oral tablet ; Simple Display Line:   2 tab(s), Oral, qhs, 180 tab(s), 1 Refill(s) ; Ordering Provider:   Elijah Benitez MD; Catalog Code:   pramipexole ; Order Dt/Tm:   7/26/2019 11:49:08 AM CDT          albuterol  :   albuterol ; Status:   Prescribed ; Ordered As Mnemonic:   albuterol 90 mcg/inh inhalation aerosol ; Simple Display Line:   2 puff(s), NEB, qid, PRN: AS NEEDED FOR WHEEZING, 18 gm, 5 Refill(s) ; Ordering Provider:   Elijah Benitez MD; Catalog Code:   albuterol ; Order Dt/Tm:   10/1/2019 4:08:35 PM CDT          zolpidem 10 mg oral tablet  :   zolpidem 10 mg oral tablet ; Status:   Prescribed ; Ordered As  Mnemonic:   zolpidem 10 mg oral tablet ; Simple Display Line:   See Instructions, TAKE 1 TABLET ONE TIME DAILY AT BEDTIME AS NEEDED FOR SLEEP, 90 tab(s), 1 Refill(s) ; Ordering Provider:   Elijah Benitez MD; Catalog Code:   zolpidem ; Order Dt/Tm:   9/10/2019 2:52:45 PM CDT            Home Meds    aspirin  :   aspirin ; Status:   Documented ; Ordered As Mnemonic:   aspirin 81 mg oral tablet ; Simple Display Line:   81 mg, 1 tab(s), Oral, daily, 0 Refill(s) ; Catalog Code:   aspirin ; Order Dt/Tm:   3/1/2019 10:58:12 AM CST          omeprazole  :   omeprazole ; Status:   Documented ; Ordered As Mnemonic:   omeprazole 20 mg oral delayed release capsule ; Simple Display Line:   40 mg, 2 cap(s), PO, bid, 90 cap(s), 0 Refill(s) ; Catalog Code:   omeprazole ; Order Dt/Tm:   5/9/2018 10:52:40 AM CDT          warfarin  :   warfarin ; Status:   Documented ; Ordered As Mnemonic:   warfarin 2 mg oral tablet ; Simple Display Line:   0 Refill(s) ; Catalog Code:   warfarin ; Order Dt/Tm:   10/17/2019 7:15:13 PM CDT          acetaminophen  :   acetaminophen ; Status:   Documented ; Ordered As Mnemonic:   acetaminophen 500 mg oral tablet ; Simple Display Line:   1-2 tab(s), Oral, q6 hrs, PRN: as needed for pain, 0 Refill(s) ; Catalog Code:   acetaminophen ; Order Dt/Tm:   10/12/2018 4:08:10 PM CDT          tacrolimus  :   tacrolimus ; Status:   Documented ; Ordered As Mnemonic:   tacrolimus 1 mg oral capsule ; Simple Display Line:   See Instructions, 4mg AM/ 5mg PM, 0 Refill(s) ; Catalog Code:   tacrolimus ; Order Dt/Tm:   10/12/2018 4:10:27 PM CDT

## 2022-04-06 ENCOUNTER — LAB (OUTPATIENT)
Dept: FAMILY MEDICINE | Facility: CLINIC | Age: 67
End: 2022-04-06
Attending: FAMILY MEDICINE
Payer: COMMERCIAL

## 2022-04-06 ENCOUNTER — TELEPHONE (OUTPATIENT)
Dept: FAMILY MEDICINE | Facility: CLINIC | Age: 67
End: 2022-04-06

## 2022-04-06 DIAGNOSIS — Z20.822 SUSPECTED 2019 NOVEL CORONAVIRUS INFECTION: ICD-10-CM

## 2022-04-06 LAB — SARS-COV-2 RNA RESP QL NAA+PROBE: NEGATIVE

## 2022-04-06 PROCEDURE — U0003 INFECTIOUS AGENT DETECTION BY NUCLEIC ACID (DNA OR RNA); SEVERE ACUTE RESPIRATORY SYNDROME CORONAVIRUS 2 (SARS-COV-2) (CORONAVIRUS DISEASE [COVID-19]), AMPLIFIED PROBE TECHNIQUE, MAKING USE OF HIGH THROUGHPUT TECHNOLOGIES AS DESCRIBED BY CMS-2020-01-R: HCPCS

## 2022-04-06 PROCEDURE — U0005 INFEC AGEN DETEC AMPLI PROBE: HCPCS

## 2022-04-06 NOTE — TELEPHONE ENCOUNTER
Started with cold type sx 3-4 days ago - no fever, occasional chills, productive cough SOB, cramping in her fingertips and toes - concerned most with the SOB-worsening and the cramping.  States she needed to walk the house most the night last night  Due to the cramping.  Appetite has been okay, drinking fine.  Has had diarrhea on/off x 3d.  Concerned she's retaining fluid and melanie like to get labs and CXR done.  Did get tested for  Covid today at a clinic in [place] (results pending)

## 2022-04-06 NOTE — TELEPHONE ENCOUNTER
I would advise we see her in clinic this week with CXR, CBC w/ diff, BMP, magnesium at time of visit   no

## 2022-04-06 NOTE — TELEPHONE ENCOUNTER
Reason for Call:  Same Day Appointment, Requested Provider:  RICHARD    PCP: Apollo Benitez    Reason for visit: Not feeling well, cough,sob,leg and hand cramping,    Duration of symptoms: 3rd day    Have you been treated for this in the past? No    Additional comments: Patient tested for covid at another [facility], waiting for results. Cannot come into clinic. Please call patient back.    Can we leave a detailed message on this number? YES    Phone number patient can be reached at: Cell number on file:    Telephone Information:   Mobile [phone]       Best Time: any    Call taken on 4/6/2022 at 9:53 AM by PEDRO KENNY

## 2022-04-07 ENCOUNTER — OFFICE VISIT (OUTPATIENT)
Dept: FAMILY MEDICINE | Facility: CLINIC | Age: 67
End: 2022-04-07
Payer: COMMERCIAL

## 2022-04-07 VITALS
WEIGHT: 135.1 LBS | BODY MASS INDEX: 23.56 KG/M2 | DIASTOLIC BLOOD PRESSURE: 78 MMHG | OXYGEN SATURATION: 96 % | HEART RATE: 101 BPM | TEMPERATURE: 98.2 F | SYSTOLIC BLOOD PRESSURE: 110 MMHG

## 2022-04-07 DIAGNOSIS — K52.9 CHRONIC DIARRHEA: ICD-10-CM

## 2022-04-07 DIAGNOSIS — C73 MALIGNANT NEOPLASM OF THYROID GLAND (H): ICD-10-CM

## 2022-04-07 DIAGNOSIS — R05.8 PRODUCTIVE COUGH: Primary | ICD-10-CM

## 2022-04-07 DIAGNOSIS — R25.2 MUSCLE CRAMPING: ICD-10-CM

## 2022-04-07 DIAGNOSIS — M17.11 PRIMARY LOCALIZED OSTEOARTHRITIS OF RIGHT KNEE: ICD-10-CM

## 2022-04-07 DIAGNOSIS — C22.1 CHOLANGIOCARCINOMA METASTATIC TO LIVER (H): ICD-10-CM

## 2022-04-07 DIAGNOSIS — Z94.4 LIVER TRANSPLANTED (H): Chronic | ICD-10-CM

## 2022-04-07 DIAGNOSIS — M81.0 AGE-RELATED OSTEOPOROSIS WITHOUT CURRENT PATHOLOGICAL FRACTURE: ICD-10-CM

## 2022-04-07 DIAGNOSIS — C78.7 CHOLANGIOCARCINOMA METASTATIC TO LIVER (H): ICD-10-CM

## 2022-04-07 PROBLEM — I82.401 ACUTE DEEP VEIN THROMBOSIS (DVT) OF RIGHT LOWER EXTREMITY (H): Status: RESOLVED | Noted: 2021-04-02 | Resolved: 2022-04-07

## 2022-04-07 PROBLEM — I85.01 BLEEDING ESOPHAGEAL VARICES (H): Status: RESOLVED | Noted: 2018-03-06 | Resolved: 2022-04-07

## 2022-04-07 PROBLEM — S31.109A OPEN ABDOMINAL WALL WOUND: Status: RESOLVED | Noted: 2018-09-12 | Resolved: 2022-04-07

## 2022-04-07 PROBLEM — S42.292D OTHER CLOSED DISPLACED FRACTURE OF PROXIMAL END OF LEFT HUMERUS WITH ROUTINE HEALING, SUBSEQUENT ENCOUNTER: Status: RESOLVED | Noted: 2019-03-11 | Resolved: 2022-04-07

## 2022-04-07 PROBLEM — R79.89 ELEVATED LFTS: Status: RESOLVED | Noted: 2021-03-31 | Resolved: 2022-04-07

## 2022-04-07 PROBLEM — D62 ACUTE BLOOD LOSS ANEMIA: Status: RESOLVED | Noted: 2018-09-12 | Resolved: 2022-04-07

## 2022-04-07 PROBLEM — E87.70 HYPERVOLEMIA: Status: RESOLVED | Noted: 2018-09-12 | Resolved: 2022-04-07

## 2022-04-07 PROBLEM — G89.18 ACUTE POST-OPERATIVE PAIN: Status: RESOLVED | Noted: 2018-09-12 | Resolved: 2022-04-07

## 2022-04-07 PROBLEM — E44.1 MILD PROTEIN-CALORIE MALNUTRITION (H): Status: RESOLVED | Noted: 2018-09-12 | Resolved: 2022-04-07

## 2022-04-07 PROBLEM — J90 PLEURAL EFFUSION: Status: RESOLVED | Noted: 2018-05-10 | Resolved: 2022-04-07

## 2022-04-07 LAB
ANION GAP SERPL CALCULATED.3IONS-SCNC: 6 MMOL/L (ref 3–14)
BASOPHILS # BLD AUTO: 0 10E3/UL (ref 0–0.2)
BASOPHILS NFR BLD AUTO: 1 %
BUN SERPL-MCNC: 17 MG/DL (ref 7–30)
CALCIUM SERPL-MCNC: 8.2 MG/DL (ref 8.5–10.1)
CHLORIDE BLD-SCNC: 107 MMOL/L (ref 94–109)
CO2 SERPL-SCNC: 27 MMOL/L (ref 20–32)
CREAT SERPL-MCNC: 0.93 MG/DL (ref 0.52–1.04)
EOSINOPHIL # BLD AUTO: 0.3 10E3/UL (ref 0–0.7)
EOSINOPHIL NFR BLD AUTO: 7 %
ERYTHROCYTE [DISTWIDTH] IN BLOOD BY AUTOMATED COUNT: 12.7 % (ref 10–15)
GFR SERPL CREATININE-BSD FRML MDRD: 68 ML/MIN/1.73M2
GLUCOSE BLD-MCNC: 111 MG/DL (ref 70–99)
HCT VFR BLD AUTO: 37.9 % (ref 35–47)
HGB BLD-MCNC: 12.3 G/DL (ref 11.7–15.7)
IMM GRANULOCYTES # BLD: 0 10E3/UL
IMM GRANULOCYTES NFR BLD: 0 %
LYMPHOCYTES # BLD AUTO: 0.8 10E3/UL (ref 0.8–5.3)
LYMPHOCYTES NFR BLD AUTO: 16 %
MAGNESIUM SERPL-MCNC: 2 MG/DL (ref 1.6–2.3)
MCH RBC QN AUTO: 28.9 PG (ref 26.5–33)
MCHC RBC AUTO-ENTMCNC: 32.5 G/DL (ref 31.5–36.5)
MCV RBC AUTO: 89 FL (ref 78–100)
MONOCYTES # BLD AUTO: 0.3 10E3/UL (ref 0–1.3)
MONOCYTES NFR BLD AUTO: 7 %
NEUTROPHILS # BLD AUTO: 3.2 10E3/UL (ref 1.6–8.3)
NEUTROPHILS NFR BLD AUTO: 69 %
PLATELET # BLD AUTO: 168 10E3/UL (ref 150–450)
POTASSIUM BLD-SCNC: 4.2 MMOL/L (ref 3.4–5.3)
RBC # BLD AUTO: 4.26 10E6/UL (ref 3.8–5.2)
SODIUM SERPL-SCNC: 140 MMOL/L (ref 133–144)
WBC # BLD AUTO: 4.7 10E3/UL (ref 4–11)

## 2022-04-07 PROCEDURE — 85025 COMPLETE CBC W/AUTO DIFF WBC: CPT | Mod: QW | Performed by: INTERNAL MEDICINE

## 2022-04-07 PROCEDURE — 36415 COLL VENOUS BLD VENIPUNCTURE: CPT | Performed by: INTERNAL MEDICINE

## 2022-04-07 PROCEDURE — 83735 ASSAY OF MAGNESIUM: CPT | Performed by: INTERNAL MEDICINE

## 2022-04-07 PROCEDURE — 80048 BASIC METABOLIC PNL TOTAL CA: CPT | Performed by: INTERNAL MEDICINE

## 2022-04-07 PROCEDURE — 99214 OFFICE O/P EST MOD 30 MIN: CPT | Performed by: INTERNAL MEDICINE

## 2022-04-07 RX ORDER — ALBUTEROL SULFATE 90 UG/1
2 AEROSOL, METERED RESPIRATORY (INHALATION) 4 TIMES DAILY PRN
COMMUNITY
Start: 2021-10-01 | End: 2024-05-23

## 2022-04-07 NOTE — PROGRESS NOTES
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 Langford   - originally diagnosed in 1/2011   - s/p neoadjuvant cisplatin + gemcitabine followed by bulk resection, recurrent with further debulking in 10/2012   - in 8/2013, biopsy confirmation of lung nodule as cholangiocarcinoma, s/p XRT   - most recent surveillance PET (6/2021): no malignancy         Chronic diarrhea     chronic diarrhea/IBS complaints   - not maintained on Cellcept (had been on Cellcept initially after transplant)   - normal celiac serologic testing   - (7/2019) C. difficile antigen, stool Cx, qualitative fat, WBCs: wnl    - previously trialed FODMAPs and lactose free diets without benefit   - [facility] evaluation in 6/2020 - colonoscopy (one 10mm sessile polyp); MRI of abdomen - no description of enteritis/ileitis   - for now, advised Imodium prn when having primarily diarrhea            Endocrine    Malignant neoplasm of thyroid gland (H)     papillary carcinoma of thyroid stage 1, subtotal thyroidectomy 8/2021  - doing well  - following with endocrinology, Dr. Lucero  - maintained on levothyroxine - dosing through endo            Musculoskeletal and Integumentary    Osteoporosis     - DEXA (9/2020): T scores ranging between -2.8 to -3.6; FRAX score 17%/6%   - previous traumatic humeral fracture after OLTx   - calcium/vitamin D   - alendronate 70mg qweek (begun 8/2019)         Primary localized osteoarthritis of right knee     right total knee  arthroplasty (4/2021), complicated by post-operative LFTs rise and acute DVT  - acute right LUE DVT  - treated with Eliquis x 3 months           Other Visit Diagnoses     Productive cough    -  Primary    reassuring vitals, exam.  CXR: stable right sided basal radiation scarring.  No new infiltrate.  Reassurance provided.  Continue to monitor symptoms    Relevant Medications    albuterol (PROAIR HFA/PROVENTIL HFA/VENTOLIN HFA) 108 (90 Base) MCG/ACT inhaler    Other Relevant Orders    XR Chest 2 Views (Completed)    CBC with Platelets & Differential (Completed)    Basic metabolic panel (Completed)    Magnesium (Completed)    REVIEW OF HEALTH MAINTENANCE PROTOCOL ORDERS (Completed)    Muscle cramping        Relevant Orders    CBC with Platelets & Differential (Completed)    Basic metabolic panel (Completed)    Magnesium (Completed)    REVIEW OF HEALTH MAINTENANCE PROTOCOL ORDERS (Completed)                 No follow-ups on file.    Apollo Benitez MD  [facility]    Shell Balderas is a 67 year old who presents for the following health issues.  Complaints related to shortness of breath and approximately 1 week history of intermittent coughing.  Describes some preceding upper respiratory tract infections including rhinorrhea, sore throat.  Also describes some loose stools and increased degree of diarrhea more so than usual.  No fever chills.  She performed in office Covid PCR testing yesterday which returned as negative.  Given her immunosuppression 1 to be further evaluated with x-ray imaging and labs especially in account of her muscle cramping.  She feels like she is been eating and drinking to a normal degree.  Denies any chest pains.  Of note was diagnosed with a provoked DVT postoperatively this past spring after right knee total arthroplasty.    History of Present Illness       Reason for visit:  Cough cold and SOB    She eats 0-1 servings of fruits and vegetables daily.She  consumes 0 sweetened beverage(s) daily.She exercises with enough effort to increase her heart rate 20 to 29 minutes per day.  She exercises with enough effort to increase her heart rate 3 or less days per week.   She is taking medications regularly.       Acute Illness  Acute illness concerns: SOB, cough, pain right back, cramping in hands, feet/legs  Onset/Duration: 1week  Symptoms:  Fever: no  Chills/Sweats: no  Headache (location?): YES  Sinus Pressure: no  Conjunctivitis:  no  Ear Pain: YES: right  Rhinorrhea: YES  Congestion: YES  Sore Throat: YES-improved  Cough: YES-productive of yellow sputum -  Started with brown sputum, not as productive as it was   Wheeze: no  Decreased Appetite: YES  Nausea: no  Vomiting: no  Diarrhea: YES  Fatigue/Achiness: YES  Therapies tried and outcome: inhaler, tylenol      Review of Systems   A 10 point complete review of systems was inquired and negative except for the pertinent following findings        Objective    /78 (Patient Position: Standing)   Pulse 101   Temp 98.2  F (36.8  C) (Tympanic)   Wt 61.3 kg (135 lb 1.6 oz)   SpO2 96%   BMI 23.56 kg/m    Body mass index is 23.56 kg/m .  Physical Exam   GENERAL: healthy, alert and no distress  NECK: no adenopathy, no asymmetry, masses, or scars and thyroid normal to palpation  RESP: lungs clear to auscultation - no rales, rhonchi or wheezes  CV: regular rate and rhythm, normal S1 S2, no S3 or S4, no murmur, click or rub, no peripheral edema and peripheral pulses strong  ABDOMEN: soft, nontender, no hepatosplenomegaly, no masses and bowel sounds normal  MS: no gross musculoskeletal defects noted, no edema    Lab on 04/06/2022   Component Date Value Ref Range Status     SARS CoV2 PCR 04/06/2022 Negative  Negative Final    NEGATIVE: SARS-CoV-2 (COVID-19) RNA not detected, presumed negative.

## 2022-04-08 NOTE — ASSESSMENT & PLAN NOTE
right total knee arthroplasty (4/2021), complicated by post-operative LFTs rise and acute DVT  - acute right LUE DVT  - treated with Eliquis x 3 months

## 2022-04-08 NOTE — ASSESSMENT & PLAN NOTE
recurrent, metastatic cholangiocarcinoma; followed by Dr. Langford   - originally diagnosed in 1/2011   - s/p neoadjuvant cisplatin + gemcitabine followed by bulk resection, recurrent with further debulking in 10/2012   - in 8/2013, biopsy confirmation of lung nodule as cholangiocarcinoma, s/p XRT   - most recent surveillance PET (6/2021): no malignancy

## 2022-04-08 NOTE — ASSESSMENT & PLAN NOTE
chronic diarrhea/IBS complaints   - not maintained on Cellcept (had been on Cellcept initially after transplant)   - normal celiac serologic testing   - (7/2019) C. difficile antigen, stool Cx, qualitative fat, WBCs: wnl    - previously trialed FODMAPs and lactose free diets without benefit   - [facility] evaluation in 6/2020 - colonoscopy (one 10mm sessile polyp); MRI of abdomen - no description of enteritis/ileitis   - for now, advised Imodium prn when having primarily diarrhea

## 2022-04-08 NOTE — ASSESSMENT & PLAN NOTE
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

## 2022-04-08 NOTE — ASSESSMENT & PLAN NOTE
- DEXA (9/2020): T scores ranging between -2.8 to -3.6; FRAX score 17%/6%   - previous traumatic humeral fracture after OLTx   - calcium/vitamin D   - alendronate 70mg qweek (begun 8/2019)

## 2022-04-08 NOTE — ASSESSMENT & PLAN NOTE
papillary carcinoma of thyroid stage 1, subtotal thyroidectomy 8/2021  - doing well  - following with endocrinology, Dr. Lucero  - maintained on levothyroxine - dosing through endo

## 2022-04-11 ENCOUNTER — TELEPHONE (OUTPATIENT)
Dept: FAMILY MEDICINE | Facility: CLINIC | Age: 67
End: 2022-04-11
Payer: COMMERCIAL

## 2022-04-11 DIAGNOSIS — R05.8 PRODUCTIVE COUGH: Primary | ICD-10-CM

## 2022-04-11 RX ORDER — BENZONATATE 200 MG/1
200 CAPSULE ORAL 3 TIMES DAILY PRN
Qty: 21 CAPSULE | Refills: 0 | Status: SHIPPED | OUTPATIENT
Start: 2022-04-11 | End: 2022-10-09

## 2022-04-11 NOTE — TELEPHONE ENCOUNTER
TC with pt, informed prescription was sent to [facility]. Explained benzonatate directions and advised to call back or f/u in clinic if not improving cough. Pt verbalized understanding and appreciation.

## 2022-04-11 NOTE — TELEPHONE ENCOUNTER
BRM o/c today.  OV on 4/7/22 for URI with BRM. Please advise on request for something for cough.

## 2022-04-11 NOTE — TELEPHONE ENCOUNTER
Reason for Call:  Medication or medication refill:    Do you use a [facility]?  Name of the pharmacy and phone number for the current request: [facility]    Name of the medication requested: Something for cough    Other request: Patient saw Dr Benitez last week for a cold. Patient states he would prescribe something for the cough if it was not better.     Can we leave a detailed message on this number? YES    Phone number patient can be reached at: Cell number on file:    Telephone Information:   Mobile [phone]       Best Time: anytime    Call taken on 4/11/2022 at 8:11 AM by Nancy Greenberg

## 2022-04-14 ENCOUNTER — TELEPHONE (OUTPATIENT)
Dept: FAMILY MEDICINE | Facility: CLINIC | Age: 67
End: 2022-04-14
Payer: COMMERCIAL

## 2022-04-14 DIAGNOSIS — R05.3 PERSISTENT COUGH: Primary | ICD-10-CM

## 2022-04-14 RX ORDER — CODEINE PHOSPHATE/GUAIFENESIN 10-100MG/5
5 LIQUID (ML) ORAL EVERY 4 HOURS PRN
Qty: 180 ML | Refills: 0 | Status: SHIPPED | OUTPATIENT
Start: 2022-04-14 | End: 2022-10-09

## 2022-04-14 RX ORDER — BENZONATATE 100 MG/1
100 CAPSULE ORAL 3 TIMES DAILY PRN
Qty: 30 CAPSULE | Refills: 0 | Status: SHIPPED | OUTPATIENT
Start: 2022-04-14 | End: 2022-10-09

## 2022-04-14 NOTE — TELEPHONE ENCOUNTER
eRx for codeine cough syrup and tessalon perles sent.  If clinically worsening with worsening breathlessness, would advise to follow-up in clinic

## 2022-04-14 NOTE — TELEPHONE ENCOUNTER
Reason for Call:  Other Still not filling well     Detailed comments: Patient was seen 04/07/22 and per patient she sates if she was not getting better to call and Dr Benitez would send in an RX for cough.  If you can send something in could you send it to [facility]     Phone Number Patient can be reached at: Home number on file [phone] (home)    Best Time: any    Can we leave a detailed message on this number? YES    Call taken on 4/14/2022 at 8:16 AM by Alyce Copeland

## 2022-04-14 NOTE — TELEPHONE ENCOUNTER
Left a detailed message on pt's identified VM with message.  Asked to call back with any questions/concerns or if she feels she needs to be rechecked

## 2022-04-22 ENCOUNTER — LAB (OUTPATIENT)
Dept: LAB | Facility: CLINIC | Age: 67
End: 2022-04-22
Attending: INTERNAL MEDICINE
Payer: COMMERCIAL

## 2022-04-22 ENCOUNTER — OFFICE VISIT (OUTPATIENT)
Dept: GASTROENTEROLOGY | Facility: CLINIC | Age: 67
End: 2022-04-22
Attending: INTERNAL MEDICINE
Payer: MEDICARE

## 2022-04-22 VITALS
BODY MASS INDEX: 23.4 KG/M2 | OXYGEN SATURATION: 98 % | HEART RATE: 72 BPM | SYSTOLIC BLOOD PRESSURE: 144 MMHG | DIASTOLIC BLOOD PRESSURE: 90 MMHG | WEIGHT: 134.2 LBS

## 2022-04-22 DIAGNOSIS — Z13.220 LIPID SCREENING: ICD-10-CM

## 2022-04-22 DIAGNOSIS — M81.0 AGE-RELATED OSTEOPOROSIS WITHOUT CURRENT PATHOLOGICAL FRACTURE: ICD-10-CM

## 2022-04-22 DIAGNOSIS — M80.00XA AGE-RELATED OSTEOPOROSIS WITH CURRENT PATHOLOGICAL FRACTURE, INITIAL ENCOUNTER: ICD-10-CM

## 2022-04-22 DIAGNOSIS — Z23 NEED FOR COVID-19 VACCINE: ICD-10-CM

## 2022-04-22 DIAGNOSIS — Z94.4 LIVER REPLACED BY TRANSPLANT (H): ICD-10-CM

## 2022-04-22 DIAGNOSIS — Z94.4 LIVER REPLACED BY TRANSPLANT (H): Primary | ICD-10-CM

## 2022-04-22 LAB
ALBUMIN SERPL-MCNC: 4 G/DL (ref 3.4–5)
ALP SERPL-CCNC: 77 U/L (ref 40–150)
ALT SERPL W P-5'-P-CCNC: 20 U/L (ref 0–50)
ANION GAP SERPL CALCULATED.3IONS-SCNC: 5 MMOL/L (ref 3–14)
AST SERPL W P-5'-P-CCNC: 13 U/L (ref 0–45)
BILIRUB DIRECT SERPL-MCNC: 0.1 MG/DL (ref 0–0.2)
BILIRUB SERPL-MCNC: 0.4 MG/DL (ref 0.2–1.3)
BUN SERPL-MCNC: 26 MG/DL (ref 7–30)
CALCIUM SERPL-MCNC: 9.1 MG/DL (ref 8.5–10.1)
CHLORIDE BLD-SCNC: 108 MMOL/L (ref 94–109)
CO2 SERPL-SCNC: 28 MMOL/L (ref 20–32)
CREAT SERPL-MCNC: 0.96 MG/DL (ref 0.52–1.04)
ERYTHROCYTE [DISTWIDTH] IN BLOOD BY AUTOMATED COUNT: 13 % (ref 10–15)
GFR SERPL CREATININE-BSD FRML MDRD: 65 ML/MIN/1.73M2
GLUCOSE BLD-MCNC: 96 MG/DL (ref 70–99)
HCT VFR BLD AUTO: 42.5 % (ref 35–47)
HGB BLD-MCNC: 13.6 G/DL (ref 11.7–15.7)
MCH RBC QN AUTO: 29.1 PG (ref 26.5–33)
MCHC RBC AUTO-ENTMCNC: 32 G/DL (ref 31.5–36.5)
MCV RBC AUTO: 91 FL (ref 78–100)
PLATELET # BLD AUTO: 178 10E3/UL (ref 150–450)
POTASSIUM BLD-SCNC: 4.8 MMOL/L (ref 3.4–5.3)
PROT SERPL-MCNC: 7.3 G/DL (ref 6.8–8.8)
RBC # BLD AUTO: 4.67 10E6/UL (ref 3.8–5.2)
SODIUM SERPL-SCNC: 141 MMOL/L (ref 133–144)
TACROLIMUS BLD-MCNC: 4 UG/L (ref 5–15)
TME LAST DOSE: ABNORMAL H
TME LAST DOSE: ABNORMAL H
WBC # BLD AUTO: 5.3 10E3/UL (ref 4–11)

## 2022-04-22 PROCEDURE — 82248 BILIRUBIN DIRECT: CPT | Performed by: PATHOLOGY

## 2022-04-22 PROCEDURE — 0013A HC ADMIN COVID VAC MODERNA, 3RD DOSE IMM COMP PT: CPT | Performed by: INTERNAL MEDICINE

## 2022-04-22 PROCEDURE — 80197 ASSAY OF TACROLIMUS: CPT | Performed by: INTERNAL MEDICINE

## 2022-04-22 PROCEDURE — 250N000011 HC RX IP 250 OP 636: Performed by: INTERNAL MEDICINE

## 2022-04-22 PROCEDURE — 36415 COLL VENOUS BLD VENIPUNCTURE: CPT | Performed by: PATHOLOGY

## 2022-04-22 PROCEDURE — 80053 COMPREHEN METABOLIC PANEL: CPT | Performed by: PATHOLOGY

## 2022-04-22 PROCEDURE — 99214 OFFICE O/P EST MOD 30 MIN: CPT | Performed by: INTERNAL MEDICINE

## 2022-04-22 PROCEDURE — G0463 HOSPITAL OUTPT CLINIC VISIT: HCPCS | Mod: 25

## 2022-04-22 PROCEDURE — 91301 HC RX IP 250 OP 636: CPT | Performed by: INTERNAL MEDICINE

## 2022-04-22 PROCEDURE — 85027 COMPLETE CBC AUTOMATED: CPT | Performed by: PATHOLOGY

## 2022-04-22 RX ADMIN — CX-024414 0.5 ML: 0.2 INJECTION, SUSPENSION INTRAMUSCULAR at 10:33

## 2022-04-22 ASSESSMENT — PAIN SCALES - GENERAL: PAINLEVEL: NO PAIN (0)

## 2022-04-22 NOTE — NURSING NOTE
Chief Complaint   Patient presents with     RECHECK     6 month follow up     Blood pressure (!) 144/90, pulse 72, weight 60.9 kg (134 lb 3.2 oz), SpO2 98 %, not currently breastfeeding.    Deirdre Beavers MA

## 2022-04-22 NOTE — LETTER
4/22/2022         RE: Sherrie Lala  [address] [facility] [id number]        Dear Colleague,    Thank you for referring your patient, Sherrie Lala, to the [facility] HEPATOLOGY [facility] [place]. Please see a copy of my visit note below.    HISTORY OF PRESENT ILLNESS:  I had the pleasure of seeing Sherrie Lala for followup in the Liver Transplant Clinic at the [facility] on 04/22/2022.  Ms. Lala returns for followup now more than 3-1/2 years status post liver transplantation for cirrhosis secondary to a previous Whipple procedure.    She is doing well at this visit.  She denies any abdominal pain, itching, skin rash, or fatigue.  She denies any increased abdominal girth or lower extremity edema.  She is getting over a fairly significant upper respiratory infection that went on for about 2 weeks with a cough that was productive of whitish to yellowish sputum.  She did have some shortness of breath with that, but that has completely resolved as well a couple of days ago.  She was tested for COVID-19 and was negative.    She denies any increased abdominal girth or lower extremity edema.  She has not had any nausea or vomiting, diarrhea or constipation.  Her appetite has been good, and her weight has been stable.  There otherwise have been no other new events since she was last seen.      Current Outpatient Medications   Medication     acetaminophen (TYLENOL) 500 MG tablet     albuterol (PROAIR HFA/PROVENTIL HFA/VENTOLIN HFA) 108 (90 Base) MCG/ACT inhaler     alendronate (FOSAMAX) 70 MG tablet     amLODIPine (NORVASC) 5 MG tablet     amoxicillin (AMOXIL) 500 MG capsule     gabapentin (NEURONTIN) 300 MG capsule     levothyroxine (SYNTHROID/LEVOTHROID) 125 MCG tablet     omeprazole 20 MG tablet     pramipexole (MIRAPEX) 0.25 MG tablet     tacrolimus (GENERIC EQUIVALENT) 1 MG capsule     zolpidem (AMBIEN) 10 MG tablet     benzonatate (TESSALON) 100 MG capsule     benzonatate  (TESSALON) 200 MG capsule     guaiFENesin-codeine (GUAIFENESIN AC) 100-10 MG/5ML syrup     Current Facility-Administered Medications   Medication     COVID-19 mRNA vacc (Moderna) injection 0.5 mL     EPINEPHrine (ADRENALIN) kit 0.3 mg     BP (!) 144/90   Pulse 72   Wt 60.9 kg (134 lb 3.2 oz)   SpO2 98%   BMI 23.40 kg/m      PHYSICAL EXAMINATION:    GENERAL:  She looks quite well.  HEENT:  No scleral icterus or temporal muscle wasting.  CHEST:  Clear.  ABDOMEN:  No increase in girth.  No masses or tenderness to palpation are present.  Her liver is 10 cm in span without left lobe enlargement.  Her incision is intact.  No spleen tip is palpable.  EXTREMITIES:  No edema.  SKIN:  Numerous senile keratoses, but no obvious skin cancers.  She is going to see a dermatologist in the next couple of weeks.  NEUROLOGIC:  Nonfocal.    Recent Results (from the past 168 hour(s))   Basic metabolic panel    Collection Time: 04/22/22  9:43 AM   Result Value Ref Range    Sodium 141 133 - 144 mmol/L    Potassium 4.8 3.4 - 5.3 mmol/L    Chloride 108 94 - 109 mmol/L    Carbon Dioxide (CO2) 28 20 - 32 mmol/L    Anion Gap 5 3 - 14 mmol/L    Urea Nitrogen 26 7 - 30 mg/dL    Creatinine 0.96 0.52 - 1.04 mg/dL    Calcium 9.1 8.5 - 10.1 mg/dL    Glucose 96 70 - 99 mg/dL    GFR Estimate 65 >60 mL/min/1.73m2   CBC with platelets    Collection Time: 04/22/22  9:43 AM   Result Value Ref Range    WBC Count 5.3 4.0 - 11.0 10e3/uL    RBC Count 4.67 3.80 - 5.20 10e6/uL    Hemoglobin 13.6 11.7 - 15.7 g/dL    Hematocrit 42.5 35.0 - 47.0 %    MCV 91 78 - 100 fL    MCH 29.1 26.5 - 33.0 pg    MCHC 32.0 31.5 - 36.5 g/dL    RDW 13.0 10.0 - 15.0 %    Platelet Count 178 150 - 450 10e3/uL   Hepatic panel    Collection Time: 04/22/22  9:43 AM   Result Value Ref Range    Bilirubin Total 0.4 0.2 - 1.3 mg/dL    Bilirubin Direct 0.1 0.0 - 0.2 mg/dL    Protein Total 7.3 6.8 - 8.8 g/dL    Albumin 4.0 3.4 - 5.0 g/dL    Alkaline Phosphatase 77 40 - 150 U/L    AST 13 0  - 45 U/L    ALT 20 0 - 50 U/L      IMPRESSION:  My impression is that Ms. Lala is now 3-1/2 years status post liver transplantation.  She really is doing quite well and her liver tests and kidney function are both excellent.  She seems to have recovered now completely from the upper respiratory infection that was not COVID-19 related.    She does have osteoporosis and she is due for another bone density.  She has not been taking her calcium because of some constipation.  I told her to talk to the pharmacist because there are some that tend to be less constipating.  She also has been taking 2000 units of vitamin D as her vitamin D levels are low as well.  She is taking Fosamax.  I have ordered another bone density in 6 months.    She is due for a COVID-19 booster, which we will get for her in the clinic today.    She also as I mentioned, will be seeing a dermatologist over the next several weeks to follow up on her skin cancers and I will see her back in the clinic again in 6 months.    Thank you very much for allowing me to participate in the care of this patient.  If you have any questions regarding my recommendations, please do not hesitate to contact me.         Bradly Lilly MD      Professor of Medicine  [employer]      Executive Medical Director, Solid Organ Transplant Program  [facility]

## 2022-04-22 NOTE — PROGRESS NOTES
HISTORY OF PRESENT ILLNESS:  I had the pleasure of seeing Sherrie Lala for followup in the Liver Transplant Clinic at the [facility] on 04/22/2022.  Ms. Lala returns for followup now more than 3-1/2 years status post liver transplantation for cirrhosis secondary to a previous Whipple procedure.    She is doing well at this visit.  She denies any abdominal pain, itching, skin rash, or fatigue.  She denies any increased abdominal girth or lower extremity edema.  She is getting over a fairly significant upper respiratory infection that went on for about 2 weeks with a cough that was productive of whitish to yellowish sputum.  She did have some shortness of breath with that, but that has completely resolved as well a couple of days ago.  She was tested for COVID-19 and was negative.    She denies any increased abdominal girth or lower extremity edema.  She has not had any nausea or vomiting, diarrhea or constipation.  Her appetite has been good, and her weight has been stable.  There otherwise have been no other new events since she was last seen.      Current Outpatient Medications   Medication     acetaminophen (TYLENOL) 500 MG tablet     albuterol (PROAIR HFA/PROVENTIL HFA/VENTOLIN HFA) 108 (90 Base) MCG/ACT inhaler     alendronate (FOSAMAX) 70 MG tablet     amLODIPine (NORVASC) 5 MG tablet     amoxicillin (AMOXIL) 500 MG capsule     gabapentin (NEURONTIN) 300 MG capsule     levothyroxine (SYNTHROID/LEVOTHROID) 125 MCG tablet     omeprazole 20 MG tablet     pramipexole (MIRAPEX) 0.25 MG tablet     tacrolimus (GENERIC EQUIVALENT) 1 MG capsule     zolpidem (AMBIEN) 10 MG tablet     benzonatate (TESSALON) 100 MG capsule     benzonatate (TESSALON) 200 MG capsule     guaiFENesin-codeine (GUAIFENESIN AC) 100-10 MG/5ML syrup     Current Facility-Administered Medications   Medication     COVID-19 mRNA vacc (Moderna) injection 0.5 mL     EPINEPHrine (ADRENALIN) kit 0.3 mg     BP (!) 144/90    Pulse 72   Wt 60.9 kg (134 lb 3.2 oz)   SpO2 98%   BMI 23.40 kg/m      PHYSICAL EXAMINATION:    GENERAL:  She looks quite well.  HEENT:  No scleral icterus or temporal muscle wasting.  CHEST:  Clear.  ABDOMEN:  No increase in girth.  No masses or tenderness to palpation are present.  Her liver is 10 cm in span without left lobe enlargement.  Her incision is intact.  No spleen tip is palpable.  EXTREMITIES:  No edema.  SKIN:  Numerous senile keratoses, but no obvious skin cancers.  She is going to see a dermatologist in the next couple of weeks.  NEUROLOGIC:  Nonfocal.    Recent Results (from the past 168 hour(s))   Basic metabolic panel    Collection Time: 04/22/22  9:43 AM   Result Value Ref Range    Sodium 141 133 - 144 mmol/L    Potassium 4.8 3.4 - 5.3 mmol/L    Chloride 108 94 - 109 mmol/L    Carbon Dioxide (CO2) 28 20 - 32 mmol/L    Anion Gap 5 3 - 14 mmol/L    Urea Nitrogen 26 7 - 30 mg/dL    Creatinine 0.96 0.52 - 1.04 mg/dL    Calcium 9.1 8.5 - 10.1 mg/dL    Glucose 96 70 - 99 mg/dL    GFR Estimate 65 >60 mL/min/1.73m2   CBC with platelets    Collection Time: 04/22/22  9:43 AM   Result Value Ref Range    WBC Count 5.3 4.0 - 11.0 10e3/uL    RBC Count 4.67 3.80 - 5.20 10e6/uL    Hemoglobin 13.6 11.7 - 15.7 g/dL    Hematocrit 42.5 35.0 - 47.0 %    MCV 91 78 - 100 fL    MCH 29.1 26.5 - 33.0 pg    MCHC 32.0 31.5 - 36.5 g/dL    RDW 13.0 10.0 - 15.0 %    Platelet Count 178 150 - 450 10e3/uL   Hepatic panel    Collection Time: 04/22/22  9:43 AM   Result Value Ref Range    Bilirubin Total 0.4 0.2 - 1.3 mg/dL    Bilirubin Direct 0.1 0.0 - 0.2 mg/dL    Protein Total 7.3 6.8 - 8.8 g/dL    Albumin 4.0 3.4 - 5.0 g/dL    Alkaline Phosphatase 77 40 - 150 U/L    AST 13 0 - 45 U/L    ALT 20 0 - 50 U/L      IMPRESSION:  My impression is that Ms. Lala is now 3-1/2 years status post liver transplantation.  She really is doing quite well and her liver tests and kidney function are both excellent.  She seems to have recovered  now completely from the upper respiratory infection that was not COVID-19 related.    She does have osteoporosis and she is due for another bone density.  She has not been taking her calcium because of some constipation.  I told her to talk to the pharmacist because there are some that tend to be less constipating.  She also has been taking 2000 units of vitamin D as her vitamin D levels are low as well.  She is taking Fosamax.  I have ordered another bone density in 6 months.    She is due for a COVID-19 booster, which we will get for her in the clinic today.    She also as I mentioned, will be seeing a dermatologist over the next several weeks to follow up on her skin cancers and I will see her back in the clinic again in 6 months.    Thank you very much for allowing me to participate in the care of this patient.  If you have any questions regarding my recommendations, please do not hesitate to contact me.         Bradly Lilly MD      Professor of Medicine  [employer]      Executive Medical Director, Solid Organ Transplant Program  [facility]

## 2022-04-29 DIAGNOSIS — G89.29 CHRONIC NEUROPATHIC PAIN: Primary | ICD-10-CM

## 2022-04-29 DIAGNOSIS — M79.2 CHRONIC NEUROPATHIC PAIN: Primary | ICD-10-CM

## 2022-05-01 RX ORDER — GABAPENTIN 300 MG/1
CAPSULE ORAL
Qty: 180 CAPSULE | Refills: 3 | Status: SHIPPED | OUTPATIENT
Start: 2022-05-01 | End: 2023-02-28

## 2022-05-15 ENCOUNTER — HEALTH MAINTENANCE LETTER (OUTPATIENT)
Age: 67
End: 2022-05-15

## 2022-05-16 ENCOUNTER — TRANSFERRED RECORDS (OUTPATIENT)
Dept: HEALTH INFORMATION MANAGEMENT | Facility: CLINIC | Age: 67
End: 2022-05-16

## 2022-05-17 ENCOUNTER — OFFICE VISIT (OUTPATIENT)
Dept: DERMATOLOGY | Facility: CLINIC | Age: 67
End: 2022-05-17
Payer: COMMERCIAL

## 2022-05-17 DIAGNOSIS — L58.9 RADIATION DERMATITIS: Primary | ICD-10-CM

## 2022-05-17 DIAGNOSIS — L82.0 INFLAMED SEBORRHEIC KERATOSIS: ICD-10-CM

## 2022-05-17 PROCEDURE — 11900 INJECT SKIN LESIONS </W 7: CPT | Mod: XS | Performed by: DERMATOLOGY

## 2022-05-17 PROCEDURE — 17110 DESTRUCTION B9 LES UP TO 14: CPT | Mod: GC | Performed by: DERMATOLOGY

## 2022-05-17 PROCEDURE — 99213 OFFICE O/P EST LOW 20 MIN: CPT | Mod: 25 | Performed by: DERMATOLOGY

## 2022-05-17 RX ORDER — CLOBETASOL PROPIONATE 0.5 MG/G
CREAM TOPICAL
Qty: 45 G | Refills: 5 | Status: SHIPPED | OUTPATIENT
Start: 2022-05-17 | End: 2022-11-01

## 2022-05-17 ASSESSMENT — PAIN SCALES - GENERAL: PAINLEVEL: NO PAIN (0)

## 2022-05-17 NOTE — NURSING NOTE
Dermatology Rooming Note    Sherrie Lala's goals for this visit include:   Chief Complaint   Patient presents with     Skin Check     Sherrie is here for a skin check and has no spots of concern.     MARCELLA Hubbard

## 2022-05-17 NOTE — PROGRESS NOTES
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 with hypertrophic scar and possibly lichen simplex  - limited improvement with clobetasol ointment for 2 weeks, said it got worse with ointment    # Seborrheic keratoses, inflamed  * chronic uncomplicated  - location: right temple, left shoulder  - number: 2  - benign nature reviewed  - cryotherapy perormed (see procedure note)    # Hx liver transplant in 2018 on tacrolimus  - regular skin checks recommended  - recommended daily SPF > 35 and sun protective clothing    Procedures Performed:   Kenalog intralesional injection procedure note  - location(s): right mid-back  - strength: 10 mg/ml  - volume: 0.6 ml  - number of injections: 1  After verbal consent and discussion of risks including but not limited to atrophy, pain, and bruising, time out was performed, patient positioned, area cleaned with alcohol, Kenalog injected into affected sites; patient tolerated procedure well    Follow-up: 1 year for skin check    Staff and Scribe:     Scribe Disclosure:  I, Lakshmi Starr, am serving as a scribe to document services personally performed by Eldon Goetz MD based on data collection and the provider's statements to me.     Jeannine Cueva MD  Dermatology Resident  [facility]    Staff Physician Comments:   I saw and evaluated the patient with the resident and I agree with the assessment and plan.  I was present for the entire minor procedure and examination. I  have made edits if needed.    Eldon Goetz MD  Staff Dermatologist and Dermatopathologist  , Department of Dermatology    ____________________________________________    CC: No chief complaint on file.    HPI:  Ms. Sherrie Lala is a(n) 67 year old female who presents today as a return patient for transplant skin check and radiation dermatitis  - back is still persistently thickened and itchy, scratches it very frequently  - uses sunscreen, outside often gardening  - liver transplant doing well  - clobetasol ointment used for 2 weeks consistently, didn't help that much, used it a few times in the past, last 3 weeks ago, not improvement  - patient is otherwise feeling well, without additional skin concerns.    Labs Reviewed:  Dermatopathology 11/2021, 7/2021    Physical Exam:  Vitals: There were no vitals taken for this visit.  SKIN: full skin  - R scapula, there is a scaly hypopigmented indurated plaque with irregular telangiectatic borders  - Central back: thin ovoid hypopigmented plaque  - Multiple waxy, stuck on appearing papules and plaques of face, arms, trunk; around shirt line there is erythematous base  - Previous site of skin cancer examined with no evidence of recurrence  - No other lesions of concern on areas examined    Medications:  Current Outpatient Medications   Medication     acetaminophen (TYLENOL) 500 MG tablet     albuterol (PROAIR HFA/PROVENTIL HFA/VENTOLIN HFA) 108 (90 Base) MCG/ACT inhaler     alendronate (FOSAMAX) 70 MG tablet     amLODIPine (NORVASC) 5 MG tablet     amoxicillin (AMOXIL) 500 MG capsule     benzonatate (TESSALON) 100 MG capsule     benzonatate (TESSALON) 200 MG capsule     gabapentin (NEURONTIN) 300 MG capsule     guaiFENesin-codeine (GUAIFENESIN AC) 100-10 MG/5ML syrup     levothyroxine (SYNTHROID/LEVOTHROID) 125 MCG tablet     omeprazole 20 MG tablet     pramipexole (MIRAPEX) 0.25 MG tablet     tacrolimus (GENERIC EQUIVALENT) 1 MG capsule      zolpidem (AMBIEN) 10 MG tablet     Current Facility-Administered Medications   Medication     EPINEPHrine (ADRENALIN) kit 0.3 mg      Past Medical History:   Patient Active Problem List   Diagnosis     Gastric ulcer     Osteoporosis     Liver transplanted (H)     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)     Immunosuppressed status (H)     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)     Positive sputum culture in cadaveric donor     Positive urine culture in cadaveric donor     Primary localized osteoarthritis of right knee     Cholangiocarcinoma metastatic to liver (H)     Malignant neoplasm of thyroid gland (H)     Chronic diarrhea     Past Medical History:   Diagnosis Date     Acute deep vein thrombosis (DVT) of right lower extremity (H) 4/2/2021     Antiplatelet or antithrombotic long-term use      Asthma      Bleeding esophageal varices (H) 3/6/2018     Cancer (H) 07/06/2021     Cholangiocarcinoma (H) 06/2014     Cirrhosis of liver with ascites (H) 05/10/2018     Encounter for pleural drainage tube placement      Esophageal varices with hemorrhage (H)      Gastric ulcer      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

## 2022-05-17 NOTE — PATIENT INSTRUCTIONS
Cryotherapy Instructions     For the areas treated with liquid nitrogen (cryotherapy or freezing) today, they are expected to get pink, puffy, and perhaps even blister. The area should then crust up and fall off and the goal is to have nice new skin underneath. There is nothing special that you need to do for these areas. You can wash them as you do normal skin.     Sometimes a blister develops; if a blister does develop do NOT pop it. However, if it breaks open on its own, be sure to wash it with soap and water daily and put plain vasaline or petroleum jelly and a bandaid on it until the skin is healed.     Please call the clinic if you have any questions or concerns.

## 2022-05-17 NOTE — LETTER
5/17/2022       RE: Sherrie Lala  [address] [facility] [address]     Dear Colleague,    Thank you for referring your patient, Sherrie Lala, to the [facility] [place] at [facility]. Please see a copy of my visit note below.    [facility] Dermatology Note  Encounter Date: May 17, 2022  Office Visit     Dermatology Problem List:  FBSE 05/17/22  # Hx liver transplant in August 2018 on tacrolimus  # NMSC  - SCC, L shoulder s/p excision 12/9/19  # Radiation dermatitis  - R mid-back bx 7/6/2021  - clobetasol ointment 0.05% BID for 3 weeks  # Benign  - nevus, R medial cheek bx 11/2/21  # Seborrheic keratoses on the right temple and posterior right back  - Cryotherapy of both areas on 10/1/19 and 7/6/21  - Cryotherapy of collar line 11/2/21      ____________________________________________    Assessment & Plan:    # Hypopigmented macule with features of sclerosis, LS v radiation dermatitis - right back  Has had radiation to chest from lung cancer many years ago. C/f radiation dermatitis, but LS also considered. Pt denies vulvur changes or symptoms so lower likelihood. Biopsy from July suggestive of radiation dermatitis.  - Will start clobetasol ointment 0.05% BID for 3 weeks  - Discussed risks of skin atrophy with medication so will use in short bursts  - If no improvement, will stop use     # Inflamed Seborrheic keratoses on the right and left shirt collar line x4. Itch and are irritated with clothes and jewelry  - Cryotherapy of both areas performed today     # Hx liver transplant in 2018 on tacrolimus.  # Well-differentiated SCC, left shoulder s/p excision 12/9/19  - Regular skin checks recommended  - Recommended daily SPF > 35 and sun protective clothing    Procedures Performed:   {kkprocedurenotes:623888}  {kkprocedurenotes:233002}    Follow-up: {kkfollowup:974557}    Staff and Scribe:     Scribe Disclosure:  Lakshmi SLOAN  Ro, am serving as a scribe to document services personally performed by Eldon Goetz MD based on data collection and the provider's statements to me.     Jeannine Cueva MD  Dermatology Resident  [facility]    ____________________________________________    CC: No chief complaint on file.    HPI:  Ms. Sherrie Lala is a(n) 67 year old female who presents today {kknew/return:968653} for ***. Last seen by myself on 11/2/21 at which point a lesion biopsied on the R medial cheek returned as an intradermal melanocytic nevus. 4 ISKs were also treated with cryo. For hypopigmented macules, she was started on clobetasol ointment.     Patient is otherwise feeling well, without additional skin concerns.    Labs Reviewed:  Dermatopathology 11/2021    Physical Exam:  Vitals: There were no vitals taken for this visit.  SKIN: {kkSkinExam:576665}  - Overlying right scapula, there is a scaly plaque that is hypopigmented  - Central back there is a hypopigmented patch  - Multiple waxy, stuck on appearing papules and plaques of face, arms, trunk; around shirt line there is erythematous base  - Previous site of skin cancer examined with no evidence of recurrence  - No other lesions of concern on areas examined.     Medications:  Current Outpatient Medications   Medication     acetaminophen (TYLENOL) 500 MG tablet     albuterol (PROAIR HFA/PROVENTIL HFA/VENTOLIN HFA) 108 (90 Base) MCG/ACT inhaler     alendronate (FOSAMAX) 70 MG tablet     amLODIPine (NORVASC) 5 MG tablet     amoxicillin (AMOXIL) 500 MG capsule     benzonatate (TESSALON) 100 MG capsule     benzonatate (TESSALON) 200 MG capsule     gabapentin (NEURONTIN) 300 MG capsule     guaiFENesin-codeine (GUAIFENESIN AC) 100-10 MG/5ML syrup     levothyroxine (SYNTHROID/LEVOTHROID) 125 MCG tablet     omeprazole 20 MG tablet     pramipexole (MIRAPEX) 0.25 MG tablet     tacrolimus (GENERIC EQUIVALENT) 1 MG capsule     zolpidem (AMBIEN) 10 MG tablet     Current  Facility-Administered Medications   Medication     EPINEPHrine (ADRENALIN) kit 0.3 mg      Past Medical History:   Patient Active Problem List   Diagnosis     Gastric ulcer     Osteoporosis     Liver transplanted (H)     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)     Immunosuppressed status (H)     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)     Positive sputum culture in cadaveric donor     Positive urine culture in cadaveric donor     Primary localized osteoarthritis of right knee     Cholangiocarcinoma metastatic to liver (H)     Malignant neoplasm of thyroid gland (H)     Chronic diarrhea     Past Medical History:   Diagnosis Date     Acute deep vein thrombosis (DVT) of right lower extremity (H) 4/2/2021     Antiplatelet or antithrombotic long-term use      Asthma      Bleeding esophageal varices (H) 3/6/2018     Cancer (H) 07/06/2021     Cholangiocarcinoma (H) 06/2014     Cirrhosis of liver with ascites (H) 05/10/2018     Encounter for pleural drainage tube placement      Esophageal varices with hemorrhage (H)      Gastric ulcer      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      [facility] Dermatology Note  Encounter Date: May 17, 2022  Office Visit     Dermatology Problem List:  FBSE 05/17/22  # Hx liver transplant in August 2018 on tacrolimus  # NMSC  - SCC, L shoulder s/p excision 12/9/19  # Radiation dermatitis, morphea-like  - R mid-back bx 7/6/2021  - clobetasol ointment 0.05% 7/2021  # Benign  - nevus, R medial cheek bx 11/2/21  # Seborrheic keratoses on the right  temple and posterior right back  - Cryotherapy of both areas on 10/1/19 and 7/6/21  - Cryotherapy of collar line 11/2/21  ____________________________________________    Assessment & Plan:    # Morphea-like radiation dermatitis on right mid-back  - limited improvement with clobetasol ointment for 2 weeks, said it got worse with ointment    # Seborrheic keratoses, inflamed  * chronic uncomplicated  - location: trunk  - number: 5  - benign nature reviewed  - cryotherapy perormed (see procedure note)    # Hx liver transplant in 2018 on tacrolimus.  - regular skin checks recommended  - recommended daily SPF > 35 and sun protective clothing    Procedures Performed:   ***    Follow-up: {kkfollowup:208794}    Staff and Scribe:     Scribe Disclosure:  I, Lakshmi Starr, am serving as a scribe to document services personally performed by Eldon Goetz MD based on data collection and the provider's statements to me.     Jeannine Cueva MD  Dermatology Resident  [facility]    ____________________________________________    CC: No chief complaint on file.    HPI:  Ms. Sherrie Lala is a(n) 67 year old female who presents today as a return patient for transplant skin check and radiation dermatitis  - back is still persistently thickened and itchy, scratches it very frequently  - uses sunscreen, outside often gardening  - liver transplant doing well  - clobetasol ointment used for 2 weeks consistently, didn't help that much, used it a few times in the past, last 3 weeks ago, not improvement  - patient is otherwise feeling well, without additional skin concerns.    Labs Reviewed:  Dermatopathology 11/2021, 7/2021    Physical Exam:  Vitals: There were no vitals taken for this visit.  SKIN: full skin  - R scapula, there is a scaly hypopigmented indurated plaque with irregular telangiectatic borders  - Central back: thin ovoid hypopigmented plaque  - Multiple waxy, stuck on appearing papules and plaques of face, arms, trunk;  around shirt line there is erythematous base  - Previous site of skin cancer examined with no evidence of recurrence  - No other lesions of concern on areas examined.     Medications:  Current Outpatient Medications   Medication     acetaminophen (TYLENOL) 500 MG tablet     albuterol (PROAIR HFA/PROVENTIL HFA/VENTOLIN HFA) 108 (90 Base) MCG/ACT inhaler     alendronate (FOSAMAX) 70 MG tablet     amLODIPine (NORVASC) 5 MG tablet     amoxicillin (AMOXIL) 500 MG capsule     benzonatate (TESSALON) 100 MG capsule     benzonatate (TESSALON) 200 MG capsule     gabapentin (NEURONTIN) 300 MG capsule     guaiFENesin-codeine (GUAIFENESIN AC) 100-10 MG/5ML syrup     levothyroxine (SYNTHROID/LEVOTHROID) 125 MCG tablet     omeprazole 20 MG tablet     pramipexole (MIRAPEX) 0.25 MG tablet     tacrolimus (GENERIC EQUIVALENT) 1 MG capsule     zolpidem (AMBIEN) 10 MG tablet     Current Facility-Administered Medications   Medication     EPINEPHrine (ADRENALIN) kit 0.3 mg      Past Medical History:   Patient Active Problem List   Diagnosis     Gastric ulcer     Osteoporosis     Liver transplanted (H)     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)     Immunosuppressed status (H)     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)     Positive sputum culture in cadaveric donor     Positive urine culture in cadaveric donor     Primary localized osteoarthritis of right knee     Cholangiocarcinoma metastatic to liver (H)     Malignant neoplasm of thyroid gland (H)     Chronic diarrhea     Past Medical History:   Diagnosis Date     Acute deep vein thrombosis (DVT) of right lower extremity (H) 4/2/2021     Antiplatelet or antithrombotic long-term use      Asthma      Bleeding esophageal varices (H) 3/6/2018     Cancer (H) 07/06/2021     Cholangiocarcinoma (H) 06/2014     Cirrhosis of liver with ascites (H) 05/10/2018     Encounter for pleural drainage tube placement      Esophageal varices with hemorrhage (H)      Gastric ulcer       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

## 2022-05-17 NOTE — NURSING NOTE
Drug Administration Record    Prior to injection, verified patient identity using patient's name and date of birth.  Due to injection administration, patient instructed to remain in clinic for 15 minutes  afterwards, and to report any adverse reaction to me immediately.    Drug Name: triamcinolone acetonide(kenalog)  Dose: 0.6mL of triamcinolone 10mg/mL, 6mg dose  Route administered: ID  NDC #: Kenalog-10 ([id number])  Amount of waste(mL):4.4mL  Reason for waste: Multi dose vial    LOT #: [id number]  SITE: see provider note  : [employer]  EXPIRATION DATE: 09/2023

## 2022-07-12 ENCOUNTER — TELEPHONE (OUTPATIENT)
Dept: CARDIOLOGY | Facility: CLINIC | Age: 67
End: 2022-07-12
Payer: COMMERCIAL

## 2022-07-12 PROCEDURE — 99207 PR NO CHARGE NURSE ONLY: CPT

## 2022-07-12 NOTE — TELEPHONE ENCOUNTER
"  Oban Study Pre-Visit Call      Study description:   Multiconditions PPG Study. The purpose of this research study is to collect data related to health for the development of mobile technologies. This data will include physiological signal recordings from medical devices and data collection software on Apple Watches (\"study watches\"). This study is not to provide any treatment nor assess safety and effectiveness, but rather to collect information for research and  purposes.     Sherrie Lala a 67 year old female, was called today to discuss participation in the Oban study. The following was reviewed with the patient.       You agree to comply with COVID precautionary measures required by local public health ordinances, workplace health and safety protocols, and/or the Study Team. Such measure may include, for example, wearing a mask, complying with social distancing guidelines, and/ or providing evidence of negative COVID-19 test result Yes       You are fully vaccinated per the most recent CDC guidelines Yes      You do not have any of the following symptoms: fever or chills; difficulty breathing; sustained loss of taste smell, or appetite. Yes      You do not have any of the following unexplained symptoms: fatigue or nausea; whole body muscle aches; new or unexpected headache; sore throat; congestion or runny nose; diarrhea or vomiting Yes      You have not had close contact with someone who is suspected or confirmed to have active COVID-19 in the last 14 days.Yes      Reminders    Please come 10 minutes early for your scheduled appointment time.    Bring your vaccination card with you to your scheduled appointment.     No smoking 2 hours prior to your appointment time.    Wear loose shirt.    Eat before you arrive.       Samantha Byrne RN       "

## 2022-07-13 ENCOUNTER — OFFICE VISIT (OUTPATIENT)
Dept: CARDIOLOGY | Facility: CLINIC | Age: 67
End: 2022-07-13

## 2022-07-13 ENCOUNTER — ALLIED HEALTH/NURSE VISIT (OUTPATIENT)
Dept: CARDIOLOGY | Facility: CLINIC | Age: 67
End: 2022-07-13
Payer: COMMERCIAL

## 2022-07-13 VITALS
DIASTOLIC BLOOD PRESSURE: 76 MMHG | HEART RATE: 77 BPM | HEIGHT: 64 IN | TEMPERATURE: 98.6 F | WEIGHT: 133 LBS | OXYGEN SATURATION: 97 % | BODY MASS INDEX: 22.71 KG/M2 | SYSTOLIC BLOOD PRESSURE: 108 MMHG | RESPIRATION RATE: 16 BRPM

## 2022-07-13 DIAGNOSIS — Z00.6 EXAMINATION OF PARTICIPANT OR CONTROL IN CLINICAL RESEARCH: Primary | ICD-10-CM

## 2022-07-13 DIAGNOSIS — J44.9 CHRONIC OBSTRUCTIVE PULMONARY DISEASE, UNSPECIFIED COPD TYPE (H): Primary | ICD-10-CM

## 2022-07-13 PROCEDURE — 99207 PR NO CHARGE-RESEARCH SERVICE: CPT

## 2022-07-13 PROCEDURE — 99207 PR NO CHARGE NURSE ONLY: CPT

## 2022-07-13 NOTE — PROGRESS NOTES
Sergio Study At-Home Note      Study description: Multiconditions PPG Sub-Study. The purpose of this research study is to collect data related to health for the development of mobile technologies. This data will include physiological signal recordings from medical devices and smart watch data collection software. This study is not to provide any treatment. This study will only collect information for research and  purposes.     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     Liver transplanted (H) 08/30/2018     Lung metastases (H) 08/2014     Other closed displaced fracture of proximal end of left humerus with routine healing, subsequent encounter 3/11/2019     Portal vein thrombosis      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) 08/31/2018    Residual thrombus in main and right portal       Current Outpatient Medications:      acetaminophen (TYLENOL) 500 MG tablet, Take 1 tablet (500 mg) by mouth every 6 hours as needed for mild pain, Disp: 30 tablet, Rfl: 1     albuterol (PROAIR HFA/PROVENTIL HFA/VENTOLIN HFA) 108 (90 Base) MCG/ACT inhaler, 2 Inhalation 4 times daily as needed, Disp: , Rfl:      alendronate (FOSAMAX) 70 MG tablet, Take 70 mg by mouth every 7 days, Disp: , Rfl:      amLODIPine (NORVASC) 5 MG tablet, TAKE 1 TABLET EVERY DAY, Disp: 90 tablet, Rfl: 3     amoxicillin (AMOXIL) 500 MG capsule, Take 4 tablets (2000 mg) by mouth 1 hour before dental  procedures/cleanings. (Patient not taking: Reported on 5/17/2022), Disp: 4 capsule, Rfl: 11     benzonatate (TESSALON) 100 MG capsule, Take 1 capsule (100 mg) by mouth 3 times daily as needed for cough (Patient not taking: No sig reported), Disp: 30 capsule, Rfl: 0     benzonatate (TESSALON) 200 MG capsule, Take 1 capsule (200 mg) by mouth 3 times daily as needed for cough (Patient not taking: No sig reported), Disp: 21 capsule, Rfl: 0     clobetasol (TEMOVATE) 0.05 % external cream, Twice daily as needed for itchy area on the back, Disp: 45 g, Rfl: 5     gabapentin (NEURONTIN) 300 MG capsule, TAKE 2 CAPSULES AT BEDTIME, Disp: 180 capsule, Rfl: 3     guaiFENesin-codeine (GUAIFENESIN AC) 100-10 MG/5ML syrup, Take 5 mLs by mouth every 4 hours as needed for congestion (Patient not taking: No sig reported), Disp: 180 mL, Rfl: 0     levothyroxine (SYNTHROID/LEVOTHROID) 125 MCG tablet, Take 125 mcg by mouth daily, Disp: , Rfl:      omeprazole 20 MG tablet, Take 2 tablets (40 mg) by mouth daily (Patient taking differently: Take 20 mg by mouth 2 times daily), Disp: 180 tablet, Rfl: 3     pramipexole (MIRAPEX) 0.25 MG tablet, Take 0.75 mg by mouth At Bedtime, Disp: , Rfl:      tacrolimus (GENERIC EQUIVALENT) 1 MG capsule, Take 2 capsules (2 mg) by mouth every 12 hours, Disp: 360 capsule, Rfl: 3     zolpidem (AMBIEN) 10 MG tablet, Take 10 mg by mouth nightly as needed for sleep , Disp: , Rfl:     Current Facility-Administered Medications:      EPINEPHrine (ADRENALIN) kit 0.3 mg, 0.3 mg, Intramuscular, Q5 Min PRN, Bradly Lilly MD     triamcinolone acetonide (KENALOG-10) injection 10 mg, 10 mg, Intra-Lesional, Once, Eldon Goetz MD    Allergies   Allergen Reactions     Blood Transfusion Related (Informational Only) Other (See Comments)     Patient has a history of a clinically significant antibody against RBC antigens.  A delay in compatible RBCs may occur.     Dilaudid [Hydromorphone] Itching     Ferrlecit      Venofer  "[Iron Sucrose]         Past Surgical History:   Procedure Laterality Date     ARTHROPLASTY KNEE Right 3/29/2021     CHOLECYSTECTOMY       HEPATECTOMY PARTIAL       IR VISCERAL ANGIOGRAM  2021     OPEN REDUCTION INTERNAL FIXATION HUMERUS PROXIMAL Left 2019     RETURN LIVER TRANSPLANT N/A 2018     TONSILLECTOMY       TRANSPLANT LIVER RECIPIENT  DONOR N/A 2018            Child-Bearing Potential?: N/A (Male)    Race: White  Race (Secondary): N/A    : No    Ethnicity: Non-/     Vitals:  Time Subject Sat: 1250   /76 (BP Location: Right arm, Patient Position: Sitting, Cuff Size: Adult Regular)   Pulse 77   Temp 98.6  F (37  C) (Oral)   Resp 16   Ht 1.626 m (5' 4\")   Wt 60.3 kg (133 lb)   SpO2 97%   BMI 22.83 kg/m       Sponsor Expected Values   Blood Pressure: SBP: ; DBP: 40-90  Pulse:  bpm  Temp: 35.5-37.5  C  Respiration: 10-23  Ht: in cm  Wt: in kg  SpO2%: %  BMI: Rounded to nearest whole number      Screening Info:  Respiratory Conditions: Idiopathic Pulmonary Fibrosis (IPF)    Spirometer Test Results (FEV%): 62    Condition Severity: Moderate - Stage 2 (FEV 50-79%)    Sleep Conditions:  Sleep Apnea Diagnosis: No  Use of CPAP at Night: No  Stop Breathing or Gasping/Gurgling Sounds at Night: Yes  Snoring at Night: Yes    SPO2%  ? 95% during 3 minutes? No    Oxygen Therapy: No    Minutes of Exercise per Week: >60  Type of Activity: Cardio      Measurements & Preferences:  Dominant Hand: Right   Preferred Watch Hand: Left    Volunteer-Reported Rubin Scale: 5  Staff-Recorded Rubin Scale: 4    Hairiness Level: A: Thin Hair, Low Density     Wrist Circumference:  Left: 148 mm       Right: 153 mm    Spectrometer Values:            Left:   L*: 54.28    A*: 11.55   B*: 22.74      Right: L*: 52.17    A*: 11.41   B*: 21.64         STUDY PROCEDURE DATA      Study Date: 22  Study Time (Education Start Time): 14:05:00 "   Device IDs:  Day Watch ID 1: [id number]     Night Watch ID 2: [id number]     Watch Enclosure 1: Aluminum      Watch Enclosure 2: Aluminum   Watch Size 1: 40 mm  Watch Size 2: 40 mm  Nonin ID 1: [id number]   Nonin ID 2: [id number]   Lifestyle:  Moisturizing Cream/Lotion applied at wrist? No  Additional Comments (Device/participant issues): n/a     13-JUL-2022  Imelda Millard

## 2022-07-13 NOTE — PROGRESS NOTES
At-Home Sergio Inclusion/Exclusion Criteria:     Study Name: Sergio  : Nimesh Luque MD    Protocol version: 4.0 - [id number]    Criteria #  Inclusion Criterita (ALL MUST BE YES)  YES/NO/N/A   1   Male and female subjects at least 18 years old at the time of the screening visit.  Yes   2   Wrist circumference and 120mm-245mm (inclusive).  Yes   3   Ability to understand and provide written informed consent.  Yes   4   Willing and able to comply with study procedures, activities, and duration as described in the ICF.   Yes   5  If not vaccinated and up to date with COVID -19 vaccinations, willing to take a rapid AG test, or PCR test, and produce a negative result within 3 days of the study visit(s).   Yes   6   Didn't smoke at least 2 hours before screening (or study procedures).  Yes   7   Neither subject, nor any individuals living with subject, have had new development in the following within the last 14 days prior to study screening:        a. Have failed to comply with any country, state, and local travel restrictions.         b. Have had any unexpected flu-like symptoms (such as fever, chills, cough, shortness of breath, diarrhea, sore throat, runny nose, or trouble breathing).        c. Have had any contact with people confirmed COVID-19.         d. Have been confirmed to have COVID-19 and have not subsequently received a negative COVID-19 test result.    Yes   8   If Cohort 1 (In-Lab Cardiac Conditions):         a. Indication of a rhythm disorder (dated up to 5 years ago) as outlined in Table A (see Protocol page 9), and be present at the time of screening.     NA   9   If Cohort 2 (In-Lab Respiratory Conditions):          a. Prior diagnosis of one of the following conditions, within 5 years: 1) Moderate (GOLD Stage 2) COPD, 2) Severe or Very Severe (GOLD Stage 3 or 4) COPD, 3) Idiopathic pulmonary fibrosis.          b. Record of spirometry FEV% result (within 5 years) are available.    NA    10   If Cohort 3 (At-Home respiratory Conditions):         a. Prior diagnosis of one of the following conditions, within 5 years: 1) Moderate (GOLD Stage 2) COPD, 2) Severe or Very Severe (GOLD Stage 3 or 4) COPD, 3) Idiopathic pulmonary fibrosis.          b. Record of spirometry FEV% result (within 5 years) are available.          c. Willing and able to use a study provided iPhone and navigate study Charline flow.          d. Stable WIFI at home and are able to connect it to study iPhones     Yes       Criteria # Exclusion Criteria (ALL MUST BE NO) YES/NO/N/A   1   Individuals with severe contact allergies to standard adhesives, or other materials found in pulse oximetry sensors, ECG electrodes, respiration monitor electrodes, wearables device bands and watch surfaces.    No   2   Individuals that do not have at least 2 intact fingers (excluding thumb, *pinky will be excluded only for cohort 1 and cohort 2) on non-preferred hand to wear a watch.    No   3   Open wound(s) or active infections on wrists at study watch wear locations or where the ECG electrodes may be placed.    No   4   Physical disability that prevents safe and adequate testing.  No   5   Individuals with a pacemaker or an automated implantable cardioverter-defibrillator (AICD).    No   6   Individuals with physical scars, tattoos, or other skin markings on wrists where sensors or finger sensor are to be worn.    No   7   Individuals with clinically significant hand tremors, as judged by a Study Investigator.    No   8   Pregnant women.     No   9   Subjects with any medical history, physical exam, vital sign or any other study procedure finding/assessment that in the opinion of the Investigator could compromise subject safety during study participation or interfere with the study integrity and/or the accurate assessment of the study objectives.    No   10   Presence of skin conditions or disease at the fingers of SpO2% application sites that could  interfere with SpO2% sensor placement or the accuracy of measurement. Such conditions include, but are not limited to: extensive scarring, skin lesions, redness, infection or edema at target measurement sites.     No   11   Presence of long fingernails that interfere with the placement of the SpO2% sensor or nail polish at the fingers of SpO2% application sites.    No   12   Medical history or physical assessment finding that makes the subject inappropriate for participation, according to the investigator.    No     Patient does fulfill study inclusion criteria and no exclusion criteria are found.     Nimesh Luque MD    13-JUL-2022    Imelda Millard

## 2022-07-13 NOTE — PROGRESS NOTES
"Oban Study Physical Exam      Medical History Reviewed? Yes    Physical Examination  For abnormal findings, please evaluate if the finding is Clinically Significant (by 'CS') or Not Clinically Significant (by 'NCS')  General Appearance   Normal  Head and Neck   Abnormal; NCS wears glasses  Lungs     Normal  Cardiovascular   Normal  Abdomen    Abnormal; NCS well healed incision s/p liver transplant 3 years ago  Musculoskeletal/Extremities  Abnormal; NCS well healed right knee incision s/p total knee replacement; unable to do complete flexion; denies pain; good strength in all extremities; tattoos left forearm both surfaces- avoiding wrist area for sensor placement   Lymph Nodes    Normal  Skin     Abnormal; NCS has had skin cancer; removed lesions on left shoulder, back and face; under close surveillance due to transplant status  Neurological    Normal    Tremor (If present document)  Present slight right hand tremor likely due to tacrolimus use    Vitals:  /76 (BP Location: Right arm, Patient Position: Sitting, Cuff Size: Adult Regular)   Pulse 77   Temp 98.6  F (37  C) (Oral)   Resp 16   Ht 1.626 m (5' 4\")   Wt 60.3 kg (133 lb)   SpO2 97%   BMI 22.83 kg/m    BSA 1.65 m           COVID: No symptoms, chills, shortness of breath, or difficulty breathing, muscle or body aches, headache, loss of taste or smell, sore throat, runny nose, congestion, nausea, vomiting or diarrhea according to the [place] Department of Health and Human Services based on the CARES Act.     COVID Vaccinations:   Immunization History   Administered Date(s) Administered     COVID-19,PF,Moderna 04/22/2022     COVID-19,PF,Pfizer (12+ Yrs) 03/24/2021, 04/14/2021, 09/21/2021     FLU 6-35 months 10/15/2010, 10/01/2013     Flu, Unspecified 09/30/2012, 10/01/2013, 09/29/2014, 10/12/2016, 12/19/2019, 10/21/2021     U4q2-17 Novel Flu 11/25/2013     Influenza (H1N1) 11/10/2009     Influenza (IIV3) PF 10/15/2010, 09/30/2012     Influenza Quad, " Recombinant, pf(RIV4) (Flublok) 10/13/2020     Influenza Vaccine IM > 6 months Valent IIV4 (Alfuria,Fluzone) 09/29/2014, 10/12/2016, 09/28/2017, 12/19/2019     Pneumo Conj 13-V (2010&after) 05/09/2018, 02/08/2019     Pneumococcal 23 valent 10/18/2012     Td (Adult), Adsorbed 01/01/1993, 06/01/2000     Tdap (Adult) Unspecified Formulation 01/01/1993, 01/01/2000, 06/01/2000       Smoking History  Are you currently smoking or vaping? No  How Many Years Have You Smoked or Vaped? Never  Packs or E-Cigs Per Day: 0     Electrocardiogram   ECG not applicable as subject is in the respiratory cohort.     Respiratory Conditions:   COPD    Spirometer Test Results (FEV%): 62  Condition Severity: Moderate - Stage 2 (FEV 50-79%)      Shayy Jeter PA-C

## 2022-07-13 NOTE — PROGRESS NOTES
Oban Study At-Home Consent      Study description:   Multiconditions PPG Sub-Study. The purpose of this research study is to collect data related to health for the development of mobile technologies. This data will include physiological signal recordings from medical devices and smart watch data collection software. This study is not to provide any treatment. This study will only collect information for research and  purposes.     Sherrie Lala a 67 year old female, was onsite today to discuss participation in the At-Home portion of the Oban study.   The consent form was reviewed with the patient.     The review of the study included:    Study Purpose     COVID-19 Criteria     At-Home Study Participation    Participant Responsibilities      Study Data and Devices    Benefits and Risks of Participation    Compensation and Costs of Participation    Voluntary Participation    Confidentiality     Injury and Legal Rights    The subject was queried in regards to her willingness to continue and her questions were answered to her satisfaction.     The patient has given her agreement to volunteer to participate in the above noted study.     The At-Home consent form and HIPPA form version 17 Jun 2022 was signed 13-JUL-2022 onsite in the Clinic Research Unit.     A copy of the At-Home Oban consent will be placed in subject's medical record.  A copy of the consent form was given to the subject today.    Study data is directly entered into Epic per protocol.     No study procedures were done prior to Sherrie Lala providing informed consent.       Imelda Millard

## 2022-07-14 ENCOUNTER — VIRTUAL VISIT (OUTPATIENT)
Dept: CARDIOLOGY | Facility: CLINIC | Age: 67
End: 2022-07-14
Payer: COMMERCIAL

## 2022-07-14 DIAGNOSIS — Z00.6 EXAMINATION OF PARTICIPANT OR CONTROL IN CLINICAL RESEARCH: Primary | ICD-10-CM

## 2022-07-14 PROCEDURE — 99207 PR NO CHARGE NURSE ONLY: CPT

## 2022-07-14 NOTE — PROGRESS NOTES
"Oban Study At-Home Phone Visit    Study description:   Multiconditions PPG Sub-Study. The purpose of this research study is to collect data related to health for the development of mobile technologies. This data will include physiological signal recordings from medical devices and smart watch data collection software. This study is not to provide any treatment. This study will only collect information for research and  purposes.       Subject Number: [id number]    Study Day: Day 2    Oban study subject was called today to;     Confirm subjects are following the subject instructions     Address any technical difficulties     Answer any questions    Reminders Checklist:   [x]  Unlock the watches: Passcode - [id number]    -When placing them on the  or on themselves   [x]  Visually confirm Wi-Fi connection and charging setup  [x]  Unlock phones   -Unlock before placing on   -Should NOT show lock icon  [x]  Check Flubber pam for incomplete surveys   -Morning surveys: End of Night task on NIGHT phone    -Evening surveys: End of Day task on DAY phone  [x]  Take devices off before showering/getting them wet  [x]  Make sure to dismiss any update notifications. -Tap \"Not Now\"  [x]  Good Nonin wear    -While resting/relaxing, not while typing doing anything hand intensive   [x]  Remind them of next appointment with us     Adverse Events & Con Med Assessment Performed?   [x]  (If yes, please generate adverse event report for PI to frances)      14-JUL-2022    Art Machado      "

## 2022-07-18 ENCOUNTER — ALLIED HEALTH/NURSE VISIT (OUTPATIENT)
Dept: CARDIOLOGY | Facility: CLINIC | Age: 67
End: 2022-07-18
Payer: COMMERCIAL

## 2022-07-18 DIAGNOSIS — Z00.6 EXAMINATION OF PARTICIPANT OR CONTROL IN CLINICAL RESEARCH: Primary | ICD-10-CM

## 2022-07-18 PROCEDURE — 99207 PR NO CHARGE NURSE ONLY: CPT

## 2022-07-18 NOTE — PROGRESS NOTES
Sregio Study At-Home On-Site Visit    Study description:   Multiconditions PPG Sub-Study. The purpose of this research study is to collect data related to health for the development of mobile technologies. This data will include physiological signal recordings from medical devices and smart watch data collection software. This study is not to provide any treatment. This study will only collect information for research and  purposes.       Subject Number: [id number]    Study Day: Day 5    Sherrie Lala a 67 year old female, was onsite today to return their first Nonin device and receive their third Nonin devices, per study protocol. With the new device, the subject was reminded to continue following the subject instructions. All questions were answered to their satisfaction.     Nonin ID 3: [id number]     Adverse Events & Con Med Assessment Performed?   [x]     18- JUL-2022    Sara Behmanesh

## 2022-07-20 ENCOUNTER — VIRTUAL VISIT (OUTPATIENT)
Dept: CARDIOLOGY | Facility: CLINIC | Age: 67
End: 2022-07-20
Payer: COMMERCIAL

## 2022-07-20 DIAGNOSIS — Z00.6 EXAMINATION OF PARTICIPANT OR CONTROL IN CLINICAL RESEARCH: Primary | ICD-10-CM

## 2022-07-20 PROCEDURE — 99207 PR NO CHARGE NURSE ONLY: CPT

## 2022-07-20 NOTE — PROGRESS NOTES
"Oban Study At-Home Phone Visit    Study description:   Multiconditions PPG Sub-Study. The purpose of this research study is to collect data related to health for the development of mobile technologies. This data will include physiological signal recordings from medical devices and smart watch data collection software. This study is not to provide any treatment. This study will only collect information for research and  purposes.       Subject Number: [id number]    Study Day: Day 8     Oban study subject was called today to;     Confirm subjects are following the subject instructions     Address any technical difficulties     Answer any questions    Reminders Checklist:   [x]  Unlock the watches: Passcode - [id number]    -When placing them on the  or on themselves   [x]  Visually confirm Wi-Fi connection and charging setup  [x]  Unlock phones   -Unlock before placing on   -Should NOT show lock icon  [x]  Check Flubber pam for incomplete surveys   -Morning surveys: End of Night task on NIGHT phone    -Evening surveys: End of Day task on DAY phone  [x]  Take devices off before showering/getting them wet  [x]  Make sure to dismiss any update notifications. -Tap \"Not Now\"  [x]  Good Nonin wear    -While resting/relaxing, not while typing doing anything hand intensive   [x]  Remind them of next appointment with us     Adverse Events & Con Med Assessment Performed?   [x]  (If yes, please generate adverse event report for PI to cosign)    The subject noted that the day wear period was difficult to gather the data because of the inconvenience of the devices.   The subject was very confused about the survey schedule and when to wear which devices. Staff re-educated the study survey timelines, when to wear the devices, and when to complete surveys and switch from the night to day watches. The Subject was unwilling to wear the devices while sleeping, staff highly recommended wearing the devices while " sleeping or data would be difficult to capture.     20-JUL-2022    Sara Behmanesh

## 2022-07-26 ENCOUNTER — VIRTUAL VISIT (OUTPATIENT)
Dept: CARDIOLOGY | Facility: CLINIC | Age: 67
End: 2022-07-26
Payer: COMMERCIAL

## 2022-07-26 DIAGNOSIS — Z00.6 EXAMINATION OF PARTICIPANT OR CONTROL IN CLINICAL RESEARCH: Primary | ICD-10-CM

## 2022-07-26 PROCEDURE — 99207 PR NO CHARGE NURSE ONLY: CPT

## 2022-07-26 NOTE — PROGRESS NOTES
"Oban Study At-Home Phone Visit    Study description:   Multiconditions PPG Sub-Study. The purpose of this research study is to collect data related to health for the development of mobile technologies. This data will include physiological signal recordings from medical devices and smart watch data collection software. This study is not to provide any treatment. This study will only collect information for research and  purposes.       Subject Number: [id number]    Study Day: Day 14    Oban study subject was called today to;     Confirm subjects are following the subject instructions     Address any technical difficulties     Answer any questions    Reminders Checklist:    [x]  Unlock the watches: Passcode - [id number]    -When placing them on the  or on themselves   [x]  Visually confirm Wi-Fi connection and charging setup  [x]  Unlock phones   -Unlock before placing on   -Should NOT show lock icon  [x]  Check Flubber pam for incomplete surveys   -Morning surveys: End of Night task on NIGHT phone    -Evening surveys: End of Day task on DAY phone  [x]  Take devices off before showering/getting them wet  [x]  Make sure to dismiss any update notifications. -Tap \"Not Now\"  [x]  Good Nonin wear    -While resting/relaxing, not while typing doing anything hand intensive   [x]  Remind them of next appointment with us     Adverse Events & Con Med Assessment Performed?   [x]  (If yes, please generate adverse event report for PI to cosign)      26-JUL-2022    TK Medina    "

## 2022-07-27 ENCOUNTER — ALLIED HEALTH/NURSE VISIT (OUTPATIENT)
Dept: CARDIOLOGY | Facility: CLINIC | Age: 67
End: 2022-07-27
Payer: COMMERCIAL

## 2022-07-27 DIAGNOSIS — Z00.6 EXAMINATION OF PARTICIPANT OR CONTROL IN CLINICAL RESEARCH: Primary | ICD-10-CM

## 2022-07-27 PROCEDURE — 99207 PR NO CHARGE NURSE ONLY: CPT

## 2022-07-27 NOTE — PROGRESS NOTES
Sergio At-Home Study End Note    Study Description:   Multiconditions PPG Sub-Study. The purpose of this research study is to collect data related to health for the development of mobile technologies. This data will include physiological signal recordings from medical devices and smart watch data collection software. This study is not to provide any treatment. This study will only collect information for research and  purposes.     Adverse Events & Con Med Assessment Performed?   [x]     Did the Subject Complete the At-Home Session? Yes    If no, Termination Reason: N/A    Study Termination/Completion Date: 27-JUL-2022    Subject returned devices today and this completes this study for the subject.    Capo Nichols

## 2022-08-03 DIAGNOSIS — G47.00 PERSISTENT INSOMNIA: Primary | ICD-10-CM

## 2022-08-04 RX ORDER — ZOLPIDEM TARTRATE 10 MG/1
TABLET ORAL
Qty: 90 TABLET | Refills: 1 | Status: SHIPPED | OUTPATIENT
Start: 2022-08-04 | End: 2022-11-01

## 2022-08-04 NOTE — TELEPHONE ENCOUNTER
Routing refill request to provider for review/approval because:  Drug not on the [facility] refill protocol     Last Written Prescription Date:  Patient reported  Last office visit: 4/7/2022   Future Office Visit:

## 2022-08-09 ENCOUNTER — TRANSFERRED RECORDS (OUTPATIENT)
Dept: HEALTH INFORMATION MANAGEMENT | Facility: CLINIC | Age: 67
End: 2022-08-09

## 2022-08-16 ENCOUNTER — TRANSFERRED RECORDS (OUTPATIENT)
Dept: HEALTH INFORMATION MANAGEMENT | Facility: CLINIC | Age: 67
End: 2022-08-16

## 2022-08-19 ENCOUNTER — DOCUMENTATION ONLY (OUTPATIENT)
Dept: TRANSPLANT | Facility: CLINIC | Age: 67
End: 2022-08-19

## 2022-08-19 NOTE — PROGRESS NOTES
Annual chart review completed.      Patient is up to date with appts but is overdue for liver transplant labs.  Has appt w/ [facility] scheduled.  Please send letter asking her to have lab testing every 3-4 mos.

## 2022-09-11 ENCOUNTER — HEALTH MAINTENANCE LETTER (OUTPATIENT)
Age: 67
End: 2022-09-11

## 2022-10-09 PROBLEM — Z00.5 POSITIVE SPUTUM CULTURE IN CADAVERIC DONOR: Status: RESOLVED | Noted: 2018-09-12 | Resolved: 2022-10-09

## 2022-10-09 PROBLEM — Z00.5 POSITIVE URINE CULTURE IN CADAVERIC DONOR: Status: RESOLVED | Noted: 2018-09-12 | Resolved: 2022-10-09

## 2022-10-09 PROBLEM — Z00.00 HEALTH CARE MAINTENANCE: Status: ACTIVE | Noted: 2022-10-09

## 2022-10-09 PROBLEM — R84.5 POSITIVE SPUTUM CULTURE IN CADAVERIC DONOR: Status: RESOLVED | Noted: 2018-09-12 | Resolved: 2022-10-09

## 2022-10-09 PROBLEM — R82.79 POSITIVE URINE CULTURE IN CADAVERIC DONOR: Status: RESOLVED | Noted: 2018-09-12 | Resolved: 2022-10-09

## 2022-10-20 DIAGNOSIS — Z94.4 LIVER REPLACED BY TRANSPLANT (H): Primary | ICD-10-CM

## 2022-10-20 DIAGNOSIS — Z13.220 LIPID SCREENING: ICD-10-CM

## 2022-10-28 ENCOUNTER — OFFICE VISIT (OUTPATIENT)
Dept: GASTROENTEROLOGY | Facility: CLINIC | Age: 67
End: 2022-10-28
Attending: INTERNAL MEDICINE
Payer: MEDICARE

## 2022-10-28 ENCOUNTER — LAB (OUTPATIENT)
Dept: LAB | Facility: CLINIC | Age: 67
End: 2022-10-28
Payer: COMMERCIAL

## 2022-10-28 ENCOUNTER — ANCILLARY PROCEDURE (OUTPATIENT)
Dept: BONE DENSITY | Facility: CLINIC | Age: 67
End: 2022-10-28
Attending: INTERNAL MEDICINE
Payer: COMMERCIAL

## 2022-10-28 VITALS
HEIGHT: 64 IN | HEART RATE: 67 BPM | BODY MASS INDEX: 22.26 KG/M2 | OXYGEN SATURATION: 98 % | DIASTOLIC BLOOD PRESSURE: 93 MMHG | WEIGHT: 130.4 LBS | SYSTOLIC BLOOD PRESSURE: 137 MMHG

## 2022-10-28 DIAGNOSIS — Z13.220 LIPID SCREENING: ICD-10-CM

## 2022-10-28 DIAGNOSIS — M81.0 AGE-RELATED OSTEOPOROSIS WITHOUT CURRENT PATHOLOGICAL FRACTURE: ICD-10-CM

## 2022-10-28 DIAGNOSIS — Z23 NEED FOR COVID-19 VACCINE: ICD-10-CM

## 2022-10-28 DIAGNOSIS — M80.00XA AGE-RELATED OSTEOPOROSIS WITH CURRENT PATHOLOGICAL FRACTURE, INITIAL ENCOUNTER: ICD-10-CM

## 2022-10-28 DIAGNOSIS — Z94.4 LIVER REPLACED BY TRANSPLANT (H): ICD-10-CM

## 2022-10-28 DIAGNOSIS — Z94.4 LIVER REPLACED BY TRANSPLANT (H): Primary | ICD-10-CM

## 2022-10-28 DIAGNOSIS — R31.29 MICROSCOPIC HEMATURIA: ICD-10-CM

## 2022-10-28 LAB
ALBUMIN MFR UR ELPH: 25.1 MG/DL
ALBUMIN SERPL BCG-MCNC: 4.6 G/DL (ref 3.5–5.2)
ALBUMIN UR-MCNC: NEGATIVE MG/DL
ALP SERPL-CCNC: 82 U/L (ref 35–104)
ALT SERPL W P-5'-P-CCNC: 21 U/L (ref 10–35)
ANION GAP SERPL CALCULATED.3IONS-SCNC: 9 MMOL/L (ref 7–15)
APPEARANCE UR: CLEAR
AST SERPL W P-5'-P-CCNC: 22 U/L (ref 10–35)
BILIRUB DIRECT SERPL-MCNC: <0.2 MG/DL (ref 0–0.3)
BILIRUB SERPL-MCNC: 0.6 MG/DL
BILIRUB UR QL STRIP: NEGATIVE
BUN SERPL-MCNC: 23.4 MG/DL (ref 8–23)
CALCIUM SERPL-MCNC: 9.2 MG/DL (ref 8.8–10.2)
CHLORIDE SERPL-SCNC: 105 MMOL/L (ref 98–107)
CHOLEST SERPL-MCNC: 150 MG/DL
COLOR UR AUTO: YELLOW
CREAT SERPL-MCNC: 1.06 MG/DL (ref 0.51–0.95)
CREAT UR-MCNC: 194 MG/DL
DEPRECATED HCO3 PLAS-SCNC: 27 MMOL/L (ref 22–29)
ERYTHROCYTE [DISTWIDTH] IN BLOOD BY AUTOMATED COUNT: 12.3 % (ref 10–15)
GFR SERPL CREATININE-BSD FRML MDRD: 58 ML/MIN/1.73M2
GLUCOSE SERPL-MCNC: 83 MG/DL (ref 70–99)
GLUCOSE UR STRIP-MCNC: NEGATIVE MG/DL
HCT VFR BLD AUTO: 42.7 % (ref 35–47)
HDLC SERPL-MCNC: 54 MG/DL
HGB BLD-MCNC: 14 G/DL (ref 11.7–15.7)
HGB UR QL STRIP: ABNORMAL
HYALINE CASTS: 1 /LPF
KETONES UR STRIP-MCNC: NEGATIVE MG/DL
LDLC SERPL CALC-MCNC: 80 MG/DL
LEUKOCYTE ESTERASE UR QL STRIP: NEGATIVE
MCH RBC QN AUTO: 29.7 PG (ref 26.5–33)
MCHC RBC AUTO-ENTMCNC: 32.8 G/DL (ref 31.5–36.5)
MCV RBC AUTO: 91 FL (ref 78–100)
MUCOUS THREADS #/AREA URNS LPF: PRESENT /LPF
NITRATE UR QL: NEGATIVE
NONHDLC SERPL-MCNC: 96 MG/DL
PH UR STRIP: 6 [PH] (ref 5–7)
PLATELET # BLD AUTO: 163 10E3/UL (ref 150–450)
POTASSIUM SERPL-SCNC: 4.1 MMOL/L (ref 3.4–5.3)
PROT SERPL-MCNC: 7.4 G/DL (ref 6.4–8.3)
PROT/CREAT 24H UR: 0.13 MG/MG CR (ref 0–0.2)
RBC # BLD AUTO: 4.72 10E6/UL (ref 3.8–5.2)
RBC URINE: 15 /HPF
SODIUM SERPL-SCNC: 141 MMOL/L (ref 136–145)
SP GR UR STRIP: 1.02 (ref 1–1.03)
TACROLIMUS BLD-MCNC: 4.5 UG/L (ref 5–15)
TME LAST DOSE: ABNORMAL H
TME LAST DOSE: ABNORMAL H
TRIGL SERPL-MCNC: 82 MG/DL
UROBILINOGEN UR STRIP-MCNC: NORMAL MG/DL
WBC # BLD AUTO: 6.5 10E3/UL (ref 4–11)
WBC URINE: 2 /HPF

## 2022-10-28 PROCEDURE — 91312 HC RX IP 250 OP 636: CPT | Performed by: INTERNAL MEDICINE

## 2022-10-28 PROCEDURE — 84156 ASSAY OF PROTEIN URINE: CPT | Performed by: INTERNAL MEDICINE

## 2022-10-28 PROCEDURE — 36415 COLL VENOUS BLD VENIPUNCTURE: CPT | Performed by: PATHOLOGY

## 2022-10-28 PROCEDURE — 80197 ASSAY OF TACROLIMUS: CPT | Performed by: INTERNAL MEDICINE

## 2022-10-28 PROCEDURE — 0124A HC ADMIN COVID VAC PFIZER 12+ BIVAL ADDITIONAL: CPT | Performed by: INTERNAL MEDICINE

## 2022-10-28 PROCEDURE — 77080 DXA BONE DENSITY AXIAL: CPT

## 2022-10-28 PROCEDURE — 81001 URINALYSIS AUTO W/SCOPE: CPT | Performed by: PATHOLOGY

## 2022-10-28 PROCEDURE — 80053 COMPREHEN METABOLIC PANEL: CPT | Performed by: PATHOLOGY

## 2022-10-28 PROCEDURE — 85027 COMPLETE CBC AUTOMATED: CPT | Performed by: PATHOLOGY

## 2022-10-28 PROCEDURE — G0463 HOSPITAL OUTPT CLINIC VISIT: HCPCS

## 2022-10-28 PROCEDURE — 99214 OFFICE O/P EST MOD 30 MIN: CPT | Performed by: INTERNAL MEDICINE

## 2022-10-28 PROCEDURE — 80061 LIPID PANEL: CPT | Performed by: INTERNAL MEDICINE

## 2022-10-28 PROCEDURE — 250N000011 HC RX IP 250 OP 636: Performed by: INTERNAL MEDICINE

## 2022-10-28 PROCEDURE — 82248 BILIRUBIN DIRECT: CPT | Performed by: PATHOLOGY

## 2022-10-28 RX ORDER — ASPIRIN 81 MG/1
81 TABLET ORAL DAILY
COMMUNITY

## 2022-10-28 RX ADMIN — BNT162B2 ORIGINAL AND OMICRON BA.4/BA.5 30 MCG: .1125; .1125 INJECTION, SUSPENSION INTRAMUSCULAR at 11:07

## 2022-10-28 ASSESSMENT — PAIN SCALES - GENERAL: PAINLEVEL: NO PAIN (0)

## 2022-10-28 NOTE — LETTER
"    10/28/2022         RE: Sherrie Lala  [address] [facility] [address]      HISTORY OF PRESENT ILLNESS:  I had the pleasure of seeing Sherrie Lala for followup in the Liver Transplant Clinic at the [facility] on 10/28/2022.  Ms. 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  She is now 11 years status post cancer surgery.    She feels well at this visit.  She denies any abdominal pain, itching, skin rash or fatigue.  She denies any increased abdominal girth or lower extremity edema.  She has not had any fevers or chills, cough or shortness of breath.  She denies any nausea or vomiting, diarrhea or constipation.  She is due for a flu vaccine as well as a COVID-19 booster.  She denies any nausea or vomiting, diarrhea or constipation.  Her appetite has been good and her weight for the most part has been stable.    Current Outpatient Medications   Medication     acetaminophen (TYLENOL) 500 MG tablet     albuterol (PROAIR HFA/PROVENTIL HFA/VENTOLIN HFA) 108 (90 Base) MCG/ACT inhaler     amLODIPine (NORVASC) 5 MG tablet     amoxicillin (AMOXIL) 500 MG capsule     aspirin 81 MG EC tablet     calcium carbonate 600 mg-vitamin D 400 units (CALTRATE) 600-400 MG-UNIT per tablet     gabapentin (NEURONTIN) 300 MG capsule     levothyroxine (SYNTHROID/LEVOTHROID) 125 MCG tablet     omeprazole 20 MG tablet     pramipexole (MIRAPEX) 0.25 MG tablet     tacrolimus (GENERIC EQUIVALENT) 1 MG capsule     zolpidem (AMBIEN) 10 MG tablet     alendronate (FOSAMAX) 70 MG tablet     clobetasol (TEMOVATE) 0.05 % external cream     Current Facility-Administered Medications   Medication     EPINEPHrine (ADRENALIN) kit 0.3 mg     triamcinolone acetonide (KENALOG-10) injection 10 mg     BP (!) 137/93   Pulse 67   Ht 1.626 m (5' 4.02\")   Wt 59.1 kg (130 lb 6.4 oz)   SpO2 98%   BMI 22.37 kg/m  "     PHYSICAL EXAMINATION:    GENERAL:  She looks quite well.  HEENT:  Shows no scleral icterus or temporal muscle wasting.  CHEST:  Clear.  ABDOMEN:  No increase in girth.  No masses or tenderness to palpation are present.  Her liver is 10 cm in span without left lobe enlargement.  No spleen tip is palpable.  EXTREMITIES:  No edema.  SKIN:  No suspicious lesions.  NEUROLOGIC:  Nonfocal.    Recent Results (from the past 168 hour(s))   Basic metabolic panel    Collection Time: 10/28/22  9:39 AM   Result Value Ref Range    Sodium 141 136 - 145 mmol/L    Potassium 4.1 3.4 - 5.3 mmol/L    Chloride 105 98 - 107 mmol/L    Carbon Dioxide (CO2) 27 22 - 29 mmol/L    Anion Gap 9 7 - 15 mmol/L    Urea Nitrogen 23.4 (H) 8.0 - 23.0 mg/dL    Creatinine 1.06 (H) 0.51 - 0.95 mg/dL    Calcium 9.2 8.8 - 10.2 mg/dL    Glucose 83 70 - 99 mg/dL    GFR Estimate 58 (L) >60 mL/min/1.73m2   CBC with platelets    Collection Time: 10/28/22  9:39 AM   Result Value Ref Range    WBC Count 6.5 4.0 - 11.0 10e3/uL    RBC Count 4.72 3.80 - 5.20 10e6/uL    Hemoglobin 14.0 11.7 - 15.7 g/dL    Hematocrit 42.7 35.0 - 47.0 %    MCV 91 78 - 100 fL    MCH 29.7 26.5 - 33.0 pg    MCHC 32.8 31.5 - 36.5 g/dL    RDW 12.3 10.0 - 15.0 %    Platelet Count 163 150 - 450 10e3/uL   Hepatic panel    Collection Time: 10/28/22  9:39 AM   Result Value Ref Range    Protein Total 7.4 6.4 - 8.3 g/dL    Albumin 4.6 3.5 - 5.2 g/dL    Bilirubin Total 0.6 <=1.2 mg/dL    Alkaline Phosphatase 82 35 - 104 U/L    AST 22 10 - 35 U/L    ALT 21 10 - 35 U/L    Bilirubin Direct <0.20 0.00 - 0.30 mg/dL   Lipid Profile    Collection Time: 10/28/22  9:39 AM   Result Value Ref Range    Cholesterol 150 <200 mg/dL    Triglycerides 82 <150 mg/dL    Direct Measure HDL 54 >=50 mg/dL    LDL Cholesterol Calculated 80 <=100 mg/dL    Non HDL Cholesterol 96 <130 mg/dL   UA reflex to Microscopic    Collection Time: 10/28/22  9:39 AM   Result Value Ref Range    Color Urine Yellow Colorless, Straw, Light  Yellow, Yellow    Appearance Urine Clear Clear    Glucose Urine Negative Negative mg/dL    Bilirubin Urine Negative Negative    Ketones Urine Negative Negative mg/dL    Specific Gravity Urine 1.025 1.003 - 1.035    Blood Urine Small (A) Negative    pH Urine 6.0 5.0 - 7.0    Protein Albumin Urine Negative Negative mg/dL    Urobilinogen Urine Normal Normal, 2.0 mg/dL    Nitrite Urine Negative Negative    Leukocyte Esterase Urine Negative Negative    RBC Urine 15 (H) <=2 /HPF    WBC Urine 2 <=5 /HPF    Mucus Urine Present (A) None Seen /LPF    Hyaline Casts Urine 1 <=2 /LPF      IMPRESSION:  Ms. Lala is doing very well now more than 4 years status post liver transplantation.  Her liver tests are all completely normal.  Her kidney function looks quite good as well.  All complications are well addressed.  She will get a COVID-19 booster today and will get a flu shot at a later date.  She does have hematuria and I have scheduled her for Urology evaluation.  She had previously been scheduled but had to cancel it, but I said this is really quite important.      She is otherwise up to date with regard to other vaccines and cancer screening, other than that noted above and I will see her back in the clinic again in 6 months.    Thank you very much for allowing me to participate in the care of this patient.  If you have any questions regarding my recommendations, please do not hesitate to contact me.         Bradly Lilly MD      Professor of Medicine  [employer]      Executive Medical Director, Solid Organ Transplant Program  [facility]

## 2022-10-28 NOTE — LETTER
10/28/2022         RE: Sherrie Lala  [address] [facility] [address]        Dear Colleague,    Thank you for referring your patient, Sherrie Lala, to the [facility] [place]. Please see a copy of my visit note below.    HISTORY OF PRESENT ILLNESS:  I had the pleasure of seeing Sherrie Lala for followup in the Liver Transplant Clinic at the [facility] on 10/28/2022.  Ms. 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  She is now 11 years status post cancer surgery.    She feels well at this visit.  She denies any abdominal pain, itching, skin rash or fatigue.  She denies any increased abdominal girth or lower extremity edema.  She has not had any fevers or chills, cough or shortness of breath.  She denies any nausea or vomiting, diarrhea or constipation.  She is due for a flu vaccine as well as a COVID-19 booster.  She denies any nausea or vomiting, diarrhea or constipation.  Her appetite has been good and her weight for the most part has been stable.    Current Outpatient Medications   Medication     acetaminophen (TYLENOL) 500 MG tablet     albuterol (PROAIR HFA/PROVENTIL HFA/VENTOLIN HFA) 108 (90 Base) MCG/ACT inhaler     amLODIPine (NORVASC) 5 MG tablet     amoxicillin (AMOXIL) 500 MG capsule     aspirin 81 MG EC tablet     calcium carbonate 600 mg-vitamin D 400 units (CALTRATE) 600-400 MG-UNIT per tablet     gabapentin (NEURONTIN) 300 MG capsule     levothyroxine (SYNTHROID/LEVOTHROID) 125 MCG tablet     omeprazole 20 MG tablet     pramipexole (MIRAPEX) 0.25 MG tablet     tacrolimus (GENERIC EQUIVALENT) 1 MG capsule     zolpidem (AMBIEN) 10 MG tablet     alendronate (FOSAMAX) 70 MG tablet     clobetasol (TEMOVATE) 0.05 % external cream     Current Facility-Administered Medications   Medication     EPINEPHrine (ADRENALIN) kit 0.3 mg      "triamcinolone acetonide (KENALOG-10) injection 10 mg     BP (!) 137/93   Pulse 67   Ht 1.626 m (5' 4.02\")   Wt 59.1 kg (130 lb 6.4 oz)   SpO2 98%   BMI 22.37 kg/m      PHYSICAL EXAMINATION:    GENERAL:  She looks quite well.  HEENT:  Shows no scleral icterus or temporal muscle wasting.  CHEST:  Clear.  ABDOMEN:  No increase in girth.  No masses or tenderness to palpation are present.  Her liver is 10 cm in span without left lobe enlargement.  No spleen tip is palpable.  EXTREMITIES:  No edema.  SKIN:  No suspicious lesions.  NEUROLOGIC:  Nonfocal.    Recent Results (from the past 168 hour(s))   Basic metabolic panel    Collection Time: 10/28/22  9:39 AM   Result Value Ref Range    Sodium 141 136 - 145 mmol/L    Potassium 4.1 3.4 - 5.3 mmol/L    Chloride 105 98 - 107 mmol/L    Carbon Dioxide (CO2) 27 22 - 29 mmol/L    Anion Gap 9 7 - 15 mmol/L    Urea Nitrogen 23.4 (H) 8.0 - 23.0 mg/dL    Creatinine 1.06 (H) 0.51 - 0.95 mg/dL    Calcium 9.2 8.8 - 10.2 mg/dL    Glucose 83 70 - 99 mg/dL    GFR Estimate 58 (L) >60 mL/min/1.73m2   CBC with platelets    Collection Time: 10/28/22  9:39 AM   Result Value Ref Range    WBC Count 6.5 4.0 - 11.0 10e3/uL    RBC Count 4.72 3.80 - 5.20 10e6/uL    Hemoglobin 14.0 11.7 - 15.7 g/dL    Hematocrit 42.7 35.0 - 47.0 %    MCV 91 78 - 100 fL    MCH 29.7 26.5 - 33.0 pg    MCHC 32.8 31.5 - 36.5 g/dL    RDW 12.3 10.0 - 15.0 %    Platelet Count 163 150 - 450 10e3/uL   Hepatic panel    Collection Time: 10/28/22  9:39 AM   Result Value Ref Range    Protein Total 7.4 6.4 - 8.3 g/dL    Albumin 4.6 3.5 - 5.2 g/dL    Bilirubin Total 0.6 <=1.2 mg/dL    Alkaline Phosphatase 82 35 - 104 U/L    AST 22 10 - 35 U/L    ALT 21 10 - 35 U/L    Bilirubin Direct <0.20 0.00 - 0.30 mg/dL   Lipid Profile    Collection Time: 10/28/22  9:39 AM   Result Value Ref Range    Cholesterol 150 <200 mg/dL    Triglycerides 82 <150 mg/dL    Direct Measure HDL 54 >=50 mg/dL    LDL Cholesterol Calculated 80 <=100 mg/dL    " Non HDL Cholesterol 96 <130 mg/dL   UA reflex to Microscopic    Collection Time: 10/28/22  9:39 AM   Result Value Ref Range    Color Urine Yellow Colorless, Straw, Light Yellow, Yellow    Appearance Urine Clear Clear    Glucose Urine Negative Negative mg/dL    Bilirubin Urine Negative Negative    Ketones Urine Negative Negative mg/dL    Specific Gravity Urine 1.025 1.003 - 1.035    Blood Urine Small (A) Negative    pH Urine 6.0 5.0 - 7.0    Protein Albumin Urine Negative Negative mg/dL    Urobilinogen Urine Normal Normal, 2.0 mg/dL    Nitrite Urine Negative Negative    Leukocyte Esterase Urine Negative Negative    RBC Urine 15 (H) <=2 /HPF    WBC Urine 2 <=5 /HPF    Mucus Urine Present (A) None Seen /LPF    Hyaline Casts Urine 1 <=2 /LPF      IMPRESSION:  Ms. Lala is doing very well now more than 4 years status post liver transplantation.  Her liver tests are all completely normal.  Her kidney function looks quite good as well.  All complications are well addressed.  She will get a COVID-19 booster today and will get a flu shot at a later date.  She does have hematuria and I have scheduled her for Urology evaluation.  She had previously been scheduled but had to cancel it, but I said this is really quite important.      She is otherwise up to date with regard to other vaccines and cancer screening, other than that noted above and I will see her back in the clinic again in 6 months.    Thank you very much for allowing me to participate in the care of this patient.  If you have any questions regarding my recommendations, please do not hesitate to contact me.         Bradly Lilly MD      Professor of Medicine  [employer]      Executive Medical Director, Solid Organ Transplant Program  [facility]

## 2022-10-28 NOTE — PROGRESS NOTES
"HISTORY OF PRESENT ILLNESS:  I had the pleasure of seeing Sherrie Lala for followup in the Liver Transplant Clinic at the [facility] on 10/28/2022.  Ms. 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  She is now 11 years status post cancer surgery.    She feels well at this visit.  She denies any abdominal pain, itching, skin rash or fatigue.  She denies any increased abdominal girth or lower extremity edema.  She has not had any fevers or chills, cough or shortness of breath.  She denies any nausea or vomiting, diarrhea or constipation.  She is due for a flu vaccine as well as a COVID-19 booster.  She denies any nausea or vomiting, diarrhea or constipation.  Her appetite has been good and her weight for the most part has been stable.    Current Outpatient Medications   Medication     acetaminophen (TYLENOL) 500 MG tablet     albuterol (PROAIR HFA/PROVENTIL HFA/VENTOLIN HFA) 108 (90 Base) MCG/ACT inhaler     amLODIPine (NORVASC) 5 MG tablet     amoxicillin (AMOXIL) 500 MG capsule     aspirin 81 MG EC tablet     calcium carbonate 600 mg-vitamin D 400 units (CALTRATE) 600-400 MG-UNIT per tablet     gabapentin (NEURONTIN) 300 MG capsule     levothyroxine (SYNTHROID/LEVOTHROID) 125 MCG tablet     omeprazole 20 MG tablet     pramipexole (MIRAPEX) 0.25 MG tablet     tacrolimus (GENERIC EQUIVALENT) 1 MG capsule     zolpidem (AMBIEN) 10 MG tablet     alendronate (FOSAMAX) 70 MG tablet     clobetasol (TEMOVATE) 0.05 % external cream     Current Facility-Administered Medications   Medication     EPINEPHrine (ADRENALIN) kit 0.3 mg     triamcinolone acetonide (KENALOG-10) injection 10 mg     BP (!) 137/93   Pulse 67   Ht 1.626 m (5' 4.02\")   Wt 59.1 kg (130 lb 6.4 oz)   SpO2 98%   BMI 22.37 kg/m      PHYSICAL EXAMINATION:    GENERAL:  She looks quite well.  HEENT:  Shows no scleral icterus " or temporal muscle wasting.  CHEST:  Clear.  ABDOMEN:  No increase in girth.  No masses or tenderness to palpation are present.  Her liver is 10 cm in span without left lobe enlargement.  No spleen tip is palpable.  EXTREMITIES:  No edema.  SKIN:  No suspicious lesions.  NEUROLOGIC:  Nonfocal.    Recent Results (from the past 168 hour(s))   Basic metabolic panel    Collection Time: 10/28/22  9:39 AM   Result Value Ref Range    Sodium 141 136 - 145 mmol/L    Potassium 4.1 3.4 - 5.3 mmol/L    Chloride 105 98 - 107 mmol/L    Carbon Dioxide (CO2) 27 22 - 29 mmol/L    Anion Gap 9 7 - 15 mmol/L    Urea Nitrogen 23.4 (H) 8.0 - 23.0 mg/dL    Creatinine 1.06 (H) 0.51 - 0.95 mg/dL    Calcium 9.2 8.8 - 10.2 mg/dL    Glucose 83 70 - 99 mg/dL    GFR Estimate 58 (L) >60 mL/min/1.73m2   CBC with platelets    Collection Time: 10/28/22  9:39 AM   Result Value Ref Range    WBC Count 6.5 4.0 - 11.0 10e3/uL    RBC Count 4.72 3.80 - 5.20 10e6/uL    Hemoglobin 14.0 11.7 - 15.7 g/dL    Hematocrit 42.7 35.0 - 47.0 %    MCV 91 78 - 100 fL    MCH 29.7 26.5 - 33.0 pg    MCHC 32.8 31.5 - 36.5 g/dL    RDW 12.3 10.0 - 15.0 %    Platelet Count 163 150 - 450 10e3/uL   Hepatic panel    Collection Time: 10/28/22  9:39 AM   Result Value Ref Range    Protein Total 7.4 6.4 - 8.3 g/dL    Albumin 4.6 3.5 - 5.2 g/dL    Bilirubin Total 0.6 <=1.2 mg/dL    Alkaline Phosphatase 82 35 - 104 U/L    AST 22 10 - 35 U/L    ALT 21 10 - 35 U/L    Bilirubin Direct <0.20 0.00 - 0.30 mg/dL   Lipid Profile    Collection Time: 10/28/22  9:39 AM   Result Value Ref Range    Cholesterol 150 <200 mg/dL    Triglycerides 82 <150 mg/dL    Direct Measure HDL 54 >=50 mg/dL    LDL Cholesterol Calculated 80 <=100 mg/dL    Non HDL Cholesterol 96 <130 mg/dL   UA reflex to Microscopic    Collection Time: 10/28/22  9:39 AM   Result Value Ref Range    Color Urine Yellow Colorless, Straw, Light Yellow, Yellow    Appearance Urine Clear Clear    Glucose Urine Negative Negative mg/dL     Bilirubin Urine Negative Negative    Ketones Urine Negative Negative mg/dL    Specific Gravity Urine 1.025 1.003 - 1.035    Blood Urine Small (A) Negative    pH Urine 6.0 5.0 - 7.0    Protein Albumin Urine Negative Negative mg/dL    Urobilinogen Urine Normal Normal, 2.0 mg/dL    Nitrite Urine Negative Negative    Leukocyte Esterase Urine Negative Negative    RBC Urine 15 (H) <=2 /HPF    WBC Urine 2 <=5 /HPF    Mucus Urine Present (A) None Seen /LPF    Hyaline Casts Urine 1 <=2 /LPF      IMPRESSION:  Ms. Lala is doing very well now more than 4 years status post liver transplantation.  Her liver tests are all completely normal.  Her kidney function looks quite good as well.  All complications are well addressed.  She will get a COVID-19 booster today and will get a flu shot at a later date.  She does have hematuria and I have scheduled her for Urology evaluation.  She had previously been scheduled but had to cancel it, but I said this is really quite important.      She is otherwise up to date with regard to other vaccines and cancer screening, other than that noted above and I will see her back in the clinic again in 6 months.    Thank you very much for allowing me to participate in the care of this patient.  If you have any questions regarding my recommendations, please do not hesitate to contact me.         Bradly Lilly MD      Professor of Medicine  [employer]      Executive Medical Director, Solid Organ Transplant Program  [facility]

## 2022-10-28 NOTE — NURSING NOTE
"Chief Complaint   Patient presents with     RECHECK     Liver TX     BP (!) 137/93   Pulse 67   Ht 1.626 m (5' 4.02\")   Wt 59.1 kg (130 lb 6.4 oz)   SpO2 98%   BMI 22.37 kg/m      Young Harvey MA  "

## 2022-10-30 NOTE — TELEPHONE ENCOUNTER
MEDICAL RECORDS REQUEST   [facility]  Urology Clinic  [address] [place] [address]  PHONE: [phone]  Fax: [phone]        FUTURE VISIT INFORMATION                                                   Sherrie Lala, : 1955 scheduled for future visit at [facility]    APPOINTMENT INFORMATION:    Date: 2023    Provider:  Richelle Abraham PA    Reason for Visit/Diagnosis: Hematuria Microscopic    REFERRAL INFORMATION:    Referring provider:  Bradly Lilly MD in  HEPATOLOGY    RECORDS REQUESTED FOR VISIT                                                     NOTES  STATUS/DETAILS   OFFICE NOTE from referring provider  yes, 10/28/2022 -- Bradly Lilly MD in  HEPATOLOGY   MEDICATION LIST  yes   LABS     URINALYSIS (UA)  yes     PRE-VISIT CHECKLIST      Record collection complete Yes   Appointment appropriately scheduled           (right time/right provider) Yes   Joint diagnostic appointment coordinated correctly          (ensure right order & amount of time) Yes   MyChart activation Yes   Questionnaire complete If no, please explain PENDING

## 2022-11-01 ENCOUNTER — OFFICE VISIT (OUTPATIENT)
Dept: FAMILY MEDICINE | Facility: CLINIC | Age: 67
End: 2022-11-01
Payer: COMMERCIAL

## 2022-11-01 VITALS
SYSTOLIC BLOOD PRESSURE: 115 MMHG | BODY MASS INDEX: 22.2 KG/M2 | HEART RATE: 74 BPM | HEIGHT: 64 IN | DIASTOLIC BLOOD PRESSURE: 77 MMHG | WEIGHT: 130 LBS

## 2022-11-01 DIAGNOSIS — C73 MALIGNANT NEOPLASM OF THYROID GLAND (H): ICD-10-CM

## 2022-11-01 DIAGNOSIS — M81.0 AGE-RELATED OSTEOPOROSIS WITHOUT CURRENT PATHOLOGICAL FRACTURE: ICD-10-CM

## 2022-11-01 DIAGNOSIS — D84.9 IMMUNOSUPPRESSED STATUS (H): Chronic | ICD-10-CM

## 2022-11-01 DIAGNOSIS — I10 BENIGN ESSENTIAL HYPERTENSION: ICD-10-CM

## 2022-11-01 DIAGNOSIS — G47.00 PERSISTENT INSOMNIA: ICD-10-CM

## 2022-11-01 DIAGNOSIS — Z94.4 LIVER TRANSPLANTED (H): ICD-10-CM

## 2022-11-01 DIAGNOSIS — Z23 INFLUENZA VACCINE ADMINISTERED: ICD-10-CM

## 2022-11-01 DIAGNOSIS — Z00.00 HEALTH CARE MAINTENANCE: Primary | ICD-10-CM

## 2022-11-01 DIAGNOSIS — K52.9 CHRONIC DIARRHEA: ICD-10-CM

## 2022-11-01 DIAGNOSIS — Z23 NEED FOR VACCINATION: ICD-10-CM

## 2022-11-01 PROBLEM — C22.1 CHOLANGIOCARCINOMA METASTATIC TO LIVER (H): Status: RESOLVED | Noted: 2021-07-06 | Resolved: 2022-11-01

## 2022-11-01 PROBLEM — T86.49 COMMON BILE DUCT LEAK OF TRANSPLANTED LIVER (H): Status: RESOLVED | Noted: 2018-09-12 | Resolved: 2022-11-01

## 2022-11-01 PROBLEM — K83.8 COMMON BILE DUCT LEAK OF TRANSPLANTED LIVER (H): Status: RESOLVED | Noted: 2018-09-12 | Resolved: 2022-11-01

## 2022-11-01 PROBLEM — I81 PORTAL VEIN THROMBOSIS OF TRANSPLANTED LIVER (H): Status: RESOLVED | Noted: 2018-09-12 | Resolved: 2022-11-01

## 2022-11-01 PROBLEM — C78.7 CHOLANGIOCARCINOMA METASTATIC TO LIVER (H): Status: RESOLVED | Noted: 2021-07-06 | Resolved: 2022-11-01

## 2022-11-01 PROBLEM — K25.9 GASTRIC ULCER: Status: RESOLVED | Noted: 2018-03-06 | Resolved: 2022-11-01

## 2022-11-01 PROBLEM — T86.49 PORTAL VEIN THROMBOSIS OF TRANSPLANTED LIVER (H): Status: RESOLVED | Noted: 2018-09-12 | Resolved: 2022-11-01

## 2022-11-01 PROCEDURE — G0008 ADMIN INFLUENZA VIRUS VAC: HCPCS | Performed by: INTERNAL MEDICINE

## 2022-11-01 PROCEDURE — 90682 RIV4 VACC RECOMBINANT DNA IM: CPT | Performed by: INTERNAL MEDICINE

## 2022-11-01 PROCEDURE — G0439 PPPS, SUBSEQ VISIT: HCPCS | Performed by: INTERNAL MEDICINE

## 2022-11-01 PROCEDURE — 99214 OFFICE O/P EST MOD 30 MIN: CPT | Mod: 25 | Performed by: INTERNAL MEDICINE

## 2022-11-01 RX ORDER — ZOLPIDEM TARTRATE 10 MG/1
TABLET ORAL
Qty: 90 TABLET | Refills: 1 | Status: SHIPPED | OUTPATIENT
Start: 2022-11-01 | End: 2023-01-16

## 2022-11-01 RX ORDER — NICOTINE POLACRILEX 4 MG/1
20 GUM, CHEWING ORAL 2 TIMES DAILY
Qty: 180 TABLET | Refills: 3 | Status: SHIPPED | OUTPATIENT
Start: 2022-11-01 | End: 2023-09-20

## 2022-11-01 RX ORDER — PRAMIPEXOLE DIHYDROCHLORIDE 0.25 MG/1
0.75 TABLET ORAL AT BEDTIME
Qty: 270 TABLET | Refills: 3 | Status: SHIPPED | OUTPATIENT
Start: 2022-11-01 | End: 2023-09-20

## 2022-11-01 ASSESSMENT — ENCOUNTER SYMPTOMS
PALPITATIONS: 0
ABDOMINAL PAIN: 0
JOINT SWELLING: 0
CONSTIPATION: 0
HEMATOCHEZIA: 0
BREAST MASS: 0
MYALGIAS: 0
NAUSEA: 0
ARTHRALGIAS: 0
DIZZINESS: 0
DIARRHEA: 0
HEADACHES: 0
FREQUENCY: 0
DYSURIA: 0
SHORTNESS OF BREATH: 0
HEMATURIA: 0
NERVOUS/ANXIOUS: 0
SORE THROAT: 0
FEVER: 0
HEARTBURN: 0
PARESTHESIAS: 0
WEAKNESS: 0
CHILLS: 0
COUGH: 0
EYE PAIN: 0

## 2022-11-01 ASSESSMENT — ASTHMA QUESTIONNAIRES
QUESTION_4 LAST FOUR WEEKS HOW OFTEN HAVE YOU USED YOUR RESCUE INHALER OR NEBULIZER MEDICATION (SUCH AS ALBUTEROL): NOT AT ALL
QUESTION_1 LAST FOUR WEEKS HOW MUCH OF THE TIME DID YOUR ASTHMA KEEP YOU FROM GETTING AS MUCH DONE AT WORK, SCHOOL OR AT HOME: NONE OF THE TIME
QUESTION_2 LAST FOUR WEEKS HOW OFTEN HAVE YOU HAD SHORTNESS OF BREATH: THREE TO SIX TIMES A WEEK
ACT_TOTALSCORE: 23
QUESTION_3 LAST FOUR WEEKS HOW OFTEN DID YOUR ASTHMA SYMPTOMS (WHEEZING, COUGHING, SHORTNESS OF BREATH, CHEST TIGHTNESS OR PAIN) WAKE YOU UP AT NIGHT OR EARLIER THAN USUAL IN THE MORNING: NOT AT ALL
ACT_TOTALSCORE: 23
QUESTION_5 LAST FOUR WEEKS HOW WOULD YOU RATE YOUR ASTHMA CONTROL: COMPLETELY CONTROLLED

## 2022-11-01 ASSESSMENT — ACTIVITIES OF DAILY LIVING (ADL): CURRENT_FUNCTION: NO ASSISTANCE NEEDED

## 2022-11-01 NOTE — PROGRESS NOTES
SUBJECTIVE:   Sherrie is a 67 year old who presents for Preventive Visit.  Recently seen earlier this week by Dr. Lilly for follow-up related to her liver transplant.  She is been doing very well in that regard.  Reviewed recent transplant labs.  Staying active, walking approximately 2 miles a day.  Has been dealing with more recent dental issues including tooth extraction and root canal.  She stopped her alendronate in this timeframe thinking this may have some implications with her teeth.  She has noticed her self is being generally more forgetful.    Are you in the first 12 months of your Medicare coverage?  No    HPI  Do you feel safe in your environment? Yes    Have you ever done Advance Care Planning? (For example, a Health Directive, POLST, or a discussion with a medical provider or your loved ones about your wishes): Yes, advance care planning is on file.      Right Ear:      1000 Hz RESPONSE- on Level: 40 db (Conditioning sound)   1000 Hz: RESPONSE- on Level:   20 db    2000 Hz: RESPONSE- on Level:   20 db    4000 Hz: RESPONSE- on Level:   20 db     Left Ear:      4000 Hz: RESPONSE- on Level:   20 db    2000 Hz: RESPONSE- on Level:   20 db    1000 Hz: RESPONSE- on Level:   20 db     500 Hz: RESPONSE- on Level: 25 db    Right Ear:    500 Hz: RESPONSE- on Level: 25 db    Hearing Acuity: Pass    Hearing Assessment: normal   Fall risk  Fallen 2 or more times in the past year?: No  Any fall with injury in the past year?: No    Cognitive Screening   1) Repeat 3 items (Leader, Season, Table)    2) Clock draw: Abnormal  3) 3 item recall: Recalls 1 object   Results: ABNORMAL clock, 1-2 items recalled: PROBABLE COGNITIVE IMPAIRMENT, **INFORM PROVIDER**    Mini-CogTM Copyright SEBASTIAN Small. Licensed by the author for use in [facility]; reprinted with permission (aung@.[facility]). All rights reserved.        Reviewed and updated as needed this visit by clinical staff    Allergies  Meds              Reviewed and  updated as needed this visit by Provider                 Social History     Tobacco Use     Smoking status: Never     Smokeless tobacco: Never   Substance Use Topics     Alcohol use: Yes     Comment: occ /rare         Alcohol Use 11/1/2022   Prescreen: >3 drinks/day or >7 drinks/week? No         Current providers sharing in care for this patient include:   Patient Care Team:  Apollo Benitez MD as PCP - General (Nephrology)  Apollo Benitez MD as PCP - Internal Medicine (Nephrology)  Baltazar Solares NP as Referring Physician (Nurse Practitioner)  Jorge Langford MD as MD (Hematology & Oncology)  Rafy Chisholm MD as MD (Gastroenterology)  Jesus Thomas MD as MD (Internal Medicine)  Bradly Lilly MD as Referring Physician (Gastroenterology)  Eldon Goetz MD as MD (Dermatology)  Nitin Goodson MD as MD (Family Practice)  Nitin Jacobsen MD as Assigned Musculoskeletal Provider  Darcy Blanco MD as MD (Urology)  Apollo Benitez MD as Assigned PCP  Eldon Goetz MD as Assigned Surgical Provider  Richelle Abraham PA as Physician Assistant (Urology)    The following health maintenance items are reviewed in Epic and correct as of today:  Health Maintenance   Topic Date Due     ADVANCE CARE PLANNING  Never done     COPD ACTION PLAN  Never done     ZOSTER IMMUNIZATION (1 of 2) Never done     DTAP/TDAP/TD IMMUNIZATION (3 - Td or Tdap) 06/01/2010     HEPATITIS B IMMUNIZATION (1 of 3 - Risk 3-dose series) Never done     Pneumococcal Vaccine: 65+ Years (3 - PPSV23 if available, else PCV20) 02/08/2020     MEDICARE ANNUAL WELLNESS VISIT  Never done     INFLUENZA VACCINE (1) 09/01/2022     MAMMO SCREENING  09/17/2022     ANNUAL REVIEW OF HM ORDERS  04/07/2023     COLORECTAL CANCER SCREENING  06/26/2023     FALL RISK ASSESSMENT  11/01/2023     LIPID  10/28/2027     DEXA  10/28/2037     SPIROMETRY  Completed     HEPATITIS C SCREENING  Completed     HIV SCREENING   "Completed     PHQ-2 (once per calendar year)  Completed     COVID-19 Vaccine  Completed     IPV IMMUNIZATION  Aged Out     MENINGITIS IMMUNIZATION  Aged Out     PAP  Discontinued       Breast CA Risk Assessment (FHS-7) 11/1/2022   Do you have a family history of breast, colon, or ovarian cancer? No / Unknown       Pertinent mammograms are reviewed under the imaging tab.    Review of Systems  Constitutional, HEENT, cardiovascular, pulmonary, gi and gu systems are negative, except as otherwise noted.    OBJECTIVE:   There were no vitals taken for this visit. Estimated body mass index is 22.37 kg/m  as calculated from the following:    Height as of 10/28/22: 1.626 m (5' 4.02\").    Weight as of 10/28/22: 59.1 kg (130 lb 6.4 oz).  Physical Exam  GENERAL: healthy, alert and no distress  NECK: no adenopathy, no asymmetry, masses, or scars and thyroid normal to palpation  RESP: lungs clear to auscultation - no rales, rhonchi or wheezes  CV: regular rate and rhythm, normal S1 S2, no S3 or S4, no murmur, click or rub, no peripheral edema and peripheral pulses strong  ABDOMEN: soft, nontender, no hepatosplenomegaly, no masses and bowel sounds normal  MS: no gross musculoskeletal defects noted, no edema    Diagnostic Test Results:  Labs reviewed in Epic    ASSESSMENT / PLAN:     Problem List Items Addressed This Visit        Digestive    Chronic diarrhea     chronic diarrhea/IBS complaints   - not maintained on Cellcept (had been on Cellcept initially after transplant)   - normal celiac serologic testing   - (7/2019) C. difficile antigen, stool Cx, qualitative fat, WBCs: wnl    - previously trialed FODMAPs and lactose free diets without benefit   - [facility] evaluation in 6/2020 - colonoscopy (one 10mm sessile polyp); MRI of abdomen - no description of enteritis/ileitis   - for now, advised Imodium prn when having primarily diarrhea            Endocrine    Malignant neoplasm of thyroid gland (H)     papillary carcinoma of thyroid " stage 1, subtotal thyroidectomy 8/2021  - doing well  - following with endocrinology, Dr. Lucero  - maintained on levothyroxine - dosing through endo            Musculoskeletal and Integumentary    Age-related osteoporosis without current pathological fracture     - DEXA (10/2022): T scores ranging between -2.7 to -3.8   - previous traumatic humeral fracture after OLTx   - calcium/vitamin D  - steroid-sparing transplant regimen   - alendronate 70mg qweek (begun 8/2019 - spring, 2022) - she stopped due to her own dental concerns  - will refer to endocrinology/bone disorder clinic given severity of osteoporosis - potential anabolic agent candidate         Relevant Orders    Adult Endocrinology  Referral       Immune    Immunosuppressed status (H) (Chronic)    Relevant Orders    Adult Endocrinology  Referral       Other    Liver transplanted (H) (Chronic)     recurrent, metastatic cholangiocarcinoma; followed by Dr. 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 (h/o cholangiocarcinoma)  transplant coordinator: [phone]   - complicated by portal vein thrombosis - completed 1 yr of warfarin therapy   - induction IS: basilixumab, maintenance: tacrolimus   - CMV -/-, EBV +/+   - doing remarkably well         Relevant Medications    omeprazole 20 MG tablet    Other Relevant Orders    Adult Endocrinology  Referral    Health care maintenance - Primary     - CRC screening due in '23 (colonoscopy)   - mammo (last in 9/2020)   - DEXA (10/2022) - osteoporosis   - current with Pfizer COVID vaccination  - influenza vaccine today (prefers to avoid HD Fluzone - bad experience  "in previous year)  - cognitive decline? - failed cognitive screening - notices she's forgetful        Other Visit Diagnoses     Persistent insomnia        Relevant Medications    zolpidem (AMBIEN) 10 MG tablet    pramipexole (MIRAPEX) 0.25 MG tablet    Need for vaccination        Influenza vaccine administered        Relevant Orders    INFLUENZA QUAD, RECOMBINANT, P-FREE (RIV4) (FLUBLOK) (Completed)    Benign essential hypertension                  COUNSELING:  Reviewed preventive health counseling, as reflected in patient instructions       Regular exercise       Healthy diet/nutrition       Immunizations    Vaccinated for: Influenza             Osteoporosis prevention/bone health       Colon cancer screening    Estimated body mass index is 22.37 kg/m  as calculated from the following:    Height as of 10/28/22: 1.626 m (5' 4.02\").    Weight as of 10/28/22: 59.1 kg (130 lb 6.4 oz).        She reports that she has never smoked. She has never used smokeless tobacco.      Appropriate preventive services were discussed with this patient, including applicable screening as appropriate for cardiovascular disease, diabetes, osteopenia/osteoporosis, and glaucoma.  As appropriate for age/gender, discussed screening for colorectal cancer, prostate cancer, breast cancer, and cervical cancer. Checklist reviewing preventive services available has been given to the patient.    Reviewed patients plan of care and provided an AVS. The Basic Care Plan (routine screening as documented in Health Maintenance) for Sherrie meets the Care Plan requirement. This Care Plan has been established and reviewed with the Patient.    Counseling Resources:  ATP IV Guidelines  Pooled Cohorts Equation Calculator  Breast Cancer Risk Calculator  Breast Cancer: Medication to Reduce Risk  FRAX Risk Assessment  ICSI Preventive Guidelines  Dietary Guidelines for Americans, 2010  USDA's MyPlate  ASA Prophylaxis  Lung CA Screening    Apollo Benitez MD  [facility] " [facility] [place]    Identified Health Risks:

## 2022-11-01 NOTE — ASSESSMENT & PLAN NOTE
recurrent, metastatic cholangiocarcinoma; followed by Dr. 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

## 2022-11-01 NOTE — ASSESSMENT & PLAN NOTE
- CRC screening due in '23 (colonoscopy)   - mammo (last in 9/2020)   - DEXA (10/2022) - osteoporosis   - current with Pfizer COVID vaccination  - influenza vaccine today (prefers to avoid HD Fluzone - bad experience in previous year)  - cognitive decline? - failed cognitive screening - notices she's forgetful

## 2022-11-01 NOTE — PATIENT INSTRUCTIONS
Doing fluzone (low potency flu shot today)    Recommending Tdap and shingrix in the future through pharmacy    Referring endocrinologist, bone specialty clinic for further evaluation of your osteoporosis

## 2022-11-01 NOTE — ASSESSMENT & PLAN NOTE
- DEXA (10/2022): T scores ranging between -2.7 to -3.8   - previous traumatic humeral fracture after OLTx   - calcium/vitamin D  - steroid-sparing transplant regimen   - alendronate 70mg qweek (begun 8/2019 - spring, 2022) - she stopped due to her own dental concerns  - will refer to endocrinology/bone disorder clinic given severity of osteoporosis - potential anabolic agent candidate

## 2022-11-03 NOTE — TELEPHONE ENCOUNTER
RECORDS RECEIVED FROM: internal    DATE RECEIVED: 12.21.22   NOTES (FOR ALL VISITS) STATUS DETAILS   OFFICE NOTES from referring provider internal  Dr Benitez     ED NOTES internal  3.29.21 [facility]      MEDICATION LIST internal     IMAGING      DEXASCAN internal  10.28.22      XR (Chest) internal  4/7/22, 4.1.21,    CT (HEAD/NECK/CHEST/ABDOMEN) internal  8/9/22, 4.2.21,      LABS     DIABETES: HBGA1C, CREATININE, FASTING LIPIDS, MICROALBUMIN URINE, POTASSIUM, TSH, T4  THYROID: TSH, T4, CBC, THYRODLONULIN, TOTAL T3, FREE T4, CALCITONIN, CEA internal /ce

## 2022-11-10 DIAGNOSIS — R79.89 ELEVATED LFTS: ICD-10-CM

## 2022-11-10 RX ORDER — TACROLIMUS 1 MG/1
CAPSULE ORAL
Qty: 360 CAPSULE | Refills: 3 | Status: SHIPPED | OUTPATIENT
Start: 2022-11-10 | End: 2023-03-13

## 2022-12-15 NOTE — PROGRESS NOTES
Calorie Counts  Intake recorded for: 9/6 Kcals: 355  Protein: 8g  # Meals Recorded: 100% 1.5 ice creams, applesauce, 50% crackers, less than 25% English muffin, yogurt, potato soup  # Supplements Recorded: 0     [id number]

## 2022-12-19 NOTE — PROGRESS NOTES
Sherrie Lala is a 67 year old female who is being evaluated via a billable video visit.        [facility]    Consulting provider: Apollo Benitez MD  [address] [place] [address]    Reason for consultation: Osteoporosis, hyperparathyroidism, also diagnosis of thyroid Ca and post-surgical hypothyroidism    HPI:   Sherrie Lala is a 67 year old female with osteoporosis, thyroid Ca, and hyperparathyroidism with comorbidity of cholagniocarcinoma in situ, status post liver transplant, . Previously seen by Dr Gaviria at [facility]    Thyroid Ca:  In August 2021 patient underwent total thyroidectomy. Bilateral papillary thyroid cancer was noted. On the right side it was 1.1cm with tall cell features. There were multiple separate papillary microcarcinoma's 0.5 to 5 mm in size. Left lobe had thyroid micromicrocarcinoma 2.5 mm in greatest dimension. There was no lymphovascular invasion or extrathyroidal extension bilaterally and there were no positive lymph glands.  Patient was started on levothyroxine and current dose is 88 mcg a day.    Pt has more anxiey, insomnia, tremors, hunger feeling since start of LT4 in 8/2021  Has trouble with dysphagia, greg bread and meat , has GERD since surgery, no hoarsmess    Osteoporosis  - fractures yes: fracture shoulder 2019 lelft,   - weight stable,   - last 12 months:  falls no, fractures no, new back pain no    FH mother hip fracture in her 70s while cross country skiing, was very challenging for mother    Prior osteoporosis meds  Has been on Alendronate, however a cracked tooth was noted, pt needed to tooth extractions, need a crown, and x-ray showed decrease in bone density at the jaw, dentist is concerned re Alendronate, took Alendronate for a bit over  1 year; no ONJ    Nutrition  Dietary Calcium dairy 1 glass lactose free milk, not greens, no legumes; has a lot of trouble, eats small meals throughout the day  Since liver transplant patient has problems  with intolerant to a lot of different foods, tolerates only small meals because of bloating and abdominal discomfort, intolerant to meat which is also not appealing to her and she does not eat now.  Intolerant to lactose.    Most recen DXA scan [facility]  T-score right total hip -3.7, L-spine -2.6    Calcium supplement Calcium carbonate takes once or twice a week, finds tablets difficult to swallow due to size  Vitamin D with calcium supplement:   Protein intake does not like meat, eggs, no supplements  Physical activity: walks 2 miles daily most days    Menopause   In about 2011,       Current Problem List:   Patient Active Problem List   Diagnosis     Age-related osteoporosis without current pathological fracture     Liver transplanted (H)     Immunosuppressed status (H)     Primary localized osteoarthritis of right knee     Malignant neoplasm of thyroid gland (H)     Chronic diarrhea     Health care maintenance         Assessment:  Kate is a very pleasant middle-aged postmenopausal woman with complicated past medical history including history of cholangio carcinoma s/p liver transplant with complicated follow-up, recent diagnosis of papillary thyroid cancer tall cell variety, and osteoporosis with recent elevated parathyroid hormone levels    Osteoporosis  Osteoporosis per T score of -3.7 at the right total hip, risk factors include postmenopausal state,FH ofhipf fracture in her motehr food intolerance and likely low calcium intake, low urine calcium and elevated parathyroid hormone likely reflecting component of osteomalacia.  Patient's dietary calcium intake and intake from supplements is low.  Recommend to first improve calcium intake, resolve osteomalacia and normalize parathyroid hormone levels, then start treatment with osteoporosis meds.  Preferred agent Evenity considering history of recent osteoporotic fracture of the humerus and low T score of -3.7. Fracture risk is very high.  Patient and patient's dentist are concerned that Alendronate had adverse effect on her teeht. I would avoid Alendronate for now although at a future time point, ok to give other antiresportives. Not a candidate for parathyroid hormone based treatment due to history of radiation for cancer    Hyperparathyroidism in association with low urine calcium and normal serum calcium with borderline vitamin D levels likely presenting secondary hyperparathyroidism indicating osteomalacia.    Papillary thyroid cancer tall cell variant  Patient has multifocal bilateral papillary thyroid cancer. Largest lesion was in the right thyroid lobe 1.1 cm with tall cell features. There were no other concerning findings. There was no lymphovascular invasion, extrathyroidal extension or lymph gland involvement. She is at low - intermdiate risk for recurrence considering tall cell variant  Patient notes some symptoms that possibly are related to thyroid hormone replacement at higher doses, also reviewed alternative causes of symptom of increased appetite anxiety and insomnia.  We will continue to follow with TSH levels with goal of 0.3 to 0.5 mcg/mL, check thyroglobulin tumor markers every 6 months and neck ultrasound once a year.  This was last done in October 2022 and was benign    Plan:   Continue levothyroxine 125 mcg daily  Change calcium to calcium citrate 950 mg twice a day  Try to add calcium to diet with nutritional shakes or smoothies  Ensure appropriate protein intake, which also would be improved with nutritional shakes or smoothies  Evaluate for gluten intolerance lab draw today  In 2 to 4 weeks 24 urine collection for calcium and creatinine, repeat PTH intact, calcium vitamin D phosphorus magnesium  If labs are improved we will apply for Evenity    Follow-up with me in 6 months     Josefina Llyod MD  Endocrinology and Diabetes  Telephone contact:  [facility] [phone]  [facility]  [place] [phone]            Past Medical and Past Surgical History:  Past Medical History:   Diagnosis Date     Acute deep vein thrombosis (DVT) of right lower extremity (H) 2021     Antiplatelet or antithrombotic long-term use      Asthma      Bleeding esophageal varices (H) 2018     Cancer (H) 2021     Cholangiocarcinoma (H) 2014     Cholangiocarcinoma metastatic to liver (H) 2021     Cirrhosis of liver with ascites (H) 05/10/2018     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) 2018    Repaired in OR 18     Encounter for pleural drainage tube placement      Esophageal varices with hemorrhage (H)      Gastric ulcer      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     Liver transplanted (H) 2018     Lung metastases (H) 2014     Other closed displaced fracture of proximal end of left humerus with routine healing, subsequent encounter 2019     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    Residual thrombus in main and right portal       Past Surgical History:   Procedure Laterality Date     ANKLE SURGERY      fracture no date     ARTHROPLASTY KNEE Right 2021    Procedure: ARTHROPLASTY, KNEE, TOTAL, RIGHT;  Surgeon: Nitin Jacobsen MD;  Location: [facility] OR     ARTHROSCOPY KNEE  2021     BIOPSY LIVER  2011     CA ANESTH,LOWER ARM SURGERY      fracture no date      SECTION  1981     CHOLECYSTECTOMY       COLONOSCOPY      2/10/2011, 2020     DUAL ENERGY X-RAY ABSORPT, 1+ SITES  2009     EGD      2020, 2018, 2016, 2016, 2016, 3/13/2015, 2011, 2011, 2/10/2011     HEPATECTOMY PARTIAL       HPV - SCREEN & TYPING      both test negative 4/15/2010, 2012     IR VISCERAL ANGIOGRAM  2021     liver transplanet  2018     MAMMOGRAM - HIM SCAN Bilateral 2010     OPEN REDUCTION INTERNAL FIXATION HUMERUS PROXIMAL Left 2019    Procedure: OPEN REDUCTION INTERNAL FIXATION  HUMERUS PROXIMAL LEFT with Allograft;  Surgeon: Eh Kraft MD;  Location: [facility] OR     PLEURAL CATHETER  2017     ND WEDGE BIOPSY OF LIVER  10/2012     REPAIR KNEE LIGAMENT  1972     RETURN LIVER TRANSPLANT N/A 2018    Procedure: RETURN LIVER TRANSPLANT;  Open Abdomen, return liver transplant washout with   Mesh and liver stent  placement and  Abdomal Wound closure;  Surgeon: Darren Rod MD;  Location: [facility] OR     THORACOSCOPY  10/30/2017     THYROIDECTOMY   2021    total     TONSILLECTOMY       TONSILLECTOMY & ADENOIDECTOMY  1963     TRANSPLANT LIVER RECIPIENT  DONOR N/A 2018    Procedure: TRANSPLANT LIVER RECIPIENT  DONOR;  TRANSPLANT LIVER RECIPIENT  DONOR ;  Surgeon: Darren Rod MD;  Location: [facility] OR     UPPER GI ENDOSCOPY      2016, 2016, 2012, 3/2/2012, 2012, 2011     VIDEO-ASSISTED THORACOSCOPIC SURGERY (VATS)  10/30/2017       Medications:   Current Outpatient Medications   Medication Sig Dispense Refill     tacrolimus (GENERIC EQUIVALENT) 1 MG capsule TAKE TWO CAPSULES BY MOUTH TWICE A  capsule 3     acetaminophen (TYLENOL) 500 MG tablet Take 1 tablet (500 mg) by mouth every 6 hours as needed for mild pain 30 tablet 1     albuterol (PROAIR HFA/PROVENTIL HFA/VENTOLIN HFA) 108 (90 Base) MCG/ACT inhaler 2 Inhalation 4 times daily as needed (Patient not taking: Reported on 2022)       amLODIPine (NORVASC) 5 MG tablet TAKE 1 TABLET EVERY DAY 90 tablet 3     amoxicillin (AMOXIL) 500 MG capsule Take 4 tablets (2000 mg) by mouth 1 hour before dental procedures/cleanings. (Patient not taking: Reported on 2022) 4 capsule 11     aspirin 81 MG EC tablet Take 81 mg by mouth daily       calcium carbonate 600 mg-vitamin D 400 units (CALTRATE) 600-400 MG-UNIT per tablet Take 1 tablet by mouth       gabapentin (NEURONTIN) 300 MG capsule TAKE 2 CAPSULES AT BEDTIME 180 capsule 3     levothyroxine  "(SYNTHROID/LEVOTHROID) 125 MCG tablet Take 125 mcg by mouth daily       omeprazole 20 MG tablet Take 1 tablet (20 mg) by mouth 2 times daily 180 tablet 3     pramipexole (MIRAPEX) 0.25 MG tablet Take 3 tablets (0.75 mg) by mouth At Bedtime 270 tablet 3     zolpidem (AMBIEN) 10 MG tablet TAKE 1 TABLET ONE TIME DAILY AT BEDTIME AS NEEDED FOR SLEEP Strength: 10 mg 90 tablet 1       Allergies:   Allergies   Allergen Reactions     Blood Transfusion Related (Informational Only) Other (See Comments)     Patient has a history of a clinically significant antibody against RBC antigens.  A delay in compatible RBCs may occur.     Dilaudid [Hydromorphone] Itching     Ferrlecit      Venofer [Iron Sucrose]        Social History     Tobacco Use     Smoking status: Never     Smokeless tobacco: Never   Substance Use Topics     Alcohol use: Yes     Comment: occ /rare       Family History   Problem Relation Age of Onset     Hypertension Mother          at age: 72 years     Osteoporosis Mother      Hypertension Father          at age: 79 years Cause of death: Car  accident     Skin Cancer Father      Hypertension Sister      Multiple Sclerosis Sister      Skin Cancer Brother      Melanoma No family hx of      Anesthesia Reaction No family hx of      Deep Vein Thrombosis (DVT) No family hx of        Exam  BP (!) 147/91 (BP Location: Right arm, Patient Position: Sitting, Cuff Size: Adult Regular)   Pulse 70   Ht 1.626 m (5' 4\")   Wt 59.9 kg (132 lb)   BMI 22.66 kg/m      Wt Readings from Last 4 Encounters:   22 59.9 kg (132 lb)   22 59 kg (130 lb)   10/28/22 59.1 kg (130 lb 6.4 oz)   22 60.3 kg (133 lb)     General: Well appearing slender woman in no distress, no moon facies, no facial plethora  Eyes: EOMI. Sclerae and conjunctivae are clear.   HENT: No thyromegaly or mass.    Lymphatic: No cervical or supraclavicular lymphadenopathy.  Cardiovascular: RRR, with normal S1+S2 and no murmurs. "   Gastrointestinal: Abdomen is soft, rounded   Skin: No rash or lesions. No abdominal striae. No supraclavicular fat pads, no dorsocervical fat pad, no vitiligo  Extremities: No peripheral edema.   Neurologic: No tremor with hands outstretched. 2+ patellar reflexes.     Labs and Studies:   10/12/22 [facility]  PTHi 167  Ca 9.1  Thyroglobulin tumor marker 0.3 AB neg  TSH 0.54              US thyroid  4/21/22      Lab Results   Component Value Date     10/28/2022    CO2 27 10/28/2022    CHLORIDE 105 10/28/2022    CR 1.06 (H) 10/28/2022    TRIG 82 10/28/2022    HDL 54 10/28/2022    HGB 14.0 10/28/2022           Results for orders placed in visit on 10/28/22    DX Hip/Pelvis/Spine    [address] [address]  Phone: [phone]   Fax: [phone]      Patient name:   Sherrie Lala  Patient demographics:  67 year old White Female  History:  Cancer -, Family History of Osteoporosis, Post Menopausal and Transplant - Liver; fractures - wrists, other  Past treatment: alendronate  Current treatments:  Calcium, Immunosuppressants  Scan:    [employer]  Comparison: 2/26/18    Impression  The most negative and valid T-score of -3.7 at the level of the right total hip corresponds with osteoporosis according to WHO criteria for postmenopausal females and men age 50 and over.    Results  Lumbar spine  T-score -2.6 , BMD 0.874 g/cm2.    Left Femur neck  T-score -2.4 , BMD 0.709 g/cm2.  Left Total hip  T-score -2.8 , BMD 0.651 g/cm2.    Right Femur neck  T-score -3.3, BMD  0.579 g/cm2.  Right Total hip  T-score -3.7 , BMD  0.546 g/cm2.    Wrist  T-score -2.8 , BMD 0.508 g/cm2.    Interval change  Bone density compared to the prior study has changed at lumbar spine by 22.8%, at the left total femur 14.6%.  Ref 3  Percent changes not mentioned  within remaining regions are insignificant    Please note that the differential diagnosis of BMD  increase in the spine includes improvement due to pharmacotherapy vs inter-current progression of spine degeneration or fracture    Fracture risk  The risk of osteoporotic fracture increases approximately 2-fold for each 1.0 SD decrease in T-score.  Low bone density is not the only risk factor for fracture; consider factors such as patient's age, fall risk, injury risk, previous osteoporotic fracture, family history of osteoporosis, etc.    Repeat  Recommend repeat DXA in 2 years.  For patients eligible for Medicare, routine testing is allowed once every 2 years.      Clinical correlation recommended    Technical quality  Satisfactory.    Principal result :  Darlene Piedra MD, [facility]  Division of Endocrinology and Diabetes  Department of Medicine      References:  Ref. 1. WHO categories:  T-score > -1.0  = normal             .  T-score -1.0 to -2.5 = low bone density  T-score < -2.5 = osteoporosis        .    Ref. 2. 2015 ISCD official position statements:  www.iscd.org.    Ref. 3.  Today's examination is compared to the technically similar prior study of the total hip and femur if available. Only changes deemed likely to be significant based on historical data are reported.  According to the ISCD position statements, total hip rather than femoral neck regions are to be compared because larger areas give better precision.  LSC = least significant changes at the [facility] (historical data)  AP spine =  0.032 g/cm2  (6/26/2007)  Left hip = 0.029 g/cm2  (6/15/2007)  Right hip = 0.018 g/cm2  (6/15/2007)  Left mid radius = 0.043 g/cm2  (6/26/2007)  Please note that the differential diagnosis of increase in bone density at the lumbar spine includes improvement due to pharmacotherapy vs inter-current progression of spine degeneration or fracture.    Ref. 4 Fracture risk is calculated in patients aged 40 to 90 years old with low bone density not on osteoporosis treatment. A 10 year fracture risk of 3%  and higher for hip fracture and 20% and higher for major osteoporotic fracture is considered higher than acceptable risk and might be an indication for medical treatment.    Ref. 5.  By definition, osteoporosis may be diagnosed in the presence or with the history of a low trauma or fragility fracture.  Fragility and low trauma fracture is defined as a fracture resulting from the force of a fall from a standing height or less or a bone that breaks under conditions that would not cause a normal bone to break.    Ref. 6. NOF Physician's Guideline Website address:  www.nof.org.

## 2022-12-20 ENCOUNTER — TELEPHONE (OUTPATIENT)
Dept: ENDOCRINOLOGY | Facility: CLINIC | Age: 67
End: 2022-12-20

## 2022-12-21 ENCOUNTER — OFFICE VISIT (OUTPATIENT)
Dept: ENDOCRINOLOGY | Facility: CLINIC | Age: 67
End: 2022-12-21
Attending: INTERNAL MEDICINE
Payer: COMMERCIAL

## 2022-12-21 ENCOUNTER — PRE VISIT (OUTPATIENT)
Dept: ENDOCRINOLOGY | Facility: CLINIC | Age: 67
End: 2022-12-21

## 2022-12-21 ENCOUNTER — LAB (OUTPATIENT)
Dept: LAB | Facility: CLINIC | Age: 67
End: 2022-12-21
Payer: COMMERCIAL

## 2022-12-21 VITALS
HEART RATE: 70 BPM | HEIGHT: 64 IN | BODY MASS INDEX: 22.53 KG/M2 | WEIGHT: 132 LBS | DIASTOLIC BLOOD PRESSURE: 91 MMHG | SYSTOLIC BLOOD PRESSURE: 147 MMHG

## 2022-12-21 DIAGNOSIS — E21.3 HYPERPARATHYROIDISM (H): ICD-10-CM

## 2022-12-21 DIAGNOSIS — E21.3 HYPERPARATHYROIDISM (H): Primary | ICD-10-CM

## 2022-12-21 DIAGNOSIS — D84.9 IMMUNOSUPPRESSED STATUS (H): Chronic | ICD-10-CM

## 2022-12-21 DIAGNOSIS — M81.0 AGE-RELATED OSTEOPOROSIS WITHOUT CURRENT PATHOLOGICAL FRACTURE: ICD-10-CM

## 2022-12-21 DIAGNOSIS — E03.8 OTHER SPECIFIED HYPOTHYROIDISM: ICD-10-CM

## 2022-12-21 DIAGNOSIS — Z94.4 LIVER TRANSPLANTED (H): ICD-10-CM

## 2022-12-21 DIAGNOSIS — Z94.4 LIVER REPLACED BY TRANSPLANT (H): ICD-10-CM

## 2022-12-21 PROCEDURE — 99204 OFFICE O/P NEW MOD 45 MIN: CPT | Performed by: INTERNAL MEDICINE

## 2022-12-21 PROCEDURE — 86258 DGP ANTIBODY EACH IG CLASS: CPT | Performed by: INTERNAL MEDICINE

## 2022-12-21 PROCEDURE — 86231 EMA EACH IG CLASS: CPT | Mod: 90 | Performed by: PATHOLOGY

## 2022-12-21 PROCEDURE — 82784 ASSAY IGA/IGD/IGG/IGM EACH: CPT | Performed by: INTERNAL MEDICINE

## 2022-12-21 PROCEDURE — 99000 SPECIMEN HANDLING OFFICE-LAB: CPT | Performed by: PATHOLOGY

## 2022-12-21 PROCEDURE — 36415 COLL VENOUS BLD VENIPUNCTURE: CPT | Performed by: PATHOLOGY

## 2022-12-21 PROCEDURE — 86364 TISS TRNSGLTMNASE EA IG CLAS: CPT | Performed by: INTERNAL MEDICINE

## 2022-12-21 NOTE — LETTER
12/21/2022       RE: Sherrie Lala  [address]  [facility] [address]     Dear Colleague,    Thank you for referring your patient, Sherrie Lala, to the [facility] ENDOCRINOLOGY [facility] [place] at [facility]. Please see a copy of my visit note below.      Sherrie Lala is a 67 year old female who is being evaluated via a billable video visit.        Endocrinology and Diabetes Clinic    Consulting provider: Apollo Benitez MD  [address] [place] [address]    Reason for consultation: Osteoporosis, hyperparathyroidism, also diagnosis of thyroid Ca and post-surgical hypothyroidism    HPI:   Sherrie Lala is a 67 year old female with osteoporosis, thyroid Ca, and hyperparathyroidism with comorbidity of cholagniocarcinoma in situ, status post liver transplant, . Previously seen by Dr Gaviria at [facility]    Thyroid Ca:  In August 2021 patient underwent total thyroidectomy. Bilateral papillary thyroid cancer was noted. On the right side it was 1.1cm with tall cell features. There were multiple separate papillary microcarcinoma's 0.5 to 5 mm in size. Left lobe had thyroid micromicrocarcinoma 2.5 mm in greatest dimension. There was no lymphovascular invasion or extrathyroidal extension bilaterally and there were no positive lymph glands.  Patient was started on levothyroxine and current dose is 88 mcg a day.    Pt has more anxiey, insomnia, tremors, hunger feeling since start of LT4 in 8/2021  Has trouble with dysphagia, greg bread and meat , has GERD since surgery, no hoarsmess    Osteoporosis  - fractures yes: fracture shoulder 2019 lelft,   - weight stable,   - last 12 months:  falls no, fractures no, new back pain no    FH mother hip fracture in her 70s while cross country skiing, was very challenging for mother    Prior osteoporosis meds  Has been on Alendronate, however a cracked tooth was noted, pt needed to tooth extractions, need a crown, and x-ray showed  decrease in bone density at the jaw, dentist is concerned re Alendronate, took Alendronate for a bit over  1 year; no ONJ    Nutrition  Dietary Calcium dairy 1 glass lactose free milk, not greens, no legumes; has a lot of trouble, eats small meals throughout the day  Since liver transplant patient has problems with intolerant to a lot of different foods, tolerates only small meals because of bloating and abdominal discomfort, intolerant to meat which is also not appealing to her and she does not eat now.  Intolerant to lactose.    Most recen DXA scan [facility]  T-score right total hip -3.7, L-spine -2.6    Calcium supplement Calcium carbonate takes once or twice a week, finds tablets difficult to swallow due to size  Vitamin D with calcium supplement:   Protein intake does not like meat, eggs, no supplements  Physical activity: walks 2 miles daily most days    Menopause   In about 2011,       Current Problem List:   Patient Active Problem List   Diagnosis     Age-related osteoporosis without current pathological fracture     Liver transplanted (H)     Immunosuppressed status (H)     Primary localized osteoarthritis of right knee     Malignant neoplasm of thyroid gland (H)     Chronic diarrhea     Health care maintenance         Assessment:  Kate is a very pleasant middle-aged postmenopausal woman with complicated past medical history including history of cholangio carcinoma s/p liver transplant with complicated follow-up, recent diagnosis of papillary thyroid cancer tall cell variety, and osteoporosis with recent elevated parathyroid hormone levels    Osteoporosis  Osteoporosis per T score of -3.7 at the right total hip, risk factors include postmenopausal state,FH ofhipf fracture in her motehr food intolerance and likely low calcium intake, low urine calcium and elevated parathyroid hormone likely reflecting component of osteomalacia.  Patient's dietary calcium intake and intake from  supplements is low.  Recommend to first improve calcium intake, resolve osteomalacia and normalize parathyroid hormone levels, then start treatment with osteoporosis meds.  Preferred agent Evenity considering history of recent osteoporotic fracture of the humerus and low T score of -3.7. Fracture risk is very high. Patient and patient's dentist are concerned that Alendronate had adverse effect on her teeht. I would avoid Alendronate for now although at a future time point, ok to give other antiresportives. Not a candidate for parathyroid hormone based treatment due to history of radiation for cancer    Hyperparathyroidism in association with low urine calcium and normal serum calcium with borderline vitamin D levels likely presenting secondary hyperparathyroidism indicating osteomalacia.    Papillary thyroid cancer tall cell variant  Patient has multifocal bilateral papillary thyroid cancer. Largest lesion was in the right thyroid lobe 1.1 cm with tall cell features. There were no other concerning findings. There was no lymphovascular invasion, extrathyroidal extension or lymph gland involvement. She is at low - intermdiate risk for recurrence considering tall cell variant  Patient notes some symptoms that possibly are related to thyroid hormone replacement at higher doses, also reviewed alternative causes of symptom of increased appetite anxiety and insomnia.  We will continue to follow with TSH levels with goal of 0.3 to 0.5 mcg/mL, check thyroglobulin tumor markers every 6 months and neck ultrasound once a year.  This was last done in October 2022 and was benign    Plan:   Continue levothyroxine 125 mcg daily  Change calcium to calcium citrate 950 mg twice a day  Try to add calcium to diet with nutritional shakes or smoothies  Ensure appropriate protein intake, which also would be improved with nutritional shakes or smoothies  Evaluate for gluten intolerance lab draw today  In 2 to 4 weeks 24 urine collection for  calcium and creatinine, repeat PTH intact, calcium vitamin D phosphorus magnesium  If labs are improved we will apply for Evenity    Follow-up with me in 6 months     Josefina Lloyd MD  Endocrinology and Diabetes  Telephone contact:  [facility] [place] [phone]  [facility] [place] [phone]            Past Medical and Past Surgical History:  Past Medical History:   Diagnosis Date     Acute deep vein thrombosis (DVT) of right lower extremity (H) 2021     Antiplatelet or antithrombotic long-term use      Asthma      Bleeding esophageal varices (H) 2018     Cancer (H) 2021     Cholangiocarcinoma (H) 2014     Cholangiocarcinoma metastatic to liver (H) 2021     Cirrhosis of liver with ascites (H) 05/10/2018     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) 2018    Repaired in OR 18     Encounter for pleural drainage tube placement      Esophageal varices with hemorrhage (H)      Gastric ulcer      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     Liver transplanted (H) 2018     Lung metastases (H) 2014     Other closed displaced fracture of proximal end of left humerus with routine healing, subsequent encounter 2019     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    Residual thrombus in main and right portal       Past Surgical History:   Procedure Laterality Date     ANKLE SURGERY      fracture no date     ARTHROPLASTY KNEE Right 2021    Procedure: ARTHROPLASTY, KNEE, TOTAL, RIGHT;  Surgeon: Nitin Jacobsen MD;  Location: [facility] OR     ARTHROSCOPY KNEE  2021     BIOPSY LIVER  2011     CA ANESTH,LOWER ARM SURGERY      fracture no date      SECTION  1981     CHOLECYSTECTOMY       COLONOSCOPY      2/10/2011, 2020     DUAL ENERGY X-RAY ABSORPT, 1+ SITES  2009     EGD      2020, 2018, 2016, 2016, 2016, 3/13/2015, 2011, 2011, 2/10/2011     HEPATECTOMY  PARTIAL       HPV - SCREEN & TYPING      both test negative 4/15/2010, 2012     IR VISCERAL ANGIOGRAM  2021     liver transplanet  2018     MAMMOGRAM - HIM SCAN Bilateral 2010     OPEN REDUCTION INTERNAL FIXATION HUMERUS PROXIMAL Left 2019    Procedure: OPEN REDUCTION INTERNAL FIXATION HUMERUS PROXIMAL LEFT with Allograft;  Surgeon: Eh Kraft MD;  Location: [facility] OR     PLEURAL CATHETER  2017     SC WEDGE BIOPSY OF LIVER  10/2012     REPAIR KNEE LIGAMENT  1972     RETURN LIVER TRANSPLANT N/A 2018    Procedure: RETURN LIVER TRANSPLANT;  Open Abdomen, return liver transplant washout with   Mesh and liver stent  placement and  Abdomal Wound closure;  Surgeon: Darren Rod MD;  [facility]: [facility]     THORACOSCOPY  10/30/2017     THYROIDECTOMY   2021    total     TONSILLECTOMY       TONSILLECTOMY & ADENOIDECTOMY  1963     TRANSPLANT LIVER RECIPIENT  DONOR N/A 2018    Procedure: TRANSPLANT LIVER RECIPIENT  DONOR;  TRANSPLANT LIVER RECIPIENT  DONOR ;  Surgeon: Darren Rod MD;  Location: [facility] OR     UPPER GI ENDOSCOPY      2016, 2016, 2012, 3/2/2012, 2012, 2011     VIDEO-ASSISTED THORACOSCOPIC SURGERY (VATS)  10/30/2017       Medications:   Current Outpatient Medications   Medication Sig Dispense Refill     tacrolimus (GENERIC EQUIVALENT) 1 MG capsule TAKE TWO CAPSULES BY MOUTH TWICE A  capsule 3     acetaminophen (TYLENOL) 500 MG tablet Take 1 tablet (500 mg) by mouth every 6 hours as needed for mild pain 30 tablet 1     albuterol (PROAIR HFA/PROVENTIL HFA/VENTOLIN HFA) 108 (90 Base) MCG/ACT inhaler 2 Inhalation 4 times daily as needed (Patient not taking: Reported on 2022)       amLODIPine (NORVASC) 5 MG tablet TAKE 1 TABLET EVERY DAY 90 tablet 3     amoxicillin (AMOXIL) 500 MG capsule Take 4 tablets (2000 mg) by mouth 1 hour before dental procedures/cleanings. (Patient not taking:  "Reported on 2022) 4 capsule 11     aspirin 81 MG EC tablet Take 81 mg by mouth daily       calcium carbonate 600 mg-vitamin D 400 units (CALTRATE) 600-400 MG-UNIT per tablet Take 1 tablet by mouth       gabapentin (NEURONTIN) 300 MG capsule TAKE 2 CAPSULES AT BEDTIME 180 capsule 3     levothyroxine (SYNTHROID/LEVOTHROID) 125 MCG tablet Take 125 mcg by mouth daily       omeprazole 20 MG tablet Take 1 tablet (20 mg) by mouth 2 times daily 180 tablet 3     pramipexole (MIRAPEX) 0.25 MG tablet Take 3 tablets (0.75 mg) by mouth At Bedtime 270 tablet 3     zolpidem (AMBIEN) 10 MG tablet TAKE 1 TABLET ONE TIME DAILY AT BEDTIME AS NEEDED FOR SLEEP Strength: 10 mg 90 tablet 1       Allergies:   Allergies   Allergen Reactions     Blood Transfusion Related (Informational Only) Other (See Comments)     Patient has a history of a clinically significant antibody against RBC antigens.  A delay in compatible RBCs may occur.     Dilaudid [Hydromorphone] Itching     Ferrlecit      Venofer [Iron Sucrose]        Social History     Tobacco Use     Smoking status: Never     Smokeless tobacco: Never   Substance Use Topics     Alcohol use: Yes     Comment: occ /rare       Family History   Problem Relation Age of Onset     Hypertension Mother          at age: 72 years     Osteoporosis Mother      Hypertension Father          at age: 79 years Cause of death: Car  accident     Skin Cancer Father      Hypertension Sister      Multiple Sclerosis Sister      Skin Cancer Brother      Melanoma No family hx of      Anesthesia Reaction No family hx of      Deep Vein Thrombosis (DVT) No family hx of        Exam  BP (!) 147/91 (BP Location: Right arm, Patient Position: Sitting, Cuff Size: Adult Regular)   Pulse 70   Ht 1.626 m (5' 4\")   Wt 59.9 kg (132 lb)   BMI 22.66 kg/m      Wt Readings from Last 4 Encounters:   22 59.9 kg (132 lb)   22 59 kg (130 lb)   10/28/22 59.1 kg (130 lb 6.4 oz)   22 60.3 kg (133 " lb)     General: Well appearing slender woman in no distress, no moon facies, no facial plethora  Eyes: EOMI. Sclerae and conjunctivae are clear.   HENT: No thyromegaly or mass.    Lymphatic: No cervical or supraclavicular lymphadenopathy.  Cardiovascular: RRR, with normal S1+S2 and no murmurs.   Gastrointestinal: Abdomen is soft, rounded   Skin: No rash or lesions. No abdominal striae. No supraclavicular fat pads, no dorsocervical fat pad, no vitiligo  Extremities: No peripheral edema.   Neurologic: No tremor with hands outstretched. 2+ patellar reflexes.     Labs and Studies:   10/12/22 [facility]  PTHi 167  Ca 9.1  Thyroglobulin tumor marker 0.3 AB neg  TSH 0.54              US thyroid  4/21/22      Lab Results   Component Value Date     10/28/2022    CO2 27 10/28/2022    CHLORIDE 105 10/28/2022    CR 1.06 (H) 10/28/2022    TRIG 82 10/28/2022    HDL 54 10/28/2022    HGB 14.0 10/28/2022           Results for orders placed in visit on 10/28/22    DX Hip/Pelvis/[place] [address]  Phone: [phone]   Fax: [phone]      Patient name:   Sherrie Lala  Patient demographics:  67 year old White Female  History:  Cancer -, Family History of Osteoporosis, Post Menopausal and Transplant - Liver; fractures - wrists, other  Past treatment: alendronate  Current treatments:  Calcium, Immunosuppressants  Scan:    [employer]  Comparison: 2/26/18    Impression  The most negative and valid T-score of -3.7 at the level of the right total hip corresponds with osteoporosis according to WHO criteria for postmenopausal females and men age 50 and over.    Results  Lumbar spine  T-score -2.6 , BMD 0.874 g/cm2.    Left Femur neck  T-score -2.4 , BMD 0.709 g/cm2.  Left Total hip  T-score -2.8 , BMD 0.651 g/cm2.    Right Femur neck  T-score -3.3, BMD  0.579 g/cm2.  Right Total hip  T-score -3.7 , BMD  0.546  g/cm2.    Wrist  T-score -2.8 , BMD 0.508 g/cm2.    Interval change  Bone density compared to the prior study has changed at lumbar spine by 22.8%, at the left total femur 14.6%.  Ref 3  Percent changes not mentioned  within remaining regions are insignificant    Please note that the differential diagnosis of BMD increase in the spine includes improvement due to pharmacotherapy vs inter-current progression of spine degeneration or fracture    Fracture risk  The risk of osteoporotic fracture increases approximately 2-fold for each 1.0 SD decrease in T-score.  Low bone density is not the only risk factor for fracture; consider factors such as patient's age, fall risk, injury risk, previous osteoporotic fracture, family history of osteoporosis, etc.    Repeat  Recommend repeat DXA in 2 years.  For patients eligible for Medicare, routine testing is allowed once every 2 years.      Clinical correlation recommended    Technical quality  Satisfactory.    Principal result :  Darlene Piedra MD, [facility]  Division of Endocrinology and Diabetes  Department of Medicine      References:  Ref. 1. WHO categories:  T-score > -1.0  = normal             .  T-score -1.0 to -2.5 = low bone density  T-score < -2.5 = osteoporosis        .    Ref. 2. 2015 ISCD official position statements:  www.iscd.org.    Ref. 3.  Today's examination is compared to the technically similar prior study of the total hip and femur if available. Only changes deemed likely to be significant based on historical data are reported.  According to the ISCD position statements, total hip rather than femoral neck regions are to be compared because larger areas give better precision.  LSC = least significant changes at the [facility] (historical data)  AP spine =  0.032 g/cm2  (6/26/2007)  Left hip = 0.029 g/cm2  (6/15/2007)  Right hip = 0.018 g/cm2  (6/15/2007)  Left mid radius = 0.043 g/cm2  (6/26/2007)  Please note that the differential  diagnosis of increase in bone density at the lumbar spine includes improvement due to pharmacotherapy vs inter-current progression of spine degeneration or fracture.    Ref. 4 Fracture risk is calculated in patients aged 40 to 90 years old with low bone density not on osteoporosis treatment. A 10 year fracture risk of 3% and higher for hip fracture and 20% and higher for major osteoporotic fracture is considered higher than acceptable risk and might be an indication for medical treatment.    Ref. 5.  By definition, osteoporosis may be diagnosed in the presence or with the history of a low trauma or fragility fracture.  Fragility and low trauma fracture is defined as a fracture resulting from the force of a fall from a standing height or less or a bone that breaks under conditions that would not cause a normal bone to break.    Ref. 6. NOF Physician's Guideline Website address:  www.nof.org.

## 2022-12-21 NOTE — PATIENT INSTRUCTIONS
"Once Calcium is well replaced , we'll try to get Evenity for osteoporosis treatment      Increase Calcium intake from nutrition   Keep taking dairy lactose free milk    Consider nutritional shakes     Increase protein intake   Consider shakes or smoothies with protein powder    Calcium citrate    950 mg twice daily, you can crush it or get chewables    In 2 weeks  Repeat labs        Do another 24h urine collection    24-Hour Urine Collection Guidelines for Patients    It does not matter what time of day you start the clock.  Make the collection time when it will be the most convenient for you.  If you miss adding any urine during the 24-hour period or if the container spills, you will need to start over.    If you think you will need more than one container during the 24-hour period, call the Lab for an additional container or continue collecting the urine in a clean (non-bleached), plastic container you have at home.    Please verify that the container is correctly labeled with your name, and your medical record number or birthdate.    1. Empty your bladder completely.  Do not save any of that urine.  Note the time, as this will be the \"START\" time of the 24-hour collection period.  2. Save ALL urine passed in the next 24 hours, adding each specimen to the collection container.  Keep the container refrigerated during the collection.  3. At the end of the 24-hour period, empty your bladder (even if you don't feel as if you need to urinate) and add it to the container.  4. When complete, bring the container to the Laboratory as soon as possible.  Be sure to stop at the Registration Desk before dropping your specimen off in the Lab.  Note: If you are collecting urine for a Urosat (for stones), please try to bring the container to the Lab Monday-Friday.    To schedule a lab appointment,  Either use [employer]  Or call as below    Lab    General [phone]   [facility] [phone]   [place] [phone]   [facility] "  [phone]   [facility] [phone]   [place] [phone]   [facility]) [phone]   [facility] Walk[facility] Only   [place] [phone]   [place] [phone]   [place] [phone]   [place] [phone]     Please reach out to the following centers to schedule your imaging appointment:       Imaging (DEXA, CT, MRI, XRAY)    [facility] ([facility], [facility], [place]) [phone]   [facility] ([place], [place]) [phone]   [facility] ([facility]) [phone]   [facility] ([facility]) [phone]

## 2022-12-22 LAB
GLIADIN IGA SER-ACNC: 2.7 U/ML
GLIADIN IGG SER-ACNC: 0.7 U/ML
IGA SERPL-MCNC: 166 MG/DL (ref 84–499)
TTG IGA SER-ACNC: 1.4 U/ML
TTG IGG SER-ACNC: <0.6 U/ML

## 2022-12-23 LAB — ENDOMYSIUM IGA TITR SER IF: NORMAL {TITER}

## 2023-01-14 DIAGNOSIS — G47.00 PERSISTENT INSOMNIA: ICD-10-CM

## 2023-01-16 RX ORDER — ZOLPIDEM TARTRATE 10 MG/1
TABLET ORAL
Qty: 90 TABLET | Refills: 1 | Status: SHIPPED | OUTPATIENT
Start: 2023-01-16 | End: 2023-08-29

## 2023-01-19 DIAGNOSIS — I10 BENIGN ESSENTIAL HYPERTENSION: ICD-10-CM

## 2023-01-19 RX ORDER — AMLODIPINE BESYLATE 5 MG/1
TABLET ORAL
Qty: 90 TABLET | Refills: 3 | Status: SHIPPED | OUTPATIENT
Start: 2023-01-19 | End: 2023-11-28

## 2023-01-22 ENCOUNTER — HEALTH MAINTENANCE LETTER (OUTPATIENT)
Age: 68
End: 2023-01-22

## 2023-01-31 ENCOUNTER — DOCUMENTATION ONLY (OUTPATIENT)
Dept: LAB | Facility: CLINIC | Age: 68
End: 2023-01-31
Payer: COMMERCIAL

## 2023-01-31 DIAGNOSIS — R31.29 MICROSCOPIC HEMATURIA: Primary | ICD-10-CM

## 2023-02-02 ENCOUNTER — ANCILLARY PROCEDURE (OUTPATIENT)
Dept: ULTRASOUND IMAGING | Facility: CLINIC | Age: 68
End: 2023-02-02
Attending: PHYSICIAN ASSISTANT
Payer: COMMERCIAL

## 2023-02-02 ENCOUNTER — PRE VISIT (OUTPATIENT)
Dept: UROLOGY | Facility: CLINIC | Age: 68
End: 2023-02-02

## 2023-02-02 ENCOUNTER — OFFICE VISIT (OUTPATIENT)
Dept: UROLOGY | Facility: CLINIC | Age: 68
End: 2023-02-02
Attending: INTERNAL MEDICINE
Payer: COMMERCIAL

## 2023-02-02 ENCOUNTER — LAB (OUTPATIENT)
Dept: LAB | Facility: CLINIC | Age: 68
End: 2023-02-02
Payer: COMMERCIAL

## 2023-02-02 VITALS — HEART RATE: 73 BPM | SYSTOLIC BLOOD PRESSURE: 121 MMHG | DIASTOLIC BLOOD PRESSURE: 84 MMHG

## 2023-02-02 DIAGNOSIS — D84.9 IMMUNOSUPPRESSED STATUS (H): Chronic | ICD-10-CM

## 2023-02-02 DIAGNOSIS — Z94.4 LIVER TRANSPLANTED (H): ICD-10-CM

## 2023-02-02 DIAGNOSIS — R31.29 MICROSCOPIC HEMATURIA: ICD-10-CM

## 2023-02-02 DIAGNOSIS — E03.8 OTHER SPECIFIED HYPOTHYROIDISM: ICD-10-CM

## 2023-02-02 DIAGNOSIS — M81.0 AGE-RELATED OSTEOPOROSIS WITHOUT CURRENT PATHOLOGICAL FRACTURE: ICD-10-CM

## 2023-02-02 DIAGNOSIS — E21.3 HYPERPARATHYROIDISM (H): ICD-10-CM

## 2023-02-02 LAB
ALBUMIN UR-MCNC: NEGATIVE MG/DL
APPEARANCE UR: CLEAR
BACTERIA #/AREA URNS HPF: ABNORMAL /HPF
BILIRUB UR QL STRIP: NEGATIVE
CALCIUM 24H UR-MRATE: 0.06 G/SPEC (ref 0.1–0.3)
CALCIUM UR-MCNC: 5.3 MG/DL
COLLECT DURATION TIME UR: 24 H
COLLECT DURATION TIME UR: 24 H
COLOR UR AUTO: ABNORMAL
CREAT 24H UR-MRATE: 0.86 G/(24.H) (ref 0.72–1.51)
CREAT UR-MCNC: 76 MG/DL
GLUCOSE UR STRIP-MCNC: NEGATIVE MG/DL
HGB UR QL STRIP: NEGATIVE
HYALINE CASTS: 8 /LPF
KETONES UR STRIP-MCNC: NEGATIVE MG/DL
LEUKOCYTE ESTERASE UR QL STRIP: NEGATIVE
MUCOUS THREADS #/AREA URNS LPF: PRESENT /LPF
NITRATE UR QL: NEGATIVE
PH UR STRIP: 5 [PH] (ref 5–7)
PTH-INTACT SERPL-MCNC: 205 PG/ML (ref 15–65)
RBC URINE: 2 /HPF
SP GR UR STRIP: 1.01 (ref 1–1.03)
SPECIMEN VOL UR: 1127 ML
SPECIMEN VOL UR: 1127 ML
SQUAMOUS EPITHELIAL: <1 /HPF
TSH SERPL DL<=0.005 MIU/L-ACNC: 0.36 UIU/ML (ref 0.3–4.2)
UROBILINOGEN UR STRIP-MCNC: NORMAL MG/DL
WBC URINE: 1 /HPF

## 2023-02-02 PROCEDURE — 88112 CYTOPATH CELL ENHANCE TECH: CPT | Mod: TC | Performed by: PHYSICIAN ASSISTANT

## 2023-02-02 PROCEDURE — 82306 VITAMIN D 25 HYDROXY: CPT | Performed by: INTERNAL MEDICINE

## 2023-02-02 PROCEDURE — 81050 URINALYSIS VOLUME MEASURE: CPT | Performed by: INTERNAL MEDICINE

## 2023-02-02 PROCEDURE — 76770 US EXAM ABDO BACK WALL COMP: CPT | Mod: GC | Performed by: STUDENT IN AN ORGANIZED HEALTH CARE EDUCATION/TRAINING PROGRAM

## 2023-02-02 PROCEDURE — 87086 URINE CULTURE/COLONY COUNT: CPT | Performed by: PHYSICIAN ASSISTANT

## 2023-02-02 PROCEDURE — 81001 URINALYSIS AUTO W/SCOPE: CPT | Performed by: PATHOLOGY

## 2023-02-02 PROCEDURE — 83970 ASSAY OF PARATHORMONE: CPT | Performed by: PATHOLOGY

## 2023-02-02 PROCEDURE — 88112 CYTOPATH CELL ENHANCE TECH: CPT | Mod: 26 | Performed by: PATHOLOGY

## 2023-02-02 PROCEDURE — 99203 OFFICE O/P NEW LOW 30 MIN: CPT | Performed by: PHYSICIAN ASSISTANT

## 2023-02-02 PROCEDURE — 36415 COLL VENOUS BLD VENIPUNCTURE: CPT | Performed by: PATHOLOGY

## 2023-02-02 PROCEDURE — 84443 ASSAY THYROID STIM HORMONE: CPT | Performed by: PATHOLOGY

## 2023-02-02 NOTE — PATIENT INSTRUCTIONS
PLAN:   - Collect Urine for urine cytology today  - Schedule cystoscopy to evaluate bladder   - Schedule renal ultrasound at the patient's convenience.     [facility]

## 2023-02-02 NOTE — PROGRESS NOTES
It was my pleasure to meet Mr. Sherrie Lala, a 67 year old year old female seen in consultation today at the request of Dr. Lilly, GI for chief complaint: Consult    Today he is accompanied by patient and review of records     HPI: Mr. Sherrie Lala has PMH significant for HTN, metastatic cholangiocarcinoma s/p Whipple, Liver transplant (2018) with immunosuppressed status, asthma, blood transfusion with antibodies, OA, thyroid cancer s/p thyroidectomy now with hypothyroidism, osteoporosis.      She has been referred today for microhematuria, present at least since 2013.   Never had gross hematuria.   Is trying to drink more water.   No Hx stones.  No UTI Hx.    Never smoker.   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  No menses since 2011.  No vaginal bleeding.  Hasn't had any recent exams.      REVIEW OF DIAGNOSTICS:  2/2/23 - WBC 1, RBC 2  10/28/22 - WBC 2, RBC 15  10/29/21 - WBC 2, RBC 17  3/30/21 - WBC 12, RBC 11  9/2/20 - WBC 0, RBC 12  11/4/14 - WBC zero, RBC 6-10  11/15/2013 - WBC 0-2, RBC 3-5    Past Medical History:   Diagnosis Date     Acute deep vein thrombosis (DVT) of right lower extremity (H) 04/02/2021     Antiplatelet or antithrombotic long-term use      Asthma      Bleeding esophageal varices (H) 03/06/2018     Cancer (H) 07/06/2021     Cholangiocarcinoma (H) 06/2014     Cholangiocarcinoma metastatic to liver (H) 07/06/2021     Cirrhosis of liver with ascites (H) 05/10/2018     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) 09/12/2018    Repaired in OR 9/1/18     Encounter for pleural drainage tube placement      Esophageal varices with hemorrhage (H)      Gastric ulcer      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     Liver transplanted (H) 08/30/2018     Lung metastases (H) 08/2014     Other closed displaced fracture of proximal end of left humerus with routine healing, subsequent encounter 03/11/2019     Portal vein thrombosis of transplanted liver  (H) 2018    Residual thrombus in main and right portal       Past Surgical History:   Procedure Laterality Date     ANKLE SURGERY      fracture no date     ARTHROPLASTY KNEE Right 2021    Procedure: ARTHROPLASTY, KNEE, TOTAL, RIGHT;  Surgeon: Nitin Jacobsen MD;  [facility]     ARTHROSCOPY KNEE  2021     BIOPSY LIVER  2011     CA ANESTH,LOWER ARM SURGERY      fracture no date      SECTION  1981     CHOLECYSTECTOMY       COLONOSCOPY      2/10/2011, 2020     DUAL ENERGY X-RAY ABSORPT, 1+ SITES  2009     EGD      2020, 2018, 2016, 2016, 2016, 3/13/2015, 2011, 2011, 2/10/2011     HEPATECTOMY PARTIAL       HPV - SCREEN & TYPING      both test negative 4/15/2010, 2012     IR VISCERAL ANGIOGRAM  2021     liver transplanet  2018     MAMMOGRAM - HIM SCAN Bilateral 2010     OPEN REDUCTION INTERNAL FIXATION HUMERUS PROXIMAL Left 2019    Procedure: OPEN REDUCTION INTERNAL FIXATION HUMERUS PROXIMAL LEFT with Allograft;  Surgeon: Eh Kraft MD;  [facility]     PLEURAL CATHETER  2017     MO WEDGE BIOPSY OF LIVER  10/2012     REPAIR KNEE LIGAMENT  1972     RETURN LIVER TRANSPLANT N/A 2018    Procedure: RETURN LIVER TRANSPLANT;  Open Abdomen, return liver transplant washout with   Mesh and liver stent  placement and  Abdomal Wound closure;  Surgeon: Darren Rod MD;  [facility]: [facility]     THORACOSCOPY  10/30/2017     THYROIDECTOMY   2021    total     TONSILLECTOMY       TONSILLECTOMY & ADENOIDECTOMY  1963     TRANSPLANT LIVER RECIPIENT  DONOR N/A 2018    Procedure: TRANSPLANT LIVER RECIPIENT  DONOR;  TRANSPLANT LIVER RECIPIENT  DONOR ;  Surgeon: Darren Rod MD;  [facility]     UPPER GI ENDOSCOPY      2016, 2016, 2012, 3/2/2012, 2012, 2011     VIDEO-ASSISTED THORACOSCOPIC SURGERY (VATS)  10/30/2017        FAMILY HISTORY: paternal grandfather with bladder cancer  Otherwise no malignancies    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 She has never used smokeless tobacco.    Current Outpatient Medications   Medication Sig Dispense Refill     amLODIPine (NORVASC) 5 MG tablet TAKE 1 TABLET EVERY DAY 90 tablet 3     acetaminophen (TYLENOL) 500 MG tablet Take 1 tablet (500 mg) by mouth every 6 hours as needed for mild pain 30 tablet 1     albuterol (PROAIR HFA/PROVENTIL HFA/VENTOLIN HFA) 108 (90 Base) MCG/ACT inhaler 2 Inhalation 4 times daily as needed (Patient not taking: Reported on 11/1/2022)       amoxicillin (AMOXIL) 500 MG capsule Take 4 tablets (2000 mg) by mouth 1 hour before dental procedures/cleanings. (Patient not taking: Reported on 11/1/2022) 4 capsule 11     aspirin 81 MG EC tablet Take 81 mg by mouth daily       calcium carbonate 600 mg-vitamin D 400 units (CALTRATE) 600-400 MG-UNIT per tablet Take 1 tablet by mouth       gabapentin (NEURONTIN) 300 MG capsule TAKE 2 CAPSULES AT BEDTIME 180 capsule 3     levothyroxine (SYNTHROID/LEVOTHROID) 125 MCG tablet Take 125 mcg by mouth daily       omeprazole 20 MG tablet Take 1 tablet (20 mg) by mouth 2 times daily 180 tablet 3     pramipexole (MIRAPEX) 0.25 MG tablet Take 3 tablets (0.75 mg) by mouth At Bedtime 270 tablet 3     tacrolimus (GENERIC EQUIVALENT) 1 MG capsule TAKE TWO CAPSULES BY MOUTH TWICE A  capsule 3     zolpidem (AMBIEN) 10 MG tablet TAKE 1 TABLET ONE TIME DAILY AT BEDTIME AS NEEDED FOR SLEEP 90 tablet 1       ALLERGIES: Blood transfusion related (informational only), Dilaudid [hydromorphone], Ferrlecit, and Venofer [iron sucrose]      REVIEW OF SYSTEMS:  As above in HPI     GENERAL PHYSICAL EXAM:   Vitals: /84   Pulse 73   There is no height or weight on file to calculate BMI.    GENERAL: Well groomed, well developed, well nourished female in NAD.  NEURO: Alert and oriented x  3.  PSYCH: Normal mood and affect, pleasant and agreeable during interview and exam.    RADIOLOGY: The following tests were reviewed:   For Patients: As a result of the 21st Century Cures Act, medical imaging exams and procedure reports are released immediately into your electronic medical record. You may view this report before your referring provider. If you have questions, please contact your health care provider.     EXAM: CT ABDOMEN PELVIS W   LOCATION: [facility] MEDICAL IMAGING   DATE/TIME: 10/25/2021 3:09 PM     INDICATION: Right sided abdominal pain, hx liver transplant   COMPARISON: 08/13/2018.   TECHNIQUE: CT scan of the abdomen and pelvis was performed following injection of IV contrast. Multiplanar reformats were obtained. Dose reduction techniques were used.   CONTRAST: Omnipaque 350 67 ml     FINDINGS:   LOWER CHEST: Normal.     HEPATOBILIARY: Interval placement of liver transplant. The transplanted liver has a normal appearance. Normal caliber bile ducts.     PANCREAS: Normal.   SPLEEN: Normal.   ADRENAL GLANDS: Normal.   KIDNEYS/BLADDER: Normal.   BOWEL: Normal.   LYMPH NODES: Normal.   VASCULATURE: Unremarkable.   PELVIC ORGANS: Normal.   MUSCULOSKELETAL: Normal.     IMPRESSION:   1.  Interval placement of a liver transplant. Interval resolution of splenic enlargement. No other interval change. No acute findings.    LABS: The last test results for Ms. Sherrie Lala were reviewed.   PSA - No results found for: PSA  BMP -   Recent Labs   Lab Test 10/28/22  0939 04/22/22  0943 04/07/22  1316 08/26/21  1102 04/06/21  0519 04/05/21  0806 04/04/21  0547    141 140   < > 137 137 137   POTASSIUM 4.1 4.8 4.2   < > 4.5 4.6 4.2   CHLORIDE 105 108 107   < > 108 106 106   CO2 27 28 27   < > 27 26 27   BUN 23.4* 26 17   < > 23 23 29   CR 1.06* 0.96 0.93   < > 0.97 0.94 1.06*   GLC 83 96 111*   < > 98 100* 103*   SHELDON 9.2 9.1 8.2*   < > 8.5 8.4* 8.0*   MAG  --   --  2.0  --  2.0 2.1 2.4*   PHOS  --   --   --   --   4.0 3.2 3.7    < > = values in this interval not displayed.       CBC -   Recent Labs   Lab Test 10/28/22  0939 04/22/22  0943 04/07/22  1317   WBC 6.5 5.3 4.7   HGB 14.0 13.6 12.3    178 168       ASSESSMENT:   1) Microhematuria     PLAN:   - Collect Urine for urine cytology today  - Schedule cystoscopy to evaluate bladder   - Schedule renal ultrasound at the patient's convenience.  Guidelines suggest patient is high risk and should get a CT urogram.  She is high risk due to age, but she is also immunosuppressed which contributes to her risk profile.  She never smoked.  On the other hand, she has had microhematuria since at least 2013, which may argue against  malignancy.  She has also had multiple CT scans over the past 10 years in the setting of cholangiocarcinoma , last being without contrast on 8/9/22 showing normal kidneys.  Before that she had a CT with contrast on 10/25/21, also showing normal kidneys.  For now, patient agrees it would be best to avoid radiation and excessive scans.     VISIT DURATION: 24 minutes (2:22 - 2:36 + 10 minutes documentation on DOS)    Christina Abraham PA-C  Department of Urologic Surgery

## 2023-02-02 NOTE — LETTER
2/2/2023       RE: Sherrie Lala  [address] [facility] [address]     Dear Colleague,    Thank you for referring your patient, Sherrie Lala, to the [facility] [place] at [facility]. Please see a copy of my visit note below.    It was my pleasure to meet Mr. Sherrie Lala, a 67 year old year old female seen in consultation today at the request of LAURA Castillo for chief complaint: Consult    Today he is accompanied by patient and review of records     HPI: Mr. Sherrie Lala has PMH significant for HTN, metastatic cholangiocarcinoma s/p Whipple, Liver transplant (2018) with immunosuppressed status, asthma, blood transfusion with antibodies, OA, thyroid cancer s/p thyroidectomy now with hypothyroidism, osteoporosis.      She has been referred today for microhematuria, present at least since 2013.   Never had gross hematuria.   Is trying to drink more water.   No Hx stones.  No UTI Hx.    Never smoker.   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  No menses since 2011.  No vaginal bleeding.  Hasn't had any recent exams.      REVIEW OF DIAGNOSTICS:  2/2/23 - WBC 1, RBC 2  10/28/22 - WBC 2, RBC 15  10/29/21 - WBC 2, RBC 17  3/30/21 - WBC 12, RBC 11  9/2/20 - WBC 0, RBC 12  11/4/14 - WBC zero, RBC 6-10  11/15/2013 - WBC 0-2, RBC 3-5    Past Medical History:   Diagnosis Date     Acute deep vein thrombosis (DVT) of right lower extremity (H) 04/02/2021     Antiplatelet or antithrombotic long-term use      Asthma      Bleeding esophageal varices (H) 03/06/2018     Cancer (H) 07/06/2021     Cholangiocarcinoma (H) 06/2014     Cholangiocarcinoma metastatic to liver (H) 07/06/2021     Cirrhosis of liver with ascites (H) 05/10/2018     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) 09/12/2018    Repaired in OR 9/1/18     Encounter for pleural drainage tube placement      Esophageal varices with hemorrhage  (H)      Gastric ulcer      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     Liver transplanted (H) 2018     Lung metastases (H) 2014     Other closed displaced fracture of proximal end of left humerus with routine healing, subsequent encounter 2019     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    Residual thrombus in main and right portal       Past Surgical History:   Procedure Laterality Date     ANKLE SURGERY      fracture no date     ARTHROPLASTY KNEE Right 2021    Procedure: ARTHROPLASTY, KNEE, TOTAL, RIGHT;  Surgeon: Nitin Jacobsen MD;  [facility]     ARTHROSCOPY KNEE  2021     BIOPSY LIVER  2011     CA ANESTH,LOWER ARM SURGERY      fracture no date      SECTION  1981     CHOLECYSTECTOMY       COLONOSCOPY      2/10/2011, 2020     DUAL ENERGY X-RAY ABSORPT, 1+ SITES  2009     EGD      2020, 2018, 2016, 2016, 2016, 3/13/2015, 2011, 2011, 2/10/2011     HEPATECTOMY PARTIAL       HPV - SCREEN & TYPING      both test negative 4/15/2010, 2012     IR VISCERAL ANGIOGRAM  2021     liver transplanet  2018     MAMMOGRAM - HIM SCAN Bilateral 2010     OPEN REDUCTION INTERNAL FIXATION HUMERUS PROXIMAL Left 2019    Procedure: OPEN REDUCTION INTERNAL FIXATION HUMERUS PROXIMAL LEFT with Allograft;  Surgeon: Eh Kraft MD;  [facility]     PLEURAL CATHETER  2017     [place] WEDGE BIOPSY OF LIVER  10/2012     REPAIR KNEE LIGAMENT  1972     RETURN LIVER TRANSPLANT N/A 2018    Procedure: RETURN LIVER TRANSPLANT;  Open Abdomen, return liver transplant washout with   Mesh and liver stent  placement and  Abdomal Wound closure;  Surgeon: Darren Rod MD;  [facility]: [facility]     THORACOSCOPY  10/30/2017     THYROIDECTOMY   2021    total     TONSILLECTOMY       TONSILLECTOMY & ADENOIDECTOMY  1963     TRANSPLANT LIVER RECIPIENT  DONOR N/A 2018    Procedure:  TRANSPLANT LIVER RECIPIENT  DONOR;  TRANSPLANT LIVER RECIPIENT  DONOR ;  Surgeon: Darren Rod MD;  [facility]: [facility]     UPPER GI ENDOSCOPY      2016, 2016, 2012, 3/2/2012, 2012, 2011     VIDEO-ASSISTED THORACOSCOPIC SURGERY (VATS)  10/30/2017       FAMILY HISTORY: paternal grandfather with bladder cancer  Otherwise no malignancies    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 She has never used smokeless tobacco.    Current Outpatient Medications   Medication Sig Dispense Refill     amLODIPine (NORVASC) 5 MG tablet TAKE 1 TABLET EVERY DAY 90 tablet 3     acetaminophen (TYLENOL) 500 MG tablet Take 1 tablet (500 mg) by mouth every 6 hours as needed for mild pain 30 tablet 1     albuterol (PROAIR HFA/PROVENTIL HFA/VENTOLIN HFA) 108 (90 Base) MCG/ACT inhaler 2 Inhalation 4 times daily as needed (Patient not taking: Reported on 2022)       amoxicillin (AMOXIL) 500 MG capsule Take 4 tablets (2000 mg) by mouth 1 hour before dental procedures/cleanings. (Patient not taking: Reported on 2022) 4 capsule 11     aspirin 81 MG EC tablet Take 81 mg by mouth daily       calcium carbonate 600 mg-vitamin D 400 units (CALTRATE) 600-400 MG-UNIT per tablet Take 1 tablet by mouth       gabapentin (NEURONTIN) 300 MG capsule TAKE 2 CAPSULES AT BEDTIME 180 capsule 3     levothyroxine (SYNTHROID/LEVOTHROID) 125 MCG tablet Take 125 mcg by mouth daily       omeprazole 20 MG tablet Take 1 tablet (20 mg) by mouth 2 times daily 180 tablet 3     pramipexole (MIRAPEX) 0.25 MG tablet Take 3 tablets (0.75 mg) by mouth At Bedtime 270 tablet 3     tacrolimus (GENERIC EQUIVALENT) 1 MG capsule TAKE TWO CAPSULES BY MOUTH TWICE A  capsule 3     zolpidem (AMBIEN) 10 MG tablet TAKE 1 TABLET ONE TIME DAILY AT BEDTIME AS NEEDED FOR SLEEP 90 tablet 1       ALLERGIES: Blood transfusion related (informational only), Dilaudid [hydromorphone], Ferrlecit,  and Venofer [iron sucrose]      REVIEW OF SYSTEMS:  As above in HPI     GENERAL PHYSICAL EXAM:   Vitals: /84   Pulse 73   There is no height or weight on file to calculate BMI.    GENERAL: Well groomed, well developed, well nourished female in NAD.  NEURO: Alert and oriented x 3.  PSYCH: Normal mood and affect, pleasant and agreeable during interview and exam.    RADIOLOGY: The following tests were reviewed:   For Patients: As a result of the 21st Century Cures Act, medical imaging exams and procedure reports are released immediately into your electronic medical record. You may view this report before your referring provider. If you have questions, please contact your health care provider.     EXAM: CT ABDOMEN PELVIS W   LOCATION: [facility] MEDICAL IMAGING   DATE/TIME: 10/25/2021 3:09 PM     INDICATION: Right sided abdominal pain, hx liver transplant   COMPARISON: 08/13/2018.   TECHNIQUE: CT scan of the abdomen and pelvis was performed following injection of IV contrast. Multiplanar reformats were obtained. Dose reduction techniques were used.   CONTRAST: Omnipaque 350 67 ml     FINDINGS:   LOWER CHEST: Normal.     HEPATOBILIARY: Interval placement of liver transplant. The transplanted liver has a normal appearance. Normal caliber bile ducts.     PANCREAS: Normal.   SPLEEN: Normal.   ADRENAL GLANDS: Normal.   KIDNEYS/BLADDER: Normal.   BOWEL: Normal.   LYMPH NODES: Normal.   VASCULATURE: Unremarkable.   PELVIC ORGANS: Normal.   MUSCULOSKELETAL: Normal.     IMPRESSION:   1.  Interval placement of a liver transplant. Interval resolution of splenic enlargement. No other interval change. No acute findings.    LABS: The last test results for Ms. Sherrie Lala were reviewed.   PSA - No results found for: PSA  BMP -   Recent Labs   Lab Test 10/28/22  0939 04/22/22  0943 04/07/22  1316 08/26/21  1102 04/06/21  0519 04/05/21  0806 04/04/21  0547    141 140   < > 137 137 137   POTASSIUM 4.1 4.8 4.2   < > 4.5 4.6 4.2    CHLORIDE 105 108 107   < > 108 106 106   CO2 27 28 27   < > 27 26 27   BUN 23.4* 26 17   < > 23 23 29   CR 1.06* 0.96 0.93   < > 0.97 0.94 1.06*   GLC 83 96 111*   < > 98 100* 103*   SHELDON 9.2 9.1 8.2*   < > 8.5 8.4* 8.0*   MAG  --   --  2.0  --  2.0 2.1 2.4*   PHOS  --   --   --   --  4.0 3.2 3.7    < > = values in this interval not displayed.       CBC -   Recent Labs   Lab Test 10/28/22  0939 04/22/22  0943 04/07/22  1317   WBC 6.5 5.3 4.7   HGB 14.0 13.6 12.3    178 168       ASSESSMENT:   1) Microhematuria     PLAN:   - Collect Urine for urine cytology today  - Schedule cystoscopy to evaluate bladder   - Schedule renal ultrasound at the patient's convenience.  Guidelines suggest patient is high risk and should get a CT urogram.  She is high risk due to age, but she is also immunosuppressed which contributes to her risk profile.  She never smoked.  On the other hand, she has had microhematuria since at least 2013, which may argue against  malignancy.  She has also had multiple CT scans over the past 10 years in the setting of cholangiocarcinoma , last being without contrast on 8/9/22 showing normal kidneys.  Before that she had a CT with contrast on 10/25/21, also showing normal kidneys.  For now, patient agrees it would be best to avoid radiation and excessive scans.     VISIT DURATION: 24 minutes (2:22 - 2:36 + 10 minutes documentation on DOS)    Christina Abraham PA-C  Department of Urologic Surgery

## 2023-02-02 NOTE — NURSING NOTE
Chief Complaint   Patient presents with     Consult       Blood pressure 121/84, pulse 73, not currently breastfeeding. There is no height or weight on file to calculate BMI.    Patient Active Problem List   Diagnosis     Age-related osteoporosis without current pathological fracture     Liver transplanted (H)     Immunosuppressed status (H)     Primary localized osteoarthritis of right knee     Malignant neoplasm of thyroid gland (H)     Chronic diarrhea     Health care maintenance       Allergies   Allergen Reactions     Blood Transfusion Related (Informational Only) Other (See Comments)     Patient has a history of a clinically significant antibody against RBC antigens.  A delay in compatible RBCs may occur.     Dilaudid [Hydromorphone] Itching     Ferrlecit      Venofer [Iron Sucrose]        Current Outpatient Medications   Medication Sig Dispense Refill     amLODIPine (NORVASC) 5 MG tablet TAKE 1 TABLET EVERY DAY 90 tablet 3     acetaminophen (TYLENOL) 500 MG tablet Take 1 tablet (500 mg) by mouth every 6 hours as needed for mild pain 30 tablet 1     albuterol (PROAIR HFA/PROVENTIL HFA/VENTOLIN HFA) 108 (90 Base) MCG/ACT inhaler 2 Inhalation 4 times daily as needed (Patient not taking: Reported on 11/1/2022)       amoxicillin (AMOXIL) 500 MG capsule Take 4 tablets (2000 mg) by mouth 1 hour before dental procedures/cleanings. (Patient not taking: Reported on 11/1/2022) 4 capsule 11     aspirin 81 MG EC tablet Take 81 mg by mouth daily       calcium carbonate 600 mg-vitamin D 400 units (CALTRATE) 600-400 MG-UNIT per tablet Take 1 tablet by mouth       gabapentin (NEURONTIN) 300 MG capsule TAKE 2 CAPSULES AT BEDTIME 180 capsule 3     levothyroxine (SYNTHROID/LEVOTHROID) 125 MCG tablet Take 125 mcg by mouth daily       omeprazole 20 MG tablet Take 1 tablet (20 mg) by mouth 2 times daily 180 tablet 3     pramipexole (MIRAPEX) 0.25 MG tablet Take 3 tablets (0.75 mg) by mouth At Bedtime 270 tablet 3     tacrolimus  (GENERIC EQUIVALENT) 1 MG capsule TAKE TWO CAPSULES BY MOUTH TWICE A  capsule 3     zolpidem (AMBIEN) 10 MG tablet TAKE 1 TABLET ONE TIME DAILY AT BEDTIME AS NEEDED FOR SLEEP 90 tablet 1       Social History     Tobacco Use     Smoking status: Never     Smokeless tobacco: Never   Vaping Use     Vaping Use: Never used   Substance Use Topics     Alcohol use: Yes     Comment: occ /rare     Drug use: No       Mark Lee  2/2/2023  2:07 PM

## 2023-02-03 LAB
BACTERIA UR CULT: NO GROWTH
PATH REPORT.COMMENTS IMP SPEC: NORMAL
PATH REPORT.FINAL DX SPEC: NORMAL
PATH REPORT.GROSS SPEC: NORMAL
PATH REPORT.MICROSCOPIC SPEC OTHER STN: NORMAL
PATH REPORT.RELEVANT HX SPEC: NORMAL

## 2023-02-05 LAB
DEPRECATED CALCIDIOL+CALCIFEROL SERPL-MC: <43 UG/L (ref 20–75)
VITAMIN D2 SERPL-MCNC: <5 UG/L
VITAMIN D3 SERPL-MCNC: 38 UG/L

## 2023-02-14 NOTE — PLAN OF CARE
"Problem: Patient Care Overview  Goal: Plan of Care/Patient Progress Review  Outcome: Improving  /60 (BP Location: Right arm)  Pulse 91  Temp 98.3  F (36.8  C) (Oral)  Resp 18  Ht 1.6 m (5' 3\")  Wt 59.6 kg (131 lb 6.4 oz)  SpO2 96%  BMI 23.28 kg/m2    2798-7099:  Patient VSS on RA; afebrile. Flexeril administered for pain x1. NJ with tube feeds cycled 0409-0933. Internal jugular with TKO for heparin SR at 400 units/hr. No BM today, given suppository this evening. Voiding adequately. Incision CDI, sutured. Up independently. Giving meds on own via NJ competently. Will continue with POC and notify MD with changes or concerns.           "
"Problem: Patient Care Overview  Goal: Plan of Care/Patient Progress Review  Outcome: Improving  /74 (BP Location: Left arm)  Pulse 83  Temp 98.4  F (36.9  C) (Oral)  Resp 16  Ht 1.6 m (5' 3\")  Wt 58 kg (127 lb 12.8 oz)  SpO2 100%  BMI 22.64 kg/m2    2100-1813: A/Ox4. VSS on RA. Regular diet; poor PO intake. NJ with cycled TF 6279-6008 @ 50mL/hr; tolerating well. Up independently; ambulated in hallway x3. Pt continues to complain of moderate to severe RUQ pain; Flexeril given x1, oxy given x1, and tylenol given x1 with moderate relief. Denies nausea. Suppository given x1 with return; pt had one formed BM. Urine output 725mL. Pt showered. Medial SANTOS with 130mL output. R SANTOS with 5mL output. No IV access; plan to discharge tomorrow.      "
"Problem: Patient Care Overview  Goal: Plan of Care/Patient Progress Review  Outcome: Improving  /76 (BP Location: Left arm)  Pulse 91  Temp 98.3  F (36.8  C) (Oral)  Resp 16  Ht 1.6 m (5' 3\")  Wt 59.5 kg (131 lb 1.6 oz)  SpO2 98%  BMI 23.22 kg/m2    1633-0267: A/Ox4. VSS on RA. Regular diet; poor PO intake. Up independently; ambulated in hallways x4. NJ with cycled TF @ 50mL/hr. Oxycodone given x3 for abdominal pain; moderate relief. Denies nausea. Flexeril given x1 at bedtime. R IJ with Heparin @ 400 units/hr. R SANTOS with 10mL serosanguinous output. Midline SANTOS with 170 milky, pink tinged output. Clamshell incision with sutures; edematous. Plan for discharge tomorrow with TF and coumadin. Will continue with plan of care and notify the team of any acute changes.       "
"Problem: Patient Care Overview  Goal: Plan of Care/Patient Progress Review  Outcome: Improving  /84 (BP Location: Left arm)  Pulse 86  Temp 97.7  F (36.5  C) (Oral)  Resp 16  Ht 1.6 m (5' 3\")  Wt 58.3 kg (128 lb 8.5 oz)  SpO2 99%  BMI 22.77 kg/m2     VSS on RA. Alert and oriented. Up SBA. Pain controlled with scheduled flexeril and PRN Tramadol Q6H and 5mg oxy Q3H. Received 4mg ODT zofran x1 for nausea with good relief. No emesis.  and 108. R I.J with hep gtt @ 400u/hr + TKO. Started on warfarin. NJ with TF increased to 40ml/hr @ 2200, plan to increase TF at 0400 to 45ml/hr. SANTOS x3 with serosang OP. R SANTOS #1 with cloudy pink/serosang OP, VANESSA Castaneda, notified and visualized OP. Continue to monitor color closely. No orders placed. SANTOS dressings changed x1 after pt showered. Incision sutured and KENNETH. Regular diet with poor appetite, pt continues to c/o fullness. Discomfort decreased after passing flatus. Pt signed up for [facility] and encouraged to watch videos. Family and friends at bedside supportive of pt and involved with cares. Will continue to monitor and notify of any changes.       "
"Problem: Patient Care Overview  Goal: Plan of Care/Patient Progress Review  Outcome: Improving  Blood pressure 118/71, pulse 91, temperature 98.4  F (36.9  C), temperature source Oral, resp. rate 16, height 1.6 m (5' 3\"), weight 59.5 kg (131 lb 1.6 oz), SpO2 98 %.  Pt. verbalizing more pain today than yesterday; pain present to right latter side of liver incision.  Pain medication given twice this shift; pt. Also requested Flexeril, which she says is very helpful.  Tolerating a small amount of food at lunch; on calorie counts.  Laxatives given per request. No BM yet this shift.  Medication teaching regarding Warfarin. Pt. Updated her lab book.  Tentative plan for discharge to home tomorrow.      "
"Problem: Patient Care Overview  Goal: Plan of Care/Patient Progress Review  Outcome: Improving  When doing bedside report with previous shift nurse Sherrie was resting quietly. Approximately 20mns later she called and said she was sick on her stomach. I gave her SL Zofran and shortly after that she was c/o \"terrible\" low back pain, then she stated she felt as if her stomach was going to pop. Just prior to her calling out noted she had been up to the bathroom independently. After returning to bed is when she began c/o nausea and back pain. In assessing her pain initially she stated she had never had pain like this before, stating she is feeling like her abdomen is going to pop. She did have very active bowel sounds. Then after further questioning her she stated she has had pain like this, but she feels this to be worse. Had the surgery cross cover come and assess her and I also gave her Oxycodone 5mg, mylanta as well as Zofran for nausea. She walked around in her room to help alleviate her back discomfort. Felt the fullness of her abdomen was due to gas and that is what I gave mylanta for. Later when assessing her she stated her pain was much better. Since she had walked in the halls just to stretch her legs. Will continue to monitor and report any significant changes.      "
"Problem: Patient Care Overview  Goal: Plan of Care/Patient Progress Review  Outcome: No Change  /66 (BP Location: Left arm)  Pulse 86  Temp 98  F (36.7  C) (Oral)  Resp 16  Ht 1.6 m (5' 3\")  Wt 57.5 kg (126 lb 12.8 oz)  SpO2 99%  BMI 22.46 kg/m2    VSS on RA.  120.  Morphine PCA 0.5q15 and scheduled oxycodone given for abdominal pain with some relief. Denies nausea. R.I.J with heparin gtt @ 400units/hr and PCA. NJ with TF @ 25cc/hr. Increasing every 6 hours by 5cc - next increase at 1000. R SANTOS x2 and L SANTOS x1 with serosanguinous OP. Incision c/d/i approximated with staples. Bruner patent with 225cc of UOP. Orders to pull @ 0600 but will wait and talk with team since UOP decreased. No reported Bm but passing gas. Pt walked unit x1 with assist x1. Clear liquid diet. Will continue to monitor. Call light in reach, continue with POC.       "
"Problem: Patient Care Overview  Goal: Plan of Care/Patient Progress Review  Outcome: No Change  /70 (BP Location: Left arm)  Pulse 89  Temp 97.6  F (36.4  C) (Oral)  Resp 18  Ht 1.6 m (5' 3\")  Wt 58 kg (127 lb 14.4 oz)  SpO2 98%  BMI 22.66 kg/m2     VSS on RA. Alert and oriented. Up SBA/ind, ambulating halls. Pt reported increased abdominal pain/\"tightness\" and increased work of breathing. NP notified and came to assess pt. Abdominal xray ordered - results normal. Receiving 5mg oxy Q3H and tylenol Q4H for pain. Pt encouraged not to use abdominal muscles when getting out of bed and encouraged to log roll. Denies nausea.  and 114. R 3L I.J with hep gtt @ 400u/hr + TKO line. Bridging with coumadin. NJ with TF @ 50ml/hr. SANTOS x3, R SANTOS #1 with moderate amounts of cloudy serosang OP, continue to monitor color. Incision sutured and KENNETH. Regular diet with poor appetite. Voiding, loose BM x1. Will continue to monitor and notify of any changes.       "
"Problem: Patient Care Overview  Goal: Plan of Care/Patient Progress Review  Outcome: No Change  /71 (BP Location: Left arm)  Pulse 79  Temp 98.4  F (36.9  C) (Oral)  Resp 16  Ht 1.6 m (5' 3\")  Wt 59.8 kg (131 lb 12.8 oz)  SpO2 98%  BMI 23.35 kg/m2    VSS on RA. Scheduled oxycodone and morphine PCA pump in use 0.5 q15 for abdomina pain with some relief. Denies nausea. R.I.J with heparin gtt @ 400units/hr, PCA, and TKO. Incision sutured, c/d/i. NJ with TF @ 10ml/hr. Bruner patent with adequate UOP. No BM but passing gas. R SANTOS x2 and L SANTOS x1 with serosanguinous OP.  BS 95 114. Up with assist x1 - walked around unit x1. NPO. Family at bedside, supportive. Will continue to monitor. Call light in reach, continue with POC.       "
"Problem: Patient Care Overview  Goal: Plan of Care/Patient Progress Review  Outcome: No Change  /74 (BP Location: Left arm)  Pulse 86  Temp 98.4  F (36.9  C) (Oral)  Resp 16  Ht 1.6 m (5' 3\")  Wt 57.5 kg (126 lb 12.8 oz)  SpO2 98%  BMI 22.46 kg/m2    VSS on RA. Alert and oriented. Up with assist of 1. Abdominal pain moderately controlled with scheduled flexeril and oxy and morphine PCA with 0.5mg bumps Q15min. No c/o nausea.  and 113. 3L I.J with hep gtt @ 400u/hr and morphine PCA. NJ with TF @ 20ml/hr, to be increased to 25ml/hr @ 0400. Incision sutured and KENNETH, no drainage. SANTOS x3 with serosang OP. Bruner with adequate amounts of dannie OP. Plan to remove tomorrow AM @ 0600. Pink lady enema given x1, however pt unable to retain. Small BM x1 and passing flatus. Pt continues to report distention/fullness, mildly improved after passing gas. Ambulated in halls x2. On clear liquid diet with poor appetite d/t feeling full. Intake encouraged. Family and friends at bedside supportive and encouraging of pt. Resting between cares. Will continue to monitor and notify of any changes.       "
"Problem: Patient Care Overview  Goal: Plan of Care/Patient Progress Review  Outcome: No Change  /79  Pulse 86  Temp 98  F (36.7  C) (Oral)  Resp 16  Ht 1.6 m (5' 3\")  Wt 58.3 kg (128 lb 8.5 oz)  SpO2 97%  BMI 22.77 kg/m2    VSS on RA.  103. Oxycodone given x2 and tramadol x1 for abdominal pain. Denies nausea. R I.J with heparin gtt @ 400units/hr.  NJ with TF @ 45cc/hr - due to increase @ 1000 to 50cc/hr (goal). R SANTOS (large bulb) moderate amount of cloudy serosanguinous OP. L and R (small bulb) with small amount of serosanguinous OP. Incision c/d/i approximated with sutures. Voiding, no reported Bm. Regular diet. Up SBA. Will continue to monitor. Call light in reach, continue with POC.       "
"Problem: Patient Care Overview  Goal: Plan of Care/Patient Progress Review  Outcome: No Change  /79 (BP Location: Left arm)  Pulse 83  Temp 98.4  F (36.9  C) (Oral)  Resp 16  Ht 1.6 m (5' 3\")  Wt 58 kg (127 lb 12.8 oz)  SpO2 100%  BMI 22.64 kg/m2    0700- 1530:  Patient VSS on RA; afebrile. Oxy given x2 for abdominal pain, lido patches in place . Tolerating regular diet with fair appetite, isadora counts. internal jugular pulled today. Voiding adequately, no stool today. Incision CDI. SANTOS's with small amount of serosanguineous fluid. Up independently. Lab book and med card reviewed, drain management reviewed. Plan for DC tomorrow.         "
Problem: Liver Transplant (Adult)  Goal: Signs and Symptoms of Listed Potential Problems Will be Absent, Minimized or Managed (Liver Transplant)  Signs and symptoms of listed potential problems will be absent, minimized or managed by discharge/transition of care (reference Liver Transplant (Adult) CPG).   Outcome: Improving  AVSS. Continues to have incisional pain. Denies nausea. Prn Oxycodone given x 2 and prn Flexiril given x 1. Pt requested to take only 5 mg of Flexiril. Voided 450 ml dannie.Ambulated in the hallway x 2. Cycled TF is running.      
Problem: Liver Transplant (Adult)  Goal: Signs and Symptoms of Listed Potential Problems Will be Absent, Minimized or Managed (Liver Transplant)  Signs and symptoms of listed potential problems will be absent, minimized or managed by discharge/transition of care (reference Liver Transplant (Adult) CPG).   Outcome: No Change  Progress is fair. No new issues today.      
Problem: Patient Care Overview  Goal: Discharge Needs Assessment  09/10/18 1548     Soha Bedolla-Registered Nurse (Nursing)  Patient seen at bedside for Warfarin teaching.        
Problem: Patient Care Overview  Goal: Individualization & Mutuality  Outcome: Improving  PT to OR this morning and was closed.  Neuro: 50prop 100fent, still having some breakthrough pain.  Following commands  CV: Sinus rhythm, K was replaced and to be rechecked at 2200.  BP Stable.  Resp: Possible extubation tomorrow.  Lungs are clear.  GI: TF held 2/2 to frequent abd pain when on.  No BM today or yesterday, pt declined stool softeners.  : Good urine output, now on lasix IV push TID.  Skin: Incision with minimal output. 3 JPs    Plan: Monitor for signs of rejections and/or infection.       
Problem: Patient Care Overview  Goal: Individualization & Mutuality  Outcome: Improving  Pt arrived on unit this morning around 0200 with daughter.    Neuro: Pt on propofol 10mg/kg/min and fentanyl 100mcg/hr.  With this sedation pt is at a RASS of 0 - -1 able to move all extremities and follow commands.  Pt reports only pain with movement.  CV: Sinus rhythm with occasional PVCs, K was 3.5 this morning and was replaced with 20meq. Slightly hypotensive when sleeping. CVP 4-8  Resp: Respiratory alkalosis this morning but was corrected by decreasing MV.  PS for 1.5hrs without complication.  Lungs sounds are clear to auscultations.  GI: NJ placed today for TF which will begin when feeding pump arrives.  Pt refused stool softeners this morning.  : Continuing on a lasix gtt at 10mg/hr  Skin: ABthera running without complication.    Plan: Possible OR tomorrow for attempt at closer.  Continue to monitor for signs of rejection or bleeding.      
Problem: Patient Care Overview  Goal: Plan of Care/Patient Progress Review    4A: Cxl - Per RN, pt with open abdomen - possible closure tomorrow; will f/u Sunday 9/2 and initiate therapy eval as appropriate.      
Problem: Patient Care Overview  Goal: Plan of Care/Patient Progress Review    AVSS, reporting pain, and states some relief with current pain regimen (morphine PCA, scheduled oxycodone/flexeril, and prn tylenol).  Patient up ambulating in hanna with standby assist, sanchez with good output, BM and passing flatus after suppository, JPs x3 to bulb suction with fair output, tube feeds remain at 10ml/hr, and tolerating small amount of clears.  Med card and lab book updated.  Scheduled meds given as ordered.  Continue to monitor, treat as ordered, notify team with changes.        
Problem: Patient Care Overview  Goal: Plan of Care/Patient Progress Review    AVSS, reporting pain, states somewhat better pain relief with current pain regimen (Morphine PCA, scheduled oxycodone/flexeril).  Blood gibrqiaf=702, 170  Patient rested in bed most of shift, now upright in chair with  at bedside.  Patient's sanchez patent with good output (sanchez cares performed), no flatus no bowel movement even after suppository, NPO with tube feeds at 10ml/hr, and JPs to bulb suction with fair output (site cleansed and dressings changed).  Med card and lab book updated.  Potassium=3.2, replaced per protocal, and recheck scheduled for 1400.  Hgb=7.1, one unit PRBCs currently infusing and thus far without issue.  Patient to receive 20mg IV lasix post-transfusion.  Abdominal xray completed to evaluate distension/pain (results pending).  Heparin gtt straight rated 400units/hr.  Scheduled meds given as ordered.  Continue to monitor, treat as ordered, notify team with changes.        
Problem: Patient Care Overview  Goal: Plan of Care/Patient Progress Review    Patient up from 4A this shift. All vital signs stable. Incisional pain adequately controlled with PCA fentanyl and PRN oxycodone. Patient complaining of twitching and cramping muscles. MD guzman, EKG normal, labs drawn, IV calcium gluconate given per orders with relief. Heparin drip running at 400 units/hr, tube feeds at 10 mL/hour.  Adequate urine output via Bruner, positive bowel sounds, no BM this shift. Family at bedside supportive of patient.      
Problem: Patient Care Overview  Goal: Plan of Care/Patient Progress Review  8887-3398: AVSS on RA. Lactic acid triggered overnight d/t WBC count, resulted 0.9. Pain continued throughout the shift, receiving 7.5mg oxycodone x1, 5mg oxycodone x2, and flexeril x1. Lidocaine patches in use during the day, removed for this shift. Pt's CVC triple lumen dressing changed after it got wet in the shower at shift change. Brown lumen infusing Heparin at 400U/hr with TKO 1/2NS carrier. NJ running tube feeds at 50ml/hr goal with 30ml q4h water flushes; cycled from 8919-8071. Regular diet with poor intake. Adequate urine output, no BM overnight. SANTOS #1 output 190ml, SANTOS #2 output ~45ml. Friend at bedside until had to leave for airport at 0400. Up SBA-independent for line management. Appeared to sleep well. Will continue to monitor and follow POC.        
Problem: Patient Care Overview  Goal: Plan of Care/Patient Progress Review  9776-4916: AVSS on RA. Pt wanted to promote sleep overnight, so plan was made to divert 0400 vitals. K+ was 3.1 upon admission, given a total of 60mEq PO per PRN protocol. K+ recheck ordered for this AM. Denies pain and nausea. Slept fairly well overnight. Will be set up for an AM and late AM shower in preparedness for surgery. Will continue to monitor and follow POC.        
Problem: Patient Care Overview  Goal: Plan of Care/Patient Progress Review  Discharge Planner OT    4A  Patient plan for discharge: Home w/ assist PRN; Refuses TCU or ARU at this time  Current status: Initial eval complete and treatment initiated. Pt educated on abdominal precautions and issued handout. Pt supine <> EOB min-mod A x2 w/ mod v/c to log roll. Pt STS x2 min A x2. Pt ambulated ~50' CGA using FWW with w/c follow. Pt required mod v/c to relax shoulders and to keep eyes open for safe ambulation. Pt returned to lying supine in bed min A x2.   Barriers to return to prior living situation: medical status, abdominal precautions, pain, decreased activity tolerance for ADL/IADL IND  Recommendations for discharge: TCU at this time, however, anticipate that patient will progress well with therapy and home w/ assist may be appropriate.   Rationale for recommendations: Pt to benefit from additional skilled OT intervention to progress activity tolerance and maximize ADL/IADL IND and safety.        Entered by: Sherri Magallanes 09/02/2018 2:34 PM           
Problem: Patient Care Overview  Goal: Plan of Care/Patient Progress Review  Discharge Planner OT   7A   Patient plan for discharge: home w A  Current status: Pt progressing with independence and activity tolerance for ADLs and IADLs. Limited by pain and fatigue. Educated re strategies for lower body dressing and energy conservation with home management.  Pt desires one last OT session to reinforce learning.   Barriers to return to prior living situation: pain, precautions  Recommendations for discharge: home w A  Rationale for recommendations: A with heavy adls and IADLs,  and sister available to assist PRN       Entered by: Maribel Houston 09/06/2018 10:38 AM           
Problem: Patient Care Overview  Goal: Plan of Care/Patient Progress Review  Discharge Planner OT   Patient plan for discharge: Not discussed this date.   Current status: CGA for STS transfers with cues for technique and hand placement.  Max A for clothing management during toileting, SBA for standing jesus cares.  Facilitated functional mobility ~175' with SBA/CGA + FWW + w/c follow, requires continuous VCs to keep eyes open and practice PLB for pain management.  AVSS on RA.   Barriers to return to prior living situation: Post-surgical precautions, pain, functional endurance.   Recommendations for discharge: TCU at this time pending progression in therapies during hospitalization.   Rationale for recommendations: To maximize safety and IND with I/ALDs and functional mobility prior to return home.        Entered by: Seth Valentino 09/03/2018 4:48 PM           
Problem: Patient Care Overview  Goal: Plan of Care/Patient Progress Review  Discharge Planner PT   Patient plan for discharge: Home with assist  Current status: Pt ambulates no AD SBA x250' with very mild gait instability. She is IND in transfers throughout session and participates in aerobic training on NuStep x15 min with no resistance (low platelets today) and OMNI effort rating 4-7 throughout. HR 90s-110s with activity, oxygen >90% throughout. Pt and  educated on energy conservation techniques during exercise.  Barriers to return to prior living situation: medical needs, reduced activity tolerance  Recommendations for discharge: Home with assist + OP PT  Rationale for recommendations: Pt would benefit from ongoing therapy to address higher level balance, weakness, and activity tolerance deficits to progress towards PLOF as she was previously unlimited community ambulator.       Entered by: Sherri Mcdonald 09/07/2018 4:24 PM           
Problem: Patient Care Overview  Goal: Plan of Care/Patient Progress Review  Discharge Planner PT  7A   Patient plan for discharge: Home  Current status: Pt reporting significant abdominal pain though agreeable to participate. Pt performs supine>sit with Russell at trunk, SBA sit<>stand. Pt ambulates 500' with SBA and FWW, very slow speed and flexed posture due to pain. Education on breathing and pacing techniques provided throughout. Pt significantly limited by pain at this date, RN notified.  Barriers to return to prior living situation: Medical status, pain  Recommendations for discharge: Anticipate pt will be appropriate to discharge home with assist   Rationale for recommendations: Pending improved pain control and progress with therapies, pt likely to be appropriate to return home. Per pt, she has good support of family who are able to assist as needed.       Entered by: Jyoti Fine 09/05/2018 1:19 PM           
Problem: Patient Care Overview  Goal: Plan of Care/Patient Progress Review  Discharge Planner PT  7A  Patient plan for discharge: Home  Current status: PT eval complete, treatment initiated. Pt performs supine>sit with CGA at trunk, cues for log roll technique with further education on abdominal precautions provided. Sit<>stand SBA with FWW. Pt ambulates 200' with FWW and CGA-SBA, very slow speed and cues required to keep eyes open throughout. Pt notes she closes her eyes as a coping technique, educated on importance of keeping eyes open for safety and balance. Pt limited by pain throughout session.  Barriers to return to prior living situation: Medical status, decreased activity tolerance, level of assist, pain  Recommendations for discharge: Currently recommend TCU though pt likely able to progress to home with assist pending progress in therapies during IP stay  Rationale for recommendations: Pt would benefit from continued therapy services to progress strength, endurance, and safety and independence with functional mobility prior to return home. Pending LOS, progress with therapies, and pain control, pt may be appropriate to return home with assist. Will continue to update recommendations as appropriate.        Entered by: Jyoti Fine 09/04/2018 10:09 AM           
Problem: Patient Care Overview  Goal: Plan of Care/Patient Progress Review  Discharge Planner PT 7A  Patient plan for discharge: home  Current status: pt mobilizing well, completing bed mobility, functional transfers and ambulation with Mod I; below baseline performance and tolerance of ambulation, reporting much fatigue. Completes x 8 stairs with BUE rail support and SBA, ambulates 200 + 350'.  Barriers to return to prior living situation: activity tolerance  Recommendations for discharge: home with assist, OP PT  Rationale for recommendations: pt may benefit from OP PT to progress activity tolerance.        Entered by: Bj Szymanski 09/06/2018 11:40 AM         
Problem: Patient Care Overview  Goal: Plan of Care/Patient Progress Review  EDEMA 7A: Orders received for lymphedema evaluation. Pt has 1+ pitting edema in RLE. LEs significantly reduced compared to prior to liver transplant. Pt previously used over the counter compression stockings. RLE edema not limiting mobility/causing pain. Currently recommend conservative management-ankle pumps/mobility, LE elevation. Anticipating discharge form hospital soon, encouraged use of compression stockings post discharge if RLE edema continues. Lymphedema order completed.       
Problem: Patient Care Overview  Goal: Plan of Care/Patient Progress Review  OT 7A:  Discharge Planner OT   Patient plan for discharge: home  Current status: Pt feeling more confident with ADL/IADL this date, showered this am and reports it went well.  Understanding EC and abdominal precautions.  Has met all OT goals, ok with dc.   Barriers to return to prior living situation: medical status, precautions  Recommendations for discharge: home with A  Rationale for recommendations: A for IADL 2/2 precautions.  Discussed supervision for shower at first if pt feeling unsteady.   and Sister able to assist prn.       Entered by: Clarissa Rodrigez 09/07/2018 5:06 PM     Occupational Therapy Discharge Summary    Reason for therapy discharge:    All goals and outcomes met, no further needs identified.    Progress towards therapy goal(s). See goals on Care Plan in [facility] electronic health record for goal details.  Goals met    Therapy recommendation(s):    No further therapy is recommended.      Problem: OT General Care Plan  Goal: Lower Body Dressing (OT)  Lower Body Dressing (OT)   Outcome: Adequate for Discharge Date Met: 09/07/18  OT: Pt consistently needing min A for threading pants 2/2 abdominal precautions.  Has been educated in use of LH reacher but is not interested.  Plans to have A with task at home.       
Problem: Patient Care Overview  Goal: Plan of Care/Patient Progress Review  OT/7A - CANCEL. Pt is pre-op for liver transplant. Will reschedule and initiate OT post-op as appropriate.      
Problem: Patient Care Overview  Goal: Plan of Care/Patient Progress Review  OT/7A:     Discharge Planner OT   Patient plan for discharge: Pt hopeful she will be feeling well enough to discharge to home  Current status: Facilitated ADLs and functional mobility with training in abdominal precautions to increase tolerance and independence with daily activity. Completes LB dressing with SBA, ambulating 600 feet with fww and SBA, bed mobility with log rolling and SBA. Pt guarded with activity due to pain.   Barriers to return to prior living situation: Medical readiness, pain  Recommendations for discharge: Anticipate home with assist   Rationale for recommendations: Currently performing at SBA level for ADLs and functional mobility. Spouse and sister able to assist prn in home environment.        Entered by: Maribel Rangel 09/05/2018 10:59 AM           
Problem: Patient Care Overview  Goal: Plan of Care/Patient Progress Review  Outcome: Improving  AVSS on RA. Pain moderately controlled with PRN Tramadol, Oxy and Tylenol this afternoon after Morphine PCA DC'd. Pain consult placed. Denies nausea. TF via NJ advanced at 1000 to 30 mL/hr. To be advanced at 1600 to 35 mL/hr. Regular diet- poor appetite but PO intake encouraged.  and 166 corrected per orders. K+ 3.3 replaced with 40 mEq PO. Recheck in AM. Heparin gtt straight rated at 400 U/hr. Bruner removed this AM- voiding and PVR negative. SANTOS x3 with serosang OP. Patient refusing pink lady enema but agreeable to suppository- administered and small result but patient is passing large amounts of gas. Incision with sutures and intact with slight edema/erythema around site. Up with SBA. Up in hanna and working with PT/OT today. Will continue with plan of care.      
Problem: Patient Care Overview  Goal: Plan of Care/Patient Progress Review  Outcome: Improving  AVSS on RA. Pain well controlled with PRN Oxy x2, Tramadol (now DC'd) and Flexeril (changed from scheduled to PRN). Denies nausea. Regular diet with poor/fair appetite- PO intake encouraged.  and 120 not requiring correction. TF at 50 mL/hr goal via NJ. Heparin gtt infusing at 400 U/hr via TL CVC. TKO with abx. SANTOS x3 with serosang OP. Voiding adequate amounts. BM x1 this afternoon. Patient very motivated- up ambulating with PT/OT and outside with family. Med card/lab book up to date and reviewed with patient and family. Will continue to monitor and treat per plan of care.       
Problem: Patient Care Overview  Goal: Plan of Care/Patient Progress Review  Outcome: Improving  AVSS on RA. Pain well controlled with PRN Oxycodone, Tylenol and Flexeril. Denies nausea. Regular diet with poor/fair appetite. Calorie counts recorded. BG 94 this AM- order to BG checks and insulin DC'd. NJ with TF at 50 mL/hr goal. Voiding adequate amounts. BM x1 this shift. SANTOS x3 with serosang OP. Heparin gtt infusing at 400 U/hr. Micafungin given per orders. Hgb drop this AM 7.7 from 8.3 yesterday. Recheck this afternoon 7.9. Bruising noted on outside of right abdomen- outlined and monitored for change throughout day; has not spread outside lines. Suzanna Cui, NP aware. Patient unable to make it d/t PLC for enteral feeding education today d/t 'inability to concentrate'. Rescheduled per patient request to Sunday at 0900- patient and  aware. Incision with sutures and intact with slight erythema. Patient motivated and walking halls multiple times today. Up with SBA. Will continue with plan of care.       
Problem: Patient Care Overview  Goal: Plan of Care/Patient Progress Review  Outcome: Improving  Afebrile; BP's 90's-120's/60-70's. Otherwise vitally stable on RA. Pain well controlled with Morphine PCA set at 0.5 mg Q15min, scheduled Oxy and Flexeril. Mirapex ordered for restless legs with relief. Denies nausea. TF at 10 mL/hr via NJ.  and 104. Heparin gtt at 400 U/hr via TL CVC. Bruner with good UOP. Large BM x1 this evening. SANTOS 1: 105 mL out. SANTOS 2: 45 mL out. SANTOS 3: 70 mL out. K+ recheck 4.1 after replacement this afternoon. Up with Ax1-2. Ambulated unit x2 this evening and encouraged to sit up in chair. Family at bedside; involved in cares. Will continue with plan of care.       
Problem: Patient Care Overview  Goal: Plan of Care/Patient Progress Review  Outcome: Improving  D/I: FiO2 30% on vent. Lungs mildly coarse. SR. MAPs 60's with SBP . Hgb last 8.7. CI 3-4. Plt's 45 and unit transfused. Urine output 40-50cc/hour. Mildly edematous. Abdomen incision with no drainage. SANTOS's with minimal. Pt c/o abdominal tightness every time asked.  Able to write words with fentanyl at 100 mcg, propofol 20-40 mcg, and oxycodone times one.   P: Likely extubation today and removal of MAC lines.       
Problem: Patient Care Overview  Goal: Plan of Care/Patient Progress Review  Outcome: Improving  VSS. No BGs. Pain was noted to be improved on Flexeril. Received x2 dose of oxycodone and Tylenol x1 prn. No nausea. Poor appetite. Regular diet, tube feeding @ goal of 50 ml/hr with q4H water flushes. RIJ - TKO and Heparin straight rated @ 400 units/hour. SANTOS drain x3 (Left: 60ml; Right medial 120ml;Right lateral: 10ml). Voided 250 ml and regular BM. Ambulates independently.    
Problem: Patient Care Overview  Goal: Plan of Care/Patient Progress Review  Outcome: No Change    RN:  AVSS, abdominal controlled with PCA Fentanyl @20mcg every 10mins interval and PRN Oxycodone given x2 with relief. MIV1/2NS@50cc/hr, Heparin gtt @400units/hr, straight rated. TF @10cc/hr, team will increase TF. Right x2 and left x1 JPs with small amount serosanguinous output. Bruner catheter with adequate urine output after Lasix, no BM, not passing gas. Patient resting between cares, will continue to monitor.      
Problem: Patient Care Overview  Goal: Plan of Care/Patient Progress Review  Outcome: No Change   09/08/18 1832   OTHER   Plan Of Care Reviewed With patient   Plan of Care Review   Progress no change     1530 - 1900  Neuro: A&Ox4.   Cardiac: SR. VSS.   Respiratory: Sating 95% on RA.  GI/: Adequate urine output. BM X1  Diet/appetite: Tolerating regular diet. Eating well. The new tube feeding order will start at 2000 till 1400 (18 hours cycle). Pt will have patient learning center staff in room tomorrow for tube teaching.   Activity:  Independent with ambulation up to chair,bathroom, in halls and off the unit.  Pain: At acceptable level on current regimen.   Skin: No new deficits noted.  LDA's:Heparin is infusing at 400 unit per hour.  Plan: Continue with POC. Notify primary team with changes.        
Problem: Patient Care Overview  Goal: Plan of Care/Patient Progress Review  Outcome: No Change  Afebrile, OVSS. Pain in right side of abdomen and on incision treated with oxycodone 5-7.5mg q 3 hours, smaller dose of flexiril (5mg) dose given per pt request during the day to prevent lethargy. Tylenol given once as well as lidocaine patches in place. TF's continue at goal rate of 50cc/hour, ate a good breakfast but little appetite for lunch. Did have a breeze supplement. Voiding dark dannie urine. Did have a large BM after suppository this afternoon, abdominal pain lessened with BM. SANTOS drains X 3, medial right drain with moderate output, other two drains with little. Heparin gtt continues at 400 units/hour. Sherrie will have PLC in room tomorrow at 9am.       
Problem: Patient Care Overview  Goal: Plan of Care/Patient Progress Review  Outcome: No Change  Afebrile, slightly hypotensive per baseline. TFs now cycled over 12 hours from 8p-8am. Sherrie was able to eat small amts of meals today, still having some lack of appetite. No BM today, is passing lots of gas and will take a suppository later if needed. Voiding small amts, dark dannie, getting scheduled bumex po. SANTOS X 2, medial right SANTOS with pinky cloudy output, sent for triglyceride fluid level.  Oxycodone 5 mg once and tylenol once with adequate pain relief. Hep gtt continues at 400 units/hour. Did have PLC education at bedside for NJ management, medication and tube feed administration. Plan for discharge tomorrow or Tuesday.       
Problem: Patient Care Overview  Goal: Plan of Care/Patient Progress Review  Outcome: No Change  D/I: Patient on 4A Surgical/Neuro ICU  Neuro: Moves all extremities purposefully. Follows commands and is able to write to communicate. Baseline numbness in hands and feet. Still slight sedated on 8 of Propofol.   CV: HR NSR with rare PVCs in 80s. MAPs remained above 65 throughout shift. Afebrile. CVP 8.   Pulm: Lungs are clear and equal bilaterally. , 30%, 5, 18. Scant thick secretions when suctioned down ETT.   GI: Refused bowel meds this evening. No BM this shift. Did not tolerate TF this evening.   : Bruner draining adequate UO without lasix drip.   Gtts: Propofol @ 8, Insulin @ 1, Fentanyl @ 100, Heparin @ 400.   Pain: In abdomen with movement.   See flow sheets for further interventions and assessments.  A: Stable  P: Plan to go to OR for washout and possible closure today. Continue to monitor pt closely. Notify MD of significant changes.       
Problem: Patient Care Overview  Goal: Plan of Care/Patient Progress Review  Outcome: No Change  VS: VSS on RA, afebrile.   Mental Status: A&Ox4  Cardiac: WNL, denies chest pain, dependent edema present  Respiratory: Lung sounds clear, diminished, bilaterally - + wet, congested cough, no sputum noted, SOB with activity   GI/: low urine output - bladder scanned 114, Suzanna, transplant NP notified, see provider notification note, abdomen distended, firm to touch, + bowel tones, denies nausea, no BM this shift. Surgical incision, CDI with sutures, no drainage, KENNETH  Pain: + abdominal pain, controlled with PRN oxycodone, tylenol and flexeril   Diet: regular diet with poor PO intake, continuous tube feeding at goal rate   Mobility: Up with SBA provided by family/friend in room, ambulated nursing unit x1  Treatment: Continue micafungin for candida infection, pain management, and nutrition   DC plan: TBD        
Problem: Patient Care Overview  Goal: Plan of Care/Patient Progress Review  Outcome: No Change  VS: VSS on RA. Afebrile.   Mental Status: A&Ox4  Cardiac: WNL, denies chest pain   Respiratory: LS clear, diminished in bases, bilaterally, + weak/nonproductive cough present, denies SOB  GI/: voiding adequate amount urine per pt, only 1 void measured. Abdomen distended, firm, + bowel tones, + flatus, PRN suppository given x1, no BM, + nausea, PRN zofran given x1 with relief, surgical site CDI w/ sutures, KENNETH. 2x R abdomen SANTOS's. Shift totals documented.   Pain: + R side abdominal pain, PRN oxycodone (x2), flexeril (x1), and tylenol (x1) given with relief of pain  Diet: regular diet poor PO intake, cyclic tube feed 8pm-8am.   Mobility: Independent, ambulated hallways x2  Treatment: Continue PO coumadin for portal vein thrombosis, pain management, close laboratory monitoring   DC plan: TBD, will discharge home        
Problem: Patient Care Overview  Goal: Plan of Care/Patient Progress Review  Outcome: No Change  VSS, on RA. Oxycodone x 2 and flexeril x1 for pain. No c/o nausea, but no appetite this evening, ordered dinner, but didn't eat anything. TF remain at 50cc/hr. SANTOS's with small output. Heparin gtt remains at 400 units/ hr. Voiding adequately--urine histoplasma sent. BMx1 this shift. UAL in room. Ambulated in hanna x1.       
Problem: Patient Care Overview  Goal: Plan of Care/Patient Progress Review  PT evaluation cx and on hold. OT completed evaluation and will follow patient for ADLs and mobility including ambulation.  Mobility limited primarily by pain. OT to advise if additional PT intervention indicated to address functional mobility.       
Problem: Patient Care Overview  Goal: Plan of Care/Patient Progress Review  PT evaluation cx, patient with open abdomen, will delay evaluation until after closure.       
Problem: Patient Care Overview  Goal: Plan of Care/Patient Progress Review  PT-7A: pt demonstrates independence with bed mobility, transfers & gait without device on level & stairs, maintaining precautions with all mobility skills. Independent with HEP.     Physical Therapy Discharge Summary    Reason for therapy discharge:    All goals and outcomes met, no further needs identified.    Progress towards therapy goal(s). See goals on Care Plan in [facility] electronic health record for goal details.  Goals met    Therapy recommendation(s):    Continue home exercise program.        
Problem: Patient Care Overview  Goal: Plan of Care/Patient Progress Review  PT/7A - CANCEL. Pt is pre-op for liver transplant. Will reschedule and initiate PT post-op as indicated.      
Problem: Patient Care Overview  Goal: Plan of Care/Patient Progress Review  Patient was extubated at 1015 this morning. She was placed on 5 liters nasal cannula and reduced to 2L and then room air. Her Fentanyl gtt and propofol gtt were discontinued and patient was placed on a Fentanyl PCA along with capnography. Her swan was Discontinued and her IV lines were DC'd and rewired to a Right internal jugular  triple lumen. Patient was up and walked in the hallway with OT. INsulin gtt DC'd and changed to sliding scale. Tube feedings were initiated at 10 ml per hour and will not be increased. Patient ans family aware of the current plan of care. Refer to flow sheets for further details.       
Yes

## 2023-02-28 DIAGNOSIS — G89.29 CHRONIC NEUROPATHIC PAIN: ICD-10-CM

## 2023-02-28 DIAGNOSIS — M79.2 CHRONIC NEUROPATHIC PAIN: ICD-10-CM

## 2023-02-28 RX ORDER — GABAPENTIN 300 MG/1
CAPSULE ORAL
Qty: 180 CAPSULE | Refills: 3 | Status: SHIPPED | OUTPATIENT
Start: 2023-02-28 | End: 2024-05-22

## 2023-03-13 DIAGNOSIS — R79.89 ELEVATED LFTS: ICD-10-CM

## 2023-03-13 RX ORDER — TACROLIMUS 1 MG/1
CAPSULE ORAL
Qty: 120 CAPSULE | Refills: 3 | Status: SHIPPED | OUTPATIENT
Start: 2023-03-13 | End: 2023-07-07

## 2023-03-29 ENCOUNTER — MYC MEDICAL ADVICE (OUTPATIENT)
Dept: ORTHOPEDICS | Facility: CLINIC | Age: 68
End: 2023-03-29
Payer: COMMERCIAL

## 2023-03-29 DIAGNOSIS — Z96.651 STATUS POST TOTAL RIGHT KNEE REPLACEMENT: ICD-10-CM

## 2023-03-29 RX ORDER — AMOXICILLIN 500 MG/1
CAPSULE ORAL
Qty: 4 CAPSULE | Refills: 4 | Status: SHIPPED | OUTPATIENT
Start: 2023-03-29 | End: 2024-03-04

## 2023-05-09 ENCOUNTER — OFFICE VISIT (OUTPATIENT)
Dept: DERMATOLOGY | Facility: CLINIC | Age: 68
End: 2023-05-09
Payer: COMMERCIAL

## 2023-05-09 DIAGNOSIS — L58.9 RADIATION DERMATITIS: Primary | ICD-10-CM

## 2023-05-09 DIAGNOSIS — L82.0 INFLAMED SEBORRHEIC KERATOSIS: ICD-10-CM

## 2023-05-09 DIAGNOSIS — L25.9 CONTACT DERMATITIS, UNSPECIFIED CONTACT DERMATITIS TYPE, UNSPECIFIED TRIGGER: ICD-10-CM

## 2023-05-09 PROCEDURE — 17110 DESTRUCTION B9 LES UP TO 14: CPT | Performed by: DERMATOLOGY

## 2023-05-09 PROCEDURE — 99213 OFFICE O/P EST LOW 20 MIN: CPT | Mod: 25 | Performed by: DERMATOLOGY

## 2023-05-09 ASSESSMENT — PAIN SCALES - GENERAL: PAINLEVEL: NO PAIN (0)

## 2023-05-09 NOTE — LETTER
5/9/2023       RE: Sherrie Lala  [address] [facility] [id number]     Dear Colleague,    Thank you for referring your patient, Sherrie Lala, to the [facility] [place] at [facility]. Please see a copy of my visit note below.    [facility] Dermatology Note  Encounter Date: May 9, 2023  Office Visit     Dermatology Problem List:    1. FBSE 5/9/2023  - History of liver transplant in August, 2018 on tacrolimus    2. Pruritus, chest    3. 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 Radiation dermatitis  - Right mid-back biopsy 7/6/2021  - ILK 10 5/17/2022  - Treating with clobetasol ointment 0.05%    5. Benign nevus, right medial cheek  - Biopsy done 11/2/2021    6. 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 Suspect that the ointment has not been penetrating the skin and recommended applying barrier to help it absorb better and prevent scratching    # Pruritus, chest; possibly related to a new topical item which may be causing irritation  - dermatographism test negative, no scaling on exam  - discussed trying antihistamine at night     # Seborrheic keratoses, inflamed  - chronic uncomplicated  - location: right temple, left shoulder; left back close to the midline  - number: 2  - benign nature reviewed     # History of liver transplant in 2018 on tacrolimus  - return in 1 year for FBSE  - sun protection: Counseled SPF30+ sunscreen, UPF clothing, sun avoidance, tanning bed avoidance.  - monitor: Patient to continue monitoring at home and will contact the clinic for any changes.    Procedures Performed:   - Cryotherapy  procedure note, locations: left back x 1 and right clavicle x 1. After verbal consent and discussion of risks and benefits including, but not limited to, dyspigmentation/scar, blister, and pain, 2 x lesions were treated with 1-2 mm freeze border for 1-2 cycles with liquid nitrogen. Post cryotherapy instructions were provided.  - Procedure performed by faculty and medical student    Follow-up: 1 year in-person or earlier for new or changing lesions    Staff and Medical Student:   Jing Elias, MS1    Staff Physician:  I was present with the medical student who participated in the service and in the documentation of the note. I have verified the history and personally performed the physical exam and medical decision making. I agree with the assessment and plan of care as documented in the note.     Eldon Goetz MD  Staff Dermatologist and Dermatopathologist  , Department of Dermatology   ____________________________________________    CC: Skin Check (FBSC, spots of concern on back and R side of neck.)    HPI:  Ms. Sherrie Lala is a 68 year old female with a history of liver transplant in 2018 and radiation dermatitis on her back who presents today as a return patient for FBSE. She has recently had some issues with itchy skin, primarily on her chest in between her breasts. This started 2 weeks ago and is occasionally associated with a raised, blotchy rash which has now subsided. The itching is especially worse at night but she is able to sleep through it and does not wake her up. Over the 2 weeks she applied her prescribed clobetasol ointment on the area 3 times without much relief so she stopped trying it. During initial interview could not recall new changes to skin regimen but later recalled that she has been using a new body spray after showering recently. Additionally, she has multiple new skin tags on her clavicle and under her breasts. Also has an itchy SK on her back which is  "\"unsightly\" and is interested in getting it removed today.     Her back is still persistently thickened and itchy and needs to scratch it frequently using a back scratcher. Her  has been applying the clobetasol ointment on her back consistently every night for the past week but patient does not feel like this helps much. Liver transplant course has been going well and she feels great. She uses sunscreen and a hat while she gardens outside. Patient is otherwise feeling well, without additional skin concerns.    Labs:  None reviewed.    Physical Exam:  Vitals: There were no vitals taken for this visit.  SKIN: Waist-up skin, which includes the head/face, neck, both arms, chest, back, abdomen, digits and/or nails was examined.  - Regular papule identified on the left proximal shoulder.   - Right scapula with scaly hypopigmented indurated plaque with irregular telangiectatic borders  - Central back: thin ovoid hypopigmented plaque  - Multiple waxy, stuck on appearing papules and plaques of face, arms, trunk; around shirt line there is erythematous base  - Previous site of skin cancer examined with no evidence of recurrence  - No other lesions of concern on areas examined    Medications:  Current Outpatient Medications   Medication    acetaminophen (TYLENOL) 500 MG tablet    amLODIPine (NORVASC) 5 MG tablet    amoxicillin (AMOXIL) 500 MG capsule    aspirin 81 MG EC tablet    gabapentin (NEURONTIN) 300 MG capsule    levothyroxine (SYNTHROID/LEVOTHROID) 125 MCG tablet    omeprazole 20 MG tablet    pramipexole (MIRAPEX) 0.25 MG tablet    tacrolimus (GENERIC EQUIVALENT) 1 MG capsule    zolpidem (AMBIEN) 10 MG tablet    albuterol (PROAIR HFA/PROVENTIL HFA/VENTOLIN HFA) 108 (90 Base) MCG/ACT inhaler    calcium carbonate 1250 MG/5ML SUSP suspension    calcium carbonate 600 mg-vitamin D 400 units (CALTRATE) 600-400 MG-UNIT per tablet     No current facility-administered medications for this visit.      Past Medical " History:   Patient Active Problem List   Diagnosis    Age-related osteoporosis without current pathological fracture    Liver transplanted (H)    Immunosuppressed status (H)    Primary localized osteoarthritis of right knee    Malignant neoplasm of thyroid gland (H)    Chronic diarrhea    Health care maintenance     Past Medical History:   Diagnosis Date    Acute deep vein thrombosis (DVT) of right lower extremity (H) 04/02/2021    Antiplatelet or antithrombotic long-term use     Asthma     Bleeding esophageal varices (H) 03/06/2018    Cancer (H) 07/06/2021    Cholangiocarcinoma (H) 06/2014    Cholangiocarcinoma metastatic to liver (H) 07/06/2021    Cirrhosis of liver with ascites (H) 05/10/2018    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) 09/12/2018    Repaired in OR 9/1/18    Encounter for pleural drainage tube placement     Esophageal varices with hemorrhage (H)     Gastric ulcer     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

## 2023-05-09 NOTE — NURSING NOTE
Dermatology Rooming Note    hSerrie Lala's goals for this visit include:   Chief Complaint   Patient presents with     Skin Check     FBSC, spots of concern on back and R side of neck.     Hattie Amaro

## 2023-05-26 ENCOUNTER — OFFICE VISIT (OUTPATIENT)
Dept: GASTROENTEROLOGY | Facility: CLINIC | Age: 68
End: 2023-05-26
Attending: INTERNAL MEDICINE
Payer: MEDICARE

## 2023-05-26 ENCOUNTER — LAB (OUTPATIENT)
Dept: LAB | Facility: CLINIC | Age: 68
End: 2023-05-26
Attending: INTERNAL MEDICINE
Payer: COMMERCIAL

## 2023-05-26 VITALS
OXYGEN SATURATION: 100 % | WEIGHT: 130.9 LBS | HEART RATE: 61 BPM | BODY MASS INDEX: 22.47 KG/M2 | DIASTOLIC BLOOD PRESSURE: 83 MMHG | SYSTOLIC BLOOD PRESSURE: 134 MMHG | TEMPERATURE: 97.8 F

## 2023-05-26 DIAGNOSIS — M81.0 AGE-RELATED OSTEOPOROSIS WITHOUT CURRENT PATHOLOGICAL FRACTURE: ICD-10-CM

## 2023-05-26 DIAGNOSIS — Z94.4 LIVER REPLACED BY TRANSPLANT (H): ICD-10-CM

## 2023-05-26 DIAGNOSIS — Z94.4 LIVER REPLACED BY TRANSPLANT (H): Primary | ICD-10-CM

## 2023-05-26 LAB
ALBUMIN SERPL BCG-MCNC: 4.4 G/DL (ref 3.5–5.2)
ALP SERPL-CCNC: 92 U/L (ref 35–104)
ALT SERPL W P-5'-P-CCNC: 8 U/L (ref 10–35)
ANION GAP SERPL CALCULATED.3IONS-SCNC: 10 MMOL/L (ref 7–15)
AST SERPL W P-5'-P-CCNC: 17 U/L (ref 10–35)
BILIRUB DIRECT SERPL-MCNC: <0.2 MG/DL (ref 0–0.3)
BILIRUB SERPL-MCNC: 0.6 MG/DL
BUN SERPL-MCNC: 23.3 MG/DL (ref 8–23)
CALCIUM SERPL-MCNC: 8.9 MG/DL (ref 8.8–10.2)
CHLORIDE SERPL-SCNC: 105 MMOL/L (ref 98–107)
CREAT SERPL-MCNC: 1.07 MG/DL (ref 0.51–0.95)
DEPRECATED HCO3 PLAS-SCNC: 25 MMOL/L (ref 22–29)
ERYTHROCYTE [DISTWIDTH] IN BLOOD BY AUTOMATED COUNT: 13.1 % (ref 10–15)
GFR SERPL CREATININE-BSD FRML MDRD: 57 ML/MIN/1.73M2
GLUCOSE SERPL-MCNC: 95 MG/DL (ref 70–99)
HCT VFR BLD AUTO: 42.3 % (ref 35–47)
HGB BLD-MCNC: 13.8 G/DL (ref 11.7–15.7)
MCH RBC QN AUTO: 28.8 PG (ref 26.5–33)
MCHC RBC AUTO-ENTMCNC: 32.6 G/DL (ref 31.5–36.5)
MCV RBC AUTO: 88 FL (ref 78–100)
PLATELET # BLD AUTO: 153 10E3/UL (ref 150–450)
POTASSIUM SERPL-SCNC: 4.4 MMOL/L (ref 3.4–5.3)
PROT SERPL-MCNC: 7.3 G/DL (ref 6.4–8.3)
RBC # BLD AUTO: 4.8 10E6/UL (ref 3.8–5.2)
SODIUM SERPL-SCNC: 140 MMOL/L (ref 136–145)
TACROLIMUS BLD-MCNC: 3.8 UG/L (ref 5–15)
TME LAST DOSE: ABNORMAL H
TME LAST DOSE: ABNORMAL H
WBC # BLD AUTO: 5.8 10E3/UL (ref 4–11)

## 2023-05-26 PROCEDURE — 85027 COMPLETE CBC AUTOMATED: CPT | Performed by: PATHOLOGY

## 2023-05-26 PROCEDURE — 82248 BILIRUBIN DIRECT: CPT | Performed by: PATHOLOGY

## 2023-05-26 PROCEDURE — G0463 HOSPITAL OUTPT CLINIC VISIT: HCPCS | Performed by: INTERNAL MEDICINE

## 2023-05-26 PROCEDURE — 80197 ASSAY OF TACROLIMUS: CPT | Performed by: INTERNAL MEDICINE

## 2023-05-26 PROCEDURE — 99214 OFFICE O/P EST MOD 30 MIN: CPT | Mod: GC | Performed by: INTERNAL MEDICINE

## 2023-05-26 PROCEDURE — 36415 COLL VENOUS BLD VENIPUNCTURE: CPT | Performed by: PATHOLOGY

## 2023-05-26 PROCEDURE — 80053 COMPREHEN METABOLIC PANEL: CPT | Performed by: PATHOLOGY

## 2023-05-26 ASSESSMENT — PAIN SCALES - GENERAL: PAINLEVEL: NO PAIN (0)

## 2023-05-26 NOTE — LETTER
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 Since last visit she has been doing well. She states that she did have follow up of her microscopic hematuria and was seen by urologist on 2/2/2023. She had a renal US performed and was recommended to have cystoscopy. She states that she did not want to have the cystoscopy done. She is not convinced that she has bladder or kidney cancer as she has intermittent hematuria. We did discuss that she is at higher risk for cancer. She states that she would not want to pursue further treatment if cancer was found on cystoscopy. Otherwise she continues to enjoy gardening. She uses sunscreen and wears sun protective clothing. She was seen by dermatology this month for skin exam. No concerning lesions were found. She has colonoscopy scheduled for July. Previously she has had tubular adenoma consistent with advanced adenoma d/t size. She is up to date on vaccines except shingles vaccine. She was seen by endocrinology for osteoporosis. They recommended to continue calcium and vitamin D supplementation. She is being evaluated for Evenity.     Patient denies jaundice, lower extremity edema, abdominal distension, lethargy or confusion.    Patient denies melena, hematemesis or hematochezia.    Patient denies fevers, sweats or chills.  Weight stable.        Medical hx Surgical hx   Past Medical History:   Diagnosis Date     Acute deep vein thrombosis (DVT) of right lower extremity (H) 04/02/2021     Antiplatelet or antithrombotic  long-term use      Asthma      Bleeding esophageal varices (H) 2018     Cancer (H) 2021     Cholangiocarcinoma (H) 2014     Cholangiocarcinoma metastatic to liver (H) 2021     Cirrhosis of liver with ascites (H) 05/10/2018     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) 2018    Repaired in OR 18     Encounter for pleural drainage tube placement      Esophageal varices with hemorrhage (H)      Gastric ulcer      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     Liver transplanted (H) 2018     Lung metastases 2014     Other closed displaced fracture of proximal end of left humerus with routine healing, subsequent encounter 2019     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    Residual thrombus in main and right portal     ) Past Surgical History:   Procedure Laterality Date     ANKLE SURGERY      fracture no date     ARTHROPLASTY KNEE Right 2021    Procedure: ARTHROPLASTY, KNEE, TOTAL, RIGHT;  Surgeon: Nitin Jacobsen MD;  [facility]     ARTHROSCOPY KNEE  2021     BIOPSY LIVER  2011     CA ANESTH,LOWER ARM SURGERY      fracture no date      SECTION  1981     CHOLECYSTECTOMY       COLONOSCOPY      2/10/2011, 2020     DUAL ENERGY X-RAY ABSORPT, 1+ SITES  2009     EGD      2020, 2018, 2016, 2016, 2016, 3/13/2015, 2011, 2011, 2/10/2011     HEPATECTOMY PARTIAL       HPV - SCREEN & TYPING      both test negative 4/15/2010, 2012     IR LUNG BIOPSY MEDIASTINUM RIGHT  8/15/2013     IR VISCERAL ANGIOGRAM  2021     liver transplanet  2018     MAMMOGRAM - HIM SCAN Bilateral 2010     OPEN REDUCTION INTERNAL FIXATION HUMERUS PROXIMAL Left 2019    Procedure: OPEN REDUCTION INTERNAL FIXATION HUMERUS PROXIMAL LEFT with Allograft;  Surgeon: Eh Kraft MD;  [facility]     PLEURAL CATHETER  2017     VT WEDGE BIOPSY OF LIVER  10/2012     REPAIR KNEE  LIGAMENT  1972     RETURN LIVER TRANSPLANT N/A 2018    Procedure: RETURN LIVER TRANSPLANT;  Open Abdomen, return liver transplant washout with   Mesh and liver stent  placement and  Abdomal Wound closure;  Surgeon: Darren Rod MD;  [facility]     THORACOSCOPY  10/30/2017     THYROIDECTOMY   2021    total     TONSILLECTOMY       TONSILLECTOMY & ADENOIDECTOMY  1963     TRANSPLANT LIVER RECIPIENT  DONOR N/A 2018    Procedure: TRANSPLANT LIVER RECIPIENT  DONOR;  TRANSPLANT LIVER RECIPIENT  DONOR ;  Surgeon: Darren Rod MD;  [facility]     UPPER GI ENDOSCOPY      2016, 2016, 2012, 3/2/2012, 2012, 2011     VIDEO-ASSISTED THORACOSCOPIC SURGERY (VATS)  10/30/2017          Medications  Prior to Admission medications    Medication Sig Start Date End Date Taking? Authorizing Provider   acetaminophen (TYLENOL) 500 MG tablet Take 1 tablet (500 mg) by mouth every 6 hours as needed for mild pain 21  Yes Alyce Figueroa PA-C   amLODIPine (NORVASC) 5 MG tablet TAKE 1 TABLET EVERY DAY 23  Yes Bradly Lilly MD   amoxicillin (AMOXIL) 500 MG capsule Take 4 tablets (2000 mg) by mouth 1 hour before dental procedures/cleanings. 3/29/23  Yes Christian Rhoades MD   aspirin 81 MG EC tablet Take 81 mg by mouth daily   Yes Reported, Patient   gabapentin (NEURONTIN) 300 MG capsule TAKE 2 CAPSULES AT BEDTIME 23  Yes Apollo Benitez MD   levothyroxine (SYNTHROID/LEVOTHROID) 125 MCG tablet Take 125 mcg by mouth daily 10/13/21  Yes Reported, Patient   omeprazole 20 MG tablet Take 1 tablet (20 mg) by mouth 2 times daily 22  Yes Apollo Benitez MD   pramipexole (MIRAPEX) 0.25 MG tablet Take 3 tablets (0.75 mg) by mouth At Bedtime 22  Yes Apollo Benitez MD   tacrolimus (GENERIC EQUIVALENT) 1 MG capsule TAKE TWO CAPSULES BY MOUTH TWICE A DAY 3/13/23  Yes Bradly Lilly MD   zolpidem (AMBIEN) 10 MG tablet TAKE 1 TABLET ONE  TIME DAILY AT BEDTIME AS NEEDED FOR SLEEP 1/16/23  Yes Apollo Benitez MD   albuterol (PROAIR HFA/PROVENTIL HFA/VENTOLIN HFA) 108 (90 Base) MCG/ACT inhaler 2 Inhalation 4 times daily as needed  Patient not taking: Reported on 11/1/2022 10/1/21   Reported, Patient   calcium carbonate 1250 MG/5ML SUSP suspension Take 5 mLs (1,250 mg) by mouth 2 times daily (with meals)  Patient not taking: Reported on 5/9/2023 2/8/23   Josefina Lloyd MD   calcium carbonate 600 mg-vitamin D 400 units (CALTRATE) 600-400 MG-UNIT per tablet Take 1 tablet by mouth  Patient not taking: Reported on 5/9/2023 4/12/22   Reported, Patient       Allergies  Allergies   Allergen Reactions     Blood Transfusion Related (Informational Only) Other (See Comments)     Patient has a history of a clinically significant antibody against RBC antigens.  A delay in compatible RBCs may occur.     Dilaudid [Hydromorphone] Itching     Na Ferric Gluc Cplx In Sucrose      Venofer [Iron Sucrose]        Review of systems  A 10-point review of systems was negative    Examination  /83   Pulse 61   Temp 97.8  F (36.6  C) (Oral)   Wt 59.4 kg (130 lb 14.4 oz)   SpO2 100%   BMI 22.47 kg/m    Body mass index is 22.47 kg/m .    Gen- well, NAD, A+Ox3, normal color  RS- non labored breathing, on RA  Abd- soft, NT, ND  Extr- hands normal, no VERONICA  Skin- no rash or jaundice  Neuro- no asterixis  Psych- normal mood    Laboratory  Lab Results   Component Value Date     05/26/2023     04/06/2021    POTASSIUM 4.4 05/26/2023    POTASSIUM 4.8 04/22/2022    POTASSIUM 4.5 04/06/2021    CHLORIDE 105 05/26/2023    CHLORIDE 108 04/22/2022    CHLORIDE 108 04/06/2021    CO2 25 05/26/2023    CO2 28 04/22/2022    CO2 27 04/06/2021    BUN 23.3 05/26/2023    BUN 26 04/22/2022    BUN 23 04/06/2021    CR 1.07 05/26/2023    CR 0.97 04/06/2021       Lab Results   Component Value Date    BILITOTAL 0.6 05/26/2023    BILITOTAL 1.0 04/06/2021    ALT 8 05/26/2023    ALT 30  04/06/2021    AST 17 05/26/2023    AST 7 04/06/2021    ALKPHOS 92 05/26/2023    ALKPHOS 82 04/06/2021       Lab Results   Component Value Date    ALBUMIN 4.4 05/26/2023    ALBUMIN 4.0 04/22/2022    ALBUMIN 2.3 04/06/2021    PROTTOTAL 7.3 05/26/2023    PROTTOTAL 5.8 04/06/2021        Lab Results   Component Value Date    WBC 5.8 05/26/2023    WBC 3.8 04/06/2021    HGB 13.8 05/26/2023    HGB 7.3 04/06/2021    MCV 88 05/26/2023    MCV 93 04/06/2021     05/26/2023     04/06/2021       Lab Results   Component Value Date    INR 1.19 04/05/2021       Radiology    2/2/2023 Renal US: reviewed     Assessment  68 year old female w/ PMH metastatic cholangiocarcinoma s/p whipple procedure, c/b 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 Doing well since last visit. LFTs remain wnl. Her kidney function is mildly elevated, but at baseline. Up to date on health maintenance except she needs shingles vaccine. She is compliant with her medications. Will follow up in 6 months. If she is doing well, then will follow up in 1 year going forward.     Plan  - continue current medication regimen   - Advised to obtain shingles vaccine   - annual skin exam   - colonoscopy scheduled for July 2023  - Continue endocrinology follow up with Dr. Lloyd for treatment of osteoporosis  - PCP to continue to monitor kidney function   - RTC in 6 months. If doing well, then will follow up in 1 year going forward.     Staffed with Dr. Maxx Sykes MD   Internal Medicine, PGY-1    Hepatology  [facility]    Attestation signed by Bradly Lilly MD at 5/26/2023  5:00 PM:  The patient was seen and examined.  The above assessment and plan was developed jointly with the resident.      I did spend a total of 30 minutes (on the date of the  encounter), including 20 minutes of face-to-face clinic time including counseling. The rest of the time was spent in documentation and review of records.     Thank you very much for allowing me to participate in the care of this patient.  If you have any questions regarding recommendations, please do not hesitate to contact me.         Bradly Lilly MD      Professor of Medicine  [employer]      Executive Medical Director, Solid Organ Transplant Program  [facility]        Bradly Lilly MD

## 2023-05-26 NOTE — LETTER
5/26/2023         RE: Sherrie Lala  [address] [facility] [address]        Dear Colleague,    Thank you for referring your patient, Sherrie Lala, to the [facility] [place]. Please see a copy of my visit note below.    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 Since last visit she has been doing well. She states that she did have follow up of her microscopic hematuria and was seen by urologist on 2/2/2023. She had a renal US performed and was recommended to have cystoscopy. She states that she did not want to have the cystoscopy done. She is not convinced that she has bladder or kidney cancer as she has intermittent hematuria. We did discuss that she is at higher risk for cancer. She states that she would not want to pursue further treatment if cancer was found on cystoscopy. Otherwise she continues to enjoy gardening. She uses sunscreen and wears sun protective clothing. She was seen by dermatology this month for skin exam. No concerning lesions were found. She has colonoscopy scheduled for July. Previously she has had tubular adenoma consistent with advanced adenoma d/t size. She is up to date on vaccines except shingles vaccine. She was seen by endocrinology for osteoporosis. They recommended to continue calcium and vitamin D supplementation. She is being evaluated for Evenity.     Patient denies jaundice, lower extremity edema, abdominal distension, lethargy or confusion.    Patient denies melena, hematemesis or hematochezia.    Patient denies fevers, sweats or chills.  Weight  stable.        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     Liver transplanted (H) 2018     Lung metastases 2014     Other closed displaced fracture of proximal end of left humerus with routine healing, subsequent encounter 2019     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    Residual thrombus in main and right portal     ) Past Surgical History:   Procedure Laterality Date     ANKLE SURGERY      fracture no date     ARTHROPLASTY KNEE Right 2021    Procedure: ARTHROPLASTY, KNEE, TOTAL, RIGHT;  Surgeon: Nitin Jacobsen MD;  [facility]     ARTHROSCOPY KNEE  2021     BIOPSY LIVER  2011     CA ANESTH,LOWER ARM SURGERY      fracture no date      SECTION  1981     CHOLECYSTECTOMY       COLONOSCOPY      2/10/2011, 2020     DUAL ENERGY X-RAY ABSORPT, 1+ SITES  2009     EGD      2020, 2018, 2016, 2016, 2016, 3/13/2015, 2011, 2011, 2/10/2011     HEPATECTOMY PARTIAL       HPV - SCREEN & TYPING      both test negative 4/15/2010, 2012     IR LUNG BIOPSY MEDIASTINUM RIGHT  8/15/2013     IR VISCERAL ANGIOGRAM  2021     liver transplanet  2018     MAMMOGRAM - HIM SCAN Bilateral 2010     OPEN REDUCTION INTERNAL FIXATION HUMERUS PROXIMAL Left 2019    Procedure: OPEN REDUCTION INTERNAL  FIXATION HUMERUS PROXIMAL LEFT with Allograft;  Surgeon: Eh Kraft MD;  [facility]     PLEURAL CATHETER  2017     AK WEDGE BIOPSY OF LIVER  10/2012     REPAIR KNEE LIGAMENT  1972     RETURN LIVER TRANSPLANT N/A 2018    Procedure: RETURN LIVER TRANSPLANT;  Open Abdomen, return liver transplant washout with   Mesh and liver stent  placement and  Abdomal Wound closure;  Surgeon: Darren Rod MD;  [facility]     THORACOSCOPY  10/30/2017     THYROIDECTOMY   2021    total     TONSILLECTOMY       TONSILLECTOMY & ADENOIDECTOMY  1963     TRANSPLANT LIVER RECIPIENT  DONOR N/A 2018    Procedure: TRANSPLANT LIVER RECIPIENT  DONOR;  TRANSPLANT LIVER RECIPIENT  DONOR ;  Surgeon: Darren Rod MD;  [facility]     UPPER GI ENDOSCOPY      2016, 2016, 2012, 3/2/2012, 2012, 2011     VIDEO-ASSISTED THORACOSCOPIC SURGERY (VATS)  10/30/2017          Medications  Prior to Admission medications    Medication Sig Start Date End Date Taking? Authorizing Provider   acetaminophen (TYLENOL) 500 MG tablet Take 1 tablet (500 mg) by mouth every 6 hours as needed for mild pain 21  Yes Alyce Figueroa PA-C   amLODIPine (NORVASC) 5 MG tablet TAKE 1 TABLET EVERY DAY 23  Yes Bradly Lilly MD   amoxicillin (AMOXIL) 500 MG capsule Take 4 tablets (2000 mg) by mouth 1 hour before dental procedures/cleanings. 3/29/23  Yes Christian Rhoades MD   aspirin 81 MG EC tablet Take 81 mg by mouth daily   Yes Reported, Patient   gabapentin (NEURONTIN) 300 MG capsule TAKE 2 CAPSULES AT BEDTIME 23  Yes Apollo Benitez MD   levothyroxine (SYNTHROID/LEVOTHROID) 125 MCG tablet Take 125 mcg by mouth daily 10/13/21  Yes Reported, Patient   omeprazole 20 MG tablet Take 1 tablet (20 mg) by mouth 2 times daily 22  Yes Apollo Benitez MD   pramipexole (MIRAPEX) 0.25 MG tablet Take 3 tablets (0.75 mg) by mouth At Bedtime 22   Yes Apollo Benitez MD   tacrolimus (GENERIC EQUIVALENT) 1 MG capsule TAKE TWO CAPSULES BY MOUTH TWICE A DAY 3/13/23  Yes Bradly Lilly MD   zolpidem (AMBIEN) 10 MG tablet TAKE 1 TABLET ONE TIME DAILY AT BEDTIME AS NEEDED FOR SLEEP 1/16/23  Yes Apollo Benitez MD   albuterol (PROAIR HFA/PROVENTIL HFA/VENTOLIN HFA) 108 (90 Base) MCG/ACT inhaler 2 Inhalation 4 times daily as needed  Patient not taking: Reported on 11/1/2022 10/1/21   Reported, Patient   calcium carbonate 1250 MG/5ML SUSP suspension Take 5 mLs (1,250 mg) by mouth 2 times daily (with meals)  Patient not taking: Reported on 5/9/2023 2/8/23   Josefina Lloyd MD   calcium carbonate 600 mg-vitamin D 400 units (CALTRATE) 600-400 MG-UNIT per tablet Take 1 tablet by mouth  Patient not taking: Reported on 5/9/2023 4/12/22   Reported, Patient       Allergies  Allergies   Allergen Reactions     Blood Transfusion Related (Informational Only) Other (See Comments)     Patient has a history of a clinically significant antibody against RBC antigens.  A delay in compatible RBCs may occur.     Dilaudid [Hydromorphone] Itching     Na Ferric Gluc Cplx In Sucrose      Venofer [Iron Sucrose]        Review of systems  A 10-point review of systems was negative    Examination  /83   Pulse 61   Temp 97.8  F (36.6  C) (Oral)   Wt 59.4 kg (130 lb 14.4 oz)   SpO2 100%   BMI 22.47 kg/m    Body mass index is 22.47 kg/m .    Gen- well, NAD, A+Ox3, normal color  RS- non labored breathing, on RA  Abd- soft, NT, ND  Extr- hands normal, no VERONICA  Skin- no rash or jaundice  Neuro- no asterixis  Psych- normal mood    Laboratory  Lab Results   Component Value Date     05/26/2023     04/06/2021    POTASSIUM 4.4 05/26/2023    POTASSIUM 4.8 04/22/2022    POTASSIUM 4.5 04/06/2021    CHLORIDE 105 05/26/2023    CHLORIDE 108 04/22/2022    CHLORIDE 108 04/06/2021    CO2 25 05/26/2023    CO2 28 04/22/2022    CO2 27 04/06/2021    BUN 23.3 05/26/2023    BUN 26 04/22/2022     BUN 23 04/06/2021    CR 1.07 05/26/2023    CR 0.97 04/06/2021       Lab Results   Component Value Date    BILITOTAL 0.6 05/26/2023    BILITOTAL 1.0 04/06/2021    ALT 8 05/26/2023    ALT 30 04/06/2021    AST 17 05/26/2023    AST 7 04/06/2021    ALKPHOS 92 05/26/2023    ALKPHOS 82 04/06/2021       Lab Results   Component Value Date    ALBUMIN 4.4 05/26/2023    ALBUMIN 4.0 04/22/2022    ALBUMIN 2.3 04/06/2021    PROTTOTAL 7.3 05/26/2023    PROTTOTAL 5.8 04/06/2021        Lab Results   Component Value Date    WBC 5.8 05/26/2023    WBC 3.8 04/06/2021    HGB 13.8 05/26/2023    HGB 7.3 04/06/2021    MCV 88 05/26/2023    MCV 93 04/06/2021     05/26/2023     04/06/2021       Lab Results   Component Value Date    INR 1.19 04/05/2021       Radiology    2/2/2023 Renal US: reviewed     Assessment  68 year old female w/ PMH metastatic cholangiocarcinoma s/p whipple procedure, c/b 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 Doing well since last visit. LFTs remain wnl. Her kidney function is mildly elevated, but at baseline. Up to date on health maintenance except she needs shingles vaccine. She is compliant with her medications. Will follow up in 6 months. If she is doing well, then will follow up in 1 year going forward.     Plan  - continue current medication regimen   - Advised to obtain shingles vaccine   - annual skin exam   - colonoscopy scheduled for July 2023  - Continue endocrinology follow up with Dr. Lloyd for treatment of osteoporosis  - PCP to continue to monitor kidney function   - RTC in 6 months. If doing well, then will follow up in 1 year going forward.     Staffed with Dr. Maxx Sykes MD   Internal Medicine, PGY-1    Hepatology  [facility]    Attestation signed by Bradly Lilly MD at  5/26/2023  5:00 PM:  The patient was seen and examined.  The above assessment and plan was developed jointly with the resident.      I did spend a total of 30 minutes (on the date of the encounter), including 20 minutes of face-to-face clinic time including counseling. The rest of the time was spent in documentation and review of records.     Thank you very much for allowing me to participate in the care of this patient.  If you have any questions regarding recommendations, please do not hesitate to contact me.         Bradly Lilly MD      Professor of Medicine  [employer]      Executive Medical Director, Solid Organ Transplant Program  [facility]      Again, thank you for allowing me to participate in the care of your patient.        Sincerely,        Bradly Lilly MD

## 2023-05-26 NOTE — NURSING NOTE
Chief Complaint   Patient presents with     RECHECK     6 mo f/u       /83   Pulse 61   Temp 97.8  F (36.6  C) (Oral)   Wt 59.4 kg (130 lb 14.4 oz)   SpO2 100%   BMI 22.47 kg/m      Fortunato Tan on 5/26/2023 at 10:52 AM

## 2023-05-26 NOTE — PROGRESS NOTES
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 Since last visit she has been doing well. She states that she did have follow up of her microscopic hematuria and was seen by urologist on 2/2/2023. She had a renal US performed and was recommended to have cystoscopy. She states that she did not want to have the cystoscopy done. She is not convinced that she has bladder or kidney cancer as she has intermittent hematuria. We did discuss that she is at higher risk for cancer. She states that she would not want to pursue further treatment if cancer was found on cystoscopy. Otherwise she continues to enjoy gardening. She uses sunscreen and wears sun protective clothing. She was seen by dermatology this month for skin exam. No concerning lesions were found. She has colonoscopy scheduled for July. Previously she has had tubular adenoma consistent with advanced adenoma d/t size. She is up to date on vaccines except shingles vaccine. She was seen by endocrinology for osteoporosis. They recommended to continue calcium and vitamin D supplementation. She is being evaluated for Evenity.     Patient denies jaundice, lower extremity edema, abdominal distension, lethargy or confusion.    Patient denies melena, hematemesis or hematochezia.    Patient denies fevers, sweats or chills.  Weight stable.        Medical hx Surgical hx   Past Medical History:   Diagnosis Date     Acute deep vein thrombosis (DVT) of right lower extremity (H) 04/02/2021     Antiplatelet or antithrombotic long-term use      Asthma      Bleeding esophageal varices (H) 03/06/2018      Cancer (H) 2021     Cholangiocarcinoma (H) 2014     Cholangiocarcinoma metastatic to liver (H) 2021     Cirrhosis of liver with ascites (H) 05/10/2018     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) 2018    Repaired in OR 18     Encounter for pleural drainage tube placement      Esophageal varices with hemorrhage (H)      Gastric ulcer      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     Liver transplanted (H) 2018     Lung metastases 2014     Other closed displaced fracture of proximal end of left humerus with routine healing, subsequent encounter 2019     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    Residual thrombus in main and right portal     ) Past Surgical History:   Procedure Laterality Date     ANKLE SURGERY      fracture no date     ARTHROPLASTY KNEE Right 2021    Procedure: ARTHROPLASTY, KNEE, TOTAL, RIGHT;  Surgeon: Nitin Jacobsen MD;  Location: [facility] OR     ARTHROSCOPY KNEE  2021     BIOPSY LIVER  2011     CA ANESTH,LOWER ARM SURGERY      fracture no date      SECTION  1981     CHOLECYSTECTOMY       COLONOSCOPY      2/10/2011, 2020     DUAL ENERGY X-RAY ABSORPT, 1+ SITES  2009     EGD      2020, 2018, 2016, 2016, 2016, 3/13/2015, 2011, 2011, 2/10/2011     HEPATECTOMY PARTIAL       HPV - SCREEN & TYPING      both test negative 4/15/2010, 2012     IR LUNG BIOPSY MEDIASTINUM RIGHT  8/15/2013     IR VISCERAL ANGIOGRAM  2021     liver transplanet  2018     MAMMOGRAM - HIM SCAN Bilateral 2010     OPEN REDUCTION INTERNAL FIXATION HUMERUS PROXIMAL Left 2019    Procedure: OPEN REDUCTION INTERNAL FIXATION HUMERUS PROXIMAL LEFT with Allograft;  Surgeon: Eh Kraft MD;  Location: [facility] OR     PLEURAL CATHETER  2017     HI WEDGE BIOPSY OF LIVER  10/2012     REPAIR KNEE LIGAMENT  1972     RETURN LIVER TRANSPLANT N/A 2018    Procedure: RETURN  LIVER TRANSPLANT;  Open Abdomen, return liver transplant washout with   Mesh and liver stent  placement and  Abdomal Wound closure;  Surgeon: Darren Rod MD;  Location: [facility] OR     THORACOSCOPY  10/30/2017     THYROIDECTOMY   2021    total     TONSILLECTOMY       TONSILLECTOMY & ADENOIDECTOMY  1963     TRANSPLANT LIVER RECIPIENT  DONOR N/A 2018    Procedure: TRANSPLANT LIVER RECIPIENT  DONOR;  TRANSPLANT LIVER RECIPIENT  DONOR ;  Surgeon: Darren Rod MD;  Location: [facility] OR     UPPER GI ENDOSCOPY      2016, 2016, 2012, 3/2/2012, 2012, 2011     VIDEO-ASSISTED THORACOSCOPIC SURGERY (VATS)  10/30/2017          Medications  Prior to Admission medications    Medication Sig Start Date End Date Taking? Authorizing Provider   acetaminophen (TYLENOL) 500 MG tablet Take 1 tablet (500 mg) by mouth every 6 hours as needed for mild pain 21  Yes Alyce Figueroa PA-C   amLODIPine (NORVASC) 5 MG tablet TAKE 1 TABLET EVERY DAY 23  Yes Bradly Lilly MD   amoxicillin (AMOXIL) 500 MG capsule Take 4 tablets (2000 mg) by mouth 1 hour before dental procedures/cleanings. 3/29/23  Yes Christian Rhoades MD   aspirin 81 MG EC tablet Take 81 mg by mouth daily   Yes Reported, Patient   gabapentin (NEURONTIN) 300 MG capsule TAKE 2 CAPSULES AT BEDTIME 23  Yes Apollo Benitez MD   levothyroxine (SYNTHROID/LEVOTHROID) 125 MCG tablet Take 125 mcg by mouth daily 10/13/21  Yes Reported, Patient   omeprazole 20 MG tablet Take 1 tablet (20 mg) by mouth 2 times daily 22  Yes Apollo Benitez MD   pramipexole (MIRAPEX) 0.25 MG tablet Take 3 tablets (0.75 mg) by mouth At Bedtime 22  Yes Apollo Benitez MD   tacrolimus (GENERIC EQUIVALENT) 1 MG capsule TAKE TWO CAPSULES BY MOUTH TWICE A DAY 3/13/23  Yes Bradly Lilly MD   zolpidem (AMBIEN) 10 MG tablet TAKE 1 TABLET ONE TIME DAILY AT BEDTIME AS NEEDED FOR SLEEP 23  Yes Apollo Benitez MD    albuterol (PROAIR HFA/PROVENTIL HFA/VENTOLIN HFA) 108 (90 Base) MCG/ACT inhaler 2 Inhalation 4 times daily as needed  Patient not taking: Reported on 11/1/2022 10/1/21   Reported, Patient   calcium carbonate 1250 MG/5ML SUSP suspension Take 5 mLs (1,250 mg) by mouth 2 times daily (with meals)  Patient not taking: Reported on 5/9/2023 2/8/23   Josefina Lloyd MD   calcium carbonate 600 mg-vitamin D 400 units (CALTRATE) 600-400 MG-UNIT per tablet Take 1 tablet by mouth  Patient not taking: Reported on 5/9/2023 4/12/22   Reported, Patient       Allergies  Allergies   Allergen Reactions     Blood Transfusion Related (Informational Only) Other (See Comments)     Patient has a history of a clinically significant antibody against RBC antigens.  A delay in compatible RBCs may occur.     Dilaudid [Hydromorphone] Itching     Na Ferric Gluc Cplx In Sucrose      Venofer [Iron Sucrose]        Review of systems  A 10-point review of systems was negative    Examination  /83   Pulse 61   Temp 97.8  F (36.6  C) (Oral)   Wt 59.4 kg (130 lb 14.4 oz)   SpO2 100%   BMI 22.47 kg/m    Body mass index is 22.47 kg/m .    Gen- well, NAD, A+Ox3, normal color  RS- non labored breathing, on RA  Abd- soft, NT, ND  Extr- hands normal, no VERONICA  Skin- no rash or jaundice  Neuro- no asterixis  Psych- normal mood    Laboratory  Lab Results   Component Value Date     05/26/2023     04/06/2021    POTASSIUM 4.4 05/26/2023    POTASSIUM 4.8 04/22/2022    POTASSIUM 4.5 04/06/2021    CHLORIDE 105 05/26/2023    CHLORIDE 108 04/22/2022    CHLORIDE 108 04/06/2021    CO2 25 05/26/2023    CO2 28 04/22/2022    CO2 27 04/06/2021    BUN 23.3 05/26/2023    BUN 26 04/22/2022    BUN 23 04/06/2021    CR 1.07 05/26/2023    CR 0.97 04/06/2021       Lab Results   Component Value Date    BILITOTAL 0.6 05/26/2023    BILITOTAL 1.0 04/06/2021    ALT 8 05/26/2023    ALT 30 04/06/2021    AST 17 05/26/2023    AST 7 04/06/2021    ALKPHOS 92 05/26/2023     ALKPHOS 82 04/06/2021       Lab Results   Component Value Date    ALBUMIN 4.4 05/26/2023    ALBUMIN 4.0 04/22/2022    ALBUMIN 2.3 04/06/2021    PROTTOTAL 7.3 05/26/2023    PROTTOTAL 5.8 04/06/2021        Lab Results   Component Value Date    WBC 5.8 05/26/2023    WBC 3.8 04/06/2021    HGB 13.8 05/26/2023    HGB 7.3 04/06/2021    MCV 88 05/26/2023    MCV 93 04/06/2021     05/26/2023     04/06/2021       Lab Results   Component Value Date    INR 1.19 04/05/2021       Radiology    2/2/2023 Renal US: reviewed     Assessment  68 year old female w/ PMH metastatic cholangiocarcinoma s/p whipple procedure, c/b 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 Doing well since last visit. LFTs remain wnl. Her kidney function is mildly elevated, but at baseline. Up to date on health maintenance except she needs shingles vaccine. She is compliant with her medications. Will follow up in 6 months. If she is doing well, then will follow up in 1 year going forward.     Plan  - continue current medication regimen   - Advised to obtain shingles vaccine   - annual skin exam   - colonoscopy scheduled for July 2023  - Continue endocrinology follow up with Dr. Lloyd for treatment of osteoporosis  - PCP to continue to monitor kidney function   - RTC in 6 months. If doing well, then will follow up in 1 year going forward.     Staffed with Dr. Maxx Sykes MD   Internal Medicine, PGY-1    Hepatology  [facility]

## 2023-07-07 DIAGNOSIS — R79.89 ELEVATED LFTS: ICD-10-CM

## 2023-07-07 RX ORDER — TACROLIMUS 1 MG/1
CAPSULE ORAL
Qty: 120 CAPSULE | Refills: 11 | Status: SHIPPED | OUTPATIENT
Start: 2023-07-07 | End: 2024-06-17

## 2023-07-25 ENCOUNTER — TRANSFERRED RECORDS (OUTPATIENT)
Dept: HEALTH INFORMATION MANAGEMENT | Facility: CLINIC | Age: 68
End: 2023-07-25
Payer: COMMERCIAL

## 2023-08-08 ENCOUNTER — TRANSFERRED RECORDS (OUTPATIENT)
Dept: HEALTH INFORMATION MANAGEMENT | Facility: CLINIC | Age: 68
End: 2023-08-08
Payer: COMMERCIAL

## 2023-08-29 DIAGNOSIS — G47.00 PERSISTENT INSOMNIA: ICD-10-CM

## 2023-08-29 RX ORDER — ZOLPIDEM TARTRATE 10 MG/1
TABLET ORAL
Qty: 90 TABLET | Refills: 0 | Status: SHIPPED | OUTPATIENT
Start: 2023-08-29 | End: 2023-09-20

## 2023-08-30 ENCOUNTER — TRANSFERRED RECORDS (OUTPATIENT)
Dept: HEALTH INFORMATION MANAGEMENT | Facility: CLINIC | Age: 68
End: 2023-08-30
Payer: COMMERCIAL

## 2023-09-08 ENCOUNTER — TELEPHONE (OUTPATIENT)
Dept: TRANSPLANT | Facility: CLINIC | Age: 68
End: 2023-09-08
Payer: COMMERCIAL

## 2023-09-08 DIAGNOSIS — Z94.4 LIVER REPLACED BY TRANSPLANT (H): Primary | ICD-10-CM

## 2023-09-08 NOTE — TELEPHONE ENCOUNTER
Writer left message for patient regarding regulatory review.  Noted patient has appointment scheduled in November with labs prior.  Patient appears compliant in chart review.  Will follow-up for regulatory review in 1 week.

## 2023-09-20 ENCOUNTER — OFFICE VISIT (OUTPATIENT)
Dept: FAMILY MEDICINE | Facility: CLINIC | Age: 68
End: 2023-09-20
Payer: COMMERCIAL

## 2023-09-20 VITALS
WEIGHT: 132.5 LBS | BODY MASS INDEX: 22.62 KG/M2 | HEART RATE: 66 BPM | TEMPERATURE: 97.9 F | RESPIRATION RATE: 17 BRPM | HEIGHT: 64 IN | SYSTOLIC BLOOD PRESSURE: 133 MMHG | OXYGEN SATURATION: 99 % | DIASTOLIC BLOOD PRESSURE: 87 MMHG

## 2023-09-20 DIAGNOSIS — E21.3 HYPERPARATHYROIDISM (H): ICD-10-CM

## 2023-09-20 DIAGNOSIS — D84.9 IMMUNOSUPPRESSED STATUS (H): ICD-10-CM

## 2023-09-20 DIAGNOSIS — Z00.00 HEALTH CARE MAINTENANCE: ICD-10-CM

## 2023-09-20 DIAGNOSIS — C73 MALIGNANT NEOPLASM OF THYROID GLAND (H): ICD-10-CM

## 2023-09-20 DIAGNOSIS — D68.9 COAGULOPATHY (H): ICD-10-CM

## 2023-09-20 DIAGNOSIS — Z23 NEED FOR PROPHYLACTIC VACCINATION AGAINST HEPATITIS A: ICD-10-CM

## 2023-09-20 DIAGNOSIS — Z23 NEED FOR SHINGLES VACCINE: ICD-10-CM

## 2023-09-20 DIAGNOSIS — G47.00 PERSISTENT INSOMNIA: ICD-10-CM

## 2023-09-20 DIAGNOSIS — M81.0 AGE-RELATED OSTEOPOROSIS WITHOUT CURRENT PATHOLOGICAL FRACTURE: ICD-10-CM

## 2023-09-20 DIAGNOSIS — Z94.4 LIVER TRANSPLANTED (H): ICD-10-CM

## 2023-09-20 DIAGNOSIS — Z23 NEED FOR VACCINATION: Primary | ICD-10-CM

## 2023-09-20 DIAGNOSIS — Z23 NEED FOR PROPHYLACTIC VACCINATION AGAINST HEPATITIS B VIRUS: ICD-10-CM

## 2023-09-20 PROCEDURE — 90686 IIV4 VACC NO PRSV 0.5 ML IM: CPT | Performed by: INTERNAL MEDICINE

## 2023-09-20 PROCEDURE — G0439 PPPS, SUBSEQ VISIT: HCPCS | Performed by: INTERNAL MEDICINE

## 2023-09-20 PROCEDURE — G0008 ADMIN INFLUENZA VIRUS VAC: HCPCS | Performed by: INTERNAL MEDICINE

## 2023-09-20 RX ORDER — PRAMIPEXOLE DIHYDROCHLORIDE 0.25 MG/1
1 TABLET ORAL AT BEDTIME
Qty: 360 TABLET | Refills: 3 | Status: CANCELLED | OUTPATIENT
Start: 2023-09-20

## 2023-09-20 RX ORDER — PRAMIPEXOLE DIHYDROCHLORIDE 1 MG/1
1 TABLET ORAL AT BEDTIME
Qty: 90 TABLET | Refills: 3 | Status: SHIPPED | OUTPATIENT
Start: 2023-09-20 | End: 2024-05-22 | Stop reason: DRUGHIGH

## 2023-09-20 RX ORDER — ZOLPIDEM TARTRATE 10 MG/1
TABLET ORAL
Qty: 90 TABLET | Refills: 1 | Status: SHIPPED | OUTPATIENT
Start: 2023-09-20 | End: 2024-02-26

## 2023-09-20 RX ORDER — NICOTINE POLACRILEX 4 MG/1
20 GUM, CHEWING ORAL 2 TIMES DAILY
Qty: 180 TABLET | Refills: 3 | Status: SHIPPED | OUTPATIENT
Start: 2023-09-20 | End: 2024-09-23

## 2023-09-20 ASSESSMENT — ENCOUNTER SYMPTOMS
HEMATOCHEZIA: 0
ARTHRALGIAS: 1
SORE THROAT: 0
HEARTBURN: 0
CONSTIPATION: 0
BREAST MASS: 0
ABDOMINAL PAIN: 0
WEAKNESS: 0
DIZZINESS: 0
NAUSEA: 0
CHILLS: 0
DYSURIA: 0
EYE PAIN: 0
HEADACHES: 0
PARESTHESIAS: 0
PALPITATIONS: 0
HEMATURIA: 0
SHORTNESS OF BREATH: 0
NERVOUS/ANXIOUS: 0
JOINT SWELLING: 0
DIARRHEA: 0
FREQUENCY: 0
FEVER: 0
COUGH: 0
MYALGIAS: 0

## 2023-09-20 ASSESSMENT — ACTIVITIES OF DAILY LIVING (ADL): CURRENT_FUNCTION: NO ASSISTANCE NEEDED

## 2023-09-20 NOTE — ASSESSMENT & PLAN NOTE
- colonoscopy 7/2023   - mammo (last in 9/2020)   - DEXA (10/2022) - osteoporosis  - cognitive decline? - failed cognitive screening - notices she's forgetful

## 2023-09-20 NOTE — ASSESSMENT & PLAN NOTE
- DEXA (10/2022): T scores ranging between -2.7 to -3.8   - previous traumatic humeral fracture after OLTx   - calcium/vitamin D  - steroid-sparing transplant regimen   - alendronate 70mg qweek (begun 8/2019 - spring, 2022) - she stopped due to her own dental concerns  - previously referred to endocrinology/bone disorder clinic given severity of osteoporosis - recommendations for Evenity anabolic therapy   - Sherrie opting not to receive treatment - aware of fracture risk

## 2023-09-20 NOTE — PROGRESS NOTES
"SUBJECTIVE:   Sherrie is a 68 year old who presents for Preventive Visit.  Returns for preventative evaluation.  Stable overall allograft function after liver transplant.  Currently on Prograf monotherapy for immunosuppression.  No issues with any rejection.  Follows in endocrinology related to papillary carcinoma of thyroid; history of thyroid resection on thyroid hormone replacement.  Did see bone metabolism clinic had been recommended for Evenity; though patient prefers not to pursue anabolic therapy.  Does understand the risks of osteoporotic fracture complications.  Feels well overall.  Has been using 1 mg of pramipexole at night for restless leg features.  Stable chronic Ambien use.  Has not noticed any issues with cognitive or memory concerns.      9/20/2023    10:42 AM   Additional Questions   Roomed by Yasemin IRELAND.       Are you in the first 12 months of your Medicare coverage?  No    Healthy Habits:     In general, how would you rate your overall health?  Good    Frequency of exercise:  4-5 days/week    Duration of exercise:  30-45 minutes    Do you usually eat at least 4 servings of fruit and vegetables a day, include whole grains    & fiber and avoid regularly eating high fat or \"junk\" foods?  No    Taking medications regularly:  Yes    Medication side effects:  None    Ability to successfully perform activities of daily living:  No assistance needed    Home Safety:  No safety concerns identified    Hearing Impairment:  No hearing concerns    In the past 6 months, have you been bothered by leaking of urine?  No    In general, how would you rate your overall mental or emotional health?  Good    Additional concerns today:  No      Today's PHQ-2 Score:       9/20/2023    10:44 AM   PHQ-2 ( 1999 [employer])   Q1: Little interest or pleasure in doing things 0   Q2: Feeling down, depressed or hopeless 0   PHQ-2 Score 0   Q1: Little interest or pleasure in doing things Not at all   Q2: Feeling down, depressed or " hopeless Not at all   PHQ-2 Score 0           Have you ever done Advance Care Planning? (For example, a Health Directive, POLST, or a discussion with a medical provider or your loved ones about your wishes): Yes, advance care planning is on file.    Denied hearing screening.     Fall risk  Fallen 2 or more times in the past year?: No  Any fall with injury in the past year?: No    Cognitive Screening   1) Repeat 3 items (Leader, Season, Table)    2) Clock draw: ABNORMAL    3) 3 item recall: Recalls 3 objects  Results: ABNORMAL clock, 1-2 items recalled: PROBABLE COGNITIVE IMPAIRMENT, **INFORM PROVIDER**    Mini-CogTM Copyright S Staci. Licensed by the author for use in [facility]; reprinted with permission (soob@[email or web address]). All rights reserved.      Do you have sleep apnea, excessive snoring or daytime drowsiness? : no    Reviewed and updated as needed this visit by clinical staff   Tobacco  Allergies  Meds              Reviewed and updated as needed this visit by Provider                 Social History     Tobacco Use    Smoking status: Never    Smokeless tobacco: Never   Substance Use Topics    Alcohol use: Yes     Comment: occ /rare             9/20/2023    10:44 AM   Alcohol Use   Prescreen: >3 drinks/day or >7 drinks/week? Not Applicable     Do you have a current opioid prescription? No  Do you use any other controlled substances or medications that are not prescribed by a provider? None          Current providers sharing in care for this patient include:   Patient Care Team:  Apollo Benitez MD as PCP - General (Nephrology)  Apollo Benitez MD as PCP - Internal Medicine (Nephrology)  Baltazar Solares NP as Referring Physician (Nurse Practitioner)  Jorge Langford MD as MD (Hematology & Oncology)  Rafy Chisholm MD as MD (Gastroenterology)  Jesus Thomas MD as MD (Internal Medicine)  Bradly Lilly MD as Referring Physician (Gastroenterology)  Eldon Goetz MD  as MD (Dermatology)  Nitin Goodson MD as MD (Family Practice)  Darcy Blanco MD as MD (Urology)  Apollo Benitez MD as Assigned PCP  Richelle Abraham PA as Physician Assistant (Urology)  Josefina Lloyd MD as MD (Endocrinology, Diabetes, and Metabolism)  Josefina Lloyd MD as Assigned Endocrinology Provider  Bradly Lilly MD as Assigned Surgical Provider    The following health maintenance items are reviewed in Epic and correct as of today:  Health Maintenance   Topic Date Due    ZOSTER IMMUNIZATION (1 of 2) Never done    HEPATITIS A IMMUNIZATION (1 of 2 - Risk 2-dose series) Never done    HEPATITIS B IMMUNIZATION (1 of 3 - Risk 3-dose series) Never done    Pneumococcal Vaccine: 65+ Years (3 - PPSV23 or PCV20) 02/08/2020    MAMMO SCREENING  09/17/2022    COVID-19 Vaccine (6 - Pfizer risk series) 12/23/2022    ANNUAL REVIEW OF HM ORDERS  04/07/2023    INFLUENZA VACCINE (1) 09/01/2023    MEDICARE ANNUAL WELLNESS VISIT  11/01/2023    TSH W/FREE T4 REFLEX  02/02/2024    FALL RISK ASSESSMENT  09/20/2024    COLORECTAL CANCER SCREENING  07/25/2026    LIPID  10/28/2027    ADVANCE CARE PLANNING  11/01/2027    DTAP/TDAP/TD IMMUNIZATION (6 - Td or Tdap) 03/10/2030    DEXA  10/28/2037    HEPATITIS C SCREENING  Completed    PHQ-2 (once per calendar year)  Completed    IPV IMMUNIZATION  Aged Out    HPV IMMUNIZATION  Aged Out    MENINGITIS IMMUNIZATION  Aged Out    PAP  Discontinued     Current Outpatient Medications   Medication Sig Dispense Refill    acetaminophen (TYLENOL) 500 MG tablet Take 1 tablet (500 mg) by mouth every 6 hours as needed for mild pain 30 tablet 1    amLODIPine (NORVASC) 5 MG tablet TAKE 1 TABLET EVERY DAY 90 tablet 3    aspirin 81 MG EC tablet Take 81 mg by mouth daily      gabapentin (NEURONTIN) 300 MG capsule TAKE 2 CAPSULES AT BEDTIME 180 capsule 3    levothyroxine (SYNTHROID/LEVOTHROID) 125 MCG tablet Take 125 mcg by mouth daily      omeprazole 20 MG tablet Take 1 tablet (20 mg) by  mouth 2 times daily 180 tablet 3    pramipexole (MIRAPEX) 1 MG tablet Take 1 tablet (1 mg) by mouth At Bedtime 90 tablet 3    tacrolimus (GENERIC EQUIVALENT) 1 MG capsule TAKE TWO CAPSULES BY MOUTH TWICE A  capsule 11    zolpidem (AMBIEN) 10 MG tablet TAKE 1 TABLET ONE TIME DAILY AT BEDTIME AS NEEDED FOR SLEEP 90 tablet 1    albuterol (PROAIR HFA/PROVENTIL HFA/VENTOLIN HFA) 108 (90 Base) MCG/ACT inhaler 2 Inhalation 4 times daily as needed (Patient not taking: Reported on 11/1/2022)      amoxicillin (AMOXIL) 500 MG capsule Take 4 tablets (2000 mg) by mouth 1 hour before dental procedures/cleanings. (Patient not taking: Reported on 9/20/2023) 4 capsule 4             11/1/2022    10:53 AM   Breast CA Risk Assessment (FHS-7)   Do you have a family history of breast, colon, or ovarian cancer? No / Unknown         Mammogram Screening: Recommended mammography every 1-2 years with patient discussion and risk factor consideration  Pertinent mammograms are reviewed under the imaging tab.    Review of Systems   Constitutional:  Negative for chills and fever.   HENT:  Negative for congestion, ear pain, hearing loss and sore throat.    Eyes:  Negative for pain and visual disturbance.   Respiratory:  Negative for cough and shortness of breath.    Cardiovascular:  Negative for chest pain, palpitations and peripheral edema.   Gastrointestinal:  Negative for abdominal pain, constipation, diarrhea, heartburn, hematochezia and nausea.   Breasts:  Negative for tenderness, breast mass and discharge.   Genitourinary:  Negative for dysuria, frequency, genital sores, hematuria, pelvic pain, urgency, vaginal bleeding and vaginal discharge.   Musculoskeletal:  Positive for arthralgias. Negative for joint swelling and myalgias.   Skin:  Negative for rash.   Neurological:  Negative for dizziness, weakness, headaches and paresthesias.   Psychiatric/Behavioral:  Negative for mood changes. The patient is not nervous/anxious.   "      OBJECTIVE:   /87   Pulse 66   Temp 97.9  F (36.6  C)   Resp 17   Ht 1.626 m (5' 4\")   Wt 60.1 kg (132 lb 8 oz)   LMP  (LMP Unknown)   SpO2 99%   BMI 22.74 kg/m   Estimated body mass index is 22.74 kg/m  as calculated from the following:    Height as of this encounter: 1.626 m (5' 4\").    Weight as of this encounter: 60.1 kg (132 lb 8 oz).  Physical Exam  GENERAL: healthy, alert and no distress  NECK: no adenopathy, no asymmetry, masses, or scars and thyroid normal to palpation  RESP: lungs clear to auscultation - no rales, rhonchi or wheezes  CV: regular rate and rhythm, normal S1 S2, no S3 or S4, no murmur, click or rub, no peripheral edema and peripheral pulses strong  ABDOMEN: soft, nontender, no hepatosplenomegaly, no masses and bowel sounds normal  MS: no gross musculoskeletal defects noted, no edema    Diagnostic Test Results:  Labs reviewed in Epic    ASSESSMENT / PLAN:     Problem List Items Addressed This Visit          Endocrine    Malignant neoplasm of thyroid gland (H)     papillary carcinoma of thyroid stage 1, subtotal thyroidectomy 8/2021  - doing well  - following with endocrinology, Dr. Lucero  - maintained on levothyroxine - dosing through endo         Hyperparathyroidism (H)       Musculoskeletal and Integumentary    Age-related osteoporosis without current pathological fracture     - DEXA (10/2022): T scores ranging between -2.7 to -3.8   - previous traumatic humeral fracture after OLTx   - calcium/vitamin D  - steroid-sparing transplant regimen   - alendronate 70mg qweek (begun 8/2019 - spring, 2022) - she stopped due to her own dental concerns  - previously referred to endocrinology/bone disorder clinic given severity of osteoporosis - recommendations for Evenity anabolic therapy   - Sherrie opting not to receive treatment - aware of fracture risk            Immune    Immunosuppressed status (H) (Chronic)       Hematologic    Coagulopathy (H)       Other    Liver transplanted (H) " (Chronic)     recurrent, metastatic cholangiocarcinoma; followed by Dr. 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 - failed cognitive screening - notices she's forgetful         Relevant Orders    REVIEW OF HEALTH MAINTENANCE PROTOCOL ORDERS (Completed)     Other Visit Diagnoses       Need for vaccination    -  Primary    Relevant Orders    INFLUENZA VACCINE >6 MONTHS (AFLURIA/FLUZONE) (Completed)    Persistent insomnia        Relevant Medications    zolpidem (AMBIEN) 10 MG tablet    pramipexole (MIRAPEX) 1 MG tablet    Need for shingles vaccine        Need for prophylactic vaccination against hepatitis A        Need for prophylactic vaccination against hepatitis B virus                      COUNSELING:  Reviewed preventive health counseling, as reflected in patient instructions        She reports that she has never smoked. She has never used smokeless tobacco.      Appropriate preventive services were discussed with this patient, including applicable screening as appropriate for cardiovascular disease, diabetes, osteopenia/osteoporosis, and glaucoma.  As appropriate for age/gender, discussed screening for colorectal cancer, prostate cancer, breast cancer,  and cervical cancer. Checklist reviewing preventive services available has been given to the patient.    Reviewed patients plan of care and provided an AVS. The Basic Care Plan (routine screening as documented in Health Maintenance) for Sherrie meets the Care Plan requirement. This Care Plan has been established and reviewed with the Patient.          Apollo Benitez MD  [facility]    Identified Health Risks:  I have reviewed Opioid Use Disorder and Substance Use Disorder risk factors and made any needed referrals.

## 2023-09-20 NOTE — ASSESSMENT & PLAN NOTE
recurrent, metastatic cholangiocarcinoma; followed by Dr. 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

## 2023-10-17 ENCOUNTER — TELEPHONE (OUTPATIENT)
Dept: TRANSPLANT | Facility: CLINIC | Age: 68
End: 2023-10-17
Payer: COMMERCIAL

## 2023-10-17 ASSESSMENT — ENCOUNTER SYMPTOMS: NEW SYMPTOMS OF CORONARY ARTERY DISEASE: 0

## 2023-11-02 ENCOUNTER — MYC MEDICAL ADVICE (OUTPATIENT)
Dept: FAMILY MEDICINE | Facility: CLINIC | Age: 68
End: 2023-11-02
Payer: COMMERCIAL

## 2023-11-02 DIAGNOSIS — G47.00 PERSISTENT INSOMNIA: Primary | ICD-10-CM

## 2023-11-02 RX ORDER — PRAMIPEXOLE DIHYDROCHLORIDE 0.25 MG/1
1 TABLET ORAL AT BEDTIME
Qty: 360 TABLET | Refills: 3 | Status: SHIPPED | OUTPATIENT
Start: 2023-11-02 | End: 2024-09-26

## 2023-11-02 NOTE — TELEPHONE ENCOUNTER
Routing message to PCP for review and address request to change back to Pramipexole 0.25mg tablets.  Order pended for review and signature.  Last OV 9/20/23 ARLIN RAMOS

## 2023-11-28 ENCOUNTER — LAB (OUTPATIENT)
Dept: LAB | Facility: CLINIC | Age: 68
End: 2023-11-28
Payer: COMMERCIAL

## 2023-11-28 ENCOUNTER — OFFICE VISIT (OUTPATIENT)
Dept: GASTROENTEROLOGY | Facility: CLINIC | Age: 68
End: 2023-11-28
Attending: INTERNAL MEDICINE
Payer: MEDICARE

## 2023-11-28 VITALS
HEART RATE: 78 BPM | OXYGEN SATURATION: 98 % | BODY MASS INDEX: 22.59 KG/M2 | SYSTOLIC BLOOD PRESSURE: 137 MMHG | WEIGHT: 131.6 LBS | TEMPERATURE: 97.4 F | DIASTOLIC BLOOD PRESSURE: 93 MMHG

## 2023-11-28 DIAGNOSIS — Z94.4 LIVER REPLACED BY TRANSPLANT (H): ICD-10-CM

## 2023-11-28 DIAGNOSIS — I10 BENIGN ESSENTIAL HYPERTENSION: ICD-10-CM

## 2023-11-28 LAB
ALBUMIN MFR UR ELPH: 23.8 MG/DL
ALBUMIN SERPL BCG-MCNC: 4.4 G/DL (ref 3.5–5.2)
ALBUMIN UR-MCNC: 10 MG/DL
ALP SERPL-CCNC: 86 U/L (ref 40–150)
ALT SERPL W P-5'-P-CCNC: 12 U/L (ref 0–50)
AMORPH CRY #/AREA URNS HPF: ABNORMAL /HPF
ANION GAP SERPL CALCULATED.3IONS-SCNC: 9 MMOL/L (ref 7–15)
APPEARANCE UR: CLEAR
AST SERPL W P-5'-P-CCNC: 18 U/L (ref 0–45)
BILIRUB DIRECT SERPL-MCNC: <0.2 MG/DL (ref 0–0.3)
BILIRUB SERPL-MCNC: 0.5 MG/DL
BILIRUB UR QL STRIP: NEGATIVE
BUN SERPL-MCNC: 21 MG/DL (ref 8–23)
CALCIUM SERPL-MCNC: 8.6 MG/DL (ref 8.8–10.2)
CHLORIDE SERPL-SCNC: 106 MMOL/L (ref 98–107)
CHOLEST SERPL-MCNC: 148 MG/DL
COLOR UR AUTO: YELLOW
CREAT SERPL-MCNC: 1.06 MG/DL (ref 0.51–0.95)
CREAT UR-MCNC: 189 MG/DL
DEPRECATED HCO3 PLAS-SCNC: 26 MMOL/L (ref 22–29)
EGFRCR SERPLBLD CKD-EPI 2021: 58 ML/MIN/1.73M2
ERYTHROCYTE [DISTWIDTH] IN BLOOD BY AUTOMATED COUNT: 12.5 % (ref 10–15)
GLUCOSE SERPL-MCNC: 94 MG/DL (ref 70–99)
GLUCOSE UR STRIP-MCNC: NEGATIVE MG/DL
HCT VFR BLD AUTO: 43.7 % (ref 35–47)
HDLC SERPL-MCNC: 55 MG/DL
HGB BLD-MCNC: 14.6 G/DL (ref 11.7–15.7)
HGB UR QL STRIP: ABNORMAL
KETONES UR STRIP-MCNC: NEGATIVE MG/DL
LDLC SERPL CALC-MCNC: 79 MG/DL
LEUKOCYTE ESTERASE UR QL STRIP: NEGATIVE
MAGNESIUM SERPL-MCNC: 1.8 MG/DL (ref 1.7–2.3)
MCH RBC QN AUTO: 29.7 PG (ref 26.5–33)
MCHC RBC AUTO-ENTMCNC: 33.4 G/DL (ref 31.5–36.5)
MCV RBC AUTO: 89 FL (ref 78–100)
MUCOUS THREADS #/AREA URNS LPF: PRESENT /LPF
NITRATE UR QL: NEGATIVE
NONHDLC SERPL-MCNC: 93 MG/DL
PH UR STRIP: 5.5 [PH] (ref 5–7)
PHOSPHATE SERPL-MCNC: 3.2 MG/DL (ref 2.5–4.5)
PLATELET # BLD AUTO: 154 10E3/UL (ref 150–450)
POTASSIUM SERPL-SCNC: 4.5 MMOL/L (ref 3.4–5.3)
PROT SERPL-MCNC: 7.1 G/DL (ref 6.4–8.3)
PROT/CREAT 24H UR: 0.13 MG/MG CR (ref 0–0.2)
RBC # BLD AUTO: 4.92 10E6/UL (ref 3.8–5.2)
RBC URINE: 13 /HPF
SODIUM SERPL-SCNC: 141 MMOL/L (ref 135–145)
SP GR UR STRIP: 1.02 (ref 1–1.03)
TACROLIMUS BLD-MCNC: 5.1 UG/L (ref 5–15)
TME LAST DOSE: NORMAL H
TME LAST DOSE: NORMAL H
TRIGL SERPL-MCNC: 72 MG/DL
UROBILINOGEN UR STRIP-MCNC: NORMAL MG/DL
WBC # BLD AUTO: 5.5 10E3/UL (ref 4–11)
WBC URINE: 1 /HPF

## 2023-11-28 PROCEDURE — 85027 COMPLETE CBC AUTOMATED: CPT | Performed by: PATHOLOGY

## 2023-11-28 PROCEDURE — G0463 HOSPITAL OUTPT CLINIC VISIT: HCPCS | Performed by: INTERNAL MEDICINE

## 2023-11-28 PROCEDURE — 83735 ASSAY OF MAGNESIUM: CPT | Performed by: PATHOLOGY

## 2023-11-28 PROCEDURE — 80197 ASSAY OF TACROLIMUS: CPT | Performed by: INTERNAL MEDICINE

## 2023-11-28 PROCEDURE — 99214 OFFICE O/P EST MOD 30 MIN: CPT | Performed by: INTERNAL MEDICINE

## 2023-11-28 PROCEDURE — 99000 SPECIMEN HANDLING OFFICE-LAB: CPT | Performed by: PATHOLOGY

## 2023-11-28 PROCEDURE — 84100 ASSAY OF PHOSPHORUS: CPT | Performed by: PATHOLOGY

## 2023-11-28 PROCEDURE — 80053 COMPREHEN METABOLIC PANEL: CPT | Performed by: PATHOLOGY

## 2023-11-28 PROCEDURE — 36415 COLL VENOUS BLD VENIPUNCTURE: CPT | Performed by: PATHOLOGY

## 2023-11-28 PROCEDURE — 84156 ASSAY OF PROTEIN URINE: CPT | Performed by: PATHOLOGY

## 2023-11-28 PROCEDURE — 80061 LIPID PANEL: CPT | Performed by: PATHOLOGY

## 2023-11-28 PROCEDURE — 81001 URINALYSIS AUTO W/SCOPE: CPT | Performed by: PATHOLOGY

## 2023-11-28 PROCEDURE — 82248 BILIRUBIN DIRECT: CPT | Performed by: PATHOLOGY

## 2023-11-28 RX ORDER — AMLODIPINE BESYLATE 5 MG/1
5 TABLET ORAL DAILY
Qty: 90 TABLET | Refills: 3 | Status: SHIPPED | OUTPATIENT
Start: 2023-11-28

## 2023-11-28 ASSESSMENT — PAIN SCALES - GENERAL: PAINLEVEL: NO PAIN (0)

## 2023-11-28 NOTE — PROGRESS NOTES
Solid Organ Transplant Hepatology Follow-Up Visit:     HISTORY OF PRESENT ILLNESS:   I had the pleasure of seeing Sherrie Lala for followup in the Liver Clinic at the [facility] on November 28, 2023. Ms. 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  She is now 12 years status post cancer surgery.     She feels well at this visit.  She denies any abdominal pain, itching, skin rash or fatigue.  She denies any increased abdominal girth or lower extremity edema.      She has not had any fevers or chills, cough or shortness of breath.  She denies any nausea or vomiting, diarrhea or constipation.  She denies any nausea or vomiting, diarrhea or constipation.  Her appetite has been good and her weight for the most part has been stable.    Medications:   Current Outpatient Medications   Medication    amLODIPine (NORVASC) 5 MG tablet    aspirin 81 MG EC tablet    gabapentin (NEURONTIN) 300 MG capsule    levothyroxine (SYNTHROID/LEVOTHROID) 125 MCG tablet    omeprazole 20 MG tablet    pramipexole (MIRAPEX) 0.25 MG tablet    pramipexole (MIRAPEX) 1 MG tablet    tacrolimus (GENERIC EQUIVALENT) 1 MG capsule    zolpidem (AMBIEN) 10 MG tablet    acetaminophen (TYLENOL) 500 MG tablet    albuterol (PROAIR HFA/PROVENTIL HFA/VENTOLIN HFA) 108 (90 Base) MCG/ACT inhaler    amoxicillin (AMOXIL) 500 MG capsule     No current facility-administered medications for this visit.        Vitals:   BP (!) 137/93   Pulse 78   Temp 97.4  F (36.3  C) (Oral)   Wt 59.7 kg (131 lb 9.6 oz)   LMP  (LMP Unknown)   SpO2 98%   BMI 22.59 kg/m      Physical Exam:   In general she looks quite well. HEENT exam shows no scleral icterus or temporal muscle wasting. Abdominal exam shows no increase in girth. No masses or tenderness to palpation are present. Liver is 10 cm in span without left lobe enlargement. No spleen  tip is palpable.  Her incision is intact.  Extremity exam shows no edema. Skin exam shows no suspicious lesions and neurologic exam is nonfocal.     Labs:   Lab Results   Component Value Date     11/28/2023    POTASSIUM 4.5 11/28/2023    CHLORIDE 106 11/28/2023    ANIONGAP 9 11/28/2023    CO2 26 11/28/2023    BUN 21.0 11/28/2023    CR 1.06 (H) 11/28/2023    GFRESTIMATED 58 (L) 11/28/2023    SHELDON 8.6 (L) 11/28/2023      Lab Results   Component Value Date    WBC 5.5 11/28/2023    HGB 14.6 11/28/2023    HCT 43.7 11/28/2023    MCV 89 11/28/2023    MCH 29.7 11/28/2023    MCHC 33.4 11/28/2023    RDW 12.5 11/28/2023     11/28/2023     Lab Results   Component Value Date    ALBUMIN 4.4 11/28/2023    ALKPHOS 86 11/28/2023    AST 18 11/28/2023     Lab Results   Component Value Date    INR 1.19 (H) 04/05/2021     Recent Labs   Lab Test 11/28/23  1011 05/26/23  1019 10/28/22  0939 04/22/22  0943 10/29/21  0806 08/26/21  1102 04/06/21  0519 04/05/21  0806 04/04/21  0547 04/03/21  0713   ALKPHOS 86 92 82 77 82 71 82 101 88 80   ALT 12 8* 21 20 13 8 30 38 47 54*   AST 18 17 22 13 8 10 7 9 8 8      Imaging:   No images are attached to the encounter.     Assessment/Plan:   IMPRESSION:   Ms. Lala is doing very well now more than 5 years status post liver transplantation.  Her liver tests are all completely normal.  Her kidney function looks quite good as well.  She does have some persistent hematuria.  She has previously no wanted this worked up but we try to convince her to do so once again.  All complications are well addressed.  She needs a COVID-19 vaccine and has had a flu shot.       She is otherwise up to date with regard to other vaccines and cancer screening and I will see her back in the clinic again in 1 year.    I did spend total of 30 minutes (on the date of the encounter), including 20 minutes of face-to-face clinic time including counseling. The rest of the time was spent in documentation and review of  records.     Thank you very much for allowing me to participate in the care of this patient.  If you have any questions regarding my recommendations, please do not hesitate to contact me.         Bradly Lilly MD      Professor of Medicine  [employer]      Executive Medical Director, Solid Organ Transplant Program  [facility]

## 2023-11-28 NOTE — LETTER
11/28/2023         RE: Sherrie Lala  [address] [facility] [address]        Dear Colleague,    Thank you for referring your patient, Sherrie Lala, to the [facility] [place]. Please see a copy of my visit note below.    Solid Organ Transplant Hepatology Follow-Up Visit:     HISTORY OF PRESENT ILLNESS:   I had the pleasure of seeing Sherrie Lala for followup in the Liver Clinic at the [facility] on November 28, 2023. Ms. 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  She is now 12 years status post cancer surgery.     She feels well at this visit.  She denies any abdominal pain, itching, skin rash or fatigue.  She denies any increased abdominal girth or lower extremity edema.      She has not had any fevers or chills, cough or shortness of breath.  She denies any nausea or vomiting, diarrhea or constipation.  She denies any nausea or vomiting, diarrhea or constipation.  Her appetite has been good and her weight for the most part has been stable.    Medications:   Current Outpatient Medications   Medication    amLODIPine (NORVASC) 5 MG tablet    aspirin 81 MG EC tablet    gabapentin (NEURONTIN) 300 MG capsule    levothyroxine (SYNTHROID/LEVOTHROID) 125 MCG tablet    omeprazole 20 MG tablet    pramipexole (MIRAPEX) 0.25 MG tablet    pramipexole (MIRAPEX) 1 MG tablet    tacrolimus (GENERIC EQUIVALENT) 1 MG capsule    zolpidem (AMBIEN) 10 MG tablet    acetaminophen (TYLENOL) 500 MG tablet    albuterol (PROAIR HFA/PROVENTIL HFA/VENTOLIN HFA) 108 (90 Base) MCG/ACT inhaler    amoxicillin (AMOXIL) 500 MG capsule     No current facility-administered medications for this visit.        Vitals:   BP (!) 137/93   Pulse 78   Temp 97.4  F (36.3  C) (Oral)   Wt 59.7 kg (131 lb 9.6 oz)   LMP  (LMP Unknown)   SpO2 98%   BMI 22.59 kg/m      Physical  Exam:   In general she looks quite well. HEENT exam shows no scleral icterus or temporal muscle wasting. Abdominal exam shows no increase in girth. No masses or tenderness to palpation are present. Liver is 10 cm in span without left lobe enlargement. No spleen tip is palpable.  Her incision is intact.  Extremity exam shows no edema. Skin exam shows no suspicious lesions and neurologic exam is nonfocal.     Labs:   Lab Results   Component Value Date     11/28/2023    POTASSIUM 4.5 11/28/2023    CHLORIDE 106 11/28/2023    ANIONGAP 9 11/28/2023    CO2 26 11/28/2023    BUN 21.0 11/28/2023    CR 1.06 (H) 11/28/2023    GFRESTIMATED 58 (L) 11/28/2023    SHELDON 8.6 (L) 11/28/2023      Lab Results   Component Value Date    WBC 5.5 11/28/2023    HGB 14.6 11/28/2023    HCT 43.7 11/28/2023    MCV 89 11/28/2023    MCH 29.7 11/28/2023    MCHC 33.4 11/28/2023    RDW 12.5 11/28/2023     11/28/2023     Lab Results   Component Value Date    ALBUMIN 4.4 11/28/2023    ALKPHOS 86 11/28/2023    AST 18 11/28/2023     Lab Results   Component Value Date    INR 1.19 (H) 04/05/2021     Recent Labs   Lab Test 11/28/23  1011 05/26/23  1019 10/28/22  0939 04/22/22  0943 10/29/21  0806 08/26/21  1102 04/06/21  0519 04/05/21  0806 04/04/21  0547 04/03/21  0713   ALKPHOS 86 92 82 77 82 71 82 101 88 80   ALT 12 8* 21 20 13 8 30 38 47 54*   AST 18 17 22 13 8 10 7 9 8 8      Imaging:   No images are attached to the encounter.     Assessment/Plan:   IMPRESSION:   Ms. Lala is doing very well now more than 5 years status post liver transplantation.  Her liver tests are all completely normal.  Her kidney function looks quite good as well.  She does have some persistent hematuria.  She has previously no wanted this worked up but we try to convince her to do so once again.  All complications are well addressed.  She needs a COVID-19 vaccine and has had a flu shot.       She is otherwise up to date with regard to other vaccines and cancer  screening and I will see her back in the clinic again in 1 year.    I did spend total of 30 minutes (on the date of the encounter), including 20 minutes of face-to-face clinic time including counseling. The rest of the time was spent in documentation and review of records.     Thank you very much for allowing me to participate in the care of this patient.  If you have any questions regarding my recommendations, please do not hesitate to contact me.         Bradly Lilly MD      Professor of Medicine  [employer]      Executive Medical Director, Solid Organ Transplant Program  [facility]

## 2023-11-28 NOTE — NURSING NOTE
Chief Complaint   Patient presents with    RECHECK     6 month check     BP (!) 137/93   Pulse 78   Temp 97.4  F (36.3  C) (Oral)   Wt 59.7 kg (131 lb 9.6 oz)   LMP  (LMP Unknown)   SpO2 98%   BMI 22.59 kg/m    Fortunato Tan on 11/28/2023 at 10:29 AM

## 2023-11-30 ENCOUNTER — TELEPHONE (OUTPATIENT)
Dept: TRANSPLANT | Facility: CLINIC | Age: 68
End: 2023-11-30
Payer: COMMERCIAL

## 2023-11-30 DIAGNOSIS — R31.9 HEMATURIA: Primary | ICD-10-CM

## 2023-11-30 DIAGNOSIS — Z94.4 LIVER REPLACED BY TRANSPLANT (H): ICD-10-CM

## 2023-11-30 NOTE — TELEPHONE ENCOUNTER
"Call to Sherrie due to abnormal urine results.  She says she did see a urologist, they said she should have a cystoscopy.  She said she just didn't want to deal w/ the fact that she may have bladder cancer.  I reminded her that there could be other reasons for this! She says it's been in the back of her mind that she \"probably should be evaluated for this abnormal finding which is not new\" but that she figured she'd wait to see if we called her again.  She would like me to place a referral.  I did.   I also suggested she schedule next years follow-up visit w/ Dr. Lilly now, she says she tried but was unsuccessful.  Order placed.   "

## 2023-12-01 NOTE — TELEPHONE ENCOUNTER
Action 2023 JTV 4:59 PM    Action Taken [facility] sent an urgent request to [place] for images.    MEDICAL RECORDS REQUEST   [facility]  Urology Clinic  [address] [place] [address]  PHONE: [phone]  Fax: [phone]        FUTURE VISIT INFORMATION                                                   Sherrie JUNE Lala, : 1955 scheduled for future visit at [facility]    APPOINTMENT INFORMATION:  Date: 12/15/2023  Provider:  Oliverio Miranda MD  Reason for Visit/Diagnosis: Hematuria    REFERRAL INFORMATION:  Referring provider:  Bradly Lilly MD in  [place]      RECORDS REQUESTED FOR VISIT                                                     NOTES  STATUS/DETAILS   OFFICE NOTE from referring provider  yes   OFFICE NOTE from other specialist  yes, 2023 -- Richelle Abraham PA @ [facility]   MEDICATION LIST  yes   LABS     URINALYSIS (UA)  yes   IMAGES  yes, [place] [facility]  2023 -- CT CHEST ABD PELVIS     PRE-VISIT CHECKLIST      Joint diagnostic appointment coordinated correctly          (ensure right order & amount of time) Yes   RECORD COLLECTION COMPLETE yes

## 2023-12-08 ENCOUNTER — PRE VISIT (OUTPATIENT)
Dept: UROLOGY | Facility: CLINIC | Age: 68
End: 2023-12-08
Payer: COMMERCIAL

## 2023-12-08 ENCOUNTER — TELEPHONE (OUTPATIENT)
Dept: UROLOGY | Facility: CLINIC | Age: 68
End: 2023-12-08
Payer: COMMERCIAL

## 2023-12-08 DIAGNOSIS — R31.29 MICROSCOPIC HEMATURIA: Primary | ICD-10-CM

## 2023-12-08 NOTE — CONFIDENTIAL NOTE
Reason for visit: cystoscopy     Relevant information: microscopic hematuria    Records/imaging/labs/orders: in epic    At Rooming: collect urine, give lido (urojet)    Annette Alcala  12/8/2023  2:46 PM

## 2023-12-08 NOTE — TELEPHONE ENCOUNTER
Writer LVM and clinic callback number for pt in regards to appt with Dr. Miranda on 12/15/2023. Per Christina Abraham's note in February, pt is to schedule cystoscopy with a urologist for microscopic hematuria. Writer wanted to confirm with pt to switch current appt with Dr. Miranda to a cystoscopy to evaluate the interior of her bladder.    Annette lAcala  December 8, 2023  2:45 PM

## 2023-12-14 RX ORDER — LIDOCAINE HYDROCHLORIDE 20 MG/ML
JELLY TOPICAL ONCE
Status: DISCONTINUED | OUTPATIENT
Start: 2023-12-15 | End: 2024-07-02

## 2023-12-15 ENCOUNTER — PRE VISIT (OUTPATIENT)
Dept: UROLOGY | Facility: CLINIC | Age: 68
End: 2023-12-15

## 2023-12-15 ENCOUNTER — OFFICE VISIT (OUTPATIENT)
Dept: UROLOGY | Facility: CLINIC | Age: 68
End: 2023-12-15
Attending: INTERNAL MEDICINE
Payer: COMMERCIAL

## 2023-12-15 VITALS
DIASTOLIC BLOOD PRESSURE: 81 MMHG | HEART RATE: 72 BPM | OXYGEN SATURATION: 97 % | WEIGHT: 131 LBS | HEIGHT: 64 IN | BODY MASS INDEX: 22.36 KG/M2 | SYSTOLIC BLOOD PRESSURE: 125 MMHG

## 2023-12-15 DIAGNOSIS — R31.29 MICROSCOPIC HEMATURIA: Primary | ICD-10-CM

## 2023-12-15 PROCEDURE — 99207 PR NO CHARGE LOS: CPT | Performed by: UROLOGY

## 2023-12-15 PROCEDURE — 52000 CYSTOURETHROSCOPY: CPT | Performed by: UROLOGY

## 2023-12-15 ASSESSMENT — PAIN SCALES - GENERAL: PAINLEVEL: NO PAIN (0)

## 2023-12-15 NOTE — NURSING NOTE
"Chief Complaint   Patient presents with    Cystoscopy     Hematuria       Blood pressure 125/81, pulse 72, height 1.626 m (5' 4\"), weight 59.4 kg (131 lb), SpO2 97%, not currently breastfeeding. Body mass index is 22.49 kg/m .    Patient Active Problem List   Diagnosis    Age-related osteoporosis without current pathological fracture    Liver transplanted (H)    Immunosuppressed status (H24)    Primary localized osteoarthritis of right knee    Malignant neoplasm of thyroid gland (H)    Chronic diarrhea    Health care maintenance    Hyperparathyroidism (H24)    Coagulopathy (H24)       Allergies   Allergen Reactions    Blood Transfusion Related (Informational Only) Other (See Comments)     Patient has a history of a clinically significant antibody against RBC antigens.  A delay in compatible RBCs may occur.    Dilaudid [Hydromorphone] Itching    Na Ferric Gluc Cplx In Sucrose     Venofer [Iron Sucrose]        Current Outpatient Medications   Medication Sig Dispense Refill    amLODIPine (NORVASC) 5 MG tablet Take 1 tablet (5 mg) by mouth daily 90 tablet 3    aspirin 81 MG EC tablet Take 81 mg by mouth daily      gabapentin (NEURONTIN) 300 MG capsule TAKE 2 CAPSULES AT BEDTIME 180 capsule 3    levothyroxine (SYNTHROID/LEVOTHROID) 125 MCG tablet Take 125 mcg by mouth daily      omeprazole 20 MG tablet Take 1 tablet (20 mg) by mouth 2 times daily 180 tablet 3    pramipexole (MIRAPEX) 0.25 MG tablet Take 4 tablets (1 mg) by mouth at bedtime 360 tablet 3    pramipexole (MIRAPEX) 1 MG tablet Take 1 tablet (1 mg) by mouth At Bedtime 90 tablet 3    tacrolimus (GENERIC EQUIVALENT) 1 MG capsule TAKE TWO CAPSULES BY MOUTH TWICE A  capsule 11    zolpidem (AMBIEN) 10 MG tablet TAKE 1 TABLET ONE TIME DAILY AT BEDTIME AS NEEDED FOR SLEEP 90 tablet 1    acetaminophen (TYLENOL) 500 MG tablet Take 1 tablet (500 mg) by mouth every 6 hours as needed for mild pain (Patient not taking: Reported on 11/28/2023) 30 tablet 1    " albuterol (PROAIR HFA/PROVENTIL HFA/VENTOLIN HFA) 108 (90 Base) MCG/ACT inhaler 2 Inhalation 4 times daily as needed (Patient not taking: Reported on 2022)      amoxicillin (AMOXIL) 500 MG capsule Take 4 tablets (2000 mg) by mouth 1 hour before dental procedures/cleanings. (Patient not taking: Reported on 2023) 4 capsule 4       Social History     Tobacco Use    Smoking status: Never    Smokeless tobacco: Never   Vaping Use    Vaping Use: Never used   Substance Use Topics    Alcohol use: Yes     Comment: occ /rare    Drug use: No       What to expect after the procedure reviewed with patient: Yes    Cecilia DavidFLASH  12/15/2023  8:47 AM     Invasive Procedure Safety Checklist:    Procedure: Cystoscopy    Action: Complete sections and checkboxes as appropriate.    Pre-procedure:  1. Patient ID Verified with 2 identifiers (Latasha and  or MRN) : YES    2. Procedure and site verified with patient/designee (when able) : YES    3. Accurate consent documentation in medical record : YES    4. H&P (or appropriate assessment) documented in medical record : YES  H&P must be up to 30 days prior to procedure an updated within 24 hours of                 Procedure as applicable.     5. Relevant diagnostic and radiology test results appropriately labeled and displayed as applicable : YES    6. Blood products, implants, devices, and/or special equipment available for the procedure as applicable : YES    7. Procedure site(s) marked with provider initials [Exclusions: None] : NO    8. Marking not required. Reason : Yes  Procedure does not require site marking    Time Out:     Time-Out performed immediately prior to starting procedure, including verbal and active participation of all team members addressing: YES    1. Correct patient identity.  2. Confirmed that the correct side and site are marked.  3. An accurate procedure to be done.  4. Agreement on the procedure to be done.  5. Correct patient position.  6. Relevant  images and results are properly labeled and appropriately displayed.  7. The need to administer antibiotics or fluids for irrigation purposes during the procedure as applicable.  8. Safety precautions based on patient history or medication use.    During Procedure: Verification of correct person, site, and procedure occurs any time the responsibility for care of the patient is transferred to another member of the care team.    No medications administered during this procedure.    Cecilia David LPN  December 15, 2023

## 2023-12-15 NOTE — LETTER
"12/15/2023       RE: Sherrie Lala  [address] [facility] [address]     Dear Colleague,    Thank you for referring your patient, Sherrie Lala, to the [facility] [place] at [facility]. Please see a copy of my visit note below.      Urology Clinic  SALAZAR Miranda MD  Dec 15, 2023  68 year old female    Microscopic hematuria   Longstanding since at least 2013 11/2023 One episode of possible small amount gross hematuria  12/15/23 Cystoscopy negative.  Many CTs over recent years that were negative, but no CT Urogram.  We are considering the multiple CTs sufficient      Plan  Return to clinic if gross hematuria, painful urination, or increasing urgency occur  Periodic blood pressure checks and urine cytologies for the next 2 years with primary care physician.      _____________________________________________________________________    HPI  Per Christina Abraham 2/2/23 note:    \"Mr. Sherrie Llaa has PMH significant for HTN, metastatic cholangiocarcinoma s/p Whipple, Liver transplant (2018) with immunosuppressed status, asthma, blood transfusion with antibodies, OA, thyroid cancer s/p thyroidectomy now with hypothyroidism, osteoporosis.       She has been referred today for microhematuria, present at least since 2013.   Never had gross hematuria.   Is trying to drink more water.   No Hx stones.  No UTI Hx.    Never smoker.   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  No menses since 2011.  No vaginal bleeding.  Hasn't had any recent exams.       She is high risk due to age, but she is also immunosuppressed which contributes to her risk profile.  She never smoked.  On the other hand, she has had microhematuria since at least 2013, which may argue against  malignancy.  She has also had multiple CT scans over the past 10 years in the setting of cholangiocarcinoma , last being without contrast on " "8/9/22 showing normal kidneys.  Before that she had a CT with contrast on 10/25/21, also showing normal kidneys.  For now, patient agrees it would be best to avoid radiation and excessive scans. \"    Today's visit is for a cystoscopy as part of the hematuria workup.      2/2/23 Cytology: negative    2/2/23 renal ultrasound   I reviewed the radiologic images and report from this radiologic exam.  My independent interpretation is:  No hydronephrosis       CYSTOSCOPY PROCEDURE  After a sterile prep and drape and an informed consent a 16-Ukrainian flexible cystoscope was introduced via the urethra.  The urethra was open.  The bladder mucosa within normal limits and without tumor or trabeculation..    CC  Patient Care Team:  Apollo Benitez MD as PCP - General (Nephrology)  Apollo Benitez MD as PCP - Internal Medicine (Nephrology)  Baltazar Solares NP as Referring Physician (Nurse Practitioner)  Jorge Langford MD as MD (Hematology & Oncology)  Rafy Chisholm MD as MD (Gastroenterology)  Jesus Thomas MD as MD (Internal Medicine)  Janeth Orozco MD as Referring Physician (Gastroenterology)  Eldon Goetz MD as MD (Dermatology)  Nitin Goodson MD as MD (Family Practice)  Darcy Blanco MD as MD (Urology)  Apollo Benitez MD as Assigned PCP  Richelle Abraham PA as Physician Assistant (Urology)  Josefina Lloyd MD as MD (Endocrinology, Diabetes, and Metabolism)  Josefina Lloyd MD as Assigned Endocrinology Provider  Janeth Orozco MD as Assigned Surgical Provider  Oliverio Miranda MD as MD (Urology)  JANETH OROZCO    Copy to patient  REX DAMON  [address] [place] [id number]    Oliverio Miranda MD    "

## 2023-12-15 NOTE — PATIENT INSTRUCTIONS
"AFTER YOUR CYSTOSCOPY        You have just completed a cystoscopy, or \"cysto\", which allowed your physician to learn more about your bladder (or to remove a stent placed after surgery). We suggest that you continue to avoid caffeine, fruit juice, and alcohol for the next 24 hours, however, you are encouraged to return to your normal activities.         A few things that are considered normal after your cystoscopy:     * Small amount of bleeding (or spotting) that clears within the next 24 hours     * Slight burning sensation with urination     * Sensation to of needing to avoid more frequently     * The feeling of \"air\" in your urine     * Mild discomfort that is relieved with Tylenol        Please contact our office promptly if you:     * Develop a fever above 101 degrees     * Are unable to urinate     * Develop bright red blood that does not stop     * Severe pain or swelling         Please contact our office with any concerns or questions @ [phone]   "

## 2023-12-15 NOTE — PROGRESS NOTES
"  Urology Clinic  SALAZAR Miranda MD  Dec 15, 2023  68 year old female    Microscopic hematuria   Longstanding since at least 2013 11/2023 One episode of possible small amount gross hematuria  12/15/23 Cystoscopy negative.  Many CTs over recent years that were negative, but no CT Urogram.  We are considering the multiple CTs sufficient      Plan  Return to clinic if gross hematuria, painful urination, or increasing urgency occur  Periodic blood pressure checks and urine cytologies for the next 2 years with primary care physician.      _____________________________________________________________________    HPI  Per Christina Abraham 2/2/23 note:    \"Mr. Sherrie Lala has PMH significant for HTN, metastatic cholangiocarcinoma s/p Whipple, Liver transplant (2018) with immunosuppressed status, asthma, blood transfusion with antibodies, OA, thyroid cancer s/p thyroidectomy now with hypothyroidism, osteoporosis.       She has been referred today for microhematuria, present at least since 2013.   Never had gross hematuria.   Is trying to drink more water.   No Hx stones.  No UTI Hx.    Never smoker.   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  No menses since 2011.  No vaginal bleeding.  Hasn't had any recent exams.       She is high risk due to age, but she is also immunosuppressed which contributes to her risk profile.  She never smoked.  On the other hand, she has had microhematuria since at least 2013, which may argue against  malignancy.  She has also had multiple CT scans over the past 10 years in the setting of cholangiocarcinoma , last being without contrast on 8/9/22 showing normal kidneys.  Before that she had a CT with contrast on 10/25/21, also showing normal kidneys.  For now, patient agrees it would be best to avoid radiation and excessive scans. \"    Today's visit is for a cystoscopy as part of the hematuria workup.      2/2/23 Cytology: " negative    2/2/23 renal ultrasound   I reviewed the radiologic images and report from this radiologic exam.  My independent interpretation is:  No hydronephrosis       CYSTOSCOPY PROCEDURE  After a sterile prep and drape and an informed consent a 16-Scottish flexible cystoscope was introduced via the urethra.  The urethra was open.  The bladder mucosa within normal limits and without tumor or trabeculation..    CC  Patient Care Team:  Apollo Benitez MD as PCP - General (Nephrology)  Apollo Benitez MD as PCP - Internal Medicine (Nephrology)  Baltazar Solares NP as Referring Physician (Nurse Practitioner)  Jorge Langford MD as MD (Hematology & Oncology)  Rafy Chisholm MD as MD (Gastroenterology)  Jesus Thomas MD as MD (Internal Medicine)  Janeth Orozco MD as Referring Physician (Gastroenterology)  Eldon Goetz MD as MD (Dermatology)  Nitin Goodson MD as MD (Family Practice)  Darcy Blanco MD as MD (Urology)  Apollo Benitez MD as Assigned PCP  Richelle Abraham PA as Physician Assistant (Urology)  Josefina Lloyd MD as MD (Endocrinology, Diabetes, and Metabolism)  Josefina Lloyd MD as Assigned Endocrinology Provider  Janeth Orozco MD as Assigned Surgical Provider  Oliverio Miranda MD as MD (Urology)  JANETH OROZCO    Copy to patient  REX DAMON  [address] [place] [phone]

## 2023-12-15 NOTE — NURSING NOTE
"Chief Complaint   Patient presents with    Cystoscopy     Hematuria       Blood pressure 125/81, pulse 72, height 1.626 m (5' 4\"), weight 59.4 kg (131 lb), SpO2 97%, not currently breastfeeding. Body mass index is 22.49 kg/m .    Patient Active Problem List   Diagnosis    Age-related osteoporosis without current pathological fracture    Liver transplanted (H)    Immunosuppressed status (H24)    Primary localized osteoarthritis of right knee    Malignant neoplasm of thyroid gland (H)    Chronic diarrhea    Health care maintenance    Hyperparathyroidism (H24)    Coagulopathy (H24)       Allergies   Allergen Reactions    Blood Transfusion Related (Informational Only) Other (See Comments)     Patient has a history of a clinically significant antibody against RBC antigens.  A delay in compatible RBCs may occur.    Dilaudid [Hydromorphone] Itching    Na Ferric Gluc Cplx In Sucrose     Venofer [Iron Sucrose]        Current Outpatient Medications   Medication Sig Dispense Refill    amLODIPine (NORVASC) 5 MG tablet Take 1 tablet (5 mg) by mouth daily 90 tablet 3    aspirin 81 MG EC tablet Take 81 mg by mouth daily      gabapentin (NEURONTIN) 300 MG capsule TAKE 2 CAPSULES AT BEDTIME 180 capsule 3    levothyroxine (SYNTHROID/LEVOTHROID) 125 MCG tablet Take 125 mcg by mouth daily      omeprazole 20 MG tablet Take 1 tablet (20 mg) by mouth 2 times daily 180 tablet 3    pramipexole (MIRAPEX) 0.25 MG tablet Take 4 tablets (1 mg) by mouth at bedtime 360 tablet 3    pramipexole (MIRAPEX) 1 MG tablet Take 1 tablet (1 mg) by mouth At Bedtime 90 tablet 3    tacrolimus (GENERIC EQUIVALENT) 1 MG capsule TAKE TWO CAPSULES BY MOUTH TWICE A  capsule 11    zolpidem (AMBIEN) 10 MG tablet TAKE 1 TABLET ONE TIME DAILY AT BEDTIME AS NEEDED FOR SLEEP 90 tablet 1    acetaminophen (TYLENOL) 500 MG tablet Take 1 tablet (500 mg) by mouth every 6 hours as needed for mild pain (Patient not taking: Reported on 11/28/2023) 30 tablet 1    " albuterol (PROAIR HFA/PROVENTIL HFA/VENTOLIN HFA) 108 (90 Base) MCG/ACT inhaler 2 Inhalation 4 times daily as needed (Patient not taking: Reported on 2022)      amoxicillin (AMOXIL) 500 MG capsule Take 4 tablets (2000 mg) by mouth 1 hour before dental procedures/cleanings. (Patient not taking: Reported on 2023) 4 capsule 4       Social History     Tobacco Use    Smoking status: Never    Smokeless tobacco: Never   Vaping Use    Vaping Use: Never used   Substance Use Topics    Alcohol use: Yes     Comment: occ /rare    Drug use: No       Invasive Procedure Safety Checklist:    Procedure: Cystoscopy    Action: Complete sections and checkboxes as appropriate.    Pre-procedure:  1. Patient ID Verified with 2 identifiers (Latasha and  or MRN) : YES    2. Procedure and site verified with patient/designee (when able) : YES    3. Accurate consent documentation in medical record : YES    4. H&P (or appropriate assessment) documented in medical record : N/A  H&P must be up to 30 days prior to procedure an updated within 24 hours of                 Procedure as applicable.     5. Relevant diagnostic and radiology test results appropriately labeled and displayed as applicable : YES    6. Blood products, implants, devices, and/or special equipment available for the procedure as applicable : YES    7. Procedure site(s) marked with provider initials [Exclusions: none] : NO    8. Marking not required. Reason : Yes  Procedure does not require site marking    Time Out:     Time-Out performed immediately prior to starting procedure, including verbal and active participation of all team members addressing: YES    1. Correct patient identity.  2. Confirmed that the correct side and site are marked.  3. An accurate procedure to be done.  4. Agreement on the procedure to be done.  5. Correct patient position.  6. Relevant images and results are properly labeled and appropriately displayed.  7. The need to administer antibiotics  or fluids for irrigation purposes during the procedure as applicable.  8. Safety precautions based on patient history or medication use.    During Procedure: Verification of correct person, site, and procedure occurs any time the responsibility for care of the patient is transferred to another member of the care team.    The following medication was given:     MEDICATION:  Lidocaine without epinephrine 2% jelly  ROUTE: urethral   SITE: urethral   DOSE: 10 mL  LOT #: [id number]  : [employer]  EXPIRATION DATE: 06/2025  NDC#: [id number]   Was there drug waste? No    Prior to med admin, verified patient identity using patient's name and date of birth.  Due to med administration, patient instructed to remain in clinic for 15 minutes  afterwards, and to report any adverse reaction to me immediately.    Drug Amount Wasted:  None.  Vial/Syringe: Kole Dozier LPN  12/15/2023  9:01 AM

## 2024-02-26 DIAGNOSIS — G47.00 PERSISTENT INSOMNIA: ICD-10-CM

## 2024-02-26 RX ORDER — ZOLPIDEM TARTRATE 10 MG/1
TABLET ORAL
Qty: 90 TABLET | Refills: 1 | Status: SHIPPED | OUTPATIENT
Start: 2024-02-26 | End: 2024-05-22

## 2024-03-01 NOTE — TELEPHONE ENCOUNTER
---------------------  From: Yasemin Tamayo CMA   To: Elijah Benitez MD;     Cc: RICHARD ZangZing ([id number] - [place]);      Sent: 9/11/2019 9:02:53 AM CDT  Subject: Fosamax concern     PCP:   RICHARD      Time of Call:  0847       Person Calling:  Patient  Phone number:  [phone]    Note:   Patient states she took her first dose of Fosamax on Monday and is now experiencing sore joints, especially the knees to the point she can barely walk. Nauseous also. Wonders if this could be a side effect and if she should continue the medication next Monday?    Last office visit and reason:  8/27/19 Osteoporosis w/ [facility]---------------------  From: Elijah Benitez MD   To: Yasemin Tamayo CMA;     Sent: 9/12/2019 2:42:52 PM CDT  Subject: RE: Fosamax concern     it certainly may.  It's listed that ~5% of patient can experience bone/joint pains.  I'd like her to try for another 1-2 weeks and see if this improves.  If not, she should let me know and we'll try a different approach.Pt notified at 3:52pm-she agreed and will try for another 1-2 weeks and let us know if still experiencing sx. She said she is feeling much better today.   OPERATIVE REPORT  PATIENT NAME: Freddy Wall  :  1958  MRN: [id number]  Pt Location: [facility]    SURGERY DATE: 24    Surgeons and Role:     * Martha Valdez MD - Primary     * Neris Parisi 

## 2024-03-04 DIAGNOSIS — Z96.651 STATUS POST TOTAL RIGHT KNEE REPLACEMENT: ICD-10-CM

## 2024-03-04 RX ORDER — AMOXICILLIN 500 MG/1
CAPSULE ORAL
Qty: 4 CAPSULE | Refills: 4 | Status: SHIPPED | OUTPATIENT
Start: 2024-03-04

## 2024-03-04 NOTE — TELEPHONE ENCOUNTER
Received fax for Amoxicillin refill for dental prophylaxis.  Patient is s/p total knee replacement with Dr. Jacobsen DOS 3/29/21.    Medication filled per SO.    Yasemin Banks RN on 3/4/2024 at 3:51 PM

## 2024-03-22 NOTE — TELEPHONE ENCOUNTER
----- Message from Abigail Parham RN sent at 9/11/2018 12:48 PM CDT -----  Regarding: new discharge  Sherrie is discharging tomorrow.  Please send a lab order to her clinic:  [facility] in [place] and send her a copy of the lab order.  thx   none

## 2024-05-22 ENCOUNTER — OFFICE VISIT (OUTPATIENT)
Dept: FAMILY MEDICINE | Facility: CLINIC | Age: 69
End: 2024-05-22
Payer: COMMERCIAL

## 2024-05-22 VITALS
RESPIRATION RATE: 20 BRPM | HEART RATE: 70 BPM | OXYGEN SATURATION: 98 % | DIASTOLIC BLOOD PRESSURE: 76 MMHG | SYSTOLIC BLOOD PRESSURE: 115 MMHG | BODY MASS INDEX: 23.1 KG/M2 | HEIGHT: 64 IN | WEIGHT: 135.3 LBS | TEMPERATURE: 96.1 F

## 2024-05-22 DIAGNOSIS — G89.29 CHRONIC NEUROPATHIC PAIN: ICD-10-CM

## 2024-05-22 DIAGNOSIS — C73 MALIGNANT NEOPLASM OF THYROID GLAND (H): ICD-10-CM

## 2024-05-22 DIAGNOSIS — Z94.4 HISTORY OF LIVER TRANSPLANT (H): ICD-10-CM

## 2024-05-22 DIAGNOSIS — D68.9 COAGULOPATHY (H): ICD-10-CM

## 2024-05-22 DIAGNOSIS — Z00.00 HEALTH CARE MAINTENANCE: ICD-10-CM

## 2024-05-22 DIAGNOSIS — G47.00 PERSISTENT INSOMNIA: ICD-10-CM

## 2024-05-22 DIAGNOSIS — D61.818 PANCYTOPENIA (H): Primary | ICD-10-CM

## 2024-05-22 DIAGNOSIS — M79.2 CHRONIC NEUROPATHIC PAIN: ICD-10-CM

## 2024-05-22 DIAGNOSIS — Z85.09 HISTORY OF CHOLANGIOCARCINOMA: ICD-10-CM

## 2024-05-22 DIAGNOSIS — R06.09 DYSPNEA ON EXERTION: ICD-10-CM

## 2024-05-22 DIAGNOSIS — D84.9 IMMUNOSUPPRESSED STATUS (H): ICD-10-CM

## 2024-05-22 DIAGNOSIS — E21.3 HYPERPARATHYROIDISM (H): ICD-10-CM

## 2024-05-22 PROCEDURE — 99214 OFFICE O/P EST MOD 30 MIN: CPT | Performed by: INTERNAL MEDICINE

## 2024-05-22 PROCEDURE — G2211 COMPLEX E/M VISIT ADD ON: HCPCS | Performed by: INTERNAL MEDICINE

## 2024-05-22 RX ORDER — ZOLPIDEM TARTRATE 10 MG/1
TABLET ORAL
Qty: 90 TABLET | Refills: 1 | Status: SHIPPED | OUTPATIENT
Start: 2024-05-22 | End: 2024-09-26

## 2024-05-22 RX ORDER — GABAPENTIN 300 MG/1
600 CAPSULE ORAL AT BEDTIME
Qty: 180 CAPSULE | Refills: 3 | Status: SHIPPED | OUTPATIENT
Start: 2024-05-22 | End: 2024-09-26

## 2024-05-22 NOTE — ASSESSMENT & PLAN NOTE
recurrent, metastatic cholangiocarcinoma; followed by Dr. 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

## 2024-05-22 NOTE — ASSESSMENT & PLAN NOTE
5/2024: Subacute timeframe of symptoms  Differential to consider possible pulmonary embolism, pulmonary manifestations with prior metastatic disease related to her cholangiocarcinoma, potential reactive airway disease  -Given her complicated history including chronic immunosuppression, we will make arrangements for CT of chest for further evaluation including PE protocol given her history of multiple venous thrombotic events

## 2024-05-22 NOTE — PROGRESS NOTES
Assessment & Plan   Problem List Items Addressed This Visit          Respiratory    Dyspnea on exertion     5/2024: Subacute timeframe of symptoms  Differential to consider possible pulmonary embolism, pulmonary manifestations with prior metastatic disease related to her cholangiocarcinoma, potential reactive airway disease  -Given her complicated history including chronic immunosuppression, we will make arrangements for CT of chest for further evaluation including PE protocol given her history of multiple venous thrombotic events         Relevant Orders    CT Chest Pulmonary Embolism w Contrast    CT Abdomen Pelvis w Contrast       Endocrine    Malignant neoplasm of thyroid gland (H)     papillary carcinoma of thyroid stage 1, subtotal thyroidectomy 8/2021  - doing well  - following with endocrinology, Dr. Lucero  - maintained on levothyroxine - dosing through endo         Hyperparathyroidism (H24)       Immune    Immunosuppressed status (H24) (Chronic)    Relevant Orders    CT Chest Pulmonary Embolism w Contrast    CT Abdomen Pelvis w Contrast    Pancytopenia (H) - Primary       Hematologic    Coagulopathy (H24)       Other    History of liver transplant (H)     recurrent, metastatic cholangiocarcinoma; followed by Dr. 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 - failed cognitive screening - notices she's forgetful   - potential complications related to longterm zolpidem use          Other Visit Diagnoses       Persistent insomnia        Relevant Medications    zolpidem (AMBIEN) 10 MG tablet    Chronic neuropathic pain        Relevant Medications    gabapentin (NEURONTIN) 300 MG capsule    History of cholangiocarcinoma        Relevant Orders    CT Chest Pulmonary Embolism w Contrast    CT Abdomen Pelvis w Contrast                        Subjective   Sherrie is a 69 year old, presenting for the following health issues: Presents for follow-up.  Brings up concerns related to approximately 2-week history of persistent dyspnea both at rest and with exertion.  Describes having significant intolerance with any yard work or activity which is unusual for her.  Has used her albuterol inhaler which she thinks has helped temporarily.  Denies any chest pains.  Generally feels better when laying down.  Remote history of portal vein thrombosis; also with lower extremity DVT following right knee replacement surgery.  Also with a remote history of cholangiocarcinoma treated both with partial hepatectomy as well as radiation therapy to isolated pulmonary metastasis.  Overall from a liver transplant standpoint has been doing well.  Reminded her to look into scheduling her surveillance transplant labs.  Chronic Disease Management (CDM follow up)        5/22/2024    10:49 AM   Additional Questions   Roomed by Nathaly PAYTON CMA   Accompanied by Self     History of Present Illness       Reason for visit:  Wellness    She eats 2-3 servings of fruits and vegetables daily.She consumes 0 sweetened beverage(s) daily.She exercises with enough effort to increase her heart rate 20 to 29 minutes per day.  She exercises with enough effort to increase her heart rate  "4 days per week.   She is taking medications regularly.         Review of Systems  Constitutional, HEENT, cardiovascular, pulmonary, gi and gu systems are negative, except as otherwise noted.        Objective    /76 (BP Location: Right arm, Patient Position: Sitting, Cuff Size: Adult Regular)   Pulse 70   Temp (!) 96.1  F (35.6  C) (Tympanic)   Resp 20   Ht 1.626 m (5' 4\")   Wt 61.4 kg (135 lb 4.8 oz)   LMP  (LMP Unknown)   SpO2 98%   BMI 23.22 kg/m    Body mass index is 23.22 kg/m .  Physical Exam   GENERAL: alert and no distress  NECK: no adenopathy, no asymmetry, masses, or scars  RESP: lungs clear to auscultation - no rales, rhonchi or wheezes  CV: regular rate and rhythm, normal S1 S2, no S3 or S4, no murmur, click or rub, no peripheral edema  ABDOMEN: soft, nontender, right-sided transplant L-shaped scar; asymmetric deformity of abdomen.  MS: no gross musculoskeletal defects noted, no edema            Signed Electronically by: Apollo Benitez MD    "

## 2024-05-22 NOTE — ASSESSMENT & PLAN NOTE
- colonoscopy 7/2023   - mammo (last in 9/2020)   - DEXA (10/2022) - osteoporosis  - cognitive decline? - failed cognitive screening - notices she's forgetful   - potential complications related to longterm zolpidem use

## 2024-05-23 ENCOUNTER — MYC MEDICAL ADVICE (OUTPATIENT)
Dept: FAMILY MEDICINE | Facility: CLINIC | Age: 69
End: 2024-05-23
Payer: COMMERCIAL

## 2024-05-23 DIAGNOSIS — R06.09 DYSPNEA ON EXERTION: Primary | ICD-10-CM

## 2024-05-23 RX ORDER — ALBUTEROL SULFATE 90 UG/1
1-2 AEROSOL, METERED RESPIRATORY (INHALATION) 4 TIMES DAILY PRN
Qty: 18 G | Refills: 3 | Status: SHIPPED | OUTPATIENT
Start: 2024-05-23 | End: 2024-07-02

## 2024-05-23 NOTE — TELEPHONE ENCOUNTER
Please advise on refills for albuterol inhaler.  Carlos Nagy CMA   Wife picked up 4 boxes of 14  Briviact 50 mg samples Lot# [id number] Exp 05/2024. Per Dr Lara galeas.

## 2024-05-24 ENCOUNTER — LAB (OUTPATIENT)
Dept: LAB | Facility: CLINIC | Age: 69
End: 2024-05-24
Payer: COMMERCIAL

## 2024-05-24 ENCOUNTER — TELEPHONE (OUTPATIENT)
Dept: FAMILY MEDICINE | Facility: CLINIC | Age: 69
End: 2024-05-24

## 2024-05-24 DIAGNOSIS — C73 MALIGNANT NEOPLASM OF THYROID GLAND (H): Primary | ICD-10-CM

## 2024-05-24 DIAGNOSIS — Z94.4 LIVER REPLACED BY TRANSPLANT (H): ICD-10-CM

## 2024-05-24 LAB
ERYTHROCYTE [DISTWIDTH] IN BLOOD BY AUTOMATED COUNT: 12.2 % (ref 10–15)
HCT VFR BLD AUTO: 43.5 % (ref 35–47)
HGB BLD-MCNC: 14.2 G/DL (ref 11.7–15.7)
MCH RBC QN AUTO: 28.6 PG (ref 26.5–33)
MCHC RBC AUTO-ENTMCNC: 32.6 G/DL (ref 31.5–36.5)
MCV RBC AUTO: 88 FL (ref 78–100)
PLATELET # BLD AUTO: 161 10E3/UL (ref 150–450)
RBC # BLD AUTO: 4.97 10E6/UL (ref 3.8–5.2)
TACROLIMUS BLD-MCNC: 4.7 UG/L (ref 5–15)
TME LAST DOSE: ABNORMAL H
TME LAST DOSE: ABNORMAL H
WBC # BLD AUTO: 4.6 10E3/UL (ref 4–11)

## 2024-05-24 PROCEDURE — 80053 COMPREHEN METABOLIC PANEL: CPT

## 2024-05-24 PROCEDURE — 84443 ASSAY THYROID STIM HORMONE: CPT

## 2024-05-24 PROCEDURE — 82248 BILIRUBIN DIRECT: CPT

## 2024-05-24 PROCEDURE — 85027 COMPLETE CBC AUTOMATED: CPT

## 2024-05-24 PROCEDURE — 36415 COLL VENOUS BLD VENIPUNCTURE: CPT

## 2024-05-24 PROCEDURE — 80197 ASSAY OF TACROLIMUS: CPT

## 2024-05-24 RX ORDER — BUDESONIDE AND FORMOTEROL FUMARATE DIHYDRATE 160; 4.5 UG/1; UG/1
2 AEROSOL RESPIRATORY (INHALATION) 2 TIMES DAILY
Qty: 10.2 G | Refills: 3 | Status: SHIPPED | OUTPATIENT
Start: 2024-05-24 | End: 2024-05-24

## 2024-05-24 RX ORDER — FLUTICASONE PROPIONATE 110 UG/1
1 AEROSOL, METERED RESPIRATORY (INHALATION) 2 TIMES DAILY
Qty: 12 G | Refills: 3 | Status: SHIPPED | OUTPATIENT
Start: 2024-05-24 | End: 2024-05-28

## 2024-05-24 NOTE — TELEPHONE ENCOUNTER
If this is to be long term use; patient prefers Rx be sent to [facility].     Please resend Symbicort Rx if necessary for long term use.

## 2024-05-24 NOTE — TELEPHONE ENCOUNTER
----- Message from Apollo Benitez MD sent at 5/24/2024  9:40 AM CDT -----  Please share results of CT chest abdomen pelvis with patient.  Specifically PE protocol no evidence of PE.  Pulmonary evaluation showing stable changes without any recurrence of prior metastatic cholangiocarcinoma.    No concerning findings in the abdomen pelvis.    Overall reassuring study.  Regarding her presentation for chest tightness and dyspnea, I would conclude at this point likely more asthma related.  Would have her monitor symptoms and would be liberal with her inhaler use.

## 2024-05-25 LAB
ALBUMIN SERPL BCG-MCNC: 4.5 G/DL (ref 3.5–5.2)
ALP SERPL-CCNC: 88 U/L (ref 40–150)
ALT SERPL W P-5'-P-CCNC: 17 U/L (ref 0–50)
ANION GAP SERPL CALCULATED.3IONS-SCNC: 9 MMOL/L (ref 7–15)
AST SERPL W P-5'-P-CCNC: 18 U/L (ref 0–45)
BILIRUB DIRECT SERPL-MCNC: <0.2 MG/DL (ref 0–0.3)
BILIRUB SERPL-MCNC: 0.5 MG/DL
BUN SERPL-MCNC: 26.6 MG/DL (ref 8–23)
CALCIUM SERPL-MCNC: 9 MG/DL (ref 8.8–10.2)
CHLORIDE SERPL-SCNC: 106 MMOL/L (ref 98–107)
CREAT SERPL-MCNC: 1.12 MG/DL (ref 0.51–0.95)
DEPRECATED HCO3 PLAS-SCNC: 26 MMOL/L (ref 22–29)
EGFRCR SERPLBLD CKD-EPI 2021: 54 ML/MIN/1.73M2
GLUCOSE SERPL-MCNC: 90 MG/DL (ref 70–99)
POTASSIUM SERPL-SCNC: 4.9 MMOL/L (ref 3.4–5.3)
PROT SERPL-MCNC: 6.8 G/DL (ref 6.4–8.3)
SODIUM SERPL-SCNC: 141 MMOL/L (ref 135–145)
TSH SERPL DL<=0.005 MIU/L-ACNC: 2.01 UIU/ML (ref 0.3–4.2)

## 2024-05-28 ENCOUNTER — TELEPHONE (OUTPATIENT)
Dept: FAMILY MEDICINE | Facility: CLINIC | Age: 69
End: 2024-05-28
Payer: COMMERCIAL

## 2024-05-28 DIAGNOSIS — R06.09 DYSPNEA ON EXERTION: Primary | ICD-10-CM

## 2024-05-28 NOTE — TELEPHONE ENCOUNTER
Medication Question or Refill        What medication are you calling about (include dose and sig)?: Flovent 110mcg    Preferred Pharmacy:  [facility] [place] - [place], [place] - [address] [place] [id number]  Phone: [phone] Fax: [phone]      Controlled Substance Agreement on file:   CSA -- Patient Level:    CSA: None found at the patient level.       Who prescribed the medication?: Dr. Apollo Benitez    Do you need a refill? Yes      Do you have any questions or concerns?  Yes: Pharmacy calling to request alternative to Flovent due to very high co-pay.  Pharmacy suggested Arnuity Ellipta. Please advise a new prescription.    Medication not pended because RN unsure of dosing.      Please sent RX to preferred pharmacy - [facility] in [place].

## 2024-06-13 ENCOUNTER — OFFICE VISIT (OUTPATIENT)
Dept: FAMILY MEDICINE | Facility: CLINIC | Age: 69
End: 2024-06-13
Payer: COMMERCIAL

## 2024-06-13 ENCOUNTER — MYC MEDICAL ADVICE (OUTPATIENT)
Dept: TRANSPLANT | Facility: CLINIC | Age: 69
End: 2024-06-13

## 2024-06-13 VITALS
SYSTOLIC BLOOD PRESSURE: 109 MMHG | BODY MASS INDEX: 23.05 KG/M2 | DIASTOLIC BLOOD PRESSURE: 77 MMHG | OXYGEN SATURATION: 95 % | WEIGHT: 135 LBS | HEART RATE: 93 BPM | RESPIRATION RATE: 16 BRPM | HEIGHT: 64 IN | TEMPERATURE: 101.7 F

## 2024-06-13 DIAGNOSIS — R50.9 FEVER: Primary | ICD-10-CM

## 2024-06-13 DIAGNOSIS — R79.89 ELEVATED LFTS: ICD-10-CM

## 2024-06-13 DIAGNOSIS — R50.9 FEVER, UNSPECIFIED FEVER CAUSE: Primary | ICD-10-CM

## 2024-06-13 DIAGNOSIS — R06.02 SHORTNESS OF BREATH: ICD-10-CM

## 2024-06-13 PROCEDURE — 99215 OFFICE O/P EST HI 40 MIN: CPT | Performed by: FAMILY MEDICINE

## 2024-06-13 NOTE — PROGRESS NOTES
"  Assessment & Plan     Fever, unspecified fever cause  Patient with fever and cough given a 3-day history of fever and she is immunocompromised I do not feel we can adequately workup in the clinic here.  She go to the ER for further evaluation                Subjective   Sherrie is a 69 year old, presenting for the following health issues:  Follow Up (Pt here to follow up sob. She was seen 3 wks ago. She has travelled to [place] last week and has been sob, deep breath coughs, chills, HA and fever, loss of appetite and feeling a bit nauseous x 4 days. She has a hx of cancer. She took Tylenol at 4:30 am. )        6/13/2024     7:46 AM   Additional Questions   Roomed by john     History of Present Illness       Reason for visit:  Fever chills body aches    She eats 2-3 servings of fruits and vegetables daily.She consumes 0 sweetened beverage(s) daily.She exercises with enough effort to increase her heart rate 30 to 60 minutes per day.  She exercises with enough effort to increase her heart rate 4 days per week.   She is taking medications regularly.                 Review of Systems  Constitutional, HEENT, cardiovascular, pulmonary, gi and gu systems are negative, except as otherwise noted.      Objective    /77 (BP Location: Right arm, Patient Position: Sitting)   Pulse 93   Temp (!) 101.7  F (38.7  C) (Tympanic)   Resp 16   Ht 1.626 m (5' 4\")   Wt 61.2 kg (135 lb)   LMP  (LMP Unknown)   SpO2 95%   BMI 23.17 kg/m    Body mass index is 23.17 kg/m .  Physical Exam   Patient alert no apparent distress HEENT reveals no acute findings neck is supple lungs reveal some rhonchi questionable Rales left base heart regular rate and rhythm            Signed Electronically by: Gianfranco Lopez MD    "

## 2024-06-17 ENCOUNTER — TELEPHONE (OUTPATIENT)
Dept: TRANSPLANT | Facility: CLINIC | Age: 69
End: 2024-06-17
Payer: COMMERCIAL

## 2024-06-17 DIAGNOSIS — R50.81 FEVER IN OTHER DISEASES: Primary | ICD-10-CM

## 2024-06-17 DIAGNOSIS — Z94.4 LIVER REPLACED BY TRANSPLANT (H): ICD-10-CM

## 2024-06-17 DIAGNOSIS — R50.9 FEVER: Primary | ICD-10-CM

## 2024-06-17 RX ORDER — TACROLIMUS 1 MG/1
1 CAPSULE ORAL 2 TIMES DAILY
Qty: 120 CAPSULE | Refills: 11 | Status: SHIPPED | OUTPATIENT
Start: 2024-06-17 | End: 2024-06-19

## 2024-06-17 RX ORDER — FLUCONAZOLE 200 MG/1
400 TABLET ORAL DAILY
COMMUNITY
Start: 2024-06-15 | End: 2024-07-08

## 2024-06-17 NOTE — TELEPHONE ENCOUNTER
Message from /Bel[place]:  Lacy Gong MD sent to Abigail Parham, RN; Bradly Lilly MD Ann,    I am not sure that it is safe to answer this question or manage this patient without the full data available to us.  Her symptoms seem to be like a viral illness. On the other hand if the physician who actually evaluated the patient thought she might have valley fever to the point that she was started on flucoanzole, I would not base the decision to stop it on the IgM level. The sensitivity of IgM is very poor and a negative test does not rule out coccidioides. I am not saying she definitely has cocci, I am saying I wouldn't base the decision that she doesn't on the IgM value.  Was there any lung infiltrate? Consolidation? Family member with viral illness?  I think she needs a full evaluation.    We need to see the CT of the chest/abd/pelvis. And I think if the fluconazole is helping, we should continue with fluconazole until she is seen by ID. In the meantime I would send for coccidioides antigen in the urine.    Thanks,  KO    Per Dr. Gong, request for films at [facility] to be pushed to us.  Ordered coccidioides Ag to be drawn tomorrow.  Asked Sherrie to continue fluconazole.

## 2024-06-17 NOTE — TELEPHONE ENCOUNTER
Call back to Sherrie coccidiodes IgM neg.   She's wondering if she can stop fluconazole.  Message to dr. Lilly

## 2024-06-17 NOTE — TELEPHONE ENCOUNTER
"Call from Sherrie.   She returned from seeing her son in [place] about 1-2 weeks ago.  Last week she began having shakes, chills, body aches, shortness of breath, cough., fatigue  Saw her PCP, ruled out PE, did CT chest / abd , all negative.  Last week temp to 103.8, went to [facility] on 6/10.  CXR, blood cultures, blood sent for Valley Fever (they told her result can take up to 2 weeks).  Started her on fluconazole.  Is now a febrile.  Continues to have dry cough and debilitating shortness of breath, was told that her 02 sats are fine.  She does feel more alert / awake.  Just walking up stairs or bending over can make it really hard for her to breath - \"It just stops me\".  She was started on fluconazole 400 mg daily on Friday - gave her a 30 day supply. She takes tacrolimus 2 mg po bid.  I asked her to decrease tac to 1 mg bid and have all labs plus a 12 hour trough tac level tomorrow.  She will do but needs orders faxed to [facility] in [place]   "

## 2024-06-17 NOTE — TELEPHONE ENCOUNTER
Patient Call: General  Route to LPN    Reason for call: Sherrie stated she forgot to let Coordinator know, that labs that was drawn last week at [facility], is in Care everywhere    Call back needed? Yes    Return Call Needed  Same as documented in contacts section  When to return call?: Same day: Route High Priority

## 2024-06-18 ENCOUNTER — TELEPHONE (OUTPATIENT)
Dept: TRANSPLANT | Facility: CLINIC | Age: 69
End: 2024-06-18

## 2024-06-18 ENCOUNTER — DOCUMENTATION ONLY (OUTPATIENT)
Dept: TRANSPLANT | Facility: CLINIC | Age: 69
End: 2024-06-18

## 2024-06-18 ENCOUNTER — LAB (OUTPATIENT)
Dept: LAB | Facility: CLINIC | Age: 69
End: 2024-06-18
Payer: COMMERCIAL

## 2024-06-18 DIAGNOSIS — Z94.4 LIVER REPLACED BY TRANSPLANT (H): ICD-10-CM

## 2024-06-18 DIAGNOSIS — R50.81 FEVER IN OTHER DISEASES: ICD-10-CM

## 2024-06-18 LAB
ALBUMIN SERPL BCG-MCNC: 4 G/DL (ref 3.5–5.2)
ALP SERPL-CCNC: 96 U/L (ref 40–150)
ALT SERPL W P-5'-P-CCNC: 13 U/L (ref 0–50)
ANION GAP SERPL CALCULATED.3IONS-SCNC: 12 MMOL/L (ref 7–15)
AST SERPL W P-5'-P-CCNC: 18 U/L (ref 0–45)
BILIRUB DIRECT SERPL-MCNC: <0.2 MG/DL (ref 0–0.3)
BILIRUB SERPL-MCNC: 0.4 MG/DL
BUN SERPL-MCNC: 26.1 MG/DL (ref 8–23)
CALCIUM SERPL-MCNC: 8.8 MG/DL (ref 8.8–10.2)
CHLORIDE SERPL-SCNC: 101 MMOL/L (ref 98–107)
CREAT SERPL-MCNC: 1.45 MG/DL (ref 0.51–0.95)
DEPRECATED HCO3 PLAS-SCNC: 26 MMOL/L (ref 22–29)
EGFRCR SERPLBLD CKD-EPI 2021: 39 ML/MIN/1.73M2
ERYTHROCYTE [DISTWIDTH] IN BLOOD BY AUTOMATED COUNT: 12.6 % (ref 10–15)
GLUCOSE SERPL-MCNC: 103 MG/DL (ref 70–99)
HCT VFR BLD AUTO: 37.2 % (ref 35–47)
HGB BLD-MCNC: 12.4 G/DL (ref 11.7–15.7)
MCH RBC QN AUTO: 29.1 PG (ref 26.5–33)
MCHC RBC AUTO-ENTMCNC: 33.3 G/DL (ref 31.5–36.5)
MCV RBC AUTO: 87 FL (ref 78–100)
PLATELET # BLD AUTO: 215 10E3/UL (ref 150–450)
POTASSIUM SERPL-SCNC: 5.2 MMOL/L (ref 3.4–5.3)
PROT SERPL-MCNC: 7.3 G/DL (ref 6.4–8.3)
RBC # BLD AUTO: 4.26 10E6/UL (ref 3.8–5.2)
SODIUM SERPL-SCNC: 139 MMOL/L (ref 135–145)
TACROLIMUS BLD-MCNC: 11.5 UG/L (ref 5–15)
TME LAST DOSE: NORMAL H
TME LAST DOSE: NORMAL H
WBC # BLD AUTO: 5.7 10E3/UL (ref 4–11)

## 2024-06-18 PROCEDURE — 82248 BILIRUBIN DIRECT: CPT | Performed by: PATHOLOGY

## 2024-06-18 PROCEDURE — 36415 COLL VENOUS BLD VENIPUNCTURE: CPT | Performed by: PATHOLOGY

## 2024-06-18 PROCEDURE — 86635 COCCIDIOIDES ANTIBODY: CPT | Mod: 90 | Performed by: PATHOLOGY

## 2024-06-18 PROCEDURE — 87449 NOS EACH ORGANISM AG IA: CPT | Performed by: INTERNAL MEDICINE

## 2024-06-18 PROCEDURE — 99000 SPECIMEN HANDLING OFFICE-LAB: CPT | Performed by: PATHOLOGY

## 2024-06-18 PROCEDURE — 80197 ASSAY OF TACROLIMUS: CPT | Performed by: INTERNAL MEDICINE

## 2024-06-18 PROCEDURE — 85027 COMPLETE CBC AUTOMATED: CPT | Performed by: PATHOLOGY

## 2024-06-18 PROCEDURE — 80053 COMPREHEN METABOLIC PANEL: CPT | Performed by: PATHOLOGY

## 2024-06-18 NOTE — TELEPHONE ENCOUNTER
Pt calls and would like to know why the tacro script was sent for 1mg BID when pt has been taking 2mg BID. I am not seeing anything associated with that. Pt had tacro level drawn today.

## 2024-06-18 NOTE — CONFIDENTIAL NOTE
DIAGNOSIS:     Fever   Shortness of breath      DATE RECEIVED:  06.19.2024     NOTES (Gather within 2 years) STATUS DETAILS   OFFICE NOTE from referring provider   Internal 06.13.2024 Bradly Lilly MD    DISCHARGE REPORT from the ER Care Everywhere 06.13.2024 [facility]     LABS (any labs) Internal / CE    MEDICATION LIST Internal / CE    IMAGING  (NEED IMAGES AND REPORTS)     Other (anything related to diagnoses Care Everywhere 06.13.2024 XR Chest 2 Views    05.23.2024 CT Chest Pulmonary Embolus

## 2024-06-18 NOTE — PROGRESS NOTES
Message from Sherrie:       From:Sherrie Lala       Sent:6/18/2024  1:04 PM CDT         To:Patient Medical Advice Request Message List    Subject:Testing     Abigail,  I m reluctant to have you change my tacrolimus, I m going to be ordering a refill tomorrow or the next day and I want to have the dose right moving forward.  So, I ll do as you requested, decrease to 1mg but I stopped the fluconazole, if I don t need a medication I don t want to take it and this seems like that situation.  So my tacrolimus might be off a little but if you are changing it then things get all mixed up with the pharmacy.  Make sense?? Let s see what my level is today and then settle on a dose of tacrolimus.  Thanks     Replied to Sherrie that we will wait to determine tac dose / taking or not taking fluconazole after consultation w/ ID tomorrow.

## 2024-06-18 NOTE — TELEPHONE ENCOUNTER
Spoke to Lashawn and asked that the requested films get pushed through [facility] PACS system.     Spoke to film room for [facility] and films attached to pt's chart.

## 2024-06-19 ENCOUNTER — PRE VISIT (OUTPATIENT)
Dept: INFECTIOUS DISEASES | Facility: CLINIC | Age: 69
End: 2024-06-19
Payer: COMMERCIAL

## 2024-06-19 ENCOUNTER — TELEPHONE (OUTPATIENT)
Dept: TRANSPLANT | Facility: CLINIC | Age: 69
End: 2024-06-19
Payer: COMMERCIAL

## 2024-06-19 DIAGNOSIS — R79.89 ELEVATED LFTS: ICD-10-CM

## 2024-06-19 DIAGNOSIS — Z94.4 LIVER REPLACED BY TRANSPLANT (H): Primary | ICD-10-CM

## 2024-06-19 RX ORDER — TACROLIMUS 1 MG/1
2 CAPSULE ORAL 2 TIMES DAILY
Qty: 360 CAPSULE | Refills: 3 | Status: SHIPPED | OUTPATIENT
Start: 2024-06-19 | End: 2024-06-20

## 2024-06-19 NOTE — TELEPHONE ENCOUNTER
"Message from Sherrie letting me know that her ID appt was pushed out to 7/2 (was supposed to have been today.  She elected to stop her fluconazole on 6/17 (last dose 6/16 evening)  despite Dr. Gong's suggestion that she stay on it.  Call to Sherrie to see how she's feeling.   She is feeling better.  She still has shortness of breath.  She feels very weak.  She is \"starting to eat better\", \"I didn't eat for about a week.  She lost about 7 pounds.  No n/v/diarrhea.  No fever for at least 72 hours.  Still has chills and feels \"weirdly tired\".  Feels alert - better than when she was really ill - I did a couple of very weird things that my  questioned like 1 night I couldn't find my way out of the bathroom\". She still feels \"foggy\".  Something is off, my limbs are weak.  I'm not completely recovered.  I have to go to the dentist and grocery shopping but my desire is to go to lay down.  This morning I was out in the garden, picking up sticks and I felt short of breath for no reason - bending over makes it worse.    Denies pain.   She agrees that if her breathing gets worse or she again develops fever >101.5 she will come to  of [facility] ER.  Tac level 11 yesterday morning likely high due to fluconazole so won't adjust dose.  As of today she will go back to her previous dose of 2 mg po bid.   Message to Dr. Lilly.   "

## 2024-06-19 NOTE — TELEPHONE ENCOUNTER
Message back from Sherrie:    Sherrie Lala sent to Abigail Parham, RN  Phone Number: [phone]     Okay I ll start it up again and do the 1mg tablet BID.  I don t like it but I m also just not feeling that great and at this point I ll stop arguing with the experts.  So for now I ll take 1mg twice daily and if you put orders in for July 2, which is when I see the ID MD I ll get my blood drawn that day too.  LMK about the orders for bloodwork so I can make an appointment to coincide with my infectious disease appointment.  Thanks Abigail    She will restart fluconazole 400 mg daily.  I asked her to decrease her tac to 1 mg po bid and have all labs rechecked on Monday.

## 2024-06-19 NOTE — TELEPHONE ENCOUNTER
Message from Dr. Lilly when I asked him if Sherrie should stay on fluconazole she wants to come off and Dr. Gong recommends that she stay on it) :    Bradly Lilly MD sent to Abigail Parham RN  Phone Number: [phone]     She should stay on it then.    I sent Sherrie a [employer] message telling her this.

## 2024-06-20 ENCOUNTER — TELEPHONE (OUTPATIENT)
Dept: TRANSPLANT | Facility: CLINIC | Age: 69
End: 2024-06-20
Payer: COMMERCIAL

## 2024-06-20 DIAGNOSIS — Z94.4 HISTORY OF LIVER TRANSPLANT (H): Primary | ICD-10-CM

## 2024-06-20 LAB — SCANNED LAB RESULT: NORMAL

## 2024-06-20 RX ORDER — TACROLIMUS 1 MG/1
1 CAPSULE ORAL EVERY 12 HOURS
Qty: 180 CAPSULE | Refills: 3 | Status: SHIPPED | OUTPATIENT
Start: 2024-06-20 | End: 2024-10-01

## 2024-06-20 NOTE — TELEPHONE ENCOUNTER
Incoming [employer] message from Sherrie:    Sherrie Lala sent to Abigail Parham, RN  Phone Number: [phone]     Can they draw blood at the [facility]? If so make sure they have the orders and I will call them on Friday and make an appointment for Monday to have it drawn there.  Otherwise the next time I ll be up to the [facility] will be July 2.  Thanks  Sherrie    Sherrie went back on fluconazole last evening so this will impact / raise her tac level.  She went back to 1 mg po bid but we need to keep a close eye on this.  As above, she's going to get labs on Monday.  Please also tell her that we need a tac level only (if Monday's labs look ok) on Thursday 6/27 and all labs 7/2 when she returns for her ID appt

## 2024-06-20 NOTE — TELEPHONE ENCOUNTER
Left a message for pt stating that lab orders are in no matter what clinic she plans to have blood drawn.

## 2024-06-21 LAB — COCCIDIOIDES AB TITR SER CF: NORMAL {TITER}

## 2024-06-24 ENCOUNTER — LAB (OUTPATIENT)
Dept: LAB | Facility: CLINIC | Age: 69
End: 2024-06-24
Payer: COMMERCIAL

## 2024-06-24 DIAGNOSIS — Z94.4 HISTORY OF LIVER TRANSPLANT (H): ICD-10-CM

## 2024-06-24 LAB
ALBUMIN SERPL BCG-MCNC: 4.2 G/DL (ref 3.5–5.2)
ALP SERPL-CCNC: 96 U/L (ref 40–150)
ALT SERPL W P-5'-P-CCNC: 10 U/L (ref 0–50)
ANION GAP SERPL CALCULATED.3IONS-SCNC: 10 MMOL/L (ref 7–15)
AST SERPL W P-5'-P-CCNC: 15 U/L (ref 0–45)
BILIRUB DIRECT SERPL-MCNC: <0.2 MG/DL (ref 0–0.3)
BILIRUB SERPL-MCNC: 0.5 MG/DL
BUN SERPL-MCNC: 36 MG/DL (ref 8–23)
CALCIUM SERPL-MCNC: 9 MG/DL (ref 8.8–10.2)
CHLORIDE SERPL-SCNC: 103 MMOL/L (ref 98–107)
CREAT SERPL-MCNC: 1.45 MG/DL (ref 0.51–0.95)
DEPRECATED HCO3 PLAS-SCNC: 25 MMOL/L (ref 22–29)
EGFRCR SERPLBLD CKD-EPI 2021: 39 ML/MIN/1.73M2
ERYTHROCYTE [DISTWIDTH] IN BLOOD BY AUTOMATED COUNT: 12.6 % (ref 10–15)
GLUCOSE SERPL-MCNC: 107 MG/DL (ref 70–99)
HCT VFR BLD AUTO: 39.9 % (ref 35–47)
HGB BLD-MCNC: 13 G/DL (ref 11.7–15.7)
MCH RBC QN AUTO: 28.5 PG (ref 26.5–33)
MCHC RBC AUTO-ENTMCNC: 32.6 G/DL (ref 31.5–36.5)
MCV RBC AUTO: 88 FL (ref 78–100)
PLATELET # BLD AUTO: 220 10E3/UL (ref 150–450)
POTASSIUM SERPL-SCNC: 4.8 MMOL/L (ref 3.4–5.3)
PROT SERPL-MCNC: 7.4 G/DL (ref 6.4–8.3)
RBC # BLD AUTO: 4.56 10E6/UL (ref 3.8–5.2)
SODIUM SERPL-SCNC: 138 MMOL/L (ref 135–145)
TACROLIMUS BLD-MCNC: 5.3 UG/L (ref 5–15)
TME LAST DOSE: NORMAL H
TME LAST DOSE: NORMAL H
WBC # BLD AUTO: 8.6 10E3/UL (ref 4–11)

## 2024-06-24 PROCEDURE — 82248 BILIRUBIN DIRECT: CPT

## 2024-06-24 PROCEDURE — 85027 COMPLETE CBC AUTOMATED: CPT

## 2024-06-24 PROCEDURE — 80197 ASSAY OF TACROLIMUS: CPT

## 2024-06-24 PROCEDURE — 36415 COLL VENOUS BLD VENIPUNCTURE: CPT

## 2024-06-24 PROCEDURE — 80053 COMPREHEN METABOLIC PANEL: CPT

## 2024-06-26 ENCOUNTER — TELEPHONE (OUTPATIENT)
Dept: TRANSPLANT | Facility: CLINIC | Age: 69
End: 2024-06-26
Payer: COMMERCIAL

## 2024-06-26 NOTE — TELEPHONE ENCOUNTER
Patient asking to discontinue Diflucan. Per Dr. Llily patient is to continue medication until after ID appointment. Patient expressed understanding and will continue with poc.

## 2024-06-28 ENCOUNTER — TELEPHONE (OUTPATIENT)
Dept: TRANSPLANT | Facility: CLINIC | Age: 69
End: 2024-06-28
Payer: COMMERCIAL

## 2024-06-28 NOTE — TELEPHONE ENCOUNTER
"Call to Sherrie to check in.  She feels a bit better but not 100%. Still running low grade temp up to 100 degrees in the evening - gets chills.  \"Everything is a chore - I am so exhausted\". \"I start vacuuming and I have to sit down\".  She is seeing ID next week.  She is still taking fluconazole.   She tells me today that about 3 weeks ago she had a deer tick embedded in her knee.  She didn't get redness at the site, no bullseye redness.  She was tested when in the ER for COVID, was negative. I encouraged her to eat, drink, \"give in to it\" and rest.  Eating is \"always a chore for me - I think I've lost about 4 pounds but I'll keep trying\".  Seeing Dr. Maribel Gutierrez next Tuesday at 10:15am.    "

## 2024-07-02 ENCOUNTER — OFFICE VISIT (OUTPATIENT)
Dept: INFECTIOUS DISEASES | Facility: CLINIC | Age: 69
End: 2024-07-02
Attending: INTERNAL MEDICINE
Payer: MEDICARE

## 2024-07-02 ENCOUNTER — LAB (OUTPATIENT)
Dept: LAB | Facility: CLINIC | Age: 69
End: 2024-07-02
Attending: INTERNAL MEDICINE
Payer: COMMERCIAL

## 2024-07-02 VITALS
SYSTOLIC BLOOD PRESSURE: 121 MMHG | WEIGHT: 129.8 LBS | HEART RATE: 96 BPM | BODY MASS INDEX: 22.28 KG/M2 | TEMPERATURE: 97.8 F | DIASTOLIC BLOOD PRESSURE: 86 MMHG | OXYGEN SATURATION: 99 %

## 2024-07-02 DIAGNOSIS — S80.262A TICK BITE OF KNEE, LEFT, INITIAL ENCOUNTER: ICD-10-CM
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
## 2024-07-02 DIAGNOSIS — M13.0 ARTHRITIS OF MULTIPLE SITES: ICD-10-CM

## 2024-07-02 DIAGNOSIS — R50.9 FEVER, UNSPECIFIED FEVER CAUSE: Primary | ICD-10-CM

## 2024-07-02 DIAGNOSIS — R06.02 SHORTNESS OF BREATH: ICD-10-CM

## 2024-07-02 DIAGNOSIS — D84.9 IMMUNOSUPPRESSED STATUS (H): Chronic | ICD-10-CM

## 2024-07-02 DIAGNOSIS — R50.9 FEVER: ICD-10-CM

## 2024-07-02 DIAGNOSIS — Z94.4 HISTORY OF LIVER TRANSPLANT (H): ICD-10-CM

## 2024-07-02 DIAGNOSIS — R06.09 DYSPNEA ON EXERTION: ICD-10-CM

## 2024-07-02 DIAGNOSIS — R91.8 PULMONARY NODULES: ICD-10-CM

## 2024-07-02 LAB
A PHAGOCYTOPH DNA BLD QL NAA+PROBE: NOT DETECTED
ALBUMIN SERPL BCG-MCNC: 4.3 G/DL (ref 3.5–5.2)
ALP SERPL-CCNC: 102 U/L (ref 40–150)
ALT SERPL W P-5'-P-CCNC: 9 U/L (ref 0–50)
ANION GAP SERPL CALCULATED.3IONS-SCNC: 14 MMOL/L (ref 7–15)
AST SERPL W P-5'-P-CCNC: 13 U/L (ref 0–45)
BABESIA DNA BLD QL NAA+PROBE: NOT DETECTED
BASOPHILS # BLD AUTO: 0.1 10E3/UL (ref 0–0.2)
BASOPHILS NFR BLD AUTO: 1 %
BILIRUB DIRECT SERPL-MCNC: <0.2 MG/DL (ref 0–0.3)
BILIRUB SERPL-MCNC: 0.5 MG/DL
BUN SERPL-MCNC: 30.9 MG/DL (ref 8–23)
CALCIUM SERPL-MCNC: 9.2 MG/DL (ref 8.8–10.2)
CHLORIDE SERPL-SCNC: 101 MMOL/L (ref 98–107)
CREAT SERPL-MCNC: 1.31 MG/DL (ref 0.51–0.95)
CRP SERPL-MCNC: 14.8 MG/L
CRYPTOC AG SPEC QL: NEGATIVE
DEPRECATED HCO3 PLAS-SCNC: 24 MMOL/L (ref 22–29)
EGFRCR SERPLBLD CKD-EPI 2021: 44 ML/MIN/1.73M2
EHRLICHIA DNA SPEC QL NAA+PROBE: NOT DETECTED
EOSINOPHIL # BLD AUTO: 0.2 10E3/UL (ref 0–0.7)
EOSINOPHIL NFR BLD AUTO: 3 %
ERYTHROCYTE [DISTWIDTH] IN BLOOD BY AUTOMATED COUNT: 13 % (ref 10–15)
ERYTHROCYTE [SEDIMENTATION RATE] IN BLOOD BY WESTERGREN METHOD: 38 MM/HR (ref 0–30)
FERRITIN SERPL-MCNC: 217 NG/ML (ref 11–328)
GLUCOSE SERPL-MCNC: 111 MG/DL (ref 70–99)
HCT VFR BLD AUTO: 38.5 % (ref 35–47)
HGB BLD-MCNC: 12.7 G/DL (ref 11.7–15.7)
IMM GRANULOCYTES # BLD: 0 10E3/UL
IMM GRANULOCYTES NFR BLD: 0 %
LYMPHOCYTES # BLD AUTO: 0.9 10E3/UL (ref 0.8–5.3)
LYMPHOCYTES NFR BLD AUTO: 14 %
MCH RBC QN AUTO: 28.9 PG (ref 26.5–33)
MCHC RBC AUTO-ENTMCNC: 33 G/DL (ref 31.5–36.5)
MCV RBC AUTO: 88 FL (ref 78–100)
MONOCYTES # BLD AUTO: 0.4 10E3/UL (ref 0–1.3)
MONOCYTES NFR BLD AUTO: 6 %
NEUTROPHILS # BLD AUTO: 4.8 10E3/UL (ref 1.6–8.3)
NEUTROPHILS NFR BLD AUTO: 76 %
NRBC # BLD AUTO: 0 10E3/UL
NRBC BLD AUTO-RTO: 0 /100
PLATELET # BLD AUTO: 197 10E3/UL (ref 150–450)
POTASSIUM SERPL-SCNC: 4.8 MMOL/L (ref 3.4–5.3)
PROT SERPL-MCNC: 7.7 G/DL (ref 6.4–8.3)
RBC # BLD AUTO: 4.39 10E6/UL (ref 3.8–5.2)
SODIUM SERPL-SCNC: 139 MMOL/L (ref 135–145)
TACROLIMUS BLD-MCNC: 5.7 UG/L (ref 5–15)
TME LAST DOSE: NORMAL H
TME LAST DOSE: NORMAL H
WBC # BLD AUTO: 6.3 10E3/UL (ref 4–11)

## 2024-07-02 PROCEDURE — 87449 NOS EACH ORGANISM AG IA: CPT | Mod: XU | Performed by: INTERNAL MEDICINE

## 2024-07-02 PROCEDURE — 85652 RBC SED RATE AUTOMATED: CPT | Performed by: PATHOLOGY

## 2024-07-02 PROCEDURE — 86753 PROTOZOA ANTIBODY NOS: CPT | Mod: 90 | Performed by: PATHOLOGY

## 2024-07-02 PROCEDURE — 99215 OFFICE O/P EST HI 40 MIN: CPT | Performed by: INTERNAL MEDICINE

## 2024-07-02 PROCEDURE — 85025 COMPLETE CBC W/AUTO DIFF WBC: CPT | Performed by: PATHOLOGY

## 2024-07-02 PROCEDURE — 80197 ASSAY OF TACROLIMUS: CPT | Performed by: INTERNAL MEDICINE

## 2024-07-02 PROCEDURE — 87385 HISTOPLASMA CAPSUL AG IA: CPT | Performed by: INTERNAL MEDICINE

## 2024-07-02 PROCEDURE — 99417 PROLNG OP E/M EACH 15 MIN: CPT | Performed by: INTERNAL MEDICINE

## 2024-07-02 PROCEDURE — 86618 LYME DISEASE ANTIBODY: CPT | Performed by: INTERNAL MEDICINE

## 2024-07-02 PROCEDURE — G0463 HOSPITAL OUTPT CLINIC VISIT: HCPCS | Performed by: INTERNAL MEDICINE

## 2024-07-02 PROCEDURE — 82248 BILIRUBIN DIRECT: CPT | Performed by: PATHOLOGY

## 2024-07-02 PROCEDURE — 87798 DETECT AGENT NOS DNA AMP: CPT | Performed by: INTERNAL MEDICINE

## 2024-07-02 PROCEDURE — 87899 AGENT NOS ASSAY W/OPTIC: CPT | Performed by: INTERNAL MEDICINE

## 2024-07-02 PROCEDURE — 80053 COMPREHEN METABOLIC PANEL: CPT | Performed by: PATHOLOGY

## 2024-07-02 PROCEDURE — 86140 C-REACTIVE PROTEIN: CPT | Performed by: PATHOLOGY

## 2024-07-02 PROCEDURE — 36415 COLL VENOUS BLD VENIPUNCTURE: CPT | Performed by: PATHOLOGY

## 2024-07-02 PROCEDURE — 82728 ASSAY OF FERRITIN: CPT | Performed by: PATHOLOGY

## 2024-07-02 PROCEDURE — 87799 DETECT AGENT NOS DNA QUANT: CPT | Mod: XU | Performed by: INTERNAL MEDICINE

## 2024-07-02 PROCEDURE — 86617 LYME DISEASE ANTIBODY: CPT | Performed by: INTERNAL MEDICINE

## 2024-07-02 PROCEDURE — 86666 EHRLICHIA ANTIBODY: CPT | Mod: 90 | Performed by: PATHOLOGY

## 2024-07-02 PROCEDURE — 99000 SPECIMEN HANDLING OFFICE-LAB: CPT | Performed by: PATHOLOGY

## 2024-07-02 RX ORDER — ALBUTEROL SULFATE 90 UG/1
1-2 AEROSOL, METERED RESPIRATORY (INHALATION) 2 TIMES DAILY PRN
Qty: 18 G | Refills: 3 | Status: SHIPPED | OUTPATIENT
Start: 2024-07-02

## 2024-07-02 RX ORDER — DOXYCYCLINE 100 MG/1
100 CAPSULE ORAL 2 TIMES DAILY
Qty: 28 CAPSULE | Refills: 0 | Status: SHIPPED | OUTPATIENT
Start: 2024-07-02 | End: 2024-07-16

## 2024-07-02 RX ORDER — DOXYCYCLINE 100 MG/1
100 CAPSULE ORAL 2 TIMES DAILY
Qty: 28 CAPSULE | Refills: 0 | Status: SHIPPED | OUTPATIENT
Start: 2024-07-02 | End: 2024-07-02

## 2024-07-02 ASSESSMENT — PAIN SCALES - GENERAL: PAINLEVEL: NO PAIN (0)

## 2024-07-02 NOTE — LETTER
7/2/2024       RE: Sherrie Lala  [address] [facility] [address]     Dear Colleague,    Thank you for referring your patient, Sherrie Lala, to the [facility] [place] at [facility]. Please see a copy of my visit note below.      [facility] [address] [address]  Phone: [phone]  Fax: [phone]    Patient:  Sherrie Lala, Date of birth 1955  Date of Visit:  07/01/2024  Referring Provider Bradly Lilly  Reason for visit: fever in transplant recipient    Assessment & Plan   Recommendations:   Obtain Tick illness work up: Lyme diseases Ab, Tick Borne Disease Panel, Anaplasma, Babesia, and Ehrlichia serology. If there is ongoing concern for tick born illness and these tests are negative I can send additional testing to [facility].   Trial empiric doxycycline 100 mg twice a day (provided a 14 day supply)  Obtain additional fungal work up: Histoplasma and Blastomyces serum and urine antigens, Cryptococcal antigen, 1-3 BD glucan  Continue fluconazole 400 mg daily, will decide on a refill pending test results  If additional fungal evaluation is negative will stop fluconazole  Obtain ESR, CRP, ferritin, CMV, CMV (part of FUO work up)  Trial Albuterol again to help with reactive airway response to coughing  Given the progressive right back pain, CT findings of progressive sclerosis of the rib, and history of metastatic cancer will discuss additional imaging options (ie MRI) with transplant hepatology  If above work up is negative then will consider additional imaging + lab work up  Follow up with me on 7/16/24    Alba Gutierrez DO  Transplant Infectious Diseases    68 y/o female w/ a history of metastatic cholangiocarcinoma (lung) s/p surgical resection, treatment, and radiation c/b liver cirrhosis s/p liver transplant 8/30/18, 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      Non-neutropenic fevers ~ 1 month:   -Persistent dry cough   -Small pulmonary nodules: 5/23/24 CT chest noted a small subpleural nodule in the RLL that is decreased is size and a small 3 mm nodule in the BRAULIO which is unchanged (from prior 2019 CT). Lymph nodes are normal. Reviewed oncologic note in 2018 -   in 5/2013, the patient had developed right lower lung nodule 8 mm size.  It showed adenocarcinoma.  The patient received radioablation to the right lower lung and subsequently also SBRT to the same lung nodule.   Coccidiodes testing was negative.Will also obtain Histoplasma, Blastomyces, CRAG antigens and 1-3 BD glucan. The CT chest images are not c/w a dimorphic fungal infection or any infection but given the temporal relationship with the development of her cough and travel to [place] will check these additional studies. If negative will stop fluconazole. She does appear to have a component of reactive airway disease based on the characteristic of her cough so recommended that she trial the albuterol again.   -Tick bite: she does endorse a tick bite prior to developing her illness in June. She had to dig the tick out but did not notice a rash that was associated with it. She thinks it was a deer tick. Will start a tick born illness evaluation including - Lyme, Ehrlichia/anaplasma, Babesia, tick born panel by PCR. Since we are getting the testing today will trial doxycycline to determine if there is improvement in her symptoms.   -History of metastatic Cholangiocarcinoma and right sided back pain: this pain has progressively worsened. 5/23/24 CT chest w/ progressive sclerotic changes of he right posterior 7th rib (increased from 2019) possibly due to treated metastatic disease. The body of read mentions an abscess at the right transverse process but based on the wording this may be a  typo. She has palpable tenderness over this area. I messaged her liver transplant team about this result given her history of metastatic cancer and progressive pain. I am inclined to get additional imaging w/ a MRI but given their knowledge of her history I will inquire with them as well.     Transplant check list:  Current immunosuppression: tacrolimus  Viral serostatus: CMV D-/R-, EBV D+/R+, HSV -unknown, VZV +  Other serostatus: Toxoplasma and Strongyloides no risk factors  Fungal: on treatment fluconazole  Bacterial prophylaxis: none  PJP prophylaxis: none  Immunizations:due for prevnar 20, tdap, shingrix. Will address when feeling better  IgG:unknown  Qtc: 393 ms      111 minutes spent by me on the date of the encounter doing chart review, review of test results, interpretation of tests, patient visit, documentation, and discussion with other provider(s)       History of Present Illness  Pertinent history obtain from: chart review and patient    68 y/o female w/ a history of metastatic cholangiocarcinoma s/p liver transplant 8/30/18, papillary carcinoma s/p subtotal thyroidectomy 8/2021 who developed BRIONES ~ 5/22/24.  Prior to this she had been feeling good. She trialed a steroid and albuterol inhaler which did not help. The BRIONES would then wax and wane.   5/23/24 CT PE - reviewed read in care everywhere. Small pulmonary nodules stable from 2019 CT. This BRIONES continues and then she returned from [place] on 6/6.     Approximately 3 days after returning she developed malaise, measurable fever (102), chills, cough (dry, non-productive), night sweats. She also developed sores in her mouth ~ 1 month ago but have improved. She lost ~ 5#. Decreased appetite. Mild nausea, no vomiting, no diarrhea. Her biggest concern is if the malignancy has returned.     She also notes pain on the right side of her back. Tylenol does not help the pain. Dulls the pain a little. No rashes. All her joints are sore. No tenderness in her muscles.  Legs feel heavy.      On 6/13/24 he was seen in the ED for SOB, dry cough,  and fevers.  She was started on empiric treatment for Coccidoides w/ fluconazole. The dose of the fluconazole that she is taking is 400 mg once a day. Since being on the fluconazole she has felt better but still not back to her baseline. Temperatures at home . Elevated temperatures are happening in the evening - does not occur nightly.     Coccidiodes serology IgM and IgG, antibody by CF, and antigen were negative. Blood cultures, COVID 19, Influenza negative.      Born in [place]. [place] has not lived anywhere else[place]. She worked as a emergency RN.  Worked at [employer]. She lives w/ . 4 grandchildren - one has been with her for 1 mo. Children are healthy. Not hot tub or pool at home. No tobacco or marijuana smoke exposure. + Gardening. No hunt. Camped in the past. She had traveled to [place] for 5 days. This winter she did not trave anywhere else.     Bitten by a tick ~ 3 weeks ago. She had to dig out tick and thinks it was a deer tick. No bulls eye or red [place] noted. This occurred before she started to not feel well.   Son had been cutting down trees in [place] and there was a lot of dust. Daughter has 2 dogs. But not pets in her home.     Induction: basiliximab; maintenance tacrolimus    ID History:   -August of 2014, patient had developed liver abscess associated with Streptococcus viridans bacteremia.   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 ID consulted and it was recommended at that time to do biweekly urine Histoplasma antigen tests. All have been negative since then (most recent 11/19/18). Recent autopsy reports show right hilar granuloma with extensive calcification which was AFB negative and fungal stain positive  for budding yeast.     Physical Exam    Vital signs:  /86   Pulse 96   Temp 97.8  F (36.6  C) (Oral)   Wt 58.9 kg (129 lb 12.8 oz)   LMP  (LMP Unknown)   SpO2 99%   BMI 22.28 kg/m      GENERAL: alert and no distress, overall healthy appearing  NECK: no adenopathy, no asymmetry, masses, or scars  ENT: scabbed sore on the mucosa of the lower lip  RESP: lungs clear to auscultation - no rales, rhonchi or wheezes, dry cough with talking  CV: regular rate and rhythm, normal S1 S2, no murmur, no peripheral edema  ABDOMEN: soft, nontender, no hepatosplenomegaly, no masses and bowel sounds normal  MS: no gross musculoskeletal defects noted, no edema  NEURO: Normal strength and tone, mentation intact and speech normal  PSYCH: affect normal/bright    Data  Laboratory data and imaging listed below was reviewed prior to this encounter.     Microbiology:    Culture   Date Value Ref Range Status   02/02/2023 No Growth  Final   , Inflammatory Markers:   Recent Labs   Lab Test 07/02/24  1155   SED 38*   , Hematology Studies:    Recent Labs   Lab Test 07/02/24  1155 06/24/24  0912 06/18/24  1041 05/24/24  0958 11/28/23  1011 05/26/23  1019 04/21/21  1101 04/06/21  0519 04/05/21  0806 04/04/21  0547 04/03/21  1147 04/03/21  0713 04/02/21  0449 04/01/21  0451   WBC 6.3 8.6 5.7 4.6 5.5 5.8   < > 3.8* 3.7* 3.9*   < > 3.7* 4.0 5.3   ANEU  --   --   --   --   --   --   --  2.7 2.7 2.9  --  2.8 2.9 4.1   AEOS  --   --   --   --   --   --   --  0.2 0.1 0.2  --  0.2 0.2 0.1   HGB 12.7 13.0 12.4 14.2 14.6 13.8   < > 7.3* 7.6* 7.8*   < > 7.5* 6.9* 8.2*   MCV 88 88 87 88 89 88   < > 93 92 93   < > 91 93 90    220 215 161 154 153   < > 149* 147* 126*   < > 112* 97* 103*    < > = values in this interval not displayed.    , Metabolic Studies:   Recent Labs   Lab Test 07/02/24  1155 06/24/24  0912 06/18/24  1041 05/24/24  0958 11/28/23  1011    138 139 141 141   POTASSIUM 4.8 4.8 5.2 4.9 4.5   CHLORIDE 101 103 101 106 106    CO2 24 25 26 26 26   BUN 30.9* 36.0* 26.1* 26.6* 21.0   CR 1.31* 1.45* 1.45* 1.12* 1.06*   GFRESTIMATED 44* 39* 39* 54* 58*   , and Hepatic Studies:   Recent Labs   Lab Test 07/02/24  1155 06/24/24  0912 06/18/24  1041 05/24/24  0958 11/28/23  1011 05/26/23  1019   BILITOTAL 0.5 0.5 0.4 0.5 0.5 0.6   ALKPHOS 102 96 96 88 86 92   ALBUMIN 4.3 4.2 4.0 4.5 4.4 4.4   AST 13 15 18 18 18 17   ALT 9 10 13 17 12 8*

## 2024-07-02 NOTE — PROGRESS NOTES
[facility] INFECTIOUS DISEASE CLINIC [address] [address]  Phone: [phone]  Fax: [phone]    Patient:  Sherrie Lala, Date of birth 1955  Date of Visit:  07/01/2024  Referring Provider Bradly Lilly  Reason for visit: fever in transplant recipient    Assessment & Plan    Recommendations:   Obtain Tick illness work up: Lyme diseases Ab, Tick Borne Disease Panel, Anaplasma, Babesia, and Ehrlichia serology. If there is ongoing concern for tick born illness and these tests are negative I can send additional testing to [facility].   Trial empiric doxycycline 100 mg twice a day (provided a 14 day supply)  Obtain additional fungal work up: Histoplasma and Blastomyces serum and urine antigens, Cryptococcal antigen, 1-3 BD glucan  Continue fluconazole 400 mg daily, will decide on a refill pending test results  If additional fungal evaluation is negative will stop fluconazole  Obtain ESR, CRP, ferritin, CMV, CMV (part of FUO work up)  Trial Albuterol again to help with reactive airway response to coughing  Given the progressive right back pain, CT findings of progressive sclerosis of the rib, and history of metastatic cancer will discuss additional imaging options (ie MRI) with transplant hepatology  If above work up is negative then will consider additional imaging + lab work up  Follow up with me on 7/16/24    Alba Gutierrez,   Transplant Infectious Diseases    68 y/o female w/ a history of metastatic cholangiocarcinoma (lung) s/p surgical resection, treatment, and radiation c/b liver cirrhosis s/p liver transplant 8/30/18, papillary 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      Non-neutropenic fevers ~ 1 month:   -Persistent dry cough   -Small pulmonary nodules: 5/23/24 CT chest noted a small subpleural nodule in the RLL that is decreased is size and a small 3  mm nodule in the BRAULIO which is unchanged (from prior 2019 CT). Lymph nodes are normal. Reviewed oncologic note in 2018 -   in 5/2013, the patient had developed right lower lung nodule 8 mm size.  It showed adenocarcinoma.  The patient received radioablation to the right lower lung and subsequently also SBRT to the same lung nodule.   Coccidiodes testing was negative.Will also obtain Histoplasma, Blastomyces, CRAG antigens and 1-3 BD glucan. The CT chest images are not c/w a dimorphic fungal infection or any infection but given the temporal relationship with the development of her cough and travel to [place] will check these additional studies. If negative will stop fluconazole. She does appear to have a component of reactive airway disease based on the characteristic of her cough so recommended that she trial the albuterol again.   -Tick bite: she does endorse a tick bite prior to developing her illness in June. She had to dig the tick out but did not notice a rash that was associated with it. She thinks it was a deer tick. Will start a tick born illness evaluation including - Lyme, Ehrlichia/anaplasma, Babesia, tick born panel by PCR. Since we are getting the testing today will trial doxycycline to determine if there is improvement in her symptoms.   -History of metastatic Cholangiocarcinoma and right sided back pain: this pain has progressively worsened. 5/23/24 CT chest w/ progressive sclerotic changes of he right posterior 7th rib (increased from 2019) possibly due to treated metastatic disease. The body of read mentions an abscess at the right transverse process but based on the wording this may be a typo. She has palpable tenderness over this area. I messaged her liver transplant team about this result given her history of metastatic cancer and progressive pain. I am inclined to get additional imaging w/ a MRI but given their knowledge of her history I will inquire with them as well.     Transplant check  list:  Current immunosuppression: tacrolimus  Viral serostatus: CMV D-/R-, EBV D+/R+, HSV -unknown, VZV +  Other serostatus: Toxoplasma and Strongyloides no risk factors  Fungal: on treatment fluconazole  Bacterial prophylaxis: none  PJP prophylaxis: none  Immunizations:due for prevnar 20, tdap, shingrix. Will address when feeling better  IgG:unknown  Qtc: 393 ms      111 minutes spent by me on the date of the encounter doing chart review, review of test results, interpretation of tests, patient visit, documentation, and discussion with other provider(s)       History of Present Illness   Pertinent history obtain from: chart review and patient    70 y/o female w/ a history of metastatic cholangiocarcinoma s/p liver transplant 8/30/18, papillary carcinoma s/p subtotal thyroidectomy 8/2021 who developed BRIONES ~ 5/22/24.  Prior to this she had been feeling good. She trialed a steroid and albuterol inhaler which did not help. The BRIONES would then wax and wane.   5/23/24 CT PE - reviewed read in care everywhere. Small pulmonary nodules stable from 2019 CT. This BRIONES continues and then she returned from [place] on 6/6.     Approximately 3 days after returning she developed malaise, measurable fever (102), chills, cough (dry, non-productive), night sweats. She also developed sores in her mouth ~ 1 month ago but have improved. She lost ~ 5#. Decreased appetite. Mild nausea, no vomiting, no diarrhea. Her biggest concern is if the malignancy has returned.     She also notes pain on the right side of her back. Tylenol does not help the pain. Dulls the pain a little. No rashes. All her joints are sore. No tenderness in her muscles. Legs feel heavy.      On 6/13/24 he was seen in the ED for SOB, dry cough,  and fevers.  She was started on empiric treatment for Coccidoides w/ fluconazole. The dose of the fluconazole that she is taking is 400 mg once a day. Since being on the fluconazole she has felt better but still not back to her  baseline. Temperatures at home . Elevated temperatures are happening in the evening - does not occur nightly.     Coccidiodes serology IgM and IgG, antibody by CF, and antigen were negative. Blood cultures, COVID 19, Influenza negative.      Born in [place]. She has not lived anywhere else[place]. She worked as a emergency RN.  Worked at [employer]. She lives w/ . 4 grandchildren - one has been with her for 1 mo. Children are healthy. Not hot tub or pool at home. No tobacco or marijuana smoke exposure. + Gardening. No hunt. Camped in the past. She had traveled to [place] for 5 days. This winter she did not trave anywhere else.     Bitten by a tick ~ 3 weeks ago. She had to dig out tick and thinks it was a deer tick. No bulls eye or red [place] noted. This occurred before she started to not feel well.   Son had been cutting down trees in [place] and there was a lot of dust. Daughter has 2 dogs. But not pets in her home.     Induction: basiliximab; maintenance tacrolimus    ID History:   -August of 2014, patient had developed liver abscess associated with Streptococcus viridans bacteremia.   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 ID consulted and it was recommended at that time to do biweekly urine Histoplasma antigen tests. All have been negative since then (most recent 11/19/18). Recent autopsy reports show right hilar granuloma with extensive calcification which was AFB negative and fungal stain positive for budding yeast.     Physical Exam     Vital signs:  /86   Pulse 96   Temp 97.8  F (36.6  C) (Oral)   Wt 58.9 kg (129 lb 12.8 oz)   LMP  (LMP Unknown)   SpO2 99%   BMI 22.28 kg/m      GENERAL: alert and no distress, overall healthy appearing  NECK: no adenopathy, no asymmetry, masses, or  scars  ENT: scabbed sore on the mucosa of the lower lip  RESP: lungs clear to auscultation - no rales, rhonchi or wheezes, dry cough with talking  CV: regular rate and rhythm, normal S1 S2, no murmur, no peripheral edema  ABDOMEN: soft, nontender, no hepatosplenomegaly, no masses and bowel sounds normal  MS: no gross musculoskeletal defects noted, no edema  NEURO: Normal strength and tone, mentation intact and speech normal  PSYCH: affect normal/bright    Data   Laboratory data and imaging listed below was reviewed prior to this encounter.     Microbiology:    Culture   Date Value Ref Range Status   02/02/2023 No Growth  Final   , Inflammatory Markers:   Recent Labs   Lab Test 07/02/24  1155   SED 38*   , Hematology Studies:    Recent Labs   Lab Test 07/02/24  1155 06/24/24  0912 06/18/24  1041 05/24/24  0958 11/28/23  1011 05/26/23  1019 04/21/21  1101 04/06/21  0519 04/05/21  0806 04/04/21  0547 04/03/21  1147 04/03/21  0713 04/02/21  0449 04/01/21  0451   WBC 6.3 8.6 5.7 4.6 5.5 5.8   < > 3.8* 3.7* 3.9*   < > 3.7* 4.0 5.3   ANEU  --   --   --   --   --   --   --  2.7 2.7 2.9  --  2.8 2.9 4.1   AEOS  --   --   --   --   --   --   --  0.2 0.1 0.2  --  0.2 0.2 0.1   HGB 12.7 13.0 12.4 14.2 14.6 13.8   < > 7.3* 7.6* 7.8*   < > 7.5* 6.9* 8.2*   MCV 88 88 87 88 89 88   < > 93 92 93   < > 91 93 90    220 215 161 154 153   < > 149* 147* 126*   < > 112* 97* 103*    < > = values in this interval not displayed.    , Metabolic Studies:   Recent Labs   Lab Test 07/02/24  1155 06/24/24  0912 06/18/24  1041 05/24/24  0958 11/28/23  1011    138 139 141 141   POTASSIUM 4.8 4.8 5.2 4.9 4.5   CHLORIDE 101 103 101 106 106   CO2 24 25 26 26 26   BUN 30.9* 36.0* 26.1* 26.6* 21.0   CR 1.31* 1.45* 1.45* 1.12* 1.06*   GFRESTIMATED 44* 39* 39* 54* 58*   , and Hepatic Studies:   Recent Labs   Lab Test 07/02/24  1155 06/24/24  0912 06/18/24  1041 05/24/24  0958 11/28/23  1011 05/26/23  1019   BILITOTAL 0.5 0.5 0.4 0.5 0.5 0.6    ALKPHOS 102 96 96 88 86 92   ALBUMIN 4.3 4.2 4.0 4.5 4.4 4.4   AST 13 15 18 18 18 17   ALT 9 10 13 17 12 8*

## 2024-07-02 NOTE — NURSING NOTE
Chief Complaint   Patient presents with    New Patient     /86   Pulse 96   Temp 97.8  F (36.6  C) (Oral)   Wt 58.9 kg (129 lb 12.8 oz)   LMP  (LMP Unknown)   SpO2 99%   BMI 22.28 kg/m    Matt Fields MA on 7/2/2024 at 10:37 AM

## 2024-07-03 ENCOUNTER — TELEPHONE (OUTPATIENT)
Dept: TRANSPLANT | Facility: CLINIC | Age: 69
End: 2024-07-03
Payer: COMMERCIAL

## 2024-07-03 LAB
B BURGDOR IGG SERPL QL IA: 17.4 INDEX
B BURGDOR IGG SERPL QL IA: REACTIVE
B BURGDOR IGG+IGM SER QL: 4.62
B BURGDOR IGM SERPL QL IA: 4.39 INDEX
B BURGDOR IGM SERPL QL IA: REACTIVE
CMV DNA SPEC NAA+PROBE-ACNC: NOT DETECTED IU/ML
EBV DNA SERPL NAA+PROBE-ACNC: NOT DETECTED IU/ML

## 2024-07-03 NOTE — TELEPHONE ENCOUNTER
Please call Sherrie and tell her that Dr. Lilly tells me ID (Dr. Gutierrez) wants her to stay on the fluconazole indefinietly - I would imagine they want to wait for all results.

## 2024-07-03 NOTE — TELEPHONE ENCOUNTER
Informed pt she needs to be on fluconazole indefinitely and inquire about the doxy with ID. Pt didn't understand why she has been taking the fluconazole. Suggested she discuss with ID.

## 2024-07-03 NOTE — TELEPHONE ENCOUNTER
Call to Sherrie to check in  was started on doxycycline yesterday. It looks like she may have tested positive for Lymes.    She is wondering if she can stop fluconazole.  Message to Laura.  Also wondering if she is on what she needs to be on (doxycyline).

## 2024-07-04 LAB
E CHAFFEENSIS IGG TITR SER IF: NORMAL {TITER}
E CHAFFEENSIS IGM TITR SER IF: NORMAL {TITER}

## 2024-07-05 LAB
A PHAGOCYTOPH IGG TITR SER IF: NORMAL {TITER}
A PHAGOCYTOPH IGM TITR SER IF: NORMAL {TITER}
B MICROTI IGG TITR SER: NORMAL {TITER}
B MICROTI IGM TITR SER: NORMAL {TITER}
SCANNED LAB RESULT: NORMAL

## 2024-07-08 ENCOUNTER — TELEPHONE (OUTPATIENT)
Dept: INFECTIOUS DISEASES | Facility: CLINIC | Age: 69
End: 2024-07-08
Payer: COMMERCIAL

## 2024-07-08 NOTE — TELEPHONE ENCOUNTER
I called Sherrie to discuss the lab results from last week - including the positive Lyme testing and negative fungal antigens. I left a message for her to call me back. I will plan to have her continue doxycycline until I see her again in July. I will have her stop the fluconazole given negative fungal testing.     Alba Gutierrez  Infectious Diseased

## 2024-07-08 NOTE — TELEPHONE ENCOUNTER
"Called patient and relayed message from provider. Patient verbalized understanding, all questions/concerns answered.     No fever any longer, still feeling run down and SOB, feels wobbly on feet, \"very strange feeling not feeling right.\" Better than 3 weeks ago, could not even get out of bed.     Patient will keep track of questions to ask at next appt with Dr. Gutierrez. None at this time. Hopes to continue to feel better.     Tres Joe RN  Infectious Disease on 7/8/2024 at 11:19 AM    "

## 2024-07-16 ENCOUNTER — OFFICE VISIT (OUTPATIENT)
Dept: INFECTIOUS DISEASES | Facility: CLINIC | Age: 69
End: 2024-07-16
Attending: INTERNAL MEDICINE
Payer: COMMERCIAL

## 2024-07-16 VITALS
HEART RATE: 73 BPM | WEIGHT: 127 LBS | SYSTOLIC BLOOD PRESSURE: 117 MMHG | DIASTOLIC BLOOD PRESSURE: 78 MMHG | BODY MASS INDEX: 21.8 KG/M2 | TEMPERATURE: 97.9 F | OXYGEN SATURATION: 97 %

## 2024-07-16 DIAGNOSIS — A69.20 LYME DISEASE: Primary | ICD-10-CM

## 2024-07-16 DIAGNOSIS — R07.81 RIB PAIN ON RIGHT SIDE: ICD-10-CM

## 2024-07-16 DIAGNOSIS — R50.9 FEVER, UNSPECIFIED FEVER CAUSE: ICD-10-CM

## 2024-07-16 DIAGNOSIS — R91.8 PULMONARY NODULES: ICD-10-CM

## 2024-07-16 PROCEDURE — 93010 ELECTROCARDIOGRAM REPORT: CPT | Performed by: INTERNAL MEDICINE

## 2024-07-16 PROCEDURE — 93005 ELECTROCARDIOGRAM TRACING: CPT | Mod: RTG

## 2024-07-16 PROCEDURE — G0463 HOSPITAL OUTPT CLINIC VISIT: HCPCS | Performed by: INTERNAL MEDICINE

## 2024-07-16 PROCEDURE — 99215 OFFICE O/P EST HI 40 MIN: CPT | Performed by: INTERNAL MEDICINE

## 2024-07-16 RX ORDER — DOXYCYCLINE 100 MG/1
100 CAPSULE ORAL 2 TIMES DAILY
Qty: 14 CAPSULE | Refills: 0 | Status: SHIPPED | OUTPATIENT
Start: 2024-07-16 | End: 2024-08-02

## 2024-07-16 ASSESSMENT — PAIN SCALES - GENERAL: PAINLEVEL: NO PAIN (0)

## 2024-07-16 NOTE — LETTER
7/16/2024       RE: Sherrie Lala  [address] [place] [address]     Dear Colleague,    Thank you for referring your patient, Sherrie Lala, to the [facility] DISE[facility] [place] at [facility]. Please see a copy of my visit note below.      [facility] [place]  [address] [address]  Phone: [phone]  Fax: [phone]    Patient:  Sherrie Lala, Date of birth 1955  Date of Visit:  07/16/2024  Referring Provider Bradly Lilly  Reason for visit: fever in transplant recipient    Assessment & Plan   Recommendations:   Extend doxycycline for another 1 week to complete a total of 3 weeks of therapy. Extending treatment due to concern for development of peripheral neuropathy.  12 lead EKG obtained - no AV blocks or prolonged intervals  She will let me know if SOB does not continue to improve. If there is ongoing concern will obtain a TTE  She will also alert us if the pain returns in her right rib (see below)  Follow up with me in 4 weeks to reassess symptoms. Virtual okay.    Alba Gutierrez, DO  Transplant Infectious Diseases    68 y/o female w/ a history of metastatic cholangiocarcinoma (lung) s/p surgical resection, treatment, and radiation c/b liver cirrhosis s/p liver transplant 8/30/18, papillary carcinoma s/p subtotal thyroidectomy 8/2021, a tick bite 6/2024, and trip to [place] who developed a constellation of symptoms starting in June 2024 that progressed over time (fevers, malaise, SOB).     Lyme Disease:   Tick bite occurred prior to developing symptoms June. Dug the tick out but did not notice a rash. Lyme IgM and IgG positive. Babesia, Ehrlichia, Anaplasma antibodies and PCRs negative. Started doxycycline empirically 7/2/2024 then the Lyme antibodies returned positive. She has clinically improved on doxycycline - fevers and malaise resolved, SOB and cough almost  resolved. Did not have symptoms c/w meningitis. Her SOB and BRIONES could raise the potential for myocarditis or cardiomyopathy but 6/13/24 EKG without prolonged intervals. These symptoms have improved. Repeat EKG today w/o prolonged intervals or heart blocks. No arthritis symptoms. She endorsed additional symptoms today of peripheral neuropathy and increased sensitivity to temperature in her hands and feet. She has completed 2 weeks of doxycycline. Due to the symptoms of nerve involvement I will extend treatment to complete 3 weeks. She will let me know if her SOB does not continue to improve - if this occurs will obtain a TTE. No physical exam findings c/w heart failure.    Small pulmonary nodules: Work up started due to fevers and SOB. 5/23/24 CT chest noted a small subpleural nodule in the RLL that is decreased is size and a small 3 mm nodule in the BRAULIO which is unchanged (from prior 2019 CT). Lymph nodes are normal. Reviewed oncologic note in 2018 -   in 5/2013, the patient had developed right lower lung nodule 8 mm size.  It showed adenocarcinoma.  The patient received radioablation to the right lower lung and subsequently also SBRT to the same lung nodule.   Coccidiodes, Histoplasma, Blastomyces, CRAG antigens, 1-3 BD glucan negative. The CT chest images are not c/w a dimorphic fungal infection or any infection. Stopped fluconazole on 7/8/24 after the positive Lyme serology resulted.    History of metastatic Cholangiocarcinoma and right sided back pain: 5/23/24 CT chest w/ progressive sclerotic changes of he right posterior 7th rib (increased from 2019) possibly due to treated metastatic disease. The body of read mentions an abscess at the right transverse process but based on the wording this may be a typo. The pain has since resolved. She will alert us if the pain returns.    Transplant check list:  Current immunosuppression: tacrolimus  Viral serostatus: CMV D-/R-, EBV D+/R+, HSV -unknown, VZV +  Other  serostatus: Toxoplasma and Strongyloides no risk factors  Fungal: none  Bacterial prophylaxis: none  PJP prophylaxis: none  Immunizations:due for prevnar 20, tdap, shingrix. Will address when feeling better  IgG:unknown    Qtc: 393 ms    48 minutes spent by me on the date of the encounter doing chart review, review of test results, interpretation of tests, patient visit, documentation, and discussion with other provider(s)     History of Present Illness  Pertinent history obtain from: chart review and patient  Last seen 7/2/24  Lyme IgM and IgG positive, started doxycycline, stopped fluconazole  Feet are burning at night. Increased sensitivity to hot water over her hands.   Tired but much improved  Legs feeling less heavy but she does feel weak  SOB improved, able to talk w/o SOB  Eating again  No joint pain  Fever resolved, denies HA, vision changes, neck pain  Dry cough is -~95% better  More shaky but she thinks that could be due to the tacrolimus level. Lorena is he transplant coordinator is aware. 1 tablet daily of tac (takes in the AM).   Right pain over the rib has improved    ID History:   -August 2014, liver abscess associated with Streptococcus viridans bacteremia.   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 ID consulted and it was recommended at that time to do biweekly urine Histoplasma antigen tests. All have been negative since then (most recent 11/19/18). Recent autopsy reports show right hilar granuloma with extensive calcification which was AFB negative and fungal stain positive for budding yeast.     Physical Exam    Vital signs:  /78 (BP Location: Right arm, Cuff Size: Adult Regular)   Pulse 73   Temp 97.9  F (36.6  C)   Wt 57.6 kg (127 lb)   LMP  (LMP Unknown)   SpO2 97%   BMI 21.80 kg/m      GENERAL: alert and no  distress, overall healthy appearing  RESP: dry cough resolved  CV: no peripheral edema  MS: no gross musculoskeletal defects noted  NEURO: Normal strength and tone, mentation intact and speech normal  PSYCH: affect normal/bright  Skin: skin over hands and feet without erythema, warm to touch    Data  Laboratory data and imaging listed below was reviewed prior to this encounter.     Microbiology:    Culture   Date Value Ref Range Status   02/02/2023 No Growth  Final   , Inflammatory Markers:   Recent Labs   Lab Test 07/02/24  1155   SED 38*   , Hematology Studies:    Recent Labs   Lab Test 07/02/24  1155 06/24/24  0912 06/18/24  1041 05/24/24  0958 11/28/23  1011 05/26/23  1019 04/21/21  1101 04/06/21  0519 04/05/21  0806 04/04/21  0547 04/03/21  1147 04/03/21  0713 04/02/21  0449 04/01/21  0451   WBC 6.3 8.6 5.7 4.6 5.5 5.8   < > 3.8* 3.7* 3.9*   < > 3.7* 4.0 5.3   ANEU  --   --   --   --   --   --   --  2.7 2.7 2.9  --  2.8 2.9 4.1   AEOS  --   --   --   --   --   --   --  0.2 0.1 0.2  --  0.2 0.2 0.1   HGB 12.7 13.0 12.4 14.2 14.6 13.8   < > 7.3* 7.6* 7.8*   < > 7.5* 6.9* 8.2*   MCV 88 88 87 88 89 88   < > 93 92 93   < > 91 93 90    220 215 161 154 153   < > 149* 147* 126*   < > 112* 97* 103*    < > = values in this interval not displayed.    , Metabolic Studies:   Recent Labs   Lab Test 07/02/24  1155 06/24/24  0912 06/18/24  1041 05/24/24  0958 11/28/23  1011    138 139 141 141   POTASSIUM 4.8 4.8 5.2 4.9 4.5   CHLORIDE 101 103 101 106 106   CO2 24 25 26 26 26   BUN 30.9* 36.0* 26.1* 26.6* 21.0   CR 1.31* 1.45* 1.45* 1.12* 1.06*   GFRESTIMATED 44* 39* 39* 54* 58*   , and Hepatic Studies:   Recent Labs   Lab Test 07/02/24  1155 06/24/24  0912 06/18/24  1041 05/24/24  0958 11/28/23  1011 05/26/23  1019   BILITOTAL 0.5 0.5 0.4 0.5 0.5 0.6   ALKPHOS 102 96 96 88 86 92   ALBUMIN 4.3 4.2 4.0 4.5 4.4 4.4   AST 13 15 18 18 18 17   ALT 9 10 13 17 12 8*                 Alba Gutierrez, DO

## 2024-07-16 NOTE — NURSING NOTE
12 lead EKG completed per Dr. Gutierrez orders. Pt's first name, last name and  verified prior to procedure. 12 lead EKG obtained without complications or questions, results given to provider.     Florence Burch CMA  2024 4:05 PM

## 2024-07-16 NOTE — PATIENT INSTRUCTIONS
12 lead EKG today to assess your heart for any involvement from Lyme  If you continue to feel SOB over the next week please let me know so I can order a echocardiogram  I will prescribe another 7 days of doxycycline to treat Lyme if there is involvement of your nerves.

## 2024-07-16 NOTE — NURSING NOTE
"Chief Complaint   Patient presents with    RECHECK     Follow-up      Vital signs:  Temp: 97.9  F (36.6  C)   BP: 117/78 Pulse: 73     SpO2: 97 %       Weight: 57.6 kg (127 lb)  Estimated body mass index is 21.8 kg/m  as calculated from the following:    Height as of 6/13/24: 1.626 m (5' 4\").    Weight as of this encounter: 57.6 kg (127 lb).      Karrie Jimenez CMA   7/16/2024 3:09 PM    "

## 2024-07-16 NOTE — PROGRESS NOTES
[facility] INFECTIOUS DISEASE CLINIC [address] [address]  Phone: [phone]  Fax: [phone]    Patient:  Sherrie Lala, Date of birth 1955  Date of Visit:  07/16/2024  Referring Provider Bradly Lilly  Reason for visit: fever in transplant recipient    Assessment & Plan    Recommendations:   Extend doxycycline for another 1 week to complete a total of 3 weeks of therapy. Extending treatment due to concern for development of peripheral neuropathy.  12 lead EKG obtained - no AV blocks or prolonged intervals  She will let me know if SOB does not continue to improve. If there is ongoing concern will obtain a TTE  She will also alert us if the pain returns in her right rib (see below)  Follow up with me in 4 weeks to reassess symptoms. Virtual okay.    Alba Gutierrez DO  Transplant Infectious Diseases    68 y/o female w/ a history of metastatic cholangiocarcinoma (lung) s/p surgical resection, treatment, and radiation c/b liver cirrhosis s/p liver transplant 8/30/18, papillary carcinoma s/p subtotal thyroidectomy 8/2021, a tick bite 6/2024, and trip to [place] who developed a constellation of symptoms starting in June 2024 that progressed over time (fevers, malaise, SOB).     Lyme Disease:   Tick bite occurred prior to developing symptoms June. Dug the tick out but did not notice a rash. Lyme IgM and IgG positive. Babesia, Ehrlichia, Anaplasma antibodies and PCRs negative. Started doxycycline empirically 7/2/2024 then the Lyme antibodies returned positive. She has clinically improved on doxycycline - fevers and malaise resolved, SOB and cough almost resolved. Did not have symptoms c/w meningitis. Her SOB and BRIONES could raise the potential for myocarditis or cardiomyopathy but 6/13/24 EKG without prolonged intervals. These symptoms have improved. Repeat EKG today w/o prolonged intervals or heart blocks. No arthritis symptoms. She endorsed additional symptoms today  of peripheral neuropathy and increased sensitivity to temperature in her hands and feet. She has completed 2 weeks of doxycycline. Due to the symptoms of nerve involvement I will extend treatment to complete 3 weeks. She will let me know if her SOB does not continue to improve - if this occurs will obtain a TTE. No physical exam findings c/w heart failure.    Small pulmonary nodules: Work up started due to fevers and SOB. 5/23/24 CT chest noted a small subpleural nodule in the RLL that is decreased is size and a small 3 mm nodule in the BRAULIO which is unchanged (from prior 2019 CT). Lymph nodes are normal. Reviewed oncologic note in 2018 -   in 5/2013, the patient had developed right lower lung nodule 8 mm size.  It showed adenocarcinoma.  The patient received radioablation to the right lower lung and subsequently also SBRT to the same lung nodule.   Coccidiodes, Histoplasma, Blastomyces, CRAG antigens, 1-3 BD glucan negative. The CT chest images are not c/w a dimorphic fungal infection or any infection. Stopped fluconazole on 7/8/24 after the positive Lyme serology resulted.    History of metastatic Cholangiocarcinoma and right sided back pain: 5/23/24 CT chest w/ progressive sclerotic changes of he right posterior 7th rib (increased from 2019) possibly due to treated metastatic disease. The body of read mentions an abscess at the right transverse process but based on the wording this may be a typo. The pain has since resolved. She will alert us if the pain returns.    Transplant check list:  Current immunosuppression: tacrolimus  Viral serostatus: CMV D-/R-, EBV D+/R+, HSV -unknown, VZV +  Other serostatus: Toxoplasma and Strongyloides no risk factors  Fungal: none  Bacterial prophylaxis: none  PJP prophylaxis: none  Immunizations:due for prevnar 20, tdap, shingrix. Will address when feeling better  IgG:unknown    Qtc: 393 ms    48 minutes spent by me on the date of the encounter doing chart review, review of test  results, interpretation of tests, patient visit, documentation, and discussion with other provider(s)     History of Present Illness   Pertinent history obtain from: chart review and patient  Last seen 7/2/24  Lyme IgM and IgG positive, started doxycycline, stopped fluconazole  Feet are burning at night. Increased sensitivity to hot water over her hands.   Tired but much improved  Legs feeling less heavy but she does feel weak  SOB improved, able to talk w/o SOB  Eating again  No joint pain  Fever resolved, denies HA, vision changes, neck pain  Dry cough is -~95% better  More shaky but she thinks that could be due to the tacrolimus level. Lorena is he transplant coordinator is aware. 1 tablet daily of tac (takes in the AM).   Right pain over the rib has improved    ID History:   -August 2014, liver abscess associated with Streptococcus viridans bacteremia.   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 ID consulted and it was recommended at that time to do biweekly urine Histoplasma antigen tests. All have been negative since then (most recent 11/19/18). Recent autopsy reports show right hilar granuloma with extensive calcification which was AFB negative and fungal stain positive for budding yeast.     Physical Exam     Vital signs:  /78 (BP Location: Right arm, Cuff Size: Adult Regular)   Pulse 73   Temp 97.9  F (36.6  C)   Wt 57.6 kg (127 lb)   LMP  (LMP Unknown)   SpO2 97%   BMI 21.80 kg/m      GENERAL: alert and no distress, overall healthy appearing  RESP: dry cough resolved  CV: no peripheral edema  MS: no gross musculoskeletal defects noted  NEURO: Normal strength and tone, mentation intact and speech normal  PSYCH: affect normal/bright  Skin: skin over hands and feet without erythema, warm to touch    Data   Laboratory data and  imaging listed below was reviewed prior to this encounter.     Microbiology:    Culture   Date Value Ref Range Status   02/02/2023 No Growth  Final   , Inflammatory Markers:   Recent Labs   Lab Test 07/02/24  1155   SED 38*   , Hematology Studies:    Recent Labs   Lab Test 07/02/24  1155 06/24/24  0912 06/18/24  1041 05/24/24  0958 11/28/23  1011 05/26/23  1019 04/21/21  1101 04/06/21  0519 04/05/21  0806 04/04/21  0547 04/03/21  1147 04/03/21  0713 04/02/21  0449 04/01/21  0451   WBC 6.3 8.6 5.7 4.6 5.5 5.8   < > 3.8* 3.7* 3.9*   < > 3.7* 4.0 5.3   ANEU  --   --   --   --   --   --   --  2.7 2.7 2.9  --  2.8 2.9 4.1   AEOS  --   --   --   --   --   --   --  0.2 0.1 0.2  --  0.2 0.2 0.1   HGB 12.7 13.0 12.4 14.2 14.6 13.8   < > 7.3* 7.6* 7.8*   < > 7.5* 6.9* 8.2*   MCV 88 88 87 88 89 88   < > 93 92 93   < > 91 93 90    220 215 161 154 153   < > 149* 147* 126*   < > 112* 97* 103*    < > = values in this interval not displayed.    , Metabolic Studies:   Recent Labs   Lab Test 07/02/24  1155 06/24/24  0912 06/18/24  1041 05/24/24  0958 11/28/23  1011    138 139 141 141   POTASSIUM 4.8 4.8 5.2 4.9 4.5   CHLORIDE 101 103 101 106 106   CO2 24 25 26 26 26   BUN 30.9* 36.0* 26.1* 26.6* 21.0   CR 1.31* 1.45* 1.45* 1.12* 1.06*   GFRESTIMATED 44* 39* 39* 54* 58*   , and Hepatic Studies:   Recent Labs   Lab Test 07/02/24  1155 06/24/24  0912 06/18/24  1041 05/24/24  0958 11/28/23  1011 05/26/23  1019   BILITOTAL 0.5 0.5 0.4 0.5 0.5 0.6   ALKPHOS 102 96 96 88 86 92   ALBUMIN 4.3 4.2 4.0 4.5 4.4 4.4   AST 13 15 18 18 18 17   ALT 9 10 13 17 12 8*

## 2024-07-17 ENCOUNTER — TELEPHONE (OUTPATIENT)
Dept: INFECTIOUS DISEASES | Facility: CLINIC | Age: 69
End: 2024-07-17
Payer: COMMERCIAL

## 2024-07-17 LAB
ATRIAL RATE - MUSE: 75 BPM
DIASTOLIC BLOOD PRESSURE - MUSE: NORMAL MMHG
INTERPRETATION ECG - MUSE: NORMAL
P AXIS - MUSE: 69 DEGREES
PR INTERVAL - MUSE: 138 MS
QRS DURATION - MUSE: 72 MS
QT - MUSE: 408 MS
QTC - MUSE: 455 MS
R AXIS - MUSE: 50 DEGREES
SYSTOLIC BLOOD PRESSURE - MUSE: NORMAL MMHG
T AXIS - MUSE: 64 DEGREES
VENTRICULAR RATE- MUSE: 75 BPM

## 2024-07-17 NOTE — TELEPHONE ENCOUNTER
EP called 7/17 to sched a 4 week follow up with Dr. Gutierrez. Patient stated that she'll be in [place] for this visit.       ----- Message from Alba Gutierrez sent at 7/16/2024  8:44 PM CDT -----  Regarding: follow up  Please arrange follow up with me in 4 weeks - virtual is okay

## 2024-07-19 ENCOUNTER — MYC MEDICAL ADVICE (OUTPATIENT)
Dept: INFECTIOUS DISEASES | Facility: CLINIC | Age: 69
End: 2024-07-19
Payer: COMMERCIAL

## 2024-07-22 DIAGNOSIS — R06.02 SHORTNESS OF BREATH: Primary | ICD-10-CM

## 2024-07-22 NOTE — TELEPHONE ENCOUNTER
I am not sure what happened to the encounter note I had written so apologies if this is a duplicate message.   I ordered an echocardiogram. Sounds like she needs it faxed.   Would you be able to keep her on your radar and check in on her symptoms on Wednesday or Thursday?    Thank you    Alba Gutierrez

## 2024-07-25 ENCOUNTER — TELEPHONE (OUTPATIENT)
Dept: INFECTIOUS DISEASES | Facility: CLINIC | Age: 69
End: 2024-07-25
Payer: COMMERCIAL

## 2024-07-25 NOTE — TELEPHONE ENCOUNTER
Called Sherrie and explained that neither of her preferred locations have availability for her echo until late August. Sherrie prefers to get her echo performed at the [facility]. Reiterated Dr. Gutierrez's recommendation to seek evaluation atht the ED if her SOB worsens or if she develops chest pain. Provided imaging scheduling line and transferred patient.

## 2024-07-25 NOTE — TELEPHONE ENCOUNTER
Called [facility] in [place], [place] and unfortunately they don't have any sooner appointments to perform an echo for patient.

## 2024-07-25 NOTE — TELEPHONE ENCOUNTER
"Called patient and asked if she was contacted by [facility] to be scheduled for her echo. Patient stated she has not been contacted. Patient states she is \"feeling so much better\" but still has SOB with exertion. ID nurse will call [facility] to ensure they received echo order and will request they reach out to patient to schedule. Will call back patient to explain the plan once writer speaks with hospital.   "

## 2024-07-25 NOTE — TELEPHONE ENCOUNTER
Called and confirmed [facility] did receive echo fax. Next available August 21st. Recommended alternative location of [facility] who shares their system so will have the same order.     [facility] - [place], [place]  [phone]

## 2024-07-26 ENCOUNTER — ANCILLARY PROCEDURE (OUTPATIENT)
Dept: CARDIOLOGY | Facility: CLINIC | Age: 69
End: 2024-07-26
Attending: INTERNAL MEDICINE
Payer: COMMERCIAL

## 2024-07-26 DIAGNOSIS — R06.02 SHORTNESS OF BREATH: ICD-10-CM

## 2024-07-26 LAB — LVEF ECHO: NORMAL

## 2024-07-26 PROCEDURE — 93306 TTE W/DOPPLER COMPLETE: CPT | Performed by: INTERNAL MEDICINE

## 2024-08-02 ENCOUNTER — OFFICE VISIT (OUTPATIENT)
Dept: FAMILY MEDICINE | Facility: CLINIC | Age: 69
End: 2024-08-02
Payer: COMMERCIAL

## 2024-08-02 VITALS — TEMPERATURE: 96.7 F | BODY MASS INDEX: 22.98 KG/M2 | RESPIRATION RATE: 12 BRPM | WEIGHT: 133.9 LBS

## 2024-08-02 DIAGNOSIS — D61.818 PANCYTOPENIA (H): ICD-10-CM

## 2024-08-02 DIAGNOSIS — D68.9 COAGULOPATHY (H): ICD-10-CM

## 2024-08-02 DIAGNOSIS — L56.8 PHOTOSENSITIVITY DERMATITIS: Primary | ICD-10-CM

## 2024-08-02 DIAGNOSIS — E21.3 HYPERPARATHYROIDISM (H): ICD-10-CM

## 2024-08-02 PROCEDURE — 99213 OFFICE O/P EST LOW 20 MIN: CPT | Performed by: INTERNAL MEDICINE

## 2024-08-02 NOTE — PROGRESS NOTES
Assessment & Plan       Photosensitivity dermatitis  Recent Lyme infection, just completed 3-week course of doxycycline -noticeable overall symptom improvement  Subsequently with sensitivity noted in her hands and her feet, especially with temperature changes with water.  Now with observed vesicular changes to primarily her toes, also with some involvement in hands  -To me, symptom description sound more like photosensitivity likely doxycycline related.  On further history, she does share having noticeable sunburn changes on her feet within the last few weeks.  I think she is now in a blistering phase of her prior sunburn  -I did ask that she bring this up with her transplant ID physician who has been following with her  -If indeed doxycycline associated, I would expect this to resolve on its own over the near future        Subjective   Sherrie is a 69 year old, presenting for the following health issues: Presents to clinic with blister changes on feet.  Recently diagnosed with Lyme's, just completed 3-week course of doxycycline.  She does note wearing longsleeve shirts hands though has worn sandals throughout the last several weeks.  Describes a redness like change on hands and her feet.  Shared that her hands and feet seem especially more sensitive to hot water.  Now noticing blister changes on her toes.  Previous fevers rigors, general fatigue all of improved substantially since her doxycycline course  Derm Problem (X 1 week, bilateral feet, blisters. )      8/2/2024    12:38 PM   Additional Questions   Roomed by Ene KING CMA   Accompanied by None     History of Present Illness       Reason for visit:  Rash    She eats 0-1 servings of fruits and vegetables daily.She consumes 0 sweetened beverage(s) daily.She exercises with enough effort to increase her heart rate 10 to 19 minutes per day.  She exercises with enough effort to increase her heart rate 3 or less days per week.   She is taking medications  regularly.        Review of Systems  Constitutional, HEENT, cardiovascular, pulmonary, gi and gu systems are negative, except as otherwise noted.      Objective    Temp (!) 96.7  F (35.9  C)   Resp 12   Wt 60.7 kg (133 lb 14.4 oz)   LMP  (LMP Unknown)   BMI 22.98 kg/m    Body mass index is 22.98 kg/m .  Physical Exam   Feet observed.  No apparent thermal burn injury on dorsal or plantar aspect of foot.  Does have vesicular, blistered changes on multiple toes; no erythema features.  No synovitis  Similar vesicular changes developing on palmar aspect of right hand.  Nontender            Signed Electronically by: Apollo Benitez MD

## 2024-08-15 ENCOUNTER — VIRTUAL VISIT (OUTPATIENT)
Dept: INFECTIOUS DISEASES | Facility: CLINIC | Age: 69
End: 2024-08-15
Attending: INTERNAL MEDICINE
Payer: COMMERCIAL

## 2024-08-15 VITALS — HEIGHT: 63 IN | WEIGHT: 130 LBS | BODY MASS INDEX: 23.04 KG/M2

## 2024-08-15 DIAGNOSIS — A69.20 LYME DISEASE: Primary | ICD-10-CM

## 2024-08-15 DIAGNOSIS — G62.9 PERIPHERAL POLYNEUROPATHY: ICD-10-CM

## 2024-08-15 DIAGNOSIS — D84.9 IMMUNOSUPPRESSED STATUS (H): ICD-10-CM

## 2024-08-15 DIAGNOSIS — Z94.4 HISTORY OF LIVER TRANSPLANT (H): ICD-10-CM

## 2024-08-15 PROCEDURE — 99213 OFFICE O/P EST LOW 20 MIN: CPT | Mod: 95 | Performed by: INTERNAL MEDICINE

## 2024-08-15 ASSESSMENT — PAIN SCALES - GENERAL: PAINLEVEL: NO PAIN (0)

## 2024-08-15 NOTE — PATIENT INSTRUCTIONS
You have completed 3 weeks of doxycycline ~ 7/21/24  Please let me know if you redevelop any symptoms or have ongoing concerns.  You are a candidate for the following vaccines that you can get at a local pharmacy or PCP office: prevnar 20, COVID, yearly inluenza, and shingrix vaccines  Follow up as needed

## 2024-08-15 NOTE — NURSING NOTE
Patient declined medication review due to no changes.       Current patient location: [address] [place] [address]    Is the patient currently in the state of [place]? NO, in [place] where patient lives.     Visit mode:VIDEO    If the visit is dropped, the patient can be reconnected by: VIDEO VISIT: Text to cell phone:   Telephone Information:   Mobile [phone]    and VIDEO VISIT: Send to e-mail at: [email or web address]    Will anyone else be joining the visit? NO  (If patient encounters technical issues they should call [phone])    How would you like to obtain your AVS? MyChart    Are changes needed to the allergy or medication list? Pt declined med review and Pt stated no med changes    Are refills needed on medications prescribed by this physician? NO    Rooming Documentation:  Not applicable    Reason for visit: CARMEN BHATT

## 2024-08-15 NOTE — LETTER
8/15/2024       RE: Sherrie Lala  [address] [facility] [address]     Dear Colleague,    Thank you for referring your patient, Sherrie Lala, to the [facility] INFECTIOUS DISEASE CLINIC [place] at [facility]. Please see a copy of my visit note below.    Virtual Visit Details    Type of service:  Video Visit   Video Start Time: 9:03 AM  Video End Time: 9:10 AM    Originating Location (pt. Location): Home    Distant Location (provider location):  On-site  Platform used for Video Visit: [facility] INFECTIOUS DISEASE CLINIC [address] [address]  Phone: [phone]  Fax: [phone]    Patient:  Sherrie Lala, Date of birth 1955  Date of Visit:  08/14/2024  Referring Provider Bradly Lilly  Reason for visit: fever in transplant recipient    Assessment & Plan   Recommendations:   Completed 3 weeks of doxycycline ~ 7/21/24  She is going well and back to baseline   Candidate for prevnar 20, COVID, yearly inluenza, and shingrix vaccines  Follow up as needed    Alba Gutierrez D.O.   Infectious Diseases  Contact information available via [facility] Paging/Directory    68 y/o female w/ a history of metastatic cholangiocarcinoma (lung) s/p surgical resection, treatment, and radiation c/b liver cirrhosis s/p liver transplant 8/30/18, papillary carcinoma s/p subtotal thyroidectomy 8/2021, a tick bite 6/2024, and trip to [place] who developed a constellation of symptoms starting in June 2024 that progressed over time (fevers, malaise, SOB) subsequently found to have Lyme disease.     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 Dug the tick out but did not notice a rash. Lyme IgM and IgG positive. Babesia, Ehrlichia, Anaplasma antibodies and PCRs negative. Started doxycycline empirically 7/2/2024 then the Lyme antibodies returned positive. She  clinically improved on doxycycline and ultimately completed a 3 week course ~ 7/21/24. She did not have clinical evidence of meningitis She did not have symptoms c/w facial palsy, meningitis, encephalitis, or arthritis. She did develop peripheral neuropathy and increased temperature sensitivity in her feet. Due to her lingering SOB and BRIONES we also obtained a 12 lead EKG and TTE assessing for heart block, myocarditis, or cardiomyopathy which were unremarkable. No arthritis symptoms. She develop a burn and blisters while on the doxycycline which has resolved. At this point she feels as though she is back to her baseline prior to diagnosis.     Small pulmonary nodules: Work up started due to fevers and SOB. 5/23/24 CT chest noted a small subpleural nodule in the RLL that is decreased is size and a small 3 mm nodule in the BRAULIO which is unchanged (from prior 2019 CT). Lymph nodes are normal. Reviewed oncologic note in 2018 -   in 5/2013, the patient had developed right lower lung nodule 8 mm size.  It showed adenocarcinoma.  The patient received radioablation to the right lower lung and subsequently also SBRT to the same lung nodule.   Coccidiodes, Histoplasma, Blastomyces, CRAG antigens, 1-3 BD glucan negative. The CT chest images are not c/w a dimorphic fungal infection or any infection. Stopped fluconazole on 7/8/24 after the positive Lyme serology resulted.    History of metastatic Cholangiocarcinoma and right sided back pain: 5/23/24 CT chest w/ progressive sclerotic changes of he right posterior 7th rib (increased from 2019) possibly due to treated metastatic disease. The body of read mentions an abscess at the right transverse process but based on the wording this may be a typo. The pain has since resolved. She will alert a provider if the pain returns.    Transplant check list:  Current immunosuppression: tacrolimus  Viral serostatus: CMV D-/R-, EBV D+/R+, HSV -unknown, VZV +  Other serostatus: Toxoplasma and  "Strongyloides no risk factors  Fungal: none  Bacterial prophylaxis: none  PJP prophylaxis: none  Immunizations:due for prevnar 20, tdap, shingrix. Will address when feeling better  IgG:unknown  NINI and Qtc: 138 and 455 ms    25 minutes spent by me on the date of the encounter doing chart review, review of test results, patient visit, and documentation     History of Present Illness  Pertinent history obtain from: chart review and patient  Completed 3 weeks of doxycycline ~ 7/16/24  After our visit she lost a toe nail. Also had noticed some blisters on her feet. She had sandals on at a baseball and she think she had burns over her feet. Hands never burned and blistered.  PCP visit on 8/2 - she had developed blisters on her hands and feet which was presumed to be a sunburn by her PCP while she was taking the doxycycline.   Fatigue significantly improved. SOB also improved but not completely resolved. Back to normal.   walk 3-4 times and walking 2-3 miles with each walk. Has her normal SOB with her walks.     ID History:   -August 2014, liver abscess associated with Streptococcus viridans bacteremia.   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 ID consulted and it was recommended at that time to do biweekly urine Histoplasma antigen tests. All have been negative since then (most recent 11/19/18). Autopsy reports show right hilar granuloma with extensive calcification which was AFB negative and fungal stain positive for budding yeast.     Physical Exam    Vital signs:  Ht 1.6 m (5' 3\")   Wt 59 kg (130 lb)   LMP  (LMP Unknown)   BMI 23.03 kg/m   -virtual visit    GENERAL: alert and no distress, overall healthy appearing  NEURO: mentation intact and speech normal  PSYCH: affect normal/bright    Data  Laboratory data and imaging listed below " was reviewed prior to this encounter.     Microbiology:    Culture   Date Value Ref Range Status   02/02/2023 No Growth  Final   , Inflammatory Markers:   Recent Labs   Lab Test 07/02/24  1155   SED 38*   , Hematology Studies:    Recent Labs   Lab Test 07/02/24  1155 06/24/24  0912 06/18/24  1041 05/24/24  0958 11/28/23  1011 05/26/23  1019 04/21/21  1101 04/06/21  0519 04/05/21  0806 04/04/21  0547 04/03/21  1147 04/03/21  0713 04/02/21  0449 04/01/21  0451   WBC 6.3 8.6 5.7 4.6 5.5 5.8   < > 3.8* 3.7* 3.9*   < > 3.7* 4.0 5.3   ANEU  --   --   --   --   --   --   --  2.7 2.7 2.9  --  2.8 2.9 4.1   AEOS  --   --   --   --   --   --   --  0.2 0.1 0.2  --  0.2 0.2 0.1   HGB 12.7 13.0 12.4 14.2 14.6 13.8   < > 7.3* 7.6* 7.8*   < > 7.5* 6.9* 8.2*   MCV 88 88 87 88 89 88   < > 93 92 93   < > 91 93 90    220 215 161 154 153   < > 149* 147* 126*   < > 112* 97* 103*    < > = values in this interval not displayed.    , Metabolic Studies:   Recent Labs   Lab Test 07/02/24  1155 06/24/24  0912 06/18/24  1041 05/24/24  0958 11/28/23  1011    138 139 141 141   POTASSIUM 4.8 4.8 5.2 4.9 4.5   CHLORIDE 101 103 101 106 106   CO2 24 25 26 26 26   BUN 30.9* 36.0* 26.1* 26.6* 21.0   CR 1.31* 1.45* 1.45* 1.12* 1.06*   GFRESTIMATED 44* 39* 39* 54* 58*   , and Hepatic Studies:   Recent Labs   Lab Test 07/02/24  1155 06/24/24  0912 06/18/24  1041 05/24/24  0958 11/28/23  1011 05/26/23  1019   BILITOTAL 0.5 0.5 0.4 0.5 0.5 0.6   ALKPHOS 102 96 96 88 86 92   ALBUMIN 4.3 4.2 4.0 4.5 4.4 4.4   AST 13 15 18 18 18 17   ALT 9 10 13 17 12 8*                 Again, thank you for allowing me to participate in the care of your patient.      Sincerely,    Alba Gutierrez, DO
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
## 2024-09-13 ENCOUNTER — TELEPHONE (OUTPATIENT)
Dept: TRANSPLANT | Facility: CLINIC | Age: 69
End: 2024-09-13
Payer: COMMERCIAL

## 2024-09-13 ENCOUNTER — TRANSFERRED RECORDS (OUTPATIENT)
Dept: HEALTH INFORMATION MANAGEMENT | Facility: CLINIC | Age: 69
End: 2024-09-13
Payer: COMMERCIAL

## 2024-09-13 NOTE — TELEPHONE ENCOUNTER
Please call Sherrie.  Had labs drawn today at [facility] and was told her liver enzymes are high.    Sherrie has written these results down.     Now Sherrie is wondering what the results mean.

## 2024-09-16 ENCOUNTER — TELEPHONE (OUTPATIENT)
Dept: TRANSPLANT | Facility: CLINIC | Age: 69
End: 2024-09-16
Payer: COMMERCIAL

## 2024-09-16 NOTE — TELEPHONE ENCOUNTER
Call back to Sherrie.  [facility] won't give her her Friday labs , she had called to see if they could fax to us.  They suggested we call [phone].  Told her it's fine, we'll just wait to see tomorrow's.

## 2024-09-16 NOTE — TELEPHONE ENCOUNTER
General  Route to LPN    Reason for call: pt wants a call back to give info about labs    Call back needed? Yes    Return Call Needed  Same as documented in contacts section  When to return call?: Greater than one day: Route standard priority

## 2024-09-16 NOTE — TELEPHONE ENCOUNTER
Call from Sherrie.    She was seen in her oncology clinic last week, had labs drawn,, liver tests found to be elevated.  This clinic isn't in Care Everywhere. Sherrie read the results to me:  alk phos 126, alt 179, ast 123.  She will call them to have these faxed to our office.  Sherrie feels well,  hasn't missed meds, hasn't started any new meds, hasn't been ill, hasn't gotten recent vaccinations.  She will come to [facility] for repeat labs tomorrow morning.  There are no lab appt's available but I spoke to Jaimee in the lab and she approved Sherrie coming at 0715.

## 2024-09-17 ENCOUNTER — LAB (OUTPATIENT)
Dept: LAB | Facility: CLINIC | Age: 69
End: 2024-09-17
Payer: COMMERCIAL

## 2024-09-17 DIAGNOSIS — R50.9 FEVER, UNSPECIFIED FEVER CAUSE: ICD-10-CM

## 2024-09-17 DIAGNOSIS — Z94.4 LIVER REPLACED BY TRANSPLANT (H): Primary | ICD-10-CM

## 2024-09-17 DIAGNOSIS — R91.8 PULMONARY NODULES: ICD-10-CM

## 2024-09-17 DIAGNOSIS — Z94.4 HISTORY OF LIVER TRANSPLANT (H): ICD-10-CM

## 2024-09-17 DIAGNOSIS — R06.02 SHORTNESS OF BREATH: ICD-10-CM

## 2024-09-17 LAB
ALBUMIN SERPL BCG-MCNC: 4.2 G/DL (ref 3.5–5.2)
ALP SERPL-CCNC: 144 U/L (ref 40–150)
ALT SERPL W P-5'-P-CCNC: 155 U/L (ref 0–50)
ANION GAP SERPL CALCULATED.3IONS-SCNC: 9 MMOL/L (ref 7–15)
AST SERPL W P-5'-P-CCNC: 98 U/L (ref 0–45)
BILIRUB DIRECT SERPL-MCNC: <0.2 MG/DL (ref 0–0.3)
BILIRUB SERPL-MCNC: 0.4 MG/DL
BUN SERPL-MCNC: 22.2 MG/DL (ref 8–23)
CALCIUM SERPL-MCNC: 8.6 MG/DL (ref 8.8–10.4)
CHLORIDE SERPL-SCNC: 105 MMOL/L (ref 98–107)
CREAT SERPL-MCNC: 0.97 MG/DL (ref 0.51–0.95)
EGFRCR SERPLBLD CKD-EPI 2021: 64 ML/MIN/1.73M2
ERYTHROCYTE [DISTWIDTH] IN BLOOD BY AUTOMATED COUNT: 13.2 % (ref 10–15)
GLUCOSE SERPL-MCNC: 97 MG/DL (ref 70–99)
HCO3 SERPL-SCNC: 26 MMOL/L (ref 22–29)
HCT VFR BLD AUTO: 43.1 % (ref 35–47)
HGB BLD-MCNC: 14 G/DL (ref 11.7–15.7)
MCH RBC QN AUTO: 29.3 PG (ref 26.5–33)
MCHC RBC AUTO-ENTMCNC: 32.5 G/DL (ref 31.5–36.5)
MCV RBC AUTO: 90 FL (ref 78–100)
PLATELET # BLD AUTO: 148 10E3/UL (ref 150–450)
POTASSIUM SERPL-SCNC: 4.5 MMOL/L (ref 3.4–5.3)
PROT SERPL-MCNC: 7 G/DL (ref 6.4–8.3)
RBC # BLD AUTO: 4.78 10E6/UL (ref 3.8–5.2)
SODIUM SERPL-SCNC: 140 MMOL/L (ref 135–145)
TACROLIMUS BLD-MCNC: 4 UG/L (ref 5–15)
TME LAST DOSE: ABNORMAL H
TME LAST DOSE: ABNORMAL H
WBC # BLD AUTO: 4.5 10E3/UL (ref 4–11)

## 2024-09-17 PROCEDURE — 99000 SPECIMEN HANDLING OFFICE-LAB: CPT | Performed by: PATHOLOGY

## 2024-09-17 PROCEDURE — 87449 NOS EACH ORGANISM AG IA: CPT | Mod: 90 | Performed by: PATHOLOGY

## 2024-09-17 PROCEDURE — 36415 COLL VENOUS BLD VENIPUNCTURE: CPT | Performed by: PATHOLOGY

## 2024-09-17 PROCEDURE — 80053 COMPREHEN METABOLIC PANEL: CPT | Performed by: PATHOLOGY

## 2024-09-17 PROCEDURE — 85027 COMPLETE CBC AUTOMATED: CPT | Performed by: PATHOLOGY

## 2024-09-17 PROCEDURE — 80197 ASSAY OF TACROLIMUS: CPT | Performed by: INTERNAL MEDICINE

## 2024-09-17 PROCEDURE — 82248 BILIRUBIN DIRECT: CPT | Performed by: PATHOLOGY

## 2024-09-18 LAB
1,3 BETA GLUCAN SER-MCNC: <31 PG/ML
OBSERVATION IMP: NEGATIVE

## 2024-09-23 DIAGNOSIS — Z94.4 LIVER TRANSPLANTED (H): ICD-10-CM

## 2024-09-25 ENCOUNTER — TELEPHONE (OUTPATIENT)
Dept: TRANSPLANT | Facility: CLINIC | Age: 69
End: 2024-09-25

## 2024-09-25 ENCOUNTER — LAB (OUTPATIENT)
Dept: LAB | Facility: CLINIC | Age: 69
End: 2024-09-25
Payer: COMMERCIAL

## 2024-09-25 DIAGNOSIS — Z94.4 LIVER REPLACED BY TRANSPLANT (H): ICD-10-CM

## 2024-09-25 LAB
ALBUMIN SERPL BCG-MCNC: 4.2 G/DL (ref 3.5–5.2)
ALP SERPL-CCNC: 155 U/L (ref 40–150)
ALT SERPL W P-5'-P-CCNC: 189 U/L (ref 0–50)
ANION GAP SERPL CALCULATED.3IONS-SCNC: 10 MMOL/L (ref 7–15)
AST SERPL W P-5'-P-CCNC: 103 U/L (ref 0–45)
BILIRUB DIRECT SERPL-MCNC: <0.2 MG/DL (ref 0–0.3)
BILIRUB SERPL-MCNC: 0.3 MG/DL
BUN SERPL-MCNC: 20.4 MG/DL (ref 8–23)
CALCIUM SERPL-MCNC: 8.8 MG/DL (ref 8.8–10.4)
CHLORIDE SERPL-SCNC: 105 MMOL/L (ref 98–107)
CREAT SERPL-MCNC: 1.1 MG/DL (ref 0.51–0.95)
EGFRCR SERPLBLD CKD-EPI 2021: 55 ML/MIN/1.73M2
ERYTHROCYTE [DISTWIDTH] IN BLOOD BY AUTOMATED COUNT: 12.9 % (ref 10–15)
GLUCOSE SERPL-MCNC: 89 MG/DL (ref 70–99)
HCO3 SERPL-SCNC: 26 MMOL/L (ref 22–29)
HCT VFR BLD AUTO: 43 % (ref 35–47)
HGB BLD-MCNC: 13.8 G/DL (ref 11.7–15.7)
MCH RBC QN AUTO: 28.8 PG (ref 26.5–33)
MCHC RBC AUTO-ENTMCNC: 32.1 G/DL (ref 31.5–36.5)
MCV RBC AUTO: 90 FL (ref 78–100)
PLATELET # BLD AUTO: 172 10E3/UL (ref 150–450)
POTASSIUM SERPL-SCNC: 4.5 MMOL/L (ref 3.4–5.3)
PROT SERPL-MCNC: 6.8 G/DL (ref 6.4–8.3)
RBC # BLD AUTO: 4.79 10E6/UL (ref 3.8–5.2)
SODIUM SERPL-SCNC: 141 MMOL/L (ref 135–145)
TACROLIMUS BLD-MCNC: 2.5 UG/L (ref 5–15)
TME LAST DOSE: ABNORMAL H
TME LAST DOSE: ABNORMAL H
WBC # BLD AUTO: 4.7 10E3/UL (ref 4–11)

## 2024-09-25 PROCEDURE — 82248 BILIRUBIN DIRECT: CPT

## 2024-09-25 PROCEDURE — 80197 ASSAY OF TACROLIMUS: CPT

## 2024-09-25 PROCEDURE — 80053 COMPREHEN METABOLIC PANEL: CPT

## 2024-09-25 PROCEDURE — 85027 COMPLETE CBC AUTOMATED: CPT

## 2024-09-25 PROCEDURE — 36415 COLL VENOUS BLD VENIPUNCTURE: CPT

## 2024-09-26 ENCOUNTER — TELEPHONE (OUTPATIENT)
Dept: TRANSPLANT | Facility: CLINIC | Age: 69
End: 2024-09-26

## 2024-09-26 ENCOUNTER — TELEPHONE (OUTPATIENT)
Dept: TRANSPLANT | Facility: CLINIC | Age: 69
End: 2024-09-26
Payer: COMMERCIAL

## 2024-09-26 ENCOUNTER — OFFICE VISIT (OUTPATIENT)
Dept: FAMILY MEDICINE | Facility: CLINIC | Age: 69
End: 2024-09-26
Payer: COMMERCIAL

## 2024-09-26 VITALS
RESPIRATION RATE: 14 BRPM | SYSTOLIC BLOOD PRESSURE: 132 MMHG | HEART RATE: 70 BPM | DIASTOLIC BLOOD PRESSURE: 87 MMHG | BODY MASS INDEX: 23.74 KG/M2 | WEIGHT: 134 LBS | HEIGHT: 63 IN | OXYGEN SATURATION: 18 % | TEMPERATURE: 97.8 F

## 2024-09-26 DIAGNOSIS — Z00.00 HEALTH CARE MAINTENANCE: ICD-10-CM

## 2024-09-26 DIAGNOSIS — G47.00 PERSISTENT INSOMNIA: ICD-10-CM

## 2024-09-26 DIAGNOSIS — R79.89 ABNORMAL LIVER FUNCTION TESTS: Primary | ICD-10-CM

## 2024-09-26 DIAGNOSIS — M81.0 AGE-RELATED OSTEOPOROSIS WITHOUT CURRENT PATHOLOGICAL FRACTURE: ICD-10-CM

## 2024-09-26 DIAGNOSIS — C73 MALIGNANT NEOPLASM OF THYROID GLAND (H): Primary | ICD-10-CM

## 2024-09-26 DIAGNOSIS — Z94.4 HISTORY OF LIVER TRANSPLANT (H): ICD-10-CM

## 2024-09-26 DIAGNOSIS — Z23 NEED FOR VACCINATION: ICD-10-CM

## 2024-09-26 DIAGNOSIS — F51.01 PRIMARY INSOMNIA: ICD-10-CM

## 2024-09-26 DIAGNOSIS — G89.29 CHRONIC NEUROPATHIC PAIN: ICD-10-CM

## 2024-09-26 DIAGNOSIS — M79.2 CHRONIC NEUROPATHIC PAIN: ICD-10-CM

## 2024-09-26 DIAGNOSIS — D84.9 IMMUNOSUPPRESSED STATUS (H): Chronic | ICD-10-CM

## 2024-09-26 PROBLEM — D61.818 PANCYTOPENIA (H): Status: RESOLVED | Noted: 2024-05-22 | Resolved: 2024-09-26

## 2024-09-26 PROCEDURE — G0439 PPPS, SUBSEQ VISIT: HCPCS | Performed by: INTERNAL MEDICINE

## 2024-09-26 PROCEDURE — 90656 IIV3 VACC NO PRSV 0.5 ML IM: CPT | Performed by: INTERNAL MEDICINE

## 2024-09-26 PROCEDURE — G0008 ADMIN INFLUENZA VIRUS VAC: HCPCS | Performed by: INTERNAL MEDICINE

## 2024-09-26 PROCEDURE — 99213 OFFICE O/P EST LOW 20 MIN: CPT | Mod: 25 | Performed by: INTERNAL MEDICINE

## 2024-09-26 RX ORDER — GABAPENTIN 300 MG/1
600 CAPSULE ORAL AT BEDTIME
Qty: 180 CAPSULE | Refills: 3 | Status: SHIPPED | OUTPATIENT
Start: 2024-09-26

## 2024-09-26 RX ORDER — ZOLPIDEM TARTRATE 10 MG/1
TABLET ORAL
Qty: 90 TABLET | Refills: 1 | Status: SHIPPED | OUTPATIENT
Start: 2024-09-26

## 2024-09-26 RX ORDER — PRAMIPEXOLE DIHYDROCHLORIDE 0.25 MG/1
1 TABLET ORAL AT BEDTIME
Qty: 360 TABLET | Refills: 3 | Status: SHIPPED | OUTPATIENT
Start: 2024-09-26

## 2024-09-26 ASSESSMENT — ENCOUNTER SYMPTOMS: NEW SYMPTOMS OF CORONARY ARTERY DISEASE: 0

## 2024-09-26 NOTE — TELEPHONE ENCOUNTER
Please call Sherrie.  She would like to do a liver biopsy sooner than later.  `She is on broad for what ever Dr. Lilly suggests.'

## 2024-09-26 NOTE — PROGRESS NOTES
Preventive Care Visit  [facility]  Apollo Benitez MD, Internal Medicine  Sep 26, 2024      Assessment & Plan   Problem List Items Addressed This Visit          Endocrine    Malignant neoplasm of thyroid gland (H) - Primary     papillary carcinoma of thyroid stage 1, subtotal thyroidectomy 8/2021  - doing well  - following with endocrinology, Dr. Lucero  - maintained on levothyroxine - dosing through endo            Musculoskeletal and Integumentary    Age-related osteoporosis without current pathological fracture     - DEXA (10/2022): T scores ranging between -2.7 to -3.8   - previous traumatic humeral fracture after OLTx   - calcium/vitamin D  - steroid-sparing transplant regimen   - alendronate 70mg qweek (begun 8/2019 - spring, 2022) - she stopped due to her own dental concerns  - previously referred to endocrinology/bone disorder clinic given severity of osteoporosis - recommendations for Evenity anabolic therapy   - Sherrie opting not to receive treatment - aware of fracture risk            Immune    Immunosuppressed status (H24) (Chronic)       Other    History of liver transplant (H)     recurrent, metastatic cholangiocarcinoma; followed by Dr. 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  Tentatively planned for transplant liver biopsy   - stable tacrolimus trough levels; has been adherent with  therapy         Health care maintenance     - colonoscopy 7/2023   - mammo (last in 9/2020) -willing to schedule the mammogram tract   - DEXA (10/2022) - osteoporosis  - cognitive decline? - failed cognitive screening - notices she's forgetful   - potential complications related to longterm zolpidem use         Relevant Orders    REVIEW OF HEALTH MAINTENANCE PROTOCOL ORDERS (Completed)    Primary insomnia     Long-term use of zolpidem 10 mg daily  -Consensus to try to wean down dose in the future given age and sedation risk          Other Visit Diagnoses       Chronic neuropathic pain        Relevant Medications    gabapentin (NEURONTIN) 300 MG capsule    Persistent insomnia        Relevant Medications    pramipexole (MIRAPEX) 0.25 MG tablet    zolpidem (AMBIEN) 10 MG tablet    Need for vaccination        Relevant Orders    INFLUENZA VACCINE, SPLIT VIRUS, TRIVALENT,PF (FLUZONE\FLUARIX) (Completed)                Counseling  Appropriate preventive services were addressed with this patient via screening, questionnaire, or discussion as appropriate for fall prevention, nutrition, physical activity, Tobacco-use cessation, social engagement, weight loss and cognition.  Checklist reviewing preventive services available has been given to the patient.  Reviewed patient's diet, addressing concerns and/or questions.     FUTURE APPOINTMENTS:       - Follow-up visit in 12 months      Shell Balderas is a 69 year old, presenting for the following: Presents for follow-up.  Recent discovery of increased liver enzymes above her baseline -plans for liver biopsy in near future.  She states she feels well.  No associated pains no fevers no pruritus.  Has been adherent with her charcoal medicine is not missed any doses.  Prior issues related to her Lyme's disease seem to be resolved.  Did have some photosensitivity rash from her doxycycline.  Tolerating his open M though would be open to trying to wean down on the dosing in the  future.  Medicare Visit        9/26/2024    10:52 AM   Additional Questions   Roomed by Yasemin NARANJO       Health Care Directive  Patient has a Health Care Directive on file  Advance care planning document is on file and is current.    HPI        9/26/2024   General Health   How would you rate your overall physical health? Good   Feel stress (tense, anxious, or unable to sleep) Not at all            9/26/2024   Nutrition   Diet: Regular (no restrictions)            9/26/2024   Exercise   Days per week of moderate/strenous exercise 7 days   Average minutes spent exercising at this level 50 min            9/26/2024   Social Factors   Frequency of gathering with friends or relatives Three times a week   Worry food won't last until get money to buy more No   Food not last or not have enough money for food? No   Do you have housing? (Housing is defined as stable permanent housing and does not include staying ouside in a car, in a tent, in an abandoned building, in an overnight shelter, or couch-surfing.) Yes   Are you worried about losing your housing? No   Lack of transportation? No   Unable to get utilities (heat,electricity)? No            9/26/2024   Fall Risk   Fallen 2 or more times in the past year? No   Trouble with walking or balance? No             9/26/2024   Activities of Daily Living- Home Safety   Needs help with the following daily activites None of the above   Safety concerns in the home None of the above            9/26/2024   Dental   Dentist two times every year? Yes            9/26/2024   Hearing Screening   Hearing concerns? None of the above            9/26/2024   Driving Risk Screening   Patient/family members have concerns about driving No            9/26/2024   General Alertness/Fatigue Screening   Have you been more tired than usual lately? No            9/26/2024   Urinary Incontinence Screening   Bothered by leaking urine in past 6 months No               Today's PHQ-2 Score:       9/26/2024     10:59 AM   PHQ-2 ( 1999 [employer])   Q1: Little interest or pleasure in doing things 0   Q2: Feeling down, depressed or hopeless 0   PHQ-2 Score 0   Q1: Little interest or pleasure in doing things Not at all   Q2: Feeling down, depressed or hopeless Not at all   PHQ-2 Score 0           9/26/2024   Substance Use   Alcohol more than 3/day or more than 7/wk No   Do you have a current opioid prescription? No   How severe/bad is pain from 1 to 10? 0/10 (No Pain)   Do you use any other substances recreationally? No        Social History     Tobacco Use    Smoking status: Never     Passive exposure: Never    Smokeless tobacco: Never   Vaping Use    Vaping status: Never Used   Substance Use Topics    Alcohol use: Yes     Comment: occ /rare    Drug use: No          Mammogram Screening - Mammogram every 1-2 years updated in Health Maintenance based on mutual decision making      History of abnormal Pap smear: No - age 65 or older with adequate negative prior screening test results (3 consecutive negative cytology results, 2 consecutive negative cotesting results, or 2 consecutive negative HrHPV test results within 10 years, with the most recent test occurring within the recommended screening interval for the test used)        2/28/2018    12:00 AM   PAP / HPV   PAP-ABSTRACT See Scanned Document           This result is from an external source.     ASCVD Risk   The 10-year ASCVD risk score (Daisha DK, et al., 2019) is: 8.6%    Values used to calculate the score:      Age: 69 years      Sex: Female      Is Non- : No      Diabetic: No      Tobacco smoker: No      Systolic Blood Pressure: 132 mmHg      Is BP treated: Yes      HDL Cholesterol: 55 mg/dL      Total Cholesterol: 148 mg/dL          Reviewed and updated as needed this visit by Provider                    Current providers sharing in care for this patient include:  Patient Care Team:  Apollo Benitez MD as PCP - General  "(Nephrology)  Apollo Benitez MD as PCP - Internal Medicine (Nephrology)  Baltazar Solares APRN CNP as Referring Physician (Nurse Practitioner)  Jorge Langford MD as MD (Hematology & Oncology)  Rafy Chisholm MD as MD (Gastroenterology)  Jesus Thomas MD as MD (Internal Medicine)  Bradly Lilly MD as Referring Physician (Gastroenterology)  Eldon Goetz MD as MD (Dermatology)  Nitin Goodson MD as MD (Family Practice)  Darcy Blanco MD as MD (Urology)  Apollo Benitez MD as Assigned PCP  Richelle Abraham PA as Physician Assistant (Urology)  Josefina Lloyd MD as MD (Endocrinology, Diabetes, and Metabolism)  Bradly Lilly MD as Assigned Surgical Provider  Oliverio Miranda MD as MD (Urology)  Alba Gutierrez DO as Assigned Infectious Disease Provider    The following health maintenance items are reviewed in Epic and correct as of today:  Health Maintenance   Topic Date Due    ZOSTER IMMUNIZATION (1 of 2) Never done    RSV VACCINE (1 - Risk 60-74 years 1-dose series) Never done    Pneumococcal Vaccine: 65+ Years (3 of 3 - PPSV23 or PCV20) 04/05/2019    MAMMO SCREENING  09/17/2022    INFLUENZA VACCINE (1) 09/01/2024    COVID-19 Vaccine (6 - 2024-25 season) 09/01/2024    ANNUAL REVIEW OF HM ORDERS  09/20/2024    MEDICARE ANNUAL WELLNESS VISIT  09/20/2024    TSH W/FREE T4 REFLEX  05/24/2025    BMP  09/25/2025    FALL RISK ASSESSMENT  09/26/2025    COLORECTAL CANCER SCREENING  07/25/2026    GLUCOSE  09/25/2027    ADVANCE CARE PLANNING  09/20/2028    LIPID  11/28/2028    DTAP/TDAP/TD IMMUNIZATION (2 - Tdap) 03/10/2030    DEXA  10/28/2037    HEPATITIS C SCREENING  Completed    PHQ-2 (once per calendar year)  Completed    HPV IMMUNIZATION  Aged Out    MENINGITIS IMMUNIZATION  Aged Out    RSV MONOCLONAL ANTIBODY  Aged Out    PAP  Discontinued            Objective    Exam  /87   Pulse 70   Temp 97.8  F (36.6  C)   Resp 14   Ht 1.6 m (5' 3\")   Wt 60.8 kg " "(134 lb)   LMP  (LMP Unknown)   SpO2 (!) 18%   BMI 23.74 kg/m     Estimated body mass index is 23.74 kg/m  as calculated from the following:    Height as of this encounter: 1.6 m (5' 3\").    Weight as of this encounter: 60.8 kg (134 lb).    Physical Exam  GENERAL: alert and no distress  NECK: no adenopathy, no asymmetry, masses, or scars  RESP: lungs clear to auscultation - no rales, rhonchi or wheezes  CV: regular rate and rhythm, normal S1 S2, no S3 or S4, no murmur, click or rub, no peripheral edema  ABDOMEN: soft, nontender, no hepatosplenomegaly, no masses and bowel sounds normal  MS: no gross musculoskeletal defects noted, no edema        9/26/2024   Mini Cog   Mini-Cog Not Completed (choose reason) Patient declines          Patient declines, there are NO concerns for cognitive deficits.           Signed Electronically by: Apollo Benitez MD    "

## 2024-09-26 NOTE — ASSESSMENT & PLAN NOTE
recurrent, metastatic cholangiocarcinoma; followed by Dr. 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  Tentatively planned for transplant liver biopsy   - stable tacrolimus trough levels; has been adherent with therapy

## 2024-09-26 NOTE — ASSESSMENT & PLAN NOTE
Long-term use of zolpidem 10 mg daily  -Consensus to try to wean down dose in the future given age and sedation risk

## 2024-09-26 NOTE — ASSESSMENT & PLAN NOTE
- colonoscopy 7/2023   - mammo (last in 9/2020) -willing to schedule the mammogram tract   - DEXA (10/2022) - osteoporosis  - cognitive decline? - failed cognitive screening - notices she's forgetful   - potential complications related to longterm zolpidem use

## 2024-09-26 NOTE — TELEPHONE ENCOUNTER
"Dr. Lilly requesting \"start w/ MRCP\".  Order placed.   Scheduled for 9/28, 12:30 check in . Sherrie aware. Haven't heard about liver biopsy yet.   "

## 2024-09-28 ENCOUNTER — ANCILLARY PROCEDURE (OUTPATIENT)
Dept: MRI IMAGING | Facility: CLINIC | Age: 69
End: 2024-09-28
Attending: INTERNAL MEDICINE
Payer: COMMERCIAL

## 2024-09-28 DIAGNOSIS — R79.89 ABNORMAL LIVER FUNCTION TESTS: ICD-10-CM

## 2024-09-28 PROCEDURE — A9585 GADOBUTROL INJECTION: HCPCS | Performed by: RADIOLOGY

## 2024-09-28 PROCEDURE — 74183 MRI ABD W/O CNTR FLWD CNTR: CPT | Mod: GC | Performed by: RADIOLOGY

## 2024-09-28 RX ORDER — GADOBUTROL 604.72 MG/ML
7.5 INJECTION INTRAVENOUS ONCE
Status: COMPLETED | OUTPATIENT
Start: 2024-09-28 | End: 2024-09-28

## 2024-09-28 RX ADMIN — GADOBUTROL 6 ML: 604.72 INJECTION INTRAVENOUS at 12:30

## 2024-09-30 ENCOUNTER — APPOINTMENT (OUTPATIENT)
Dept: MEDSURG UNIT | Facility: CLINIC | Age: 69
End: 2024-09-30
Attending: STUDENT IN AN ORGANIZED HEALTH CARE EDUCATION/TRAINING PROGRAM
Payer: MEDICARE

## 2024-09-30 ENCOUNTER — TELEPHONE (OUTPATIENT)
Dept: TRANSPLANT | Facility: CLINIC | Age: 69
End: 2024-09-30
Payer: COMMERCIAL

## 2024-09-30 ENCOUNTER — HOSPITAL ENCOUNTER (OUTPATIENT)
Facility: CLINIC | Age: 69
Discharge: HOME | End: 2024-09-30
Attending: STUDENT IN AN ORGANIZED HEALTH CARE EDUCATION/TRAINING PROGRAM | Admitting: STUDENT IN AN ORGANIZED HEALTH CARE EDUCATION/TRAINING PROGRAM
Payer: MEDICARE

## 2024-09-30 ENCOUNTER — APPOINTMENT (OUTPATIENT)
Dept: INTERVENTIONAL RADIOLOGY/VASCULAR | Facility: CLINIC | Age: 69
End: 2024-09-30
Attending: INTERNAL MEDICINE
Payer: MEDICARE

## 2024-09-30 VITALS
DIASTOLIC BLOOD PRESSURE: 72 MMHG | OXYGEN SATURATION: 99 % | HEART RATE: 69 BPM | BODY MASS INDEX: 23.52 KG/M2 | TEMPERATURE: 97.9 F | RESPIRATION RATE: 16 BRPM | WEIGHT: 132.8 LBS | SYSTOLIC BLOOD PRESSURE: 118 MMHG

## 2024-09-30 DIAGNOSIS — R79.89 ABNORMAL LIVER FUNCTION TESTS: ICD-10-CM

## 2024-09-30 LAB — INR PPP: 1.14 (ref 0.85–1.15)

## 2024-09-30 PROCEDURE — 258N000003 HC RX IP 258 OP 636: Performed by: NURSE PRACTITIONER

## 2024-09-30 PROCEDURE — 36415 COLL VENOUS BLD VENIPUNCTURE: CPT | Performed by: NURSE PRACTITIONER

## 2024-09-30 PROCEDURE — 88313 SPECIAL STAINS GROUP 2: CPT | Mod: TC | Performed by: INTERNAL MEDICINE

## 2024-09-30 PROCEDURE — 99152 MOD SED SAME PHYS/QHP 5/>YRS: CPT | Mod: GC | Performed by: STUDENT IN AN ORGANIZED HEALTH CARE EDUCATION/TRAINING PROGRAM

## 2024-09-30 PROCEDURE — 47000 NEEDLE BIOPSY OF LIVER PERQ: CPT | Mod: GC | Performed by: STUDENT IN AN ORGANIZED HEALTH CARE EDUCATION/TRAINING PROGRAM

## 2024-09-30 PROCEDURE — 250N000009 HC RX 250: Performed by: INTERNAL MEDICINE

## 2024-09-30 PROCEDURE — 250N000011 HC RX IP 250 OP 636: Performed by: INTERNAL MEDICINE

## 2024-09-30 PROCEDURE — 88313 SPECIAL STAINS GROUP 2: CPT | Mod: 26 | Performed by: PATHOLOGY

## 2024-09-30 PROCEDURE — 85610 PROTHROMBIN TIME: CPT | Performed by: NURSE PRACTITIONER

## 2024-09-30 PROCEDURE — 88307 TISSUE EXAM BY PATHOLOGIST: CPT | Mod: 26 | Performed by: PATHOLOGY

## 2024-09-30 PROCEDURE — 76942 ECHO GUIDE FOR BIOPSY: CPT | Mod: 26 | Performed by: STUDENT IN AN ORGANIZED HEALTH CARE EDUCATION/TRAINING PROGRAM

## 2024-09-30 PROCEDURE — 999N000133 HC STATISTIC PP CARE STAGE 2

## 2024-09-30 PROCEDURE — 99152 MOD SED SAME PHYS/QHP 5/>YRS: CPT

## 2024-09-30 PROCEDURE — 999N000142 HC STATISTIC PROCEDURE PREP ONLY

## 2024-09-30 RX ORDER — FENTANYL CITRATE 50 UG/ML
25-50 INJECTION, SOLUTION INTRAMUSCULAR; INTRAVENOUS EVERY 5 MIN PRN
Status: DISCONTINUED | OUTPATIENT
Start: 2024-09-30 | End: 2024-09-30 | Stop reason: HOSPADM

## 2024-09-30 RX ORDER — NALOXONE HYDROCHLORIDE 0.4 MG/ML
0.4 INJECTION, SOLUTION INTRAMUSCULAR; INTRAVENOUS; SUBCUTANEOUS
Status: DISCONTINUED | OUTPATIENT
Start: 2024-09-30 | End: 2024-09-30 | Stop reason: HOSPADM

## 2024-09-30 RX ORDER — LIDOCAINE 40 MG/G
CREAM TOPICAL
Status: DISCONTINUED | OUTPATIENT
Start: 2024-09-30 | End: 2024-09-30 | Stop reason: HOSPADM

## 2024-09-30 RX ORDER — NALOXONE HYDROCHLORIDE 0.4 MG/ML
0.2 INJECTION, SOLUTION INTRAMUSCULAR; INTRAVENOUS; SUBCUTANEOUS
Status: DISCONTINUED | OUTPATIENT
Start: 2024-09-30 | End: 2024-09-30 | Stop reason: HOSPADM

## 2024-09-30 RX ORDER — FLUMAZENIL 0.1 MG/ML
0.2 INJECTION, SOLUTION INTRAVENOUS
Status: DISCONTINUED | OUTPATIENT
Start: 2024-09-30 | End: 2024-09-30 | Stop reason: HOSPADM

## 2024-09-30 RX ORDER — SODIUM CHLORIDE 9 MG/ML
INJECTION, SOLUTION INTRAVENOUS CONTINUOUS
Status: DISCONTINUED | OUTPATIENT
Start: 2024-09-30 | End: 2024-09-30 | Stop reason: HOSPADM

## 2024-09-30 RX ADMIN — FENTANYL CITRATE 25 MCG: 50 INJECTION, SOLUTION INTRAMUSCULAR; INTRAVENOUS at 13:23

## 2024-09-30 RX ADMIN — FENTANYL CITRATE 25 MCG: 50 INJECTION, SOLUTION INTRAMUSCULAR; INTRAVENOUS at 13:15

## 2024-09-30 RX ADMIN — LIDOCAINE HYDROCHLORIDE 7 ML: 10 INJECTION, SOLUTION EPIDURAL; INFILTRATION; INTRACAUDAL; PERINEURAL at 13:35

## 2024-09-30 RX ADMIN — MIDAZOLAM 0.5 MG: 1 INJECTION INTRAMUSCULAR; INTRAVENOUS at 13:14

## 2024-09-30 RX ADMIN — MIDAZOLAM 0.5 MG: 1 INJECTION INTRAMUSCULAR; INTRAVENOUS at 13:23

## 2024-09-30 RX ADMIN — SODIUM CHLORIDE: 9 INJECTION, SOLUTION INTRAVENOUS at 12:08

## 2024-09-30 ASSESSMENT — ACTIVITIES OF DAILY LIVING (ADL)
ADLS_ACUITY_SCORE: 38

## 2024-09-30 NOTE — TELEPHONE ENCOUNTER
Call from IR, they are ready for Sherrie, she can head to the [facility] waiting room.  She is NPO.  They know she lives in [place].  She is on her way.

## 2024-09-30 NOTE — DISCHARGE INSTRUCTIONS
[facility]    Interventional Radiology  Patient Instructions Following Liver Biopsy    AFTER YOU GO HOME  If you were given sedation, for the first 24 hours: we recommend that an adult stay with you, DO NOT drive or operate machinery at home or at work, DO NOT smoke, DO NOT make any important or legal decisions.  DO NOT drink alcoholic beverages the day of your procedure  Drink plenty of fluids   Resume your regular diet, unless otherwise instructed by your Primary Physician  Relax and take it easy for 48 hours  DO NOT do any strenuous exercise or lifting (> 10 lbs) for at least 3 days following your procedure  Keep the dressing dry and in place for 24 hours. Replace with Band aid for 2 days.  Never leave a wet dressing in place.  Do not take a shower for at least 12 hours following your procedure. No tub bath, hot tub, or swimming for 5 days.  There should be minimum drainage from the biopsy site    CALL THE PHYSICIAN IF:  You start bleeding from the procedure site.  If you do start to bleed from that site, hold pressure on the site for a minimum of 10 minutes.  Your physician will tell you if you need to return to the hospital  You develop nausea or vomiting  You have excessive swelling, redness, or tenderness at the site  You have drainage that looks like it is infected.  You experience severe pain  You develop hives or a rash or unexplained itching  You develop a temperature of 101 degrees F or greater    ADDITIONAL INSTRUCTIONS: none    [facility] INTERVENTIONAL RADIOLOGY DEPARTMENT  Procedure Physician:     Dr. Leo            Date of procedure: September 30, 2024  Telephone Numbers: [phone]      Monday-Friday 7:30 am to 4:00 pm  [phone]    After 4:00 pm Monday-Friday, Weekends & Holidays.   Ask for the Interventional Radiologist on call.  Someone is on call 24 hrs/day  [facility] toll free number: [phone] Monday-Friday 8:00 am to 4:30 pm  [facility] Emergency Dept: [phone]

## 2024-09-30 NOTE — PROGRESS NOTES
Pt tolerated recovery without complication. Discharge instructions reviewed, copy given to pt. Pt tolerated oral intake and ambulation. Voided. OFELIA cain'chriss. Pt discharged home accompanied by .

## 2024-09-30 NOTE — TELEPHONE ENCOUNTER
"Per Dr. Lilly needs liver biopsy.  I called IR to find out why this hasn't been scheduled, I place order last Thursday, asked for stat biopsy.   will check to see if can be \"squeezed in today\" or tomorrow, let me know.  Sherrie is NPO, awaiting my call.  IR will call me w/ plan.   "

## 2024-09-30 NOTE — PROCEDURES
[facility]    Procedure: IR Procedure Note    Date/Time: 9/30/2024 1:41 PM    Performed by: Femi Velazquez MD  Authorized by: Klever Leo MD  IR Fellow Physician: N/A  Radiology Resident Physician: Femi Velazquez MD    Pre Procedure Diagnosis: Prior metastatic cholangiocarcinoma  Post Procedure Diagnosis: Same    UNIVERSAL PROTOCOL   Site Marked: Yes  Prior Images Obtained and Reviewed:  Yes  Required items: Required blood products, implants, devices and special equipment available    Patient identity confirmed:  Verbally with patient and arm band  Patient was reevaluated immediately before administering moderate or deep sedation or anesthesia  Confirmation Checklist:  Patient's identity using two indicators  Time out: Immediately prior to the procedure a time out was called    Universal Protocol: the Joint Commission Universal Protocol was followed    Preparation: Patient was prepped and draped in usual sterile fashion    ESBL (mL):  1     ANESTHESIA    Local Anesthetic:  Lidocaine 1% with epinephrine  Anesthetic Total (mL):  7      SEDATION  Patient Sedated: Yes    Sedation:  Fentanyl and midazolam  Vital signs: Vital signs monitored during sedation    Fluoroscopy Time: 0 minute(s)  Findings: No significant findings.    Specimens: fluid and/or tissue for laboratory analysis    Procedural Complications: None    Condition: Stable    Plan: Bedrest 2 hours. Then return to pre-procedure activities.      PROCEDURE  Describe Procedure: A total of four 18-gauge targeted core needle liver biopsy samples were obtained and sent to laboratory for analysis.  Patient Tolerance:  Patient tolerated the procedure well with no immediate complications  Length of time physician/provider present for 1:1 monitoring during sedation:  0-22 min

## 2024-09-30 NOTE — IR NOTE
Patient Name: Shrerie Lala  Medical Record Number: [id number]  Today's Date: 9/30/2024    Procedure: Liver biopsy  Proceduralist: Dr Marcus Mendosa  Pathology present: No, cores sent to pathology lab    Procedure Start: 1321  Procedure end: 1333  Sedation medications administered: 50 mcg fentanyl, 1 mg versed    Report given to: adrian Sumner 2A  : n/a    Other Notes: Pt arrived to IR room 7 from . Consent reviewed. Pt denies any questions or concerns regarding procedure. Pt positioned supine and monitored per protocol. Pt tolerated procedure without any noted complications. Pt transferred back to 2A.

## 2024-09-30 NOTE — PRE-PROCEDURE
GENERAL PRE-PROCEDURE:   Procedure:  Liver biopsy  Date/Time:  9/30/2024 12:55 PM    Written consent obtained?: Yes    Risks and benefits: Risks, benefits and alternatives were discussed    Consent given by:  Patient  Patient states understanding of procedure being performed: Yes    Patient's understanding of procedure matches consent: Yes    Procedure consent matches procedure scheduled: Yes    Expected level of sedation:  Moderate  Appropriately NPO:  Yes  Mallampati  :  Grade 2- soft palate, base of uvula, tonsillar pillars, and portion of posterior pharyngeal wall visible  Lungs:  Lungs clear with good breath sounds bilaterally  Heart:  Normal heart sounds and rate  History & Physical reviewed:  History and physical reviewed and no updates needed  Statement of review:  I have reviewed the lab findings, diagnostic data, medications, and the plan for sedation

## 2024-10-01 DIAGNOSIS — R79.89 ELEVATED LFTS: ICD-10-CM

## 2024-10-01 DIAGNOSIS — T86.41 LIVER TRANSPLANT REJECTION (H): Primary | ICD-10-CM

## 2024-10-01 LAB
PATH REPORT.COMMENTS IMP SPEC: NORMAL
PATH REPORT.FINAL DX SPEC: NORMAL
PATH REPORT.GROSS SPEC: NORMAL
PATH REPORT.MICROSCOPIC SPEC OTHER STN: NORMAL
PATH REPORT.RELEVANT HX SPEC: NORMAL
PHOTO IMAGE: NORMAL

## 2024-10-01 RX ORDER — MEPERIDINE HYDROCHLORIDE 25 MG/ML
25 INJECTION INTRAMUSCULAR; INTRAVENOUS; SUBCUTANEOUS EVERY 30 MIN PRN
Status: CANCELLED | OUTPATIENT
Start: 2024-10-02

## 2024-10-01 RX ORDER — HEPARIN SODIUM,PORCINE 10 UNIT/ML
5-20 VIAL (ML) INTRAVENOUS DAILY PRN
Status: CANCELLED | OUTPATIENT
Start: 2024-10-02

## 2024-10-01 RX ORDER — ALBUTEROL SULFATE 0.83 MG/ML
2.5 SOLUTION RESPIRATORY (INHALATION)
Status: CANCELLED | OUTPATIENT
Start: 2024-10-02

## 2024-10-01 RX ORDER — ALBUTEROL SULFATE 90 UG/1
1-2 AEROSOL, METERED RESPIRATORY (INHALATION)
Status: CANCELLED
Start: 2024-10-02

## 2024-10-01 RX ORDER — TACROLIMUS 1 MG/1
2 CAPSULE ORAL 2 TIMES DAILY
Qty: 360 CAPSULE | Refills: 3 | Status: SHIPPED | OUTPATIENT
Start: 2024-10-01 | End: 2024-10-04

## 2024-10-01 RX ORDER — METHYLPREDNISOLONE SODIUM SUCCINATE 125 MG/2ML
125 INJECTION, POWDER, LYOPHILIZED, FOR SOLUTION INTRAMUSCULAR; INTRAVENOUS
Status: CANCELLED
Start: 2024-10-02

## 2024-10-01 RX ORDER — HEPARIN SODIUM (PORCINE) LOCK FLUSH IV SOLN 100 UNIT/ML 100 UNIT/ML
5 SOLUTION INTRAVENOUS
Status: CANCELLED | OUTPATIENT
Start: 2024-10-02

## 2024-10-01 RX ORDER — EPINEPHRINE 1 MG/ML
0.3 INJECTION, SOLUTION, CONCENTRATE INTRAVENOUS EVERY 5 MIN PRN
Status: CANCELLED | OUTPATIENT
Start: 2024-10-02

## 2024-10-01 RX ORDER — DIPHENHYDRAMINE HYDROCHLORIDE 50 MG/ML
50 INJECTION INTRAMUSCULAR; INTRAVENOUS
Status: CANCELLED
Start: 2024-10-02

## 2024-10-01 NOTE — LETTER
OUTPATIENT LABORATORY TEST ORDER   [facility]  Fax# [phone]  Additional lab order     Patient Name: Sherrie Lala      YOB: 1955        [facility] MR# [if applicable]: [id number]   Date & Time: October 2, 2024  Expiration Date: 1 year after date issued         Diagnosis:      Liver Transplant (ICD-10 Z94.4)   Aftercare following organ transplant (ICD-10 Z48.288)   Long term use of medications (ICD-10 Z79.899)      We ask your assistance in obtaining the following laboratory tests, which are part of our routine surveillance program for Solid Organ Transplant patients.      Please fax each result to [phone], same day as resulted/available    Critical lab results page [phone]         This week or next and as needed       Tacrolimus drug level - 12 hour trough, please document time of last dose     .

## 2024-10-01 NOTE — TELEPHONE ENCOUNTER
Per Dr. Lilly, needs IV solumedrol due to late rej.  She is leaving to go out of town on an early flight Friday morning so he has ok'd just 2 days of 500 mg IV solumedrol (10/2 and 10/3).  She will also need a steroid taper as follows:      a. Day #3 50mg po QID (starting 10/4)  b. Day #4 40mg po QID (10/5)  c. Day #5 40mg po TID (10/6)  d. Day #6 40mg BID (10/7)  e. Day #7 40 mg po daily (10/8)  f. Day #8  20mg po daily (10/9).    Therapy plan in place, [facility] unable to give me a time for tomorrow.  They will call Sherrie in the morning to give her a time for first dose of IV steroids.  I read the steroid taper to Sherrie, she'd like this sent to [facility] in [place].  I asked Sherrie to get a tac level done on Thursday if timing is correct.   Sherrie was able to repeat instructions.

## 2024-10-01 NOTE — TELEPHONE ENCOUNTER
Liver bx:    LIVER ALLOGRAFT, NEEDLE BIOPSY:  Mild rejection primarily hepatitic (grade 1 /40 with also mild bile duct injury but no ductopenia or cholestasis; fibrosis mild, stage 1 /4, periportal  (See Comment)     Message to Dr. Lilly.  Will run tac level 6-8 for now, Sherrie is taking 1 mg po bid - will increase to 2 mg po bid, Sherrie will recheck labs when she returns.       <--- Click to Launch ICDx for PreOp, PostOp and Procedure

## 2024-10-02 ENCOUNTER — TELEPHONE (OUTPATIENT)
Dept: TRANSPLANT | Facility: CLINIC | Age: 69
End: 2024-10-02

## 2024-10-02 ENCOUNTER — INFUSION THERAPY VISIT (OUTPATIENT)
Dept: INFUSION THERAPY | Facility: CLINIC | Age: 69
End: 2024-10-02
Attending: INTERNAL MEDICINE
Payer: MEDICARE

## 2024-10-02 VITALS
HEART RATE: 74 BPM | RESPIRATION RATE: 16 BRPM | DIASTOLIC BLOOD PRESSURE: 96 MMHG | SYSTOLIC BLOOD PRESSURE: 149 MMHG | OXYGEN SATURATION: 99 % | TEMPERATURE: 97.4 F

## 2024-10-02 DIAGNOSIS — Z94.4 LIVER REPLACED BY TRANSPLANT (H): Primary | ICD-10-CM

## 2024-10-02 DIAGNOSIS — Z48.288 ENCOUNTER FOR AFTERCARE FOLLOWING MULTIPLE ORGAN TRANSPLANT: ICD-10-CM

## 2024-10-02 DIAGNOSIS — T86.41 LIVER TRANSPLANT REJECTION (H): Primary | ICD-10-CM

## 2024-10-02 DIAGNOSIS — Z79.899 ENCOUNTER FOR LONG-TERM (CURRENT) USE OF HIGH-RISK MEDICATION: ICD-10-CM

## 2024-10-02 LAB
ALBUMIN SERPL BCG-MCNC: 4.3 G/DL (ref 3.5–5.2)
ALP SERPL-CCNC: 277 U/L (ref 40–150)
ALT SERPL W P-5'-P-CCNC: 374 U/L (ref 0–50)
ANION GAP SERPL CALCULATED.3IONS-SCNC: 11 MMOL/L (ref 7–15)
AST SERPL W P-5'-P-CCNC: 189 U/L (ref 0–45)
BASOPHILS # BLD AUTO: 0 10E3/UL (ref 0–0.2)
BASOPHILS NFR BLD AUTO: 1 %
BILIRUB DIRECT SERPL-MCNC: 0.88 MG/DL (ref 0–0.3)
BILIRUB SERPL-MCNC: 1.5 MG/DL
BUN SERPL-MCNC: 20.3 MG/DL (ref 8–23)
CALCIUM SERPL-MCNC: 8.9 MG/DL (ref 8.8–10.4)
CHLORIDE SERPL-SCNC: 106 MMOL/L (ref 98–107)
CREAT SERPL-MCNC: 0.94 MG/DL (ref 0.51–0.95)
EGFRCR SERPLBLD CKD-EPI 2021: 66 ML/MIN/1.73M2
EOSINOPHIL # BLD AUTO: 0.8 10E3/UL (ref 0–0.7)
EOSINOPHIL NFR BLD AUTO: 19 %
ERYTHROCYTE [DISTWIDTH] IN BLOOD BY AUTOMATED COUNT: 13.1 % (ref 10–15)
GLUCOSE SERPL-MCNC: 92 MG/DL (ref 70–99)
HCO3 SERPL-SCNC: 24 MMOL/L (ref 22–29)
HCT VFR BLD AUTO: 42.1 % (ref 35–47)
HGB BLD-MCNC: 13.9 G/DL (ref 11.7–15.7)
IMM GRANULOCYTES # BLD: 0 10E3/UL
IMM GRANULOCYTES NFR BLD: 0 %
LYMPHOCYTES # BLD AUTO: 0.9 10E3/UL (ref 0.8–5.3)
LYMPHOCYTES NFR BLD AUTO: 19 %
MCH RBC QN AUTO: 29.8 PG (ref 26.5–33)
MCHC RBC AUTO-ENTMCNC: 33 G/DL (ref 31.5–36.5)
MCV RBC AUTO: 90 FL (ref 78–100)
MONOCYTES # BLD AUTO: 0.3 10E3/UL (ref 0–1.3)
MONOCYTES NFR BLD AUTO: 7 %
MYCOPHENOLATE SERPL LC/MS/MS-MCNC: <0.25 MG/L (ref 1–3.5)
MYCOPHENOLATE-G SERPL LC/MS/MS-MCNC: <6.5 MG/L (ref 30–95)
NEUTROPHILS # BLD AUTO: 2.4 10E3/UL (ref 1.6–8.3)
NEUTROPHILS NFR BLD AUTO: 54 %
NRBC # BLD AUTO: 0 10E3/UL
NRBC BLD AUTO-RTO: 0 /100
PLATELET # BLD AUTO: 166 10E3/UL (ref 150–450)
POTASSIUM SERPL-SCNC: 3.9 MMOL/L (ref 3.4–5.3)
PROT SERPL-MCNC: 7.4 G/DL (ref 6.4–8.3)
RBC # BLD AUTO: 4.66 10E6/UL (ref 3.8–5.2)
SODIUM SERPL-SCNC: 141 MMOL/L (ref 135–145)
TACROLIMUS BLD-MCNC: 4.3 UG/L (ref 5–15)
TME LAST DOSE: ABNORMAL H
WBC # BLD AUTO: 4.4 10E3/UL (ref 4–11)

## 2024-10-02 PROCEDURE — 258N000003 HC RX IP 258 OP 636: Performed by: INTERNAL MEDICINE

## 2024-10-02 PROCEDURE — 82248 BILIRUBIN DIRECT: CPT | Performed by: INTERNAL MEDICINE

## 2024-10-02 PROCEDURE — 250N000011 HC RX IP 250 OP 636: Performed by: INTERNAL MEDICINE

## 2024-10-02 PROCEDURE — 96365 THER/PROPH/DIAG IV INF INIT: CPT

## 2024-10-02 PROCEDURE — 36415 COLL VENOUS BLD VENIPUNCTURE: CPT | Performed by: INTERNAL MEDICINE

## 2024-10-02 PROCEDURE — 80180 DRUG SCRN QUAN MYCOPHENOLATE: CPT | Performed by: INTERNAL MEDICINE

## 2024-10-02 PROCEDURE — 80197 ASSAY OF TACROLIMUS: CPT | Performed by: INTERNAL MEDICINE

## 2024-10-02 PROCEDURE — 85004 AUTOMATED DIFF WBC COUNT: CPT | Performed by: INTERNAL MEDICINE

## 2024-10-02 RX ORDER — HEPARIN SODIUM,PORCINE 10 UNIT/ML
5-20 VIAL (ML) INTRAVENOUS DAILY PRN
OUTPATIENT
Start: 2024-10-03

## 2024-10-02 RX ORDER — ALBUTEROL SULFATE 90 UG/1
1-2 INHALANT RESPIRATORY (INHALATION)
Start: 2024-10-03

## 2024-10-02 RX ORDER — METHYLPREDNISOLONE SODIUM SUCCINATE 125 MG/2ML
125 INJECTION INTRAMUSCULAR; INTRAVENOUS
Start: 2024-10-03

## 2024-10-02 RX ORDER — PREDNISONE 10 MG/1
10 TABLET ORAL SEE ADMIN INSTRUCTIONS
Qty: 100 TABLET | Refills: 2 | Status: SHIPPED | OUTPATIENT
Start: 2024-10-02

## 2024-10-02 RX ORDER — EPINEPHRINE 1 MG/ML
0.3 INJECTION, SOLUTION INTRAMUSCULAR; SUBCUTANEOUS EVERY 5 MIN PRN
OUTPATIENT
Start: 2024-10-03

## 2024-10-02 RX ORDER — HEPARIN SODIUM (PORCINE) LOCK FLUSH IV SOLN 100 UNIT/ML 100 UNIT/ML
5 SOLUTION INTRAVENOUS
OUTPATIENT
Start: 2024-10-03

## 2024-10-02 RX ORDER — ALBUTEROL SULFATE 0.83 MG/ML
2.5 SOLUTION RESPIRATORY (INHALATION)
OUTPATIENT
Start: 2024-10-03

## 2024-10-02 RX ORDER — DIPHENHYDRAMINE HYDROCHLORIDE 50 MG/ML
50 INJECTION INTRAMUSCULAR; INTRAVENOUS
Start: 2024-10-03

## 2024-10-02 RX ORDER — MEPERIDINE HYDROCHLORIDE 25 MG/ML
25 INJECTION INTRAMUSCULAR; INTRAVENOUS; SUBCUTANEOUS EVERY 30 MIN PRN
OUTPATIENT
Start: 2024-10-03

## 2024-10-02 RX ADMIN — SODIUM CHLORIDE 500 MG: 9 INJECTION, SOLUTION INTRAVENOUS at 08:36

## 2024-10-02 NOTE — TELEPHONE ENCOUNTER
Spoke to Janae and clarified the prednisone directions. Then starting on 10/9, pt will take pred 10mg daily indefinitely.

## 2024-10-02 NOTE — PATIENT INSTRUCTIONS
Dear Sherrie Lala    Thank you for choosing [facility] ([facility] for your infusion.  The following information is a summary of our appointment as well as important reminders.    If you have any questions on your upcoming Specialty Infusion appointments, please call scheduling at [phone].  It was a pleasure taking care of you today.    Sincerely,    [facility]  [address] [place] [address]  [address]  Phone:  [phone]

## 2024-10-02 NOTE — TELEPHONE ENCOUNTER
----- Message from Abigail WINTER sent at 10/2/2024  7:55 AM CDT -----  Regarding: RE: They want this Wednesday and Thursday as patient is going out of town Friday this week  So she's on for this morning but we don't have a slot for tomorrow?  I'll have to talk w/ my supervisor.  This is rejection, not IV fluids or electrolyte replacement, Parisa CORTES can you help?  Parisa LORA,can you call [facility] to see if they have an infusion area that could give 500 mg solumedrol x 1 tomorrow?  ----- Message -----  From: Jackelyn Molina RN  Sent: 10/2/2024   6:42 AM CDT  To: Abigail Parham, BECKI; Lashawn Huerta; [facility]  Subject: RE: They want this Wednesday and Thursday as#    I am not sure either, we can only do so much when given these quick turnaround.    Abigail can you speak with her and see what we need to do, otherwise I am not sure when she will get the 2nd dose as she is leaving town Friday    Jacey Molina RN, BSN, CRNI  [facility]  Phone [phone]  ----- Message -----  From: Lashawn Huerta  Sent: 10/1/2024   6:10 PM CDT  To: Jackelyn Molina RN; [facility]  Subject: RE: They want this Wednesday and Thursday as#    I grabbed the 8am but not sure what to do about Thursday - I offered the 3pm in chair 50 but she said that does not work for her.     What I have pending though is not for adding Tomorrow -     [address] said she needs to move to Thursday Friday - sent a separate message for that    And I gave Arnold the 3pm on Thursday chair 42 which I now put on hold.  ----- Message -----  From: Jackelyn Molina RN  Sent: 10/1/2024   4:57 PM CDT  To: Lashawn Huerta; [facility]  Subject: They want this Wednesday and Thursday as pat#    Please schedule appt.      Appt notes:  Solumedrol    Appt Length (if known): 60    Physician:  Dr. Lilly    Frequency and preferred time : They want her in Wednesday and Thursday, but I am not sure what is available, sounds like DG might not come tomorrow, in ED now    Pt to get info.  AVS/mycsmootht or  to call? Well it depends on the requests that are out there and if DG comes, You could put it at 0800 tomorrow in chair 43, Wednesday 10/2    Please remind patients that they may only have 1 visitor in infusion over the age of 18 years old. No children will be permitted.    Jacey Molina RN, BSN, CRNI  [facility]  Phone [phone]

## 2024-10-02 NOTE — TELEPHONE ENCOUNTER
General  Route to LPN    Reason for call: [facility] wants humberto to have steroid infusion tomorrow 10/3/2024 and they need an order faxed [phone]     Call back needed? Yes    Return Call Needed  Same as documented in contacts section  When to return call?: Same day: Route High Priority

## 2024-10-02 NOTE — TELEPHONE ENCOUNTER
Spoke to Alina in the infusion department at [facility] who will call writer back to confirm an IV for tomorrow.     Confirmed with Mariama that Sherrie is scheduled for 1:30 tomorrow.

## 2024-10-02 NOTE — LETTER
PHYSICIAN ORDERS  [facility]  Fax#[phone]      DATE & TIME ISSUED: 2024 12:29 PM  PATIENT NAME: Sherrie Lala   : 1955     [facility] MR# [if applicable]: [id number]     DIAGNOSIS:  Liver transplant rejection  ICD-10 CODE: T86.41     Please infuse solumedrol IV 500mg x 1 dose      Any questions please call: [phone]        .

## 2024-10-02 NOTE — PROGRESS NOTES
Infusion Nursing Note:  Sherrie Lala presents today for IV infusion; Solumedrol (1st dose).    Patient seen by provider today: No   present during visit today: Not Applicable.    Note:   Administrations This Visit       methylPREDNISolone sodium succinate (solu-MEDROL) 500 mg in sodium chloride 0.9 % 114 mL intermittent infusion       Admin Date  10/02/2024 Action  $New Bag Dose  500 mg Rate  228 mL/hr Route  Intravenous Documented By  Taye Huggins RN              sodium chloride (PF) 0.9% PF flush 3-20 mL       Admin Date  10/02/2024 Action  $Given Dose  20 mL Route  Intracatheter Documented By  Taye Huggins RN                  Temp: 97.4  F (36.3  C) Temp src: Oral BP: 137/88 Pulse: 73   Resp: 16 SpO2: 99 % O2 Device: None (Room air)      Intravenous Access:  Labs drawn without difficulty.  Peripheral IV placed.    Treatment Conditions:  Not Applicable.    Post Infusion Assessment:  Patient tolerated infusion without incident.  Blood return noted pre and post infusion.  Site patent and intact, free from redness, edema or discomfort.  No evidence of extravasations.  Access discontinued per protocol.     Discharge Plan:   Patient and/or family verbalized understanding of discharge instructions and all questions answered.  AVS to patient via [employer].  Patient will return tomorrow for next appointment.   Patient discharged in stable condition accompanied by: self.  Departure Mode: Ambulatory.    Taye Huggins RN

## 2024-10-03 ENCOUNTER — TELEPHONE (OUTPATIENT)
Dept: TRANSPLANT | Facility: CLINIC | Age: 69
End: 2024-10-03
Payer: COMMERCIAL

## 2024-10-03 DIAGNOSIS — R79.89 ELEVATED LFTS: ICD-10-CM

## 2024-10-03 DIAGNOSIS — T86.41 LIVER TRANSPLANT REJECTION (H): Primary | ICD-10-CM

## 2024-10-03 RX ORDER — MYCOPHENOLATE MOFETIL 500 MG/1
500 TABLET ORAL 2 TIMES DAILY
COMMUNITY
Start: 2024-10-03

## 2024-10-03 RX ORDER — MYCOPHENOLIC ACID 180 MG/1
360 TABLET, DELAYED RELEASE ORAL 2 TIMES DAILY
Qty: 360 TABLET | Refills: 3 | Status: SHIPPED | OUTPATIENT
Start: 2024-10-03 | End: 2024-10-03

## 2024-10-03 NOTE — TELEPHONE ENCOUNTER
Pt calls to state that the mycophenolic acid will not be available until tomorrow and it is a 300 co-pay. Please call pt.

## 2024-10-03 NOTE — LETTER
OUTPATIENT LABORATORY TEST ORDER   [facility]  Fax# [phone]  Frequency change    Patient Name: Sherrie Lala   YOB: 1955     [facility] MR# [if applicable]: [id number]   Date & Time: October 3, 2024  2:37 PM  Expiration Date: 1 year after date issued       Diagnosis: Liver Transplant (ICD-10 Z94.4)   Aftercare following organ transplant (ICD-10 Z48.288)   Long term use of medications (ICD-10 Z79.899)      We ask your assistance in obtaining the following laboratory tests, which are part of our routine surveillance program for Solid Organ Transplant patients.     Please fax each result to [phone], same day as resulted/available    Critical lab results page [phone]      Weekly x 8 weeks, then every other week  x 4, then re-evaluate  CBC with Platelets   Basic Metabolic Panel   Phosphorous  Hepatic panel   Tacrolimus drug level - 12 hour trough, please document time of last dose       Labs annually due November 2024  Phosphorous  Magnesium  Fasting Lipid Panel  Urine protein/creatinine ratio  UA with reflex to micro      If you have any questions, please call [facility]- [phone] or [phone], Fax- [phone].    .

## 2024-10-03 NOTE — TELEPHONE ENCOUNTER
Pharmacist states that pt's portion for the mycophenolic acid after insurance is 207.96 and he would have to order it in. Although he does have mycophenolate 250's and 500's in stock.

## 2024-10-03 NOTE — TELEPHONE ENCOUNTER
Sherrie will  mycophenolate 250 mg capsules at [facility] and take 500 mg po bid due to cost.  If she develops GI symptoms she'd like to go thru our specialty pharmacy to get mycophenolic acid .    Please change tac script for 2 mg po bid back dated to 9/30 as it went to [facility] which is incorrect, send to  Specialty pharm.

## 2024-10-03 NOTE — TELEPHONE ENCOUNTER
suggesting we also add mmf to Sherrie's daily medicine regimen. She believes that she may have had side effects from this post transplant.  Per Dr. Lilly sent script for myfortic 360 mg bid to [facility] in [place]  Shrerie is getting labs done on thurs 10/10, the morning after she returns from her vacation in [place].  She want to be sure that lab orders are there for all post transplant labs. .  She asked that I review the prednisone taper with her, I did.  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  I sent a message to dr. Lilly, will see what he says.  Sherrie knows to call me if she feels she is getting worse while gone.

## 2024-10-04 ENCOUNTER — TELEPHONE (OUTPATIENT)
Dept: TRANSPLANT | Facility: CLINIC | Age: 69
End: 2024-10-04
Payer: COMMERCIAL

## 2024-10-04 RX ORDER — TACROLIMUS 1 MG/1
2 CAPSULE ORAL 2 TIMES DAILY
Qty: 360 CAPSULE | Refills: 3 | Status: SHIPPED | OUTPATIENT
Start: 2024-10-04 | End: 2024-10-11

## 2024-10-04 NOTE — TELEPHONE ENCOUNTER
Provider Call: General  Route to LPN    Reason for call: Call from Pharmacy  for Mycophenolate- Need  Diagnosis code   reference # [id number]  Store # [id number]     Call back needed? Yes    Return Call Needed  Same as documented in contacts section  When to return call?: Same day: Route High Priority

## 2024-10-10 ENCOUNTER — LAB (OUTPATIENT)
Dept: LAB | Facility: CLINIC | Age: 69
End: 2024-10-10
Payer: COMMERCIAL

## 2024-10-10 ENCOUNTER — TELEPHONE (OUTPATIENT)
Dept: TRANSPLANT | Facility: CLINIC | Age: 69
End: 2024-10-10

## 2024-10-10 ENCOUNTER — TELEPHONE (OUTPATIENT)
Dept: FAMILY MEDICINE | Facility: CLINIC | Age: 69
End: 2024-10-10

## 2024-10-10 ENCOUNTER — DOCUMENTATION ONLY (OUTPATIENT)
Dept: GASTROENTEROLOGY | Facility: CLINIC | Age: 69
End: 2024-10-10

## 2024-10-10 DIAGNOSIS — Z79.899 ENCOUNTER FOR LONG-TERM (CURRENT) USE OF HIGH-RISK MEDICATION: ICD-10-CM

## 2024-10-10 DIAGNOSIS — T86.41 LIVER TRANSPLANT REJECTION (H): Primary | ICD-10-CM

## 2024-10-10 DIAGNOSIS — Z48.288 ENCOUNTER FOR AFTERCARE FOLLOWING MULTIPLE ORGAN TRANSPLANT: ICD-10-CM

## 2024-10-10 DIAGNOSIS — Z94.4 LIVER REPLACED BY TRANSPLANT (H): ICD-10-CM

## 2024-10-10 LAB
ALBUMIN SERPL BCG-MCNC: 4.1 G/DL (ref 3.5–5.2)
ALP SERPL-CCNC: 128 U/L (ref 40–150)
ALT SERPL W P-5'-P-CCNC: 65 U/L (ref 0–50)
ANION GAP SERPL CALCULATED.3IONS-SCNC: 10 MMOL/L (ref 7–15)
AST SERPL W P-5'-P-CCNC: 23 U/L (ref 0–45)
BILIRUB DIRECT SERPL-MCNC: <0.2 MG/DL (ref 0–0.3)
BILIRUB SERPL-MCNC: 0.5 MG/DL
BUN SERPL-MCNC: 30.9 MG/DL (ref 8–23)
CALCIUM SERPL-MCNC: 8.3 MG/DL (ref 8.8–10.4)
CHLORIDE SERPL-SCNC: 104 MMOL/L (ref 98–107)
CREAT SERPL-MCNC: 0.97 MG/DL (ref 0.51–0.95)
EGFRCR SERPLBLD CKD-EPI 2021: 64 ML/MIN/1.73M2
ERYTHROCYTE [DISTWIDTH] IN BLOOD BY AUTOMATED COUNT: 12.9 % (ref 10–15)
GLUCOSE SERPL-MCNC: 78 MG/DL (ref 70–99)
HCO3 SERPL-SCNC: 26 MMOL/L (ref 22–29)
HCT VFR BLD AUTO: 43.4 % (ref 35–47)
HGB BLD-MCNC: 14.2 G/DL (ref 11.7–15.7)
MCH RBC QN AUTO: 29.1 PG (ref 26.5–33)
MCHC RBC AUTO-ENTMCNC: 32.7 G/DL (ref 31.5–36.5)
MCV RBC AUTO: 89 FL (ref 78–100)
PHOSPHATE SERPL-MCNC: 4 MG/DL (ref 2.5–4.5)
PLATELET # BLD AUTO: 201 10E3/UL (ref 150–450)
POTASSIUM SERPL-SCNC: 4.3 MMOL/L (ref 3.4–5.3)
PROT SERPL-MCNC: 6.5 G/DL (ref 6.4–8.3)
RBC # BLD AUTO: 4.88 10E6/UL (ref 3.8–5.2)
SODIUM SERPL-SCNC: 140 MMOL/L (ref 135–145)
TACROLIMUS BLD-MCNC: 4.9 UG/L (ref 5–15)
TME LAST DOSE: ABNORMAL H
TME LAST DOSE: ABNORMAL H
WBC # BLD AUTO: 8 10E3/UL (ref 4–11)

## 2024-10-10 PROCEDURE — 36415 COLL VENOUS BLD VENIPUNCTURE: CPT

## 2024-10-10 PROCEDURE — 84100 ASSAY OF PHOSPHORUS: CPT

## 2024-10-10 PROCEDURE — 80053 COMPREHEN METABOLIC PANEL: CPT

## 2024-10-10 PROCEDURE — 85027 COMPLETE CBC AUTOMATED: CPT

## 2024-10-10 PROCEDURE — 80197 ASSAY OF TACROLIMUS: CPT

## 2024-10-10 PROCEDURE — 82248 BILIRUBIN DIRECT: CPT

## 2024-10-10 NOTE — TELEPHONE ENCOUNTER
Test Results        Who ordered the test:  Bradly Lilly    Type of test: Lab    Date of test:  10/10/2024    Where was the test performed:  [facility]    What are your questions/concerns?:  Results and why it is taking so long to get them back!    Could we send this information to you in [facility] or would you prefer to receive a phone call?:   No preference   Okay to leave a detailed message?: Yes at Cell number on file:    Telephone Information:   Mobile [phone]

## 2024-10-10 NOTE — PROGRESS NOTES
Sherrie is going to be coming weekly and then bi-weekly for lab. Please update her orders in Epic to reflect the new orders.     Thank you,  Alyce

## 2024-10-10 NOTE — TELEPHONE ENCOUNTER
"Call to Sherrie because all I see for labs today is a CBC.  She's down to prednisone 20 mg / day.   She had a nice time on her trip, feels well.  Labs now say ''in process\" so I'll watch for them.   "

## 2024-10-11 ENCOUNTER — TELEPHONE (OUTPATIENT)
Dept: TRANSPLANT | Facility: CLINIC | Age: 69
End: 2024-10-11
Payer: COMMERCIAL

## 2024-10-11 DIAGNOSIS — R79.89 ELEVATED LFTS: ICD-10-CM

## 2024-10-11 DIAGNOSIS — T86.41 LIVER TRANSPLANT REJECTION (H): ICD-10-CM

## 2024-10-11 RX ORDER — PREDNISONE 5 MG/1
15 TABLET ORAL DAILY
Qty: 90 TABLET | Refills: 3 | Status: SHIPPED | OUTPATIENT
Start: 2024-10-11 | End: 2024-11-05

## 2024-10-11 RX ORDER — TACROLIMUS 1 MG/1
3 CAPSULE ORAL 2 TIMES DAILY
Qty: 540 CAPSULE | Refills: 3 | Status: SHIPPED | OUTPATIENT
Start: 2024-10-11 | End: 2024-10-18

## 2024-10-11 NOTE — TELEPHONE ENCOUNTER
Call to Sherrie to make sure she saw my message about her labs.  She knows to continue weekly labs for now, stay on pred 20.      Please make sure she has orders for weekly labs for 2 months, then monthly x 4.

## 2024-10-11 NOTE — TELEPHONE ENCOUNTER
Per Dr. Lilly, run tac level higher.  Yesterday's level was 4.9.  She's taking 2 mg po bid.  Please ask her to increase tac to 3 mg po bid.    See earlier note in this same encounter re: decreasing pred to 15 mg daily.  Thanks    Instructed pt the following:    Pt able to repeat instructions.

## 2024-10-11 NOTE — TELEPHONE ENCOUNTER
Per Dr. Lilly re:  Pred dose:      Bradly Lilly MD sent to Abigail Parham RN  You can go down to 15 mg.

## 2024-10-17 ENCOUNTER — LAB (OUTPATIENT)
Dept: LAB | Facility: CLINIC | Age: 69
End: 2024-10-17
Payer: COMMERCIAL

## 2024-10-17 DIAGNOSIS — Z79.899 ENCOUNTER FOR LONG-TERM (CURRENT) USE OF HIGH-RISK MEDICATION: ICD-10-CM

## 2024-10-17 DIAGNOSIS — T86.41 LIVER TRANSPLANT REJECTION (H): ICD-10-CM

## 2024-10-17 DIAGNOSIS — Z48.288 ENCOUNTER FOR AFTERCARE FOLLOWING MULTIPLE ORGAN TRANSPLANT: ICD-10-CM

## 2024-10-17 DIAGNOSIS — Z94.4 LIVER REPLACED BY TRANSPLANT (H): ICD-10-CM

## 2024-10-17 LAB
ALBUMIN SERPL BCG-MCNC: 4 G/DL (ref 3.5–5.2)
ALP SERPL-CCNC: 96 U/L (ref 40–150)
ALT SERPL W P-5'-P-CCNC: 53 U/L (ref 0–50)
ANION GAP SERPL CALCULATED.3IONS-SCNC: 9 MMOL/L (ref 7–15)
AST SERPL W P-5'-P-CCNC: 28 U/L (ref 0–45)
BILIRUB DIRECT SERPL-MCNC: <0.2 MG/DL (ref 0–0.3)
BILIRUB SERPL-MCNC: 0.5 MG/DL
BUN SERPL-MCNC: 27.5 MG/DL (ref 8–23)
CALCIUM SERPL-MCNC: 8.4 MG/DL (ref 8.8–10.4)
CHLORIDE SERPL-SCNC: 105 MMOL/L (ref 98–107)
CREAT SERPL-MCNC: 1.08 MG/DL (ref 0.51–0.95)
EGFRCR SERPLBLD CKD-EPI 2021: 56 ML/MIN/1.73M2
ERYTHROCYTE [DISTWIDTH] IN BLOOD BY AUTOMATED COUNT: 12.8 % (ref 10–15)
GLUCOSE SERPL-MCNC: 82 MG/DL (ref 70–99)
HCO3 SERPL-SCNC: 26 MMOL/L (ref 22–29)
HCT VFR BLD AUTO: 41.1 % (ref 35–47)
HGB BLD-MCNC: 13.1 G/DL (ref 11.7–15.7)
MCH RBC QN AUTO: 29 PG (ref 26.5–33)
MCHC RBC AUTO-ENTMCNC: 31.9 G/DL (ref 31.5–36.5)
MCV RBC AUTO: 91 FL (ref 78–100)
PHOSPHATE SERPL-MCNC: 3.6 MG/DL (ref 2.5–4.5)
PLATELET # BLD AUTO: 147 10E3/UL (ref 150–450)
POTASSIUM SERPL-SCNC: 4.2 MMOL/L (ref 3.4–5.3)
PROT SERPL-MCNC: 6.4 G/DL (ref 6.4–8.3)
RBC # BLD AUTO: 4.51 10E6/UL (ref 3.8–5.2)
SODIUM SERPL-SCNC: 140 MMOL/L (ref 135–145)
TACROLIMUS BLD-MCNC: 6.6 UG/L (ref 5–15)
TME LAST DOSE: NORMAL H
TME LAST DOSE: NORMAL H
WBC # BLD AUTO: 6.2 10E3/UL (ref 4–11)

## 2024-10-17 PROCEDURE — 84100 ASSAY OF PHOSPHORUS: CPT

## 2024-10-17 PROCEDURE — 36415 COLL VENOUS BLD VENIPUNCTURE: CPT

## 2024-10-17 PROCEDURE — 80197 ASSAY OF TACROLIMUS: CPT

## 2024-10-17 PROCEDURE — 85027 COMPLETE CBC AUTOMATED: CPT

## 2024-10-17 PROCEDURE — 82248 BILIRUBIN DIRECT: CPT

## 2024-10-17 PROCEDURE — 80053 COMPREHEN METABOLIC PANEL: CPT

## 2024-10-18 ENCOUNTER — TELEPHONE (OUTPATIENT)
Dept: GASTROENTEROLOGY | Facility: CLINIC | Age: 69
End: 2024-10-18
Payer: COMMERCIAL

## 2024-10-18 ENCOUNTER — TRANSFERRED RECORDS (OUTPATIENT)
Dept: HEALTH INFORMATION MANAGEMENT | Facility: CLINIC | Age: 69
End: 2024-10-18
Payer: COMMERCIAL

## 2024-10-18 DIAGNOSIS — R79.89 ELEVATED LFTS: ICD-10-CM

## 2024-10-18 RX ORDER — TACROLIMUS 1 MG/1
CAPSULE ORAL
Qty: 450 CAPSULE | Refills: 3 | Status: SHIPPED | OUTPATIENT
Start: 2024-10-18 | End: 2024-10-25

## 2024-10-18 NOTE — TELEPHONE ENCOUNTER
ISSUE:   Tacrolimus IR level 6.6 on 10-, goal 6, dose 3 mg BID.    PLAN/LPN TASK:   Call Patient and confirm this was an accurate 12-hour trough.   Verify Tacrolimus IR dose 3 mg BID.   Confirm no new medications or or missed doses.   Confirm no new illness / infection / diarrhea.   If accurate trough and accurate dose, decrease Tacrolimus IR dose to 3 mg QAM, 2mg QPM     Is this more than a 50% increase or decrease in current IS dose: No  If YES, justification: n/a    Repeat labs in 1 weeks.  *If > 50% change in immunosuppression dose, repeat labs in 1 week.     Pt confirmed dose change.

## 2024-10-18 NOTE — TELEPHONE ENCOUNTER
Prior Authorization Not Needed per Insurance    Medication: PREDNISONE 5 MG PO TABS  Insurance Company:    Pharmacy Filling the Rx: [facility] - [place], [place] - [address]  Pharmacy Notified: YES- [facility] CANNOT BILL PART B-   Patient Notified: YES- CALLED PATIENT AND LEFT MESSAGE

## 2024-10-24 ENCOUNTER — ANCILLARY PROCEDURE (OUTPATIENT)
Dept: MAMMOGRAPHY | Facility: CLINIC | Age: 69
End: 2024-10-24
Payer: COMMERCIAL

## 2024-10-24 ENCOUNTER — LAB (OUTPATIENT)
Dept: LAB | Facility: CLINIC | Age: 69
End: 2024-10-24
Payer: COMMERCIAL

## 2024-10-24 DIAGNOSIS — Z12.31 VISIT FOR SCREENING MAMMOGRAM: ICD-10-CM

## 2024-10-24 DIAGNOSIS — T86.41 LIVER TRANSPLANT REJECTION (H): ICD-10-CM

## 2024-10-24 LAB
ALBUMIN SERPL BCG-MCNC: 4.2 G/DL (ref 3.5–5.2)
ALP SERPL-CCNC: 90 U/L (ref 40–150)
ALT SERPL W P-5'-P-CCNC: 41 U/L (ref 0–50)
ANION GAP SERPL CALCULATED.3IONS-SCNC: 9 MMOL/L (ref 7–15)
AST SERPL W P-5'-P-CCNC: 23 U/L (ref 0–45)
BILIRUB DIRECT SERPL-MCNC: <0.2 MG/DL (ref 0–0.3)
BILIRUB SERPL-MCNC: 0.3 MG/DL
BUN SERPL-MCNC: 32.8 MG/DL (ref 8–23)
CALCIUM SERPL-MCNC: 9.3 MG/DL (ref 8.8–10.4)
CHLORIDE SERPL-SCNC: 106 MMOL/L (ref 98–107)
CREAT SERPL-MCNC: 0.97 MG/DL (ref 0.51–0.95)
EGFRCR SERPLBLD CKD-EPI 2021: 64 ML/MIN/1.73M2
ERYTHROCYTE [DISTWIDTH] IN BLOOD BY AUTOMATED COUNT: 12.5 % (ref 10–15)
GLUCOSE SERPL-MCNC: 84 MG/DL (ref 70–99)
HCO3 SERPL-SCNC: 28 MMOL/L (ref 22–29)
HCT VFR BLD AUTO: 44.7 % (ref 35–47)
HGB BLD-MCNC: 14.2 G/DL (ref 11.7–15.7)
MCH RBC QN AUTO: 28.4 PG (ref 26.5–33)
MCHC RBC AUTO-ENTMCNC: 31.8 G/DL (ref 31.5–36.5)
MCV RBC AUTO: 89 FL (ref 78–100)
PHOSPHATE SERPL-MCNC: 3.9 MG/DL (ref 2.5–4.5)
PLATELET # BLD AUTO: 159 10E3/UL (ref 150–450)
POTASSIUM SERPL-SCNC: 4.4 MMOL/L (ref 3.4–5.3)
PROT SERPL-MCNC: 6.6 G/DL (ref 6.4–8.3)
RBC # BLD AUTO: 5 10E6/UL (ref 3.8–5.2)
SODIUM SERPL-SCNC: 143 MMOL/L (ref 135–145)
TACROLIMUS BLD-MCNC: 4.1 UG/L (ref 5–15)
TME LAST DOSE: ABNORMAL H
TME LAST DOSE: ABNORMAL H
WBC # BLD AUTO: 6.1 10E3/UL (ref 4–11)

## 2024-10-24 PROCEDURE — 85027 COMPLETE CBC AUTOMATED: CPT

## 2024-10-24 PROCEDURE — 84100 ASSAY OF PHOSPHORUS: CPT

## 2024-10-24 PROCEDURE — 77067 SCR MAMMO BI INCL CAD: CPT | Mod: TC | Performed by: RADIOLOGY

## 2024-10-24 PROCEDURE — 80053 COMPREHEN METABOLIC PANEL: CPT

## 2024-10-24 PROCEDURE — 77063 BREAST TOMOSYNTHESIS BI: CPT | Mod: TC | Performed by: RADIOLOGY

## 2024-10-24 PROCEDURE — 36415 COLL VENOUS BLD VENIPUNCTURE: CPT

## 2024-10-24 PROCEDURE — 80197 ASSAY OF TACROLIMUS: CPT

## 2024-10-24 PROCEDURE — 82248 BILIRUBIN DIRECT: CPT

## 2024-10-25 ENCOUNTER — DOCUMENTATION ONLY (OUTPATIENT)
Dept: TRANSPLANT | Facility: CLINIC | Age: 69
End: 2024-10-25
Payer: COMMERCIAL

## 2024-10-25 DIAGNOSIS — T86.41 LIVER TRANSPLANT REJECTION (H): ICD-10-CM

## 2024-10-25 DIAGNOSIS — R79.89 ELEVATED LFTS: ICD-10-CM

## 2024-10-25 RX ORDER — TACROLIMUS 1 MG/1
3 CAPSULE ORAL EVERY 12 HOURS
Qty: 180 CAPSULE | Refills: 11 | Status: SHIPPED | OUTPATIENT
Start: 2024-10-25

## 2024-10-25 ASSESSMENT — ENCOUNTER SYMPTOMS: NEW SYMPTOMS OF CORONARY ARTERY DISEASE: 0

## 2024-10-25 NOTE — TELEPHONE ENCOUNTER
ISSUE:   Tacrolimus IR level 4.1 on 10/24, goal 6, dose  3 mg in the morning and 2 mg in the evening.    PLAN:   Call Patient and confirm this was an accurate 12-hour trough.   Verify Tacrolimus IR dose.  Confirm no new medications or or missed doses.   Confirm no new illness / infection / diarrhea.   If accurate trough and accurate dose, increase Tacrolimus IR dose to 3 mg BID         Repeat labs in a week, if still looking good will decrease labs to every 2 weeks x 2, if good at that point can go to monthly.   Abigail Parham RN     OUTCOME:   Spoke with Patient, they confirm accurate trough level and current dose 3 mg am, 2 mg pm  .   Patient confirmed dose change to 3 mg BID.  Patient agreed to repeat labs in 1 weeks.   Orders sent to preferred pharmacy for dose change and lab for repeat labs.   Patient voiced understanding of plan.      Parisa Thrasher LPN

## 2024-10-31 ENCOUNTER — LAB (OUTPATIENT)
Dept: LAB | Facility: CLINIC | Age: 69
End: 2024-10-31
Payer: COMMERCIAL

## 2024-10-31 DIAGNOSIS — T86.41 LIVER TRANSPLANT REJECTION (H): ICD-10-CM

## 2024-10-31 DIAGNOSIS — Z94.4 LIVER REPLACED BY TRANSPLANT (H): ICD-10-CM

## 2024-10-31 LAB
ALBUMIN SERPL BCG-MCNC: 4.2 G/DL (ref 3.5–5.2)
ALBUMIN UR-MCNC: NEGATIVE MG/DL
ALP SERPL-CCNC: 83 U/L (ref 40–150)
ALT SERPL W P-5'-P-CCNC: 38 U/L (ref 0–50)
ANION GAP SERPL CALCULATED.3IONS-SCNC: 10 MMOL/L (ref 7–15)
APPEARANCE UR: CLEAR
AST SERPL W P-5'-P-CCNC: 21 U/L (ref 0–45)
BACTERIA #/AREA URNS HPF: ABNORMAL /HPF
BILIRUB DIRECT SERPL-MCNC: <0.2 MG/DL (ref 0–0.3)
BILIRUB SERPL-MCNC: 0.4 MG/DL
BILIRUB UR QL STRIP: NEGATIVE
BUN SERPL-MCNC: 22.5 MG/DL (ref 8–23)
CALCIUM SERPL-MCNC: 8.9 MG/DL (ref 8.8–10.4)
CHLORIDE SERPL-SCNC: 102 MMOL/L (ref 98–107)
CHOLEST SERPL-MCNC: 191 MG/DL
COLOR UR AUTO: YELLOW
CREAT SERPL-MCNC: 1 MG/DL (ref 0.51–0.95)
EGFRCR SERPLBLD CKD-EPI 2021: 61 ML/MIN/1.73M2
ERYTHROCYTE [DISTWIDTH] IN BLOOD BY AUTOMATED COUNT: 12.2 % (ref 10–15)
FASTING STATUS PATIENT QL REPORTED: NO
FASTING STATUS PATIENT QL REPORTED: NO
GLUCOSE SERPL-MCNC: 82 MG/DL (ref 70–99)
GLUCOSE UR STRIP-MCNC: NEGATIVE MG/DL
HCO3 SERPL-SCNC: 27 MMOL/L (ref 22–29)
HCT VFR BLD AUTO: 41.7 % (ref 35–47)
HDLC SERPL-MCNC: 65 MG/DL
HGB BLD-MCNC: 13.5 G/DL (ref 11.7–15.7)
HGB UR QL STRIP: ABNORMAL
KETONES UR STRIP-MCNC: NEGATIVE MG/DL
LDLC SERPL CALC-MCNC: 110 MG/DL
LEUKOCYTE ESTERASE UR QL STRIP: NEGATIVE
MAGNESIUM SERPL-MCNC: 1.7 MG/DL (ref 1.7–2.3)
MCH RBC QN AUTO: 28.5 PG (ref 26.5–33)
MCHC RBC AUTO-ENTMCNC: 32.4 G/DL (ref 31.5–36.5)
MCV RBC AUTO: 88 FL (ref 78–100)
MUCOUS THREADS #/AREA URNS LPF: PRESENT /LPF
NITRATE UR QL: NEGATIVE
NONHDLC SERPL-MCNC: 126 MG/DL
PH UR STRIP: 5.5 [PH] (ref 5–7)
PHOSPHATE SERPL-MCNC: 4.3 MG/DL (ref 2.5–4.5)
PLATELET # BLD AUTO: 185 10E3/UL (ref 150–450)
POTASSIUM SERPL-SCNC: 4.4 MMOL/L (ref 3.4–5.3)
PROT SERPL-MCNC: 6.5 G/DL (ref 6.4–8.3)
RBC # BLD AUTO: 4.74 10E6/UL (ref 3.8–5.2)
RBC #/AREA URNS AUTO: ABNORMAL /HPF
SODIUM SERPL-SCNC: 139 MMOL/L (ref 135–145)
SP GR UR STRIP: 1.02 (ref 1–1.03)
SQUAMOUS #/AREA URNS AUTO: ABNORMAL /LPF
TACROLIMUS BLD-MCNC: 6.8 UG/L (ref 5–15)
TME LAST DOSE: NORMAL H
TME LAST DOSE: NORMAL H
TRIGL SERPL-MCNC: 82 MG/DL
UROBILINOGEN UR STRIP-ACNC: 0.2 E.U./DL
WBC # BLD AUTO: 6 10E3/UL (ref 4–11)
WBC #/AREA URNS AUTO: ABNORMAL /HPF

## 2024-10-31 PROCEDURE — 85027 COMPLETE CBC AUTOMATED: CPT

## 2024-10-31 PROCEDURE — 83735 ASSAY OF MAGNESIUM: CPT

## 2024-10-31 PROCEDURE — 80053 COMPREHEN METABOLIC PANEL: CPT

## 2024-10-31 PROCEDURE — 81001 URINALYSIS AUTO W/SCOPE: CPT

## 2024-10-31 PROCEDURE — 84100 ASSAY OF PHOSPHORUS: CPT

## 2024-10-31 PROCEDURE — 36415 COLL VENOUS BLD VENIPUNCTURE: CPT

## 2024-10-31 PROCEDURE — 84156 ASSAY OF PROTEIN URINE: CPT

## 2024-10-31 PROCEDURE — 82248 BILIRUBIN DIRECT: CPT

## 2024-10-31 PROCEDURE — 80061 LIPID PANEL: CPT

## 2024-10-31 PROCEDURE — 80197 ASSAY OF TACROLIMUS: CPT

## 2024-11-01 LAB
ALBUMIN MFR UR ELPH: 12.5 MG/DL
CREAT UR-MCNC: 139 MG/DL
PROT/CREAT 24H UR: 0.09 MG/MG CR (ref 0–0.2)

## 2024-11-05 ENCOUNTER — TELEPHONE (OUTPATIENT)
Dept: TRANSPLANT | Facility: CLINIC | Age: 69
End: 2024-11-05

## 2024-11-05 ENCOUNTER — LAB (OUTPATIENT)
Dept: LAB | Facility: CLINIC | Age: 69
End: 2024-11-05
Attending: INTERNAL MEDICINE
Payer: COMMERCIAL

## 2024-11-05 ENCOUNTER — OFFICE VISIT (OUTPATIENT)
Dept: GASTROENTEROLOGY | Facility: CLINIC | Age: 69
End: 2024-11-05
Attending: INTERNAL MEDICINE
Payer: COMMERCIAL

## 2024-11-05 VITALS
OXYGEN SATURATION: 96 % | HEART RATE: 87 BPM | SYSTOLIC BLOOD PRESSURE: 145 MMHG | BODY MASS INDEX: 24.53 KG/M2 | DIASTOLIC BLOOD PRESSURE: 89 MMHG | WEIGHT: 138.5 LBS | TEMPERATURE: 97.8 F

## 2024-11-05 DIAGNOSIS — Z94.4 LIVER REPLACED BY TRANSPLANT (H): Primary | ICD-10-CM

## 2024-11-05 DIAGNOSIS — T86.41 LIVER TRANSPLANT REJECTION (H): ICD-10-CM

## 2024-11-05 LAB
ALBUMIN SERPL BCG-MCNC: 4.2 G/DL (ref 3.5–5.2)
ALP SERPL-CCNC: 81 U/L (ref 40–150)
ALT SERPL W P-5'-P-CCNC: 56 U/L (ref 0–50)
ANION GAP SERPL CALCULATED.3IONS-SCNC: 10 MMOL/L (ref 7–15)
AST SERPL W P-5'-P-CCNC: 28 U/L (ref 0–45)
BILIRUB DIRECT SERPL-MCNC: <0.2 MG/DL (ref 0–0.3)
BILIRUB SERPL-MCNC: 0.4 MG/DL
BUN SERPL-MCNC: 23.7 MG/DL (ref 8–23)
CALCIUM SERPL-MCNC: 8.7 MG/DL (ref 8.8–10.4)
CHLORIDE SERPL-SCNC: 104 MMOL/L (ref 98–107)
CREAT SERPL-MCNC: 0.93 MG/DL (ref 0.51–0.95)
EGFRCR SERPLBLD CKD-EPI 2021: 67 ML/MIN/1.73M2
ERYTHROCYTE [DISTWIDTH] IN BLOOD BY AUTOMATED COUNT: 12.6 % (ref 10–15)
GLUCOSE SERPL-MCNC: 94 MG/DL (ref 70–99)
HCO3 SERPL-SCNC: 26 MMOL/L (ref 22–29)
HCT VFR BLD AUTO: 40.9 % (ref 35–47)
HGB BLD-MCNC: 13.3 G/DL (ref 11.7–15.7)
MCH RBC QN AUTO: 28.9 PG (ref 26.5–33)
MCHC RBC AUTO-ENTMCNC: 32.5 G/DL (ref 31.5–36.5)
MCV RBC AUTO: 89 FL (ref 78–100)
PHOSPHATE SERPL-MCNC: 3.2 MG/DL (ref 2.5–4.5)
PLATELET # BLD AUTO: 176 10E3/UL (ref 150–450)
POTASSIUM SERPL-SCNC: 4.3 MMOL/L (ref 3.4–5.3)
PROT SERPL-MCNC: 6.5 G/DL (ref 6.4–8.3)
RBC # BLD AUTO: 4.6 10E6/UL (ref 3.8–5.2)
SODIUM SERPL-SCNC: 140 MMOL/L (ref 135–145)
TACROLIMUS BLD-MCNC: 13.2 UG/L (ref 5–15)
TME LAST DOSE: NORMAL H
TME LAST DOSE: NORMAL H
WBC # BLD AUTO: 7.5 10E3/UL (ref 4–11)

## 2024-11-05 PROCEDURE — G0463 HOSPITAL OUTPT CLINIC VISIT: HCPCS | Performed by: INTERNAL MEDICINE

## 2024-11-05 PROCEDURE — 84100 ASSAY OF PHOSPHORUS: CPT | Performed by: PATHOLOGY

## 2024-11-05 PROCEDURE — 99000 SPECIMEN HANDLING OFFICE-LAB: CPT | Performed by: PATHOLOGY

## 2024-11-05 PROCEDURE — 85027 COMPLETE CBC AUTOMATED: CPT | Performed by: PATHOLOGY

## 2024-11-05 PROCEDURE — 80053 COMPREHEN METABOLIC PANEL: CPT | Performed by: PATHOLOGY

## 2024-11-05 PROCEDURE — 82248 BILIRUBIN DIRECT: CPT | Performed by: PATHOLOGY

## 2024-11-05 PROCEDURE — 99215 OFFICE O/P EST HI 40 MIN: CPT | Performed by: INTERNAL MEDICINE

## 2024-11-05 PROCEDURE — 80197 ASSAY OF TACROLIMUS: CPT | Performed by: INTERNAL MEDICINE

## 2024-11-05 PROCEDURE — 36415 COLL VENOUS BLD VENIPUNCTURE: CPT | Performed by: PATHOLOGY

## 2024-11-05 RX ORDER — PREDNISONE 5 MG/1
5 TABLET ORAL DAILY
Qty: 90 TABLET | Refills: 3 | Status: SHIPPED | OUTPATIENT
Start: 2024-11-05 | End: 2024-11-15

## 2024-11-05 ASSESSMENT — PAIN SCALES - GENERAL: PAINLEVEL_OUTOF10: NO PAIN (0)

## 2024-11-05 NOTE — TELEPHONE ENCOUNTER
Message from Dr. Lilly stating that he decreased Sherrie's prednisone to 5 mg daily today. Updated EPIC

## 2024-11-05 NOTE — LETTER
11/5/2024      Sherrie Lala  [address] [place] [id number]      Dear Colleague,    Thank you for referring your patient, Sherrie Lala, to the [facility]PAT[facility] [place]. Please see a copy of my visit note below.    Solid Organ Transplant Hepatology Follow-Up Visit:     HISTORY OF PRESENT ILLNESS:   I had the pleasure of seeing Sherrie Lala for followup in the Liver Clinic at the [facility] on November 5, 2024. Ms. 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  She is now 13 years status post cancer surgery.    She recently had an episode of acute cellular rejection, which came out of the blue without apparent explanation.  She nonetheless had a good response to antirejection therapy and we also boosted her maintenance immunosuppression to include prednisone and mycophenolate.     She now feels well at this visit.  She denies any abdominal pain, itching, skin rash or fatigue.  She denies any increased abdominal girth or lower extremity edema.       She has not had any fevers or chills, cough or shortness of breath.  She denies any nausea or vomiting, diarrhea or constipation.  She denies any nausea or vomiting, diarrhea or constipation.  Her appetite has been good and her weight has increased with her antirejection therapy she does feel a bit tremulous.    Medications:   Current Outpatient Medications   Medication Sig Dispense Refill     acetaminophen (TYLENOL) 500 MG tablet Take 1 tablet (500 mg) by mouth every 6 hours as needed for mild pain 30 tablet 1     albuterol (PROAIR HFA/PROVENTIL HFA/VENTOLIN HFA) 108 (90 Base) MCG/ACT inhaler Inhale 1-2 puffs into the lungs 2 times daily as needed for shortness of breath or wheezing 18 g 3     amLODIPine (NORVASC) 5 MG tablet Take 1 tablet (5 mg) by mouth daily 90 tablet 3     amoxicillin (AMOXIL) 500 MG  capsule Take 4 tablets (2000 mg) by mouth 1 hour before dental procedures/cleanings. 4 capsule 4     aspirin 81 MG EC tablet Take 81 mg by mouth daily       gabapentin (NEURONTIN) 300 MG capsule Take 2 capsules (600 mg) by mouth at bedtime. 180 capsule 3     levothyroxine (SYNTHROID/LEVOTHROID) 125 MCG tablet Take 125 mcg by mouth daily       mycophenolate (GENERIC EQUIVALENT) 500 MG tablet Take 500 mg by mouth 2 times daily. Called in for #60 with 3 fills       omeprazole (PRILOSEC) 20 MG DR capsule TAKE 1 CAPSULE TWICE DAILY 180 capsule 3     pramipexole (MIRAPEX) 0.25 MG tablet Take 4 tablets (1 mg) by mouth at bedtime. 360 tablet 3     predniSONE (DELTASONE) 5 MG tablet Take 3 tablets (15 mg) by mouth daily. 90 tablet 3     tacrolimus (GENERIC EQUIVALENT) 1 MG capsule Take 3 capsules (3 mg) by mouth every 12 hours. 180 capsule 11     zolpidem (AMBIEN) 10 MG tablet TAKE 1 TABLET ONE TIME DAILY AT BEDTIME AS NEEDED FOR SLEEP 90 tablet 1     No current facility-administered medications for this visit.      Vitals:   BP (!) 145/89   Pulse 87   Temp 97.8  F (36.6  C) (Oral)   Wt 62.8 kg (138 lb 8 oz)   LMP  (LMP Unknown)   SpO2 96%   BMI 24.53 kg/m      Physical Exam:   In general she looks quite well. HEENT exam shows no scleral icterus or temporal muscle wasting. Chest is clear. Abdominal exam shows no increase in girth. No masses or tenderness to palpation are present. Liver is 10 cm in span without left lobe enlargement. No spleen tip is palpable.  Her incision is intact.  Extremity exam shows no edema. Skin exam shows no suspicious lesions and neurologic exam is nonfocal..     Labs:   Lab Results   Component Value Date     10/31/2024    POTASSIUM 4.4 10/31/2024    CHLORIDE 102 10/31/2024    ANIONGAP 10 10/31/2024    CO2 27 10/31/2024    BUN 22.5 10/31/2024    CR 1.00 (H) 10/31/2024    GFRESTIMATED 61 10/31/2024    SHELDON 8.9 10/31/2024      Lab Results   Component Value Date    WBC 7.5 11/05/2024    HGB  13.3 11/05/2024    HCT 40.9 11/05/2024    MCV 89 11/05/2024    MCH 28.9 11/05/2024    MCHC 32.5 11/05/2024    RDW 12.6 11/05/2024     11/05/2024     Lab Results   Component Value Date    ALBUMIN 4.2 10/31/2024    ALKPHOS 83 10/31/2024    AST 21 10/31/2024     Lab Results   Component Value Date    INR 1.14 09/30/2024     Recent Labs   Lab Test 10/31/24  0915 10/24/24  0808 10/17/24  0824 10/10/24  0838 10/02/24  0825 09/25/24  0904 09/17/24  0758 07/02/24  1155 06/24/24  0912 06/18/24  1041   ALKPHOS 83 90 96 128 277* 155* 144 102 96 96   ALT 38 41 53* 65* 374* 189* 155* 9 10 13   AST 21 23 28 23 189* 103* 98* 13 15 18      Imaging:   No images are attached to the encounter.     Assessment/Plan:   IMPRESSION:   Ms. Lala is doing very well now more than 6 years status post liver transplantation, in spite of her recent episode of acute cellular rejection.  As I mentioned above, I am really at a loss to explain why this episode of rejection occurred almost 6 years status post transplant.  Nonetheless, her liver tests are all completely normal now.  Her kidney function looks quite good as well.  I will decrease her prednisone dose down to 10 mg as this is what mostly making her feel tremulous.  The other possibility is tacrolimus but her level is not that high.  Currently all complications are well addressed.  She needs a COVID-19 vaccine and will get a flu shot.  I also encouraged her to get an RSV vaccine.       She is otherwise up to date with regard to other vaccines and cancer screening and I will see her back in the clinic again in 6 months.     I did spend total of 40 minutes (on the date of the encounter), including 30 minutes of face-to-face clinic time including counseling. The rest of the time was spent in documentation and review of records.     Thank you very much for allowing me to participate in the care of this patient.  If you have any questions regarding my recommendations, please do not  hesitate to contact me.         Bradly Lilly MD      Professor of Medicine  [employer]      Executive Medical Director, Solid Organ Transplant Program  [facility]      Again, thank you for allowing me to participate in the care of your patient.        Sincerely,        Bradly Lilly MD

## 2024-11-05 NOTE — NURSING NOTE
Chief Complaint   Patient presents with    RECHECK       BP (!) 145/89   Pulse 87   Temp 97.8  F (36.6  C) (Oral)   Wt 62.8 kg (138 lb 8 oz)   LMP  (LMP Unknown)   SpO2 96%   BMI 24.53 kg/m      Fortunato Tan on 11/5/2024 at 10:52 AM

## 2024-11-05 NOTE — PROGRESS NOTES
Solid Organ Transplant Hepatology Follow-Up Visit:     HISTORY OF PRESENT ILLNESS:   I had the pleasure of seeing Sherrie Lala for followup in the Liver Clinic at the [facility] on November 5, 2024. Ms. 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  She is now 13 years status post cancer surgery.    She recently had an episode of acute cellular rejection, which came out of the blue without apparent explanation.  She nonetheless had a good response to antirejection therapy and we also boosted her maintenance immunosuppression to include prednisone and mycophenolate.     She now feels well at this visit.  She denies any abdominal pain, itching, skin rash or fatigue.  She denies any increased abdominal girth or lower extremity edema.       She has not had any fevers or chills, cough or shortness of breath.  She denies any nausea or vomiting, diarrhea or constipation.  She denies any nausea or vomiting, diarrhea or constipation.  Her appetite has been good and her weight has increased with her antirejection therapy she does feel a bit tremulous.    Medications:   Current Outpatient Medications   Medication Sig Dispense Refill    acetaminophen (TYLENOL) 500 MG tablet Take 1 tablet (500 mg) by mouth every 6 hours as needed for mild pain 30 tablet 1    albuterol (PROAIR HFA/PROVENTIL HFA/VENTOLIN HFA) 108 (90 Base) MCG/ACT inhaler Inhale 1-2 puffs into the lungs 2 times daily as needed for shortness of breath or wheezing 18 g 3    amLODIPine (NORVASC) 5 MG tablet Take 1 tablet (5 mg) by mouth daily 90 tablet 3    amoxicillin (AMOXIL) 500 MG capsule Take 4 tablets (2000 mg) by mouth 1 hour before dental procedures/cleanings. 4 capsule 4    aspirin 81 MG EC tablet Take 81 mg by mouth daily      gabapentin (NEURONTIN) 300 MG capsule Take 2 capsules (600 mg) by mouth at bedtime. 180  capsule 3    levothyroxine (SYNTHROID/LEVOTHROID) 125 MCG tablet Take 125 mcg by mouth daily      mycophenolate (GENERIC EQUIVALENT) 500 MG tablet Take 500 mg by mouth 2 times daily. Called in for #60 with 3 fills      omeprazole (PRILOSEC) 20 MG DR capsule TAKE 1 CAPSULE TWICE DAILY 180 capsule 3    pramipexole (MIRAPEX) 0.25 MG tablet Take 4 tablets (1 mg) by mouth at bedtime. 360 tablet 3    predniSONE (DELTASONE) 5 MG tablet Take 3 tablets (15 mg) by mouth daily. 90 tablet 3    tacrolimus (GENERIC EQUIVALENT) 1 MG capsule Take 3 capsules (3 mg) by mouth every 12 hours. 180 capsule 11    zolpidem (AMBIEN) 10 MG tablet TAKE 1 TABLET ONE TIME DAILY AT BEDTIME AS NEEDED FOR SLEEP 90 tablet 1     No current facility-administered medications for this visit.      Vitals:   BP (!) 145/89   Pulse 87   Temp 97.8  F (36.6  C) (Oral)   Wt 62.8 kg (138 lb 8 oz)   LMP  (LMP Unknown)   SpO2 96%   BMI 24.53 kg/m      Physical Exam:   In general she looks quite well. HEENT exam shows no scleral icterus or temporal muscle wasting. Chest is clear. Abdominal exam shows no increase in girth. No masses or tenderness to palpation are present. Liver is 10 cm in span without left lobe enlargement. No spleen tip is palpable.  Her incision is intact.  Extremity exam shows no edema. Skin exam shows no suspicious lesions and neurologic exam is nonfocal..     Labs:   Lab Results   Component Value Date     10/31/2024    POTASSIUM 4.4 10/31/2024    CHLORIDE 102 10/31/2024    ANIONGAP 10 10/31/2024    CO2 27 10/31/2024    BUN 22.5 10/31/2024    CR 1.00 (H) 10/31/2024    GFRESTIMATED 61 10/31/2024    SHELDON 8.9 10/31/2024      Lab Results   Component Value Date    WBC 7.5 11/05/2024    HGB 13.3 11/05/2024    HCT 40.9 11/05/2024    MCV 89 11/05/2024    MCH 28.9 11/05/2024    MCHC 32.5 11/05/2024    RDW 12.6 11/05/2024     11/05/2024     Lab Results   Component Value Date    ALBUMIN 4.2 10/31/2024    ALKPHOS 83 10/31/2024    AST 21  10/31/2024     Lab Results   Component Value Date    INR 1.14 09/30/2024     Recent Labs   Lab Test 10/31/24  0915 10/24/24  0808 10/17/24  0824 10/10/24  0838 10/02/24  0825 09/25/24  0904 09/17/24  0758 07/02/24  1155 06/24/24  0912 06/18/24  1041   ALKPHOS 83 90 96 128 277* 155* 144 102 96 96   ALT 38 41 53* 65* 374* 189* 155* 9 10 13   AST 21 23 28 23 189* 103* 98* 13 15 18      Imaging:   No images are attached to the encounter.     Assessment/Plan:   IMPRESSION:   Ms. Lala is doing very well now more than 6 years status post liver transplantation, in spite of her recent episode of acute cellular rejection.  As I mentioned above, I am really at a loss to explain why this episode of rejection occurred almost 6 years status post transplant.  Nonetheless, her liver tests are all completely normal now.  Her kidney function looks quite good as well.  I will decrease her prednisone dose down to 10 mg as this is what mostly making her feel tremulous.  The other possibility is tacrolimus but her level is not that high.  Currently all complications are well addressed.  She needs a COVID-19 vaccine and will get a flu shot.  I also encouraged her to get an RSV vaccine.       She is otherwise up to date with regard to other vaccines and cancer screening and I will see her back in the clinic again in 6 months.     I did spend total of 40 minutes (on the date of the encounter), including 30 minutes of face-to-face clinic time including counseling. The rest of the time was spent in documentation and review of records.     Thank you very much for allowing me to participate in the care of this patient.  If you have any questions regarding my recommendations, please do not hesitate to contact me.         Bradly Lilly MD      Professor of Medicine  [employer]      Executive Medical Director, Solid Organ Transplant Program  [facility]

## 2024-11-06 ENCOUNTER — TELEPHONE (OUTPATIENT)
Dept: TRANSPLANT | Facility: CLINIC | Age: 69
End: 2024-11-06
Payer: COMMERCIAL

## 2024-11-06 DIAGNOSIS — T86.41 LIVER TRANSPLANT REJECTION (H): ICD-10-CM

## 2024-11-06 NOTE — TELEPHONE ENCOUNTER
ISSUE:   Tacrolimus IR level 13.2 on 11/5, goal 6, dose 3 mg BID.    PLAN:   Call Patient and confirm this was an accurate 12-hour trough.   Verify Tacrolimus IR dose.  Confirm no new medications or or missed doses.   Confirm no new illness / infection / diarrhea.   If accurate trough and accurate dose, decrease Tacrolimus IR dose to 2 mg BID     Repeat labs 2 weeks per Dr. Lilly, also needs hepatic panel at this time.      Message from Dr. Lilly stating that tac level was NOT a 12 hour trough.  Will repeat tac level w/ next lab draw.      Please call Sherrie make sure she is aware of this.     Last time writer spoke to pt, she knows to get labs weekly for now. Also sent pt a [facility] message.      Parisa Thrasher LPN

## 2024-11-14 ENCOUNTER — LAB (OUTPATIENT)
Dept: LAB | Facility: CLINIC | Age: 69
End: 2024-11-14
Payer: COMMERCIAL

## 2024-11-14 DIAGNOSIS — T86.41 LIVER TRANSPLANT REJECTION (H): ICD-10-CM

## 2024-11-14 LAB
ALBUMIN SERPL BCG-MCNC: 4.2 G/DL (ref 3.5–5.2)
ALP SERPL-CCNC: 73 U/L (ref 40–150)
ALT SERPL W P-5'-P-CCNC: 34 U/L (ref 0–50)
ANION GAP SERPL CALCULATED.3IONS-SCNC: 14 MMOL/L (ref 7–15)
AST SERPL W P-5'-P-CCNC: 21 U/L (ref 0–45)
BILIRUB DIRECT SERPL-MCNC: <0.2 MG/DL (ref 0–0.3)
BILIRUB SERPL-MCNC: 0.6 MG/DL
BUN SERPL-MCNC: 22 MG/DL (ref 8–23)
CALCIUM SERPL-MCNC: 8.6 MG/DL (ref 8.8–10.4)
CHLORIDE SERPL-SCNC: 103 MMOL/L (ref 98–107)
CREAT SERPL-MCNC: 1 MG/DL (ref 0.51–0.95)
EGFRCR SERPLBLD CKD-EPI 2021: 61 ML/MIN/1.73M2
ERYTHROCYTE [DISTWIDTH] IN BLOOD BY AUTOMATED COUNT: 12.4 % (ref 10–15)
GLUCOSE SERPL-MCNC: 99 MG/DL (ref 70–99)
HCO3 SERPL-SCNC: 21 MMOL/L (ref 22–29)
HCT VFR BLD AUTO: 42.5 % (ref 35–47)
HGB BLD-MCNC: 13.5 G/DL (ref 11.7–15.7)
MCH RBC QN AUTO: 27.9 PG (ref 26.5–33)
MCHC RBC AUTO-ENTMCNC: 31.8 G/DL (ref 31.5–36.5)
MCV RBC AUTO: 88 FL (ref 78–100)
PHOSPHATE SERPL-MCNC: 3.8 MG/DL (ref 2.5–4.5)
PLATELET # BLD AUTO: 175 10E3/UL (ref 150–450)
POTASSIUM SERPL-SCNC: 3.7 MMOL/L (ref 3.4–5.3)
PROT SERPL-MCNC: 6.5 G/DL (ref 6.4–8.3)
RBC # BLD AUTO: 4.84 10E6/UL (ref 3.8–5.2)
SODIUM SERPL-SCNC: 138 MMOL/L (ref 135–145)
WBC # BLD AUTO: 9.5 10E3/UL (ref 4–11)

## 2024-11-14 PROCEDURE — 36415 COLL VENOUS BLD VENIPUNCTURE: CPT

## 2024-11-14 PROCEDURE — 82248 BILIRUBIN DIRECT: CPT

## 2024-11-14 PROCEDURE — 85027 COMPLETE CBC AUTOMATED: CPT

## 2024-11-14 PROCEDURE — 80053 COMPREHEN METABOLIC PANEL: CPT

## 2024-11-14 PROCEDURE — 84100 ASSAY OF PHOSPHORUS: CPT

## 2024-11-14 PROCEDURE — 80197 ASSAY OF TACROLIMUS: CPT

## 2024-11-15 ENCOUNTER — TELEPHONE (OUTPATIENT)
Dept: TRANSPLANT | Facility: CLINIC | Age: 69
End: 2024-11-15
Payer: COMMERCIAL

## 2024-11-15 DIAGNOSIS — T86.41 LIVER TRANSPLANT REJECTION (H): ICD-10-CM

## 2024-11-15 LAB
TACROLIMUS BLD-MCNC: 8 UG/L (ref 5–15)
TME LAST DOSE: NORMAL H
TME LAST DOSE: NORMAL H

## 2024-11-15 RX ORDER — PREDNISONE 5 MG/1
7.5 TABLET ORAL DAILY
Qty: 135 TABLET | Refills: 3 | Status: SHIPPED | OUTPATIENT
Start: 2024-11-15

## 2024-11-15 NOTE — TELEPHONE ENCOUNTER
Message from Dr. Lilly confirming tac dose :    Bradly Lilly MD sent to Abigail Parham RN  7.5 mg          Called Sherrie and let her know.  Suggested every other week labs.  She requests a new lab order be faxed.  I faxed to [facility].  Updated prednisone prescription and sent to [facility] per Sherrie's request.

## 2024-11-15 NOTE — LETTER
OUTPATIENT OR TRANSITIONAL CARE  LABORATORY TEST ORDER  [facility]    Patient Name: Sherrie Lala  Transplant Date: 8/30/2018   YOB: 1955  Issue Date: 11/15/24   [facility] MR#:[id number]  Expiration Date: 11/15/2024       Diagnoses: [x]      Liver Transplant (ICD-10 Z94.4)    [x]     Liver Transplant Rejection (T86.41)        Frequency: every 2 weeks for 2 months  then monthly       [x] CBC with Platelets   [x] Basic Metabolic Panel (Sodium, Potassium, Chloride, CO2, Creatinine, Urea Nitrogen, Glucose, Calcium)  [] Phosphorous, Magnesium  [x] Hepatic panel (Albumin, Alk Phos, ALT, AST, Direct and Total Bili)  [x] Tacrolimus drug level - 12 hour trough, please document time of last dose       Other Frequency: annually in August  [x]      Fasting lipid panel  [x] Random urine protein  [x] Urinalysis with reflex to micro    If you have questions, please call [phone] or [phone].    Please fax results when available to [phone]    .

## 2024-11-22 PROBLEM — A69.20 LYME DISEASE: Status: ACTIVE | Noted: 2024-07-02

## 2024-11-26 ENCOUNTER — LAB (OUTPATIENT)
Dept: LAB | Facility: CLINIC | Age: 69
End: 2024-11-26
Payer: COMMERCIAL

## 2024-11-26 DIAGNOSIS — T86.41 LIVER TRANSPLANT REJECTION (H): ICD-10-CM

## 2024-11-26 LAB
ALBUMIN SERPL BCG-MCNC: 4.3 G/DL (ref 3.5–5.2)
ALP SERPL-CCNC: 69 U/L (ref 40–150)
ALT SERPL W P-5'-P-CCNC: 24 U/L (ref 0–50)
ANION GAP SERPL CALCULATED.3IONS-SCNC: 9 MMOL/L (ref 7–15)
AST SERPL W P-5'-P-CCNC: 19 U/L (ref 0–45)
BILIRUB DIRECT SERPL-MCNC: <0.2 MG/DL (ref 0–0.3)
BILIRUB SERPL-MCNC: 0.6 MG/DL
BUN SERPL-MCNC: 25.7 MG/DL (ref 8–23)
CALCIUM SERPL-MCNC: 8.7 MG/DL (ref 8.8–10.4)
CHLORIDE SERPL-SCNC: 105 MMOL/L (ref 98–107)
CREAT SERPL-MCNC: 1.13 MG/DL (ref 0.51–0.95)
EGFRCR SERPLBLD CKD-EPI 2021: 53 ML/MIN/1.73M2
ERYTHROCYTE [DISTWIDTH] IN BLOOD BY AUTOMATED COUNT: 12.7 % (ref 10–15)
GLUCOSE SERPL-MCNC: 103 MG/DL (ref 70–99)
HCO3 SERPL-SCNC: 25 MMOL/L (ref 22–29)
HCT VFR BLD AUTO: 41.4 % (ref 35–47)
HGB BLD-MCNC: 13.3 G/DL (ref 11.7–15.7)
MCH RBC QN AUTO: 27.8 PG (ref 26.5–33)
MCHC RBC AUTO-ENTMCNC: 32.1 G/DL (ref 31.5–36.5)
MCV RBC AUTO: 86 FL (ref 78–100)
PHOSPHATE SERPL-MCNC: 3.6 MG/DL (ref 2.5–4.5)
PLATELET # BLD AUTO: 178 10E3/UL (ref 150–450)
POTASSIUM SERPL-SCNC: 4.6 MMOL/L (ref 3.4–5.3)
PROT SERPL-MCNC: 6.4 G/DL (ref 6.4–8.3)
RBC # BLD AUTO: 4.79 10E6/UL (ref 3.8–5.2)
SODIUM SERPL-SCNC: 139 MMOL/L (ref 135–145)
TACROLIMUS BLD-MCNC: 8.6 UG/L (ref 5–15)
TME LAST DOSE: NORMAL H
TME LAST DOSE: NORMAL H
WBC # BLD AUTO: 6 10E3/UL (ref 4–11)

## 2024-11-26 PROCEDURE — 80197 ASSAY OF TACROLIMUS: CPT

## 2024-11-26 PROCEDURE — 84100 ASSAY OF PHOSPHORUS: CPT

## 2024-11-26 PROCEDURE — 85027 COMPLETE CBC AUTOMATED: CPT

## 2024-11-26 PROCEDURE — 36415 COLL VENOUS BLD VENIPUNCTURE: CPT

## 2024-11-26 PROCEDURE — 82248 BILIRUBIN DIRECT: CPT

## 2024-11-26 PROCEDURE — 80053 COMPREHEN METABOLIC PANEL: CPT

## 2024-11-27 DIAGNOSIS — T86.41 LIVER TRANSPLANT REJECTION (H): ICD-10-CM

## 2024-11-27 DIAGNOSIS — R79.89 ELEVATED LFTS: ICD-10-CM

## 2024-11-27 RX ORDER — TACROLIMUS 1 MG/1
2 CAPSULE ORAL EVERY 12 HOURS
Qty: 120 CAPSULE | Refills: 11 | Status: SHIPPED | OUTPATIENT
Start: 2024-11-27

## 2024-11-27 NOTE — TELEPHONE ENCOUNTER
ISSUE:   Tacrolimus IR level 8.6 on 11/26, goal 6, dose 3 mg BID.    PLAN:   Call Patient and confirm this was an accurate 12-hour trough.   Verify Tacrolimus IR dose.  Confirm no new medications or or missed doses.   Confirm no new illness / infection / diarrhea.   If accurate trough and accurate dose, decrease Tacrolimus IR dose to 2 mg BID     Repeat labs in a month  Abigail Parham RN     OUTCOME:   Spoke with Patient, they confirm accurate trough level and current dose 3 mg BID.   Patient confirmed dose change to 2 mg BID.  Patient agreed to repeat labs in 1 months.   Orders sent to preferred pharmacy for dose change and lab for repeat labs.   Patient voiced understanding of plan.      Parisa Thrasher LPN

## 2024-12-10 ENCOUNTER — LAB (OUTPATIENT)
Dept: LAB | Facility: CLINIC | Age: 69
End: 2024-12-10
Payer: COMMERCIAL

## 2024-12-10 DIAGNOSIS — T86.41 LIVER TRANSPLANT REJECTION (H): ICD-10-CM

## 2024-12-10 LAB
ALBUMIN SERPL BCG-MCNC: 4.3 G/DL (ref 3.5–5.2)
ALP SERPL-CCNC: 86 U/L (ref 40–150)
ALT SERPL W P-5'-P-CCNC: 21 U/L (ref 0–50)
ANION GAP SERPL CALCULATED.3IONS-SCNC: 8 MMOL/L (ref 7–15)
AST SERPL W P-5'-P-CCNC: 20 U/L (ref 0–45)
BILIRUB DIRECT SERPL-MCNC: <0.2 MG/DL (ref 0–0.3)
BILIRUB SERPL-MCNC: 0.4 MG/DL
BUN SERPL-MCNC: 20.3 MG/DL (ref 8–23)
CALCIUM SERPL-MCNC: 8.8 MG/DL (ref 8.8–10.4)
CHLORIDE SERPL-SCNC: 104 MMOL/L (ref 98–107)
CREAT SERPL-MCNC: 1.01 MG/DL (ref 0.51–0.95)
EGFRCR SERPLBLD CKD-EPI 2021: 61 ML/MIN/1.73M2
ERYTHROCYTE [DISTWIDTH] IN BLOOD BY AUTOMATED COUNT: 12.8 % (ref 10–15)
GLUCOSE SERPL-MCNC: 91 MG/DL (ref 70–99)
HCO3 SERPL-SCNC: 28 MMOL/L (ref 22–29)
HCT VFR BLD AUTO: 42.7 % (ref 35–47)
HGB BLD-MCNC: 13.7 G/DL (ref 11.7–15.7)
MCH RBC QN AUTO: 27.6 PG (ref 26.5–33)
MCHC RBC AUTO-ENTMCNC: 32.1 G/DL (ref 31.5–36.5)
MCV RBC AUTO: 86 FL (ref 78–100)
PLATELET # BLD AUTO: 182 10E3/UL (ref 150–450)
POTASSIUM SERPL-SCNC: 4.1 MMOL/L (ref 3.4–5.3)
PROT SERPL-MCNC: 6.7 G/DL (ref 6.4–8.3)
RBC # BLD AUTO: 4.97 10E6/UL (ref 3.8–5.2)
SODIUM SERPL-SCNC: 140 MMOL/L (ref 135–145)
WBC # BLD AUTO: 4.9 10E3/UL (ref 4–11)

## 2024-12-10 PROCEDURE — 82248 BILIRUBIN DIRECT: CPT

## 2024-12-10 PROCEDURE — 80197 ASSAY OF TACROLIMUS: CPT

## 2024-12-10 PROCEDURE — 36415 COLL VENOUS BLD VENIPUNCTURE: CPT

## 2024-12-10 PROCEDURE — 80053 COMPREHEN METABOLIC PANEL: CPT

## 2024-12-10 PROCEDURE — 85027 COMPLETE CBC AUTOMATED: CPT

## 2024-12-11 LAB
TACROLIMUS BLD-MCNC: 5 UG/L (ref 5–15)
TME LAST DOSE: NORMAL H
TME LAST DOSE: NORMAL H

## 2024-12-12 ENCOUNTER — TELEPHONE (OUTPATIENT)
Dept: TRANSPLANT | Facility: CLINIC | Age: 69
End: 2024-12-12
Payer: COMMERCIAL

## 2024-12-12 DIAGNOSIS — T86.41 LIVER TRANSPLANT REJECTION (H): ICD-10-CM

## 2024-12-12 NOTE — TELEPHONE ENCOUNTER
Rejection 9/30/24.  Liver tests have only been normal since 11/14.  Sherrie is currently on tacrolimus, pred 7.5 and mycophenolate 500 bid.  She is asking if she can come off mmf and pred.  I explained that her labs have only normalized for 3 weeks and that is likely too early  but said I would ask Dr. Lilly if we can come down on mmf.  Message to Dr. Lilly.

## 2024-12-23 ENCOUNTER — LAB (OUTPATIENT)
Dept: LAB | Facility: CLINIC | Age: 69
End: 2024-12-23
Payer: COMMERCIAL

## 2024-12-23 DIAGNOSIS — T86.41 LIVER TRANSPLANT REJECTION (H): ICD-10-CM

## 2024-12-23 LAB
ALBUMIN SERPL BCG-MCNC: 4.3 G/DL (ref 3.5–5.2)
ALP SERPL-CCNC: 80 U/L (ref 40–150)
ALT SERPL W P-5'-P-CCNC: 23 U/L (ref 0–50)
ANION GAP SERPL CALCULATED.3IONS-SCNC: 10 MMOL/L (ref 7–15)
AST SERPL W P-5'-P-CCNC: 23 U/L (ref 0–45)
BILIRUB DIRECT SERPL-MCNC: <0.2 MG/DL (ref 0–0.3)
BILIRUB SERPL-MCNC: 0.4 MG/DL
BUN SERPL-MCNC: 19.6 MG/DL (ref 8–23)
CALCIUM SERPL-MCNC: 8.9 MG/DL (ref 8.8–10.4)
CHLORIDE SERPL-SCNC: 108 MMOL/L (ref 98–107)
CREAT SERPL-MCNC: 1.06 MG/DL (ref 0.51–0.95)
EGFRCR SERPLBLD CKD-EPI 2021: 57 ML/MIN/1.73M2
ERYTHROCYTE [DISTWIDTH] IN BLOOD BY AUTOMATED COUNT: 13.1 % (ref 10–15)
GLUCOSE SERPL-MCNC: 88 MG/DL (ref 70–99)
HCO3 SERPL-SCNC: 25 MMOL/L (ref 22–29)
HCT VFR BLD AUTO: 41.6 % (ref 35–47)
HGB BLD-MCNC: 13.2 G/DL (ref 11.7–15.7)
MCH RBC QN AUTO: 27.4 PG (ref 26.5–33)
MCHC RBC AUTO-ENTMCNC: 31.7 G/DL (ref 31.5–36.5)
MCV RBC AUTO: 87 FL (ref 78–100)
PLATELET # BLD AUTO: 172 10E3/UL (ref 150–450)
POTASSIUM SERPL-SCNC: 4.4 MMOL/L (ref 3.4–5.3)
PROT SERPL-MCNC: 6.5 G/DL (ref 6.4–8.3)
RBC # BLD AUTO: 4.81 10E6/UL (ref 3.8–5.2)
SODIUM SERPL-SCNC: 143 MMOL/L (ref 135–145)
TACROLIMUS BLD-MCNC: 5.3 UG/L (ref 5–15)
TME LAST DOSE: NORMAL H
TME LAST DOSE: NORMAL H
WBC # BLD AUTO: 6.1 10E3/UL (ref 4–11)

## 2024-12-23 PROCEDURE — 36415 COLL VENOUS BLD VENIPUNCTURE: CPT

## 2024-12-23 PROCEDURE — 80197 ASSAY OF TACROLIMUS: CPT

## 2024-12-23 PROCEDURE — 82248 BILIRUBIN DIRECT: CPT

## 2024-12-23 PROCEDURE — 80053 COMPREHEN METABOLIC PANEL: CPT

## 2024-12-23 PROCEDURE — 85027 COMPLETE CBC AUTOMATED: CPT

## 2024-12-26 NOTE — TELEPHONE ENCOUNTER
Patient Call: General      Reason for call: pt calling to review drug level    Call back needed? Yes    Return Call Needed  Same as documented in contacts section  When to return call?: Greater than one day: Route standard priority  
Sherrie called back again and appologized because she totally forgot she went for labs on Friday the 30th. She went if for her high risk labs and thought she was just doing them, but the [facility]  her Tac as well and she already took her medications. All is good now.   
Sherrie called back in confusion about her labs. I told her that the high level tac draw was from Friday the 30th. Sherrie said she did not get any labs done on the 30th and is confused by this. This must be an error that came over from [facility]. Sherrie's [facility] labs were due on 11/30, so I am thinking they put in that date and  them on an earlier date. Either way Sherrie is redoing her labs this coming Monday.   
Tacrolimus level 29.8.  I spoke with Sherrie yesterday, she called me to tell me that she is feeling much better.  She is generally very sensitive to prograf and I believe that if her level was really this high she would not be feeling well.  Please call her, ask her if she thinks it's possible that she took her dose prior to this lab draw.  Ask her to repeat her drug level early next week w/ particular attention to it needing to be a 12 hour trough level.         
Talked to Sherrie and she said she had her pills in her pocket and took them after the labs where drawn. She said she was 99% sure she did not take her meds before hand. She will have her labs redrawn early next week.   
26-Dec-2024 09:00

## 2025-01-08 DIAGNOSIS — I10 BENIGN ESSENTIAL HYPERTENSION: ICD-10-CM

## 2025-01-09 RX ORDER — AMLODIPINE BESYLATE 5 MG/1
5 TABLET ORAL DAILY
Qty: 90 TABLET | Refills: 3 | OUTPATIENT
Start: 2025-01-09

## 2025-01-09 NOTE — TELEPHONE ENCOUNTER
Sent a [facility] message asking that pt get authorization for amlodipine through PCP and to call with any questions.

## 2025-01-13 ENCOUNTER — LAB (OUTPATIENT)
Dept: LAB | Facility: CLINIC | Age: 70
End: 2025-01-13
Payer: COMMERCIAL

## 2025-01-13 DIAGNOSIS — T86.41 LIVER TRANSPLANT REJECTION (H): ICD-10-CM

## 2025-01-13 LAB
ALBUMIN SERPL BCG-MCNC: 4.4 G/DL (ref 3.5–5.2)
ALP SERPL-CCNC: 91 U/L (ref 40–150)
ALT SERPL W P-5'-P-CCNC: 23 U/L (ref 0–50)
ANION GAP SERPL CALCULATED.3IONS-SCNC: 10 MMOL/L (ref 7–15)
AST SERPL W P-5'-P-CCNC: 24 U/L (ref 0–45)
BILIRUB DIRECT SERPL-MCNC: <0.2 MG/DL (ref 0–0.3)
BILIRUB SERPL-MCNC: 0.5 MG/DL
BUN SERPL-MCNC: 27.9 MG/DL (ref 8–23)
CALCIUM SERPL-MCNC: 8.8 MG/DL (ref 8.8–10.4)
CHLORIDE SERPL-SCNC: 107 MMOL/L (ref 98–107)
CREAT SERPL-MCNC: 1.02 MG/DL (ref 0.51–0.95)
EGFRCR SERPLBLD CKD-EPI 2021: 60 ML/MIN/1.73M2
ERYTHROCYTE [DISTWIDTH] IN BLOOD BY AUTOMATED COUNT: 13.1 % (ref 10–15)
GLUCOSE SERPL-MCNC: 86 MG/DL (ref 70–99)
HCO3 SERPL-SCNC: 25 MMOL/L (ref 22–29)
HCT VFR BLD AUTO: 42.5 % (ref 35–47)
HGB BLD-MCNC: 13.8 G/DL (ref 11.7–15.7)
MCH RBC QN AUTO: 27.7 PG (ref 26.5–33)
MCHC RBC AUTO-ENTMCNC: 32.5 G/DL (ref 31.5–36.5)
MCV RBC AUTO: 85 FL (ref 78–100)
PLATELET # BLD AUTO: 187 10E3/UL (ref 150–450)
POTASSIUM SERPL-SCNC: 4.4 MMOL/L (ref 3.4–5.3)
PROT SERPL-MCNC: 6.7 G/DL (ref 6.4–8.3)
RBC # BLD AUTO: 4.98 10E6/UL (ref 3.8–5.2)
SODIUM SERPL-SCNC: 142 MMOL/L (ref 135–145)
TACROLIMUS BLD-MCNC: 7.5 UG/L (ref 5–15)
TME LAST DOSE: NORMAL H
TME LAST DOSE: NORMAL H
WBC # BLD AUTO: 5.9 10E3/UL (ref 4–11)

## 2025-01-13 PROCEDURE — 36415 COLL VENOUS BLD VENIPUNCTURE: CPT

## 2025-01-13 PROCEDURE — 80197 ASSAY OF TACROLIMUS: CPT

## 2025-01-13 PROCEDURE — 80053 COMPREHEN METABOLIC PANEL: CPT

## 2025-01-13 PROCEDURE — 82248 BILIRUBIN DIRECT: CPT

## 2025-01-13 PROCEDURE — 85027 COMPLETE CBC AUTOMATED: CPT

## 2025-01-14 ENCOUNTER — TELEPHONE (OUTPATIENT)
Dept: TRANSPLANT | Facility: CLINIC | Age: 70
End: 2025-01-14
Payer: COMMERCIAL

## 2025-01-14 DIAGNOSIS — T86.41 LIVER TRANSPLANT REJECTION (H): ICD-10-CM

## 2025-01-14 NOTE — LETTER
OUTPATIENT LABORATORY TEST ORDER     [facility]  Fax# [phone]  Frequency change  Patient Name: Sherrie Lala   YOB: 1955     [facility] MR# [if applicable]: [id number]   Date & Time: January 15, 2025  1:39 PM  Expiration Date: 1 year after date issued       Diagnosis: Liver Transplant (ICD-10 Z94.4)   Aftercare following organ transplant (ICD-10 Z48.288)   Long term use of medications (ICD-10 Z79.899)      We ask your assistance in obtaining the following laboratory tests, which are part of our routine surveillance program for Solid Organ Transplant patients.     Please fax each result to [phone], same day as resulted/available    Critical lab results page [phone]      Every other week for 1 month, then monthly for 2 months then every 2-3 months thereafter  CBC with Platelets   Basic Metabolic Panel   Hepatic panel   Tacrolimus drug level - 12 hour trough, please document time of last dose       Labs annually due October 2025  Phosphorous  Fasting Lipid Panel  Urine protein/creatinine ratio  UA with reflex to micro  Magnesium      If you have any questions, please call [facility]- [phone] or [phone], Fax- [phone].    .

## 2025-01-14 NOTE — TELEPHONE ENCOUNTER
Per nick Castillo to decrease prednisone to 5 mg daily.  With this change she will need every other week labs for a month, then monthly x 2, then every 2 mos. .

## 2025-01-14 NOTE — TELEPHONE ENCOUNTER
Left the following message:      Per nick Castillo to decrease prednisone to 5 mg daily.  With this change she will need every other week labs for a month, then monthly x 2, then every 2 mos   And confirm.

## 2025-01-15 ENCOUNTER — TELEPHONE (OUTPATIENT)
Dept: TRANSPLANT | Facility: CLINIC | Age: 70
End: 2025-01-15
Payer: COMMERCIAL

## 2025-01-15 DIAGNOSIS — T86.41 LIVER TRANSPLANT REJECTION (H): ICD-10-CM

## 2025-01-15 RX ORDER — PREDNISONE 5 MG/1
5 TABLET ORAL DAILY
Qty: 90 TABLET | Refills: 3 | Status: SHIPPED | OUTPATIENT
Start: 2025-01-15

## 2025-01-15 NOTE — ADDENDUM NOTE
Addended by: FABIO BERNARD on: 1/15/2025 01:45 PM     Modules accepted: Orders     shortness of breath

## 2025-01-15 NOTE — TELEPHONE ENCOUNTER
Provider Call: General  Route to LPN    Reason for call: [facility] called  they are now part of [facility]  Place new orders into [facility]     Call back needed? No

## 2025-01-23 DIAGNOSIS — G47.00 PERSISTENT INSOMNIA: ICD-10-CM

## 2025-01-23 RX ORDER — ZOLPIDEM TARTRATE 10 MG/1
TABLET ORAL
Qty: 90 TABLET | Refills: 1 | Status: SHIPPED | OUTPATIENT
Start: 2025-01-23

## 2025-01-29 ENCOUNTER — TELEPHONE (OUTPATIENT)
Dept: TRANSPLANT | Facility: CLINIC | Age: 70
End: 2025-01-29
Payer: COMMERCIAL

## 2025-01-29 DIAGNOSIS — T86.41 LIVER TRANSPLANT REJECTION (H): ICD-10-CM

## 2025-01-29 RX ORDER — MYCOPHENOLATE MOFETIL 500 MG/1
500 TABLET ORAL 2 TIMES DAILY
Qty: 60 TABLET | Refills: 3 | OUTPATIENT
Start: 2025-01-29

## 2025-01-29 RX ORDER — MYCOPHENOLATE MOFETIL 500 MG/1
500 TABLET ORAL 2 TIMES DAILY
Qty: 180 TABLET | Refills: 3 | Status: SHIPPED | OUTPATIENT
Start: 2025-01-29

## 2025-01-30 ENCOUNTER — LAB (OUTPATIENT)
Dept: LAB | Facility: CLINIC | Age: 70
End: 2025-01-30
Payer: COMMERCIAL

## 2025-01-30 ENCOUNTER — TELEPHONE (OUTPATIENT)
Dept: TRANSPLANT | Facility: CLINIC | Age: 70
End: 2025-01-30

## 2025-01-30 DIAGNOSIS — Z94.4 LIVER REPLACED BY TRANSPLANT (H): ICD-10-CM

## 2025-01-30 DIAGNOSIS — T86.41 LIVER TRANSPLANT REJECTION (H): ICD-10-CM

## 2025-01-30 LAB
ALBUMIN SERPL BCG-MCNC: 4.3 G/DL (ref 3.5–5.2)
ALP SERPL-CCNC: 87 U/L (ref 40–150)
ALT SERPL W P-5'-P-CCNC: 23 U/L (ref 0–50)
ANION GAP SERPL CALCULATED.3IONS-SCNC: 13 MMOL/L (ref 7–15)
AST SERPL W P-5'-P-CCNC: 23 U/L (ref 0–45)
BILIRUB DIRECT SERPL-MCNC: <0.2 MG/DL (ref 0–0.3)
BILIRUB SERPL-MCNC: 0.4 MG/DL
BUN SERPL-MCNC: 29 MG/DL (ref 8–23)
CALCIUM SERPL-MCNC: 9.4 MG/DL (ref 8.8–10.4)
CHLORIDE SERPL-SCNC: 105 MMOL/L (ref 98–107)
CREAT SERPL-MCNC: 1.08 MG/DL (ref 0.51–0.95)
EGFRCR SERPLBLD CKD-EPI 2021: 56 ML/MIN/1.73M2
ERYTHROCYTE [DISTWIDTH] IN BLOOD BY AUTOMATED COUNT: 13.1 % (ref 10–15)
GLUCOSE SERPL-MCNC: 88 MG/DL (ref 70–99)
HCO3 SERPL-SCNC: 23 MMOL/L (ref 22–29)
HCT VFR BLD AUTO: 40.8 % (ref 35–47)
HGB BLD-MCNC: 13.1 G/DL (ref 11.7–15.7)
MCH RBC QN AUTO: 27.2 PG (ref 26.5–33)
MCHC RBC AUTO-ENTMCNC: 32.1 G/DL (ref 31.5–36.5)
MCV RBC AUTO: 85 FL (ref 78–100)
PLATELET # BLD AUTO: 143 10E3/UL (ref 150–450)
POTASSIUM SERPL-SCNC: 3.9 MMOL/L (ref 3.4–5.3)
PROT SERPL-MCNC: 6.6 G/DL (ref 6.4–8.3)
RBC # BLD AUTO: 4.81 10E6/UL (ref 3.8–5.2)
SODIUM SERPL-SCNC: 141 MMOL/L (ref 135–145)
TACROLIMUS BLD-MCNC: 4.8 UG/L (ref 5–15)
TME LAST DOSE: ABNORMAL H
TME LAST DOSE: ABNORMAL H
WBC # BLD AUTO: 4.9 10E3/UL (ref 4–11)

## 2025-01-30 NOTE — TELEPHONE ENCOUNTER
Sherrie's labs  have been normal for 2 mos now.  She continues on tac w / goal 5-7, pred 5 daily and mycophenolate 500 mg bid.   I spoke w/ her, she is feeling well.    If we were to wean her off pred or mmf she prefers to come off mmf first.  Message to Dr. Lilly asking if I can wean off. Sherrie is fine w/ a mychart message letting her know if he agrees.  She only has 500 mg mmf so will need 250's if we proceed w/ wean.  She'd like this called to [facility] in [place].

## 2025-02-11 ENCOUNTER — LAB (OUTPATIENT)
Dept: LAB | Facility: CLINIC | Age: 70
End: 2025-02-11
Payer: COMMERCIAL

## 2025-02-11 DIAGNOSIS — T86.41 LIVER TRANSPLANT REJECTION (H): ICD-10-CM

## 2025-02-11 LAB
ALBUMIN SERPL BCG-MCNC: 4.2 G/DL (ref 3.5–5.2)
ALP SERPL-CCNC: 88 U/L (ref 40–150)
ALT SERPL W P-5'-P-CCNC: 20 U/L (ref 0–50)
ANION GAP SERPL CALCULATED.3IONS-SCNC: 14 MMOL/L (ref 7–15)
AST SERPL W P-5'-P-CCNC: 21 U/L (ref 0–45)
BILIRUB DIRECT SERPL-MCNC: <0.2 MG/DL (ref 0–0.3)
BILIRUB SERPL-MCNC: 0.4 MG/DL
BUN SERPL-MCNC: 26.6 MG/DL (ref 8–23)
CALCIUM SERPL-MCNC: 9 MG/DL (ref 8.8–10.4)
CHLORIDE SERPL-SCNC: 107 MMOL/L (ref 98–107)
CREAT SERPL-MCNC: 0.97 MG/DL (ref 0.51–0.95)
EGFRCR SERPLBLD CKD-EPI 2021: 64 ML/MIN/1.73M2
ERYTHROCYTE [DISTWIDTH] IN BLOOD BY AUTOMATED COUNT: 13 % (ref 10–15)
GLUCOSE SERPL-MCNC: 82 MG/DL (ref 70–99)
HCO3 SERPL-SCNC: 21 MMOL/L (ref 22–29)
HCT VFR BLD AUTO: 41.1 % (ref 35–47)
HGB BLD-MCNC: 13.1 G/DL (ref 11.7–15.7)
MCH RBC QN AUTO: 27.1 PG (ref 26.5–33)
MCHC RBC AUTO-ENTMCNC: 31.9 G/DL (ref 31.5–36.5)
MCV RBC AUTO: 85 FL (ref 78–100)
PLATELET # BLD AUTO: 156 10E3/UL (ref 150–450)
POTASSIUM SERPL-SCNC: 4.3 MMOL/L (ref 3.4–5.3)
PROT SERPL-MCNC: 6.5 G/DL (ref 6.4–8.3)
RBC # BLD AUTO: 4.83 10E6/UL (ref 3.8–5.2)
SODIUM SERPL-SCNC: 142 MMOL/L (ref 135–145)
TACROLIMUS BLD-MCNC: 5.6 UG/L (ref 5–15)
TME LAST DOSE: NORMAL H
TME LAST DOSE: NORMAL H
WBC # BLD AUTO: 9.5 10E3/UL (ref 4–11)

## 2025-02-11 PROCEDURE — 85027 COMPLETE CBC AUTOMATED: CPT

## 2025-02-11 PROCEDURE — 80197 ASSAY OF TACROLIMUS: CPT

## 2025-02-11 PROCEDURE — 36415 COLL VENOUS BLD VENIPUNCTURE: CPT

## 2025-02-11 PROCEDURE — 82248 BILIRUBIN DIRECT: CPT

## 2025-02-11 PROCEDURE — 80053 COMPREHEN METABOLIC PANEL: CPT

## 2025-02-19 ENCOUNTER — VIRTUAL VISIT (OUTPATIENT)
Dept: FAMILY MEDICINE | Facility: CLINIC | Age: 70
End: 2025-02-19
Payer: COMMERCIAL

## 2025-02-19 DIAGNOSIS — I10 BENIGN ESSENTIAL HYPERTENSION: ICD-10-CM

## 2025-02-19 DIAGNOSIS — R06.09 DYSPNEA ON EXERTION: ICD-10-CM

## 2025-02-19 DIAGNOSIS — Z94.4 LIVER REPLACED BY TRANSPLANT (H): ICD-10-CM

## 2025-02-19 DIAGNOSIS — C78.00 MALIGNANT NEOPLASM METASTATIC TO LUNG, UNSPECIFIED LATERALITY (H): Primary | ICD-10-CM

## 2025-02-19 DIAGNOSIS — Z94.4 HISTORY OF LIVER TRANSPLANT (H): ICD-10-CM

## 2025-02-19 PROCEDURE — 98014 SYNCH AUDIO-ONLY EST MOD 30: CPT | Performed by: INTERNAL MEDICINE

## 2025-02-19 RX ORDER — HYDROCHLOROTHIAZIDE 12.5 MG/1
12.5 TABLET ORAL DAILY
Qty: 90 TABLET | Refills: 3 | Status: SHIPPED | OUTPATIENT
Start: 2025-02-19

## 2025-02-19 RX ORDER — ALBUTEROL SULFATE 90 UG/1
1-2 INHALANT RESPIRATORY (INHALATION) 2 TIMES DAILY PRN
Qty: 18 G | Refills: 3 | Status: SHIPPED | OUTPATIENT
Start: 2025-02-19

## 2025-02-19 NOTE — ASSESSMENT & PLAN NOTE
recurrent, metastatic cholangiocarcinoma; followed by Dr. 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  Liver biopsy demonstrating mild cellular rejection changes  -CellCept added to her tacrolimus.  Treated with oral prednisone with subsequent tapering off completely.    -Notable reversal response in transaminases with immunosuppression changes

## 2025-02-19 NOTE — ASSESSMENT & PLAN NOTE
current antihypertensive regimen: Amlodipine 5 mg daily  regimen changes: Stopping amlodipine, replace with hydrochlorothiazide 12.5 mg daily  intolerance:   future titration/work-up plan:  Fall, 2025 -notable in clinic blood pressure elevations in context of prednisone use for mild cellular rejection -I suspect likely a reversible phenomenon with elimination of prednisone use  2/2025: Stopping amlodipine replacing with hydrochlorothiazide 12.5 mg daily  -Plans for in-clinic follow-up in approximately 2 months.

## 2025-02-19 NOTE — PROGRESS NOTES
Sherrie is a 69 year old who is being evaluated via a billable telephone visit.    What phone number would you like to be contacted at? [phone]  How would you like to obtain your AVS? MyChart  Originating Location (pt. Location): Home    Distant Location (provider location):  On-site  Telephone visit completed due to the patient did not consent to a video visit.    Assessment & Plan   Problem List Items Addressed This Visit          Respiratory    Dyspnea on exertion    Relevant Medications    albuterol (PROAIR HFA/PROVENTIL HFA/VENTOLIN HFA) 108 (90 Base) MCG/ACT inhaler    Malignant neoplasm metastatic to lung, unspecified laterality (H) - Primary       Circulatory    Benign essential hypertension     current antihypertensive regimen: Amlodipine 5 mg daily  regimen changes: Stopping amlodipine, replace with hydrochlorothiazide 12.5 mg daily  intolerance:   future titration/work-up plan:  Fall, 2025 -notable in clinic blood pressure elevations in context of prednisone use for mild cellular rejection -I suspect likely a reversible phenomenon with elimination of prednisone use  2/2025: Stopping amlodipine replacing with hydrochlorothiazide 12.5 mg daily  -Plans for in-clinic follow-up in approximately 2 months.           Relevant Medications    hydroCHLOROthiazide 12.5 MG tablet       Other    History of liver transplant (H)     recurrent, metastatic cholangiocarcinoma; followed by Dr. 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  Liver biopsy demonstrating mild cellular rejection changes  -CellCept added to her tacrolimus.  Treated with oral prednisone with subsequent tapering off completely.    -Notable reversal response in transaminases with immunosuppression changes          Other Visit Diagnoses       Liver replaced by transplant (H)                      FUTURE APPOINTMENTS:       - Follow-up visit in 2 months      Shell Balderas is a 69 year old, presenting for the following health issues: Telephone visit regarding blood pressure medications.  Had been on amlodipine as prescribed by her transplant provider -has been on for over a year.  More recent elevated in clinic blood pressures since her prednisone use for concerns for cellular rejection.  Now on CellCept in addition to her tacrolimus.  Had been on prednisone which has now been weaned off entirely.  She does have a home blood pressure cuff available to her.  Does notice some dependent swelling in her feet and ankles -questions if she could benefit from a diuretic  Medication Request (Switching HTN Medications - )      2/19/2025    10:44 AM   Additional Questions   Roomed by Ene KING CMA   Accompanied by None           Objective           Vitals:  No vitals were obtained today due to virtual visit.    Physical Exam   General: Alert and no distress //Respiratory: No audible wheeze, cough, or shortness of breath // Psychiatric:  Appropriate affect, tone, and pace of words            Phone call duration: 15 minutes  Signed Electronically by: Apollo Benitez MD

## 2025-02-25 ENCOUNTER — LAB (OUTPATIENT)
Dept: LAB | Facility: CLINIC | Age: 70
End: 2025-02-25
Payer: COMMERCIAL

## 2025-02-25 DIAGNOSIS — T86.41 LIVER TRANSPLANT REJECTION (H): ICD-10-CM

## 2025-02-25 LAB
ALBUMIN SERPL BCG-MCNC: 4.4 G/DL (ref 3.5–5.2)
ALP SERPL-CCNC: 90 U/L (ref 40–150)
ALT SERPL W P-5'-P-CCNC: 22 U/L (ref 0–50)
ANION GAP SERPL CALCULATED.3IONS-SCNC: 11 MMOL/L (ref 7–15)
AST SERPL W P-5'-P-CCNC: 23 U/L (ref 0–45)
BILIRUB DIRECT SERPL-MCNC: 0.17 MG/DL (ref 0–0.3)
BILIRUB SERPL-MCNC: 0.4 MG/DL
BUN SERPL-MCNC: 19.8 MG/DL (ref 8–23)
CALCIUM SERPL-MCNC: 8.6 MG/DL (ref 8.8–10.4)
CHLORIDE SERPL-SCNC: 106 MMOL/L (ref 98–107)
CREAT SERPL-MCNC: 1.07 MG/DL (ref 0.51–0.95)
EGFRCR SERPLBLD CKD-EPI 2021: 57 ML/MIN/1.73M2
ERYTHROCYTE [DISTWIDTH] IN BLOOD BY AUTOMATED COUNT: 12.8 % (ref 10–15)
GLUCOSE SERPL-MCNC: 91 MG/DL (ref 70–99)
HCO3 SERPL-SCNC: 24 MMOL/L (ref 22–29)
HCT VFR BLD AUTO: 43.3 % (ref 35–47)
HGB BLD-MCNC: 13.9 G/DL (ref 11.7–15.7)
MCH RBC QN AUTO: 27.3 PG (ref 26.5–33)
MCHC RBC AUTO-ENTMCNC: 32.1 G/DL (ref 31.5–36.5)
MCV RBC AUTO: 85 FL (ref 78–100)
PLATELET # BLD AUTO: 160 10E3/UL (ref 150–450)
POTASSIUM SERPL-SCNC: 4.2 MMOL/L (ref 3.4–5.3)
PROT SERPL-MCNC: 6.6 G/DL (ref 6.4–8.3)
RBC # BLD AUTO: 5.1 10E6/UL (ref 3.8–5.2)
SODIUM SERPL-SCNC: 141 MMOL/L (ref 135–145)
TACROLIMUS BLD-MCNC: 7.2 UG/L (ref 5–15)
TME LAST DOSE: NORMAL H
TME LAST DOSE: NORMAL H
WBC # BLD AUTO: 5 10E3/UL (ref 4–11)

## 2025-02-25 PROCEDURE — 80197 ASSAY OF TACROLIMUS: CPT

## 2025-02-25 PROCEDURE — 85027 COMPLETE CBC AUTOMATED: CPT

## 2025-02-25 PROCEDURE — 80053 COMPREHEN METABOLIC PANEL: CPT

## 2025-02-25 PROCEDURE — 36415 COLL VENOUS BLD VENIPUNCTURE: CPT

## 2025-02-25 PROCEDURE — 82248 BILIRUBIN DIRECT: CPT

## 2025-02-26 DIAGNOSIS — R79.89 ELEVATED LFTS: ICD-10-CM

## 2025-02-26 DIAGNOSIS — T86.41 LIVER TRANSPLANT REJECTION (H): ICD-10-CM

## 2025-02-26 RX ORDER — TACROLIMUS 1 MG/1
1 CAPSULE ORAL EVERY 12 HOURS
Qty: 60 CAPSULE | Refills: 11 | Status: SHIPPED | OUTPATIENT
Start: 2025-02-26

## 2025-02-26 NOTE — TELEPHONE ENCOUNTER
ISSUE:   Tacrolimus IR level 7.5 on 2/25, goal 5, dose 2 mg BID.    PLAN:   Call Patient and confirm this was an accurate 12-hour trough.   Verify Tacrolimus IR dose .  Confirm no new medications or or missed doses.   Confirm no new illness / infection / diarrhea.   If accurate trough and accurate dose, decrease Tacrolimus IR dose to 1. mg BID     Repeat labs in 2 weeks.  If labs good at this point will ask Dr. Lilly if we can back off on mmf dose.  *If > 50% change in immunosuppression dose, repeat labs in 1 week.     OUTCOME:   Spoke with Patient, they confirm accurate trough level and current dose 2 mg BID.   Patient confirmed dose change to 1 mg BID.  Patient agreed to repeat labs in 2 weeks.   Orders sent to preferred pharmacy for dose change and lab for repeat labs.   Patient voiced understanding of plan.     Pt prefers the tacro get sent to the  specialty pharmacy.      Parisa Thrasher LPN

## 2025-03-13 ENCOUNTER — LAB (OUTPATIENT)
Dept: LAB | Facility: CLINIC | Age: 70
End: 2025-03-13
Payer: COMMERCIAL

## 2025-03-13 DIAGNOSIS — T86.41 LIVER TRANSPLANT REJECTION (H): ICD-10-CM

## 2025-03-13 LAB
ALBUMIN SERPL BCG-MCNC: 4.4 G/DL (ref 3.5–5.2)
ALP SERPL-CCNC: 97 U/L (ref 40–150)
ALT SERPL W P-5'-P-CCNC: 25 U/L (ref 0–50)
ANION GAP SERPL CALCULATED.3IONS-SCNC: 11 MMOL/L (ref 7–15)
AST SERPL W P-5'-P-CCNC: 25 U/L (ref 0–45)
BILIRUB DIRECT SERPL-MCNC: 0.15 MG/DL (ref 0–0.3)
BILIRUB SERPL-MCNC: 0.5 MG/DL
BUN SERPL-MCNC: 29.4 MG/DL (ref 8–23)
CALCIUM SERPL-MCNC: 9.4 MG/DL (ref 8.8–10.4)
CHLORIDE SERPL-SCNC: 103 MMOL/L (ref 98–107)
CREAT SERPL-MCNC: 1.21 MG/DL (ref 0.51–0.95)
EGFRCR SERPLBLD CKD-EPI 2021: 49 ML/MIN/1.73M2
ERYTHROCYTE [DISTWIDTH] IN BLOOD BY AUTOMATED COUNT: 13.3 % (ref 10–15)
GLUCOSE SERPL-MCNC: 89 MG/DL (ref 70–99)
HCO3 SERPL-SCNC: 28 MMOL/L (ref 22–29)
HCT VFR BLD AUTO: 41.4 % (ref 35–47)
HGB BLD-MCNC: 13.3 G/DL (ref 11.7–15.7)
MCH RBC QN AUTO: 27.1 PG (ref 26.5–33)
MCHC RBC AUTO-ENTMCNC: 32.1 G/DL (ref 31.5–36.5)
MCV RBC AUTO: 84 FL (ref 78–100)
PLATELET # BLD AUTO: 167 10E3/UL (ref 150–450)
POTASSIUM SERPL-SCNC: 3.5 MMOL/L (ref 3.4–5.3)
PROT SERPL-MCNC: 6.6 G/DL (ref 6.4–8.3)
RBC # BLD AUTO: 4.91 10E6/UL (ref 3.8–5.2)
SODIUM SERPL-SCNC: 142 MMOL/L (ref 135–145)
TACROLIMUS BLD-MCNC: 3 UG/L (ref 5–15)
TME LAST DOSE: ABNORMAL H
TME LAST DOSE: ABNORMAL H
WBC # BLD AUTO: 6.4 10E3/UL (ref 4–11)

## 2025-04-08 ENCOUNTER — LAB (OUTPATIENT)
Dept: LAB | Facility: CLINIC | Age: 70
End: 2025-04-08
Payer: COMMERCIAL

## 2025-04-08 DIAGNOSIS — T86.41 LIVER TRANSPLANT REJECTION (H): ICD-10-CM

## 2025-04-08 LAB
ALBUMIN SERPL BCG-MCNC: 4.2 G/DL (ref 3.5–5.2)
ALP SERPL-CCNC: 101 U/L (ref 40–150)
ALT SERPL W P-5'-P-CCNC: 12 U/L (ref 0–50)
ANION GAP SERPL CALCULATED.3IONS-SCNC: 10 MMOL/L (ref 7–15)
AST SERPL W P-5'-P-CCNC: 19 U/L (ref 0–45)
BILIRUB DIRECT SERPL-MCNC: 0.22 MG/DL (ref 0–0.3)
BILIRUB SERPL-MCNC: 0.5 MG/DL
BUN SERPL-MCNC: 24 MG/DL (ref 8–23)
CALCIUM SERPL-MCNC: 9.2 MG/DL (ref 8.8–10.4)
CHLORIDE SERPL-SCNC: 101 MMOL/L (ref 98–107)
CREAT SERPL-MCNC: 1.11 MG/DL (ref 0.51–0.95)
EGFRCR SERPLBLD CKD-EPI 2021: 54 ML/MIN/1.73M2
ERYTHROCYTE [DISTWIDTH] IN BLOOD BY AUTOMATED COUNT: 12.8 % (ref 10–15)
GLUCOSE SERPL-MCNC: 88 MG/DL (ref 70–99)
HCO3 SERPL-SCNC: 28 MMOL/L (ref 22–29)
HCT VFR BLD AUTO: 40.3 % (ref 35–47)
HGB BLD-MCNC: 12.9 G/DL (ref 11.7–15.7)
MCH RBC QN AUTO: 26.7 PG (ref 26.5–33)
MCHC RBC AUTO-ENTMCNC: 32 G/DL (ref 31.5–36.5)
MCV RBC AUTO: 83 FL (ref 78–100)
PLATELET # BLD AUTO: 184 10E3/UL (ref 150–450)
POTASSIUM SERPL-SCNC: 3.9 MMOL/L (ref 3.4–5.3)
PROT SERPL-MCNC: 6.7 G/DL (ref 6.4–8.3)
RBC # BLD AUTO: 4.83 10E6/UL (ref 3.8–5.2)
SODIUM SERPL-SCNC: 139 MMOL/L (ref 135–145)
TACROLIMUS BLD-MCNC: 3.3 UG/L (ref 5–15)
TME LAST DOSE: ABNORMAL H
TME LAST DOSE: ABNORMAL H
WBC # BLD AUTO: 4.9 10E3/UL (ref 4–11)

## 2025-04-08 PROCEDURE — 84155 ASSAY OF PROTEIN SERUM: CPT | Performed by: TRANSPLANT SURGERY

## 2025-04-08 PROCEDURE — 84450 TRANSFERASE (AST) (SGOT): CPT | Performed by: TRANSPLANT SURGERY

## 2025-04-08 PROCEDURE — 85027 COMPLETE CBC AUTOMATED: CPT

## 2025-04-08 PROCEDURE — 80076 HEPATIC FUNCTION PANEL: CPT | Performed by: TRANSPLANT SURGERY

## 2025-04-08 PROCEDURE — 82947 ASSAY GLUCOSE BLOOD QUANT: CPT | Performed by: TRANSPLANT SURGERY

## 2025-04-08 PROCEDURE — 80197 ASSAY OF TACROLIMUS: CPT | Performed by: TRANSPLANT SURGERY

## 2025-04-08 PROCEDURE — 82374 ASSAY BLOOD CARBON DIOXIDE: CPT | Performed by: TRANSPLANT SURGERY

## 2025-04-08 PROCEDURE — 36415 COLL VENOUS BLD VENIPUNCTURE: CPT

## 2025-04-29 ENCOUNTER — OFFICE VISIT (OUTPATIENT)
Dept: FAMILY MEDICINE | Facility: CLINIC | Age: 70
End: 2025-04-29
Attending: INTERNAL MEDICINE
Payer: COMMERCIAL

## 2025-04-29 VITALS
WEIGHT: 140 LBS | SYSTOLIC BLOOD PRESSURE: 120 MMHG | OXYGEN SATURATION: 100 % | HEART RATE: 71 BPM | TEMPERATURE: 98.5 F | DIASTOLIC BLOOD PRESSURE: 80 MMHG | RESPIRATION RATE: 14 BRPM | BODY MASS INDEX: 24.8 KG/M2

## 2025-04-29 DIAGNOSIS — N18.31 CHRONIC KIDNEY DISEASE, STAGE 3A (H): ICD-10-CM

## 2025-04-29 DIAGNOSIS — I10 BENIGN ESSENTIAL HYPERTENSION: ICD-10-CM

## 2025-04-29 DIAGNOSIS — C73 MALIGNANT NEOPLASM OF THYROID GLAND (H): Primary | ICD-10-CM

## 2025-04-29 DIAGNOSIS — T86.41 LIVER TRANSPLANT REJECTION (H): ICD-10-CM

## 2025-04-29 DIAGNOSIS — Z94.4 HISTORY OF LIVER TRANSPLANT (H): ICD-10-CM

## 2025-04-29 DIAGNOSIS — R06.09 DYSPNEA ON EXERTION: ICD-10-CM

## 2025-04-29 DIAGNOSIS — Z00.00 HEALTH CARE MAINTENANCE: ICD-10-CM

## 2025-04-29 PROBLEM — A69.20 LYME DISEASE: Status: RESOLVED | Noted: 2024-07-02 | Resolved: 2025-04-29

## 2025-04-29 PROCEDURE — G2211 COMPLEX E/M VISIT ADD ON: HCPCS | Performed by: INTERNAL MEDICINE

## 2025-04-29 PROCEDURE — 3074F SYST BP LT 130 MM HG: CPT | Performed by: INTERNAL MEDICINE

## 2025-04-29 PROCEDURE — 3079F DIAST BP 80-89 MM HG: CPT | Performed by: INTERNAL MEDICINE

## 2025-04-29 PROCEDURE — 99214 OFFICE O/P EST MOD 30 MIN: CPT | Performed by: INTERNAL MEDICINE

## 2025-04-29 RX ORDER — AMLODIPINE BESYLATE 5 MG/1
5 TABLET ORAL DAILY
Qty: 90 TABLET | Refills: 3 | Status: SHIPPED | OUTPATIENT
Start: 2025-04-29

## 2025-04-29 NOTE — ASSESSMENT & PLAN NOTE
Fall, 2024 -elevations in transaminases leading to liver biopsy  -Acute cellular rejection features  -Treated with prednisone -well responsive  -Since maintained on CNI + MMF

## 2025-04-29 NOTE — PROGRESS NOTES
Assessment & Plan   Problem List Items Addressed This Visit          Respiratory    Dyspnea on exertion     4/2025: chronic timeframe of symptoms    Prior work-up:  5/2024: CT chest, no central PE; right basilar chronic scarring  7/2024: Echo -EF 60%; no significant valvular disease            Digestive    Liver transplant rejection (H)     Fall, 2024 -elevations in transaminases leading to liver biopsy  -Acute cellular rejection features  -Treated with prednisone -well responsive  -Since maintained on CNI + MMF            Endocrine    Malignant neoplasm of thyroid gland (H) - Primary       Circulatory    Benign essential hypertension     uncontrolled  current antihypertensive regimen: hydrochlorothiazide 12.5 mg daily  regimen changes: adding back amlodipine 5mg   intolerance:   future titration/work-up plan:  Fall, 2025 -notable in clinic blood pressure elevations in context of prednisone use for mild cellular rejection -I suspect likely a reversible phenomenon with elimination of prednisone use  2/2025: Stopping amlodipine replacing with hydrochlorothiazide 12.5 mg daily  4/2025: adding back amlodipine along with hctz           Relevant Medications    amLODIPine (NORVASC) 5 MG tablet       Urinary    Chronic kidney disease, stage 3a (H)       Other    History of liver transplant (H)     recurrent, metastatic cholangiocarcinoma; followed by Dr. 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  Liver biopsy demonstrating mild cellular rejection changes  -CellCept added to her tacrolimus.  Treated with oral prednisone with subsequent tapering off completely.    -Notable reversal response in transaminases with immunosuppression changes    4/2025: Off prednisone (principally at patient request)  -Maintained on CellCept along with tacrolimus  -Transaminases remain normalized after steroid introduction         Health care maintenance    Relevant Orders    REVIEW OF HEALTH MAINTENANCE PROTOCOL ORDERS (Completed)               Shell Balderas is a 70 year old, presenting for the following health issues: Returns for follow-up related to blood pressure.  Now off prednisone; has been off now for approximately 1 month.  On tacrolimus and CellCept.  Transaminases quickly normalized with steroid introduction -have remained within normal limits ever since.  She was bothered by several steroid perceived symptoms including weight gains, insomnia.  Blood pressures notably more elevated when on prednisone; previously replaced her amlodipine with hydrochlorothiazide last fall.  She does monitor home blood pressures; typically states systolic pressures running in the high 130s; sometimes in the 120s.  Previous issues with lower extremity swelling resolved.  Complaints of chronic dyspnea.  Also describes impaired swallowing; feels like things feel stuck in her throat transiently  Hypertension        4/29/2025    11:11 AM   Additional Questions   Roomed by Yasemin NARANJO     History of Present Illness       Reason for visit:  Medication    She eats 0-1 servings of fruits and vegetables daily.She consumes 0 sweetened beverage(s) daily.She exercises with enough effort to increase her heart rate 30 to 60 minutes per day.  She exercises with enough effort to increase her heart rate 4 days per week.   She is taking medications regularly.           Hypertension Follow-up    Do you check your blood pressure regularly outside of the clinic? No   Are you following a low salt diet? No  Are your blood pressures ever more than 140 on the top number (systolic) OR more   than 90 on the bottom number (diastolic), for example 140/90? Yes    BP Readings from Last 2 Encounters:   04/29/25 (!) 143/89   11/05/24 (!) 145/89           Review of Systems  Constitutional, HEENT, cardiovascular, pulmonary, gi and gu systems are negative, except as otherwise noted.      Objective    BP (!) 143/89   Pulse 71   Temp 98.5  F (36.9  C)   Resp 14   Wt 63.5 kg (140 lb)   LMP  (LMP Unknown)   SpO2 100%   BMI 24.80 kg/m    Body mass index is 24.8 kg/m .  Physical Exam   GENERAL: alert and no distress  NECK: no adenopathy, no asymmetry, masses, or scars  RESP: lungs clear to auscultation - no rales, rhonchi or wheezes  CV: regular rate and rhythm, normal S1 S2, no S3 or S4, no murmur, click or rub, no peripheral edema  ABDOMEN: soft, nontender, no hepatosplenomegaly, no masses and bowel sounds normal  MS: no gross musculoskeletal defects noted, no edema          Signed Electronically by: Apollo Benitez MD

## 2025-04-29 NOTE — ASSESSMENT & PLAN NOTE
uncontrolled  current antihypertensive regimen: hydrochlorothiazide 12.5 mg daily  regimen changes: adding back amlodipine 5mg   intolerance:   future titration/work-up plan:  Fall, 2025 -notable in clinic blood pressure elevations in context of prednisone use for mild cellular rejection -I suspect likely a reversible phenomenon with elimination of prednisone use  2/2025: Stopping amlodipine replacing with hydrochlorothiazide 12.5 mg daily  4/2025: adding back amlodipine along with hctz

## 2025-04-29 NOTE — ASSESSMENT & PLAN NOTE
recurrent, metastatic cholangiocarcinoma; followed by Dr. 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  Liver biopsy demonstrating mild cellular rejection changes  -CellCept added to her tacrolimus.  Treated with oral prednisone with subsequent tapering off completely.    -Notable reversal response in transaminases with immunosuppression changes    4/2025: Off prednisone (principally at patient request)  -Maintained on CellCept along with tacrolimus  -Transaminases remain normalized after steroid introduction

## 2025-05-09 ENCOUNTER — TELEPHONE (OUTPATIENT)
Dept: TRANSPLANT | Facility: CLINIC | Age: 70
End: 2025-05-09
Payer: COMMERCIAL

## 2025-05-09 DIAGNOSIS — T86.41 LIVER TRANSPLANT REJECTION (H): ICD-10-CM

## 2025-05-09 NOTE — TELEPHONE ENCOUNTER
Patient Call: General  Route to LPN    Reason for call: Janae at [facility] called to touch base about patient and have questions concerning quantity and dosage for mycophenolate 500 MG. The refill was initially written on 1/29/2025. More details with call back.       Call back needed? Yes    Return Call Needed  Same as documented in contacts section  When to return call?: Same day: Route High Priority

## 2025-05-13 NOTE — TELEPHONE ENCOUNTER
Pharmacist wanted to know if they should fill mycophenolate for 180 or 60 tabs. Advised 60 tabs as pt may be weaned off.

## 2025-05-19 ENCOUNTER — PATIENT OUTREACH (OUTPATIENT)
Dept: ONCOLOGY | Facility: CLINIC | Age: 70
End: 2025-05-19

## 2025-05-19 ENCOUNTER — LAB (OUTPATIENT)
Dept: LAB | Facility: CLINIC | Age: 70
End: 2025-05-19
Payer: COMMERCIAL

## 2025-05-19 ENCOUNTER — OFFICE VISIT (OUTPATIENT)
Dept: GASTROENTEROLOGY | Facility: CLINIC | Age: 70
End: 2025-05-19
Attending: INTERNAL MEDICINE
Payer: COMMERCIAL

## 2025-05-19 ENCOUNTER — RESULTS FOLLOW-UP (OUTPATIENT)
Dept: TRANSPLANT | Facility: CLINIC | Age: 70
End: 2025-05-19

## 2025-05-19 VITALS
BODY MASS INDEX: 24.62 KG/M2 | HEART RATE: 79 BPM | SYSTOLIC BLOOD PRESSURE: 115 MMHG | WEIGHT: 139 LBS | OXYGEN SATURATION: 97 % | DIASTOLIC BLOOD PRESSURE: 75 MMHG

## 2025-05-19 DIAGNOSIS — T86.41 LIVER TRANSPLANT REJECTION (H): ICD-10-CM

## 2025-05-19 DIAGNOSIS — T86.41 LIVER TRANSPLANT REJECTION (H): Primary | ICD-10-CM

## 2025-05-19 DIAGNOSIS — Z94.4 LIVER REPLACED BY TRANSPLANT (H): ICD-10-CM

## 2025-05-19 DIAGNOSIS — R06.00 DYSPNEA, UNSPECIFIED TYPE: ICD-10-CM

## 2025-05-19 LAB
ALBUMIN SERPL BCG-MCNC: 4.5 G/DL (ref 3.5–5.2)
ALP SERPL-CCNC: 96 U/L (ref 40–150)
ALT SERPL W P-5'-P-CCNC: 18 U/L (ref 0–50)
ANION GAP SERPL CALCULATED.3IONS-SCNC: 12 MMOL/L (ref 7–15)
AST SERPL W P-5'-P-CCNC: 22 U/L (ref 0–45)
BILIRUB SERPL-MCNC: 0.6 MG/DL
BILIRUBIN DIRECT (ROCHE PRO & PURE): 0.25 MG/DL (ref 0–0.45)
BUN SERPL-MCNC: 35.6 MG/DL (ref 8–23)
CALCIUM SERPL-MCNC: 8.9 MG/DL (ref 8.8–10.4)
CHLORIDE SERPL-SCNC: 100 MMOL/L (ref 98–107)
CREAT SERPL-MCNC: 1.28 MG/DL (ref 0.51–0.95)
EGFRCR SERPLBLD CKD-EPI 2021: 45 ML/MIN/1.73M2
ERYTHROCYTE [DISTWIDTH] IN BLOOD BY AUTOMATED COUNT: 13.6 % (ref 10–15)
GLUCOSE SERPL-MCNC: 150 MG/DL (ref 70–99)
HCO3 SERPL-SCNC: 26 MMOL/L (ref 22–29)
HCT VFR BLD AUTO: 41.7 % (ref 35–47)
HGB BLD-MCNC: 13.4 G/DL (ref 11.7–15.7)
MCH RBC QN AUTO: 26.9 PG (ref 26.5–33)
MCHC RBC AUTO-ENTMCNC: 32.1 G/DL (ref 31.5–36.5)
MCV RBC AUTO: 84 FL (ref 78–100)
PLATELET # BLD AUTO: 168 10E3/UL (ref 150–450)
POTASSIUM SERPL-SCNC: 3.9 MMOL/L (ref 3.4–5.3)
PROT SERPL-MCNC: 6.9 G/DL (ref 6.4–8.3)
RBC # BLD AUTO: 4.99 10E6/UL (ref 3.8–5.2)
SODIUM SERPL-SCNC: 138 MMOL/L (ref 135–145)
TACROLIMUS BLD-MCNC: 8.1 UG/L (ref 5–15)
TME LAST DOSE: NORMAL H
TME LAST DOSE: NORMAL H
WBC # BLD AUTO: 7.1 10E3/UL (ref 4–11)

## 2025-05-19 PROCEDURE — 99000 SPECIMEN HANDLING OFFICE-LAB: CPT | Performed by: PATHOLOGY

## 2025-05-19 PROCEDURE — 85027 COMPLETE CBC AUTOMATED: CPT | Performed by: PATHOLOGY

## 2025-05-19 PROCEDURE — 80053 COMPREHEN METABOLIC PANEL: CPT | Performed by: PATHOLOGY

## 2025-05-19 PROCEDURE — 3078F DIAST BP <80 MM HG: CPT | Performed by: INTERNAL MEDICINE

## 2025-05-19 PROCEDURE — 36415 COLL VENOUS BLD VENIPUNCTURE: CPT | Performed by: PATHOLOGY

## 2025-05-19 PROCEDURE — G0463 HOSPITAL OUTPT CLINIC VISIT: HCPCS | Performed by: INTERNAL MEDICINE

## 2025-05-19 PROCEDURE — 99215 OFFICE O/P EST HI 40 MIN: CPT | Performed by: INTERNAL MEDICINE

## 2025-05-19 PROCEDURE — 1126F AMNT PAIN NOTED NONE PRSNT: CPT | Performed by: INTERNAL MEDICINE

## 2025-05-19 PROCEDURE — 80197 ASSAY OF TACROLIMUS: CPT | Performed by: INTERNAL MEDICINE

## 2025-05-19 PROCEDURE — 3074F SYST BP LT 130 MM HG: CPT | Performed by: INTERNAL MEDICINE

## 2025-05-19 PROCEDURE — 82248 BILIRUBIN DIRECT: CPT | Performed by: PATHOLOGY

## 2025-05-19 ASSESSMENT — PAIN SCALES - GENERAL: PAINLEVEL_OUTOF10: NO PAIN (0)

## 2025-05-19 NOTE — NURSING NOTE
"Chief Complaint   Patient presents with    RECHECK     Follow-up         Vital signs:      BP: 115/75 Pulse: 79     SpO2: 97 %       Weight: 63 kg (139 lb)  Estimated body mass index is 24.62 kg/m  as calculated from the following:    Height as of 9/26/24: 1.6 m (5' 3\").    Weight as of this encounter: 63 kg (139 lb).      Karrie Jimenez CMA   5/19/2025 1:23 PM    "

## 2025-05-19 NOTE — PROGRESS NOTES
New IP (Interventional Pulmonology) referral rec'd.  Chart reviewed.        New Patient: Interventional Pulmonary (Lung nodule) Nurse Navigator Note    [facility]    Referred to (specialty): Interventional Pulmonary (Lung nodule)    Requested provider (if applicable): n/a    Date Referral Received: 5/19/2025    Evaluation for:  Cough and YEHUDA; chest CT shows lung nodules    Clinical History (per Nurse review of records provided):    **BOOK MARKED**    [facility]  9/13/2024:  CT c/a/p      Records Location: [facility] & [facility] ([facility]) and [facility]    RECORDS NEEDED:  Last FIVE years CHEST imaging pushed to PACS from [facility] and [facility]--thank you!!    Additional testing needed prior to consult:

## 2025-05-19 NOTE — PROGRESS NOTES
Solid Organ Transplant Hepatology Follow-Up Visit:     HISTORY OF PRESENT ILLNESS:   I had the pleasure of seeing Sherrie Lala for followup in the Liver Clinic at the [facility] on May 19, 2025. Ms. 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  She is now more than 13 years status post cancer surgery.     Her major complaint has been shortness of breath.  This occurs predominantly going up steps and less so on the flat.  She does have a cough and a CT scan of her chest is previously shown some pulmonary nodules.  We did do an echocardiogram last year that was normal.     She does not feels well at this visit.  She denies any abdominal pain, itching, skin rash or fatigue.  She denies any increased abdominal girth or lower extremity edema.       She has not had any fevers or chills.  Her cough is dry and not related to position.  She denies any nausea or vomiting, diarrhea or constipation.  She denies any nausea or vomiting, diarrhea or constipation.  Her appetite has been good and her weight is stable.    Medications:   Current Outpatient Medications   Medication Sig Dispense Refill    albuterol (PROAIR HFA/PROVENTIL HFA/VENTOLIN HFA) 108 (90 Base) MCG/ACT inhaler Inhale 1-2 puffs into the lungs 2 times daily as needed for shortness of breath or wheezing. 18 g 3    amLODIPine (NORVASC) 5 MG tablet Take 1 tablet (5 mg) by mouth daily. 90 tablet 3    amoxicillin (AMOXIL) 500 MG capsule Take 4 tablets (2000 mg) by mouth 1 hour before dental procedures/cleanings. 4 capsule 4    aspirin 81 MG EC tablet Take 81 mg by mouth daily      gabapentin (NEURONTIN) 300 MG capsule Take 2 capsules (600 mg) by mouth at bedtime. 180 capsule 3    hydroCHLOROthiazide 12.5 MG tablet Take 1 tablet (12.5 mg) by mouth daily. 90 tablet 3    levothyroxine (SYNTHROID/LEVOTHROID) 125 MCG tablet Take  125 mcg by mouth daily      mycophenolate (GENERIC EQUIVALENT) 500 MG tablet Take 1 tablet (500 mg) by mouth 2 times daily. Called in for #60 with 3 fills 180 tablet 3    omeprazole (PRILOSEC) 20 MG DR capsule TAKE 1 CAPSULE TWICE DAILY 180 capsule 3    pramipexole (MIRAPEX) 0.25 MG tablet Take 4 tablets (1 mg) by mouth at bedtime. 360 tablet 3    tacrolimus (GENERIC EQUIVALENT) 0.5 MG capsule Take 1 capsule (0.5 mg) by mouth every 12 hours. Total dose 1.5mg  capsule 3    tacrolimus (GENERIC EQUIVALENT) 1 MG capsule Take 1 capsule (1 mg) by mouth every 12 hours. Total dose 1.5mg BID 60 capsule 11    zolpidem (AMBIEN) 10 MG tablet TAKE 1 TABLET AT BEDTIME AS NEEDED FOR SLEEP 90 tablet 1     No current facility-administered medications for this visit.      Vitals:   /75   Pulse 79   Wt 63 kg (139 lb)   LMP  (LMP Unknown)   SpO2 97%   BMI 24.62 kg/m      Physical Exam:   In general she looks quite well. HEENT exam shows no scleral icterus or temporal muscle wasting. Chest is clear. Abdominal exam shows no increase in girth. No masses or tenderness to palpation are present. Liver is 10 cm in span without left lobe enlargement. No spleen tip is palpable. Extremity exam shows no edema. Skin exam shows no stigmata of chronic liver disease. Neurologic exam shows no asterixis.     Labs:   Lab Results   Component Value Date     05/19/2025    POTASSIUM 3.9 05/19/2025    CHLORIDE 100 05/19/2025    ANIONGAP 12 05/19/2025    CO2 26 05/19/2025    BUN 35.6 (H) 05/19/2025    CR 1.28 (H) 05/19/2025    GFRESTIMATED 45 (L) 05/19/2025    SHELDON 8.9 05/19/2025      Lab Results   Component Value Date    WBC 7.1 05/19/2025    HGB 13.4 05/19/2025    HCT 41.7 05/19/2025    MCV 84 05/19/2025    MCH 26.9 05/19/2025    MCHC 32.1 05/19/2025    RDW 13.6 05/19/2025     05/19/2025     Lab Results   Component Value Date    ALBUMIN 4.5 05/19/2025    ALKPHOS 96 05/19/2025    AST 22 05/19/2025     Lab Results   Component  Value Date    INR 1.14 09/30/2024     Recent Labs   Lab Test 05/19/25  1228 04/08/25  0957 03/13/25  1029 02/25/25  1011 02/11/25  1017 01/30/25  1003 01/13/25  1006 12/23/24  1033 12/10/24  0959 11/26/24  0910   ALKPHOS 96 101 97 90 88 87 91 80 86 69   ALT 18 12 25 22 20 23 23 23 21 24   AST 22 19 25 23 21 23 24 23 20 19      Imaging:   No images are attached to the encounter.     Assessment/Plan:   IMPRESSION:   Ms. Lala is doing very well now more than 6 years status post liver transplantation.  I am not sure what is causing her shortness of breath but we will have her seen by pulmonary medicine given that her echocardiogram is normal and her chest CT scan is abnormal.  The good news is her liver tests are all completely normal now and her kidney function looks quite good as well.  For immunosuppression, she is now off prednisone and only on low-dose tacrolimus and CellCept.  Currently all complications are well addressed.  She is up-to-date with regard to vaccines.  I encouraged her to get an RSV vaccine.  She does need to see the dermatologist for skin cancer screening     I will see her back in the clinic again in 6 months.     I did spend total of 40 minutes (on the date of the encounter), including 30 minutes of face-to-face clinic time including counseling. The rest of the time was spent in documentation and review of records.     Thank you very much for allowing me to participate in the care of this patient.  If you have any questions regarding my recommendations, please do not hesitate to contact me.         Bradly Lilly MD      Professor of Medicine  [employer]      Executive Medical Director, Solid Organ Transplant Program  [facility]

## 2025-05-19 NOTE — LETTER
5/19/2025      Sherrie Lala  [address] [facility] [address]      Dear Colleague,    Thank you for referring your patient, Sherrie Lala, to the [facility] [place]. Please see a copy of my visit note below.    Solid Organ Transplant Hepatology Follow-Up Visit:     HISTORY OF PRESENT ILLNESS:   I had the pleasure of seeing Sherrie Lala for followup in the Liver Clinic at the [facility] on May 19, 2025. Ms. 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  She is now more than 13 years status post cancer surgery.     Her major complaint has been shortness of breath.  This occurs predominantly going up steps and less so on the flat.  She does have a cough and a CT scan of her chest is previously shown some pulmonary nodules.  We did do an echocardiogram last year that was normal.     She does not feels well at this visit.  She denies any abdominal pain, itching, skin rash or fatigue.  She denies any increased abdominal girth or lower extremity edema.       She has not had any fevers or chills.  Her cough is dry and not related to position.  She denies any nausea or vomiting, diarrhea or constipation.  She denies any nausea or vomiting, diarrhea or constipation.  Her appetite has been good and her weight is stable.    Medications:   Current Outpatient Medications   Medication Sig Dispense Refill     albuterol (PROAIR HFA/PROVENTIL HFA/VENTOLIN HFA) 108 (90 Base) MCG/ACT inhaler Inhale 1-2 puffs into the lungs 2 times daily as needed for shortness of breath or wheezing. 18 g 3     amLODIPine (NORVASC) 5 MG tablet Take 1 tablet (5 mg) by mouth daily. 90 tablet 3     amoxicillin (AMOXIL) 500 MG capsule Take 4 tablets (2000 mg) by mouth 1 hour before dental procedures/cleanings. 4 capsule 4     aspirin 81 MG EC tablet Take 81 mg by mouth daily        gabapentin (NEURONTIN) 300 MG capsule Take 2 capsules (600 mg) by mouth at bedtime. 180 capsule 3     hydroCHLOROthiazide 12.5 MG tablet Take 1 tablet (12.5 mg) by mouth daily. 90 tablet 3     levothyroxine (SYNTHROID/LEVOTHROID) 125 MCG tablet Take 125 mcg by mouth daily       mycophenolate (GENERIC EQUIVALENT) 500 MG tablet Take 1 tablet (500 mg) by mouth 2 times daily. Called in for #60 with 3 fills 180 tablet 3     omeprazole (PRILOSEC) 20 MG DR capsule TAKE 1 CAPSULE TWICE DAILY 180 capsule 3     pramipexole (MIRAPEX) 0.25 MG tablet Take 4 tablets (1 mg) by mouth at bedtime. 360 tablet 3     tacrolimus (GENERIC EQUIVALENT) 0.5 MG capsule Take 1 capsule (0.5 mg) by mouth every 12 hours. Total dose 1.5mg  capsule 3     tacrolimus (GENERIC EQUIVALENT) 1 MG capsule Take 1 capsule (1 mg) by mouth every 12 hours. Total dose 1.5mg BID 60 capsule 11     zolpidem (AMBIEN) 10 MG tablet TAKE 1 TABLET AT BEDTIME AS NEEDED FOR SLEEP 90 tablet 1     No current facility-administered medications for this visit.      Vitals:   /75   Pulse 79   Wt 63 kg (139 lb)   LMP  (LMP Unknown)   SpO2 97%   BMI 24.62 kg/m      Physical Exam:   In general she looks quite well. HEENT exam shows no scleral icterus or temporal muscle wasting. Chest is clear. Abdominal exam shows no increase in girth. No masses or tenderness to palpation are present. Liver is 10 cm in span without left lobe enlargement. No spleen tip is palpable. Extremity exam shows no edema. Skin exam shows no stigmata of chronic liver disease. Neurologic exam shows no asterixis.     Labs:   Lab Results   Component Value Date     05/19/2025    POTASSIUM 3.9 05/19/2025    CHLORIDE 100 05/19/2025    ANIONGAP 12 05/19/2025    CO2 26 05/19/2025    BUN 35.6 (H) 05/19/2025    CR 1.28 (H) 05/19/2025    GFRESTIMATED 45 (L) 05/19/2025    SHELDON 8.9 05/19/2025      Lab Results   Component Value Date    WBC 7.1 05/19/2025    HGB 13.4 05/19/2025    HCT 41.7 05/19/2025     MCV 84 05/19/2025    MCH 26.9 05/19/2025    MCHC 32.1 05/19/2025    RDW 13.6 05/19/2025     05/19/2025     Lab Results   Component Value Date    ALBUMIN 4.5 05/19/2025    ALKPHOS 96 05/19/2025    AST 22 05/19/2025     Lab Results   Component Value Date    INR 1.14 09/30/2024     Recent Labs   Lab Test 05/19/25  1228 04/08/25  0957 03/13/25  1029 02/25/25  1011 02/11/25  1017 01/30/25  1003 01/13/25  1006 12/23/24  1033 12/10/24  0959 11/26/24  0910   ALKPHOS 96 101 97 90 88 87 91 80 86 69   ALT 18 12 25 22 20 23 23 23 21 24   AST 22 19 25 23 21 23 24 23 20 19      Imaging:   No images are attached to the encounter.     Assessment/Plan:   IMPRESSION:   Ms. Lala is doing very well now more than 6 years status post liver transplantation.  I am not sure what is causing her shortness of breath but we will have her seen by pulmonary medicine given that her echocardiogram is normal and her chest CT scan is abnormal.  The good news is her liver tests are all completely normal now and her kidney function looks quite good as well.  For immunosuppression, she is now off prednisone and only on low-dose tacrolimus and CellCept.  Currently all complications are well addressed.  She is up-to-date with regard to vaccines.  I encouraged her to get an RSV vaccine.  She does need to see the dermatologist for skin cancer screening     I will see her back in the clinic again in 6 months.     I did spend total of 40 minutes (on the date of the encounter), including 30 minutes of face-to-face clinic time including counseling. The rest of the time was spent in documentation and review of records.     Thank you very much for allowing me to participate in the care of this patient.  If you have any questions regarding my recommendations, please do not hesitate to contact me.         Bradly Lilly MD      Professor of Medicine  [employer]      Executive Medical Director, Solid Organ Transplant  Program  [facility]    Again, thank you for allowing me to participate in the care of your patient.        Sincerely,        Bradly Lilly MD    Electronically signed

## 2025-05-20 ENCOUNTER — TELEPHONE (OUTPATIENT)
Dept: TRANSPLANT | Facility: CLINIC | Age: 70
End: 2025-05-20
Payer: COMMERCIAL

## 2025-05-20 DIAGNOSIS — T86.41 LIVER TRANSPLANT REJECTION (H): ICD-10-CM

## 2025-05-20 NOTE — TELEPHONE ENCOUNTER
ISSUE:   Tacrolimus IR level 8.1 on 5/19, goal 5, dose 1.5 mg BID.    PLAN:   Call Patient and confirm this was an accurate 12-hour trough. (I doubt it kori labs were drawn at 1:28pm)  Verify Tacrolimus IR dose ..  Confirm no new medications or or missed doses. .  Confirm no new illness / infection / diarrhea.   If accurate trough and accurate dose, decrease Tacrolimus IR dose to 1 mg BID  and repeat labs in a month.  If tac level WAS timed inaccurately ask her to repeat it next week.  Abigail Parham, BECKI     OUTCOME:   Pt did take meds prior to lab draw. Pt will check labs next week.     Parisa Thrasher LPN

## 2025-05-21 ENCOUNTER — PRE VISIT (OUTPATIENT)
Dept: ONCOLOGY | Facility: CLINIC | Age: 70
End: 2025-05-21
Payer: COMMERCIAL

## 2025-05-21 ENCOUNTER — TRANSCRIBE ORDERS (OUTPATIENT)
Dept: OTHER | Age: 70
End: 2025-05-21

## 2025-05-21 DIAGNOSIS — R06.09 DOE (DYSPNEA ON EXERTION): ICD-10-CM

## 2025-05-21 DIAGNOSIS — R05.9 COUGH: Primary | ICD-10-CM

## 2025-05-21 DIAGNOSIS — R06.02 SOB (SHORTNESS OF BREATH): ICD-10-CM

## 2025-05-21 NOTE — TELEPHONE ENCOUNTER
RECORDS STATUS - ALL OTHER DIAGNOSIS      RECORDS RECEIVED FROM:    DIAGNOSIS: Dyspnea, unspecified type [R06.00]   NOTES STATUS DETAILS   OFFICE NOTE from referring provider Epic 05/19/25: Dr. Bradly Lilly   OFFICE NOTE from medical oncologist     OFFICE NOTE from other specialist     DISCHARGE SUMMARY from hospital     DISCHARGE REPORT from the ER     OPERATIVE REPORT     MEDICATION LIST     LABS     PATHOLOGY REPORTS     ANYTHING RELATED TO DIAGNOSIS     PATHOLOGY [employer] TRACKING   Tracking #:   GENONOMIC TESTING     TYPE:     IMAGING (NEED IMAGES & REPORT)     CT SCANS Req 05/21 [facility]:  05/23/24: CT Chest    [place]:  04/25/20: CT Chest    [facility]:   MRI     XRAYS Req 05/21 [facility]:  06/13/24-05/08/20: XR Chest    [place]:  04/26/20: DX Chest   ULTRASOUND     PET Req 05/21 [facility]:   IMAGE DISC [employer] TRACKING   Tracking #:

## 2025-05-22 DIAGNOSIS — R06.09 DOE (DYSPNEA ON EXERTION): ICD-10-CM

## 2025-05-22 DIAGNOSIS — R05.9 COUGH: Primary | ICD-10-CM

## 2025-05-22 DIAGNOSIS — R06.02 SOB (SHORTNESS OF BREATH): ICD-10-CM

## 2025-05-27 ENCOUNTER — LAB (OUTPATIENT)
Dept: LAB | Facility: CLINIC | Age: 70
End: 2025-05-27
Payer: COMMERCIAL

## 2025-05-27 DIAGNOSIS — C73 MALIGNANT NEOPLASM OF THYROID GLAND (H): ICD-10-CM

## 2025-05-27 DIAGNOSIS — N18.31 CHRONIC KIDNEY DISEASE, STAGE 3A (H): Primary | ICD-10-CM

## 2025-05-28 ENCOUNTER — LAB (OUTPATIENT)
Dept: LAB | Facility: CLINIC | Age: 70
End: 2025-05-28
Payer: COMMERCIAL

## 2025-05-28 ENCOUNTER — RESULTS FOLLOW-UP (OUTPATIENT)
Dept: TRANSPLANT | Facility: CLINIC | Age: 70
End: 2025-05-28

## 2025-05-28 DIAGNOSIS — T86.41 LIVER TRANSPLANT REJECTION (H): ICD-10-CM

## 2025-05-28 DIAGNOSIS — Z79.899 ENCOUNTER FOR LONG-TERM (CURRENT) USE OF HIGH-RISK MEDICATION: ICD-10-CM

## 2025-05-28 DIAGNOSIS — Z94.4 LIVER REPLACED BY TRANSPLANT (H): ICD-10-CM

## 2025-05-28 DIAGNOSIS — Z48.288 ENCOUNTER FOR AFTERCARE FOLLOWING MULTIPLE ORGAN TRANSPLANT: ICD-10-CM

## 2025-05-28 LAB
TACROLIMUS BLD-MCNC: 4.5 UG/L (ref 5–15)
TME LAST DOSE: ABNORMAL H
TME LAST DOSE: ABNORMAL H

## 2025-05-28 PROCEDURE — 80197 ASSAY OF TACROLIMUS: CPT | Performed by: INTERNAL MEDICINE

## 2025-05-28 PROCEDURE — 36415 COLL VENOUS BLD VENIPUNCTURE: CPT

## 2025-05-29 ENCOUNTER — TELEPHONE (OUTPATIENT)
Dept: TRANSPLANT | Facility: CLINIC | Age: 70
End: 2025-05-29
Payer: COMMERCIAL

## 2025-05-29 DIAGNOSIS — T86.41 LIVER TRANSPLANT REJECTION (H): ICD-10-CM

## 2025-05-29 NOTE — TELEPHONE ENCOUNTER
Call to Sherrie to check in.  Had rejection last fall, remains on tac and 500 mg mmf bid.  Visited w/ Dr. Lilly a few weeks ago.  No answer, LM asking for monthly labs.

## 2025-07-08 DIAGNOSIS — G47.00 PERSISTENT INSOMNIA: ICD-10-CM

## 2025-07-08 RX ORDER — PRAMIPEXOLE DIHYDROCHLORIDE 0.25 MG/1
TABLET ORAL
Qty: 360 TABLET | Refills: 3 | OUTPATIENT
Start: 2025-07-08

## 2025-08-12 ENCOUNTER — LAB (OUTPATIENT)
Dept: LAB | Facility: CLINIC | Age: 70
End: 2025-08-12
Payer: COMMERCIAL

## 2025-08-12 DIAGNOSIS — C73 MALIGNANT NEOPLASM OF THYROID GLAND (H): ICD-10-CM

## 2025-08-12 DIAGNOSIS — Z94.4 LIVER REPLACED BY TRANSPLANT (H): ICD-10-CM

## 2025-08-12 DIAGNOSIS — T86.41 LIVER TRANSPLANT REJECTION (H): ICD-10-CM

## 2025-08-12 DIAGNOSIS — N18.31 CHRONIC KIDNEY DISEASE, STAGE 3A (H): ICD-10-CM

## 2025-08-12 LAB
ALBUMIN SERPL BCG-MCNC: 4.5 G/DL (ref 3.5–5.2)
ALP SERPL-CCNC: 82 U/L (ref 40–150)
ALT SERPL W P-5'-P-CCNC: 23 U/L (ref 0–50)
ANION GAP SERPL CALCULATED.3IONS-SCNC: 12 MMOL/L (ref 7–15)
AST SERPL W P-5'-P-CCNC: 24 U/L (ref 0–45)
BILIRUB SERPL-MCNC: 0.5 MG/DL
BILIRUBIN DIRECT (ROCHE PRO & PURE): 0.22 MG/DL (ref 0–0.45)
BUN SERPL-MCNC: 23.4 MG/DL (ref 8–23)
CALCIUM SERPL-MCNC: 9.3 MG/DL (ref 8.8–10.4)
CHLORIDE SERPL-SCNC: 103 MMOL/L (ref 98–107)
CREAT SERPL-MCNC: 1.02 MG/DL (ref 0.51–0.95)
EGFRCR SERPLBLD CKD-EPI 2021: 60 ML/MIN/1.73M2
ERYTHROCYTE [DISTWIDTH] IN BLOOD BY AUTOMATED COUNT: 13.1 % (ref 10–15)
GLUCOSE SERPL-MCNC: 85 MG/DL (ref 70–99)
HCO3 SERPL-SCNC: 25 MMOL/L (ref 22–29)
HCT VFR BLD AUTO: 41.7 % (ref 35–47)
HGB BLD-MCNC: 13.5 G/DL (ref 11.7–15.7)
MCH RBC QN AUTO: 27.5 PG (ref 26.5–33)
MCHC RBC AUTO-ENTMCNC: 32.4 G/DL (ref 31.5–36.5)
MCV RBC AUTO: 85 FL (ref 78–100)
PLATELET # BLD AUTO: 169 10E3/UL (ref 150–450)
POTASSIUM SERPL-SCNC: 4.5 MMOL/L (ref 3.4–5.3)
PROT SERPL-MCNC: 6.8 G/DL (ref 6.4–8.3)
RBC # BLD AUTO: 4.91 10E6/UL (ref 3.8–5.2)
SODIUM SERPL-SCNC: 140 MMOL/L (ref 135–145)
TACROLIMUS BLD-MCNC: 5.2 UG/L (ref 5–15)
TME LAST DOSE: NORMAL H
TME LAST DOSE: NORMAL H
WBC # BLD AUTO: 4.7 10E3/UL (ref 4–11)

## 2025-08-12 PROCEDURE — 80197 ASSAY OF TACROLIMUS: CPT | Performed by: INTERNAL MEDICINE

## 2025-08-12 PROCEDURE — 84155 ASSAY OF PROTEIN SERUM: CPT | Performed by: INTERNAL MEDICINE

## 2025-08-12 PROCEDURE — 80048 BASIC METABOLIC PNL TOTAL CA: CPT | Performed by: INTERNAL MEDICINE

## 2025-08-12 PROCEDURE — 85027 COMPLETE CBC AUTOMATED: CPT

## 2025-08-27 ENCOUNTER — PATIENT OUTREACH (OUTPATIENT)
Dept: CARE COORDINATION | Facility: CLINIC | Age: 70
End: 2025-08-27
Payer: COMMERCIAL

## (undated) DEVICE — DRAIN JACKSON PRATT ROUND SIL 19FR W/TROCAR LF JP-2232

## (undated) DEVICE — ESU CLEANER TIP 31142717

## (undated) DEVICE — CANNISTER ABTHERA NEGATIVE PRESSURE WOUND 370620S

## (undated) DEVICE — ESU GROUND PAD ADULT W/CORD E7507

## (undated) DEVICE — IMM KIT SHOULDER TMAX MASK FACE 7210559

## (undated) DEVICE — BASIN SET SINGLE STERILE 13752-624

## (undated) DEVICE — LINEN TOWEL PACK X6 WHITE 5487

## (undated) DEVICE — GLOVE PROTEXIS W/NEU-THERA 8.0  2D73TE80

## (undated) DEVICE — DEFIB PRO-PADZ LVP LQD GEL ADULT 8900-2105-01

## (undated) DEVICE — DRSG PRIMAPORE 02X3" 7133

## (undated) DEVICE — RETR VISCERA FISH MED 3204

## (undated) DEVICE — SOL WATER IRRIG 1000ML BOTTLE 2F7114

## (undated) DEVICE — SPONGE LAP 18X18" X8435

## (undated) DEVICE — LINEN TOWEL PACK X5 5464

## (undated) DEVICE — DRSG ABTHERA NEGATIVE PRESSURE WOUND 370605

## (undated) DEVICE — SOL NACL 0.9% IRRIG 1000ML BOTTLE 07138-09

## (undated) DEVICE — DECANTER VIAL 2006S

## (undated) DEVICE — IMPLANTABLE DEVICE
Type: IMPLANTABLE DEVICE | Site: HUMERUS | Status: NON-FUNCTIONAL
Removed: 2019-03-04

## (undated) DEVICE — SU VICRYL 3-0 SH 27" J316H

## (undated) DEVICE — SOL HYDROGEN PEROXIDE 3% 4OZ BOTTLE F0010

## (undated) DEVICE — TOURNIQUET CUFF 30" REPRO BLUE 60-7070-105

## (undated) DEVICE — ESU ELEC BLADE 6" COATED E1450-6

## (undated) DEVICE — SU PDS II 0 CT-1 27" Z340H

## (undated) DEVICE — Device

## (undated) DEVICE — ESU PENCIL W/COATED BLADE E2450H

## (undated) DEVICE — SURGICEL FIBRILLAR HEMOSTAT 4"X4" 1963

## (undated) DEVICE — DRAPE MAYO STAND 23X54 8337

## (undated) DEVICE — SUCTION TIP YANKAUER W/O VENT K86

## (undated) DEVICE — SU STRATAFIXÂ PDO 2-0 24CMX24CM MH DA SXPD2B408

## (undated) DEVICE — DRAIN JACKSON PRATT RESERVOIR 100ML SU130-1305

## (undated) DEVICE — DRILL BIT QUICK CONNECT ZIM 2.5X110MM

## (undated) DEVICE — PREP CHLORAPREP 26ML TINTED HI-LITE ORANGE 930815

## (undated) DEVICE — DRAPE SLUSH/WARMER 66X44" ORS-320

## (undated) DEVICE — SUCTION IRR SYSTEM W/O TIP INTERPULSE HANDPIECE 0210-100-000

## (undated) DEVICE — ESU LIGASURE IMPACT OPEN SEALER/DVDR CVD LG JAW LF4418

## (undated) DEVICE — DRSG TEGADERM 4X4 3/4" 1626W

## (undated) DEVICE — SOL NACL 0.9% IRRIG 1000ML BOTTLE 2F7124

## (undated) DEVICE — DRAPE C-ARM OEC MINI VIEW 6800   00-901917-01

## (undated) DEVICE — ESU PENCIL W/HOLSTER E2350H

## (undated) DEVICE — COVER CAMERA IN-LIGHT DISP LT-C02

## (undated) DEVICE — BLADE CLIPPER SGL USE 9680

## (undated) DEVICE — GLOVE PROTEXIS POWDER FREE 8.5 ORTHOPEDIC 2D73ET85

## (undated) DEVICE — BONE CLEANING TIP INTERPULSE  0210-010-000

## (undated) DEVICE — TUBE NASOGASTRIC ENTRIFLEX 10FR 43"

## (undated) DEVICE — DRAIN JACKSON PRATT RESERVOIR 400ML SU130-1000

## (undated) DEVICE — SPONGE RAY-TEC 4X8" 7318

## (undated) DEVICE — GOWN XLG DISP 9545

## (undated) DEVICE — STPL SKIN 35W ROTATING HEAD PRW35

## (undated) DEVICE — GLOVE PROTEXIS BLUE W/NEU-THERA 8.0  2D73EB80

## (undated) DEVICE — SU FIBERWIRE 2 38" T-8 NDL  AR-7206

## (undated) DEVICE — SU SILK 5-0 TIE 12X30" A302H

## (undated) DEVICE — BASIN SET MAJOR

## (undated) DEVICE — NDL SPINAL 20GA 3.5" 405182

## (undated) DEVICE — TEST TUBE W/SCREW CAP 17361

## (undated) DEVICE — SU STRATAFIX PDS PLUS 1 CT-1 18" SXPP1A404

## (undated) DEVICE — CLIP ETHICON LIGACLIP LG YELLOW LT400

## (undated) DEVICE — SOL NACL 0.9% IRRIG 3000ML BAG 2B7477

## (undated) DEVICE — RESTRAINT LIMB HOLDER ANKLE/WRIST FOAM W/QUICK RELEASE 2533

## (undated) DEVICE — LINEN ORTHO PACK 5446

## (undated) DEVICE — SU PROLENE 4-0 SHDA 36" 8521H

## (undated) DEVICE — PACK SET-UP STD 9102

## (undated) DEVICE — ESU GROUND PAD THERMOGUARD PLUS ABC PEDS 7-382

## (undated) DEVICE — GLIDEWIRE TERUMO .035X180 ANG STIFF GS3508

## (undated) DEVICE — LINEN TOWEL PACK X30 5481

## (undated) DEVICE — SU SILK 1 TIE 6X30" A307H

## (undated) DEVICE — CATH TRAY FOLEY SURESTEP 16FR W/TMP PRB STLK LATEX A319416AM

## (undated) DEVICE — SU PROLENE 7-0 BV-1DA 30" 8703H

## (undated) DEVICE — ESU PENCIL W/SMOKE EVAC NEPTUNE STRYKER 0703-046-000

## (undated) DEVICE — TUBING IRRIG CYSTO/BLADDER SET 81" LF 2C4040

## (undated) DEVICE — IMM KIT SHOULDER STABILIZATION 7210573

## (undated) DEVICE — SURGICEL HEMOSTAT 4X8" 1952

## (undated) DEVICE — GLOVE PROTEXIS W/NEU-THERA 8.5  2D73TE85

## (undated) DEVICE — SU SILK 2-0 TIE 12X30" A305H

## (undated) DEVICE — SU PROLENE 5-0 RB-2DA 30" 8710H

## (undated) DEVICE — ESU ELEC NDL 1" E1552

## (undated) DEVICE — DRSG TEGADERM ALGINATE AG 4X5" 90303

## (undated) DEVICE — BLADE SAW SAGITTAL STRK 18X90X1.27MM HD SYS 6 6118-127-090

## (undated) DEVICE — DRAPE POUCH INSTRUMENT 1018

## (undated) DEVICE — STPL ENDO ARTICULATING ENDOPATH X8 45MM G/B/W ATS45NK

## (undated) DEVICE — LINEN BACK PACK 5440

## (undated) DEVICE — SU PROLENE 1 CTX 30" 8455H

## (undated) DEVICE — DRAPE IOBAN C-SECTION W/POUCH 30X35" 6657

## (undated) DEVICE — DRSG AQUACEL AG 3.5X9.75" HYDROFIBER 412011

## (undated) DEVICE — SYR 01ML 27GA 0.5" NDL TBC 309623

## (undated) DEVICE — DRSG QUICKCLOT TRAUMA PAD 12X12" 460

## (undated) DEVICE — SU PROLENE 0 CT-1 30" 8424H

## (undated) DEVICE — DRILL ZIM 2.7MM STD QUICK CONNECT 00-2360-205-27

## (undated) DEVICE — CLIP ETHICON LIGACLIP SM BLUE LT100

## (undated) DEVICE — DRSG TEGADERM 8X12" 1629

## (undated) DEVICE — RETR VISCERA FISH

## (undated) DEVICE — SURGICEL ABSORBABLE HEMOSTAT SNOW 4"X4" 2083

## (undated) DEVICE — NDL BLUNT 18GA 1" W/O FILTER 305181

## (undated) DEVICE — BONE CEMENT MIXEVAC III HI VAC KIT  0206-015-000

## (undated) DEVICE — SU MONOCRYL 3-0 PS-1 27" Y936H

## (undated) DEVICE — SPONGE SURGIFOAM 100 1974

## (undated) DEVICE — SU SILK 3-0 TIE 12X30" A304H

## (undated) DEVICE — DRAPE U-DRAPE 1015NSD NON-STERILE

## (undated) DEVICE — WIPES FOLEY CARE SURESTEP PROVON DFC100

## (undated) DEVICE — DRSG PRIMAPORE 03 1/8X6" 66000318

## (undated) DEVICE — NDL COUNTER 20CT 31142493

## (undated) DEVICE — SU PDS II 1 TP-1 54" Z879G

## (undated) DEVICE — SU VICRYL 3-0 PS-1 18" UND J683G

## (undated) DEVICE — SU PROLENE 3-0 MH 36" 8842H

## (undated) DEVICE — GLOVE PROTEXIS BLUE W/NEU-THERA 7.5  2D73EB75

## (undated) DEVICE — GLOVE PROTEXIS BLUE W/NEU-THERA 8.5  2D73EB85

## (undated) DEVICE — STRAP KNEE/BODY 31143004

## (undated) DEVICE — CATH FOGARTY EMBOLECTOMY 5FR 80CM LATEX 120805FP

## (undated) DEVICE — SU SILK 4-0 TIE 12X30" A303H

## (undated) DEVICE — SU PDS II 6-0 RB-2DA 30" Z149H

## (undated) DEVICE — GUIDEWIRE TERUMO .035X180 STR STIFF GS3505

## (undated) DEVICE — DRSG STERI STRIP 1/2X4" R1547

## (undated) DEVICE — SU PROLENE 7-0 BV-1DA 4X30" M8703

## (undated) DEVICE — SU VICRYL 0 CT-1 27" UND J260H

## (undated) DEVICE — SU ETHILON 3-0 PS-1 18" 1663H

## (undated) DEVICE — ESU HANDPIECE ABC BEND-A-BEAM 6" 134006

## (undated) DEVICE — SUCTION TIP YANKAUER VIAGUARD W/SLEEVE SMP-2006-001SS

## (undated) DEVICE — SU ETHILON 2-0 FS 18" 664H

## (undated) DEVICE — GLOVE PROTEXIS POWDER FREE 8.0 ORTHOPEDIC 2D73ET80

## (undated) DEVICE — TUBE FEEDING 08FR 42" 461800

## (undated) DEVICE — CLIP ENDO HEMO-LOC PURPLE LG 544240

## (undated) DEVICE — SU SILK 2-0 SH CR 5X18" C0125

## (undated) DEVICE — SU PROLENE 6-0 RB-2DA 30" 8711H

## (undated) DEVICE — BLADE SAW RECIP STRK 70X12.5X1.2MM 0277-096-281

## (undated) DEVICE — INSERT FOGARTY 33MM TRACTION HYDRAJAW HYDRA33

## (undated) DEVICE — DRAPE IOBAN INCISE 23X17" 6650EZ

## (undated) DEVICE — DRAPE U-POUCH 34X29" 1067

## (undated) DEVICE — SU FORCEFIBER #2 CO-BRAID WHITE/BLUE 38" 3910-900-020

## (undated) DEVICE — SU SILK 0 SH 30" K834H

## (undated) DEVICE — SUCTION MANIFOLD DORNOCH ULTRA CART UL-CL500

## (undated) DEVICE — EYE PREP BETADINE 5% SOLUTION 30ML 0065-0411-30

## (undated) DEVICE — SU SILK 2-0 SH 30" K833H

## (undated) DEVICE — SUCTION MANIFOLD NEPTUNE 2 SYS 4 PORT 0702-020-000

## (undated) DEVICE — SU PROLENE 5-0 RB-1DA 36"  8556H

## (undated) DEVICE — SU PROLENE 4-0 RB-1DA 36" 8557H

## (undated) DEVICE — SU SILK 0 TIE 6X30" A306H

## (undated) DEVICE — LIGHT HANDLE X2

## (undated) DEVICE — STPL ENDO RELOAD TA 30X2.5MM 010911L

## (undated) DEVICE — STPL ENDO TA30 2.5MM MULTIFIRE 010901

## (undated) DEVICE — TUBE FEEDING 08FR 20" 461701

## (undated) DEVICE — ADH SKIN CLOSURE PREMIERPRO EXOFIN 1.0ML 3470

## (undated) DEVICE — HOOD FLYTE W/PEELAWAY 408-800-100

## (undated) DEVICE — CLIP ETHICON LIGACLIP MED WHITE LT200

## (undated) DEVICE — SYR BULB IRRIG 50ML LATEX FREE 0035280

## (undated) DEVICE — SU SILK 3-0 SH CR 8X18" C013D

## (undated) DEVICE — SU PROLENE 3-0 SH-1 30" 8762H

## (undated) DEVICE — SU ETHIBOND 1 CTX CR 8/18" CX30D

## (undated) DEVICE — BRUSH SURGICAL SCRUB W/4% CHLORHEXIDINE GLUCONATE SOL 4458A

## (undated) DEVICE — STPL ENDO ARTICULATING 60MM EC60A

## (undated) DEVICE — TUBING SUCTION 10'X3/16" N510

## (undated) RX ORDER — HEPARIN SODIUM 1000 [USP'U]/ML
INJECTION, SOLUTION INTRAVENOUS; SUBCUTANEOUS
Status: DISPENSED
Start: 2018-08-30

## (undated) RX ORDER — FENTANYL CITRATE 50 UG/ML
INJECTION, SOLUTION INTRAMUSCULAR; INTRAVENOUS
Status: DISPENSED
Start: 2019-03-04

## (undated) RX ORDER — FENTANYL CITRATE 50 UG/ML
INJECTION, SOLUTION INTRAMUSCULAR; INTRAVENOUS
Status: DISPENSED
Start: 2018-08-30

## (undated) RX ORDER — DOBUTAMINE HYDROCHLORIDE 200 MG/100ML
INJECTION INTRAVENOUS
Status: DISPENSED
Start: 2018-02-27

## (undated) RX ORDER — PROPOFOL 10 MG/ML
INJECTION, EMULSION INTRAVENOUS
Status: DISPENSED
Start: 2018-09-01

## (undated) RX ORDER — FUROSEMIDE 10 MG/ML
INJECTION INTRAMUSCULAR; INTRAVENOUS
Status: DISPENSED
Start: 2018-08-30

## (undated) RX ORDER — FENTANYL CITRATE 50 UG/ML
INJECTION, SOLUTION INTRAMUSCULAR; INTRAVENOUS
Status: DISPENSED
Start: 2024-09-30

## (undated) RX ORDER — PROPOFOL 10 MG/ML
INJECTION, EMULSION INTRAVENOUS
Status: DISPENSED
Start: 2018-08-31

## (undated) RX ORDER — ALBUTEROL SULFATE 0.83 MG/ML
SOLUTION RESPIRATORY (INHALATION)
Status: DISPENSED
Start: 2018-02-26

## (undated) RX ORDER — PHENYLEPHRINE HCL IN 0.9% NACL 1 MG/10 ML
SYRINGE (ML) INTRAVENOUS
Status: DISPENSED
Start: 2018-09-01

## (undated) RX ORDER — LIDOCAINE HYDROCHLORIDE 20 MG/ML
INJECTION, SOLUTION EPIDURAL; INFILTRATION; INTRACAUDAL; PERINEURAL
Status: DISPENSED
Start: 2019-03-04

## (undated) RX ORDER — EPHEDRINE SULFATE 50 MG/ML
INJECTION, SOLUTION INTRAMUSCULAR; INTRAVENOUS; SUBCUTANEOUS
Status: DISPENSED
Start: 2018-09-01

## (undated) RX ORDER — DEXAMETHASONE SODIUM PHOSPHATE 4 MG/ML
INJECTION, SOLUTION INTRA-ARTICULAR; INTRALESIONAL; INTRAMUSCULAR; INTRAVENOUS; SOFT TISSUE
Status: DISPENSED
Start: 2019-03-04

## (undated) RX ORDER — DIPHENHYDRAMINE HYDROCHLORIDE 50 MG/ML
INJECTION INTRAMUSCULAR; INTRAVENOUS
Status: DISPENSED
Start: 2019-03-04

## (undated) RX ORDER — METOPROLOL TARTRATE 1 MG/ML
INJECTION, SOLUTION INTRAVENOUS
Status: DISPENSED
Start: 2018-02-27

## (undated) RX ORDER — ALBUMIN, HUMAN INJ 5% 5 %
SOLUTION INTRAVENOUS
Status: DISPENSED
Start: 2018-08-30

## (undated) RX ORDER — FENTANYL CITRATE-0.9 % NACL/PF 10 MCG/ML
PLASTIC BAG, INJECTION (ML) INTRAVENOUS
Status: DISPENSED
Start: 2021-03-29

## (undated) RX ORDER — CEFAZOLIN SODIUM 2 G/100ML
INJECTION, SOLUTION INTRAVENOUS
Status: DISPENSED
Start: 2019-03-04

## (undated) RX ORDER — PROPOFOL 10 MG/ML
INJECTION, EMULSION INTRAVENOUS
Status: DISPENSED
Start: 2021-03-29

## (undated) RX ORDER — SODIUM CHLORIDE 9 MG/ML
INJECTION, SOLUTION INTRAVENOUS
Status: DISPENSED
Start: 2024-09-30

## (undated) RX ORDER — ALBUTEROL SULFATE 0.83 MG/ML
SOLUTION RESPIRATORY (INHALATION)
Status: DISPENSED
Start: 2020-05-21

## (undated) RX ORDER — HYDROMORPHONE HYDROCHLORIDE 1 MG/ML
INJECTION, SOLUTION INTRAMUSCULAR; INTRAVENOUS; SUBCUTANEOUS
Status: DISPENSED
Start: 2018-08-31

## (undated) RX ORDER — LIDOCAINE HYDROCHLORIDE 10 MG/ML
INJECTION, SOLUTION EPIDURAL; INFILTRATION; INTRACAUDAL; PERINEURAL
Status: DISPENSED
Start: 2024-09-30

## (undated) RX ORDER — FENTANYL CITRATE 50 UG/ML
INJECTION, SOLUTION INTRAMUSCULAR; INTRAVENOUS
Status: DISPENSED
Start: 2021-03-29

## (undated) RX ORDER — FENTANYL CITRATE 50 UG/ML
INJECTION, SOLUTION INTRAMUSCULAR; INTRAVENOUS
Status: DISPENSED
Start: 2021-04-05

## (undated) RX ORDER — HYDROXYZINE HYDROCHLORIDE 25 MG/1
TABLET, FILM COATED ORAL
Status: DISPENSED
Start: 2019-03-04

## (undated) RX ORDER — CEFAZOLIN SODIUM 2 G/100ML
INJECTION, SOLUTION INTRAVENOUS
Status: DISPENSED
Start: 2021-03-29

## (undated) RX ORDER — OXYCODONE HYDROCHLORIDE 5 MG/1
TABLET ORAL
Status: DISPENSED
Start: 2019-03-04

## (undated) RX ORDER — PROPOFOL 10 MG/ML
INJECTION, EMULSION INTRAVENOUS
Status: DISPENSED
Start: 2019-03-04

## (undated) RX ORDER — HYDROMORPHONE HYDROCHLORIDE 1 MG/ML
INJECTION, SOLUTION INTRAMUSCULAR; INTRAVENOUS; SUBCUTANEOUS
Status: DISPENSED
Start: 2019-03-04

## (undated) RX ORDER — LIDOCAINE HYDROCHLORIDE 20 MG/ML
JELLY TOPICAL
Status: DISPENSED
Start: 2023-12-15

## (undated) RX ORDER — LIDOCAINE HYDROCHLORIDE 10 MG/ML
INJECTION, SOLUTION EPIDURAL; INFILTRATION; INTRACAUDAL; PERINEURAL
Status: DISPENSED
Start: 2021-04-05

## (undated) RX ORDER — ACETAMINOPHEN 325 MG/1
TABLET ORAL
Status: DISPENSED
Start: 2021-03-29

## (undated) RX ORDER — ONDANSETRON 2 MG/ML
INJECTION INTRAMUSCULAR; INTRAVENOUS
Status: DISPENSED
Start: 2019-03-04

## (undated) RX ORDER — PAPAVERINE HYDROCHLORIDE 30 MG/ML
INJECTION INTRAMUSCULAR; INTRAVENOUS
Status: DISPENSED
Start: 2018-08-30

## (undated) RX ORDER — HEPARIN SODIUM 200 [USP'U]/100ML
INJECTION, SOLUTION INTRAVENOUS
Status: DISPENSED
Start: 2021-04-05

## (undated) RX ORDER — PHENYLEPHRINE HCL IN 0.9% NACL 1 MG/10 ML
SYRINGE (ML) INTRAVENOUS
Status: DISPENSED
Start: 2019-03-04